# Patient Record
Sex: FEMALE | Race: WHITE | NOT HISPANIC OR LATINO | Employment: OTHER | URBAN - METROPOLITAN AREA
[De-identification: names, ages, dates, MRNs, and addresses within clinical notes are randomized per-mention and may not be internally consistent; named-entity substitution may affect disease eponyms.]

---

## 2017-03-09 ENCOUNTER — HOSPITAL ENCOUNTER (INPATIENT)
Facility: HOSPITAL | Age: 62
LOS: 4 days | Discharge: HOME HEALTH | DRG: 327 | End: 2017-03-13
Attending: EMERGENCY MEDICINE | Admitting: SURGERY
Payer: OTHER GOVERNMENT

## 2017-03-09 DIAGNOSIS — K56.7 ILEUS: ICD-10-CM

## 2017-03-09 DIAGNOSIS — F10.20: ICD-10-CM

## 2017-03-09 DIAGNOSIS — F10.20 ALCOHOLISM: ICD-10-CM

## 2017-03-09 DIAGNOSIS — F41.9 ANXIETY: ICD-10-CM

## 2017-03-09 DIAGNOSIS — M47.816 LUMBAR FACET ARTHROPATHY: ICD-10-CM

## 2017-03-09 DIAGNOSIS — K44.0 INCARCERATED HIATAL HERNIA: Primary | ICD-10-CM

## 2017-03-09 DIAGNOSIS — R07.9 CHEST PAIN, UNSPECIFIED TYPE: ICD-10-CM

## 2017-03-09 DIAGNOSIS — R10.13 ABDOMINAL PAIN, EPIGASTRIC: ICD-10-CM

## 2017-03-09 DIAGNOSIS — F31.9 BIPOLAR DISEASE, CHRONIC: ICD-10-CM

## 2017-03-09 DIAGNOSIS — F41.1 ANXIETY STATE, UNSPECIFIED: ICD-10-CM

## 2017-03-09 DIAGNOSIS — F33.2 MAJOR DEPRESSIVE DISORDER, RECURRENT EPISODE, SEVERE, WITHOUT MENTION OF PSYCHOTIC BEHAVIOR: ICD-10-CM

## 2017-03-09 DIAGNOSIS — M43.10 ACQUIRED SPONDYLOLISTHESIS: ICD-10-CM

## 2017-03-09 DIAGNOSIS — R00.0 TACHYCARDIA: ICD-10-CM

## 2017-03-09 DIAGNOSIS — F11.20 OPIATE DEPENDENCE, CONTINUOUS: ICD-10-CM

## 2017-03-09 DIAGNOSIS — M47.819 SPONDYLOSIS WITHOUT MYELOPATHY: ICD-10-CM

## 2017-03-09 DIAGNOSIS — M51.36 DDD (DEGENERATIVE DISC DISEASE), LUMBAR: Chronic | ICD-10-CM

## 2017-03-09 DIAGNOSIS — E07.9 THYROID DISEASE: ICD-10-CM

## 2017-03-09 DIAGNOSIS — M51.37 DEGENERATION OF LUMBAR OR LUMBOSACRAL INTERVERTEBRAL DISC: ICD-10-CM

## 2017-03-09 LAB
ALBUMIN SERPL BCP-MCNC: 3.7 G/DL
ALP SERPL-CCNC: 332 U/L
ALT SERPL W/O P-5'-P-CCNC: 70 U/L
AMPHET+METHAMPHET UR QL: NEGATIVE
ANION GAP SERPL CALC-SCNC: 14 MMOL/L
AST SERPL-CCNC: 151 U/L
BARBITURATES UR QL SCN>200 NG/ML: NEGATIVE
BASOPHILS # BLD AUTO: 0.05 K/UL
BASOPHILS NFR BLD: 1.5 %
BENZODIAZ UR QL SCN>200 NG/ML: NEGATIVE
BILIRUB SERPL-MCNC: 1 MG/DL
BILIRUB UR QL STRIP: NEGATIVE
BNP SERPL-MCNC: 25 PG/ML
BUN SERPL-MCNC: 3 MG/DL
BZE UR QL SCN: NEGATIVE
CALCIUM SERPL-MCNC: 8.5 MG/DL
CANNABINOIDS UR QL SCN: NEGATIVE
CHLORIDE SERPL-SCNC: 96 MMOL/L
CLARITY UR: CLEAR
CO2 SERPL-SCNC: 20 MMOL/L
COLOR UR: ABNORMAL
CREAT SERPL-MCNC: 0.6 MG/DL
CREAT UR-MCNC: 10.1 MG/DL
DIFFERENTIAL METHOD: ABNORMAL
EOSINOPHIL # BLD AUTO: 0 K/UL
EOSINOPHIL NFR BLD: 0.3 %
ERYTHROCYTE [DISTWIDTH] IN BLOOD BY AUTOMATED COUNT: 13.9 %
EST. GFR  (AFRICAN AMERICAN): >60 ML/MIN/1.73 M^2
EST. GFR  (NON AFRICAN AMERICAN): >60 ML/MIN/1.73 M^2
ETHANOL SERPL-MCNC: 264 MG/DL
GLUCOSE SERPL-MCNC: 120 MG/DL
GLUCOSE UR QL STRIP: NEGATIVE
HCT VFR BLD AUTO: 41 %
HGB BLD-MCNC: 14.9 G/DL
HGB UR QL STRIP: NEGATIVE
INR PPP: 1.1
KETONES UR QL STRIP: NEGATIVE
LACTATE SERPL-SCNC: 3.3 MMOL/L
LEUKOCYTE ESTERASE UR QL STRIP: ABNORMAL
LIPASE SERPL-CCNC: 35 U/L
LYMPHOCYTES # BLD AUTO: 1 K/UL
LYMPHOCYTES NFR BLD: 31.5 %
MCH RBC QN AUTO: 32.5 PG
MCHC RBC AUTO-ENTMCNC: 36.3 %
MCV RBC AUTO: 89 FL
METHADONE UR QL SCN>300 NG/ML: NEGATIVE
MICROSCOPIC COMMENT: NORMAL
MONOCYTES # BLD AUTO: 0.3 K/UL
MONOCYTES NFR BLD: 9.5 %
NEUTROPHILS # BLD AUTO: 1.9 K/UL
NEUTROPHILS NFR BLD: 56.9 %
NITRITE UR QL STRIP: NEGATIVE
OPIATES UR QL SCN: NEGATIVE
PCP UR QL SCN>25 NG/ML: NEGATIVE
PH UR STRIP: 6 [PH] (ref 5–8)
PLATELET # BLD AUTO: 258 K/UL
PMV BLD AUTO: 10.2 FL
POTASSIUM SERPL-SCNC: 3.8 MMOL/L
PROT SERPL-MCNC: 8.5 G/DL
PROT UR QL STRIP: NEGATIVE
PROTHROMBIN TIME: 11.2 SEC
RBC # BLD AUTO: 4.59 M/UL
SODIUM SERPL-SCNC: 130 MMOL/L
SP GR UR STRIP: 1 (ref 1–1.03)
SQUAMOUS #/AREA URNS HPF: 1 /HPF
TOXICOLOGY INFORMATION: ABNORMAL
TROPONIN I SERPL DL<=0.01 NG/ML-MCNC: <0.006 NG/ML
TROPONIN I SERPL DL<=0.01 NG/ML-MCNC: <0.006 NG/ML
URN SPEC COLLECT METH UR: ABNORMAL
UROBILINOGEN UR STRIP-ACNC: NEGATIVE EU/DL
WBC # BLD AUTO: 3.27 K/UL
WBC #/AREA URNS HPF: 1 /HPF (ref 0–5)

## 2017-03-09 PROCEDURE — 96365 THER/PROPH/DIAG IV INF INIT: CPT

## 2017-03-09 PROCEDURE — 93005 ELECTROCARDIOGRAM TRACING: CPT

## 2017-03-09 PROCEDURE — 84484 ASSAY OF TROPONIN QUANT: CPT

## 2017-03-09 PROCEDURE — 83690 ASSAY OF LIPASE: CPT

## 2017-03-09 PROCEDURE — 83880 ASSAY OF NATRIURETIC PEPTIDE: CPT

## 2017-03-09 PROCEDURE — 25000003 PHARM REV CODE 250: Performed by: INTERNAL MEDICINE

## 2017-03-09 PROCEDURE — 63600175 PHARM REV CODE 636 W HCPCS: Performed by: EMERGENCY MEDICINE

## 2017-03-09 PROCEDURE — 80320 DRUG SCREEN QUANTALCOHOLS: CPT

## 2017-03-09 PROCEDURE — 85610 PROTHROMBIN TIME: CPT

## 2017-03-09 PROCEDURE — 80053 COMPREHEN METABOLIC PANEL: CPT

## 2017-03-09 PROCEDURE — 96366 THER/PROPH/DIAG IV INF ADDON: CPT

## 2017-03-09 PROCEDURE — 96375 TX/PRO/DX INJ NEW DRUG ADDON: CPT

## 2017-03-09 PROCEDURE — 25000003 PHARM REV CODE 250: Performed by: EMERGENCY MEDICINE

## 2017-03-09 PROCEDURE — 80307 DRUG TEST PRSMV CHEM ANLYZR: CPT | Mod: 59

## 2017-03-09 PROCEDURE — 83605 ASSAY OF LACTIC ACID: CPT

## 2017-03-09 PROCEDURE — 82570 ASSAY OF URINE CREATININE: CPT

## 2017-03-09 PROCEDURE — 63600175 PHARM REV CODE 636 W HCPCS: Performed by: INTERNAL MEDICINE

## 2017-03-09 PROCEDURE — 25500020 PHARM REV CODE 255: Performed by: EMERGENCY MEDICINE

## 2017-03-09 PROCEDURE — 99285 EMERGENCY DEPT VISIT HI MDM: CPT | Mod: 25

## 2017-03-09 PROCEDURE — 96376 TX/PRO/DX INJ SAME DRUG ADON: CPT

## 2017-03-09 PROCEDURE — 81000 URINALYSIS NONAUTO W/SCOPE: CPT

## 2017-03-09 PROCEDURE — 85025 COMPLETE CBC W/AUTO DIFF WBC: CPT

## 2017-03-09 PROCEDURE — 12000002 HC ACUTE/MED SURGE SEMI-PRIVATE ROOM

## 2017-03-09 PROCEDURE — C9113 INJ PANTOPRAZOLE SODIUM, VIA: HCPCS | Performed by: INTERNAL MEDICINE

## 2017-03-09 RX ORDER — FAMOTIDINE 10 MG/ML
20 INJECTION INTRAVENOUS EVERY 12 HOURS
Status: DISCONTINUED | OUTPATIENT
Start: 2017-03-09 | End: 2017-03-09

## 2017-03-09 RX ORDER — LEVOTHYROXINE SODIUM ANHYDROUS 100 UG/5ML
12.5 INJECTION, POWDER, LYOPHILIZED, FOR SOLUTION INTRAVENOUS DAILY
Status: DISCONTINUED | OUTPATIENT
Start: 2017-03-10 | End: 2017-03-13 | Stop reason: HOSPADM

## 2017-03-09 RX ORDER — MORPHINE SULFATE 10 MG/ML
4 INJECTION INTRAMUSCULAR; INTRAVENOUS; SUBCUTANEOUS EVERY 4 HOURS PRN
Status: DISCONTINUED | OUTPATIENT
Start: 2017-03-09 | End: 2017-03-11

## 2017-03-09 RX ORDER — ONDANSETRON 2 MG/ML
4 INJECTION INTRAMUSCULAR; INTRAVENOUS
Status: COMPLETED | OUTPATIENT
Start: 2017-03-09 | End: 2017-03-09

## 2017-03-09 RX ORDER — METOPROLOL TARTRATE 1 MG/ML
5 INJECTION, SOLUTION INTRAVENOUS EVERY 8 HOURS
Status: DISCONTINUED | OUTPATIENT
Start: 2017-03-09 | End: 2017-03-10

## 2017-03-09 RX ORDER — SODIUM CHLORIDE 9 MG/ML
INJECTION, SOLUTION INTRAVENOUS CONTINUOUS
Status: DISCONTINUED | OUTPATIENT
Start: 2017-03-09 | End: 2017-03-10

## 2017-03-09 RX ORDER — LORAZEPAM 2 MG/ML
1 INJECTION INTRAMUSCULAR EVERY 4 HOURS PRN
Status: DISCONTINUED | OUTPATIENT
Start: 2017-03-09 | End: 2017-03-13 | Stop reason: HOSPADM

## 2017-03-09 RX ORDER — ASPIRIN 325 MG
325 TABLET ORAL
Status: COMPLETED | OUTPATIENT
Start: 2017-03-09 | End: 2017-03-09

## 2017-03-09 RX ORDER — ONDANSETRON 2 MG/ML
4 INJECTION INTRAMUSCULAR; INTRAVENOUS EVERY 12 HOURS PRN
Status: DISCONTINUED | OUTPATIENT
Start: 2017-03-09 | End: 2017-03-13 | Stop reason: HOSPADM

## 2017-03-09 RX ORDER — ENOXAPARIN SODIUM 100 MG/ML
40 INJECTION SUBCUTANEOUS EVERY 24 HOURS
Status: DISCONTINUED | OUTPATIENT
Start: 2017-03-10 | End: 2017-03-10

## 2017-03-09 RX ORDER — LORAZEPAM 2 MG/ML
1 INJECTION INTRAMUSCULAR
Status: COMPLETED | OUTPATIENT
Start: 2017-03-09 | End: 2017-03-09

## 2017-03-09 RX ORDER — PANTOPRAZOLE SODIUM 40 MG/10ML
40 INJECTION, POWDER, LYOPHILIZED, FOR SOLUTION INTRAVENOUS DAILY
Status: DISCONTINUED | OUTPATIENT
Start: 2017-03-09 | End: 2017-03-13 | Stop reason: HOSPADM

## 2017-03-09 RX ADMIN — PROMETHAZINE HYDROCHLORIDE 6.25 MG: 25 INJECTION, SOLUTION INTRAMUSCULAR; INTRAVENOUS at 07:03

## 2017-03-09 RX ADMIN — MORPHINE SULFATE 4 MG: 10 INJECTION INTRAVENOUS at 08:03

## 2017-03-09 RX ADMIN — METOPROLOL TARTRATE 5 MG: 5 INJECTION INTRAVENOUS at 04:03

## 2017-03-09 RX ADMIN — LORAZEPAM 1 MG: 2 INJECTION, SOLUTION INTRAMUSCULAR; INTRAVENOUS at 09:03

## 2017-03-09 RX ADMIN — SODIUM CHLORIDE: 0.9 INJECTION, SOLUTION INTRAVENOUS at 09:03

## 2017-03-09 RX ADMIN — PANTOPRAZOLE SODIUM 40 MG: 40 INJECTION, POWDER, FOR SOLUTION INTRAVENOUS at 04:03

## 2017-03-09 RX ADMIN — ASPIRIN 325 MG ORAL TABLET 325 MG: 325 PILL ORAL at 11:03

## 2017-03-09 RX ADMIN — ONDANSETRON 4 MG: 2 INJECTION INTRAMUSCULAR; INTRAVENOUS at 05:03

## 2017-03-09 RX ADMIN — ONDANSETRON 4 MG: 2 INJECTION INTRAMUSCULAR; INTRAVENOUS at 12:03

## 2017-03-09 RX ADMIN — PROMETHAZINE HYDROCHLORIDE 25 MG: 25 INJECTION, SOLUTION INTRAMUSCULAR; INTRAVENOUS at 11:03

## 2017-03-09 RX ADMIN — IOHEXOL 100 ML: 350 INJECTION, SOLUTION INTRAVENOUS at 01:03

## 2017-03-09 RX ADMIN — MORPHINE SULFATE 4 MG: 10 INJECTION INTRAVENOUS at 04:03

## 2017-03-09 RX ADMIN — LORAZEPAM 1 MG: 2 INJECTION, SOLUTION INTRAMUSCULAR; INTRAVENOUS at 01:03
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
## 2017-03-09 NOTE — IP AVS SNAPSHOT
[place] [address] [place] [address]  Phone: [phone]           Patient Discharge Instructions     Our goal is to set you up for success. This packet includes information on your condition, medications, and your home care. It will help you to care for yourself so you don't get sicker and need to go back to the hospital.     Please ask your nurse if you have any questions.        There are many details to remember when preparing to leave the hospital. Here is what you will need to do:    1. Take your medicine. If you are prescribed medications, review your Medication List in the following pages. You may have new medications to  at the pharmacy and others that you'll need to stop taking. Review the instructions for how and when to take your medications. Talk with your doctor or nurses if you are unsure of what to do.     2. Go to your follow-up appointments. Specific follow-up information is listed in the following pages. Your may be contacted by a transition nurse or clinical provider about future appointments. Be sure we have all of the phone numbers to reach you, if needed. Please contact your provider's office if you are unable to make an appointment.     3. Watch for warning signs. Your doctor or nurse will give you detailed warning signs to watch for and when to call for assistance. These instructions may also include educational information about your condition. If you experience any of warning signs to your health, call your doctor.               ** Verify the list of medication(s) below is accurate and up to date. Carry this with you in case of emergency. If your medications have changed, please notify your healthcare provider.             Medication List      CHANGE how you take these medications        Additional Info                      * oxycodone 20 mg Tab immediate release tablet   Commonly known as:  ROXICODONE   Refills:  0   What changed:  Another  medication with the same name was added. Make sure you understand how and when to take each.    Instructions:  TK 1 T PO  Q 6-8 H PRN     Begin Date    AM    Noon    PM    Bedtime       * oxycodone 5 mg/5 mL Soln   Commonly known as:  ROXICODONE   Quantity:  4 Bottle   Refills:  0   Dose:  10 mg   What changed:  You were already taking a medication with the same name, and this prescription was added. Make sure you understand how and when to take each.    Last time this was given:  10 mg on 3/13/2017  9:45 AM   Instructions:  Take 10 mLs (10 mg total) by mouth every 4 (four) hours as needed.     Begin Date    AM    Noon    PM    Bedtime       * Notice:  This list has 2 medication(s) that are the same as other medications prescribed for you. Read the directions carefully, and ask your doctor or other care provider to review them with you.      CONTINUE taking these medications        Additional Info                      amitriptyline 50 MG tablet   Commonly known as:  ELAVIL   Quantity:  90 tablet   Refills:  0   Dose:  50 mg    Instructions:  Take 1 tablet (50 mg total) by mouth every evening.     Begin Date    AM    Noon    PM    Bedtime       atenolol 25 MG tablet   Commonly known as:  TENORMIN   Refills:  3    Instructions:  TK 1 T PO  D     Begin Date    AM    Noon    PM    Bedtime       capsicum 0.075% 0.075 % topical cream   Commonly known as:  TRIXAICIN HP   Quantity:  56 g   Refills:  1    Instructions:  Apply topically 3 (three) times daily.     Begin Date    AM    Noon    PM    Bedtime       clonazePAM 1 MG tablet   Commonly known as:  KLONOPIN   Quantity:  60 tablet   Refills:  0    Instructions:  TAKE 1 TABLET BY MOUTH TWICE DAILY     Begin Date    AM    Noon    PM    Bedtime       cyclobenzaprine 10 MG tablet   Commonly known as:  FLEXERIL   Quantity:  90 tablet   Refills:  0    Instructions:  TAKE 1 TABLET(10 MG) BY MOUTH THREE TIMES DAILY AS NEEDED FOR MUSCLE SPASMS     Begin Date    AM    Noon    PM     Bedtime       duloxetine 30 MG capsule   Commonly known as:  CYMBALTA   Quantity:  90 capsule   Refills:  2   Dose:  30 mg    Instructions:  Take 1 capsule (30 mg total) by mouth once daily.     Begin Date    AM    Noon    PM    Bedtime       gabapentin 300 MG capsule   Commonly known as:  NEURONTIN   Refills:  3    Instructions:  TK ONE C PO Q 8 H     Begin Date    AM    Noon    PM    Bedtime       JUBLIA 10 % Darlin   Quantity:  24 mL   Refills:  0   Generic drug:  efinaconazole    Instructions:  APPLY ONE DOSE ONCE DAILY     Begin Date    AM    Noon    PM    Bedtime       levothyroxine 25 MCG tablet   Commonly known as:  SYNTHROID   Quantity:  90 tablet   Refills:  5   Dose:  25 mcg   Comments:  **Patient requests 90 days supply**    Instructions:  Take 1 tablet (25 mcg total) by mouth once daily.     Begin Date    AM    Noon    PM    Bedtime       nitroGLYCERIN 0.3 MG SL tablet   Commonly known as:  NITROSTAT   Quantity:  30 tablet   Refills:  0   Dose:  0.3 mg    Instructions:  Place 1 tablet (0.3 mg total) under the tongue every 5 (five) minutes as needed for Chest pain.     Begin Date    AM    Noon    PM    Bedtime       pantoprazole 40 MG tablet   Commonly known as:  PROTONIX   Quantity:  90 tablet   Refills:  5   Dose:  40 mg   Comments:  **Patient requests 90 days supply**    Instructions:  Take 1 tablet (40 mg total) by mouth once daily.     Begin Date    AM    Noon    PM    Bedtime       RESTASIS 0.05 % ophthalmic emulsion   Quantity:  30 each   Refills:  3   Generic drug:  cycloSPORINE    Instructions:  INSTILL 1 DROP INTO BOTH EYES TWICE DAILY     Begin Date    AM    Noon    PM    Bedtime       zolpidem 5 MG Tab   Commonly known as:  AMBIEN   Quantity:  30 tablet   Refills:  1    Instructions:  TK 1 T PO QHS PRN     Begin Date    AM    Noon    PM    Bedtime            Where to Get Your Medications      You can get these medications from any pharmacy     Bring a paper prescription for each of these  medications     oxycodone 5 mg/5 mL Soln                  Please bring to all follow up appointments:    1. A copy of your discharge instructions.  2. All medicines you are currently taking in their original bottles.  3. Identification and insurance card.    Please arrive 15 minutes ahead of scheduled appointment time.    Please call 24 hours in advance if you must reschedule your appointment and/or time.        Your Scheduled Appointments     Mar 23, 2017 10:10 AM CDT   Post Op-OCC with Vini Gomez MD   [facility], [facility] ([facility])    [address] [facility] [address]  [facility] [address]   [phone]              Follow-up Information     Follow up with Vini Gomez MD. Go on 3/23/2017.    Specialties:  General Surgery, Bariatrics    Why:  For Appointment on Thursday 3/23/2017 @ 9:30AM    Contact information:    [address] [place] [address] [address]  [facility] [address]  [phone]          Follow up with Jameson Cornejo MD. Go on 3/22/2017.    Specialty:  Gastroenterology    Why:  For Appointment on Wednesday 3/22/2017 @ 12:45PM    Contact information:    [address] [place] [address]  [address]  [facility]  [place] [address]  [phone]        Referrals     Future Orders    Ambulatory referral to Home Health         Discharge Instructions     Future Orders    Activity as tolerated     Call MD for:  difficulty breathing or increased cough     Call MD for:  persistent nausea and vomiting or diarrhea     Call MD for:  redness, tenderness, or signs of infection (pain, swelling, redness, odor or green/yellow discharge around incision site)     Call MD for:  severe uncontrolled pain     Call MD for:  temperature >100.4     Diet general     Questions:    Total calories:      Fat restriction, if any:      Protein restriction, if any:      Na restriction, if any:      Fluid restriction:      Additional restrictions:      No dressing needed       Discharge  "References/Attachments     HIATAL HERNIA, WHAT IS A (ENGLISH)        Primary Diagnosis     Your primary diagnosis was:  Hiatal Hernia With Obstruction But No Gangrene      Admission Information     Date & Time Provider Department CSN    3/9/2017 10:23 AM Vini Gomez MD [facility] [id number]      Care Providers     Provider Role Specialty Primary office phone    Vini Gomez MD Attending Provider General Surgery [phone]    Yane Pineda MD Consulting Physician  Hospitalist [phone]    Jameson Cornejo MD Consulting Physician  Gastroenterology [phone]    Arnulfo Benton MD Surgeon  Gastroenterology [phone]    Vini Gomez MD Surgeon  General Surgery [phone]      Your Vitals Were     BP Pulse Temp Resp Height Weight    132/85 116 99.9 °F (37.7 °C) (Oral) 18 5' 5" (1.651 m) 74.5 kg (164 lb 4.6 oz)    Last Period SpO2 BMI          (Approximate) 93% 27.34 kg/m2        Recent Lab Values        2/17/2015                           6:08 AM           A1C 5.4                       Pending Labs     Order Current Status    Specimen to Pathology - Surgery Collected (03/10/17 1618)      Allergies as of 3/13/2017     No Known Allergies      [facility]     [facility] Nurse Care Line - 24/7 Assistance  Unless otherwise directed by your provider, please contact [facility], our nurse care line that is available for 24/7 assistance.     Registered nurses in the [facility] provide clinical advisement, health education, appointment booking, and other advisory services.  Call for this free service at [phone].        Advance Directives     An advance directive is a document which, in the event you are no longer able to make decisions for yourself, tells your healthcare team what kind of treatment you do or do not want to receive, or who you would like to make those decisions for you.  If you do not currently have an advance directive, [facility] encourages " you to create one.  For more information call:  [phone]), [phone] ([phone]),  or log on to [email or web address].        Language Assistance Services     ATTENTION: Language assistance services are available, free of charge. Please call [phone].      ATENCIÓN: Si habla español, tiene a quintero disposición servicios gratuitos de asistencia lingüística. Llame al [phone].     [facility] Ý: N?u b?n nói Ti?ng Vi?t, có các d?ch v? h? tr? ngôn ng? mi?n phí dành cho b?n. G?i s? [phone].        [facility] Sign-Up     Activating your [facility] account is as easy as 1-2-3!     1) Visit [email or web address], select Sign Up Now, enter this activation code and your date of birth, then select Next.  [email or web address]  Expires: 4/27/2017 12:34 PM      2) Create a username and password to use when you visit [facility] in the future and select a security question in case you lose your password and select Next.    3) Enter your e-mail address and click Sign Up!    Additional Information  If you have questions, please e-mail [email or web address] or call [phone] to talk to our [facility] staff. Remember, [facility] is NOT to be used for urgent needs. For medical emergencies, dial 911.          [facility] complies with applicable Federal civil rights laws and does not discriminate on the basis of race, color, national origin, age, disability, or sex.

## 2017-03-09 NOTE — CONSULTS
Reason for Consult:  N/V, Chest Pain    HPI:  Pt is a 62 y.o. female who presents with chest pain x 1 day.  Sx's have been ongoing, intermittently over a few months, yet became acutely severe last night.  + associated N/V, non-bloody.  Pt. Found to have abnormal imaging, consistent with volvulus of hiatal hernia.  No alleviating/exacerbating factors.  No associated abd. Pain, F/C, wt. Loss.  No dysphagia, GERD sx's, yellowing of eyes/skin.  No diarrhea/constipation.  No blood in her stool nor black/tarry stools.      Pt. Had EGD, C-scope in 2013      Past Medical History:   Diagnosis Date    Alcohol abuse     Anxiety     Bipolar disorder     Cirrhosis of liver     Depression     Fall     GERD (gastroesophageal reflux disease)     Low back pain     Thyroid disease        Past Surgical History:   Procedure Laterality Date    JOINT REPLACEMENT      KNEE SURGERY  bilateral replacement    right foot sx  2008    SHOULDER SURGERY  replacement       Family History   Problem Relation Age of Onset    Cancer Mother     Cancer Father        Social History     Social History    Marital status:      Spouse name: N/A    Number of children: N/A    Years of education: N/A     Occupational History    Not on file.     Social History Main Topics    Smoking status: Never Smoker    Smokeless tobacco: Never Used    Alcohol use 14.4 oz/week     24 Cans of beer per week    Drug use: No    Sexual activity: Not on file     Other Topics Concern    Not on file     Social History Narrative       Endoscopic History:  EGD - 2013 - Large HH, no varices    Review of patient's allergies indicates:  No Known Allergies    No current facility-administered medications on file prior to encounter.      Current Outpatient Prescriptions on File Prior to Encounter   Medication Sig Dispense Refill    oxycodone 20 mg Tab TK 1 T PO  Q 6-8 H PRN  0    amitriptyline (ELAVIL) 50 MG tablet Take 1 tablet (50 mg total) by mouth every  evening. 90 tablet 0    atenolol (TENORMIN) 25 MG tablet TK 1 T PO  D  3    capsicum 0.075% (TRIXAICIN HP) 0.075 % topical cream Apply topically 3 (three) times daily. 56 g 1    clonazePAM (KLONOPIN) 1 MG tablet TAKE 1 TABLET BY MOUTH TWICE DAILY 60 tablet 0    cyclobenzaprine (FLEXERIL) 10 MG tablet TAKE 1 TABLET(10 MG) BY MOUTH THREE TIMES DAILY AS NEEDED FOR MUSCLE SPASMS 90 tablet 0    duloxetine (CYMBALTA) 30 MG capsule Take 1 capsule (30 mg total) by mouth once daily. 90 capsule 2    gabapentin (NEURONTIN) 300 MG capsule TK ONE C PO Q 8 H  3    JUBLIA 10 % Eran APPLY ONE DOSE ONCE DAILY 24 mL 0    levothyroxine (SYNTHROID) 25 MCG tablet Take 1 tablet (25 mcg total) by mouth once daily. 90 tablet 5    nitroGLYCERIN (NITROSTAT) 0.3 MG SL tablet Place 1 tablet (0.3 mg total) under the tongue every 5 (five) minutes as needed for Chest pain. 30 tablet 0    pantoprazole (PROTONIX) 40 MG tablet Take 1 tablet (40 mg total) by mouth once daily. 90 tablet 5    RESTASIS 0.05 % ophthalmic emulsion INSTILL 1 DROP INTO BOTH EYES TWICE DAILY 30 each 3    zolpidem (AMBIEN) 5 MG Tab TK 1 T PO QHS PRN 30 tablet 1     Scheduled Meds:   [START ON 3/10/2017] levothyroxine  12.5 mcg Intravenous Daily    metoprolol  5 mg Intravenous Q8H    pantoprazole  40 mg Intravenous Daily     Continuous Infusions:   PRN Meds:.lorazepam    Review of Systems:  CONSTITUTIONAL: Negative for weakness, fever, weight loss, weight gain.  HEENT: Eyes: Negative for double/blurred vision. Ears: Negative pain or loss of hearing. Nose:Negative for nasal congestion. Negative for rhinorrhea Mouth: Negative for dry mouth/pain Throat: Negative for masses or hoarseness.  CARDIOVASCULAR: Negative for chest pain or palpitations.  RESPIRATORY: Negative for SOB, wheezes  GASTROINTESTINAL: See HPI  GENITOURINARY: Negative for dysuria/hematuria.  MUSCULOSKELETAL: Negative for osteoarthritis, muscle pain.  SKIN: Negative for rashes/lesions.  NEUROLOGIC:  "Negative for headaches, numbness/tingling.  ENDOCRINE: Negative for diabetes, + thyroid abnormalities.  HEMATOLOGIC: Negative for anemia or blood dyscrasias.    Patient Vitals for the past 24 hrs:   BP Temp Temp src Pulse Resp SpO2 Height Weight   03/09/17 1011 (!) 121/92 97.9 °F (36.6 °C) Oral 107 18 (!) 92 % 5' 5" (1.651 m) 72.6 kg (160 lb)       Gen: Well developed, well nourished, no apparent distress, cooperative  HEENT: Anicteric, PERRLA, normocephalic, neck symmetrical  CV: S1, S2, no murmers/rubs, non-displaced PMI  Lungs: CTA-B, normal excursion  Abd: Soft, NT, ND, normal BS's, no HSM  Ext: No c/c/e, 1+ DP pulses to BLE's  Neuro: CN II-XII grossly intact, no asterixis.  Skin: No rashes/lesions, normal texture  Psych: AA&O x 4, normal affect    Labs:    Recent Labs  Lab 03/09/17  1158   CALCIUM 8.5*   PROT 8.5*   *   K 3.8   CO2 20*   CL 96   BUN 3*   CREATININE 0.6   ALKPHOS 332*   ALT 70*   *   BILITOT 1.0     Recent Results (from the past 336 hour(s))   CBC auto differential    Collection Time: 03/09/17 11:58 AM   Result Value Ref Range    WBC 3.27 (L) 3.90 - 12.70 K/uL    Hemoglobin 14.9 12.0 - 16.0 g/dL    Hematocrit 41.0 37.0 - 48.5 %    Platelets 258 150 - 350 K/uL       Recent Labs  Lab 03/09/17  1158   INR 1.1       Imaging:  CT:        Hiatal hernia with organoaxial volvulus.         Assessment:  Pt. Is a 62 y.o. female with:  1. Hiatal Hernia with volvulus - Concerning for necrosis (elevated Lactate).  2. Abnormal LFT's - + Hx of EtOH intake.  Continues to consume EtOH.     3. 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 Depression/Anxiety, Bipolar, Thyroid disease....    Recommendations:  1. Monitor for sx's.  2. Gen Surg following to consider surgical intervention.  3. EGD tomorrow to evaluate.  4. EtOH cessation counseling.        I would like to take this opportunity to thank you for this consult.  If you have any questions or concerns, please do not hesitate to contact me.         "

## 2017-03-09 NOTE — ASSESSMENT & PLAN NOTE
Routine labs, CXR and EKG reviewed. Patient is at low risk for perioperative cardiopulmonary complications based on the 2014 ACC/AHA Guideline on Perioperative Cardiovascular Evaluation and Management of patients undergoing noncardiac surgery. Patient with no active cardiac issues. Patient with good functional mets > 4 and with no significant cardiac clinical risk factors for intermediate-high risk (if emergent) surgery. Recommend to proceed to surgery as planned with no additional non-invasive cardiac testing required. Given her active alcohol use, I would recommend initiating on a diazepam taper to avoid complications of withdrawal that would delay surgery. Also to initiate atenolol and provide with IVFs to support lactic acidemia and presenting alcoholic hepatitis. Hold gabapentin, flexeril, klonopin, amitriptyline and duloxetine until after surgery. Pending tox screen to rule out other substances used prior to presentation.

## 2017-03-09 NOTE — SUBJECTIVE & OBJECTIVE
Past Medical History:   Diagnosis Date    Alcohol abuse     Anxiety     Bipolar disorder     Cirrhosis of liver     Depression     Fall     GERD (gastroesophageal reflux disease)     Low back pain     Thyroid disease        Past Surgical History:   Procedure Laterality Date    JOINT REPLACEMENT      KNEE SURGERY  bilateral replacement    right foot sx  2008    SHOULDER SURGERY  replacement       Review of patient's allergies indicates:  No Known Allergies    No current facility-administered medications on file prior to encounter.      Current Outpatient Prescriptions on File Prior to Encounter   Medication Sig    oxycodone 20 mg Tab TK 1 T PO  Q 6-8 H PRN    amitriptyline (ELAVIL) 50 MG tablet Take 1 tablet (50 mg total) by mouth every evening.    atenolol (TENORMIN) 25 MG tablet TK 1 T PO  D    capsicum 0.075% (TRIXAICIN HP) 0.075 % topical cream Apply topically 3 (three) times daily.    clonazePAM (KLONOPIN) 1 MG tablet TAKE 1 TABLET BY MOUTH TWICE DAILY    cyclobenzaprine (FLEXERIL) 10 MG tablet TAKE 1 TABLET(10 MG) BY MOUTH THREE TIMES DAILY AS NEEDED FOR MUSCLE SPASMS    duloxetine (CYMBALTA) 30 MG capsule Take 1 capsule (30 mg total) by mouth once daily.    gabapentin (NEURONTIN) 300 MG capsule TK ONE C PO Q 8 H    JUBLIA 10 % Eran APPLY ONE DOSE ONCE DAILY    levothyroxine (SYNTHROID) 25 MCG tablet Take 1 tablet (25 mcg total) by mouth once daily.    nitroGLYCERIN (NITROSTAT) 0.3 MG SL tablet Place 1 tablet (0.3 mg total) under the tongue every 5 (five) minutes as needed for Chest pain.    pantoprazole (PROTONIX) 40 MG tablet Take 1 tablet (40 mg total) by mouth once daily.    RESTASIS 0.05 % ophthalmic emulsion INSTILL 1 DROP INTO BOTH EYES TWICE DAILY    zolpidem (AMBIEN) 5 MG Tab TK 1 T PO QHS PRN     Family History     Problem Relation (Age of Onset)    Cancer Mother, Father        Social History Main Topics    Smoking status: Never Smoker    Smokeless tobacco: Never Used     Alcohol use 14.4 oz/week     24 Cans of beer per week    Drug use: No    Sexual activity: Not on file     Review of Systems   Constitutional: Negative.    HENT: Negative.    Eyes: Negative.    Respiratory: Negative.  Negative for cough and shortness of breath.    Cardiovascular: Positive for chest pain. Negative for palpitations and leg swelling.   Gastrointestinal: Positive for diarrhea, nausea and vomiting. Negative for abdominal pain.   Endocrine: Negative.    Genitourinary: Negative.    Musculoskeletal: Negative.    Skin: Negative.    Allergic/Immunologic: Negative.    Neurological: Negative.    Hematological: Negative.    Psychiatric/Behavioral: The patient is nervous/anxious.      Objective:     Vital Signs (Most Recent):  Temp: 97.9 °F (36.6 °C) (03/09/17 1011)  Pulse: 107 (03/09/17 1011)  Resp: 18 (03/09/17 1011)  BP: (!) 121/92 (03/09/17 1011)  SpO2: (!) 92 % (03/09/17 1011) Vital Signs (24h Range):  Temp:  [97.9 °F (36.6 °C)] 97.9 °F (36.6 °C)  Pulse:  [107] 107  Resp:  [18] 18  SpO2:  [92 %] 92 %  BP: (121)/(92) 121/92     Weight: 72.6 kg (160 lb)  Body mass index is 26.63 kg/(m^2).    Physical Exam   Constitutional: She is oriented to person, place, and time. She appears well-developed. No distress.   Appears older than stated age   HENT:   Head: Normocephalic and atraumatic.   Mouth/Throat: Oropharynx is clear and moist.   Eyes: Conjunctivae and EOM are normal. No scleral icterus.   Neck: Normal range of motion. Neck supple. No thyromegaly present.   Cardiovascular: Normal rate, regular rhythm, normal heart sounds and intact distal pulses.    No murmur heard.  Pulmonary/Chest: Effort normal. She has no wheezes. She has no rales.   Decreased breath sounds on left lower lobe  Breathing comfortably at room air   Abdominal: Soft. She exhibits no distension. There is no tenderness. There is no guarding.   Hypoactive bowel sounds   Musculoskeletal: Normal range of motion. She exhibits no edema.    Neurological: She is alert and oriented to person, place, and time. No cranial nerve deficit.   Skin: Skin is warm and dry. She is not diaphoretic.   Vitals reviewed.      Significant Labs: All pertinent labs within the past 24 hours have been reviewed.    Significant Imaging: I have reviewed all pertinent imaging results/findings within the past 24 hours.

## 2017-03-09 NOTE — CONSULTS
"[facility] Hospital Medicine  Consult Note    Patient Name: Estella Arevalo  MRN: [id number]  Admission Date: 3/9/2017  Hospital Length of Stay: 0 days  Attending Physician: Salvador Wells MD   Primary Care Provider: Angela Packer MD           Patient information was obtained from patient and ER records.     Consults  Subjective:     Principal Problem: Incarcerated hiatal hernia    Chief Complaint:   Chief Complaint   Patient presents with    Chest Pain     mid-sternal CP since last night    Vomiting     multiple episodes of vomiting with nausea        HPI: 62 year old female with self reported past MI, alcohol dependence, essential hypertension, narcotic use dependence, acquired hypothyroidism, chronic back pain, depression, anxiety and chronic Hep C who presented via EMS for worsening nausea, vomiting, diarrhea and "stabbing" chest pain for one day in evolution. Stated she's had chest pain for months. It is described as intermittent, left sided, worsening with movement, and 7/10 in intensity at its worst. Not associated with n/v nor worsening with PO intake. Her chest pain yesterday was described as same chest pain she's had, but with new addition of n/v of bilious fluid. This self reported MI was described as "I saw my doctor for this pain. She did an EKG and showed a defect. I had a heart attack". Was referred to cardiology but never went. No history of cardiac revascularization nor angiography. Has no signs of CHF. No perceived murmur on exam to suggest valvular disease. No diabetes nor renal insufficiency. Today, EKG had T wave inversion in V1 and V2, with occasional PVCs, NSR and with normal QTc. Otherwise normal. Troponin negative. On chest XRay it was noted she had a very significant for "Hiatal hernia with organoaxial volvulus." A CT confirms this.       Of note, patient drinks beer on a daily basis. Last drink was yesterday despite ongoing symptoms. Apparently no longer " taking narcotics for chronic pain. Tox screen pending. CMP with   and ALT with 70 consistent with alcoholic hepatitis. Liver function appears normal otherwise. Lactic acid of 3.3. No leukocytosis or other signs of active infection      Past Medical History:   Diagnosis Date    Alcohol abuse     Anxiety     Bipolar disorder     Cirrhosis of liver     Depression     Fall     GERD (gastroesophageal reflux disease)     Low back pain     Thyroid disease        Past Surgical History:   Procedure Laterality Date    JOINT REPLACEMENT      KNEE SURGERY  bilateral replacement    right foot sx  2008    SHOULDER SURGERY  replacement       Review of patient's allergies indicates:  No Known Allergies    No current facility-administered medications on file prior to encounter.      Current Outpatient Prescriptions on File Prior to Encounter   Medication Sig    oxycodone 20 mg Tab TK 1 T PO  Q 6-8 H PRN    amitriptyline (ELAVIL) 50 MG tablet Take 1 tablet (50 mg total) by mouth every evening.    atenolol (TENORMIN) 25 MG tablet TK 1 T PO  D    capsicum 0.075% (TRIXAICIN HP) 0.075 % topical cream Apply topically 3 (three) times daily.    clonazePAM (KLONOPIN) 1 MG tablet TAKE 1 TABLET BY MOUTH TWICE DAILY    cyclobenzaprine (FLEXERIL) 10 MG tablet TAKE 1 TABLET(10 MG) BY MOUTH THREE TIMES DAILY AS NEEDED FOR MUSCLE SPASMS    duloxetine (CYMBALTA) 30 MG capsule Take 1 capsule (30 mg total) by mouth once daily.    gabapentin (NEURONTIN) 300 MG capsule TK ONE C PO Q 8 H    JUBLIA 10 % Eran APPLY ONE DOSE ONCE DAILY    levothyroxine (SYNTHROID) 25 MCG tablet Take 1 tablet (25 mcg total) by mouth once daily.    nitroGLYCERIN (NITROSTAT) 0.3 MG SL tablet Place 1 tablet (0.3 mg total) under the tongue every 5 (five) minutes as needed for Chest pain.    pantoprazole (PROTONIX) 40 MG tablet Take 1 tablet (40 mg total) by mouth once daily.    RESTASIS 0.05 % ophthalmic emulsion INSTILL 1 DROP INTO BOTH EYES  TWICE DAILY    zolpidem (AMBIEN) 5 MG Tab TK 1 T PO QHS PRN     Family History     Problem Relation (Age of Onset)    Cancer Mother, Father        Social History Main Topics    Smoking status: Never Smoker    Smokeless tobacco: Never Used    Alcohol use 14.4 oz/week     24 Cans of beer per week    Drug use: No    Sexual activity: Not on file     Review of Systems   Constitutional: Negative.    HENT: Negative.    Eyes: Negative.    Respiratory: Negative.  Negative for cough and shortness of breath.    Cardiovascular: Positive for chest pain. Negative for palpitations and leg swelling.   Gastrointestinal: Positive for diarrhea, nausea and vomiting. Negative for abdominal pain.   Endocrine: Negative.    Genitourinary: Negative.    Musculoskeletal: Negative.    Skin: Negative.    Allergic/Immunologic: Negative.    Neurological: Negative.    Hematological: Negative.    Psychiatric/Behavioral: The patient is nervous/anxious.      Objective:     Vital Signs (Most Recent):  Temp: 97.9 °F (36.6 °C) (03/09/17 1011)  Pulse: 107 (03/09/17 1011)  Resp: 18 (03/09/17 1011)  BP: (!) 121/92 (03/09/17 1011)  SpO2: (!) 92 % (03/09/17 1011) Vital Signs (24h Range):  Temp:  [97.9 °F (36.6 °C)] 97.9 °F (36.6 °C)  Pulse:  [107] 107  Resp:  [18] 18  SpO2:  [92 %] 92 %  BP: (121)/(92) 121/92     Weight: 72.6 kg (160 lb)  Body mass index is 26.63 kg/(m^2).    Physical Exam   Constitutional: She is oriented to person, place, and time. She appears well-developed. No distress.   Appears older than stated age   HENT:   Head: Normocephalic and atraumatic.   Mouth/Throat: Oropharynx is clear and moist.   Eyes: Conjunctivae and EOM are normal. No scleral icterus.   Neck: Normal range of motion. Neck supple. No thyromegaly present.   Cardiovascular: Normal rate, regular rhythm, normal heart sounds and intact distal pulses.    No murmur heard.  Pulmonary/Chest: Effort normal. She has no wheezes. She has no rales.   Decreased breath sounds on  left lower lobe  Breathing comfortably at room air   Abdominal: Soft. She exhibits no distension. There is no tenderness. There is no guarding.   Hypoactive bowel sounds   Musculoskeletal: Normal range of motion. She exhibits no edema.   Neurological: She is alert and oriented to person, place, and time. No cranial nerve deficit.   Skin: Skin is warm and dry. She is not diaphoretic.   Vitals reviewed.      Significant Labs: All pertinent labs within the past 24 hours have been reviewed.    Significant Imaging: I have reviewed all pertinent imaging results/findings within the past 24 hours.    Assessment/Plan:     * Incarcerated hiatal hernia  Routine labs, CXR and EKG reviewed. Patient is at low risk for perioperative cardiopulmonary complications based on the 2014 ACC/AHA Guideline on Perioperative Cardiovascular Evaluation and Management of patients undergoing noncardiac surgery. Patient with no active cardiac issues. Patient with good functional mets > 4 and with no significant cardiac clinical risk factors for intermediate-high risk (if emergent) surgery. Recommend to proceed to surgery as planned with no additional non-invasive cardiac testing required. Given her active alcohol use, I would recommend initiating on PRN IV lorazepam which is a shorter acting and shorter acting agent for signs of alcohol withdrawal in setting of undergoing general anesthesia soon. Also to initiate atenolol and provide with IVFs to support lactic acidemia and presenting alcoholic hepatitis. BP currently at goal. Hold gabapentin, flexeril, klonopin, amitriptyline and duloxetine until after surgery. Pending tox screen to rule out other substances used prior to presentation.     Other and unspecified alcohol dependence, continuous drinking behavior  As above      Opiate dependence, continuous  Apparently no longer using. Pending tox screen      Major depressive disorder, recurrent episode, severe, without mention of psychotic  behavior  As above      Thyroid disease  Continue home dose levothyroxine    Alcoholic hepatitis  As above      VTE Risk Mitigation     None        Thank you for your consult. I will follow-up with patient. Please contact us if you have any additional questions.    Yane Peters MD  Department of Hospital Medicine   [facility]

## 2017-03-10 ENCOUNTER — SURGERY (OUTPATIENT)
Age: 62
End: 2017-03-10

## 2017-03-10 ENCOUNTER — ANESTHESIA EVENT (OUTPATIENT)
Dept: SURGERY | Facility: HOSPITAL | Age: 62
DRG: 327 | End: 2017-03-10
Payer: OTHER GOVERNMENT

## 2017-03-10 ENCOUNTER — ANESTHESIA (OUTPATIENT)
Dept: ENDOSCOPY | Facility: HOSPITAL | Age: 62
DRG: 327 | End: 2017-03-10
Payer: OTHER GOVERNMENT

## 2017-03-10 ENCOUNTER — ANESTHESIA EVENT (OUTPATIENT)
Dept: ENDOSCOPY | Facility: HOSPITAL | Age: 62
DRG: 327 | End: 2017-03-10
Payer: OTHER GOVERNMENT

## 2017-03-10 ENCOUNTER — ANESTHESIA (OUTPATIENT)
Dept: SURGERY | Facility: HOSPITAL | Age: 62
DRG: 327 | End: 2017-03-10
Payer: OTHER GOVERNMENT

## 2017-03-10 LAB
ABO + RH BLD: NORMAL
ALBUMIN SERPL BCP-MCNC: 3.7 G/DL
ALP SERPL-CCNC: 304 U/L
ALT SERPL W/O P-5'-P-CCNC: 61 U/L
ANION GAP SERPL CALC-SCNC: 12 MMOL/L
AST SERPL-CCNC: 116 U/L
BASOPHILS # BLD AUTO: 0.02 K/UL
BASOPHILS NFR BLD: 0.5 %
BILIRUB SERPL-MCNC: 2 MG/DL
BLD GP AB SCN CELLS X3 SERPL QL: NORMAL
BUN SERPL-MCNC: 6 MG/DL
CALCIUM SERPL-MCNC: 8.7 MG/DL
CHLORIDE SERPL-SCNC: 102 MMOL/L
CO2 SERPL-SCNC: 23 MMOL/L
CREAT SERPL-MCNC: 0.7 MG/DL
DAT IGG-SP REAG RBC-IMP: NORMAL
DIFFERENTIAL METHOD: ABNORMAL
EOSINOPHIL # BLD AUTO: 0 K/UL
EOSINOPHIL NFR BLD: 1 %
ERYTHROCYTE [DISTWIDTH] IN BLOOD BY AUTOMATED COUNT: 14.5 %
EST. GFR  (AFRICAN AMERICAN): >60 ML/MIN/1.73 M^2
EST. GFR  (NON AFRICAN AMERICAN): >60 ML/MIN/1.73 M^2
GLUCOSE SERPL-MCNC: 99 MG/DL
HCT VFR BLD AUTO: 41.4 %
HGB BLD-MCNC: 14.6 G/DL
LYMPHOCYTES # BLD AUTO: 1.8 K/UL
LYMPHOCYTES NFR BLD: 42.1 %
MCH RBC QN AUTO: 32.4 PG
MCHC RBC AUTO-ENTMCNC: 35.3 %
MCV RBC AUTO: 92 FL
MONOCYTES # BLD AUTO: 0.3 K/UL
MONOCYTES NFR BLD: 7.1 %
NEUTROPHILS # BLD AUTO: 2.1 K/UL
NEUTROPHILS NFR BLD: 49.3 %
PLATELET # BLD AUTO: 216 K/UL
PMV BLD AUTO: 11 FL
POTASSIUM SERPL-SCNC: 3.8 MMOL/L
PROT SERPL-MCNC: 8 G/DL
RBC # BLD AUTO: 4.51 M/UL
RH BLD: NORMAL
SODIUM SERPL-SCNC: 137 MMOL/L
WBC # BLD AUTO: 4.2 K/UL

## 2017-03-10 PROCEDURE — 63600175 PHARM REV CODE 636 W HCPCS: Performed by: ANESTHESIOLOGY

## 2017-03-10 PROCEDURE — 63600175 PHARM REV CODE 636 W HCPCS: Performed by: NURSE ANESTHETIST, CERTIFIED REGISTERED

## 2017-03-10 PROCEDURE — 0BUS4JZ SUPPLEMENT LEFT DIAPHRAGM WITH SYNTH SUB, PERC ENDO APPROACH: ICD-10-PCS | Performed by: SURGERY

## 2017-03-10 PROCEDURE — 88302 TISSUE EXAM BY PATHOLOGIST: CPT | Performed by: PATHOLOGY

## 2017-03-10 PROCEDURE — D9220A PRA ANESTHESIA: Mod: ANES,,, | Performed by: ANESTHESIOLOGY

## 2017-03-10 PROCEDURE — 27201423 OPTIME MED/SURG SUP & DEVICES STERILE SUPPLY: Performed by: SURGERY

## 2017-03-10 PROCEDURE — 43235 EGD DIAGNOSTIC BRUSH WASH: CPT | Performed by: INTERNAL MEDICINE

## 2017-03-10 PROCEDURE — 71000033 HC RECOVERY, INTIAL HOUR: Performed by: SURGERY

## 2017-03-10 PROCEDURE — 0DH63UZ INSERTION OF FEEDING DEVICE INTO STOMACH, PERCUTANEOUS APPROACH: ICD-10-PCS | Performed by: SURGERY

## 2017-03-10 PROCEDURE — 25000003 PHARM REV CODE 250: Performed by: INTERNAL MEDICINE

## 2017-03-10 PROCEDURE — 25000003 PHARM REV CODE 250: Performed by: SURGERY

## 2017-03-10 PROCEDURE — 86901 BLOOD TYPING SEROLOGIC RH(D): CPT | Mod: 91

## 2017-03-10 PROCEDURE — 86880 COOMBS TEST DIRECT: CPT

## 2017-03-10 PROCEDURE — 36415 COLL VENOUS BLD VENIPUNCTURE: CPT

## 2017-03-10 PROCEDURE — D9220A PRA ANESTHESIA: Mod: XE,ANES,, | Performed by: ANESTHESIOLOGY

## 2017-03-10 PROCEDURE — 0DJ08ZZ INSPECTION OF UPPER INTESTINAL TRACT, VIA NATURAL OR ARTIFICIAL OPENING ENDOSCOPIC: ICD-10-PCS | Performed by: INTERNAL MEDICINE

## 2017-03-10 PROCEDURE — C9113 INJ PANTOPRAZOLE SODIUM, VIA: HCPCS | Performed by: INTERNAL MEDICINE

## 2017-03-10 PROCEDURE — 37000008 HC ANESTHESIA 1ST 15 MINUTES: Performed by: INTERNAL MEDICINE

## 2017-03-10 PROCEDURE — 36000711: Performed by: SURGERY

## 2017-03-10 PROCEDURE — 25000003 PHARM REV CODE 250: Performed by: NURSE ANESTHETIST, CERTIFIED REGISTERED

## 2017-03-10 PROCEDURE — 94761 N-INVAS EAR/PLS OXIMETRY MLT: CPT

## 2017-03-10 PROCEDURE — 63600175 PHARM REV CODE 636 W HCPCS: Performed by: INTERNAL MEDICINE

## 2017-03-10 PROCEDURE — C1768 GRAFT, VASCULAR: HCPCS | Performed by: SURGERY

## 2017-03-10 PROCEDURE — 80053 COMPREHEN METABOLIC PANEL: CPT

## 2017-03-10 PROCEDURE — 86901 BLOOD TYPING SEROLOGIC RH(D): CPT

## 2017-03-10 PROCEDURE — 12000002 HC ACUTE/MED SURGE SEMI-PRIVATE ROOM

## 2017-03-10 PROCEDURE — 36000710: Performed by: SURGERY

## 2017-03-10 PROCEDURE — 85025 COMPLETE CBC W/AUTO DIFF WBC: CPT

## 2017-03-10 PROCEDURE — D9220A PRA ANESTHESIA: Mod: CRNA,,, | Performed by: NURSE ANESTHETIST, CERTIFIED REGISTERED

## 2017-03-10 PROCEDURE — 63600175 PHARM REV CODE 636 W HCPCS: Performed by: EMERGENCY MEDICINE

## 2017-03-10 PROCEDURE — 37000009 HC ANESTHESIA EA ADD 15 MINS: Performed by: INTERNAL MEDICINE

## 2017-03-10 PROCEDURE — 63600175 PHARM REV CODE 636 W HCPCS

## 2017-03-10 PROCEDURE — 37000009 HC ANESTHESIA EA ADD 15 MINS: Performed by: SURGERY

## 2017-03-10 PROCEDURE — 63600175 PHARM REV CODE 636 W HCPCS: Performed by: SURGERY

## 2017-03-10 PROCEDURE — D9220A PRA ANESTHESIA: Mod: XE,CRNA,, | Performed by: NURSE ANESTHETIST, CERTIFIED REGISTERED

## 2017-03-10 PROCEDURE — 25000003 PHARM REV CODE 250: Performed by: EMERGENCY MEDICINE

## 2017-03-10 PROCEDURE — 88302 TISSUE EXAM BY PATHOLOGIST: CPT | Mod: 26,,, | Performed by: PATHOLOGY

## 2017-03-10 PROCEDURE — 37000008 HC ANESTHESIA 1ST 15 MINUTES: Performed by: SURGERY

## 2017-03-10 PROCEDURE — 25000003 PHARM REV CODE 250

## 2017-03-10 PROCEDURE — C1781 MESH (IMPLANTABLE): HCPCS | Performed by: SURGERY

## 2017-03-10 PROCEDURE — 86900 BLOOD TYPING SEROLOGIC ABO: CPT

## 2017-03-10 DEVICE — REINFORCEMENT BIO TISSUE: Type: IMPLANTABLE DEVICE | Site: ESOPHAGUS | Status: FUNCTIONAL

## 2017-03-10 DEVICE — FELT THIN 4INX4IN 5EA/BX: Type: IMPLANTABLE DEVICE | Site: ESOPHAGUS | Status: FUNCTIONAL

## 2017-03-10 RX ORDER — LIDOCAINE HYDROCHLORIDE 20 MG/ML
INJECTION, SOLUTION INFILTRATION; PERINEURAL
Status: COMPLETED
Start: 2017-03-10 | End: 2017-03-10

## 2017-03-10 RX ORDER — METOPROLOL TARTRATE 1 MG/ML
10 INJECTION, SOLUTION INTRAVENOUS EVERY 8 HOURS
Status: DISCONTINUED | OUTPATIENT
Start: 2017-03-10 | End: 2017-03-13 | Stop reason: HOSPADM

## 2017-03-10 RX ORDER — MIDAZOLAM HYDROCHLORIDE 1 MG/ML
INJECTION, SOLUTION INTRAMUSCULAR; INTRAVENOUS
Status: DISCONTINUED | OUTPATIENT
Start: 2017-03-10 | End: 2017-03-10

## 2017-03-10 RX ORDER — NALOXONE HCL 0.4 MG/ML
0.02 VIAL (ML) INJECTION
Status: DISCONTINUED | OUTPATIENT
Start: 2017-03-10 | End: 2017-03-11

## 2017-03-10 RX ORDER — ONDANSETRON 8 MG/1
8 TABLET, ORALLY DISINTEGRATING ORAL EVERY 8 HOURS PRN
Status: DISCONTINUED | OUTPATIENT
Start: 2017-03-11 | End: 2017-03-11

## 2017-03-10 RX ORDER — METOCLOPRAMIDE HYDROCHLORIDE 5 MG/ML
INJECTION INTRAMUSCULAR; INTRAVENOUS
Status: DISCONTINUED | OUTPATIENT
Start: 2017-03-10 | End: 2017-03-10

## 2017-03-10 RX ORDER — ROCURONIUM BROMIDE 10 MG/ML
INJECTION, SOLUTION INTRAVENOUS
Status: DISCONTINUED | OUTPATIENT
Start: 2017-03-10 | End: 2017-03-10

## 2017-03-10 RX ORDER — FAMOTIDINE 20 MG/50ML
INJECTION, SOLUTION INTRAVENOUS
Status: COMPLETED
Start: 2017-03-10 | End: 2017-03-10

## 2017-03-10 RX ORDER — MORPHINE SULFATE 10 MG/ML
2 INJECTION INTRAMUSCULAR; INTRAVENOUS; SUBCUTANEOUS EVERY 4 HOURS PRN
Status: DISCONTINUED | OUTPATIENT
Start: 2017-03-10 | End: 2017-03-11

## 2017-03-10 RX ORDER — PROPOFOL 10 MG/ML
VIAL (ML) INTRAVENOUS
Status: DISCONTINUED | OUTPATIENT
Start: 2017-03-10 | End: 2017-03-10

## 2017-03-10 RX ORDER — ACETAMINOPHEN 10 MG/ML
1000 INJECTION, SOLUTION INTRAVENOUS ONCE
Status: COMPLETED | OUTPATIENT
Start: 2017-03-10 | End: 2017-03-10

## 2017-03-10 RX ORDER — LIDOCAINE HCL/PF 100 MG/5ML
SYRINGE (ML) INTRAVENOUS
Status: DISCONTINUED | OUTPATIENT
Start: 2017-03-10 | End: 2017-03-10

## 2017-03-10 RX ORDER — ACETAMINOPHEN 10 MG/ML
INJECTION, SOLUTION INTRAVENOUS
Status: DISPENSED
Start: 2017-03-10 | End: 2017-03-11

## 2017-03-10 RX ORDER — SUCCINYLCHOLINE CHLORIDE 20 MG/ML
INJECTION INTRAMUSCULAR; INTRAVENOUS
Status: DISCONTINUED | OUTPATIENT
Start: 2017-03-10 | End: 2017-03-10

## 2017-03-10 RX ORDER — FENTANYL CITRATE 50 UG/ML
INJECTION, SOLUTION INTRAMUSCULAR; INTRAVENOUS
Status: DISCONTINUED | OUTPATIENT
Start: 2017-03-10 | End: 2017-03-10

## 2017-03-10 RX ORDER — DIPHENHYDRAMINE HYDROCHLORIDE 50 MG/ML
25 INJECTION INTRAMUSCULAR; INTRAVENOUS EVERY 6 HOURS PRN
Status: DISCONTINUED | OUTPATIENT
Start: 2017-03-10 | End: 2017-03-13 | Stop reason: HOSPADM

## 2017-03-10 RX ORDER — MEPERIDINE HYDROCHLORIDE 50 MG/ML
12.5 INJECTION INTRAMUSCULAR; INTRAVENOUS; SUBCUTANEOUS ONCE AS NEEDED
Status: ACTIVE | OUTPATIENT
Start: 2017-03-10 | End: 2017-03-10

## 2017-03-10 RX ORDER — SODIUM CHLORIDE, SODIUM LACTATE, POTASSIUM CHLORIDE, CALCIUM CHLORIDE 600; 310; 30; 20 MG/100ML; MG/100ML; MG/100ML; MG/100ML
INJECTION, SOLUTION INTRAVENOUS CONTINUOUS PRN
Status: DISCONTINUED | OUTPATIENT
Start: 2017-03-10 | End: 2017-03-10

## 2017-03-10 RX ORDER — HYDROMORPHONE HYDROCHLORIDE 2 MG/ML
0.2 INJECTION, SOLUTION INTRAMUSCULAR; INTRAVENOUS; SUBCUTANEOUS EVERY 5 MIN PRN
Status: DISCONTINUED | OUTPATIENT
Start: 2017-03-10 | End: 2017-03-11

## 2017-03-10 RX ORDER — GLYCOPYRROLATE 0.2 MG/ML
INJECTION INTRAMUSCULAR; INTRAVENOUS
Status: DISCONTINUED | OUTPATIENT
Start: 2017-03-10 | End: 2017-03-10

## 2017-03-10 RX ORDER — BUPIVACAINE HYDROCHLORIDE 5 MG/ML
INJECTION, SOLUTION PERINEURAL
Status: DISCONTINUED | OUTPATIENT
Start: 2017-03-10 | End: 2017-03-10 | Stop reason: HOSPADM

## 2017-03-10 RX ORDER — NEOSTIGMINE METHYLSULFATE 1 MG/ML
INJECTION, SOLUTION INTRAVENOUS
Status: DISCONTINUED | OUTPATIENT
Start: 2017-03-10 | End: 2017-03-10

## 2017-03-10 RX ORDER — LIDOCAINE HYDROCHLORIDE AND EPINEPHRINE 20; 10 MG/ML; UG/ML
INJECTION, SOLUTION INFILTRATION; PERINEURAL
Status: DISCONTINUED | OUTPATIENT
Start: 2017-03-10 | End: 2017-03-10 | Stop reason: HOSPADM

## 2017-03-10 RX ORDER — PROPOFOL 10 MG/ML
VIAL (ML) INTRAVENOUS
Status: COMPLETED
Start: 2017-03-10 | End: 2017-03-10

## 2017-03-10 RX ORDER — LIDOCAINE HYDROCHLORIDE 20 MG/ML
INJECTION, SOLUTION INFILTRATION; PERINEURAL
Status: DISPENSED
Start: 2017-03-10 | End: 2017-03-10

## 2017-03-10 RX ORDER — HYDROMORPHONE HCL IN 0.9% NACL 6 MG/30 ML
PATIENT CONTROLLED ANALGESIA SYRINGE INTRAVENOUS CONTINUOUS
Status: DISCONTINUED | OUTPATIENT
Start: 2017-03-11 | End: 2017-03-11

## 2017-03-10 RX ORDER — CEFAZOLIN SODIUM 2 G/50ML
2 SOLUTION INTRAVENOUS
Status: COMPLETED | OUTPATIENT
Start: 2017-03-10 | End: 2017-03-10

## 2017-03-10 RX ORDER — CEFAZOLIN SODIUM 2 G/50ML
SOLUTION INTRAVENOUS
Status: DISPENSED
Start: 2017-03-10 | End: 2017-03-11

## 2017-03-10 RX ORDER — LORAZEPAM 2 MG/ML
0.25 INJECTION INTRAMUSCULAR ONCE AS NEEDED
Status: ACTIVE | OUTPATIENT
Start: 2017-03-10 | End: 2017-03-10

## 2017-03-10 RX ORDER — DEXTROSE, SODIUM CHLORIDE, SODIUM LACTATE, POTASSIUM CHLORIDE, AND CALCIUM CHLORIDE 5; .6; .31; .03; .02 G/100ML; G/100ML; G/100ML; G/100ML; G/100ML
INJECTION, SOLUTION INTRAVENOUS CONTINUOUS
Status: DISCONTINUED | OUTPATIENT
Start: 2017-03-10 | End: 2017-03-12

## 2017-03-10 RX ADMIN — ACETAMINOPHEN 1000 MG: 10 INJECTION, SOLUTION INTRAVENOUS at 05:03

## 2017-03-10 RX ADMIN — METOPROLOL TARTRATE 2 MG: 5 INJECTION INTRAVENOUS at 02:03

## 2017-03-10 RX ADMIN — FENTANYL CITRATE 50 MCG: 50 INJECTION INTRAMUSCULAR; INTRAVENOUS at 03:03

## 2017-03-10 RX ADMIN — MORPHINE SULFATE 4 MG: 10 INJECTION INTRAVENOUS at 01:03

## 2017-03-10 RX ADMIN — SODIUM CHLORIDE, SODIUM LACTATE, POTASSIUM CHLORIDE, AND CALCIUM CHLORIDE: .6; .31; .03; .02 INJECTION, SOLUTION INTRAVENOUS at 04:03

## 2017-03-10 RX ADMIN — FENTANYL CITRATE 50 MCG: 50 INJECTION INTRAMUSCULAR; INTRAVENOUS at 04:03

## 2017-03-10 RX ADMIN — GLYCOPYRROLATE 0.6 MG: 0.2 INJECTION, SOLUTION INTRAMUSCULAR; INTRAVENOUS at 04:03

## 2017-03-10 RX ADMIN — LIDOCAINE HYDROCHLORIDE 100 MG: 20 INJECTION, SOLUTION INFILTRATION; PERINEURAL at 11:03

## 2017-03-10 RX ADMIN — METOPROLOL TARTRATE 10 MG: 5 INJECTION INTRAVENOUS at 10:03

## 2017-03-10 RX ADMIN — METOPROLOL TARTRATE 5 MG: 5 INJECTION INTRAVENOUS at 05:03

## 2017-03-10 RX ADMIN — LORAZEPAM 1 MG: 2 INJECTION, SOLUTION INTRAMUSCULAR; INTRAVENOUS at 06:03

## 2017-03-10 RX ADMIN — LORAZEPAM 1 MG: 2 INJECTION, SOLUTION INTRAMUSCULAR; INTRAVENOUS at 10:03

## 2017-03-10 RX ADMIN — LEVOTHYROXINE SODIUM ANHYDROUS 12.5 MCG: 100 INJECTION, POWDER, LYOPHILIZED, FOR SOLUTION INTRAVENOUS at 09:03

## 2017-03-10 RX ADMIN — SODIUM CHLORIDE: 0.9 INJECTION, SOLUTION INTRAVENOUS at 10:03

## 2017-03-10 RX ADMIN — ROCURONIUM BROMIDE 20 MG: 10 INJECTION, SOLUTION INTRAVENOUS at 03:03

## 2017-03-10 RX ADMIN — PROPOFOL 50 MG: 10 INJECTION, EMULSION INTRAVENOUS at 11:03

## 2017-03-10 RX ADMIN — PROPOFOL 150 MG: 10 INJECTION, EMULSION INTRAVENOUS at 01:03

## 2017-03-10 RX ADMIN — ROCURONIUM BROMIDE 30 MG: 10 INJECTION, SOLUTION INTRAVENOUS at 01:03

## 2017-03-10 RX ADMIN — SODIUM CHLORIDE, SODIUM LACTATE, POTASSIUM CHLORIDE, AND CALCIUM CHLORIDE: .6; .31; .03; .02 INJECTION, SOLUTION INTRAVENOUS at 02:03

## 2017-03-10 RX ADMIN — ONDANSETRON 4 MG: 2 INJECTION INTRAMUSCULAR; INTRAVENOUS at 04:03

## 2017-03-10 RX ADMIN — SUCCINYLCHOLINE CHLORIDE 120 MG: 20 INJECTION, SOLUTION INTRAMUSCULAR; INTRAVENOUS at 01:03

## 2017-03-10 RX ADMIN — MORPHINE SULFATE 4 MG: 10 INJECTION INTRAVENOUS at 05:03

## 2017-03-10 RX ADMIN — NEOSTIGMINE METHYLSULFATE 5 MG: 1 INJECTION INTRAVENOUS at 04:03

## 2017-03-10 RX ADMIN — SODIUM CHLORIDE, SODIUM LACTATE, POTASSIUM CHLORIDE, AND CALCIUM CHLORIDE: .6; .31; .03; .02 INJECTION, SOLUTION INTRAVENOUS at 01:03

## 2017-03-10 RX ADMIN — MORPHINE SULFATE 4 MG: 10 INJECTION INTRAVENOUS at 07:03

## 2017-03-10 RX ADMIN — METOPROLOL TARTRATE 2 MG: 5 INJECTION INTRAVENOUS at 04:03

## 2017-03-10 RX ADMIN — LIDOCAINE HYDROCHLORIDE 100 MG: 20 INJECTION, SOLUTION INTRAVENOUS at 01:03

## 2017-03-10 RX ADMIN — BUPIVACAINE HYDROCHLORIDE 20 ML: 5 INJECTION, SOLUTION PERINEURAL at 02:03

## 2017-03-10 RX ADMIN — FAMOTIDINE 20 MG: 20 INJECTION, SOLUTION INTRAVENOUS at 01:03

## 2017-03-10 RX ADMIN — METOCLOPRAMIDE 10 MG: 5 INJECTION, SOLUTION INTRAMUSCULAR; INTRAVENOUS at 01:03

## 2017-03-10 RX ADMIN — ROCURONIUM BROMIDE 10 MG: 10 INJECTION, SOLUTION INTRAVENOUS at 02:03

## 2017-03-10 RX ADMIN — CEFAZOLIN SODIUM 2 G: 2 SOLUTION INTRAVENOUS at 01:03

## 2017-03-10 RX ADMIN — METOPROLOL TARTRATE 3 MG: 5 INJECTION INTRAVENOUS at 02:03

## 2017-03-10 RX ADMIN — Medication: at 11:03

## 2017-03-10 RX ADMIN — ROCURONIUM BROMIDE 10 MG: 10 INJECTION, SOLUTION INTRAVENOUS at 01:03

## 2017-03-10 RX ADMIN — MORPHINE SULFATE 4 MG: 10 INJECTION INTRAVENOUS at 09:03

## 2017-03-10 RX ADMIN — LIDOCAINE HYDROCHLORIDE,EPINEPHRINE BITARTRATE 20 ML: 20; .01 INJECTION, SOLUTION INFILTRATION; PERINEURAL at 02:03

## 2017-03-10 RX ADMIN — DEXTROSE, SODIUM CHLORIDE, SODIUM LACTATE, POTASSIUM CHLORIDE, AND CALCIUM CHLORIDE: 5; .6; .31; .03; .02 INJECTION, SOLUTION INTRAVENOUS at 06:03

## 2017-03-10 RX ADMIN — MIDAZOLAM HYDROCHLORIDE 2 MG: 1 INJECTION, SOLUTION INTRAMUSCULAR; INTRAVENOUS at 01:03

## 2017-03-10 RX ADMIN — PANTOPRAZOLE SODIUM 40 MG: 40 INJECTION, POWDER, FOR SOLUTION INTRAVENOUS at 09:03

## 2017-03-10 RX ADMIN — FENTANYL CITRATE 100 MCG: 50 INJECTION INTRAMUSCULAR; INTRAVENOUS at 01:03

## 2017-03-10 NOTE — H&P
[facility]  History & Physical    Subjective:      Chief Complaint/Reason for Admission: nausea, vomiting, chest pain    Estella Arevalo is a 62 y.o. female with past history of known large hiatal hernia, now with nausea, vomiting and chest pain, constant and severe.  Worse than previous episodes.    Past Medical History:   Diagnosis Date    Alcohol abuse     Anxiety     Bipolar disorder     Cirrhosis of liver     Coronary artery disease     Depression     Fall     GERD (gastroesophageal reflux disease)     Hypertension     Low back pain     Thyroid disease      Past Surgical History:   Procedure Laterality Date    JOINT REPLACEMENT      KNEE SURGERY  bilateral replacement    right foot sx  2008    SHOULDER SURGERY  replacement     Family History   Problem Relation Age of Onset    Cancer Mother     Cancer Father      Social History   Substance Use Topics    Smoking status: Never Smoker    Smokeless tobacco: Never Used    Alcohol use 14.4 oz/week     24 Cans of beer per week       PTA Medications   Medication Sig    oxycodone 20 mg Tab TK 1 T PO  Q 6-8 H PRN    amitriptyline (ELAVIL) 50 MG tablet Take 1 tablet (50 mg total) by mouth every evening.    atenolol (TENORMIN) 25 MG tablet TK 1 T PO  D    capsicum 0.075% (TRIXAICIN HP) 0.075 % topical cream Apply topically 3 (three) times daily.    clonazePAM (KLONOPIN) 1 MG tablet TAKE 1 TABLET BY MOUTH TWICE DAILY    cyclobenzaprine (FLEXERIL) 10 MG tablet TAKE 1 TABLET(10 MG) BY MOUTH THREE TIMES DAILY AS NEEDED FOR MUSCLE SPASMS    duloxetine (CYMBALTA) 30 MG capsule Take 1 capsule (30 mg total) by mouth once daily.    gabapentin (NEURONTIN) 300 MG capsule TK ONE C PO Q 8 H    JUBLIA 10 % Eran APPLY ONE DOSE ONCE DAILY    levothyroxine (SYNTHROID) 25 MCG tablet Take 1 tablet (25 mcg total) by mouth once daily.    nitroGLYCERIN (NITROSTAT) 0.3 MG SL tablet Place 1 tablet (0.3 mg total) under the tongue every 5 (five)  minutes as needed for Chest pain.    pantoprazole (PROTONIX) 40 MG tablet Take 1 tablet (40 mg total) by mouth once daily.    RESTASIS 0.05 % ophthalmic emulsion INSTILL 1 DROP INTO BOTH EYES TWICE DAILY    zolpidem (AMBIEN) 5 MG Tab TK 1 T PO QHS PRN     Review of patient's allergies indicates:  No Known Allergies     Review of Systems   Constitutional: Negative for chills and fever.   HENT: Negative.    Eyes: Negative.    Respiratory: Negative for cough and shortness of breath.    Cardiovascular: Positive for chest pain. Negative for palpitations.   Gastrointestinal: Positive for nausea and vomiting. Negative for abdominal pain.   Musculoskeletal: Negative.    Skin: Negative.    Neurological: Negative.    Psychiatric/Behavioral: Negative.        Objective:      Vital Signs (Most Recent)  Temp: 97.9 °F (36.6 °C) (03/10/17 1039)  Pulse: 89 (03/10/17 1039)  Resp: 18 (03/10/17 1039)  BP: 115/77 (03/10/17 1039)  SpO2: 97 % (03/10/17 1039)    Vital Signs Range (Last 24H):  Temp:  [97.9 °F (36.6 °C)-99.9 °F (37.7 °C)]   Pulse:  []   Resp:  [18-20]   BP: (102-146)/(61-88)   SpO2:  [94 %-98 %]     Physical Exam   Constitutional: She is oriented to person, place, and time. She appears well-developed and well-nourished.   HENT:   Head: Normocephalic and atraumatic.   Eyes: Pupils are equal, round, and reactive to light. No scleral icterus.   Neck: Normal range of motion. No thyromegaly present.   Cardiovascular: Normal rate and regular rhythm.    Pulmonary/Chest: Effort normal and breath sounds normal.   Abdominal: Soft. She exhibits no distension. There is no tenderness.   Neurological: She is alert and oriented to person, place, and time.   Skin: Skin is warm and dry.   Psychiatric: She has a normal mood and affect. Thought content normal.       Data Review:    CBC:   Lab Results   Component Value Date    WBC 4.20 03/10/2017    RBC 4.51 03/10/2017    HGB 14.6 03/10/2017    HCT 41.4 03/10/2017     03/10/2017      BMP:   Lab Results   Component Value Date    GLU 99 03/10/2017     03/10/2017    K 3.8 03/10/2017     03/10/2017    CO2 23 03/10/2017    BUN 6 (L) 03/10/2017    CREATININE 0.7 03/10/2017    CALCIUM 8.7 03/10/2017     Radiology review: CT shows large HH with volvulus.       Assessment:      Active Hospital Problems    Diagnosis  POA    *Incarcerated hiatal hernia [K44.0]  Yes    Bipolar disease, chronic [F31.9]  Yes    Degeneration of lumbar or lumbosacral intervertebral disc [M51.37]  Yes    Alcoholic hepatitis [K70.10]  Yes    Thyroid disease [E07.9]  Yes    Other and unspecified alcohol dependence, continuous drinking behavior [F10.20]  Yes    Opiate dependence, continuous [F11.20]  Yes    Major depressive disorder, recurrent episode, severe, without mention of psychotic behavior [F33.2]  Yes      Resolved Hospital Problems    Diagnosis Date Resolved POA   No resolved problems to display.       Plan:    For EGD this AM to assess viability of stomach.  If viable will take for lap HH repair, likely with PEG.

## 2017-03-10 NOTE — TRANSFER OF CARE
"Anesthesia Transfer of Care Note    Patient: Estella Arevalo    Procedure(s) Performed: Procedure(s) (LRB):  ESOPHAGOGASTRODUODENOSCOPY (EGD) (N/A)    Patient location: GI    Anesthesia Type: general    Transport from OR: Transported from OR on room air with adequate spontaneous ventilation    Post pain: adequate analgesia    Post assessment: no apparent anesthetic complications and tolerated procedure well    Post vital signs: stable    Level of consciousness: awake, alert and oriented    Nausea/Vomiting: no nausea/vomiting    Complications: none          Last vitals:   Visit Vitals    /74 (BP Location: Left arm, Patient Position: Lying, BP Method: Automatic)    Pulse 88    Temp 36.7 °C (98.1 °F) (Oral)    Resp 18    Ht 5' 5" (1.651 m)    Wt 74.5 kg (164 lb 4.6 oz)    LMP  (Approximate)    SpO2 95%    Breastfeeding No    BMI 27.34 kg/m2     "

## 2017-03-10 NOTE — TRANSFER OF CARE
"Anesthesia Transfer of Care Note    Patient: Estella Arevalo    Procedure(s) Performed: Procedure(s) (LRB):  REPAIR-HERNIA-LAPAROSCOPIC-HIATAL (N/A)  ESOPHAGOGASTRODUODENOSCOPY (EGD) (N/A)  LAPAROSCOPIC PEG TUBE PLACEMENT/REPLACEMENT (N/A)    Patient location: PACU    Anesthesia Type: general    Transport from OR: Transported from OR on room air with adequate spontaneous ventilation    Post pain: adequate analgesia    Post assessment: no apparent anesthetic complications and tolerated procedure well    Post vital signs: stable    Level of consciousness: sedated and responds to stimulation    Nausea/Vomiting: no nausea/vomiting    Complications: none          Last vitals:   Visit Vitals    /77 (BP Location: Left arm, BP Method: Automatic)    Pulse 90    Temp 36.9 °C (98.4 °F) (Oral)    Resp 20    Ht 5' 5" (1.651 m)    Wt 74.5 kg (164 lb 4.6 oz)    LMP  (Approximate)    SpO2 96%    Breastfeeding No    BMI 27.34 kg/m2     "

## 2017-03-10 NOTE — ED NOTES
Pt in bed, semi-fowlers position, bed locked and in lowest position, SR up x2, call bell within reach, pt states pain is 8/10 at this time, respirations even and unlabored, bathroom offered, possessions within reach, no acute distress noted at this time, will continue to monitor.

## 2017-03-10 NOTE — H&P
The patient has been examined and the H&P has been reviewed:  I concur with the findings and no changes have occurred since H&P was written.    Dr. Gomez would like us to better delinate the anatomy and mucosal involvement prior to tentative surgery.     Anesthesia/Surgery risks, benefits and alternative options discussed and understood by patient/family.

## 2017-03-10 NOTE — ANESTHESIA PREPROCEDURE EVALUATION
03/10/2017  Estella Arevalo is a 62 y.o., female.    [facility] Anesthesia Evaluation    I have reviewed the Patient Summary Reports.    I have reviewed the Nursing Notes.      Review of Systems  Anesthesia Hx:  No problems with previous Anesthesia    Social:  Alcohol Use, Non-Smoker    Cardiovascular:   Exercise tolerance: good Denies Pacemaker. Hypertension  Denies Valvular problems/Murmurs.  Denies MI.  Denies CAD.    Denies CABG/stent.   Denies Angina.             denies PVD no hyperlipidemia    Pulmonary:  Pulmonary Normal    Renal/:  Renal/ Normal     Hepatic/GI:   Hiatal Hernia, GERD Liver Disease, (cirrhosis) Pt admitted w incarcerated hiatal hernia;  Likelihood of obstruction low as she is passing gas today and had bowel movement yesterday (or possibly day before, she is uncertain)   Musculoskeletal:   Arthritis   Spine Disorders: Degenerative disease    Neurological:   Denies CVA. Denies Seizures.   Chronic Pain Syndrome   Endocrine:   Denies Diabetes. Hypothyroidism    Psych:   Psychiatric History          Physical Exam  General:  Well nourished    Airway/Jaw/Neck:  AIRWAY FINDINGS: Normal           Mental Status:  Mental Status Findings: Normal        Anesthesia Plan  Type of Anesthesia, risks & benefits discussed:  Anesthesia Type:  general  Patient's Preference:   Intra-op Monitoring Plan:   Intra-op Monitoring Plan Comments:   Post Op Pain Control Plan:   Post Op Pain Control Plan Comments:   Induction:   IV and rapid sequence  Beta Blocker:  Patient is on a Beta-Blocker and has not received dose within the past 24 hours due to non-compliance or for other reasons (Patient should receive a perioperative dose or document why it is withheld).     Beta Blocker Comments: Pt has not received beta blocker in past 24 hours, we will give betablocker during procedure if vitals allow.  Informed Consent:  Patient understands risks and agrees with Anesthesia plan.  Questions answered. Anesthesia consent signed with patient.  ASA Score: 3  emergent   Day of Surgery Review of History & Physical:    H&P update referred to the surgeon.     Anesthesia Plan Notes: Pt w/ active alcohol abuse and cirrhosis admitted w/ abd pain and found to have incarcerated hiatal hernia.        Ready For Surgery From Anesthesia Perspective.     Lab Results   Component Value Date    WBC 4.20 03/10/2017    HGB 14.6 03/10/2017    HCT 41.4 03/10/2017    MCV 92 03/10/2017     03/10/2017       Chemistry        Component Value Date/Time     03/10/2017 0531    K 3.8 03/10/2017 0531     03/10/2017 0531    CO2 23 03/10/2017 0531    BUN 6 (L) 03/10/2017 0531    CREATININE 0.7 03/10/2017 0531    GLU 99 03/10/2017 0531        Component Value Date/Time    CALCIUM 8.7 03/10/2017 0531    ALKPHOS 304 (H) 03/10/2017 0531     (H) 03/10/2017 0531    ALT 61 (H) 03/10/2017 0531    BILITOT 2.0 (H) 03/10/2017 0531        No current facility-administered medications on file prior to encounter.      Current Outpatient Prescriptions on File Prior to Encounter   Medication Sig Dispense Refill    oxycodone 20 mg Tab TK 1 T PO  Q 6-8 H PRN  0    amitriptyline (ELAVIL) 50 MG tablet Take 1 tablet (50 mg total) by mouth every evening. 90 tablet 0    atenolol (TENORMIN) 25 MG tablet TK 1 T PO  D  3    capsicum 0.075% (TRIXAICIN HP) 0.075 % topical cream Apply topically 3 (three) times daily. 56 g 1    clonazePAM (KLONOPIN) 1 MG tablet TAKE 1 TABLET BY MOUTH TWICE DAILY 60 tablet 0    cyclobenzaprine (FLEXERIL) 10 MG tablet TAKE 1 TABLET(10 MG) BY MOUTH THREE TIMES DAILY AS NEEDED FOR MUSCLE SPASMS 90 tablet 0    duloxetine (CYMBALTA) 30 MG capsule Take 1 capsule (30 mg total) by mouth once daily. 90 capsule 2    gabapentin (NEURONTIN) 300 MG capsule TK ONE C PO Q 8 H  3    JUBLIA 10 % Eran APPLY ONE DOSE ONCE DAILY 24 mL 0    levothyroxine  (SYNTHROID) 25 MCG tablet Take 1 tablet (25 mcg total) by mouth once daily. 90 tablet 5    nitroGLYCERIN (NITROSTAT) 0.3 MG SL tablet Place 1 tablet (0.3 mg total) under the tongue every 5 (five) minutes as needed for Chest pain. 30 tablet 0    pantoprazole (PROTONIX) 40 MG tablet Take 1 tablet (40 mg total) by mouth once daily. 90 tablet 5    RESTASIS 0.05 % ophthalmic emulsion INSTILL 1 DROP INTO BOTH EYES TWICE DAILY 30 each 3    zolpidem (AMBIEN) 5 MG Tab TK 1 T PO QHS PRN 30 tablet 1

## 2017-03-10 NOTE — ANESTHESIA POSTPROCEDURE EVALUATION
"Anesthesia Post Evaluation    Patient: Estella Arevalo    Procedure(s) Performed: Procedure(s) (LRB):  REPAIR-HERNIA-LAPAROSCOPIC-HIATAL (N/A)  ESOPHAGOGASTRODUODENOSCOPY (EGD) (N/A)  LAPAROSCOPIC PEG TUBE PLACEMENT/REPLACEMENT (N/A)    Final Anesthesia Type: general  Patient location during evaluation: PACU  Patient participation: Yes- Able to Participate  Level of consciousness: awake  Post-procedure vital signs: reviewed and stable  Pain management: adequate  Airway patency: patent  PONV status at discharge: No PONV  Anesthetic complications: no      Cardiovascular status: stable  Respiratory status: unassisted  Hydration status: euvolemic  Follow-up not needed.        Visit Vitals    /82    Pulse 76    Temp 36.8 °C (98.2 °F) (Oral)    Resp 19    Ht 5' 5" (1.651 m)    Wt 74.5 kg (164 lb 4.6 oz)    LMP  (Approximate)    SpO2 95%    Breastfeeding No    BMI 27.34 kg/m2       Pain/Mika Score: Pain Assessment Performed: Yes (3/10/2017 11:54 AM)  Presence of Pain: complains of pain/discomfort (3/10/2017  5:34 PM)  Pain Rating Prior to Med Admin: 0 (3/10/2017  5:23 PM)  Pain Rating Post Med Admin: 3 (3/10/2017  1:41 AM)  Mika Score: 8 (3/10/2017  5:34 PM)      "

## 2017-03-10 NOTE — PROGRESS NOTES
Telemetry monitor #[id number] placed on patient, monitor tech notified of new admit, placement on screen verified with colt patients monitor visible on the screen.

## 2017-03-10 NOTE — ANESTHESIA PREPROCEDURE EVALUATION
03/10/2017  Estella Arevalo is a 62 y.o., female.    [facility] Anesthesia Evaluation    I have reviewed the Patient Summary Reports.    I have reviewed the Nursing Notes.      Review of Systems  Anesthesia Hx:  No problems with previous Anesthesia    Social:  Alcohol Use, Non-Smoker    Cardiovascular:   Exercise tolerance: good Denies Pacemaker.  Denies Valvular problems/Murmurs.  Denies MI.  Denies CAD.    Denies CABG/stent.   Denies Angina.             denies PVD no hyperlipidemia    Pulmonary:  Pulmonary Normal    Renal/:  Renal/ Normal     Hepatic/GI:   Hiatal Hernia, GERD Liver Disease, (cirrhosis) Pt admitted w incarcerated hiatal hernia;  Likelihood of obstruction low as she is passing gas today and had bowel movement yesterday (or possibly day before, she is uncertain)   Musculoskeletal:   Arthritis   Spine Disorders: Degenerative disease    Neurological:   Denies CVA. Denies Seizures.   Chronic Pain Syndrome   Endocrine:   Denies Diabetes. Hypothyroidism    Psych:   Psychiatric History          Physical Exam  General:  Well nourished    Airway/Jaw/Neck:  AIRWAY FINDINGS: Normal           Mental Status:  Mental Status Findings: Normal        Anesthesia Plan  Type of Anesthesia, risks & benefits discussed:  Anesthesia Type:  general  Patient's Preference:   Intra-op Monitoring Plan:   Intra-op Monitoring Plan Comments:   Post Op Pain Control Plan:   Post Op Pain Control Plan Comments:   Induction:   IV  Beta Blocker:  Patient is on a Beta-Blocker and has not received dose within the past 24 hours due to non-compliance or for other reasons (Patient should receive a perioperative dose or document why it is withheld).     Beta Blocker Comments: Pt has not received beta blocker in past 24 hours, we will give betablocker during procedure if vitals allow.  Informed Consent: Patient understands risks and  agrees with Anesthesia plan.  Questions answered. Anesthesia consent signed with patient.  ASA Score: 3  emergent   Day of Surgery Review of History & Physical:    H&P update referred to the surgeon.     Anesthesia Plan Notes: Pt w/ active alcohol abuse and cirrhosis admitted w/ abd pain and found to have incarcerated hiatal hernia.  Pt may have necrotic gut and require surgery after EGD  Bowel obstruction is not documented on imaging (po contrast is evident throughout upper and lower bowel) and pt is passing flatus and had bowel movement yday (or day before).  We will not plan to intubate but will be prepared to intubate if needed.        Ready For Surgery From Anesthesia Perspective.

## 2017-03-10 NOTE — PROGRESS NOTES
Attempt to complete discharge needs assessment, but patient is currently off unit for testing. Will attempt again as able.

## 2017-03-10 NOTE — NURSING
Patient off unit via wheelchair to Endoscopy.  No acute distress noted; will continue to monitor.

## 2017-03-10 NOTE — PLAN OF CARE
Problem: Patient Care Overview  Goal: Plan of Care Review  Outcome: Ongoing (interventions implemented as appropriate)  Pain and anxiety being managed with IV medications. Patient prepared for surgery in am. No falls, trauma or injury this shift. Continue with plan of care and continue to monitor.

## 2017-03-10 NOTE — BRIEF OP NOTE
[facility]  Brief Operative Note    SUMMARY     Surgery Date: 3/10/2017     Surgeon(s) and Role:     * Vini Gomez MD - Primary    Assisting Surgeon: None    Pre-op Diagnosis:  Incarcerated hiatal hernia [K44.0]    Post-op Diagnosis:  Post-Op Diagnosis Codes:     * Incarcerated hiatal hernia [K44.0]    Procedure(s) (LRB):  REPAIR-HERNIA-LAPAROSCOPIC-HIATAL (N/A)  ESOPHAGOGASTRODUODENOSCOPY (EGD) (N/A)  LAPAROSCOPIC PEG TUBE PLACEMENT/REPLACEMENT (N/A)    Anesthesia: General    Description of Procedure: large incarcerated hernia.  Short gastrics divided.  Hernia sac  from jeremiah circumfirentially and excised.  Stomach, colon, omentum reduced into abdomen until 3 cm of intraabdominal esophagus remained.  Pledgeted crural closure, reinforced with bioA mesh.  EGD showed no injury, perfused mucosa, small ulcer.  PEG placed without complication.    Description of the findings of the procedure: as above    Estimated Blood Loss: 100 mL         Specimens:   Specimen (12h ago through future)    Start     Ordered    03/10/17 1618  Specimen to Pathology - Surgery  Once     Comments:  Hernia sac    03/10/17 5031

## 2017-03-11 LAB
ALBUMIN SERPL BCP-MCNC: 3.1 G/DL
ALP SERPL-CCNC: 208 U/L
ALT SERPL W/O P-5'-P-CCNC: 99 U/L
ANION GAP SERPL CALC-SCNC: 12 MMOL/L
ANION GAP SERPL CALC-SCNC: 12 MMOL/L
AST SERPL-CCNC: 316 U/L
BASOPHILS # BLD AUTO: 0.04 K/UL
BASOPHILS # BLD AUTO: 0.04 K/UL
BASOPHILS NFR BLD: 0.9 %
BASOPHILS NFR BLD: 0.9 %
BILIRUB SERPL-MCNC: 1.4 MG/DL
BUN SERPL-MCNC: 7 MG/DL
BUN SERPL-MCNC: 7 MG/DL
CALCIUM SERPL-MCNC: 8 MG/DL
CALCIUM SERPL-MCNC: 8 MG/DL
CHLORIDE SERPL-SCNC: 103 MMOL/L
CHLORIDE SERPL-SCNC: 103 MMOL/L
CO2 SERPL-SCNC: 21 MMOL/L
CO2 SERPL-SCNC: 21 MMOL/L
CREAT SERPL-MCNC: 0.7 MG/DL
CREAT SERPL-MCNC: 0.7 MG/DL
DIFFERENTIAL METHOD: ABNORMAL
DIFFERENTIAL METHOD: ABNORMAL
EOSINOPHIL # BLD AUTO: 0 K/UL
EOSINOPHIL # BLD AUTO: 0 K/UL
EOSINOPHIL NFR BLD: 0 %
EOSINOPHIL NFR BLD: 0 %
ERYTHROCYTE [DISTWIDTH] IN BLOOD BY AUTOMATED COUNT: 14.3 %
ERYTHROCYTE [DISTWIDTH] IN BLOOD BY AUTOMATED COUNT: 14.3 %
EST. GFR  (AFRICAN AMERICAN): >60 ML/MIN/1.73 M^2
EST. GFR  (AFRICAN AMERICAN): >60 ML/MIN/1.73 M^2
EST. GFR  (NON AFRICAN AMERICAN): >60 ML/MIN/1.73 M^2
EST. GFR  (NON AFRICAN AMERICAN): >60 ML/MIN/1.73 M^2
GLUCOSE SERPL-MCNC: 152 MG/DL
GLUCOSE SERPL-MCNC: 152 MG/DL
HCT VFR BLD AUTO: 38.4 %
HCT VFR BLD AUTO: 38.4 %
HGB BLD-MCNC: 13.1 G/DL
HGB BLD-MCNC: 13.1 G/DL
LYMPHOCYTES # BLD AUTO: 1.1 K/UL
LYMPHOCYTES # BLD AUTO: 1.1 K/UL
LYMPHOCYTES NFR BLD: 23.7 %
LYMPHOCYTES NFR BLD: 23.7 %
MCH RBC QN AUTO: 32.3 PG
MCH RBC QN AUTO: 32.3 PG
MCHC RBC AUTO-ENTMCNC: 34.1 %
MCHC RBC AUTO-ENTMCNC: 34.1 %
MCV RBC AUTO: 95 FL
MCV RBC AUTO: 95 FL
MONOCYTES # BLD AUTO: 0.3 K/UL
MONOCYTES # BLD AUTO: 0.3 K/UL
MONOCYTES NFR BLD: 6.7 %
MONOCYTES NFR BLD: 6.7 %
NEUTROPHILS # BLD AUTO: 3.2 K/UL
NEUTROPHILS # BLD AUTO: 3.2 K/UL
NEUTROPHILS NFR BLD: 69.6 %
NEUTROPHILS NFR BLD: 69.6 %
NRBC BLD-RTO: 2 /100 WBC
NRBC BLD-RTO: 2 /100 WBC
PLATELET # BLD AUTO: 151 K/UL
PLATELET # BLD AUTO: 151 K/UL
PMV BLD AUTO: 11.4 FL
PMV BLD AUTO: 11.4 FL
POTASSIUM SERPL-SCNC: 3.7 MMOL/L
POTASSIUM SERPL-SCNC: 3.7 MMOL/L
PROT SERPL-MCNC: 6.5 G/DL
RBC # BLD AUTO: 4.05 M/UL
RBC # BLD AUTO: 4.05 M/UL
SODIUM SERPL-SCNC: 136 MMOL/L
SODIUM SERPL-SCNC: 136 MMOL/L
WBC # BLD AUTO: 4.6 K/UL
WBC # BLD AUTO: 4.6 K/UL

## 2017-03-11 PROCEDURE — 36415 COLL VENOUS BLD VENIPUNCTURE: CPT

## 2017-03-11 PROCEDURE — C9113 INJ PANTOPRAZOLE SODIUM, VIA: HCPCS | Performed by: INTERNAL MEDICINE

## 2017-03-11 PROCEDURE — 85025 COMPLETE CBC W/AUTO DIFF WBC: CPT

## 2017-03-11 PROCEDURE — 63600175 PHARM REV CODE 636 W HCPCS: Performed by: INTERNAL MEDICINE

## 2017-03-11 PROCEDURE — 25000003 PHARM REV CODE 250: Performed by: INTERNAL MEDICINE

## 2017-03-11 PROCEDURE — 63600175 PHARM REV CODE 636 W HCPCS: Performed by: SURGERY

## 2017-03-11 PROCEDURE — 80053 COMPREHEN METABOLIC PANEL: CPT

## 2017-03-11 PROCEDURE — 25000003 PHARM REV CODE 250: Performed by: SURGERY

## 2017-03-11 PROCEDURE — 63600175 PHARM REV CODE 636 W HCPCS: Performed by: EMERGENCY MEDICINE

## 2017-03-11 PROCEDURE — 12000002 HC ACUTE/MED SURGE SEMI-PRIVATE ROOM

## 2017-03-11 RX ORDER — OXYCODONE HCL 5 MG/5 ML
10 SOLUTION, ORAL ORAL EVERY 4 HOURS PRN
Status: DISCONTINUED | OUTPATIENT
Start: 2017-03-11 | End: 2017-03-13 | Stop reason: HOSPADM

## 2017-03-11 RX ORDER — KETOROLAC TROMETHAMINE 30 MG/ML
15 INJECTION, SOLUTION INTRAMUSCULAR; INTRAVENOUS EVERY 6 HOURS
Status: DISCONTINUED | OUTPATIENT
Start: 2017-03-11 | End: 2017-03-13 | Stop reason: HOSPADM

## 2017-03-11 RX ORDER — MORPHINE SULFATE 10 MG/ML
2 INJECTION INTRAMUSCULAR; INTRAVENOUS; SUBCUTANEOUS EVERY 4 HOURS PRN
Status: DISCONTINUED | OUTPATIENT
Start: 2017-03-11 | End: 2017-03-13 | Stop reason: HOSPADM

## 2017-03-11 RX ORDER — OXYCODONE HCL 5 MG/5 ML
5 SOLUTION, ORAL ORAL EVERY 4 HOURS PRN
Status: DISCONTINUED | OUTPATIENT
Start: 2017-03-11 | End: 2017-03-13 | Stop reason: HOSPADM

## 2017-03-11 RX ORDER — NALOXONE HCL 0.4 MG/ML
0.4 VIAL (ML) INJECTION ONCE
Status: COMPLETED | OUTPATIENT
Start: 2017-03-11 | End: 2017-03-11

## 2017-03-11 RX ORDER — LORAZEPAM 2 MG/ML
2 INJECTION INTRAMUSCULAR ONCE
Status: COMPLETED | OUTPATIENT
Start: 2017-03-11 | End: 2017-03-11

## 2017-03-11 RX ADMIN — METOPROLOL TARTRATE 10 MG: 5 INJECTION INTRAVENOUS at 01:03

## 2017-03-11 RX ADMIN — METOPROLOL TARTRATE 10 MG: 5 INJECTION INTRAVENOUS at 05:03

## 2017-03-11 RX ADMIN — MORPHINE SULFATE 4 MG: 10 INJECTION INTRAVENOUS at 11:03

## 2017-03-11 RX ADMIN — KETOROLAC TROMETHAMINE 15 MG: 30 INJECTION, SOLUTION INTRAMUSCULAR at 11:03

## 2017-03-11 RX ADMIN — DEXTROSE, SODIUM CHLORIDE, SODIUM LACTATE, POTASSIUM CHLORIDE, AND CALCIUM CHLORIDE: 5; .6; .31; .03; .02 INJECTION, SOLUTION INTRAVENOUS at 05:03

## 2017-03-11 RX ADMIN — MORPHINE SULFATE 4 MG: 10 INJECTION INTRAVENOUS at 07:03

## 2017-03-11 RX ADMIN — LORAZEPAM 1 MG: 2 INJECTION, SOLUTION INTRAMUSCULAR; INTRAVENOUS at 03:03

## 2017-03-11 RX ADMIN — PANTOPRAZOLE SODIUM 40 MG: 40 INJECTION, POWDER, FOR SOLUTION INTRAVENOUS at 08:03

## 2017-03-11 RX ADMIN — LORAZEPAM 2 MG: 2 INJECTION, SOLUTION INTRAMUSCULAR; INTRAVENOUS at 08:03

## 2017-03-11 RX ADMIN — DEXTROSE, SODIUM CHLORIDE, SODIUM LACTATE, POTASSIUM CHLORIDE, AND CALCIUM CHLORIDE: 5; .6; .31; .03; .02 INJECTION, SOLUTION INTRAVENOUS at 01:03

## 2017-03-11 RX ADMIN — LORAZEPAM 1 MG: 2 INJECTION, SOLUTION INTRAMUSCULAR; INTRAVENOUS at 08:03

## 2017-03-11 RX ADMIN — LORAZEPAM 1 MG: 2 INJECTION, SOLUTION INTRAMUSCULAR; INTRAVENOUS at 01:03

## 2017-03-11 RX ADMIN — NALOXONE HYDROCHLORIDE 0.4 MG: 0.4 INJECTION, SOLUTION INTRAMUSCULAR; INTRAVENOUS; SUBCUTANEOUS at 04:03

## 2017-03-11 RX ADMIN — METOPROLOL TARTRATE 10 MG: 5 INJECTION INTRAVENOUS at 10:03

## 2017-03-11 NOTE — PLAN OF CARE
Problem: Patient Care Overview  Goal: Plan of Care Review  Outcome: Ongoing (interventions implemented as appropriate)    03/10/17 1918   Coping/Psychosocial   Plan Of Care Reviewed With patient      Patient remained free from falls, trauma, Pain controlled with prn IV analgesics.  No complaints of nausea or vomiting.  IV fluids and medications administered as prescribed. Vital Signs Stable. Boyd catheter dry and intact. PEG tube bandage with small amount of blood; tube to gravity and clamped.  4 Lap sites  With dermabound to abdomen.  No acute distress noted.

## 2017-03-11 NOTE — PLAN OF CARE
Problem: Pain, Acute (Adult)  Goal: Identify Related Risk Factors and Signs and Symptoms  Related risk factors and signs and symptoms are identified upon initiation of Human Response Clinical Practice Guideline (CPG)     03/11/17 1652   Pain, Acute   Related Risk Factors (Acute Pain) persistent pain;surgery   Signs and Symptoms (Acute Pain) BADLs/IADLs reluctance/inability to perform;verbalization of pain descriptors         Problem: Anxiety (Adult)  Goal: Identify Related Risk Factors and Signs and Symptoms  Related risk factors and signs and symptoms are identified upon initiation of Human Response Clinical Practice Guideline (CPG)     03/11/17 1652   Anxiety   Related Risk Factors (Anxiety) procedure/treatment;substance use/abuse;prognosis/treatment plan;pain;coping ineffective   Signs and Symptoms (Anxiety) agitation;nervousness/tension/restlessness;physical complaints/symptoms         Problem: Infection, Risk/Actual (Adult)  Goal: Identify Related Risk Factors and Signs and Symptoms  Related risk factors and signs and symptoms are identified upon initiation of Human Response Clinical Practice Guideline (CPG)     03/11/17 1652   Infection, Risk/Actual   Related Risk Factors (Infection, Risk/Actual) chronic illness/condition;substance abuse;surgery/procedure   Signs and Symptoms (Infection, Risk/Actual) pain         Problem: Fall Risk (Adult)  Goal: Identify Related Risk Factors and Signs and Symptoms  Related risk factors and signs and symptoms are identified upon initiation of Human Response Clinical Practice Guideline (CPG)     03/11/17 1652   Fall Risk   Related Risk Factors (Fall Risk) age-related changes;polypharmacy;sleep pattern alteration   Signs and Symptoms (Fall Risk) presence of risk factors         Problem: Alcohol Withdrawal Acute, Risk/Actual (Adult)  Goal: Signs and Symptoms of Listed Potential Problems Will be Absent, Minimized or Managed (Alcohol Withdrawal Acute, Risk/Actual)  Signs and  symptoms of listed potential problems will be absent, minimized or managed by discharge/transition of care (reference Alcohol Withdrawal Acute, Risk/Actual (Adult) CPG).    03/11/17 1232   Alcohol Withdrawal Acute, Risk/Actual   Problems Assessed (Alcohol Withdrawal Syndrome) all   Problems Present (Alcohol Withdrawal Syndrome) situational response         Comments:   Pt is very manipulative, continues to disconnects self from iv despite continuous education and reinforcement, pt threaten to leave ama today if md didn't increase pain medication also threaten to leave ama this am because at that time she couldn't have ice chips. Pt verbally abuses staff and screams out in the hallway when she cant get what she wants, surgery dressing intact, dressing around peg tube changed today, device clamped. Iv fluids decreased to 75 cc/hr.  Alternating pain and anxiety meds for comfort. Sitter remains at bedside

## 2017-03-11 NOTE — PROGRESS NOTES
Pt continues to scream and use profanity in hallway against  and friend at bedside, attempted to redirect pt to calm down, unsuccessful at this time will monitor pt

## 2017-03-11 NOTE — NURSING
Estella Arevalo, MRN [id number]  has been verified by JAYCEE Stafford and JAYCEE Talbot to be wearing tele box # [id number]. Rhythm and correct information is visible on the monitor.

## 2017-03-11 NOTE — PROGRESS NOTES
"Complains of "pain everywhere".  Wants more morphine.  When I told her I would not increase the dose she threatened to leave, saying "I can get better pain medicine at home".    Vitals:    03/10/17 2312 03/11/17 0000 03/11/17 0330 03/11/17 0715   BP: 127/84 132/80 123/85 (!) 132/95   BP Location: Left arm  Left arm    Patient Position: Lying  Lying    BP Method: Automatic  Automatic    Pulse: 109 85 (!) 120 (!) 113   Resp: 17 18 20 18   Temp: 98.5 °F (36.9 °C)  98.6 °F (37 °C) 98.1 °F (36.7 °C)   TempSrc: Oral  Oral Oral   SpO2: 96% 97% 95% 98%   Weight:       Height:         A/O x 3, combative  RRR  Soft, sivan tender, nondistended.  Gtube intact, serosanguineous drainage    Labs ok    A/P s/p lap PEH, EGD with PEG.  Start clears.  Alcohol intoxication on admit, improved with IVF  Alcoholism--recommend cessation, ativan for prophylaxis  Cirrhosis--visualized at surgery, noncompliant liver with macronodular disease.  Limiting narcotics due to inability to metabolize, and risk of respiratory problems.  "

## 2017-03-11 NOTE — SUBJECTIVE & OBJECTIVE
"Interval History: POD 1 of laparoscopic repair of large hernia with gastric content protruding. EGD done prior to surgery showing no signs of necrosis per mucosal appearance, just a non bleeding ulcer in GEJ. Today she is afebrile but very anxious about diagnosis of "cirrhosis".     Review of Systems   Respiratory: Negative.    Cardiovascular: Negative.    Gastrointestinal: Positive for abdominal pain. Negative for nausea.   Psychiatric/Behavioral: The patient is nervous/anxious.      Objective:     Vital Signs (Most Recent):  Temp: 98.2 °F (36.8 °C) (03/11/17 1603)  Pulse: 98 (03/11/17 1603)  Resp: 18 (03/11/17 1603)  BP: 115/79 (03/11/17 1603)  SpO2: 98 % (03/11/17 1603) Vital Signs (24h Range):  Temp:  [97.4 °F (36.3 °C)-98.6 °F (37 °C)] 98.2 °F (36.8 °C)  Pulse:  [] 98  Resp:  [15-26] 18  SpO2:  [92 %-98 %] 98 %  BP: (109-133)/(74-95) 115/79     Weight: 74.5 kg (164 lb 4.6 oz)  Body mass index is 27.34 kg/(m^2).    Intake/Output Summary (Last 24 hours) at 03/11/17 1607  Last data filed at 03/11/17 1320   Gross per 24 hour   Intake          3344.58 ml   Output             1480 ml   Net          1864.58 ml      Physical Exam   Constitutional: She is oriented to person, place, and time. She appears well-developed.   Cardiovascular: Normal rate and regular rhythm.    Pulmonary/Chest: Effort normal and breath sounds normal.   Abdominal: Soft. There is tenderness.   Neurological: She is alert and oriented to person, place, and time.   Skin: Skin is warm and dry. She is not diaphoretic.   Vitals reviewed.      Significant Labs: All pertinent labs within the past 24 hours have been reviewed.    Significant Imaging: I have reviewed all pertinent imaging results/findings within the past 24 hours.  "

## 2017-03-11 NOTE — ASSESSMENT & PLAN NOTE
POD 1 of laparoscopic repair of large hernia with gastric content protruding. PEG in place. EGD done prior to surgery showing no signs of necrosis per mucosal appearance, just a non bleeding ulcer in GEJ. Management per surgery and GI    Chemical DVT prophylaxis when ok by primary

## 2017-03-11 NOTE — PLAN OF CARE
Problem: Patient Care Overview  Goal: Plan of Care Review  Outcome: Ongoing (interventions implemented as appropriate)  Pain medications changed to PCA, with use patient was not able to tolerated self administered medications. Returned to IV morphine. Anxiety managed with IV medications.. Pt remains afebrile  And wbcs at 4.20 continue with plan of care.

## 2017-03-11 NOTE — ASSESSMENT & PLAN NOTE
Tox screen positive for alcohol upon arrival to the ED. Symptoms of withdrawal currently controlled with PRN lorazepam at this time. Will add diazepam if at any point this no longer works.

## 2017-03-11 NOTE — PROGRESS NOTES
Patient now alert and oriented times 4  Heart rate of 98, patient resting quietly. Dr. Mcgill notified.

## 2017-03-11 NOTE — PROGRESS NOTES
Confusion escalating, asttampting to leave hospital heart rate 146,  notified. Orders received and carried out for discontinue PCA, give 0.4mg narcan.

## 2017-03-11 NOTE — PROGRESS NOTES
Patient confused to time and place, attempted to get out of bed, bedalarm placed, patient moved to room 407b near the nurses station for visual observation.

## 2017-03-11 NOTE — NURSING
Patient arrived back to floor from surgery. 4 lap sites to the abdomen; PEG tub to l abdomen.  No acute distress noted. Alarms on and audible.  Will continue to monitor.

## 2017-03-12 LAB
ALBUMIN SERPL BCP-MCNC: 2.7 G/DL
ALP SERPL-CCNC: 204 U/L
ALT SERPL W/O P-5'-P-CCNC: 165 U/L
ANION GAP SERPL CALC-SCNC: 6 MMOL/L
AST SERPL-CCNC: 536 U/L
BASOPHILS # BLD AUTO: 0.01 K/UL
BASOPHILS NFR BLD: 0.3 %
BILIRUB SERPL-MCNC: 2.3 MG/DL
BUN SERPL-MCNC: 5 MG/DL
CALCIUM SERPL-MCNC: 7.8 MG/DL
CHLORIDE SERPL-SCNC: 103 MMOL/L
CO2 SERPL-SCNC: 26 MMOL/L
CREAT SERPL-MCNC: 0.6 MG/DL
DIFFERENTIAL METHOD: ABNORMAL
EOSINOPHIL # BLD AUTO: 0.2 K/UL
EOSINOPHIL NFR BLD: 4.2 %
ERYTHROCYTE [DISTWIDTH] IN BLOOD BY AUTOMATED COUNT: 13.9 %
EST. GFR  (AFRICAN AMERICAN): >60 ML/MIN/1.73 M^2
EST. GFR  (NON AFRICAN AMERICAN): >60 ML/MIN/1.73 M^2
GLUCOSE SERPL-MCNC: 98 MG/DL
HCT VFR BLD AUTO: 30.8 %
HGB BLD-MCNC: 10.6 G/DL
LYMPHOCYTES # BLD AUTO: 1.1 K/UL
LYMPHOCYTES NFR BLD: 31 %
MCH RBC QN AUTO: 32.7 PG
MCHC RBC AUTO-ENTMCNC: 34.4 %
MCV RBC AUTO: 95 FL
MONOCYTES # BLD AUTO: 0.2 K/UL
MONOCYTES NFR BLD: 5.3 %
NEUTROPHILS # BLD AUTO: 2.1 K/UL
NEUTROPHILS NFR BLD: 59.2 %
PLATELET # BLD AUTO: 93 K/UL
PMV BLD AUTO: 10.9 FL
POTASSIUM SERPL-SCNC: 3.2 MMOL/L
PROT SERPL-MCNC: 6 G/DL
RBC # BLD AUTO: 3.24 M/UL
SODIUM SERPL-SCNC: 135 MMOL/L
WBC # BLD AUTO: 3.58 K/UL

## 2017-03-12 PROCEDURE — 63600175 PHARM REV CODE 636 W HCPCS: Performed by: INTERNAL MEDICINE

## 2017-03-12 PROCEDURE — 36415 COLL VENOUS BLD VENIPUNCTURE: CPT

## 2017-03-12 PROCEDURE — 25000003 PHARM REV CODE 250: Performed by: INTERNAL MEDICINE

## 2017-03-12 PROCEDURE — C9113 INJ PANTOPRAZOLE SODIUM, VIA: HCPCS | Performed by: INTERNAL MEDICINE

## 2017-03-12 PROCEDURE — 85025 COMPLETE CBC W/AUTO DIFF WBC: CPT

## 2017-03-12 PROCEDURE — 12000002 HC ACUTE/MED SURGE SEMI-PRIVATE ROOM

## 2017-03-12 PROCEDURE — 80053 COMPREHEN METABOLIC PANEL: CPT

## 2017-03-12 PROCEDURE — 63600175 PHARM REV CODE 636 W HCPCS: Performed by: SURGERY

## 2017-03-12 RX ORDER — DIAZEPAM 10 MG/2ML
10 INJECTION INTRAMUSCULAR EVERY 8 HOURS
Status: DISCONTINUED | OUTPATIENT
Start: 2017-03-12 | End: 2017-03-13 | Stop reason: HOSPADM

## 2017-03-12 RX ORDER — LIDOCAINE HYDROCHLORIDE 10 MG/ML
INJECTION INFILTRATION; PERINEURAL
Status: DISPENSED
Start: 2017-03-12 | End: 2017-03-13

## 2017-03-12 RX ADMIN — OXYCODONE HYDROCHLORIDE 5 MG: 5 SOLUTION ORAL at 09:03

## 2017-03-12 RX ADMIN — OXYCODONE HYDROCHLORIDE 10 MG: 5 SOLUTION ORAL at 03:03

## 2017-03-12 RX ADMIN — DIAZEPAM 10 MG: 5 INJECTION, SOLUTION INTRAMUSCULAR; INTRAVENOUS at 03:03

## 2017-03-12 RX ADMIN — KETOROLAC TROMETHAMINE 15 MG: 30 INJECTION, SOLUTION INTRAMUSCULAR at 06:03

## 2017-03-12 RX ADMIN — METOPROLOL TARTRATE 10 MG: 5 INJECTION INTRAVENOUS at 10:03

## 2017-03-12 RX ADMIN — DIAZEPAM 10 MG: 5 INJECTION, SOLUTION INTRAMUSCULAR; INTRAVENOUS at 10:03

## 2017-03-12 RX ADMIN — OXYCODONE HYDROCHLORIDE 5 MG: 5 SOLUTION ORAL at 10:03

## 2017-03-12 RX ADMIN — LORAZEPAM 1 MG: 2 INJECTION, SOLUTION INTRAMUSCULAR; INTRAVENOUS at 04:03

## 2017-03-12 RX ADMIN — LEVOTHYROXINE SODIUM ANHYDROUS 12.5 MCG: 100 INJECTION, POWDER, LYOPHILIZED, FOR SOLUTION INTRAVENOUS at 09:03

## 2017-03-12 RX ADMIN — PANTOPRAZOLE SODIUM 40 MG: 40 INJECTION, POWDER, FOR SOLUTION INTRAVENOUS at 09:03

## 2017-03-12 RX ADMIN — KETOROLAC TROMETHAMINE 15 MG: 30 INJECTION, SOLUTION INTRAMUSCULAR at 12:03

## 2017-03-12 RX ADMIN — OXYCODONE HYDROCHLORIDE 5 MG: 5 SOLUTION ORAL at 06:03

## 2017-03-12 NOTE — ASSESSMENT & PLAN NOTE
POD 2 of laparoscopic repair of large hernia with gastric content protruding. PEG in place. EGD done prior to surgery showing no signs of necrosis per mucosal appearance, just a non bleeding ulcer in GEJ. Management per surgery and GI    Chemical DVT prophylaxis when ok by primary

## 2017-03-12 NOTE — PROGRESS NOTES
"Pt in bathroom touching IV site and tubing, pt told to please stop per safety sitter Nicky. Pt slammed bathroom door on Nicky safety sitter. Pt instructed by Purvi ANN that bathroom door must remain partially opened to ensure her safety. Pt yelling, "this is the most ridiculous place I have ever been to". Assured pt that we are only looking out for her safety. Unable to calm patient, pt continually yelling/cursing. Sitter at bedside. Will continue to monitor.   "

## 2017-03-12 NOTE — PROGRESS NOTES
Restraints taken off at this time for a trial run; pt calm and oriented. Explained that restraints will need to be reapplied if she becomes noncompliant or confused again to the point it may cause her/others harm/interfere teratment. Pt verbalized and demonstrated understanding. Passed on report to day nurse.

## 2017-03-12 NOTE — ASSESSMENT & PLAN NOTE
Tox screen positive for alcohol upon arrival to the ED. Evident symptoms of withdrawal last night with associated visual hallucinations. Continue IV valium q 8 hours for now. Will change to PO once diet is advanced and taper accordingly

## 2017-03-12 NOTE — PROGRESS NOTES
Pt very belligerent about pain meds.  She refuses to take toradol, saying it makes her too sleepy.  When I informed her that narcotics are more likely to cause sedation, she said it doesn't make her sleepy, it makes her nauseated.  When I told her that was also more likely with narcotics, she tells me it just makes her feel bad.  Tolerating clears, wants to try more (when I ask about food, she denies ever having any nausea).    Vitals:    03/11/17 2356 03/12/17 0435 03/12/17 0715 03/12/17 1545   BP: 121/84 100/67 110/75 110/72   BP Location: Right arm Right arm     Patient Position: Lying Lying     BP Method: Automatic Automatic     Pulse: 86 89 88 104   Resp: 17 13 18 18   Temp: 97.7 °F (36.5 °C) 97.4 °F (36.3 °C) 97.7 °F (36.5 °C) 98 °F (36.7 °C)   TempSrc: Oral Oral Oral Oral   SpO2: (!) 93% (!) 92% (!) 94% 98%   Weight:       Height:         Awake, alert.  Angry but does not appear uncomfortable.  RRR  Soft, sivan tender, nondistended    A/P s/p lap repair of huge PEH with volvulus.   Advance diet  Hopefully home tomorrow on full liquids x 2 weeks

## 2017-03-12 NOTE — PROGRESS NOTES
"[facility] Medicine  Progress Note    Patient Name: Estella Arevalo  MRN: [id number]  Patient Class: IP- Inpatient   Admission Date: 3/9/2017  Length of Stay: 3 days  Attending Physician: Vini Gomez MD  Primary Care Provider: Angela Packer MD        Subjective:     Principal Problem:Incarcerated hiatal hernia    HPI:  62 year old female with self reported past MI, alcohol dependence, essential hypertension, narcotic use dependence, acquired hypothyroidism, chronic back pain, depression, anxiety and chronic Hep C who presented via EMS for worsening nausea, vomiting, diarrhea and "stabbing" chest pain for one day in evolution. Stated she's had chest pain for months. It is described as intermittent, left sided, worsening with movement, and 7/10 in intensity at its worst. Not associated with n/v nor worsening with PO intake. Her chest pain yesterday was described as same chest pain she's had, but with new addition of n/v of bilious fluid. This self reported MI was described as "I saw my doctor for this pain. She did an EKG and showed a defect. I had a heart attack". Was referred to cardiology but never went. No history of cardiac revascularization nor angiography. Has no signs of CHF. No perceived murmur on exam to suggest valvular disease. No diabetes nor renal insufficiency. Today, EKG had T wave inversion in V1 and V2, with occasional PVCs, NSR and with normal QTc. Otherwise normal. Troponin negative. On chest XRay it was noted she had a very significant for "Hiatal hernia with organoaxial volvulus." A CT confirms this.       Of note, patient drinks beer on a daily basis. Last drink was yesterday despite ongoing symptoms. Apparently no longer taking narcotics for chronic pain. Tox screen pending. CMP with   and ALT with 70 consistent with alcoholic hepatitis. Liver function appears normal otherwise. Lactic acid of 3.3. No leukocytosis or other signs of active " infection      Hospital Course:       Interval History: POD 2. With symptoms of withdrawal last night, needing a scheduled dose of valium via IV route. Slowly advancing diet. No definite issues from post op standpoint. Transaminases increasing as well. Fluids running at 75 cc/hr    Review of Systems   Respiratory: Negative.    Cardiovascular: Negative.    Gastrointestinal: Positive for abdominal pain. Negative for nausea.   Psychiatric/Behavioral: The patient is nervous/anxious.      Objective:     Vital Signs (Most Recent):  Temp: 97.7 °F (36.5 °C) (03/12/17 0715)  Pulse: 88 (03/12/17 0715)  Resp: 18 (03/12/17 0715)  BP: 110/75 (03/12/17 0715)  SpO2: (!) 94 % (03/12/17 0715) Vital Signs (24h Range):  Temp:  [97.4 °F (36.3 °C)-98.2 °F (36.8 °C)] 97.7 °F (36.5 °C)  Pulse:  [] 88  Resp:  [13-18] 18  SpO2:  [86 %-98 %] 94 %  BP: (100-157)/() 110/75     Weight: 74.5 kg (164 lb 4.6 oz)  Body mass index is 27.34 kg/(m^2).    Intake/Output Summary (Last 24 hours) at 03/12/17 1416  Last data filed at 03/12/17 0647   Gross per 24 hour   Intake             1200 ml   Output                0 ml   Net             1200 ml      Physical Exam   Constitutional: She is oriented to person, place, and time. She appears well-developed.   Cardiovascular: Normal rate and regular rhythm.    Pulmonary/Chest: Effort normal and breath sounds normal.   Abdominal: Soft. There is tenderness.   Neurological: She is alert and oriented to person, place, and time.   Skin: Skin is warm and dry. She is not diaphoretic.   Vitals reviewed.      Significant Labs: All pertinent labs within the past 24 hours have been reviewed.    Significant Imaging: I have reviewed all pertinent imaging results/findings within the past 24 hours.    Assessment/Plan:      * Incarcerated hiatal hernia  POD 2 of laparoscopic repair of large hernia with gastric content protruding. PEG in place. EGD done prior to surgery showing no signs of necrosis per mucosal  appearance, just a non bleeding ulcer in GEJ. Management per surgery and GI    Chemical DVT prophylaxis when ok by primary            Other and unspecified alcohol dependence, continuous drinking behavior  Tox screen positive for alcohol upon arrival to the ED. Evident symptoms of withdrawal last night with associated visual hallucinations. Continue IV valium q 8 hours for now. Will change to PO once diet is advanced and taper accordingly      Opiate dependence, continuous  Apparently no longer using. Tox screen confirms this. Getting opioids for pain control post-op      Major depressive disorder, recurrent episode, severe, without mention of psychotic behavior  Holding off on meds at this time. Focusing on withdrawal symptoms      Thyroid disease  Continue home dose levothyroxine but IV form (1/2 home dose)    Alcoholic hepatitis  I am concerned she has signs of cirrhosis. Patient is aware of this and is anxious about diagnosis. Encouraged alcohol cessation for good. Will increase rate of fluids and trend transaminases in AM      VTE Risk Mitigation         Ordered     Medium Risk of VTE  Once      03/10/17 1810     Place sequential compression device  Until discontinued      03/10/17 1810     Reason for No Pharmacological VTE Prophylaxis  Once      03/10/17 1810          Yane Peters MD  Department of Hospital Medicine   [facility]

## 2017-03-12 NOTE — PLAN OF CARE
Problem: Patient Care Overview  Goal: Plan of Care Review  Outcome: Ongoing (interventions implemented as appropriate)  Pt remains free of falls and injuries. Safety sitter at bedside, soft wrist restraints on. Pt much more calm, alert and oriented x4 this AM. Scheduled valium started this morning. New IV started to LFA by House Sup. IVFs cont as scheduled. She remains on tele, VSSAF. Incisions to abd CDI, bruising noted around sites. PEG tube to LLQ intact, old bloody drainage noted to dressing. Will cont to monitor.

## 2017-03-12 NOTE — ASSESSMENT & PLAN NOTE
I am concerned she has signs of cirrhosis. Patient is aware of this and is anxious about diagnosis. Encouraged alcohol cessation for good. Will increase rate of fluids and trend transaminases in AM

## 2017-03-12 NOTE — PROGRESS NOTES
IV infiltrated at 1630. Unsuccessful IV attempts by nurse and charge nurse. Awaiting IV attempt from House Supervisor. Pt very agitated, pacing in room. Attempts made to calm pt unsuccessful. Safety sitter at bedside. Will continue to monitor.

## 2017-03-12 NOTE — SUBJECTIVE & OBJECTIVE
Interval History: POD 2. With symptoms of withdrawal last night, needing a scheduled dose of valium via IV route. Slowly advancing diet. No definite issues from post op standpoint. Transaminases increasing as well. Fluids running at 75 cc/hr    Review of Systems   Respiratory: Negative.    Cardiovascular: Negative.    Gastrointestinal: Positive for abdominal pain. Negative for nausea.   Psychiatric/Behavioral: The patient is nervous/anxious.      Objective:     Vital Signs (Most Recent):  Temp: 97.7 °F (36.5 °C) (03/12/17 0715)  Pulse: 88 (03/12/17 0715)  Resp: 18 (03/12/17 0715)  BP: 110/75 (03/12/17 0715)  SpO2: (!) 94 % (03/12/17 0715) Vital Signs (24h Range):  Temp:  [97.4 °F (36.3 °C)-98.2 °F (36.8 °C)] 97.7 °F (36.5 °C)  Pulse:  [] 88  Resp:  [13-18] 18  SpO2:  [86 %-98 %] 94 %  BP: (100-157)/() 110/75     Weight: 74.5 kg (164 lb 4.6 oz)  Body mass index is 27.34 kg/(m^2).    Intake/Output Summary (Last 24 hours) at 03/12/17 1416  Last data filed at 03/12/17 0647   Gross per 24 hour   Intake             1200 ml   Output                0 ml   Net             1200 ml      Physical Exam   Constitutional: She is oriented to person, place, and time. She appears well-developed.   Cardiovascular: Normal rate and regular rhythm.    Pulmonary/Chest: Effort normal and breath sounds normal.   Abdominal: Soft. There is tenderness.   Neurological: She is alert and oriented to person, place, and time.   Skin: Skin is warm and dry. She is not diaphoretic.   Vitals reviewed.      Significant Labs: All pertinent labs within the past 24 hours have been reviewed.    Significant Imaging: I have reviewed all pertinent imaging results/findings within the past 24 hours.

## 2017-03-12 NOTE — PROGRESS NOTES
Pt Estella Arevalo MRN [id number] with tele box #[id number] confirmed with VARINDER Patrick on box and tele monitor.

## 2017-03-12 NOTE — PROGRESS NOTES
Pt agitated/yelling about clear liquid diet, explained to pt she is on clears until changed by MD. Pt demanding jello, informed pt we do not keep jello in stock on the unit and will request some from dietary. Sitter at bedside. Will continue to monitor.

## 2017-03-12 NOTE — PROGRESS NOTES
Patient yelling and cursing because she is unable to get the medication she wants because of IV access lost to infiltration. MD notified, MD confirmed continued need for IV access. Requested switch to PO meds; awaiting new orders. Will continue to monitor.

## 2017-03-13 VITALS
HEART RATE: 116 BPM | DIASTOLIC BLOOD PRESSURE: 85 MMHG | WEIGHT: 164.31 LBS | OXYGEN SATURATION: 93 % | BODY MASS INDEX: 27.38 KG/M2 | HEIGHT: 65 IN | TEMPERATURE: 100 F | SYSTOLIC BLOOD PRESSURE: 132 MMHG | RESPIRATION RATE: 18 BRPM

## 2017-03-13 PROCEDURE — C9113 INJ PANTOPRAZOLE SODIUM, VIA: HCPCS | Performed by: INTERNAL MEDICINE

## 2017-03-13 PROCEDURE — 63600175 PHARM REV CODE 636 W HCPCS: Performed by: SURGERY

## 2017-03-13 PROCEDURE — 63600175 PHARM REV CODE 636 W HCPCS: Performed by: INTERNAL MEDICINE

## 2017-03-13 PROCEDURE — 25000003 PHARM REV CODE 250: Performed by: INTERNAL MEDICINE

## 2017-03-13 RX ORDER — OXYCODONE HCL 5 MG/5 ML
10 SOLUTION, ORAL ORAL EVERY 4 HOURS PRN
Qty: 4 BOTTLE | Refills: 0 | Status: ON HOLD | OUTPATIENT
Start: 2017-03-13 | End: 2017-11-08 | Stop reason: HOSPADM

## 2017-03-13 RX ADMIN — OXYCODONE HYDROCHLORIDE 10 MG: 5 SOLUTION ORAL at 05:03

## 2017-03-13 RX ADMIN — OXYCODONE HYDROCHLORIDE 10 MG: 5 SOLUTION ORAL at 09:03

## 2017-03-13 RX ADMIN — LORAZEPAM 1 MG: 2 INJECTION, SOLUTION INTRAMUSCULAR; INTRAVENOUS at 02:03

## 2017-03-13 RX ADMIN — OXYCODONE HYDROCHLORIDE 10 MG: 5 SOLUTION ORAL at 01:03

## 2017-03-13 RX ADMIN — PANTOPRAZOLE SODIUM 40 MG: 40 INJECTION, POWDER, FOR SOLUTION INTRAVENOUS at 08:03

## 2017-03-13 RX ADMIN — DIAZEPAM 10 MG: 5 INJECTION, SOLUTION INTRAMUSCULAR; INTRAVENOUS at 06:03

## 2017-03-13 RX ADMIN — METOPROLOL TARTRATE 10 MG: 5 INJECTION INTRAVENOUS at 05:03

## 2017-03-13 RX ADMIN — LEVOTHYROXINE SODIUM ANHYDROUS 12.5 MCG: 100 INJECTION, POWDER, LYOPHILIZED, FOR SOLUTION INTRAVENOUS at 08:03

## 2017-03-13 NOTE — PROGRESS NOTES
WRITTEN HEALTHCARE DISCHARGE INFORMATION         If you are unable to make your follow up appointments, please call the number listed and reschedule this appointment.     After discharge, if you need assistance, you can call [facility] Line for 24/7 assistance at [phone]    Thank you for choosing [facility] and allowing us to care for you.   From your care management team: [facility] [phone], [phone] or [phone]    You should receive a call from [facility] within 48-72 hours to help manage your care after discharge. Please try to make sure that you answer your phone for this important phone call.     Follow-up Information     Follow up with Vini Gomez MD. Go on 3/23/2017.    Specialties:  General Surgery, Bariatrics    Why:  For Appointment on Thursday 3/23/2017 @ 9:30AM    Contact information:    [address]  [address]  [place] [facility]  [address] [place] [address]  [phone]          Follow up with Jameson Cornejo MD. Go on 3/22/2017.    Specialty:  Gastroenterology    Why:  For Appointment on Wednesday 3/22/2017 @ 12:45PM    Contact information:    [address] [place] [address]  [address]  [facility]  [place] [address]  [phone]

## 2017-03-13 NOTE — NURSING
Patient remained free from falls, trauma, and injuries throughout shift.  No complaints of nausea or vomiting.  Complaints of pain controlled with prn analgesics. Telemetry monitor on and alarms audible.  Medications administered as prescribed.  Patient ambulated to and from bathroom with 1 person assist.  Safety sitter remains at bedside.  Bruising noted to 4 lap sites; PEG tube in placed and clamped.  IV removed; catheter intact.  Discharge instructions, prescriptions, and future appointments given to patient.  Educated patient on reasons to return to hospital and provided handouts on hiatal hernia.  Discharged via wheelchair; accompanied by staff and family.  No acute distress noted.

## 2017-03-13 NOTE — OP NOTE
DATE OF PROCEDURE:  03/10/2017.    SURGEON:  Vini Gomez M.D.    PREOPERATIVE DIAGNOSIS:  Incarcerated hiatal hernia with volvulus.    POSTOPERATIVE DIAGNOSIS:  Incarcerated hiatal hernia with volvulus.    PROCEDURES:  1.  Laparoscopic repair of hiatal hernia with mesh.  2.  EGD with PEG placement.    ESTIMATED BLOOD LOSS:  100 mL.    INDICATIONS FOR PROCEDURE:  The patient is a 62-year-old female who has had   previous episodes of chest pain and has a known hiatal hernia; however, this   time was presented with evidence of incarceration and volvulus.  She has   undergone an EGD, which shows viability of the majority of the stomach.    However, she does have ulcer near the GE junction.  This has been discussed in   detail with her and resecting this ulcer would require a major resection of the   stomach with a difficult anastomosis with high grade.  For this reason, the   hernia will be repaired and the ulcer will be left to heal on its own.  The   risks and benefits of the procedure have been explained to the patient, who   acknowledges these risks and wished to proceed.    PROCEDURE IN DETAIL:  After administer of IV antibiotics and placement of   sequential compression devices, the patient was intubated by Anesthesia and   sterilely prepped and draped.  All ports were infiltrated with local anesthetic   prior to incision.  An incision was made in the umbilicus.  The skin edges were   grasped with penetrating towel clamps and used to retract the abdominal wall   upward.  A Veress needle was inserted through a small umbilical hernia and   connected to insufflation.  A pneumoperitoneum to 15 mmHg was obtained.  The   remaining ports were placed under direct visualization.  Access to the abdomen   was gained with an optical viewing 11 mm trocar and a 0-degree scope at 15 cm   from the xiphoid process and just to the left of midline.  The underlying bowel   was inspected and found to be free of injury.  A 5 mm port  was placed in the   left lateral abdomen.  A 5 mm incision was made in the subxiphoid area and the   Ekaterina liver retractor was passed through this incision and used to expose   the GE junction.  It should be noted that the liver had the gross appearance of   advanced cirrhosis was very noncompliant with micronodular appearance.  Two 11   mm ports were placed at the right and left costal margins for the surgeon's   operating ports and the patient was placed in steep reverse Trendelenburg.  The   entirety of the stomach and a portion of the colon were within the hernia   defect.  The hernia was not reduced easily.  The short gastric vessels were   divided beginning approximately 2/3 of the way down the greater curvature of the   stomach.  This was carried up to the left jeremiah.  Once the short gastric vessels   were divided, the colon was able to be reduced back into the abdomen.  The   dissection continued along the jeremiah  the hernia sac from the   diaphragm.  This was completed circumferential.  A 6 inch Penrose drain was   passed into the abdomen and wrapped around the upper stomach just closed to the   GE junction as possible.  This was secured in place with an Endoloop and used to   place gentle retraction on the stomach.  Once the hernia sac was completely    from the cruise, it was bluntly dissected away from the structures   mediastinum.  There were thick bands of scar tissue consistent with an   incarcerated hernia.  Ultimately, this was completely reduced and the esophagus   was mobilized wellness with 3 cm of intraabdominal esophagus would stay when   left of tension.  The 60-Malagasy bougie was placed and a pledget crural repair   was performed that placing several 0 Ethibond sutures with pledgets posterior to   the esophagus and to anterior to the esophagus.  This was closed until it was   snug around the esophagus with the bougie in place.  The ____ U-shaped patch was   chosen to  reinforce the repair.  This was passed posterior to the esophagus and   secured in place using Ethibond sutures.  The bougie was then withdrawn.  An   EGD was performed, which showed no evidence of esophageal injury.  No   intraluminal bleeding.  The previously seen ulcer was still in the same position   near the GE junction along the greater curve.  The examined portion of the   duodenum was normal.  The Angiocath for the PEG tube was placed through the left   upper quadrant incision and guided through the wall of the stomach under direct   visualization.  The Angiocath was grasped with a snare through the scope.  The   needle was removed and a guidewire was placed.  The snare was repositioned to   grasp the guidewire and the scope and the guidewire were withdrawn.  A guidewire   was secured to the PEG tube and the guidewire was pulled back through pulling   the PEG tube with it until the bumper was in good position adjacent to the   abdominal wall.  The guidewire was removed.  The PEG tube was connected to   gravity drainage.  The PEG was confirmed in good position, laparoscopically.    The insufflation was then expelled from the abdomen and the liver retractor was   removed.  All ports were removed.  The skin over all ports was closed using   interrupted 4-0 Monocryl deep dermal sutures.  Dermabond was applied, and the   patient was aroused and transferred to Recovery Room in good condition.  All   instrument and sponge counts were correct at the end of the case.      HEVER  dd: 03/13/2017 09:28:07 (CDT)  td: 03/13/2017 12:09:41 (CDT)  Doc ID   #[id number]  Job ID #[id number]    CC:

## 2017-03-13 NOTE — PROGRESS NOTES
Order received for HH.  Pt offered list of HH providers.  Patient choice form completed, original to blue folder, copy to patient.  Pt requested [facility].   Facesheet, Orders, H&P, med list, Op report, GI consult sent to [facility] via [employer].  Fax confirmation received.  Awaiting return call to confirm provider.

## 2017-03-13 NOTE — DISCHARGE SUMMARY
[facility]  General Surgery  Discharge Summary      Patient Name: Estella Arevalo  MRN: [id number]  Admission Date: 3/9/2017  Hospital Length of Stay: 4 days  Discharge Date and Time:  03/13/2017 9:50 AM  Attending Physician: Vini Gomez MD   Discharging Provider: Jyaa Phillips MD  Primary Care Provider: Angela Packer MD     HPI: 63 yo female with acute incarcerated hiatal hernia    Procedure(s) (LRB):  REPAIR-HERNIA-LAPAROSCOPIC-HIATAL (N/A)  ESOPHAGOGASTRODUODENOSCOPY (EGD) (N/A)  LAPAROSCOPIC PEG TUBE PLACEMENT/REPLACEMENT (N/A)     Hospital Course: Pt was admitted and seen by GI and hospital medicine and was taken to OR for repair of her hiatal hernia.  Postoperatively she had slow but steady progress.  Her diet was advanced to full liquid diet and she continued to progress well and will be discharged home to follow up with Dr Gomez in a week or so    Consults:   Consults         Status Ordering Provider     Inpatient consult to Gastroenterology  Once     Provider:  Jameson Cornejo MD    Completed VINI GOMEZ     Inpatient consult to Internal Medicine  Once     Provider:  Yane Pineda MD    Acknowledged VEGA GUAN          Significant Diagnostic Studies: none    Pending Diagnostic Studies:     Procedure Component Value Units Date/Time    CBC auto differential [[id number]]     Order Status:  Sent Lab Status:  No result     Specimen:  Blood from Blood     Comprehensive metabolic panel [[id number]]     Order Status:  Sent Lab Status:  No result     Specimen:  Blood from Blood         Final Active Diagnoses:    Diagnosis Date Noted POA    PRINCIPAL PROBLEM:  Incarcerated hiatal hernia [K44.0] 03/09/2017 Yes    Bipolar disease, chronic [F31.9] 12/30/2016 Yes    Degeneration of lumbar or lumbosacral intervertebral disc [M51.37] 03/09/2015 Yes    Alcoholic hepatitis [K70.10] 12/19/2014 Yes    Thyroid disease [E07.9]  Yes    Other and unspecified alcohol  dependence, continuous drinking behavior [F10.20] 07/14/2013 Yes    Opiate dependence, continuous [F11.20] 07/14/2013 Yes    Major depressive disorder, recurrent episode, severe, without mention of psychotic behavior [F33.2] 07/14/2013 Yes      Problems Resolved During this Admission:    Diagnosis Date Noted Date Resolved POA      Discharged Condition: good    Disposition: Home or Self Care    Follow Up:  Follow-up Information     Follow up with Vini Gomez MD In 1 week.    Specialties:  General Surgery, Bariatrics    Contact information:    [address]  [address]  [facility]  [place] [place] [address]  [phone]          Patient Instructions:     Diet general     Activity as tolerated     Call MD for:  temperature >100.4     Call MD for:  persistent nausea and vomiting or diarrhea     Call MD for:  severe uncontrolled pain     Call MD for:  redness, tenderness, or signs of infection (pain, swelling, redness, odor or green/yellow discharge around incision site)     Call MD for:  difficulty breathing or increased cough     No dressing needed       Medications:  Reconciled Home Medications:   Current Discharge Medication List      START taking these medications    Details   oxycodone (ROXICODONE) 5 mg/5 mL Soln Take 10 mLs (10 mg total) by mouth every 4 (four) hours as needed.  Qty: 4 Bottle, Refills: 0         CONTINUE these medications which have NOT CHANGED    Details   oxycodone 20 mg Tab TK 1 T PO  Q 6-8 H PRN  Refills: 0      amitriptyline (ELAVIL) 50 MG tablet Take 1 tablet (50 mg total) by mouth every evening.  Qty: 90 tablet, Refills: 0    Associated Diagnoses: Primary insomnia      atenolol (TENORMIN) 25 MG tablet TK 1 T PO  D  Refills: 3      capsicum 0.075% (TRIXAICIN HP) 0.075 % topical cream Apply topically 3 (three) times daily.  Qty: 56 g, Refills: 1    Associated Diagnoses: Pain; DDD (degenerative disc disease), lumbar      clonazePAM (KLONOPIN) 1 MG tablet TAKE 1 TABLET BY MOUTH  TWICE DAILY  Qty: 60 tablet, Refills: 0      cyclobenzaprine (FLEXERIL) 10 MG tablet TAKE 1 TABLET(10 MG) BY MOUTH THREE TIMES DAILY AS NEEDED FOR MUSCLE SPASMS  Qty: 90 tablet, Refills: 0    Associated Diagnoses: Pain; DDD (degenerative disc disease), lumbar      duloxetine (CYMBALTA) 30 MG capsule Take 1 capsule (30 mg total) by mouth once daily.  Qty: 90 capsule, Refills: 2    Associated Diagnoses: DDD (degenerative disc disease), lumbar      gabapentin (NEURONTIN) 300 MG capsule TK ONE C PO Q 8 H  Refills: 3      JUBLIA 10 % Eran APPLY ONE DOSE ONCE DAILY  Qty: 24 mL, Refills: 0      levothyroxine (SYNTHROID) 25 MCG tablet Take 1 tablet (25 mcg total) by mouth once daily.  Qty: 90 tablet, Refills: 5    Comments: **Patient requests 90 days supply**  Associated Diagnoses: Hypothyroidism due to acquired atrophy of thyroid      nitroGLYCERIN (NITROSTAT) 0.3 MG SL tablet Place 1 tablet (0.3 mg total) under the tongue every 5 (five) minutes as needed for Chest pain.  Qty: 30 tablet, Refills: 0      pantoprazole (PROTONIX) 40 MG tablet Take 1 tablet (40 mg total) by mouth once daily.  Qty: 90 tablet, Refills: 5    Comments: **Patient requests 90 days supply**  Associated Diagnoses: Gastroesophageal reflux disease without esophagitis      RESTASIS 0.05 % ophthalmic emulsion INSTILL 1 DROP INTO BOTH EYES TWICE DAILY  Qty: 30 each, Refills: 3      zolpidem (AMBIEN) 5 MG Tab TK 1 T PO QHS PRN  Qty: 30 tablet, Refills: 1    Associated Diagnoses: Primary insomnia             Jaya Phillips MD  General Surgery  [facility]

## 2017-03-13 NOTE — PLAN OF CARE
"   03/13/17 1028   Discharge Assessment   Assessment Type Discharge Planning Assessment   Confirmed/corrected address and phone number on facesheet? Yes   Assessment information obtained from? Patient   Type of Healthcare Directive Received Other (Comment)  (Pt states that she "does not know")   Prior to hospitilization cognitive status: Alert/Oriented   Prior to hospitalization functional status: Independent  (relys on  and daughter to drive her )   Current cognitive status: Alert/Oriented   Current Functional Status: Independent   Arrived From home or self-care   Lives With child(carol), adult;grandchild(carol);spouse   Able to Return to Prior Arrangements yes   Is patient able to care for self after discharge? Yes   How many people do you have in your home that can help with your care after discharge? 2   Who are your caregiver(s) and their phone number(s)? , Hammad Salas [phone], Daughter Mavis Salas [phone]   Patient's perception of discharge disposition home health   Readmission Within The Last 30 Days no previous admission in last 30 days   Patient currently being followed by outpatient case management? No   Patient currently receives home health services? No   Does the patient currently use HME? No   Patient currently receives private duty nursing? No   Patient currently receives any other outside agency services? No   Equipment Currently Used at Home none   Do you have any problems affording any of your prescribed medications? No   Is the patient taking medications as prescribed? yes   Do you have any financial concerns preventing you from receiving the healthcare you need? No   Does the patient have transportation to healthcare appointments? Yes  (Daughter, Mavis Salas)   Transportation Available car   On Dialysis? No   Does the patient receive services at the Coumadin Clinic? No   Are there any open cases? No   Discharge Plan A Home with family   Discharge Plan B Home with " family;Home Health   Patient/Family In Agreement With Plan yes       [facility] [address] - [place] - [address] [facility]  [address] [place] [address]  Phone: [phone] Fax: [phone]

## 2017-03-13 NOTE — PROGRESS NOTES
Follow up appt scheduled with Kimberlyn @ Dr. Gomez's office [phone] on Thursday 3/23/2017 @ 9:30PM.    Follow up appt scheduled with Darrius @ Dr. Cornejo's office [phone] for Wednesday 3/22/2017 @ 12:45PM

## 2017-03-13 NOTE — PLAN OF CARE
03/12/17 1932   Discharge Assessment   Assessment Type Discharge Planning Assessment  (Three attempts to obtain assessment. First attempt patient was unavailable due to tech assisting pt to bathroom. Called pt's spouse at [phone] and [phone]. Voice mail not set up on the first phone and left a voice message on the second phone. )   Assessment information obtained from? Other  (No call received from spouse. Returned to pt's room but she was in bathroom. Just returned to pt's room and she is lying in bed and stated that she is tired and not able to give info.)

## 2017-03-13 NOTE — ANESTHESIA POSTPROCEDURE EVALUATION
"Anesthesia Post Evaluation    Patient: Estella Arevalo    Procedure(s) Performed: Procedure(s) (LRB):  ESOPHAGOGASTRODUODENOSCOPY (EGD) (N/A)    Final Anesthesia Type: general  Patient location during evaluation: PACU  Patient participation: Yes- Able to Participate  Level of consciousness: awake and alert and oriented  Post-procedure vital signs: reviewed and stable  Pain management: adequate  Airway patency: patent  PONV status at discharge: No PONV  Anesthetic complications: no      Cardiovascular status: blood pressure returned to baseline and hemodynamically stable  Respiratory status: unassisted and spontaneous ventilation  Hydration status: euvolemic  Follow-up not needed.        Visit Vitals    /86    Pulse 96    Temp 36.5 °C (97.7 °F) (Oral)    Resp 15    Ht 5' 5" (1.651 m)    Wt 74.5 kg (164 lb 4.6 oz)    LMP  (Approximate)    SpO2 (!) 92%    Breastfeeding No    BMI 27.34 kg/m2       Pain/Mika Score: Pain Assessment Performed: Yes (3/12/2017  7:30 PM)  Presence of Pain: complains of pain/discomfort (3/12/2017  7:30 PM)  Pain Rating Prior to Med Admin: 9 (3/13/2017  5:07 AM)  Pain Rating Post Med Admin: 2 (3/13/2017  6:07 AM)      "

## 2017-03-13 NOTE — PROGRESS NOTES
Denied by [facility]. H&P, MAR, Home Health orders, Op report and GI consult faxed to [facility] via [facility]. [facility] accepted pt via [facility].

## 2017-03-13 NOTE — PROGRESS NOTES
Went to pts room to complete case management discharge teaching. Pt was not in room. Daisha informed TN that pt left. TN placed call to pt at [phone] with regards to please call TN for follow up MD appts and Home Health scheduled. Call also placed to spouse, Hammad, [phone], no answer and voice mail not set up.

## 2017-03-13 NOTE — PLAN OF CARE
Problem: Patient Care Overview  Goal: Plan of Care Review  Outcome: Ongoing (interventions implemented as appropriate)  Argumentative, anxious behavior. Repeat request for pain mediation. Oriented x3. Voiding well. Skin integrity maintained. Pain uncontrolled per patient, however seems to be resting comfortably. VS stable. Adequate oral intake. Tolerating diet. Free of falls. Call light in reach. Low bed. No issues during shift. Continue plan of care.        Problem: Pain, Acute (Adult)  Goal: Acceptable Pain Control/Comfort Level  Patient will demonstrate the desired outcomes by discharge/transition of care.   Outcome: Ongoing (interventions implemented as appropriate)  Complains of severe pain to ABD. Pain medication given as order. Continue to monitor VS. Assess for over sedation.     Problem: Infection, Risk/Actual (Adult)  Goal: Identify Related Risk Factors and Signs and Symptoms  Related risk factors and signs and symptoms are identified upon initiation of Human Response Clinical Practice Guideline (CPG)   Outcome: Ongoing (interventions implemented as appropriate)  Afebrile.     Problem: Fall Risk (Adult)  Goal: Identify Related Risk Factors and Signs and Symptoms  Related risk factors and signs and symptoms are identified upon initiation of Human Response Clinical Practice Guideline (CPG)   Outcome: Ongoing (interventions implemented as appropriate)  Sitter at bedside. Free of falls. Assist while ambulating.     Problem: Alcohol Withdrawal Acute, Risk/Actual (Adult)  Goal: Signs and Symptoms of Listed Potential Problems Will be Absent, Minimized or Managed (Alcohol Withdrawal Acute, Risk/Actual)  Signs and symptoms of listed potential problems will be absent, minimized or managed by discharge/transition of care (reference Alcohol Withdrawal Acute, Risk/Actual (Adult) CPG).   Outcome: Ongoing (interventions implemented as appropriate)  Tachycardia. Anxious behavior noted. No tremors noted.

## 2017-03-13 NOTE — NURSING
"Patient refused to wait until home health was set up with care management.  Patient states, "I have been waiting long enough.  I am ready to go."  Will inform care management of patient's decision.    "

## 2017-03-15 LAB
AMPHETAMINES SERPL QL: NEGATIVE
BARBITURATES SERPL QL SCN: NEGATIVE
BENZODIAZ SERPL QL: NEGATIVE
CANNABINOIDS SERPL QL: NEGATIVE
ETHANOL SERPL-MCNC: 230 MG/DL
METHADONE SERPL QL SCN: NEGATIVE
OPIATES SERPL QL SCN: NEGATIVE
PCP SERPL QL SCN: NEGATIVE
PROPOXYPH SERPL QL: NEGATIVE

## 2017-03-22 ENCOUNTER — TELEPHONE (OUTPATIENT)
Dept: FAMILY MEDICINE | Facility: CLINIC | Age: 62
End: 2017-03-22

## 2017-03-22 NOTE — TELEPHONE ENCOUNTER
----- Message from Yumiko Braden RN sent at 3/16/2017  2:04 PM CDT -----  Please forward this important TCC information to your provider in order to maximize the post discharge care delivery of this patient.    C3 nurse attempted to contact Estella SORIA Mickey  for a TCC post hospital discharge follow up call. No answer. C3 nurse left a voice message and OOC # given. The patient does not have a HOSFU appointment with a Provider within 7-14 days post hospital discharge date 3/14.  Please contact patient and schedule follow up appointment using HOSFU visit type on or before 3/21.    Respectfully,  Yumiko Braden, RN    [facility]  [email or web address]

## 2017-03-24 DIAGNOSIS — R52 PAIN: ICD-10-CM

## 2017-03-24 DIAGNOSIS — F51.01 PRIMARY INSOMNIA: ICD-10-CM

## 2017-03-24 DIAGNOSIS — M51.36 DDD (DEGENERATIVE DISC DISEASE), LUMBAR: ICD-10-CM

## 2017-03-26 RX ORDER — AMITRIPTYLINE HYDROCHLORIDE 50 MG/1
TABLET, FILM COATED ORAL
Qty: 90 TABLET | Refills: 0 | Status: SHIPPED | OUTPATIENT
Start: 2017-03-26 | End: 2017-06-08 | Stop reason: SDUPTHER

## 2017-03-26 RX ORDER — NITROGLYCERIN 0.3 MG/1
TABLET SUBLINGUAL
Qty: 100 TABLET | Refills: 0 | Status: SHIPPED | OUTPATIENT
Start: 2017-03-26 | End: 2017-03-26 | Stop reason: SDUPTHER

## 2017-03-26 RX ORDER — AMITRIPTYLINE HYDROCHLORIDE 50 MG/1
TABLET, FILM COATED ORAL
Qty: 90 TABLET | Refills: 0 | OUTPATIENT
Start: 2017-03-26

## 2017-03-26 RX ORDER — NITROGLYCERIN 0.3 MG/1
TABLET SUBLINGUAL
Qty: 30 TABLET | Refills: 0 | Status: SHIPPED | OUTPATIENT
Start: 2017-03-26 | End: 2017-12-05 | Stop reason: SDUPTHER

## 2017-03-26 RX ORDER — CYCLOBENZAPRINE HCL 10 MG
TABLET ORAL
Qty: 90 TABLET | Refills: 0 | Status: SHIPPED | OUTPATIENT
Start: 2017-03-26 | End: 2018-07-12

## 2017-03-26 RX ORDER — CYCLOSPORINE 0.5 MG/ML
EMULSION OPHTHALMIC
Qty: 30 EACH | Refills: 0 | Status: SHIPPED | OUTPATIENT
Start: 2017-03-26 | End: 2017-05-06 | Stop reason: SDUPTHER

## 2017-03-30 ENCOUNTER — TELEPHONE (OUTPATIENT)
Dept: FAMILY MEDICINE | Facility: CLINIC | Age: 62
End: 2017-03-30

## 2017-03-30 NOTE — TELEPHONE ENCOUNTER
----- Message from Linsey Perez sent at 3/29/2017  3:41 PM CDT -----  Contact: [phone]  Pt needs all her consults rescheduled because she wasn't able to make them Please call pt at your earliest convenience. Thanks !

## 2017-04-04 ENCOUNTER — HOSPITAL ENCOUNTER (INPATIENT)
Facility: HOSPITAL | Age: 62
LOS: 4 days | Discharge: HOME | DRG: 392 | End: 2017-04-08
Attending: EMERGENCY MEDICINE | Admitting: EMERGENCY MEDICINE
Payer: OTHER GOVERNMENT

## 2017-04-04 DIAGNOSIS — R10.9 INTRACTABLE ABDOMINAL PAIN: ICD-10-CM

## 2017-04-04 DIAGNOSIS — R11.2 INTRACTABLE VOMITING WITH NAUSEA, UNSPECIFIED VOMITING TYPE: Primary | ICD-10-CM

## 2017-04-04 LAB
ALBUMIN SERPL BCP-MCNC: 3.6 G/DL
ALP SERPL-CCNC: 232 U/L
ALT SERPL W/O P-5'-P-CCNC: 26 U/L
ANION GAP SERPL CALC-SCNC: 11 MMOL/L
AST SERPL-CCNC: 61 U/L
BACTERIA #/AREA URNS HPF: NORMAL /HPF
BASOPHILS # BLD AUTO: 0.06 K/UL
BASOPHILS NFR BLD: 1.2 %
BILIRUB SERPL-MCNC: 0.9 MG/DL
BILIRUB UR QL STRIP: NEGATIVE
BUN SERPL-MCNC: 7 MG/DL
CALCIUM SERPL-MCNC: 9.2 MG/DL
CHLORIDE SERPL-SCNC: 103 MMOL/L
CLARITY UR: CLEAR
CO2 SERPL-SCNC: 22 MMOL/L
COLOR UR: YELLOW
CREAT SERPL-MCNC: 0.7 MG/DL
DIFFERENTIAL METHOD: ABNORMAL
EOSINOPHIL # BLD AUTO: 0.1 K/UL
EOSINOPHIL NFR BLD: 1.2 %
ERYTHROCYTE [DISTWIDTH] IN BLOOD BY AUTOMATED COUNT: 14.4 %
EST. GFR  (AFRICAN AMERICAN): >60 ML/MIN/1.73 M^2
EST. GFR  (NON AFRICAN AMERICAN): >60 ML/MIN/1.73 M^2
ETHANOL SERPL-MCNC: <10 MG/DL
GLUCOSE SERPL-MCNC: 116 MG/DL
GLUCOSE UR QL STRIP: NEGATIVE
HCT VFR BLD AUTO: 41.9 %
HGB BLD-MCNC: 14.5 G/DL
HGB UR QL STRIP: NEGATIVE
INR PPP: 1.1
KETONES UR QL STRIP: ABNORMAL
LEUKOCYTE ESTERASE UR QL STRIP: ABNORMAL
LIPASE SERPL-CCNC: 13 U/L
LYMPHOCYTES # BLD AUTO: 1 K/UL
LYMPHOCYTES NFR BLD: 20 %
MCH RBC QN AUTO: 32.2 PG
MCHC RBC AUTO-ENTMCNC: 34.6 %
MCV RBC AUTO: 93 FL
MICROSCOPIC COMMENT: NORMAL
MONOCYTES # BLD AUTO: 0.4 K/UL
MONOCYTES NFR BLD: 7.6 %
NEUTROPHILS # BLD AUTO: 3.4 K/UL
NEUTROPHILS NFR BLD: 70 %
NITRITE UR QL STRIP: NEGATIVE
PH UR STRIP: 7 [PH] (ref 5–8)
PLATELET # BLD AUTO: 378 K/UL
PMV BLD AUTO: 10.7 FL
POTASSIUM SERPL-SCNC: 3.7 MMOL/L
PROT SERPL-MCNC: 9.1 G/DL
PROT UR QL STRIP: NEGATIVE
PROTHROMBIN TIME: 11.4 SEC
RBC # BLD AUTO: 4.51 M/UL
SODIUM SERPL-SCNC: 136 MMOL/L
SP GR UR STRIP: >1.03 (ref 1–1.03)
SQUAMOUS #/AREA URNS HPF: 2 /HPF
URN SPEC COLLECT METH UR: ABNORMAL
UROBILINOGEN UR STRIP-ACNC: NEGATIVE EU/DL
WBC # BLD AUTO: 4.9 K/UL
WBC #/AREA URNS HPF: 1 /HPF (ref 0–5)

## 2017-04-04 PROCEDURE — C9113 INJ PANTOPRAZOLE SODIUM, VIA: HCPCS | Performed by: EMERGENCY MEDICINE

## 2017-04-04 PROCEDURE — 63600175 PHARM REV CODE 636 W HCPCS: Performed by: EMERGENCY MEDICINE

## 2017-04-04 PROCEDURE — 80320 DRUG SCREEN QUANTALCOHOLS: CPT

## 2017-04-04 PROCEDURE — 83690 ASSAY OF LIPASE: CPT

## 2017-04-04 PROCEDURE — 25500020 PHARM REV CODE 255: Performed by: EMERGENCY MEDICINE

## 2017-04-04 PROCEDURE — 99285 EMERGENCY DEPT VISIT HI MDM: CPT | Mod: 25

## 2017-04-04 PROCEDURE — 96375 TX/PRO/DX INJ NEW DRUG ADDON: CPT

## 2017-04-04 PROCEDURE — 12000002 HC ACUTE/MED SURGE SEMI-PRIVATE ROOM

## 2017-04-04 PROCEDURE — 96376 TX/PRO/DX INJ SAME DRUG ADON: CPT

## 2017-04-04 PROCEDURE — 85025 COMPLETE CBC W/AUTO DIFF WBC: CPT

## 2017-04-04 PROCEDURE — 96366 THER/PROPH/DIAG IV INF ADDON: CPT

## 2017-04-04 PROCEDURE — 25000003 PHARM REV CODE 250: Performed by: EMERGENCY MEDICINE

## 2017-04-04 PROCEDURE — 81000 URINALYSIS NONAUTO W/SCOPE: CPT

## 2017-04-04 PROCEDURE — 85610 PROTHROMBIN TIME: CPT

## 2017-04-04 PROCEDURE — 96365 THER/PROPH/DIAG IV INF INIT: CPT

## 2017-04-04 PROCEDURE — 80053 COMPREHEN METABOLIC PANEL: CPT

## 2017-04-04 RX ORDER — HYDRALAZINE HYDROCHLORIDE 20 MG/ML
10 INJECTION INTRAMUSCULAR; INTRAVENOUS
Status: COMPLETED | OUTPATIENT
Start: 2017-04-04 | End: 2017-04-04

## 2017-04-04 RX ORDER — HYDROMORPHONE HYDROCHLORIDE 2 MG/ML
1 INJECTION, SOLUTION INTRAMUSCULAR; INTRAVENOUS; SUBCUTANEOUS
Status: COMPLETED | OUTPATIENT
Start: 2017-04-04 | End: 2017-04-04

## 2017-04-04 RX ORDER — HYDROMORPHONE HYDROCHLORIDE 2 MG/ML
0.5 INJECTION, SOLUTION INTRAMUSCULAR; INTRAVENOUS; SUBCUTANEOUS EVERY 4 HOURS PRN
Status: DISCONTINUED | OUTPATIENT
Start: 2017-04-04 | End: 2017-04-08 | Stop reason: HOSPADM

## 2017-04-04 RX ORDER — ONDANSETRON 2 MG/ML
4 INJECTION INTRAMUSCULAR; INTRAVENOUS EVERY 12 HOURS PRN
Status: DISCONTINUED | OUTPATIENT
Start: 2017-04-04 | End: 2017-04-08 | Stop reason: HOSPADM

## 2017-04-04 RX ORDER — SODIUM CHLORIDE, SODIUM LACTATE, POTASSIUM CHLORIDE, CALCIUM CHLORIDE 600; 310; 30; 20 MG/100ML; MG/100ML; MG/100ML; MG/100ML
INJECTION, SOLUTION INTRAVENOUS CONTINUOUS
Status: DISCONTINUED | OUTPATIENT
Start: 2017-04-04 | End: 2017-04-07

## 2017-04-04 RX ORDER — PANTOPRAZOLE SODIUM 40 MG/10ML
40 INJECTION, POWDER, LYOPHILIZED, FOR SOLUTION INTRAVENOUS DAILY
Status: DISCONTINUED | OUTPATIENT
Start: 2017-04-05 | End: 2017-04-08 | Stop reason: HOSPADM

## 2017-04-04 RX ORDER — PANTOPRAZOLE SODIUM 40 MG/10ML
40 INJECTION, POWDER, LYOPHILIZED, FOR SOLUTION INTRAVENOUS
Status: COMPLETED | OUTPATIENT
Start: 2017-04-04 | End: 2017-04-04

## 2017-04-04 RX ORDER — ONDANSETRON 2 MG/ML
8 INJECTION INTRAMUSCULAR; INTRAVENOUS
Status: COMPLETED | OUTPATIENT
Start: 2017-04-04 | End: 2017-04-04

## 2017-04-04 RX ORDER — METOCLOPRAMIDE HYDROCHLORIDE 5 MG/ML
10 INJECTION INTRAMUSCULAR; INTRAVENOUS
Status: COMPLETED | OUTPATIENT
Start: 2017-04-04 | End: 2017-04-04

## 2017-04-04 RX ORDER — HYDROMORPHONE HYDROCHLORIDE 2 MG/ML
1 INJECTION, SOLUTION INTRAMUSCULAR; INTRAVENOUS; SUBCUTANEOUS EVERY 4 HOURS PRN
Status: DISCONTINUED | OUTPATIENT
Start: 2017-04-04 | End: 2017-04-08 | Stop reason: HOSPADM

## 2017-04-04 RX ADMIN — HYDRALAZINE HYDROCHLORIDE 10 MG: 20 INJECTION INTRAMUSCULAR; INTRAVENOUS at 06:04

## 2017-04-04 RX ADMIN — SODIUM CHLORIDE, SODIUM LACTATE, POTASSIUM CHLORIDE, AND CALCIUM CHLORIDE: .6; .31; .03; .02 INJECTION, SOLUTION INTRAVENOUS at 08:04

## 2017-04-04 RX ADMIN — PROMETHAZINE HYDROCHLORIDE 25 MG: 25 INJECTION, SOLUTION INTRAMUSCULAR; INTRAVENOUS at 08:04

## 2017-04-04 RX ADMIN — ONDANSETRON 8 MG: 2 INJECTION INTRAMUSCULAR; INTRAVENOUS at 03:04

## 2017-04-04 RX ADMIN — ONDANSETRON 4 MG: 2 INJECTION INTRAMUSCULAR; INTRAVENOUS at 09:04

## 2017-04-04 RX ADMIN — HYDROMORPHONE HYDROCHLORIDE 1 MG: 2 INJECTION INTRAMUSCULAR; INTRAVENOUS; SUBCUTANEOUS at 02:04

## 2017-04-04 RX ADMIN — HYDROMORPHONE HYDROCHLORIDE 1 MG: 2 INJECTION INTRAMUSCULAR; INTRAVENOUS; SUBCUTANEOUS at 10:04

## 2017-04-04 RX ADMIN — METOCLOPRAMIDE 10 MG: 5 INJECTION, SOLUTION INTRAMUSCULAR; INTRAVENOUS at 05:04

## 2017-04-04 RX ADMIN — HYDROMORPHONE HYDROCHLORIDE 1 MG: 2 INJECTION INTRAMUSCULAR; INTRAVENOUS; SUBCUTANEOUS at 03:04

## 2017-04-04 RX ADMIN — IOHEXOL 75 ML: 350 INJECTION, SOLUTION INTRAVENOUS at 03:04

## 2017-04-04 RX ADMIN — HYDROMORPHONE HYDROCHLORIDE 1 MG: 2 INJECTION INTRAMUSCULAR; INTRAVENOUS; SUBCUTANEOUS at 06:04

## 2017-04-04 RX ADMIN — PROMETHAZINE HYDROCHLORIDE 25 MG: 25 INJECTION INTRAMUSCULAR; INTRAVENOUS at 02:04

## 2017-04-04 RX ADMIN — PANTOPRAZOLE SODIUM 40 MG: 40 INJECTION, POWDER, FOR SOLUTION INTRAVENOUS at 05:04

## 2017-04-04 RX ADMIN — IOHEXOL 15 ML: 300 INJECTION, SOLUTION INTRAVENOUS at 03:04

## 2017-04-04 NOTE — ED PROVIDER NOTES
Encounter Date: 4/4/2017    SCRIBE #1 NOTE: I, Ainsley Aguilar, am scribing for, and in the presence of,  Kirill Alves MD. I have scribed the following portions of the note - Other sections scribed: HPI and ROS.       History     Chief Complaint   Patient presents with    Abdominal Pain     arrived via [facility], called to home for c/o abd pain, n/v, Hx: PEG      Review of patient's allergies indicates:  No Known Allergies  HPI Comments: CC: Abdominal Pain    HPI: This 62 y.o. Female who has GERD, Thyroid disease, Chronic back pain, Liver cirrhosis, Alcohol abuse, HTN, CAD, Anxiety, and Depression presents to the ED c/o acute, constant, 9/10 abdominal pain with associated nausea, emesis, and diarrhea that began at 0400.  Patient also reports of a 2 day h/o abdominal bloating.  Patient reports having a hiatal hernia repair on 3/9/17.  Patient reports of no relief of her pain with taking her prescribed 20mg oxycodone, which states has been prescribed for her chronic back pain.  Patient reports of no changes in flatulence.  Patient denies fever, chills, cough, rhinorrhea, chest pain, neck pain, blood in stool, or any other associated symptoms.  No alleviating factors.    The history is provided by the patient. No  was used.     Past Medical History:   Diagnosis Date    Alcohol abuse     Anxiety     Bipolar disorder     Cirrhosis of liver     Coronary artery disease     Depression     Fall     GERD (gastroesophageal reflux disease)     Hypertension     Low back pain     MI (myocardial infarction)     Thyroid disease      Past Surgical History:   Procedure Laterality Date    JOINT REPLACEMENT      KNEE SURGERY  bilateral replacement    right foot sx  2008    SHOULDER SURGERY  replacement     Family History   Problem Relation Age of Onset    Cancer Mother     Cancer Father      Social History   Substance Use Topics    Smoking status: Never Smoker    Smokeless tobacco: Never Used     Alcohol use No     Review of Systems   Constitutional: Negative for chills and fever.   HENT: Negative for ear pain and sore throat.    Eyes: Negative for pain.   Respiratory: Negative for cough and shortness of breath.    Cardiovascular: Negative for chest pain.   Gastrointestinal: Positive for abdominal distention, abdominal pain, diarrhea, nausea and vomiting.   Genitourinary: Negative for dysuria.   Musculoskeletal: Negative for back pain.   Skin: Negative for rash.   Neurological: Negative for headaches.       Physical Exam   Initial Vitals   BP Pulse Resp Temp SpO2   04/04/17 1409 04/04/17 1409 04/04/17 1409 04/04/17 1409 04/04/17 1409   170/96 66 20 98.9 °F (37.2 °C) 98 %     Physical Exam    Nursing note and vitals reviewed.  Constitutional: She appears well-developed and well-nourished. She appears distressed.   Active vomiting with food particles- Undigested spinach. States ate spinach yesterday.    Eyes: EOM are normal. Pupils are equal, round, and reactive to light.   Neck: Normal range of motion. Neck supple. No thyromegaly present. No JVD present.   Cardiovascular: Normal rate, regular rhythm, normal heart sounds and intact distal pulses. Exam reveals no gallop and no friction rub.    No murmur heard.  Pulmonary/Chest: Breath sounds normal. No respiratory distress. She has no wheezes. She has no rhonchi. She has no rales. She exhibits no tenderness.   Abdominal: Soft. She exhibits no distension and no mass. There is tenderness. There is no rebound and no guarding.   Musculoskeletal: Normal range of motion. She exhibits no edema or tenderness.   Neurological: She is alert and oriented to person, place, and time. She has normal strength. No sensory deficit.   Skin: Skin is warm and dry.         ED Course   Procedures  Labs Reviewed   CBC W/ AUTO DIFFERENTIAL - Abnormal; Notable for the following:        Result Value    MCH 32.2 (*)     Platelets 378 (*)     All other components within normal limits    COMPREHENSIVE METABOLIC PANEL - Abnormal; Notable for the following:     CO2 22 (*)     Glucose 116 (*)     BUN, Bld 7 (*)     Total Protein 9.1 (*)     Alkaline Phosphatase 232 (*)     AST 61 (*)     All other components within normal limits   URINALYSIS - Abnormal; Notable for the following:     Specific Gravity, UA >1.030 (*)     Ketones, UA 1+ (*)     Leukocytes, UA 1+ (*)     All other components within normal limits   LIPASE   PROTIME-INR   ALCOHOL,MEDICAL (ETHANOL)   URINALYSIS MICROSCOPIC            per radiology patient CT scan shows a 9 cm fluid collection surrounding the distal esophagus in the inferior mediastinum.  Sterile postoperative fluid versus infection which cannot be excluded.  Patient does not have fever or leukocytosis.  Discussed with Dr. Jackson.  Who requests the patient be admitted to Dr. Gomez with suction to the PEG tube and nausea medications and pain medications and IV fluids.             Scribe Attestation:   Scribe #1: I performed the above scribed service and the documentation accurately describes the services I performed. I attest to the accuracy of the note.    Attending Attestation:           Physician Attestation for Scribe:  Physician Attestation Statement for Scribe #1: I, Kirill Alves MD, reviewed documentation, as scribed by Ainsley Aguilar in my presence, and it is both accurate and complete.                 ED Course     Clinical Impression:   The primary encounter diagnosis was Intractable vomiting with nausea, unspecified vomiting type. A diagnosis of Intractable abdominal pain was also pertinent to this visit.          Kirill Alves MD  04/04/17 1394

## 2017-04-04 NOTE — ED NOTES
MD made aware that pt refused oral contrast dye, pt requested additional pain med and is currently unable to provide urine specimen.

## 2017-04-04 NOTE — IP AVS SNAPSHOT
[place] [address] [address] [place] [address]  Phone: [phone]           Patient Discharge Instructions   Our goal is to set you up for success. This packet includes information on your condition, medications, and your home care.  It will help you care for yourself to prevent having to return to the hospital.     Please ask your nurse if you have any questions.      There are many details to remember when preparing to leave the hospital. Here is what you will need to do:    1. Take your medicine. If you are prescribed medications, review your Medication List on the following pages. You may have new medications to  at the pharmacy and others that you'll need to stop taking. Review the instructions for how and when to take your medications. Talk with your doctor or nurses if you are unsure of what to do.     2. Go to your follow-up appointments. Specific follow-up information is listed in the following pages. Your may be contacted by a nurse or clinical provider about future appointments. Be sure we have all of the phone numbers to reach you. Please contact your provider's office if you are unable to make an appointment.     3. Watch for warning signs. Your doctor or nurse will give you detailed warning signs to watch for and when to call for assistance. These instructions may also include educational information about your condition. If you experience any of warning signs to your health, call your doctor.               ** Verify the list of medication(s) below is accurate and up to date. Carry this with you in case of emergency. If your medications have changed, please notify your healthcare provider.             Medication List      CONTINUE taking these medications        Additional Info                      amitriptyline 50 MG tablet   Commonly known as:  ELAVIL   Quantity:  90 tablet   Refills:  0    Instructions:  TAKE 1 TABLET(50 MG) BY MOUTH EVERY EVENING     Begin Date     AM    Noon    PM    Bedtime       atenolol 25 MG tablet   Commonly known as:  TENORMIN   Refills:  3    Instructions:  TK 1 T PO  D     Begin Date    AM    Noon    PM    Bedtime       capsicum 0.075% 0.075 % topical cream   Commonly known as:  TRIXAICIN HP   Quantity:  56 g   Refills:  1    Instructions:  Apply topically 3 (three) times daily.     Begin Date    AM    Noon    PM    Bedtime       clonazePAM 1 MG tablet   Commonly known as:  KLONOPIN   Quantity:  60 tablet   Refills:  0    Instructions:  TAKE 1 TABLET BY MOUTH TWICE DAILY     Begin Date    AM    Noon    PM    Bedtime       * cyclobenzaprine 10 MG tablet   Commonly known as:  FLEXERIL   Quantity:  90 tablet   Refills:  0    Instructions:  TAKE 1 TABLET(10 MG) BY MOUTH THREE TIMES DAILY AS NEEDED FOR MUSCLE SPASMS     Begin Date    AM    Noon    PM    Bedtime       * cyclobenzaprine 10 MG tablet   Commonly known as:  FLEXERIL   Quantity:  90 tablet   Refills:  0    Instructions:  TAKE 1 TABLET(10 MG) BY MOUTH THREE TIMES DAILY AS NEEDED FOR MUSCLE SPASMS     Begin Date    AM    Noon    PM    Bedtime       duloxetine 30 MG capsule   Commonly known as:  CYMBALTA   Quantity:  90 capsule   Refills:  2   Dose:  30 mg    Instructions:  Take 1 capsule (30 mg total) by mouth once daily.     Begin Date    AM    Noon    PM    Bedtime       gabapentin 300 MG capsule   Commonly known as:  NEURONTIN   Refills:  3    Instructions:  TK ONE C PO Q 8 H     Begin Date    AM    Noon    PM    Bedtime       JUBLIA 10 % Darlin   Quantity:  24 mL   Refills:  0   Generic drug:  efinaconazole    Instructions:  APPLY ONE DOSE ONCE DAILY     Begin Date    AM    Noon    PM    Bedtime       levothyroxine 25 MCG tablet   Commonly known as:  SYNTHROID   Quantity:  90 tablet   Refills:  5   Dose:  25 mcg   Comments:  **Patient requests 90 days supply**    Instructions:  Take 1 tablet (25 mcg total) by mouth once daily.     Begin Date    AM    Noon    PM    Bedtime       nitroGLYCERIN  0.3 MG SL tablet   Commonly known as:  NITROSTAT   Quantity:  30 tablet   Refills:  0   Comments:  **Patient requests 90 days supply**    Instructions:  ONE TABLET UNDER TONGUE AS NEEDED FOR CHEST PAIN EVERY 5 MINUTES AS NEEDED     Begin Date    AM    Noon    PM    Bedtime       * oxycodone 20 mg Tab immediate release tablet   Commonly known as:  ROXICODONE   Refills:  0    Instructions:  TK 1 T PO  Q 6-8 H PRN     Begin Date    AM    Noon    PM    Bedtime       * oxycodone 5 mg/5 mL Soln   Commonly known as:  ROXICODONE   Quantity:  4 Bottle   Refills:  0   Dose:  10 mg    Instructions:  Take 10 mLs (10 mg total) by mouth every 4 (four) hours as needed.     Begin Date    AM    Noon    PM    Bedtime       pantoprazole 40 MG tablet   Commonly known as:  PROTONIX   Quantity:  90 tablet   Refills:  5   Dose:  40 mg   Comments:  **Patient requests 90 days supply**    Instructions:  Take 1 tablet (40 mg total) by mouth once daily.     Begin Date    AM    Noon    PM    Bedtime       RESTASIS 0.05 % ophthalmic emulsion   Quantity:  30 each   Refills:  0   Generic drug:  cycloSPORINE    Instructions:  INSTILL 1 DROP IN BOTH EYES TWICE DAILY     Begin Date    AM    Noon    PM    Bedtime       zolpidem 5 MG Tab   Commonly known as:  AMBIEN   Quantity:  30 tablet   Refills:  1    Instructions:  TK 1 T PO QHS PRN     Begin Date    AM    Noon    PM    Bedtime       * Notice:  This list has 4 medication(s) that are the same as other medications prescribed for you. Read the directions carefully, and ask your doctor or other care provider to review them with you.               Please bring to all follow up appointments:    1. A copy of your discharge instructions.  2. All medicines you are currently taking in their original bottles.  3. Identification and insurance card.    Please arrive 15 minutes ahead of scheduled appointment time.    Please call 24 hours in advance if you must reschedule your appointment and/or time.         Your Scheduled Appointments     Apr 21, 2017  8:00 AM CDT   Hospital Follow Up with Angela Packer MD   [facility] ([facility])    [address] [place] [address]   [phone]            Apr 24, 2017  1:00 PM CDT   Post Op-OCC with Vini Gomez MD   [facility] ([facility])    [address] [facility] [address]  [facility] [address]   [phone]              Follow-up Information     Follow up with Angela Packer MD. Go on 4/21/2017.    Specialty:  Family Medicine    Why:  For Appointment on Friday 4/21/2017 @ 8AM    Contact information:    [address] [place] [address]  [phone]          Follow up with Vini Gomez MD. Go on 4/24/2017.    Specialties:  General Surgery, Bariatrics    Why:  For Appointment on Monday 4/24/2017 @ 1PM    Contact information:    [address]  [address]  [place] [facility] [place] [address]  [phone]          Discharge Instructions     Future Orders    Activity as tolerated     Call MD for:  difficulty breathing or increased cough     Call MD for:  persistent nausea and vomiting or diarrhea     Call MD for:  redness, tenderness, or signs of infection (pain, swelling, redness, odor or green/yellow discharge around incision site)     Call MD for:  severe persistent headache     Call MD for:  severe uncontrolled pain     Call MD for:  temperature >100.4     Diet general     Questions:    Total calories:      Fat restriction, if any:      Protein restriction, if any:      Na restriction, if any:      Fluid restriction:      Additional restrictions:      No dressing needed         Primary Diagnosis     Your primary diagnosis was:  Intractable Vomiting With Nausea      Admission Information     Date & Time Provider Department CSN    4/4/2017  2:07 PM Vini Gomez MD [facility] [id number]      Care Providers     Provider Role Specialty Primary office phone    Vini Gomez MD Attending Provider  "General Surgery [phone]      Your Vitals Were     BP Pulse Temp Resp Height Weight    109/67 97 98.7 °F (37.1 °C) (Oral) 18 5' 6" (1.676 m) 75.3 kg (166 lb)    Last Period SpO2 BMI          (Approximate) 96% 26.79 kg/m2        Recent Lab Values        2/17/2015                           6:08 AM           A1C 5.4                       Allergies as of 4/8/2017     No Known Allergies      [facility]     [facility] Nurse Care Line - 24/7 Assistance  Unless otherwise directed by your provider, please contact [facility], our nurse care line that is available for 24/7 assistance.     Registered nurses in the [facility] provide clinical advisement, health education, appointment booking, and other advisory services.  Call for this free service at [phone].        Advance Directives     An advance directive is a document which, in the event you are no longer able to make decisions for yourself, tells your healthcare team what kind of treatment you do or do not want to receive, or who you would like to make those decisions for you.  If you do not currently have an advance directive, [facility] encourages you to create one.  For more information call:  [phone]), [phone] ([phone]),  or log on to [email or web address].        Language Assistance Services     ATTENTION: Language assistance services are available, free of charge. Please call [phone].      ATENCIÓN: Si habla español, tiene a quintero disposición servicios gratuitos de asistencia lingüística. Llame al [phone].     CHÚ Ý: N?u b?n nói Ti?ng Vi?t, có các d?ch v? h? tr? ngôn ng? mi?n phí dành cho b?n. G?i s? [phone].        [facility] Sign-Up     Activating your [facility] account is as easy as 1-2-3!     1) Visit [email or web address], select Sign Up Now, enter this activation code and your date of birth, then select Next.  [email or web address]  Expires: 4/27/2017 12:34 PM      2) Create a username " and password to use when you visit [facility] in the future and select a security question in case you lose your password and select Next.    3) Enter your e-mail address and click Sign Up!    Additional Information  If you have questions, please e-mail [email or web address] or call [phone] to talk to our [email or web address] staff. Remember, [facility] is NOT to be used for urgent needs. For medical emergencies, dial 911.          [facility] complies with applicable Federal civil rights laws and does not discriminate on the basis of race, color, national origin, age, disability, or sex.

## 2017-04-05 LAB
ALBUMIN SERPL BCP-MCNC: 3.2 G/DL
ALP SERPL-CCNC: 211 U/L
ALT SERPL W/O P-5'-P-CCNC: 22 U/L
ANION GAP SERPL CALC-SCNC: 10 MMOL/L
AST SERPL-CCNC: 49 U/L
BASOPHILS # BLD AUTO: 0.05 K/UL
BASOPHILS NFR BLD: 1 %
BILIRUB SERPL-MCNC: 0.8 MG/DL
BUN SERPL-MCNC: 8 MG/DL
CALCIUM SERPL-MCNC: 8.8 MG/DL
CHLORIDE SERPL-SCNC: 103 MMOL/L
CO2 SERPL-SCNC: 23 MMOL/L
CREAT SERPL-MCNC: 0.6 MG/DL
DIFFERENTIAL METHOD: ABNORMAL
EOSINOPHIL # BLD AUTO: 0.1 K/UL
EOSINOPHIL NFR BLD: 1.4 %
ERYTHROCYTE [DISTWIDTH] IN BLOOD BY AUTOMATED COUNT: 14.6 %
EST. GFR  (AFRICAN AMERICAN): >60 ML/MIN/1.73 M^2
EST. GFR  (NON AFRICAN AMERICAN): >60 ML/MIN/1.73 M^2
GLUCOSE SERPL-MCNC: 89 MG/DL
HCT VFR BLD AUTO: 38.6 %
HGB BLD-MCNC: 13.1 G/DL
LYMPHOCYTES # BLD AUTO: 1.3 K/UL
LYMPHOCYTES NFR BLD: 25.9 %
MCH RBC QN AUTO: 32.1 PG
MCHC RBC AUTO-ENTMCNC: 33.9 %
MCV RBC AUTO: 95 FL
MONOCYTES # BLD AUTO: 0.7 K/UL
MONOCYTES NFR BLD: 13.3 %
NEUTROPHILS # BLD AUTO: 3 K/UL
NEUTROPHILS NFR BLD: 58.4 %
PLATELET # BLD AUTO: 323 K/UL
PMV BLD AUTO: 11.2 FL
POTASSIUM SERPL-SCNC: 3.7 MMOL/L
PROT SERPL-MCNC: 8.1 G/DL
RBC # BLD AUTO: 4.08 M/UL
SODIUM SERPL-SCNC: 136 MMOL/L
WBC # BLD AUTO: 5.18 K/UL

## 2017-04-05 PROCEDURE — 36415 COLL VENOUS BLD VENIPUNCTURE: CPT

## 2017-04-05 PROCEDURE — 63600175 PHARM REV CODE 636 W HCPCS: Performed by: EMERGENCY MEDICINE

## 2017-04-05 PROCEDURE — 80053 COMPREHEN METABOLIC PANEL: CPT

## 2017-04-05 PROCEDURE — 25000003 PHARM REV CODE 250: Performed by: EMERGENCY MEDICINE

## 2017-04-05 PROCEDURE — 85025 COMPLETE CBC W/AUTO DIFF WBC: CPT

## 2017-04-05 PROCEDURE — 12000002 HC ACUTE/MED SURGE SEMI-PRIVATE ROOM

## 2017-04-05 PROCEDURE — C9113 INJ PANTOPRAZOLE SODIUM, VIA: HCPCS | Performed by: EMERGENCY MEDICINE

## 2017-04-05 RX ADMIN — ONDANSETRON 4 MG: 2 INJECTION INTRAMUSCULAR; INTRAVENOUS at 11:04

## 2017-04-05 RX ADMIN — HYDROMORPHONE HYDROCHLORIDE 1 MG: 2 INJECTION INTRAMUSCULAR; INTRAVENOUS; SUBCUTANEOUS at 03:04

## 2017-04-05 RX ADMIN — PANTOPRAZOLE SODIUM 40 MG: 40 INJECTION, POWDER, FOR SOLUTION INTRAVENOUS at 08:04

## 2017-04-05 RX ADMIN — HYDROMORPHONE HYDROCHLORIDE 1 MG: 2 INJECTION INTRAMUSCULAR; INTRAVENOUS; SUBCUTANEOUS at 11:04

## 2017-04-05 RX ADMIN — HYDROMORPHONE HYDROCHLORIDE 1 MG: 2 INJECTION INTRAMUSCULAR; INTRAVENOUS; SUBCUTANEOUS at 07:04

## 2017-04-05 RX ADMIN — SODIUM CHLORIDE, SODIUM LACTATE, POTASSIUM CHLORIDE, AND CALCIUM CHLORIDE: .6; .31; .03; .02 INJECTION, SOLUTION INTRAVENOUS at 06:04

## 2017-04-05 NOTE — PLAN OF CARE
04/05/17 1146   Readmission Questionnaire   At the time of your discharge, did someone talk to you about what your health problems were? Yes   At the time of discharge, did someone talk to you about what to watch out for regarding worsening of your health problem? Yes   At the time of discharge, did someone talk to you about what to do if you experienced worsening of your health problem? Yes   At the time of discharge, did someone talk to you about which medication to take when you left the hospital and which ones to stop taking? Yes   At the time of discharge, did someone talk to you about when and where to follow up with a doctor after you left the hospital? Yes   What do you believe caused you to be sick enough to be re-admitted? I don't know. I  did eat steak and I have a bad habit of taking big bites   How often do you need to have someone help you when you read instructions, pamphlets, or other written material from your doctor or pharmacy? Always   Do you have problems taking your medications as prescribed? No   Do you have any problems affording any of  your prescribed medications? No   Do you have problems obtaining/receiving your medications? No   Does the patient have transportation to healthcare appointments? Yes   Lives With child(carol), adult;grandchild(carol);spouse   Living Arrangements house   Does the patient have family/friends to help with healtcare needs after discharge? yes   Who are your caregiver(s) and their phone number(s)? Spouse, Hammad Arevalo [phone]   Does your caregiver provide all the help you need? Yes   If no, what kind of help do you need at home? I would like an aide to do housework    Are you currently feeling confused? No   Are you currently having problems thinking? No   Are you currently having memory problems? No   Have you felt down, depressed, or hopeless? 0   Have you felt little interest or pleasure in doing things? 0   In the last 7 days, my sleep quality was: fair

## 2017-04-05 NOTE — ED NOTES
Patient in no acute distress, respirations even and unlabored, awake and alert and oriented. Patient reports improvement with abdominal pain, pain rating 5/10. Still complains of nausea, slight. Patient update on plan of care and currently waiting for bed assignment. PEG tube hook to suction. Will continue to monitor.

## 2017-04-05 NOTE — PROGRESS NOTES
Pt to unit in stable condition. Peg tube intact and to suction per MD orders. Spinach contents noted to tube. IV fluids continued for hydration. Pt NPO and verbalized understanding.pt oriented to room. Call bell and telephone at BS.

## 2017-04-05 NOTE — PLAN OF CARE
04/05/17 1142   Discharge Assessment   Assessment Type Discharge Planning Assessment   Confirmed/corrected address and phone number on facesheet? Yes   Assessment information obtained from? Patient   Expected Length of Stay (days) 3   Communicated expected length of stay with patient/caregiver yes   Type of Healthcare Directive Received (No healhcare directve or POA)   Prior to hospitilization cognitive status: Alert/Oriented   Prior to hospitalization functional status: Independent   Current cognitive status: Alert/Oriented   Current Functional Status: Independent   Arrived From admitted as an inpatient;home or self-care   Lives With child(carol), adult;grandchild(carol);spouse   Able to Return to Prior Arrangements yes   Is patient able to care for self after discharge? Yes   How many people do you have in your home that can help with your care after discharge? 3   Who are your caregiver(s) and their phone number(s)? Spouse, Hammad Arevalo [phone]   Patient's perception of discharge disposition home or selfcare;home health   Readmission Within The Last 30 Days previous discharge plan unsuccessful   Patient currently being followed by outpatient case management? No   Patient currently receives home health services? No   Does the patient currently use HME? No   Patient currently receives private duty nursing? No   Patient currently receives any other outside agency services? No   Equipment Currently Used at Home none   Do you have any problems affording any of your prescribed medications? No   Is the patient taking medications as prescribed? yes   Do you have any financial concerns preventing you from receiving the healthcare you need? No   Does the patient have transportation to healthcare appointments? Yes   Transportation Available car;family or friend will provide   On Dialysis? No   Does the patient receive services at the Coumadin Clinic? No   Are there any open cases? No   Discharge Plan A Home with  family   Discharge Plan B Home with family;Home Health   Patient/Family In Agreement With Plan yes   TN explained duties of Case Management to patient. Contact information added to white board, TN explained process to contact TN for any additional questions or concerns. Blue folder given to patient. She was informed to leave folder at bedside and we will add any needed paperwork to folder during hospital stay.       [facility] [address] [place] [address] [place] [address] [facility]  [address] [address]  Phone: [phone] Fax: [phone]

## 2017-04-06 LAB
ALBUMIN SERPL BCP-MCNC: 3.2 G/DL
ALP SERPL-CCNC: 185 U/L
ALT SERPL W/O P-5'-P-CCNC: 23 U/L
ANION GAP SERPL CALC-SCNC: 10 MMOL/L
AST SERPL-CCNC: 51 U/L
BASOPHILS # BLD AUTO: 0.05 K/UL
BASOPHILS NFR BLD: 1.1 %
BILIRUB SERPL-MCNC: 1 MG/DL
BUN SERPL-MCNC: 7 MG/DL
CALCIUM SERPL-MCNC: 8.6 MG/DL
CHLORIDE SERPL-SCNC: 103 MMOL/L
CO2 SERPL-SCNC: 24 MMOL/L
CREAT SERPL-MCNC: 0.6 MG/DL
DIFFERENTIAL METHOD: ABNORMAL
EOSINOPHIL # BLD AUTO: 0.4 K/UL
EOSINOPHIL NFR BLD: 8 %
ERYTHROCYTE [DISTWIDTH] IN BLOOD BY AUTOMATED COUNT: 14.4 %
EST. GFR  (AFRICAN AMERICAN): >60 ML/MIN/1.73 M^2
EST. GFR  (NON AFRICAN AMERICAN): >60 ML/MIN/1.73 M^2
GLUCOSE SERPL-MCNC: 72 MG/DL
HCT VFR BLD AUTO: 38.3 %
HGB BLD-MCNC: 12.8 G/DL
LYMPHOCYTES # BLD AUTO: 1.3 K/UL
LYMPHOCYTES NFR BLD: 29.4 %
MCH RBC QN AUTO: 32.6 PG
MCHC RBC AUTO-ENTMCNC: 33.4 %
MCV RBC AUTO: 98 FL
MONOCYTES # BLD AUTO: 0.5 K/UL
MONOCYTES NFR BLD: 10.7 %
NEUTROPHILS # BLD AUTO: 2.2 K/UL
NEUTROPHILS NFR BLD: 50.8 %
PLATELET # BLD AUTO: 330 K/UL
PMV BLD AUTO: 10.7 FL
POTASSIUM SERPL-SCNC: 3 MMOL/L
PROT SERPL-MCNC: 7.6 G/DL
RBC # BLD AUTO: 3.93 M/UL
SODIUM SERPL-SCNC: 137 MMOL/L
WBC # BLD AUTO: 4.39 K/UL

## 2017-04-06 PROCEDURE — 85025 COMPLETE CBC W/AUTO DIFF WBC: CPT

## 2017-04-06 PROCEDURE — 25000003 PHARM REV CODE 250: Performed by: EMERGENCY MEDICINE

## 2017-04-06 PROCEDURE — 80053 COMPREHEN METABOLIC PANEL: CPT

## 2017-04-06 PROCEDURE — 63600175 PHARM REV CODE 636 W HCPCS: Performed by: EMERGENCY MEDICINE

## 2017-04-06 PROCEDURE — 12000002 HC ACUTE/MED SURGE SEMI-PRIVATE ROOM

## 2017-04-06 PROCEDURE — 36415 COLL VENOUS BLD VENIPUNCTURE: CPT

## 2017-04-06 PROCEDURE — 63600175 PHARM REV CODE 636 W HCPCS: Performed by: SURGERY

## 2017-04-06 PROCEDURE — C9113 INJ PANTOPRAZOLE SODIUM, VIA: HCPCS | Performed by: EMERGENCY MEDICINE

## 2017-04-06 RX ORDER — METOCLOPRAMIDE HYDROCHLORIDE 5 MG/ML
10 INJECTION INTRAMUSCULAR; INTRAVENOUS EVERY 8 HOURS
Status: DISCONTINUED | OUTPATIENT
Start: 2017-04-06 | End: 2017-04-08 | Stop reason: HOSPADM

## 2017-04-06 RX ORDER — POTASSIUM CHLORIDE 7.45 MG/ML
10 INJECTION INTRAVENOUS
Status: DISPENSED | OUTPATIENT
Start: 2017-04-06 | End: 2017-04-06

## 2017-04-06 RX ADMIN — ONDANSETRON 4 MG: 2 INJECTION INTRAMUSCULAR; INTRAVENOUS at 07:04

## 2017-04-06 RX ADMIN — HYDROMORPHONE HYDROCHLORIDE 1 MG: 2 INJECTION INTRAMUSCULAR; INTRAVENOUS; SUBCUTANEOUS at 08:04

## 2017-04-06 RX ADMIN — HYDROMORPHONE HYDROCHLORIDE 1 MG: 2 INJECTION INTRAMUSCULAR; INTRAVENOUS; SUBCUTANEOUS at 03:04

## 2017-04-06 RX ADMIN — POTASSIUM CHLORIDE 10 MEQ: 10 INJECTION, SOLUTION INTRAVENOUS at 09:04

## 2017-04-06 RX ADMIN — HYDROMORPHONE HYDROCHLORIDE 1 MG: 2 INJECTION INTRAMUSCULAR; INTRAVENOUS; SUBCUTANEOUS at 12:04

## 2017-04-06 RX ADMIN — HYDROMORPHONE HYDROCHLORIDE 1 MG: 2 INJECTION INTRAMUSCULAR; INTRAVENOUS; SUBCUTANEOUS at 07:04

## 2017-04-06 RX ADMIN — POTASSIUM CHLORIDE 10 MEQ: 10 INJECTION, SOLUTION INTRAVENOUS at 12:04

## 2017-04-06 RX ADMIN — HYDROMORPHONE HYDROCHLORIDE 1 MG: 2 INJECTION INTRAMUSCULAR; INTRAVENOUS; SUBCUTANEOUS at 04:04

## 2017-04-06 RX ADMIN — SODIUM CHLORIDE, SODIUM LACTATE, POTASSIUM CHLORIDE, AND CALCIUM CHLORIDE: .6; .31; .03; .02 INJECTION, SOLUTION INTRAVENOUS at 10:04

## 2017-04-06 RX ADMIN — PANTOPRAZOLE SODIUM 40 MG: 40 INJECTION, POWDER, FOR SOLUTION INTRAVENOUS at 09:04

## 2017-04-06 RX ADMIN — METOCLOPRAMIDE 10 MG: 5 INJECTION, SOLUTION INTRAMUSCULAR; INTRAVENOUS at 01:04

## 2017-04-06 RX ADMIN — PROMETHAZINE HYDROCHLORIDE 25 MG: 25 INJECTION, SOLUTION INTRAMUSCULAR; INTRAVENOUS at 03:04

## 2017-04-06 RX ADMIN — POTASSIUM CHLORIDE 10 MEQ: 10 INJECTION, SOLUTION INTRAVENOUS at 11:04

## 2017-04-06 RX ADMIN — METOCLOPRAMIDE 10 MG: 5 INJECTION, SOLUTION INTRAMUSCULAR; INTRAVENOUS at 10:04

## 2017-04-06 NOTE — PROGRESS NOTES
Better, still with intermittent nausea.  Wants to try clears.    Vitals:    04/05/17 1535 04/05/17 2120 04/06/17 0023 04/06/17 0400   BP: 135/87 138/78 134/89 137/86   BP Location:  Left arm Left arm Left arm   Patient Position:  Lying Lying Lying   BP Method:  Automatic Automatic Automatic   Pulse: 86 75 78 90   Resp: 18 17 18 18   Temp: 98.5 °F (36.9 °C) 97.6 °F (36.4 °C) 99.5 °F (37.5 °C) 98.4 °F (36.9 °C)   TempSrc: Oral Oral Oral Oral   SpO2: 99% 97% 95% 96%   Weight:       Height:         A/O x 3  RRR  Soft, nontender.  Gtube gastric fluid    A/P nausea and vomiting, common after large PEH repair.  D/c phenergan, start reglan, clamp tube, start clears

## 2017-04-06 NOTE — H&P
S/P Huge PEH with volvulus and incarcerated colon in defect.  Seroma in the area is expected.  With no fever and normal wbc, would not attempt aspiration.  Problems with gastric emptying also expected.  She had done well until eating at an all you can eat chinese buffet.  N/V, diarrhea after.  Keep Gtube to suction overnight, then clamp and start clears in AM if no further problems

## 2017-04-06 NOTE — PLAN OF CARE
Problem: Patient Care Overview  Goal: Plan of Care Review  Outcome: Outcome(s) achieved Date Met:  04/06/17  NGT clamped today with patient tolerating clear liquid diet well.    IVF's infusing per order.   Patient's potassium level at 3 today.   IV potassium given per order. Patient medicated for pain throughout the day with pain medication relieving patient's pain.

## 2017-04-06 NOTE — PLAN OF CARE
Problem: Patient Care Overview  Goal: Plan of Care Review  Outcome: Ongoing (interventions implemented as appropriate)  Pt currently AAOx4, no falls no injuries.  Pain 10/10 to abdomen and headache controlled by PRN medication.  Headache currently uncontrolled, with dilaudid headache goes away for about 2-2.5h, then returns in force.  Pt depressed, talks about needing to be home taking care of her , her daughter, and her granddaughter.  Was inconsolable at 0400, stating that she cannot live like this and she prays that this will never happen again.  Abdomen is soft and tender in LLQ.  Bowel sounds hypoactive.  No nausea no vomiting no BM.  PEG drain putting out thin milky yellow drainage with pink tinge.  Will continue to monitor.

## 2017-04-06 NOTE — PROGRESS NOTES
Follow up appt scheduled with Luisa at Dr. Packer [phone] for Friday 4/21/2017 @ 8AM.    Follow up verified with Dr. Cooks office [phone] for Monday 4/24/2017 @ 1PM

## 2017-04-06 NOTE — PROGRESS NOTES
WRITTEN HEALTHCARE DISCHARGE INFORMATION     If you are unable to make your follow up appointments, please call the number listed and reschedule this appointment.     After discharge, if you need assistance, you can call [facility] for 24/7 assistance at [phone]    Thank you for choosing [facility] and allowing us to care for you.   From your care management team: Adrienne Ty [phone] or [phone]     You should receive a call from [facility] within 48-72 hours to help manage your care after discharge. Please try to make sure that you answer your phone for this important phone call.     Follow-up Information     Follow up with Angela Packer MD. Go on 4/21/2017.    Specialty:  Family Medicine    Why:  For Appointment on Friday 4/21/2017 @ 8AM    Contact information:    [address] [place] [address]  [phone]          Follow up with Vini Gomez MD. Go on 4/24/2017.    Specialties:  General Surgery, Bariatrics    Why:  For Appointment on Monday 4/24/2017 @ 1PM    Contact information:    [address]  [address]  [place]  [place] [place] [address]  [phone]

## 2017-04-07 LAB
ALBUMIN SERPL BCP-MCNC: 3 G/DL
ALP SERPL-CCNC: 162 U/L
ALT SERPL W/O P-5'-P-CCNC: 24 U/L
ANION GAP SERPL CALC-SCNC: 8 MMOL/L
AST SERPL-CCNC: 56 U/L
BASOPHILS # BLD AUTO: 0.08 K/UL
BASOPHILS NFR BLD: 2.1 %
BILIRUB SERPL-MCNC: 1.2 MG/DL
BUN SERPL-MCNC: 3 MG/DL
CALCIUM SERPL-MCNC: 8.5 MG/DL
CHLORIDE SERPL-SCNC: 103 MMOL/L
CO2 SERPL-SCNC: 26 MMOL/L
CREAT SERPL-MCNC: 0.6 MG/DL
DIFFERENTIAL METHOD: ABNORMAL
EOSINOPHIL # BLD AUTO: 0.5 K/UL
EOSINOPHIL NFR BLD: 12.9 %
ERYTHROCYTE [DISTWIDTH] IN BLOOD BY AUTOMATED COUNT: 14.1 %
EST. GFR  (AFRICAN AMERICAN): >60 ML/MIN/1.73 M^2
EST. GFR  (NON AFRICAN AMERICAN): >60 ML/MIN/1.73 M^2
GLUCOSE SERPL-MCNC: 85 MG/DL
HCT VFR BLD AUTO: 37 %
HGB BLD-MCNC: 12.3 G/DL
LYMPHOCYTES # BLD AUTO: 1.2 K/UL
LYMPHOCYTES NFR BLD: 29.6 %
MCH RBC QN AUTO: 32.1 PG
MCHC RBC AUTO-ENTMCNC: 33.2 %
MCV RBC AUTO: 97 FL
MONOCYTES # BLD AUTO: 0.5 K/UL
MONOCYTES NFR BLD: 13.1 %
NEUTROPHILS # BLD AUTO: 1.6 K/UL
NEUTROPHILS NFR BLD: 42.3 %
PLATELET # BLD AUTO: 291 K/UL
PMV BLD AUTO: 10.8 FL
POTASSIUM SERPL-SCNC: 3.3 MMOL/L
PROT SERPL-MCNC: 7 G/DL
RBC # BLD AUTO: 3.83 M/UL
SODIUM SERPL-SCNC: 137 MMOL/L
WBC # BLD AUTO: 3.88 K/UL

## 2017-04-07 PROCEDURE — 25000003 PHARM REV CODE 250: Performed by: EMERGENCY MEDICINE

## 2017-04-07 PROCEDURE — 85025 COMPLETE CBC W/AUTO DIFF WBC: CPT

## 2017-04-07 PROCEDURE — 63600175 PHARM REV CODE 636 W HCPCS: Performed by: EMERGENCY MEDICINE

## 2017-04-07 PROCEDURE — 12000002 HC ACUTE/MED SURGE SEMI-PRIVATE ROOM

## 2017-04-07 PROCEDURE — 63600175 PHARM REV CODE 636 W HCPCS: Performed by: SURGERY

## 2017-04-07 PROCEDURE — 80053 COMPREHEN METABOLIC PANEL: CPT

## 2017-04-07 PROCEDURE — 36415 COLL VENOUS BLD VENIPUNCTURE: CPT

## 2017-04-07 PROCEDURE — C9113 INJ PANTOPRAZOLE SODIUM, VIA: HCPCS | Performed by: EMERGENCY MEDICINE

## 2017-04-07 RX ADMIN — HYDROMORPHONE HYDROCHLORIDE 1 MG: 2 INJECTION INTRAMUSCULAR; INTRAVENOUS; SUBCUTANEOUS at 12:04

## 2017-04-07 RX ADMIN — METOCLOPRAMIDE 10 MG: 5 INJECTION, SOLUTION INTRAMUSCULAR; INTRAVENOUS at 09:04

## 2017-04-07 RX ADMIN — HYDROMORPHONE HYDROCHLORIDE 1 MG: 2 INJECTION INTRAMUSCULAR; INTRAVENOUS; SUBCUTANEOUS at 04:04

## 2017-04-07 RX ADMIN — HYDROMORPHONE HYDROCHLORIDE 1 MG: 2 INJECTION INTRAMUSCULAR; INTRAVENOUS; SUBCUTANEOUS at 05:04

## 2017-04-07 RX ADMIN — SODIUM CHLORIDE, SODIUM LACTATE, POTASSIUM CHLORIDE, AND CALCIUM CHLORIDE: .6; .31; .03; .02 INJECTION, SOLUTION INTRAVENOUS at 12:04

## 2017-04-07 RX ADMIN — HYDROMORPHONE HYDROCHLORIDE 1 MG: 2 INJECTION INTRAMUSCULAR; INTRAVENOUS; SUBCUTANEOUS at 08:04

## 2017-04-07 RX ADMIN — ONDANSETRON 4 MG: 2 INJECTION INTRAMUSCULAR; INTRAVENOUS at 05:04

## 2017-04-07 RX ADMIN — SODIUM CHLORIDE, SODIUM LACTATE, POTASSIUM CHLORIDE, AND CALCIUM CHLORIDE: .6; .31; .03; .02 INJECTION, SOLUTION INTRAVENOUS at 05:04

## 2017-04-07 RX ADMIN — METOCLOPRAMIDE 10 MG: 5 INJECTION, SOLUTION INTRAMUSCULAR; INTRAVENOUS at 02:04

## 2017-04-07 RX ADMIN — HYDROMORPHONE HYDROCHLORIDE 1 MG: 2 INJECTION INTRAMUSCULAR; INTRAVENOUS; SUBCUTANEOUS at 09:04

## 2017-04-07 RX ADMIN — PANTOPRAZOLE SODIUM 40 MG: 40 INJECTION, POWDER, FOR SOLUTION INTRAVENOUS at 08:04

## 2017-04-07 RX ADMIN — METOCLOPRAMIDE 10 MG: 5 INJECTION, SOLUTION INTRAMUSCULAR; INTRAVENOUS at 05:04

## 2017-04-07 NOTE — PROGRESS NOTES
"TN reviewed follow up appointment information as well as  "Post op discharge instructions" handout with patient using teach back while informing patient to concentrate on signs and symptoms to look for after discharge that would flag her that she needs to contact the doctor. Patient is in agreement and verbalized an understanding. Placed discharge information in blue discharge folder.  TN also reviewed patient responsibility checklist with her using teach back. Patient was able to verbalize her responsibilities after discharge to manager her care at home being   1. Going to follow up appointments   2.  rx from the pharmacy when discharged  3. Taking her medication as prescribed       "

## 2017-04-07 NOTE — PROGRESS NOTES
Better and tolerating clears  Af VSS  abd is soft and NT    I will advance diet and if tolerated dc soon

## 2017-04-07 NOTE — PLAN OF CARE
Problem: Patient Care Overview  Goal: Plan of Care Review  Outcome: Ongoing (interventions implemented as appropriate)  Patient medicated for pain throughout the day.    Patient's IVF's D/Rajan and patient placed on a regular diet.   Patient tolerated diet well.   Patient at 75% of meals.   Plan for patient is possible discharge tomorrow.

## 2017-04-07 NOTE — PLAN OF CARE
"Problem: Patient Care Overview  Goal: Plan of Care Review  Outcome: Ongoing (interventions implemented as appropriate)  Pt currently AAOx4, no falls no injuries.  Afebrile.  Pain 10/10 to PEG tube site controlled by PRN medication, pt states that pain went up as she laid on the site.  Dressing site to PEG tube changed per pt request, area around insertion site is reddened, insertion site itself is red and moist, small amount of yellow malodorous drainage present.  Dressing site to midline abdomen is clean dry and intact.  PEG tube clamped per orders.  No nausea, no vomiting, no BM.  Bowel sounds hypoactive.  Abdomen is soft in all quadrants, tender only in LLQ.  States that her "headache is mild and going away".  Will continue to monitor.      "

## 2017-04-08 VITALS
DIASTOLIC BLOOD PRESSURE: 67 MMHG | BODY MASS INDEX: 26.68 KG/M2 | HEART RATE: 97 BPM | SYSTOLIC BLOOD PRESSURE: 109 MMHG | TEMPERATURE: 99 F | HEIGHT: 66 IN | RESPIRATION RATE: 18 BRPM | WEIGHT: 166 LBS | OXYGEN SATURATION: 96 %

## 2017-04-08 PROCEDURE — 63600175 PHARM REV CODE 636 W HCPCS: Performed by: EMERGENCY MEDICINE

## 2017-04-08 PROCEDURE — C9113 INJ PANTOPRAZOLE SODIUM, VIA: HCPCS | Performed by: EMERGENCY MEDICINE

## 2017-04-08 PROCEDURE — 63600175 PHARM REV CODE 636 W HCPCS: Performed by: SURGERY

## 2017-04-08 RX ADMIN — HYDROMORPHONE HYDROCHLORIDE 1 MG: 2 INJECTION INTRAMUSCULAR; INTRAVENOUS; SUBCUTANEOUS at 09:04

## 2017-04-08 RX ADMIN — PANTOPRAZOLE SODIUM 40 MG: 40 INJECTION, POWDER, FOR SOLUTION INTRAVENOUS at 09:04

## 2017-04-08 RX ADMIN — HYDROMORPHONE HYDROCHLORIDE 1 MG: 2 INJECTION INTRAMUSCULAR; INTRAVENOUS; SUBCUTANEOUS at 05:04

## 2017-04-08 RX ADMIN — METOCLOPRAMIDE 10 MG: 5 INJECTION, SOLUTION INTRAMUSCULAR; INTRAVENOUS at 05:04

## 2017-04-08 RX ADMIN — HYDROMORPHONE HYDROCHLORIDE 1 MG: 2 INJECTION INTRAMUSCULAR; INTRAVENOUS; SUBCUTANEOUS at 01:04

## 2017-04-08 NOTE — DISCHARGE SUMMARY
[facility]  General Surgery  Discharge Summary      Patient Name: Estella Arevalo  MRN: [id number]  Admission Date: 4/4/2017  Hospital Length of Stay: 4 days  Discharge Date and Time:  04/08/2017 10:54 AM  Attending Physician: Vini Gomez MD   Discharging Provider: Jaya Phillips MD  Primary Care Provider: Angela Packer MD     HPI: 61 yo female s/p hernia repair and was at home until episode of nausea and vomiting and diarrhea    * No surgery found *     Hospital Course: Initially with NG tube and IV hydration, she started to improve and passing flatus. Her diet was advanced and she will be discharged home today.    Consults:     Significant Diagnostic Studies:     Pending Diagnostic Studies:     None        Final Active Diagnoses:    Diagnosis Date Noted POA    PRINCIPAL PROBLEM:  Intractable vomiting with nausea [R11.2] 04/04/2017 Yes      Problems Resolved During this Admission:    Diagnosis Date Noted Date Resolved POA      Discharged Condition: good    Disposition: Home or Self Care    Follow Up:  Follow-up Information     Follow up with Angela Packer MD. Go on 4/21/2017.    Specialty:  Family Medicine    Why:  For Appointment on Friday 4/21/2017 @ 8AM    Contact information:    [address] [facility] [place] [address]  [phone]          Follow up with Vini Gomez MD. Go on 4/24/2017.    Specialties:  General Surgery, Bariatrics    Why:  For Appointment on Monday 4/24/2017 @ 1PM    Contact information:    [address]  [address]  [facility]  [place] [address]  [phone]          Patient Instructions:     Diet general     Activity as tolerated     Call MD for:  temperature >100.4     Call MD for:  persistent nausea and vomiting or diarrhea     Call MD for:  severe uncontrolled pain     Call MD for:  redness, tenderness, or signs of infection (pain, swelling, redness, odor or green/yellow discharge around incision site)     Call MD for:  difficulty  breathing or increased cough     Call MD for:  severe persistent headache     No dressing needed       Medications:  Reconciled Home Medications:   Current Discharge Medication List      CONTINUE these medications which have NOT CHANGED    Details   amitriptyline (ELAVIL) 50 MG tablet TAKE 1 TABLET(50 MG) BY MOUTH EVERY EVENING  Qty: 90 tablet, Refills: 0    Associated Diagnoses: Primary insomnia      atenolol (TENORMIN) 25 MG tablet TK 1 T PO  D  Refills: 3      capsicum 0.075% (TRIXAICIN HP) 0.075 % topical cream Apply topically 3 (three) times daily.  Qty: 56 g, Refills: 1    Associated Diagnoses: Pain; DDD (degenerative disc disease), lumbar      !! cyclobenzaprine (FLEXERIL) 10 MG tablet TAKE 1 TABLET(10 MG) BY MOUTH THREE TIMES DAILY AS NEEDED FOR MUSCLE SPASMS  Qty: 90 tablet, Refills: 0    Associated Diagnoses: Pain; DDD (degenerative disc disease), lumbar      !! cyclobenzaprine (FLEXERIL) 10 MG tablet TAKE 1 TABLET(10 MG) BY MOUTH THREE TIMES DAILY AS NEEDED FOR MUSCLE SPASMS  Qty: 90 tablet, Refills: 0    Associated Diagnoses: Pain; DDD (degenerative disc disease), lumbar      duloxetine (CYMBALTA) 30 MG capsule Take 1 capsule (30 mg total) by mouth once daily.  Qty: 90 capsule, Refills: 2    Associated Diagnoses: DDD (degenerative disc disease), lumbar      gabapentin (NEURONTIN) 300 MG capsule TK ONE C PO Q 8 H  Refills: 3      JUBLIA 10 % Eran APPLY ONE DOSE ONCE DAILY  Qty: 24 mL, Refills: 0      levothyroxine (SYNTHROID) 25 MCG tablet Take 1 tablet (25 mcg total) by mouth once daily.  Qty: 90 tablet, Refills: 5    Comments: **Patient requests 90 days supply**  Associated Diagnoses: Hypothyroidism due to acquired atrophy of thyroid      nitroGLYCERIN (NITROSTAT) 0.3 MG SL tablet ONE TABLET UNDER TONGUE AS NEEDED FOR CHEST PAIN EVERY 5 MINUTES AS NEEDED  Qty: 30 tablet, Refills: 0    Comments: **Patient requests 90 days supply**      oxycodone (ROXICODONE) 5 mg/5 mL Soln Take 10 mLs (10 mg total) by  mouth every 4 (four) hours as needed.  Qty: 4 Bottle, Refills: 0      oxycodone 20 mg Tab TK 1 T PO  Q 6-8 H PRN  Refills: 0      pantoprazole (PROTONIX) 40 MG tablet Take 1 tablet (40 mg total) by mouth once daily.  Qty: 90 tablet, Refills: 5    Comments: **Patient requests 90 days supply**  Associated Diagnoses: Gastroesophageal reflux disease without esophagitis      RESTASIS 0.05 % ophthalmic emulsion INSTILL 1 DROP IN BOTH EYES TWICE DAILY  Qty: 30 each, Refills: 0      zolpidem (AMBIEN) 5 MG Tab TK 1 T PO QHS PRN  Qty: 30 tablet, Refills: 1    Associated Diagnoses: Primary insomnia      clonazePAM (KLONOPIN) 1 MG tablet TAKE 1 TABLET BY MOUTH TWICE DAILY  Qty: 60 tablet, Refills: 0       !! - Potential duplicate medications found. Please discuss with provider.          Jaya Phillips MD  General Surgery  [facility]

## 2017-04-08 NOTE — PROGRESS NOTES
Pt given discharge instructions no prescriptions noted,  pt educated on follow ups.  All questions acknowledged and answered iv removed bleeding controlled awaiting ride.

## 2017-04-08 NOTE — PLAN OF CARE
04/08/17 1100   Final Note   Assessment Type Final Discharge Note   Discharge Disposition Home   Discharge planning education complete? Yes   What phone number can be called within the next 1-3 days to see how you are doing after discharge? ([phone])   Hospital Follow Up  Appt(s) scheduled? Yes   Discharge plans and expectations educations in teach back method with documentation complete? Yes   [facility]fered [facility] Bed[facility] Delivery? n/a   Discharge/Hospital Encounter Summary to ([facility]) PCP n/a   Referral to Outpatient Case Management complete? n/a   Referral to / orders for Home Health Complete? n/a   30 day supply of medicines given at discharge, if documented non-compliance / non-adherence? n/a   Any social issues identified prior to discharge? n/a   Did you assess the readiness or willingness of the family or caregiver to support self management of care? n/a   Jalen Mahmood notified that all CM needs are met.

## 2017-04-08 NOTE — PLAN OF CARE
Problem: Patient Care Overview  Goal: Plan of Care Review  Outcome: Ongoing (interventions implemented as appropriate)  IV analgesic and anti-emetic administered as ordered. Pt tolerating regular diet. Ambulates independently. Refusing labs because she expects to be discharged this morning. Remains free from falls or further trauma.

## 2017-04-08 NOTE — PLAN OF CARE
04/08/17 1100   Final Note   Assessment Type Final Discharge Note   Discharge Disposition Home   Discharge planning education complete? Yes   What phone number can be called within the next 1-3 days to see how you are doing after discharge? ([phone])   Hospital Follow Up  Appt(s) scheduled? Yes   Discharge plans and expectations educations in teach back method with documentation complete? Yes   [facility]fered [facility]? n/a   Discharge/Hospital Encounter Summary to ([facility]) PCP n/a   Referral to Outpatient Case Management complete? n/a   Referral to / orders for Home Health Complete? n/a   30 day supply of medicines given at discharge, if documented non-compliance / non-adherence? n/a   Any social issues identified prior to discharge? n/a   Did you assess the readiness or willingness of the family or caregiver to support self management of care? n/a

## 2017-04-12 ENCOUNTER — HOSPITAL ENCOUNTER (EMERGENCY)
Facility: HOSPITAL | Age: 62
Discharge: HOME | End: 2017-04-12
Attending: EMERGENCY MEDICINE
Payer: OTHER GOVERNMENT

## 2017-04-12 VITALS
HEART RATE: 86 BPM | HEIGHT: 66 IN | BODY MASS INDEX: 25.71 KG/M2 | WEIGHT: 160 LBS | OXYGEN SATURATION: 99 % | DIASTOLIC BLOOD PRESSURE: 78 MMHG | SYSTOLIC BLOOD PRESSURE: 112 MMHG | RESPIRATION RATE: 18 BRPM | TEMPERATURE: 99 F

## 2017-04-12 DIAGNOSIS — Z93.1 S/P PERCUTANEOUS ENDOSCOPIC GASTROSTOMY (PEG) TUBE PLACEMENT: Primary | ICD-10-CM

## 2017-04-12 DIAGNOSIS — R10.9 ABDOMINAL PAIN: ICD-10-CM

## 2017-04-12 LAB
ALBUMIN SERPL BCP-MCNC: 3.6 G/DL
ALP SERPL-CCNC: 179 U/L
ALT SERPL W/O P-5'-P-CCNC: 42 U/L
ANION GAP SERPL CALC-SCNC: 10 MMOL/L
AST SERPL-CCNC: 84 U/L
BACTERIA #/AREA URNS HPF: NORMAL /HPF
BASOPHILS # BLD AUTO: 0.06 K/UL
BASOPHILS NFR BLD: 1.1 %
BILIRUB SERPL-MCNC: 0.5 MG/DL
BILIRUB UR QL STRIP: NEGATIVE
BUN SERPL-MCNC: 11 MG/DL
CALCIUM SERPL-MCNC: 9.2 MG/DL
CHLORIDE SERPL-SCNC: 105 MMOL/L
CLARITY UR: CLEAR
CO2 SERPL-SCNC: 17 MMOL/L
COLOR UR: YELLOW
CREAT SERPL-MCNC: 0.6 MG/DL
DIFFERENTIAL METHOD: ABNORMAL
EOSINOPHIL # BLD AUTO: 0.4 K/UL
EOSINOPHIL NFR BLD: 7.5 %
ERYTHROCYTE [DISTWIDTH] IN BLOOD BY AUTOMATED COUNT: 14.1 %
EST. GFR  (AFRICAN AMERICAN): >60 ML/MIN/1.73 M^2
EST. GFR  (NON AFRICAN AMERICAN): >60 ML/MIN/1.73 M^2
GLUCOSE SERPL-MCNC: 81 MG/DL
GLUCOSE UR QL STRIP: NEGATIVE
HCT VFR BLD AUTO: 41.5 %
HGB BLD-MCNC: 13.8 G/DL
HGB UR QL STRIP: NEGATIVE
KETONES UR QL STRIP: NEGATIVE
LEUKOCYTE ESTERASE UR QL STRIP: ABNORMAL
LYMPHOCYTES # BLD AUTO: 1.8 K/UL
LYMPHOCYTES NFR BLD: 33.2 %
MCH RBC QN AUTO: 32.9 PG
MCHC RBC AUTO-ENTMCNC: 33.3 %
MCV RBC AUTO: 99 FL
MICROSCOPIC COMMENT: NORMAL
MONOCYTES # BLD AUTO: 0.5 K/UL
MONOCYTES NFR BLD: 9.7 %
NEUTROPHILS # BLD AUTO: 2.7 K/UL
NEUTROPHILS NFR BLD: 48.7 %
NITRITE UR QL STRIP: NEGATIVE
PH UR STRIP: 5 [PH] (ref 5–8)
PLATELET # BLD AUTO: 285 K/UL
PMV BLD AUTO: 11.2 FL
POTASSIUM SERPL-SCNC: 4.6 MMOL/L
PROT SERPL-MCNC: 8.4 G/DL
PROT UR QL STRIP: NEGATIVE
RBC # BLD AUTO: 4.19 M/UL
RBC #/AREA URNS HPF: 2 /HPF (ref 0–4)
SODIUM SERPL-SCNC: 132 MMOL/L
SP GR UR STRIP: 1.01 (ref 1–1.03)
SQUAMOUS #/AREA URNS HPF: 3 /HPF
URN SPEC COLLECT METH UR: ABNORMAL
UROBILINOGEN UR STRIP-ACNC: NEGATIVE EU/DL
WBC # BLD AUTO: 5.46 K/UL
WBC #/AREA URNS HPF: 4 /HPF (ref 0–5)

## 2017-04-12 PROCEDURE — 96375 TX/PRO/DX INJ NEW DRUG ADDON: CPT

## 2017-04-12 PROCEDURE — 96376 TX/PRO/DX INJ SAME DRUG ADON: CPT

## 2017-04-12 PROCEDURE — 85025 COMPLETE CBC W/AUTO DIFF WBC: CPT

## 2017-04-12 PROCEDURE — 25500020 PHARM REV CODE 255: Performed by: EMERGENCY MEDICINE

## 2017-04-12 PROCEDURE — 96365 THER/PROPH/DIAG IV INF INIT: CPT

## 2017-04-12 PROCEDURE — 25000003 PHARM REV CODE 250: Performed by: EMERGENCY MEDICINE

## 2017-04-12 PROCEDURE — 63600175 PHARM REV CODE 636 W HCPCS: Performed by: EMERGENCY MEDICINE

## 2017-04-12 PROCEDURE — 80053 COMPREHEN METABOLIC PANEL: CPT

## 2017-04-12 PROCEDURE — 81000 URINALYSIS NONAUTO W/SCOPE: CPT

## 2017-04-12 PROCEDURE — 99285 EMERGENCY DEPT VISIT HI MDM: CPT | Mod: 25

## 2017-04-12 RX ORDER — DICYCLOMINE HYDROCHLORIDE 10 MG/1
20 CAPSULE ORAL
Status: COMPLETED | OUTPATIENT
Start: 2017-04-12 | End: 2017-04-12

## 2017-04-12 RX ORDER — ONDANSETRON 4 MG/1
4 TABLET, ORALLY DISINTEGRATING ORAL EVERY 6 HOURS PRN
Qty: 21 TABLET | Refills: 0 | Status: SHIPPED | OUTPATIENT
Start: 2017-04-12 | End: 2017-09-15 | Stop reason: SDUPTHER

## 2017-04-12 RX ORDER — ONDANSETRON 2 MG/ML
8 INJECTION INTRAMUSCULAR; INTRAVENOUS
Status: COMPLETED | OUTPATIENT
Start: 2017-04-12 | End: 2017-04-12

## 2017-04-12 RX ORDER — DICYCLOMINE HYDROCHLORIDE 20 MG/1
20 TABLET ORAL 2 TIMES DAILY
Qty: 20 TABLET | Refills: 0 | Status: SHIPPED | OUTPATIENT
Start: 2017-04-12 | End: 2017-05-12

## 2017-04-12 RX ORDER — MORPHINE SULFATE 10 MG/ML
6 INJECTION INTRAMUSCULAR; INTRAVENOUS; SUBCUTANEOUS
Status: COMPLETED | OUTPATIENT
Start: 2017-04-12 | End: 2017-04-12

## 2017-04-12 RX ORDER — FAMOTIDINE 20 MG/50ML
20 INJECTION, SOLUTION INTRAVENOUS 2 TIMES DAILY
Status: DISCONTINUED | OUTPATIENT
Start: 2017-04-12 | End: 2017-04-13 | Stop reason: HOSPADM

## 2017-04-12 RX ORDER — MORPHINE SULFATE 10 MG/ML
4 INJECTION INTRAMUSCULAR; INTRAVENOUS; SUBCUTANEOUS
Status: COMPLETED | OUTPATIENT
Start: 2017-04-12 | End: 2017-04-12

## 2017-04-12 RX ADMIN — DIATRIZOATE MEGLUMINE AND DIATRIZOATE SODIUM 60 ML: 600; 100 SOLUTION ORAL; RECTAL at 09:04

## 2017-04-12 RX ADMIN — FAMOTIDINE 20 MG: 20 INJECTION, SOLUTION INTRAVENOUS at 09:04

## 2017-04-12 RX ADMIN — ONDANSETRON 8 MG: 2 INJECTION INTRAMUSCULAR; INTRAVENOUS at 07:04

## 2017-04-12 RX ADMIN — IOHEXOL 70 ML: 350 INJECTION, SOLUTION INTRAVENOUS at 10:04

## 2017-04-12 RX ADMIN — MORPHINE SULFATE 6 MG: 10 INJECTION INTRAVENOUS at 09:04

## 2017-04-12 RX ADMIN — DICYCLOMINE HYDROCHLORIDE 20 MG: 10 CAPSULE ORAL at 09:04

## 2017-04-12 RX ADMIN — MORPHINE SULFATE 4 MG: 10 INJECTION INTRAVENOUS at 07:04

## 2017-04-12 NOTE — ED AVS SNAPSHOT
[facility]  [address] [place] [address]               Estella Arevalo   2017  6:06 PM   ED    Description:  Female : 1955   Department:  [facility]           Your Care was Coordinated By:     Provider Role From To    Dai Lopez MD Attending Provider 17 [phone] --      Reason for Visit     Abdominal Pain           Diagnoses this Visit        Comments    S/P percutaneous endoscopic gastrostomy (PEG) tube placement    -  Primary     Abdominal pain           ED Disposition     ED Disposition Condition Comment    Discharge             To Do List           Follow-up Information     Follow up with Angela Packer MD. Schedule an appointment as soon as possible for a visit in 2 days.    Specialty:  Family Medicine    Contact information:    [address]  [place] [id number] [phone]          Follow up with Vini Gomez MD. Schedule an appointment as soon as possible for a visit in 2 days.    Specialties:  General Surgery, Bariatrics    Contact information:    [address] [facility]  [address]  [facility]  [place] [address]  [phone]         These Medications        Disp Refills Start End    dicyclomine (BENTYL) 20 mg tablet 20 tablet 0 2017    Take 1 tablet (20 mg total) by mouth 2 (two) times daily. - Oral    Pharmacy: [facility] [address] [place] [address] [address] [facility] Ph #: [phone]       ondansetron (ZOFRAN-ODT) 4 MG TbDL 21 tablet 0 2017     Take 1 tablet (4 mg total) by mouth every 6 (six) hours as needed (for vomiting or nausea). - Oral    Pharmacy: [facility] [address] [facility] [address] [facility] Ph #: [phone]         [facility]     [facility] Care Line -  Assistance  Unless otherwise directed by your provider, please contact [facility], our nurse care line that is available for   assistance.     Registered nurses in the [facility] provide: appointment scheduling, clinical advisement, health education, and other advisory services.  Call: [phone] (toll free)               Medications           Message regarding Medications     Verify the changes and/or additions to your medication regime listed below are the same as discussed with your clinician today.  If any of these changes or additions are incorrect, please notify your healthcare provider.        START taking these NEW medications        Refills    dicyclomine (BENTYL) 20 mg tablet 0    Sig: Take 1 tablet (20 mg total) by mouth 2 (two) times daily.    Class: Print    Route: Oral    ondansetron (ZOFRAN-ODT) 4 MG TbDL 0    Sig: Take 1 tablet (4 mg total) by mouth every 6 (six) hours as needed (for vomiting or nausea).    Class: Print    Route: Oral      These medications were administered today        Dose Freq    ondansetron injection 8 mg 8 mg ED 1 Time    Sig: Inject 8 mg into the vein ED 1 Time.    Class: Normal    Route: Intravenous    morphine injection 4 mg 4 mg ED 1 Time    Sig: Inject 0.4 mLs (4 mg total) into the vein ED 1 Time.    Class: Normal    Route: Intravenous    famotidine IVPB 20 mg 20 mg 2 times daily    Sig: Inject 50 mLs (20 mg total) into the vein 2 (two) times daily.    Class: Normal    Route: Intravenous    dicyclomine capsule 20 mg 20 mg ED 1 Time    Sig: Take 2 capsules (20 mg total) by mouth ED 1 Time.    Class: Normal    Route: Oral    morphine injection 6 mg 6 mg ED 1 Time    Sig: Inject 0.6 mLs (6 mg total) into the vein ED 1 Time.    Class: Normal    Route: Intravenous    gastroview 60 mL 60 mL IMG once as needed    Si mLs by Per G Tube route ONCE PRN for contrast.    Class: Normal    Route: Per G Tube    omnipaque 350 iohexol 70 mL 70 mL IMG once as needed    Sig: Inject 70 mLs into the vein ONCE PRN for contrast.    Class: Normal    Route: Intravenous      STOP taking these  medications     capsicum 0.075% (TRIXAICIN HP) 0.075 % topical cream Apply topically 3 (three) times daily.    pantoprazole (PROTONIX) 40 MG tablet Take 1 tablet (40 mg total) by mouth once daily.           Verify that the below list of medications is an accurate representation of the medications you are currently taking.  If none reported, the list may be blank. If incorrect, please contact your healthcare provider. Carry this list with you in case of emergency.           Current Medications     amitriptyline (ELAVIL) 50 MG tablet TAKE 1 TABLET(50 MG) BY MOUTH EVERY EVENING    atenolol (TENORMIN) 25 MG tablet TK 1 T PO  D    clonazePAM (KLONOPIN) 1 MG tablet TAKE 1 TABLET BY MOUTH TWICE DAILY    cyclobenzaprine (FLEXERIL) 10 MG tablet TAKE 1 TABLET(10 MG) BY MOUTH THREE TIMES DAILY AS NEEDED FOR MUSCLE SPASMS    cyclobenzaprine (FLEXERIL) 10 MG tablet TAKE 1 TABLET(10 MG) BY MOUTH THREE TIMES DAILY AS NEEDED FOR MUSCLE SPASMS    duloxetine (CYMBALTA) 30 MG capsule Take 1 capsule (30 mg total) by mouth once daily.    gabapentin (NEURONTIN) 300 MG capsule TK ONE C PO Q 8 H    levothyroxine (SYNTHROID) 25 MCG tablet Take 1 tablet (25 mcg total) by mouth once daily.    nitroGLYCERIN (NITROSTAT) 0.3 MG SL tablet ONE TABLET UNDER TONGUE AS NEEDED FOR CHEST PAIN EVERY 5 MINUTES AS NEEDED    oxycodone (ROXICODONE) 5 mg/5 mL Soln Take 10 mLs (10 mg total) by mouth every 4 (four) hours as needed.    oxycodone 20 mg Tab TK 1 T PO  Q 6-8 H PRN    RESTASIS 0.05 % ophthalmic emulsion INSTILL 1 DROP IN BOTH EYES TWICE DAILY    zolpidem (AMBIEN) 5 MG Tab TK 1 T PO QHS PRN    dicyclomine (BENTYL) 20 mg tablet Take 1 tablet (20 mg total) by mouth 2 (two) times daily.    famotidine IVPB 20 mg Inject 50 mLs (20 mg total) into the vein 2 (two) times daily.    JUBLIA 10 % Eran APPLY ONE DOSE ONCE DAILY    ondansetron (ZOFRAN-ODT) 4 MG TbDL Take 1 tablet (4 mg total) by mouth every 6 (six) hours as needed (for vomiting or nausea).     "       Clinical Reference Information           Your Vitals Were     BP Pulse Temp Resp Height Weight    115/79 88 98.5 °F (36.9 °C) (Oral) 17 5' 6" (1.676 m) 72.6 kg (160 lb)    Last Period SpO2 BMI          (Approximate) 94% 25.82 kg/m2        Allergies as of 4/12/2017     No Known Allergies      Immunizations Administered on Date of Encounter - 4/12/2017     None      ED Micro, Lab, POCT     Start Ordered       Status Ordering Provider    04/12/17 1626 04/12/17 1625  CBC auto differential  STAT      Final result     04/12/17 1626 04/12/17 1625  Comprehensive metabolic panel  STAT      Final result     04/12/17 1626 04/12/17 1625  Urinalysis  STAT      Final result     04/12/17 1625 04/12/17 1625  Urinalysis Microscopic  Once      Final result       ED Imaging Orders     Start Ordered       Status Ordering Provider    04/12/17 2052 04/12/17 2051    1 time imaging,   Status:  Canceled      Canceled     04/12/17 2037 04/12/17 2037  CT Abdomen Pelvis With Contrast  1 time imaging      Final result     04/12/17 1929 04/12/17 1928  X-Ray Chest Lateral Decubitus Right  1 time imaging      Final result     04/12/17 1928 04/12/17 1928  XR Gastric Tube Check With Contrast  1 time imaging      Final result     04/12/17 1833 04/12/17 1833  X-Ray Abdomen Flat And Erect  1 time imaging      Final result       Discharge References/Attachments     PAIN MANAGEMENT AFTER SURGERY (ENGLISH)      Your Scheduled Appointments     Apr 21, 2017  8:00 AM CDT   Hospital Follow Up with Angela Packer MD   [facility] ([facility])    [address] [place] [address] [place] [address] [phone]            Apr 24, 2017  1:00 PM CDT   Post Op-OCC with Vini Gomez MD   [facility] (O[facility])    [address] [facility] [address]  [place] [address] [phone]              [facility] Sign-Up     Activating your [facility] account is as easy as 1-2-3!     1) Visit [email or web address], select Sign Up Now, enter this " activation code and your date of birth, then select Next.  [email or web address]  Expires: 4/27/2017 12:34 PM      2) Create a username and password to use when you visit [facility] in the future and select a security question in case you lose your password and select Next.    3) Enter your e-mail address and click Sign Up!    Additional Information  If you have questions, please e-mail [email or web address] or call [phone] to talk to our [facility] staff. Remember, [employer] is NOT to be used for urgent needs. For medical emergencies, dial 911.          [facility] complies with applicable Federal civil rights laws and does not discriminate on the basis of race, color, national origin, age, disability, or sex.        Language Assistance Services     ATTENTION: Language assistance services are available, free of charge. Please call [phone].      ATENCIÓN: Si habla español, tiene a quintero disposición servicios gratuitos de asistencia lingüística. Llame al [phone].     CHÚ Ý: N?u b?n nói Ti?ng Vi?t, có các d?ch v? h? tr? ngôn ng? mi?n phí dành cho b?n. G?i s? [phone].

## 2017-04-12 NOTE — ED TRIAGE NOTES
"Pt states that she has a peg tube that was placed in March for a hiatal hernia for soley for "drainage, help with nausea and stomach placement." She also states that she was recently admitted for vomiting because the tube was getting full and not draining. She noticed that her abdomen looked distended several hours ago and feels the "tube may not be in the right place." Pt is currently complaining of 9/10 pain by tube placement and also a headache but denies N/V/F/D.  "

## 2017-04-12 NOTE — ED PROVIDER NOTES
Encounter Date: 4/12/2017    SCRIBE #1 NOTE: I, Artem Loja , am scribing for, and in the presence of,  Dai Lopez MD . I have scribed the following portions of the note - Other sections scribed: HPI, ROS, PE .       History     Chief Complaint   Patient presents with    Abdominal Pain     peg tube placed martch 9th, having abd pain around the site and feels her abd is distended     Review of patient's allergies indicates:  No Known Allergies  HPI Comments: CC: Abdominal Pain     HPI: This 62 y.o. female with a medical history of Alcohol abuse; Anxiety; Bipolar disorder; Cirrhosis of liver; Coronary artery disease; Depression; Fall; GERD (gastroesophageal reflux disease); Hypertension; Low back pain; MI (myocardial infarction); and Thyroid disease presents to the ED via EMS c/o acute onset abdominal pain and intermittent nausea attributed to PEG tube placement on 3/9/17. Pt is worried there is possible abdominal distention and the tube has moved. Pt reports severe pain 8/10 that is exacerbated with movement. Pt is also reporting appetite change (decreased appetite).  Pt reports last BM was 1 day ago with assistance from stool softer taken 1 day ago. Pt denies any Roxicodone treatment for pain today. Pt denies pain with palpation. Pt denies fever, vomiting, dysuria, or any other associated symptoms. Pt has no modifying factors. Pt has no prior treatment.         The history is provided by the patient. No  was used.     Past Medical History:   Diagnosis Date    Alcohol abuse     Anxiety     Bipolar disorder     Cirrhosis of liver     Coronary artery disease     Depression     Fall     GERD (gastroesophageal reflux disease)     Hypertension     Low back pain     MI (myocardial infarction)     Thyroid disease      Past Surgical History:   Procedure Laterality Date    JOINT REPLACEMENT      KNEE SURGERY  bilateral replacement    right foot sx  2008    SHOULDER SURGERY   replacement     Family History   Problem Relation Age of Onset    Cancer Mother     Cancer Father      Social History   Substance Use Topics    Smoking status: Never Smoker    Smokeless tobacco: Never Used    Alcohol use No     Review of Systems   Constitutional: Positive for appetite change (decreased). Negative for fever.   HENT: Negative for sore throat.    Eyes: Negative for pain.   Respiratory: Negative for shortness of breath.    Cardiovascular: Negative for chest pain.   Gastrointestinal: Positive for abdominal pain and nausea. Negative for vomiting.   Genitourinary: Negative for dysuria.   Musculoskeletal: Negative for back pain.   Skin: Negative for rash.   Neurological: Negative for headaches.       Physical Exam   Initial Vitals   BP Pulse Resp Temp SpO2   04/12/17 1602 04/12/17 1602 04/12/17 1602 04/12/17 1602 04/12/17 1602   126/78 108 17 98.5 °F (36.9 °C) 95 %     Physical Exam    Nursing note and vitals reviewed.  Constitutional: She appears well-developed and well-nourished. She is cooperative.  Non-toxic appearance. No distress.   Pt is well appearing.   Pt has no acute distress.      HENT:   Head: Normocephalic and atraumatic.   Mouth/Throat: Oropharynx is clear and moist.   Eyes: Conjunctivae and EOM are normal. Pupils are equal, round, and reactive to light.   Neck: Normal range of motion and full passive range of motion without pain. Neck supple. No thyromegaly present. No JVD present.   Cardiovascular: Normal rate, regular rhythm, normal heart sounds and normal pulses.   Pulmonary/Chest: Effort normal and breath sounds normal. No respiratory distress.   Abdominal: Soft. Normal appearance. She exhibits no distension and no mass. Bowel sounds are decreased. There is tenderness (mild) in the left lower quadrant.   Pt has PEG tube in place without any drainage at site.   Pt has 1 cm subxiphoid incision incision that is well healing.      Musculoskeletal: Normal range of motion.   Neurological:  "She is alert and oriented to person, place, and time. She has normal strength. No cranial nerve deficit or sensory deficit.   Skin: Skin is warm, dry and intact. No rash noted.   Psychiatric: She has a normal mood and affect. Her speech is normal and behavior is normal. Judgment and thought content normal.         ED Course   Procedures  Labs Reviewed   CBC W/ AUTO DIFFERENTIAL - Abnormal; Notable for the following:        Result Value    MCV 99 (*)     MCH 32.9 (*)     All other components within normal limits   COMPREHENSIVE METABOLIC PANEL - Abnormal; Notable for the following:     Sodium 132 (*)     CO2 17 (*)     Alkaline Phosphatase 179 (*)     AST 84 (*)     All other components within normal limits   URINALYSIS - Abnormal; Notable for the following:     Leukocytes, UA 3+ (*)     All other components within normal limits   URINALYSIS MICROSCOPIC             Medical Decision Making:   Initial Assessment:   Urgent evaluation of 62-year-old female with history of hiatal hernia here with complaint of abdominal distention and nausea. Patient was last discharged from the hospital 4/8 after admission for postoperative complication after PEG tube placement requiring NPO status.  Patient had prior history of incarcerated hiatal hernia requiring laparoscopic repair with PEG tube placement. Pt has since progressed to full PO diet, and endorses eating today without issue, but noted increased swelling to the abdomen this afternoon. Pt denies vomiting, notes belching, and decreased flatus, with last bm yesterday and normal. On exam pt has mild LLQ tenderness consistent with recent surgical intervention, g tube in place without  Drainage through dressing, abdomen soft with intact but diminished bowel sounds. Ddx ileus, repeat sbo, perf viscous, pos op infection. During eval, pt actively requesting pain medications, and endorses being a "pain pain" and reportedly did not take analgesia this am.   Clinical Tests:   Lab Tests: " Ordered and Reviewed  Radiological Study: Ordered and Reviewed  ED Management:  CT obtained and without evidence of obstruction nor infection/post op complication. XR with apropriate contrast in gastric lumen to confirm PEG tube not dislodged. Pt able to tolerate po without emesis/nausea prior to dc home. Has appointment with Dr Gomez in 1 week.             Scribe Attestation:   Scribe #1: I performed the above scribed service and the documentation accurately describes the services I performed. I attest to the accuracy of the note.    Attending Attestation:           Physician Attestation for Scribe:  Physician Attestation Statement for Scribe #1: I, Dai Lopez MD , reviewed documentation, as scribed by Artem Loja  in my presence, and it is both accurate and complete.                 ED Course     Clinical Impression:   The primary encounter diagnosis was S/P percutaneous endoscopic gastrostomy (PEG) tube placement. A diagnosis of Abdominal pain was also pertinent to this visit.    Disposition:   Disposition: Discharged  Condition: Stable       Dai Lopez MD  04/13/17 0118

## 2017-04-12 NOTE — ED NOTES
"Called from lobby to bring to Main ED, noted, agitated due to long wait, states "i'm coming!!!" due to degree of anger, loud voice, i walk away and ask ER tech to please bring pt to room  "

## 2017-04-13 NOTE — ED NOTES
Report received from MARISSA Asencio. Pt lying in bed. MD at bedside speaking to pt. Will continue to monitor.

## 2017-04-13 NOTE — ED NOTES
"Pt found holding hand with sheet upon entering room. States "I took out my IV because y'all wanted me to leave so bad". Dressing placed on hand. No redness or bleeding noted to site.   "

## 2017-04-19 NOTE — PHYSICIAN QUERY
PT Name: Estella Arevalo  MR #: [id number]     Physician Query Form - Documentation Clarification      CDS/: Linda Paez               Contact information: [email or web address]    This form is a permanent document in the medical record.     Query Date: April 19, 2017    By submitting this query, we are merely seeking further clarification of documentation. Please utilize your independent clinical judgment when addressing the question(s) below.    The Medical record reflects the following:    Supporting Clinical Findings Location in Medical Record     Chief Complaint/Reason for Admission: nausea vomiting and abdominal pain     H&P  4/8      Likely gastroenteritis.    Some component of delayed gastric     emptying is likely present as this is expected after an incarcerated hiatal hernia    Final Active Diagnoses:     Diagnosis Date Noted POA    PRINCIPAL PROBLEM: Intractable vomiting with nausea [R11.2] 04/04/2017 Yes            H&P 4/8            D/C Summary 4/8                                                                            Doctor Jason , were you able to determine an underlying cause to pts N/V given it was the primary focus of treatment.    Provider Use Only    [   ] Gastroenteritis    [ x  ]  Delayed Gastric Emptying    [   ]  Other    [  ] Clinically unable to determined

## 2017-04-19 NOTE — H&P
[facility]  General Surgery  History & Physical    Patient Name: Estella Arevalo  MRN: [id number]  Admission Date: 4/4/2017  Attending Physician: Vini Gomez MD  Primary Care Provider: Angela Packer MD    Patient information was obtained from patient and ER records.     Subjective:     Chief Complaint/Reason for Admission: nausea vomiting and abdominal pain    History of Present Illness:  Patient is a 62 y.o. female with recent history of lap repair of huge paraesophageal hernia.  She went to an all you can eat chinese buffet and developed nausea vomiting and diarrhea.  Gtube has been placed to suction with some improvement.    No current facility-administered medications on file prior to encounter.      Current Outpatient Prescriptions on File Prior to Encounter   Medication Sig    amitriptyline (ELAVIL) 50 MG tablet TAKE 1 TABLET(50 MG) BY MOUTH EVERY EVENING    atenolol (TENORMIN) 25 MG tablet TK 1 T PO  D    cyclobenzaprine (FLEXERIL) 10 MG tablet TAKE 1 TABLET(10 MG) BY MOUTH THREE TIMES DAILY AS NEEDED FOR MUSCLE SPASMS    cyclobenzaprine (FLEXERIL) 10 MG tablet TAKE 1 TABLET(10 MG) BY MOUTH THREE TIMES DAILY AS NEEDED FOR MUSCLE SPASMS    duloxetine (CYMBALTA) 30 MG capsule Take 1 capsule (30 mg total) by mouth once daily.    gabapentin (NEURONTIN) 300 MG capsule TK ONE C PO Q 8 H    JUBLIA 10 % Eran APPLY ONE DOSE ONCE DAILY    levothyroxine (SYNTHROID) 25 MCG tablet Take 1 tablet (25 mcg total) by mouth once daily.    nitroGLYCERIN (NITROSTAT) 0.3 MG SL tablet ONE TABLET UNDER TONGUE AS NEEDED FOR CHEST PAIN EVERY 5 MINUTES AS NEEDED    oxycodone (ROXICODONE) 5 mg/5 mL Soln Take 10 mLs (10 mg total) by mouth every 4 (four) hours as needed.    oxycodone 20 mg Tab TK 1 T PO  Q 6-8 H PRN    RESTASIS 0.05 % ophthalmic emulsion INSTILL 1 DROP IN BOTH EYES TWICE DAILY    zolpidem (AMBIEN) 5 MG Tab TK 1 T PO QHS PRN    clonazePAM (KLONOPIN) 1 MG tablet TAKE 1 TABLET BY  MOUTH TWICE DAILY       Review of patient's allergies indicates:  No Known Allergies    Past Medical History:   Diagnosis Date    Alcohol abuse     Anxiety     Bipolar disorder     Cirrhosis of liver     Coronary artery disease     Depression     Fall     GERD (gastroesophageal reflux disease)     Hypertension     Low back pain     MI (myocardial infarction)     Thyroid disease      Past Surgical History:   Procedure Laterality Date    JOINT REPLACEMENT      KNEE SURGERY  bilateral replacement    right foot sx  2008    SHOULDER SURGERY  replacement     Family History     Problem Relation (Age of Onset)    Cancer Mother, Father        Social History Main Topics    Smoking status: Never Smoker    Smokeless tobacco: Never Used    Alcohol use No    Drug use: No    Sexual activity: Not on file     Review of Systems   Constitutional: Negative for chills and fever.   HENT: Negative.    Eyes: Negative.    Respiratory: Negative for cough and shortness of breath.    Cardiovascular: Negative for chest pain and palpitations.   Gastrointestinal: Positive for abdominal pain, diarrhea, nausea and vomiting.   Musculoskeletal: Negative.    Skin: Negative.    Hematological: Negative.    Psychiatric/Behavioral: Negative.      Objective:     Vital Signs (Most Recent):  Temp: 98.7 °F (37.1 °C) (04/08/17 0815)  Pulse: 97 (04/08/17 0815)  Resp: 18 (04/08/17 0815)  BP: 109/67 (04/08/17 0815)  SpO2: 96 % (04/08/17 0815) Vital Signs (24h Range):        Weight: 75.3 kg (166 lb)  Body mass index is 26.79 kg/(m^2).    Physical Exam   Constitutional: She is oriented to person, place, and time. She appears well-developed and well-nourished.   HENT:   Head: Normocephalic and atraumatic.   Eyes: Pupils are equal, round, and reactive to light. No scleral icterus.   Neck: Normal range of motion. No thyromegaly present.   Cardiovascular: Normal rate and regular rhythm.    Pulmonary/Chest: Effort normal and breath sounds normal.    Abdominal: Soft. She exhibits no distension. There is no tenderness.   Neurological: She is alert and oriented to person, place, and time.   Skin: Skin is warm and dry.   Psychiatric: She has a normal mood and affect. Thought content normal.       Significant Labs:  CBC:   Recent Labs  Lab 04/12/17  1855   WBC 5.46   RBC 4.19   HGB 13.8   HCT 41.5      MCV 99*   MCH 32.9*   MCHC 33.3     BMP:   Recent Labs  Lab 04/12/17  1855   GLU 81   *   K 4.6      CO2 17*   BUN 11   CREATININE 0.6   CALCIUM 9.2       Significant Diagnostics:  CT: I have reviewed all pertinent results/findings within the past 24 hours and my personal findings are:       Assessment/Plan:     Active Diagnoses:    Diagnosis Date Noted POA    PRINCIPAL PROBLEM:  Intractable vomiting with nausea [R11.2] 04/04/2017 Yes      Problems Resolved During this Admission:    Diagnosis Date Noted Date Resolved POA     VTE Risk Mitigation         Ordered     Medium Risk of VTE  Once      04/04/17 2208      likely gastroenteritis.  Some component of delayed gastric emptying is likely present as this is expected after an incarcerated hiatal hernia.  Also has a fluid collection in the area that I would avoid aspirating unless she develops signs of infection (fever, leukocytosis, etc)    Vini Gomez MD  General Surgery  [facility] [place]

## 2017-04-20 ENCOUNTER — NURSE TRIAGE (OUTPATIENT)
Dept: ADMINISTRATIVE | Facility: CLINIC | Age: 62
End: 2017-04-20

## 2017-04-20 NOTE — TELEPHONE ENCOUNTER
Reason for Disposition   General information question, no triage required and triager able to answer question    Protocols used: ST INFORMATION ONLY CALL-A-AH    Pt calling wanting the local number to [facility].  Numbers given.

## 2017-05-08 RX ORDER — EFINACONAZOLE 100 MG/ML
SOLUTION TOPICAL
Qty: 24 ML | Refills: 0 | Status: SHIPPED | OUTPATIENT
Start: 2017-05-08 | End: 2017-09-22 | Stop reason: SDUPTHER

## 2017-05-08 RX ORDER — CYCLOSPORINE 0.5 MG/ML
EMULSION OPHTHALMIC
Qty: 30 EACH | Refills: 0 | Status: SHIPPED | OUTPATIENT
Start: 2017-05-08 | End: 2017-07-27 | Stop reason: SDUPTHER

## 2017-05-31 ENCOUNTER — HOSPITAL ENCOUNTER (EMERGENCY)
Facility: HOSPITAL | Age: 62
Discharge: HOME | End: 2017-05-31
Attending: EMERGENCY MEDICINE
Payer: OTHER GOVERNMENT

## 2017-05-31 VITALS
DIASTOLIC BLOOD PRESSURE: 70 MMHG | WEIGHT: 163 LBS | TEMPERATURE: 99 F | OXYGEN SATURATION: 99 % | RESPIRATION RATE: 18 BRPM | HEIGHT: 66 IN | HEART RATE: 88 BPM | SYSTOLIC BLOOD PRESSURE: 100 MMHG | BODY MASS INDEX: 26.2 KG/M2

## 2017-05-31 DIAGNOSIS — K62.3 RECTAL PROLAPSE: Primary | ICD-10-CM

## 2017-05-31 PROCEDURE — 96374 THER/PROPH/DIAG INJ IV PUSH: CPT

## 2017-05-31 PROCEDURE — 63600175 PHARM REV CODE 636 W HCPCS: Performed by: EMERGENCY MEDICINE

## 2017-05-31 PROCEDURE — 99284 EMERGENCY DEPT VISIT MOD MDM: CPT | Mod: 25

## 2017-05-31 RX ORDER — DIAZEPAM 10 MG/2ML
10 INJECTION INTRAMUSCULAR
Status: COMPLETED | OUTPATIENT
Start: 2017-05-31 | End: 2017-05-31

## 2017-05-31 RX ADMIN — DIAZEPAM 10 MG: 5 INJECTION, SOLUTION INTRAMUSCULAR; INTRAVENOUS at 08:05

## 2017-06-01 NOTE — ED NOTES
Pt neighbor Lisa called to check if pt arrived. Per pt, okay to share pt information with this person. Lisa updated on pt status. Phone number: [phone]

## 2017-06-08 DIAGNOSIS — F51.01 PRIMARY INSOMNIA: ICD-10-CM

## 2017-06-08 RX ORDER — AMITRIPTYLINE HYDROCHLORIDE 50 MG/1
TABLET, FILM COATED ORAL
Qty: 90 TABLET | Refills: 0 | Status: SHIPPED | OUTPATIENT
Start: 2017-06-08 | End: 2017-07-28 | Stop reason: SDUPTHER

## 2017-06-09 ENCOUNTER — HOSPITAL ENCOUNTER (EMERGENCY)
Facility: OTHER | Age: 62
Discharge: HOME | End: 2017-06-09
Attending: EMERGENCY MEDICINE
Payer: OTHER GOVERNMENT

## 2017-06-09 VITALS
OXYGEN SATURATION: 97 % | TEMPERATURE: 97 F | WEIGHT: 177 LBS | RESPIRATION RATE: 18 BRPM | BODY MASS INDEX: 28.45 KG/M2 | HEART RATE: 95 BPM | DIASTOLIC BLOOD PRESSURE: 77 MMHG | HEIGHT: 66 IN | SYSTOLIC BLOOD PRESSURE: 122 MMHG

## 2017-06-09 DIAGNOSIS — K94.22 INFECTION OF PEG SITE: ICD-10-CM

## 2017-06-09 DIAGNOSIS — R52 PAIN: ICD-10-CM

## 2017-06-09 DIAGNOSIS — S93.401A SPRAIN OF RIGHT ANKLE, UNSPECIFIED LIGAMENT, INITIAL ENCOUNTER: Primary | ICD-10-CM

## 2017-06-09 PROCEDURE — 87186 SC STD MICRODIL/AGAR DIL: CPT

## 2017-06-09 PROCEDURE — 29515 APPLICATION SHORT LEG SPLINT: CPT | Mod: RT

## 2017-06-09 PROCEDURE — 25000003 PHARM REV CODE 250

## 2017-06-09 PROCEDURE — 99284 EMERGENCY DEPT VISIT MOD MDM: CPT | Mod: 25

## 2017-06-09 PROCEDURE — 63600175 PHARM REV CODE 636 W HCPCS: Performed by: EMERGENCY MEDICINE

## 2017-06-09 PROCEDURE — 96372 THER/PROPH/DIAG INJ SC/IM: CPT

## 2017-06-09 PROCEDURE — 87070 CULTURE OTHR SPECIMN AEROBIC: CPT

## 2017-06-09 PROCEDURE — 87077 CULTURE AEROBIC IDENTIFY: CPT | Mod: 59

## 2017-06-09 RX ORDER — CLINDAMYCIN HYDROCHLORIDE 150 MG/1
150 CAPSULE ORAL 4 TIMES DAILY
Qty: 28 CAPSULE | Refills: 0 | Status: SHIPPED | OUTPATIENT
Start: 2017-06-09 | End: 2017-06-16

## 2017-06-09 RX ORDER — KETOROLAC TROMETHAMINE 30 MG/ML
30 INJECTION, SOLUTION INTRAMUSCULAR; INTRAVENOUS
Status: COMPLETED | OUTPATIENT
Start: 2017-06-09 | End: 2017-06-09

## 2017-06-09 RX ORDER — ONDANSETRON 4 MG/1
TABLET, ORALLY DISINTEGRATING ORAL
Status: COMPLETED
Start: 2017-06-09 | End: 2017-06-09

## 2017-06-09 RX ORDER — TRAMADOL HYDROCHLORIDE 50 MG/1
50 TABLET ORAL EVERY 6 HOURS PRN
Qty: 12 TABLET | Refills: 0 | Status: SHIPPED | OUTPATIENT
Start: 2017-06-09 | End: 2017-06-19

## 2017-06-09 RX ADMIN — KETOROLAC TROMETHAMINE 30 MG: 30 INJECTION, SOLUTION INTRAMUSCULAR; INTRAVENOUS at 10:06

## 2017-06-09 RX ADMIN — ONDANSETRON 8 MG: 4 TABLET, ORALLY DISINTEGRATING ORAL at 11:06

## 2017-06-10 NOTE — ED PROVIDER NOTES
Encounter Date: 6/9/2017       History     Chief Complaint   Patient presents with    Ankle Pain     states she fell a few days ago and now has pain and swelling to the rt ankle, noted greenish and serousanguinous fluid from her PEG tube    Abdominal Pain     Review of patient's allergies indicates:  No Known Allergies  The patient states that she twisted her right ankle approximately one week ago.  She fell to her right left side she's noted since the fall that she's had increased drainage coming from around her PEG tube site.      The history is provided by the patient.   Leg Pain    The incident occurred in the street. The injury mechanism was a fall. The incident occurred several days ago. The pain is present in the right ankle. The quality of the pain is described as aching. The pain is at a severity of 5/10. The pain has been constant since onset. Associated symptoms include loss of motion. The symptoms are aggravated by bearing weight. She has tried nothing for the symptoms.     Past Medical History:   Diagnosis Date    Alcohol abuse     Anxiety     Bipolar disorder     Cirrhosis of liver     Coronary artery disease     Depression     Fall     GERD (gastroesophageal reflux disease)     Hypertension     Low back pain     MI (myocardial infarction)     Thyroid disease      Past Surgical History:   Procedure Laterality Date    JOINT REPLACEMENT      KNEE SURGERY  bilateral replacement    right foot sx  2008    SHOULDER SURGERY  replacement     Family History   Problem Relation Age of Onset    Cancer Mother     Cancer Father      Social History   Substance Use Topics    Smoking status: Never Smoker    Smokeless tobacco: Never Used    Alcohol use No     Review of Systems   Constitutional: Negative.    HENT: Negative.    Eyes: Negative.    Respiratory: Negative.    Cardiovascular: Negative.    Gastrointestinal: Negative.    Endocrine: Negative.    Genitourinary: Negative.    Musculoskeletal:  Negative.    Skin: Negative.    Allergic/Immunologic: Negative.    Neurological: Negative.    Hematological: Negative.    Psychiatric/Behavioral: Negative.    All other systems reviewed and are negative.      Physical Exam     Initial Vitals [06/09/17 2201]   BP Pulse Resp Temp SpO2   122/77 95 18 97 °F (36.1 °C) 97 %     Physical Exam    Nursing note and vitals reviewed.  Constitutional: Vital signs are normal. She appears well-developed. She is active and cooperative.   HENT:   Head: Normocephalic and atraumatic.   Eyes: Conjunctivae, EOM and lids are normal. Pupils are equal, round, and reactive to light.   Neck: Trachea normal and full passive range of motion without pain. Neck supple. No thyroid mass present.   Cardiovascular: Normal rate, regular rhythm, S1 normal, S2 normal, normal heart sounds, intact distal pulses and normal pulses.   Abdominal: Soft. Normal appearance, normal aorta and bowel sounds are normal.   Musculoskeletal:        Right ankle: She exhibits decreased range of motion and swelling. Tenderness.        Feet:    Lymphadenopathy:     She has no axillary adenopathy.   Neurological: She is alert and oriented to person, place, and time.   Skin: Skin is warm, dry and intact.        Psychiatric: She has a normal mood and affect. Her speech is normal and behavior is normal. Judgment and thought content normal. Cognition and memory are normal.         ED Course   Procedures  Labs Reviewed   CULTURE, AEROBIC  (SPECIFY SOURCE)          X-Rays:   Independently Interpreted Readings:   Other Readings:  X-rays are not crossing over in the system that repeating radiology interpretation as no acute disease        X-Ray Ankle Complete Right   Final Result                         ED Course     Clinical Impression:   The primary encounter diagnosis was Sprain of right ankle, unspecified ligament, initial encounter. Diagnoses of Pain and Infection of PEG site were also pertinent to this visit.          Vini  DIANA Narayanan MD  06/09/17 6068

## 2017-06-12 LAB
BACTERIA SPEC AEROBE CULT: NORMAL
BACTERIA SPEC AEROBE CULT: NORMAL

## 2017-06-15 ENCOUNTER — TELEPHONE (OUTPATIENT)
Dept: EMERGENCY MEDICINE | Facility: OTHER | Age: 62
End: 2017-06-15

## 2017-06-25 ENCOUNTER — HOSPITAL ENCOUNTER (EMERGENCY)
Facility: OTHER | Age: 62
Discharge: HOME | End: 2017-06-25
Attending: EMERGENCY MEDICINE
Payer: OTHER GOVERNMENT

## 2017-06-25 VITALS
WEIGHT: 168 LBS | HEART RATE: 86 BPM | RESPIRATION RATE: 18 BRPM | BODY MASS INDEX: 27.12 KG/M2 | DIASTOLIC BLOOD PRESSURE: 82 MMHG | SYSTOLIC BLOOD PRESSURE: 110 MMHG | OXYGEN SATURATION: 96 % | TEMPERATURE: 99 F

## 2017-06-25 DIAGNOSIS — M51.36 DDD (DEGENERATIVE DISC DISEASE), LUMBAR: ICD-10-CM

## 2017-06-25 DIAGNOSIS — N39.0 URINARY TRACT INFECTION WITHOUT HEMATURIA, SITE UNSPECIFIED: Primary | ICD-10-CM

## 2017-06-25 LAB
BILIRUBIN, POC UA: NEGATIVE
BLOOD, POC UA: ABNORMAL
CLARITY, POC UA: ABNORMAL
COLOR, POC UA: ABNORMAL
GLUCOSE, POC UA: NEGATIVE
KETONES, POC UA: NEGATIVE
LEUKOCYTE EST, POC UA: ABNORMAL
NITRITE, POC UA: NEGATIVE
PH UR STRIP: 7.5 [PH]
PROTEIN, POC UA: ABNORMAL
SPECIFIC GRAVITY, POC UA: 1.02
UROBILINOGEN, POC UA: 0.2 E.U./DL

## 2017-06-25 PROCEDURE — 87088 URINE BACTERIA CULTURE: CPT

## 2017-06-25 PROCEDURE — 87186 SC STD MICRODIL/AGAR DIL: CPT

## 2017-06-25 PROCEDURE — 99283 EMERGENCY DEPT VISIT LOW MDM: CPT | Mod: 25

## 2017-06-25 PROCEDURE — 25000003 PHARM REV CODE 250

## 2017-06-25 PROCEDURE — 87077 CULTURE AEROBIC IDENTIFY: CPT

## 2017-06-25 PROCEDURE — 87086 URINE CULTURE/COLONY COUNT: CPT

## 2017-06-25 PROCEDURE — 81003 URINALYSIS AUTO W/O SCOPE: CPT

## 2017-06-25 PROCEDURE — 63600175 PHARM REV CODE 636 W HCPCS

## 2017-06-25 PROCEDURE — 96372 THER/PROPH/DIAG INJ SC/IM: CPT

## 2017-06-25 RX ORDER — NITROFURANTOIN 25; 75 MG/1; MG/1
100 CAPSULE ORAL 2 TIMES DAILY
Qty: 10 CAPSULE | Refills: 0
Start: 2017-06-25 | End: 2017-06-30

## 2017-06-25 RX ORDER — PHENAZOPYRIDINE HYDROCHLORIDE 100 MG/1
TABLET, FILM COATED ORAL
Status: COMPLETED
Start: 2017-06-25 | End: 2017-06-25

## 2017-06-25 RX ORDER — CEFTRIAXONE 1 G/1
INJECTION, POWDER, FOR SOLUTION INTRAMUSCULAR; INTRAVENOUS
Status: COMPLETED
Start: 2017-06-25 | End: 2017-06-25

## 2017-06-25 RX ORDER — PHENAZOPYRIDINE HYDROCHLORIDE 200 MG/1
200 TABLET, FILM COATED ORAL 3 TIMES DAILY
Qty: 6 TABLET | Refills: 0
Start: 2017-06-25 | End: 2017-07-05

## 2017-06-25 RX ADMIN — PHENAZOPYRIDINE HYDROCHLORIDE: 100 TABLET ORAL at 03:06

## 2017-06-25 RX ADMIN — CEFTRIAXONE SODIUM: 1 INJECTION, POWDER, FOR SOLUTION INTRAMUSCULAR; INTRAVENOUS at 03:06

## 2017-06-25 NOTE — ED TRIAGE NOTES
Pt presents to ER with c/o painful dysuria that started tonight.  She denies any fever or flank pain.

## 2017-06-25 NOTE — ED PROVIDER NOTES
Encounter Date: 6/25/2017       History     Chief Complaint   Patient presents with    Dysuria     started tonight, painful and frequent urination     62 y.o. Female presents to the emergency department complaining of acute frequency, oliguria, urgency and frequency that began tonight while laying in bed.    She reports similar symptoms several years ago she was diagnosed with a urinary tract infection.  She denies fever, chills, nausea, vomiting, flank pain or abdominal pain.    Has PEG tube in place to prevent recurrent herniation of abdominal organs intrathoracic cavity.  Eats food by mouth.      The history is provided by the patient.     Review of patient's allergies indicates:  No Known Allergies  Past Medical History:   Diagnosis Date    Alcohol abuse     Anxiety     Bipolar disorder     Cirrhosis of liver     Coronary artery disease     Depression     Fall     GERD (gastroesophageal reflux disease)     Hypertension     Low back pain     MI (myocardial infarction)     Thyroid disease      Past Surgical History:   Procedure Laterality Date    JOINT REPLACEMENT      KNEE SURGERY  bilateral replacement    right foot sx  2008    SHOULDER SURGERY  replacement     Family History   Problem Relation Age of Onset    Cancer Mother     Cancer Father      Social History   Substance Use Topics    Smoking status: Never Smoker    Smokeless tobacco: Never Used    Alcohol use No     Review of Systems   Constitutional: Negative for chills and fever.   HENT: Negative.    Eyes: Negative.    Respiratory: Negative for cough, shortness of breath and stridor.    Cardiovascular: Negative for chest pain and palpitations.   Gastrointestinal: Negative for nausea and vomiting.   Genitourinary: Positive for dysuria and frequency. Negative for flank pain and hematuria.        Oliguria     Musculoskeletal: Negative.    Skin: Negative.    Neurological: Negative for dizziness and headaches.   Psychiatric/Behavioral:  Negative.    All other systems reviewed and are negative.      Physical Exam     Initial Vitals [06/25/17 0153]   BP Pulse Resp Temp SpO2   (!) 134/92 84 16 97.7 °F (36.5 °C) 96 %      MAP       106         Physical Exam    Nursing note and vitals reviewed.  Constitutional: She appears well-developed and well-nourished. She is not diaphoretic. No distress.   HENT:   Head: Normocephalic and atraumatic.   Mouth/Throat: Oropharynx is clear and moist. No oropharyngeal exudate.   Eyes: EOM are normal. Pupils are equal, round, and reactive to light.   Neck: Normal range of motion. Neck supple.   Cardiovascular: Normal rate, regular rhythm and intact distal pulses.   No murmur heard.  Pulmonary/Chest: Breath sounds normal. No stridor. No respiratory distress.   Abdominal: Soft. Bowel sounds are normal. She exhibits no distension. There is no tenderness. There is no guarding.   Peg in place   Musculoskeletal: Normal range of motion. She exhibits no edema or tenderness.   Neurological: She is alert and oriented to person, place, and time. She has normal strength. No cranial nerve deficit.   Skin: Skin is warm and dry. Capillary refill takes less than 2 seconds. No erythema. No pallor.   Psychiatric: She has a normal mood and affect.         ED Course   Procedures  Labs Reviewed   POCT URINALYSIS W/O SCOPE - Abnormal; Notable for the following:        Result Value    Glucose, UA Negative (*)     Bilirubin, UA Negative (*)     Ketones, UA Negative (*)     Blood, UA 3+ (*)     Protein, UA 2+ (*)     Nitrite, UA Negative (*)     Leukocytes, UA 3+ (*)     All other components within normal limits   POCT URINALYSIS W/O SCOPE                               ED Course     Labs Reviewed  Admission on 06/25/2017, Discharged on 06/25/2017   Component Date Value Ref Range Status    Glucose, UA 06/25/2017 Negative*  Final    Bilirubin, UA 06/25/2017 Negative*  Final    Ketones, UA 06/25/2017 Negative*  Final    Spec Grav UA 06/25/2017  1.020   Final    Blood, UA 06/25/2017 3+*  Final    PH, UA 06/25/2017 7.5   Final    Protein, UA 06/25/2017 2+*  Final    Urobilinogen, UA 06/25/2017 0.2  E.U./dL Final    Nitrite, UA 06/25/2017 Negative*  Final    Leukocytes, UA 06/25/2017 3+*  Final    Color, UA 06/25/2017 Pink   Final    Clarity, UA 06/25/2017 Turbid   Final        Imaging Reviewed    Imaging Results    None         Medications given in ED    Medications   phenazopyridine (PYRIDIUM) 100 MG tablet (  Given 6/25/17 0318)   cefTRIAXone (ROCEPHIN) 1 gram injection ( Intramuscular Given 6/25/17 0317)       Discharge Medications     Discharge Medication List as of 6/25/2017  3:51 AM      CONTINUE these medications which have NOT CHANGED    Details   amitriptyline (ELAVIL) 50 MG tablet TAKE 1 TABLET(50 MG) BY MOUTH EVERY EVENING, Normal      atenolol (TENORMIN) 25 MG tablet TK 1 T PO  D, Historical Med      clonazePAM (KLONOPIN) 1 MG tablet TAKE 1 TABLET BY MOUTH TWICE DAILY, Print      !! cyclobenzaprine (FLEXERIL) 10 MG tablet TAKE 1 TABLET(10 MG) BY MOUTH THREE TIMES DAILY AS NEEDED FOR MUSCLE SPASMS, Normal      !! cyclobenzaprine (FLEXERIL) 10 MG tablet TAKE 1 TABLET(10 MG) BY MOUTH THREE TIMES DAILY AS NEEDED FOR MUSCLE SPASMS, Normal      duloxetine (CYMBALTA) 30 MG capsule Take 1 capsule (30 mg total) by mouth once daily., Starting 9/23/2016, Until Sat 9/23/17, Normal      esomeprazole (NEXIUM) 40 MG capsule Take 1 capsule by mouth once daily., Starting 3/30/2017, Until Discontinued, Historical Med      gabapentin (NEURONTIN) 300 MG capsule TK ONE C PO Q 8 H, Historical Med      JUBLIA 10 % Eran APPLY ONE DOSE DAILY, Normal      levothyroxine (SYNTHROID) 25 MCG tablet Take 1 tablet (25 mcg total) by mouth once daily., Starting 12/30/2016, Until Discontinued, Normal      LINZESS 145 mcg Cap capsule Take 1 capsule by mouth once daily., Starting 3/30/2017, Until Discontinued, Historical Med      nitroGLYCERIN (NITROSTAT) 0.3 MG SL tablet  ONE TABLET UNDER TONGUE AS NEEDED FOR CHEST PAIN EVERY 5 MINUTES AS NEEDED, Normal      ondansetron (ZOFRAN-ODT) 4 MG TbDL Take 1 tablet (4 mg total) by mouth every 6 (six) hours as needed (for vomiting or nausea)., Starting 4/12/2017, Until Discontinued, Print      oxycodone (ROXICODONE) 5 mg/5 mL Soln Take 10 mLs (10 mg total) by mouth every 4 (four) hours as needed., Starting 3/13/2017, Until Discontinued, Print      oxycodone 20 mg Tab TK 1 T PO  Q 6-8 H PRN, Historical Med      RESTASIS 0.05 % ophthalmic emulsion INSTILL 1 DROP IN BOTH EYES TWICE DAILY, Normal      zolpidem (AMBIEN) 5 MG Tab TK 1 T PO QHS PRN, Print       !! - Potential duplicate medications found. Please discuss with provider.                Patient discharged to home in stable condition with instructions to:   1. Please take all meds as prescribed.  2. Follow-up with your primary care doctor   3. Return precautions discussed and patient and/or family/caretaker understands to return to the emergency room for any concerns including worsening of your current symptoms, fever, chills, night sweats, worsening pain, chest pain, shortness of breath, nausea, vomiting, diarrhea, bleeding, headache, difficulty talking, visual disturbances, weakness, numbness or any other acute concerns    Clinical Impression:   The encounter diagnosis was Urinary tract infection without hematuria, site unspecified.                           Rob Aguilar MD  06/25/17 9212

## 2017-06-25 NOTE — ED NOTES
Pt reports painful and freq urination that started tonight, states she thinks she has a bladder infection

## 2017-06-26 RX ORDER — DULOXETIN HYDROCHLORIDE 30 MG/1
CAPSULE, DELAYED RELEASE ORAL
Qty: 90 CAPSULE | Refills: 0 | Status: SHIPPED | OUTPATIENT
Start: 2017-06-26 | End: 2017-09-22 | Stop reason: SDUPTHER

## 2017-06-27 LAB — BACTERIA UR CULT: NORMAL

## 2017-07-27 RX ORDER — CYCLOSPORINE 0.5 MG/ML
EMULSION OPHTHALMIC
Qty: 1 EACH | Refills: 0 | Status: SHIPPED | OUTPATIENT
Start: 2017-07-27 | End: 2017-10-02 | Stop reason: SDUPTHER

## 2017-07-28 DIAGNOSIS — F51.01 PRIMARY INSOMNIA: ICD-10-CM

## 2017-07-28 RX ORDER — AMITRIPTYLINE HYDROCHLORIDE 50 MG/1
TABLET, FILM COATED ORAL
Qty: 90 TABLET | Refills: 0 | Status: SHIPPED | OUTPATIENT
Start: 2017-07-28 | End: 2017-08-22 | Stop reason: SDUPTHER

## 2017-08-02 ENCOUNTER — HOSPITAL ENCOUNTER (EMERGENCY)
Facility: OTHER | Age: 62
Discharge: HOME | End: 2017-08-02
Attending: EMERGENCY MEDICINE
Payer: OTHER GOVERNMENT

## 2017-08-02 VITALS
OXYGEN SATURATION: 98 % | WEIGHT: 151.25 LBS | DIASTOLIC BLOOD PRESSURE: 71 MMHG | BODY MASS INDEX: 25.82 KG/M2 | HEART RATE: 77 BPM | RESPIRATION RATE: 18 BRPM | TEMPERATURE: 98 F | HEIGHT: 64 IN | SYSTOLIC BLOOD PRESSURE: 118 MMHG

## 2017-08-02 DIAGNOSIS — M25.471 RIGHT ANKLE SWELLING: ICD-10-CM

## 2017-08-02 DIAGNOSIS — G89.29 CHRONIC PAIN OF LEFT ANKLE: Primary | ICD-10-CM

## 2017-08-02 DIAGNOSIS — M25.572 CHRONIC PAIN OF LEFT ANKLE: Primary | ICD-10-CM

## 2017-08-02 PROCEDURE — 99283 EMERGENCY DEPT VISIT LOW MDM: CPT

## 2017-08-02 RX ORDER — KETOROLAC TROMETHAMINE 10 MG/1
10 TABLET, FILM COATED ORAL EVERY 6 HOURS
Qty: 20 TABLET | Refills: 0 | Status: SHIPPED | OUTPATIENT
Start: 2017-08-02 | End: 2017-09-22 | Stop reason: ALTCHOICE

## 2017-08-02 NOTE — ED PROVIDER NOTES
"Encounter Date: 8/2/2017       History     Chief Complaint   Patient presents with    Ankle Pain     "few weeks ago"     Chief complaint: Need x-ray of my ankle    62-year-old comes to the emergency room with a prescription from her doctor for an x-ray of her ankle.  This is dated June 28.  She also has a prescription for an MRI and is requesting to have these tests done today.  First patient said that she had no new trauma, then she said she did twist her ankle.  Patient takes Roxicodone for pain.          Review of patient's allergies indicates:  No Known Allergies  Past Medical History:   Diagnosis Date    Alcohol abuse     Anxiety     Bipolar disorder     Cirrhosis of liver     Coronary artery disease     Depression     Fall     GERD (gastroesophageal reflux disease)     Hypertension     Low back pain     MI (myocardial infarction)     Thyroid disease      Past Surgical History:   Procedure Laterality Date    JOINT REPLACEMENT      KNEE SURGERY  bilateral replacement    right foot sx  2008    SHOULDER SURGERY  replacement     Family History   Problem Relation Age of Onset    Cancer Mother     Cancer Father      Social History   Substance Use Topics    Smoking status: Never Smoker    Smokeless tobacco: Never Used    Alcohol use No     Review of Systems   Musculoskeletal: Positive for joint swelling.   Skin: Negative for color change and wound.   Neurological: Negative for weakness and numbness.       Physical Exam     Initial Vitals [08/02/17 1437]   BP Pulse Resp Temp SpO2   118/71 77 18 98.4 °F (36.9 °C) 98 %      MAP       86.67         Physical Exam    Nursing note and vitals reviewed.  Constitutional: No distress.   Eyes: Conjunctivae are normal.   Pulmonary/Chest: No respiratory distress.   Musculoskeletal: Normal range of motion. She exhibits edema (right medial ankle). She exhibits no tenderness.   Neurological: She is alert and oriented to person, place, and time.   Skin: Capillary " refill takes less than 2 seconds. No erythema.   Psychiatric: Her mood appears anxious.         ED Course   Procedures  Labs Reviewed - No data to display          Medical Decision Making:   Initial Assessment:   62-year-old presents with pain to her right ankle.  Patient claims to have twisted her ankle a few weeks ago  Clinical Tests:   Radiological Study: Ordered and Reviewed  ED Management:  X-ray will be done of the right ankle.  I did complain to the patient that she needs to follow-up at diagnostic Center for MRI and she also needs follow-up with an orthopedic.  Prescription monitoring shows that the patient does receive 120 oxycodone per month.  X-ray showed no acute findings.  Patient will be referred to orthopedics and prescribed Toradol                   ED Course     Clinical Impression:   The primary encounter diagnosis was Chronic pain of left ankle. A diagnosis of Right ankle swelling was also pertinent to this visit.                           Rohini William MD  08/02/17 7111

## 2017-08-02 NOTE — ED TRIAGE NOTES
"Pt reports here for 1) ankle pain for several weeks                                 2) " I have a split in my head" requesting Ultra Sound of cranium                                  3)  GERD / Acid Reflux  "

## 2017-08-02 NOTE — ED NOTES
Pt identifiers checked and correct.    LOC: The patient is awake, alert and aware of environment with an appropriate affect, the patient is oriented x 3 and speaking appropriately.   APPEARANCE: Patient resting comfortably and in no acute distress, patient is clean and well groomed, patient's clothing is properly fastened.   SKIN: The skin is warm and dry, color consistent with ethnicity, patient has normal skin turgor and moist mucus membranes, skin intact, no breakdown or bruising noted.   MUSCULOSKELETAL: Patient moving all extremities spontaneously, no obvious swelling or deformities noted. Pt ambulates with NAD noted   RESPIRATORY: Airway is open and patent, respirations are spontaneous, patient has a normal effort and rate, no accessory muscle use noted.   CARDIAC: Patient has a normal rate and regular rhythm, no periphreal edema noted, capillary refill < 3 seconds.   ABDOMEN: Soft and non tender to palpation, no distention noted, active bowel sounds present in all four quadrants.   NEUROLOGIC: PERRL, 3 mm bilaterally, eyes open spontaneously, behavior appropriate to situation, follows commands, facial expression symmetrical, bilateral hand grasp equal and even, purposeful motor response noted, normal sensation in all extremities when touched with a finger.

## 2017-08-16 ENCOUNTER — HOSPITAL ENCOUNTER (EMERGENCY)
Facility: HOSPITAL | Age: 62
Discharge: HOME | End: 2017-08-16
Attending: EMERGENCY MEDICINE
Payer: OTHER GOVERNMENT

## 2017-08-16 VITALS
HEART RATE: 86 BPM | RESPIRATION RATE: 18 BRPM | SYSTOLIC BLOOD PRESSURE: 115 MMHG | DIASTOLIC BLOOD PRESSURE: 68 MMHG | TEMPERATURE: 97 F | BODY MASS INDEX: 25.78 KG/M2 | OXYGEN SATURATION: 96 % | WEIGHT: 151 LBS | HEIGHT: 64 IN

## 2017-08-16 DIAGNOSIS — K59.09 CHRONIC CONSTIPATION: ICD-10-CM

## 2017-08-16 DIAGNOSIS — K62.89 RECTAL PAIN: Primary | ICD-10-CM

## 2017-08-16 DIAGNOSIS — K62.3 RECTAL PROLAPSE: ICD-10-CM

## 2017-08-16 DIAGNOSIS — F10.929 ALCOHOL INTOXICATION, WITH UNSPECIFIED COMPLICATION: ICD-10-CM

## 2017-08-16 DIAGNOSIS — F11.20 NARCOTIC DEPENDENCE: ICD-10-CM

## 2017-08-16 DIAGNOSIS — K62.5 RECTAL BLEEDING: ICD-10-CM

## 2017-08-16 DIAGNOSIS — R74.8 ELEVATED LIVER ENZYMES: ICD-10-CM

## 2017-08-16 LAB
ALBUMIN SERPL BCP-MCNC: 3.7 G/DL
ALP SERPL-CCNC: 263 U/L
ALT SERPL W/O P-5'-P-CCNC: 78 U/L
AMPHET+METHAMPHET UR QL: NEGATIVE
ANION GAP SERPL CALC-SCNC: 11 MMOL/L
APTT BLDCRRT: 25.7 SEC
AST SERPL-CCNC: 158 U/L
BACTERIA #/AREA URNS HPF: NORMAL /HPF
BARBITURATES UR QL SCN>200 NG/ML: NEGATIVE
BASOPHILS # BLD AUTO: 0.05 K/UL
BASOPHILS NFR BLD: 1 %
BENZODIAZ UR QL SCN>200 NG/ML: NEGATIVE
BILIRUB SERPL-MCNC: 0.5 MG/DL
BILIRUB UR QL STRIP: NEGATIVE
BUN SERPL-MCNC: 8 MG/DL
BZE UR QL SCN: NEGATIVE
CALCIUM SERPL-MCNC: 8.9 MG/DL
CANNABINOIDS UR QL SCN: NEGATIVE
CHLORIDE SERPL-SCNC: 104 MMOL/L
CLARITY UR: CLEAR
CO2 SERPL-SCNC: 21 MMOL/L
COLOR UR: ABNORMAL
CREAT SERPL-MCNC: 0.6 MG/DL
CREAT UR-MCNC: 20.4 MG/DL
DIFFERENTIAL METHOD: ABNORMAL
EOSINOPHIL # BLD AUTO: 0.1 K/UL
EOSINOPHIL NFR BLD: 2.3 %
ERYTHROCYTE [DISTWIDTH] IN BLOOD BY AUTOMATED COUNT: 14.8 %
EST. GFR  (AFRICAN AMERICAN): >60 ML/MIN/1.73 M^2
EST. GFR  (NON AFRICAN AMERICAN): >60 ML/MIN/1.73 M^2
ETHANOL SERPL-MCNC: 211 MG/DL
GLUCOSE SERPL-MCNC: 120 MG/DL
GLUCOSE UR QL STRIP: NEGATIVE
HCT VFR BLD AUTO: 37 %
HGB BLD-MCNC: 13.2 G/DL
HGB UR QL STRIP: ABNORMAL
INR PPP: 1.1
KETONES UR QL STRIP: NEGATIVE
LEUKOCYTE ESTERASE UR QL STRIP: NEGATIVE
LYMPHOCYTES # BLD AUTO: 2.1 K/UL
LYMPHOCYTES NFR BLD: 42.4 %
MCH RBC QN AUTO: 32.6 PG
MCHC RBC AUTO-ENTMCNC: 35.7 G/DL
MCV RBC AUTO: 91 FL
METHADONE UR QL SCN>300 NG/ML: NEGATIVE
MICROSCOPIC COMMENT: NORMAL
MONOCYTES # BLD AUTO: 0.4 K/UL
MONOCYTES NFR BLD: 8.9 %
NEUTROPHILS # BLD AUTO: 2.2 K/UL
NEUTROPHILS NFR BLD: 45.4 %
NITRITE UR QL STRIP: NEGATIVE
OPIATES UR QL SCN: NORMAL
PCP UR QL SCN>25 NG/ML: NEGATIVE
PH UR STRIP: 5 [PH] (ref 5–8)
PLATELET # BLD AUTO: 298 K/UL
PMV BLD AUTO: 10.3 FL
POTASSIUM SERPL-SCNC: 4.1 MMOL/L
PROT SERPL-MCNC: 8.9 G/DL
PROT UR QL STRIP: NEGATIVE
PROTHROMBIN TIME: 11.2 SEC
RBC # BLD AUTO: 4.05 M/UL
RBC #/AREA URNS HPF: 1 /HPF (ref 0–4)
SODIUM SERPL-SCNC: 136 MMOL/L
SP GR UR STRIP: 1 (ref 1–1.03)
TOXICOLOGY INFORMATION: NORMAL
URN SPEC COLLECT METH UR: ABNORMAL
UROBILINOGEN UR STRIP-ACNC: NEGATIVE EU/DL
WBC # BLD AUTO: 4.83 K/UL
WBC #/AREA URNS HPF: 0 /HPF (ref 0–5)

## 2017-08-16 PROCEDURE — 80320 DRUG SCREEN QUANTALCOHOLS: CPT

## 2017-08-16 PROCEDURE — 85025 COMPLETE CBC W/AUTO DIFF WBC: CPT

## 2017-08-16 PROCEDURE — 81000 URINALYSIS NONAUTO W/SCOPE: CPT

## 2017-08-16 PROCEDURE — 85730 THROMBOPLASTIN TIME PARTIAL: CPT

## 2017-08-16 PROCEDURE — 99284 EMERGENCY DEPT VISIT MOD MDM: CPT

## 2017-08-16 PROCEDURE — 99284 EMERGENCY DEPT VISIT MOD MDM: CPT | Mod: 27

## 2017-08-16 PROCEDURE — 80307 DRUG TEST PRSMV CHEM ANLYZR: CPT

## 2017-08-16 PROCEDURE — 80053 COMPREHEN METABOLIC PANEL: CPT

## 2017-08-16 PROCEDURE — 85610 PROTHROMBIN TIME: CPT

## 2017-08-16 RX ORDER — DOCUSATE SODIUM 100 MG/1
100 CAPSULE, LIQUID FILLED ORAL 2 TIMES DAILY
Qty: 60 CAPSULE | Refills: 2 | Status: SHIPPED | OUTPATIENT
Start: 2017-08-16 | End: 2017-09-22 | Stop reason: SDUPTHER

## 2017-08-16 NOTE — ED PROVIDER NOTES
Encounter Date: 8/16/2017    SCRIBE #1 NOTE: I, Marilin Barba, am scribing for, and in the presence of, Catalina Ferguson MD. Other sections scribed: HPI/ROS.       History     Chief Complaint   Patient presents with    Rectal Bleeding     hx of rectal prolapse, reports bright red bleeding tonight, neighbor reports she drank 6 pack of beer and 6 other drinks      CC: Rectal bleeding    Pt is a 62 y.o. female w/ GERD, depression, anxiety, liver cirrhosis, bipolar disorder, HTN, CAD, thyroid disease, and hx of MI, chronic lower back pain, and hiatal hernia repair (3/10/2017) presenting to the ED via EMS c/o bright red rectal bleeding and rectal prolapse tonight after straining during a BM. Pt states this is her second episode of rectal prolapse since her hernia repair, both during straining during BM's. Pt also reports rectal pain. Pt reports chronic opioid related constipation; pt takes 20 mg oxycodone regularly. Pt states she had 4 vaginal childbirths. Pt denies anal sex, vaginal prolapse, urinary or bowel incontinence, abdominal pain, or N/V.      The history is provided by the patient.     Review of patient's allergies indicates:  No Known Allergies  Past Medical History:   Diagnosis Date    Alcohol abuse     Anxiety     Bipolar disorder     Cirrhosis of liver     Coronary artery disease     Depression     Fall     GERD (gastroesophageal reflux disease)     Hypertension     Low back pain     MI (myocardial infarction)     Thyroid disease      Past Surgical History:   Procedure Laterality Date    JOINT REPLACEMENT      KNEE SURGERY  bilateral replacement    right foot sx  2008    SHOULDER SURGERY  replacement     Family History   Problem Relation Age of Onset    Cancer Mother     Cancer Father      Social History   Substance Use Topics    Smoking status: Never Smoker    Smokeless tobacco: Never Used    Alcohol use No     Review of Systems   Constitutional: Negative for fever.   HENT: Negative  for facial swelling, sinus pressure and sneezing.    Eyes: Negative for visual disturbance.   Respiratory: Negative for shortness of breath.    Cardiovascular: Negative for chest pain.   Gastrointestinal: Positive for anal bleeding, blood in stool and rectal pain. Negative for abdominal pain, nausea and vomiting.   Genitourinary: Negative for dysuria and hematuria.   Musculoskeletal: Negative for arthralgias, myalgias and neck pain.   Skin: Negative for rash and wound.   Neurological: Negative for headaches.       Physical Exam     Initial Vitals [08/16/17 0230]   BP Pulse Resp Temp SpO2   120/72 98 18 97.2 °F (36.2 °C) 98 %      MAP       88         Physical Exam    Nursing note and vitals reviewed.  Constitutional: She appears well-developed and well-nourished. She is not diaphoretic.  Non-toxic appearance. She does not appear ill.   Loudly cursing at paramedic. No distress.   HENT:   Head: Normocephalic and atraumatic.   Mouth/Throat: Oropharynx is clear and moist. Mucous membranes are dry. No oropharyngeal exudate.   Eyes: Conjunctivae and EOM are normal. Pupils are equal, round, and reactive to light.   Neck: Normal range of motion. Neck supple.   Cardiovascular: Normal rate and regular rhythm.   Pulmonary/Chest: Effort normal and breath sounds normal. No respiratory distress.   Abdominal: Soft. Normal appearance and bowel sounds are normal. She exhibits no distension. There is no tenderness.   Multiple well-healed laparoscopic scars from sites.    Genitourinary: Rectal exam shows external hemorrhoid.   Genitourinary Comments: Prolapse has resolved. External hemorrhoids.   Musculoskeletal: Normal range of motion.   Neurological: She is alert and oriented to person, place, and time. She has normal strength.   Skin: Skin is warm and dry.   Psychiatric: She has a normal mood and affect. Her speech is slurred. She expresses inappropriate judgment.         ED Course   Procedures  Labs Reviewed   COMPREHENSIVE  "METABOLIC PANEL - Abnormal; Notable for the following:        Result Value    CO2 21 (*)     Glucose 120 (*)     Total Protein 8.9 (*)     Alkaline Phosphatase 263 (*)      (*)     ALT 78 (*)     All other components within normal limits   CBC W/ AUTO DIFFERENTIAL - Abnormal; Notable for the following:     MCH 32.6 (*)     RDW 14.8 (*)     All other components within normal limits   ALCOHOL,MEDICAL (ETHANOL) - Abnormal; Notable for the following:     Alcohol, Medical, Serum 211 (*)     All other components within normal limits   URINALYSIS - Abnormal; Notable for the following:     Occult Blood UA 2+ (*)     All other components within normal limits   PROTIME-INR   APTT   DRUG SCREEN PANEL, URINE EMERGENCY   URINALYSIS MICROSCOPIC             Medical Decision Making:   History:   Old Medical Records: I decided to obtain old medical records.  Old Records Summarized: records from previous admission(s).  Initial Assessment:   This is an emergent evaluation of a 62 y.o. Female with reported rectal prolapse. Also admits EtOH this PM.  Differential Diagnosis:   Ddx includes rectal prolapse, GI bleed, anemia, EtOH intox, other.  ED Management:  Nontoxic-appearing. Prolapse has resolved. Hgb stable and coags normal. LFTs somewhat bumped, c/w EtOH (EtOH here 211).     Despite argument with paramedics, patient was cooperative with me, though continued to be loud and somewhat inappropriate. ("You're Roman Catholic, aren't you? Jews are the best doctors." "You're going to heaven anyway because you believe in the Old Testament.") She and her friend who is with her and who also had some EtOH tonight by her admission will be taking a cab home. Were escorted to cab by security in [facility].            Scribe Attestation:   Scribe #1: I performed the above scribed service and the documentation accurately describes the services I performed. I attest to the accuracy of the note.    Attending Attestation:           Physician Attestation for " Scribe:  Physician Attestation Statement for Scribe #1: I, Catalina Ferguson MD, reviewed documentation, as scribed by Marilin Barba in my presence, and it is both accurate and complete.                 ED Course     Clinical Impression:   The primary encounter diagnosis was Rectal pain. Diagnoses of Rectal bleeding, Rectal prolapse, Alcohol intoxication, with unspecified complication, Elevated liver enzymes, Narcotic dependence, and Chronic constipation were also pertinent to this visit.                           Catalina Ferguson MD  08/20/17 5239

## 2017-08-16 NOTE — ED TRIAGE NOTES
"Pt reports rectal bleeding and pain starting a couple hours ago. Pt has alcohol in her  Breath. "I don't have anal sex. I don't know why I have a prolapsed rectum. I should go home." Also diarrhea. Denies nv, SOB, chesty pain.  "

## 2017-08-17 ENCOUNTER — HOSPITAL ENCOUNTER (EMERGENCY)
Facility: HOSPITAL | Age: 62
Discharge: HOME | End: 2017-08-17
Attending: EMERGENCY MEDICINE
Payer: OTHER GOVERNMENT

## 2017-08-17 VITALS
DIASTOLIC BLOOD PRESSURE: 84 MMHG | HEART RATE: 96 BPM | WEIGHT: 151 LBS | OXYGEN SATURATION: 97 % | SYSTOLIC BLOOD PRESSURE: 130 MMHG | RESPIRATION RATE: 18 BRPM | TEMPERATURE: 99 F | BODY MASS INDEX: 25.92 KG/M2

## 2017-08-17 DIAGNOSIS — F10.920 ALCOHOL INTOXICATION, UNCOMPLICATED: Primary | ICD-10-CM

## 2017-08-17 NOTE — ED NOTES
"Patient was placed in her 's Lisa. Patient is constantly screaming, "You are a fucking bitch. I am going to report you. If we were on the streets you do not even know what I would do to you if we were not here. You are a lying bitch. You told me my  left because I was screaming and I knew that was not true. I am going to report you to the board of nursing. You have no sense of humor. You have no personality." Patient is able to ambulate to get in her 's car. Security assisted me with the discharge of the patient.   "

## 2017-08-17 NOTE — ED TRIAGE NOTES
"Patient is highly intoxicated at this time. Patient states, "I am drunk and I want to go home." Patient has red daiquiri all over her night gown. Patient is screaming in the hallway. Patient states, "I was at home drinking beers."  "

## 2017-08-17 NOTE — ED NOTES
Security, JPSO, and house supervisor at the bedside to monitor the patient until her  arrives. Patient is screaming profanity and cursing at staff members. Patient is verbally and physically abusive to staff. Patient is threatening staff at this time.
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
## 2017-08-22 ENCOUNTER — TELEPHONE (OUTPATIENT)
Dept: FAMILY MEDICINE | Facility: CLINIC | Age: 62
End: 2017-08-22

## 2017-08-22 DIAGNOSIS — F51.01 PRIMARY INSOMNIA: ICD-10-CM

## 2017-08-22 RX ORDER — AMITRIPTYLINE HYDROCHLORIDE 50 MG/1
TABLET, FILM COATED ORAL
Qty: 90 TABLET | Refills: 0 | Status: SHIPPED | OUTPATIENT
Start: 2017-08-22 | End: 2017-09-22 | Stop reason: SDUPTHER

## 2017-08-22 NOTE — TELEPHONE ENCOUNTER
Spoke with patient, requesting Elavil, until she comes in on 8/31/17 for her appointment. She states she hasn't slept in 3 days.would like script sent to [facility] @ [phone]

## 2017-08-22 NOTE — TELEPHONE ENCOUNTER
----- Message from Cassidy Gasca sent at 8/22/2017  3:20 PM CDT -----  Contact: pt [phone]  Calling to speak with nurse regarding scripts that need to be called in. [facility] [phone]( [facility])

## 2017-08-22 NOTE — TELEPHONE ENCOUNTER
----- Message from Tanya Moran sent at 8/22/2017  3:46 PM CDT -----  Contact: Self   Patient returned your call. Please call again at [phone]

## 2017-08-24 RX ORDER — NITROGLYCERIN 0.3 MG/1
TABLET SUBLINGUAL
Qty: 100 TABLET | Refills: 0 | Status: SHIPPED | OUTPATIENT
Start: 2017-08-24 | End: 2018-08-06 | Stop reason: SDUPTHER

## 2017-08-24 RX ORDER — ONDANSETRON HYDROCHLORIDE 8 MG/1
TABLET, FILM COATED ORAL
Qty: 30 TABLET | Refills: 0 | Status: SHIPPED | OUTPATIENT
Start: 2017-08-24 | End: 2017-09-22 | Stop reason: ALTCHOICE

## 2017-08-25 ENCOUNTER — HOSPITAL ENCOUNTER (EMERGENCY)
Facility: HOSPITAL | Age: 62
Discharge: HOME | End: 2017-08-26
Attending: EMERGENCY MEDICINE
Payer: OTHER GOVERNMENT

## 2017-08-25 DIAGNOSIS — F10.920 ALCOHOL INTOXICATION, UNCOMPLICATED: Primary | ICD-10-CM

## 2017-08-25 DIAGNOSIS — M62.82 NON-TRAUMATIC RHABDOMYOLYSIS: ICD-10-CM

## 2017-08-25 DIAGNOSIS — R74.8 ELEVATED CPK: ICD-10-CM

## 2017-08-25 LAB
ALBUMIN SERPL BCP-MCNC: 3.3 G/DL
ALP SERPL-CCNC: 263 U/L
ALT SERPL W/O P-5'-P-CCNC: 67 U/L
ANION GAP SERPL CALC-SCNC: 10 MMOL/L
AST SERPL-CCNC: 137 U/L
B-OH-BUTYR BLD STRIP-SCNC: 0.1 MMOL/L
BASOPHILS # BLD AUTO: 0.04 K/UL
BASOPHILS NFR BLD: 1.4 %
BILIRUB SERPL-MCNC: 0.6 MG/DL
BUN SERPL-MCNC: 9 MG/DL
CALCIUM SERPL-MCNC: 8 MG/DL
CHLORIDE SERPL-SCNC: 98 MMOL/L
CK SERPL-CCNC: 227 U/L
CO2 SERPL-SCNC: 21 MMOL/L
CREAT SERPL-MCNC: 0.7 MG/DL
DIFFERENTIAL METHOD: ABNORMAL
EOSINOPHIL # BLD AUTO: 0.2 K/UL
EOSINOPHIL NFR BLD: 6.6 %
ERYTHROCYTE [DISTWIDTH] IN BLOOD BY AUTOMATED COUNT: 14.5 %
EST. GFR  (AFRICAN AMERICAN): >60 ML/MIN/1.73 M^2
EST. GFR  (NON AFRICAN AMERICAN): >60 ML/MIN/1.73 M^2
ETHANOL SERPL-MCNC: 59 MG/DL
GLUCOSE SERPL-MCNC: 87 MG/DL
HCT VFR BLD AUTO: 31.5 %
HGB BLD-MCNC: 10.5 G/DL
LYMPHOCYTES # BLD AUTO: 1.2 K/UL
LYMPHOCYTES NFR BLD: 41 %
MCH RBC QN AUTO: 31.3 PG
MCHC RBC AUTO-ENTMCNC: 33.3 G/DL
MCV RBC AUTO: 94 FL
MONOCYTES # BLD AUTO: 0.3 K/UL
MONOCYTES NFR BLD: 10.7 %
NEUTROPHILS # BLD AUTO: 1.2 K/UL
NEUTROPHILS NFR BLD: 40.3 %
PLATELET # BLD AUTO: 197 K/UL
PMV BLD AUTO: 10.3 FL
POTASSIUM SERPL-SCNC: 4.1 MMOL/L
PROT SERPL-MCNC: 7.6 G/DL
RBC # BLD AUTO: 3.35 M/UL
SODIUM SERPL-SCNC: 129 MMOL/L
WBC # BLD AUTO: 2.9 K/UL

## 2017-08-25 PROCEDURE — 82550 ASSAY OF CK (CPK): CPT

## 2017-08-25 PROCEDURE — 85025 COMPLETE CBC W/AUTO DIFF WBC: CPT

## 2017-08-25 PROCEDURE — 82010 KETONE BODYS QUAN: CPT

## 2017-08-25 PROCEDURE — 80053 COMPREHEN METABOLIC PANEL: CPT

## 2017-08-25 PROCEDURE — 80320 DRUG SCREEN QUANTALCOHOLS: CPT

## 2017-08-25 PROCEDURE — 96360 HYDRATION IV INFUSION INIT: CPT

## 2017-08-25 PROCEDURE — 83930 ASSAY OF BLOOD OSMOLALITY: CPT

## 2017-08-25 PROCEDURE — 25000003 PHARM REV CODE 250: Performed by: EMERGENCY MEDICINE

## 2017-08-25 PROCEDURE — 99284 EMERGENCY DEPT VISIT MOD MDM: CPT | Mod: 25

## 2017-08-25 PROCEDURE — 96361 HYDRATE IV INFUSION ADD-ON: CPT

## 2017-08-25 RX ADMIN — SODIUM CHLORIDE 1000 ML: 0.9 INJECTION, SOLUTION INTRAVENOUS at 10:08

## 2017-08-26 VITALS
DIASTOLIC BLOOD PRESSURE: 84 MMHG | TEMPERATURE: 98 F | WEIGHT: 151 LBS | RESPIRATION RATE: 16 BRPM | OXYGEN SATURATION: 98 % | BODY MASS INDEX: 25.92 KG/M2 | HEART RATE: 84 BPM | SYSTOLIC BLOOD PRESSURE: 137 MMHG

## 2017-08-26 LAB
AMPHET+METHAMPHET UR QL: NEGATIVE
BARBITURATES UR QL SCN>200 NG/ML: NEGATIVE
BENZODIAZ UR QL SCN>200 NG/ML: NEGATIVE
BILIRUB UR QL STRIP: NEGATIVE
BZE UR QL SCN: NEGATIVE
CANNABINOIDS UR QL SCN: NEGATIVE
CK SERPL-CCNC: 255 U/L
CLARITY UR: CLEAR
COLOR UR: YELLOW
CREAT UR-MCNC: 30 MG/DL
GLUCOSE UR QL STRIP: NEGATIVE
HGB UR QL STRIP: NEGATIVE
KETONES UR QL STRIP: NEGATIVE
LEUKOCYTE ESTERASE UR QL STRIP: NEGATIVE
METHADONE UR QL SCN>300 NG/ML: NEGATIVE
NITRITE UR QL STRIP: NEGATIVE
OPIATES UR QL SCN: NORMAL
OSMOLALITY SERPL: 287 MOSM/KG
PCP UR QL SCN>25 NG/ML: NEGATIVE
PH UR STRIP: 5 [PH] (ref 5–8)
PROT UR QL STRIP: NEGATIVE
SP GR UR STRIP: 1.01 (ref 1–1.03)
TOXICOLOGY INFORMATION: NORMAL
URN SPEC COLLECT METH UR: NORMAL
UROBILINOGEN UR STRIP-ACNC: NEGATIVE EU/DL

## 2017-08-26 PROCEDURE — 82550 ASSAY OF CK (CPK): CPT

## 2017-08-26 PROCEDURE — 25000003 PHARM REV CODE 250: Performed by: EMERGENCY MEDICINE

## 2017-08-26 PROCEDURE — 81003 URINALYSIS AUTO W/O SCOPE: CPT

## 2017-08-26 PROCEDURE — 80307 DRUG TEST PRSMV CHEM ANLYZR: CPT

## 2017-08-26 RX ADMIN — SODIUM CHLORIDE 1000 ML: 0.9 INJECTION, SOLUTION INTRAVENOUS at 12:08

## 2017-08-26 RX ADMIN — SODIUM CHLORIDE 1000 ML: 0.9 INJECTION, SOLUTION INTRAVENOUS at 02:08

## 2017-08-26 NOTE — ED PROVIDER NOTES
"Encounter Date: 8/25/2017    SCRIBE #1 NOTE: I, Sajan Braden, am scribing for, and in the presence of,  Salvador Wells MD. I have scribed the following portions of the note - Other sections scribed: HPI, ROS.       History     Chief Complaint   Patient presents with    Alcohol Intoxication      reports finding her on floor in bathroom with vomit next to her,  reports she drank 2, 6-packs of "the tall beers"      CC: Alcohol Intoxication    HPI: This 62 y.o. Female with PMHx HTN, CAD, MI, thyroid disease, cirrhosis of liver, GERD, alcohol abuse, bipolar disorder, anxiety, depression, low back pain, and PSHx shoulder replacement, bilateral knee replacement, and R foot surgery presents to the ED via EMS for emergent evaluation of alcohol intoxication. Per nurses note,  found pt on bathroom floor with vomit and says she drank x2 6-packs of "the tall beers". Pt is currently incomprehensible and history is limited by the condition of the pt.       The history is provided by the patient. The history is limited by the condition of the patient. No  was used.     Review of patient's allergies indicates:  No Known Allergies  Past Medical History:   Diagnosis Date    Alcohol abuse     Anxiety     Bipolar disorder     Cirrhosis of liver     Coronary artery disease     Depression     Fall     GERD (gastroesophageal reflux disease)     Hypertension     Low back pain     MI (myocardial infarction)     Thyroid disease      Past Surgical History:   Procedure Laterality Date    JOINT REPLACEMENT      KNEE SURGERY  bilateral replacement    right foot sx  2008    SHOULDER SURGERY  replacement     Family History   Problem Relation Age of Onset    Cancer Mother     Cancer Father      Social History   Substance Use Topics    Smoking status: Never Smoker    Smokeless tobacco: Never Used    Alcohol use Yes     Review of Systems   Unable to perform ROS: Mental status change "   Constitutional: Negative for fever.        (+) alcohol intoxication     HENT: Negative for sore throat.    Respiratory: Negative for shortness of breath.    Cardiovascular: Negative for chest pain.   Gastrointestinal: Negative for nausea.   Genitourinary: Negative for dysuria.   Musculoskeletal: Negative for back pain.   Skin: Negative for rash.   Neurological: Negative for weakness.   Hematological: Does not bruise/bleed easily.       Physical Exam     Initial Vitals [08/25/17 2237]   BP Pulse Resp Temp SpO2   (!) 144/88 102 16 97.9 °F (36.6 °C) 96 %      MAP       106.67         Physical Exam    Vitals reviewed.  Constitutional: She appears well-developed and well-nourished.   HENT:   Head: Normocephalic and atraumatic.   Nose: Nose normal.   Mouth/Throat: No oropharyngeal exudate.   Eyes: EOM are normal. Pupils are equal, round, and reactive to light.   Neck: Normal range of motion. Neck supple. No JVD present.   Cardiovascular: Regular rhythm and normal heart sounds. Exam reveals no gallop and no friction rub.    No murmur heard.  Pulmonary/Chest: Breath sounds normal. No stridor. No respiratory distress. She has no wheezes. She has no rhonchi. She has no rales. She exhibits no tenderness.   Abdominal: Soft. Bowel sounds are normal. She exhibits no distension and no mass. There is no tenderness. There is no rebound and no guarding.   Musculoskeletal: Normal range of motion. She exhibits no edema or tenderness.   Neurological: She is alert and oriented to person, place, and time. She has normal strength. No sensory deficit.   Skin: Skin is warm and dry.   Psychiatric: She has a normal mood and affect. Her speech is slurred. She is slowed. She exhibits abnormal recent memory. She is inattentive.         ED Course   Procedures  Labs Reviewed   CBC W/ AUTO DIFFERENTIAL - Abnormal; Notable for the following:        Result Value    WBC 2.90 (*)     RBC 3.35 (*)     Hemoglobin 10.5 (*)     Hematocrit 31.5 (*)     MCH  31.3 (*)     Gran # 1.2 (*)     All other components within normal limits   COMPREHENSIVE METABOLIC PANEL - Abnormal; Notable for the following:     Sodium 129 (*)     CO2 21 (*)     Calcium 8.0 (*)     Albumin 3.3 (*)     Alkaline Phosphatase 263 (*)      (*)     ALT 67 (*)     All other components within normal limits   ALCOHOL,MEDICAL (ETHANOL) - Abnormal; Notable for the following:     Alcohol, Medical, Serum 59 (*)     All other components within normal limits   CK - Abnormal; Notable for the following:      (*)     All other components within normal limits   CK - Abnormal; Notable for the following:      (*)     All other components within normal limits   OSMOLALITY   BETA - HYDROXYBUTYRATE, SERUM   URINALYSIS   DRUG SCREEN PANEL, URINE EMERGENCY        Medical decision-making:    The patient received a medical screening exam. If performed, the EKG was independently evaluated by me and is pending final cardiology evaluation.  If performed, all radiographic studies were independently evaluated by me and are pending final radiology evaluation. If labs were ordered, they were reviewed. Vital signs are independently assessed by me.  If performed, the pulse oximetry was independently evaluated by me.  I decided to obtain the patient's past medical record.  If available, I reviewed the patient's past medical record, including most recent labs and radiology reports.    Patient presents to the emergency department for evaluation of suspected alcohol intoxication.  Patient has history of alcohol abuse.  Patient was found by family on the floor.  Unknown downtime.  Patient is slurring her speech and slowed.  There is no visible sign of trauma.  CT scan head does not reveal any acute intracranial abnormality.  There is no evidence of intracranial hemorrhage or stroke.  There is no evidence of external trauma.  Patient has a mildly elevated alcohol level.  Patient also has an elevated CPK.  Her renal  function is normal.  Patient was given IV fluids and her CPK level is stabilized and not significantly rising.  I do not feel that she is in severe rhabdo my lysis.  She has a normal potassium.  Patient is dehydrated and she was given IV fluids.  She was encouraged to drink plain fluids.  Throughout the remainder of the day.  Patient was observed in the emergency department for some time and the apparent alcohol intoxication with slurred speech has now resolved.  Patient is ambulating around the emergency department with steady gait.  Her slurred speech is back to normal.  There is no nystagmus.  Patient is clinically sober.  I advised her to seek help and counseling regarding her alcohol addiction and she stated that she would.  I've encouraged her to also seek primary care follow-up.  Return precautions given.  Patient is not significantly anemic to require blood transfusion.  She has no sign of bleeding diathesis.  She denies any GI bleed or dark or bloody stools.  She denies hematemesis.  Low-sodium is most likely due to excessive fluid intake and some of her beer potomania.     The results and physical exam findings were reviewed with the patient. Pt agrees with assessment, disposition and treatment plan and has no further questions or complaints at this time.    NAMRATA Wells M.D. 5:38 AM 8/26/2017                    Scribe Attestation:   Scribe #1: I performed the above scribed service and the documentation accurately describes the services I performed. I attest to the accuracy of the note.    Attending Attestation:           Physician Attestation for Scribe:  Physician Attestation Statement for Scribe #1: I, Salvador Wells MD, reviewed documentation, as scribed by Sajan Braden in my presence, and it is both accurate and complete.                 ED Course     Clinical Impression:   The primary encounter diagnosis was Alcohol intoxication, uncomplicated. Diagnoses of Elevated CPK and Non-traumatic  rhabdomyolysis were also pertinent to this visit.                           Salvador Wells MD  08/26/17 6316
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
## 2017-09-14 ENCOUNTER — HOSPITAL ENCOUNTER (EMERGENCY)
Facility: HOSPITAL | Age: 62
Discharge: HOME | End: 2017-09-15
Attending: EMERGENCY MEDICINE
Payer: OTHER GOVERNMENT

## 2017-09-14 DIAGNOSIS — R10.9 ABDOMINAL PAIN, UNSPECIFIED LOCATION: Primary | ICD-10-CM

## 2017-09-14 DIAGNOSIS — F10.10 ALCOHOL ABUSE: ICD-10-CM

## 2017-09-14 DIAGNOSIS — F31.9 BIPOLAR AFFECTIVE DISORDER, REMISSION STATUS UNSPECIFIED: ICD-10-CM

## 2017-09-14 DIAGNOSIS — K45.8 RECURRENT ABDOMINAL HERNIA WITHOUT OBSTRUCTION OR GANGRENE, UNSPECIFIED HERNIA TYPE: ICD-10-CM

## 2017-09-14 DIAGNOSIS — R11.0 NAUSEA: ICD-10-CM

## 2017-09-14 DIAGNOSIS — R07.9 CHEST PAIN, UNSPECIFIED TYPE: ICD-10-CM

## 2017-09-14 LAB
ALBUMIN SERPL BCP-MCNC: 3.4 G/DL
ALP SERPL-CCNC: 315 U/L
ALT SERPL W/O P-5'-P-CCNC: 73 U/L
ANION GAP SERPL CALC-SCNC: 11 MMOL/L
AST SERPL-CCNC: 148 U/L
BASOPHILS # BLD AUTO: 0.06 K/UL
BASOPHILS NFR BLD: 1.6 %
BILIRUB SERPL-MCNC: 0.6 MG/DL
BNP SERPL-MCNC: 142 PG/ML
BUN SERPL-MCNC: 8 MG/DL
CALCIUM SERPL-MCNC: 8.8 MG/DL
CHLORIDE SERPL-SCNC: 102 MMOL/L
CO2 SERPL-SCNC: 22 MMOL/L
CREAT SERPL-MCNC: 0.7 MG/DL
DIFFERENTIAL METHOD: ABNORMAL
EOSINOPHIL # BLD AUTO: 0.2 K/UL
EOSINOPHIL NFR BLD: 5 %
ERYTHROCYTE [DISTWIDTH] IN BLOOD BY AUTOMATED COUNT: 15.1 %
EST. GFR  (AFRICAN AMERICAN): >60 ML/MIN/1.73 M^2
EST. GFR  (NON AFRICAN AMERICAN): >60 ML/MIN/1.73 M^2
ETHANOL SERPL-MCNC: 28 MG/DL
GLUCOSE SERPL-MCNC: 109 MG/DL
HCT VFR BLD AUTO: 37.6 %
HGB BLD-MCNC: 13 G/DL
LIPASE SERPL-CCNC: 49 U/L
LYMPHOCYTES # BLD AUTO: 1.3 K/UL
LYMPHOCYTES NFR BLD: 34.6 %
MCH RBC QN AUTO: 32.5 PG
MCHC RBC AUTO-ENTMCNC: 34.6 G/DL
MCV RBC AUTO: 94 FL
MONOCYTES # BLD AUTO: 0.5 K/UL
MONOCYTES NFR BLD: 12.8 %
NEUTROPHILS # BLD AUTO: 1.8 K/UL
NEUTROPHILS NFR BLD: 46 %
PLATELET # BLD AUTO: 211 K/UL
PMV BLD AUTO: 9.6 FL
POTASSIUM SERPL-SCNC: 4.1 MMOL/L
PROT SERPL-MCNC: 8.1 G/DL
RBC # BLD AUTO: 4 M/UL
SODIUM SERPL-SCNC: 135 MMOL/L
TROPONIN I SERPL DL<=0.01 NG/ML-MCNC: 0.01 NG/ML
WBC # BLD AUTO: 3.82 K/UL

## 2017-09-14 PROCEDURE — 96374 THER/PROPH/DIAG INJ IV PUSH: CPT

## 2017-09-14 PROCEDURE — 84484 ASSAY OF TROPONIN QUANT: CPT

## 2017-09-14 PROCEDURE — 93005 ELECTROCARDIOGRAM TRACING: CPT

## 2017-09-14 PROCEDURE — 80320 DRUG SCREEN QUANTALCOHOLS: CPT

## 2017-09-14 PROCEDURE — 85025 COMPLETE CBC W/AUTO DIFF WBC: CPT

## 2017-09-14 PROCEDURE — 96375 TX/PRO/DX INJ NEW DRUG ADDON: CPT

## 2017-09-14 PROCEDURE — 83690 ASSAY OF LIPASE: CPT

## 2017-09-14 PROCEDURE — 80053 COMPREHEN METABOLIC PANEL: CPT

## 2017-09-14 PROCEDURE — 83880 ASSAY OF NATRIURETIC PEPTIDE: CPT

## 2017-09-14 PROCEDURE — 99285 EMERGENCY DEPT VISIT HI MDM: CPT | Mod: 25

## 2017-09-14 PROCEDURE — 25000003 PHARM REV CODE 250: Performed by: EMERGENCY MEDICINE

## 2017-09-14 RX ADMIN — LIDOCAINE HYDROCHLORIDE: 20 SOLUTION ORAL; TOPICAL at 11:09

## 2017-09-15 VITALS
HEART RATE: 76 BPM | RESPIRATION RATE: 16 BRPM | DIASTOLIC BLOOD PRESSURE: 79 MMHG | OXYGEN SATURATION: 95 % | HEIGHT: 64 IN | BODY MASS INDEX: 25.78 KG/M2 | WEIGHT: 151 LBS | TEMPERATURE: 98 F | SYSTOLIC BLOOD PRESSURE: 120 MMHG

## 2017-09-15 LAB
AMPHET+METHAMPHET UR QL: NEGATIVE
BACTERIA #/AREA URNS HPF: NORMAL /HPF
BARBITURATES UR QL SCN>200 NG/ML: NEGATIVE
BENZODIAZ UR QL SCN>200 NG/ML: NORMAL
BILIRUB UR QL STRIP: NEGATIVE
BZE UR QL SCN: NEGATIVE
CANNABINOIDS UR QL SCN: NEGATIVE
CLARITY UR: CLEAR
COLOR UR: YELLOW
CREAT UR-MCNC: 68.8 MG/DL
GLUCOSE UR QL STRIP: NEGATIVE
HGB UR QL STRIP: NEGATIVE
KETONES UR QL STRIP: NEGATIVE
LEUKOCYTE ESTERASE UR QL STRIP: ABNORMAL
METHADONE UR QL SCN>300 NG/ML: NEGATIVE
MICROSCOPIC COMMENT: NORMAL
NITRITE UR QL STRIP: NEGATIVE
OPIATES UR QL SCN: NORMAL
PCP UR QL SCN>25 NG/ML: NEGATIVE
PH UR STRIP: 6 [PH] (ref 5–8)
PROT UR QL STRIP: NEGATIVE
RBC #/AREA URNS HPF: 1 /HPF (ref 0–4)
SP GR UR STRIP: 1.01 (ref 1–1.03)
TOXICOLOGY INFORMATION: NORMAL
URN SPEC COLLECT METH UR: ABNORMAL
UROBILINOGEN UR STRIP-ACNC: ABNORMAL EU/DL
WBC #/AREA URNS HPF: 1 /HPF (ref 0–5)

## 2017-09-15 PROCEDURE — 63600175 PHARM REV CODE 636 W HCPCS: Performed by: EMERGENCY MEDICINE

## 2017-09-15 PROCEDURE — 25500020 PHARM REV CODE 255: Performed by: EMERGENCY MEDICINE

## 2017-09-15 PROCEDURE — 81000 URINALYSIS NONAUTO W/SCOPE: CPT

## 2017-09-15 PROCEDURE — 80307 DRUG TEST PRSMV CHEM ANLYZR: CPT

## 2017-09-15 PROCEDURE — 25000003 PHARM REV CODE 250: Performed by: EMERGENCY MEDICINE

## 2017-09-15 RX ORDER — CEPHALEXIN 500 MG/1
500 CAPSULE ORAL EVERY 8 HOURS
Qty: 30 CAPSULE | Refills: 0 | Status: SHIPPED | OUTPATIENT
Start: 2017-09-15 | End: 2018-05-16

## 2017-09-15 RX ORDER — ONDANSETRON 4 MG/1
4 TABLET, ORALLY DISINTEGRATING ORAL EVERY 4 HOURS PRN
Qty: 20 TABLET | Refills: 0 | Status: SHIPPED | OUTPATIENT
Start: 2017-09-15 | End: 2017-09-22 | Stop reason: SDUPTHER

## 2017-09-15 RX ORDER — BUTALBITAL, ACETAMINOPHEN AND CAFFEINE 50; 325; 40 MG/1; MG/1; MG/1
1 TABLET ORAL
Status: COMPLETED | OUTPATIENT
Start: 2017-09-15 | End: 2017-09-15

## 2017-09-15 RX ORDER — METOPROLOL SUCCINATE 25 MG/1
25 TABLET, EXTENDED RELEASE ORAL DAILY
Qty: 30 TABLET | Refills: 2 | Status: SHIPPED | OUTPATIENT
Start: 2017-09-15 | End: 2017-09-22 | Stop reason: ALTCHOICE

## 2017-09-15 RX ORDER — ONDANSETRON 2 MG/ML
4 INJECTION INTRAMUSCULAR; INTRAVENOUS
Status: COMPLETED | OUTPATIENT
Start: 2017-09-15 | End: 2017-09-15

## 2017-09-15 RX ORDER — DICYCLOMINE HYDROCHLORIDE 20 MG/1
20 TABLET ORAL 4 TIMES DAILY PRN
Qty: 20 TABLET | Refills: 0 | Status: SHIPPED | OUTPATIENT
Start: 2017-09-15 | End: 2017-10-15

## 2017-09-15 RX ORDER — CEPHALEXIN 250 MG/1
500 CAPSULE ORAL
Status: COMPLETED | OUTPATIENT
Start: 2017-09-15 | End: 2017-09-15

## 2017-09-15 RX ADMIN — IOHEXOL 70 ML: 350 INJECTION, SOLUTION INTRAVENOUS at 01:09

## 2017-09-15 RX ADMIN — ONDANSETRON 4 MG: 2 INJECTION INTRAMUSCULAR; INTRAVENOUS at 03:09

## 2017-09-15 RX ADMIN — CEPHALEXIN 500 MG: 250 CAPSULE ORAL at 03:09

## 2017-09-15 RX ADMIN — BUTALBITAL, ACETAMINOPHEN AND CAFFEINE 1 TABLET: 50; 325; 40 TABLET ORAL at 12:09

## 2017-09-15 RX ADMIN — LORAZEPAM 1 MG: 2 INJECTION INTRAMUSCULAR; INTRAVENOUS at 03:09

## 2017-09-15 NOTE — ED PROVIDER NOTES
"Encounter Date: 9/14/2017    SCRIBE #1 NOTE: I, Elfi Christie, am scribing for, and in the presence of,  Catalina Ferguson MD. I have scribed the following portions of the note - Other sections scribed: HPI, ROS, PE.       History     Chief Complaint   Patient presents with    Hernia     Patient states that everynight she "eats dinner, pushes in her henia, and then throws up." Patient states that tonight she has 10/10 pain with her hernia.     CC: Abdominal Pain    HPI: 62 year old female presents to the ED via EMS c/o worsening abdominal pain secondary to a hernia with nausea and vomiting tonight. Pain is 10/10. Pt had a PEG tube placed by Dr. Gomez in April 2017 (needed surgery for gastric volvulus) and had it removed a few weeks ago. Pt states the hernia has protruded more since the PEG tube removal. Pt reports when she stands or moves around, the hernia keeps pushing out. She reports coming to the ED for further evaluation of her hernia possibly strangulating. Pt does not have an abdominal binder. Pt otherwise denies fever, chills, chest pain, SOB, diarrhea, dysuria, and any other associated symptoms. No alleviating factors.    PMHx: Alcohol abuse; Anxiety; Bipolar disorder; Cirrhosis of liver; Coronary artery disease; Depression; Fall; GERD; Hypertension; Low back pain; MI; and Thyroid disease    PSHx: Shoulder surgery (replacement); Knee surgery (bilateral replacement); right foot sx (2008); Joint replacement; and Hiatal hernia repair      The history is provided by the patient. No  was used.     Review of patient's allergies indicates:  No Known Allergies  Past Medical History:   Diagnosis Date    Alcohol abuse     Anxiety     Bipolar disorder     Cirrhosis of liver     Coronary artery disease     Depression     Fall     GERD (gastroesophageal reflux disease)     Hypertension     Low back pain     MI (myocardial infarction)     Thyroid disease      Past Surgical History: "   Procedure Laterality Date    HIATAL HERNIA REPAIR      JOINT REPLACEMENT      KNEE SURGERY  bilateral replacement    right foot sx  2008    SHOULDER SURGERY  replacement     Family History   Problem Relation Age of Onset    Cancer Mother     Cancer Father      Social History   Substance Use Topics    Smoking status: Never Smoker    Smokeless tobacco: Never Used    Alcohol use Yes     Review of Systems   Constitutional: Negative for chills, diaphoresis and fever.   HENT: Negative for ear pain and sore throat.    Eyes: Negative for photophobia and visual disturbance.   Respiratory: Negative for cough and shortness of breath.    Cardiovascular: Negative for chest pain.   Gastrointestinal: Positive for abdominal pain, nausea and vomiting. Negative for diarrhea.   Genitourinary: Negative for dysuria and flank pain.   Musculoskeletal: Negative for back pain and neck stiffness.   Skin: Negative for rash.   Neurological: Negative for headaches.       Physical Exam     Initial Vitals [09/14/17 2214]   BP Pulse Resp Temp SpO2   124/76 84 16 98.3 °F (36.8 °C) 98 %      MAP       92         Physical Exam    Nursing note and vitals reviewed.  Constitutional: She appears well-developed and well-nourished. She is not diaphoretic. She does not appear ill. No distress.   Middle-aged female, awake, alert, appears non-toxic. Pt is ambulatory, somewhat agitated, eager to show off hernia   HENT:   Head: Normocephalic and atraumatic.   Eyes: Conjunctivae and EOM are normal. Pupils are equal, round, and reactive to light.   Neck: Normal range of motion. Neck supple.   Cardiovascular: Normal rate and regular rhythm.   Pulmonary/Chest: Effort normal and breath sounds normal. No respiratory distress.   Abdominal: Soft. Normal appearance and bowel sounds are normal. She exhibits distension. There is tenderness.   L-sided abdominal wall hernia. PEG tube site protrudes somewhat past this, easily reduced, minimally erythematous,  non-tender/   Musculoskeletal: Normal range of motion. She exhibits no tenderness.   Neurological: She is alert and oriented to person, place, and time. She has normal strength.   Skin: Skin is warm and dry.   Psychiatric: She has a normal mood and affect.         ED Course   Procedures  Labs Reviewed   B-TYPE NATRIURETIC PEPTIDE - Abnormal; Notable for the following:        Result Value     (*)     All other components within normal limits   CBC W/ AUTO DIFFERENTIAL - Abnormal; Notable for the following:     WBC 3.82 (*)     MCH 32.5 (*)     RDW 15.1 (*)     All other components within normal limits   COMPREHENSIVE METABOLIC PANEL - Abnormal; Notable for the following:     Sodium 135 (*)     CO2 22 (*)     Albumin 3.4 (*)     Alkaline Phosphatase 315 (*)      (*)     ALT 73 (*)     All other components within normal limits   URINALYSIS - Abnormal; Notable for the following:     Urobilinogen, UA 2.0-3.0 (*)     Leukocytes, UA 1+ (*)     All other components within normal limits   ALCOHOL,MEDICAL (ETHANOL) - Abnormal; Notable for the following:     Alcohol, Medical, Serum 28 (*)     All other components within normal limits   LIPASE   TROPONIN I   DRUG SCREEN PANEL, URINE EMERGENCY   URINALYSIS MICROSCOPIC     EKG Readings: (Independently Interpreted)   23:02: NSR, HR 91. Normal axis and intervals. No STEMI.          Medical Decision Making:   History:   Old Medical Records: I decided to obtain old medical records.  Old Records Summarized: records from previous admission(s) and records from clinic visits.  Initial Assessment:   This is an emergent evaluation of a 62-year-old female presents with abdominal pain, nausea, vomiting.  Patient has a known left-sided abdominal wall hernia.  She recently had a PEG tube placed and then removed.  She complains of increasing swelling and wants her hernia fixed.  She missed her latest follow-up with Dr. Gomez.  Differential Diagnosis:   Differential includes  "uncomplicated abdominal wall hernia, incarcerated hernia, strangulated hernia, recurrent volvulus, other.  Independently Interpreted Test(s):   I have ordered and independently interpreted EKG Reading(s) - see prior notes  Clinical Tests:   Lab Tests: Ordered and Reviewed  Radiological Study: Ordered and Reviewed  Medical Tests: Ordered and Reviewed  ED Management:  Labs with chronic low WBC, chronic elevation of LFTs (ETOH), normal lipase. UA with 1+ leuks. BNP minimally elevated. Troponin normal.    EKG no STEMI.    CT abdom/pelvis read by radiology as : "Left anterolateral abdominal wall defect at the site of the patient's previously visualized gastrostomy tube which has been intervally removed. There is associated diastases of the rectus musculature with herniation of mesenteric fat through the defect. There is suggestion of mild inflammatory change of the herniated fat and a component of incarceration is not excluded."    As noted, patient's hernia easily reducible by me without significant discomfort to patient, who is ambulating around the exam room in no apparent distress.  I discussed the CT report with surgery resident Dr. Coello, on call for Dr. Gomez, who reviewed the images and agreed that if the patient's exam was unimpressive, she was stable for discharge with clinic follow-up.    The patient is disappointed she cannot get her hernia fixed here tomorrow, but understands the importance of outpatient follow-up.    I have treated the patient's UTI with Keflex.  The patient had Ativan here to allow for her CT scan and to prevent DTs, as she is a known alcoholic.  At her request, I have also changed her outpatient atenolol, which is apparently hard-to-find, for metoprolol succinate, though I have advised her to follow-up to her primary care doctor for further blood pressure related issues.    Dc'ed home with  in NAD.  Other:   I have discussed this case with another health care provider.        "     Scribe Attestation:   Scribe #1: I performed the above scribed service and the documentation accurately describes the services I performed. I attest to the accuracy of the note.    Attending Attestation:           Physician Attestation for Scribe:  Physician Attestation Statement for Scribe #1: I, Catalina Ferguson MD, reviewed documentation, as scribed by Elif Christie in my presence, and it is both accurate and complete.                 ED Course      Clinical Impression:   The primary encounter diagnosis was Abdominal pain, unspecified location. Diagnoses of Nausea, Chest pain, unspecified type, Alcohol abuse, Recurrent abdominal hernia without obstruction or gangrene, unspecified hernia type, and Bipolar affective disorder, remission status unspecified were also pertinent to this visit.                           Catalina Ferguson MD  09/15/17 [id number]

## 2017-09-15 NOTE — ED TRIAGE NOTES
Pt presents to ED with c/o pain around her hernia repair site rated 10/10.  Pt reports the g-tub was taken out about 3 weeks ago.  Pt reports the site has been protruding more than usual and is getting more swollen over the last few days.

## 2017-09-22 ENCOUNTER — OFFICE VISIT (OUTPATIENT)
Dept: FAMILY MEDICINE | Facility: CLINIC | Age: 62
End: 2017-09-22
Payer: OTHER GOVERNMENT

## 2017-09-22 VITALS
BODY MASS INDEX: 25.78 KG/M2 | HEIGHT: 64 IN | TEMPERATURE: 98 F | DIASTOLIC BLOOD PRESSURE: 80 MMHG | WEIGHT: 151 LBS | SYSTOLIC BLOOD PRESSURE: 125 MMHG | HEART RATE: 114 BPM | OXYGEN SATURATION: 95 %

## 2017-09-22 DIAGNOSIS — R00.0 TACHYCARDIA: ICD-10-CM

## 2017-09-22 DIAGNOSIS — M25.571 CHRONIC PAIN OF RIGHT ANKLE: ICD-10-CM

## 2017-09-22 DIAGNOSIS — F33.9 EPISODE OF RECURRENT MAJOR DEPRESSIVE DISORDER, UNSPECIFIED DEPRESSION EPISODE SEVERITY: ICD-10-CM

## 2017-09-22 DIAGNOSIS — R52 PAIN: ICD-10-CM

## 2017-09-22 DIAGNOSIS — M54.41 CHRONIC LOW BACK PAIN WITH RIGHT-SIDED SCIATICA, UNSPECIFIED BACK PAIN LATERALITY: ICD-10-CM

## 2017-09-22 DIAGNOSIS — R11.0 NAUSEA: ICD-10-CM

## 2017-09-22 DIAGNOSIS — B35.1 ONYCHOMYCOSIS OF TOENAIL: ICD-10-CM

## 2017-09-22 DIAGNOSIS — K21.9 GASTROESOPHAGEAL REFLUX DISEASE, ESOPHAGITIS PRESENCE NOT SPECIFIED: ICD-10-CM

## 2017-09-22 DIAGNOSIS — R35.0 URINARY FREQUENCY: ICD-10-CM

## 2017-09-22 DIAGNOSIS — G89.29 CHRONIC PAIN OF RIGHT ANKLE: ICD-10-CM

## 2017-09-22 DIAGNOSIS — F31.9 BIPOLAR AFFECTIVE DISORDER, REMISSION STATUS UNSPECIFIED: ICD-10-CM

## 2017-09-22 DIAGNOSIS — F51.01 PRIMARY INSOMNIA: ICD-10-CM

## 2017-09-22 DIAGNOSIS — G89.29 CHRONIC LOW BACK PAIN WITH RIGHT-SIDED SCIATICA, UNSPECIFIED BACK PAIN LATERALITY: ICD-10-CM

## 2017-09-22 DIAGNOSIS — K59.03 DRUG-INDUCED CONSTIPATION: Primary | ICD-10-CM

## 2017-09-22 DIAGNOSIS — R07.89 ATYPICAL CHEST PAIN: ICD-10-CM

## 2017-09-22 DIAGNOSIS — M51.36 DDD (DEGENERATIVE DISC DISEASE), LUMBAR: ICD-10-CM

## 2017-09-22 PROCEDURE — 99214 OFFICE O/P EST MOD 30 MIN: CPT | Mod: S$PBB,,, | Performed by: NURSE PRACTITIONER

## 2017-09-22 PROCEDURE — 3008F BODY MASS INDEX DOCD: CPT | Mod: ,,, | Performed by: NURSE PRACTITIONER

## 2017-09-22 PROCEDURE — 99215 OFFICE O/P EST HI 40 MIN: CPT | Mod: PBBFAC,PO | Performed by: NURSE PRACTITIONER

## 2017-09-22 PROCEDURE — 87086 URINE CULTURE/COLONY COUNT: CPT

## 2017-09-22 PROCEDURE — 99999 PR PBB SHADOW E&M-EST. PATIENT-LVL V: CPT | Mod: PBBFAC,,, | Performed by: NURSE PRACTITIONER

## 2017-09-22 RX ORDER — DOCUSATE SODIUM 100 MG/1
100 CAPSULE, LIQUID FILLED ORAL 2 TIMES DAILY
Qty: 60 CAPSULE | Refills: 2 | Status: SHIPPED | OUTPATIENT
Start: 2017-09-22 | End: 2018-05-16 | Stop reason: SDUPTHER

## 2017-09-22 RX ORDER — CYCLOBENZAPRINE HCL 10 MG
TABLET ORAL
Qty: 90 TABLET | Refills: 0 | Status: CANCELLED | OUTPATIENT
Start: 2017-09-22

## 2017-09-22 RX ORDER — METOPROLOL TARTRATE 25 MG/1
12.5 TABLET, FILM COATED ORAL 2 TIMES DAILY
Qty: 30 TABLET | Refills: 2 | Status: SHIPPED | OUTPATIENT
Start: 2017-09-22 | End: 2018-08-23

## 2017-09-22 RX ORDER — AMITRIPTYLINE HYDROCHLORIDE 50 MG/1
TABLET, FILM COATED ORAL
Qty: 90 TABLET | Refills: 0 | Status: SHIPPED | OUTPATIENT
Start: 2017-09-22 | End: 2017-11-03

## 2017-09-22 RX ORDER — GABAPENTIN 300 MG/1
300 CAPSULE ORAL 2 TIMES DAILY
Qty: 180 CAPSULE | Refills: 0 | Status: ON HOLD | OUTPATIENT
Start: 2017-09-22 | End: 2019-03-20

## 2017-09-22 RX ORDER — DULOXETIN HYDROCHLORIDE 30 MG/1
CAPSULE, DELAYED RELEASE ORAL
Qty: 90 CAPSULE | Refills: 0 | Status: ON HOLD | OUTPATIENT
Start: 2017-09-22 | End: 2018-09-23 | Stop reason: SDUPTHER

## 2017-09-22 RX ORDER — PANTOPRAZOLE SODIUM 40 MG/1
40 TABLET, DELAYED RELEASE ORAL DAILY
Qty: 90 TABLET | Refills: 0 | Status: SHIPPED | OUTPATIENT
Start: 2017-09-22 | End: 2017-12-05 | Stop reason: SDUPTHER

## 2017-09-22 RX ORDER — ONDANSETRON 4 MG/1
4 TABLET, ORALLY DISINTEGRATING ORAL EVERY 8 HOURS PRN
Qty: 20 TABLET | Refills: 0 | Status: SHIPPED | OUTPATIENT
Start: 2017-09-22 | End: 2019-04-04 | Stop reason: ALTCHOICE

## 2017-09-22 RX ORDER — LINACLOTIDE 145 UG/1
145 CAPSULE, GELATIN COATED ORAL DAILY
Qty: 90 CAPSULE | Refills: 0 | Status: SHIPPED | OUTPATIENT
Start: 2017-09-22 | End: 2018-05-16 | Stop reason: SDUPTHER

## 2017-09-22 NOTE — PROGRESS NOTES
Patient Name: Estella Arevalo    : 1955  MRN: [id number]    Subjective:  Estella SORIA is a 62 y.o. female who presents today temporary refill of her medications until she is able to follow up with her pcp for     She has hepatitis C and is being follow by [facility] for her liver, she has been few weeks off alcohol after her one time intoxication at ER 1 month ago. She is currently on some medications that may not be indicated due to her liver disease and is aware of this but states she needs it and will go over it with her liver specialist to see if she would have to discontinue.   2. She is on keflex for a uti currently and reports persistent urinary urgency so would like to obtain urine culture.     Past Medical History  Past Medical History:   Diagnosis Date    Alcohol abuse     Anxiety     Bipolar disorder     Cirrhosis of liver     Coronary artery disease     Depression     Fall     GERD (gastroesophageal reflux disease)     Hypertension     Low back pain     MI (myocardial infarction)     Thyroid disease        Past Surgical History  Past Surgical History:   Procedure Laterality Date    HIATAL HERNIA REPAIR      JOINT REPLACEMENT      KNEE SURGERY  bilateral replacement    right foot sx  2008    SHOULDER SURGERY  replacement       Family History  Family History   Problem Relation Age of Onset    Cancer Mother     Cancer Father        Social History  Social History     Social History    Marital status:      Spouse name: N/A    Number of children: N/A    Years of education: N/A     Occupational History    Not on file.     Social History Main Topics    Smoking status: Never Smoker    Smokeless tobacco: Never Used    Alcohol use Yes    Drug use: No    Sexual activity: Not on file     Other Topics Concern    Not on file     Social History Narrative    No narrative on file       Allergies  Review of patient's allergies indicates:  No Known Allergies -reviewed and  updated      Medications  Reviewed and updated.   Current Outpatient Prescriptions   Medication Sig Dispense Refill    amitriptyline (ELAVIL) 50 MG tablet TAKE 1 TABLET(50 MG) BY MOUTH EVERY EVENING 90 tablet 0    cephALEXin (KEFLEX) 500 MG capsule Take 1 capsule (500 mg total) by mouth every 8 (eight) hours. 30 capsule 0    clonazePAM (KLONOPIN) 1 MG tablet TAKE 1 TABLET BY MOUTH TWICE DAILY 60 tablet 0    cyclobenzaprine (FLEXERIL) 10 MG tablet TAKE 1 TABLET(10 MG) BY MOUTH THREE TIMES DAILY AS NEEDED FOR MUSCLE SPASMS 90 tablet 0    dicyclomine (BENTYL) 20 mg tablet Take 1 tablet (20 mg total) by mouth 4 (four) times daily as needed. 20 tablet 0    docusate sodium (COLACE) 100 MG capsule Take 1 capsule (100 mg total) by mouth 2 (two) times daily. 60 capsule 2    duloxetine (CYMBALTA) 30 MG capsule TAKE 1 CAPSULE(30 MG) BY MOUTH EVERY DAY 90 capsule 0    efinaconazole (JUBLIA) 10 % Eran APPLY ONE DOSE DAILY 24 mL 0    gabapentin (NEURONTIN) 300 MG capsule Take 1 capsule (300 mg total) by mouth 2 (two) times daily. 180 capsule 0    HARVONI  mg Tab Take 1 tablet by mouth once daily at 6am.  0    levothyroxine (SYNTHROID) 25 MCG tablet Take 1 tablet (25 mcg total) by mouth once daily. 90 tablet 5    LINZESS 145 mcg Cap capsule Take 1 capsule (145 mcg total) by mouth once daily. 90 capsule 0    nitroGLYCERIN (NITROSTAT) 0.3 MG SL tablet ONE TABLET UNDER TONGUE AS NEEDED FOR CHEST PAIN EVERY 5 MINUTES AS NEEDED 30 tablet 0    nitroGLYCERIN (NITROSTAT) 0.3 MG SL tablet ONE TABLET UNDER TONGUE AS NEEDED FOR CHEST PAIN EVERY 5 MINUTES AS NEEDED 100 tablet 0    ondansetron (ZOFRAN-ODT) 4 MG TbDL Take 1 tablet (4 mg total) by mouth every 8 (eight) hours as needed. Nausea/vomiting 20 tablet 0    oxycodone (ROXICODONE) 5 mg/5 mL Soln Take 10 mLs (10 mg total) by mouth every 4 (four) hours as needed. 4 Bottle 0    oxycodone 20 mg Tab TK 1 T PO  Q 6-8 H PRN  0    pantoprazole (PROTONIX) 40 MG tablet Take  "1 tablet (40 mg total) by mouth once daily. 90 tablet 0    RESTASIS 0.05 % ophthalmic emulsion INSTILL 1 DROP IN BOTH EYES TWICE DAILY 1 each 0    zolpidem (AMBIEN) 5 MG Tab TK 1 T PO QHS PRN 30 tablet 1    metoprolol tartrate (LOPRESSOR) 25 MG tablet Take 0.5 tablets (12.5 mg total) by mouth 2 (two) times daily. 30 tablet 2     No current facility-administered medications for this visit.          Review of Systems   Constitutional: Positive for malaise/fatigue. Negative for chills and fever.   Cardiovascular: Positive for chest pain (on and off, none at visit) and palpitations.   Gastrointestinal: Negative for abdominal pain, nausea and vomiting.   Genitourinary: Positive for frequency and urgency. Negative for dysuria.   Musculoskeletal: Positive for back pain (low back), joint pain (right ankle after previous sx) and myalgias (leg cramps and chronic back pain).   Psychiatric/Behavioral: Positive for depression. Negative for suicidal ideas. The patient is nervous/anxious and has insomnia.          Physical Exam  /80 (BP Location: Right arm, Patient Position: Sitting)   Pulse (!) 114   Temp 98.2 °F (36.8 °C) (Oral)   Ht 5' 4" (1.626 m)   Wt 68.5 kg (151 lb 0.2 oz)   LMP  (Approximate)   SpO2 95%   BMI 25.92 kg/m²   Physical Exam   Constitutional: She is oriented to person, place, and time. She appears well-developed. No distress.   Cardiovascular: Normal rate, regular rhythm and normal heart sounds.    Pulmonary/Chest: Effort normal and breath sounds normal.   Abdominal: Soft. There is no tenderness.   Musculoskeletal:        Right ankle: She exhibits swelling and deformity. She exhibits normal range of motion and no ecchymosis. Tenderness. Lateral malleolus tenderness found. Achilles tendon exhibits no pain.   Neurological: She is alert and oriented to person, place, and time.   Skin: She is not diaphoretic.         Assessment/Plan:  Estella Arevalo is a 62 y.o. female who presents today for " :    Drug-induced constipation  The current regimen is effective, continue treatment, follow up with pcp  -     docusate sodium (COLACE) 100 MG capsule; Take 1 capsule (100 mg total) by mouth 2 (two) times daily.  Dispense: 60 capsule; Refill: 2  -     LINZESS 145 mcg Cap capsule; Take 1 capsule (145 mcg total) by mouth once daily.  Dispense: 90 capsule; Refill: 0    Primary insomnia  The current regimen is effective, continue treatment, follow up with pcp  -     amitriptyline (ELAVIL) 50 MG tablet; TAKE 1 TABLET(50 MG) BY MOUTH EVERY EVENING  Dispense: 90 tablet; Refill: 0    Pain  Comments:  [facility] for chronic pain management     DDD (degenerative disc disease), lumbar  The current regimen is effective, continue treatment, follow up with pcp  -     duloxetine (CYMBALTA) 30 MG capsule; TAKE 1 CAPSULE(30 MG) BY MOUTH EVERY DAY  Dispense: 90 capsule; Refill: 0    Episode of recurrent major depressive disorder, unspecified depression episode severity  The current regimen is effective, continue treatment, follow up with pcp  -     duloxetine (CYMBALTA) 30 MG capsule; TAKE 1 CAPSULE(30 MG) BY MOUTH EVERY DAY  Dispense: 90 capsule; Refill: 0    Gastroesophageal reflux disease, esophagitis presence not specified  The current regimen is effective, continue treatment, follow up with pcp  -     pantoprazole (PROTONIX) 40 MG tablet; Take 1 tablet (40 mg total) by mouth once daily.  Dispense: 90 tablet; Refill: 0    Chronic low back pain with right-sided sciatica, unspecified back pain laterality  The current regimen is effective, continue treatment, follow up with pcp  -     gabapentin (NEURONTIN) 300 MG capsule; Take 1 capsule (300 mg total) by mouth 2 (two) times daily.  Dispense: 180 capsule; Refill: 0    Onychomycosis of toenail  The current regimen is effective, continue treatment, follow up with pcp  -     efinaconazole (JUBLIA) 10 % Eran; APPLY ONE DOSE DAILY  Dispense: 24 mL; Refill: 0    Tachycardia  The current  regimen is effective, continue treatment, follow up with pcp  -     metoprolol tartrate (LOPRESSOR) 25 MG tablet; Take 0.5 tablets (12.5 mg total) by mouth 2 (two) times daily.  Dispense: 30 tablet; Refill: 2    Nausea  The current regimen is effective, continue treatment, follow up with pcp  -     ondansetron (ZOFRAN-ODT) 4 MG TbDL; Take 1 tablet (4 mg total) by mouth every 8 (eight) hours as needed. Nausea/vomiting  Dispense: 20 tablet; Refill: 0    Bipolar affective disorder, remission status unspecified  -     Ambulatory referral to Psychiatry    Atypical chest pain  -     Ambulatory referral to Cardiology    Chronic pain of right ankle  -     Ambulatory referral to Orthopedics    Other orders  -     Cancel: cyclobenzaprine (FLEXERIL) 10 MG tablet;   Dispense: 90 tablet; Refill: 0-discuss tx contraindicated due to liver disease        Return f/u with Dr Packer .

## 2017-09-24 LAB — BACTERIA UR CULT: NO GROWTH

## 2017-09-25 ENCOUNTER — TELEPHONE (OUTPATIENT)
Dept: FAMILY MEDICINE | Facility: CLINIC | Age: 62
End: 2017-09-25

## 2017-10-02 DIAGNOSIS — B35.1 ONYCHOMYCOSIS OF TOENAIL: ICD-10-CM

## 2017-10-02 DIAGNOSIS — H04.129 DRY EYE SYNDROME, UNSPECIFIED LATERALITY: Primary | ICD-10-CM

## 2017-10-02 RX ORDER — CYCLOSPORINE 0.5 MG/ML
1 EMULSION OPHTHALMIC 2 TIMES DAILY
Qty: 1 EACH | Refills: 0 | Status: SHIPPED | OUTPATIENT
Start: 2017-10-02 | End: 2017-11-21 | Stop reason: SDUPTHER

## 2017-10-02 NOTE — TELEPHONE ENCOUNTER
----- Message from Ria Moran sent at 10/2/2017 12:44 PM CDT -----  Patient is asking to have amitriptyline (ELAVIL) 50 MG tablet increased to 75MG or 100MG or have it changed to something similar. She had developed a hernia and has more difficulty sleeping.    Also, needs refills on efinaconazole (JUBLIA) 10 % Eran and RESTASIS 0.05 % ophthalmic emulsion.    Please send to [facility] [phone]. Please call patient at [phone] Thank you!

## 2017-10-02 NOTE — TELEPHONE ENCOUNTER
----- Message from Ria Moran sent at 10/2/2017  1:06 PM CDT -----  Patient was taking Kelfex. She states she is still having symptoms of the UTI. She would like Dr Packer to analyze urine culture results and see if she needs another prescription. Please call at [phone] Thank you!

## 2017-11-03 ENCOUNTER — HOSPITAL ENCOUNTER (OUTPATIENT)
Dept: PREADMISSION TESTING | Facility: HOSPITAL | Age: 62
Discharge: HOME | End: 2017-11-03
Attending: SURGERY
Payer: OTHER GOVERNMENT

## 2017-11-03 VITALS
OXYGEN SATURATION: 97 % | HEIGHT: 65 IN | RESPIRATION RATE: 17 BRPM | HEART RATE: 76 BPM | WEIGHT: 175.69 LBS | BODY MASS INDEX: 29.27 KG/M2 | SYSTOLIC BLOOD PRESSURE: 138 MMHG | DIASTOLIC BLOOD PRESSURE: 94 MMHG | TEMPERATURE: 99 F

## 2017-11-03 DIAGNOSIS — Z01.818 PRE-OP TESTING: Primary | ICD-10-CM

## 2017-11-03 LAB
ANION GAP SERPL CALC-SCNC: 11 MMOL/L
BASOPHILS # BLD AUTO: 0.05 K/UL
BASOPHILS NFR BLD: 0.9 %
BUN SERPL-MCNC: 10 MG/DL
CALCIUM SERPL-MCNC: 8.4 MG/DL
CHLORIDE SERPL-SCNC: 100 MMOL/L
CO2 SERPL-SCNC: 23 MMOL/L
CREAT SERPL-MCNC: 0.7 MG/DL
DIFFERENTIAL METHOD: ABNORMAL
EOSINOPHIL # BLD AUTO: 0 K/UL
EOSINOPHIL NFR BLD: 0.4 %
ERYTHROCYTE [DISTWIDTH] IN BLOOD BY AUTOMATED COUNT: 14.8 %
EST. GFR  (AFRICAN AMERICAN): >60 ML/MIN/1.73 M^2
EST. GFR  (NON AFRICAN AMERICAN): >60 ML/MIN/1.73 M^2
GLUCOSE SERPL-MCNC: 109 MG/DL
HCT VFR BLD AUTO: 38.5 %
HGB BLD-MCNC: 13.3 G/DL
LYMPHOCYTES # BLD AUTO: 1.2 K/UL
LYMPHOCYTES NFR BLD: 22.2 %
MCH RBC QN AUTO: 31.7 PG
MCHC RBC AUTO-ENTMCNC: 34.5 G/DL
MCV RBC AUTO: 92 FL
MONOCYTES # BLD AUTO: 0.9 K/UL
MONOCYTES NFR BLD: 15.5 %
NEUTROPHILS # BLD AUTO: 3.4 K/UL
NEUTROPHILS NFR BLD: 61 %
PLATELET # BLD AUTO: 221 K/UL
PMV BLD AUTO: 9.4 FL
POTASSIUM SERPL-SCNC: 4 MMOL/L
RBC # BLD AUTO: 4.19 M/UL
SODIUM SERPL-SCNC: 134 MMOL/L
WBC # BLD AUTO: 5.54 K/UL

## 2017-11-03 PROCEDURE — 80048 BASIC METABOLIC PNL TOTAL CA: CPT

## 2017-11-03 PROCEDURE — 85025 COMPLETE CBC W/AUTO DIFF WBC: CPT

## 2017-11-03 NOTE — DISCHARGE INSTRUCTIONS
Your surgery is scheduled for _Wed. Nov. 3, 2017___________________.    Call [phone] between 2 p.m. and 5 p.m. on   __Tuesday_____________ to find out your arrival time for the day of your surgery.      Please report to SAME DAY SURGERY UNIT on the 2nd FLOOR at _______ a.m.  Use front door entrance. The doors open at 0530 am.          INSTRUCTIONS IMPORTANT!!!  ¨ Do not eat or drink after 12 midnight-including water. OK to brush teeth, no   gum, candy or mints!    ¨ Take only these medicines with a small swallow of water-morning of surgery.  TAKE METOPROLOL MORNING OF SURGERY WITH SWALLOW OF WATER.      x___  Prep instructions:  SHOWER     _x___  Please shower using Hibiclens soap the night before AND  the morning of  your surgery/procedure. Do not use Hibiclens on your face or genitals               If your surgery is around your belly button (Navel) be sure to wash inside your  belly button also. Rinse hibiclens off completely.  _x___  No shaving of procedural area at least 4-5 days before surgery due to  increased risk of skin irritation and/or possible infection.  _x___  Do not wear makeup, including mascara. WEARING EYE MAKEUP MAY LEAD TO SERIOUS EYE INJURY during surgery.  _x___  No powder, lotions or creams to your body.  __x__  You may wear only deodorant on the day of surgery.  _x___  Please remove all jewelry, including piercings and leave at home.  _x___  No money or valuables needed. Please leave at home.  You may bring your cell phone.    _x___  If going home the same day, arrange for a ride home. You will not be able to   drive if Anesthesia was used.  __x__  Wear loose fitting clothing. Allow for dressings, bandages.  _x___  Stop Aspirin, Ibuprofen, Motrin and Aleve at least 3-5 days before  surgery, unless otherwise instructed by your doctor, or the nurse.              You MAY use Tylenol/acetaminophen until day of surgery.  .  _x___  Call MD for temperature above 101 degrees.        _x___ Stop  taking any Fish Oil supplement or any Vitamins that contain Vitamin  E at least 5 days prior to surgery.          I have read or had read and explained to me, and understand the above information.  Additional comments or instructions:Please call   [phone] if you have any questions regarding the instructions above.

## 2017-11-03 NOTE — PRE-PROCEDURE INSTRUCTIONS
Pre-operative instructions, medication directives and pain scales reviewed with patient. Instructed to wash with Hibiclens prior to surgery. ll questions the patient had  were answered. Re-assurance about surgical procedure and day of surgery routine given as needed. The patient verbalized understanding of the pre-op instructions.

## 2017-11-08 ENCOUNTER — ANESTHESIA EVENT (OUTPATIENT)
Dept: SURGERY | Facility: HOSPITAL | Age: 62
End: 2017-11-08
Payer: OTHER GOVERNMENT

## 2017-11-08 ENCOUNTER — ANESTHESIA (OUTPATIENT)
Dept: SURGERY | Facility: HOSPITAL | Age: 62
End: 2017-11-08
Payer: OTHER GOVERNMENT

## 2017-11-08 ENCOUNTER — HOSPITAL ENCOUNTER (OUTPATIENT)
Facility: HOSPITAL | Age: 62
Discharge: HOME | End: 2017-11-09
Attending: SURGERY | Admitting: SURGERY
Payer: OTHER GOVERNMENT

## 2017-11-08 DIAGNOSIS — K43.9 VENTRAL HERNIA WITHOUT OBSTRUCTION OR GANGRENE: Primary | ICD-10-CM

## 2017-11-08 PROBLEM — K70.30 ALCOHOLIC CIRRHOSIS: Status: ACTIVE | Noted: 2017-11-08

## 2017-11-08 PROCEDURE — 71000039 HC RECOVERY, EACH ADD'L HOUR: Performed by: SURGERY

## 2017-11-08 PROCEDURE — 63600175 PHARM REV CODE 636 W HCPCS: Performed by: NURSE ANESTHETIST, CERTIFIED REGISTERED

## 2017-11-08 PROCEDURE — 36000708 HC OR TIME LEV III 1ST 15 MIN: Performed by: SURGERY

## 2017-11-08 PROCEDURE — 63600175 PHARM REV CODE 636 W HCPCS: Performed by: ANESTHESIOLOGY

## 2017-11-08 PROCEDURE — D9220A PRA ANESTHESIA: Mod: CRNA,,, | Performed by: NURSE ANESTHETIST, CERTIFIED REGISTERED

## 2017-11-08 PROCEDURE — 63600175 PHARM REV CODE 636 W HCPCS: Performed by: SURGERY

## 2017-11-08 PROCEDURE — S0028 INJECTION, FAMOTIDINE, 20 MG: HCPCS | Performed by: NURSE ANESTHETIST, CERTIFIED REGISTERED

## 2017-11-08 PROCEDURE — 37000009 HC ANESTHESIA EA ADD 15 MINS: Performed by: SURGERY

## 2017-11-08 PROCEDURE — 88302 TISSUE EXAM BY PATHOLOGIST: CPT | Mod: 26,,, | Performed by: PATHOLOGY

## 2017-11-08 PROCEDURE — 71000033 HC RECOVERY, INTIAL HOUR: Performed by: SURGERY

## 2017-11-08 PROCEDURE — 63600175 PHARM REV CODE 636 W HCPCS

## 2017-11-08 PROCEDURE — 36000709 HC OR TIME LEV III EA ADD 15 MIN: Performed by: SURGERY

## 2017-11-08 PROCEDURE — 25000003 PHARM REV CODE 250: Performed by: ANESTHESIOLOGY

## 2017-11-08 PROCEDURE — 25000003 PHARM REV CODE 250: Performed by: SURGERY

## 2017-11-08 PROCEDURE — 37000008 HC ANESTHESIA 1ST 15 MINUTES: Performed by: SURGERY

## 2017-11-08 PROCEDURE — 88302 TISSUE EXAM BY PATHOLOGIST: CPT | Performed by: PATHOLOGY

## 2017-11-08 PROCEDURE — S0020 INJECTION, BUPIVICAINE HYDRO: HCPCS | Performed by: SURGERY

## 2017-11-08 PROCEDURE — 25000003 PHARM REV CODE 250: Performed by: NURSE ANESTHETIST, CERTIFIED REGISTERED

## 2017-11-08 PROCEDURE — D9220A PRA ANESTHESIA: Mod: ANES,,, | Performed by: ANESTHESIOLOGY

## 2017-11-08 PROCEDURE — 27201423 OPTIME MED/SURG SUP & DEVICES STERILE SUPPLY: Performed by: SURGERY

## 2017-11-08 DEVICE — IMPLANTABLE DEVICE: Type: IMPLANTABLE DEVICE | Site: ABDOMEN | Status: FUNCTIONAL

## 2017-11-08 RX ORDER — FENTANYL CITRATE 50 UG/ML
50 INJECTION, SOLUTION INTRAMUSCULAR; INTRAVENOUS ONCE
Status: COMPLETED | OUTPATIENT
Start: 2017-11-08 | End: 2017-11-08

## 2017-11-08 RX ORDER — LIDOCAINE HYDROCHLORIDE AND EPINEPHRINE 20; 10 MG/ML; UG/ML
INJECTION, SOLUTION INFILTRATION; PERINEURAL
Status: DISCONTINUED | OUTPATIENT
Start: 2017-11-08 | End: 2017-11-08 | Stop reason: HOSPADM

## 2017-11-08 RX ORDER — LORAZEPAM 2 MG/ML
INJECTION INTRAMUSCULAR
Status: COMPLETED
Start: 2017-11-08 | End: 2017-11-08

## 2017-11-08 RX ORDER — DOCUSATE SODIUM 100 MG/1
100 CAPSULE, LIQUID FILLED ORAL 2 TIMES DAILY
Status: DISCONTINUED | OUTPATIENT
Start: 2017-11-08 | End: 2017-11-09 | Stop reason: HOSPADM

## 2017-11-08 RX ORDER — MIDAZOLAM HYDROCHLORIDE 1 MG/ML
INJECTION INTRAMUSCULAR; INTRAVENOUS
Status: DISCONTINUED | OUTPATIENT
Start: 2017-11-08 | End: 2017-11-08

## 2017-11-08 RX ORDER — BUPIVACAINE HYDROCHLORIDE 5 MG/ML
INJECTION, SOLUTION PERINEURAL
Status: DISCONTINUED | OUTPATIENT
Start: 2017-11-08 | End: 2017-11-08 | Stop reason: HOSPADM

## 2017-11-08 RX ORDER — OXYCODONE AND ACETAMINOPHEN 10; 325 MG/1; MG/1
1 TABLET ORAL EVERY 4 HOURS PRN
Status: DISCONTINUED | OUTPATIENT
Start: 2017-11-08 | End: 2017-11-09

## 2017-11-08 RX ORDER — LIDOCAINE HCL/PF 100 MG/5ML
SYRINGE (ML) INTRAVENOUS
Status: DISCONTINUED | OUTPATIENT
Start: 2017-11-08 | End: 2017-11-08

## 2017-11-08 RX ORDER — HYDROCODONE BITARTRATE AND ACETAMINOPHEN 5; 325 MG/1; MG/1
1 TABLET ORAL EVERY 4 HOURS PRN
Status: DISCONTINUED | OUTPATIENT
Start: 2017-11-08 | End: 2017-11-08

## 2017-11-08 RX ORDER — CYCLOBENZAPRINE HCL 10 MG
10 TABLET ORAL 3 TIMES DAILY
Status: DISCONTINUED | OUTPATIENT
Start: 2017-11-08 | End: 2017-11-09 | Stop reason: HOSPADM

## 2017-11-08 RX ORDER — PHENYLEPHRINE HYDROCHLORIDE 10 MG/ML
INJECTION INTRAVENOUS
Status: DISCONTINUED | OUTPATIENT
Start: 2017-11-08 | End: 2017-11-08

## 2017-11-08 RX ORDER — ONDANSETRON HYDROCHLORIDE 2 MG/ML
INJECTION, SOLUTION INTRAMUSCULAR; INTRAVENOUS
Status: DISCONTINUED | OUTPATIENT
Start: 2017-11-08 | End: 2017-11-08

## 2017-11-08 RX ORDER — GLYCOPYRROLATE 0.2 MG/ML
INJECTION INTRAMUSCULAR; INTRAVENOUS
Status: DISCONTINUED | OUTPATIENT
Start: 2017-11-08 | End: 2017-11-08

## 2017-11-08 RX ORDER — OXYCODONE HYDROCHLORIDE 5 MG/1
5 TABLET ORAL EVERY 4 HOURS PRN
Qty: 40 TABLET | Refills: 0 | Status: SHIPPED | OUTPATIENT
Start: 2017-11-08 | End: 2017-11-27

## 2017-11-08 RX ORDER — ACETAMINOPHEN 325 MG/1
650 TABLET ORAL EVERY 4 HOURS PRN
Status: DISCONTINUED | OUTPATIENT
Start: 2017-11-08 | End: 2017-11-08

## 2017-11-08 RX ORDER — SODIUM CHLORIDE, SODIUM LACTATE, POTASSIUM CHLORIDE, CALCIUM CHLORIDE 600; 310; 30; 20 MG/100ML; MG/100ML; MG/100ML; MG/100ML
INJECTION, SOLUTION INTRAVENOUS CONTINUOUS
Status: DISCONTINUED | OUTPATIENT
Start: 2017-11-08 | End: 2017-11-08

## 2017-11-08 RX ORDER — LIDOCAINE HYDROCHLORIDE 20 MG/ML
JELLY TOPICAL
Status: DISCONTINUED | OUTPATIENT
Start: 2017-11-08 | End: 2017-11-08

## 2017-11-08 RX ORDER — SODIUM CHLORIDE 0.9 G/100ML
IRRIGANT IRRIGATION
Status: DISCONTINUED | OUTPATIENT
Start: 2017-11-08 | End: 2017-11-08 | Stop reason: HOSPADM

## 2017-11-08 RX ORDER — HYDROCODONE BITARTRATE AND ACETAMINOPHEN 10; 325 MG/1; MG/1
1 TABLET ORAL EVERY 4 HOURS PRN
Status: DISCONTINUED | OUTPATIENT
Start: 2017-11-08 | End: 2017-11-08

## 2017-11-08 RX ORDER — PANTOPRAZOLE SODIUM 40 MG/1
40 TABLET, DELAYED RELEASE ORAL DAILY
Status: DISCONTINUED | OUTPATIENT
Start: 2017-11-08 | End: 2017-11-09 | Stop reason: HOSPADM

## 2017-11-08 RX ORDER — LORAZEPAM 2 MG/ML
0.5 INJECTION INTRAMUSCULAR
Status: DISCONTINUED | OUTPATIENT
Start: 2017-11-08 | End: 2017-11-08 | Stop reason: HOSPADM

## 2017-11-08 RX ORDER — NEOSTIGMINE METHYLSULFATE 1 MG/ML
INJECTION, SOLUTION INTRAVENOUS
Status: DISCONTINUED | OUTPATIENT
Start: 2017-11-08 | End: 2017-11-08

## 2017-11-08 RX ORDER — CEFAZOLIN SODIUM 2 G/50ML
2 SOLUTION INTRAVENOUS
Status: COMPLETED | OUTPATIENT
Start: 2017-11-08 | End: 2017-11-08

## 2017-11-08 RX ORDER — ZOLPIDEM TARTRATE 5 MG/1
5 TABLET ORAL NIGHTLY PRN
Status: DISCONTINUED | OUTPATIENT
Start: 2017-11-08 | End: 2017-11-09 | Stop reason: HOSPADM

## 2017-11-08 RX ORDER — GABAPENTIN 300 MG/1
300 CAPSULE ORAL 2 TIMES DAILY
Status: DISCONTINUED | OUTPATIENT
Start: 2017-11-08 | End: 2017-11-09 | Stop reason: HOSPADM

## 2017-11-08 RX ORDER — ONDANSETRON 2 MG/ML
4 INJECTION INTRAMUSCULAR; INTRAVENOUS EVERY 6 HOURS PRN
Status: DISCONTINUED | OUTPATIENT
Start: 2017-11-08 | End: 2017-11-09 | Stop reason: HOSPADM

## 2017-11-08 RX ORDER — HYDROMORPHONE HYDROCHLORIDE 2 MG/ML
1 INJECTION, SOLUTION INTRAMUSCULAR; INTRAVENOUS; SUBCUTANEOUS
Status: DISCONTINUED | OUTPATIENT
Start: 2017-11-08 | End: 2017-11-09

## 2017-11-08 RX ORDER — FENTANYL CITRATE 50 UG/ML
INJECTION, SOLUTION INTRAMUSCULAR; INTRAVENOUS
Status: DISCONTINUED | OUTPATIENT
Start: 2017-11-08 | End: 2017-11-08

## 2017-11-08 RX ORDER — HYDROMORPHONE HYDROCHLORIDE 2 MG/ML
0.2 INJECTION, SOLUTION INTRAMUSCULAR; INTRAVENOUS; SUBCUTANEOUS EVERY 5 MIN PRN
Status: DISCONTINUED | OUTPATIENT
Start: 2017-11-08 | End: 2017-11-08 | Stop reason: HOSPADM

## 2017-11-08 RX ORDER — SODIUM CHLORIDE 0.9 % (FLUSH) 0.9 %
3 SYRINGE (ML) INJECTION
Status: DISCONTINUED | OUTPATIENT
Start: 2017-11-08 | End: 2017-11-09 | Stop reason: HOSPADM

## 2017-11-08 RX ORDER — CLONAZEPAM 0.5 MG/1
1 TABLET ORAL 2 TIMES DAILY
Status: DISCONTINUED | OUTPATIENT
Start: 2017-11-08 | End: 2017-11-09 | Stop reason: HOSPADM

## 2017-11-08 RX ORDER — HYDROMORPHONE HYDROCHLORIDE 2 MG/ML
1 INJECTION, SOLUTION INTRAMUSCULAR; INTRAVENOUS; SUBCUTANEOUS EVERY 4 HOURS PRN
Status: DISCONTINUED | OUTPATIENT
Start: 2017-11-08 | End: 2017-11-08

## 2017-11-08 RX ORDER — NITROGLYCERIN 0.4 MG/1
0.4 TABLET SUBLINGUAL EVERY 5 MIN PRN
Status: DISCONTINUED | OUTPATIENT
Start: 2017-11-08 | End: 2017-11-09 | Stop reason: HOSPADM

## 2017-11-08 RX ORDER — DULOXETIN HYDROCHLORIDE 30 MG/1
30 CAPSULE, DELAYED RELEASE ORAL DAILY
Status: DISCONTINUED | OUTPATIENT
Start: 2017-11-08 | End: 2017-11-09 | Stop reason: HOSPADM

## 2017-11-08 RX ORDER — ACETAMINOPHEN 10 MG/ML
1000 INJECTION, SOLUTION INTRAVENOUS ONCE
Status: COMPLETED | OUTPATIENT
Start: 2017-11-08 | End: 2017-11-08

## 2017-11-08 RX ORDER — SODIUM CHLORIDE 9 MG/ML
INJECTION, SOLUTION INTRAVENOUS CONTINUOUS
Status: DISCONTINUED | OUTPATIENT
Start: 2017-11-08 | End: 2017-11-09

## 2017-11-08 RX ORDER — ONDANSETRON 2 MG/ML
4 INJECTION INTRAMUSCULAR; INTRAVENOUS EVERY 8 HOURS PRN
Status: DISCONTINUED | OUTPATIENT
Start: 2017-11-08 | End: 2017-11-08

## 2017-11-08 RX ORDER — LEVOTHYROXINE SODIUM 25 UG/1
25 TABLET ORAL DAILY
Status: DISCONTINUED | OUTPATIENT
Start: 2017-11-08 | End: 2017-11-09 | Stop reason: HOSPADM

## 2017-11-08 RX ORDER — FENTANYL CITRATE 50 UG/ML
50 INJECTION, SOLUTION INTRAMUSCULAR; INTRAVENOUS
Status: DISCONTINUED | OUTPATIENT
Start: 2017-11-08 | End: 2017-11-08

## 2017-11-08 RX ORDER — SUCCINYLCHOLINE CHLORIDE 20 MG/ML
INJECTION INTRAMUSCULAR; INTRAVENOUS
Status: DISCONTINUED | OUTPATIENT
Start: 2017-11-08 | End: 2017-11-08

## 2017-11-08 RX ORDER — METOPROLOL TARTRATE 25 MG/1
12.5 TABLET ORAL 2 TIMES DAILY
Status: DISCONTINUED | OUTPATIENT
Start: 2017-11-08 | End: 2017-11-09 | Stop reason: HOSPADM

## 2017-11-08 RX ORDER — FAMOTIDINE 10 MG/ML
INJECTION INTRAVENOUS
Status: DISCONTINUED | OUTPATIENT
Start: 2017-11-08 | End: 2017-11-08

## 2017-11-08 RX ORDER — PROPOFOL 10 MG/ML
VIAL (ML) INTRAVENOUS
Status: DISCONTINUED | OUTPATIENT
Start: 2017-11-08 | End: 2017-11-08

## 2017-11-08 RX ORDER — LORAZEPAM 2 MG/ML
2 INJECTION INTRAMUSCULAR EVERY 4 HOURS PRN
Status: DISCONTINUED | OUTPATIENT
Start: 2017-11-08 | End: 2017-11-09 | Stop reason: HOSPADM

## 2017-11-08 RX ORDER — ROCURONIUM BROMIDE 10 MG/ML
INJECTION, SOLUTION INTRAVENOUS
Status: DISCONTINUED | OUTPATIENT
Start: 2017-11-08 | End: 2017-11-08

## 2017-11-08 RX ADMIN — HYDROMORPHONE HYDROCHLORIDE 0.2 MG: 2 INJECTION, SOLUTION INTRAMUSCULAR; INTRAVENOUS; SUBCUTANEOUS at 12:11

## 2017-11-08 RX ADMIN — GLYCOPYRROLATE 0.2 MG: 0.2 INJECTION, SOLUTION INTRAMUSCULAR; INTRAVENOUS at 11:11

## 2017-11-08 RX ADMIN — ONDANSETRON 4 MG: 2 INJECTION, SOLUTION INTRAMUSCULAR; INTRAVENOUS at 08:11

## 2017-11-08 RX ADMIN — SODIUM CHLORIDE, SODIUM LACTATE, POTASSIUM CHLORIDE, AND CALCIUM CHLORIDE: .6; .31; .03; .02 INJECTION, SOLUTION INTRAVENOUS at 09:11

## 2017-11-08 RX ADMIN — LIDOCAINE HYDROCHLORIDE 1 EACH: 20 JELLY TOPICAL at 10:11

## 2017-11-08 RX ADMIN — NEOSTIGMINE METHYLSULFATE 2.5 MG: 1 INJECTION INTRAVENOUS at 11:11

## 2017-11-08 RX ADMIN — LIDOCAINE HYDROCHLORIDE 100 MG: 20 INJECTION, SOLUTION INTRAVENOUS at 10:11

## 2017-11-08 RX ADMIN — FENTANYL CITRATE 50 MCG: 50 INJECTION, SOLUTION INTRAMUSCULAR; INTRAVENOUS at 11:11

## 2017-11-08 RX ADMIN — ONDANSETRON 4 MG: 2 INJECTION, SOLUTION INTRAMUSCULAR; INTRAVENOUS at 10:11

## 2017-11-08 RX ADMIN — ROCURONIUM BROMIDE 5 MG: 10 INJECTION, SOLUTION INTRAVENOUS at 10:11

## 2017-11-08 RX ADMIN — PHENYLEPHRINE HYDROCHLORIDE 100 MCG: 10 INJECTION INTRAVENOUS at 10:11

## 2017-11-08 RX ADMIN — FENTANYL CITRATE 50 MCG: 50 INJECTION INTRAMUSCULAR; INTRAVENOUS at 01:11

## 2017-11-08 RX ADMIN — ACETAMINOPHEN 1000 MG: 10 INJECTION, SOLUTION INTRAVENOUS at 01:11

## 2017-11-08 RX ADMIN — CLONAZEPAM 1 MG: 0.5 TABLET ORAL at 09:11

## 2017-11-08 RX ADMIN — PHENYLEPHRINE HYDROCHLORIDE 200 MCG: 10 INJECTION INTRAVENOUS at 10:11

## 2017-11-08 RX ADMIN — OXYCODONE HYDROCHLORIDE AND ACETAMINOPHEN 1 TABLET: 10; 325 TABLET ORAL at 08:11

## 2017-11-08 RX ADMIN — FENTANYL CITRATE 25 MCG: 50 INJECTION, SOLUTION INTRAMUSCULAR; INTRAVENOUS at 12:11

## 2017-11-08 RX ADMIN — MIDAZOLAM HYDROCHLORIDE 2 MG: 1 INJECTION, SOLUTION INTRAMUSCULAR; INTRAVENOUS at 10:11

## 2017-11-08 RX ADMIN — SODIUM CHLORIDE: 0.9 INJECTION, SOLUTION INTRAVENOUS at 04:11

## 2017-11-08 RX ADMIN — HYDROMORPHONE HYDROCHLORIDE 1 MG: 2 INJECTION INTRAMUSCULAR; INTRAVENOUS; SUBCUTANEOUS at 08:11

## 2017-11-08 RX ADMIN — PANTOPRAZOLE SODIUM 40 MG: 40 TABLET, DELAYED RELEASE ORAL at 04:11

## 2017-11-08 RX ADMIN — PROMETHAZINE HYDROCHLORIDE 12.5 MG: 25 INJECTION INTRAMUSCULAR; INTRAVENOUS at 08:11

## 2017-11-08 RX ADMIN — PROPOFOL 150 MG: 10 INJECTION, EMULSION INTRAVENOUS at 10:11

## 2017-11-08 RX ADMIN — PROMETHAZINE HYDROCHLORIDE 12.5 MG: 25 INJECTION INTRAMUSCULAR; INTRAVENOUS at 12:11

## 2017-11-08 RX ADMIN — FENTANYL CITRATE 50 MCG: 50 INJECTION INTRAMUSCULAR; INTRAVENOUS at 12:11

## 2017-11-08 RX ADMIN — ROCURONIUM BROMIDE 30 MG: 10 INJECTION, SOLUTION INTRAVENOUS at 10:11

## 2017-11-08 RX ADMIN — PHENYLEPHRINE HYDROCHLORIDE 100 MCG: 10 INJECTION INTRAVENOUS at 11:11

## 2017-11-08 RX ADMIN — ONDANSETRON 4 MG: 2 INJECTION, SOLUTION INTRAMUSCULAR; INTRAVENOUS at 11:11

## 2017-11-08 RX ADMIN — CEFAZOLIN SODIUM 2 G: 2 SOLUTION INTRAVENOUS at 10:11

## 2017-11-08 RX ADMIN — LORAZEPAM 0.5 MG: 2 INJECTION INTRAMUSCULAR at 01:11

## 2017-11-08 RX ADMIN — DOCUSATE SODIUM 100 MG: 100 CAPSULE, LIQUID FILLED ORAL at 09:11

## 2017-11-08 RX ADMIN — FAMOTIDINE 20 MG: 10 INJECTION, SOLUTION INTRAVENOUS at 10:11

## 2017-11-08 RX ADMIN — SUCCINYLCHOLINE CHLORIDE 140 MG: 20 INJECTION, SOLUTION INTRAMUSCULAR; INTRAVENOUS at 10:11

## 2017-11-08 RX ADMIN — HYDROMORPHONE HYDROCHLORIDE 1 MG: 2 INJECTION, SOLUTION INTRAMUSCULAR; INTRAVENOUS; SUBCUTANEOUS at 10:11

## 2017-11-08 RX ADMIN — LORAZEPAM 0.5 MG: 2 INJECTION INTRAMUSCULAR; INTRAVENOUS at 01:11

## 2017-11-08 RX ADMIN — OXYCODONE HYDROCHLORIDE AND ACETAMINOPHEN 1 TABLET: 10; 325 TABLET ORAL at 04:11

## 2017-11-08 RX ADMIN — PHENYLEPHRINE HYDROCHLORIDE 200 MCG: 10 INJECTION INTRAVENOUS at 11:11

## 2017-11-08 RX ADMIN — FENTANYL CITRATE 100 MCG: 50 INJECTION, SOLUTION INTRAMUSCULAR; INTRAVENOUS at 10:11

## 2017-11-08 RX ADMIN — DULOXETINE 30 MG: 30 CAPSULE, DELAYED RELEASE ORAL at 04:11

## 2017-11-08 RX ADMIN — HYDROMORPHONE HYDROCHLORIDE 0.2 MG: 2 INJECTION, SOLUTION INTRAMUSCULAR; INTRAVENOUS; SUBCUTANEOUS at 01:11

## 2017-11-08 RX ADMIN — LEVOTHYROXINE SODIUM 25 MCG: 25 TABLET ORAL at 04:11

## 2017-11-08 NOTE — H&P (VIEW-ONLY)
History & Physical    SUBJECTIVE:     History of Present Illness:  Patient is a 62 y.o. female with history of incarcerated paraesophageal hernia, now presents with hernia through the PEG site.  History of alcoholism, but pt insists she has quit.  BMI 30 and pt says she is losing weight.  Hernia causes pain.  No n/v, normal BM.    Chief Complaint   Patient presents with    Hernia       Review of patient's allergies indicates:  No Known Allergies    Current Outpatient Prescriptions   Medication Sig Dispense Refill    amitriptyline (ELAVIL) 50 MG tablet TAKE 1 TABLET(50 MG) BY MOUTH EVERY EVENING 90 tablet 0    cephALEXin (KEFLEX) 500 MG capsule Take 1 capsule (500 mg total) by mouth every 8 (eight) hours. 30 capsule 0    clonazePAM (KLONOPIN) 1 MG tablet TAKE 1 TABLET BY MOUTH TWICE DAILY 60 tablet 0    cyclobenzaprine (FLEXERIL) 10 MG tablet TAKE 1 TABLET(10 MG) BY MOUTH THREE TIMES DAILY AS NEEDED FOR MUSCLE SPASMS 90 tablet 0    cycloSPORINE (RESTASIS) 0.05 % ophthalmic emulsion Place 0.4 mLs (1 drop total) into both eyes 2 (two) times daily. 1 each 0    docusate sodium (COLACE) 100 MG capsule Take 1 capsule (100 mg total) by mouth 2 (two) times daily. 60 capsule 2    duloxetine (CYMBALTA) 30 MG capsule TAKE 1 CAPSULE(30 MG) BY MOUTH EVERY DAY 90 capsule 0    efinaconazole (JUBLIA) 10 % Eran APPLY ONE DOSE DAILY 24 mL 0    gabapentin (NEURONTIN) 300 MG capsule Take 1 capsule (300 mg total) by mouth 2 (two) times daily. 180 capsule 0    HARVONI  mg Tab Take 1 tablet by mouth once daily at 6am.  0    levothyroxine (SYNTHROID) 25 MCG tablet Take 1 tablet (25 mcg total) by mouth once daily. 90 tablet 5    LINZESS 145 mcg Cap capsule Take 1 capsule (145 mcg total) by mouth once daily. 90 capsule 0    metoprolol tartrate (LOPRESSOR) 25 MG tablet Take 0.5 tablets (12.5 mg total) by mouth 2 (two) times daily. 30 tablet 2    nitroGLYCERIN (NITROSTAT) 0.3 MG SL tablet ONE TABLET UNDER TONGUE AS NEEDED  FOR CHEST PAIN EVERY 5 MINUTES AS NEEDED 30 tablet 0    nitroGLYCERIN (NITROSTAT) 0.3 MG SL tablet ONE TABLET UNDER TONGUE AS NEEDED FOR CHEST PAIN EVERY 5 MINUTES AS NEEDED 100 tablet 0    ondansetron (ZOFRAN-ODT) 4 MG TbDL Take 1 tablet (4 mg total) by mouth every 8 (eight) hours as needed. Nausea/vomiting 20 tablet 0    oxycodone (ROXICODONE) 5 mg/5 mL Soln Take 10 mLs (10 mg total) by mouth every 4 (four) hours as needed. 4 Bottle 0    oxycodone 20 mg Tab TK 1 T PO  Q 6-8 H PRN  0    pantoprazole (PROTONIX) 40 MG tablet Take 1 tablet (40 mg total) by mouth once daily. 90 tablet 0    zolpidem (AMBIEN) 5 MG Tab TK 1 T PO QHS PRN 30 tablet 1     No current facility-administered medications for this visit.        Past Medical History:   Diagnosis Date    Alcohol abuse     Anxiety     Bipolar disorder     Cirrhosis of liver     Coronary artery disease     Depression     Fall     GERD (gastroesophageal reflux disease)     Hypertension     Low back pain     MI (myocardial infarction)     Thyroid disease      Past Surgical History:   Procedure Laterality Date    HIATAL HERNIA REPAIR      JOINT REPLACEMENT      KNEE SURGERY  bilateral replacement    right foot sx  2008    SHOULDER SURGERY  replacement     Family History   Problem Relation Age of Onset    Cancer Mother     Cancer Father      Social History   Substance Use Topics    Smoking status: Never Smoker    Smokeless tobacco: Never Used    Alcohol use Yes        Review of Systems:    Review of Systems   Constitutional: Negative for chills and fever.   HENT: Negative.    Eyes: Negative.    Respiratory: Negative for cough and shortness of breath.    Cardiovascular: Negative for chest pain and palpitations.   Gastrointestinal: Positive for abdominal pain. Negative for constipation, nausea and vomiting.   Musculoskeletal: Negative.    Skin: Negative.    Hematological: Negative.    Psychiatric/Behavioral: Negative.        OBJECTIVE:     Vital  "Signs (Most Recent)     5' 4" (1.626 m)  68.5 kg (151 lb)     Physical Exam:    Physical Exam   Constitutional: She is oriented to person, place, and time. She appears well-developed and well-nourished.   HENT:   Head: Normocephalic and atraumatic.   Eyes: Pupils are equal, round, and reactive to light. No scleral icterus.   Neck: Normal range of motion. No thyromegaly present.   Cardiovascular: Normal rate and regular rhythm.    Pulmonary/Chest: Effort normal and breath sounds normal.   Abdominal: Soft. She exhibits no distension. There is no tenderness. A hernia is present.   Neurological: She is alert and oriented to person, place, and time.   Skin: Skin is warm and dry.   Psychiatric: She has a normal mood and affect. Thought content normal.       Laboratory      Diagnostic Results:      ASSESSMENT/PLAN:     Hernia at old PEG site.  Lap VHR, will likely need to resect stomach at the site to reduce the hernia and repair the defect.  This will increase risk of complication and necessitate biologic mesh  "

## 2017-11-08 NOTE — ANESTHESIA PREPROCEDURE EVALUATION
11/08/2017  Estella Arevalo is a 62 y.o., female.    Anesthesia Evaluation    I have reviewed the Patient Summary Reports.     I have reviewed the Medications.     Review of Systems  Anesthesia Hx:  No problems with previous Anesthesia  History of prior surgery of interest to airway management or planning: Previous anesthesia: General   Social:  Social Alcohol Use, Non-Smoker    Hematology/Oncology:  Hematology Normal   Oncology Normal     EENT/Dental:EENT/Dental Normal   Cardiovascular:   Exercise tolerance: poor Hypertension, well controlled Past MI CAD asymptomatic     Pulmonary:  Pulmonary Normal    Renal/:  Renal/ Normal     Hepatic/GI:   Hiatal Hernia, GERD, well controlled Liver Disease, Hepatitis    Musculoskeletal:   Arthritis     Neurological:   Neuromuscular Disease,    Endocrine:   Hypothyroidism    Dermatological:  Skin Normal    Psych:  Psychiatric Normal           Physical Exam  General:  Well nourished    Airway/Jaw/Neck:  Airway Findings: Mouth Opening: Normal Tongue: Normal  General Airway Assessment: Adult  Mallampati: II  TM Distance: Normal, at least 6 cm  Jaw/Neck Findings:  Neck ROM: Normal ROM      Dental:  Dental Findings: In tact        Mental Status:  Mental Status Findings:  Cooperative, Alert and Oriented         Anesthesia Plan  Type of Anesthesia, risks & benefits discussed:  Anesthesia Type:  general  Patient's Preference: GA  Intra-op Monitoring Plan: standard ASA monitors  Intra-op Monitoring Plan Comments:   Post Op Pain Control Plan: multimodal analgesia  Post Op Pain Control Plan Comments:   Induction:   IV  Beta Blocker:  Patient is on a Beta-Blocker and has received one dose within the past 24 hours (No further documentation required).       Informed Consent: Patient understands risks and agrees with Anesthesia plan.  Questions answered. Anesthesia consent signed  with patient.  ASA Score: 3     Day of Surgery Review of History & Physical:  There are no significant changes.  H&P update referred to the surgeon.         Ready For Surgery From Anesthesia Perspective.

## 2017-11-08 NOTE — TRANSFER OF CARE
"Anesthesia Transfer of Care Note    Patient: Estella Arevalo    Procedure(s) Performed: Procedure(s) (LRB):  REPAIR-HERNIA-VENTRAL-LAPAROSCOPIC-W/MESH (N/A)    Patient location: PACU    Anesthesia Type: general    Transport from OR: Transported from OR on room air with adequate spontaneous ventilation    Post pain: adequate analgesia    Post assessment: no apparent anesthetic complications    Post vital signs: stable    Level of consciousness: awake    Nausea/Vomiting: no nausea/vomiting    Complications: none    Transfer of care protocol was followed      Last vitals:   Visit Vitals  BP (!) 141/76 (BP Location: Right arm, Patient Position: Lying)   Pulse 71   Temp 36.3 °C (97.3 °F) (Oral)   Resp 20   Ht 5' 5" (1.651 m)   Wt 79.4 kg (175 lb)   LMP  (Approximate)   SpO2 (!) 94%   BMI 29.12 kg/m²     "

## 2017-11-08 NOTE — BRIEF OP NOTE
[facility]  Brief Operative Note     SUMMARY     Surgery Date: 11/8/2017     Surgeon(s) and Role:     * Vini Gomez MD - Primary    Assisting Surgeon: Josephine Fuentes, Surgery Resident    Pre-op Diagnosis:  Ventral hernia without obstruction or gangrene [K43.9]    Post-op Diagnosis:  Post-Op Diagnosis Codes:     * Ventral hernia without obstruction or gangrene [K43.9]    Procedure(s) (LRB):  REPAIR-HERNIA-VENTRAL-LAPAROSCOPIC-W/MESH (N/A)    Anesthesia: General    Description of the findings of the procedure: laparoscopic hernia repair with mesh    Findings/Key Components: hernia containing stomach from previous gastrostomy tube which was stapled off, stratice mesh placed    Estimated Blood Loss: * No values recorded between 11/8/2017 10:58 AM and 11/8/2017 12:12 PM *         Specimens:   Specimen (12h ago through future)    Start     Ordered    11/08/17 1138  Specimen to Pathology - Surgery  Once     Comments:  Gastrostomy      11/08/17 1202          Discharge Note    SUMMARY     Admit Date: 11/8/2017    Discharge Date and Time:  11/08/2017 12:18 PM    Hospital Course (synopsis of major diagnoses, care, treatment, and services provided during the course of the hospital stay): 61 y/o female with ventral hernia at previous gastrostomy site presented for elective repair of hernia.. She tolerated the surgery well without any complications. Once she recovered from anesthesia, tolerated PO and her pain was controlled she was deemed appropriate for discharge.      Final Diagnosis: Post-Op Diagnosis Codes:     * Ventral hernia without obstruction or gangrene [K43.9]    Disposition: Home or Self Care    Follow Up/Patient Instructions:     Medications:  Reconciled Home Medications:   Current Discharge Medication List      CONTINUE these medications which have CHANGED    Details   oxyCODONE (ROXICODONE) 5 MG immediate release tablet Take 1 tablet (5 mg total) by mouth every 4 (four) hours as needed for  Pain.  Qty: 40 tablet, Refills: 0         CONTINUE these medications which have NOT CHANGED    Details   clonazePAM (KLONOPIN) 1 MG tablet TAKE 1 TABLET BY MOUTH TWICE DAILY  Qty: 60 tablet, Refills: 0      cyclobenzaprine (FLEXERIL) 10 MG tablet TAKE 1 TABLET(10 MG) BY MOUTH THREE TIMES DAILY AS NEEDED FOR MUSCLE SPASMS  Qty: 90 tablet, Refills: 0    Associated Diagnoses: Pain; DDD (degenerative disc disease), lumbar      cycloSPORINE (RESTASIS) 0.05 % ophthalmic emulsion Place 0.4 mLs (1 drop total) into both eyes 2 (two) times daily.  Qty: 1 each, Refills: 0    Associated Diagnoses: Dry eye syndrome, unspecified laterality      docusate sodium (COLACE) 100 MG capsule Take 1 capsule (100 mg total) by mouth 2 (two) times daily.  Qty: 60 capsule, Refills: 2    Associated Diagnoses: Drug-induced constipation      duloxetine (CYMBALTA) 30 MG capsule TAKE 1 CAPSULE(30 MG) BY MOUTH EVERY DAY  Qty: 90 capsule, Refills: 0    Associated Diagnoses: DDD (degenerative disc disease), lumbar; Episode of recurrent major depressive disorder, unspecified depression episode severity      efinaconazole (JUBLIA) 10 % Eran APPLY ONE DOSE DAILY  Qty: 24 mL, Refills: 0    Associated Diagnoses: Onychomycosis of toenail      gabapentin (NEURONTIN) 300 MG capsule Take 1 capsule (300 mg total) by mouth 2 (two) times daily.  Qty: 180 capsule, Refills: 0    Associated Diagnoses: Chronic low back pain with right-sided sciatica, unspecified back pain laterality      levothyroxine (SYNTHROID) 25 MCG tablet Take 1 tablet (25 mcg total) by mouth once daily.  Qty: 90 tablet, Refills: 5    Comments: **Patient requests 90 days supply**  Associated Diagnoses: Hypothyroidism due to acquired atrophy of thyroid      LINZESS 145 mcg Cap capsule Take 1 capsule (145 mcg total) by mouth once daily.  Qty: 90 capsule, Refills: 0    Associated Diagnoses: Drug-induced constipation      metoprolol tartrate (LOPRESSOR) 25 MG tablet Take 0.5 tablets (12.5 mg total)  by mouth 2 (two) times daily.  Qty: 30 tablet, Refills: 2    Associated Diagnoses: Tachycardia      !! nitroGLYCERIN (NITROSTAT) 0.3 MG SL tablet ONE TABLET UNDER TONGUE AS NEEDED FOR CHEST PAIN EVERY 5 MINUTES AS NEEDED  Qty: 30 tablet, Refills: 0    Comments: **Patient requests 90 days supply**      ondansetron (ZOFRAN-ODT) 4 MG TbDL Take 1 tablet (4 mg total) by mouth every 8 (eight) hours as needed. Nausea/vomiting  Qty: 20 tablet, Refills: 0    Associated Diagnoses: Nausea      pantoprazole (PROTONIX) 40 MG tablet Take 1 tablet (40 mg total) by mouth once daily.  Qty: 90 tablet, Refills: 0    Associated Diagnoses: Gastroesophageal reflux disease, esophagitis presence not specified      zolpidem (AMBIEN) 5 MG Tab TK 1 T PO QHS PRN  Qty: 30 tablet, Refills: 1    Associated Diagnoses: Primary insomnia      cephALEXin (KEFLEX) 500 MG capsule Take 1 capsule (500 mg total) by mouth every 8 (eight) hours.  Qty: 30 capsule, Refills: 0      !! nitroGLYCERIN (NITROSTAT) 0.3 MG SL tablet ONE TABLET UNDER TONGUE AS NEEDED FOR CHEST PAIN EVERY 5 MINUTES AS NEEDED  Qty: 100 tablet, Refills: 0       !! - Potential duplicate medications found. Please discuss with provider.      STOP taking these medications       oxycodone (ROXICODONE) 5 mg/5 mL Soln Comments:   Reason for Stopping:             No discharge procedures on file.

## 2017-11-09 VITALS
BODY MASS INDEX: 29.16 KG/M2 | TEMPERATURE: 99 F | RESPIRATION RATE: 19 BRPM | OXYGEN SATURATION: 90 % | HEART RATE: 84 BPM | HEIGHT: 65 IN | WEIGHT: 175 LBS | SYSTOLIC BLOOD PRESSURE: 129 MMHG | DIASTOLIC BLOOD PRESSURE: 90 MMHG

## 2017-11-09 PROCEDURE — 63600175 PHARM REV CODE 636 W HCPCS: Performed by: SURGERY

## 2017-11-09 PROCEDURE — 90471 IMMUNIZATION ADMIN: CPT | Performed by: SURGERY

## 2017-11-09 PROCEDURE — 25000003 PHARM REV CODE 250: Performed by: SURGERY

## 2017-11-09 PROCEDURE — 90472 IMMUNIZATION ADMIN EACH ADD: CPT | Performed by: SURGERY

## 2017-11-09 PROCEDURE — 90670 PCV13 VACCINE IM: CPT | Performed by: SURGERY

## 2017-11-09 PROCEDURE — 90686 IIV4 VACC NO PRSV 0.5 ML IM: CPT | Performed by: SURGERY

## 2017-11-09 RX ORDER — OXYCODONE HYDROCHLORIDE 15 MG/1
15 TABLET ORAL EVERY 4 HOURS PRN
Status: DISCONTINUED | OUTPATIENT
Start: 2017-11-09 | End: 2017-11-09

## 2017-11-09 RX ORDER — HYDROMORPHONE HYDROCHLORIDE 2 MG/ML
2 INJECTION, SOLUTION INTRAMUSCULAR; INTRAVENOUS; SUBCUTANEOUS EVERY 4 HOURS PRN
Status: DISCONTINUED | OUTPATIENT
Start: 2017-11-09 | End: 2017-11-09

## 2017-11-09 RX ORDER — KETOROLAC TROMETHAMINE 30 MG/ML
30 INJECTION, SOLUTION INTRAMUSCULAR; INTRAVENOUS EVERY 8 HOURS
Status: DISCONTINUED | OUTPATIENT
Start: 2017-11-09 | End: 2017-11-09 | Stop reason: HOSPADM

## 2017-11-09 RX ADMIN — HYDROMORPHONE HYDROCHLORIDE 1 MG: 2 INJECTION, SOLUTION INTRAMUSCULAR; INTRAVENOUS; SUBCUTANEOUS at 12:11

## 2017-11-09 RX ADMIN — CLONAZEPAM 1 MG: 0.5 TABLET ORAL at 08:11

## 2017-11-09 RX ADMIN — OXYCODONE HYDROCHLORIDE 20 MG: 5 TABLET ORAL at 08:11

## 2017-11-09 RX ADMIN — OXYCODONE HYDROCHLORIDE AND ACETAMINOPHEN 1 TABLET: 10; 325 TABLET ORAL at 03:11

## 2017-11-09 RX ADMIN — Medication 12.5 MG: at 08:11

## 2017-11-09 RX ADMIN — HYDROMORPHONE HYDROCHLORIDE 1 MG: 2 INJECTION, SOLUTION INTRAMUSCULAR; INTRAVENOUS; SUBCUTANEOUS at 03:11

## 2017-11-09 RX ADMIN — GABAPENTIN 300 MG: 300 CAPSULE ORAL at 08:11

## 2017-11-09 RX ADMIN — PNEUMOCOCCAL 13-VALENT CONJUGATE VACCINE 0.5 ML: 2.2; 2.2; 2.2; 2.2; 2.2; 4.4; 2.2; 2.2; 2.2; 2.2; 2.2; 2.2; 2.2 INJECTION, SUSPENSION INTRAMUSCULAR at 11:11

## 2017-11-09 RX ADMIN — DOCUSATE SODIUM 100 MG: 100 CAPSULE, LIQUID FILLED ORAL at 08:11

## 2017-11-09 RX ADMIN — LEVOTHYROXINE SODIUM 25 MCG: 25 TABLET ORAL at 06:11

## 2017-11-09 RX ADMIN — INFLUENZA A VIRUS A/MICHIGAN/45/2015 X-275 (H1N1) ANTIGEN (FORMALDEHYDE INACTIVATED), INFLUENZA A VIRUS A/HONG KONG/4801/2014 X-263B (H3N2) ANTIGEN (FORMALDEHYDE INACTIVATED), INFLUENZA B VIRUS B/PHUKET/3073/2013 ANTIGEN (FORMALDEHYDE INACTIVATED), AND INFLUENZA B VIRUS B/BRISBANE/60/2008 ANTIGEN (FORMALDEHYDE INACTIVATED) 0.5 ML: 15; 15; 15; 15 INJECTION, SUSPENSION INTRAMUSCULAR at 11:11

## 2017-11-09 RX ADMIN — DULOXETINE 30 MG: 30 CAPSULE, DELAYED RELEASE ORAL at 08:11

## 2017-11-09 RX ADMIN — OXYCODONE HYDROCHLORIDE AND ACETAMINOPHEN 1 TABLET: 10; 325 TABLET ORAL at 07:11

## 2017-11-09 RX ADMIN — CYCLOBENZAPRINE HYDROCHLORIDE 10 MG: 10 TABLET, FILM COATED ORAL at 06:11

## 2017-11-09 RX ADMIN — PANTOPRAZOLE SODIUM 40 MG: 40 TABLET, DELAYED RELEASE ORAL at 08:11

## 2017-11-09 RX ADMIN — KETOROLAC TROMETHAMINE 30 MG: 30 INJECTION, SOLUTION INTRAMUSCULAR at 08:11

## 2017-11-09 RX ADMIN — HYDROMORPHONE HYDROCHLORIDE 1 MG: 2 INJECTION, SOLUTION INTRAMUSCULAR; INTRAVENOUS; SUBCUTANEOUS at 06:11

## 2017-11-09 NOTE — DISCHARGE SUMMARY
[facility]  General Surgery  Discharge Summary      Patient Name: Estella Arevalo  MRN: [id number]  Admission Date: 11/8/2017  Hospital Length of Stay: 0 days  Discharge Date and Time: 11/9/2017 12:36 PM  Attending Physician: No att. providers found   Discharging Provider: Josephine Fuentes MD  Primary Care Provider: Angela Packer MD     HPI: 61 y/o female with hernia at previous PEG tube site presented for elective laparoscopic repair of hernia    Procedure(s) (LRB):  REPAIR-HERNIA-VENTRAL-LAPAROSCOPIC-W/MESH (N/A)     Hospital Course: She tolerated surgery well without any complications but post operatively she experienced significant pain that required admission to control. Patient uses chronic pain medications at home and sees a pain management physician. She was admitted overnight for pain control. On POD 1 she was tolerating a soft diet and her pain was controlled with PO pain regimen. She was discharged home with a script for pain medication for one week until she could follow up with her pain management doctor.    Consults: none    Significant Diagnostic Studies: none    Pending Diagnostic Studies:     None        Final Active Diagnoses:    Diagnosis Date Noted POA    PRINCIPAL PROBLEM:  Ventral hernia without obstruction or gangrene [K43.9] 11/08/2017 Yes    Continuous alcohol dependence [F10.20] 07/14/2013 Yes    Alcoholic cirrhosis [K70.30] 11/08/2017 Yes      Problems Resolved During this Admission:    Diagnosis Date Noted Date Resolved POA      Discharged Condition: good    Disposition: Home or Self Care    Follow Up:  Follow-up Information     Vini Gomez MD On 11/27/2017.    Specialties:  General Surgery, Bariatrics  Why:  Hospital Follow Up on Monday at 1pm  Contact information:  [address] [place] [address]  [place] [address]  [phone]                 Patient Instructions:     Diet general     Lifting restrictions   Order Comments: No lifting  greater than 10 pounds for 6 weeks     Call MD for:  temperature >100.4     Call MD for:  persistent nausea and vomiting     Call MD for:  severe uncontrolled pain     Call MD for:  difficulty breathing, headache or visual disturbances     Call MD for:  redness, tenderness, or signs of infection (pain, swelling, redness, odor or green/yellow discharge around incision site)     Call MD for:  hives     Call MD for:  persistent dizziness or light-headedness     Call MD for:  extreme fatigue     No dressing needed       Medications:    Given prescription for Roxicodone 20mg #50 tabs    Reconciled Home Medications:   Discharge Medication List as of 11/9/2017  1:55 PM      CONTINUE these medications which have CHANGED    Details   oxyCODONE (ROXICODONE) 5 MG immediate release tablet Take 1 tablet (5 mg total) by mouth every 4 (four) hours as needed for Pain., Starting Wed 11/8/2017, Print         CONTINUE these medications which have NOT CHANGED    Details   cephALEXin (KEFLEX) 500 MG capsule Take 1 capsule (500 mg total) by mouth every 8 (eight) hours., Starting Fri 9/15/2017, Until Mon 9/25/2017, Print      clonazePAM (KLONOPIN) 1 MG tablet TAKE 1 TABLET BY MOUTH TWICE DAILY, Print      cyclobenzaprine (FLEXERIL) 10 MG tablet TAKE 1 TABLET(10 MG) BY MOUTH THREE TIMES DAILY AS NEEDED FOR MUSCLE SPASMS, Normal      cycloSPORINE (RESTASIS) 0.05 % ophthalmic emulsion Place 0.4 mLs (1 drop total) into both eyes 2 (two) times daily., Starting Mon 10/2/2017, Normal      docusate sodium (COLACE) 100 MG capsule Take 1 capsule (100 mg total) by mouth 2 (two) times daily., Starting Fri 9/22/2017, Print      duloxetine (CYMBALTA) 30 MG capsule TAKE 1 CAPSULE(30 MG) BY MOUTH EVERY DAY, Normal      efinaconazole (JUBLIA) 10 % Eran APPLY ONE DOSE DAILY, Normal      gabapentin (NEURONTIN) 300 MG capsule Take 1 capsule (300 mg total) by mouth 2 (two) times daily., Starting Fri 9/22/2017, Normal      levothyroxine (SYNTHROID) 25 MCG  tablet Take 1 tablet (25 mcg total) by mouth once daily., Starting 12/30/2016, Until Discontinued, Normal      LINZESS 145 mcg Cap capsule Take 1 capsule (145 mcg total) by mouth once daily., Starting Fri 9/22/2017, Normal      metoprolol tartrate (LOPRESSOR) 25 MG tablet Take 0.5 tablets (12.5 mg total) by mouth 2 (two) times daily., Starting Fri 9/22/2017, Until Sat 9/22/2018, Normal      !! nitroGLYCERIN (NITROSTAT) 0.3 MG SL tablet ONE TABLET UNDER TONGUE AS NEEDED FOR CHEST PAIN EVERY 5 MINUTES AS NEEDED, Normal      !! nitroGLYCERIN (NITROSTAT) 0.3 MG SL tablet ONE TABLET UNDER TONGUE AS NEEDED FOR CHEST PAIN EVERY 5 MINUTES AS NEEDED, Normal      ondansetron (ZOFRAN-ODT) 4 MG TbDL Take 1 tablet (4 mg total) by mouth every 8 (eight) hours as needed. Nausea/vomiting, Starting Fri 9/22/2017, Normal      pantoprazole (PROTONIX) 40 MG tablet Take 1 tablet (40 mg total) by mouth once daily., Starting Fri 9/22/2017, Until Sat 9/22/2018, Normal      zolpidem (AMBIEN) 5 MG Tab TK 1 T PO QHS PRN, Print       !! - Potential duplicate medications found. Please discuss with provider.      STOP taking these medications       oxycodone (ROXICODONE) 5 mg/5 mL Soln Comments:   Reason for Stopping:               Josephine Fuentes MD  General Surgery  [facility]

## 2017-11-09 NOTE — PROGRESS NOTES
WRITTEN HEALTHCARE DISCHARGE INFORMATION     Things that YOU are responsible for to Manage Your Care At Home:  1. Getting your prescriptions filled.  2. Taking you medications as directed. DO NOT MISS ANY DOSES!  3. Going to your follow-up doctor appointments. This is important because it allows the doctor to monitor your progress and to determine if any changes need to be made to your treatment plan.    If you are unable to make your follow up appointments, please call the number listed and reschedule this appointment.     After discharge, if you need assistance, you can call Dr. Vini Gomez at [phone]    Thank you for choosing [facility] and allowing us to care for you.   From your care management team: Josie HERNANDEZ,RSW & Radha MANNING RN,TN [phone] or [phone]     You should receive a call from [facility]charge [facility] within 48-72 hours to help manage your care after discharge. Please try to make sure that you answer your phone for this important phone call.     Follow-up Information     Vini Gomez MD On 11/27/2017.    Specialties:  General Surgery, Bariatrics  Why:  Hospital Follow Up on Monday at 1pm  Contact information:  [address]  [address]  [place] [facility]  [place] [address]  [phone]

## 2017-11-09 NOTE — PLAN OF CARE
"COLBY met with pt at bedside. COLBY reviewed discharge education sheet "Post Op Surgery" with pt. Pt voiced understanding. Teachback method applied with pt. SW also reviewed with pt what she is responsible for in order to manage her health at home.     1) Getting medications taking from pharmacy.  2) Taking medications as prescribed.  3) Making Appointments that are scheduled.    Pt verbalized understanding. Pt cleared to D/C from CM standpoint. COLBY informed MARISSA Paniagua.      11/09/17 0956   Final Note   Assessment Type Final Discharge Note   Discharge Disposition Home   What phone number can be called within the next 1-3 days to see how you are doing after discharge? [phone]   Hospital Follow Up  Appt(s) scheduled? Yes   Discharge plans and expectations educations in teach back method with documentation complete? Yes   Right Care Referral Info   Post Acute Recommendation No Care       "

## 2017-11-09 NOTE — NURSING
I was in the room when Dr Fuentes provided prescription for pain.  I escorted pt with transporter via WC and cart with  with crutches, to her car.  Pt wanted the transporter to roll her to the car in the parking garage as cars were passing and not wait until her  pulled up. Instructed pt that her  would have to pull up as he was already in the car. Pt argumentative, yelling and said she would walk to her car. I escorted pt as she ambulated approx 6 feet to car as the  was trying to pull up. Waited until pt put belonging in car and in car. No distress noted.

## 2017-11-09 NOTE — NURSING
Dr. Fuentes notified of complaint of pain 7/10 unrelieved with PO pain medication or IV hydromorphone for breakthrough pain as well as request that diet be changed from cardiac to clear liquid. New orders noted.

## 2017-11-09 NOTE — PLAN OF CARE
SW met with pt at bedside. SW explained role in . SW inquired about HELP AT HOME. Pt stated that pt will have help at home with Spouse and Daughter. SW reviewed HELP AT HOME and DISCHARGE PLANNING brochure of questions to think about while in the hospital. Pt voiced understanding. SW inquired about what pt feels she are RESPONSIBLE for to MANAGE HEALTH AT HOME. Pt stated going to MD appointments and medications. SW voiced that taking medications at the appropriate time is important with managing care. SW informed pt that a DISCHARGE EDUCATION SHEET will be provided during stay. Pt voiced understanding.       [email or web address] [id number] - [place] - [address] AT [facility] [address] [place]  [address]  [place] [address]  Phone: [phone] Fax: [phone]       11/09/17 0954   Discharge Assessment   Assessment Type Discharge Planning Assessment   Confirmed/corrected address and phone number on facesheet? Yes   Assessment information obtained from? Patient   Prior to hospitilization cognitive status: Alert/Oriented   Prior to hospitalization functional status: Independent   Current cognitive status: Alert/Oriented   Current Functional Status: Independent   Lives With child(carol), adult;grandchild(carol);spouse   Able to Return to Prior Arrangements yes   Is patient able to care for self after discharge? Yes   Who are your caregiver(s) and their phone number(s)? Spouse    Patient's perception of discharge disposition home or selfcare   Readmission Within The Last 30 Days no previous admission in last 30 days   Equipment Currently Used at Home none   Do you have any problems affording any of your prescribed medications? No   Is the patient taking medications as prescribed? yes   Does the patient have transportation home? Yes   Transportation Available car;family or friend will provide   Discharge Plan A Home   Discharge Plan B Home   Patient/Family In Agreement With Plan yes

## 2017-11-09 NOTE — OP NOTE
DATE OF PROCEDURE:  11/08/2017.    SURGEON:  Vini Gomez M.D.    FIRST ASSISTANT:  Josephine Fuentes MD, PGY 4.    PREOPERATIVE DIAGNOSIS:  Ventral hernia containing incarcerated stomach.    POSTOPERATIVE DIAGNOSES:  Ventral hernia containing incarcerated stomach,   cirrhosis and umbilical hernia.    PROCEDURE:  Laparoscopic repair of ventral hernia with mesh, resection of   gastrostomy.    ANESTHESIA:  General endotracheal.    ESTIMATED BLOOD LOSS:  15 mL.    INDICATIONS FOR PROCEDURE:  The patient is a 62-year-old female with a history   of incarcerated paraesophageal hernia.  Part of this procedure involved placing   a PEG tube for decompression.  She has subsequently developed a hernia through   this site containing stomach and colon as well, which causes her significant   discomfort.  The risks and benefits have been explained to the patient, who   acknowledges these risks and wishes to proceed.    PROCEDURE IN DETAIL:  After administration of IV antibiotics and placement of   sequential compression devices, the patient was intubated by Anesthesia and   sterilely prepped and draped.  All ports were infiltrated with local anesthetic   prior to the incision.  An incision was made in the right upper quadrant.    Access to the abdomen was gained using an optical viewing 11 mm trocar and a   0-degree scope.  The underlying bowel was inspected and found to be free of   injury.  A 5 mm port was placed in the right lateral abdomen.  The colon   spontaneously reduced from the hernia defect with insufflation.  The stomach was   densely adherent.  This was the site of previous gastrostomy, so there was some   concern for a patent tract.  The Endo AIYANA stapler with a blue load was used to   staple and divide the stomach just below the level of the skin.  The remaining   portion of the tract was excised using scissors and removed.  The defect was   relatively small, approximately 4 cm in greatest dimension.  Interrupted 0    Vicryl sutures were passed through the anterior abdominal wall to close the   defect primarily and a 10 x 16 piece of Strattice was chosen to cover the   defect.  Sutures were placed in the superior and inferior border of the mesh as   well as the right and left lateral edge of the mesh.  The mesh was rolled up and   inserted into the abdomen and then unrolled within the abdomen.  The sutures   were pulled through the anterior abdominal wall corresponding positions on the   mesh.  The edge of the mesh was just under the ribs and may contribute to   postoperative pain, but was necessary to achieve adequate overlap of the defect.    Tacks were placed in 1 to 1.5 cm intervals around the circumference of the   mesh.  A second layer of tacks was placed into the anterior of the mesh to   prevent seroma formation.  A small umbilical defect was also noted.  The   preperitoneal fat was dissected away using electrocautery.  There was a   significant dilation of the periumbilical veins consistent with history of   cirrhosis.  Her liver was also found to be very nodular throughout the liver,   also consistent with cirrhosis.  The umbilical defect was small and was closed   using a figure-of-eight 0 Vicryl suture.  The insufflation was expelled from the   abdomen.  All ports were removed.  The skin over all ports was closed using   interrupted 4-0 Monocryl deep dermal suture.  Dermabond was applied and the   patient was aroused and transferred to Recovery Room in good condition.  All   instrument and sponge counts were correct at the end of the case.      CARLY/IN  dd: 11/09/2017 08:03:17 (CST)  td: 11/09/2017 09:02:20 (CST)  Doc ID   #[id number]  Job ID #[id number]    CC:

## 2017-11-14 NOTE — ANESTHESIA POSTPROCEDURE EVALUATION
"Anesthesia Post Evaluation    Patient: Estella Arevalo    Procedure(s) Performed: Procedure(s) (LRB):  REPAIR-HERNIA-VENTRAL-LAPAROSCOPIC-W/MESH (N/A)    Final Anesthesia Type: general  Patient location during evaluation: PACU  Patient participation: Yes- Able to Participate  Level of consciousness: awake and alert  Post-procedure vital signs: reviewed and stable  Pain management: adequate  Airway patency: patent  PONV status at discharge: No PONV  Anesthetic complications: no      Cardiovascular status: blood pressure returned to baseline and hemodynamically stable  Respiratory status: unassisted  Hydration status: euvolemic  Follow-up not needed.        Visit Vitals  BP (!) 129/90   Pulse 84   Temp 36.9 °C (98.5 °F)   Resp 19   Ht 5' 5" (1.651 m)   Wt 79.4 kg (175 lb)   LMP  (Approximate)   SpO2 (!) 90%   Breastfeeding? No   BMI 29.12 kg/m²       Pain/Mika Score: No Data Recorded      "

## 2017-11-17 ENCOUNTER — HOSPITAL ENCOUNTER (EMERGENCY)
Facility: HOSPITAL | Age: 62
Discharge: HOME | End: 2017-11-17
Attending: EMERGENCY MEDICINE
Payer: OTHER GOVERNMENT

## 2017-11-17 VITALS
BODY MASS INDEX: 26.52 KG/M2 | TEMPERATURE: 98 F | HEIGHT: 66 IN | HEART RATE: 94 BPM | WEIGHT: 165 LBS | DIASTOLIC BLOOD PRESSURE: 83 MMHG | OXYGEN SATURATION: 95 % | RESPIRATION RATE: 17 BRPM | SYSTOLIC BLOOD PRESSURE: 131 MMHG

## 2017-11-17 DIAGNOSIS — Z98.890 STATUS POST REPAIR OF VENTRAL HERNIA: ICD-10-CM

## 2017-11-17 DIAGNOSIS — R10.12 ABDOMINAL PAIN, LEFT UPPER QUADRANT: ICD-10-CM

## 2017-11-17 DIAGNOSIS — Z87.19 STATUS POST REPAIR OF VENTRAL HERNIA: ICD-10-CM

## 2017-11-17 DIAGNOSIS — R11.2 NON-INTRACTABLE VOMITING WITH NAUSEA, UNSPECIFIED VOMITING TYPE: ICD-10-CM

## 2017-11-17 DIAGNOSIS — R10.13 ABDOMINAL PAIN, EPIGASTRIC: Primary | ICD-10-CM

## 2017-11-17 LAB
ALBUMIN SERPL BCP-MCNC: 3.6 G/DL
ALP SERPL-CCNC: 414 U/L
ALT SERPL W/O P-5'-P-CCNC: 59 U/L
ANION GAP SERPL CALC-SCNC: 13 MMOL/L
AST SERPL-CCNC: 183 U/L
BACTERIA #/AREA URNS HPF: NORMAL /HPF
BASOPHILS # BLD AUTO: 0.18 K/UL
BASOPHILS NFR BLD: 5.7 %
BILIRUB SERPL-MCNC: 1.6 MG/DL
BILIRUB UR QL STRIP: NEGATIVE
BUN SERPL-MCNC: 8 MG/DL
CALCIUM SERPL-MCNC: 9 MG/DL
CHLORIDE SERPL-SCNC: 100 MMOL/L
CLARITY UR: CLEAR
CO2 SERPL-SCNC: 23 MMOL/L
COLOR UR: YELLOW
CREAT SERPL-MCNC: 0.7 MG/DL
DIFFERENTIAL METHOD: ABNORMAL
EOSINOPHIL # BLD AUTO: 0.3 K/UL
EOSINOPHIL NFR BLD: 10.4 %
ERYTHROCYTE [DISTWIDTH] IN BLOOD BY AUTOMATED COUNT: 15.9 %
EST. GFR  (AFRICAN AMERICAN): >60 ML/MIN/1.73 M^2
EST. GFR  (NON AFRICAN AMERICAN): >60 ML/MIN/1.73 M^2
GLUCOSE SERPL-MCNC: 116 MG/DL
GLUCOSE UR QL STRIP: NEGATIVE
HCT VFR BLD AUTO: 39.4 %
HGB BLD-MCNC: 13.8 G/DL
HGB UR QL STRIP: NEGATIVE
KETONES UR QL STRIP: ABNORMAL
LEUKOCYTE ESTERASE UR QL STRIP: NEGATIVE
LIPASE SERPL-CCNC: 27 U/L
LYMPHOCYTES # BLD AUTO: 0.9 K/UL
LYMPHOCYTES NFR BLD: 28.9 %
MCH RBC QN AUTO: 32.2 PG
MCHC RBC AUTO-ENTMCNC: 35 G/DL
MCV RBC AUTO: 92 FL
MICROSCOPIC COMMENT: NORMAL
MONOCYTES # BLD AUTO: 0.5 K/UL
MONOCYTES NFR BLD: 16.4 %
NEUTROPHILS # BLD AUTO: 1.2 K/UL
NEUTROPHILS NFR BLD: 38.6 %
NITRITE UR QL STRIP: NEGATIVE
PH UR STRIP: 8 [PH] (ref 5–8)
PLATELET # BLD AUTO: 292 K/UL
PMV BLD AUTO: 9.5 FL
POTASSIUM SERPL-SCNC: 3.8 MMOL/L
PROT SERPL-MCNC: 8.2 G/DL
PROT UR QL STRIP: NEGATIVE
RBC # BLD AUTO: 4.29 M/UL
RBC #/AREA URNS HPF: 1 /HPF (ref 0–4)
SODIUM SERPL-SCNC: 136 MMOL/L
SP GR UR STRIP: 1.01 (ref 1–1.03)
SQUAMOUS #/AREA URNS HPF: 1 /HPF
URN SPEC COLLECT METH UR: ABNORMAL
UROBILINOGEN UR STRIP-ACNC: ABNORMAL EU/DL
WBC # BLD AUTO: 3.18 K/UL
WBC #/AREA URNS HPF: 1 /HPF (ref 0–5)

## 2017-11-17 PROCEDURE — 25000003 PHARM REV CODE 250: Performed by: EMERGENCY MEDICINE

## 2017-11-17 PROCEDURE — 80053 COMPREHEN METABOLIC PANEL: CPT

## 2017-11-17 PROCEDURE — 96375 TX/PRO/DX INJ NEW DRUG ADDON: CPT

## 2017-11-17 PROCEDURE — 81000 URINALYSIS NONAUTO W/SCOPE: CPT

## 2017-11-17 PROCEDURE — 83690 ASSAY OF LIPASE: CPT

## 2017-11-17 PROCEDURE — 99285 EMERGENCY DEPT VISIT HI MDM: CPT | Mod: 25

## 2017-11-17 PROCEDURE — 85025 COMPLETE CBC W/AUTO DIFF WBC: CPT

## 2017-11-17 PROCEDURE — 25000003 PHARM REV CODE 250: Performed by: SURGERY

## 2017-11-17 PROCEDURE — 63600175 PHARM REV CODE 636 W HCPCS: Performed by: EMERGENCY MEDICINE

## 2017-11-17 PROCEDURE — 96374 THER/PROPH/DIAG INJ IV PUSH: CPT

## 2017-11-17 PROCEDURE — 96361 HYDRATE IV INFUSION ADD-ON: CPT

## 2017-11-17 PROCEDURE — 96376 TX/PRO/DX INJ SAME DRUG ADON: CPT

## 2017-11-17 RX ORDER — HYDROMORPHONE HYDROCHLORIDE 2 MG/ML
1 INJECTION, SOLUTION INTRAMUSCULAR; INTRAVENOUS; SUBCUTANEOUS
Status: COMPLETED | OUTPATIENT
Start: 2017-11-17 | End: 2017-11-17

## 2017-11-17 RX ORDER — ONDANSETRON 2 MG/ML
8 INJECTION INTRAMUSCULAR; INTRAVENOUS
Status: COMPLETED | OUTPATIENT
Start: 2017-11-17 | End: 2017-11-17

## 2017-11-17 RX ORDER — METOCLOPRAMIDE HYDROCHLORIDE 10 MG/2ML
10 INJECTION, SOLUTION INTRAMUSCULAR; INTRAVENOUS
Status: COMPLETED | OUTPATIENT
Start: 2017-11-17 | End: 2017-11-17

## 2017-11-17 RX ORDER — ACETAMINOPHEN 500 MG
1000 TABLET ORAL
Status: DISCONTINUED | OUTPATIENT
Start: 2017-11-17 | End: 2017-11-17 | Stop reason: HOSPADM

## 2017-11-17 RX ORDER — LORAZEPAM 2 MG/ML
1 INJECTION INTRAMUSCULAR
Status: COMPLETED | OUTPATIENT
Start: 2017-11-17 | End: 2017-11-17

## 2017-11-17 RX ORDER — DIPHENHYDRAMINE HYDROCHLORIDE 50 MG/ML
50 INJECTION INTRAMUSCULAR; INTRAVENOUS
Status: COMPLETED | OUTPATIENT
Start: 2017-11-17 | End: 2017-11-17

## 2017-11-17 RX ORDER — OXYCODONE HYDROCHLORIDE 5 MG/1
20 TABLET ORAL ONCE
Status: COMPLETED | OUTPATIENT
Start: 2017-11-17 | End: 2017-11-17

## 2017-11-17 RX ADMIN — OXYCODONE HYDROCHLORIDE 20 MG: 5 TABLET ORAL at 04:11

## 2017-11-17 RX ADMIN — METOCLOPRAMIDE HYDROCHLORIDE 10 MG: 10 INJECTION, SOLUTION INTRAMUSCULAR; INTRAVENOUS at 12:11

## 2017-11-17 RX ADMIN — HYDROMORPHONE HYDROCHLORIDE 1 MG: 2 INJECTION INTRAMUSCULAR; INTRAVENOUS; SUBCUTANEOUS at 01:11

## 2017-11-17 RX ADMIN — SODIUM CHLORIDE 1000 ML: 0.9 INJECTION, SOLUTION INTRAVENOUS at 12:11

## 2017-11-17 RX ADMIN — LORAZEPAM 1 MG: 2 INJECTION INTRAMUSCULAR; INTRAVENOUS at 04:11

## 2017-11-17 RX ADMIN — ONDANSETRON 8 MG: 2 INJECTION INTRAMUSCULAR; INTRAVENOUS at 12:11

## 2017-11-17 RX ADMIN — HYDROMORPHONE HYDROCHLORIDE 1 MG: 2 INJECTION INTRAMUSCULAR; INTRAVENOUS; SUBCUTANEOUS at 12:11

## 2017-11-17 RX ADMIN — DIPHENHYDRAMINE HYDROCHLORIDE 50 MG: 50 INJECTION, SOLUTION INTRAMUSCULAR; INTRAVENOUS at 12:11

## 2017-11-17 NOTE — ED NOTES
"During medication administration pt continued to state "When can  I get more dilaudid?" pt was informed we will monitor her pain and inform MD when pain has returned.  "

## 2017-11-17 NOTE — ED NOTES
"While on EMS stretcher, pt yelling "Doctor, give me pain medication." pt was repeatedly told that she has to be placed into a room and MD will assess her and order medication.  "

## 2017-11-17 NOTE — ED PROVIDER NOTES
"Encounter Date: 11/17/2017    SCRIBE #1 NOTE: I, Vishnu Adan, am scribing for, and in the presence of,  Steven Rodriguez MD. I have scribed the following portions of the note - Other sections scribed: HPI and ROS.       History     Chief Complaint   Patient presents with    Abdominal Pain     with nausea and vomitting since last night; hernia repair Tuesday; EMS reports pt is alcoholic and drank last night; ; 4mg Zofran given en route     CC: Abdominal Pain     HPI: This 62 y.o F presents to the ED via EMS c/o LUQ abdominal pain s/p hernia repair sx 11/08/17 with associated nausea and 15 episodes of emesis and "dry heaving" since 0400. The pt states "I can't keep anything down." The pt attempted to drink ETOH last night. The pt also reports L sided chest pain which began at 0000 and dysuria. The pt reports previous episodes of similar chest pain in the same. The pt denies fever, chills, headache and diarrhea.    Pt's surgeon is Vini Gomez MD    PMHx: Alcohol abuse; Anxiety; Bipolar disorder; Cirrhosis of liver; Coronary artery disease; Depression; Fall; GERD; Hypertension; Low back pain; MI; and Thyroid disease.        The history is provided by the patient. No  was used.     Review of patient's allergies indicates:  No Known Allergies  Past Medical History:   Diagnosis Date    Alcohol abuse     Anxiety     Bipolar disorder     Cirrhosis of liver     Coronary artery disease     Depression     Fall     GERD (gastroesophageal reflux disease)     Hypertension     Low back pain     MI (myocardial infarction)     Thyroid disease      Past Surgical History:   Procedure Laterality Date    HIATAL HERNIA REPAIR      JOINT REPLACEMENT      KNEE SURGERY  bilateral replacement    right foot sx  2008    SHOULDER SURGERY  replacement     Family History   Problem Relation Age of Onset    Cancer Mother     Cancer Father      Social History   Substance Use Topics    Smoking status: " "Never Smoker    Smokeless tobacco: Never Used    Alcohol use Yes      Comment: "few days a week"     Review of Systems   Constitutional: Negative for chills and fever.   HENT: Negative for congestion, ear pain, rhinorrhea and sore throat.    Eyes: Negative for pain and visual disturbance.   Respiratory: Negative for cough and shortness of breath.    Cardiovascular: Positive for chest pain (L side).   Gastrointestinal: Positive for abdominal pain, nausea and vomiting. Negative for diarrhea.   Genitourinary: Positive for dysuria.   Musculoskeletal: Negative for back pain and neck pain.   Skin: Negative for rash.   Neurological: Negative for headaches.       Physical Exam     Initial Vitals [11/17/17 1120]   BP Pulse Resp Temp SpO2   138/87 104 (!) 22 98.6 °F (37 °C) 98 %      MAP       104         Physical Exam  The patient was examined specifically for the following:   General:No significant distress, Good color, Warm and dry. Head and neck:Scalp atraumatic, Neck supple. Neurological:Appropriate conversation, Gross motor deficits. Eyes:Conjugate gaze, Clear corneas. ENT: No epistaxis. Cardiac: Regular rate and rhythm, Grossly normal heart tones. Pulmonary: Wheezing, Rales. Gastrointestinal: Abdominal tenderness, Abdominal distention. Musculoskeletal: Extremity deformity, Apparent pain with range of motion of the joints. Skin: Rash.   The findings on examination were normal except for the following: The patient is retching into an emesis bag.  The lungs are clear.  The heart tones are normal.  The patient has fresh laparoscopic abdominal surgical wounds that are healing without infection in the left upper quadrant.  The patient is markedly tender in the left upper quadrant.  There is no erythema warmth or edema.  The lungs are clear.  The heart tones are normal.  The patient seems very anxious.  ED Course   Procedures  Labs Reviewed   COMPREHENSIVE METABOLIC PANEL - Abnormal; Notable for the following:        Result " Value    Glucose 116 (*)     Total Bilirubin 1.6 (*)     Alkaline Phosphatase 414 (*)      (*)     ALT 59 (*)     All other components within normal limits   CBC W/ AUTO DIFFERENTIAL - Abnormal; Notable for the following:     WBC 3.18 (*)     MCH 32.2 (*)     RDW 15.9 (*)     Gran # 1.2 (*)     Lymph # 0.9 (*)     Mono% 16.4 (*)     Eosinophil% 10.4 (*)     Basophil% 5.7 (*)     All other components within normal limits   URINALYSIS - Abnormal; Notable for the following:     Ketones, UA Trace (*)     Urobilinogen, UA 2.0-3.0 (*)     All other components within normal limits   LIPASE   URINALYSIS MICROSCOPIC          X-Rays:   Independently Interpreted Readings:   Other Readings:  CT of the abdomen fails to reveal any evidence of bowel obstruction or peritonitis.    Medical decision making: Given the above, this patient presents emergency with left upper quadrant abdominal pain after having had hernia repair in that area on the eighth of this month.  The patient complains of nausea and vomiting.  The patient was treated with hydromorphone and Zofran in the emergency room.  CT failed to reveal significant abnormalities.  The patient was evaluated by Dr. Fuentes, Gen. surgery who feels that the patient could be discharged after fluid challenge.  The patient tolerated fluid and solid foods well in the emergency room.  I will discharge her to outpatient evaluation and treatment.              Scribe Attestation:   Scribe #1: I performed the above scribed service and the documentation accurately describes the services I performed. I attest to the accuracy of the note.    Attending Attestation:           Physician Attestation for Scribe:  Physician Attestation Statement for Scribe #1: I, Steven Rodriguez MD, reviewed documentation, as scribed by Vishnu Adan in my presence, and it is both accurate and complete.                 ED Course      Clinical Impression:   The primary encounter diagnosis was Abdominal pain,  epigastric. Diagnoses of Abdominal pain, left upper quadrant, Non-intractable vomiting with nausea, unspecified vomiting type, and Status post repair of ventral hernia were also pertinent to this visit.                           Steven Rodriguez MD  11/18/17 0882

## 2017-11-17 NOTE — ED NOTES
Pt called nurses station asking for pain medication, pt was made aware MD was notified and will order once surgery comes to bedside to assess

## 2017-11-17 NOTE — ED NOTES
"Pt was given the phone number to guest relations and pt became upset at  and stated "great. Give me a number to one of your friends, now you want to be a smart ass." pt was made aware that phone number is the correct number to file a complaint. Pt continued to yell rude comments, pt was again re directed and asked to lower her voice. Pt apologized and continued to complain.  "

## 2017-11-17 NOTE — ED TRIAGE NOTES
"Pt arrived via [facility] from home. CC of abdominal pain. Pt stated "I had a hernia repair on the 8th, and I haven't been able to hold anything down. My incision hurts." pt presents with abdominal pain, pt describes pain at incision site. Pt also reports N/V. Pt denies F/D/SOB. NAD at this time.  "

## 2017-11-17 NOTE — ED NOTES
"Pt yelling rude comments at , Pt  at bedside to answer call light. pt was redirected and asked to lower her voice and made aware that comments have been inappropriate. Pt stated that she did no like 's "tone of voice". Pt requested to speak to house supervisor to file complaint.  "

## 2017-11-17 NOTE — CONSULTS
[facility]  General Surgery  Consult Note    Consults  Subjective:     Chief Complaint/Reason for Admission: abdominal pain    History of Present Illness: 63 y/o female w/ complicated medical h/o presents 9 days post operatively with increased abdominal pain. Patient states she had been having increasing pain and ran out of her pain medications that she was given in the hospital. She was supposed to see her pain doctor for management but she says her appointment was moved back. She states she has been nauseated and has had issues with dry heaving. She denied alcohol use to me for the last few days but on chart review she had drank some alcohol last night.       No current facility-administered medications on file prior to encounter.      Current Outpatient Prescriptions on File Prior to Encounter   Medication Sig    cephALEXin (KEFLEX) 500 MG capsule Take 1 capsule (500 mg total) by mouth every 8 (eight) hours.    clonazePAM (KLONOPIN) 1 MG tablet TAKE 1 TABLET BY MOUTH TWICE DAILY    cyclobenzaprine (FLEXERIL) 10 MG tablet TAKE 1 TABLET(10 MG) BY MOUTH THREE TIMES DAILY AS NEEDED FOR MUSCLE SPASMS    cycloSPORINE (RESTASIS) 0.05 % ophthalmic emulsion Place 0.4 mLs (1 drop total) into both eyes 2 (two) times daily.    docusate sodium (COLACE) 100 MG capsule Take 1 capsule (100 mg total) by mouth 2 (two) times daily.    duloxetine (CYMBALTA) 30 MG capsule TAKE 1 CAPSULE(30 MG) BY MOUTH EVERY DAY    efinaconazole (JUBLIA) 10 % Eran APPLY ONE DOSE DAILY    gabapentin (NEURONTIN) 300 MG capsule Take 1 capsule (300 mg total) by mouth 2 (two) times daily.    levothyroxine (SYNTHROID) 25 MCG tablet Take 1 tablet (25 mcg total) by mouth once daily.    LINZESS 145 mcg Cap capsule Take 1 capsule (145 mcg total) by mouth once daily.    metoprolol tartrate (LOPRESSOR) 25 MG tablet Take 0.5 tablets (12.5 mg total) by mouth 2 (two) times daily.    nitroGLYCERIN (NITROSTAT) 0.3 MG SL tablet ONE  "TABLET UNDER TONGUE AS NEEDED FOR CHEST PAIN EVERY 5 MINUTES AS NEEDED    nitroGLYCERIN (NITROSTAT) 0.3 MG SL tablet ONE TABLET UNDER TONGUE AS NEEDED FOR CHEST PAIN EVERY 5 MINUTES AS NEEDED    ondansetron (ZOFRAN-ODT) 4 MG TbDL Take 1 tablet (4 mg total) by mouth every 8 (eight) hours as needed. Nausea/vomiting    oxyCODONE (ROXICODONE) 5 MG immediate release tablet Take 1 tablet (5 mg total) by mouth every 4 (four) hours as needed for Pain.    pantoprazole (PROTONIX) 40 MG tablet Take 1 tablet (40 mg total) by mouth once daily.    zolpidem (AMBIEN) 5 MG Tab TK 1 T PO QHS PRN       Review of patient's allergies indicates:  No Known Allergies    Past Medical History:   Diagnosis Date    Alcohol abuse     Anxiety     Bipolar disorder     Cirrhosis of liver     Coronary artery disease     Depression     Fall     GERD (gastroesophageal reflux disease)     Hypertension     Low back pain     MI (myocardial infarction)     Thyroid disease      Past Surgical History:   Procedure Laterality Date    HIATAL HERNIA REPAIR      JOINT REPLACEMENT      KNEE SURGERY  bilateral replacement    right foot sx  2008    SHOULDER SURGERY  replacement     Family History     Problem Relation (Age of Onset)    Cancer Mother, Father        Social History Main Topics    Smoking status: Never Smoker    Smokeless tobacco: Never Used    Alcohol use Yes      Comment: "few days a week"    Drug use: No    Sexual activity: Not on file     Review of Systems   Comprehensive ROS was completed and negative other than that stated in HPI    Objective:     Vital Signs (Most Recent):  Temp: 98.6 °F (37 °C) (11/17/17 1120)  Pulse: 98 (11/17/17 1433)  Resp: (!) 22 (11/17/17 1120)  BP: (!) 141/81 (11/17/17 1433)  SpO2: 97 % (11/17/17 1433) Vital Signs (24h Range):  Temp:  [98.6 °F (37 °C)] 98.6 °F (37 °C)  Pulse:  [] 98  Resp:  [22] 22  SpO2:  [96 %-98 %] 97 %  BP: (138-149)/(81-92) 141/81     Weight: 74.8 kg (165 lb)  Body " mass index is 26.63 kg/m².    No intake or output data in the 24 hours ending 11/17/17 0539    Physical Exam   Constitutional: She is oriented to person, place, and time. She appears well-developed and well-nourished.   HENT:   Head: Normocephalic and atraumatic.   Eyes: Conjunctivae and EOM are normal.   Neck: Normal range of motion. Neck supple.   Cardiovascular: Normal rate and regular rhythm.    Pulmonary/Chest: Effort normal and breath sounds normal.   Abdominal: Soft. Bowel sounds are normal. She exhibits no distension and no mass. There is tenderness (LUQ- appropriate). There is no guarding. No hernia.   Surgical site soft with some bruising, some scar tissue related to previous PEG site. Incisions all healing well   Musculoskeletal: Normal range of motion.   Neurological: She is alert and oriented to person, place, and time.   Skin: Skin is warm and dry.   Psychiatric: She has a normal mood and affect. Her behavior is normal.       Significant Labs:  CBC:   Recent Labs  Lab 11/17/17  1205   WBC 3.18*   RBC 4.29   HGB 13.8   HCT 39.4      MCV 92   MCH 32.2*   MCHC 35.0     CMP:   Recent Labs  Lab 11/17/17  1205   *   CALCIUM 9.0   ALBUMIN 3.6   PROT 8.2      K 3.8   CO2 23      BUN 8   CREATININE 0.7   ALKPHOS 414*   ALT 59*   *   BILITOT 1.6*       Significant Diagnostics:  CT: I have reviewed all pertinent results/findings within the past 24 hours. 1.  Interval postsurgical change of left flank hernia repair.  There is lobular material in the postsurgical site, could reflect bulging of the mesh with associated fluid/hematoma, however developing abscess is not excluded, correlation clinically with ascites recommended.  Subcutaneous emphysema is noted more caudally, likely related to recent procedure.  No large volume free air.       2.  Minimally prominent small bowel loops in the left upper quadrant, nonspecific, could reflect ileus related to surgery or early infectious or  inflammatory enteritis although is nonspecific and correlation is needed.    3.  Diffuse hepatic steatosis, with nodular contour suggesting underlying cirrhosis, correlation for the same recommended.    4.  Postsurgical changes of hiatal hernia repair with interval suture material in the midabdomen, possibly of interval partial gastrectomy.  There is inflammation of the mesentery in the region without focal organized fluid collection.    5.  Several additional findings above.    Assessment/Plan:     63 y/o female s/p laparoscopic hernia repair. Pain control is difficult for her given her alcohol abuse. She follows with a pain doctor as an outpatient.    Plan for PO trial in Ed.  Will provide enough Oxycodone until her appointment with her Pain doctor.   Follow up with Dr. Gomez in clinic.  She has an appointment on 11/27/17 at  1pm      Josephine Fuentes MD  General Surgery  [facility]

## 2017-11-17 NOTE — ED NOTES
"Followed up with pt after given a cup of water; pt stated that she feels fine and is no longer nauseous. Pt stated "I even called my  to buy me a cheese burger."  "

## 2017-11-18 NOTE — DISCHARGE INSTRUCTIONS
Please follow-up with Dr. Vini Gomez as scheduled.  Please return immediately if you get worse or if new problems develop.  Please use the medicines, as they were written by Dr. Fuentes.

## 2017-11-21 DIAGNOSIS — H04.129 DRY EYE SYNDROME, UNSPECIFIED LATERALITY: ICD-10-CM

## 2017-11-21 RX ORDER — CYCLOSPORINE 0.5 MG/ML
EMULSION OPHTHALMIC
Qty: 30 EACH | Refills: 0 | Status: SHIPPED | OUTPATIENT
Start: 2017-11-21 | End: 2017-12-05 | Stop reason: SDUPTHER

## 2017-12-05 ENCOUNTER — OFFICE VISIT (OUTPATIENT)
Dept: FAMILY MEDICINE | Facility: CLINIC | Age: 62
End: 2017-12-05
Payer: OTHER GOVERNMENT

## 2017-12-05 VITALS
WEIGHT: 177.25 LBS | BODY MASS INDEX: 28.49 KG/M2 | OXYGEN SATURATION: 93 % | SYSTOLIC BLOOD PRESSURE: 100 MMHG | DIASTOLIC BLOOD PRESSURE: 70 MMHG | HEART RATE: 67 BPM | TEMPERATURE: 98 F | HEIGHT: 66 IN

## 2017-12-05 DIAGNOSIS — B35.9 RINGWORM: Primary | ICD-10-CM

## 2017-12-05 DIAGNOSIS — G89.29 CHRONIC LOW BACK PAIN WITH RIGHT-SIDED SCIATICA, UNSPECIFIED BACK PAIN LATERALITY: ICD-10-CM

## 2017-12-05 DIAGNOSIS — G89.29 CHRONIC PAIN OF RIGHT ANKLE: ICD-10-CM

## 2017-12-05 DIAGNOSIS — E03.4 HYPOTHYROIDISM DUE TO ACQUIRED ATROPHY OF THYROID: ICD-10-CM

## 2017-12-05 DIAGNOSIS — M25.571 CHRONIC PAIN OF RIGHT ANKLE: ICD-10-CM

## 2017-12-05 DIAGNOSIS — M54.41 CHRONIC LOW BACK PAIN WITH RIGHT-SIDED SCIATICA, UNSPECIFIED BACK PAIN LATERALITY: ICD-10-CM

## 2017-12-05 DIAGNOSIS — F33.9 EPISODE OF RECURRENT MAJOR DEPRESSIVE DISORDER, UNSPECIFIED DEPRESSION EPISODE SEVERITY: ICD-10-CM

## 2017-12-05 DIAGNOSIS — M51.36 DDD (DEGENERATIVE DISC DISEASE), LUMBAR: ICD-10-CM

## 2017-12-05 DIAGNOSIS — K21.9 GASTROESOPHAGEAL REFLUX DISEASE, ESOPHAGITIS PRESENCE NOT SPECIFIED: ICD-10-CM

## 2017-12-05 DIAGNOSIS — H04.129 DRY EYE SYNDROME, UNSPECIFIED LATERALITY: ICD-10-CM

## 2017-12-05 DIAGNOSIS — B35.1 ONYCHOMYCOSIS OF TOENAIL: ICD-10-CM

## 2017-12-05 DIAGNOSIS — Z12.39 SCREENING FOR BREAST CANCER: ICD-10-CM

## 2017-12-05 DIAGNOSIS — H10.9 CONJUNCTIVITIS OF LEFT EYE, UNSPECIFIED CONJUNCTIVITIS TYPE: ICD-10-CM

## 2017-12-05 PROCEDURE — 99215 OFFICE O/P EST HI 40 MIN: CPT | Mod: PBBFAC,PO | Performed by: NURSE PRACTITIONER

## 2017-12-05 PROCEDURE — 99214 OFFICE O/P EST MOD 30 MIN: CPT | Mod: S$PBB,,, | Performed by: NURSE PRACTITIONER

## 2017-12-05 PROCEDURE — 99999 PR PBB SHADOW E&M-EST. PATIENT-LVL V: CPT | Mod: PBBFAC,,, | Performed by: NURSE PRACTITIONER

## 2017-12-05 RX ORDER — CYCLOSPORINE 0.5 MG/ML
1 EMULSION OPHTHALMIC 2 TIMES DAILY
Qty: 30 EACH | Refills: 0 | Status: SHIPPED | OUTPATIENT
Start: 2017-12-05 | End: 2018-07-26 | Stop reason: SDUPTHER

## 2017-12-05 RX ORDER — AMITRIPTYLINE HYDROCHLORIDE 50 MG/1
50 TABLET, FILM COATED ORAL NIGHTLY
Qty: 90 TABLET | Refills: 3 | Status: SHIPPED | OUTPATIENT
Start: 2017-12-05 | End: 2018-07-26 | Stop reason: SDUPTHER

## 2017-12-05 RX ORDER — CLOTRIMAZOLE AND BETAMETHASONE DIPROPIONATE 10; .64 MG/G; MG/G
CREAM TOPICAL 2 TIMES DAILY
Qty: 15 G | Refills: 0 | Status: SHIPPED | OUTPATIENT
Start: 2017-12-05 | End: 2018-05-16

## 2017-12-05 RX ORDER — POLYMYXIN B SULFATE AND TRIMETHOPRIM 1; 10000 MG/ML; [USP'U]/ML
1 SOLUTION OPHTHALMIC EVERY 6 HOURS
Qty: 3 ML | Refills: 0 | Status: ON HOLD | OUTPATIENT
Start: 2017-12-05 | End: 2018-10-30 | Stop reason: HOSPADM

## 2017-12-05 RX ORDER — PANTOPRAZOLE SODIUM 40 MG/1
40 TABLET, DELAYED RELEASE ORAL DAILY
Qty: 90 TABLET | Refills: 0 | Status: SHIPPED | OUTPATIENT
Start: 2017-12-05 | End: 2018-02-12 | Stop reason: SDUPTHER

## 2017-12-05 RX ORDER — GABAPENTIN 300 MG/1
300 CAPSULE ORAL 2 TIMES DAILY
Qty: 180 CAPSULE | Refills: 0 | Status: CANCELLED | OUTPATIENT
Start: 2017-12-05

## 2017-12-05 RX ORDER — LEVOTHYROXINE SODIUM 25 UG/1
25 TABLET ORAL DAILY
Qty: 90 TABLET | Refills: 5 | Status: SHIPPED | OUTPATIENT
Start: 2017-12-05 | End: 2018-07-26 | Stop reason: SDUPTHER

## 2017-12-05 RX ORDER — CYCLOBENZAPRINE HCL 5 MG
5 TABLET ORAL 3 TIMES DAILY PRN
Qty: 30 TABLET | Refills: 0 | Status: SHIPPED | OUTPATIENT
Start: 2017-12-05 | End: 2017-12-15

## 2017-12-05 RX ORDER — DULOXETIN HYDROCHLORIDE 30 MG/1
CAPSULE, DELAYED RELEASE ORAL
Qty: 90 CAPSULE | Refills: 0 | Status: CANCELLED | OUTPATIENT
Start: 2017-12-05

## 2017-12-05 NOTE — PROGRESS NOTES
Subjective:       Patient ID: Estella Arevalo is a 62 y.o. female.    Chief Complaint: Conjunctivitis; Rash (Right arm); and Medication Refill    62-year-old female presents to the clinic today with complaint of ringworm to the right upper arm for the last several days.  She requested medication for that.  She also complains of some yellowish crusty drainage to left eye worse when she wakes up in the morning for the last 2 days.  She is requesting multiple refills. She states she never saw psychiatry as recommended by Dr. Packer. Psychiatry department number given to the patient.  She is followed by pain management.  Dr. Packer refused to refill her Klonopin,  Neurontin, or Cymbalta.  She said that she needed to get it from psychiatry or her pain management.  Patient states that she needs Klonopin to quit drinking alcohol.  However, Dr. Packer has told her in the past she would not refill her Klonopin.  She request a ortho referral for chronic right ankle pain. She said she has surgery several years ago to her ankle.       Past Medical History:   Diagnosis Date    Alcohol abuse     Anxiety     Bipolar disorder     Cirrhosis of liver     Coronary artery disease     Depression     Fall     GERD (gastroesophageal reflux disease)     Hypertension     Low back pain     MI (myocardial infarction)     Thyroid disease      Past Surgical History:   Procedure Laterality Date    HIATAL HERNIA REPAIR      JOINT REPLACEMENT      KNEE SURGERY  bilateral replacement    right foot sx  2008    SHOULDER SURGERY  replacement      reports that she has never smoked. She has never used smokeless tobacco. She reports that she drinks alcohol. She reports that she does not use drugs.  Review of Systems   Eyes: Positive for discharge.   Respiratory: Negative for cough, shortness of breath and wheezing.    Cardiovascular: Negative for chest pain, palpitations and leg swelling.   Gastrointestinal: Negative for abdominal  pain, blood in stool, constipation, diarrhea, nausea and vomiting.   Musculoskeletal: Negative for gait problem.   Skin: Positive for rash.   Neurological: Negative for dizziness, light-headedness and headaches.       Objective:      Physical Exam   Constitutional: She is oriented to person, place, and time. She appears well-developed and well-nourished. No distress.   HENT:   Head: Normocephalic and atraumatic.   Right Ear: External ear normal.   Left Ear: External ear normal.   Nose: Nose normal.   Mouth/Throat: Oropharynx is clear and moist.   Eyes: Conjunctivae and EOM are normal. Pupils are equal, round, and reactive to light. Right eye exhibits no discharge. Left eye exhibits discharge. No scleral icterus.   Neck: Normal range of motion. Neck supple. No JVD present.   Cardiovascular: Normal rate, regular rhythm and normal heart sounds.    No murmur heard.  Pulmonary/Chest: Effort normal and breath sounds normal. No respiratory distress. She has no wheezes. She has no rales.   Abdominal: Soft. Bowel sounds are normal. There is no tenderness.   Musculoskeletal: Normal range of motion. She exhibits no edema.   Neurological: She is alert and oriented to person, place, and time.   Skin: Skin is warm and dry. She is not diaphoretic.   Psychiatric: She has a normal mood and affect.       Assessment:       1. Ringworm    2. Gastroesophageal reflux disease, esophagitis presence not specified    3. DDD (degenerative disc disease), lumbar    4. Episode of recurrent major depressive disorder, unspecified depression episode severity    5. Hypothyroidism due to acquired atrophy of thyroid    6. Onychomycosis of toenail    7. Dry eye syndrome, unspecified laterality    8. Chronic low back pain with right-sided sciatica, unspecified back pain laterality    9. Conjunctivitis of left eye, unspecified conjunctivitis type    10. Chronic pain of right ankle    11. Screening for breast cancer        Plan:         Ringworm  -  Lotrisone was sent to her pharmacy    Gastroesophageal reflux disease, esophagitis presence not specified  -     pantoprazole (PROTONIX) 40 MG tablet; Take 1 tablet (40 mg total) by mouth once daily.  Dispense: 90 tablet; Refill: 0  - The current medical regimen is effective;  continue present plan and medications.    DDD (degenerative disc disease), lumbar  - followed by pain management    Episode of recurrent major depressive disorder, unspecified depression episode severity  - needs to see psychiatry per Dr. Packer     Hypothyroidism due to acquired atrophy of thyroid  -     levothyroxine (SYNTHROID) 25 MCG tablet; Take 1 tablet (25 mcg total) by mouth once daily.  Dispense: 90 tablet; Refill: 5    Onychomycosis of toenail  -     efinaconazole (JUBLIA) 10 % Eran; APPLY ONE DOSE DAILY  Dispense: 24 mL; Refill: 0  - The current medical regimen is effective;  continue present plan and medications.    Dry eye syndrome, unspecified laterality  -     cycloSPORINE (RESTASIS) 0.05 % ophthalmic emulsion; Place 0.4 mLs (1 drop total) into both eyes 2 (two) times daily.  Dispense: 30 each; Refill: 0  - The current medical regimen is effective;  continue present plan and medications.'    Chronic low back pain with right-sided sciatica, unspecified back pain laterality  - followed by pain management    Conjunctivitis of left eye, unspecified conjunctivitis type  - Polytrim drops sent to the pharmacy    Chronic pain of right ankle  -     Ambulatory referral to Orthopedics    Screening for breast cancer  - Mammogram order already in computer    Other orders  -     Cancel: DULoxetine (CYMBALTA) 30 MG capsule; TAKE 1 CAPSULE(30 MG) BY MOUTH EVERY DAY  Dispense: 90 capsule; Refill: 0  -     Cancel: gabapentin (NEURONTIN) 300 MG capsule; Take 1 capsule (300 mg total) by mouth 2 (two) times daily.  Dispense: 180 capsule; Refill: 0  -     clotrimazole-betamethasone 1-0.05% (LOTRISONE) cream; Apply topically 2 (two) times daily.   Dispense: 15 g; Refill: 0  -     polymyxin B sulf-trimethoprim (POLYTRIM) 10,000 unit- 1 mg/mL Drop; Place 1 drop into the left eye every 6 (six) hours.  Dispense: 3 mL; Refill: 0  -     amitriptyline (ELAVIL) 50 MG tablet; Take 1 tablet (50 mg total) by mouth every evening.  Dispense: 90 tablet; Refill: 3  -     cyclobenzaprine (FLEXERIL) 5 MG tablet; Take 1 tablet (5 mg total) by mouth 3 (three) times daily as needed.  Dispense: 30 tablet; Refill: 0

## 2017-12-19 DIAGNOSIS — M51.36 DDD (DEGENERATIVE DISC DISEASE), LUMBAR: ICD-10-CM

## 2017-12-19 DIAGNOSIS — F33.9 EPISODE OF RECURRENT MAJOR DEPRESSIVE DISORDER, UNSPECIFIED DEPRESSION EPISODE SEVERITY: ICD-10-CM

## 2017-12-19 NOTE — TELEPHONE ENCOUNTER
----- Message from Eve Milner sent at 12/19/2017  3:46 PM CST -----  Contact: Self  Refill : duloxetine (CYMBALTA) 30 MG capsule        Pharmacy     [facility] [facility] - [address] [facility]    Pt states she tried to get an appt with  but there's nothing close and pt needs this medication as soon as possible.

## 2017-12-20 RX ORDER — DULOXETIN HYDROCHLORIDE 30 MG/1
CAPSULE, DELAYED RELEASE ORAL
Qty: 90 CAPSULE | Refills: 0 | OUTPATIENT
Start: 2017-12-20

## 2017-12-20 NOTE — TELEPHONE ENCOUNTER
Patient has refused to see Dr. Arita.  She has been told multiple times in the past.  She has been told on every single appointment with me.  I am uncomfortable with prescribing with medication to her.

## 2017-12-27 ENCOUNTER — TELEPHONE (OUTPATIENT)
Dept: FAMILY MEDICINE | Facility: CLINIC | Age: 62
End: 2017-12-27

## 2017-12-27 NOTE — TELEPHONE ENCOUNTER
----- Message from Linsey Perez sent at 12/22/2017  4:44 PM CST -----  Contact: [phone]  Pt is wanting to know why her medication for the DULoxetine (CYMBALTA) 30 MG is not appropriate Please call pt at your earliest convenience.  Thanks !

## 2017-12-29 DIAGNOSIS — H04.129 DRY EYE SYNDROME, UNSPECIFIED LATERALITY: ICD-10-CM

## 2017-12-29 RX ORDER — CYCLOSPORINE 0.5 MG/ML
EMULSION OPHTHALMIC
Qty: 30 EACH | Refills: 0 | Status: SHIPPED | OUTPATIENT
Start: 2017-12-29 | End: 2019-04-04 | Stop reason: SDUPTHER

## 2018-01-02 RX ORDER — CYCLOSPORINE 0.5 MG/ML
EMULSION OPHTHALMIC
Refills: 0 | OUTPATIENT
Start: 2018-01-02

## 2018-01-05 ENCOUNTER — TELEPHONE (OUTPATIENT)
Dept: ADMINISTRATIVE | Facility: HOSPITAL | Age: 63
End: 2018-01-05

## 2018-01-09 ENCOUNTER — HOSPITAL ENCOUNTER (EMERGENCY)
Facility: OTHER | Age: 63
Discharge: HOME | End: 2018-01-09
Attending: EMERGENCY MEDICINE
Payer: OTHER GOVERNMENT

## 2018-01-09 VITALS
TEMPERATURE: 98 F | SYSTOLIC BLOOD PRESSURE: 115 MMHG | BODY MASS INDEX: 27.32 KG/M2 | OXYGEN SATURATION: 96 % | HEIGHT: 66 IN | HEART RATE: 103 BPM | WEIGHT: 170 LBS | DIASTOLIC BLOOD PRESSURE: 94 MMHG | RESPIRATION RATE: 20 BRPM

## 2018-01-09 DIAGNOSIS — R11.2 NON-INTRACTABLE VOMITING WITH NAUSEA, UNSPECIFIED VOMITING TYPE: Primary | ICD-10-CM

## 2018-01-09 DIAGNOSIS — F10.930 ALCOHOL WITHDRAWAL SYNDROME WITHOUT COMPLICATION: ICD-10-CM

## 2018-01-09 LAB
ALBUMIN SERPL-MCNC: 4.7 G/DL (ref 3.3–5.5)
ALBUMIN SERPL-MCNC: 4.9 G/DL (ref 3.3–5.5)
ALP SERPL-CCNC: 185 U/L (ref 42–141)
ALP SERPL-CCNC: 199 U/L (ref 42–141)
BILIRUB SERPL-MCNC: 1.1 MG/DL (ref 0.2–1.6)
BILIRUB SERPL-MCNC: 1.2 MG/DL (ref 0.2–1.6)
BILIRUBIN, POC UA: ABNORMAL
BLOOD, POC UA: NEGATIVE
BUN SERPL-MCNC: 8 MG/DL (ref 7–22)
CALCIUM SERPL-MCNC: 9.9 MG/DL (ref 8–10.3)
CHLORIDE SERPL-SCNC: 92 MMOL/L (ref 98–108)
CLARITY, POC UA: CLEAR
COLOR, POC UA: YELLOW
CREAT SERPL-MCNC: 0.6 MG/DL (ref 0.6–1.2)
GLUCOSE SERPL-MCNC: 121 MG/DL (ref 73–118)
GLUCOSE, POC UA: NEGATIVE
KETONES, POC UA: ABNORMAL
LEUKOCYTE EST, POC UA: ABNORMAL
NITRITE, POC UA: NEGATIVE
PH UR STRIP: 7 [PH]
POC ALT (SGPT): 41 U/L (ref 10–47)
POC ALT (SGPT): 43 U/L (ref 10–47)
POC AMYLASE: 61 U/L (ref 14–97)
POC AST (SGOT): 81 U/L (ref 11–38)
POC AST (SGOT): 85 U/L (ref 11–38)
POC GGT: 802 U/L (ref 5–65)
POC TCO2: 25 MMOL/L (ref 18–33)
POTASSIUM BLD-SCNC: 4.1 MMOL/L (ref 3.6–5.1)
PROTEIN, POC UA: ABNORMAL
PROTEIN, POC: 9.3 G/DL (ref 6.4–8.1)
PROTEIN, POC: 9.6 G/DL (ref 6.4–8.1)
SODIUM BLD-SCNC: 138 MMOL/L (ref 128–145)
SPECIFIC GRAVITY, POC UA: 1.02
UROBILINOGEN, POC UA: 1 E.U./DL

## 2018-01-09 PROCEDURE — 96361 HYDRATE IV INFUSION ADD-ON: CPT

## 2018-01-09 PROCEDURE — 96374 THER/PROPH/DIAG INJ IV PUSH: CPT

## 2018-01-09 PROCEDURE — 85025 COMPLETE CBC W/AUTO DIFF WBC: CPT

## 2018-01-09 PROCEDURE — 82040 ASSAY OF SERUM ALBUMIN: CPT

## 2018-01-09 PROCEDURE — 25000003 PHARM REV CODE 250: Performed by: EMERGENCY MEDICINE

## 2018-01-09 PROCEDURE — 96375 TX/PRO/DX INJ NEW DRUG ADDON: CPT

## 2018-01-09 PROCEDURE — 80053 COMPREHEN METABOLIC PANEL: CPT

## 2018-01-09 PROCEDURE — 81003 URINALYSIS AUTO W/O SCOPE: CPT

## 2018-01-09 PROCEDURE — 63600175 PHARM REV CODE 636 W HCPCS: Performed by: EMERGENCY MEDICINE

## 2018-01-09 PROCEDURE — 99284 EMERGENCY DEPT VISIT MOD MDM: CPT | Mod: 25

## 2018-01-09 RX ORDER — PROMETHAZINE HYDROCHLORIDE 25 MG/1
25 SUPPOSITORY RECTAL EVERY 6 HOURS PRN
Qty: 10 SUPPOSITORY | Refills: 0 | Status: SHIPPED | OUTPATIENT
Start: 2018-01-09 | End: 2018-05-06 | Stop reason: ALTCHOICE

## 2018-01-09 RX ORDER — CHLORDIAZEPOXIDE HYDROCHLORIDE 25 MG/1
CAPSULE, GELATIN COATED ORAL
Qty: 20 CAPSULE | Refills: 0 | Status: SHIPPED | OUTPATIENT
Start: 2018-01-09 | End: 2018-05-06

## 2018-01-09 RX ORDER — ONDANSETRON 4 MG/1
8 TABLET, ORALLY DISINTEGRATING ORAL
Status: COMPLETED | OUTPATIENT
Start: 2018-01-09 | End: 2018-01-09

## 2018-01-09 RX ORDER — DIAZEPAM 10 MG/2ML
10 INJECTION INTRAMUSCULAR
Status: DISCONTINUED | OUTPATIENT
Start: 2018-01-09 | End: 2018-01-09

## 2018-01-09 RX ORDER — METOCLOPRAMIDE HYDROCHLORIDE 5 MG/ML
10 INJECTION INTRAMUSCULAR; INTRAVENOUS
Status: COMPLETED | OUTPATIENT
Start: 2018-01-09 | End: 2018-01-09

## 2018-01-09 RX ORDER — METOCLOPRAMIDE 10 MG/1
10 TABLET ORAL EVERY 6 HOURS
Qty: 30 TABLET | Refills: 0 | Status: SHIPPED | OUTPATIENT
Start: 2018-01-09 | End: 2018-07-26 | Stop reason: SDUPTHER

## 2018-01-09 RX ADMIN — ONDANSETRON 8 MG: 4 TABLET, ORALLY DISINTEGRATING ORAL at 08:01

## 2018-01-09 RX ADMIN — SODIUM CHLORIDE 1000 ML: 0.9 INJECTION, SOLUTION INTRAVENOUS at 09:01

## 2018-01-09 RX ADMIN — METOCLOPRAMIDE HYDROCHLORIDE 10 MG: 5 INJECTION INTRAMUSCULAR; INTRAVENOUS at 09:01

## 2018-01-09 RX ADMIN — LORAZEPAM 2 MG: 2 INJECTION INTRAMUSCULAR at 09:01

## 2018-01-10 NOTE — ED TRIAGE NOTES
Pt states she quit drinking yesterday and began having n/v. Pt states she normally drinks 6-12 beers a day and has been drinking pretty steadily since the holidays. Pt states she also has a headache. Pt dry heaving. Pt has no other complaints

## 2018-01-10 NOTE — ED NOTES
Patient identifiers verified and correct for Estella Arevalo.    LOC: The patient is awake, alert and aware of environment with an appropriate affect, the patient is oriented x 3 and speaking appropriately.  APPEARANCE: Patient restless and in no acute distress, patient is clean and well groomed, patient's clothing is properly fastened.  SKIN: The skin is warm and dry, color consistent with ethnicity, patient has normal skin turgor and moist mucus membranes, skin intact, no breakdown or bruising noted.  MUSCULOSKELETAL: Patient moving all extremities spontaneously, no obvious swelling or deformities noted.  RESPIRATORY: Airway is open and patent, respirations are spontaneous, patient has a normal effort and rate, no accessory muscle use noted, clear bilateral breath sounds.  CARDIAC: Patient sinus tach, regular rhythm, no periphreal edema noted, capillary refill < 3 seconds.  ABDOMEN: Soft and non tender to palpation, no distention noted, normoactive bowel sounds present in all four quadrants.  NEUROLOGIC: PERRL, 3mm bilaterally, eyes open spontaneously, behavior appropriate to situation, follows commands, facial expression symmetrical, bilateral hand grasp equal and even, purposeful motor response noted, normal sensation in all extremities when touched with a finger.

## 2018-01-10 NOTE — ED PROVIDER NOTES
Encounter Date: 1/9/2018       History     Chief Complaint   Patient presents with    Emesis     patient states that she is trying to detox herself off of alcohol. Was here and left, called the ambulance to take her to [facility], Left [facility] to come back here with 24 guage iv still in right wrist. States she has been vomiting since last night but her last drink was earlier today.     62 y.o. Female with PMH ETOH abuse, cirrhosis, others presents to emergency department complaining of acute nausea, vomiting, headache and diarrhea that began last night.  She states that she's been trying to detox himself from alcohol since yesterday.  She reports to drinking 6-12 beers daily and states her last drink was last night.  She states headache is localized to the frontal area and was gradual in onset earlier today.  She denies taking medication for pain and denies fever, neck stiffness, photophobia, focal numbness or gait disturbance.  States she fell and hit her head 2 weeks ago and is concerned that she may have a skull fracture.          Review of patient's allergies indicates:  No Known Allergies  Past Medical History:   Diagnosis Date    Alcohol abuse     Anxiety     Bipolar disorder     Cirrhosis of liver     Coronary artery disease     Depression     Fall     GERD (gastroesophageal reflux disease)     Hypertension     Low back pain     MI (myocardial infarction)     Thyroid disease      Past Surgical History:   Procedure Laterality Date    HIATAL HERNIA REPAIR      JOINT REPLACEMENT      KNEE SURGERY  bilateral replacement    right foot sx  2008    SHOULDER SURGERY  replacement     Family History   Problem Relation Age of Onset    Cancer Mother     Cancer Father      Social History   Substance Use Topics    Smoking status: Never Smoker    Smokeless tobacco: Never Used    Alcohol use 3.6 oz/week     6 Cans of beer per week      Comment: every day     Review of Systems   Constitutional:  Negative for chills, diaphoresis and fever.   Eyes: Negative for photophobia and visual disturbance.   Respiratory: Negative for cough, shortness of breath and stridor.    Cardiovascular: Negative for chest pain, palpitations and leg swelling.   Gastrointestinal: Positive for diarrhea, nausea and vomiting. Negative for abdominal pain.   Genitourinary: Negative for dysuria and frequency.   Musculoskeletal: Positive for back pain (chronic). Negative for neck stiffness.   Skin: Negative for rash.   Neurological: Positive for headaches. Negative for tremors, syncope, speech difficulty, weakness and numbness.   Psychiatric/Behavioral: Negative for confusion and hallucinations.       Physical Exam     Initial Vitals [01/09/18 1938]   BP Pulse Resp Temp SpO2   (!) 175/127 109 20 98.2 °F (36.8 °C) 100 %      MAP       143         Physical Exam    Nursing note and vitals reviewed.  Constitutional: She appears well-developed and well-nourished. She is not diaphoretic. She is cooperative. No distress.   Dry heaving and spitting into emesis bag   HENT:   Head: Normocephalic and atraumatic.   Eyes: Conjunctivae are normal. Pupils are equal, round, and reactive to light.   Neck: Normal range of motion. Neck supple.   Cardiovascular: Intact distal pulses. Tachycardia present.    Pulmonary/Chest: No accessory muscle usage. No tachypnea. No respiratory distress.   Abdominal: She exhibits no distension. There is no tenderness.   Musculoskeletal: Normal range of motion. She exhibits no tenderness.   Neurological: She is alert and oriented to person, place, and time.   Skin: Skin is warm. Capillary refill takes less than 2 seconds.   Psychiatric: Her speech is normal. Her mood appears anxious.   restless         ED Course   Procedures  Labs Reviewed - No data to display          Medical Decision Making:   ED Management:  No clinical ssx of DTs. Discharge with librium taper.                    ED Course as of Jan 09 2300   Tue Jan 09,  2018   2226 Calcium, POC: 9.9 [DL]   2253 Symptoms resolved with meds evening ED.  Patient tolerating oral intake.   [DL]      ED Course User Index  [DL] Rob Aguilar MD     Labs Reviewed  Admission on 01/09/2018   Component Date Value Ref Range Status    Albumin, POC 01/09/2018 4.7  3.3 - 5.5 g/dL Final    Alkaline Phosphatase, POC 01/09/2018 199* 42 - 141 U/L Final    ALT (SGPT), POC 01/09/2018 43  10 - 47 U/L Final    AST (SGOT), POC 01/09/2018 81* 11 - 38 U/L Final    POC BUN 01/09/2018 8  7 - 22 mg/dL Final    Calcium, POC 01/09/2018 9.9  8.0 - 10.3 mg/dL Final    POC Chloride 01/09/2018 92* 98 - 108 mmol/L Final    POC Creatinine 01/09/2018 0.6* 0.6 - 1.2 mg/dL Final    POC Glucose 01/09/2018 121* 73 - 118 mg/dL Final    POC Potassium 01/09/2018 4.1  3.6 - 5.1 mmol/L Final    POC Sodium 01/09/2018 138  128 - 145 mmol/L Final    Bilirubin 01/09/2018 1.2  0.2 - 1.6 mg/dL Final    POC TCO2 01/09/2018 25  18 - 33 mmol/L Final    Protein 01/09/2018 9.6* 6.4 - 8.1 g/dL Final    Albumin, POC 01/09/2018 4.9  3.3 - 5.5 g/dL Final    Alkaline Phosphatase, POC 01/09/2018 185* 42 - 141 U/L Final    ALT (SGPT), POC 01/09/2018 41  10 - 47 U/L Final    Amylase, POC 01/09/2018 61  14 - 97 U/L Final    AST (SGOT), POC 01/09/2018 85* 11 - 38 U/L Final    POC GGT 01/09/2018 802* 5 - 65 U/L Final    Bilirubin 01/09/2018 1.1  0.2 - 1.6 mg/dL Final    Protein 01/09/2018 9.3* 6.4 - 8.1 g/dL Final    Glucose, UA 01/09/2018 Negative*  Final    Bilirubin, UA 01/09/2018 1+*  Final    Ketones, UA 01/09/2018 Trace*  Final    Spec Grav UA 01/09/2018 1.020   Final    Blood, UA 01/09/2018 Negative*  Final    PH, UA 01/09/2018 7.0   Final    Protein, UA 01/09/2018 1+*  Final    Urobilinogen, UA 01/09/2018 1.0  E.U./dL Final    Nitrite, UA 01/09/2018 Negative*  Final    Leukocytes, UA 01/09/2018 1+*  Final    Color, UA 01/09/2018 Yellow   Final    Clarity, UA 01/09/2018 Clear   Final        Imaging  Reviewed    Imaging Results          CT Head Without Contrast (Final result)  Result time 01/09/18 21:42:07    Final result by Chan Richardson MD (01/09/18 21:42:07)                 Impression:        No convincing acute intracranial abnormality noting limited exam secondary to patient motion.    Remote injury of the right zygomatic arch, similar to the previous examination as well as postsurgical changes of the anterior wall of right maxillary sinus.    Near-complete opacification of the right maxillary sinus and right sphenoid sinus.    Sequela of chronic microvascular ischemic change and senescent change.            Electronically signed by: CHAN RICHARDSON MD  Date:     01/09/18  Time:    21:42              Narrative:    Comparison: 8/25/2017    Clinical history: Headache    Technique:    Axial images of the brain were obtained at 5-mm intervals from the skull base to the vertex without the administration of contrast.    Findings:    Examination is limited secondary to patient motion.    There is generalized cerebral volume loss.  There is hypoattenuation in a periventricular fashion, likely sequela of chronic microvascular ischemic change.  There is no evidence of acute major vascular territory infarct, hemorrhage, or mass.  There is no hydrocephalus.  There are no abnormal extra-axial fluid collections.  There is complete opacification of the right maxillary sinus, and near-complete opacification of the right sphenoid sinus, otherwise the visualized paranasal sinuses and mastoid air cells are clear, and there is no evidence of calvarial fracture.  The visualized soft tissues are unremarkable.    Postsurgical changes are noted of the anterior wall of the right maxillary sinus.  Remote injury noted of the right zygomatic arch.                              Medications given in ED    Medications   ondansetron disintegrating tablet 8 mg (8 mg Oral Given 1/9/18 2006)   metoclopramide HCl injection 10 mg (10 mg  Intravenous Given 1/9/18 2105)   sodium chloride 0.9% bolus 1,000 mL (0 mLs Intravenous Stopped 1/9/18 2248)   lorazepam (ATIVAN) injection 2 mg (2 mg Intravenous Given 1/9/18 2157)     Discharge Medications     Discharge Medication List as of 1/9/2018 10:42 PM      START taking these medications    Details   chlordiazepoxide (LIBRIUM) 25 MG Cap Day 1 50 mg q 6 hours  Day 2 50 mg q 8 hours  Day 3 50 mg q 12 hours   Day 4 50 mg at night, Print      metoclopramide HCl (REGLAN) 10 MG tablet Take 1 tablet (10 mg total) by mouth every 6 (six) hours., Starting Tue 1/9/2018, Normal      promethazine (PHENERGAN) 25 MG suppository Place 1 suppository (25 mg total) rectally every 6 (six) hours as needed for Nausea., Starting Tue 1/9/2018, Normal         CONTINUE these medications which have NOT CHANGED    Details   amitriptyline (ELAVIL) 50 MG tablet Take 1 tablet (50 mg total) by mouth every evening., Starting Tue 12/5/2017, Until Wed 12/5/2018, Normal      clonazePAM (KLONOPIN) 1 MG tablet TAKE 1 TABLET BY MOUTH TWICE DAILY, Print      clotrimazole-betamethasone 1-0.05% (LOTRISONE) cream Apply topically 2 (two) times daily., Starting Tue 12/5/2017, Normal      cyclobenzaprine (FLEXERIL) 10 MG tablet TAKE 1 TABLET(10 MG) BY MOUTH THREE TIMES DAILY AS NEEDED FOR MUSCLE SPASMS, Normal      !! cycloSPORINE (RESTASIS) 0.05 % ophthalmic emulsion Place 0.4 mLs (1 drop total) into both eyes 2 (two) times daily., Starting Tue 12/5/2017, Normal      docusate sodium (COLACE) 100 MG capsule Take 1 capsule (100 mg total) by mouth 2 (two) times daily., Starting Fri 9/22/2017, Print      duloxetine (CYMBALTA) 30 MG capsule TAKE 1 CAPSULE(30 MG) BY MOUTH EVERY DAY, Normal      efinaconazole (JUBLIA) 10 % Eran APPLY ONE DOSE DAILY, Normal      gabapentin (NEURONTIN) 300 MG capsule Take 1 capsule (300 mg total) by mouth 2 (two) times daily., Starting Fri 9/22/2017, Normal      levothyroxine (SYNTHROID) 25 MCG tablet Take 1 tablet (25 mcg  total) by mouth once daily., Starting Tue 12/5/2017, Normal      LINZESS 145 mcg Cap capsule Take 1 capsule (145 mcg total) by mouth once daily., Starting Fri 9/22/2017, Normal      metoprolol tartrate (LOPRESSOR) 25 MG tablet Take 0.5 tablets (12.5 mg total) by mouth 2 (two) times daily., Starting Fri 9/22/2017, Until Sat 9/22/2018, Normal      atenolol (TENORMIN) 25 MG tablet Take 1 tablet by mouth once daily., Starting Fri 11/17/2017, Historical Med      cephALEXin (KEFLEX) 500 MG capsule Take 1 capsule (500 mg total) by mouth every 8 (eight) hours., Starting Fri 9/15/2017, Until Mon 9/25/2017, Print      NARCAN 4 mg/actuation Spry USE UTD, Historical Med      nitroGLYCERIN (NITROSTAT) 0.3 MG SL tablet ONE TABLET UNDER TONGUE AS NEEDED FOR CHEST PAIN EVERY 5 MINUTES AS NEEDED, Normal      ondansetron (ZOFRAN-ODT) 4 MG TbDL Take 1 tablet (4 mg total) by mouth every 8 (eight) hours as needed. Nausea/vomiting, Starting Fri 9/22/2017, Normal      oxyCODONE (ROXICODONE) 20 mg Tab immediate release tablet Take 1 tablet by mouth every 6 (six) hours as needed., Starting Fri 11/24/2017, Historical Med      polymyxin B sulf-trimethoprim (POLYTRIM) 10,000 unit- 1 mg/mL Drop Place 1 drop into the left eye every 6 (six) hours., Starting Tue 12/5/2017, Normal      !! RESTASIS 0.05 % ophthalmic emulsion INSTILL 1 DROP IN BOTH EYES TWICE DAILY, Normal      zolpidem (AMBIEN) 5 MG Tab TK 1 T PO QHS PRN, Print      pantoprazole (PROTONIX) 40 MG tablet Take 1 tablet (40 mg total) by mouth once daily., Starting Tue 12/5/2017, Until Wed 12/5/2018, Normal       !! - Potential duplicate medications found. Please discuss with provider.              Vitals:    01/09/18 1938 01/09/18 2136 01/09/18 2236   BP: (!) 175/127 122/87 (!) 115/94   BP Location: Right arm     Patient Position: Sitting     Pulse: 109 (!) 112 103   Resp: 20     Temp: 98.2 °F (36.8 °C)     TempSrc: Oral     SpO2: 100% 98% 96%   Weight: 77.1 kg (170 lb)     Height: 5'  "6" (1.676 m)           Patient discharged to home in stable condition with instructions to:   1. Please take all meds as prescribed.  2. Follow-up with your primary care doctor   3. Return precautions discussed and patient and/or family/caretaker understands to return to the emergency room for any concerns including worsening of your current symptoms, fever, chills, night sweats, worsening pain, chest pain, shortness of breath, nausea, vomiting, diarrhea, bleeding, headache, difficulty talking, visual disturbances, weakness, numbness or any other acute concerns      Note was created using voice recognition software. Note may have occasional typographical errors that may not have been identified and edited despite good stiven initial review prior to signing.    Clinical Impression:   The primary encounter diagnosis was Non-intractable vomiting with nausea, unspecified vomiting type. A diagnosis of Alcohol withdrawal syndrome without complication was also pertinent to this visit.                           Rob Aguilar MD  02/22/18 1051    "

## 2018-01-16 ENCOUNTER — HOSPITAL ENCOUNTER (EMERGENCY)
Facility: HOSPITAL | Age: 63
Discharge: HOME | End: 2018-01-16
Attending: EMERGENCY MEDICINE
Payer: OTHER GOVERNMENT

## 2018-01-16 VITALS
DIASTOLIC BLOOD PRESSURE: 67 MMHG | TEMPERATURE: 98 F | RESPIRATION RATE: 16 BRPM | HEIGHT: 66 IN | HEART RATE: 74 BPM | WEIGHT: 170 LBS | OXYGEN SATURATION: 98 % | BODY MASS INDEX: 27.32 KG/M2 | SYSTOLIC BLOOD PRESSURE: 102 MMHG

## 2018-01-16 DIAGNOSIS — R41.82 ALTERED MENTAL STATUS: ICD-10-CM
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
## 2018-01-16 DIAGNOSIS — R05.9 COUGH: ICD-10-CM

## 2018-01-16 DIAGNOSIS — S00.83XA FACIAL CONTUSION, INITIAL ENCOUNTER: ICD-10-CM

## 2018-01-16 LAB
ALBUMIN SERPL BCP-MCNC: 3 G/DL
ALP SERPL-CCNC: 157 U/L
ALT SERPL W/O P-5'-P-CCNC: 84 U/L
AMPHET+METHAMPHET UR QL: NEGATIVE
ANION GAP SERPL CALC-SCNC: 4 MMOL/L
APTT BLDCRRT: 25.2 SEC
AST SERPL-CCNC: 214 U/L
BARBITURATES UR QL SCN>200 NG/ML: NEGATIVE
BASOPHILS # BLD AUTO: 0.04 K/UL
BASOPHILS NFR BLD: 0.5 %
BENZODIAZ UR QL SCN>200 NG/ML: NORMAL
BILIRUB SERPL-MCNC: 1.2 MG/DL
BILIRUB UR QL STRIP: NEGATIVE
BUN SERPL-MCNC: 10 MG/DL
BZE UR QL SCN: NEGATIVE
CALCIUM SERPL-MCNC: 9 MG/DL
CANNABINOIDS UR QL SCN: NEGATIVE
CHLORIDE SERPL-SCNC: 99 MMOL/L
CLARITY UR: CLEAR
CO2 SERPL-SCNC: 30 MMOL/L
COLOR UR: YELLOW
CREAT SERPL-MCNC: 0.6 MG/DL
CREAT UR-MCNC: 39.2 MG/DL
DIFFERENTIAL METHOD: ABNORMAL
EOSINOPHIL # BLD AUTO: 0.4 K/UL
EOSINOPHIL NFR BLD: 4.5 %
ERYTHROCYTE [DISTWIDTH] IN BLOOD BY AUTOMATED COUNT: 15 %
EST. GFR  (AFRICAN AMERICAN): >60 ML/MIN/1.73 M^2
EST. GFR  (NON AFRICAN AMERICAN): >60 ML/MIN/1.73 M^2
ETHANOL SERPL-MCNC: <10 MG/DL
GLUCOSE SERPL-MCNC: 98 MG/DL
GLUCOSE UR QL STRIP: NEGATIVE
HCT VFR BLD AUTO: 33.2 %
HGB BLD-MCNC: 11.3 G/DL
HGB UR QL STRIP: NEGATIVE
INR PPP: 1.1
KETONES UR QL STRIP: NEGATIVE
LACTATE SERPL-SCNC: 0.9 MMOL/L
LEUKOCYTE ESTERASE UR QL STRIP: NEGATIVE
LIPASE SERPL-CCNC: 6 U/L
LYMPHOCYTES # BLD AUTO: 1 K/UL
LYMPHOCYTES NFR BLD: 11 %
MAGNESIUM SERPL-MCNC: 1.7 MG/DL
MCH RBC QN AUTO: 32.6 PG
MCHC RBC AUTO-ENTMCNC: 34 G/DL
MCV RBC AUTO: 96 FL
METHADONE UR QL SCN>300 NG/ML: NEGATIVE
MONOCYTES # BLD AUTO: 0.9 K/UL
MONOCYTES NFR BLD: 10.3 %
NEUTROPHILS # BLD AUTO: 6.4 K/UL
NEUTROPHILS NFR BLD: 73.7 %
NITRITE UR QL STRIP: NEGATIVE
OPIATES UR QL SCN: NORMAL
PCP UR QL SCN>25 NG/ML: NEGATIVE
PH UR STRIP: 6 [PH] (ref 5–8)
PHOSPHATE SERPL-MCNC: 2.4 MG/DL
PLATELET # BLD AUTO: 221 K/UL
PMV BLD AUTO: 10.2 FL
POTASSIUM SERPL-SCNC: 3.9 MMOL/L
PROT SERPL-MCNC: 6.9 G/DL
PROT UR QL STRIP: NEGATIVE
PROTHROMBIN TIME: 11.4 SEC
RBC # BLD AUTO: 3.47 M/UL
SODIUM SERPL-SCNC: 133 MMOL/L
SP GR UR STRIP: 1.01 (ref 1–1.03)
TOXICOLOGY INFORMATION: NORMAL
TROPONIN I SERPL DL<=0.01 NG/ML-MCNC: 0.01 NG/ML
TSH SERPL DL<=0.005 MIU/L-ACNC: 1.69 UIU/ML
URN SPEC COLLECT METH UR: ABNORMAL
UROBILINOGEN UR STRIP-ACNC: ABNORMAL EU/DL
WBC # BLD AUTO: 8.71 K/UL

## 2018-01-16 PROCEDURE — 93010 ELECTROCARDIOGRAM REPORT: CPT | Mod: ,,, | Performed by: INTERNAL MEDICINE

## 2018-01-16 PROCEDURE — 87086 URINE CULTURE/COLONY COUNT: CPT

## 2018-01-16 PROCEDURE — 80307 DRUG TEST PRSMV CHEM ANLYZR: CPT

## 2018-01-16 PROCEDURE — 85730 THROMBOPLASTIN TIME PARTIAL: CPT

## 2018-01-16 PROCEDURE — 84100 ASSAY OF PHOSPHORUS: CPT

## 2018-01-16 PROCEDURE — P9612 CATHETERIZE FOR URINE SPEC: HCPCS

## 2018-01-16 PROCEDURE — 81003 URINALYSIS AUTO W/O SCOPE: CPT

## 2018-01-16 PROCEDURE — 93005 ELECTROCARDIOGRAM TRACING: CPT

## 2018-01-16 PROCEDURE — 83735 ASSAY OF MAGNESIUM: CPT

## 2018-01-16 PROCEDURE — 85610 PROTHROMBIN TIME: CPT

## 2018-01-16 PROCEDURE — 83690 ASSAY OF LIPASE: CPT

## 2018-01-16 PROCEDURE — 84484 ASSAY OF TROPONIN QUANT: CPT

## 2018-01-16 PROCEDURE — 99285 EMERGENCY DEPT VISIT HI MDM: CPT | Mod: 25

## 2018-01-16 PROCEDURE — 85025 COMPLETE CBC W/AUTO DIFF WBC: CPT

## 2018-01-16 PROCEDURE — 83605 ASSAY OF LACTIC ACID: CPT

## 2018-01-16 PROCEDURE — 80053 COMPREHEN METABOLIC PANEL: CPT

## 2018-01-16 PROCEDURE — 25000003 PHARM REV CODE 250: Performed by: EMERGENCY MEDICINE

## 2018-01-16 PROCEDURE — 96360 HYDRATION IV INFUSION INIT: CPT

## 2018-01-16 PROCEDURE — 84443 ASSAY THYROID STIM HORMONE: CPT

## 2018-01-16 PROCEDURE — 80320 DRUG SCREEN QUANTALCOHOLS: CPT

## 2018-01-16 RX ORDER — OXYCODONE HYDROCHLORIDE 5 MG/1
10 TABLET ORAL
Status: COMPLETED | OUTPATIENT
Start: 2018-01-16 | End: 2018-01-16

## 2018-01-16 RX ORDER — OXYCODONE AND ACETAMINOPHEN 5; 325 MG/1; MG/1
1 TABLET ORAL
Status: DISCONTINUED | OUTPATIENT
Start: 2018-01-16 | End: 2018-01-16

## 2018-01-16 RX ADMIN — SODIUM CHLORIDE 500 ML: 900 INJECTION, SOLUTION INTRAVENOUS at 06:01

## 2018-01-16 RX ADMIN — OXYCODONE HYDROCHLORIDE 10 MG: 5 TABLET ORAL at 06:01

## 2018-01-16 NOTE — ED TRIAGE NOTES
"Pt here via EMS for fatigue. When asked what brought pt into ED today pt loudly yells "I dont know". Pt not answering questions in full sentences; states "I fell. Fever. I was coughing". Speech is garbled; pt appears agitated. History limited due to pt current mood/state of mind.   "

## 2018-01-16 NOTE — ED NOTES
"Pt refuses PO pain medication. Yells at nurse "I take 20mg of hydrocodone. I can't take tylenol because of my liver. The doctor should know that. Im not taking that stuff. They usually give me IV dilaudid. Im only taking that". Will make MD aware.   "

## 2018-01-16 NOTE — ED PROVIDER NOTES
Encounter Date: 1/16/2018    SCRIBE #1 NOTE: I, Windy Navarrete, am scribing for, and in the presence of,  Fabian Sumner MD. I have scribed the following portions of the note - Other sections scribed: ROS and HPI.       History     Chief Complaint   Patient presents with    Fatigue     generalized weakness and body aches. patient has a hx of substance abuse. patient slow to respond to questions.      CC: Fatigue; Headache    HPI: This 62 y.o. female with a past medical history of Alcohol abuse; Anxiety; Bipolar disorder; Cirrhosis of liver; CAD; Depression; Fall; GERD; Hypertension; Low back pain; MI; and Thyroid disease, presents to the ED complaining of gradual onset, severe and constant generalized headache. She does not know how she got to ER and why she is here. She notes she fell on a tile floor after slipping on a paper. She is unsure fall happened today or couple of days ago. Unsure about LOC. Ecchymosis noted to L eye. She was seen at an ER on 01/09/2018 for N/V/D and headache after she attempted to detox from alcohol . Denies abdominal pain, CP, SOB or any other associated sx. Not a current smoker.       The history is provided by the patient. No  was used.     Review of patient's allergies indicates:  No Known Allergies  Past Medical History:   Diagnosis Date    Alcohol abuse     Anxiety     Bipolar disorder     Cirrhosis of liver     Coronary artery disease     Depression     Fall     GERD (gastroesophageal reflux disease)     Hypertension     Low back pain     MI (myocardial infarction)     Thyroid disease      Past Surgical History:   Procedure Laterality Date    HIATAL HERNIA REPAIR      JOINT REPLACEMENT      KNEE SURGERY  bilateral replacement    right foot sx  2008    SHOULDER SURGERY  replacement     Family History   Problem Relation Age of Onset    Cancer Mother     Cancer Father      Social History   Substance Use Topics    Smoking status: Never Smoker     Smokeless tobacco: Never Used    Alcohol use 3.6 oz/week     6 Cans of beer per week      Comment: every day     Review of Systems   Constitutional: Positive for fatigue. Negative for fever.   HENT: Negative for sore throat.    Eyes:        (+) ecchymosis to L eye   Respiratory: Negative for shortness of breath.    Cardiovascular: Negative for chest pain.   Gastrointestinal: Negative for nausea.   Genitourinary: Negative for dysuria.   Musculoskeletal: Negative for back pain.   Skin: Negative for rash.   Neurological: Positive for headaches (generalized). Negative for syncope.   Hematological: Does not bruise/bleed easily.       Physical Exam     Initial Vitals   BP Pulse Resp Temp SpO2   01/16/18 1441 01/16/18 1413 01/16/18 1413 01/16/18 1413 01/16/18 1413   (!) 101/57 81 16 99 °F (37.2 °C) (!) 92 %      MAP       01/16/18 1441       71.67         Physical Exam    Nursing note and vitals reviewed.  Constitutional: She appears well-developed and well-nourished.   HENT:   Head: Normocephalic.   Left facial ecchymosis around the left eye. No laceration.   Eyes: Conjunctivae and EOM are normal. Pupils are equal, round, and reactive to light.   Neck: Normal range of motion. Neck supple.   Cardiovascular: Normal rate, regular rhythm and normal heart sounds.   Pulmonary/Chest: Breath sounds normal.   Abdominal: Soft. Bowel sounds are normal. She exhibits no distension. There is no tenderness.   Musculoskeletal: She exhibits tenderness.   Left facial tenderness.   Neurological: She is alert and oriented to person, place, and time. She has normal strength.   Skin: Skin is warm and dry. Capillary refill takes less than 2 seconds.         ED Course   Procedures  Labs Reviewed   CBC W/ AUTO DIFFERENTIAL - Abnormal; Notable for the following:        Result Value    RBC 3.47 (*)     Hemoglobin 11.3 (*)     Hematocrit 33.2 (*)     MCH 32.6 (*)     RDW 15.0 (*)     Gran% 73.7 (*)     Lymph% 11.0 (*)     All other components  within normal limits    Narrative:     Recoll. [id number] by TWDEVIN at 01/16/2018 17:20, reason: Specimen   clotted  Tube has been discarded   COMPREHENSIVE METABOLIC PANEL - Abnormal; Notable for the following:     Sodium 133 (*)     CO2 30 (*)     Albumin 3.0 (*)     Total Bilirubin 1.2 (*)     Alkaline Phosphatase 157 (*)      (*)     ALT 84 (*)     Anion Gap 4 (*)     All other components within normal limits   PHOSPHORUS - Abnormal; Notable for the following:     Phosphorus 2.4 (*)     All other components within normal limits   URINALYSIS - Abnormal; Notable for the following:     Urobilinogen, UA 2.0-3.0 (*)     All other components within normal limits   CULTURE, URINE   APTT   LACTIC ACID, PLASMA   LIPASE   MAGNESIUM   PROTIME-INR   TROPONIN I   TSH   DRUG SCREEN PANEL, URINE EMERGENCY   ALCOHOL,MEDICAL (ETHANOL)        Imaging Results          X-Ray Chest AP Portable (Final result)  Result time 01/16/18 22:39:44    Final result by Kishan Levi MD (01/16/18 22:39:44)                 Impression:        No acute radiographic findings are identified in the chest.        ______________________________________     Electronically signed by resident: SIM MEADOWS MD  Date:     01/16/18  Time:    22:11            As the supervising and teaching physician, I personally reviewed the images and resident's interpretation and I agree with the findings.      ______________________________________     Electronically signed by resident: SIM MEADOWS MD  Date:     01/16/18  Time:    22:37            As the supervising and teaching physician, I personally reviewed the images and resident's interpretation and I agree with the findings.          Electronically signed by: KISHAN LEVI MD  Date:     01/16/18  Time:    22:39              Narrative:    Technique:  Chest AP radiograph    Comparison: Chest radiograph 4/4/17    Findings:     Cardiac monitor wires overlie the chest. Cardiomediastinal silhouette appears  stable. Lungs are slightly hypoexpanded without evidence of focal consolidation, pleural effusion, or pneumothorax.   Stable postsurgical changes of reverse right shoulder arthroplasty noted.                             CT Head Without Contrast (Final result)  Result time 01/16/18 19:17:22    Final result by Kishan Levi MD (01/16/18 19:17:22)                 Impression:       1.  No acute intracranial abnormalities identified.    2.  No definite acute facial fractures identified.  Suspected remote left orbital floor fracture with mild partial herniation of intraorbital fat into the superior aspect of the left maxillary antrum.  No air-fluid levels or blood products seen to suggest acute fracture.  Additional remote fractures are seen involving the nasal bones, right zygomatic arch, and right maxillary sinus walls.      Electronically signed by: KISHAN LEVI MD  Date:     01/16/18  Time:    19:17              Narrative:    Exam: CT of the head and maxillofacial CT without contrast -- 62-year-old female status post fall.    Comparison: CT head 8/2017.    Technique: 5 mm noncontrast axial images through the brain were obtained.  Additional 2.5 mm axial images with sagittal and coronal reformats were obtained through the maxillofacial region without the use of contrast.      Findings: No evidence of acute/recent major vascular distribution cerebral infarction, intraparenchymal hemorrhage, or intra-axial space occupying lesion. The ventricular system is normal in size and configuration with no evidence of hydrocephalus. No effacement of the skull-base cisterns. No abnormal extra-axial fluid collections or blood products. The calvarium shows no significant abnormality.    Remote facial fractures are seen involving the right maxillary sinus walls with postsurgical ORIF changes involving the right anterior maxillary sinus wall.  Remote appearing fracture also seen involving the right zygomatic arch.  There is  suspected left remote inferior orbital fracture with partial mild herniation of intraorbital fat into the superior aspect of the left maxillary antrum.  No air-fluid levels are seen to otherwise suggest acute fracture.  There are suspected remote nasal bone fractures with prominent leftward nasal septal deviation.    There is near-complete opacification of the right maxillary sinus and right sphenoid sinus.  Remaining visualized paranasal sinuses and mastoid air cells are clear.    Upper visualized cervical spine shows no evidence of acute fracture.  There is prominent left-sided facet arthropathy.  Odontoid process is intact the craniocervical junction is unremarkable.                             CT Maxillofacial Without Contrast (Final result)  Result time 01/16/18 19:17:22    Final result by Kishan Levi MD (01/16/18 19:17:22)                 Impression:       1.  No acute intracranial abnormalities identified.    2.  No definite acute facial fractures identified.  Suspected remote left orbital floor fracture with mild partial herniation of intraorbital fat into the superior aspect of the left maxillary antrum.  No air-fluid levels or blood products seen to suggest acute fracture.  Additional remote fractures are seen involving the nasal bones, right zygomatic arch, and right maxillary sinus walls.      Electronically signed by: KISHAN LEVI MD  Date:     01/16/18  Time:    19:17              Narrative:    Exam: CT of the head and maxillofacial CT without contrast -- 62-year-old female status post fall.    Comparison: CT head 8/2017.    Technique: 5 mm noncontrast axial images through the brain were obtained.  Additional 2.5 mm axial images with sagittal and coronal reformats were obtained through the maxillofacial region without the use of contrast.      Findings: No evidence of acute/recent major vascular distribution cerebral infarction, intraparenchymal hemorrhage, or intra-axial space occupying lesion.  The ventricular system is normal in size and configuration with no evidence of hydrocephalus. No effacement of the skull-base cisterns. No abnormal extra-axial fluid collections or blood products. The calvarium shows no significant abnormality.    Remote facial fractures are seen involving the right maxillary sinus walls with postsurgical ORIF changes involving the right anterior maxillary sinus wall.  Remote appearing fracture also seen involving the right zygomatic arch.  There is suspected left remote inferior orbital fracture with partial mild herniation of intraorbital fat into the superior aspect of the left maxillary antrum.  No air-fluid levels are seen to otherwise suggest acute fracture.  There are suspected remote nasal bone fractures with prominent leftward nasal septal deviation.    There is near-complete opacification of the right maxillary sinus and right sphenoid sinus.  Remaining visualized paranasal sinuses and mastoid air cells are clear.    Upper visualized cervical spine shows no evidence of acute fracture.  There is prominent left-sided facet arthropathy.  Odontoid process is intact the craniocervical junction is unremarkable.                                 Medical Decision Making:   Differential Diagnosis:   Left Facial Contusion.            Scribe Attestation:   Scribe #1: I performed the above scribed service and the documentation accurately describes the services I performed. I attest to the accuracy of the note.    Attending Attestation:           Physician Attestation for Scribe:  Physician Attestation Statement for Scribe #1: I, Fabian Sumner MD, reviewed documentation, as scribed by Windy Navarrete in my presence, and it is both accurate and complete.     Comments: I, Dr. Babcock, personally performed the services described in this documentation. All medical record entries made by the scribe were at my direction and in my presence.  I have reviewed the chart and agree that the record  reflects my personal performance and is accurate and complete.              ED Course      Clinical Impression:   The primary encounter diagnosis was Fall, initial encounter. Diagnoses of Altered mental status, Facial contusion, initial encounter, and Cough were also pertinent to this visit.    Disposition:   Disposition: Discharged  Condition: Stable                        Amanda Babcock MD  01/17/18 0019

## 2018-01-16 NOTE — ED NOTES
"IV attempt unsuccessful. Pt extremely rude to staff. 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   "

## 2018-01-16 NOTE — ED NOTES
"Pt refusing to go to CT until she receives pain medication. Pt reports "Im just in terrible pain". Speech is slurred, pt easily falls back asleep when nurse is not speaking.   "

## 2018-01-17 NOTE — ED NOTES
Patient has repeatedly called the  to speak with the house supervisor. Cathleen called and notified.

## 2018-01-18 LAB — BACTERIA UR CULT: NO GROWTH

## 2018-01-20 ENCOUNTER — HOSPITAL ENCOUNTER (EMERGENCY)
Facility: OTHER | Age: 63
Discharge: HOME | End: 2018-01-20
Attending: EMERGENCY MEDICINE
Payer: OTHER GOVERNMENT

## 2018-01-20 VITALS
BODY MASS INDEX: 28.37 KG/M2 | HEART RATE: 84 BPM | RESPIRATION RATE: 18 BRPM | WEIGHT: 176.5 LBS | HEIGHT: 66 IN | DIASTOLIC BLOOD PRESSURE: 79 MMHG | SYSTOLIC BLOOD PRESSURE: 123 MMHG | TEMPERATURE: 98 F | OXYGEN SATURATION: 100 %

## 2018-01-20 DIAGNOSIS — S93.401A SPRAIN OF RIGHT ANKLE, UNSPECIFIED LIGAMENT, INITIAL ENCOUNTER: ICD-10-CM
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
## 2018-01-20 DIAGNOSIS — R68.89 FLU-LIKE SYMPTOMS: Primary | ICD-10-CM

## 2018-01-20 DIAGNOSIS — J20.9 ACUTE BRONCHITIS, UNSPECIFIED ORGANISM: ICD-10-CM

## 2018-01-20 PROCEDURE — 99284 EMERGENCY DEPT VISIT MOD MDM: CPT

## 2018-01-20 RX ORDER — OSELTAMIVIR PHOSPHATE 75 MG/1
75 CAPSULE ORAL 2 TIMES DAILY
Qty: 10 CAPSULE | Refills: 0 | Status: SHIPPED | OUTPATIENT
Start: 2018-01-20 | End: 2018-01-25

## 2018-01-20 RX ORDER — NAPROXEN 500 MG/1
500 TABLET ORAL 2 TIMES DAILY PRN
Qty: 20 TABLET | Refills: 0 | Status: SHIPPED | OUTPATIENT
Start: 2018-01-20 | End: 2018-07-13

## 2018-01-20 RX ORDER — BENZONATATE 100 MG/1
100 CAPSULE ORAL 3 TIMES DAILY PRN
Qty: 30 CAPSULE | Refills: 0 | Status: SHIPPED | OUTPATIENT
Start: 2018-01-20 | End: 2018-01-30

## 2018-01-20 RX ORDER — AZITHROMYCIN 250 MG/1
250 TABLET, FILM COATED ORAL DAILY
Qty: 6 TABLET | Refills: 0 | Status: SHIPPED | OUTPATIENT
Start: 2018-01-20 | End: 2018-05-16

## 2018-01-20 NOTE — ED PROVIDER NOTES
"Encounter Date: 1/20/2018       History     Chief Complaint   Patient presents with    Cough     pt states "I've had a cough and congestion x 1 week"    Nasal Congestion    Ankle Pain     right ankle pain and has been swollen x 1 week     A 62-year-old female who presents to the ED with nasal congestion and cough ×1 week.  Patient has been exposed to granddaughter who has influenza.  Patient reports falling a few days ago.  Patient was seen at another ED and had x-rays.  Patient reports right ankle swelling.  Patient states the right ankle was not x-rayed.  Patient requesting medication for inflammation.      The history is provided by the patient.   Cough   This is a new problem. The current episode started several days ago. The problem has been gradually worsening. The cough is productive of sputum. There has been no fever. Associated symptoms include sore throat. Pertinent negatives include no chills, no shortness of breath and no wheezing. She has tried nothing for the symptoms. She is a smoker.   Ankle Pain   This is a new problem. The current episode started more than 2 days ago. The problem has been gradually worsening. Pertinent negatives include no abdominal pain and no shortness of breath. The symptoms are aggravated by walking. She has tried nothing for the symptoms. The treatment provided no relief.     Review of patient's allergies indicates:  No Known Allergies  Past Medical History:   Diagnosis Date    Alcohol abuse     Anxiety     Bipolar disorder     Cirrhosis of liver     Coronary artery disease     Depression     Fall     GERD (gastroesophageal reflux disease)     Hypertension     Low back pain     MI (myocardial infarction)     Thyroid disease      Past Surgical History:   Procedure Laterality Date    HIATAL HERNIA REPAIR      JOINT REPLACEMENT      KNEE SURGERY  bilateral replacement    right foot sx  2008    SHOULDER SURGERY  replacement     Family History   Problem Relation " Age of Onset    Cancer Mother     Cancer Father      Social History   Substance Use Topics    Smoking status: Never Smoker    Smokeless tobacco: Never Used    Alcohol use 3.6 oz/week     6 Cans of beer per week      Comment: every day     Review of Systems   Constitutional: Negative.  Negative for chills and fever.   HENT: Positive for congestion and sore throat.    Eyes: Negative.    Respiratory: Positive for cough. Negative for chest tightness, shortness of breath and wheezing.    Cardiovascular: Negative.    Gastrointestinal: Negative.  Negative for abdominal pain.   Endocrine: Negative.    Genitourinary: Negative.    Musculoskeletal: Negative.  Negative for back pain and neck pain.   Skin: Negative.  Negative for rash.   Neurological: Negative.  Negative for weakness.   Psychiatric/Behavioral: Negative.    All other systems reviewed and are negative.      Physical Exam     Initial Vitals [01/20/18 1525]   BP Pulse Resp Temp SpO2   124/85 81 18 97.8 °F (36.6 °C) 96 %      MAP       98         Physical Exam    Nursing note and vitals reviewed.  Constitutional: Vital signs are normal. She appears well-developed. She is cooperative. She does not appear ill.   HENT:   Head: Normocephalic and atraumatic.   Right Ear: Tympanic membrane, external ear and ear canal normal.   Left Ear: Tympanic membrane, external ear and ear canal normal.   Nose: Mucosal edema present.   Mouth/Throat: Uvula is midline, oropharynx is clear and moist and mucous membranes are normal.   Eyes: Conjunctivae and lids are normal. Pupils are equal, round, and reactive to light.   Neck: Normal range of motion. Neck supple.   Cardiovascular: Normal rate, regular rhythm, S1 normal, S2 normal and normal heart sounds.   Pulmonary/Chest: Effort normal and breath sounds normal.   Abdominal: Soft. Normal appearance and bowel sounds are normal. There is no tenderness.   Musculoskeletal: Normal range of motion.        Right ankle: She exhibits  swelling. She exhibits normal range of motion and no deformity.   Second and third toe with deformity. Pt has a small dark area to these toes. Pt is scheduled to see a podiatrist next week. Distal pulse +2 bilaterally.   Neurological: She is alert and oriented to person, place, and time.   Skin: Skin is warm, dry and intact.   Psychiatric: She has a normal mood and affect. Judgment and thought content normal.       Imaging Results          X-Ray Ankle Complete Right (Final result)  Result time 01/20/18 17:29:44    Final result by Kishan Levi MD (01/20/18 17:29:44)                 Impression:      Severe degenerative changes within the hindfoot with collapse of the talar dome and ankle joint effusion.  No acute osseous abnormality or significant change compared to previous exam from 8/2017.      Electronically signed by: KISHAN LEVI MD  Date:     01/20/18  Time:    17:29              Narrative:    Right ankle 3 views.  Comparison: 8/2/17.    Severe degenerative changes are visualized within the hindfoot with collapse of the talar dome and extensive spurring.  Postsurgical changes are visualized within the calcaneus and hindfoot with arthrodesis.  Postsurgical ORIF changes are also visualized involving the first metatarsal.  No hardware complication seen.  Findings are not significantly changed compared to previous radiographs from 8/2017.  No evidence of acute fracture or dislocation.  There is ankle joint effusion.  Soft tissue swelling is seen about the ankle.                               ED Course   Procedures  Labs Reviewed - No data to display          Medical Decision Making:   Initial Assessment:   A 62-year-old female who presents to the ED with complaints of nasal congestion and cough ×1 week.  Patient has been exposed to her granddaughter who has influenza.  Patient also reports right ankle pain.  Patient had a fall last week.  Patient was seen at another hospital and had x-rays.  Patient states  her right ankle was not x-rayed but she does have chronic right ankle swelling.  Patient also has a deformity to second and third toes.  Patient is scheduled to see a podiatrist next week.    Differential Diagnosis:   Influenza  Acute sinusitis  Acute bronchitis  Chronic ankle pain  Clinical Tests:   Radiological Study: Ordered and Reviewed  ED Management:  Physical exam.  Right ankle x-ray with degenerative changes no acute findings.  Patient to be treated for acute bronchitis and influenza exposure.  Patient discharged home with a Z-Adrian, Tessalon Perles and Tamiflu.  Patient to follow-up with PCP in 2-3 days.  Return to ED if worsening of symptoms                   ED Course      Clinical Impression:   The primary encounter diagnosis was Flu-like symptoms. Diagnoses of Fall, Acute bronchitis, unspecified organism, and Sprain of right ankle, unspecified ligament, initial encounter were also pertinent to this visit.                           SEAN Mireles  01/1955

## 2018-01-20 NOTE — DISCHARGE INSTRUCTIONS
Follow-up with PCP in 2-3 days.  Return to ED if symptoms worsen.  Keep  scheduled appointment with for foot doctor.

## 2018-01-22 ENCOUNTER — TELEPHONE (OUTPATIENT)
Dept: FAMILY MEDICINE | Facility: CLINIC | Age: 63
End: 2018-01-22

## 2018-01-22 NOTE — TELEPHONE ENCOUNTER
----- Message from Arlet Quiñones sent at 1/19/2018  4:13 PM CST -----  Contact: self  Patient would like to be prescribed an antibiotics to be sent to [facility] at [facility] . She can be reached at [phone]. Thank you!

## 2018-01-22 NOTE — TELEPHONE ENCOUNTER
Patient contacted regarding request for Antibiotics ; message left to contact the clinic. Patient visited the ED for Flu-like symptoms.

## 2018-02-01 DIAGNOSIS — H04.129 DRY EYE SYNDROME, UNSPECIFIED LATERALITY: ICD-10-CM

## 2018-02-01 DIAGNOSIS — K21.9 GASTROESOPHAGEAL REFLUX DISEASE, ESOPHAGITIS PRESENCE NOT SPECIFIED: ICD-10-CM

## 2018-02-02 RX ORDER — CYCLOSPORINE 0.5 MG/ML
EMULSION OPHTHALMIC
Refills: 0 | OUTPATIENT
Start: 2018-02-02

## 2018-02-02 RX ORDER — PANTOPRAZOLE SODIUM 40 MG/1
TABLET, DELAYED RELEASE ORAL
Qty: 90 TABLET | Refills: 0 | OUTPATIENT
Start: 2018-02-02

## 2018-02-12 ENCOUNTER — OFFICE VISIT (OUTPATIENT)
Dept: FAMILY MEDICINE | Facility: CLINIC | Age: 63
End: 2018-02-12
Payer: OTHER GOVERNMENT

## 2018-02-12 VITALS
WEIGHT: 167.88 LBS | HEART RATE: 80 BPM | SYSTOLIC BLOOD PRESSURE: 122 MMHG | OXYGEN SATURATION: 95 % | DIASTOLIC BLOOD PRESSURE: 82 MMHG | BODY MASS INDEX: 26.98 KG/M2 | TEMPERATURE: 98 F | HEIGHT: 66 IN

## 2018-02-12 DIAGNOSIS — K21.9 GASTROESOPHAGEAL REFLUX DISEASE, ESOPHAGITIS PRESENCE NOT SPECIFIED: ICD-10-CM

## 2018-02-12 DIAGNOSIS — I96 NECROTIC TOES: ICD-10-CM

## 2018-02-12 DIAGNOSIS — B18.2 CHRONIC HEPATITIS C WITHOUT HEPATIC COMA: ICD-10-CM

## 2018-02-12 DIAGNOSIS — R07.89 ATYPICAL CHEST PAIN: Primary | ICD-10-CM

## 2018-02-12 PROCEDURE — 99211 OFF/OP EST MAY X REQ PHY/QHP: CPT | Mod: S$PBB,,, | Performed by: NURSE PRACTITIONER

## 2018-02-12 PROCEDURE — 99214 OFFICE O/P EST MOD 30 MIN: CPT | Mod: PBBFAC,PO | Performed by: NURSE PRACTITIONER

## 2018-02-12 PROCEDURE — 99999 PR PBB SHADOW E&M-EST. PATIENT-LVL IV: CPT | Mod: PBBFAC,,, | Performed by: NURSE PRACTITIONER

## 2018-02-12 RX ORDER — PANTOPRAZOLE SODIUM 40 MG/1
40 TABLET, DELAYED RELEASE ORAL DAILY
Qty: 30 TABLET | Refills: 0 | Status: SHIPPED | OUTPATIENT
Start: 2018-02-12 | End: 2018-05-06

## 2018-02-12 RX ORDER — LEDIPASVIR AND SOFOSBUVIR 90; 400 MG/1; MG/1
TABLET, FILM COATED ORAL
Refills: 0 | COMMUNITY
Start: 2018-01-09 | End: 2018-07-13

## 2018-02-12 RX ORDER — ZOLPIDEM TARTRATE 10 MG/1
TABLET ORAL
Refills: 0 | Status: ON HOLD | COMMUNITY
Start: 2018-01-23 | End: 2018-09-13 | Stop reason: HOSPADM

## 2018-02-12 RX ORDER — PANTOPRAZOLE SODIUM 40 MG/1
40 TABLET, DELAYED RELEASE ORAL DAILY
Qty: 30 TABLET | Refills: 0 | OUTPATIENT
Start: 2018-02-12 | End: 2019-02-12

## 2018-02-12 NOTE — PROGRESS NOTES
Patient scheduled to come in and see Dr. Packer. She has not been sent over a year. Will reorder protonix and patient requesting referrals for specialty. Referrals placed.

## 2018-02-14 ENCOUNTER — TELEPHONE (OUTPATIENT)
Dept: FAMILY MEDICINE | Facility: CLINIC | Age: 63
End: 2018-02-14

## 2018-02-14 NOTE — TELEPHONE ENCOUNTER
Patient states SEAN Stubbs. Called her but I advised patient that Evelyne was gone for the day. States she really needs to speak with Eric MCNEAL due to her very serious health issues. Patient requesting a call back and can be reached late morning/early afternoon.

## 2018-02-16 NOTE — TELEPHONE ENCOUNTER
Tried calling patient, no answer. Left message. In message advised that someone from the referral dept may have tired to call her.

## 2018-04-09 ENCOUNTER — HOSPITAL ENCOUNTER (EMERGENCY)
Facility: OTHER | Age: 63
Discharge: HOME | End: 2018-04-09
Attending: EMERGENCY MEDICINE
Payer: OTHER GOVERNMENT

## 2018-04-09 VITALS
HEART RATE: 102 BPM | TEMPERATURE: 98 F | WEIGHT: 165 LBS | SYSTOLIC BLOOD PRESSURE: 137 MMHG | OXYGEN SATURATION: 98 % | HEIGHT: 66 IN | BODY MASS INDEX: 26.52 KG/M2 | DIASTOLIC BLOOD PRESSURE: 92 MMHG | RESPIRATION RATE: 16 BRPM

## 2018-04-09 DIAGNOSIS — R07.9 CHEST PAIN: ICD-10-CM

## 2018-04-09 DIAGNOSIS — K64.9 HEMORRHOIDS, UNSPECIFIED HEMORRHOID TYPE: ICD-10-CM

## 2018-04-09 DIAGNOSIS — K62.5 RECTAL BLEEDING: Primary | ICD-10-CM

## 2018-04-09 LAB
ALBUMIN SERPL-MCNC: 3.9 G/DL (ref 3.3–5.5)
ALP SERPL-CCNC: 259 U/L (ref 42–141)
BILIRUB SERPL-MCNC: 0.8 MG/DL (ref 0.2–1.6)
BUN SERPL-MCNC: 7 MG/DL (ref 7–22)
CALCIUM SERPL-MCNC: 9.5 MG/DL (ref 8–10.3)
CHLORIDE SERPL-SCNC: 96 MMOL/L (ref 98–108)
CREAT SERPL-MCNC: 0.5 MG/DL (ref 0.6–1.2)
CTP QC/QA: YES
FECAL OCCULT BLOOD, POC: POSITIVE
GLUCOSE SERPL-MCNC: 96 MG/DL (ref 73–118)
HCO3 UR-SCNC: 22 MMOL/L (ref 24–28)
LDH SERPL L TO P-CCNC: 3.09 MMOL/L (ref 0.5–2.2)
PCO2 BLDA: 26.4 MMHG (ref 35–45)
PH SMN: 7.53 [PH] (ref 7.35–7.45)
PO2 BLDA: 70 MMHG (ref 40–60)
POC ALT (SGPT): 34 U/L (ref 10–47)
POC AST (SGOT): 62 U/L (ref 11–38)
POC B-TYPE NATRIURETIC PEPTIDE: 16.1 PG/ML (ref 0–100)
POC BE: -1 MMOL/L
POC CARDIAC TROPONIN I: 0 NG/ML
POC PTINR: 0.9 (ref 0.9–1.2)
POC PTWBT: 11.3 SEC (ref 9.7–14.3)
POC SATURATED O2: 96 % (ref 95–100)
POC TCO2: 20 MMOL/L (ref 18–33)
POC TCO2: 23 MMOL/L (ref 24–29)
POTASSIUM BLD-SCNC: 3.2 MMOL/L (ref 3.6–5.1)
PROTEIN, POC: 8.2 G/DL (ref 6.4–8.1)
SAMPLE: ABNORMAL
SAMPLE: NORMAL
SAMPLE: NORMAL
SODIUM BLD-SCNC: 137 MMOL/L (ref 128–145)

## 2018-04-09 PROCEDURE — 81003 URINALYSIS AUTO W/O SCOPE: CPT

## 2018-04-09 PROCEDURE — 85025 COMPLETE CBC W/AUTO DIFF WBC: CPT

## 2018-04-09 PROCEDURE — 84484 ASSAY OF TROPONIN QUANT: CPT

## 2018-04-09 PROCEDURE — 96367 TX/PROPH/DG ADDL SEQ IV INF: CPT

## 2018-04-09 PROCEDURE — 82803 BLOOD GASES ANY COMBINATION: CPT

## 2018-04-09 PROCEDURE — 83880 ASSAY OF NATRIURETIC PEPTIDE: CPT

## 2018-04-09 PROCEDURE — 85610 PROTHROMBIN TIME: CPT

## 2018-04-09 PROCEDURE — 99285 EMERGENCY DEPT VISIT HI MDM: CPT | Mod: 25

## 2018-04-09 PROCEDURE — 80053 COMPREHEN METABOLIC PANEL: CPT

## 2018-04-09 PROCEDURE — 93010 ELECTROCARDIOGRAM REPORT: CPT | Mod: ,,, | Performed by: INTERNAL MEDICINE

## 2018-04-09 PROCEDURE — 25500020 PHARM REV CODE 255: Performed by: EMERGENCY MEDICINE

## 2018-04-09 PROCEDURE — 96375 TX/PRO/DX INJ NEW DRUG ADDON: CPT

## 2018-04-09 PROCEDURE — 82270 OCCULT BLOOD FECES: CPT

## 2018-04-09 PROCEDURE — 25000003 PHARM REV CODE 250: Performed by: EMERGENCY MEDICINE

## 2018-04-09 PROCEDURE — 63600175 PHARM REV CODE 636 W HCPCS: Performed by: EMERGENCY MEDICINE

## 2018-04-09 PROCEDURE — 96361 HYDRATE IV INFUSION ADD-ON: CPT

## 2018-04-09 PROCEDURE — 93005 ELECTROCARDIOGRAM TRACING: CPT

## 2018-04-09 PROCEDURE — 96365 THER/PROPH/DIAG IV INF INIT: CPT

## 2018-04-09 RX ORDER — ONDANSETRON 2 MG/ML
8 INJECTION INTRAMUSCULAR; INTRAVENOUS
Status: COMPLETED | OUTPATIENT
Start: 2018-04-09 | End: 2018-04-09

## 2018-04-09 RX ORDER — ACETAMINOPHEN 10 MG/ML
1000 INJECTION, SOLUTION INTRAVENOUS
Status: COMPLETED | OUTPATIENT
Start: 2018-04-09 | End: 2018-04-09

## 2018-04-09 RX ORDER — POTASSIUM CHLORIDE 7.45 MG/ML
10 INJECTION INTRAVENOUS
Status: COMPLETED | OUTPATIENT
Start: 2018-04-09 | End: 2018-04-09

## 2018-04-09 RX ADMIN — SODIUM CHLORIDE 1000 ML: 0.9 INJECTION, SOLUTION INTRAVENOUS at 06:04

## 2018-04-09 RX ADMIN — IOHEXOL 100 ML: 350 INJECTION, SOLUTION INTRAVENOUS at 06:04

## 2018-04-09 RX ADMIN — POTASSIUM CHLORIDE 10 MEQ: 10 INJECTION, SOLUTION INTRAVENOUS at 07:04

## 2018-04-09 RX ADMIN — ONDANSETRON 8 MG: 2 INJECTION INTRAMUSCULAR; INTRAVENOUS at 05:04

## 2018-04-09 RX ADMIN — IOHEXOL 30 ML: 350 INJECTION, SOLUTION INTRAVENOUS at 06:04

## 2018-04-09 RX ADMIN — ACETAMINOPHEN 1000 MG: 10 INJECTION, SOLUTION INTRAVENOUS at 06:04

## 2018-04-09 NOTE — ED PROVIDER NOTES
Encounter Date: 4/9/2018    SCRIBE #1 NOTE: I, Eric Betancourt, am scribing for, and in the presence of,  Dr. Vanegas. I have scribed the entire note.       History     Chief Complaint   Patient presents with    Rectal Bleeding     pt presents to ED with c/o rectal bleeding that started late last night/early morning today. States she is very weak and is now having lower abd pain    Abdominal Pain     This is a 63 y.o. Female with a past medical history significant for ETOH abuse, HTN, cirrhosis, and bipolar disorder who presents with 14 hours of rectal bleeding and LLQ abdominal pain. She notes large amounts of blood in the toilet. She notes a history of rectal prolapse and denies any recent trauma or injury. She describes her pain as burning in quality. She denies any shortness of breath. She notes some associated lightheadedness. She notes her lightheadedness is worse upon standing. She denies any PO intake today.       The history is provided by the patient.     Review of patient's allergies indicates:  No Known Allergies  Past Medical History:   Diagnosis Date    Alcohol abuse     Anxiety     Bipolar disorder     Cirrhosis of liver     Coronary artery disease     Depression     Fall     GERD (gastroesophageal reflux disease)     Hypertension     Low back pain     MI (myocardial infarction)     Thyroid disease      Past Surgical History:   Procedure Laterality Date    HIATAL HERNIA REPAIR      JOINT REPLACEMENT      KNEE SURGERY  bilateral replacement    right foot sx  2008    SHOULDER SURGERY  replacement     Family History   Problem Relation Age of Onset    Cancer Mother     Cancer Father      Social History   Substance Use Topics    Smoking status: Never Smoker    Smokeless tobacco: Never Used    Alcohol use 3.6 oz/week     6 Cans of beer per week      Comment: every day     Review of Systems   Constitutional: Negative for chills and fever.   Respiratory: Negative for shortness of  breath.    Cardiovascular: Negative for chest pain.   Gastrointestinal: Positive for abdominal pain and anal bleeding. Negative for nausea and vomiting.   Genitourinary: Negative for vaginal bleeding.   Musculoskeletal: Negative for back pain.       Physical Exam     Initial Vitals [04/09/18 1710]   BP Pulse Resp Temp SpO2   (!) 130/90 (!) 116 16 97.6 °F (36.4 °C) 99 %      MAP       103.33         Physical Exam    Nursing note and vitals reviewed.  Constitutional: She appears well-developed and well-nourished. She is not diaphoretic. No distress.   Pale, anxious appearing. Dry red blood to ankle and socks.    HENT:   Head: Normocephalic and atraumatic.   Eyes: EOM are normal. Pupils are equal, round, and reactive to light.   Cardiovascular: Regular rhythm and normal heart sounds.   Tachycardia.    Pulmonary/Chest: Breath sounds normal. No respiratory distress. She has no wheezes. She has no rhonchi. She has no rales.   Abdominal: Soft. Bowel sounds are normal. There is tenderness. There is no rebound and no guarding.   Bilateral lower quadrant tenderness. No rebound, guarding, or rigidity.    Genitourinary: Rectal exam shows guaiac positive stool. Guaiac positive stool. : Acceptable.  Genitourinary Comments: Dark stool on rectal exam. Guaiac positive. Multiple hemorrhoids.   Neurological: She is alert and oriented to person, place, and time.   Skin: Skin is warm and dry.         ED Course   Procedures  Labs Reviewed   POCT OCCULT BLOOD STOOL - Abnormal; Notable for the following:        Result Value    Fecal Occult Blood Positive (*)     All other components within normal limits   ISTAT PROCEDURE - Abnormal; Notable for the following:     POC PH 7.528 (*)     POC PCO2 26.4 (*)     POC PO2 70 (*)     POC HCO3 22.0 (*)     POC Lactate 3.09 (*)     POC TCO2 23 (*)     All other components within normal limits   POCT CMP - Abnormal; Notable for the following:     Alkaline Phosphatase,  (*)      AST (SGOT), POC 62 (*)     POC Chloride 96 (*)     POC Creatinine 0.5 (*)     POC Potassium 3.2 (*)     Protein 8.2 (*)     All other components within normal limits   TROPONIN ISTAT   POCT CBC   POCT URINALYSIS W/O SCOPE   ISTAT PROCEDURE   POCT B-TYPE NATRIURETIC PEPTIDE (BNP)   POCT CMP   POCT PROTIME-INR   POCT TROPONIN   POCT B-TYPE NATRIURETIC PEPTIDE (BNP)     EKG Readings: (Independently Interpreted)   Initial Reading: No STEMI.   Sinus tachycardia at a rate of 117. Indeterminate axis. QTc normal at 460.   Previous EKG 01-- when compared to prior rate has increased by 42 beats today.           Medical Decision Making:   Clinical Tests:   Lab Tests: Ordered  Medical Tests: Ordered  ED Management:  This patient was transferred to my care at 1900 hrs.  Patient at that time was pending CT evaluation.  Patient did receive IV fluids within the department states that she was feeling much better.  Patient also reports no further bright red blood per she has made several trips to the bathroom to urinate.  The patient H&H is essentially normal 14/40.  As discussed with Dr. Vanegas the patient likely has a little bleeding.  I have instructed the patient to follow-up with her primary care physician and will start Proctofoam.          Scribe Attestation:   Scribe #1: I performed the above scribed service and the documentation accurately describes the services I performed. I attest to the accuracy of the note.    Attending Attestation:           Physician Attestation for Scribe:  Physician Attestation Statement for Scribe #1: I, Dr. Vanegas, reviewed documentation, as scribed by Eric Betancourt in my presence, and it is both accurate and complete.         Medical decision making   Chief complaint: Rectal bleeding that came on at 3 AM today  Differential diagnosis: Upper GI bleed, lower GI bleed, diverticulitis, diverticulosis, symptomatic anemia, and anxiety.    Treatment in the ED Physical Exam, IV fluids, IV  Tylenol for pain.  Patient last ate last night.  Patient seen nothing today.  Patient is agreeable to transfer and admission at [facility] if necessary.  Discussed labs, and imaging results.    INR 0.9 lactic acid elevated at 3.09 BMP 16 CMP sodium 137, potassium 3.2, bicarbonate 20, core 96, glucose 96, BUN 7 and creatinine 0.5.  Alkaline phosphatase elevated to 59, ALT 34 and AST 62  CBC White blood cell count 3.5, hemoglobin 13.5, hematocrit 40.9 and platelets 180.  Turned care of patient over to DR Narayanan             Clinical Impression:     1. Rectal bleeding    2. Chest pain    3. Hemorrhoids, unspecified hemorrhoid type                                Vini Narayanan MD  04/09/18 2901

## 2018-04-16 DIAGNOSIS — K21.9 GASTROESOPHAGEAL REFLUX DISEASE, ESOPHAGITIS PRESENCE NOT SPECIFIED: ICD-10-CM

## 2018-04-17 RX ORDER — PANTOPRAZOLE SODIUM 40 MG/1
40 TABLET, DELAYED RELEASE ORAL DAILY
Qty: 30 TABLET | Refills: 0 | OUTPATIENT
Start: 2018-04-17 | End: 2019-04-17

## 2018-04-27 DIAGNOSIS — K21.9 GASTROESOPHAGEAL REFLUX DISEASE, ESOPHAGITIS PRESENCE NOT SPECIFIED: ICD-10-CM

## 2018-04-27 RX ORDER — PANTOPRAZOLE SODIUM 40 MG/1
40 TABLET, DELAYED RELEASE ORAL DAILY
Qty: 30 TABLET | Refills: 0 | OUTPATIENT
Start: 2018-04-27 | End: 2019-04-27

## 2018-04-27 NOTE — TELEPHONE ENCOUNTER
----- Message from Linsey Perez sent at 4/27/2018  3:30 PM CDT -----  Contact: [phone]  Refill:pantoprazole (PROTONIX) 40 MG tablet please send to [id number] - [place], [place] - [address] AT [facility]

## 2018-04-30 RX ORDER — PANTOPRAZOLE SODIUM 40 MG/1
40 TABLET, DELAYED RELEASE ORAL DAILY
Qty: 30 TABLET | Refills: 0 | OUTPATIENT
Start: 2018-04-30 | End: 2019-04-30

## 2018-05-03 NOTE — TELEPHONE ENCOUNTER
LVM for the patient to contact the office to schedule an appt with Dr Packer to be evaluated for a medication refill.

## 2018-05-06 ENCOUNTER — HOSPITAL ENCOUNTER (EMERGENCY)
Facility: HOSPITAL | Age: 63
Discharge: HOME | End: 2018-05-06
Attending: EMERGENCY MEDICINE
Payer: OTHER GOVERNMENT

## 2018-05-06 VITALS
SYSTOLIC BLOOD PRESSURE: 176 MMHG | DIASTOLIC BLOOD PRESSURE: 93 MMHG | TEMPERATURE: 99 F | BODY MASS INDEX: 25.11 KG/M2 | OXYGEN SATURATION: 98 % | HEIGHT: 67 IN | WEIGHT: 160 LBS | RESPIRATION RATE: 22 BRPM | HEART RATE: 78 BPM

## 2018-05-06 DIAGNOSIS — R11.10 VOMITING: ICD-10-CM

## 2018-05-06 DIAGNOSIS — K21.9 GASTROESOPHAGEAL REFLUX DISEASE, ESOPHAGITIS PRESENCE NOT SPECIFIED: ICD-10-CM

## 2018-05-06 LAB
ALBUMIN SERPL BCP-MCNC: 4.1 G/DL
ALP SERPL-CCNC: 245 U/L
ALT SERPL W/O P-5'-P-CCNC: 26 U/L
ANION GAP SERPL CALC-SCNC: 14 MMOL/L
AST SERPL-CCNC: 46 U/L
BACTERIA #/AREA URNS HPF: NORMAL /HPF
BASOPHILS # BLD AUTO: 0.03 K/UL
BASOPHILS NFR BLD: 0.7 %
BILIRUB SERPL-MCNC: 1 MG/DL
BILIRUB UR QL STRIP: NEGATIVE
BUN SERPL-MCNC: 9 MG/DL
CALCIUM SERPL-MCNC: 9.3 MG/DL
CHLORIDE SERPL-SCNC: 102 MMOL/L
CLARITY UR: CLEAR
CO2 SERPL-SCNC: 20 MMOL/L
COLOR UR: YELLOW
CREAT SERPL-MCNC: 0.7 MG/DL
DIFFERENTIAL METHOD: ABNORMAL
EOSINOPHIL # BLD AUTO: 0 K/UL
EOSINOPHIL NFR BLD: 0 %
ERYTHROCYTE [DISTWIDTH] IN BLOOD BY AUTOMATED COUNT: 14.1 %
EST. GFR  (AFRICAN AMERICAN): >60 ML/MIN/1.73 M^2
EST. GFR  (NON AFRICAN AMERICAN): >60 ML/MIN/1.73 M^2
GLUCOSE SERPL-MCNC: 121 MG/DL
GLUCOSE UR QL STRIP: NEGATIVE
HCT VFR BLD AUTO: 39.7 %
HGB BLD-MCNC: 13.7 G/DL
HGB UR QL STRIP: NEGATIVE
KETONES UR QL STRIP: ABNORMAL
LEUKOCYTE ESTERASE UR QL STRIP: ABNORMAL
LIPASE SERPL-CCNC: 17 U/L
LYMPHOCYTES # BLD AUTO: 0.7 K/UL
LYMPHOCYTES NFR BLD: 15.7 %
MCH RBC QN AUTO: 32.3 PG
MCHC RBC AUTO-ENTMCNC: 34.5 G/DL
MCV RBC AUTO: 94 FL
MICROSCOPIC COMMENT: NORMAL
MONOCYTES # BLD AUTO: 0.3 K/UL
MONOCYTES NFR BLD: 6.7 %
NEUTROPHILS # BLD AUTO: 3.3 K/UL
NEUTROPHILS NFR BLD: 76.9 %
NITRITE UR QL STRIP: NEGATIVE
PH UR STRIP: 5 [PH] (ref 5–8)
PLATELET # BLD AUTO: 243 K/UL
PMV BLD AUTO: 9.9 FL
POTASSIUM SERPL-SCNC: 3.6 MMOL/L
PROT SERPL-MCNC: 8.4 G/DL
PROT UR QL STRIP: NEGATIVE
RBC # BLD AUTO: 4.24 M/UL
SODIUM SERPL-SCNC: 136 MMOL/L
SP GR UR STRIP: 1.02 (ref 1–1.03)
SQUAMOUS #/AREA URNS HPF: 2 /HPF
URN SPEC COLLECT METH UR: ABNORMAL
UROBILINOGEN UR STRIP-ACNC: NEGATIVE EU/DL
WBC # BLD AUTO: 4.32 K/UL
WBC #/AREA URNS HPF: 2 /HPF (ref 0–5)

## 2018-05-06 PROCEDURE — 96365 THER/PROPH/DIAG IV INF INIT: CPT

## 2018-05-06 PROCEDURE — 96361 HYDRATE IV INFUSION ADD-ON: CPT

## 2018-05-06 PROCEDURE — 25000003 PHARM REV CODE 250: Performed by: EMERGENCY MEDICINE

## 2018-05-06 PROCEDURE — 63600175 PHARM REV CODE 636 W HCPCS: Performed by: EMERGENCY MEDICINE

## 2018-05-06 PROCEDURE — 85025 COMPLETE CBC W/AUTO DIFF WBC: CPT

## 2018-05-06 PROCEDURE — 80053 COMPREHEN METABOLIC PANEL: CPT

## 2018-05-06 PROCEDURE — C9113 INJ PANTOPRAZOLE SODIUM, VIA: HCPCS | Performed by: EMERGENCY MEDICINE

## 2018-05-06 PROCEDURE — 83690 ASSAY OF LIPASE: CPT

## 2018-05-06 PROCEDURE — 99285 EMERGENCY DEPT VISIT HI MDM: CPT | Mod: 25

## 2018-05-06 PROCEDURE — 96375 TX/PRO/DX INJ NEW DRUG ADDON: CPT

## 2018-05-06 PROCEDURE — 81000 URINALYSIS NONAUTO W/SCOPE: CPT

## 2018-05-06 RX ORDER — PROMETHAZINE HYDROCHLORIDE 25 MG/1
25 TABLET ORAL EVERY 6 HOURS PRN
Qty: 15 TABLET | Refills: 0 | Status: ON HOLD | OUTPATIENT
Start: 2018-05-06 | End: 2018-10-30 | Stop reason: HOSPADM

## 2018-05-06 RX ORDER — PANTOPRAZOLE SODIUM 40 MG/10ML
40 INJECTION, POWDER, LYOPHILIZED, FOR SOLUTION INTRAVENOUS
Status: DISCONTINUED | OUTPATIENT
Start: 2018-05-06 | End: 2018-05-06 | Stop reason: CLARIF

## 2018-05-06 RX ORDER — CHLORDIAZEPOXIDE HYDROCHLORIDE 25 MG/1
CAPSULE, GELATIN COATED ORAL
Qty: 20 CAPSULE | Refills: 0 | Status: ON HOLD | OUTPATIENT
Start: 2018-05-06 | End: 2018-07-17 | Stop reason: HOSPADM

## 2018-05-06 RX ORDER — LORAZEPAM 2 MG/ML
2 INJECTION INTRAMUSCULAR
Status: COMPLETED | OUTPATIENT
Start: 2018-05-06 | End: 2018-05-06

## 2018-05-06 RX ORDER — PANTOPRAZOLE SODIUM 40 MG/1
40 TABLET, DELAYED RELEASE ORAL DAILY
Qty: 30 TABLET | Refills: 0 | Status: SHIPPED | OUTPATIENT
Start: 2018-05-06 | End: 2018-05-16

## 2018-05-06 RX ADMIN — LORAZEPAM 2 MG: 2 INJECTION, SOLUTION INTRAMUSCULAR; INTRAVENOUS at 05:05

## 2018-05-06 RX ADMIN — SODIUM CHLORIDE 1000 ML: 0.9 INJECTION, SOLUTION INTRAVENOUS at 05:05

## 2018-05-06 RX ADMIN — PROMETHAZINE HYDROCHLORIDE 12.5 MG: 25 INJECTION INTRAMUSCULAR; INTRAVENOUS at 05:05

## 2018-05-06 RX ADMIN — DEXTROSE 40 MG: 50 INJECTION, SOLUTION INTRAVENOUS at 05:05

## 2018-05-06 NOTE — ED PROVIDER NOTES
"Encounter Date: 5/6/2018    SCRIBE #1 NOTE: I, Windy Navarrete, am scribing for, and in the presence of,  Yared Hunt MD. I have scribed the following portions of the note - Other sections scribed: ROS, HPI and PE.       History     Chief Complaint   Patient presents with    Abdominal Pain     "drinking all night and I am going through alcohol withdrawal" c/o epigastrc abd pain with n/v that started this am. given 8mg zofran per EMS     CC: Abdominal Pain      HPI: This 63 y.o. female with  past medical history of Alcohol abuse; Anxiety; Bipolar disorder; Cirrhosis of liver; Coronary artery disease; Depression; Fall; GERD; Hypertension; Low back pain; MI ; and Thyroid disease,  presents to the ED complaining of one day hx of N/V/D and abdominal pain. She reports drinking alcohol yesterday. The patient reports she is unable to hold anything down including alcohol. EMS gave the patient 80 MG of Zofran. Denies any other complaints. Sx are severe and constant.      The history is provided by the patient. No  was used.     Review of patient's allergies indicates:  No Known Allergies  Past Medical History:   Diagnosis Date    Alcohol abuse     Anxiety     Bipolar disorder     Cirrhosis of liver     Coronary artery disease     Depression     Fall     GERD (gastroesophageal reflux disease)     Hypertension     Low back pain     MI (myocardial infarction)     Thyroid disease      Past Surgical History:   Procedure Laterality Date    HIATAL HERNIA REPAIR      JOINT REPLACEMENT      KNEE SURGERY  bilateral replacement    right foot sx  2008    SHOULDER SURGERY  replacement     Family History   Problem Relation Age of Onset    Cancer Mother     Cancer Father      Social History   Substance Use Topics    Smoking status: Never Smoker    Smokeless tobacco: Never Used    Alcohol use 3.6 oz/week     6 Cans of beer per week      Comment: every day     Review of Systems   Constitutional: " Negative for chills and fever.   HENT: Negative for congestion, ear pain, rhinorrhea and sore throat.    Eyes: Negative for pain and visual disturbance.   Respiratory: Negative for cough and shortness of breath.    Cardiovascular: Negative for chest pain.   Gastrointestinal: Positive for abdominal pain, diarrhea, nausea and vomiting.   Genitourinary: Negative for dysuria.   Musculoskeletal: Negative for back pain and neck pain.   Skin: Negative for rash.   Neurological: Negative for headaches.       Physical Exam     Initial Vitals [05/06/18 1558]   BP Pulse Resp Temp SpO2   (!) 140/74 70 (!) 22 97.8 °F (36.6 °C) 98 %      MAP       96         Physical Exam    Nursing note and vitals reviewed.  Constitutional: She appears well-developed and well-nourished. She is not diaphoretic. No distress.   On exam, the patient appears uncomfortable.    HENT:   Head: Normocephalic and atraumatic.   Eyes: EOM are normal.   Neck: Neck supple.   Cardiovascular: Normal rate and regular rhythm.   Pulmonary/Chest: Breath sounds normal. No respiratory distress. She has no wheezes. She has no rhonchi. She has no rales. She exhibits no tenderness.   Abdominal: Soft. Normal appearance and bowel sounds are normal. She exhibits no distension. There is no tenderness. There is no rebound and no guarding.   Musculoskeletal: Normal range of motion. She exhibits no edema.   Neurological: She is alert and oriented to person, place, and time.   Skin: Skin is warm and dry.   Psychiatric: She has a normal mood and affect.         ED Course   Procedures  Labs Reviewed   CBC W/ AUTO DIFFERENTIAL - Abnormal; Notable for the following:        Result Value    MCH 32.3 (*)     Lymph # 0.7 (*)     Gran% 76.9 (*)     Lymph% 15.7 (*)     All other components within normal limits   COMPREHENSIVE METABOLIC PANEL - Abnormal; Notable for the following:     CO2 20 (*)     Glucose 121 (*)     Alkaline Phosphatase 245 (*)     AST 46 (*)     All other components  within normal limits   URINALYSIS - Abnormal; Notable for the following:     Ketones, UA 1+ (*)     Leukocytes, UA 1+ (*)     All other components within normal limits   LIPASE   URINALYSIS MICROSCOPIC             Medical Decision Making:   ED Management:  N/V, possible alcohol withdrawal. Will check lab and X-ray her.     Abdominal X-ray- no acute abnormality.  CXR - normal  Labs reviewed- no acute finding.  she feels much better.  Taking PO.  Abdomen nontender  Happy to go home          Scribe Attestation:   Scribe #1: I performed the above scribed service and the documentation accurately describes the services I performed. I attest to the accuracy of the note.    Attending Attestation:           Physician Attestation for Scribe:  Physician Attestation Statement for Scribe #1: I, Yared Hunt MD, reviewed documentation, as scribed by Windy Navarrete in my presence, and it is both accurate and complete.                    Clinical Impression:   Diagnoses of Vomiting and Gastroesophageal reflux disease, esophagitis presence not specified were pertinent to this visit.                           Yared Hunt MD  05/06/18 7475

## 2018-05-06 NOTE — ED TRIAGE NOTES
"63 y.o. Patient reports to the ED via EMS with chief complaint of nausea, vomiting, and headache. Patient states "I believe I'm having withdrawals from alcohol." Patient reports the last time she drank alcohol was last night. Patient denies recent injury, fever, chills, or chest pain. Patient is resting in bed in no acute distress with side rails up x2. Patient is alert and oriented x4.   "

## 2018-05-12 ENCOUNTER — HOSPITAL ENCOUNTER (EMERGENCY)
Facility: HOSPITAL | Age: 63
Discharge: HOME | End: 2018-05-12
Attending: EMERGENCY MEDICINE
Payer: OTHER GOVERNMENT

## 2018-05-12 VITALS
SYSTOLIC BLOOD PRESSURE: 141 MMHG | OXYGEN SATURATION: 98 % | DIASTOLIC BLOOD PRESSURE: 90 MMHG | HEART RATE: 98 BPM | RESPIRATION RATE: 18 BRPM | BODY MASS INDEX: 26.52 KG/M2 | TEMPERATURE: 99 F | WEIGHT: 165 LBS | HEIGHT: 66 IN

## 2018-05-12 DIAGNOSIS — L03.115 CELLULITIS OF RIGHT FOOT: Primary | ICD-10-CM

## 2018-05-12 DIAGNOSIS — R52 PAIN: ICD-10-CM

## 2018-05-12 LAB
ALBUMIN SERPL-MCNC: 3.4 G/DL (ref 3.3–5.5)
ALP SERPL-CCNC: 166 U/L (ref 42–141)
BILIRUB SERPL-MCNC: 0.5 MG/DL (ref 0.2–1.6)
BILIRUBIN, POC UA: NEGATIVE
BLOOD, POC UA: NEGATIVE
BUN SERPL-MCNC: 7 MG/DL (ref 7–22)
CALCIUM SERPL-MCNC: 9.4 MG/DL (ref 8–10.3)
CHLORIDE SERPL-SCNC: 108 MMOL/L (ref 98–108)
CLARITY, POC UA: CLEAR
COLOR, POC UA: YELLOW
CREAT SERPL-MCNC: 0.6 MG/DL (ref 0.6–1.2)
GLUCOSE SERPL-MCNC: 95 MG/DL (ref 73–118)
GLUCOSE, POC UA: NEGATIVE
KETONES, POC UA: NEGATIVE
LEUKOCYTE EST, POC UA: ABNORMAL
NITRITE, POC UA: NEGATIVE
PH UR STRIP: 6 [PH]
POC ALT (SGPT): 31 U/L (ref 10–47)
POC AST (SGOT): 50 U/L (ref 11–38)
POC TCO2: 24 MMOL/L (ref 18–33)
POTASSIUM BLD-SCNC: 3.7 MMOL/L (ref 3.6–5.1)
PROTEIN, POC UA: NEGATIVE
PROTEIN, POC: 7.4 G/DL (ref 6.4–8.1)
SODIUM BLD-SCNC: 141 MMOL/L (ref 128–145)
SPECIFIC GRAVITY, POC UA: 1.01
UROBILINOGEN, POC UA: 0.2 E.U./DL

## 2018-05-12 PROCEDURE — 85025 COMPLETE CBC W/AUTO DIFF WBC: CPT

## 2018-05-12 PROCEDURE — 87040 BLOOD CULTURE FOR BACTERIA: CPT

## 2018-05-12 PROCEDURE — S0077 INJECTION, CLINDAMYCIN PHOSP: HCPCS | Performed by: EMERGENCY MEDICINE

## 2018-05-12 PROCEDURE — 96374 THER/PROPH/DIAG INJ IV PUSH: CPT

## 2018-05-12 PROCEDURE — 80053 COMPREHEN METABOLIC PANEL: CPT

## 2018-05-12 PROCEDURE — 25000003 PHARM REV CODE 250: Performed by: EMERGENCY MEDICINE

## 2018-05-12 PROCEDURE — 81003 URINALYSIS AUTO W/O SCOPE: CPT

## 2018-05-12 PROCEDURE — 99284 EMERGENCY DEPT VISIT MOD MDM: CPT | Mod: 25

## 2018-05-12 RX ORDER — HYDROCODONE BITARTRATE AND ACETAMINOPHEN 7.5; 325 MG/1; MG/1
1 TABLET ORAL ONCE
Status: COMPLETED | OUTPATIENT
Start: 2018-05-12 | End: 2018-05-12

## 2018-05-12 RX ORDER — CLINDAMYCIN HYDROCHLORIDE 300 MG/1
300 CAPSULE ORAL EVERY 6 HOURS
Qty: 40 CAPSULE | Refills: 0 | Status: SHIPPED | OUTPATIENT
Start: 2018-05-12 | End: 2018-07-26

## 2018-05-12 RX ORDER — HYDROCODONE BITARTRATE AND ACETAMINOPHEN 7.5; 325 MG/1; MG/1
1 TABLET ORAL ONCE
Status: DISCONTINUED | OUTPATIENT
Start: 2018-05-12 | End: 2018-05-12

## 2018-05-12 RX ORDER — ACETAMINOPHEN 500 MG
TABLET ORAL
Status: DISCONTINUED
Start: 2018-05-12 | End: 2018-05-12 | Stop reason: HOSPADM

## 2018-05-12 RX ORDER — CLINDAMYCIN PHOSPHATE 900 MG/50ML
900 INJECTION, SOLUTION INTRAVENOUS
Status: COMPLETED | OUTPATIENT
Start: 2018-05-12 | End: 2018-05-12

## 2018-05-12 RX ADMIN — HYDROCODONE BITARTRATE AND ACETAMINOPHEN 1 TABLET: 7.5; 325 TABLET ORAL at 07:05

## 2018-05-12 RX ADMIN — CLINDAMYCIN PHOSPHATE 900 MG: 18 INJECTION, SOLUTION INTRAVENOUS at 07:05

## 2018-05-12 NOTE — ED PROVIDER NOTES
Encounter Date: 5/12/2018       History     Chief Complaint   Patient presents with    Foot Swelling     pt reports right foot swelling, redness and pain since earlier today.      A 63 years old female who presents to the ED with right foot erythema, swelling and pain which started today. Pt denies fever or chills. Pt has a history of HTN, Low back pain, Depression and CAD. Pt has had several foot surgeries. Pt denies injury.       The history is provided by the patient.   Foot Injury    The incident occurred at home. There was no injury mechanism. The incident occurred just prior to arrival. The pain is present in the right foot. The pain is at a severity of 10/10. She reports no foreign bodies present. The symptoms are aggravated by bearing weight. She has tried nothing for the symptoms.     Review of patient's allergies indicates:  No Known Allergies  Past Medical History:   Diagnosis Date    Alcohol abuse     Anxiety     Bipolar disorder     Cirrhosis of liver     Coronary artery disease     Depression     Fall     GERD (gastroesophageal reflux disease)     Hypertension     Low back pain     MI (myocardial infarction)     Thyroid disease      Past Surgical History:   Procedure Laterality Date    HIATAL HERNIA REPAIR      JOINT REPLACEMENT      KNEE SURGERY  bilateral replacement    right foot sx  2008    SHOULDER SURGERY  replacement     Family History   Problem Relation Age of Onset    Cancer Mother     Cancer Father      Social History   Substance Use Topics    Smoking status: Never Smoker    Smokeless tobacco: Never Used    Alcohol use 3.6 oz/week     6 Cans of beer per week      Comment: every day     Review of Systems   Constitutional: Negative.  Negative for chills and fever.   HENT: Negative.  Negative for congestion.    Eyes: Negative.    Respiratory: Negative.  Negative for chest tightness and shortness of breath.    Cardiovascular: Negative.  Negative for chest pain.    Gastrointestinal: Negative.  Negative for abdominal pain and vomiting.   Endocrine: Negative.    Genitourinary: Negative.  Negative for dysuria and hematuria.   Musculoskeletal: Negative for back pain and neck pain.        Right foot pain, swelling, and redness   Skin: Negative.  Negative for rash.   Allergic/Immunologic: Negative for immunocompromised state.   Neurological: Negative.  Negative for weakness.   Hematological: Does not bruise/bleed easily.   Psychiatric/Behavioral: Negative.    All other systems reviewed and are negative.      Physical Exam     Initial Vitals [05/12/18 1754]   BP Pulse Resp Temp SpO2   (!) 140/93 97 18 98.9 °F (37.2 °C) 96 %      MAP       108.67         Physical Exam    Nursing note and vitals reviewed.  Constitutional: Vital signs are normal. She appears well-developed. She is cooperative. She does not appear ill.   HENT:   Head: Normocephalic and atraumatic.   Right Ear: External ear normal.   Left Ear: External ear normal.   Nose: Nose normal.   Mouth/Throat: Oropharynx is clear and moist.   Eyes: Conjunctivae and lids are normal. Pupils are equal, round, and reactive to light.   Neck: Normal range of motion. Neck supple.   Cardiovascular: Normal rate, regular rhythm, S1 normal, S2 normal and normal heart sounds.   Pulmonary/Chest: Effort normal and breath sounds normal.   Abdominal: Soft. Normal appearance and bowel sounds are normal. There is no tenderness.   Musculoskeletal: Normal range of motion.        Right foot: There is tenderness, swelling and deformity (Chronic ).   Right foot with chronic deformity.  Right foot swelling, erythema and tenderness.  Right great toenail missing (chronic).  Third distal toe  with dry had skin.   Distal pulse +2    Neurological: She is alert and oriented to person, place, and time.   Skin: Skin is warm, dry and intact.   Psychiatric: She has a normal mood and affect. Her speech is normal. Thought content normal. Cognition and memory are  normal.       Imaging Results          X-Ray Foot Complete Right (Final result)  Result time 05/12/18 19:03:19    Final result by Paula Santos MD (05/12/18 19:03:19)                 Impression:      Postsurgical changes and chronic changes as above.  No definite acute osseous abnormality identified.      Electronically signed by: Paula Santos MD  Date:    05/12/2018  Time:    19:03             Narrative:    EXAMINATION:  XR FOOT COMPLETE 3 VIEW RIGHT    CLINICAL HISTORY:  . Pain, unspecified    TECHNIQUE:  AP, lateral, and oblique views of the right foot were performed.    COMPARISON:  August 2017.    FINDINGS:  Postsurgical changes are seen.  Bone staples and screws are seen within the hindfoot consistent with prior arthrodesis.  Severe degenerative changes are seen at the tibiotalar joint with collapse of the talar dome.  Postsurgical ORIF changes are seen involving the 1st metatarsal.  There is osseous fusion of the interphalangeal joint of the great toe.  Degenerative changes are seen in the 1st MTP joint.  No acute fracture or dislocation seen.  Second through 5th toes are in hammertoe position and suboptimally evaluated.  There is punctate metallic foreign body seen at the level of the 5th toe.  Diffuse soft tissue swelling is visualized.                                 ED Course   Procedures  Labs Reviewed - No data to display          Medical Decision Making:   Initial Assessment:   A 63 years old female who presents to the ED with right foot erythema, swelling and tenderness which started today. Pt denies injury. She denies fever or chills. Pt has chronic foot deformity.   Differential Diagnosis:   Cellulitis of foot  Osteomyelitis foot  Clinical Tests:   Lab Tests: Ordered and Reviewed       <> Summary of Lab: CMP sodium 141, potassium 3.7, glucose 95, BUNs 7, creatinine 0.6.  CBC- WBC 2.7, hemoglobin 10.8, hematocrit 32.  Radiological Study: Ordered and Reviewed  ED Management:  Physical  exam.  Labs- CMP, CBC. Blood cultures.  Medicated with Clindamycin 900 mg IV.  Right foot x-ray with chronic changes, possible foreign body to fifth toe. No evidence of skin breaks  noted to foot.  Pt to be discharged home with Clindamycin. Continue home pain medications.   Follow-up with PCP in 2-3 days. Return to ED for worsening of symptoms.                      Clinical Impression:   The primary encounter diagnosis was Cellulitis of right foot. A diagnosis of Pain was also pertinent to this visit.                           SEAN Mireles  05/14/18 4104

## 2018-05-16 ENCOUNTER — OFFICE VISIT (OUTPATIENT)
Dept: FAMILY MEDICINE | Facility: CLINIC | Age: 63
End: 2018-05-16
Payer: OTHER GOVERNMENT

## 2018-05-16 VITALS
WEIGHT: 164.88 LBS | DIASTOLIC BLOOD PRESSURE: 70 MMHG | HEIGHT: 66 IN | BODY MASS INDEX: 26.5 KG/M2 | TEMPERATURE: 98 F | HEART RATE: 91 BPM | OXYGEN SATURATION: 96 % | SYSTOLIC BLOOD PRESSURE: 98 MMHG

## 2018-05-16 DIAGNOSIS — G89.4 CHRONIC PAIN SYNDROME: ICD-10-CM

## 2018-05-16 DIAGNOSIS — L03.115 CELLULITIS OF FOOT, RIGHT: Primary | ICD-10-CM

## 2018-05-16 DIAGNOSIS — K70.10 ALCOHOLIC HEPATITIS, UNSPECIFIED WHETHER ASCITES PRESENT: ICD-10-CM

## 2018-05-16 DIAGNOSIS — K59.03 DRUG-INDUCED CONSTIPATION: ICD-10-CM

## 2018-05-16 DIAGNOSIS — K21.9 GASTROESOPHAGEAL REFLUX DISEASE WITHOUT ESOPHAGITIS: ICD-10-CM

## 2018-05-16 DIAGNOSIS — F10.20 CONTINUOUS ALCOHOL DEPENDENCE: ICD-10-CM

## 2018-05-16 DIAGNOSIS — E03.4 HYPOTHYROIDISM DUE TO ACQUIRED ATROPHY OF THYROID: ICD-10-CM

## 2018-05-16 DIAGNOSIS — K64.9 HEMORRHOIDS, UNSPECIFIED HEMORRHOID TYPE: ICD-10-CM

## 2018-05-16 DIAGNOSIS — Z98.890 HISTORY OF FOOT SURGERY: ICD-10-CM

## 2018-05-16 DIAGNOSIS — G89.29 CHRONIC PAIN OF TOE OF RIGHT FOOT: ICD-10-CM

## 2018-05-16 DIAGNOSIS — M79.674 CHRONIC PAIN OF TOE OF RIGHT FOOT: ICD-10-CM

## 2018-05-16 DIAGNOSIS — M51.37 DEGENERATION OF LUMBAR OR LUMBOSACRAL INTERVERTEBRAL DISC: ICD-10-CM

## 2018-05-16 DIAGNOSIS — F11.20 OPIATE DEPENDENCE, CONTINUOUS: ICD-10-CM

## 2018-05-16 DIAGNOSIS — I87.2 VENOUS STASIS DERMATITIS OF BOTH LOWER EXTREMITIES: ICD-10-CM

## 2018-05-16 PROCEDURE — 99999 PR PBB SHADOW E&M-EST. PATIENT-LVL V: CPT | Mod: PBBFAC,,, | Performed by: NURSE PRACTITIONER

## 2018-05-16 PROCEDURE — 99215 OFFICE O/P EST HI 40 MIN: CPT | Mod: PBBFAC,PO | Performed by: NURSE PRACTITIONER

## 2018-05-16 PROCEDURE — 99214 OFFICE O/P EST MOD 30 MIN: CPT | Mod: S$PBB,,, | Performed by: NURSE PRACTITIONER

## 2018-05-16 RX ORDER — LINACLOTIDE 145 UG/1
145 CAPSULE, GELATIN COATED ORAL DAILY
Qty: 90 CAPSULE | Refills: 0 | Status: SHIPPED | OUTPATIENT
Start: 2018-05-16 | End: 2018-07-26 | Stop reason: SDUPTHER

## 2018-05-16 RX ORDER — TRIAMCINOLONE ACETONIDE 1 MG/G
CREAM TOPICAL 2 TIMES DAILY
Qty: 15 G | Refills: 0 | Status: ON HOLD | OUTPATIENT
Start: 2018-05-16 | End: 2018-10-30 | Stop reason: HOSPADM

## 2018-05-16 RX ORDER — ATENOLOL 25 MG/1
25 TABLET ORAL DAILY
Refills: 2 | Status: CANCELLED | OUTPATIENT
Start: 2018-05-16

## 2018-05-16 RX ORDER — DOCUSATE SODIUM 100 MG/1
100 CAPSULE, LIQUID FILLED ORAL 2 TIMES DAILY
Qty: 60 CAPSULE | Refills: 2 | Status: SHIPPED | OUTPATIENT
Start: 2018-05-16 | End: 2018-07-26 | Stop reason: SDUPTHER

## 2018-05-16 NOTE — PATIENT INSTRUCTIONS
Tips to Control Acid Reflux    To control acid reflux, youll need to make some basic diet and lifestyle changes. The simple steps outlined below may be all youll need to ease discomfort.  Watch what you eat  · Avoid fatty foods and spicy foods.  · Eat fewer acidic foods, such as citrus and tomato-based foods. These can increase symptoms.  · Limit drinking alcohol, caffeine, and fizzy beverages. All increase acid reflux.  · Try limiting chocolate, peppermint, and spearmint. These can worsen acid reflux in some people.  Watch when you eat  · Avoid lying down for 3 hours after eating.  · Do not snack before going to bed.  Raise your head  Raising your head and upper body by 4 to 6 inches helps limit reflux when youre lying down. Put blocks under the head of your bed frame to raise it.  Other changes  · Lose weight, if you need to  · Dont exercise near bedtime  · Avoid tight-fitting clothes  · Limit aspirin and ibuprofen  · Stop smoking   Date Last Reviewed: 7/1/2016 © 2000-2017 [employer]. [address] [place] [address]. All rights reserved. This information is not intended as a substitute for professional medical care. Always follow your healthcare professional's instructions.        Discharge Instructions for Cellulitis  You have been diagnosed with cellulitis. This is an infection in the deepest layer of the skin. In some cases, the infection also affects the muscle. Cellulitis is caused by bacteria. The bacteria can enter the body through broken skin. This can happen with a cut, scratch, animal bite, or an insect bite that has been scratched. You may have been treated in the hospital with antibiotics and fluids. You will likely be given a prescription for antibiotics to take at home. This sheet will help you take care of yourself at home.  Home care  When you are home:  · Take the prescribed antibiotic medicine you are given as directed until it is gone. Take it even if you feel  better. It treats the infection and stops it from returning. Not taking all the medicine can make future infections hard to treat.  · Keep the infected area clean.  · When possible, raise the infected area above the level of your heart. This helps keep swelling down.  · Talk with your healthcare provider if you are in pain. Ask what kind of over-the-counter medicine you can take for pain.  · Apply clean bandages as advised.  · Take your temperature once a day for a week.  · Wash your hands often to prevent spreading the infection.  In the future, wash your hands before and after you touch cuts, scratches, or bandages. This will help prevent infection.   When to call your healthcare provider  Call your healthcare provider immediately if you have any of the following:  · Difficulty or pain when moving the joints above or below the infected area  · Discharge or pus draining from the area  · Fever of 100.4°F (38°C) or higher, or as directed by your healthcare provider  · Pain that gets worse in or around the infected   · Redness that gets worse in or around the infected area, particularly if the area of redness expands to a wider area  · Shaking chills  · Swelling of the infected area  · Vomiting   Date Last Reviewed: 8/1/2016  © 7739-3865 [employer]. [address] [place] [address], [place] [address]. All rights reserved. This information is not intended as a substitute for professional medical care. Always follow your healthcare professional's instructions.

## 2018-05-16 NOTE — PROGRESS NOTES
History of Present Illness   Estella Arevalo is a 63 y.o. woman with medical history as listed below who presents today for ER follow-up, right foot cellulitis. She was seen in the ER on 5/12/18 with complaint of swelling, redness, warmth, and tenderness to the right foot. X-ray was negative with the exception of chronic post-surgical changes. Her blood cultures have returned no growth to date and CBC/CMP were unchanged from previous. She received IV Clindamycin and was discharged home on PO Clindamycin. She is taking antibiotics as prescribed. She state redness, warmth, and tenderness have improved greatly. She states the swelling is still present, but she admits she has not been elevating the extremity and has been walking on the foot for prolonged periods of time. She denies fevers, chills, or other systemic signs of infection. She is also requesting multiple referrals on visit today. She would like to try new medication for her GERD as the PPI's do not seem to be helping. She is also requesting a cream for her venous stasis dermatitis. She has no additional complaints and is otherwise healthy on today's visit.        Past Medical History:   Diagnosis Date    Alcohol abuse     Anxiety     Bipolar disorder     Cirrhosis of liver     Coronary artery disease     Depression     Fall     GERD (gastroesophageal reflux disease)     Hypertension     Low back pain     MI (myocardial infarction)     Thyroid disease        Past Surgical History:   Procedure Laterality Date    HIATAL HERNIA REPAIR      JOINT REPLACEMENT      KNEE SURGERY  bilateral replacement    right foot sx  2008    SHOULDER SURGERY  replacement       Social History     Social History    Marital status:      Spouse name: N/A    Number of children: N/A    Years of education: N/A     Social History Main Topics    Smoking status: Never Smoker    Smokeless tobacco: Never Used    Alcohol use 3.6 oz/week     6 Cans of beer per  "week      Comment: every day    Drug use: No    Sexual activity: Not Asked     Other Topics Concern    None     Social History Narrative    None       Family History   Problem Relation Age of Onset    Cancer Mother     Cancer Father        Review of Systems  Review of Systems   Constitutional: Negative for chills and fever.   Respiratory: Negative for shortness of breath.    Cardiovascular: Positive for leg swelling. Negative for chest pain and palpitations.   Gastrointestinal: Positive for heartburn. Negative for abdominal pain, blood in stool, nausea and vomiting.   Musculoskeletal: Negative for joint pain.   Skin: Positive for itching and rash.     A complete review of systems was otherwise negative.    Physical Exam  BP 98/70 (BP Location: Left arm, Patient Position: Sitting, BP Method: X-Large (Manual))   Pulse 91   Temp 98.1 °F (36.7 °C) (Oral)   Ht 5' 6" (1.676 m)   Wt 74.8 kg (164 lb 14.5 oz)   SpO2 96%   BMI 26.62 kg/m²   General appearance: alert, appears stated age, cooperative and no distress  Lungs: clear to auscultation bilaterally  Heart: regular rate and rhythm, S1, S2 normal, no murmur, click, rub or gallop  Extremities: edema RLE associated with cellulitis, Homans sign is negative, no sign of DVT and venous stasis dermatitis noted  Pulses: diminished dorsalis pedis pulse bilateral, 1+  Skin: Skin color, texture, turgor normal. No rashes or lesions or healing cellulitic appearance to right foot with swelling and minimal erythema, no warmth or drainage  Neurologic: Grossly normal    Assessment/Plan  Cellulitis of foot, right  Continue antibiotics until complete, appears to be healing well.  We discussed that she needs to elevate the extremity, apply ice, and try and stay off of the foot to help with the swelling.  No systemic sign of infection.  She is requesting a podiatry referral for recurrent skin/nail infections which she feels have been present since surgery on this foot, referral " placed today.  -     Ambulatory referral to Podiatry    Venous stasis dermatitis of both lower extremities  Treatment as listed below.  We discussed chronic nature of this condition.  She may use Triamcinolone for about 3-4 days, then switch to moisturizer with hydrocortisone to help with itching.  -     Ambulatory referral to Podiatry  -     triamcinolone acetonide 0.1% (KENALOG) 0.1 % cream; Apply topically 2 (two) times daily.  Dispense: 15 g; Refill: 0    Gastroesophageal reflux disease without esophagitis  Stop PPI, we will try Zantac.  If no improvement, we will refer her to GI for further management.  -     ranitidine (ZANTAC) 300 MG tablet; Take 1 tablet (300 mg total) by mouth every evening.  Dispense: 30 tablet; Refill: 11    Hemorrhoids, unspecified hemorrhoid type  Referral to colorectal surgery per patient request.  Home management of hemorrhoids discussed with patient.  We discussed opioid induced chronic constipation is likely contributing to worsening of symptoms.  -     Ambulatory referral to Colorectal Surgery    Drug-induced constipation  The current medical regimen is effective;  continue present plan and medications.  Refill placed today.  -     docusate sodium (COLACE) 100 MG capsule; Take 1 capsule (100 mg total) by mouth 2 (two) times daily.  Dispense: 60 capsule; Refill: 2  -     LINZESS 145 mcg Cap capsule; Take 1 capsule (145 mcg total) by mouth once daily.  Dispense: 90 capsule; Refill: 0    Chronic pain of toe of right foot  She states she has had pain and swelling since her surgery, she would like to see orthopedic.  Referral placed.  -     Ambulatory referral to Orthopedics    History of foot surgery  As above.  -     Ambulatory referral to Orthopedics    Chronic pain syndrome  The current medical regimen is effective;  continue present plan and medications.  Followed by pain management with pain contract.    Degeneration of lumbar or lumbosacral intervertebral disc  As  above.    Opiate dependence, continuous  As above.    Alcoholic hepatitis, unspecified whether ascites present  The current medical regimen is effective;  continue present plan and medications.    Continuous alcohol dependence  She reports she has refrained from drinking alcohol, encouraged continued cessation.    Hypothyroidism due to acquired atrophy of thyroid  The current medical regimen is effective;  continue present plan and medications.    Other orders  Appears she was switched to Metoprolol and should not be taking both. Discussed this with patient at visit today.  -     Cancel: atenolol (TENORMIN) 25 MG tablet; Take 1 tablet (25 mg total) by mouth once daily.; Refill: 2    She has verbalized understanding and is in agreement with plan of care.    Follow-up in about 1 month (around 6/16/2018) for annual exam with PCP.

## 2018-05-18 LAB — BACTERIA BLD CULT: NORMAL

## 2018-07-02 ENCOUNTER — HOSPITAL ENCOUNTER (EMERGENCY)
Facility: HOSPITAL | Age: 63
Discharge: HOME | End: 2018-07-02
Attending: EMERGENCY MEDICINE
Payer: OTHER GOVERNMENT

## 2018-07-02 VITALS
SYSTOLIC BLOOD PRESSURE: 136 MMHG | DIASTOLIC BLOOD PRESSURE: 99 MMHG | HEIGHT: 65 IN | BODY MASS INDEX: 27.32 KG/M2 | TEMPERATURE: 98 F | OXYGEN SATURATION: 98 % | WEIGHT: 164 LBS | RESPIRATION RATE: 18 BRPM | HEART RATE: 86 BPM

## 2018-07-02 DIAGNOSIS — R11.10 VOMITING, INTRACTABILITY OF VOMITING NOT SPECIFIED, PRESENCE OF NAUSEA NOT SPECIFIED, UNSPECIFIED VOMITING TYPE: ICD-10-CM

## 2018-07-02 DIAGNOSIS — R10.9 ABDOMINAL PAIN, UNSPECIFIED ABDOMINAL LOCATION: ICD-10-CM

## 2018-07-02 DIAGNOSIS — F11.93 OPIATE WITHDRAWAL: Primary | ICD-10-CM

## 2018-07-02 LAB
ALBUMIN SERPL BCP-MCNC: 4.4 G/DL
ALP SERPL-CCNC: 204 U/L
ALT SERPL W/O P-5'-P-CCNC: 25 U/L
ANION GAP SERPL CALC-SCNC: 16 MMOL/L
AST SERPL-CCNC: 39 U/L
BASOPHILS # BLD AUTO: 0.01 K/UL
BASOPHILS NFR BLD: 0.2 %
BILIRUB SERPL-MCNC: 0.7 MG/DL
BILIRUB UR QL STRIP: NEGATIVE
BUN SERPL-MCNC: 17 MG/DL
CALCIUM SERPL-MCNC: 10.1 MG/DL
CHLORIDE SERPL-SCNC: 102 MMOL/L
CLARITY UR: CLEAR
CO2 SERPL-SCNC: 18 MMOL/L
COLOR UR: ABNORMAL
CREAT SERPL-MCNC: 0.8 MG/DL
DIFFERENTIAL METHOD: ABNORMAL
EOSINOPHIL # BLD AUTO: 0 K/UL
EOSINOPHIL NFR BLD: 0.6 %
ERYTHROCYTE [DISTWIDTH] IN BLOOD BY AUTOMATED COUNT: 15.7 %
EST. GFR  (AFRICAN AMERICAN): >60 ML/MIN/1.73 M^2
EST. GFR  (NON AFRICAN AMERICAN): >60 ML/MIN/1.73 M^2
GLUCOSE SERPL-MCNC: 143 MG/DL
GLUCOSE UR QL STRIP: NEGATIVE
HCT VFR BLD AUTO: 38.4 %
HGB BLD-MCNC: 13.7 G/DL
HGB UR QL STRIP: NEGATIVE
KETONES UR QL STRIP: ABNORMAL
LEUKOCYTE ESTERASE UR QL STRIP: NEGATIVE
LIPASE SERPL-CCNC: 39 U/L
LYMPHOCYTES # BLD AUTO: 1.1 K/UL
LYMPHOCYTES NFR BLD: 21.6 %
MCH RBC QN AUTO: 31.9 PG
MCHC RBC AUTO-ENTMCNC: 35.7 G/DL
MCV RBC AUTO: 89 FL
MONOCYTES # BLD AUTO: 0.4 K/UL
MONOCYTES NFR BLD: 7.2 %
NEUTROPHILS # BLD AUTO: 3.6 K/UL
NEUTROPHILS NFR BLD: 70.4 %
NITRITE UR QL STRIP: NEGATIVE
PH UR STRIP: 7 [PH] (ref 5–8)
PLATELET # BLD AUTO: 248 K/UL
PMV BLD AUTO: 10 FL
POTASSIUM SERPL-SCNC: 3.6 MMOL/L
PROT SERPL-MCNC: 8.7 G/DL
PROT UR QL STRIP: NEGATIVE
RBC # BLD AUTO: 4.3 M/UL
SODIUM SERPL-SCNC: 136 MMOL/L
SP GR UR STRIP: 1.01 (ref 1–1.03)
URN SPEC COLLECT METH UR: ABNORMAL
UROBILINOGEN UR STRIP-ACNC: NEGATIVE EU/DL
WBC # BLD AUTO: 5.15 K/UL

## 2018-07-02 PROCEDURE — 83690 ASSAY OF LIPASE: CPT

## 2018-07-02 PROCEDURE — 25500020 PHARM REV CODE 255: Performed by: EMERGENCY MEDICINE

## 2018-07-02 PROCEDURE — 96372 THER/PROPH/DIAG INJ SC/IM: CPT | Mod: 59

## 2018-07-02 PROCEDURE — 63600175 PHARM REV CODE 636 W HCPCS: Performed by: EMERGENCY MEDICINE

## 2018-07-02 PROCEDURE — 96374 THER/PROPH/DIAG INJ IV PUSH: CPT

## 2018-07-02 PROCEDURE — 51701 INSERT BLADDER CATHETER: CPT

## 2018-07-02 PROCEDURE — 80053 COMPREHEN METABOLIC PANEL: CPT

## 2018-07-02 PROCEDURE — 25000003 PHARM REV CODE 250: Performed by: EMERGENCY MEDICINE

## 2018-07-02 PROCEDURE — 96376 TX/PRO/DX INJ SAME DRUG ADON: CPT

## 2018-07-02 PROCEDURE — 96375 TX/PRO/DX INJ NEW DRUG ADDON: CPT

## 2018-07-02 PROCEDURE — 81003 URINALYSIS AUTO W/O SCOPE: CPT

## 2018-07-02 PROCEDURE — 99285 EMERGENCY DEPT VISIT HI MDM: CPT | Mod: 25

## 2018-07-02 PROCEDURE — 96361 HYDRATE IV INFUSION ADD-ON: CPT

## 2018-07-02 PROCEDURE — 85025 COMPLETE CBC W/AUTO DIFF WBC: CPT

## 2018-07-02 RX ORDER — METOCLOPRAMIDE HYDROCHLORIDE 5 MG/ML
10 INJECTION INTRAMUSCULAR; INTRAVENOUS
Status: COMPLETED | OUTPATIENT
Start: 2018-07-02 | End: 2018-07-02

## 2018-07-02 RX ORDER — OXYCODONE AND ACETAMINOPHEN 5; 325 MG/1; MG/1
1 TABLET ORAL
Status: COMPLETED | OUTPATIENT
Start: 2018-07-02 | End: 2018-07-02

## 2018-07-02 RX ORDER — MORPHINE SULFATE 4 MG/ML
4 INJECTION, SOLUTION INTRAMUSCULAR; INTRAVENOUS
Status: COMPLETED | OUTPATIENT
Start: 2018-07-02 | End: 2018-07-02

## 2018-07-02 RX ORDER — ONDANSETRON 2 MG/ML
4 INJECTION INTRAMUSCULAR; INTRAVENOUS
Status: COMPLETED | OUTPATIENT
Start: 2018-07-02 | End: 2018-07-02

## 2018-07-02 RX ORDER — PROMETHAZINE HYDROCHLORIDE 25 MG/1
25 TABLET ORAL
Status: COMPLETED | OUTPATIENT
Start: 2018-07-02 | End: 2018-07-02

## 2018-07-02 RX ORDER — OXYCODONE AND ACETAMINOPHEN 5; 325 MG/1; MG/1
1 TABLET ORAL EVERY 4 HOURS PRN
Qty: 10 TABLET | Refills: 0 | Status: ON HOLD | OUTPATIENT
Start: 2018-07-02 | End: 2018-09-13 | Stop reason: SDUPTHER

## 2018-07-02 RX ORDER — ONDANSETRON 4 MG/1
4 TABLET, FILM COATED ORAL EVERY 6 HOURS
Qty: 12 TABLET | Refills: 0 | Status: SHIPPED | OUTPATIENT
Start: 2018-07-02 | End: 2018-07-26 | Stop reason: SDUPTHER

## 2018-07-02 RX ORDER — KETOROLAC TROMETHAMINE 30 MG/ML
30 INJECTION, SOLUTION INTRAMUSCULAR; INTRAVENOUS
Status: COMPLETED | OUTPATIENT
Start: 2018-07-02 | End: 2018-07-02

## 2018-07-02 RX ORDER — ONDANSETRON 4 MG/1
4 TABLET, ORALLY DISINTEGRATING ORAL
Status: COMPLETED | OUTPATIENT
Start: 2018-07-02 | End: 2018-07-02

## 2018-07-02 RX ADMIN — OXYCODONE HYDROCHLORIDE AND ACETAMINOPHEN 1 TABLET: 5; 325 TABLET ORAL at 11:07

## 2018-07-02 RX ADMIN — PROMETHAZINE HYDROCHLORIDE 25 MG: 25 TABLET ORAL at 08:07

## 2018-07-02 RX ADMIN — METOCLOPRAMIDE 10 MG: 5 INJECTION, SOLUTION INTRAMUSCULAR; INTRAVENOUS at 07:07

## 2018-07-02 RX ADMIN — MORPHINE SULFATE 4 MG: 4 INJECTION INTRAVENOUS at 09:07

## 2018-07-02 RX ADMIN — IOHEXOL 75 ML: 350 INJECTION, SOLUTION INTRAVENOUS at 10:07

## 2018-07-02 RX ADMIN — SODIUM CHLORIDE 1000 ML: 0.9 INJECTION, SOLUTION INTRAVENOUS at 07:07

## 2018-07-02 RX ADMIN — MORPHINE SULFATE 4 MG: 4 INJECTION INTRAVENOUS at 07:07

## 2018-07-02 RX ADMIN — KETOROLAC TROMETHAMINE 30 MG: 30 INJECTION, SOLUTION INTRAMUSCULAR at 08:07

## 2018-07-02 RX ADMIN — ONDANSETRON 4 MG: 2 INJECTION INTRAMUSCULAR; INTRAVENOUS at 07:07

## 2018-07-02 RX ADMIN — ONDANSETRON 4 MG: 4 TABLET, ORALLY DISINTEGRATING ORAL at 12:07

## 2018-07-02 NOTE — ED NOTES
Patient changed into scrubs due to patients clothes were soiled. Offered bathroom, pt refused. Patient clean and emesis bag given, along with belongings and a blanket in a wheelchair and took to [facility]. Harsha Crowder given report. She talking to patient about discharge. Patient argumentative and agitated.

## 2018-07-02 NOTE — ED TRIAGE NOTES
Patient stated that she's had N&V for 3 days getting worse on this am. Unable to keep any food or her medications down. Denies diarrhea, fever, chills     no

## 2018-07-02 NOTE — ED NOTES
Patient aware that urine needed for spec. Patient urinated herself in bed. Unable to void at this time.

## 2018-07-02 NOTE — ED NOTES
Performed straight cath to obtain urine spec. Patient tolerated well. Xray brought patient contrast to drink for CT. Patient stated that she's unable to drink. Dr Akbar notified

## 2018-07-02 NOTE — ED PROVIDER NOTES
Encounter Date: 7/2/2018    SCRIBE #1 NOTE: I, Vamsi Kelly, am scribing for, and in the presence of,  Lamont Akbar MD. I have scribed the following portions of the note - Other sections scribed: HPI, ROS, PE, MDM.       History     Chief Complaint   Patient presents with    Abdominal Pain     nausea and vomiting, intermittent for the past few days, pt dry heaving with EMS, 4mg zofran      CC: Abdominal Pain    HPI: This is a 63 y.o. F who has Thyroid disease, GERD, Cirrhosis of liver, Bipolar disorder, HTN, CAD, Alcohol abuse, and Hx of MI who presents to the ED via EMS transportation c/o acute diffuse, non-radiating, severe, 10/10 abdominal pain that began 2 days ago. She describes the abdominal pain as bad. Pt has associated nausea, mucus-like emesis, and diarrhea. Her last episode of vomiting was a few hours PTA. Pt states that the current symptoms are typical to previous episodes that were secondary to GERD. There are no alleviating or exacerbating factors. She denies recent alcohol use and states that she last consumed alcohol a few days ago. Additionally, the pt also complains of dysuria and myalgias. She states that she has been unable to tolerate Oxycodone, which she usually takes for pain. Pt denies urinary frequency, urinary urgency, fever, tobacco use, recreational drug use, or known drug allergies.      The history is provided by the patient. No  was used.     Review of patient's allergies indicates:  No Known Allergies  Past Medical History:   Diagnosis Date    Alcohol abuse     Anxiety     Bipolar disorder     Cirrhosis of liver     Coronary artery disease     Depression     Fall     GERD (gastroesophageal reflux disease)     Hypertension     Low back pain     MI (myocardial infarction)     Thyroid disease      Past Surgical History:   Procedure Laterality Date    HIATAL HERNIA REPAIR      JOINT REPLACEMENT      KNEE SURGERY  bilateral replacement    right foot  sx  2008    SHOULDER SURGERY  replacement     Family History   Problem Relation Age of Onset    Cancer Mother     Cancer Father      Social History   Substance Use Topics    Smoking status: Never Smoker    Smokeless tobacco: Never Used    Alcohol use 3.6 oz/week     6 Cans of beer per week      Comment: every day     Review of Systems   Constitutional: Negative for chills and fever.   HENT: Negative for ear pain and sore throat.    Eyes: Negative for pain.   Respiratory: Negative for cough and shortness of breath.    Cardiovascular: Negative for chest pain.   Gastrointestinal: Positive for abdominal pain, diarrhea, nausea and vomiting.   Genitourinary: Positive for dysuria. Negative for frequency and urgency.   Musculoskeletal: Positive for myalgias. Negative for back pain.   Skin: Negative for rash.   Neurological: Negative for weakness, light-headedness, numbness and headaches.       Physical Exam     Initial Vitals [07/02/18 0651]   BP Pulse Resp Temp SpO2   (!) 175/11 105 18 -- 97 %      MAP       --         Physical Exam    Nursing note and vitals reviewed.  Constitutional: She appears well-developed and well-nourished. She is not diaphoretic.  Non-toxic appearance. No distress.   Pt appears uncomfortable. There is old vomit on shirt.   HENT:   Head: Normocephalic and atraumatic.   Eyes: Conjunctivae and EOM are normal. Pupils are equal, round, and reactive to light.   Neck: Normal range of motion. Neck supple. No neck rigidity.   Cardiovascular: Normal rate, regular rhythm, S1 normal and S2 normal. Exam reveals no gallop and no friction rub.    No murmur heard.  Pulses:       Radial pulses are 2+ on the right side, and 2+ on the left side.   Pulmonary/Chest: Effort normal and breath sounds normal. No respiratory distress. She has no wheezes. She has no rhonchi. She has no rales. She exhibits no tenderness.   Abdominal: Soft. Normal appearance and bowel sounds are normal. She exhibits no distension. There  is generalized tenderness. There is no rebound, no guarding and no CVA tenderness.   Musculoskeletal: Normal range of motion. She exhibits no edema.   Neurological: She is alert and oriented to person, place, and time. She has normal strength. No cranial nerve deficit or sensory deficit. Gait normal.   Skin: Skin is warm and dry. No ecchymosis and no rash noted.   Psychiatric: She has a normal mood and affect.         ED Course   Procedures  Labs Reviewed   CBC W/ AUTO DIFFERENTIAL - Abnormal; Notable for the following:        Result Value    MCH 31.9 (*)     RDW 15.7 (*)     All other components within normal limits   COMPREHENSIVE METABOLIC PANEL - Abnormal; Notable for the following:     CO2 18 (*)     Glucose 143 (*)     Total Protein 8.7 (*)     Alkaline Phosphatase 204 (*)     All other components within normal limits   URINALYSIS - Abnormal; Notable for the following:     Ketones, UA 1+ (*)     All other components within normal limits   LIPASE          Imaging Results          CT Abdomen Pelvis With Contrast (Final result)  Result time 07/02/18 11:06:14    Final result by Tommy Jamison MD (07/02/18 11:06:14)                 Impression:      1. Prominent gas filled proximal small bowel without definite obstruction.  2. Surgical changes consistent with hiatal hernia repair and ventral hernia repair.  Small fat containing umbilical hernia present.  3. Fluid filled gallbladder without abnormal distention or acute inflammation.  Question of hepatic cirrhosis.  4. Atherosclerosis, degenerative spine changes, and other findings as above.      Electronically signed by: Tommy Jamison MD  Date:    07/02/2018  Time:    11:06             Narrative:    EXAMINATION:  CT ABDOMEN PELVIS WITH CONTRAST    CLINICAL HISTORY:  Abdominal pain, unspecified;abdominal pain, possible parital obstruction on xray;    TECHNIQUE:  Low dose axial images, sagittal and coronal reformations were obtained from the lung bases to the  pubic symphysis following the IV administration of 75 mL of Omnipaque 350 .  No oral contrast.    COMPARISON:  CT abdomen pelvis 04/09/2018, 09/15/2017    FINDINGS:  The visualized lung bases are clear without consolidation or pleural fluid.  Postoperative changes are present at the esophagogastric junction, likely hiatal hernia repair.  No free air is seen in the abdomen.  Spleen is small with a splenule noted at the hilum.  The liver is normal size with homogeneous parenchyma; hepatic contour is slightly lobular, could indicate cirrhosis..  The gallbladder is filled with fluid but not abnormally distended and no signs of acute inflammation.  No opaque stones are detected.  Common bile duct is upper normal caliber at 6 mm.  Pancreas is normal.    Adrenal glands are normal.  Both kidneys are normal size and excrete contrast satisfactorily without evidence of stone, obstruction, or solid mass.  Ureters are normal caliber.  Wall of the urinary bladder is smooth.  Uterus and adnexal areas are within normal limits.    Abdominal aorta tapers normally with minimal atherosclerosis with all the major branch vessels patent.  No significant retroperitoneal lymph node enlargement is seen.    No significant ascites is identified in the abdomen or pelvis.  Upper abdominal images  demonstrate some motion blurring from intestinal peristalsis and patient movement.  The stomach is collapsed.  Left upper quadrant and mid abdomen demonstrate multiple prominent small bowel loops filled with gas and some fluid; the dimensions are less than 3 cm diameter indicating no significant distention.  Distal small bowel is unremarkable.  Appendix may be visualized with no inflammation in the area.  Stool and gas are present in the right colon.  Left colon has few diverticula and is otherwise unremarkable.    Skeletal structures are intact without acute finding.  Extensive degenerative changes are again seen in the thoracolumbar spine, also noting  a grade 1 retrolisthesis at L1-2 and grade 2 anterolisthesis at L4-5.  The small fat containing umbilical hernia is again seen.  The abdominal wall over the left mid abdomen shows a band of soft tissue density correlating with the surgical repair of a previous ventral hernia.                               X-Ray Abdomen Flat And Erect (Final result)  Result time 07/02/18 09:16:27    Final result by Tommy Jamison MD (07/02/18 09:16:27)                 Impression:      No free air identified.  Prominent gas in proximal small bowel with mild distention, possible partial obstruction.      Electronically signed by: Tommy Jamison MD  Date:    07/02/2018  Time:    09:16             Narrative:    EXAMINATION:  XR ABDOMEN FLAT AND ERECT    CLINICAL HISTORY:  Abdominal Pain;    TECHNIQUE:  Flat and erect AP views of the abdomen were performed.    COMPARISON:  Abdomen 05/06/2018, CT abdomen pelvis 04/09/2018    FINDINGS:  No free air is seen in the abdomen.  The surgical clips are present near the esophagogastric junction.  Intestinal pattern shows scattered gas mostly in the small bowel.  Several proximal small bowel loops are gas-filled and minimally distended.  Remainder of intestinal pattern is unremarkable.  No stones or obvious masses are detected in the abdomen.  Small vascular calcifications are present in the pelvis.  Skeletal structures are intact with degenerative changes noted in the spine.                                 Medical Decision Making:   History:   Old Medical Records: I decided to obtain old medical records.  patient presents with diffuse, non-radiating, severe, 10/10 abdominal pain. Patient reassessed 1 hour ago abdominal exam normal. Patient reassessed 30 minutes ago abdominal exam normal. Spoke to Dr. Quinn specific and CT reveals no obstruction.    DDx included:  Cholecystitis: location inconsistent, no relation with meals, negative joseph's  Appendicitis: location inconsistent, no  fever, no rebound/guarding  Kidney stone: no radiation to back or cva tenderness, no dysuria, no hematuria  Pyelonephritis: no cva tenderness, no dysuria, no fever  Pancreatitis: no history of alcohol abuse, unlikely gallstone obstructing, location inconsistent  Diverticulitis: age and location not most common, no history of diverticulitis, no fever, no wbc  SBO: normal BM and flatus. No vomiting  Mesenteric ischemia: HPI inconsistent, does not coincide with meals, other dx more likely  TOA: no systemic symptoms, location inconsistent, pelvic exam benign  Ectopic: negative pregnancy test  Torsion: no adnexal tenderness and hpi not consistent  PID: no history of STDs, no vaginal discharge, no cervical motion tenderness  AAA: age unlikely, location inconsistent, no bruits, no history of HTN    Workup in the ED included:   - labs: c/w dehydration with elevated AG. Given IV fluids in ed and tolerating po fluids   - urinalysis: neg for uti   - imaging: negative for anything specific. Spoke with radiology specifically and NO obstruction    Patient reassessed and given Promethazine at 7:54 and Toradol at 8:55. I feel her abdominal pain is of nonemergent etiology. Patient is non toxic in appearance with normal vitals. Patient tolerating PO  and abdominal exam is not impressive on initial or repeat examination.  Patient discharged home with percocet, zofran and follow up with her pcp and pain clinic. She was advised to return to the ED for new or worsening symptoms. She was given the opportunity to ask questions, which were reasonably addressed to the best of my ability and her apparent satisfaction.    Patient ambulated multiple times during visit. When informed she was going to be discharged patient possibly fell to ground. No acute tenderness. c spine cleared via nexus and Somali. No ct head due to Somali ct head. Full rom of legs and arms. No new brusing (old bruises on legs and arms and back). No c/t/l spine  tenderness. Delay in disposition because on original conversation patient stated she lives with family members and safe at home and can ambulate and has her meds. Then states she doesn't have a ride and no family members and no medications. Due to vitals stable, no acute findings, tolerating po stable for discharge. Able to ambulate.               Scribe Attestation:   Scribe #1: I performed the above scribed service and the documentation accurately describes the services I performed. I attest to the accuracy of the note.    Attending Attestation:           Physician Attestation for Scribe:  Physician Attestation Statement for Scribe #1: I, Lamont Akbar MD, reviewed documentation, as scribed by Vamsi Kelly in my presence, and it is both accurate and complete.                    Clinical Impression:   The primary encounter diagnosis was Opiate withdrawal. Diagnoses of Abdominal pain, unspecified abdominal location and Vomiting, intractability of vomiting not specified, presence of nausea not specified, unspecified vomiting type were also pertinent to this visit.                             Lamont Akbar MD  07/02/18 [id number]

## 2018-07-02 NOTE — ED NOTES
Patient again urinated in bed and had a bowel movement. Cleaned linens and patient. Bedpan provided. Patient V/U but not demonstrating it.

## 2018-07-02 NOTE — ED NOTES
Pt continuously using call light, every time nurse walks out of room. Pt educated on use of call light.

## 2018-07-02 NOTE — ED NOTES
Called to room by Nurse Ken. Pt found sitting on floor stating that she fell while trying to get phone. This nurse left room approximately 5 minutes before fall to inform pt of discharge and the need for ride home. At that time pt adamant about not being able to go home because she was feeling the same way as when she arrived. After fall, pt helped back in to bed by CLAYTON Landers, this nurse as well as Ken RN. Pt c/o left sided head pain, left hip pain and left leg pian. No bruises or hematoma noted. Dr. Akbar notified of pt fall.

## 2018-07-06 DIAGNOSIS — G89.29 CHRONIC LOW BACK PAIN WITH RIGHT-SIDED SCIATICA, UNSPECIFIED BACK PAIN LATERALITY: ICD-10-CM

## 2018-07-06 DIAGNOSIS — F51.01 PRIMARY INSOMNIA: ICD-10-CM

## 2018-07-06 DIAGNOSIS — M54.41 CHRONIC LOW BACK PAIN WITH RIGHT-SIDED SCIATICA, UNSPECIFIED BACK PAIN LATERALITY: ICD-10-CM

## 2018-07-06 DIAGNOSIS — E03.4 HYPOTHYROIDISM DUE TO ACQUIRED ATROPHY OF THYROID: ICD-10-CM

## 2018-07-06 DIAGNOSIS — H04.129 DRY EYE SYNDROME, UNSPECIFIED LATERALITY: ICD-10-CM

## 2018-07-06 RX ORDER — POLYMYXIN B SULFATE AND TRIMETHOPRIM 1; 10000 MG/ML; [USP'U]/ML
SOLUTION OPHTHALMIC
Qty: 10 ML | Refills: 0 | OUTPATIENT
Start: 2018-07-06

## 2018-07-06 RX ORDER — GABAPENTIN 300 MG/1
CAPSULE ORAL
Qty: 270 CAPSULE | Refills: 0 | OUTPATIENT
Start: 2018-07-06

## 2018-07-06 RX ORDER — LEVOTHYROXINE SODIUM 25 UG/1
TABLET ORAL
Qty: 90 TABLET | Refills: 0 | OUTPATIENT
Start: 2018-07-06

## 2018-07-06 RX ORDER — CYCLOSPORINE 0.5 MG/ML
EMULSION OPHTHALMIC
Refills: 0 | OUTPATIENT
Start: 2018-07-06

## 2018-07-06 RX ORDER — AMITRIPTYLINE HYDROCHLORIDE 50 MG/1
TABLET, FILM COATED ORAL
Qty: 90 TABLET | Refills: 0 | OUTPATIENT
Start: 2018-07-06

## 2018-07-09 DIAGNOSIS — K59.03 DRUG-INDUCED CONSTIPATION: ICD-10-CM

## 2018-07-10 RX ORDER — LINACLOTIDE 145 UG/1
145 CAPSULE, GELATIN COATED ORAL DAILY
Qty: 90 CAPSULE | Refills: 0 | OUTPATIENT
Start: 2018-07-10

## 2018-07-10 NOTE — TELEPHONE ENCOUNTER
Call patient to inform her that a OV is needed for refill on Linzess was told patient is in the hospital at [facility].

## 2018-07-12 ENCOUNTER — HOSPITAL ENCOUNTER (EMERGENCY)
Facility: HOSPITAL | Age: 63
Discharge: HOME | End: 2018-07-12
Attending: EMERGENCY MEDICINE
Payer: OTHER GOVERNMENT

## 2018-07-12 VITALS
HEIGHT: 66 IN | HEART RATE: 82 BPM | SYSTOLIC BLOOD PRESSURE: 122 MMHG | TEMPERATURE: 98 F | WEIGHT: 160 LBS | BODY MASS INDEX: 25.71 KG/M2 | RESPIRATION RATE: 16 BRPM | DIASTOLIC BLOOD PRESSURE: 83 MMHG | OXYGEN SATURATION: 97 %

## 2018-07-12 DIAGNOSIS — M54.2 NECK PAIN: Primary | ICD-10-CM

## 2018-07-12 DIAGNOSIS — M54.9 UPPER BACK PAIN: ICD-10-CM

## 2018-07-12 DIAGNOSIS — S99.929A INJURY OF NAIL BED OF TOE: ICD-10-CM

## 2018-07-12 DIAGNOSIS — L03.011 CELLULITIS OF FINGER OF RIGHT HAND: ICD-10-CM

## 2018-07-12 PROCEDURE — 99283 EMERGENCY DEPT VISIT LOW MDM: CPT | Mod: 25

## 2018-07-12 PROCEDURE — 25000003 PHARM REV CODE 250: Performed by: EMERGENCY MEDICINE

## 2018-07-12 RX ORDER — MUPIROCIN 20 MG/G
OINTMENT TOPICAL 3 TIMES DAILY
Qty: 22 G | Refills: 0 | Status: SHIPPED | OUTPATIENT
Start: 2018-07-12 | End: 2018-07-22

## 2018-07-12 RX ORDER — METHOCARBAMOL 750 MG/1
1500 TABLET, FILM COATED ORAL
Status: COMPLETED | OUTPATIENT
Start: 2018-07-12 | End: 2018-07-12

## 2018-07-12 RX ORDER — METHOCARBAMOL 750 MG/1
1500 TABLET, FILM COATED ORAL EVERY 6 HOURS
Qty: 24 TABLET | Refills: 0 | Status: ON HOLD | OUTPATIENT
Start: 2018-07-12 | End: 2018-07-17 | Stop reason: HOSPADM

## 2018-07-12 RX ORDER — CLINDAMYCIN PHOSPHATE 150 MG/ML
600 INJECTION, SOLUTION INTRAVENOUS
Status: DISCONTINUED | OUTPATIENT
Start: 2018-07-12 | End: 2018-07-12

## 2018-07-12 RX ORDER — SULFAMETHOXAZOLE AND TRIMETHOPRIM 800; 160 MG/1; MG/1
1 TABLET ORAL 2 TIMES DAILY
Qty: 20 TABLET | Refills: 0 | Status: SHIPPED | OUTPATIENT
Start: 2018-07-12 | End: 2018-07-22

## 2018-07-12 RX ADMIN — METHOCARBAMOL 1500 MG: 750 TABLET ORAL at 08:07

## 2018-07-13 ENCOUNTER — OFFICE VISIT (OUTPATIENT)
Dept: FAMILY MEDICINE | Facility: CLINIC | Age: 63
End: 2018-07-13
Payer: OTHER GOVERNMENT

## 2018-07-13 VITALS
BODY MASS INDEX: 27.16 KG/M2 | OXYGEN SATURATION: 98 % | WEIGHT: 169 LBS | HEART RATE: 85 BPM | TEMPERATURE: 98 F | SYSTOLIC BLOOD PRESSURE: 132 MMHG | HEIGHT: 66 IN | DIASTOLIC BLOOD PRESSURE: 70 MMHG

## 2018-07-13 DIAGNOSIS — K92.2 UGIB (UPPER GASTROINTESTINAL BLEED): ICD-10-CM

## 2018-07-13 DIAGNOSIS — M47.816 SPONDYLOSIS OF LUMBAR REGION WITHOUT MYELOPATHY OR RADICULOPATHY: ICD-10-CM

## 2018-07-13 DIAGNOSIS — Z09 HOSPITAL DISCHARGE FOLLOW-UP: Primary | ICD-10-CM

## 2018-07-13 PROCEDURE — 99213 OFFICE O/P EST LOW 20 MIN: CPT | Mod: PBBFAC,PO | Performed by: INTERNAL MEDICINE

## 2018-07-13 PROCEDURE — 99999 PR PBB SHADOW E&M-EST. PATIENT-LVL III: CPT | Mod: PBBFAC,,, | Performed by: INTERNAL MEDICINE

## 2018-07-13 PROCEDURE — 99214 OFFICE O/P EST MOD 30 MIN: CPT | Mod: S$PBB,,, | Performed by: INTERNAL MEDICINE

## 2018-07-13 NOTE — ED PROVIDER NOTES
And/Tirso Date: 7/12/2018    SCRIBE #1 NOTE: I, Izabel Maza, am scribing for, and in the presence of,  Dr. Aguilar. I have scribed the entire note.       History     Chief Complaint   Patient presents with    Fall     Pt states that she fell on 7/2 and now has a sniff cervical spine and increase pain in her lumbar spine. Pain in right great toe. She is requesting xrays. Pt was discharged from [facility] yesterday for unrelated illness     This is a 63 y.o. female who presents with pain after a fall on July 2nd. She reports she fell out of bed reaching for pain medication. No LOC. She reports hitting the side of her head. She reports upper and mid back pain. She reports neck pain. She reports left sided headaches since the fall. She also reports general left sided pain. She reports regularly taking pain medication for her right ankle after a past surgery. She reports her current pain is different than the pain she takes medication for. No change in ability to walk since the fall. She reports being admitted on the 10th and released yesterday from [facility] for an upper GI bleed. She reports unchanged chronic lumbar spinulosus. No previous neck injuries. Pt has secondary complaint of someone stepping on her toe. Toenail was pulled off and bleeding. She has been cleaning with betadine.          The history is provided by the patient.     Review of patient's allergies indicates:  No Known Allergies  Past Medical History:   Diagnosis Date    Alcohol abuse     Anxiety     Arthritis     Bipolar disorder     Cirrhosis of liver     Coronary artery disease     Depression     Fall     GERD (gastroesophageal reflux disease)     Hypertension     Low back pain     MI (myocardial infarction)     Migraine headache     Thyroid disease      Past Surgical History:   Procedure Laterality Date    HIATAL HERNIA REPAIR      JOINT REPLACEMENT      KNEE SURGERY  bilateral replacement    right foot sx  2008     SHOULDER SURGERY  replacement     Family History   Problem Relation Age of Onset    Cancer Mother     Cancer Father      Social History   Substance Use Topics    Smoking status: Never Smoker    Smokeless tobacco: Never Used    Alcohol use No      Comment: pt states she does not drink anymore     Review of Systems   Respiratory: Negative.  Negative for shortness of breath.    Cardiovascular: Positive for chest pain (Chronic intermittent CP. Takes NTG).   Gastrointestinal: Positive for blood in stool (DC'd from [facility] yesterday for upper GI. Dx with gastritis. Currently resolved. ). Negative for nausea and vomiting.   Genitourinary: Negative.  Negative for dysuria.   Musculoskeletal: Positive for arthralgias, back pain, joint swelling (Chronic right ankle pain) and neck pain.   Neurological: Positive for headaches. Negative for weakness and numbness.   Psychiatric/Behavioral: The patient is nervous/anxious.    All other systems reviewed and are negative.      Physical Exam     Initial Vitals [07/12/18 1907]   BP Pulse Resp Temp SpO2   115/84 84 20 98.6 °F (37 °C) 96 %      MAP       --         Physical Exam    Nursing note and vitals reviewed.  Constitutional: She appears well-developed and well-nourished.   HENT:   Head: Normocephalic and atraumatic.   Nose: Nose normal.   Eyes: Conjunctivae are normal.   Neck: Normal range of motion. Neck supple. Muscular tenderness present. No spinous process tenderness present.       ROM left lateral neck rotation reproduces pain.    Cardiovascular: Normal rate, regular rhythm and normal heart sounds. Exam reveals no gallop and no friction rub.    No murmur heard.  Pulmonary/Chest: Breath sounds normal. No stridor. No respiratory distress. She has no wheezes. She has no rhonchi. She has no rales.   Musculoskeletal: Normal range of motion.        Thoracic back: She exhibits tenderness. She exhibits no bony tenderness.        Feet:    No midline bony tenderness. Thoracic  paraspinal tenderness, no bony tenderness.     Neurological: She is alert and oriented to person, place, and time. She has normal strength.   Skin: Skin is warm and dry. Capillary refill takes less than 2 seconds.   Psychiatric: She has a normal mood and affect. Her behavior is normal.         ED Course   Procedures  Labs Reviewed - No data to display       Imaging Results          X-Ray Thoracic Spine AP Lateral (Final result)  Result time 07/12/18 20:50:45    Final result by Rupesh Richardson MD (07/12/18 20:50:45)                 Impression:      1. Overall, grossly stable chronic degenerative changes of the spine, no convincing new acute displaced fracture or dislocation.      Electronically signed by: Rupesh Richardson MD  Date:    07/12/2018  Time:    20:50             Narrative:    EXAMINATION:  XR THORACIC SPINE AP LATERAL    CLINICAL HISTORY:  Dorsalgia, unspecified    COMPARISON:  09/23/2016    FINDINGS:  Three views.    Lateral imaging demonstrates anterior wedging of a midthoracic vertebral body, similar in appearance to the previous exam with multilevel midthoracic disc space height loss, also grossly stable as compared to the previous examination.  No convincing new vertebral height loss.  AP spinal alignment is grossly unremarkable.  No visualized acute displaced rib fracture.  The visualized lung zones are grossly clear.  Postsurgical change overlies the epigastric region.  Postsurgical changes noted of the shoulder.                               X-Ray Cervical Spine AP And Lateral (Final result)  Result time 07/12/18 20:49:21    Final result by Rupesh Richardson MD (07/12/18 20:49:21)                 Impression:      1. No convincing acute displaced fracture or dislocation of the spine noting two views performed.  Please see above.  2. Sinus disease.      Electronically signed by: Rupesh Richardson MD  Date:    07/12/2018  Time:    20:49             Narrative:    EXAMINATION:  XR CERVICAL SPINE AP  LATERAL    CLINICAL HISTORY:  NECK PAIN;    TECHNIQUE:  Two views cervical spine.    COMPARISON:  CT 01/16/2018    FINDINGS:  The inferior endplate of C7 is obscured by soft tissues on lateral view.  There is stable grade 1 anterolisthesis of C4 on C5.  Spinal alignment is otherwise appropriate.  There is disc space height loss primarily involving C5-C6 and C6-C7 with endplate degenerative changes at these levels.  The facet joints are aligned.  AP spinal alignment is grossly appropriate.  The visualized portions the odontoid are grossly intact noting open-mouth view not performed.  The lung apices are grossly clear.  The paraspinous and hypo pharyngeal soft tissues are unremarkable.  The airway is patent.  Postsurgical changes are noted of the shoulder and facial bones.  There is opacification of the right maxillary sinus.                                 Medical Decision Making:   Clinical Tests:   Lab Tests: Ordered and Reviewed  Radiological Study: Ordered and Reviewed            Scribe Attestation:   Scribe #1: I performed the above scribed service and the documentation accurately describes the services I performed. I attest to the accuracy of the note.       Imaging Reviewed    Imaging Results          X-Ray Thoracic Spine AP Lateral (Final result)  Result time 07/12/18 20:50:45    Final result by Rupesh Richardson MD (07/12/18 20:50:45)                 Impression:      1. Overall, grossly stable chronic degenerative changes of the spine, no convincing new acute displaced fracture or dislocation.      Electronically signed by: Rupesh Richardson MD  Date:    07/12/2018  Time:    20:50             Narrative:    EXAMINATION:  XR THORACIC SPINE AP LATERAL    CLINICAL HISTORY:  Dorsalgia, unspecified    COMPARISON:  09/23/2016    FINDINGS:  Three views.    Lateral imaging demonstrates anterior wedging of a midthoracic vertebral body, similar in appearance to the previous exam with multilevel midthoracic disc space  height loss, also grossly stable as compared to the previous examination.  No convincing new vertebral height loss.  AP spinal alignment is grossly unremarkable.  No visualized acute displaced rib fracture.  The visualized lung zones are grossly clear.  Postsurgical change overlies the epigastric region.  Postsurgical changes noted of the shoulder.                               X-Ray Cervical Spine AP And Lateral (Final result)  Result time 07/12/18 20:49:21    Final result by Rupesh Richardson MD (07/12/18 20:49:21)                 Impression:      1. No convincing acute displaced fracture or dislocation of the spine noting two views performed.  Please see above.  2. Sinus disease.      Electronically signed by: Rupesh Richardson MD  Date:    07/12/2018  Time:    20:49             Narrative:    EXAMINATION:  XR CERVICAL SPINE AP LATERAL    CLINICAL HISTORY:  NECK PAIN;    TECHNIQUE:  Two views cervical spine.    COMPARISON:  CT 01/16/2018    FINDINGS:  The inferior endplate of C7 is obscured by soft tissues on lateral view.  There is stable grade 1 anterolisthesis of C4 on C5.  Spinal alignment is otherwise appropriate.  There is disc space height loss primarily involving C5-C6 and C6-C7 with endplate degenerative changes at these levels.  The facet joints are aligned.  AP spinal alignment is grossly appropriate.  The visualized portions the odontoid are grossly intact noting open-mouth view not performed.  The lung apices are grossly clear.  The paraspinous and hypo pharyngeal soft tissues are unremarkable.  The airway is patent.  Postsurgical changes are noted of the shoulder and facial bones.  There is opacification of the right maxillary sinus.                                Medications given in ED    Medications   methocarbamol tablet 1,500 mg (1,500 mg Oral Given 7/12/18 2022)       This document was produced by a scribe under my direction and in my presence. I agree with the content of the note and have made  any necessary edits.     Rob Aguilar MD         Note was created using voice recognition software. Note may have occasional typographical errors that may not have been identified and edited despite good stiven initial review prior to signing.        Discharge Medications     Medication List with Changes/Refills   New Medications    METHOCARBAMOL (ROBAXIN) 750 MG TAB    Take 2 tablets (1,500 mg total) by mouth every 6 (six) hours. for 3 days    MUPIROCIN (BACTROBAN) 2 % OINTMENT    Apply topically 3 (three) times daily. for 10 days    SULFAMETHOXAZOLE-TRIMETHOPRIM 800-160MG (BACTRIM DS) 800-160 MG TAB    Take 1 tablet by mouth 2 (two) times daily. for 10 days   Current Medications    AMITRIPTYLINE (ELAVIL) 50 MG TABLET    Take 1 tablet (50 mg total) by mouth every evening.    ATENOLOL (TENORMIN) 25 MG TABLET    Take 1 tablet by mouth once daily.    CHLORDIAZEPOXIDE (LIBRIUM) 25 MG CAP    Day 1 50 mg q 6 hours  Day 2 50 mg q 8 hours  Day 3 50 mg q 12 hours   Day 4 50 mg at night    CLINDAMYCIN (CLEOCIN) 300 MG CAPSULE    Take 1 capsule (300 mg total) by mouth every 6 (six) hours.    CLONAZEPAM (KLONOPIN) 1 MG TABLET    TAKE 1 TABLET BY MOUTH TWICE DAILY    CYCLOSPORINE (RESTASIS) 0.05 % OPHTHALMIC EMULSION    Place 0.4 mLs (1 drop total) into both eyes 2 (two) times daily.    DOCUSATE SODIUM (COLACE) 100 MG CAPSULE    Take 1 capsule (100 mg total) by mouth 2 (two) times daily.    DULOXETINE (CYMBALTA) 30 MG CAPSULE    TAKE 1 CAPSULE(30 MG) BY MOUTH EVERY DAY    EFINACONAZOLE (JUBLIA) 10 % MONIQUE    APPLY ONE DOSE DAILY    GABAPENTIN (NEURONTIN) 300 MG CAPSULE    Take 1 capsule (300 mg total) by mouth 2 (two) times daily.    HARVONI  MG TAB    TK 1 T PO QD    HYDROCORTISONE-PRAMOXINE (PROCTOFOAM-HS) RECTAL FOAM    Place 1 applicator rectally 2 (two) times daily.    LEVOTHYROXINE (SYNTHROID) 25 MCG TABLET    Take 1 tablet (25 mcg total) by mouth once daily.    LINZESS 145 MCG CAP CAPSULE    Take 1 capsule (145 mcg  total) by mouth once daily.    METOCLOPRAMIDE HCL (REGLAN) 10 MG TABLET    Take 1 tablet (10 mg total) by mouth every 6 (six) hours.    METOPROLOL TARTRATE (LOPRESSOR) 25 MG TABLET    Take 0.5 tablets (12.5 mg total) by mouth 2 (two) times daily.    NAPROXEN (NAPROSYN) 500 MG TABLET    Take 1 tablet (500 mg total) by mouth 2 (two) times daily as needed.    NARCAN 4 MG/ACTUATION SPRY    USE UTD    NITROGLYCERIN (NITROSTAT) 0.3 MG SL TABLET    ONE TABLET UNDER TONGUE AS NEEDED FOR CHEST PAIN EVERY 5 MINUTES AS NEEDED    ONDANSETRON (ZOFRAN) 4 MG TABLET    Take 1 tablet (4 mg total) by mouth every 6 (six) hours.    ONDANSETRON (ZOFRAN-ODT) 4 MG TBDL    Take 1 tablet (4 mg total) by mouth every 8 (eight) hours as needed. Nausea/vomiting    OXYCODONE (ROXICODONE) 20 MG TAB IMMEDIATE RELEASE TABLET    Take 1 tablet by mouth every 6 (six) hours as needed.    OXYCODONE-ACETAMINOPHEN (PERCOCET) 5-325 MG PER TABLET    Take 1 tablet by mouth every 4 (four) hours as needed for Pain.    POLYMYXIN B SULF-TRIMETHOPRIM (POLYTRIM) 10,000 UNIT- 1 MG/ML DROP    Place 1 drop into the left eye every 6 (six) hours.    PROMETHAZINE (PHENERGAN) 25 MG TABLET    Take 1 tablet (25 mg total) by mouth every 6 (six) hours as needed for Nausea.    RANITIDINE (ZANTAC) 300 MG TABLET    Take 1 tablet (300 mg total) by mouth every evening.    RESTASIS 0.05 % OPHTHALMIC EMULSION    INSTILL 1 DROP IN BOTH EYES TWICE DAILY    TRIAMCINOLONE ACETONIDE 0.1% (KENALOG) 0.1 % CREAM    Apply topically 2 (two) times daily.    ZOLPIDEM (AMBIEN) 10 MG TAB    TK 1 T PO  QHS PRN    ZOLPIDEM (AMBIEN) 5 MG TAB    TK 1 T PO QHS PRN   Discontinued Medications    CYCLOBENZAPRINE (FLEXERIL) 10 MG TABLET    TAKE 1 TABLET(10 MG) BY MOUTH THREE TIMES DAILY AS NEEDED FOR MUSCLE SPASMS             Patient discharged to home in stable condition with instructions to:   1. Please take all meds as prescribed.  2. Follow-up with your primary care doctor   3. Return precautions  discussed and patient and/or family/caretaker understands to return to the emergency room for any concerns including worsening of your current symptoms, fever, chills, night sweats, worsening pain, chest pain, shortness of breath, nausea, vomiting, diarrhea, bleeding, headache, difficulty talking, visual disturbances, weakness, numbness or any other acute concerns          Clinical Impression:     1. Neck pain    2. Upper back pain    3. Cellulitis of finger of right hand    4. Injury of nail bed of toe                                   Rob Aguilar MD  07/12/18 [id number]

## 2018-07-13 NOTE — ED NOTES
"Pt aaox3, rr even unlabored skin wdp cap refill <2 sec. Pt arrived to ed with multiple hospital ID bands and refuses to take off. Pt reports has has several hospital visits ans admissions since complaint of neck pain from fall 07/2/18. Pt ambulates with steady gait and use of all 4 extremities with ease and appropriately, pulses present bilateral and equally. Pt c/o neck pain after falling while reaching for medicine at home on 7/2. Reports pain has not worsened or gotten better since fall. Has had other hospital visits/admissions since fall for unrelated complaint. Reports has had it evaluatd by those MDs and was told had a "cervical sprain". Pt here requesting xrays, a soft collar and pain medicine. Nad. Will continue to monitor.   "

## 2018-07-13 NOTE — PROGRESS NOTES
Assessment & Plan (all problems are new to me)  Problem List Items Addressed This Visit     None      Visit Diagnoses     Hospital discharge follow-up    -  Primary  -    Requesting records from [facility].  Recheck labs in about 2 weeks.     Relevant Orders    CBC auto differential    Basic metabolic panel    UGIB (upper gastrointestinal bleed)    -  Continue PPI therapy. Stop all NSAIDs.  Ok to continue tylenol and robaxin.      Spondylosis of lumbar region without myelopathy or radiculopathy   -  Add Salonpas.  Will also refer to PT. No imaging indicated today due to lack of Red flag symptoms.  review of the abdominal CT scan on 7/2/18 done which showed extensive DJD and some retro/anteriolithesis.      Relevant Orders    Ambulatory Referral to Physical/Occupational Therapy            Health Maintenance reviewed, deferred.    Follow-up: Follow-up in about 2 weeks (around 7/27/2018).  ______________________________________________________________________    Chief Complaint  Chief Complaint   Patient presents with    Hospital Follow Up     ER follow up       HPI  Estella Arevalo is a 63 y.o. female with multiple medical diagnoses as listed in the medical history and problem list that presents for ER follow up.  Pt is new to me but is known to this clinic with her last appointment being 5/16/2018.  She is normal followed by one of my partners.  She was apparently admitted to [facility] for an UGIB.  I do not have any records.  She reports having an EGD with bleeding gastritis.  She was put on a soft bland diet and was told to stop her NSAIDs.  Discharged on PPI.  Denies needing transufusion.  Discharged a few days ago.      She reports that she had a fall on July 2nd and is c/o midline lumbar pain.  She is not having any bowel or bladder incontinence, numbness, weakness, saddle anesthesia.        PAST MEDICAL HISTORY:  Past Medical History:   Diagnosis Date    Alcohol abuse     Anxiety     Arthritis     Bipolar disorder      Cirrhosis of liver     Coronary artery disease     Depression     Fall     GERD (gastroesophageal reflux disease)     Hypertension     Low back pain     MI (myocardial infarction)     Migraine headache     Thyroid disease        PAST SURGICAL HISTORY:  Past Surgical History:   Procedure Laterality Date    HIATAL HERNIA REPAIR      JOINT REPLACEMENT      KNEE SURGERY  bilateral replacement    right foot sx  2008    SHOULDER SURGERY  replacement       SOCIAL HISTORY:  Social History     Social History    Marital status:      Spouse name: N/A    Number of children: N/A    Years of education: N/A     Occupational History    Not on file.     Social History Main Topics    Smoking status: Never Smoker    Smokeless tobacco: Never Used    Alcohol use No      Comment: pt states she does not drink anymore    Drug use: No      Comment: [facility] clinic    Sexual activity: Not on file     Other Topics Concern    Not on file     Social History Narrative    No narrative on file       FAMILY HISTORY:  Family History   Problem Relation Age of Onset    Cancer Mother     Cancer Father        ALLERGIES AND MEDICATIONS: updated and reviewed.  Review of patient's allergies indicates:  No Known Allergies  Current Outpatient Prescriptions   Medication Sig Dispense Refill    amitriptyline (ELAVIL) 50 MG tablet Take 1 tablet (50 mg total) by mouth every evening. 90 tablet 3    atenolol (TENORMIN) 25 MG tablet Take 1 tablet by mouth once daily.  2    chlordiazepoxide (LIBRIUM) 25 MG Cap Day 1 50 mg q 6 hours  Day 2 50 mg q 8 hours  Day 3 50 mg q 12 hours   Day 4 50 mg at night 20 capsule 0    clindamycin (CLEOCIN) 300 MG capsule Take 1 capsule (300 mg total) by mouth every 6 (six) hours. 40 capsule 0    clonazePAM (KLONOPIN) 1 MG tablet TAKE 1 TABLET BY MOUTH TWICE DAILY 60 tablet 0    cycloSPORINE (RESTASIS) 0.05 % ophthalmic emulsion Place 0.4 mLs (1 drop total) into both eyes 2 (two) times daily.  30 each 0    docusate sodium (COLACE) 100 MG capsule Take 1 capsule (100 mg total) by mouth 2 (two) times daily. 60 capsule 2    duloxetine (CYMBALTA) 30 MG capsule TAKE 1 CAPSULE(30 MG) BY MOUTH EVERY DAY 90 capsule 0    efinaconazole (JUBLIA) 10 % Eran APPLY ONE DOSE DAILY 24 mL 0    gabapentin (NEURONTIN) 300 MG capsule Take 1 capsule (300 mg total) by mouth 2 (two) times daily. 180 capsule 0    hydrocortisone-pramoxine (PROCTOFOAM-HS) rectal foam Place 1 applicator rectally 2 (two) times daily. 10 g 1    levothyroxine (SYNTHROID) 25 MCG tablet Take 1 tablet (25 mcg total) by mouth once daily. 90 tablet 5    LINZESS 145 mcg Cap capsule Take 1 capsule (145 mcg total) by mouth once daily. 90 capsule 0    methocarbamol (ROBAXIN) 750 MG Tab Take 2 tablets (1,500 mg total) by mouth every 6 (six) hours. for 3 days 24 tablet 0    metoclopramide HCl (REGLAN) 10 MG tablet Take 1 tablet (10 mg total) by mouth every 6 (six) hours. 30 tablet 0    metoprolol tartrate (LOPRESSOR) 25 MG tablet Take 0.5 tablets (12.5 mg total) by mouth 2 (two) times daily. 30 tablet 2    mupirocin (BACTROBAN) 2 % ointment Apply topically 3 (three) times daily. for 10 days 22 g 0    NARCAN 4 mg/actuation Spry USE UTD  0    nitroGLYCERIN (NITROSTAT) 0.3 MG SL tablet ONE TABLET UNDER TONGUE AS NEEDED FOR CHEST PAIN EVERY 5 MINUTES AS NEEDED 100 tablet 0    ondansetron (ZOFRAN) 4 MG tablet Take 1 tablet (4 mg total) by mouth every 6 (six) hours. 12 tablet 0    ondansetron (ZOFRAN-ODT) 4 MG TbDL Take 1 tablet (4 mg total) by mouth every 8 (eight) hours as needed. Nausea/vomiting 20 tablet 0    oxyCODONE (ROXICODONE) 20 mg Tab immediate release tablet Take 1 tablet by mouth every 6 (six) hours as needed.  0    polymyxin B sulf-trimethoprim (POLYTRIM) 10,000 unit- 1 mg/mL Drop Place 1 drop into the left eye every 6 (six) hours. 3 mL 0    promethazine (PHENERGAN) 25 MG tablet Take 1 tablet (25 mg total) by mouth every 6 (six) hours as  "needed for Nausea. 15 tablet 0    ranitidine (ZANTAC) 300 MG tablet Take 1 tablet (300 mg total) by mouth every evening. 30 tablet 11    RESTASIS 0.05 % ophthalmic emulsion INSTILL 1 DROP IN BOTH EYES TWICE DAILY 30 each 0    sulfamethoxazole-trimethoprim 800-160mg (BACTRIM DS) 800-160 mg Tab Take 1 tablet by mouth 2 (two) times daily. for 10 days 20 tablet 0    zolpidem (AMBIEN) 10 mg Tab TK 1 T PO  QHS PRN  0    oxyCODONE-acetaminophen (PERCOCET) 5-325 mg per tablet Take 1 tablet by mouth every 4 (four) hours as needed for Pain. 10 tablet 0    triamcinolone acetonide 0.1% (KENALOG) 0.1 % cream Apply topically 2 (two) times daily. 15 g 0    zolpidem (AMBIEN) 5 MG Tab TK 1 T PO QHS PRN 30 tablet 1     No current facility-administered medications for this visit.          ROS  Review of Systems   Constitutional: Negative for chills, fever and unexpected weight change.   Eyes: Negative for discharge, redness and visual disturbance.   Respiratory: Negative for cough, chest tightness, shortness of breath and wheezing.    Cardiovascular: Negative for chest pain, palpitations and leg swelling.   Gastrointestinal: Negative for abdominal pain, constipation, diarrhea, nausea and vomiting.   Genitourinary: Negative for decreased urine volume, dysuria and hematuria.   Musculoskeletal: Positive for back pain. Negative for arthralgias, joint swelling and myalgias.   Neurological: Positive for headaches. Negative for dizziness, weakness and light-headedness.         Physical Exam  Vitals:    07/13/18 1403   BP: 132/70   Pulse: 85   Temp: 98.3 °F (36.8 °C)   SpO2: 98%   Weight: 76.7 kg (169 lb)   Height: 5' 6" (1.676 m)    Body mass index is 27.28 kg/m².  Weight: 76.7 kg (169 lb)   Height: 5' 6" (167.6 cm)   Physical Exam   Constitutional: She is oriented to person, place, and time. She appears well-developed and well-nourished. No distress.   Cardiovascular: Normal rate and regular rhythm.  Exam reveals no gallop and no " friction rub.    Pulmonary/Chest: Effort normal and breath sounds normal. No respiratory distress. She has no wheezes. She has no rales.   Musculoskeletal:        Cervical back: She exhibits no bony tenderness (no midline TTP).        Thoracic back: She exhibits no bony tenderness.        Lumbar back: She exhibits spasm. She exhibits no bony tenderness (no midline TTP). Tenderness:  paraspinous tenderness.   Neurological: She is alert and oriented to person, place, and time.   Symmetric facial movements palate elevated symmetrically tongue midline    Skin: Skin is warm and dry. No rash noted. No erythema. No pallor.           Health Maintenance       Date Due Completion Date    Pneumococcal PPSV23 (Medium Risk) (1) 03/01/1972 ---    Pap Smear with HPV Cotest 03/01/1975 ---    Mammogram 03/01/1994 ---    Lipid Panel 05/29/2017 5/29/2012    Influenza Vaccine 08/01/2018 11/9/2017    Override on 11/2/2015: Done    Colonoscopy 07/16/2023 7/16/2013    TETANUS VACCINE 12/30/2026 12/30/2016 (Declined)    Override on 12/30/2016: Declined

## 2018-07-13 NOTE — PATIENT INSTRUCTIONS
Salonpas patch with lidocaine.      You can take a maximum of 4000mg of tylenol a day.     Try some heat on the low back for 15-20 min 2-3x a day.      We will call about seeing PT.

## 2018-07-16 ENCOUNTER — HOSPITAL ENCOUNTER (INPATIENT)
Facility: HOSPITAL | Age: 63
LOS: 1 days | Discharge: HOME | DRG: 641 | End: 2018-07-17
Attending: EMERGENCY MEDICINE | Admitting: INTERNAL MEDICINE
Payer: OTHER GOVERNMENT

## 2018-07-16 DIAGNOSIS — K70.30 ALCOHOLIC CIRRHOSIS, UNSPECIFIED WHETHER ASCITES PRESENT: ICD-10-CM

## 2018-07-16 DIAGNOSIS — F31.9 BIPOLAR 1 DISORDER: Chronic | ICD-10-CM

## 2018-07-16 DIAGNOSIS — R42 DIZZY: ICD-10-CM

## 2018-07-16 DIAGNOSIS — K74.60 HEPATIC CIRRHOSIS, UNSPECIFIED HEPATIC CIRRHOSIS TYPE, UNSPECIFIED WHETHER ASCITES PRESENT: Chronic | ICD-10-CM

## 2018-07-16 DIAGNOSIS — M47.819 SPONDYLOSIS WITHOUT MYELOPATHY: ICD-10-CM

## 2018-07-16 DIAGNOSIS — M47.816 LUMBAR FACET ARTHROPATHY: ICD-10-CM

## 2018-07-16 DIAGNOSIS — R53.1 WEAKNESS: ICD-10-CM

## 2018-07-16 DIAGNOSIS — E87.1 HYPONATREMIA: Primary | ICD-10-CM

## 2018-07-16 DIAGNOSIS — M43.10 ACQUIRED SPONDYLOLISTHESIS: ICD-10-CM

## 2018-07-16 DIAGNOSIS — M51.36 DDD (DEGENERATIVE DISC DISEASE), LUMBAR: Chronic | ICD-10-CM

## 2018-07-16 DIAGNOSIS — M51.37 DEGENERATION OF LUMBAR OR LUMBOSACRAL INTERVERTEBRAL DISC: ICD-10-CM

## 2018-07-16 DIAGNOSIS — F11.20 OPIATE DEPENDENCE, CONTINUOUS: Chronic | ICD-10-CM

## 2018-07-16 PROBLEM — K44.0 INCARCERATED HIATAL HERNIA: Status: RESOLVED | Noted: 2017-03-09 | Resolved: 2018-07-16

## 2018-07-16 PROBLEM — I10 ESSENTIAL HYPERTENSION: Chronic | Status: ACTIVE | Noted: 2018-07-16

## 2018-07-16 PROBLEM — K64.9 HEMORRHOIDS: Status: RESOLVED | Noted: 2018-04-09 | Resolved: 2018-07-16

## 2018-07-16 PROBLEM — B18.2 CHRONIC HEPATITIS C: Chronic | Status: ACTIVE | Noted: 2018-07-16

## 2018-07-16 PROBLEM — R11.2 INTRACTABLE VOMITING WITH NAUSEA: Status: RESOLVED | Noted: 2017-04-04 | Resolved: 2018-07-16

## 2018-07-16 PROBLEM — K62.5 RECTAL BLEEDING: Status: RESOLVED | Noted: 2018-04-09 | Resolved: 2018-07-16

## 2018-07-16 PROBLEM — K43.9 VENTRAL HERNIA WITHOUT OBSTRUCTION OR GANGRENE: Status: RESOLVED | Noted: 2017-11-08 | Resolved: 2018-07-16

## 2018-07-16 PROBLEM — I25.10 CAD (CORONARY ARTERY DISEASE): Chronic | Status: ACTIVE | Noted: 2018-07-16

## 2018-07-16 LAB
ALBUMIN SERPL-MCNC: 4 G/DL (ref 3.3–5.5)
ALBUMIN SERPL-MCNC: 4.2 G/DL (ref 3.3–5.5)
ALP SERPL-CCNC: 126 U/L (ref 42–141)
ALP SERPL-CCNC: 134 U/L (ref 42–141)
BILIRUB SERPL-MCNC: 0.5 MG/DL (ref 0.2–1.6)
BILIRUB SERPL-MCNC: 0.5 MG/DL (ref 0.2–1.6)
BILIRUBIN, POC UA: NEGATIVE
BLOOD, POC UA: NEGATIVE
BUN SERPL-MCNC: 12 MG/DL (ref 7–22)
CALCIUM SERPL-MCNC: 8.9 MG/DL (ref 8–10.3)
CHLORIDE SERPL-SCNC: 89 MMOL/L (ref 98–108)
CLARITY, POC UA: CLEAR
COLOR, POC UA: YELLOW
CREAT SERPL-MCNC: 0.9 MG/DL (ref 0.6–1.2)
GLUCOSE SERPL-MCNC: 107 MG/DL (ref 73–118)
GLUCOSE, POC UA: NEGATIVE
KETONES, POC UA: NEGATIVE
LEUKOCYTE EST, POC UA: NEGATIVE
NITRITE, POC UA: NEGATIVE
PH UR STRIP: 7 [PH]
POC ALT (SGPT): 23 U/L (ref 10–47)
POC ALT (SGPT): 26 U/L (ref 10–47)
POC AMYLASE: 32 U/L (ref 14–97)
POC AST (SGOT): 47 U/L (ref 11–38)
POC AST (SGOT): 50 U/L (ref 11–38)
POC CARDIAC TROPONIN I: 0 NG/ML
POC GGT: 214 U/L (ref 5–65)
POC TCO2: 23 MMOL/L (ref 18–33)
POTASSIUM BLD-SCNC: 4.9 MMOL/L (ref 3.6–5.1)
PROTEIN, POC UA: NEGATIVE
PROTEIN, POC: 7.7 G/DL (ref 6.4–8.1)
PROTEIN, POC: 7.8 G/DL (ref 6.4–8.1)
SAMPLE: NORMAL
SODIUM BLD-SCNC: 123 MMOL/L (ref 128–145)
SPECIFIC GRAVITY, POC UA: 1.02
UROBILINOGEN, POC UA: 0.2 E.U./DL

## 2018-07-16 PROCEDURE — 85025 COMPLETE CBC W/AUTO DIFF WBC: CPT

## 2018-07-16 PROCEDURE — 82150 ASSAY OF AMYLASE: CPT

## 2018-07-16 PROCEDURE — 93005 ELECTROCARDIOGRAM TRACING: CPT

## 2018-07-16 PROCEDURE — 85610 PROTHROMBIN TIME: CPT

## 2018-07-16 PROCEDURE — 81003 URINALYSIS AUTO W/O SCOPE: CPT

## 2018-07-16 PROCEDURE — 21400001 HC TELEMETRY ROOM

## 2018-07-16 PROCEDURE — 84484 ASSAY OF TROPONIN QUANT: CPT

## 2018-07-16 PROCEDURE — 25000003 PHARM REV CODE 250: Performed by: EMERGENCY MEDICINE

## 2018-07-16 PROCEDURE — 99285 EMERGENCY DEPT VISIT HI MDM: CPT | Mod: 25

## 2018-07-16 PROCEDURE — 96361 HYDRATE IV INFUSION ADD-ON: CPT

## 2018-07-16 PROCEDURE — 93010 ELECTROCARDIOGRAM REPORT: CPT | Mod: ,,, | Performed by: INTERNAL MEDICINE

## 2018-07-16 PROCEDURE — 80053 COMPREHEN METABOLIC PANEL: CPT

## 2018-07-16 PROCEDURE — 96360 HYDRATION IV INFUSION INIT: CPT

## 2018-07-16 RX ORDER — ONDANSETRON 2 MG/ML
8 INJECTION INTRAMUSCULAR; INTRAVENOUS EVERY 8 HOURS PRN
Status: DISCONTINUED | OUTPATIENT
Start: 2018-07-16 | End: 2018-07-17 | Stop reason: HOSPADM

## 2018-07-16 RX ORDER — AMOXICILLIN 250 MG
1 CAPSULE ORAL 2 TIMES DAILY PRN
Status: DISCONTINUED | OUTPATIENT
Start: 2018-07-17 | End: 2018-07-17 | Stop reason: HOSPADM

## 2018-07-16 RX ORDER — GABAPENTIN 300 MG/1
300 CAPSULE ORAL 2 TIMES DAILY
Status: DISCONTINUED | OUTPATIENT
Start: 2018-07-17 | End: 2018-07-17 | Stop reason: HOSPADM

## 2018-07-16 RX ORDER — ONDANSETRON 2 MG/ML
4 INJECTION INTRAMUSCULAR; INTRAVENOUS EVERY 8 HOURS PRN
Status: DISCONTINUED | OUTPATIENT
Start: 2018-07-16 | End: 2018-07-16

## 2018-07-16 RX ORDER — DULOXETIN HYDROCHLORIDE 30 MG/1
30 CAPSULE, DELAYED RELEASE ORAL 2 TIMES DAILY
Status: DISCONTINUED | OUTPATIENT
Start: 2018-07-17 | End: 2018-07-17 | Stop reason: HOSPADM

## 2018-07-16 RX ORDER — FAMOTIDINE 20 MG/1
20 TABLET, FILM COATED ORAL 2 TIMES DAILY
Status: DISCONTINUED | OUTPATIENT
Start: 2018-07-17 | End: 2018-07-17 | Stop reason: HOSPADM

## 2018-07-16 RX ORDER — ACETAMINOPHEN 500 MG
500 TABLET ORAL EVERY 6 HOURS PRN
Status: DISCONTINUED | OUTPATIENT
Start: 2018-07-17 | End: 2018-07-17 | Stop reason: HOSPADM

## 2018-07-16 RX ORDER — METOPROLOL TARTRATE 25 MG/1
12.5 TABLET ORAL 2 TIMES DAILY
Status: DISCONTINUED | OUTPATIENT
Start: 2018-07-17 | End: 2018-07-17 | Stop reason: HOSPADM

## 2018-07-16 RX ORDER — CLONIDINE HYDROCHLORIDE 0.1 MG/1
0.1 TABLET ORAL 3 TIMES DAILY PRN
Status: DISCONTINUED | OUTPATIENT
Start: 2018-07-17 | End: 2018-07-17 | Stop reason: HOSPADM

## 2018-07-16 RX ORDER — ONDANSETRON 4 MG/1
4 TABLET, ORALLY DISINTEGRATING ORAL
Status: COMPLETED | OUTPATIENT
Start: 2018-07-16 | End: 2018-07-16

## 2018-07-16 RX ORDER — SODIUM CHLORIDE 9 MG/ML
INJECTION, SOLUTION INTRAVENOUS CONTINUOUS
Status: DISCONTINUED | OUTPATIENT
Start: 2018-07-16 | End: 2018-07-16

## 2018-07-16 RX ORDER — LEVOTHYROXINE SODIUM 25 UG/1
25 TABLET ORAL
Status: DISCONTINUED | OUTPATIENT
Start: 2018-07-17 | End: 2018-07-17 | Stop reason: HOSPADM

## 2018-07-16 RX ORDER — RAMELTEON 8 MG/1
8 TABLET ORAL NIGHTLY PRN
Status: DISCONTINUED | OUTPATIENT
Start: 2018-07-17 | End: 2018-07-17 | Stop reason: HOSPADM

## 2018-07-16 RX ADMIN — SODIUM CHLORIDE 1000 ML: 0.9 INJECTION, SOLUTION INTRAVENOUS at 07:07

## 2018-07-16 RX ADMIN — ONDANSETRON 4 MG: 4 TABLET, ORALLY DISINTEGRATING ORAL at 06:07

## 2018-07-16 RX ADMIN — LIDOCAINE HYDROCHLORIDE: 20 SOLUTION ORAL; TOPICAL at 06:07

## 2018-07-16 RX ADMIN — SODIUM CHLORIDE: 0.9 INJECTION, SOLUTION INTRAVENOUS at 11:07

## 2018-07-16 NOTE — ED PROVIDER NOTES
"Encounter Date: 7/16/2018       History     Chief Complaint   Patient presents with    Fall     pt reports "still a GI bleed, falls sometimes, I was here last week w somebody else, is doctor Jeff here, I think Im off balance"  Pt alert to self and day of week, unable to provide date, behvaior is erratic in triage     Chief complaint:  Vomiting  63-year-old presents with several episodes of vomiting that began today.  She also has heartburn.  No diarrhea.  She reports burning epigastric pain.  She denies chest pain or shortness of breath.      The history is provided by the patient and medical records.     Review of patient's allergies indicates:  No Known Allergies  Past Medical History:   Diagnosis Date    Alcohol abuse     Anxiety     Arthritis     Bipolar disorder     Cirrhosis of liver     Coronary artery disease     Depression     Fall     GERD (gastroesophageal reflux disease)     Hypertension     Low back pain     MI (myocardial infarction)     Migraine headache     Thyroid disease      Past Surgical History:   Procedure Laterality Date    HIATAL HERNIA REPAIR      JOINT REPLACEMENT      KNEE SURGERY  bilateral replacement    right foot sx  2008    SHOULDER SURGERY  replacement     Family History   Problem Relation Age of Onset    Cancer Mother     Cancer Father      Social History   Substance Use Topics    Smoking status: Never Smoker    Smokeless tobacco: Never Used    Alcohol use No      Comment: pt states she does not drink anymore     Review of Systems   Gastrointestinal: Positive for abdominal pain, nausea and vomiting.   Genitourinary: Negative for dysuria.   Neurological: Positive for weakness.   Hematological: Bruises/bleeds easily.   Psychiatric/Behavioral: Positive for confusion (chronic).       Physical Exam     Initial Vitals [07/16/18 1734]   BP Pulse Resp Temp SpO2   110/80 93 (!) 22 98.7 °F (37.1 °C) 97 %      MAP       --         Physical Exam    Nursing note and " "vitals reviewed.  Constitutional: She appears well-developed and well-nourished.   HENT:   Head: Normocephalic and atraumatic.   Eyes: Conjunctivae and EOM are normal. Pupils are equal, round, and reactive to light.   Neck: Normal range of motion. Neck supple.   Cardiovascular: Normal rate and regular rhythm.   Pulmonary/Chest: Breath sounds normal. No respiratory distress.   Abdominal: Soft. She exhibits no distension. There is no tenderness. There is no rebound and no guarding.   Musculoskeletal: Normal range of motion.   Neurological: She is alert and oriented to person, place, and time. She has normal strength.   Skin: Skin is warm and dry.   Multiple areas of ecchymoses in various stages of healing, abrasion to the left knee   Psychiatric: She has a normal mood and affect.   Bizarre          ED Course   Critical Care  Date/Time: 7/16/2018 6:49 PM  Performed by: TORRES CALLAHAN.  Authorized by: TORRES CALLAHAN   Direct patient critical care time: 20 minutes  Additional history critical care time: 5 minutes  Ordering / reviewing critical care time: 5 minutes  Documentation critical care time: 5 minutes  Consulting other physicians critical care time: 5 minutes  Total critical care time (exclusive of procedural time) : 40 minutes  Critical care was necessary to treat or prevent imminent or life-threatening deterioration of the following conditions: endocrine crisis.        Labs Reviewed   POCT CBC   POCT URINALYSIS W/O SCOPE   POCT PROTIME-INR   POCT CMP   POCT AMYLASE     EKG Readings: (Independently Interpreted)   Initial Reading: No STEMI.   Normal sinus rhythm, no ST segment elevation, normal QT, normal P RR, inverted T wave II       Imaging Results    None          Medical Decision Making:   Initial Assessment:   63-year-old presents with a complaint to me that she has been vomiting. She also reports "heartburn".  On exam patient has a bizarre affect but has stable vital signs. Abdomen is soft and " "nontender.  ED Management:  Patient will be evaluated with CBC, CMP, INR, troponin and EKG.  She will be checked with orthostatics and given Zofran and a GI cocktail.  She is requesting "IM  pain medicines".  I explained to her that until her abdominal pain is evaluated I do not feel comfortable giving her Toradol and IM narcotics are not indicated.  Patient's labs were significant for sodium of 123 and chronic elevated liver function tests.  Troponin was normal.  Patient will be given IV fluids.  I discussed the patient with  who does agree that the patient should be placed in observation.  Urine osmolarity, serum osmolarity, urine sodium and tox screen will be ordered with admit orders.  Head CT will be done as well. At the time of this dictation urinalysis is pending.  CT of head pending as well.                      Clinical Impression:   The primary encounter diagnosis was Hyponatremia. Diagnoses of Weakness and Dizzy were also pertinent to this visit.                             Rohini William MD  07/16/18 7100    "

## 2018-07-17 VITALS
TEMPERATURE: 98 F | RESPIRATION RATE: 18 BRPM | HEART RATE: 68 BPM | WEIGHT: 167.56 LBS | SYSTOLIC BLOOD PRESSURE: 109 MMHG | BODY MASS INDEX: 26.93 KG/M2 | HEIGHT: 66 IN | DIASTOLIC BLOOD PRESSURE: 62 MMHG | OXYGEN SATURATION: 93 %

## 2018-07-17 LAB
ALBUMIN SERPL BCP-MCNC: 4.2 G/DL
ALP SERPL-CCNC: 155 U/L
ALT SERPL W/O P-5'-P-CCNC: 26 U/L
AMPHET+METHAMPHET UR QL: NEGATIVE
ANION GAP SERPL CALC-SCNC: 5 MMOL/L
ANION GAP SERPL CALC-SCNC: 6 MMOL/L
AST SERPL-CCNC: 39 U/L
BARBITURATES UR QL SCN>200 NG/ML: NEGATIVE
BASOPHILS # BLD AUTO: 0.03 K/UL
BASOPHILS NFR BLD: 1.3 %
BENZODIAZ UR QL SCN>200 NG/ML: NEGATIVE
BILIRUB SERPL-MCNC: 0.4 MG/DL
BUN SERPL-MCNC: 9 MG/DL
BUN SERPL-MCNC: 9 MG/DL
BZE UR QL SCN: NEGATIVE
CALCIUM SERPL-MCNC: 9 MG/DL
CALCIUM SERPL-MCNC: 9.3 MG/DL
CANNABINOIDS UR QL SCN: NEGATIVE
CHLORIDE SERPL-SCNC: 97 MMOL/L
CHLORIDE SERPL-SCNC: 97 MMOL/L
CO2 SERPL-SCNC: 20 MMOL/L
CO2 SERPL-SCNC: 23 MMOL/L
CREAT SERPL-MCNC: 0.8 MG/DL
CREAT SERPL-MCNC: 0.9 MG/DL
CREAT UR-MCNC: 12.8 MG/DL
DIFFERENTIAL METHOD: ABNORMAL
EOSINOPHIL # BLD AUTO: 0.2 K/UL
EOSINOPHIL NFR BLD: 9.4 %
ERYTHROCYTE [DISTWIDTH] IN BLOOD BY AUTOMATED COUNT: 14.5 %
EST. GFR  (AFRICAN AMERICAN): >60 ML/MIN/1.73 M^2
EST. GFR  (AFRICAN AMERICAN): >60 ML/MIN/1.73 M^2
EST. GFR  (NON AFRICAN AMERICAN): >60 ML/MIN/1.73 M^2
EST. GFR  (NON AFRICAN AMERICAN): >60 ML/MIN/1.73 M^2
GLUCOSE SERPL-MCNC: 88 MG/DL
GLUCOSE SERPL-MCNC: 99 MG/DL
HCT VFR BLD AUTO: 36.7 %
HGB BLD-MCNC: 12.9 G/DL
LYMPHOCYTES # BLD AUTO: 0.9 K/UL
LYMPHOCYTES NFR BLD: 40.6 %
MAGNESIUM SERPL-MCNC: 2.5 MG/DL
MCH RBC QN AUTO: 31.3 PG
MCHC RBC AUTO-ENTMCNC: 35.1 G/DL
MCV RBC AUTO: 89 FL
METHADONE UR QL SCN>300 NG/ML: NEGATIVE
MONOCYTES # BLD AUTO: 0.5 K/UL
MONOCYTES NFR BLD: 21.9 %
NEUTROPHILS # BLD AUTO: 0.6 K/UL
NEUTROPHILS NFR BLD: 26.8 %
OPIATES UR QL SCN: NEGATIVE
OSMOLALITY SERPL: 261 MOSM/KG
OSMOLALITY UR: 167 MOSM/KG
PCP UR QL SCN>25 NG/ML: NEGATIVE
PHOSPHATE SERPL-MCNC: 2.9 MG/DL
PLATELET # BLD AUTO: 243 K/UL
PMV BLD AUTO: 10.1 FL
POTASSIUM SERPL-SCNC: 4.6 MMOL/L
POTASSIUM SERPL-SCNC: 4.7 MMOL/L
PROT SERPL-MCNC: 8 G/DL
RBC # BLD AUTO: 4.12 M/UL
SODIUM SERPL-SCNC: 123 MMOL/L
SODIUM SERPL-SCNC: 125 MMOL/L
SODIUM UR-SCNC: 35 MMOL/L
TOXICOLOGY INFORMATION: ABNORMAL
TSH SERPL DL<=0.005 MIU/L-ACNC: 1.52 UIU/ML
WBC # BLD AUTO: 2.24 K/UL

## 2018-07-17 PROCEDURE — 83935 ASSAY OF URINE OSMOLALITY: CPT

## 2018-07-17 PROCEDURE — 84100 ASSAY OF PHOSPHORUS: CPT

## 2018-07-17 PROCEDURE — 36415 COLL VENOUS BLD VENIPUNCTURE: CPT

## 2018-07-17 PROCEDURE — 84443 ASSAY THYROID STIM HORMONE: CPT

## 2018-07-17 PROCEDURE — 80307 DRUG TEST PRSMV CHEM ANLYZR: CPT

## 2018-07-17 PROCEDURE — 85025 COMPLETE CBC W/AUTO DIFF WBC: CPT

## 2018-07-17 PROCEDURE — 80053 COMPREHEN METABOLIC PANEL: CPT

## 2018-07-17 PROCEDURE — 80048 BASIC METABOLIC PNL TOTAL CA: CPT

## 2018-07-17 PROCEDURE — 25000003 PHARM REV CODE 250: Performed by: INTERNAL MEDICINE

## 2018-07-17 PROCEDURE — 84300 ASSAY OF URINE SODIUM: CPT

## 2018-07-17 PROCEDURE — 94761 N-INVAS EAR/PLS OXIMETRY MLT: CPT

## 2018-07-17 PROCEDURE — 83735 ASSAY OF MAGNESIUM: CPT

## 2018-07-17 PROCEDURE — 83930 ASSAY OF BLOOD OSMOLALITY: CPT

## 2018-07-17 RX ORDER — SODIUM CHLORIDE 9 MG/ML
INJECTION, SOLUTION INTRAVENOUS CONTINUOUS
Status: DISCONTINUED | OUTPATIENT
Start: 2018-07-17 | End: 2018-07-17

## 2018-07-17 RX ORDER — CLONAZEPAM 0.5 MG/1
2 TABLET ORAL 3 TIMES DAILY
Status: DISCONTINUED | OUTPATIENT
Start: 2018-07-17 | End: 2018-07-17

## 2018-07-17 RX ORDER — MAG HYDROX/ALUMINUM HYD/SIMETH 200-200-20
30 SUSPENSION, ORAL (FINAL DOSE FORM) ORAL
Status: DISCONTINUED | OUTPATIENT
Start: 2018-07-17 | End: 2018-07-17 | Stop reason: HOSPADM

## 2018-07-17 RX ORDER — SODIUM CHLORIDE 9 MG/ML
INJECTION, SOLUTION INTRAVENOUS CONTINUOUS
Status: DISCONTINUED | OUTPATIENT
Start: 2018-07-17 | End: 2018-07-17 | Stop reason: HOSPADM

## 2018-07-17 RX ORDER — CLONAZEPAM 0.5 MG/1
1 TABLET ORAL 3 TIMES DAILY
Status: DISCONTINUED | OUTPATIENT
Start: 2018-07-17 | End: 2018-07-17 | Stop reason: HOSPADM

## 2018-07-17 RX ORDER — OXYCODONE HYDROCHLORIDE 5 MG/1
10 TABLET ORAL ONCE
Status: COMPLETED | OUTPATIENT
Start: 2018-07-17 | End: 2018-07-17

## 2018-07-17 RX ADMIN — ALUMINUM HYDROXIDE, MAGNESIUM HYDROXIDE, AND SIMETHICONE 30 ML: 200; 200; 20 SUSPENSION ORAL at 12:07

## 2018-07-17 RX ADMIN — CLONAZEPAM 1 MG: 0.5 TABLET ORAL at 04:07

## 2018-07-17 RX ADMIN — LEVOTHYROXINE SODIUM 25 MCG: 25 TABLET ORAL at 06:07

## 2018-07-17 RX ADMIN — METOPROLOL TARTRATE 12.5 MG: 25 TABLET, FILM COATED ORAL at 12:07

## 2018-07-17 RX ADMIN — SODIUM CHLORIDE: 0.9 INJECTION, SOLUTION INTRAVENOUS at 01:07

## 2018-07-17 RX ADMIN — ACETAMINOPHEN 500 MG: 500 TABLET, FILM COATED ORAL at 04:07

## 2018-07-17 RX ADMIN — FAMOTIDINE 20 MG: 20 TABLET ORAL at 12:07

## 2018-07-17 RX ADMIN — DULOXETINE HYDROCHLORIDE 30 MG: 30 CAPSULE, DELAYED RELEASE ORAL at 08:07

## 2018-07-17 RX ADMIN — OXYCODONE HYDROCHLORIDE 10 MG: 5 TABLET ORAL at 01:07

## 2018-07-17 RX ADMIN — METOPROLOL TARTRATE 12.5 MG: 25 TABLET, FILM COATED ORAL at 08:07

## 2018-07-17 RX ADMIN — FAMOTIDINE 20 MG: 20 TABLET ORAL at 08:07

## 2018-07-17 RX ADMIN — GABAPENTIN 300 MG: 300 CAPSULE ORAL at 12:07

## 2018-07-17 RX ADMIN — GABAPENTIN 300 MG: 300 CAPSULE ORAL at 08:07

## 2018-07-17 RX ADMIN — SODIUM CHLORIDE: 0.9 INJECTION, SOLUTION INTRAVENOUS at 08:07

## 2018-07-17 RX ADMIN — DULOXETINE HYDROCHLORIDE 30 MG: 30 CAPSULE, DELAYED RELEASE ORAL at 12:07

## 2018-07-17 RX ADMIN — ALUMINUM HYDROXIDE, MAGNESIUM HYDROXIDE, AND SIMETHICONE 30 ML: 200; 200; 20 SUSPENSION ORAL at 06:07

## 2018-07-17 NOTE — NURSING
Notified MD Burke Emerson that patient requests Clonazepam ordered. Also informed MD that pt states she takes bactrim for a toe infection and she wants pain meds ordered. Orders noted.

## 2018-07-17 NOTE — ASSESSMENT & PLAN NOTE
Her sodium is 123 mmol/L which is new for her.  She does not appear to be volume overloaded so I do not think that nephrotic syndrome, cirrhosis, acute heart failure, nor her hypothyroidism to be the issue here.  Given that she had been nauseous and vomiting I think that she is volume depleted and is likely the cause of her hyponatremia.  Will continue IV fluids and recheck her sodium in the morning.

## 2018-07-17 NOTE — ED NOTES
"Called and spoke with Ayana at [phone], and requested for her to leave a message with Martha RN assigned to this pt. To fax to us at [facility] the two pages of the "Patient Transfer Documentation" that came over in the transfer packet with this pt.  Our fax number here at [facility] is:  [phone].  Ayana verbalized understanding.  Our number at [facility] is:  [phone]  "

## 2018-07-17 NOTE — SUBJECTIVE & OBJECTIVE
Past Medical History:   Diagnosis Date    Alcohol abuse     Anxiety     Arthritis     Bipolar disorder     Cirrhosis of liver     Coronary artery disease     Depression     Fall     GERD (gastroesophageal reflux disease)     Hypertension     Low back pain     MI (myocardial infarction)     Migraine headache     Thyroid disease        Past Surgical History:   Procedure Laterality Date    HIATAL HERNIA REPAIR      JOINT REPLACEMENT      KNEE SURGERY  bilateral replacement    right foot sx  2008    SHOULDER SURGERY  replacement       Review of patient's allergies indicates:  No Known Allergies    No current facility-administered medications on file prior to encounter.      Current Outpatient Prescriptions on File Prior to Encounter   Medication Sig    amitriptyline (ELAVIL) 50 MG tablet Take 1 tablet (50 mg total) by mouth every evening.    atenolol (TENORMIN) 25 MG tablet Take 1 tablet by mouth once daily.    chlordiazepoxide (LIBRIUM) 25 MG Cap Day 1 50 mg q 6 hours  Day 2 50 mg q 8 hours  Day 3 50 mg q 12 hours   Day 4 50 mg at night    clindamycin (CLEOCIN) 300 MG capsule Take 1 capsule (300 mg total) by mouth every 6 (six) hours.    clonazePAM (KLONOPIN) 1 MG tablet TAKE 1 TABLET BY MOUTH TWICE DAILY    cycloSPORINE (RESTASIS) 0.05 % ophthalmic emulsion Place 0.4 mLs (1 drop total) into both eyes 2 (two) times daily.    docusate sodium (COLACE) 100 MG capsule Take 1 capsule (100 mg total) by mouth 2 (two) times daily.    duloxetine (CYMBALTA) 30 MG capsule TAKE 1 CAPSULE(30 MG) BY MOUTH EVERY DAY    efinaconazole (JUBLIA) 10 % Eran APPLY ONE DOSE DAILY    gabapentin (NEURONTIN) 300 MG capsule Take 1 capsule (300 mg total) by mouth 2 (two) times daily.    hydrocortisone-pramoxine (PROCTOFOAM-HS) rectal foam Place 1 applicator rectally 2 (two) times daily.    levothyroxine (SYNTHROID) 25 MCG tablet Take 1 tablet (25 mcg total) by mouth once daily.    LINZESS 145 mcg Cap capsule Take 1  capsule (145 mcg total) by mouth once daily.    metoclopramide HCl (REGLAN) 10 MG tablet Take 1 tablet (10 mg total) by mouth every 6 (six) hours.    metoprolol tartrate (LOPRESSOR) 25 MG tablet Take 0.5 tablets (12.5 mg total) by mouth 2 (two) times daily.    mupirocin (BACTROBAN) 2 % ointment Apply topically 3 (three) times daily. for 10 days    NARCAN 4 mg/actuation Spry USE UTD    nitroGLYCERIN (NITROSTAT) 0.3 MG SL tablet ONE TABLET UNDER TONGUE AS NEEDED FOR CHEST PAIN EVERY 5 MINUTES AS NEEDED    ondansetron (ZOFRAN) 4 MG tablet Take 1 tablet (4 mg total) by mouth every 6 (six) hours.    ondansetron (ZOFRAN-ODT) 4 MG TbDL Take 1 tablet (4 mg total) by mouth every 8 (eight) hours as needed. Nausea/vomiting    oxyCODONE (ROXICODONE) 20 mg Tab immediate release tablet Take 1 tablet by mouth every 6 (six) hours as needed.    oxyCODONE-acetaminophen (PERCOCET) 5-325 mg per tablet Take 1 tablet by mouth every 4 (four) hours as needed for Pain.    polymyxin B sulf-trimethoprim (POLYTRIM) 10,000 unit- 1 mg/mL Drop Place 1 drop into the left eye every 6 (six) hours.    promethazine (PHENERGAN) 25 MG tablet Take 1 tablet (25 mg total) by mouth every 6 (six) hours as needed for Nausea.    ranitidine (ZANTAC) 300 MG tablet Take 1 tablet (300 mg total) by mouth every evening.    RESTASIS 0.05 % ophthalmic emulsion INSTILL 1 DROP IN BOTH EYES TWICE DAILY    sulfamethoxazole-trimethoprim 800-160mg (BACTRIM DS) 800-160 mg Tab Take 1 tablet by mouth 2 (two) times daily. for 10 days    triamcinolone acetonide 0.1% (KENALOG) 0.1 % cream Apply topically 2 (two) times daily.    zolpidem (AMBIEN) 10 mg Tab TK 1 T PO  QHS PRN    zolpidem (AMBIEN) 5 MG Tab TK 1 T PO QHS PRN     Family History     Problem Relation (Age of Onset)    Cancer Mother, Father        Social History Main Topics    Smoking status: Never Smoker    Smokeless tobacco: Never Used    Alcohol use 3.6 oz/week     6 Cans of beer per week       Comment: pt states she does not drink anymore    Drug use: No      Comment: Pain mgt clinic    Sexual activity: Not on file     Review of Systems   Constitutional: Positive for chills. Negative for activity change, appetite change, diaphoresis, fatigue, fever and unexpected weight change.   HENT: Negative.    Eyes: Negative.    Respiratory: Negative for cough, chest tightness, shortness of breath and wheezing.    Cardiovascular: Positive for chest pain. Negative for palpitations and leg swelling.   Gastrointestinal: Positive for nausea and vomiting. Negative for abdominal distention, abdominal pain, blood in stool, constipation and diarrhea.   Genitourinary: Negative for dysuria and hematuria.   Musculoskeletal: Positive for back pain.   Skin: Negative.    Neurological: Negative for dizziness, seizures, syncope, weakness and light-headedness.   Psychiatric/Behavioral: Negative.      Objective:     Vital Signs (Most Recent):  Temp: 97.2 °F (36.2 °C) (07/16/18 2237)  Pulse: 74 (07/16/18 2300)  Resp: 18 (07/16/18 2237)  BP: 139/76 (07/16/18 2237)  SpO2: 97 % (07/16/18 2237) Vital Signs (24h Range):  Temp:  [97.2 °F (36.2 °C)-98.7 °F (37.1 °C)] 97.2 °F (36.2 °C)  Pulse:  [66-93] 74  Resp:  [17-22] 18  SpO2:  [94 %-97 %] 97 %  BP: ()/(70-92) 139/76     Weight: 76 kg (167 lb 8.8 oz)  Body mass index is 27.04 kg/m².    Physical Exam   Constitutional: She is oriented to person, place, and time. She appears well-developed and well-nourished. No distress.   HENT:   Head: Normocephalic and atraumatic.   Right Ear: External ear normal.   Left Ear: External ear normal.   Nose: Nose normal.   Eyes: Right eye exhibits no discharge. Left eye exhibits no discharge.   Neck: Normal range of motion.   Cardiovascular: Normal rate, regular rhythm, normal heart sounds and intact distal pulses.  Exam reveals no gallop and no friction rub.    No murmur heard.  Pulmonary/Chest: Effort normal and breath sounds normal. No respiratory  distress. She has no wheezes. She has no rales. She exhibits no tenderness.   Abdominal: Soft. Bowel sounds are normal. She exhibits no distension. There is no tenderness. There is no rebound and no guarding.   Musculoskeletal: Normal range of motion. She exhibits no edema.   Neurological: She is alert and oriented to person, place, and time.   Skin: Skin is warm and dry. She is not diaphoretic. No erythema.   Psychiatric: She has a normal mood and affect. Her behavior is normal. Judgment and thought content normal.   Nursing note and vitals reviewed.          Significant Labs: All pertinent labs within the past 24 hours have been reviewed.    Significant Imaging: I have reviewed and interpreted all pertinent imaging results/findings within the past 24 hours.

## 2018-07-17 NOTE — PLAN OF CARE
Problem: Patient Care Overview  Goal: Plan of Care Review  Outcome: Ongoing (interventions implemented as appropriate)   07/17/18 1019   Coping/Psychosocial   Plan Of Care Reviewed With patient       Problem: Fall Risk (Adult)  Goal: Absence of Falls  Patient will demonstrate the desired outcomes by discharge/transition of care.   Outcome: Ongoing (interventions implemented as appropriate)   07/17/18 1019   Fall Risk (Adult)   Absence of Falls making progress toward outcome

## 2018-07-17 NOTE — PROGRESS NOTES
WRITTEN HEALTHCARE DISCHARGE INFORMATION     Things that YOU are responsible for to Manage Your Care At Home:  1. Getting your prescriptions filled.  2. Taking you medications as directed. DO NOT MISS ANY DOSES!  3. Going to your follow-up doctor appointments. This is important because it allows the doctor to monitor your progress and to determine if any changes need to be made to your treatment plan.    If you are unable to make your follow up appointments, please call the number listed and reschedule this appointment.     After discharge, if you need assistance, you can call [facility] for 24/7 assistance at [phone]    Thank you for choosing [facility] and allowing us to care for you.   From your care manager: Jayna ROTH,[place] @ [phone]     You should receive a call from [facility] within 48-72 hours to help manage your care after discharge. Please try to make sure that you answer your phone for this important phone call.     Follow-up Information     [facility] On 7/26/2018.    Specialty:  Urgent Care  Why:  On Thursday @ 4:20pm, outpatient services  Contact information:  [address] [place] [id number]  [phone]             Vini De Luna MD.    Specialties:  Cardiology, INTERVENTIONAL CARDIOLOGY  Why:  Call to schedule Cardiology appointment   Contact information:  [address] [facility] [address]  [address]  [place] [id number] [phone]             Aden Arita MD.    Specialty:  Psychiatry  Why:  Call to schedule Psychiatry appoingment   Contact information:  [address] [facility] [address]  [address]  [facility] [id number] [phone]

## 2018-07-17 NOTE — H&P
[facility] Medicine  History & Physical    Patient Name: Estella Arevalo  MRN: [id number]  Admission Date: 7/16/2018  Attending Physician: Mavis Rosa MD   Primary Care Provider: Angela Packer MD         Patient information was obtained from patient.     Subjective:     Principal Problem:Hyponatremia    Chief Complaint: Nausea & vomiting for two weeks.    HPI: Mrs. Estella Arevalo is a 63 y.o. female with essential hypertension, CAD, hypothyroidism (TSH 1.689 Jan 2018), cirrhosis of liver, chronic hepatitis C, Bipolar 1 disorder, and opioid dependency who presented initially to [facility] ED with complaints of nausea & vomiting for the past two weeks.  She reports that she was recently in the ED for similar complaints earlier this month and felt that her symptoms were ignored.  She says that the nausea and vomiting is mainly of undigested food and denies any vomiting of blood, bile, nor any coffee-grounds.  She denies any abdominal pain, diarrhea, fevers but has had some chills.  These episodes are intermittent.  Her son and daughter was wondering why she had been unsteady on her feet and why she's been so lethargic.  Her family also had mentioned why she appears more confused.    She also complains of left-sided chest pain that is non-radiating, is pressure-like in quality, and is 8/10 in severity.  There is no palpitations, diaphoresis, hemoptysis, nor any lower extremity pain or swelling.  There is no alleviating factors but she does say that exertion and palpation of the chest makes the pain worse.      She otherwise is very tangential and focuses on her pain medication regimen, stating that she has ran out of her medications and would like to avoid going through withdrawals.  She says that she is following with a pain medicine specialist and usually gets oxycodone 20 mg q4-6 hours as-needed for pain.  She was supposedly instructed by a gastroenterologist to avoid  acetaminophen due to risk of liver problems.  She adamantly denies any history of cirrhosis.    Chart Review:  Previous Hospitalizations  Date Hospital Diagnosis   Nov 2017 [facility] Incarcerate ventral hernia s/p laparoscopic repair   Apr 2017 [facility] Acute gastroenteritis   Mar 2017 [facility] Hiatal hernia s/p laparoscopic repair     Feb 2015 [facility] Hematochezia     Dec 2014 [facility] Suicidal ideations    Jan 2014 [facility] Ileus    Jul 2013 [facility] Hematemesis      Outpatient Follow-Up  Date of Visit Physician Service   Jul 2018 Zander Morrow MD Primary Care   Dec 2017 Greg Mendez NP Urgent Care   Nov 2017 Vini Gomez MD General Surgery    Mar 2015 Moisés Marinelli MD Neurosurgery    Jun 2013 NIGEL Atkins MD Pain Medicine      Past Medical History:   Diagnosis Date    Alcohol abuse     Anxiety     Arthritis     Bipolar disorder     Cirrhosis of liver     Coronary artery disease     Depression     Fall     GERD (gastroesophageal reflux disease)     Hypertension     Low back pain     MI (myocardial infarction)     Migraine headache     Thyroid disease        Past Surgical History:   Procedure Laterality Date    HIATAL HERNIA REPAIR      JOINT REPLACEMENT      KNEE SURGERY  bilateral replacement    right foot sx  2008    SHOULDER SURGERY  replacement       Review of patient's allergies indicates:  No Known Allergies    No current facility-administered medications on file prior to encounter.      Current Outpatient Prescriptions on File Prior to Encounter   Medication Sig    amitriptyline (ELAVIL) 50 MG tablet Take 1 tablet (50 mg total) by mouth every evening.    atenolol (TENORMIN) 25 MG tablet Take 1 tablet by mouth once daily.    chlordiazepoxide (LIBRIUM) 25 MG Cap Day 1 50 mg q 6 hours  Day 2 50 mg q 8 hours  Day 3 50 mg q 12 hours   Day 4 50 mg at night    clindamycin (CLEOCIN) 300 MG capsule Take 1 capsule (300 mg total) by mouth every 6 (six) hours.    clonazePAM (KLONOPIN) 1 MG tablet  TAKE 1 TABLET BY MOUTH TWICE DAILY    cycloSPORINE (RESTASIS) 0.05 % ophthalmic emulsion Place 0.4 mLs (1 drop total) into both eyes 2 (two) times daily.    docusate sodium (COLACE) 100 MG capsule Take 1 capsule (100 mg total) by mouth 2 (two) times daily.    duloxetine (CYMBALTA) 30 MG capsule TAKE 1 CAPSULE(30 MG) BY MOUTH EVERY DAY    efinaconazole (JUBLIA) 10 % Eran APPLY ONE DOSE DAILY    gabapentin (NEURONTIN) 300 MG capsule Take 1 capsule (300 mg total) by mouth 2 (two) times daily.    hydrocortisone-pramoxine (PROCTOFOAM-HS) rectal foam Place 1 applicator rectally 2 (two) times daily.    levothyroxine (SYNTHROID) 25 MCG tablet Take 1 tablet (25 mcg total) by mouth once daily.    LINZESS 145 mcg Cap capsule Take 1 capsule (145 mcg total) by mouth once daily.    metoclopramide HCl (REGLAN) 10 MG tablet Take 1 tablet (10 mg total) by mouth every 6 (six) hours.    metoprolol tartrate (LOPRESSOR) 25 MG tablet Take 0.5 tablets (12.5 mg total) by mouth 2 (two) times daily.    mupirocin (BACTROBAN) 2 % ointment Apply topically 3 (three) times daily. for 10 days    NARCAN 4 mg/actuation Spry USE UTD    nitroGLYCERIN (NITROSTAT) 0.3 MG SL tablet ONE TABLET UNDER TONGUE AS NEEDED FOR CHEST PAIN EVERY 5 MINUTES AS NEEDED    ondansetron (ZOFRAN) 4 MG tablet Take 1 tablet (4 mg total) by mouth every 6 (six) hours.    ondansetron (ZOFRAN-ODT) 4 MG TbDL Take 1 tablet (4 mg total) by mouth every 8 (eight) hours as needed. Nausea/vomiting    oxyCODONE (ROXICODONE) 20 mg Tab immediate release tablet Take 1 tablet by mouth every 6 (six) hours as needed.    oxyCODONE-acetaminophen (PERCOCET) 5-325 mg per tablet Take 1 tablet by mouth every 4 (four) hours as needed for Pain.    polymyxin B sulf-trimethoprim (POLYTRIM) 10,000 unit- 1 mg/mL Drop Place 1 drop into the left eye every 6 (six) hours.    promethazine (PHENERGAN) 25 MG tablet Take 1 tablet (25 mg total) by mouth every 6 (six) hours as needed for  Nausea.    ranitidine (ZANTAC) 300 MG tablet Take 1 tablet (300 mg total) by mouth every evening.    RESTASIS 0.05 % ophthalmic emulsion INSTILL 1 DROP IN BOTH EYES TWICE DAILY    sulfamethoxazole-trimethoprim 800-160mg (BACTRIM DS) 800-160 mg Tab Take 1 tablet by mouth 2 (two) times daily. for 10 days    triamcinolone acetonide 0.1% (KENALOG) 0.1 % cream Apply topically 2 (two) times daily.    zolpidem (AMBIEN) 10 mg Tab TK 1 T PO  QHS PRN    zolpidem (AMBIEN) 5 MG Tab TK 1 T PO QHS PRN     Family History     Problem Relation (Age of Onset)    Cancer Mother, Father        Social History Main Topics    Smoking status: Never Smoker    Smokeless tobacco: Never Used    Alcohol use 3.6 oz/week     6 Cans of beer per week      Comment: pt states she does not drink anymore    Drug use: No      Comment: Pain mgt clinic    Sexual activity: Not on file     Review of Systems   Constitutional: Positive for chills. Negative for activity change, appetite change, diaphoresis, fatigue, fever and unexpected weight change.   HENT: Negative.    Eyes: Negative.    Respiratory: Negative for cough, chest tightness, shortness of breath and wheezing.    Cardiovascular: Positive for chest pain. Negative for palpitations and leg swelling.   Gastrointestinal: Positive for nausea and vomiting. Negative for abdominal distention, abdominal pain, blood in stool, constipation and diarrhea.   Genitourinary: Negative for dysuria and hematuria.   Musculoskeletal: Positive for back pain.   Skin: Negative.    Neurological: Negative for dizziness, seizures, syncope, weakness and light-headedness.   Psychiatric/Behavioral: Negative.      Objective:     Vital Signs (Most Recent):  Temp: 97.2 °F (36.2 °C) (07/16/18 2237)  Pulse: 74 (07/16/18 2300)  Resp: 18 (07/16/18 2237)  BP: 139/76 (07/16/18 2237)  SpO2: 97 % (07/16/18 2237) Vital Signs (24h Range):  Temp:  [97.2 °F (36.2 °C)-98.7 °F (37.1 °C)] 97.2 °F (36.2 °C)  Pulse:  [66-93] 74  Resp:   [17-22] 18  SpO2:  [94 %-97 %] 97 %  BP: ()/(70-92) 139/76     Weight: 76 kg (167 lb 8.8 oz)  Body mass index is 27.04 kg/m².    Physical Exam   Constitutional: She is oriented to person, place, and time. She appears well-developed and well-nourished. No distress.   HENT:   Head: Normocephalic and atraumatic.   Right Ear: External ear normal.   Left Ear: External ear normal.   Nose: Nose normal.   Eyes: Right eye exhibits no discharge. Left eye exhibits no discharge.   Neck: Normal range of motion.   Cardiovascular: Normal rate, regular rhythm, normal heart sounds and intact distal pulses.  Exam reveals no gallop and no friction rub.    No murmur heard.  Pulmonary/Chest: Effort normal and breath sounds normal. No respiratory distress. She has no wheezes. She has no rales. She exhibits no tenderness.   Abdominal: Soft. Bowel sounds are normal. She exhibits no distension. There is no tenderness. There is no rebound and no guarding.   Musculoskeletal: Normal range of motion. She exhibits no edema.   Neurological: She is alert and oriented to person, place, and time.   Skin: Skin is warm and dry. She is not diaphoretic. No erythema.   Psychiatric: She has a normal mood and affect. Her behavior is normal. Judgment and thought content normal.   Nursing note and vitals reviewed.          Significant Labs: All pertinent labs within the past 24 hours have been reviewed.    Significant Imaging: I have reviewed and interpreted all pertinent imaging results/findings within the past 24 hours.    Assessment/Plan:     * Hyponatremia    Her sodium is 123 mmol/L which is new for her.  She does not appear to be volume overloaded so I do not think that nephrotic syndrome, cirrhosis, acute heart failure, nor her hypothyroidism to be the issue here.  Given that she had been nauseous and vomiting I think that she is volume depleted and is likely the cause of her hyponatremia.  Will continue IV fluids and recheck her sodium in the  morning.        Essential hypertension    Patient's blood pressure is well-controlled; will continue home regimen of metoprolol, and provide as-needed clonidine.        CAD (coronary artery disease)    Stable; there are no acute issues.        Hypothyroidism    Well-controlled; will continue patient's home regimen of levothyroxine.        Cirrhosis of liver    Stable; there are no acute issues.        Chronic hepatitis C    Stable; there are no acute issues.        Bipolar 1 disorder    Stable; will continue her home regimen of duloxetine.        Opiate dependence, continuous    Stable; will continue her home regimen of gabapentin.  I will give a single dose of oxycodone tonight.          VTE Risk Mitigation         Ordered     IP VTE LOW RISK PATIENT  Once      07/16/18 3761             Francisco J Mcgill M.D.  Staff Nocturnist  Department of Hospital Medicine  [facility]  Pager: [phone]

## 2018-07-17 NOTE — PROGRESS NOTES
TN faxed facesheet and RW order to [phone]. Awaiting feedback. Patient informed that if approved, walker will be delivered to patient's home.

## 2018-07-17 NOTE — ED NOTES
Called verbal report on this pt. via telephone to MARISSA Nova at [facility], and all questions answered.

## 2018-07-17 NOTE — ED NOTES
Spoke with Ceasar via telephone , House Supervisor, at [facility], and I received bed assignment for this pt.  Bed assignment is [address] Bed [address] and phone number to call report is [phone]

## 2018-07-17 NOTE — HOSPITAL COURSE
She was found to be hyponatremic, suspected to be due to volume depletion given her complaints of nausea and vomiting. She was started on IV fluids. She had no focal neurologic deficits.   She was complaining of chest pain however her troponin was normal. EKG showed chronically inverted T waves in V1 but otherwise was negative for ST segment or T wave changes. She had no further chest pain and ACS was very unlikely.  I suspect her chronic nausea/vomiting to be due to opiate-indced gastroparesis from her chronic opiate use. She is followed by a gastroenterologist in clinic. She was fixated on her pain and anxiety management. She was also very concerned about her clinic appointment with a pain management doctor that was scheduled for 7/18 @ 10am. She was tolerating her diet and anxious for discharge. Her sodium increased slightly to 125 the day following discharge. Given that her nausea/vomiting resolved, I suspect her hyponatremia will continue to improve. She was instructed to eat a regular diet and maintain hydration with electrolyte solutions and follow up with her PCP in clinic.

## 2018-07-17 NOTE — ASSESSMENT & PLAN NOTE
Stable; will continue her home regimen of gabapentin.  I will give a single dose of oxycodone tonight.

## 2018-07-17 NOTE — PLAN OF CARE
"TN reviewed follow up appointment information as well as  "GI discharge instructions" handout with patient using teach back. Patient stated she will notify the doctor if she has blood in her stools and a fever.  Patient is in agreement and verbalized an understanding. Placed discharge information in blue discharge folder. TN also reviewed patient responsibility checklist with her using teach back. Patient was able to verbalize her responsibilities after discharge to manage her care at home bein. Going to follow up appointments   2. Picking up rx from the pharmacy when discharged  3. Taking her medications as prescribed     TN also provided patient with contact information for Dr. De Luna and Dr. Arita to schedule appointments for cardiology and psychiatry follow up.        18 1224   Final Note   Assessment Type Final Discharge Note   Discharge Disposition Home   What phone number can be called within the next 1-3 days to see how you are doing after discharge? ([phone])   Hospital Follow Up  Appt(s) scheduled? Yes   Discharge plans and expectations educations in teach back method with documentation complete? Yes   Right Care Referral Info   Post Acute Recommendation No Care     "

## 2018-07-17 NOTE — PROGRESS NOTES
PCP appointment scheduled.    Follow-up Information     DOMITILA Méndez On 7/26/2018.    Specialty:  Urgent Care  Why:  On Thursday @ 4:20pm, outpatient services  Contact information:  [address] [place] [address] [place] [address] [phone]

## 2018-07-17 NOTE — HPI
Mrs. Estella Arevalo is a 63 y.o. female with essential hypertension, CAD, hypothyroidism (TSH 1.689 Jan 2018), cirrhosis of liver, chronic hepatitis C, Bipolar 1 disorder, and opioid dependency who presented initially to [facility] ED with complaints of nausea & vomiting for the past two weeks.  She reports that she was recently in the ED for similar complaints earlier this month and felt that her symptoms were ignored.  She says that the nausea and vomiting is mainly of undigested food and denies any vomiting of blood, bile, nor any coffee-grounds.  She denies any abdominal pain, diarrhea, fevers but has had some chills.  These episodes are intermittent.  Her son and daughter were wondering why she had been unsteady on her feet and why she's been so lethargic.  Her family also had mentioned why she appears more confused.    She also complains of left-sided chest pain that is non-radiating, is pressure-like in quality, and is 8/10 in severity.  There is no palpitations, diaphoresis, hemoptysis, nor any lower extremity pain or swelling.  There is no alleviating factors but she does say that exertion and palpation of the chest makes the pain worse.      She otherwise is very tangential and focuses on her pain medication regimen, stating that she has ran out of her medications and would like to avoid going through withdrawals.  She says that she is following with a pain medicine specialist and usually gets oxycodone 20 mg q4-6 hours as-needed for pain.  She was supposedly instructed by a gastroenterologist to avoid acetaminophen due to risk of liver problems.  She adamantly denies any history of cirrhosis.

## 2018-07-17 NOTE — NURSING
Patient tolerated her breakfast with no problems, negative for nausea, vomiting, or abdominal pain. Will continue to monitor.

## 2018-07-17 NOTE — ED NOTES
Gave verbal report on this pt. Via telephone to MARISSA Bruno at [phone], and all questions answered.

## 2018-07-17 NOTE — PLAN OF CARE
"TN met with patient at bedside to complete discharge needs assessment. TN explained duties of case management to patient.  TN reviewed   "[employer] Health Packet", "Discharge Planning Begins on Admission" brochure and discussed "Help at Home". Patient stated she lives with her daughter, son, and grandchildren. Patient also stated she is usually independent however she has been having leg pain causing difficulty walking. Patient would like a cane or walker at discharge. Patient's HELP AT HOME is her daughter Mavis and  Hammad. Patient plans to return home with family when ready for discharge. TN also discussed her responsibilities to manage her health at home. Patient was informed to leave folder at bedside during hospital stay. Contact information added to white board. HELP AT HOME Questionnaire placed in Health Packet.     Discharge Needs- walker or rollator    PCP- Angela Packer MD     Patient Preferred Pharmacy:   [facility] [address] [place], [place] - [address] [facility] [address]  [address]  [facility] [address]  Phone: [phone] Fax: [phone]       07/17/18 1138   Discharge Assessment   Confirmed/corrected address and phone number on facesheet? Yes   Assessment information obtained from? Patient   Prior to hospitilization cognitive status: Alert/Oriented   Prior to hospitalization functional status: Independent   Current cognitive status: Alert/Oriented   Current Functional Status: Independent   Lives With spouse;child(carol), adult  (Son and grandchildren )   Able to Return to Prior Arrangements yes   Is patient able to care for self after discharge? Yes   Who are your caregiver(s) and their phone number(s)? Hammad-  @ [phone]   Patient's perception of discharge disposition home or selfcare   Readmission Within The Last 30 Days no previous admission in last 30 days   Patient currently being followed by outpatient case management? No   Patient currently receives " any other outside agency services? No   Equipment Currently Used at Home none   Do you have any problems affording any of your prescribed medications? No   Is the patient taking medications as prescribed? no   If no, which medications is patient not taking? cymbalta   Does the patient have transportation home? Yes   Transportation Available car;family or friend will provide   Does the patient receive services at the [facility]? No   Discharge Plan A Home;Home with family   Discharge Plan B Home;Home with family   Patient/Family In Agreement With Plan yes   Does the patient have transportation to healthcare appointments? Yes   Readmission Questionnaire   Have you felt down, depressed, or hopeless? 3

## 2018-07-18 NOTE — DISCHARGE SUMMARY
[facility] Hospital Medicine  Discharge Summary      Patient Name: Estella Arevalo  MRN: [id number]  Admission Date: 7/16/2018  Hospital Length of Stay: 1 days  Discharge Date and Time: 7/17/2018  6:46 PM  Attending Physician: No att. providers found   Discharging Provider: Mavis Rosa MD  Primary Care Provider: Angela Packer MD      HPI:   Mrs. Estella Arevalo is a 63 y.o. female with essential hypertension, CAD, hypothyroidism (TSH 1.689 Jan 2018), cirrhosis of liver, chronic hepatitis C, Bipolar 1 disorder, and opioid dependency who presented initially to [facility] ED with complaints of nausea & vomiting for the past two weeks.  She reports that she was recently in the ED for similar complaints earlier this month and felt that her symptoms were ignored.  She says that the nausea and vomiting is mainly of undigested food and denies any vomiting of blood, bile, nor any coffee-grounds.  She denies any abdominal pain, diarrhea, fevers but has had some chills.  These episodes are intermittent.  Her son and daughter were wondering why she had been unsteady on her feet and why she's been so lethargic.  Her family also had mentioned why she appears more confused.    She also complains of left-sided chest pain that is non-radiating, is pressure-like in quality, and is 8/10 in severity.  There is no palpitations, diaphoresis, hemoptysis, nor any lower extremity pain or swelling.  There is no alleviating factors but she does say that exertion and palpation of the chest makes the pain worse.      She otherwise is very tangential and focuses on her pain medication regimen, stating that she has ran out of her medications and would like to avoid going through withdrawals.  She says that she is following with a pain medicine specialist and usually gets oxycodone 20 mg q4-6 hours as-needed for pain.  She was supposedly instructed by a gastroenterologist to avoid acetaminophen due to risk of  liver problems.  She adamantly denies any history of cirrhosis.    Hospital Course:   She was found to be hyponatremic, suspected to be due to volume depletion given her complaints of nausea and vomiting. She was started on IV fluids. She had no focal neurologic deficits.   She was complaining of chest pain however her troponin was normal. EKG showed chronically inverted T waves in V1 but otherwise was negative for ST segment or T wave changes. She had no further chest pain and ACS was very unlikely.  I suspect her chronic nausea/vomiting to be due to opiate-indced gastroparesis from her chronic opiate use. She is followed by a gastroenterologist in clinic. She was fixated on her pain and anxiety management. She was also very concerned about her clinic appointment with a pain management doctor that was scheduled for 7/18 @ 10am. She was tolerating her diet and anxious for discharge. Her sodium increased slightly to 125 the day following discharge. Given that her nausea/vomiting resolved, I suspect her hyponatremia will continue to improve. She was instructed to eat a regular diet and maintain hydration with electrolyte solutions and follow up with her PCP in clinic. Home medications resumed, none of which are suspected to be the etiology of her hyponatremia. She does, however, have an extensive list and should follow up with her PCP in clinic with all of her medications for further review if she remains hyponatremic.    Final Active Diagnoses:    Diagnosis Date Noted POA    PRINCIPAL PROBLEM:  Hyponatremia [E87.1] 07/16/2018 Yes    Essential hypertension [I10] 07/16/2018 Yes     Chronic    CAD (coronary artery disease) [I25.10] 07/16/2018 Yes     Chronic    Cirrhosis of liver [K74.60] 07/16/2018 Yes     Chronic    Chronic hepatitis C [B18.2] 07/16/2018 Yes     Chronic    Bipolar 1 disorder [F31.9] 12/30/2016 Yes     Chronic    Hypothyroidism [E03.9] 12/19/2014 Yes     Chronic    Opiate dependence, continuous  "[F11.20] 07/14/2013 Yes     Chronic      Problems Resolved During this Admission:    Diagnosis Date Noted Date Resolved POA       Discharged Condition: stable    Disposition: Home or Self Care    Follow Up:  Follow-up Information     DOMITILA Méndez On 7/26/2018.    Specialty:  Urgent Care  Why:  On Thursday @ 4:20pm, outpatient services  Contact information:  [address]  [place] [address]  [phone]             Vini De Luna MD.    Specialties:  Cardiology, INTERVENTIONAL CARDIOLOGY  Why:  Call to schedule Cardiology appointment   Contact information:  [address] [facility]  [address]  [facility] [phone]             Aden Arita MD.    Specialty:  Psychiatry  Why:  Call to schedule Psychiatry appoingment   Contact information:  [address] [facility]  [address]  [facility] [phone]                 Patient Instructions:     WALKER FOR HOME USE   Order Specific Question Answer Comments   Type of Walker: Adult (5'4"-6'6")    With wheels? Yes    Height: 5' 6" (1.676 m)    Weight: 76 kg (167 lb 8.8 oz)    Length of need (1-99 months): 99    Does patient have medical equipment at home? none    Please check all that apply: Patient is unable to safely ambulate without equipment.      Diet Cardiac     Notify your health care provider if you experience any of the following:  increased confusion or weakness     Notify your health care provider if you experience any of the following:  persistent dizziness, light-headedness, or visual disturbances     Notify your health care provider if you experience any of the following:  worsening rash     Notify your health care provider if you experience any of the following:  severe persistent headache     Notify your health care provider if you experience any of the following:  difficulty breathing or increased cough     Notify your health care provider if you experience any of the following:  redness, tenderness, or signs of infection (pain, " swelling, redness, odor or green/yellow discharge around incision site)     Notify your health care provider if you experience any of the following:  severe uncontrolled pain     Notify your health care provider if you experience any of the following:  persistent nausea and vomiting or diarrhea     Notify your health care provider if you experience any of the following:  temperature >100.4     Activity as tolerated       Medications:  Reconciled Home Medications:      Medication List      CONTINUE taking these medications    amitriptyline 50 MG tablet  Commonly known as:  ELAVIL  Take 1 tablet (50 mg total) by mouth every evening.     atenolol 25 MG tablet  Commonly known as:  TENORMIN  Take 1 tablet by mouth once daily.     clindamycin 300 MG capsule  Commonly known as:  CLEOCIN  Take 1 capsule (300 mg total) by mouth every 6 (six) hours.     clonazePAM 1 MG tablet  Commonly known as:  KLONOPIN  TAKE 1 TABLET BY MOUTH TWICE DAILY     * cycloSPORINE 0.05 % ophthalmic emulsion  Commonly known as:  RESTASIS  Place 0.4 mLs (1 drop total) into both eyes 2 (two) times daily.     * RESTASIS 0.05 % ophthalmic emulsion  Generic drug:  cycloSPORINE  INSTILL 1 DROP IN BOTH EYES TWICE DAILY     docusate sodium 100 MG capsule  Commonly known as:  COLACE  Take 1 capsule (100 mg total) by mouth 2 (two) times daily.     DULoxetine 30 MG capsule  Commonly known as:  CYMBALTA  TAKE 1 CAPSULE(30 MG) BY MOUTH EVERY DAY     efinaconazole 10 % Darlin  Commonly known as:  JUBLIA  APPLY ONE DOSE DAILY     gabapentin 300 MG capsule  Commonly known as:  NEURONTIN  Take 1 capsule (300 mg total) by mouth 2 (two) times daily.     hydrocortisone-pramoxine rectal foam  Commonly known as:  PROCTOFOAM-HS  Place 1 applicator rectally 2 (two) times daily.     levothyroxine 25 MCG tablet  Commonly known as:  SYNTHROID  Take 1 tablet (25 mcg total) by mouth once daily.     LINZESS 145 mcg Cap capsule  Generic drug:  linaclotide  Take 1 capsule (145 mcg  total) by mouth once daily.     metoclopramide HCl 10 MG tablet  Commonly known as:  REGLAN  Take 1 tablet (10 mg total) by mouth every 6 (six) hours.     metoprolol tartrate 25 MG tablet  Commonly known as:  LOPRESSOR  Take 0.5 tablets (12.5 mg total) by mouth 2 (two) times daily.     mupirocin 2 % ointment  Commonly known as:  BACTROBAN  Apply topically 3 (three) times daily. for 10 days     NARCAN 4 mg/actuation Spry  Generic drug:  naloxone  USE UTD     nitroGLYCERIN 0.3 MG SL tablet  Commonly known as:  NITROSTAT  ONE TABLET UNDER TONGUE AS NEEDED FOR CHEST PAIN EVERY 5 MINUTES AS NEEDED     ondansetron 4 MG tablet  Commonly known as:  ZOFRAN  Take 1 tablet (4 mg total) by mouth every 6 (six) hours.     ondansetron 4 MG Tbdl  Commonly known as:  ZOFRAN-ODT  Take 1 tablet (4 mg total) by mouth every 8 (eight) hours as needed. Nausea/vomiting     oxyCODONE 20 mg Tab immediate release tablet  Commonly known as:  ROXICODONE  Take 1 tablet by mouth every 6 (six) hours as needed.     oxyCODONE-acetaminophen 5-325 mg per tablet  Commonly known as:  PERCOCET  Take 1 tablet by mouth every 4 (four) hours as needed for Pain.     polymyxin B sulf-trimethoprim 10,000 unit- 1 mg/mL Drop  Commonly known as:  POLYTRIM  Place 1 drop into the left eye every 6 (six) hours.     promethazine 25 MG tablet  Commonly known as:  PHENERGAN  Take 1 tablet (25 mg total) by mouth every 6 (six) hours as needed for Nausea.     ranitidine 300 MG tablet  Commonly known as:  ZANTAC  Take 1 tablet (300 mg total) by mouth every evening.     sulfamethoxazole-trimethoprim 800-160mg 800-160 mg Tab  Commonly known as:  BACTRIM DS  Take 1 tablet by mouth 2 (two) times daily. for 10 days     triamcinolone acetonide 0.1% 0.1 % cream  Commonly known as:  KENALOG  Apply topically 2 (two) times daily.     * zolpidem 5 MG Tab  Commonly known as:  AMBIEN  TK 1 T PO QHS PRN     * zolpidem 10 mg Tab  Commonly known as:  AMBIEN  TK 1 T PO  QHS PRN        * This  list has 4 medication(s) that are the same as other medications prescribed for you. Read the directions carefully, and ask your doctor or other care provider to review them with you.            STOP taking these medications    chlordiazepoxide 25 MG Cap  Commonly known as:  LIBRIUM     methocarbamol 750 MG Tab  Commonly known as:  ROBAXIN            Indwelling Lines/Drains at time of discharge:   Lines/Drains/Airways          No matching active lines, drains, or airways          Time spent on the discharge of patient: 45 minutes  Patient was seen and examined on the date of discharge and determined to be suitable for discharge.         Mavis Rosa MD  Department of Hospital Medicine  [facility]

## 2018-07-26 ENCOUNTER — OFFICE VISIT (OUTPATIENT)
Dept: FAMILY MEDICINE | Facility: CLINIC | Age: 63
End: 2018-07-26
Payer: OTHER GOVERNMENT

## 2018-07-26 VITALS
SYSTOLIC BLOOD PRESSURE: 100 MMHG | OXYGEN SATURATION: 92 % | TEMPERATURE: 98 F | HEIGHT: 66 IN | BODY MASS INDEX: 27.03 KG/M2 | DIASTOLIC BLOOD PRESSURE: 66 MMHG | HEART RATE: 116 BPM | WEIGHT: 168.19 LBS

## 2018-07-26 DIAGNOSIS — E03.4 HYPOTHYROIDISM DUE TO ACQUIRED ATROPHY OF THYROID: ICD-10-CM

## 2018-07-26 DIAGNOSIS — K59.03 DRUG-INDUCED CONSTIPATION: ICD-10-CM

## 2018-07-26 DIAGNOSIS — K21.9 GASTROESOPHAGEAL REFLUX DISEASE, ESOPHAGITIS PRESENCE NOT SPECIFIED: ICD-10-CM

## 2018-07-26 DIAGNOSIS — B35.1 ONYCHOMYCOSIS OF TOENAIL: ICD-10-CM

## 2018-07-26 DIAGNOSIS — K21.9 GASTROESOPHAGEAL REFLUX DISEASE WITHOUT ESOPHAGITIS: ICD-10-CM

## 2018-07-26 DIAGNOSIS — E87.1 HYPONATREMIA: ICD-10-CM

## 2018-07-26 DIAGNOSIS — K64.9 BLEEDING HEMORRHOID: ICD-10-CM

## 2018-07-26 DIAGNOSIS — K59.09 OTHER CONSTIPATION: ICD-10-CM

## 2018-07-26 DIAGNOSIS — H04.129 DRY EYE SYNDROME, UNSPECIFIED LATERALITY: ICD-10-CM

## 2018-07-26 DIAGNOSIS — B18.2 CHRONIC HEPATITIS C WITHOUT HEPATIC COMA: ICD-10-CM

## 2018-07-26 DIAGNOSIS — I10 ESSENTIAL HYPERTENSION: ICD-10-CM

## 2018-07-26 DIAGNOSIS — Z09 HOSPITAL DISCHARGE FOLLOW-UP: Primary | ICD-10-CM

## 2018-07-26 DIAGNOSIS — K64.9 HEMORRHOIDS, UNSPECIFIED HEMORRHOID TYPE: ICD-10-CM

## 2018-07-26 DIAGNOSIS — F31.9 BIPOLAR 1 DISORDER: ICD-10-CM

## 2018-07-26 DIAGNOSIS — R11.0 NAUSEA: ICD-10-CM

## 2018-07-26 PROCEDURE — 99495 TRANSJ CARE MGMT MOD F2F 14D: CPT | Mod: PBBFAC,PO | Performed by: NURSE PRACTITIONER

## 2018-07-26 PROCEDURE — 99214 OFFICE O/P EST MOD 30 MIN: CPT | Mod: PBBFAC,PO | Performed by: NURSE PRACTITIONER

## 2018-07-26 PROCEDURE — 99999 PR PBB SHADOW E&M-EST. PATIENT-LVL IV: CPT | Mod: PBBFAC,,, | Performed by: NURSE PRACTITIONER

## 2018-07-26 PROCEDURE — 99495 TRANSJ CARE MGMT MOD F2F 14D: CPT | Mod: S$PBB,,, | Performed by: NURSE PRACTITIONER

## 2018-07-26 RX ORDER — LINACLOTIDE 145 UG/1
145 CAPSULE, GELATIN COATED ORAL DAILY
Qty: 90 CAPSULE | Refills: 0 | Status: ON HOLD | OUTPATIENT
Start: 2018-07-26 | End: 2019-03-20

## 2018-07-26 RX ORDER — ATENOLOL 25 MG/1
25 TABLET ORAL DAILY
Qty: 90 TABLET | Refills: 1 | Status: ON HOLD | OUTPATIENT
Start: 2018-07-26 | End: 2019-03-20 | Stop reason: HOSPADM

## 2018-07-26 RX ORDER — METOCLOPRAMIDE 10 MG/1
10 TABLET ORAL EVERY 6 HOURS
Qty: 180 TABLET | Refills: 0 | Status: ON HOLD | OUTPATIENT
Start: 2018-07-26 | End: 2018-09-13 | Stop reason: HOSPADM

## 2018-07-26 RX ORDER — AMITRIPTYLINE HYDROCHLORIDE 50 MG/1
50 TABLET, FILM COATED ORAL NIGHTLY
Qty: 90 TABLET | Refills: 1 | Status: ON HOLD | OUTPATIENT
Start: 2018-07-26 | End: 2018-09-23 | Stop reason: HOSPADM

## 2018-07-26 RX ORDER — CYCLOSPORINE 0.5 MG/ML
1 EMULSION OPHTHALMIC 2 TIMES DAILY
Qty: 30 EACH | Refills: 0 | Status: SHIPPED | OUTPATIENT
Start: 2018-07-26 | End: 2019-04-04 | Stop reason: SDUPTHER

## 2018-07-26 RX ORDER — PANTOPRAZOLE SODIUM 40 MG/1
40 TABLET, DELAYED RELEASE ORAL DAILY
Qty: 90 TABLET | Refills: 0 | Status: SHIPPED | OUTPATIENT
Start: 2018-07-26 | End: 2019-04-04 | Stop reason: SDUPTHER

## 2018-07-26 RX ORDER — DOCUSATE SODIUM 100 MG/1
100 CAPSULE, LIQUID FILLED ORAL 2 TIMES DAILY
Qty: 180 CAPSULE | Refills: 0 | Status: ON HOLD | OUTPATIENT
Start: 2018-07-26 | End: 2018-10-30 | Stop reason: HOSPADM

## 2018-07-26 RX ORDER — LEVOTHYROXINE SODIUM 25 UG/1
25 TABLET ORAL DAILY
Qty: 90 TABLET | Refills: 0 | Status: SHIPPED | OUTPATIENT
Start: 2018-07-26 | End: 2019-04-04 | Stop reason: SDUPTHER

## 2018-07-26 RX ORDER — ONDANSETRON 4 MG/1
4 TABLET, FILM COATED ORAL EVERY 6 HOURS
Qty: 60 TABLET | Refills: 0 | Status: ON HOLD | OUTPATIENT
Start: 2018-07-26 | End: 2018-10-30 | Stop reason: HOSPADM

## 2018-07-26 NOTE — PROGRESS NOTES
Transitional Care Note  Subjective:       Patient ID: Estella Arevalo is a 63 y.o. female.  Chief Complaint: Transitional Care    Family and/or Caretaker present at visit?  No.  Diagnostic tests reviewed/disposition: No diagnosic tests pending after this hospitalization.  Disease/illness education: none  Home health/community services discussion/referrals: Patient does not have home health established from hospital visit.  They do not need home health.  If needed, we will set up home health for the patient.   Establishment or re-establishment of referral orders for community resources: No other necessary community resources.   Discussion with other health care providers: No discussion with other health care providers necessary.   63-year-old female presents to the clinic today for hospital follow-up.  She was hospitalized at [facility] on 07/16/2018 through 07/17/2018.  The the she presented to the emergency room with complaint of nausea and vomiting for the past 2 weeks.  She reports that she was recently seen in the ED for similar complaints earlier in the month and felt the symptoms were ignored.  She states that the nausea and vomiting was mainly undigested food and denies any vomiting of blood, bile, are not any coffee ground substances.  She denied any abdominal pain, diarrhea, or fevers.  She stated these episodes are intermittent.  She also complained of left-sided chest pain that is nonradiating, is pressure like in quality and is 8/10 in severity.  She denies any palpitations, diaphoresis, nor any extremity pain or swelling. There were no alleviating factors but she does say that exertion on palpation of the chest makes the pain worse.  She was found to be hyponatremic, suspected to be due to volume depletion given her complaints of nausea and vomiting.  She was started on IV fluids.  She had no focal neurological deficits.  She was complaining of chest pain however her troponin was  normal.  EKG showed chronically inverted T-waves in V1 but otherwise was negative for ST segment or T-wave changes.  She had no further chest pain and acute coronary syndrome was very unlikely.  It was suspected that her chronic nausea and vomiting to be due to opiate induced gastroparesis from her chronic opiate use.  She is followed by gastroenterologist in the clinic.  She was fixated on her pain and anxiety management.  She was also very concerned about her clinic appointment with a pain management doctor that was scheduled for 07/18/2018 at 10:00 a.m..  She was tolerating her diet and anxious about discharge. Her sodium increased slightly to 125 the day following discharge. Her nausea and vomiting had resolved. It was suspected that her hyponatremia would continued to improve.  She was instructed to eat a regular diet and maintain hydration with electrolyte solutions and  follow up with her PCP in the clinic.  She states she has been drinking Gatorade, Powerade, and a lot of water.  She states she vomited some undigested food once last night.  She also states that she had some blood in her stool due to her hemorrhoids that she noticed last night.  She is going to schedule an appointment with Dr. Hyatt at [facility] who is a GI specialist.  Presently, she denies any cardiac chest pain, heart palpitations, shortness breath, or swelling to lower extremities.  She has occasionally headache which she believes are related to tension.  She is under lot of stress at home.  She denies any dizziness or blurred vision.  She says she was also hospitalized around 07/08/2018 at [place] in the psychiatric unit and was transferred to the floor due to vomiting up coffee-ground emesis.  She states she was scoped at [facility]. I will attempt to get hospital records from [place].      Review of Systems   Constitutional: Negative for activity change.   Respiratory: Negative for cough, chest tightness,  shortness of breath and wheezing.    Cardiovascular: Negative for chest pain, palpitations and leg swelling.   Gastrointestinal: Positive for abdominal pain, blood in stool, nausea and vomiting. Negative for constipation and diarrhea.   Musculoskeletal: Negative for gait problem.   Skin: Negative for color change.   Neurological: Positive for headaches. Negative for dizziness, syncope and light-headedness.       Objective:      Physical Exam   Constitutional: She is oriented to person, place, and time. She appears well-developed and well-nourished. No distress.   Eyes: Conjunctivae and EOM are normal. Pupils are equal, round, and reactive to light. Right eye exhibits no discharge. Left eye exhibits no discharge. No scleral icterus.   Neck: Normal range of motion. Neck supple. No JVD present.   Cardiovascular: Normal rate, regular rhythm and normal heart sounds.    No murmur heard.  Pulmonary/Chest: Effort normal and breath sounds normal. No respiratory distress. She has no wheezes. She has no rales.   Abdominal: Soft. Bowel sounds are normal. There is no tenderness.   Non-tenderness to palpation no rebound or guarding noted    Musculoskeletal: Normal range of motion. She exhibits no edema.   Neurological: She is alert and oriented to person, place, and time.   Skin: Skin is warm and dry. She is not diaphoretic.   Psychiatric: She has a normal mood and affect.       Assessment:       1. Hospital discharge follow-up    2. Hemorrhoids, unspecified hemorrhoid type    3. Essential hypertension    4. Chronic hepatitis C without hepatic coma    5. Bipolar 1 disorder    6. Hyponatremia    7. Bleeding hemorrhoid    8. Dry eye syndrome, unspecified laterality    9. Drug-induced constipation    10. Onychomycosis of toenail    11. Hypothyroidism due to acquired atrophy of thyroid    12. Gastroesophageal reflux disease without esophagitis    13. Other constipation    14. Nausea    15. Gastroesophageal reflux disease, esophagitis  presence not specified        Plan:         Hospital discharge follow-up    Hemorrhoids, unspecified hemorrhoid type  - call Dr. Hyatt and schedule a appt asap    Essential hypertension  - The current medical regimen is effective;  continue present plan and medications.    Chronic hepatitis C without hepatic coma  -     atenolol (TENORMIN) 25 MG tablet; Take 1 tablet (25 mg total) by mouth once daily.  Dispense: 90 tablet; Refill: 1    Bipolar 1 disorder  -     amitriptyline (ELAVIL) 50 MG tablet; Take 1 tablet (50 mg total) by mouth every evening.  Dispense: 90 tablet; Refill: 1  - call and schedule a appt with Dr. Arita     Hyponatremia  -     Comprehensive metabolic panel; Future; Expected date: 07/26/2018    Bleeding hemorrhoid  -     CBC auto differential; Future; Expected date: 07/26/2018  -     hydrocortisone-pramoxine (PROCTOFOAM-HS) rectal foam; Place 1 applicator rectally 2 (two) times daily.  Dispense: 10 g; Refill: 1    Dry eye syndrome, unspecified laterality  -     cycloSPORINE (RESTASIS) 0.05 % ophthalmic emulsion; Place 0.4 mLs (1 drop total) into both eyes 2 (two) times daily.  Dispense: 30 each; Refill: 0    Drug-induced constipation  -     docusate sodium (COLACE) 100 MG capsule; Take 1 capsule (100 mg total) by mouth 2 (two) times daily.  Dispense: 180 capsule; Refill: 0  -     LINZESS 145 mcg Cap capsule; Take 1 capsule (145 mcg total) by mouth once daily.  Dispense: 90 capsule; Refill: 0    Onychomycosis of toenail  -     efinaconazole (JUBLIA) 10 % Eran; APPLY ONE DOSE DAILY  Dispense: 24 mL; Refill: 0    Hypothyroidism due to acquired atrophy of thyroid  -     levothyroxine (SYNTHROID) 25 MCG tablet; Take 1 tablet (25 mcg total) by mouth once daily.  Dispense: 90 tablet; Refill: 0  -     TSH; Future; Expected date: 07/26/2018  -     T4, free; Future; Expected date: 07/26/2018    Gastroesophageal reflux disease without esophagitis    Other constipation    Nausea  -     metoclopramide HCl  (REGLAN) 10 MG tablet; Take 1 tablet (10 mg total) by mouth every 6 (six) hours.  Dispense: 180 tablet; Refill: 0  -     ondansetron (ZOFRAN) 4 MG tablet; Take 1 tablet (4 mg total) by mouth every 6 (six) hours.  Dispense: 60 tablet; Refill: 0    Gastroesophageal reflux disease, esophagitis presence not specified  -     pantoprazole (PROTONIX) 40 MG tablet; Take 1 tablet (40 mg total) by mouth once daily.  Dispense: 90 tablet; Refill: 0

## 2018-07-26 NOTE — PROGRESS NOTES
Subjective:       Patient ID: Estella Arevalo is a 63 y.o. female.    Chief Complaint: No chief complaint on file.    HPI  Past Medical History:   Diagnosis Date    Alcohol abuse     Anxiety     Arthritis     Bipolar disorder     Cirrhosis of liver     Coronary artery disease     Depression     Fall     GERD (gastroesophageal reflux disease)     Hypertension     Low back pain     MI (myocardial infarction)     Migraine headache     Thyroid disease      Past Surgical History:   Procedure Laterality Date    HIATAL HERNIA REPAIR      JOINT REPLACEMENT      KNEE SURGERY  bilateral replacement    right foot sx  2008    SHOULDER SURGERY  replacement      reports that she has never smoked. She has never used smokeless tobacco. She reports that she drinks about 3.6 oz of alcohol per week . She reports that she does not use drugs.  Review of Systems    Objective:      Physical Exam    Assessment:       No diagnosis found.    Plan:       ***    No Follow-up on file.

## 2018-07-31 ENCOUNTER — TELEPHONE (OUTPATIENT)
Dept: FAMILY MEDICINE | Facility: CLINIC | Age: 63
End: 2018-07-31

## 2018-08-06 RX ORDER — NITROGLYCERIN 0.3 MG/1
TABLET SUBLINGUAL
Qty: 100 TABLET | Refills: 0 | Status: SHIPPED | OUTPATIENT
Start: 2018-08-06 | End: 2019-04-04 | Stop reason: SDUPTHER

## 2018-08-07 ENCOUNTER — HOSPITAL ENCOUNTER (EMERGENCY)
Facility: HOSPITAL | Age: 63
Discharge: HOME | End: 2018-08-07
Attending: EMERGENCY MEDICINE
Payer: OTHER GOVERNMENT

## 2018-08-07 VITALS
SYSTOLIC BLOOD PRESSURE: 141 MMHG | WEIGHT: 168 LBS | HEIGHT: 66 IN | HEART RATE: 72 BPM | RESPIRATION RATE: 18 BRPM | OXYGEN SATURATION: 96 % | BODY MASS INDEX: 27 KG/M2 | DIASTOLIC BLOOD PRESSURE: 85 MMHG | TEMPERATURE: 99 F

## 2018-08-07 DIAGNOSIS — K42.9 UMBILICAL HERNIA WITHOUT OBSTRUCTION AND WITHOUT GANGRENE: Primary | ICD-10-CM

## 2018-08-07 DIAGNOSIS — K42.9 UMBILICAL HERNIA: ICD-10-CM

## 2018-08-07 DIAGNOSIS — R51.9 NONINTRACTABLE EPISODIC HEADACHE, UNSPECIFIED HEADACHE TYPE: ICD-10-CM

## 2018-08-07 PROCEDURE — 99283 EMERGENCY DEPT VISIT LOW MDM: CPT | Mod: 25

## 2018-08-07 PROCEDURE — 25000003 PHARM REV CODE 250: Performed by: NURSE PRACTITIONER

## 2018-08-07 RX ORDER — BUTALBITAL, ACETAMINOPHEN AND CAFFEINE 50; 325; 40 MG/1; MG/1; MG/1
1 TABLET ORAL EVERY 6 HOURS PRN
Qty: 10 TABLET | Refills: 0 | Status: SHIPPED | OUTPATIENT
Start: 2018-08-07 | End: 2018-09-06

## 2018-08-07 RX ORDER — BUTALBITAL, ACETAMINOPHEN AND CAFFEINE 50; 325; 40 MG/1; MG/1; MG/1
1 TABLET ORAL
Status: COMPLETED | OUTPATIENT
Start: 2018-08-07 | End: 2018-08-07

## 2018-08-07 RX ADMIN — BUTALBITAL, ACETAMINOPHEN AND CAFFEINE 1 TABLET: 50; 325; 40 TABLET ORAL at 05:08

## 2018-08-07 NOTE — ED PROVIDER NOTES
"Encounter Date: 8/7/2018    SCRIBE #1 NOTE: I, Ruma Tyrelaston, am scribing for, and in the presence of,  Battley Toussaint NP. I have scribed the entire note.       History     Chief Complaint   Patient presents with    Abdominal Pain     " i think I have an umbilical hernia" pt states she noticed her belly button protuding while taking a bath yesterday. Also reports some nausea " but I was able to eat shanes bbq today."    Nausea     63 y.o female complains of an acute possible umbilical hernia she first noticed last night. She was taking a bath last night when she noticed it. She denies changes in her bowels, chest pain, and vomiting. She has associated nausea. She came in to the ED today due to having complications when she had a hiatal hernia in the past. She rates her pain as a 2/10.       The history is provided by the patient. No  was used.   Abdominal Pain   The current episode started yesterday. The onset of the illness was gradual. The problem has been gradually worsening. The abdominal pain is located in the periumbilical region. The abdominal pain does not radiate. The severity of the abdominal pain is 2/10. The abdominal pain is relieved by being still. The abdominal pain is exacerbated by certain positions and movement. The other symptoms of the illness include nausea. The other symptoms of the illness do not include fever, fatigue, jaundice, melena, shortness of breath, vomiting, diarrhea, dysuria, hematemesis, hematochezia, vaginal discharge or vaginal bleeding.   The patient states that she believes she is currently not pregnant. The patient has not had a change in bowel habit. Symptoms associated with the illness do not include chills, anorexia, constipation, hematuria or back pain.   Headache    This is a chronic problem. The current episode started in the past 7 days. The problem occurs intermittently. The problem has been waxing and waning. The pain is located in the " bilateral and frontal region. The pain does not radiate. The pain quality is similar to prior headaches. The quality of the pain is described as aching. The pain is at a severity of 4/10. Associated symptoms include abdominal pain and nausea. Pertinent negatives include no abnormal behavior, anorexia, back pain, blurred vision, coughing, dizziness, drainage, ear pain, eye pain, eye redness, eye watering, facial sweating, fever, hearing loss, insomnia, loss of balance, muscle aches, neck pain, numbness, phonophobia, photophobia, rhinorrhea, scalp tenderness, seizures, sinus pressure, sore throat, swollen glands, tingling, tinnitus, visual change, vomiting, weakness or weight loss. The symptoms are aggravated by bright light and noise. She has tried nothing for the symptoms. Her past medical history is significant for migraine headaches.     Review of patient's allergies indicates:  No Known Allergies  Past Medical History:   Diagnosis Date    Alcohol abuse     Anxiety     Arthritis     Bipolar disorder     Cirrhosis of liver     Coronary artery disease     Depression     Fall     GERD (gastroesophageal reflux disease)     Hypertension     Low back pain     MI (myocardial infarction)     Migraine headache     Thyroid disease      Past Surgical History:   Procedure Laterality Date    HIATAL HERNIA REPAIR      JOINT REPLACEMENT      KNEE SURGERY  bilateral replacement    right foot sx  2008    SHOULDER SURGERY  replacement     Family History   Problem Relation Age of Onset    Cancer Mother     Cancer Father      Social History   Substance Use Topics    Smoking status: Never Smoker    Smokeless tobacco: Never Used    Alcohol use 3.6 oz/week     6 Cans of beer per week      Comment: pt states she does not drink anymore     Review of Systems   Constitutional: Negative.  Negative for appetite change, chills, fatigue, fever and weight loss.   HENT: Negative.  Negative for congestion, dental problem,  ear discharge, ear pain, hearing loss, mouth sores, rhinorrhea, sinus pressure, sore throat, tinnitus and trouble swallowing.    Eyes: Negative.  Negative for blurred vision, photophobia, pain, discharge and redness.   Respiratory: Negative.  Negative for cough and shortness of breath.    Cardiovascular: Negative.  Negative for chest pain.   Gastrointestinal: Positive for abdominal pain and nausea. Negative for abdominal distention, anal bleeding, anorexia, blood in stool, constipation, diarrhea, hematemesis, hematochezia, jaundice, melena, rectal pain and vomiting.   Endocrine: Negative.    Genitourinary: Negative.  Negative for dyspareunia, dysuria, hematuria, vaginal bleeding, vaginal discharge and vaginal pain.   Musculoskeletal: Negative for back pain and neck pain.   Skin: Negative.  Negative for rash.   Allergic/Immunologic: Negative.    Neurological: Positive for headaches. Negative for dizziness, tingling, seizures, facial asymmetry, speech difficulty, weakness, light-headedness, numbness and loss of balance.   Hematological: Negative.  Does not bruise/bleed easily.   Psychiatric/Behavioral: Negative.  Negative for agitation, dysphoric mood and sleep disturbance. The patient does not have insomnia.    All other systems reviewed and are negative.      Physical Exam     Initial Vitals [08/07/18 1732]   BP Pulse Resp Temp SpO2   (!) 146/94 72 18 98.8 °F (37.1 °C) 98 %      MAP       --         Physical Exam    Nursing note and vitals reviewed.  Constitutional: She appears well-developed and well-nourished. She is not diaphoretic.  Non-toxic appearance. She does not appear ill. No distress.   HENT:   Head: Normocephalic and atraumatic.   Eyes: Conjunctivae and EOM are normal. Right eye exhibits no discharge. Left eye exhibits no discharge.   Neck: Normal range of motion.   Cardiovascular: Normal rate, regular rhythm, normal heart sounds and intact distal pulses. Exam reveals no gallop and no friction rub.    No  murmur heard.  Pulmonary/Chest: Breath sounds normal. No respiratory distress. She has no wheezes. She has no rhonchi. She has no rales. She exhibits no tenderness.   Abdominal: Soft. Normal appearance and bowel sounds are normal. There is no tenderness. There is no rebound and no guarding. A hernia (umbilical without strangulation; easily reducible) is present.       Musculoskeletal: Normal range of motion.   Neurological: She is alert and oriented to person, place, and time. She has normal strength. She displays no atrophy and no tremor. No cranial nerve deficit or sensory deficit. She exhibits normal muscle tone. She displays a negative Romberg sign. She displays no seizure activity. Coordination and gait normal. GCS eye subscore is 4. GCS verbal subscore is 5. GCS motor subscore is 6.   Skin: Skin is warm and dry. No rash noted.   Psychiatric: She has a normal mood and affect. Her behavior is normal. Judgment and thought content normal.         ED Course   Procedures  Labs Reviewed - No data to display       Imaging Results          X-Ray Abdomen Flat And Erect (Final result)  Result time 08/07/18 18:26:12    Final result by Rupesh Richardson MD (08/07/18 18:26:12)                 Impression:      1. Large amount of stool throughout the colon suggesting constipation.  2. Large amount of ingested content within the gastric lumen.      Electronically signed by: Rupesh Richardson MD  Date:    08/07/2018  Time:    18:26             Narrative:    EXAMINATION:  XR ABDOMEN FLAT AND ERECT    CLINICAL HISTORY:  Umbilical hernia without obstruction or gangrene    TECHNIQUE:  Flat and erect AP views of the abdomen were performed.    COMPARISON:  07/02/2018    FINDINGS:  Two upright views, 2 supine views.    No significant air-fluid levels on upright view, noting air-fluid levels within the right colon.  There is a large amount of stool throughout the colon, as well as a large amount of ingested content within the gastric  lumen.  No convincing focally dilated small bowel loops.  No large volume free air or pneumatosis.  Postsurgical change overlies the epigastric region.  The lower lung zones are grossly clear, obscured.  No acute osseous abnormality.                                 Medical Decision Making:   Initial Assessment:   Umbilical hernia, headache  Differential Diagnosis:   Bowel obstruction, abdominal pain  Clinical Tests:   Radiological Study: Ordered and Reviewed  ED Management:  The patient will be discharged home on Fioricet for her headache.  The patient will be referred to General surgery for further management and evaluation of her hernia and the patient is instructed to return to the ER as needed if symptoms worsen or fail to improve.  No CT scan ordered as the patient's headache is chronic and similar to prior headaches and she has no neurologic abnormality or deficits on exam.  Also patient denies this is the worse headache of her life and denies thunderclap.  The patient verbalized understanding of discharge instructions and treatment plan.            Scribe Attestation:   Scribe #1: I performed the above scribed service and the documentation accurately describes the services I performed. I attest to the accuracy of the note.               Clinical Impression:     1. Umbilical hernia without obstruction and without gangrene    2. Umbilical hernia    3. Nonintractable episodic headache, unspecified headache type                             Toussaint Battley III, [facility]  08/07/18 4422

## 2018-08-11 NOTE — PROGRESS NOTES
"[facility] Medicine  Progress Note    Patient Name: Estella Arevalo  MRN: [id number]  Patient Class: IP- Inpatient   Admission Date: 3/9/2017  Length of Stay: 2 days  Attending Physician: Vini Gomez MD  Primary Care Provider: Angela Packer MD        Subjective:     Principal Problem:Incarcerated hiatal hernia    HPI:  62 year old female with self reported past MI, alcohol dependence, essential hypertension, narcotic use dependence, acquired hypothyroidism, chronic back pain, depression, anxiety and chronic Hep C who presented via EMS for worsening nausea, vomiting, diarrhea and "stabbing" chest pain for one day in evolution. Stated she's had chest pain for months. It is described as intermittent, left sided, worsening with movement, and 7/10 in intensity at its worst. Not associated with n/v nor worsening with PO intake. Her chest pain yesterday was described as same chest pain she's had, but with new addition of n/v of bilious fluid. This self reported MI was described as "I saw my doctor for this pain. She did an EKG and showed a defect. I had a heart attack". Was referred to cardiology but never went. No history of cardiac revascularization nor angiography. Has no signs of CHF. No perceived murmur on exam to suggest valvular disease. No diabetes nor renal insufficiency. Today, EKG had T wave inversion in V1 and V2, with occasional PVCs, NSR and with normal QTc. Otherwise normal. Troponin negative. On chest XRay it was noted she had a very significant for "Hiatal hernia with organoaxial volvulus." A CT confirms this.       Of note, patient drinks beer on a daily basis. Last drink was yesterday despite ongoing symptoms. Apparently no longer taking narcotics for chronic pain. Tox screen pending. CMP with   and ALT with 70 consistent with alcoholic hepatitis. Liver function appears normal otherwise. Lactic acid of 3.3. No leukocytosis or other signs of active " "infection      Hospital Course:       Interval History: POD 1 of laparoscopic repair of large hernia with gastric content protruding. EGD done prior to surgery showing no signs of necrosis per mucosal appearance, just a non bleeding ulcer in GEJ. Today she is afebrile but very anxious about diagnosis of "cirrhosis".     Review of Systems   Respiratory: Negative.    Cardiovascular: Negative.    Gastrointestinal: Positive for abdominal pain. Negative for nausea.   Psychiatric/Behavioral: The patient is nervous/anxious.      Objective:     Vital Signs (Most Recent):  Temp: 98.2 °F (36.8 °C) (03/11/17 1603)  Pulse: 98 (03/11/17 1603)  Resp: 18 (03/11/17 1603)  BP: 115/79 (03/11/17 1603)  SpO2: 98 % (03/11/17 1603) Vital Signs (24h Range):  Temp:  [97.4 °F (36.3 °C)-98.6 °F (37 °C)] 98.2 °F (36.8 °C)  Pulse:  [] 98  Resp:  [15-26] 18  SpO2:  [92 %-98 %] 98 %  BP: (109-133)/(74-95) 115/79     Weight: 74.5 kg (164 lb 4.6 oz)  Body mass index is 27.34 kg/(m^2).    Intake/Output Summary (Last 24 hours) at 03/11/17 1607  Last data filed at 03/11/17 1320   Gross per 24 hour   Intake          3344.58 ml   Output             1480 ml   Net          1864.58 ml      Physical Exam   Constitutional: She is oriented to person, place, and time. She appears well-developed.   Cardiovascular: Normal rate and regular rhythm.    Pulmonary/Chest: Effort normal and breath sounds normal.   Abdominal: Soft. There is tenderness.   Neurological: She is alert and oriented to person, place, and time.   Skin: Skin is warm and dry. She is not diaphoretic.   Vitals reviewed.      Significant Labs: All pertinent labs within the past 24 hours have been reviewed.    Significant Imaging: I have reviewed all pertinent imaging results/findings within the past 24 hours.    Assessment/Plan:      * Incarcerated hiatal hernia  POD 1 of laparoscopic repair of large hernia with gastric content protruding. PEG in place. EGD done prior to surgery showing no " signs of necrosis per mucosal appearance, just a non bleeding ulcer in GEJ. Management per surgery and GI    Chemical DVT prophylaxis when ok by primary            Other and unspecified alcohol dependence, continuous drinking behavior  Tox screen positive for alcohol upon arrival to the ED. Symptoms of withdrawal currently controlled with PRN lorazepam at this time. Will add diazepam if at any point this no longer works.       Opiate dependence, continuous  Apparently no longer using. Tox screen confirms this.       Major depressive disorder, recurrent episode, severe, without mention of psychotic behavior  Holding off on meds at this time. Will resume once OK by surgery to use GI tract      Thyroid disease  Continue home dose levothyroxine but IV form (1/2 home dose)    Alcoholic hepatitis  I am concerned she has signs of cirrhosis. Patient is aware of this and is anxious about diagnosis. Encouraged alcohol cessation for good.       VTE Risk Mitigation         Ordered     Medium Risk of VTE  Once      03/10/17 1810     Place sequential compression device  Until discontinued      03/10/17 1810     Reason for No Pharmacological VTE Prophylaxis  Once      03/10/17 1810        Thank you for this consult. I will continue to follow with you    Yane Peters MD  Department of Hospital Medicine   [facility]   36.8

## 2018-08-12 ENCOUNTER — HOSPITAL ENCOUNTER (EMERGENCY)
Facility: HOSPITAL | Age: 63
Discharge: HOME | End: 2018-08-12
Attending: EMERGENCY MEDICINE
Payer: OTHER GOVERNMENT

## 2018-08-12 VITALS
OXYGEN SATURATION: 100 % | HEART RATE: 62 BPM | TEMPERATURE: 98 F | DIASTOLIC BLOOD PRESSURE: 102 MMHG | HEIGHT: 66 IN | RESPIRATION RATE: 16 BRPM | BODY MASS INDEX: 26.52 KG/M2 | SYSTOLIC BLOOD PRESSURE: 158 MMHG | WEIGHT: 165 LBS

## 2018-08-12 DIAGNOSIS — M79.671 RIGHT FOOT PAIN: ICD-10-CM
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
## 2018-08-12 DIAGNOSIS — S13.9XXA CERVICAL SPRAIN, INITIAL ENCOUNTER: Primary | ICD-10-CM

## 2018-08-12 PROCEDURE — 96374 THER/PROPH/DIAG INJ IV PUSH: CPT

## 2018-08-12 PROCEDURE — 99284 EMERGENCY DEPT VISIT MOD MDM: CPT | Mod: 25

## 2018-08-12 PROCEDURE — 63600175 PHARM REV CODE 636 W HCPCS: Performed by: NURSE PRACTITIONER

## 2018-08-12 RX ORDER — KETOROLAC TROMETHAMINE 30 MG/ML
30 INJECTION, SOLUTION INTRAMUSCULAR; INTRAVENOUS
Status: COMPLETED | OUTPATIENT
Start: 2018-08-12 | End: 2018-08-12

## 2018-08-12 RX ORDER — NAPROXEN 500 MG/1
500 TABLET ORAL 2 TIMES DAILY PRN
Qty: 14 TABLET | Refills: 0 | Status: ON HOLD | OUTPATIENT
Start: 2018-08-12 | End: 2019-03-20

## 2018-08-12 RX ORDER — KETOROLAC TROMETHAMINE 30 MG/ML
30 INJECTION, SOLUTION INTRAMUSCULAR; INTRAVENOUS
Status: DISCONTINUED | OUTPATIENT
Start: 2018-08-12 | End: 2018-08-12

## 2018-08-12 RX ADMIN — KETOROLAC TROMETHAMINE 30 MG: 30 INJECTION, SOLUTION INTRAMUSCULAR at 07:08

## 2018-08-12 NOTE — ED PROVIDER NOTES
Encounter Date: 8/12/2018    SCRIBE #1 NOTE: I, Izabel Maza, am scribing for, and in the presence of,  SEAN Leggett. I have scribed the entire note.       History   No chief complaint on file.    This is a 63 year old female who presents to the ED complaining of right ankle pain and neck pain. She reports she fell in the bathtub and twisted her ankle today a few minutes ago. No head injury. No LOC.  Patient has a history of anxiety, bipolar disorder cirrhosis of the liver, CAD, depression GERD hypertension chronic low back pain, MI migraine headaches and thyroid disease.    She is in pain management for lumbar back pain.   Reports hx of left sided migraines. She reports a migraine HA that just developed at the ED.      The history is provided by the patient.   Fall   The accident occurred just prior to arrival. The fall occurred while standing (while showering). She landed on a hard floor. The pain is present in the neck (right ankle). The pain is at a severity of 9/10. She was not ambulatory at the scene. Associated symptoms include neck pain, back pain (chronic) and headaches (chronic migraine HA). Pertinent negatives include no fever and no loss of consciousness. She has tried nothing for the symptoms.     Review of patient's allergies indicates:  No Known Allergies  Past Medical History:   Diagnosis Date    Alcohol abuse     Anxiety     Arthritis     Bipolar disorder     Cirrhosis of liver     Coronary artery disease     Depression     Fall     GERD (gastroesophageal reflux disease)     Hypertension     Low back pain     MI (myocardial infarction)     Migraine headache     Thyroid disease      Past Surgical History:   Procedure Laterality Date    HIATAL HERNIA REPAIR      JOINT REPLACEMENT      KNEE SURGERY  bilateral replacement    right foot sx  2008    SHOULDER SURGERY  replacement     Family History   Problem Relation Age of Onset    Cancer Mother     Cancer Father      Social  History     Tobacco Use    Smoking status: Never Smoker    Smokeless tobacco: Never Used   Substance Use Topics    Alcohol use: Yes     Alcohol/week: 3.6 oz     Types: 6 Cans of beer per week     Comment: pt states she does not drink anymore    Drug use: No     Comment: [facility]     Review of Systems   Constitutional: Negative.  Negative for fever.   HENT: Negative.    Eyes: Negative.    Respiratory: Negative.    Cardiovascular: Negative.    Gastrointestinal: Negative.    Endocrine: Negative.    Genitourinary: Negative.    Musculoskeletal: Positive for arthralgias (left ankle), back pain (chronic) and neck pain.   Skin: Negative.  Negative for wound.   Allergic/Immunologic: Negative.    Neurological: Positive for headaches (chronic migraine HA). Negative for loss of consciousness.   Hematological: Negative.    Psychiatric/Behavioral: Negative.    All other systems reviewed and are negative.      Physical Exam     Initial Vitals [08/12/18 1757]   BP Pulse Resp Temp SpO2   (!) 173/93 75 16 98 °F (36.7 °C) 99 %      MAP       --         Physical Exam    Nursing note and vitals reviewed.  Constitutional: Vital signs are normal. She appears well-developed. She is cooperative. She does not appear ill.   HENT:   Head: Normocephalic and atraumatic.   Right Ear: External ear normal.   Left Ear: External ear normal.   Nose: Nose normal.   Mouth/Throat: Oropharynx is clear and moist.   Eyes: Conjunctivae, EOM and lids are normal. Pupils are equal, round, and reactive to light.   Neck: Normal range of motion. Neck supple. Muscular tenderness present. No spinous process tenderness present.       Cardiovascular: Normal rate, regular rhythm, S1 normal, S2 normal and normal heart sounds. Exam reveals no gallop.    No murmur heard.  Pulses:       Dorsalis pedis pulses are 2+ on the right side, and 2+ on the left side.   Pulmonary/Chest: Effort normal and breath sounds normal.   Abdominal: Soft. Normal appearance and bowel  sounds are normal. There is no tenderness.   Musculoskeletal: Normal range of motion.        Left ankle: She exhibits swelling. Tenderness.        Left foot: There is tenderness and swelling. There is no deformity.        Feet:    From of all extremities   Neurological: She is alert and oriented to person, place, and time.   Skin: Skin is warm, dry and intact.   Psychiatric: She has a normal mood and affect. Her speech is normal. Thought content normal. Cognition and memory are normal.         ED Course   Procedures  Labs Reviewed - No data to display       Imaging Results    None       Imaging Results          X-Ray Tibia Fibula 2 View Right (Final result)  Result time 08/12/18 18:56:03    Final result by Lucho Cuevas MD (08/12/18 18:56:03)                 Impression:      No acute process.    Partially visualized arthrodesis involving the right knee and right ankle.      Electronically signed by: Lucho Cuevas MD  Date:    08/12/2018  Time:    18:56             Narrative:    EXAMINATION:  XR TIBIA FIBULA 2 VIEW RIGHT    CLINICAL HISTORY:  Unspecified fall, initial encounter    TECHNIQUE:  AP and lateral views of the right tibia and fibula were performed.    COMPARISON:  None.    FINDINGS:  There are postoperative changes of total right knee arthroplasty.  There also partially visualized postoperative changes of arthrodesis involving the right ankle.  The visualized portions of the hardware appear unremarkable.  The bone mineralization is within normal limits.  There is joint space narrowing the right ankle.  There is no evidence of fracture or dislocation of the right tibia/fibula.                               X-Ray Ankle Complete Right (Final result)     Abnormal  Result time 08/12/18 19:09:23    Final result by Lucho Cuevas MD (08/12/18 19:09:23)                 Impression:      Punctate radiopaque density along the plantar aspect of the right midfoot.  This is concerning for a small retained radiopaque foreign  body.    Stable appearance of arthrodesis the right ankle and foot.  No evidence of a fracture.    This report was flagged in Epic as abnormal.      Electronically signed by: Lucho Cuevas MD  Date:    08/12/2018  Time:    19:09             Narrative:    EXAMINATION:  XR ANKLE COMPLETE 3 VIEW RIGHT    CLINICAL HISTORY:  Unspecified fall, initial encounter    TECHNIQUE:  AP, lateral, and oblique images of the right ankle were performed.    COMPARISON:  01/20/2018.    FINDINGS:  There is stable appearance of arthrodesis involving the right ankle and the right hindfoot.  The appearance of the hardware is unchanged compared to the prior examination.  No evidence of periprosthetic fracture or loosening is identified.  There is stable abnormal appearance of the ankle mortise.  No definitive new fracture or dislocation is identified.  There is a punctate radiopaque foreign body along the plantar aspect of the right midfoot.                               X-Ray Foot Complete Right (Edited Result - FINAL)  Result time 08/12/18 19:04:06    Addendum 1 of 1 by Lucho Cuevas MD (08/12/18 19:04:06)      Correction: The retained foreign body is along the plantar aspect of the right midfoot.      Electronically signed by: Lucho Cuevas MD  Date:    08/12/2018  Time:    19:04               Final result by Lucho Cuevas MD (08/12/18 19:00:12)                 Impression:      Punctate radiopaque density along the dorsum of the right midfoot.  This is concerning for a small retained foreign body.  Suggest clinical correlation.    Stable appearance of extensive arthrodesis involving the right foot.  No evidence of a periprosthetic fracture or dislocation.      Electronically signed by: Lucho Cuevas MD  Date:    08/12/2018  Time:    19:00             Narrative:    EXAMINATION:  XR FOOT COMPLETE 3 VIEW RIGHT    CLINICAL HISTORY:  Unspecified fall, initial encounter    TECHNIQUE:  AP, lateral, and oblique views of the right foot were  performed.    COMPARISON:  05/12/2018.    FINDINGS:  There is stable appearance of a through disease involving the proximal aspect of the 1st right metatarsal.  There also arthrodesis involving the right midfoot in the region of the cuboid bone.  There is also stable appearance of screws transfixing the calcaneus.  The appearance of the arthrodesis is unchanged compared to the prior examination.  There is stable appearance of the loss of the ankle mortise.  The Lisfranc articulation appears fused.  There is a punctate radiopaque density along the dorsum of the right midfoot.  There is no evidence of an acute fracture or dislocation of the right foot.                               CT Cervical Spine Without Contrast (Final result)  Result time 08/12/18 18:53:57    Final result by Lucho Cuevas MD (08/12/18 18:53:57)                 Impression:      No evidence of acute fracture of the cervical spine.    1 mm anterolisthesis of C4 on C5.  This is most likely on the basis of degenerative changes.    Advanced multilevel degenerative changes of the cervical spine.    Soft tissue opacifications of the right paranasal sinuses.  Dedicated CT maxillofacial examination may be obtained for further evaluation.      Electronically signed by: Lucho Cuevas MD  Date:    08/12/2018  Time:    18:53             Narrative:    EXAMINATION:  CT CERVICAL SPINE WITHOUT CONTRAST    CLINICAL HISTORY:  Status post fall.    TECHNIQUE:  Low dose axial images, sagittal and coronal reformations were performed though the cervical spine.  Contrast was not administered.    COMPARISON:  07/12/2018.    FINDINGS:  The visualized portions of the intracranial compartment is within normal limits.  There is soft tissue opacification of the right maxillary sinus.  There is also fluid within the right aspect of the sphenoid sinuses.  No prevertebral soft tissue swelling is identified.    The craniocervical junction is within normal limits.  There is minimal 1  mm anterolisthesis of C4 on C5.  The cervical alignment is otherwise unremarkable.  The vertebral body heights are maintained.  There is hypertrophy of the posterior elements.  There is no evidence of jumped or perched facet.  There is multilevel disc desiccation at multiple levels.  There is variable spinal canal and neural foraminal narrowing.  There is no evidence of mass effect in the spinal canal.  There is no evidence of acute fracture or listhesis of the cervical spine.    The visualized soft tissues of the neck are within normal limits.  There is no evidence of lymphadenopathy in the neck.  The visualized lung apices are unremarkable.  There is no evidence of a pneumothorax.                                    Medical Decision Making:   Initial Assessment:   A 63-year-old female who presents to the ED with complaints of a slip and fall in the shower.  Patient reports neck pain and upper back pain and rightfoot and ankle pain.  Patient denies head injury or LOC.  Patient reports a headache since she fell.  Differential Diagnosis:   Cervical fracture  Cervical sprain  Right foot sprain/fracture  Right ankle/fracture  Clinical Tests:   Radiological Study: Ordered and Reviewed  ED Management:  Physical exam.  Medicated with Toradol 30 mg IM.  Cervical CT with no acute fracture dislocation.  Right foot and ankle x-ray with no acute fracture dislocation.  Possible foreign body to right midfoot.  Physical exam with no evidence of foreign body.  Callus noted.  Patient has home pain medicine.    Patient instructed to continue home medicine.  Patient given Naprosyn p.r.n..  Follow-up with PCP in 1-2 days.  Return ED for worsening of symptoms.            Scribe Attestation:   Scribe #1: I performed the above scribed service and the documentation accurately describes the services I performed. I attest to the accuracy of the note.               Clinical Impression:     1. Cervical sprain, initial encounter    2. Fall     3. Right foot pain                                   Sophia Henson, SEAN  08/13/18 1848

## 2018-08-13 NOTE — ED NOTES
Neuro: AAOx3  Resp: Airway patent, respirations even/unlabored  Cardiac: Skin pink/warm/dry, pulses intact  Abdomen: Soft, non-tender to palpation, denies N/V/D  Musculoskeletal: Moves all extremities equally, ROM intact  Pt reports neck pain and headache

## 2018-08-21 ENCOUNTER — LAB VISIT (OUTPATIENT)
Dept: LAB | Facility: HOSPITAL | Age: 63
End: 2018-08-21
Attending: NURSE PRACTITIONER
Payer: OTHER GOVERNMENT

## 2018-08-21 DIAGNOSIS — E03.4 HYPOTHYROIDISM DUE TO ACQUIRED ATROPHY OF THYROID: ICD-10-CM

## 2018-08-21 DIAGNOSIS — K64.9 BLEEDING HEMORRHOID: ICD-10-CM

## 2018-08-21 DIAGNOSIS — E87.1 HYPONATREMIA: ICD-10-CM

## 2018-08-21 LAB
ALBUMIN SERPL BCP-MCNC: 3.8 G/DL
ALP SERPL-CCNC: 265 U/L
ALT SERPL W/O P-5'-P-CCNC: 29 U/L
ANION GAP SERPL CALC-SCNC: 9 MMOL/L
AST SERPL-CCNC: 41 U/L
BASOPHILS # BLD AUTO: 0.05 K/UL
BASOPHILS NFR BLD: 1.2 %
BILIRUB SERPL-MCNC: 0.5 MG/DL
BUN SERPL-MCNC: 4 MG/DL
CALCIUM SERPL-MCNC: 9.3 MG/DL
CHLORIDE SERPL-SCNC: 92 MMOL/L
CO2 SERPL-SCNC: 25 MMOL/L
CREAT SERPL-MCNC: 0.6 MG/DL
DIFFERENTIAL METHOD: ABNORMAL
EOSINOPHIL # BLD AUTO: 0.2 K/UL
EOSINOPHIL NFR BLD: 4.2 %
ERYTHROCYTE [DISTWIDTH] IN BLOOD BY AUTOMATED COUNT: 14.5 %
EST. GFR  (AFRICAN AMERICAN): >60 ML/MIN/1.73 M^2
EST. GFR  (NON AFRICAN AMERICAN): >60 ML/MIN/1.73 M^2
GLUCOSE SERPL-MCNC: 100 MG/DL
HCT VFR BLD AUTO: 34.6 %
HGB BLD-MCNC: 11.6 G/DL
IMM GRANULOCYTES # BLD AUTO: 0 K/UL
IMM GRANULOCYTES NFR BLD AUTO: 0 %
LYMPHOCYTES # BLD AUTO: 1 K/UL
LYMPHOCYTES NFR BLD: 25.4 %
MCH RBC QN AUTO: 29.7 PG
MCHC RBC AUTO-ENTMCNC: 33.5 G/DL
MCV RBC AUTO: 89 FL
MONOCYTES # BLD AUTO: 0.5 K/UL
MONOCYTES NFR BLD: 13.3 %
NEUTROPHILS # BLD AUTO: 2.3 K/UL
NEUTROPHILS NFR BLD: 55.9 %
NRBC BLD-RTO: 0 /100 WBC
PLATELET # BLD AUTO: 294 K/UL
PMV BLD AUTO: 10.3 FL
POTASSIUM SERPL-SCNC: 4.3 MMOL/L
PROT SERPL-MCNC: 7.2 G/DL
RBC # BLD AUTO: 3.9 M/UL
SODIUM SERPL-SCNC: 126 MMOL/L
T4 FREE SERPL-MCNC: 0.72 NG/DL
TSH SERPL DL<=0.005 MIU/L-ACNC: 1.6 UIU/ML
WBC # BLD AUTO: 4.06 K/UL

## 2018-08-21 PROCEDURE — 85025 COMPLETE CBC W/AUTO DIFF WBC: CPT

## 2018-08-21 PROCEDURE — 84443 ASSAY THYROID STIM HORMONE: CPT

## 2018-08-21 PROCEDURE — 84439 ASSAY OF FREE THYROXINE: CPT

## 2018-08-21 PROCEDURE — 80053 COMPREHEN METABOLIC PANEL: CPT

## 2018-08-21 PROCEDURE — 36415 COLL VENOUS BLD VENIPUNCTURE: CPT | Mod: PO

## 2018-08-23 ENCOUNTER — OFFICE VISIT (OUTPATIENT)
Dept: FAMILY MEDICINE | Facility: CLINIC | Age: 63
End: 2018-08-23
Payer: OTHER GOVERNMENT

## 2018-08-23 VITALS
WEIGHT: 167.56 LBS | HEIGHT: 66 IN | DIASTOLIC BLOOD PRESSURE: 70 MMHG | HEART RATE: 82 BPM | SYSTOLIC BLOOD PRESSURE: 90 MMHG | TEMPERATURE: 99 F | OXYGEN SATURATION: 98 % | BODY MASS INDEX: 26.93 KG/M2

## 2018-08-23 DIAGNOSIS — Z01.818 PRE-OP EVALUATION: Primary | ICD-10-CM

## 2018-08-23 DIAGNOSIS — R74.8 ELEVATED LIVER ENZYMES: ICD-10-CM

## 2018-08-23 DIAGNOSIS — K42.9 UMBILICAL HERNIA WITHOUT OBSTRUCTION AND WITHOUT GANGRENE: ICD-10-CM

## 2018-08-23 DIAGNOSIS — E03.9 ACQUIRED HYPOTHYROIDISM: Chronic | ICD-10-CM

## 2018-08-23 DIAGNOSIS — E87.1 HYPONATREMIA: ICD-10-CM

## 2018-08-23 PROCEDURE — 99999 PR PBB SHADOW E&M-EST. PATIENT-LVL III: CPT | Mod: PBBFAC,,, | Performed by: FAMILY MEDICINE

## 2018-08-23 PROCEDURE — 93010 ELECTROCARDIOGRAM REPORT: CPT | Mod: ,,, | Performed by: INTERNAL MEDICINE

## 2018-08-23 PROCEDURE — 93005 ELECTROCARDIOGRAM TRACING: CPT | Mod: PBBFAC,PO | Performed by: FAMILY MEDICINE

## 2018-08-23 PROCEDURE — 99213 OFFICE O/P EST LOW 20 MIN: CPT | Mod: PBBFAC,25,PO | Performed by: FAMILY MEDICINE

## 2018-08-23 PROCEDURE — 99215 OFFICE O/P EST HI 40 MIN: CPT | Mod: S$PBB,,, | Performed by: FAMILY MEDICINE

## 2018-08-23 NOTE — PROGRESS NOTES
Assessment & Plan  Problem List Items Addressed This Visit        Unprioritized    Acquired hypothyroidism (Chronic)    Relevant Orders    CBC auto differential    Comprehensive metabolic panel    Hemoglobin A1c    Lipid panel    TSH    HIV-1 and HIV-2 antibodies    US Abdomen Limited    Hyponatremia    Relevant Orders    CBC auto differential    Comprehensive metabolic panel    Hemoglobin A1c    Lipid panel    HIV-1 and HIV-2 antibodies    RPR    Hepatitis C RNA, quantitative, PCR    Ethanol    ANTIDIURETIC HORMONE    SODIUM, URINE, RANDOM      Other Visit Diagnoses     Pre-op evaluation    -  Primary    Relevant Orders    CBC auto differential    Comprehensive metabolic panel    Hemoglobin A1c    Lipid panel    TSH    Hepatitis C RNA, quantitative, PCR    Ethanol    EKG 12-lead    US Abdomen Limited    Umbilical hernia without obstruction and without gangrene        Relevant Orders    US Abdomen Limited    Elevated liver enzymes        Relevant Orders    US Abdomen Limited      At this time is difficult to clear this patient for surgery.  She has an untreated hyponatremia that may be related to cirrhosis or poly dipsia as she has increased her intake of water.  Known history of alcohol abuse.  Elevated liver enzymes noted on blood work completed on 8/21/18.  Will need further evaluation of liver as it is enlarged on exam today   Needs to complete liver ultrasound and blood work.  Would not recommend surgery unless hyponatremia improves.      As of 8/30/18 patient has not completed any blood work.  She CANNOT PROCEED WITH SURGERY.   Health Maintenance reviewed.      ______________________________________________________________________    Chief Complaint  Chief Complaint   Patient presents with    Pre-op Exam    Medication Refill    Results       HPI  Estella Arevalo is a 63 y.o. female with multiple medical diagnoses as listed in the medical history and problem list that presents for pre-op.  Pt is known to me  with last appointment 12/30/2016.      She will have a hernia repair in the near future.  Patient denies any new symptoms including chest pain, SOB, blurry vision, N/V, diarrhea.  She reports increased issued of vomiting bright red blood.  She has an ulceration that is noted at the esophagus and stomach.  History of internal hemorrhoids.  Currently on medication of ulcer.      She did not bring her medication to the appointment.  When I asked about metoprolol she states that she is on atenolol.  I instructed her in the office today that she needs to get back in to see me with her bottles.  We need a complete list of all of her medication.  I told her we need to see her bottles to determine what she is taking.    PAST MEDICAL HISTORY:  Past Medical History:   Diagnosis Date    Alcohol abuse     Anxiety     Arthritis     Bipolar disorder     Cirrhosis of liver     Coronary artery disease     Depression     Fall     GERD (gastroesophageal reflux disease)     Hypertension     Low back pain     MI (myocardial infarction)     Migraine headache     Thyroid disease        PAST SURGICAL HISTORY:  Past Surgical History:   Procedure Laterality Date    HIATAL HERNIA REPAIR      JOINT REPLACEMENT      KNEE SURGERY  bilateral replacement    right foot sx  2008    SHOULDER SURGERY  replacement       SOCIAL HISTORY:  Social History     Socioeconomic History    Marital status:      Spouse name: Not on file    Number of children: Not on file    Years of education: Not on file    Highest education level: Not on file   Social Needs    Financial resource strain: Not on file    Food insecurity - worry: Not on file    Food insecurity - inability: Not on file    Transportation needs - medical: Not on file    Transportation needs - non-medical: Not on file   Occupational History    Not on file   Tobacco Use    Smoking status: Never Smoker    Smokeless tobacco: Never Used   Substance and Sexual Activity     Alcohol use: Yes     Alcohol/week: 3.6 oz     Types: 6 Cans of beer per week     Comment: pt states she does not drink anymore    Drug use: No     Comment: [facility]    Sexual activity: Not on file   Other Topics Concern    Not on file   Social History Narrative    Not on file       FAMILY HISTORY:  Family History   Problem Relation Age of Onset    Cancer Mother     Cancer Father        ALLERGIES AND MEDICATIONS: updated and reviewed.  Review of patient's allergies indicates:  No Known Allergies  Current Outpatient Medications   Medication Sig Dispense Refill    amitriptyline (ELAVIL) 50 MG tablet Take 1 tablet (50 mg total) by mouth every evening. 90 tablet 1    atenolol (TENORMIN) 25 MG tablet Take 1 tablet (25 mg total) by mouth once daily. 90 tablet 1    butalbital-acetaminophen-caffeine -40 mg (FIORICET, ESGIC) -40 mg per tablet Take 1 tablet by mouth every 6 (six) hours as needed for Headaches. 10 tablet 0    clonazePAM (KLONOPIN) 1 MG tablet TAKE 1 TABLET BY MOUTH TWICE DAILY 60 tablet 0    cycloSPORINE (RESTASIS) 0.05 % ophthalmic emulsion Place 0.4 mLs (1 drop total) into both eyes 2 (two) times daily. 30 each 0    docusate sodium (COLACE) 100 MG capsule Take 1 capsule (100 mg total) by mouth 2 (two) times daily. 180 capsule 0    duloxetine (CYMBALTA) 30 MG capsule TAKE 1 CAPSULE(30 MG) BY MOUTH EVERY DAY 90 capsule 0    efinaconazole (JUBLIA) 10 % Eran APPLY ONE DOSE DAILY 24 mL 0    gabapentin (NEURONTIN) 300 MG capsule Take 1 capsule (300 mg total) by mouth 2 (two) times daily. 180 capsule 0    hydrocortisone-pramoxine (PROCTOFOAM-HS) rectal foam Place 1 applicator rectally 2 (two) times daily. 10 g 1    levothyroxine (SYNTHROID) 25 MCG tablet Take 1 tablet (25 mcg total) by mouth once daily. 90 tablet 0    LINZESS 145 mcg Cap capsule Take 1 capsule (145 mcg total) by mouth once daily. 90 capsule 0    metoclopramide HCl (REGLAN) 10 MG tablet Take 1 tablet (10 mg  total) by mouth every 6 (six) hours. 180 tablet 0    naproxen (NAPROSYN) 500 MG tablet Take 1 tablet (500 mg total) by mouth 2 (two) times daily as needed. 14 tablet 0    NARCAN 4 mg/actuation Spry USE UTD  0    nitroGLYCERIN (NITROSTAT) 0.3 MG SL tablet ONE TABLET UNDER TONGUE AS NEEDED FOR CHEST PAIN EVERY 5 MINUTES AS NEEDED 100 tablet 0    ondansetron (ZOFRAN) 4 MG tablet Take 1 tablet (4 mg total) by mouth every 6 (six) hours. 60 tablet 0    ondansetron (ZOFRAN-ODT) 4 MG TbDL Take 1 tablet (4 mg total) by mouth every 8 (eight) hours as needed. Nausea/vomiting 20 tablet 0    oxyCODONE (ROXICODONE) 20 mg Tab immediate release tablet Take 1 tablet by mouth every 6 (six) hours as needed.  0    oxyCODONE-acetaminophen (PERCOCET) 5-325 mg per tablet Take 1 tablet by mouth every 4 (four) hours as needed for Pain. 10 tablet 0    pantoprazole (PROTONIX) 40 MG tablet Take 1 tablet (40 mg total) by mouth once daily. 90 tablet 0    polymyxin B sulf-trimethoprim (POLYTRIM) 10,000 unit- 1 mg/mL Drop Place 1 drop into the left eye every 6 (six) hours. 3 mL 0    promethazine (PHENERGAN) 25 MG tablet Take 1 tablet (25 mg total) by mouth every 6 (six) hours as needed for Nausea. 15 tablet 0    ranitidine (ZANTAC) 300 MG tablet Take 1 tablet (300 mg total) by mouth every evening. 30 tablet 11    RESTASIS 0.05 % ophthalmic emulsion INSTILL 1 DROP IN BOTH EYES TWICE DAILY 30 each 0    zolpidem (AMBIEN) 10 mg Tab TK 1 T PO  QHS PRN  0    triamcinolone acetonide 0.1% (KENALOG) 0.1 % cream Apply topically 2 (two) times daily. 15 g 0     No current facility-administered medications for this visit.          ROS  Review of Systems   Constitutional: Negative for activity change, appetite change, fatigue, fever and unexpected weight change.   HENT: Negative.  Negative for ear discharge, ear pain, rhinorrhea and sore throat.    Eyes: Negative.    Respiratory: Negative for apnea, cough, chest tightness, shortness of breath and  "wheezing.    Cardiovascular: Negative for chest pain, palpitations and leg swelling.   Gastrointestinal: Negative for abdominal distention, abdominal pain, constipation, diarrhea and vomiting.   Endocrine: Negative for cold intolerance, heat intolerance, polydipsia and polyuria.   Genitourinary: Negative for decreased urine volume, menstrual problem, urgency, vaginal bleeding, vaginal discharge and vaginal pain.   Musculoskeletal: Negative.    Skin: Negative for rash.   Neurological: Negative for dizziness and headaches.   Hematological: Does not bruise/bleed easily.   Psychiatric/Behavioral: Negative for agitation, sleep disturbance and suicidal ideas.           Physical Exam  Vitals:    08/23/18 1421   BP: 90/70   BP Location: Left arm   Patient Position: Sitting   BP Method: Large (Manual)   Pulse: 82   Temp: 98.6 °F (37 °C)   TempSrc: Oral   SpO2: 98%   Weight: 76 kg (167 lb 8.8 oz)   Height: 5' 6" (1.676 m)    Body mass index is 27.04 kg/m².  Weight: 76 kg (167 lb 8.8 oz)   Height: 5' 6" (167.6 cm)   Physical Exam   Constitutional: She is oriented to person, place, and time. She appears well-developed and well-nourished.   HENT:   Head: Normocephalic.   Right Ear: External ear normal.   Left Ear: External ear normal.   Nose: Nose normal.   Mouth/Throat: Oropharynx is clear and moist.   Eyes: Conjunctivae and EOM are normal. Pupils are equal, round, and reactive to light.   Cardiovascular: Normal rate, regular rhythm and normal heart sounds.   Pulmonary/Chest: Effort normal and breath sounds normal.   Abdominal: Soft. Bowel sounds are normal. There is hepatomegaly.       Noted umbilical hernia that is reducible in the office.    Neurological: She is alert and oriented to person, place, and time.   Skin: Skin is warm and dry.   Vitals reviewed.        Health Maintenance       Date Due Completion Date    Pap Smear with HPV Cotest 03/01/1975 ---    Mammogram 03/01/1994 ---    Lipid Panel 05/29/2017 5/29/2012    " Influenza Vaccine 08/01/2018 11/9/2017    Override on 11/2/2015: Done    Colonoscopy 07/16/2023 7/16/2013    TETANUS VACCINE 12/30/2026 12/30/2016 (Declined)    Override on 12/30/2016: Declined

## 2018-08-28 ENCOUNTER — HOSPITAL ENCOUNTER (EMERGENCY)
Facility: HOSPITAL | Age: 63
Discharge: HOME | End: 2018-08-28
Attending: EMERGENCY MEDICINE
Payer: OTHER GOVERNMENT

## 2018-08-28 VITALS
HEART RATE: 75 BPM | DIASTOLIC BLOOD PRESSURE: 78 MMHG | OXYGEN SATURATION: 98 % | RESPIRATION RATE: 16 BRPM | TEMPERATURE: 98 F | SYSTOLIC BLOOD PRESSURE: 139 MMHG | WEIGHT: 160 LBS | BODY MASS INDEX: 25.82 KG/M2

## 2018-08-28 DIAGNOSIS — K64.9 HEMORRHOIDS, UNSPECIFIED HEMORRHOID TYPE: ICD-10-CM

## 2018-08-28 DIAGNOSIS — K62.3 RECTAL PROLAPSE: Primary | ICD-10-CM

## 2018-08-28 LAB — POCT GLUCOSE: 106 MG/DL (ref 70–110)

## 2018-08-28 PROCEDURE — 85025 COMPLETE CBC W/AUTO DIFF WBC: CPT

## 2018-08-28 PROCEDURE — 25000003 PHARM REV CODE 250: Performed by: NURSE PRACTITIONER

## 2018-08-28 PROCEDURE — 99283 EMERGENCY DEPT VISIT LOW MDM: CPT

## 2018-08-28 RX ORDER — HYDROCODONE BITARTRATE AND ACETAMINOPHEN 5; 325 MG/1; MG/1
1 TABLET ORAL
Status: COMPLETED | OUTPATIENT
Start: 2018-08-28 | End: 2018-08-28

## 2018-08-28 RX ADMIN — HYDROCODONE BITARTRATE AND ACETAMINOPHEN 1 TABLET: 5; 325 TABLET ORAL at 04:08

## 2018-08-28 NOTE — ED PROVIDER NOTES
"Encounter Date: 8/28/2018    SCRIBE #1 NOTE: I, Izabel Link, am scribing for, and in the presence of,  SEAN Leggett. I have scribed the following portions of the note - Other sections scribed: HPI, ROS, PE.       History     Chief Complaint   Patient presents with    Rectal Bleeding     Pt states," I am bleeding through my rectum since last night."     This is a 63 year old female who presents to the ED complaining of hemorrhaging from her rectum since last night. She reports she has bled one pint of blood or more and bled through her diaper. She states she took a laxative and enema yesterday because she is afraid of being constipated. She reports diarrhea yesterday and then the bleeding started. She reports severe rectal pain now. She is requesting medication for pain.     She is currently prescribed pain medication for chronic pain.    She reports she has a rectum prolapse. She reports she has had an endoscopy before.    She reports a pre op appointment for September 6th with Dr. Gomez for hernia repair.     Surgery scheduled for September 12th.      The history is provided by the patient.   Rectal Bleeding    The current episode started today (last night). The onset was sudden. The problem has been gradually worsening. The pain is at a severity of 10/10. The stool is described as liquid and bloody. Prior successful therapies include laxatives and enemas. Associated symptoms include diarrhea and rectal pain. Pertinent negatives include no nausea and no vomiting. The last void occurred 13 to 24 hours ago.     Review of patient's allergies indicates:  No Known Allergies  Past Medical History:   Diagnosis Date    Alcohol abuse     Anxiety     Arthritis     Bipolar disorder     Cirrhosis of liver     Coronary artery disease     Depression     Fall     GERD (gastroesophageal reflux disease)     Hypertension     Low back pain     MI (myocardial infarction)     Migraine headache     Thyroid disease "      Past Surgical History:   Procedure Laterality Date    HERNIA REPAIR      HIATAL HERNIA REPAIR      JOINT REPLACEMENT      KNEE SURGERY  bilateral replacement    right foot sx  2008    SHOULDER SURGERY  replacement     Family History   Problem Relation Age of Onset    Cancer Mother     Cancer Father      Social History     Tobacco Use    Smoking status: Never Smoker    Smokeless tobacco: Never Used   Substance Use Topics    Alcohol use: Yes     Alcohol/week: 3.6 oz     Types: 6 Cans of beer per week     Comment: pt states she does not drink anymore    Drug use: No     Comment: [facility]     Review of Systems   Constitutional: Negative.    HENT: Negative.    Eyes: Negative.    Respiratory: Negative.    Cardiovascular: Negative.    Gastrointestinal: Positive for blood in stool, diarrhea, hematochezia and rectal pain. Negative for constipation, nausea and vomiting.   Endocrine: Negative.    Musculoskeletal: Negative.    Skin: Negative.    Allergic/Immunologic: Negative.    Neurological: Negative.    Hematological: Negative.    Psychiatric/Behavioral: Negative.    All other systems reviewed and are negative.      Physical Exam     Initial Vitals [08/28/18 1554]   BP Pulse Resp Temp SpO2   (!) 140/69 91 16 98 °F (36.7 °C) 99 %      MAP       --         Physical Exam    Nursing note and vitals reviewed.  Constitutional: She appears well-developed and well-nourished. No distress.   HENT:   Head: Normocephalic and atraumatic.   Nose: Nose normal.   Eyes: Conjunctivae and EOM are normal. Pupils are equal, round, and reactive to light.   Neck: Normal range of motion. Neck supple.   Cardiovascular: Normal rate, regular rhythm, normal heart sounds and intact distal pulses. Exam reveals no gallop and no friction rub.    No murmur heard.  Pulmonary/Chest: Breath sounds normal. No respiratory distress. She has no wheezes. She has no rhonchi. She has no rales. She exhibits no tenderness.   Abdominal: Soft.  Bowel sounds are normal. She exhibits no distension. There is no tenderness. There is no rebound and no guarding.   Genitourinary:   Genitourinary Comments: Rectal exam with external hemorrhoids with no inflammation or bleeding.  Patient refused a digital exam.   Musculoskeletal: Normal range of motion.   Neurological: She is alert and oriented to person, place, and time. She has normal strength. No sensory deficit.   Skin: Skin is warm and dry. Capillary refill takes less than 2 seconds.   Psychiatric: Her behavior is normal. Her mood appears anxious.         ED Course   Procedures  Labs Reviewed   POCT CBC   POCT GLUCOSE MONITORING CONTINUOUS   POCT GLUCOSE          Imaging Results    None          Medical Decision Making:   Initial Assessment:   A 63-year-old female who presents to the ED with complaints of rectal bleeding.  Patient reports a history of constipation, rectal prolapse and hemorrhoids.  She took a laxative on yesterday and had a few episodes of diarrhea.  Patient states she began have some bleeding afterwards.  Patient is concerned that she may needed blood transfusion.  Patient is scheduled to have a surgery September 12.  Patient is afraid that she would not be able to have surgery.    Differential Diagnosis:   Anemia  Hemorrhoid  Clinical Tests:   Lab Tests: Ordered and Reviewed  ED Management:  Physical exam.  CBC Hemoglobin 9 Hematocrit 28.  Patient medicated with Norco.  Patient instructed to follow up with PCP in a.m..  Return ED for worsening of symptoms.            Scribe Attestation:   Scribe #1: I performed the above scribed service and the documentation accurately describes the services I performed. I attest to the accuracy of the note.               Clinical Impression:     1. Rectal prolapse    2. Hemorrhoids, unspecified hemorrhoid type                                   Sophia Henson, SEAN  08/29/18 6466

## 2018-08-31 ENCOUNTER — TELEPHONE (OUTPATIENT)
Dept: FAMILY MEDICINE | Facility: CLINIC | Age: 63
End: 2018-08-31

## 2018-08-31 NOTE — TELEPHONE ENCOUNTER
----- Message from Muriel Thomson sent at 8/31/2018  9:57 AM CDT -----  Contact: self  Patient is wanting to speak to nurse,regarding some iron supplements. Her iron is low due to blood loss. Went to E.R last night. Please call at [phone]

## 2018-08-31 NOTE — TELEPHONE ENCOUNTER
----- Message from Muriel Thomson sent at 8/31/2018  9:57 AM CDT -----  Contact: self  Patient is wanting to speak to nurse,regarding some iron supplements. Her iron is low due to blood loss. Went to E.R last night. Please call at [phone]

## 2018-08-31 NOTE — TELEPHONE ENCOUNTER
Patient states was cleared for sx but went to ER on 8/28  hgb is 9.8 and she is very weak. Requesting iron supplement . Please advise

## 2018-08-31 NOTE — TELEPHONE ENCOUNTER
Please notify patient that I did not clear her for surgery.  She has not completed any of the blood work that I instructed her to complete.  She has not completed the ultrasound I instructed her to complete.  She cannot have surgery.  She has not brought in her medications to update her medication list.  I am not comfortable with prescribing any medications until she brings in her medication to clinic so that we can up date her list.  She can take iron tablets over the counter, but she cannot have surgery at this time do to incomplete workup.

## 2018-09-03 ENCOUNTER — HOSPITAL ENCOUNTER (EMERGENCY)
Facility: HOSPITAL | Age: 63
Discharge: LEFT BEFORE DISCHARGE | End: 2018-09-03
Attending: EMERGENCY MEDICINE
Payer: OTHER GOVERNMENT

## 2018-09-03 VITALS
OXYGEN SATURATION: 96 % | WEIGHT: 160 LBS | HEART RATE: 68 BPM | HEIGHT: 66 IN | SYSTOLIC BLOOD PRESSURE: 125 MMHG | DIASTOLIC BLOOD PRESSURE: 76 MMHG | BODY MASS INDEX: 25.71 KG/M2

## 2018-09-03 DIAGNOSIS — R07.9 CHEST PAIN: ICD-10-CM

## 2018-09-03 LAB
ALBUMIN SERPL BCP-MCNC: 3.7 G/DL
ALP SERPL-CCNC: 181 U/L
ALT SERPL W/O P-5'-P-CCNC: 25 U/L
ANION GAP SERPL CALC-SCNC: 10 MMOL/L
AST SERPL-CCNC: 38 U/L
BASOPHILS # BLD AUTO: 0.06 K/UL
BASOPHILS NFR BLD: 1.8 %
BILIRUB SERPL-MCNC: 0.2 MG/DL
BUN SERPL-MCNC: 9 MG/DL
CALCIUM SERPL-MCNC: 8.8 MG/DL
CHLORIDE SERPL-SCNC: 106 MMOL/L
CO2 SERPL-SCNC: 22 MMOL/L
CREAT SERPL-MCNC: 0.6 MG/DL
DIFFERENTIAL METHOD: ABNORMAL
EOSINOPHIL # BLD AUTO: 0.2 K/UL
EOSINOPHIL NFR BLD: 6.7 %
ERYTHROCYTE [DISTWIDTH] IN BLOOD BY AUTOMATED COUNT: 14.7 %
EST. GFR  (AFRICAN AMERICAN): >60 ML/MIN/1.73 M^2
EST. GFR  (NON AFRICAN AMERICAN): >60 ML/MIN/1.73 M^2
GLUCOSE SERPL-MCNC: 133 MG/DL
HCT VFR BLD AUTO: 30.4 %
HGB BLD-MCNC: 10.1 G/DL
INR PPP: 1
LIPASE SERPL-CCNC: 22 U/L
LYMPHOCYTES # BLD AUTO: 1.2 K/UL
LYMPHOCYTES NFR BLD: 35.3 %
MCH RBC QN AUTO: 29.1 PG
MCHC RBC AUTO-ENTMCNC: 33.2 G/DL
MCV RBC AUTO: 88 FL
MONOCYTES # BLD AUTO: 0.7 K/UL
MONOCYTES NFR BLD: 21 %
NEUTROPHILS # BLD AUTO: 1.2 K/UL
NEUTROPHILS NFR BLD: 35.2 %
PLATELET # BLD AUTO: 359 K/UL
PMV BLD AUTO: 9.3 FL
POTASSIUM SERPL-SCNC: 3.7 MMOL/L
PROT SERPL-MCNC: 7.5 G/DL
PROTHROMBIN TIME: 10 SEC
RBC # BLD AUTO: 3.47 M/UL
SODIUM SERPL-SCNC: 138 MMOL/L
TROPONIN I SERPL DL<=0.01 NG/ML-MCNC: <0.006 NG/ML
WBC # BLD AUTO: 3.29 K/UL

## 2018-09-03 PROCEDURE — 93005 ELECTROCARDIOGRAM TRACING: CPT

## 2018-09-03 PROCEDURE — 96365 THER/PROPH/DIAG IV INF INIT: CPT

## 2018-09-03 PROCEDURE — 84484 ASSAY OF TROPONIN QUANT: CPT

## 2018-09-03 PROCEDURE — 25000003 PHARM REV CODE 250: Performed by: EMERGENCY MEDICINE

## 2018-09-03 PROCEDURE — 85025 COMPLETE CBC W/AUTO DIFF WBC: CPT

## 2018-09-03 PROCEDURE — C9113 INJ PANTOPRAZOLE SODIUM, VIA: HCPCS | Performed by: EMERGENCY MEDICINE

## 2018-09-03 PROCEDURE — 93010 ELECTROCARDIOGRAM REPORT: CPT | Mod: ,,, | Performed by: INTERNAL MEDICINE

## 2018-09-03 PROCEDURE — 85610 PROTHROMBIN TIME: CPT

## 2018-09-03 PROCEDURE — 96375 TX/PRO/DX INJ NEW DRUG ADDON: CPT

## 2018-09-03 PROCEDURE — 80053 COMPREHEN METABOLIC PANEL: CPT

## 2018-09-03 PROCEDURE — 63600175 PHARM REV CODE 636 W HCPCS: Performed by: EMERGENCY MEDICINE

## 2018-09-03 PROCEDURE — 99285 EMERGENCY DEPT VISIT HI MDM: CPT | Mod: 25

## 2018-09-03 PROCEDURE — 83690 ASSAY OF LIPASE: CPT

## 2018-09-03 RX ORDER — ONDANSETRON 2 MG/ML
4 INJECTION INTRAMUSCULAR; INTRAVENOUS
Status: COMPLETED | OUTPATIENT
Start: 2018-09-03 | End: 2018-09-03

## 2018-09-03 RX ADMIN — DEXTROSE 40 MG: 50 INJECTION, SOLUTION INTRAVENOUS at 05:09

## 2018-09-03 RX ADMIN — ONDANSETRON HYDROCHLORIDE 4 MG: 2 INJECTION, SOLUTION INTRAMUSCULAR; INTRAVENOUS at 05:09

## 2018-09-03 NOTE — ED TRIAGE NOTES
Patient brought to the ED via EMS with complaints of chest pain, vomiting and pooping blood and nausea. Patient is rating pain for 10/10.Patient was at the stand alone ED with the same complaint.

## 2018-09-03 NOTE — ED PROVIDER NOTES
Encounter Date: 9/3/2018    SCRIBE #1 NOTE: I, Mavis Wells, am scribing for, and in the presence of,  Kingsley Villalobos MD. I have scribed the following portions of the note - Other sections scribed: HPI, ROS, PE, MDM.       History     Chief Complaint   Patient presents with    Chest Pain     x4 days, recently seen at [facility] with same complaint; pt presents with clinical signs of intoxication     CC: Hematemesis, Hematochezia     62 y/o female with PMHx of alcohol abuse, bipolar disorder, cirrhosis of liver, CAD, GERD, HTN, and MI and past surgical hx of hernia repair and hiatal hernia repair presents to ED via EMS for emergent evaluation of hematemesis and hematochezia that began 1-2 hours ago.  Pt reports vomit was dark red initially and then bright red.  Pt notes visiting [facility] a few days ago for rectal bleeding and hematemesis where she was told she had an ulcer and internal hemorrhoids. She reports being discharged with an antacid.  Pt notes visit to [facility] ED on 8/28/18 for rectal prolapse. Pt also reports present chest pain, mild SOB, and abdominal pain associated with her hernia (umbilical hernia, diagnosed at this facility on 8/7/18).  She states she has been out of nitro for 4 days.  Pt admits to drinking alcohol yesterday evening/today.  Pt denies fever, chills, congestion, dysuria, weakness, numbness, and headache.  No tx at home. No other symptoms reported.       The history is provided by the patient. No  was used.     Review of patient's allergies indicates:  No Known Allergies  Past Medical History:   Diagnosis Date    Alcohol abuse     Anxiety     Arthritis     Bipolar disorder     Cirrhosis of liver     Coronary artery disease     Depression     Fall     GERD (gastroesophageal reflux disease)     Hypertension     Low back pain     MI (myocardial infarction)     Migraine headache     Thyroid disease      Past Surgical History:   Procedure  Laterality Date    HERNIA REPAIR      HIATAL HERNIA REPAIR      JOINT REPLACEMENT      KNEE SURGERY  bilateral replacement    right foot sx  2008    SHOULDER SURGERY  replacement     Family History   Problem Relation Age of Onset    Cancer Mother     Cancer Father      Social History     Tobacco Use    Smoking status: Never Smoker    Smokeless tobacco: Never Used   Substance Use Topics    Alcohol use: Yes     Alcohol/week: 3.6 oz     Types: 6 Cans of beer per week     Comment: pt states she does not drink anymore    Drug use: No     Comment: [facility]     Review of Systems   Constitutional: Negative for chills and fever.   HENT: Negative for ear pain and sore throat.    Eyes: Negative for pain.   Respiratory: Positive for shortness of breath (mild). Negative for cough.    Cardiovascular: Positive for chest pain. Negative for palpitations.   Gastrointestinal: Positive for blood in stool, nausea and vomiting. Negative for abdominal pain and diarrhea.        (+) hematochezia   (+) hematemesis   Genitourinary: Negative for dysuria and hematuria.   Musculoskeletal: Negative for back pain and neck pain.   Skin: Negative for wound.   Neurological: Negative for syncope and headaches.   All other systems reviewed and are negative.      Physical Exam     Initial Vitals   BP Pulse Resp Temp SpO2   -- -- -- -- --      MAP       --         Physical Exam    Nursing note and vitals reviewed.  Constitutional: She appears well-developed and well-nourished. No distress.   Drunk. Slurred Speech.   HENT:   Head: Normocephalic and atraumatic.   Nose: Nose normal.   Eyes: Conjunctivae are normal. No scleral icterus.   Neck: Neck supple. No JVD present.   Cardiovascular: Normal rate and regular rhythm.   Pulmonary/Chest: Breath sounds normal. No stridor. No respiratory distress. She has no wheezes. She has no rhonchi. She has no rales.   Abdominal: Soft. There is no tenderness. There is no rebound and no guarding. A  hernia (umbilical) is present.   3 cm umbilical hernia. Not reducible. Mild tenderness. No erythema.    Musculoskeletal: She exhibits no tenderness.   Neurological: She is alert. She has normal strength.   Normal speech   Skin: Skin is warm and dry. Capillary refill takes less than 2 seconds. No pallor.   Psychiatric: She has a normal mood and affect. Her behavior is normal. Thought content normal.         ED Course   Procedures  Labs Reviewed   CBC W/ AUTO DIFFERENTIAL   COMPREHENSIVE METABOLIC PANEL   LIPASE   TROPONIN I     EKG Readings: (Independently Interpreted)   Initial Reading: No STEMI. Rhythm: Normal Sinus Rhythm. Heart Rate: 69. Ectopy: No Ectopy. Conduction: Normal. ST Segments: Normal ST Segments. T Waves: Normal. Clinical Impression: Normal Sinus Rhythm       Imaging Results    None          Medical Decision Making:   Differential Diagnosis:   Peptic ulcer, esophageal varices, internal hemorrhoids, esophagitis, alcohol abuse   ED Management:  Patient stated that she wanted to leave AMA prior to results coming back. States that she is tired of waiting on tests.  Patient exhibited now vomiting in ED. She plans to call her  for a ride. Though intoxicated she is AAOx3. Her gait is steady. 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 She was advised that she may return for further evaluation and treatment if she changes her mind. She was advised to f/u with her PCP.             Scribe Attestation:   Scribe #1: I performed the above scribed service and the documentation accurately describes the services I performed. I attest to the accuracy of the note.    Attending Attestation:           Physician Attestation for Scribe:  Physician Attestation Statement for Scribe #1: I, Kingsley Villalobos MD, reviewed documentation, as scribed by Mavis Wells in my presence, and it is both accurate and complete.                    Clinical Impression:   Diagnoses of Chest pain  and Chest pain were pertinent to this visit.      Disposition:   Disposition: Discharged  Condition: Stable                        Kingsley Villalobos MD  09/03/18 0668

## 2018-09-04 ENCOUNTER — TELEPHONE (OUTPATIENT)
Dept: FAMILY MEDICINE | Facility: CLINIC | Age: 63
End: 2018-09-04

## 2018-09-04 NOTE — TELEPHONE ENCOUNTER
----- Message from eTsha Olsen sent at 8/31/2018  3:59 PM CDT -----  Contact: Self   Pt would like the office to know that she has her list of medications, last office visit, copy of blood work results. She says she is no longer interested in having the surgery. She will bring it all in Tuesday. [phone].

## 2018-09-11 ENCOUNTER — HOSPITAL ENCOUNTER (INPATIENT)
Facility: HOSPITAL | Age: 63
LOS: 2 days | Discharge: PSYCHIATRIC OR SUBSTANCE USE FACILITY | DRG: 641 | End: 2018-09-13
Attending: EMERGENCY MEDICINE | Admitting: INTERNAL MEDICINE
Payer: OTHER GOVERNMENT

## 2018-09-11 DIAGNOSIS — E87.1 HYPONATREMIA: Primary | ICD-10-CM

## 2018-09-11 DIAGNOSIS — E87.1 ACUTE HYPONATREMIA: ICD-10-CM

## 2018-09-11 DIAGNOSIS — F19.94 SUBSTANCE INDUCED MOOD DISORDER: ICD-10-CM

## 2018-09-11 DIAGNOSIS — R53.1 WEAKNESS: ICD-10-CM

## 2018-09-11 DIAGNOSIS — F11.20 POLYSUBSTANCE DEPENDENCE INCLUDING OPIOID TYPE DRUG, CONTINUOUS USE: ICD-10-CM

## 2018-09-11 DIAGNOSIS — F19.20 POLYSUBSTANCE DEPENDENCE INCLUDING OPIOID TYPE DRUG, CONTINUOUS USE: ICD-10-CM

## 2018-09-11 DIAGNOSIS — K52.9 COLITIS: ICD-10-CM

## 2018-09-11 LAB
ALBUMIN SERPL BCP-MCNC: 3.9 G/DL
ALP SERPL-CCNC: 207 U/L
ALT SERPL W/O P-5'-P-CCNC: 51 U/L
AMPHET+METHAMPHET UR QL: NEGATIVE
ANION GAP SERPL CALC-SCNC: 12 MMOL/L
ANION GAP SERPL CALC-SCNC: 12 MMOL/L
APAP SERPL-MCNC: <3 UG/ML
AST SERPL-CCNC: 56 U/L
BARBITURATES UR QL SCN>200 NG/ML: NEGATIVE
BASOPHILS # BLD AUTO: 0.01 K/UL
BASOPHILS NFR BLD: 0.2 %
BENZODIAZ UR QL SCN>200 NG/ML: NEGATIVE
BILIRUB SERPL-MCNC: 0.4 MG/DL
BILIRUB UR QL STRIP: NEGATIVE
BUN SERPL-MCNC: 11 MG/DL
BUN SERPL-MCNC: 9 MG/DL
BZE UR QL SCN: NEGATIVE
CALCIUM SERPL-MCNC: 9 MG/DL
CALCIUM SERPL-MCNC: 9.8 MG/DL
CANNABINOIDS UR QL SCN: NEGATIVE
CARBAMAZEPINE SERPL-MCNC: <1.9 UG/ML
CHLORIDE SERPL-SCNC: 92 MMOL/L
CHLORIDE SERPL-SCNC: 95 MMOL/L
CHLORIDE UR-SCNC: 37 MMOL/L
CLARITY UR: CLEAR
CO2 SERPL-SCNC: 16 MMOL/L
CO2 SERPL-SCNC: 23 MMOL/L
COLOR UR: ABNORMAL
CREAT SERPL-MCNC: 0.6 MG/DL
CREAT SERPL-MCNC: 0.9 MG/DL
CREAT UR-MCNC: 13.7 MG/DL
DIFFERENTIAL METHOD: ABNORMAL
EOSINOPHIL # BLD AUTO: 0 K/UL
EOSINOPHIL NFR BLD: 0 %
ERYTHROCYTE [DISTWIDTH] IN BLOOD BY AUTOMATED COUNT: 14.1 %
EST. GFR  (AFRICAN AMERICAN): >60 ML/MIN/1.73 M^2
EST. GFR  (AFRICAN AMERICAN): >60 ML/MIN/1.73 M^2
EST. GFR  (NON AFRICAN AMERICAN): >60 ML/MIN/1.73 M^2
EST. GFR  (NON AFRICAN AMERICAN): >60 ML/MIN/1.73 M^2
ETHANOL SERPL-MCNC: <10 MG/DL
GLUCOSE SERPL-MCNC: 125 MG/DL
GLUCOSE SERPL-MCNC: 130 MG/DL
GLUCOSE UR QL STRIP: NEGATIVE
HCT VFR BLD AUTO: 33.8 %
HGB BLD-MCNC: 11.7 G/DL
HGB UR QL STRIP: ABNORMAL
KETONES UR QL STRIP: NEGATIVE
LEUKOCYTE ESTERASE UR QL STRIP: ABNORMAL
LIPASE SERPL-CCNC: 9 U/L
LYMPHOCYTES # BLD AUTO: 0.8 K/UL
LYMPHOCYTES NFR BLD: 12.8 %
MAGNESIUM SERPL-MCNC: 1.8 MG/DL
MCH RBC QN AUTO: 28.7 PG
MCHC RBC AUTO-ENTMCNC: 34.6 G/DL
MCV RBC AUTO: 83 FL
METHADONE UR QL SCN>300 NG/ML: NEGATIVE
MICROSCOPIC COMMENT: NORMAL
MONOCYTES # BLD AUTO: 0.8 K/UL
MONOCYTES NFR BLD: 12.3 %
NEUTROPHILS # BLD AUTO: 4.8 K/UL
NEUTROPHILS NFR BLD: 74.5 %
NITRITE UR QL STRIP: NEGATIVE
OPIATES UR QL SCN: ABNORMAL
OSMOLALITY SERPL: 252 MOSM/KG
PCP UR QL SCN>25 NG/ML: NEGATIVE
PH UR STRIP: 8 [PH] (ref 5–8)
PLATELET # BLD AUTO: 431 K/UL
PMV BLD AUTO: 9.9 FL
POTASSIUM SERPL-SCNC: 4.2 MMOL/L
POTASSIUM SERPL-SCNC: 4.3 MMOL/L
POTASSIUM UR-SCNC: 15 MMOL/L
PROT SERPL-MCNC: 7.7 G/DL
PROT UR QL STRIP: NEGATIVE
RBC # BLD AUTO: 4.08 M/UL
RBC #/AREA URNS HPF: 0 /HPF (ref 0–4)
SALICYLATES SERPL-MCNC: <5 MG/DL
SODIUM SERPL-SCNC: 120 MMOL/L
SODIUM SERPL-SCNC: 130 MMOL/L
SODIUM UR-SCNC: 41 MMOL/L
SP GR UR STRIP: 1 (ref 1–1.03)
T4 FREE SERPL-MCNC: 0.76 NG/DL
TOXICOLOGY INFORMATION: ABNORMAL
TROPONIN I SERPL DL<=0.01 NG/ML-MCNC: <0.006 NG/ML
TSH SERPL DL<=0.005 MIU/L-ACNC: 10.13 UIU/ML
URN SPEC COLLECT METH UR: ABNORMAL
UROBILINOGEN UR STRIP-ACNC: NEGATIVE EU/DL
WBC # BLD AUTO: 6.48 K/UL
WBC #/AREA URNS HPF: 1 /HPF (ref 0–5)

## 2018-09-11 PROCEDURE — 80156 ASSAY CARBAMAZEPINE TOTAL: CPT

## 2018-09-11 PROCEDURE — 96376 TX/PRO/DX INJ SAME DRUG ADON: CPT

## 2018-09-11 PROCEDURE — 80048 BASIC METABOLIC PNL TOTAL CA: CPT

## 2018-09-11 PROCEDURE — 96361 HYDRATE IV INFUSION ADD-ON: CPT

## 2018-09-11 PROCEDURE — 96374 THER/PROPH/DIAG INJ IV PUSH: CPT

## 2018-09-11 PROCEDURE — 96375 TX/PRO/DX INJ NEW DRUG ADDON: CPT

## 2018-09-11 PROCEDURE — 25500020 PHARM REV CODE 255: Performed by: EMERGENCY MEDICINE

## 2018-09-11 PROCEDURE — 93005 ELECTROCARDIOGRAM TRACING: CPT

## 2018-09-11 PROCEDURE — 81000 URINALYSIS NONAUTO W/SCOPE: CPT | Mod: 59

## 2018-09-11 PROCEDURE — 84443 ASSAY THYROID STIM HORMONE: CPT

## 2018-09-11 PROCEDURE — 84439 ASSAY OF FREE THYROXINE: CPT

## 2018-09-11 PROCEDURE — 99291 CRITICAL CARE FIRST HOUR: CPT | Mod: 25

## 2018-09-11 PROCEDURE — 80320 DRUG SCREEN QUANTALCOHOLS: CPT

## 2018-09-11 PROCEDURE — 83735 ASSAY OF MAGNESIUM: CPT

## 2018-09-11 PROCEDURE — 82436 ASSAY OF URINE CHLORIDE: CPT

## 2018-09-11 PROCEDURE — 80307 DRUG TEST PRSMV CHEM ANLYZR: CPT

## 2018-09-11 PROCEDURE — 21400001 HC TELEMETRY ROOM

## 2018-09-11 PROCEDURE — 63600175 PHARM REV CODE 636 W HCPCS: Performed by: EMERGENCY MEDICINE

## 2018-09-11 PROCEDURE — 25000003 PHARM REV CODE 250: Performed by: EMERGENCY MEDICINE

## 2018-09-11 PROCEDURE — 99292 CRITICAL CARE ADDL 30 MIN: CPT

## 2018-09-11 PROCEDURE — 93010 ELECTROCARDIOGRAM REPORT: CPT | Mod: ,,, | Performed by: INTERNAL MEDICINE

## 2018-09-11 PROCEDURE — 80053 COMPREHEN METABOLIC PANEL: CPT

## 2018-09-11 PROCEDURE — 85025 COMPLETE CBC W/AUTO DIFF WBC: CPT

## 2018-09-11 PROCEDURE — S0030 INJECTION, METRONIDAZOLE: HCPCS | Performed by: EMERGENCY MEDICINE

## 2018-09-11 PROCEDURE — 84133 ASSAY OF URINE POTASSIUM: CPT

## 2018-09-11 PROCEDURE — 84300 ASSAY OF URINE SODIUM: CPT

## 2018-09-11 PROCEDURE — 83930 ASSAY OF BLOOD OSMOLALITY: CPT

## 2018-09-11 PROCEDURE — 80329 ANALGESICS NON-OPIOID 1 OR 2: CPT

## 2018-09-11 PROCEDURE — 84484 ASSAY OF TROPONIN QUANT: CPT

## 2018-09-11 PROCEDURE — 83690 ASSAY OF LIPASE: CPT

## 2018-09-11 PROCEDURE — 82340 ASSAY OF CALCIUM IN URINE: CPT

## 2018-09-11 RX ORDER — ATENOLOL 25 MG/1
25 TABLET ORAL DAILY
Status: DISCONTINUED | OUTPATIENT
Start: 2018-09-12 | End: 2018-09-13 | Stop reason: HOSPADM

## 2018-09-11 RX ORDER — FLUOXETINE HYDROCHLORIDE 20 MG/1
20 CAPSULE ORAL DAILY
Status: DISCONTINUED | OUTPATIENT
Start: 2018-09-12 | End: 2018-09-12

## 2018-09-11 RX ORDER — SODIUM CHLORIDE 9 MG/ML
INJECTION, SOLUTION INTRAVENOUS CONTINUOUS
Status: DISCONTINUED | OUTPATIENT
Start: 2018-09-11 | End: 2018-09-12

## 2018-09-11 RX ORDER — DIPHENHYDRAMINE HYDROCHLORIDE 50 MG/ML
12.5 INJECTION INTRAMUSCULAR; INTRAVENOUS
Status: COMPLETED | OUTPATIENT
Start: 2018-09-11 | End: 2018-09-11

## 2018-09-11 RX ORDER — ONDANSETRON 2 MG/ML
4 INJECTION INTRAMUSCULAR; INTRAVENOUS
Status: COMPLETED | OUTPATIENT
Start: 2018-09-11 | End: 2018-09-11

## 2018-09-11 RX ORDER — ZOLPIDEM TARTRATE 5 MG/1
5 TABLET ORAL NIGHTLY
Status: DISCONTINUED | OUTPATIENT
Start: 2018-09-11 | End: 2018-09-12

## 2018-09-11 RX ORDER — IBUPROFEN 200 MG
1 TABLET ORAL
Status: ON HOLD | COMMUNITY
End: 2019-03-20 | Stop reason: HOSPADM

## 2018-09-11 RX ORDER — FLUOXETINE HYDROCHLORIDE 20 MG/1
20 CAPSULE ORAL DAILY
Status: ON HOLD | COMMUNITY
End: 2018-09-23 | Stop reason: HOSPADM

## 2018-09-11 RX ORDER — FERROUS SULFATE 325(65) MG
325 TABLET, DELAYED RELEASE (ENTERIC COATED) ORAL
COMMUNITY
End: 2019-04-04

## 2018-09-11 RX ORDER — METRONIDAZOLE 500 MG/100ML
500 INJECTION, SOLUTION INTRAVENOUS
Status: DISCONTINUED | OUTPATIENT
Start: 2018-09-11 | End: 2018-09-13 | Stop reason: HOSPADM

## 2018-09-11 RX ORDER — OXYCODONE AND ACETAMINOPHEN 10; 325 MG/1; MG/1
1 TABLET ORAL
Status: COMPLETED | OUTPATIENT
Start: 2018-09-11 | End: 2018-09-11

## 2018-09-11 RX ORDER — MORPHINE SULFATE 4 MG/ML
4 INJECTION, SOLUTION INTRAMUSCULAR; INTRAVENOUS
Status: COMPLETED | OUTPATIENT
Start: 2018-09-11 | End: 2018-09-11

## 2018-09-11 RX ORDER — METOCLOPRAMIDE HYDROCHLORIDE 5 MG/ML
10 INJECTION INTRAMUSCULAR; INTRAVENOUS
Status: COMPLETED | OUTPATIENT
Start: 2018-09-11 | End: 2018-09-11

## 2018-09-11 RX ORDER — LEVOTHYROXINE SODIUM 25 UG/1
25 TABLET ORAL
Status: DISCONTINUED | OUTPATIENT
Start: 2018-09-12 | End: 2018-09-13 | Stop reason: HOSPADM

## 2018-09-11 RX ORDER — CIPROFLOXACIN 2 MG/ML
400 INJECTION, SOLUTION INTRAVENOUS
Status: DISCONTINUED | OUTPATIENT
Start: 2018-09-11 | End: 2018-09-13 | Stop reason: HOSPADM

## 2018-09-11 RX ORDER — OXCARBAZEPINE 150 MG/1
300 TABLET, FILM COATED ORAL 2 TIMES DAILY
Status: DISCONTINUED | OUTPATIENT
Start: 2018-09-11 | End: 2018-09-12

## 2018-09-11 RX ORDER — OXCARBAZEPINE 300 MG/1
600 TABLET, FILM COATED ORAL 2 TIMES DAILY
Status: ON HOLD | COMMUNITY
End: 2018-09-23 | Stop reason: HOSPADM

## 2018-09-11 RX ADMIN — MORPHINE SULFATE 4 MG: 4 INJECTION INTRAVENOUS at 05:09

## 2018-09-11 RX ADMIN — ONDANSETRON 4 MG: 2 INJECTION INTRAMUSCULAR; INTRAVENOUS at 02:09

## 2018-09-11 RX ADMIN — OXCARBAZEPINE 300 MG: 150 TABLET ORAL at 10:09

## 2018-09-11 RX ADMIN — DIPHENHYDRAMINE HYDROCHLORIDE 12.5 MG: 50 INJECTION INTRAMUSCULAR; INTRAVENOUS at 03:09

## 2018-09-11 RX ADMIN — CIPROFLOXACIN 400 MG: 2 INJECTION, SOLUTION INTRAVENOUS at 10:09

## 2018-09-11 RX ADMIN — IOHEXOL 75 ML: 350 INJECTION, SOLUTION INTRAVENOUS at 04:09

## 2018-09-11 RX ADMIN — METOCLOPRAMIDE 10 MG: 5 INJECTION, SOLUTION INTRAMUSCULAR; INTRAVENOUS at 03:09

## 2018-09-11 RX ADMIN — MORPHINE SULFATE 4 MG: 4 INJECTION INTRAVENOUS at 02:09

## 2018-09-11 RX ADMIN — SODIUM CHLORIDE: 0.9 INJECTION, SOLUTION INTRAVENOUS at 10:09

## 2018-09-11 RX ADMIN — AMITRIPTYLINE HYDROCHLORIDE 100 MG: 75 TABLET, FILM COATED ORAL at 10:09

## 2018-09-11 RX ADMIN — ZOLPIDEM TARTRATE 5 MG: 5 TABLET ORAL at 10:09

## 2018-09-11 RX ADMIN — METRONIDAZOLE 500 MG: 500 INJECTION, SOLUTION INTRAVENOUS at 10:09

## 2018-09-11 RX ADMIN — SODIUM CHLORIDE 500 ML: 0.9 INJECTION, SOLUTION INTRAVENOUS at 02:09

## 2018-09-11 RX ADMIN — OXYCODONE HYDROCHLORIDE AND ACETAMINOPHEN 1 TABLET: 10; 325 TABLET ORAL at 07:09

## 2018-09-11 NOTE — ED NOTES
Dr. Akbar made aware of multiple unsuccessful attempts by  and myself to obtain PT/INR, verbal order received to d/c PT/INR labs

## 2018-09-11 NOTE — ED NOTES
Nurse Clay notified Dr. Morales of need for PT/INR draw. Pt currently in CT, will attempt draw when pt returns.

## 2018-09-11 NOTE — ED NOTES
Vipul RN butterfly pt x3 in attempt to collect labs, unsuccessful. Vipul reports will contact lab to obtain draw

## 2018-09-11 NOTE — ED NOTES
Present during Dr. Morales's rectal assessment, due to pt c/o rectal pain/bleeding. No bleeding visualized by

## 2018-09-11 NOTE — ED PROVIDER NOTES
Encounter Date: 9/11/2018       History     Chief Complaint   Patient presents with    Abnormal Lab     present to the ER via EMS from [facility] for c/o low sodium and confusion, pt is PEC     HPI  Review of patient's allergies indicates:  No Known Allergies  Past Medical History:   Diagnosis Date    Alcohol abuse     Anxiety     Arthritis     Bipolar disorder     Cirrhosis of liver     Coronary artery disease     Depression     Fall     GERD (gastroesophageal reflux disease)     Hypertension     Low back pain     MI (myocardial infarction)     Migraine headache     Thyroid disease      Past Surgical History:   Procedure Laterality Date    BLOCK, NERVE, FACET JOINT, MEDIAL BRANCH, LUMBAR Right 4/16/2013    [facility]    BLOCK, NERVE, FACET JOINT, MEDIAL BRANCH, LUMBAR Right 4/2/2013    [facility]    COLONOSCOPY Left 7/16/2013    [facility] by [facility]    EGD (ESOPHAGOGASTRODUODENOSCOPY) N/A 7/15/2013    Performed by [facility] at [facility] END[facility]    ESOPHAGOGASTRODUODENOSCOPY (EGD) N/A 3/10/2017    Performed by Vini Gomez [facility] at [facility] OR    ESOPHAGOGASTRODUODENOSCOPY (EGD) N/A 3/10/2017    Performed by Arnulfo Benton MD at [facility] ENDO    HERNIA REPAIR      HIATAL HERNIA REPAIR      JOINT REPLACEMENT      KNEE SURGERY  bilateral replacement    LAPAROSCOPIC PEG TUBE PLACEMENT/REPLACEMENT N/A 3/10/2017    [facility] at [facility] OR    REPAIR-HERNIA-LAPAROSCOPIC-HIATAL N/A 3/10/2017    Per[facility] at [facility] OR    REPAIR-HERNIA-VENTRAL-LAPAROSCOPIC-W/MESH N/A 11/8/2017    Per[facility] by [facility] at [facility] OR    right foot sx  2008    SHOULDER SURGERY  replacement     Family History   Problem Relation Age of Onset    Cancer Mother     Cancer Father      Social History     Tobacco Use    Smoking status: Never Smoker    Smokeless tobacco: Never  Used   Substance Use Topics    Alcohol use: Yes     Alcohol/week: 3.6 oz     Types: 6 Cans of beer per week     Comment: pt states she does not drink anymore    Drug use: No     Comment: [facility]     Review of Systems    Physical Exam     Initial Vitals [09/11/18 1349]   BP Pulse Resp Temp SpO2   (!) 165/92 92 20 98.2 °F (36.8 °C) 98 %      MAP       --         Physical Exam    ED Course   Procedures  Labs Reviewed   COMPREHENSIVE METABOLIC PANEL - Abnormal; Notable for the following components:       Result Value    Sodium 120 (*)     Chloride 92 (*)     CO2 16 (*)     Glucose 125 (*)     Alkaline Phosphatase 207 (*)     AST 56 (*)     ALT 51 (*)     All other components within normal limits   CBC W/ AUTO DIFFERENTIAL - Abnormal; Notable for the following components:    Hemoglobin 11.7 (*)     Hematocrit 33.8 (*)     Platelets 431 (*)     Lymph # 0.8 (*)     Gran% 74.5 (*)     Lymph% 12.8 (*)     All other components within normal limits   URINALYSIS, REFLEX TO URINE CULTURE - Abnormal; Notable for the following components:    Occult Blood UA 1+ (*)     Leukocytes, UA 1+ (*)     All other components within normal limits    Narrative:     Preferred Collection Type->Urine, Clean Catch   OSMOLALITY - Abnormal; Notable for the following components:    Osmolality 252 (*)     All other components within normal limits    Narrative:     SERUM OSMOLALITY   critical result(s) called and verbal readback   obtained from ANDERS SMITH., 09/11/2018 16:39   DRUG SCREEN PANEL, URINE EMERGENCY - Abnormal; Notable for the following components:    Creatinine, Random Ur 13.7 (*)     All other components within normal limits   ACETAMINOPHEN LEVEL - Abnormal; Notable for the following components:    Acetaminophen (Tylenol), Serum <3.0 (*)     All other components within normal limits   SALICYLATE LEVEL - Abnormal; Notable for the following components:    Salicylate Lvl <5.0 (*)     All other components within normal limits   LIPASE    MAGNESIUM   TROPONIN I   CHLORIDE, URINE, RANDOM   POTASSIUM, URINE, RANDOM   SODIUM, URINE, RANDOM   ALCOHOL,MEDICAL (ETHANOL)   URINALYSIS MICROSCOPIC    Narrative:     Preferred Collection Type->Urine, Clean Catch   CALCIUM, URINE, RANDOM     EKG Readings: (Independently Interpreted)   14:31  nsr with rate 86, normal axis and qrs and qtc. Normal ekg       Imaging Results          CT Abdomen Pelvis With Contrast (In process)  Result time 09/11/18 17:30:51               X-Ray Chest AP Portable (Final result)  Result time 09/11/18 14:47:05    Final result by Austin Aragon MD (09/11/18 14:47:05)                 Impression:      Stable exam.  No acute cardiopulmonary process identified.      Electronically signed by: Austin Aragon MD  Date:    09/11/2018  Time:    14:47             Narrative:    EXAMINATION:  XR CHEST AP PORTABLE    CLINICAL HISTORY:  weakness;    TECHNIQUE:  Frontal radiograph of the chest was performed.    COMPARISON:  09/03/2018    FINDINGS:  Multiple overlying cardiac monitoring leads.    No apparent change in cardiopulmonary status of the patient.  No new focal pulmonary parenchymal opacity, pleural fluid or pneumothorax.    Prior right shoulder arthroplasty procedure.                                 Medical Decision Making:   Differential Diagnosis:   Confusion/abnormal lab  - likely 2/2 hyponatremia  - h/a c/w hypontremia  - given IV bolus 500 ml NS but then fluid capping patient    Patient has hx of abdominal pain  - diffuse tenderness but no rebound/guarding  - reducible hernia  - ct abdomen pelvis ordered and pending    Labs c/w hyponatremia, elevated anion gap, low osmolality. Signed out to Dr romo pending CT abd/pelvis result and patient will need to get admitted.   Lamont Akbar    Clinical Tests:   Lab Tests: Ordered and Reviewed  Radiological Study: Reviewed and Ordered  Medical Tests: Reviewed and Ordered                      Clinical Impression:   The encounter diagnosis was  Weakness.                             Lamont Akbar MD  09/11/18 9432

## 2018-09-11 NOTE — ED NOTES
Received phone call from [facility] tech to inform me he was unable to draw the PT/INR. Pineda instructed to come obtain the lab draw, verbalized understanding

## 2018-09-11 NOTE — ED NOTES
Called [facility]. Patient's family requested our facility. Mayur ([facility]) stated that sitter is on their way that they are coming from [place].

## 2018-09-11 NOTE — ED PROVIDER NOTES
Encounter Date: 9/11/2018    SCRIBE #1 NOTE: I, Brooke Henson , am scribing for, and in the presence of,  Lamont Akbar MD . I have scribed the following portions of the note - Other sections scribed: HPI, ROS, PE .       History     Chief Complaint   Patient presents with    Abnormal Lab     present to the ER via EMS from [facility] for c/o low sodium and confusion, pt is PEC     CC: Abnormal lab     HPI: This is a 63 y.o female pt who has a past medical history of Alcohol abuse, Anxiety, Arthritis, Bipolar disorder, Cirrhosis of liver, Coronary artery disease, Depression, Fall, GERD (gastroesophageal reflux disease), Hypertension, Low back pain, MI (myocardial infarction), Migraine headache, and Thyroid disease who presents to the ED c/o nausea, emesis, headaches, and 11 episodes of diarrhea today. The nausea and emesis began today, the headaches began x1 ago. Pt also reports rectal prolapse. Pt says she has never had a headache of this magnitude before. The pt is able to walk but is limited because she feels very weak. The pt was transferred from [facility] because of low sodium and confusion. No recent trauma, change in vision, and fever.       Patient has a CEC at [facility] in [place] for gravely disabled with bipolar disorder  delusional and polysubstance abuse and opiate dependency.  She was treated with Elavil, trileptal, ambien and Prozac While at [place] she was noted to have increased confusion and blood work revealed a sodium of 118.                Review of patient's allergies indicates:  No Known Allergies  Past Medical History:   Diagnosis Date    Alcohol abuse     Anxiety     Arthritis     Bipolar disorder     Cirrhosis of liver     Coronary artery disease     Depression     Fall     GERD (gastroesophageal reflux disease)     Hypertension     Low back pain     MI (myocardial infarction)     Migraine headache     Thyroid disease      Past Surgical History:   Procedure Laterality Date     BLOCK, NERVE, FACET JOINT, MEDIAL BRANCH, LUMBAR Right 4/16/2013    Performed by [facility] at [facility]    BLOCK, NERVE, FACET JOINT, MEDIAL BRANCH, LUMBAR Right 4/2/2013    [facility] at [facility]    COLONOSCOPY Left 7/16/2013    [facility]formed by [facility] at [facility] ENDO    EGD (ESOPHAGOGASTRODUODENOSCOPY) N/A 7/15/2013    Performed by [facility] at [facility] END[facility]    ESOPHAGOGASTRODUODENOSCOPY (EGD) N/A 3/10/2017    Performed by [facility]ini [facility] at [facility] OR    ESOPHAGOGASTRODUODENOSCOPY (EGD) N/A 3/10/2017    Performed by Arnulfo Benton MD at [facility] ENDO    HERNIA REPAIR      HIATAL HERNIA REPAIR      JOINT REPLACEMENT      KNEE SURGERY  bilateral replacement    LAPAROSCOPIC PEG TUBE PLACEMENT/REPLACEMENT N/A 3/10/2017    [facility] by [facility] at [facility] OR    REPAIR-HERNIA-LAPAROSCOPIC-HIATAL N/A 3/10/2017    [facility] at [facility]    REPAIR-HERNIA-VENTRAL-LAPAROSCOPIC-W/MESH N/A 11/8/2017    [facility] at [facility]    right foot sx  2008    SHOULDER SURGERY  replacement     Family History   Problem Relation Age of Onset    Cancer Mother     Cancer Father      Social History     Tobacco Use    Smoking status: Never Smoker    Smokeless tobacco: Never Used   Substance Use Topics    Alcohol use: Yes     Alcohol/week: 3.6 oz     Types: 6 Cans of beer per week     Comment: pt states she does not drink anymore    Drug use: No     Comment: Pain mgt clinic     Review of Systems   Constitutional: Negative for fever.   HENT: Negative for sore throat.    Respiratory: Negative for shortness of breath.    Cardiovascular: Negative for chest pain.   Gastrointestinal: Positive for diarrhea, nausea and vomiting.   Genitourinary: Negative for dysuria.   Musculoskeletal: Negative for back pain.   Skin: Negative for rash.   Neurological: Negative for weakness.   Hematological: Does not bruise/bleed easily.    There has been  no blood in the stool or vomit    Physical Exam     Initial Vitals [09/11/18 1349]   BP Pulse Resp Temp SpO2   (!) 165/92 92 20 98.2 °F (36.8 °C) 98 %      MAP       --       HTN noted and discussed need for close followup with primary care physician.    Physical Exam    Nursing note and vitals reviewed.  Constitutional: She appears well-developed and well-nourished.   weak   Lying in bed      HENT:   Head: Normocephalic and atraumatic.   Nose: Nose normal.   Mouth/Throat: Mucous membranes are dry.   Eyes: EOM and lids are normal.   Neck: Neck supple.   Pulmonary/Chest: No respiratory distress.   Abdominal: Normal appearance. There is tenderness (generalized).   Reproducible abdominal hernia     Musculoskeletal: Normal range of motion.   Neurological: She is alert.   Skin: Skin is warm, dry and intact. Capillary refill takes less than 2 seconds.   Psychiatric: She has a normal mood and affect. Her behavior is normal. Thought content normal.   Slightly confused         ED Course   Procedures  Labs Reviewed   COMPREHENSIVE METABOLIC PANEL - Abnormal; Notable for the following components:       Result Value    Sodium 120 (*)     Chloride 92 (*)     CO2 16 (*)     Glucose 125 (*)     Alkaline Phosphatase 207 (*)     AST 56 (*)     ALT 51 (*)     All other components within normal limits   CBC W/ AUTO DIFFERENTIAL - Abnormal; Notable for the following components:    Hemoglobin 11.7 (*)     Hematocrit 33.8 (*)     Platelets 431 (*)     Lymph # 0.8 (*)     Gran% 74.5 (*)     Lymph% 12.8 (*)     All other components within normal limits   URINALYSIS, REFLEX TO URINE CULTURE - Abnormal; Notable for the following components:    Occult Blood UA 1+ (*)     Leukocytes, UA 1+ (*)     All other components within normal limits    Narrative:     Preferred Collection Type->Urine, Clean Catch   OSMOLALITY - Abnormal; Notable for the following components:    Osmolality 252 (*)     All other components within normal limits    Narrative:      SERUM OSMOLALITY   critical result(s) called and verbal readback   obtained from ANDERS GUTHRIEEZ., 09/11/2018 16:39   DRUG SCREEN PANEL, URINE EMERGENCY - Abnormal; Notable for the following components:    Creatinine, Random Ur 13.7 (*)     All other components within normal limits   ACETAMINOPHEN LEVEL - Abnormal; Notable for the following components:    Acetaminophen (Tylenol), Serum <3.0 (*)     All other components within normal limits   SALICYLATE LEVEL - Abnormal; Notable for the following components:    Salicylate Lvl <5.0 (*)     All other components within normal limits   LIPASE   MAGNESIUM   TROPONIN I   CHLORIDE, URINE, RANDOM   POTASSIUM, URINE, RANDOM   SODIUM, URINE, RANDOM   ALCOHOL,MEDICAL (ETHANOL)   URINALYSIS MICROSCOPIC    Narrative:     Preferred Collection Type->Urine, Clean Catch   CALCIUM, URINE, RANDOM          Imaging Results          CT Abdomen Pelvis With Contrast (Final result)  Result time 09/11/18 17:41:14    Final result by Paula Santos MD (09/11/18 17:41:14)                 Impression:      1. Mild rectal wall thickening with surrounding inflammatory change suggestive for acute proctitis.  Future colonoscopy follow-up is recommended if not recently performed.  2. Interval enlargement of fat containing periumbilical hernia with mild edema/inflammatory change visualized within the herniated fat.  3. Scattered colonic diverticulosis without evidence of acute diverticulitis.  4. Additional findings as detailed above.      Electronically signed by: Paula Santos MD  Date:    09/11/2018  Time:    17:41             Narrative:    EXAMINATION:  CT ABDOMEN PELVIS WITH CONTRAST    CLINICAL HISTORY:  Nausea, vomiting, diarrhea;    TECHNIQUE:  Low dose axial images, sagittal and coronal reformations were obtained from the lung bases to the pubic symphysis following the IV administration of 75 mL of Omnipaque 350 .  Oral contrast was not given.    COMPARISON:  CT abdomen and pelvis from  07/02/2018.    FINDINGS:  The visualized portion of the heart is unremarkable.  Mild atelectatic change seen at the lung bases.    No significant hepatic abnormalities are identified.  There is no intra-or extrahepatic biliary ductal dilatation.  The gallbladder is unremarkable.  The stomach, pancreas, spleen, and adrenal glands are unremarkable.  Surgical clips are seen at the level of the GE junction.    Kidneys are functioning.  No evidence of hydronephrosis.  No abnormalities are seen along the ureteral courses.  Urinary bladder and uterus show no significant abnormalities.    No evidence of bowel obstruction.  There is mild rectal wall thickening with mild surrounding stranding/inflammatory change which may be seen in setting of proctitis.  Scattered colonic diverticula are seen without evidence of acute diverticulitis.  No free air or free fluid.    Aorta tapers normally.    No acute osseous abnormality identified.  Multilevel degenerative changes are seen within the spine with stable grade 2 anterolisthesis of L4 on L5.    There has been interval enlargement of fat containing periumbilical hernia with mild edema/inflammatory change visualized within the herniated fat.                               X-Ray Chest AP Portable (Final result)  Result time 09/11/18 14:47:05    Final result by Austin Aragon MD (09/11/18 14:47:05)                 Impression:      Stable exam.  No acute cardiopulmonary process identified.      Electronically signed by: Austin Aragon MD  Date:    09/11/2018  Time:    14:47             Narrative:    EXAMINATION:  XR CHEST AP PORTABLE    CLINICAL HISTORY:  weakness;    TECHNIQUE:  Frontal radiograph of the chest was performed.    COMPARISON:  09/03/2018    FINDINGS:  Multiple overlying cardiac monitoring leads.    No apparent change in cardiopulmonary status of the patient.  No new focal pulmonary parenchymal opacity, pleural fluid or pneumothorax.    Prior right shoulder arthroplasty  procedure.                                                   ED Course as of Sep 11 1856   Tue Sep 11, 2018   1800 CT consistent with colitis.  No obstruction. CT Abdomen Pelvis With Contrast [MH]      ED Course User Index  [MH] Reginaldo Sandoval MD     Clinical Impression:   The primary encounter diagnosis was Hyponatremia. Diagnoses of Weakness and Colitis were also pertinent to this visit.               Reginaldo Sandoval MD  09/11/18 2779       I have discussed this patient's workup and examination findings with .  He recommends hydration at 125cc an hour x 20 hours with q6 hr BMP, plus Flagyl and Cipro for colitis.    CRITICAL CARE TIME  Based on this patient's presenting history and physical exam, this patient had high probability of sudden, clinically significant deterioration and the services I provided to this patient were to treat and/or prevent clinically significant deterioration.      These services included the following: chart data review, reviewing nursing notes and researching old charts from internal and external sources, documentation time, consultant collaboration regarding findings and treatment options, medication orders and management, direct patient care, vital sign assessments, physical exam reassessments, and ordering, interpreting and reviewing diagnostic studies/lab tests.    Aggregate critical care time was approximately 75 minutes, which includes only time during which I was engaged in work directly related to the patient's care, as described above, whether at the bedside or elsewhere in the Emergency Department.  It did not include time spent performing other reported procedures or the services of residents, students, nurses or physician assistants.    ]       Reginaldo Sandoval MD  09/11/18 [id number]

## 2018-09-11 NOTE — ED TRIAGE NOTES
Per EMS sent here from [facility], unknown confusion, labs drawn on 9/11, sodium of 118. C/o n/v. Also pt c/o rectal pain due to previous rectal prolapse. Pt is PEC/CEC

## 2018-09-12 PROBLEM — K52.9 ACUTE COLITIS: Status: ACTIVE | Noted: 2018-09-12

## 2018-09-12 PROBLEM — F19.94 SUBSTANCE INDUCED MOOD DISORDER: Status: ACTIVE | Noted: 2018-09-12

## 2018-09-12 PROBLEM — D63.8 ANEMIA OF CHRONIC DISEASE: Chronic | Status: ACTIVE | Noted: 2018-09-12

## 2018-09-12 LAB
ANION GAP SERPL CALC-SCNC: 10 MMOL/L
ANION GAP SERPL CALC-SCNC: 9 MMOL/L
BUN SERPL-MCNC: 14 MG/DL
BUN SERPL-MCNC: 15 MG/DL
CALCIUM SERPL-MCNC: 9 MG/DL
CALCIUM SERPL-MCNC: 9.1 MG/DL
CALCIUM UR-MCNC: 6.4 MG/DL
CHLORIDE SERPL-SCNC: 101 MMOL/L
CHLORIDE SERPL-SCNC: 101 MMOL/L
CO2 SERPL-SCNC: 19 MMOL/L
CO2 SERPL-SCNC: 22 MMOL/L
CREAT SERPL-MCNC: 0.8 MG/DL
CREAT SERPL-MCNC: 0.8 MG/DL
EST. GFR  (AFRICAN AMERICAN): >60 ML/MIN/1.73 M^2
EST. GFR  (AFRICAN AMERICAN): >60 ML/MIN/1.73 M^2
EST. GFR  (NON AFRICAN AMERICAN): >60 ML/MIN/1.73 M^2
EST. GFR  (NON AFRICAN AMERICAN): >60 ML/MIN/1.73 M^2
GLUCOSE SERPL-MCNC: 110 MG/DL
GLUCOSE SERPL-MCNC: 98 MG/DL
POTASSIUM SERPL-SCNC: 4.4 MMOL/L
POTASSIUM SERPL-SCNC: 4.9 MMOL/L
SODIUM SERPL-SCNC: 130 MMOL/L
SODIUM SERPL-SCNC: 132 MMOL/L

## 2018-09-12 PROCEDURE — S0030 INJECTION, METRONIDAZOLE: HCPCS | Performed by: EMERGENCY MEDICINE

## 2018-09-12 PROCEDURE — 63600175 PHARM REV CODE 636 W HCPCS: Performed by: EMERGENCY MEDICINE

## 2018-09-12 PROCEDURE — 25000003 PHARM REV CODE 250: Performed by: EMERGENCY MEDICINE

## 2018-09-12 PROCEDURE — 25000003 PHARM REV CODE 250: Performed by: INTERNAL MEDICINE

## 2018-09-12 PROCEDURE — 25000003 PHARM REV CODE 250: Performed by: HOSPITALIST

## 2018-09-12 PROCEDURE — 21400001 HC TELEMETRY ROOM

## 2018-09-12 PROCEDURE — 99231 SBSQ HOSP IP/OBS SF/LOW 25: CPT | Mod: ,,, | Performed by: NURSE PRACTITIONER

## 2018-09-12 PROCEDURE — 80048 BASIC METABOLIC PNL TOTAL CA: CPT | Mod: 91

## 2018-09-12 PROCEDURE — 36415 COLL VENOUS BLD VENIPUNCTURE: CPT

## 2018-09-12 RX ORDER — RAMELTEON 8 MG/1
8 TABLET ORAL NIGHTLY PRN
Status: DISCONTINUED | OUTPATIENT
Start: 2018-09-12 | End: 2018-09-13 | Stop reason: HOSPADM

## 2018-09-12 RX ORDER — SODIUM CHLORIDE 9 MG/ML
INJECTION, SOLUTION INTRAVENOUS CONTINUOUS
Status: DISCONTINUED | OUTPATIENT
Start: 2018-09-12 | End: 2018-09-13 | Stop reason: HOSPADM

## 2018-09-12 RX ORDER — DULOXETIN HYDROCHLORIDE 30 MG/1
30 CAPSULE, DELAYED RELEASE ORAL DAILY
Status: DISCONTINUED | OUTPATIENT
Start: 2018-09-12 | End: 2018-09-12

## 2018-09-12 RX ORDER — GABAPENTIN 300 MG/1
300 CAPSULE ORAL 2 TIMES DAILY
Status: DISCONTINUED | OUTPATIENT
Start: 2018-09-12 | End: 2018-09-13 | Stop reason: HOSPADM

## 2018-09-12 RX ORDER — ONDANSETRON 2 MG/ML
8 INJECTION INTRAMUSCULAR; INTRAVENOUS EVERY 8 HOURS PRN
Status: DISCONTINUED | OUTPATIENT
Start: 2018-09-12 | End: 2018-09-13 | Stop reason: HOSPADM

## 2018-09-12 RX ORDER — CLONIDINE HYDROCHLORIDE 0.1 MG/1
0.1 TABLET ORAL 3 TIMES DAILY PRN
Status: DISCONTINUED | OUTPATIENT
Start: 2018-09-12 | End: 2018-09-13 | Stop reason: HOSPADM

## 2018-09-12 RX ORDER — ACETAMINOPHEN 500 MG
500 TABLET ORAL EVERY 6 HOURS PRN
Status: DISCONTINUED | OUTPATIENT
Start: 2018-09-12 | End: 2018-09-13 | Stop reason: HOSPADM

## 2018-09-12 RX ORDER — FAMOTIDINE 20 MG/1
20 TABLET, FILM COATED ORAL 2 TIMES DAILY
Status: DISCONTINUED | OUTPATIENT
Start: 2018-09-12 | End: 2018-09-13 | Stop reason: HOSPADM

## 2018-09-12 RX ORDER — AMOXICILLIN 250 MG
1 CAPSULE ORAL 2 TIMES DAILY PRN
Status: DISCONTINUED | OUTPATIENT
Start: 2018-09-12 | End: 2018-09-13 | Stop reason: HOSPADM

## 2018-09-12 RX ADMIN — SODIUM CHLORIDE: 0.9 INJECTION, SOLUTION INTRAVENOUS at 01:09

## 2018-09-12 RX ADMIN — FAMOTIDINE 20 MG: 20 TABLET ORAL at 09:09

## 2018-09-12 RX ADMIN — AMITRIPTYLINE HYDROCHLORIDE 100 MG: 75 TABLET, FILM COATED ORAL at 08:09

## 2018-09-12 RX ADMIN — LEVOTHYROXINE SODIUM 25 MCG: 25 TABLET ORAL at 06:09

## 2018-09-12 RX ADMIN — FAMOTIDINE 20 MG: 20 TABLET ORAL at 08:09

## 2018-09-12 RX ADMIN — METRONIDAZOLE 500 MG: 500 INJECTION, SOLUTION INTRAVENOUS at 06:09

## 2018-09-12 RX ADMIN — CIPROFLOXACIN 400 MG: 2 INJECTION, SOLUTION INTRAVENOUS at 09:09

## 2018-09-12 RX ADMIN — GABAPENTIN 300 MG: 300 CAPSULE ORAL at 09:09

## 2018-09-12 RX ADMIN — ATENOLOL 25 MG: 25 TABLET ORAL at 09:09

## 2018-09-12 RX ADMIN — OXYCODONE HYDROCHLORIDE 20 MG: 15 TABLET ORAL at 07:09

## 2018-09-12 RX ADMIN — METRONIDAZOLE 500 MG: 500 INJECTION, SOLUTION INTRAVENOUS at 01:09

## 2018-09-12 RX ADMIN — OXYCODONE HYDROCHLORIDE 20 MG: 15 TABLET ORAL at 01:09

## 2018-09-12 RX ADMIN — GABAPENTIN 300 MG: 300 CAPSULE ORAL at 08:09

## 2018-09-12 RX ADMIN — OXYCODONE HYDROCHLORIDE 20 MG: 15 TABLET ORAL at 08:09

## 2018-09-12 RX ADMIN — METRONIDAZOLE 500 MG: 500 INJECTION, SOLUTION INTRAVENOUS at 10:09

## 2018-09-12 NOTE — SUBJECTIVE & OBJECTIVE
Past Medical History:   Diagnosis Date    Alcohol abuse     Anxiety     Arthritis     Bipolar disorder     Cirrhosis of liver     Coronary artery disease     Depression     Fall     GERD (gastroesophageal reflux disease)     Hypertension     Low back pain     MI (myocardial infarction)     Migraine headache     Thyroid disease        Past Surgical History:   Procedure Laterality Date    BLOCK, NERVE, FACET JOINT, MEDIAL BRANCH, LUMBAR Right 4/16/2013    [facility]    BLOCK, NERVE, FACET JOINT, MEDIAL BRANCH, LUMBAR Right 4/2/2013    [facility]    COLONOSCOPY Left 7/16/2013    Performed by [facility]arias MD at [facility] ENDO    EGD (ESOPHAGOGASTRODUODENOSCOPY) N/A 7/15/2013    Performed by [facility] at [facility] ENDO    ESOPHAGOGASTRODUODENOSCOPY (EGD) N/A 3/10/2017    Performed by Vini Gomez MD at [facility] OR    ESOPHAGOGASTRODUODENOSCOPY (EGD) N/A 3/10/2017    Performed by Arnulfo Benton MD at [facility] ENDO    HERNIA REPAIR      HIATAL HERNIA REPAIR      JOINT REPLACEMENT      KNEE SURGERY  bilateral replacement    LAPAROSCOPIC PEG TUBE PLACEMENT/REPLACEMENT N/A 3/10/2017    Performed by [facility] at [facility] OR    REPAIR-HERNIA-LAPAROSCOPIC-HIATAL N/A 3/10/2017    [facility]formed by [facility] at [facility] OR    REPAIR-HERNIA-VENTRAL-LAPAROSCOPIC-W/MESH N/A 11/8/2017    Performed by [facility] MD at [facility] OR    right foot sx  2008    SHOULDER SURGERY  replacement       Review of patient's allergies indicates:  No Known Allergies    No current facility-administered medications on file prior to encounter.      Current Outpatient Medications on File Prior to Encounter   Medication Sig    atenolol (TENORMIN) 25 MG tablet Take 1 tablet (25 mg total) by mouth once daily.    docusate sodium (COLACE) 100 MG capsule Take 1 capsule (100 mg total) by mouth 2 (two) times daily.    LINZESS 145 mcg Cap capsule Take 1  capsule (145 mcg total) by mouth once daily.    amitriptyline (ELAVIL) 50 MG tablet Take 1 tablet (50 mg total) by mouth every evening.    clonazePAM (KLONOPIN) 1 MG tablet TAKE 1 TABLET BY MOUTH TWICE DAILY    cycloSPORINE (RESTASIS) 0.05 % ophthalmic emulsion Place 0.4 mLs (1 drop total) into both eyes 2 (two) times daily.    duloxetine (CYMBALTA) 30 MG capsule TAKE 1 CAPSULE(30 MG) BY MOUTH EVERY DAY    efinaconazole (JUBLIA) 10 % Eran APPLY ONE DOSE DAILY    ferrous sulfate 325 (65 FE) MG EC tablet Take 325 mg by mouth 3 (three) times daily with meals.    FLUoxetine (PROZAC) 20 MG capsule Take 20 mg by mouth once daily.    gabapentin (NEURONTIN) 300 MG capsule Take 1 capsule (300 mg total) by mouth 2 (two) times daily.    hydrocortisone-pramoxine (PROCTOFOAM-HS) rectal foam Place 1 applicator rectally 2 (two) times daily.    levothyroxine (SYNTHROID) 25 MCG tablet Take 1 tablet (25 mcg total) by mouth once daily.    metoclopramide HCl (REGLAN) 10 MG tablet Take 1 tablet (10 mg total) by mouth every 6 (six) hours.    naproxen (NAPROSYN) 500 MG tablet Take 1 tablet (500 mg total) by mouth 2 (two) times daily as needed.    NARCAN 4 mg/actuation Spry USE UTD    nicotine (NICODERM CQ) 21 mg/24 hr Place 1 patch onto the skin every 24 hours.    nitroGLYCERIN (NITROSTAT) 0.3 MG SL tablet ONE TABLET UNDER TONGUE AS NEEDED FOR CHEST PAIN EVERY 5 MINUTES AS NEEDED    ondansetron (ZOFRAN) 4 MG tablet Take 1 tablet (4 mg total) by mouth every 6 (six) hours.    ondansetron (ZOFRAN-ODT) 4 MG TbDL Take 1 tablet (4 mg total) by mouth every 8 (eight) hours as needed. Nausea/vomiting    OXcarbazepine (TRILEPTAL) 300 MG Tab Take 600 mg by mouth 2 (two) times daily.    oxyCODONE (ROXICODONE) 20 mg Tab immediate release tablet Take 1 tablet by mouth every 6 (six) hours as needed.    oxyCODONE-acetaminophen (PERCOCET) 5-325 mg per tablet Take 1 tablet by mouth every 4 (four) hours as needed for Pain.     pantoprazole (PROTONIX) 40 MG tablet Take 1 tablet (40 mg total) by mouth once daily.    polymyxin B sulf-trimethoprim (POLYTRIM) 10,000 unit- 1 mg/mL Drop Place 1 drop into the left eye every 6 (six) hours.    promethazine (PHENERGAN) 25 MG tablet Take 1 tablet (25 mg total) by mouth every 6 (six) hours as needed for Nausea.    ranitidine (ZANTAC) 300 MG tablet Take 1 tablet (300 mg total) by mouth every evening.    RESTASIS 0.05 % ophthalmic emulsion INSTILL 1 DROP IN BOTH EYES TWICE DAILY    triamcinolone acetonide 0.1% (KENALOG) 0.1 % cream Apply topically 2 (two) times daily.    zolpidem (AMBIEN) 10 mg Tab TK 1 T PO  QHS PRN     Family History     Problem Relation (Age of Onset)    Cancer Mother, Father        Tobacco Use    Smoking status: Never Smoker    Smokeless tobacco: Never Used   Substance and Sexual Activity    Alcohol use: Yes     Alcohol/week: 3.6 oz     Types: 6 Cans of beer per week     Comment: last drink Sept 2, 2018    Drug use: No     Comment: [facility] mgt clinic    Sexual activity: Not on file     Review of Systems   Constitutional: Negative for activity change, appetite change, chills, diaphoresis, fatigue, fever and unexpected weight change.   HENT: Negative.    Eyes: Negative.    Respiratory: Negative for cough, chest tightness, shortness of breath and wheezing.    Cardiovascular: Negative for chest pain, palpitations and leg swelling.   Gastrointestinal: Positive for abdominal pain (hernia), blood in stool, diarrhea, nausea and vomiting. Negative for abdominal distention and constipation.   Genitourinary: Negative for dysuria and hematuria.   Musculoskeletal: Negative.    Skin: Negative.    Neurological: Negative for dizziness, seizures, syncope, weakness and light-headedness.   Psychiatric/Behavioral: Negative.      Objective:     Vital Signs (Most Recent):  Temp: 98.5 °F (36.9 °C) (09/12/18 0423)  Pulse: 80 (09/12/18 0423)  Resp: 18 (09/12/18 0423)  BP: 113/62 (09/12/18  0423)  SpO2: 98 % (09/12/18 0423) Vital Signs (24h Range):  Temp:  [98 °F (36.7 °C)-99.1 °F (37.3 °C)] 98.5 °F (36.9 °C)  Pulse:  [] 80  Resp:  [16-20] 18  SpO2:  [96 %-100 %] 98 %  BP: ()/(58-99) 113/62     Weight: 76 kg (167 lb 8.8 oz)  Body mass index is 28.76 kg/m².    Physical Exam   Constitutional: She is oriented to person, place, and time. She appears well-developed and well-nourished. No distress.   HENT:   Head: Normocephalic and atraumatic.   Right Ear: External ear normal.   Left Ear: External ear normal.   Nose: Nose normal.   Eyes: Right eye exhibits no discharge. Left eye exhibits no discharge.   Neck: Normal range of motion.   Cardiovascular: Normal rate, regular rhythm, normal heart sounds and intact distal pulses. Exam reveals no gallop and no friction rub.   No murmur heard.  Pulmonary/Chest: Effort normal and breath sounds normal. No stridor. No respiratory distress. She has no wheezes. She has no rales. She exhibits no tenderness.   Abdominal: Soft. Bowel sounds are normal. She exhibits no distension. There is no tenderness. There is no rebound and no guarding.   Musculoskeletal: Normal range of motion. She exhibits no edema.   Neurological: She is alert and oriented to person, place, and time.   Skin: Skin is warm and dry. She is not diaphoretic. No erythema.   Psychiatric: Judgment and thought content normal. Her mood appears anxious. Her speech is rapid and/or pressured and tangential. She is agitated, aggressive and hyperactive. Cognition and memory are normal. She is inattentive.   Nursing note and vitals reviewed.          Significant Labs: All pertinent labs within the past 24 hours have been reviewed.    Significant Imaging: I have reviewed and interpreted all pertinent imaging results/findings within the past 24 hours.

## 2018-09-12 NOTE — HOSPITAL COURSE
"Estella Arevalo presents sitting up in bed, wearing hospital attire with 1:1 sitter in place. Estella was transferred here from [facility] in [place] due to nausea and vomiting related to hyponatremia. Estella states she called 911 for physical problems related to her bleeding umbilical hernia. She started telling them about the American flag and crucifix that came from her brother in law's  and that she saw the crown of thornes around [place]' head illuminating and they had her put into [facility] via a PEC. She states that she was diagnosed with bipolar disorder when she was a young girl. She states that she has been on multiple psychiatric medications in the past. She has taken and stopped taking medications over and over for years. She remembers being on lithium in the past. She states that she has been taking Prozac 20 mgs and Ambien 10 mgs along with an unknown dose of Trileptal for the past 2 months. She admits that she was only partially compliant with the prozac and trileptal. She admits to a long history of drug and alcohol abuses. She states she is addicted to opioids and receives them from a pain doctor who is also an anesthesiologist. She states, "believe me, I get plenty enough, she gives 120 tablets but she customizes it to what you need." She states she loves benzodiazepines but does not take them as much as she would like so she takes them whenever she can get them. She does take her ambien prescription in addition to other sources which she does not specify. She states that she also likes adderall and has had methamphetamines at times but they saw something on her heart so she does not use it as often as she use to use it. She states that she does not drink as much alcohol as she use to because of problems with her liver and her past treatment of Hepatitis C which she states she got from her . Presently her mood is labile mixed with euphoria and crying. She " "is mildly loosened.     Her overall symptom complex includes: polysubstance abuse/dependence, labile affect, mildly loosened, history of diagnosis of bipolar disorder, past history of suicide attempt, mood is described today as "fantastic", visual hallucinations, energy without sleep for days, excessive shopping, poor focus  "

## 2018-09-12 NOTE — HPI
Mrs. Estella Arevalo is a 63 y.o. female [facility] patient known to me with essential hypertension, CAD, hypothyroidism (TSH 10.131 Sept 2018), cirrhosis of liver, chronic hepatitis C, anemia of chronic disease, Bipolar 1 disorder, and opioid dependency who presents to [facility] ED with complaints of hyponatremia today.  She was noted to have a sodium of 118 mmol/L and was sent here for further evaluation.  She says that labs were drawn for her today in preparation for an abdominal hernia repair on Wednesday, Sept 12, 2018 at [facility] with Dr. Vini Gomez.  They found that her sodium was low and sent her here for evaluation.  She says that she has chronic back and abdominal pain but has been feeling some increased lower abdominal pain for the past couple days that was associated with copious diarrhea.  She also had several episodes of nausea with vomiting.  She does say that she has had bloody stools but that it's normal for her given her rectal prolapse and straining.  She denies any blood anywhere else.  She otherwise has been feeling well.
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
## 2018-09-12 NOTE — CARE UPDATE
Patient has been seen and examinated,has been admitted for hyponatremia,agree with A/P of ,i changed diet to low salt and patient will be on frer water restriction,she is CEC'ed duo to history of psychiatry illness,came [facility],psychiartry has been consulted.

## 2018-09-12 NOTE — ASSESSMENT & PLAN NOTE
"Her overall symptom complex includes: polysubstance abuse/dependence, labile affect, mildly loosened, history of diagnosis of bipolar disorder, past history of suicide attempt, mood is described today as "fantastic", visual hallucinations, energy without sleep for days, excessive shopping, poor focus    Patient is presently CEC'd. Continue 1:1 Sitter. Back to Inpatient Psychiatric facility once medically cleared. Upon transfer, please send original PEC/CEC with patient and place copy on the chart. Utilize Zyprexa 10 mg IM/PO PRN for agitation or uncontrollable threatening behavior. Recommend patient continue psychiatric medications as per [facility] prior to her admission here. Patient would benefit from inpatient drug/alcohol rehabilitation.   "

## 2018-09-12 NOTE — ED NOTES
Report received from Kathryn ANN. Pt resting comfortably in stretcher, no acute distress. Sitter at bedside. VSS. Will continue to monitor.

## 2018-09-12 NOTE — NURSING
Bedside report given to nurse Adelr. Pt is AAO. Sitter at bedside. Pt denies needs. Safety maintained.

## 2018-09-12 NOTE — NURSING
Pt arrived to room. Pt is AAO. Pt ambulated in room. Respirations even and nonlabored. Pt denies needs. Pt oriented to room and call system. Be in lowest position, side rails up x2 and sitter at bedside. Will continue to monitor pt.

## 2018-09-12 NOTE — ASSESSMENT & PLAN NOTE
"She has had multiple episodes of diarrhea which is likely from acute colitis and I think is contributing to her hyponatremia.  Her CT-abd/pelvis was significant for "[m]ild rectal wall thickening with surrounding inflammatory change suggestive for acute proctitis."  She has been started on ciprofloxacin and metronidazole.  "

## 2018-09-12 NOTE — H&P
[facility] Medicine  History & Physical    Patient Name: Estella Arevalo  MRN: [id number]  Admission Date: 9/11/2018  Attending Physician: Keven Gleason MD   Primary Care Provider: Angela Packer MD         Patient information was obtained from patient.     Subjective:     Principal Problem:Hyponatremia    Chief Complaint: Abnormal labs today.    HPI: Mrs. Estella Arevalo is a 63 y.o. female [facility] patient known to me with essential hypertension, CAD, hypothyroidism (TSH 10.131 Sept 2018), cirrhosis of liver, chronic hepatitis C, anemia of chronic disease, Bipolar 1 disorder, and opioid dependency who presents to [facility] ED with complaints of hyponatremia today.  She was noted to have a sodium of 118 mmol/L and was sent here for further evaluation.  She says that labs were drawn for her today in preparation for an abdominal hernia repair on Wednesday, Sept 12, 2018 at [facility] with Dr. Vini Gomez.  They found that her sodium was low and sent her here for evaluation.  She says that she has chronic back and abdominal pain but has been feeling some increased lower abdominal pain for the past couple days that was associated with copious diarrhea.  She also had several episodes of nausea with vomiting.  She does say that she has had bloody stools but that it's normal for her given her rectal prolapse and straining.  She denies any blood anywhere else.  She otherwise has been feeling well.    Chart Review:  Previous Hospitalizations  Date Hospital Diagnosis   Jul 2018 [facility] Hyponatremia    Nov 2017 [facility] Incarcerate ventral hernia s/p laparoscopic repair   Apr 2017 [facility] Acute gastroenteritis   Mar 2017 [facility] Hiatal hernia s/p laparoscopic repair     Feb 2015 [facility] Hematochezia     Dec 2014 [facility] Suicidal ideations    Jan 2014 [facility] Ileus    Jul 2013 [facility] Hematemesis       Outpatient Follow-Up  Date of Visit Physician Service   Jul 2018 Zander  MD [facility]   Dec 2017 Greg Mendez NP Urgent Care   Nov 2017 Vini Gomez MD General Surgery    Mar 2015 Moisés Marinelli MD Neurosurgery    Jun 2013 NIGEL Atkins MD Pain Medicine      Past Medical History:   Diagnosis Date    Alcohol abuse     Anxiety     Arthritis     Bipolar disorder     Cirrhosis of liver     Coronary artery disease     Depression     Fall     GERD (gastroesophageal reflux disease)     Hypertension     Low back pain     MI (myocardial infarction)     Migraine headache     Thyroid disease        Past Surgical History:   Procedure Laterality Date    BLOCK, NERVE, FACET JOINT, MEDIAL BRANCH, LUMBAR Right 4/16/2013    Performed by Nichelle [facility]buena [facility] at [facility] PAIN MGT    BLOCK, NERVE, FACET JOINT, MEDIAL BRANCH, LUMBAR Right 4/2/2013    Performed by [facility] at [facility] MGT    COLONOSCOPY Left 7/16/2013    Performed by Hernandez Farias MD at [facility] ENDO    EGD (ESOPHAGOGASTRODUODENOSCOPY) N/A 7/15/2013    Performed by Hernandez Farias MD at [facility] ENDO    ESOPHAGOGASTRODUODENOSCOPY (EGD) N/A 3/10/2017    Performed by Vini Gomez MD at [facility] OR    ESOPHAGOGASTRODUODENOSCOPY (EGD) N/A 3/10/2017    Performed by Arnulfo Benton MD at [facility] ENDO    HERNIA REPAIR      HIATAL HERNIA REPAIR      JOINT REPLACEMENT      KNEE SURGERY  bilateral replacement    LAPAROSCOPIC PEG TUBE PLACEMENT/REPLACEMENT N/A 3/10/2017    Performed by [facility]ini Gomez MD at [facility] OR    REPAIR-HERNIA-LAPAROSCOPIC-HIATAL N/A 3/10/2017    Performed by Vini Gomez MD at [facility] OR    REPAIR-HERNIA-VENTRAL-LAPAROSCOPIC-W/MESH N/A 11/8/2017    Performed by Vini Gomez MD at [facility] OR    right foot sx  2008    SHOULDER SURGERY  replacement       Review of patient's allergies indicates:  No Known Allergies    No current facility-administered medications on file prior to encounter.      Current Outpatient Medications on File Prior to Encounter   Medication Sig    atenolol  (TENORMIN) 25 MG tablet Take 1 tablet (25 mg total) by mouth once daily.    docusate sodium (COLACE) 100 MG capsule Take 1 capsule (100 mg total) by mouth 2 (two) times daily.    LINZESS 145 mcg Cap capsule Take 1 capsule (145 mcg total) by mouth once daily.    amitriptyline (ELAVIL) 50 MG tablet Take 1 tablet (50 mg total) by mouth every evening.    clonazePAM (KLONOPIN) 1 MG tablet TAKE 1 TABLET BY MOUTH TWICE DAILY    cycloSPORINE (RESTASIS) 0.05 % ophthalmic emulsion Place 0.4 mLs (1 drop total) into both eyes 2 (two) times daily.    duloxetine (CYMBALTA) 30 MG capsule TAKE 1 CAPSULE(30 MG) BY MOUTH EVERY DAY    efinaconazole (JUBLIA) 10 % Eran APPLY ONE DOSE DAILY    ferrous sulfate 325 (65 FE) MG EC tablet Take 325 mg by mouth 3 (three) times daily with meals.    FLUoxetine (PROZAC) 20 MG capsule Take 20 mg by mouth once daily.    gabapentin (NEURONTIN) 300 MG capsule Take 1 capsule (300 mg total) by mouth 2 (two) times daily.    hydrocortisone-pramoxine (PROCTOFOAM-HS) rectal foam Place 1 applicator rectally 2 (two) times daily.    levothyroxine (SYNTHROID) 25 MCG tablet Take 1 tablet (25 mcg total) by mouth once daily.    metoclopramide HCl (REGLAN) 10 MG tablet Take 1 tablet (10 mg total) by mouth every 6 (six) hours.    naproxen (NAPROSYN) 500 MG tablet Take 1 tablet (500 mg total) by mouth 2 (two) times daily as needed.    NARCAN 4 mg/actuation Spry USE UTD    nicotine (NICODERM CQ) 21 mg/24 hr Place 1 patch onto the skin every 24 hours.    nitroGLYCERIN (NITROSTAT) 0.3 MG SL tablet ONE TABLET UNDER TONGUE AS NEEDED FOR CHEST PAIN EVERY 5 MINUTES AS NEEDED    ondansetron (ZOFRAN) 4 MG tablet Take 1 tablet (4 mg total) by mouth every 6 (six) hours.    ondansetron (ZOFRAN-ODT) 4 MG TbDL Take 1 tablet (4 mg total) by mouth every 8 (eight) hours as needed. Nausea/vomiting    OXcarbazepine (TRILEPTAL) 300 MG Tab Take 600 mg by mouth 2 (two) times daily.    oxyCODONE (ROXICODONE) 20 mg Tab  immediate release tablet Take 1 tablet by mouth every 6 (six) hours as needed.    oxyCODONE-acetaminophen (PERCOCET) 5-325 mg per tablet Take 1 tablet by mouth every 4 (four) hours as needed for Pain.    pantoprazole (PROTONIX) 40 MG tablet Take 1 tablet (40 mg total) by mouth once daily.    polymyxin B sulf-trimethoprim (POLYTRIM) 10,000 unit- 1 mg/mL Drop Place 1 drop into the left eye every 6 (six) hours.    promethazine (PHENERGAN) 25 MG tablet Take 1 tablet (25 mg total) by mouth every 6 (six) hours as needed for Nausea.    ranitidine (ZANTAC) 300 MG tablet Take 1 tablet (300 mg total) by mouth every evening.    RESTASIS 0.05 % ophthalmic emulsion INSTILL 1 DROP IN BOTH EYES TWICE DAILY    triamcinolone acetonide 0.1% (KENALOG) 0.1 % cream Apply topically 2 (two) times daily.    zolpidem (AMBIEN) 10 mg Tab TK 1 T PO  QHS PRN     Family History     Problem Relation (Age of Onset)    Cancer Mother, Father        Tobacco Use    Smoking status: Never Smoker    Smokeless tobacco: Never Used   Substance and Sexual Activity    Alcohol use: Yes     Alcohol/week: 3.6 oz     Types: 6 Cans of beer per week     Comment: last drink Sept 2, 2018    Drug use: No     Comment: [facility]    Sexual activity: Not on file     Review of Systems   Constitutional: Negative for activity change, appetite change, chills, diaphoresis, fatigue, fever and unexpected weight change.   HENT: Negative.    Eyes: Negative.    Respiratory: Negative for cough, chest tightness, shortness of breath and wheezing.    Cardiovascular: Negative for chest pain, palpitations and leg swelling.   Gastrointestinal: Positive for abdominal pain (hernia), blood in stool, diarrhea, nausea and vomiting. Negative for abdominal distention and constipation.   Genitourinary: Negative for dysuria and hematuria.   Musculoskeletal: Negative.    Skin: Negative.    Neurological: Negative for dizziness, seizures, syncope, weakness and light-headedness.    Psychiatric/Behavioral: Negative.      Objective:     Vital Signs (Most Recent):  Temp: 98.5 °F (36.9 °C) (09/12/18 0423)  Pulse: 80 (09/12/18 0423)  Resp: 18 (09/12/18 0423)  BP: 113/62 (09/12/18 0423)  SpO2: 98 % (09/12/18 0423) Vital Signs (24h Range):  Temp:  [98 °F (36.7 °C)-99.1 °F (37.3 °C)] 98.5 °F (36.9 °C)  Pulse:  [] 80  Resp:  [16-20] 18  SpO2:  [96 %-100 %] 98 %  BP: ()/(58-99) 113/62     Weight: 76 kg (167 lb 8.8 oz)  Body mass index is 28.76 kg/m².    Physical Exam   Constitutional: She is oriented to person, place, and time. She appears well-developed and well-nourished. No distress.   HENT:   Head: Normocephalic and atraumatic.   Right Ear: External ear normal.   Left Ear: External ear normal.   Nose: Nose normal.   Eyes: Right eye exhibits no discharge. Left eye exhibits no discharge.   Neck: Normal range of motion.   Cardiovascular: Normal rate, regular rhythm, normal heart sounds and intact distal pulses. Exam reveals no gallop and no friction rub.   No murmur heard.  Pulmonary/Chest: Effort normal and breath sounds normal. No stridor. No respiratory distress. She has no wheezes. She has no rales. She exhibits no tenderness.   Abdominal: Soft. Bowel sounds are normal. She exhibits no distension. There is no tenderness. There is no rebound and no guarding.   Musculoskeletal: Normal range of motion. She exhibits no edema.   Neurological: She is alert and oriented to person, place, and time.   Skin: Skin is warm and dry. She is not diaphoretic. No erythema.   Psychiatric: Judgment and thought content normal. Her mood appears anxious. Her speech is rapid and/or pressured and tangential. She is agitated, aggressive and hyperactive. Cognition and memory are normal. She is inattentive.   Nursing note and vitals reviewed.          Significant Labs: All pertinent labs within the past 24 hours have been reviewed.    Significant Imaging: I have reviewed and interpreted all pertinent imaging  "results/findings within the past 24 hours.    Assessment/Plan:     * Hyponatremia    She is a repeat episode of hyponatremia and there could be several reasons.  First, she is on duloxetine, fluoxetine, oxcarbazepine, and pantoprazole are of which areknown agents to cause hyponatremia.  Second, she has had many bouts of diarrhea and does appear to be hypovolemic.  Third, her TSH is high which suggests that she is either not adequately controlled with her current dose of levothyroxine or she is incorrectly taking it.  She has a normal glucose.  She is not exhibiting any neurological symptoms from hyponatremia so she was started on IV fluids given her volume depleted state.  A repeat sodium was 130 mmol/L compared to 120 mmol/L for which I have stopped her IV fluids.  She continues to be asymptomatic.  Will repeat her sodium again soon.        Acute colitis    She has had multiple episodes of diarrhea which is likely from acute colitis and I think is contributing to her hyponatremia.  Her CT-abd/pelvis was significant for "[m]ild rectal wall thickening with surrounding inflammatory change suggestive for acute proctitis."  She has been started on ciprofloxacin and metronidazole.        Essential hypertension    Patient's blood pressure is well-controlled; will continue home regimen of atenolol, and provide as-needed clonidine.        CAD (coronary artery disease)    Stable; will continue her home regimen of atenolol.        Acquired hypothyroidism    Her most recent TSH reveals that she is undersuppressed which could be contributing to her hyponatremia.  Will continue her home regimen of levothyroxine.        Cirrhosis of liver    Stable; there are no acute issues.        Chronic hepatitis C    Stable; there are no acute issues.        Anemia of chronic disease    The patient's H/H is stable and consistent with previous laboratory measurements, and the patient exhibits no signs or symptoms of acute bleeding; there is " no indication for transfusion.  Will continue to monitor.        Bipolar 1 disorder    Stable; will continue her home regimen of amitriptyline, but hold duloxetine, fluoxetine, and oxcarbazapine due to hyponatremia.         Opiate dependence, continuous    Stable; there are no acute issues.          VTE Risk Mitigation (From admission, onward)        Ordered     IP VTE LOW RISK PATIENT  Once      09/11/18 5765           Francisco J Mcgill M.D.  Staff Nocturnist  Department of Hospital Medicine  [facility]  Pager: [phone]

## 2018-09-12 NOTE — ASSESSMENT & PLAN NOTE
She is a repeat episode of hyponatremia and there could be several reasons.  First, she is on duloxetine, fluoxetine, oxcarbazepine, and pantoprazole are of which areknown agents to cause hyponatremia.  Second, she has had many bouts of diarrhea and does appear to be hypovolemic.  Third, her TSH is high which suggests that she is either not adequately controlled with her current dose of levothyroxine or she is incorrectly taking it.  She has a normal glucose.  She is not exhibiting any neurological symptoms from hyponatremia so she was started on IV fluids given her volume depleted state.  A repeat sodium was 130 mmol/L compared to 120 mmol/L for which I have stopped her IV fluids.  She continues to be asymptomatic.  Will repeat her sodium again soon.
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
## 2018-09-12 NOTE — PLAN OF CARE
Problem: Patient Care Overview  Goal: Plan of Care Review  Outcome: Ongoing (interventions implemented as appropriate)   09/12/18 0610   Coping/Psychosocial   Plan Of Care Reviewed With patient   AAOx3. Ambulates to bathroom. Remains free from falls. IV Abx continued. IVF dc'd per orders. Sitter continued throughout shift. No distress noted. Safety maintained: phone removed from room, bed in lowest position c wheels locked, close to nurse station. Will continue c POC.

## 2018-09-12 NOTE — PROGRESS NOTES
09/11/18 2157   Vital Signs   /61       Notified Dr. Mcgill of above BP and of pt's request for pain medication.No new orders at this time. Will continue to monitor pt closely.

## 2018-09-12 NOTE — ASSESSMENT & PLAN NOTE
Stable; will continue her home regimen of amitriptyline, but hold duloxetine, fluoxetine, and oxcarbazapine due to hyponatremia.

## 2018-09-12 NOTE — HPI
"Dr. Sandoval: "This is a 63 y.o female pt who has a past medical history of Alcohol abuse, Anxiety, Arthritis, Bipolar disorder, Cirrhosis of liver, Coronary artery disease, Depression, Fall, GERD (gastroesophageal reflux disease), Hypertension, Low back pain, MI (myocardial infarction), Migraine headache, and Thyroid disease who presents to the ED c/o nausea, emesis, headaches, and 11 episodes of diarrhea today. The nausea and emesis began today, the headaches began x1 ago. Pt also reports rectal prolapse. Pt says she has never had a headache of this magnitude before. The pt is able to walk but is limited because she feels very weak. The pt was transferred from [facility] because of low sodium and confusion. No recent trauma, change in vision, and fever.       "

## 2018-09-12 NOTE — ASSESSMENT & PLAN NOTE
Her most recent TSH reveals that she is undersuppressed which could be contributing to her hyponatremia.  Will continue her home regimen of levothyroxine.

## 2018-09-12 NOTE — PLAN OF CARE
09/12/18 1530   Discharge Assessment   Assessment Type Discharge Planning Assessment   Confirmed/corrected address and phone number on facesheet? Yes   Assessment information obtained from? Patient   Communicated expected length of stay with patient/caregiver no   Prior to hospitilization cognitive status: Alert/Oriented;No Deficits   Prior to hospitalization functional status: Independent;Assistive Equipment   Current cognitive status: Alert/Oriented;No Deficits   Current Functional Status: Independent   Facility Arrived From: [facility]    Lives With spouse;grandchild(carol);child(carol), adult   Able to Return to Prior Arrangements unable to determine at this time (comments)   Is patient able to care for self after discharge? Unable to determine at this time (comments)   Who are your caregiver(s) and their phone number(s)? Hammad (spouse) [phone]   Patient's perception of discharge disposition home or selfcare   Readmission Within The Last 30 Days no previous admission in last 30 days   Patient currently being followed by outpatient case management? No   Patient currently receives any other outside agency services? No   Equipment Currently Used at Home walker, rolling   Do you have any problems affording any of your prescribed medications? No   Transportation Available ambulance   Does the patient receive services at the [facility]? No   Discharge Plan A Psychiatric hospital   Discharge Plan B Home with family   Patient/Family In Agreement With Plan yes   Does the patient have transportation to healthcare appointments? Yes         [facility] [id number] - [address] [address]  [address] [facility] [address]  [address]  [place] [address]  Phone: [phone] Fax: [phone]

## 2018-09-12 NOTE — ASSESSMENT & PLAN NOTE
Patient's blood pressure is well-controlled; will continue home regimen of atenolol, and provide as-needed clonidine.

## 2018-09-13 VITALS
HEIGHT: 64 IN | WEIGHT: 172.38 LBS | RESPIRATION RATE: 16 BRPM | OXYGEN SATURATION: 100 % | DIASTOLIC BLOOD PRESSURE: 62 MMHG | HEART RATE: 66 BPM | BODY MASS INDEX: 29.43 KG/M2 | SYSTOLIC BLOOD PRESSURE: 125 MMHG | TEMPERATURE: 98 F

## 2018-09-13 LAB
ALBUMIN SERPL BCP-MCNC: 3.5 G/DL
ALP SERPL-CCNC: 193 U/L
ALT SERPL W/O P-5'-P-CCNC: 47 U/L
ANION GAP SERPL CALC-SCNC: 10 MMOL/L
AST SERPL-CCNC: 62 U/L
BASOPHILS # BLD AUTO: 0.04 K/UL
BASOPHILS NFR BLD: 1.2 %
BILIRUB SERPL-MCNC: 0.4 MG/DL
BUN SERPL-MCNC: 14 MG/DL
CALCIUM SERPL-MCNC: 9.1 MG/DL
CHLORIDE SERPL-SCNC: 104 MMOL/L
CO2 SERPL-SCNC: 21 MMOL/L
CREAT SERPL-MCNC: 0.7 MG/DL
DIFFERENTIAL METHOD: ABNORMAL
EOSINOPHIL # BLD AUTO: 0.2 K/UL
EOSINOPHIL NFR BLD: 5.1 %
ERYTHROCYTE [DISTWIDTH] IN BLOOD BY AUTOMATED COUNT: 15.1 %
EST. GFR  (AFRICAN AMERICAN): >60 ML/MIN/1.73 M^2
EST. GFR  (NON AFRICAN AMERICAN): >60 ML/MIN/1.73 M^2
GLUCOSE SERPL-MCNC: 104 MG/DL
HCT VFR BLD AUTO: 29.1 %
HGB BLD-MCNC: 9.9 G/DL
LYMPHOCYTES # BLD AUTO: 1.3 K/UL
LYMPHOCYTES NFR BLD: 38.7 %
MAGNESIUM SERPL-MCNC: 2.2 MG/DL
MCH RBC QN AUTO: 29.5 PG
MCHC RBC AUTO-ENTMCNC: 34 G/DL
MCV RBC AUTO: 87 FL
MONOCYTES # BLD AUTO: 0.7 K/UL
MONOCYTES NFR BLD: 19.6 %
NEUTROPHILS # BLD AUTO: 1.2 K/UL
NEUTROPHILS NFR BLD: 35.4 %
PHOSPHATE SERPL-MCNC: 5.8 MG/DL
PLATELET # BLD AUTO: 345 K/UL
PMV BLD AUTO: 9.2 FL
POTASSIUM SERPL-SCNC: 4.4 MMOL/L
PROT SERPL-MCNC: 6.8 G/DL
RBC # BLD AUTO: 3.36 M/UL
SODIUM SERPL-SCNC: 135 MMOL/L
WBC # BLD AUTO: 3.36 K/UL

## 2018-09-13 PROCEDURE — 84100 ASSAY OF PHOSPHORUS: CPT

## 2018-09-13 PROCEDURE — 25000003 PHARM REV CODE 250: Performed by: HOSPITALIST

## 2018-09-13 PROCEDURE — 90686 IIV4 VACC NO PRSV 0.5 ML IM: CPT | Performed by: EMERGENCY MEDICINE

## 2018-09-13 PROCEDURE — 63600175 PHARM REV CODE 636 W HCPCS: Performed by: EMERGENCY MEDICINE

## 2018-09-13 PROCEDURE — 25000003 PHARM REV CODE 250: Performed by: EMERGENCY MEDICINE

## 2018-09-13 PROCEDURE — 90471 IMMUNIZATION ADMIN: CPT | Performed by: EMERGENCY MEDICINE

## 2018-09-13 PROCEDURE — 83735 ASSAY OF MAGNESIUM: CPT

## 2018-09-13 PROCEDURE — 85025 COMPLETE CBC W/AUTO DIFF WBC: CPT

## 2018-09-13 PROCEDURE — 25000003 PHARM REV CODE 250: Performed by: INTERNAL MEDICINE

## 2018-09-13 PROCEDURE — S0030 INJECTION, METRONIDAZOLE: HCPCS | Performed by: EMERGENCY MEDICINE

## 2018-09-13 PROCEDURE — 80053 COMPREHEN METABOLIC PANEL: CPT

## 2018-09-13 PROCEDURE — 63600150 PHARM REV CODE 636: Performed by: EMERGENCY MEDICINE

## 2018-09-13 PROCEDURE — 36415 COLL VENOUS BLD VENIPUNCTURE: CPT

## 2018-09-13 RX ORDER — CLONAZEPAM 1 MG/1
1 TABLET ORAL 2 TIMES DAILY
Qty: 14 TABLET | Refills: 0 | Status: ON HOLD | OUTPATIENT
Start: 2018-09-13 | End: 2018-10-30 | Stop reason: HOSPADM

## 2018-09-13 RX ORDER — OXYCODONE AND ACETAMINOPHEN 5; 325 MG/1; MG/1
1 TABLET ORAL EVERY 12 HOURS PRN
Qty: 15 TABLET | Refills: 0 | Status: ON HOLD | OUTPATIENT
Start: 2018-09-13 | End: 2018-09-23 | Stop reason: HOSPADM

## 2018-09-13 RX ADMIN — CIPROFLOXACIN 400 MG: 2 INJECTION, SOLUTION INTRAVENOUS at 12:09

## 2018-09-13 RX ADMIN — FAMOTIDINE 20 MG: 20 TABLET ORAL at 09:09

## 2018-09-13 RX ADMIN — ATENOLOL 25 MG: 25 TABLET ORAL at 09:09

## 2018-09-13 RX ADMIN — METRONIDAZOLE 500 MG: 500 INJECTION, SOLUTION INTRAVENOUS at 09:09

## 2018-09-13 RX ADMIN — SODIUM CHLORIDE: 0.9 INJECTION, SOLUTION INTRAVENOUS at 09:09

## 2018-09-13 RX ADMIN — INFLUENZA A VIRUS A/MICHIGAN/45/2015 X-275 (H1N1) ANTIGEN (FORMALDEHYDE INACTIVATED), INFLUENZA A VIRUS A/SINGAPORE/INFIMH-16-0019/2016 IVR-186 (H3N2) ANTIGEN (FORMALDEHYDE INACTIVATED), INFLUENZA B VIRUS B/PHUKET/3073/2013 ANTIGEN (FORMALDEHYDE INACTIVATED), AND INFLUENZA B VIRUS B/MARYLAND/15/2016 BX-69A ANTIGEN (FORMALDEHYDE INACTIVATED) 0.5 ML: 15; 15; 15; 15 INJECTION, SUSPENSION INTRAMUSCULAR at 12:09

## 2018-09-13 RX ADMIN — LEVOTHYROXINE SODIUM 25 MCG: 25 TABLET ORAL at 07:09

## 2018-09-13 RX ADMIN — OXYCODONE HYDROCHLORIDE 20 MG: 15 TABLET ORAL at 01:09

## 2018-09-13 RX ADMIN — GABAPENTIN 300 MG: 300 CAPSULE ORAL at 09:09

## 2018-09-13 RX ADMIN — OXYCODONE HYDROCHLORIDE 20 MG: 15 TABLET ORAL at 07:09

## 2018-09-13 NOTE — DISCHARGE SUMMARY
[facility] Hospital Medicine  Discharge Summary      Patient Name: Estella Arevalo  MRN: [id number]  Admission Date: 9/11/2018  Hospital Length of Stay: 2 days  Discharge Date and Time:  09/13/2018 11:44 AM  Attending Physician: Keven Gleason MD   Discharging Provider: Keven Gleason MD  Primary Care Provider: Angela Packer MD      HPI:   Mrs. Estella Arevalo is a 63 y.o. female [facility] patient known to me with essential hypertension, CAD, hypothyroidism (TSH 10.131 Sept 2018), cirrhosis of liver, chronic hepatitis C, anemia of chronic disease, Bipolar 1 disorder, and opioid dependency who presents to [facility] ED with complaints of hyponatremia today.  She was noted to have a sodium of 118 mmol/L and was sent here for further evaluation.  She says that labs were drawn for her today in preparation for an abdominal hernia repair on Wednesday, Sept 12, 2018 at [facility] with Dr. Vini Gomez.  They found that her sodium was low and sent her here for evaluation.  She says that she has chronic back and abdominal pain but has been feeling some increased lower abdominal pain for the past couple days that was associated with copious diarrhea.  She also had several episodes of nausea with vomiting.  She does say that she has had bloody stools but that it's normal for her given her rectal prolapse and straining.  She denies any blood anywhere else.  She otherwise has been feeling well.    * No surgery found *      Hospital Course:   Mrs. Estella Arevalo is a 63 y.o. female [facility] patient known to me with essential hypertension, CAD, hypothyroidism (TSH 10.131 Sept 2018), cirrhosis of liver, chronic hepatitis C, anemia of chronic disease, Bipolar 1 disorder, and opioid dependency who presents to [facility] ED with complaints of hyponatremia.  She was noted to have a sodium of 118 mmol/L and was sent here for further evaluation.  She says that labs were  drawn for her in preparation for an abdominal hernia repair on Wednesday, Sept 12, 2018 [place] with Dr. Vini Gomez.  They found that her sodium was low and sent her here for evaluation.  She says that she has chronic back and abdominal pain but has been feeling some increased lower abdominal pain for the past couple days that was associated with copious diarrhea.  She also had several episodes of nausea with vomiting.  She does say that she has had bloody stools but that it's normal for her given her rectal prolapse and straining.  She denies any blood anywhere else.  She otherwise has been feeling well.patient was started on gentle NS,she was on low salt and free water restriction,some of her home medication tegretol,cymbolta,ppI,was hold as potential agentss for hyponatremia,she was CEC'ed duo to history of psychiatric admission [facility],her hyponatremia much improved,psychitray was consulted and re[facility]mended [facility] and resume her previous medications,I returned back patient to [facility] with low slat diet and  free water restriction.she need follow up with PCP in next few days.     Consults:   Consults (From admission, onward)        Status Ordering Provider     Inpatient consult to Psychiatry  Once     Provider:  Aden Arita MD    Completed ANAI BRYAN     Inpatient consult to Social Work  Once     Provider:  (Not yet assigned)    Completed ANAI BRYAN          No new Assessment & Plan notes have been filed under this hospital service since the last note was generated.  Service: Hospital Medicine    Final Active Diagnoses:    Diagnosis Date Noted POA    PRINCIPAL PROBLEM:  Hyponatremia [E87.1] 07/16/2018 Yes    Anemia of chronic disease [D63.8] 09/12/2018 Yes     Chronic    Acute colitis [K52.9] 09/12/2018 Yes    Substance induced mood disorder [F19.94] 09/12/2018 Unknown    Essential hypertension [I10] 07/16/2018 Yes     Chronic    CAD (coronary  artery disease) [I25.10] 07/16/2018 Yes     Chronic    Cirrhosis of liver [K74.60] 07/16/2018 Yes     Chronic    Chronic hepatitis C [B18.2] 07/16/2018 Yes     Chronic    Bipolar 1 disorder [F31.9] 12/30/2016 Yes     Chronic    Acquired hypothyroidism [E03.9] 12/19/2014 Yes     Chronic    Polysubstance dependence including opioid type drug, continuous use [F11.20, F19.20] 07/14/2013 Yes      Problems Resolved During this Admission:       Discharged Condition: stable    Disposition: Home or Self Care    Follow Up:  Follow-up Information     Angela Packer MD In 1 week.    Specialty:  Family Medicine  Contact information:  [address] [place] [address]  [place] [address] [phone]                 Patient Instructions:      Activity as tolerated       Significant Diagnostic Studies: Labs:   BMP:   Recent Labs   Lab  09/11/18   1445   09/12/18   0429  09/12/18   0837  09/13/18   0553   GLU  125*   < >  110  98  104   NA  120*   < >  132*  130*  135*   K  4.3   < >  4.4  4.9  4.4   CL  92*   < >  101  101  104   CO2  16*   < >  22*  19*  21*   BUN  9   < >  15  14  14   CREATININE  0.6   < >  0.8  0.8  0.7   CALCIUM  9.0   < >  9.0  9.1  9.1   MG  1.8   --    --    --   2.2    < > = values in this interval not displayed.    and CBC   Recent Labs   Lab  09/11/18   1444  09/13/18   0553   WBC  6.48  3.36*   HGB  11.7*  9.9*   HCT  33.8*  29.1*   PLT  431*  345       Pending Diagnostic Studies:     None         Medications:  Reconciled Home Medications:      Medication List      CHANGE how you take these medications    clonazePAM 1 MG tablet  Commonly known as:  KLONOPIN  Take 1 tablet (1 mg total) by mouth 2 (two) times daily. for 7 days  What changed:  See the new instructions.     oxyCODONE-acetaminophen 5-325 mg per tablet  Commonly known as:  PERCOCET  Take 1 tablet by mouth every 12 (twelve) hours as needed for Pain.  What changed:  when to take this        CONTINUE taking these medications    amitriptyline 50 MG  tablet  Commonly known as:  ELAVIL  Take 1 tablet (50 mg total) by mouth every evening.     atenolol 25 MG tablet  Commonly known as:  TENORMIN  Take 1 tablet (25 mg total) by mouth once daily.     docusate sodium 100 MG capsule  Commonly known as:  COLACE  Take 1 capsule (100 mg total) by mouth 2 (two) times daily.     DULoxetine 30 MG capsule  Commonly known as:  CYMBALTA  TAKE 1 CAPSULE(30 MG) BY MOUTH EVERY DAY     efinaconazole 10 % Darlin  Commonly known as:  JUBLIA  APPLY ONE DOSE DAILY     ferrous sulfate 325 (65 FE) MG EC tablet  Take 325 mg by mouth 3 (three) times daily with meals.     FLUoxetine 20 MG capsule  Commonly known as:  PROZAC  Take 20 mg by mouth once daily.     gabapentin 300 MG capsule  Commonly known as:  NEURONTIN  Take 1 capsule (300 mg total) by mouth 2 (two) times daily.     hydrocortisone-pramoxine rectal foam  Commonly known as:  PROCTOFOAM-HS  Place 1 applicator rectally 2 (two) times daily.     levothyroxine 25 MCG tablet  Commonly known as:  SYNTHROID  Take 1 tablet (25 mcg total) by mouth once daily.     LINZESS 145 mcg Cap capsule  Generic drug:  linaclotide  Take 1 capsule (145 mcg total) by mouth once daily.     naproxen 500 MG tablet  Commonly known as:  NAPROSYN  Take 1 tablet (500 mg total) by mouth 2 (two) times daily as needed.     NARCAN 4 mg/actuation Spry  Generic drug:  naloxone  USE UTD     nicotine 21 mg/24 hr  Commonly known as:  NICODERM CQ  Place 1 patch onto the skin every 24 hours.     nitroGLYCERIN 0.3 MG SL tablet  Commonly known as:  NITROSTAT  ONE TABLET UNDER TONGUE AS NEEDED FOR CHEST PAIN EVERY 5 MINUTES AS NEEDED     ondansetron 4 MG tablet  Commonly known as:  ZOFRAN  Take 1 tablet (4 mg total) by mouth every 6 (six) hours.     ondansetron 4 MG Tbdl  Commonly known as:  ZOFRAN-ODT  Take 1 tablet (4 mg total) by mouth every 8 (eight) hours as needed. Nausea/vomiting     OXcarbazepine 300 MG Tab  Commonly known as:  TRILEPTAL  Take 600 mg by mouth 2 (two)  times daily.     pantoprazole 40 MG tablet  Commonly known as:  PROTONIX  Take 1 tablet (40 mg total) by mouth once daily.     polymyxin B sulf-trimethoprim 10,000 unit- 1 mg/mL Drop  Commonly known as:  POLYTRIM  Place 1 drop into the left eye every 6 (six) hours.     promethazine 25 MG tablet  Commonly known as:  PHENERGAN  Take 1 tablet (25 mg total) by mouth every 6 (six) hours as needed for Nausea.     ranitidine 300 MG tablet  Commonly known as:  ZANTAC  Take 1 tablet (300 mg total) by mouth every evening.     * RESTASIS 0.05 % ophthalmic emulsion  Generic drug:  cycloSPORINE  INSTILL 1 DROP IN BOTH EYES TWICE DAILY     * cycloSPORINE 0.05 % ophthalmic emulsion  Commonly known as:  RESTASIS  Place 0.4 mLs (1 drop total) into both eyes 2 (two) times daily.     triamcinolone acetonide 0.1% 0.1 % cream  Commonly known as:  KENALOG  Apply topically 2 (two) times daily.         * This list has 2 medication(s) that are the same as other medications prescribed for you. Read the directions carefully, and ask your doctor or other care provider to review them with you.            STOP taking these medications    metoclopramide HCl 10 MG tablet  Commonly known as:  REGLAN     oxyCODONE 20 mg Tab immediate release tablet  Commonly known as:  ROXICODONE     zolpidem 10 mg Tab  Commonly known as:  AMBIEN            Indwelling Lines/Drains at time of discharge:   Lines/Drains/Airways          None          Time spent on the discharge of patient: more than 30  minutes  Patient was seen and examined on the date of discharge and determined to be suitable for discharge.         Keven Gleason MD  Department of Hospital Medicine  [facility]

## 2018-09-13 NOTE — NURSING
SPD  at bedside for transport to [facility]. Patient awake, alert, oriented, dressed in blue paper scrubs with one wedding band on that can't be removed. No apparent distress noted. Attempt to call report to [facility] ([place]) [phone], spoke to Connor. Connor states that Pt is going to [facility], phone number is [phone]. Accepting MD is Dr. Mcbride.

## 2018-09-13 NOTE — NURSING
Follow up call placed to [facility], spoke to MARISSA Bell. Report given to MARISSA Bell, all questions answered

## 2018-09-13 NOTE — PLAN OF CARE
[facility]     Department of Hospital Medicine     [address] [place] [address]     [phone] after hours  [phone] fax       NURSING HOME ORDERS    09/13/2018    Admit to Nursing Home:  Regular Bed                                                Diagnoses:  Active Hospital Problems    Diagnosis  POA    *Hyponatremia [E87.1]  Yes    Anemia of chronic disease [D63.8]  Yes     Chronic    Acute colitis [K52.9]  Yes    Substance induced mood disorder [F19.94]  Unknown    Essential hypertension [I10]  Yes     Chronic    CAD (coronary artery disease) [I25.10]  Yes     Chronic    Cirrhosis of liver [K74.60]  Yes     Chronic    Chronic hepatitis C [B18.2]  Yes     Chronic    Bipolar 1 disorder [F31.9]  Yes     Chronic    Acquired hypothyroidism [E03.9]  Yes     Chronic    Polysubstance dependence including opioid type drug, continuous use [F11.20, F19.20]  Yes      Resolved Hospital Problems   No resolved problems to display.       Patient is homebound due to:  Hyponatremia    Allergies:Review of patient's allergies indicates:  No Known Allergies    Vitals:       Every shift    Diet:   Low salt diet   Free water restriction 300 daily     Acitivities:      - Up in a chair each morning as tolerated   - Ambulate with assistance to bathroom     - May use walker, cane, or self-propelled wheelchair   - Weight bearing: as tolerated.    LABS:  Per facility protocol    Nursing Precautions:     - Aspiration precautions:                -  Upright 90 degrees befor during and after meals               - Fall precautions per nursing home protocol     - Decubitus precautions:        -  for positioning   - Pressure reducing foam mattress   - Turn patient every two hours. Use wedge pillows to anchor patient    Consult   Psychiatry     MISCELLANEOUS CARE:                    Routine Skin for Bedridden Patients:  Apply moisture barrier cream to all    skin folds and  wet areas in perineal area daily and after baths and                           all bowel movements.                       Medications: Discontinue all previous medication orders, if any. See new list below.   (   Estella Arevalo   Home Medication Instructions AMCKENZIE:[id number]    Printed on:09/13/18 1037   Medication Information                      amitriptyline (ELAVIL) 50 MG tablet  Take 1 tablet (50 mg total) by mouth every evening.             atenolol (TENORMIN) 25 MG tablet  Take 1 tablet (25 mg total) by mouth once daily.             clonazePAM (KLONOPIN) 1 MG tablet  TAKE 1 TABLET BY MOUTH TWICE DAILY             cycloSPORINE (RESTASIS) 0.05 % ophthalmic emulsion  Place 0.4 mLs (1 drop total) into both eyes 2 (two) times daily.             docusate sodium (COLACE) 100 MG capsule  Take 1 capsule (100 mg total) by mouth 2 (two) times daily.             duloxetine (CYMBALTA) 30 MG capsule  TAKE 1 CAPSULE(30 MG) BY MOUTH EVERY DAY                          ferrous sulfate 325 (65 FE) MG EC tablet  Take 325 mg by mouth 3 (three) times daily with meals.             FLUoxetine (PROZAC) 20 MG capsule  Take 20 mg by mouth once daily.             gabapentin (NEURONTIN) 300 MG capsule  Take 1 capsule (300 mg total) by mouth 2 (two) times daily.             hydrocortisone-pramoxine (PROCTOFOAM-HS) rectal foam  Place 1 applicator rectally 2 (two) times daily.             levothyroxine (SYNTHROID) 25 MCG tablet  Take 1 tablet (25 mcg total) by mouth once daily.             LINZESS 145 mcg Cap capsule  Take 1 capsule (145 mcg total) by mouth once daily.             naproxen (NAPROSYN) 500 MG tablet  Take 1 tablet (500 mg total) by mouth 2 (two) times daily as needed.for pain                           nicotine (NICODERM CQ) 21 mg/24 hr  Place 1 patch onto the skin every 24 hours.             nitroGLYCERIN (NITROSTAT) 0.3 MG SL tablet  ONE TABLET UNDER TONGUE AS NEEDED FOR CHEST PAIN EVERY 5 MINUTES AS NEEDED for chest pain                           ondansetron (ZOFRAN-ODT) 4 MG TbDL  Take 1 tablet (4 mg total) by mouth every 8 (eight) hours as needed. Nausea/vomiting             OXcarbazepine (TRILEPTAL) 300 MG Tab  Take 600 mg by mouth 2 (two) times daily.             oxyCODONE-acetaminophen (PERCOCET) 5-325 mg per tablet  Take 1 tablet by mouth every 12 (twelve) hours as needed for Pain.             pantoprazole (PROTONIX) 40 MG tablet  Take 1 tablet (40 mg total) by mouth once daily.                                       ranitidine (ZANTAC) 300 MG tablet  Take 1 tablet (300 mg total) by mouth every evening.                                                 _________________________________  Keven Gleason MD  09/13/2018

## 2018-09-13 NOTE — PROGRESS NOTES
Patient IV occluded will not flush. Multiple attempts to find access, Cathleen, House Supervisor notified.

## 2018-09-13 NOTE — NURSING
Attempt to call report to [facility], [phone]   spoke to Rosa. [facility] nurse will call back for report. Call back number given.

## 2018-09-13 NOTE — HOSPITAL COURSE
Mrs. Estella Arevalo is a 63 y.o. female [facility] patient known to me with essential hypertension, CAD, hypothyroidism (TSH 10.131 Sept 2018), cirrhosis of liver, chronic hepatitis C, anemia of chronic disease, Bipolar 1 disorder, and opioid dependency who presents to [facility] ED with complaints of hyponatremia.  She was noted to have a sodium of 118 mmol/L and was sent here for further evaluation.  She says that labs were drawn for her in preparation for an abdominal hernia repair on Wednesday, Sept 12, 2018 at [facility] with Dr. Vini Gomez.  They found that her sodium was low and sent her here for evaluation.  She says that she has chronic back and abdominal pain but has been feeling some increased lower abdominal pain for the past couple days that was associated with copious diarrhea.  She also had several episodes of nausea with vomiting.  She does say that she has had bloody stools but that it's normal for her given her rectal prolapse and straining.  She denies any blood anywhere else.  She otherwise has been feeling well.patient was started on gentle NS,she was on low salt and free water restriction,some of her home medication tegretol,cymbolta,ppI,was hold as potential agentss for hyponatremia,she was CEC'ed duo to history of psychiatric admission on [facility],her hyponatremia much improved,psychitray was consulted and reccommended return back to [facility] and resume her previous medications,I returned back patient to [facility] with low slat diet and  free water restriction.she need follow up with PCP in next few days.

## 2018-09-13 NOTE — PROGRESS NOTES
COLBY faxed clinicals to [facility] @ [phone] so admissions could review for possible re-admission.    Addendum: COLBY spoke to Kristine () with [facility] that reported facility will accept patient back. Kristine provided COLBY with call report information (call [phone], Dr. Mcbride). COLBY contacted [facility] @ [phone] to schedule car transportation to facility, COLBY spoke to [facility]. COLBY provided nurse Agnieszka with call report information.

## 2018-09-18 ENCOUNTER — HOSPITAL ENCOUNTER (INPATIENT)
Facility: HOSPITAL | Age: 63
LOS: 5 days | Discharge: HOME | DRG: 641 | End: 2018-09-23
Attending: EMERGENCY MEDICINE | Admitting: HOSPITALIST
Payer: OTHER GOVERNMENT

## 2018-09-18 DIAGNOSIS — E87.1 HYPONATREMIA: Primary | ICD-10-CM

## 2018-09-18 DIAGNOSIS — M51.36 DDD (DEGENERATIVE DISC DISEASE), LUMBAR: ICD-10-CM

## 2018-09-18 DIAGNOSIS — R53.1 WEAKNESS: ICD-10-CM

## 2018-09-18 DIAGNOSIS — F33.9 EPISODE OF RECURRENT MAJOR DEPRESSIVE DISORDER, UNSPECIFIED DEPRESSION EPISODE SEVERITY: ICD-10-CM

## 2018-09-18 PROBLEM — K52.9 ACUTE COLITIS: Status: RESOLVED | Noted: 2018-09-12 | Resolved: 2018-09-18

## 2018-09-18 LAB
ALBUMIN SERPL BCP-MCNC: 4.2 G/DL
ALP SERPL-CCNC: 231 U/L
ALT SERPL W/O P-5'-P-CCNC: 35 U/L
ANION GAP SERPL CALC-SCNC: 12 MMOL/L
AST SERPL-CCNC: 38 U/L
BACTERIA #/AREA URNS HPF: NORMAL /HPF
BASOPHILS # BLD AUTO: 0 K/UL
BASOPHILS NFR BLD: 0 %
BILIRUB SERPL-MCNC: 0.7 MG/DL
BILIRUB UR QL STRIP: NEGATIVE
BNP SERPL-MCNC: 163 PG/ML
BUN SERPL-MCNC: 6 MG/DL
CALCIUM SERPL-MCNC: 9.6 MG/DL
CHLORIDE SERPL-SCNC: 83 MMOL/L
CLARITY UR: ABNORMAL
CO2 SERPL-SCNC: 21 MMOL/L
COLOR UR: ABNORMAL
CREAT SERPL-MCNC: 0.7 MG/DL
DIFFERENTIAL METHOD: ABNORMAL
EOSINOPHIL # BLD AUTO: 0 K/UL
EOSINOPHIL NFR BLD: 0 %
ERYTHROCYTE [DISTWIDTH] IN BLOOD BY AUTOMATED COUNT: 14.2 %
EST. GFR  (AFRICAN AMERICAN): >60 ML/MIN/1.73 M^2
EST. GFR  (NON AFRICAN AMERICAN): >60 ML/MIN/1.73 M^2
GLUCOSE SERPL-MCNC: 132 MG/DL
GLUCOSE UR QL STRIP: ABNORMAL
HCT VFR BLD AUTO: 31.5 %
HGB BLD-MCNC: 11.4 G/DL
HGB UR QL STRIP: ABNORMAL
KETONES UR QL STRIP: NEGATIVE
LEUKOCYTE ESTERASE UR QL STRIP: ABNORMAL
LIPASE SERPL-CCNC: 5 U/L
LYMPHOCYTES # BLD AUTO: 0.6 K/UL
LYMPHOCYTES NFR BLD: 15.1 %
MCH RBC QN AUTO: 29.7 PG
MCHC RBC AUTO-ENTMCNC: 36.2 G/DL
MCV RBC AUTO: 82 FL
MICROSCOPIC COMMENT: NORMAL
MONOCYTES # BLD AUTO: 0.3 K/UL
MONOCYTES NFR BLD: 8.1 %
NEUTROPHILS # BLD AUTO: 3.1 K/UL
NEUTROPHILS NFR BLD: 76.8 %
NITRITE UR QL STRIP: NEGATIVE
OSMOLALITY SERPL: 242 MOSM/KG
PH UR STRIP: 8 [PH] (ref 5–8)
PLATELET # BLD AUTO: 387 K/UL
PMV BLD AUTO: 9.4 FL
POCT GLUCOSE: 106 MG/DL (ref 70–110)
POTASSIUM SERPL-SCNC: 4.8 MMOL/L
PROT SERPL-MCNC: 8.1 G/DL
PROT UR QL STRIP: NEGATIVE
RBC # BLD AUTO: 3.84 M/UL
RBC #/AREA URNS HPF: 3 /HPF (ref 0–4)
SODIUM SERPL-SCNC: 116 MMOL/L
SP GR UR STRIP: 1 (ref 1–1.03)
TROPONIN I SERPL DL<=0.01 NG/ML-MCNC: <0.006 NG/ML
TSH SERPL DL<=0.005 MIU/L-ACNC: 1.91 UIU/ML
URATE SERPL-MCNC: 1.6 MG/DL
URN SPEC COLLECT METH UR: ABNORMAL
UROBILINOGEN UR STRIP-ACNC: NEGATIVE EU/DL
WBC # BLD AUTO: 4.05 K/UL
WBC #/AREA URNS HPF: 2 /HPF (ref 0–5)

## 2018-09-18 PROCEDURE — 12000002 HC ACUTE/MED SURGE SEMI-PRIVATE ROOM

## 2018-09-18 PROCEDURE — 85025 COMPLETE CBC W/AUTO DIFF WBC: CPT

## 2018-09-18 PROCEDURE — 83930 ASSAY OF BLOOD OSMOLALITY: CPT

## 2018-09-18 PROCEDURE — 25000003 PHARM REV CODE 250: Performed by: EMERGENCY MEDICINE

## 2018-09-18 PROCEDURE — 83690 ASSAY OF LIPASE: CPT

## 2018-09-18 PROCEDURE — 81000 URINALYSIS NONAUTO W/SCOPE: CPT

## 2018-09-18 PROCEDURE — 84484 ASSAY OF TROPONIN QUANT: CPT

## 2018-09-18 PROCEDURE — 94761 N-INVAS EAR/PLS OXIMETRY MLT: CPT

## 2018-09-18 PROCEDURE — A4216 STERILE WATER/SALINE, 10 ML: HCPCS | Performed by: EMERGENCY MEDICINE

## 2018-09-18 PROCEDURE — 93010 ELECTROCARDIOGRAM REPORT: CPT | Mod: ,,, | Performed by: INTERNAL MEDICINE

## 2018-09-18 PROCEDURE — 93005 ELECTROCARDIOGRAM TRACING: CPT

## 2018-09-18 PROCEDURE — 99284 EMERGENCY DEPT VISIT MOD MDM: CPT | Mod: 25

## 2018-09-18 PROCEDURE — 25000003 PHARM REV CODE 250: Performed by: HOSPITALIST

## 2018-09-18 PROCEDURE — 84443 ASSAY THYROID STIM HORMONE: CPT

## 2018-09-18 PROCEDURE — 83880 ASSAY OF NATRIURETIC PEPTIDE: CPT

## 2018-09-18 PROCEDURE — 84550 ASSAY OF BLOOD/URIC ACID: CPT

## 2018-09-18 PROCEDURE — 80053 COMPREHEN METABOLIC PANEL: CPT

## 2018-09-18 RX ORDER — SODIUM CHLORIDE 0.9 % (FLUSH) 0.9 %
3 SYRINGE (ML) INJECTION EVERY 8 HOURS
Status: DISCONTINUED | OUTPATIENT
Start: 2018-09-18 | End: 2018-09-23 | Stop reason: HOSPADM

## 2018-09-18 RX ORDER — SODIUM CHLORIDE 9 MG/ML
INJECTION, SOLUTION INTRAVENOUS CONTINUOUS
Status: DISCONTINUED | OUTPATIENT
Start: 2018-09-18 | End: 2018-09-22

## 2018-09-18 RX ORDER — DOCUSATE SODIUM 100 MG/1
100 CAPSULE, LIQUID FILLED ORAL 2 TIMES DAILY
Status: DISCONTINUED | OUTPATIENT
Start: 2018-09-18 | End: 2018-09-23 | Stop reason: HOSPADM

## 2018-09-18 RX ORDER — OXYCODONE AND ACETAMINOPHEN 5; 325 MG/1; MG/1
1 TABLET ORAL EVERY 8 HOURS PRN
Status: DISCONTINUED | OUTPATIENT
Start: 2018-09-18 | End: 2018-09-18

## 2018-09-18 RX ORDER — AMITRIPTYLINE HYDROCHLORIDE 25 MG/1
50 TABLET, FILM COATED ORAL NIGHTLY
Status: DISCONTINUED | OUTPATIENT
Start: 2018-09-18 | End: 2018-09-20

## 2018-09-18 RX ORDER — SODIUM CHLORIDE 9 MG/ML
1000 INJECTION, SOLUTION INTRAVENOUS
Status: COMPLETED | OUTPATIENT
Start: 2018-09-18 | End: 2018-09-18

## 2018-09-18 RX ORDER — OXCARBAZEPINE 150 MG/1
600 TABLET, FILM COATED ORAL 2 TIMES DAILY
Status: DISCONTINUED | OUTPATIENT
Start: 2018-09-18 | End: 2018-09-20

## 2018-09-18 RX ORDER — FERROUS SULFATE 325(65) MG
325 TABLET, DELAYED RELEASE (ENTERIC COATED) ORAL
Status: DISCONTINUED | OUTPATIENT
Start: 2018-09-18 | End: 2018-09-23 | Stop reason: HOSPADM

## 2018-09-18 RX ORDER — GABAPENTIN 300 MG/1
300 CAPSULE ORAL 2 TIMES DAILY
Status: DISCONTINUED | OUTPATIENT
Start: 2018-09-18 | End: 2018-09-23 | Stop reason: HOSPADM

## 2018-09-18 RX ORDER — POLYETHYLENE GLYCOL 3350 17 G/17G
17 POWDER, FOR SOLUTION ORAL 2 TIMES DAILY PRN
Status: DISCONTINUED | OUTPATIENT
Start: 2018-09-18 | End: 2018-09-23 | Stop reason: HOSPADM

## 2018-09-18 RX ORDER — ONDANSETRON 2 MG/ML
8 INJECTION INTRAMUSCULAR; INTRAVENOUS EVERY 6 HOURS PRN
Status: DISCONTINUED | OUTPATIENT
Start: 2018-09-18 | End: 2018-09-23 | Stop reason: HOSPADM

## 2018-09-18 RX ORDER — OXYCODONE HYDROCHLORIDE 5 MG/1
10 TABLET ORAL EVERY 6 HOURS PRN
Status: DISCONTINUED | OUTPATIENT
Start: 2018-09-18 | End: 2018-09-23 | Stop reason: HOSPADM

## 2018-09-18 RX ORDER — FLUOXETINE HYDROCHLORIDE 20 MG/1
20 CAPSULE ORAL DAILY
Status: DISCONTINUED | OUTPATIENT
Start: 2018-09-19 | End: 2018-09-20

## 2018-09-18 RX ORDER — ACETAMINOPHEN 325 MG/1
650 TABLET ORAL EVERY 4 HOURS PRN
Status: DISCONTINUED | OUTPATIENT
Start: 2018-09-18 | End: 2018-09-23 | Stop reason: HOSPADM

## 2018-09-18 RX ORDER — LEVOTHYROXINE SODIUM 25 UG/1
25 TABLET ORAL DAILY
Status: DISCONTINUED | OUTPATIENT
Start: 2018-09-19 | End: 2018-09-23 | Stop reason: HOSPADM

## 2018-09-18 RX ORDER — TRAMADOL HYDROCHLORIDE 50 MG/1
50 TABLET ORAL EVERY 6 HOURS PRN
Status: DISCONTINUED | OUTPATIENT
Start: 2018-09-18 | End: 2018-09-18

## 2018-09-18 RX ORDER — ATENOLOL 25 MG/1
25 TABLET ORAL DAILY
Status: DISCONTINUED | OUTPATIENT
Start: 2018-09-19 | End: 2018-09-23 | Stop reason: HOSPADM

## 2018-09-18 RX ADMIN — OXYCODONE HYDROCHLORIDE 10 MG: 5 TABLET ORAL at 06:09

## 2018-09-18 RX ADMIN — SODIUM CHLORIDE 1000 ML: 0.9 INJECTION, SOLUTION INTRAVENOUS at 12:09

## 2018-09-18 RX ADMIN — AMITRIPTYLINE HYDROCHLORIDE 50 MG: 25 TABLET, FILM COATED ORAL at 09:09

## 2018-09-18 RX ADMIN — SODIUM CHLORIDE: 0.9 INJECTION, SOLUTION INTRAVENOUS at 04:09

## 2018-09-18 RX ADMIN — SODIUM CHLORIDE: 0.9 INJECTION, SOLUTION INTRAVENOUS at 09:09

## 2018-09-18 RX ADMIN — GABAPENTIN 300 MG: 300 CAPSULE ORAL at 09:09

## 2018-09-18 RX ADMIN — OXCARBAZEPINE 600 MG: 150 TABLET ORAL at 09:09

## 2018-09-18 RX ADMIN — DOCUSATE SODIUM 100 MG: 100 CAPSULE, LIQUID FILLED ORAL at 09:09

## 2018-09-18 RX ADMIN — Medication 3 ML: at 09:09

## 2018-09-18 NOTE — ED TRIAGE NOTES
Arrived to [facility] via personal transportation by . Pt is lethargic at this time. Reports being discharged today from [facility]. Reported sodium level of 111

## 2018-09-18 NOTE — HPI
62 y/o female presents for hyponatremia.  Patient is currently lethargic and unable to give history.  Patient wakes up, mumbles a response to question and goes back to sleep.  History obtained from ER and patient's .  Patient was admitted here last week for hyponatremia.  This was thought to be secondary to medications (Cymbalta, Tegretol and PPI).  Medications were stopped and she was hydrated with improvement of Na.  Patient was then discharged back to behavioral unit where she had been prior to admission.  Per , patient was discharged from behavioral unit, but he got a call on their way home to come to hospital because her Na was very low.   denies patient having excessive fluid intake.   keeps stating that she needs her pain medications.  He is also a very poor historian.  No further history able to be obtained.

## 2018-09-18 NOTE — NURSING
Pt demanded oxycodone 20mg to be given instead of tramadol or percocet. She yelled and said if she doesn't get her pain med she will leave the hospital. She cursed and made a show about her pain med. She refused percocet stating that it does nothing to her. Waiting for  to call back.

## 2018-09-18 NOTE — SUBJECTIVE & OBJECTIVE
Past Medical History:   Diagnosis Date    Addiction to drug     Alcohol abuse     Anxiety     Arthritis     Bipolar disorder     Cirrhosis of liver     Coronary artery disease     Depression     Fall     GERD (gastroesophageal reflux disease)     Hallucination     Hepatitis C     States was successfully treated with Harvoni    History of psychiatric hospitalization     Hx of psychiatric care     Hypertension     Low back pain     Radha     MI (myocardial infarction)     Migraine headache     Psychiatric problem     Sleep difficulties     Suicide attempt     Therapy     Thyroid disease     Withdrawal symptoms, alcohol     tremors    Withdrawal symptoms, drug or narcotic     nausea, diarrhea       Past Surgical History:   Procedure Laterality Date    BLOCK, NERVE, FACET JOINT, MEDIAL BRANCH, LUMBAR Right 4/16/2013    [facility]    BLOCK, NERVE, FACET JOINT, MEDIAL BRANCH, LUMBAR Right 4/2/2013    [facility]    COLONOSCOPY Left 7/16/2013    Performed by [facility] Farias [facility] at [facility] ENDO    EGD (ESOPHAGOGASTRODUODENOSCOPY) N/A 7/15/2013    Performed by [facility] at [facility] ENDO    ESOPHAGOGASTRODUODENOSCOPY (EGD) N/A 3/10/2017    Performed by Vini Gomez MD at [facility] OR    ESOPHAGOGASTRODUODENOSCOPY (EGD) N/A 3/10/2017    Performed by Arnulfo Benton MD at [facility] ENDO    HERNIA REPAIR      HIATAL HERNIA REPAIR      JOINT REPLACEMENT Bilateral     KNEE SURGERY  bilateral replacement    LAPAROSCOPIC PEG TUBE PLACEMENT/REPLACEMENT N/A 3/10/2017    Performed by [facility] at [facility] OR    REPAIR-HERNIA-LAPAROSCOPIC-HIATAL N/A 3/10/2017    Performed by [facility] at [facility] OR    REPAIR-HERNIA-VENTRAL-LAPAROSCOPIC-W/MESH N/A 11/8/2017    Performed by [facility]ini Gomez [facility] at [facility] OR    right foot sx  2008    SHOULDER SURGERY  replacement       Review of patient's allergies indicates:  No Known  Allergies    No current facility-administered medications on file prior to encounter.      Current Outpatient Medications on File Prior to Encounter   Medication Sig    atenolol (TENORMIN) 25 MG tablet Take 1 tablet (25 mg total) by mouth once daily.    clonazePAM (KLONOPIN) 1 MG tablet Take 1 tablet (1 mg total) by mouth 2 (two) times daily. for 7 days    cycloSPORINE (RESTASIS) 0.05 % ophthalmic emulsion Place 0.4 mLs (1 drop total) into both eyes 2 (two) times daily.    docusate sodium (COLACE) 100 MG capsule Take 1 capsule (100 mg total) by mouth 2 (two) times daily.    ferrous sulfate 325 (65 FE) MG EC tablet Take 325 mg by mouth 3 (three) times daily with meals.    FLUoxetine (PROZAC) 20 MG capsule Take 20 mg by mouth once daily.    naproxen (NAPROSYN) 500 MG tablet Take 1 tablet (500 mg total) by mouth 2 (two) times daily as needed.    nitroGLYCERIN (NITROSTAT) 0.3 MG SL tablet ONE TABLET UNDER TONGUE AS NEEDED FOR CHEST PAIN EVERY 5 MINUTES AS NEEDED    ondansetron (ZOFRAN-ODT) 4 MG TbDL Take 1 tablet (4 mg total) by mouth every 8 (eight) hours as needed. Nausea/vomiting    pantoprazole (PROTONIX) 40 MG tablet Take 1 tablet (40 mg total) by mouth once daily.    ranitidine (ZANTAC) 300 MG tablet Take 1 tablet (300 mg total) by mouth every evening.    amitriptyline (ELAVIL) 50 MG tablet Take 1 tablet (50 mg total) by mouth every evening.    duloxetine (CYMBALTA) 30 MG capsule TAKE 1 CAPSULE(30 MG) BY MOUTH EVERY DAY    efinaconazole (JUBLIA) 10 % Eran APPLY ONE DOSE DAILY    gabapentin (NEURONTIN) 300 MG capsule Take 1 capsule (300 mg total) by mouth 2 (two) times daily.    hydrocortisone-pramoxine (PROCTOFOAM-HS) rectal foam Place 1 applicator rectally 2 (two) times daily.    levothyroxine (SYNTHROID) 25 MCG tablet Take 1 tablet (25 mcg total) by mouth once daily.    LINZESS 145 mcg Cap capsule Take 1 capsule (145 mcg total) by mouth once daily.    NARCAN 4 mg/actuation Spry USE UTD     nicotine (NICODERM CQ) 21 mg/24 hr Place 1 patch onto the skin every 24 hours.    ondansetron (ZOFRAN) 4 MG tablet Take 1 tablet (4 mg total) by mouth every 6 (six) hours.    OXcarbazepine (TRILEPTAL) 300 MG Tab Take 600 mg by mouth 2 (two) times daily.    oxyCODONE-acetaminophen (PERCOCET) 5-325 mg per tablet Take 1 tablet by mouth every 12 (twelve) hours as needed for Pain.    polymyxin B sulf-trimethoprim (POLYTRIM) 10,000 unit- 1 mg/mL Drop Place 1 drop into the left eye every 6 (six) hours.    promethazine (PHENERGAN) 25 MG tablet Take 1 tablet (25 mg total) by mouth every 6 (six) hours as needed for Nausea.    RESTASIS 0.05 % ophthalmic emulsion INSTILL 1 DROP IN BOTH EYES TWICE DAILY    triamcinolone acetonide 0.1% (KENALOG) 0.1 % cream Apply topically 2 (two) times daily.     Family History     Problem Relation (Age of Onset)    Bipolar disorder Maternal Grandfather    Cancer Mother, Father    Depression Sister    Suicide Cousin        Tobacco Use    Smoking status: Never Smoker    Smokeless tobacco: Never Used   Substance and Sexual Activity    Alcohol use: Yes     Alcohol/week: 3.6 oz     Types: 6 Cans of beer per week     Comment: last drink Sept 2, 2018    Drug use: Yes     Types: Benzodiazepines, Methamphetamines, Amphetamines     Comment: [facility] clinic; admits to addiction to opioids    Sexual activity: Not on file     Review of Systems   Reason unable to perform ROS: Lethargy.     Objective:     Vital Signs (Most Recent):  Temp: 97.4 °F (36.3 °C) (09/18/18 1614)  Pulse: 63 (09/18/18 1614)  Resp: 17 (09/18/18 1614)  BP: 118/81 (09/18/18 1614)  SpO2: 100 % (09/18/18 1614) Vital Signs (24h Range):  Temp:  [97.4 °F (36.3 °C)-98.3 °F (36.8 °C)] 97.4 °F (36.3 °C)  Pulse:  [63-79] 63  Resp:  [16-20] 17  SpO2:  [94 %-100 %] 100 %  BP: (114-172)/() 118/81     Weight: 72.6 kg (160 lb)  Body mass index is 27.46 kg/m².    Physical Exam   Constitutional: No distress.   HENT:   Head:  Normocephalic and atraumatic.   Eyes: Conjunctivae are normal. Right eye exhibits no discharge. Left eye exhibits no discharge.   Neck: Neck supple. No tracheal deviation present.   Cardiovascular: Normal rate, regular rhythm and normal heart sounds.   Pulmonary/Chest: Effort normal and breath sounds normal.   Abdominal: Soft. Bowel sounds are normal.   Musculoskeletal: She exhibits no deformity.   Neurological:   Lethargic  Opens eyes to tactile stimuli, mumbles answer to question and goes back to sleep.  Moves all extremities   Skin: Skin is warm and dry. She is not diaphoretic.           Significant Labs:   BMP:   Recent Labs   Lab  09/18/18   1224   GLU  132*   NA  116*   K  4.8   CL  83*   CO2  21*   BUN  6*   CREATININE  0.7   CALCIUM  9.6     CBC:   Recent Labs   Lab  09/18/18   1224   WBC  4.05   HGB  11.4*   HCT  31.5*   PLT  387*

## 2018-09-18 NOTE — H&P
[facility] Hospital Medicine  History & Physical    Patient Name: Estella Arevalo  MRN: [id number]  Admission Date: 9/18/2018  Attending Physician: Ramon Marvin MD   Primary Care Provider: Angela Packer MD         Patient information was obtained from ER records.     Subjective:     Principal Problem:Hyponatremia    Chief Complaint:   Chief Complaint   Patient presents with    Fatigue     Pt sent for evaluation of sodium 111. She reports feeling fatigued and having diffiuclty walking and talking.         HPI: 64 y/o female presents for hyponatremia.  Patient is currently lethargic and unable to give history.  Patient wakes up, mumbles a response to question and goes back to sleep.  History obtained from ER and patient's .  Patient was admitted here last week for hyponatremia.  This was thought to be secondary to medications (Cymbalta, Tegretol and PPI).  Medications were stopped and she was hydrated with improvement of Na.  Patient was then discharged back to behavioral unit where she had been prior to admission.  Per , patient was discharged from behavioral unit, but he got a call on their way home to come to hospital because her Na was very low.   denies patient having excessive fluid intake.   keeps stating that she needs her pain medications.  He is also a very poor historian.  No further history able to be obtained.    Past Medical History:   Diagnosis Date    Addiction to drug     Alcohol abuse     Anxiety     Arthritis     Bipolar disorder     Cirrhosis of liver     Coronary artery disease     Depression     Fall     GERD (gastroesophageal reflux disease)     Hallucination     Hepatitis C     States was successfully treated with Harvoni    History of psychiatric hospitalization     Hx of psychiatric care     Hypertension     Low back pain     Radha     MI (myocardial infarction)     Migraine headache     Psychiatric problem      Sleep difficulties     Suicide attempt     Therapy     Thyroid disease     Withdrawal symptoms, alcohol     tremors    Withdrawal symptoms, drug or narcotic     nausea, diarrhea       Past Surgical History:   Procedure Laterality Date    BLOCK, NERVE, FACET JOINT, MEDIAL BRANCH, LUMBAR Right 4/16/2013    [facility]    BLOCK, NERVE, FACET JOINT, MEDIAL BRANCH, LUMBAR Right 4/2/2013    [facility]    COLONOSCOPY Left 7/16/2013    [facility]formed by [facility] at [facility] ENDO    EGD (ESOPHAGOGASTRODUODENOSCOPY) N/A 7/15/2013    Performed by [facility] at [facility] ENDO    ESOPHAGOGASTRODUODENOSCOPY (EGD) N/A 3/10/2017    Performed by Vini Gomez MD at [facility] OR    ESOPHAGOGASTRODUODENOSCOPY (EGD) N/A 3/10/2017    Performed by Arnulfo Benton MD at [facility] ENDO    HERNIA REPAIR      HIATAL HERNIA REPAIR      JOINT REPLACEMENT Bilateral     KNEE SURGERY  bilateral replacement    LAPAROSCOPIC PEG TUBE PLACEMENT/REPLACEMENT N/A 3/10/2017    Performed by [facility] at [facility] OR    REPAIR-HERNIA-LAPAROSCOPIC-HIATAL N/A 3/10/2017    Performed by [facility] at [facility] OR    REPAIR-HERNIA-VENTRAL-LAPAROSCOPIC-W/MESH N/A 11/8/2017    Performed by [facility] at [facility] OR    right foot sx  2008    SHOULDER SURGERY  replacement       Review of patient's allergies indicates:  No Known Allergies    No current facility-administered medications on file prior to encounter.      Current Outpatient Medications on File Prior to Encounter   Medication Sig    atenolol (TENORMIN) 25 MG tablet Take 1 tablet (25 mg total) by mouth once daily.    clonazePAM (KLONOPIN) 1 MG tablet Take 1 tablet (1 mg total) by mouth 2 (two) times daily. for 7 days    cycloSPORINE (RESTASIS) 0.05 % ophthalmic emulsion Place 0.4 mLs (1 drop total) into both eyes 2 (two) times daily.    docusate sodium (COLACE) 100 MG capsule Take 1 capsule (100 mg  total) by mouth 2 (two) times daily.    ferrous sulfate 325 (65 FE) MG EC tablet Take 325 mg by mouth 3 (three) times daily with meals.    FLUoxetine (PROZAC) 20 MG capsule Take 20 mg by mouth once daily.    naproxen (NAPROSYN) 500 MG tablet Take 1 tablet (500 mg total) by mouth 2 (two) times daily as needed.    nitroGLYCERIN (NITROSTAT) 0.3 MG SL tablet ONE TABLET UNDER TONGUE AS NEEDED FOR CHEST PAIN EVERY 5 MINUTES AS NEEDED    ondansetron (ZOFRAN-ODT) 4 MG TbDL Take 1 tablet (4 mg total) by mouth every 8 (eight) hours as needed. Nausea/vomiting    pantoprazole (PROTONIX) 40 MG tablet Take 1 tablet (40 mg total) by mouth once daily.    ranitidine (ZANTAC) 300 MG tablet Take 1 tablet (300 mg total) by mouth every evening.    amitriptyline (ELAVIL) 50 MG tablet Take 1 tablet (50 mg total) by mouth every evening.    duloxetine (CYMBALTA) 30 MG capsule TAKE 1 CAPSULE(30 MG) BY MOUTH EVERY DAY    efinaconazole (JUBLIA) 10 % Eran APPLY ONE DOSE DAILY    gabapentin (NEURONTIN) 300 MG capsule Take 1 capsule (300 mg total) by mouth 2 (two) times daily.    hydrocortisone-pramoxine (PROCTOFOAM-HS) rectal foam Place 1 applicator rectally 2 (two) times daily.    levothyroxine (SYNTHROID) 25 MCG tablet Take 1 tablet (25 mcg total) by mouth once daily.    LINZESS 145 mcg Cap capsule Take 1 capsule (145 mcg total) by mouth once daily.    NARCAN 4 mg/actuation Spry USE UTD    nicotine (NICODERM CQ) 21 mg/24 hr Place 1 patch onto the skin every 24 hours.    ondansetron (ZOFRAN) 4 MG tablet Take 1 tablet (4 mg total) by mouth every 6 (six) hours.    OXcarbazepine (TRILEPTAL) 300 MG Tab Take 600 mg by mouth 2 (two) times daily.    oxyCODONE-acetaminophen (PERCOCET) 5-325 mg per tablet Take 1 tablet by mouth every 12 (twelve) hours as needed for Pain.    polymyxin B sulf-trimethoprim (POLYTRIM) 10,000 unit- 1 mg/mL Drop Place 1 drop into the left eye every 6 (six) hours.    promethazine (PHENERGAN) 25 MG tablet  Take 1 tablet (25 mg total) by mouth every 6 (six) hours as needed for Nausea.    RESTASIS 0.05 % ophthalmic emulsion INSTILL 1 DROP IN BOTH EYES TWICE DAILY    triamcinolone acetonide 0.1% (KENALOG) 0.1 % cream Apply topically 2 (two) times daily.     Family History     Problem Relation (Age of Onset)    Bipolar disorder Maternal Grandfather    Cancer Mother, Father    Depression Sister    Suicide Cousin        Tobacco Use    Smoking status: Never Smoker    Smokeless tobacco: Never Used   Substance and Sexual Activity    Alcohol use: Yes     Alcohol/week: 3.6 oz     Types: 6 Cans of beer per week     Comment: last drink Sept 2, 2018    Drug use: Yes     Types: Benzodiazepines, Methamphetamines, Amphetamines     Comment: [facility] clinic; admits to addiction to opioids    Sexual activity: Not on file     Review of Systems   Reason unable to perform ROS: Lethargy.     Objective:     Vital Signs (Most Recent):  Temp: 97.4 °F (36.3 °C) (09/18/18 1614)  Pulse: 63 (09/18/18 1614)  Resp: 17 (09/18/18 1614)  BP: 118/81 (09/18/18 1614)  SpO2: 100 % (09/18/18 1614) Vital Signs (24h Range):  Temp:  [97.4 °F (36.3 °C)-98.3 °F (36.8 °C)] 97.4 °F (36.3 °C)  Pulse:  [63-79] 63  Resp:  [16-20] 17  SpO2:  [94 %-100 %] 100 %  BP: (114-172)/() 118/81     Weight: 72.6 kg (160 lb)  Body mass index is 27.46 kg/m².    Physical Exam   Constitutional: No distress.   HENT:   Head: Normocephalic and atraumatic.   Eyes: Conjunctivae are normal. Right eye exhibits no discharge. Left eye exhibits no discharge.   Neck: Neck supple. No tracheal deviation present.   Cardiovascular: Normal rate, regular rhythm and normal heart sounds.   Pulmonary/Chest: Effort normal and breath sounds normal.   Abdominal: Soft. Bowel sounds are normal.   Musculoskeletal: She exhibits no deformity.   Neurological:   Lethargic  Opens eyes to tactile stimuli, mumbles answer to question and goes back to sleep.  Moves all extremities   Skin: Skin is warm and  dry. She is not diaphoretic.           Significant Labs:   BMP:   Recent Labs   Lab  09/18/18   1224   GLU  132*   NA  116*   K  4.8   CL  83*   CO2  21*   BUN  6*   CREATININE  0.7   CALCIUM  9.6     CBC:   Recent Labs   Lab  09/18/18   1224   WBC  4.05   HGB  11.4*   HCT  31.5*   PLT  387*         Assessment/Plan:     * Hyponatremia    Patient with recurrent admissions for hyponatremia.  Admitted last week.  Thought to be secondary to medications (Cymbalta, Tegretol, PPI).  Medications stopped and patient hydrated with improvement of Na.  Psychogenic polydypsia?  Still remains on Lexapro.  Will check osmolality and urine studies.  Nephrology input.  IVF's and oral fluid restriction.           Anemia of chronic disease    H/H similar to previous labs.          Chronic hepatitis C              Essential hypertension    Continue home medications and monitor.          Bipolar 1 disorder    Patient just discharged from psych facility.  Continue medications.          Acquired hypothyroidism    Check TSH  Continue Synthroid.        Polysubstance dependence including opioid type drug, continuous use                VTE Risk Mitigation (From admission, onward)        Ordered     IP VTE LOW RISK PATIENT  Once      09/18/18 1614     Place sequential compression device  Until discontinued      09/18/18 1614             Ramon Marvin MD  Department of Hospital Medicine   [facility]

## 2018-09-18 NOTE — ASSESSMENT & PLAN NOTE
Patient with recurrent admissions for hyponatremia.  Admitted last week.  Thought to be secondary to medications (Cymbalta, Tegretol, PPI).  Medications stopped and patient hydrated with improvement of Na.  Psychogenic polydypsia?  Still remains on Lexapro.  Will check osmolality and urine studies.  Nephrology input.  IVF's and oral fluid restriction.

## 2018-09-18 NOTE — PLAN OF CARE
Readmit; Discharged from [facility] 9/18/2018.  Prefers afternoon appts.       09/18/18 1822   Discharge Assessment   Assessment Type Discharge Planning Assessment   Confirmed/corrected address and phone number on facesheet? Yes   Assessment information obtained from? Patient   Prior to hospitilization cognitive status: Alert/Oriented   Prior to hospitalization functional status: Independent   Current cognitive status: Alert/Oriented   Current Functional Status: Independent   Facility Arrived From: [facility]; Discharged today.   Lives With spouse   Able to Return to Prior Arrangements yes   Is patient able to care for self after discharge? Yes   Who are your caregiver(s) and their phone number(s)? Hammad Arevalo; [phone]; spouse   Patient's perception of discharge disposition home or selfcare   Readmission Within The Last 30 Days current reason for admission unrelated to previous admission   If yes, most recent facility name: [facility]   Patient currently being followed by outpatient case management? No   Patient currently receives any other outside agency services? No   Equipment Currently Used at Home walker, rolling   Do you have any problems affording any of your prescribed medications? No   Is the patient taking medications as prescribed? yes   Does the patient have transportation home? Yes   Transportation Available family or friend will provide   Dialysis Name and Scheduled days n/a   Does the patient receive services at the [facility]? No   Discharge Plan A Home   Discharge Plan B Home   Patient/Family In Agreement With Plan yes   Bailey Garnett LMSW, [facility], CCM  9/18/2018

## 2018-09-18 NOTE — NURSING
Pt arrived vias stretcher. No signs of distress noted. C/o pain in legs, ha. Ns infusing @100ml. scds in place. Will cont monitor

## 2018-09-18 NOTE — ED PROVIDER NOTES
Encounter Date: 9/18/2018    SCRIBE #1 NOTE: I, Dianna Cruz, am scribing for, and in the presence of,  Jyoti Mon MD. I have scribed the following portions of the note - Other sections scribed: HPI, ROS, PE.       History     Chief Complaint   Patient presents with    Fatigue     Pt sent for evaluation of sodium 111. She reports feeling fatigued and having diffiuclty walking and talking.      CC: Fatigue    63 year old female  has a past medical history of polysubstance abuse, Anxiety, Arthritis, Bipolar disorder, Cirrhosis of liver, Coronary artery disease,  Hepatitis C presents to the ED for evaluation of fatigue. Per triage note, pt was sent to this ED for evaluation of a sodium of 111. Per triage note, pt is feeling fatigue and having difficulty walking and talking. No other symptoms reported.       The history is provided by the patient. No  was used.     Review of patient's allergies indicates:  No Known Allergies  Past Medical History:   Diagnosis Date    Addiction to drug     Alcohol abuse     Anxiety     Arthritis     Bipolar disorder     Cirrhosis of liver     Coronary artery disease     Depression     Fall     GERD (gastroesophageal reflux disease)     Hallucination     Hepatitis C     States was successfully treated with Harvoni    History of psychiatric hospitalization     Hx of psychiatric care     Hypertension     Low back pain     Radha     MI (myocardial infarction)     Migraine headache     Psychiatric problem     Sleep difficulties     Suicide attempt     Therapy     Thyroid disease     Withdrawal symptoms, alcohol     tremors    Withdrawal symptoms, drug or narcotic     nausea, diarrhea     Past Surgical History:   Procedure Laterality Date    BLOCK, NERVE, FACET JOINT, MEDIAL BRANCH, LUMBAR Right 4/16/2013    Performed by [facility] at [facility] PA[facility]GT    BLOCK, NERVE, FACET JOINT, MEDIAL BRANCH, LUMBAR Right 4/2/2013    Performed by  [facility] at [facility] PA[facility]    COLONOSCOPY Left 7/16/2013    Performed by Hernandez Farias MD at [facility] ENDO    EGD (ESOPHAGOGASTRODUODENOSCOPY) N/A 7/15/2013    Performed by Hernandez Farias MD at [facility] END[facility]    ESOPHAGOGASTRODUODENOSCOPY (EGD) N/A 3/10/2017    Performed by Vini Gomez MD at [facility] OR    ESOPHAGOGASTRODUODENOSCOPY (EGD) N/A 3/10/2017    Performed by Arnulfo Benton MD at [facility] END[facility]    HERNIA REPAIR      HIATAL HERNIA REPAIR      JOINT REPLACEMENT Bilateral     KNEE SURGERY  bilateral replacement    LAPAROSCOPIC PEG TUBE PLACEMENT/REPLACEMENT N/A 3/10/2017    Performed by Vini Gomez MD at [facility] OR    REPAIR-HERNIA-LAPAROSCOPIC-HIATAL N/A 3/10/2017    Performed by Vini Gomez MD at [facility] OR    REPAIR-HERNIA-VENTRAL-LAPAROSCOPIC-W/MESH N/A 11/8/2017    Performed by Vini Gomez MD at [facility] OR    right foot sx  2008    SHOULDER SURGERY  replacement     Family History   Problem Relation Age of Onset    Cancer Mother     Cancer Father     Depression Sister     Bipolar disorder Maternal Grandfather     Suicide Cousin      Social History     Tobacco Use    Smoking status: Never Smoker    Smokeless tobacco: Never Used   Substance Use Topics    Alcohol use: Yes     Alcohol/week: 3.6 oz     Types: 6 Cans of beer per week     Comment: last drink Sept 2, 2018    Drug use: Yes     Types: Benzodiazepines, Methamphetamines, Amphetamines     Comment: Pain mgt clinic; admits to addiction to opioids     Review of Systems   Unable to perform ROS: Mental status change   Skin: Negative for rash.       Physical Exam     Initial Vitals [09/18/18 1056]   BP Pulse Resp Temp SpO2   128/87 79 18 97.7 °F (36.5 °C) 98 %      MAP       --         Physical Exam    Nursing note and vitals reviewed.  Constitutional: She is not diaphoretic. No distress.   Drowsy   Answers questions appropriately     HENT:   Head: Normocephalic and atraumatic.   Mouth/Throat: Oropharynx is clear and moist.   Eyes:  EOM are normal. Pupils are equal, round, and reactive to light. No scleral icterus.   Neck: Normal range of motion. Neck supple. No JVD present.   Cardiovascular: Normal rate and regular rhythm.   Pulmonary/Chest: Breath sounds normal. No stridor. No respiratory distress.   Abdominal: Soft. Bowel sounds are normal. She exhibits no distension. There is no tenderness.   Musculoskeletal: Normal range of motion. She exhibits no edema or tenderness.   Neurological: No cranial nerve deficit or sensory deficit.   Drowsy but arousable.  Follows commands.   Skin: Skin is warm and dry.         ED Course   Procedures  Labs Reviewed   CBC W/ AUTO DIFFERENTIAL - Abnormal; Notable for the following components:       Result Value    RBC 3.84 (*)     Hemoglobin 11.4 (*)     Hematocrit 31.5 (*)     MCHC 36.2 (*)     Platelets 387 (*)     Lymph # 0.6 (*)     Gran% 76.8 (*)     Lymph% 15.1 (*)     All other components within normal limits   COMPREHENSIVE METABOLIC PANEL - Abnormal; Notable for the following components:    Sodium 116 (*)     Chloride 83 (*)     CO2 21 (*)     Glucose 132 (*)     BUN, Bld 6 (*)     Alkaline Phosphatase 231 (*)     All other components within normal limits    Narrative:     SODIUM critical result(s) called and verbal readback obtained from   SHARRI EUBANKS. , 09/18/2018 13:17   URINALYSIS, REFLEX TO URINE CULTURE - Abnormal; Notable for the following components:    Appearance, UA Hazy (*)     Glucose, UA 2+ (*)     Occult Blood UA 3+ (*)     Leukocytes, UA 3+ (*)     All other components within normal limits    Narrative:     Preferred Collection Type->Urine, Clean Catch   B-TYPE NATRIURETIC PEPTIDE - Abnormal; Notable for the following components:     (*)     All other components within normal limits   OSMOLALITY - Abnormal; Notable for the following components:    Osmolality 242 (*)     All other components within normal limits    Narrative:     OSMO critical result(s) called and verbal  readback obtained from   YAKOV IQBAL.  , 09/18/2018 13:45   URIC ACID - Abnormal; Notable for the following components:    Uric Acid 1.6 (*)     All other components within normal limits   LIPASE   TROPONIN I   URINALYSIS MICROSCOPIC    Narrative:     Preferred Collection Type->Urine, Clean Catch     EKG Readings: (Independently Interpreted)   Initial Reading: No STEMI. Rhythm: Sinus Arrhythmia. Heart Rate: 55. Conduction: 1st Degree AV Block. ST Segments: Normal ST Segments. T Waves: Normal.       Imaging Results          X-Ray Chest AP Portable (Final result)  Result time 09/18/18 12:01:31    Final result by Yoel Flores MD (09/18/18 12:01:31)                 Impression:      No acute chest disease identified.    No detrimental change noted when compared to 09/11/2018.      Electronically signed by: Yoel Flores MD  Date:    09/18/2018  Time:    12:01             Narrative:    EXAMINATION:  XR CHEST AP PORTABLE    CLINICAL HISTORY:  Weakness    TECHNIQUE:  Single frontal view of the chest was performed.    COMPARISON:  09/11/2018.    FINDINGS:  The heart and mediastinal structures are unremarkable.  Pulmonary vasculature is within normal limits.  The lungs are free of focal consolidations.  There is no evidence for pneumothorax or large pleural effusions.  Patient has a right shoulder prosthesis.  Bony structures appear grossly intact.                              X-Rays:   Independently Interpreted Readings:   Chest X-Ray: No infiltrates.  No acute abnormalities.     Medical Decision Making:   History:   Old Medical Records: I decided to obtain old medical records.  Old Records Summarized: records from previous admission(s).       <> Summary of Records: Recently admitted for hyponatremia discharge to a behavior health facility  Differential Diagnosis:   Hyponatremia  Electrolyte abnormality  Intoxication  Independently Interpreted Test(s):   I have ordered and independently interpreted X-rays - see prior  notes.  I have ordered and independently interpreted EKG Reading(s) - see prior notes  Clinical Tests:   Lab Tests: Ordered and Reviewed  Radiological Study: Ordered and Reviewed  Medical Tests: Ordered and Reviewed  ED Management:  Patient is afebrile and in no acute distress at time of history and physical.  She is drowsy but follows commands and intermittently responds appropriately.  She has no obvious focal neurological deficits.  No evidence of head trauma.  Labs notable for sodium of 116.  Patient started on IV hydration.  On reassessment she is slightly more alert. She does report drinking a lot of water.  I suspect symptoms may be due to polydipsia.  Patient to be admitted to Medicine for further management of hyponatremia.    I spent a total of 30 minutes caring for and overseeing the critical care of this patient. Critical care time was spent treating or preventing major adverse outcome resulting from hyponatremia.  Patient was specifically observed and treated with IV hydration, cardiopulmonary monitoring, neurological exam, lab testing, interpretation of lab values, neurological reassessment, followed by discussion with hospitalist and admission to the hospital.     This chart completed using dictation software, as a result there may be some typographical errors.             Scribe Attestation:   Scribe #1: I performed the above scribed service and the documentation accurately describes the services I performed. I attest to the accuracy of the note.    Attending Attestation:           Physician Attestation for Scribe:  Physician Attestation Statement for Scribe #1: I, Jyoti Mon MD, reviewed documentation, as scribed by Dianna Cruz in my presence, and it is both accurate and complete.                    Clinical Impression:   The primary encounter diagnosis was Hyponatremia. A diagnosis of Weakness was also pertinent to this visit.      Disposition:   Disposition: Admitted  Condition:  Aravind Mon MD  09/18/18 5293

## 2018-09-19 LAB
ANION GAP SERPL CALC-SCNC: 11 MMOL/L
BUN SERPL-MCNC: 9 MG/DL
CALCIUM SERPL-MCNC: 9.2 MG/DL
CHLORIDE SERPL-SCNC: 100 MMOL/L
CHLORIDE UR-SCNC: 41 MMOL/L
CO2 SERPL-SCNC: 15 MMOL/L
CREAT SERPL-MCNC: 0.7 MG/DL
EST. GFR  (AFRICAN AMERICAN): >60 ML/MIN/1.73 M^2
EST. GFR  (NON AFRICAN AMERICAN): >60 ML/MIN/1.73 M^2
GLUCOSE SERPL-MCNC: 74 MG/DL
OSMOLALITY UR: 177 MOSM/KG
POTASSIUM SERPL-SCNC: 4.8 MMOL/L
POTASSIUM UR-SCNC: <10 MMOL/L
SODIUM SERPL-SCNC: 126 MMOL/L
SODIUM UR-SCNC: 34 MMOL/L

## 2018-09-19 PROCEDURE — 25000003 PHARM REV CODE 250: Performed by: HOSPITALIST

## 2018-09-19 PROCEDURE — 80048 BASIC METABOLIC PNL TOTAL CA: CPT

## 2018-09-19 PROCEDURE — 82436 ASSAY OF URINE CHLORIDE: CPT

## 2018-09-19 PROCEDURE — A4216 STERILE WATER/SALINE, 10 ML: HCPCS | Performed by: EMERGENCY MEDICINE

## 2018-09-19 PROCEDURE — 83935 ASSAY OF URINE OSMOLALITY: CPT

## 2018-09-19 PROCEDURE — 25000003 PHARM REV CODE 250: Performed by: EMERGENCY MEDICINE

## 2018-09-19 PROCEDURE — 84300 ASSAY OF URINE SODIUM: CPT

## 2018-09-19 PROCEDURE — 12000002 HC ACUTE/MED SURGE SEMI-PRIVATE ROOM

## 2018-09-19 PROCEDURE — 36415 COLL VENOUS BLD VENIPUNCTURE: CPT

## 2018-09-19 PROCEDURE — 84133 ASSAY OF URINE POTASSIUM: CPT

## 2018-09-19 RX ADMIN — GABAPENTIN 300 MG: 300 CAPSULE ORAL at 09:09

## 2018-09-19 RX ADMIN — DOCUSATE SODIUM 100 MG: 100 CAPSULE, LIQUID FILLED ORAL at 09:09

## 2018-09-19 RX ADMIN — FERROUS SULFATE TAB EC 325 MG (65 MG FE EQUIVALENT) 325 MG: 325 (65 FE) TABLET DELAYED RESPONSE at 01:09

## 2018-09-19 RX ADMIN — OXYCODONE HYDROCHLORIDE 10 MG: 5 TABLET ORAL at 06:09

## 2018-09-19 RX ADMIN — FERROUS SULFATE TAB EC 325 MG (65 MG FE EQUIVALENT) 325 MG: 325 (65 FE) TABLET DELAYED RESPONSE at 06:09

## 2018-09-19 RX ADMIN — OXYCODONE HYDROCHLORIDE 10 MG: 5 TABLET ORAL at 01:09

## 2018-09-19 RX ADMIN — LEVOTHYROXINE SODIUM 25 MCG: 25 TABLET ORAL at 09:09

## 2018-09-19 RX ADMIN — AMITRIPTYLINE HYDROCHLORIDE 50 MG: 25 TABLET, FILM COATED ORAL at 09:09

## 2018-09-19 RX ADMIN — FLUOXETINE HYDROCHLORIDE 20 MG: 20 CAPSULE ORAL at 09:09

## 2018-09-19 RX ADMIN — Medication 3 ML: at 06:09

## 2018-09-19 RX ADMIN — SODIUM CHLORIDE: 0.9 INJECTION, SOLUTION INTRAVENOUS at 06:09

## 2018-09-19 RX ADMIN — Medication 3 ML: at 01:09

## 2018-09-19 RX ADMIN — Medication 3 ML: at 10:09

## 2018-09-19 RX ADMIN — OXCARBAZEPINE 600 MG: 150 TABLET ORAL at 09:09

## 2018-09-19 RX ADMIN — SODIUM CHLORIDE: 0.9 INJECTION, SOLUTION INTRAVENOUS at 10:09

## 2018-09-19 RX ADMIN — OXYCODONE HYDROCHLORIDE 10 MG: 5 TABLET ORAL at 07:09

## 2018-09-19 RX ADMIN — FERROUS SULFATE TAB EC 325 MG (65 MG FE EQUIVALENT) 325 MG: 325 (65 FE) TABLET DELAYED RESPONSE at 09:09

## 2018-09-19 RX ADMIN — ATENOLOL 25 MG: 25 TABLET ORAL at 09:09

## 2018-09-19 NOTE — PROGRESS NOTES
[facility] Hospital Medicine  Progress Note    Patient Name: Estella Arevalo  MRN: [id number]  Patient Class: IP- Inpatient   Admission Date: 9/18/2018  Length of Stay: 1 days  Attending Physician: Ramon Marvin MD  Primary Care Provider: Angela Packer MD        Subjective:     Principal Problem:Hyponatremia    HPI:  62 y/o female presents for hyponatremia.  Patient is currently lethargic and unable to give history.  Patient wakes up, mumbles a response to question and goes back to sleep.  History obtained from ER and patient's .  Patient was admitted here last week for hyponatremia.  This was thought to be secondary to medications (Cymbalta, Tegretol and PPI).  Medications were stopped and she was hydrated with improvement of Na.  Patient was then discharged back to behavioral unit where she had been prior to admission.  Per , patient was discharged from behavioral unit, but he got a call on their way home to come to hospital because her Na was very low.   denies patient having excessive fluid intake.   keeps stating that she needs her pain medications.  He is also a very poor historian.  No further history able to be obtained.    Hospital Course:  62 y/o female admitted with recurrent hyponatremia.  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  Started on IVF's and fluid restriction diet.  Nephrology consulted.  Improving Na.    Interval History: Feeling well today and much more awake.    Review of Systems   HENT: Negative for ear discharge and ear pain.    Eyes: Negative for pain and itching.   Genitourinary: Negative for difficulty urinating and dysuria.   Neurological: Negative for seizures and syncope.     Objective:     Vital Signs (Most Recent):  Temp: 97.3 °F (36.3 °C) (09/19/18 1625)  Pulse: 60 (09/19/18 1625)  Resp: 16 (09/19/18 1625)  BP: 110/75 (09/19/18 1625)  SpO2: 99 % (09/19/18 1625) Vital Signs (24h Range):  Temp:  [96.3 °F (35.7 °C)-98.7 °F (37.1 °C)]  97.3 °F (36.3 °C)  Pulse:  [56-76] 60  Resp:  [16-18] 16  SpO2:  [95 %-100 %] 99 %  BP: (102-120)/(62-76) 110/75     Weight: 77.7 kg (171 lb 3.2 oz)  Body mass index is 29.37 kg/m².    Intake/Output Summary (Last 24 hours) at 9/19/2018 0503  Last data filed at 9/19/2018 1200  Gross per 24 hour   Intake --   Output 185 ml   Net -185 ml      Physical Exam   Constitutional: She is oriented to person, place, and time. No distress.   HENT:   Head: Normocephalic and atraumatic.   Eyes: Conjunctivae are normal. Right eye exhibits no discharge. Left eye exhibits no discharge.   Neck: Neck supple. No tracheal deviation present.   Cardiovascular: Normal rate, regular rhythm and normal heart sounds.   Pulmonary/Chest: Effort normal and breath sounds normal.   Abdominal: Soft. Bowel sounds are normal.   Musculoskeletal: She exhibits no deformity.   Neurological: She is alert and oriented to person, place, and time.   Skin: Skin is warm and dry. She is not diaphoretic.   Psychiatric: Her mood appears anxious.       Significant Labs:   BMP:   Recent Labs   Lab  09/19/18   1404   GLU  74   NA  126*   K  4.8   CL  100   CO2  15*   BUN  9   CREATININE  0.7   CALCIUM  9.2     CBC:   Recent Labs   Lab  09/18/18   1224   WBC  4.05   HGB  11.4*   HCT  31.5*   PLT  387*         Assessment/Plan:      * Hyponatremia    Patient with recurrent admissions for hyponatremia.  Admitted last week.  Thought to be secondary to medications (Cymbalta, Tegretol, PPI).  Medications stopped and patient hydrated with improvement of Na.  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  Appreciate Renal input.  Started on IVF's and fluid restriction diet with improving Na.          Anemia of chronic disease    H/H similar to previous labs.          Chronic hepatitis C              Essential hypertension    Continue home medications and monitor.          Bipolar 1 disorder    Patient just discharged from psych facility.  Continue medications.          Acquired  hypothyroidism    TSH wnl  Continue Synthroid.        Polysubstance dependence including opioid type drug, continuous use                VTE Risk Mitigation (From admission, onward)        Ordered     IP VTE LOW RISK PATIENT  Once      09/18/18 1614     Place sequential compression device  Until discontinued      09/18/18 1614              Ramon Marvin MD  Department of Hospital Medicine   [facility]

## 2018-09-19 NOTE — HOSPITAL COURSE
62 y/o female admitted with recurrent hyponatremia.  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  Started on IVF's and fluid restriction diet.  Nephrology consulted.  Improving Na.  Psych consulted for medication adjustments.  Fluoxetine and Oxcarbazepine were stopped.  Patient was started on nightly Seroquel.  Patient remained afebrile and hemodynamically stable during hospital stay.  She initially presented with Na of 116.  Na now is 131.  She has been instructed on importance of limiting oral fluid intake.  Patient will be discharged home.

## 2018-09-19 NOTE — CONSULTS
"Complicated hx for this 63 y o f who was recently dc from [facility] as she was tx for "hallucinations". Went home but hte  brought her to [facility] because Hyponatremia.    Renal consult requested to help with her evaluation and treatment. This is not the first time she has had hyponatremia. As a matter of fact she was at  2-3 weeks ago with a NA of 111 which corrected in 2 days (per pt). Tells me that she has always been a big fluid drinker and tells me that daily she drinks >3000 ml's/24 hours.    Denies using diuretics, having CNS disease, endocrinopathies or polyuria. Denies HA vomiting hypotension hyper or hypoK metabolic acidosis or hypothyroidism.    Denies CNS ENT CP GI  RHEUM OR DERM SX    She made some requests and noted a few things:  1) na at [facility] her na improved to 135 in 2 days  2) IVF rate at  was 125 here 100  3) Has GERD needs rx  4) Requests a Multivitamin   5) At [facility] she was treated with cipro for "MRSA" unknown location  6) At home take Linzess  7) Has an umbilical hernia (seems incarcerated)  8) Added salt to her food here at [facility]  9) requested Klonopin 1 mg tid  Past Medical History:   Diagnosis Date    Addiction to drug     Alcohol abuse     Anxiety     Arthritis     Bipolar disorder     Cirrhosis of liver     Coronary artery disease     Depression     Fall     GERD (gastroesophageal reflux disease)     Hallucination     Hepatitis C     States was successfully treated with Harvoni    History of psychiatric hospitalization     Hx of psychiatric care     Hypertension     Low back pain     Radha     MI (myocardial infarction)     Migraine headache     Psychiatric problem     Sleep difficulties     Suicide attempt     Therapy     Thyroid disease     Withdrawal symptoms, alcohol     tremors    Withdrawal symptoms, drug or narcotic     nausea, diarrhea     Review of patient's allergies indicates:  No Known Allergies  Family History   Problem Relation Age of Onset " "   Cancer Mother     Cancer Father     Depression Sister     Bipolar disorder Maternal Grandfather     Suicide Cousin      Social History     Socioeconomic History    Marital status:      Spouse name: Hammad Kaufman"    Number of children: 4    Years of education: Not on file    Highest education level: Not on file   Social Needs    Financial resource strain: Not on file    Food insecurity - worry: Not on file    Food insecurity - inability: Not on file    Transportation needs - medical: Not on file    Transportation needs - non-medical: Not on file   Occupational History    Occupation: Retired     Comment: RN   Tobacco Use    Smoking status: Never Smoker    Smokeless tobacco: Never Used   Substance and Sexual Activity    Alcohol use: Yes     Alcohol/week: 3.6 oz     Types: 6 Cans of beer per week     Comment: last drink Sept 2, 2018    Drug use: Yes     Types: Benzodiazepines, Methamphetamines, Amphetamines     Comment: [facility] mgt clinic; admits to addiction to opioids    Sexual activity: Not on file   Other Topics Concern    Patient feels they ought to cut down on drinking/drug use Yes    Patient annoyed by others criticizing their drinking/drug use Yes    Patient has felt bad or guilty about drinking/drug use Yes    Patient has had a drink/used drugs as an eye opener in the AM Yes   Social History Narrative    Lives with , grand-daughter, daughter and son    Retired RN    Polysubstance abuse       Current Facility-Administered Medications   Medication    0.9%  NaCl infusion    acetaminophen tablet 650 mg    amitriptyline tablet 50 mg    atenolol tablet 25 mg    docusate sodium capsule 100 mg    ferrous sulfate EC tablet 325 mg    FLUoxetine capsule 20 mg    gabapentin capsule 300 mg    levothyroxine tablet 25 mcg    ondansetron injection 8 mg    OXcarbazepine tablet 600 mg    oxyCODONE immediate release tablet 10 mg    polyethylene glycol packet 17 g    sodium " "chloride 0.9% flush 3 mL       LABS    Recent Results (from the past 24 hour(s))   POCT glucose    Collection Time: 09/18/18  9:34 PM   Result Value Ref Range    POCT Glucose 106 70 - 110 mg/dL   Chloride, urine, random    Collection Time: 09/19/18 12:30 PM   Result Value Ref Range    Chloride, Rand Ur 41 25 - 200 mmol/L   Potassium, urine, random    Collection Time: 09/19/18 12:30 PM   Result Value Ref Range    Potassium Urine Random <10 (L) 15 - 95 mmol/L   Sodium, urine, random    Collection Time: 09/19/18 12:30 PM   Result Value Ref Range    Sodium Urine Random 34 20 - 250 mmol/L   ]    No intake/output data recorded.    Vitals:    09/18/18 2337 09/19/18 0512 09/19/18 0818 09/19/18 1210   BP: 105/67 120/62 115/76 102/68   Pulse: 76 (!) 56 66 61   Resp: 18 18 16 16   Temp: 98.1 °F (36.7 °C) 97.7 °F (36.5 °C) 98 °F (36.7 °C) 96.3 °F (35.7 °C)   TempSrc: Oral Oral Oral Oral   SpO2: 99% 95% 100% 99%   Weight:   77.7 kg (171 lb 3.2 oz)    Height:   5' 4.02" (1.626 m)        No Jvd, Thyromegaly or Lymphadenopathy  Lungs: Fairly clear anteriorly and laterally  Cor: RRR no G or rubs  Abd: Soft benign good bowel sounds non tender, Incarcerated umbilacal hernia  Ext: No E C C    A)  Severe and maybe symptomatic hyponatremia  Cause for hyponatremia probably a combination of things: Water intox-medications-cns disease-liver disease and bowel disease.  SIADH    Cirrhosis  Bipolar  CAD  DJD  Depression sp suicide attempt  HTN  Hypothyroid  Hx of drug and alcohol problems    P)  Agree with present rx  Goal is to improve her na @ 0.5 meq an hour  Replace thyroid  Avoid diuretics and NSAIDs  Psych drugs can cause hyponatremia: consider psych eval to help us chose a more concise treatment plan  Fluid rest 1500 ml 24 hours  No need for hypertonic  "

## 2018-09-19 NOTE — SUBJECTIVE & OBJECTIVE
Interval History: Feeling well today and much more awake.    Review of Systems   HENT: Negative for ear discharge and ear pain.    Eyes: Negative for pain and itching.   Genitourinary: Negative for difficulty urinating and dysuria.   Neurological: Negative for seizures and syncope.     Objective:     Vital Signs (Most Recent):  Temp: 97.3 °F (36.3 °C) (09/19/18 1625)  Pulse: 60 (09/19/18 1625)  Resp: 16 (09/19/18 1625)  BP: 110/75 (09/19/18 1625)  SpO2: 99 % (09/19/18 1625) Vital Signs (24h Range):  Temp:  [96.3 °F (35.7 °C)-98.7 °F (37.1 °C)] 97.3 °F (36.3 °C)  Pulse:  [56-76] 60  Resp:  [16-18] 16  SpO2:  [95 %-100 %] 99 %  BP: (102-120)/(62-76) 110/75     Weight: 77.7 kg (171 lb 3.2 oz)  Body mass index is 29.37 kg/m².    Intake/Output Summary (Last 24 hours) at 9/19/2018 1743  Last data filed at 9/19/2018 1200  Gross per 24 hour   Intake --   Output 185 ml   Net -185 ml      Physical Exam   Constitutional: She is oriented to person, place, and time. No distress.   HENT:   Head: Normocephalic and atraumatic.   Eyes: Conjunctivae are normal. Right eye exhibits no discharge. Left eye exhibits no discharge.   Neck: Neck supple. No tracheal deviation present.   Cardiovascular: Normal rate, regular rhythm and normal heart sounds.   Pulmonary/Chest: Effort normal and breath sounds normal.   Abdominal: Soft. Bowel sounds are normal.   Musculoskeletal: She exhibits no deformity.   Neurological: She is alert and oriented to person, place, and time.   Skin: Skin is warm and dry. She is not diaphoretic.   Psychiatric: Her mood appears anxious.       Significant Labs:   BMP:   Recent Labs   Lab  09/19/18   1404   GLU  74   NA  126*   K  4.8   CL  100   CO2  15*   BUN  9   CREATININE  0.7   CALCIUM  9.2     CBC:   Recent Labs   Lab  09/18/18   1224   WBC  4.05   HGB  11.4*   HCT  31.5*   PLT  387*

## 2018-09-19 NOTE — ASSESSMENT & PLAN NOTE
Patient with recurrent admissions for hyponatremia.  Admitted last week.  Thought to be secondary to medications (Cymbalta, Tegretol, PPI).  Medications stopped and patient hydrated with improvement of Na.  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  Appreciate Renal input.  Started on IVF's and fluid restriction diet with improving Na.

## 2018-09-19 NOTE — PLAN OF CARE
Problem: Patient Care Overview  Goal: Plan of Care Review  Outcome: Ongoing (interventions implemented as appropriate)  Pt refused lab collection. Is currently sleeping after pain med was given. Bed alarm set. Call light within reach

## 2018-09-19 NOTE — PLAN OF CARE
Problem: Patient Care Overview  Goal: Plan of Care Review  Outcome: Ongoing (interventions implemented as appropriate)   09/19/18 0500   Coping/Psychosocial   Plan Of Care Reviewed With patient   Pt AOx4. C/o pain.managed with prn meds. No s/s of distress or discomfort. Refused morning labs. Ambulates to the bathroom with assist. Safety maintained. Will cont to monitor...

## 2018-09-20 LAB
ANION GAP SERPL CALC-SCNC: 8 MMOL/L
BUN SERPL-MCNC: 10 MG/DL
CALCIUM SERPL-MCNC: 9 MG/DL
CHLORIDE SERPL-SCNC: 98 MMOL/L
CO2 SERPL-SCNC: 20 MMOL/L
CREAT SERPL-MCNC: 0.6 MG/DL
EST. GFR  (AFRICAN AMERICAN): >60 ML/MIN/1.73 M^2
EST. GFR  (NON AFRICAN AMERICAN): >60 ML/MIN/1.73 M^2
GLUCOSE SERPL-MCNC: 81 MG/DL
POTASSIUM SERPL-SCNC: 4.2 MMOL/L
SODIUM SERPL-SCNC: 126 MMOL/L

## 2018-09-20 PROCEDURE — A4216 STERILE WATER/SALINE, 10 ML: HCPCS | Performed by: EMERGENCY MEDICINE

## 2018-09-20 PROCEDURE — 36415 COLL VENOUS BLD VENIPUNCTURE: CPT

## 2018-09-20 PROCEDURE — 25000003 PHARM REV CODE 250: Performed by: HOSPITALIST

## 2018-09-20 PROCEDURE — 80048 BASIC METABOLIC PNL TOTAL CA: CPT

## 2018-09-20 PROCEDURE — 90792 PSYCH DIAG EVAL W/MED SRVCS: CPT | Mod: ,,, | Performed by: PSYCHIATRY & NEUROLOGY

## 2018-09-20 PROCEDURE — 25000003 PHARM REV CODE 250: Performed by: EMERGENCY MEDICINE

## 2018-09-20 PROCEDURE — 63600175 PHARM REV CODE 636 W HCPCS: Performed by: HOSPITALIST

## 2018-09-20 PROCEDURE — 12000002 HC ACUTE/MED SURGE SEMI-PRIVATE ROOM

## 2018-09-20 RX ORDER — QUETIAPINE FUMARATE 100 MG/1
100 TABLET, FILM COATED ORAL NIGHTLY
Status: DISCONTINUED | OUTPATIENT
Start: 2018-09-20 | End: 2018-09-23 | Stop reason: HOSPADM

## 2018-09-20 RX ORDER — DULOXETIN HYDROCHLORIDE 30 MG/1
30 CAPSULE, DELAYED RELEASE ORAL DAILY
Status: DISCONTINUED | OUTPATIENT
Start: 2018-09-21 | End: 2018-09-23 | Stop reason: HOSPADM

## 2018-09-20 RX ADMIN — OXYCODONE HYDROCHLORIDE 10 MG: 5 TABLET ORAL at 07:09

## 2018-09-20 RX ADMIN — FERROUS SULFATE TAB EC 325 MG (65 MG FE EQUIVALENT) 325 MG: 325 (65 FE) TABLET DELAYED RESPONSE at 11:09

## 2018-09-20 RX ADMIN — DOCUSATE SODIUM 100 MG: 100 CAPSULE, LIQUID FILLED ORAL at 09:09

## 2018-09-20 RX ADMIN — QUETIAPINE FUMARATE 100 MG: 100 TABLET ORAL at 10:09

## 2018-09-20 RX ADMIN — Medication 3 ML: at 07:09

## 2018-09-20 RX ADMIN — FERROUS SULFATE TAB EC 325 MG (65 MG FE EQUIVALENT) 325 MG: 325 (65 FE) TABLET DELAYED RESPONSE at 06:09

## 2018-09-20 RX ADMIN — ONDANSETRON 8 MG: 2 INJECTION INTRAMUSCULAR; INTRAVENOUS at 11:09

## 2018-09-20 RX ADMIN — POLYETHYLENE GLYCOL 3350 17 G: 17 POWDER, FOR SOLUTION ORAL at 09:09

## 2018-09-20 RX ADMIN — GABAPENTIN 300 MG: 300 CAPSULE ORAL at 10:09

## 2018-09-20 RX ADMIN — OXCARBAZEPINE 600 MG: 150 TABLET ORAL at 09:09

## 2018-09-20 RX ADMIN — LEVOTHYROXINE SODIUM 25 MCG: 25 TABLET ORAL at 09:09

## 2018-09-20 RX ADMIN — OXYCODONE HYDROCHLORIDE 10 MG: 5 TABLET ORAL at 01:09

## 2018-09-20 RX ADMIN — SODIUM CHLORIDE: 0.9 INJECTION, SOLUTION INTRAVENOUS at 04:09

## 2018-09-20 RX ADMIN — OXYCODONE HYDROCHLORIDE 10 MG: 5 TABLET ORAL at 12:09

## 2018-09-20 RX ADMIN — FLUOXETINE HYDROCHLORIDE 20 MG: 20 CAPSULE ORAL at 09:09

## 2018-09-20 RX ADMIN — DOCUSATE SODIUM 100 MG: 100 CAPSULE, LIQUID FILLED ORAL at 10:09

## 2018-09-20 RX ADMIN — Medication 3 ML: at 10:09

## 2018-09-20 RX ADMIN — ATENOLOL 25 MG: 25 TABLET ORAL at 09:09

## 2018-09-20 RX ADMIN — POLYETHYLENE GLYCOL 3350 17 G: 17 POWDER, FOR SOLUTION ORAL at 10:09

## 2018-09-20 RX ADMIN — FERROUS SULFATE TAB EC 325 MG (65 MG FE EQUIVALENT) 325 MG: 325 (65 FE) TABLET DELAYED RESPONSE at 07:09

## 2018-09-20 RX ADMIN — GABAPENTIN 300 MG: 300 CAPSULE ORAL at 09:09

## 2018-09-20 NOTE — PROGRESS NOTES
[facility] Hospital Medicine  Progress Note    Patient Name: Estella Arevalo  MRN: [id number]  Patient Class: IP- Inpatient   Admission Date: 9/18/2018  Length of Stay: 2 days  Attending Physician: Ramon Marvin MD  Primary Care Provider: Angela Packer MD        Subjective:     Principal Problem:Hyponatremia    HPI:  64 y/o female presents for hyponatremia.  Patient is currently lethargic and unable to give history.  Patient wakes up, mumbles a response to question and goes back to sleep.  History obtained from ER and patient's .  Patient was admitted here last week for hyponatremia.  This was thought to be secondary to medications (Cymbalta, Tegretol and PPI).  Medications were stopped and she was hydrated with improvement of Na.  Patient was then discharged back to behavioral unit where she had been prior to admission.  Per , patient was discharged from behavioral unit, but he got a call on their way home to come to hospital because her Na was very low.   denies patient having excessive fluid intake.   keeps stating that she needs her pain medications.  He is also a very poor historian.  No further history able to be obtained.    Hospital Course:  64 y/o female admitted with recurrent hyponatremia.  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  Started on IVF's and fluid restriction diet.  Nephrology consulted.  Improving Na.    Interval History: No new complaints.    Review of Systems   HENT: Negative for ear discharge and ear pain.    Eyes: Negative for pain and itching.   Genitourinary: Negative for difficulty urinating and dysuria.   Neurological: Negative for seizures and syncope.     Objective:     Vital Signs (Most Recent):  Temp: 98 °F (36.7 °C) (09/20/18 1702)  Pulse: 76 (09/20/18 1702)  Resp: 18 (09/20/18 1702)  BP: 134/77 (09/20/18 1702)  SpO2: 100 % (09/20/18 1702) Vital Signs (24h Range):  Temp:  [97.8 °F (36.6 °C)-98.4 °F (36.9 °C)] 98 °F (36.7 °C)  Pulse:   [55-76] 76  Resp:  [18] 18  SpO2:  [95 %-100 %] 100 %  BP: ()/(57-77) 134/77     Weight: 77.7 kg (171 lb 5.2 oz)  Body mass index is 28.51 kg/m².    Intake/Output Summary (Last 24 hours) at 9/20/2018 1714  Last data filed at 9/20/2018 1409  Gross per 24 hour   Intake 1930 ml   Output --   Net 1930 ml      Physical Exam   Constitutional: She is oriented to person, place, and time. No distress.   HENT:   Head: Normocephalic and atraumatic.   Eyes: Conjunctivae are normal. Right eye exhibits no discharge. Left eye exhibits no discharge.   Neck: Neck supple. No tracheal deviation present.   Cardiovascular: Normal rate, regular rhythm and normal heart sounds.   Pulmonary/Chest: Effort normal and breath sounds normal.   Abdominal: Soft. Bowel sounds are normal.   Musculoskeletal: She exhibits no deformity.   Neurological: She is alert and oriented to person, place, and time.   Skin: Skin is warm and dry. She is not diaphoretic.   Psychiatric: Her mood appears anxious.       Significant Labs:   BMP:   Recent Labs   Lab  09/20/18   0400   GLU  81   NA  126*   K  4.2   CL  98   CO2  20*   BUN  10   CREATININE  0.6   CALCIUM  9.0     CBC:   No results for input(s): WBC, HGB, HCT, PLT in the last 48 hours.      Assessment/Plan:      * Hyponatremia    Patient with recurrent admissions for hyponatremia.  Admitted last week.  Thought to be secondary to medications (Cymbalta, Tegretol, PPI).  Medications stopped and patient hydrated with improvement of Na.  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  Appreciate Renal input.  Started on IVF's and fluid restriction diet with improving Na.  Na about the same as yesterday.  Consult Psych to see if meds can be adjusted to avoid further hyponatremia.          Anemia of chronic disease    H/H similar to previous labs.          Chronic hepatitis C              Essential hypertension    Continue home medications and monitor.          Bipolar 1 disorder    Patient just discharged from psych  facility.  Psych consult.        Acquired hypothyroidism    TSH wnl  Continue Synthroid.        Polysubstance dependence including opioid type drug, continuous use                VTE Risk Mitigation (From admission, onward)        Ordered     IP VTE LOW RISK PATIENT  Once      09/18/18 1614     Place sequential compression device  Until discontinued      09/18/18 1614              Ramon Marvin MD  Department of Hospital Medicine   [facility]

## 2018-09-20 NOTE — ASSESSMENT & PLAN NOTE
Patient with a long history of bipolar diathesis mood disorder but in the context of multiple substances and alcohol use.  Was on carbamazepine and now on oxcarbazepine which can both lower sodium levels.  Stop oxcarbazepine.  Also, stop fluoxetine.  Start duloxetine 30mg daily.  Start quetiapine 100mg nightly.  Can increase quetiapine to 200, then 300mg nightly for mood control and will help with sleep.  Stop amitriptyline as this in an anticholinergic risk in patient with polysubstance.

## 2018-09-20 NOTE — HPI
"Patient Estella Arevalo presents to the hospital directly from being discharged from an inpatient psychiatric facility and found to have low sodium.  Consult was placed to help manage her symptoms and medications.  She is easily approached in conversation but very flighty in her words.  She is difficult to maintain on task and goes off on many tangential stories.  She tells me that she has been on many psychiatric medications in the past and is worried that she is not feeling as she should.  She is not depressed but is anxious.  She feels that she is a little better since her initial admission to the psychiatric unit a couple weeks ago but "can be better".  She is stressed because her 90 year old mother passed away last week while she was in the hospital and is not able to go to the  because her  has to have surgery on that day.  She is tearful and somewhat labile in her speech.  She is also fixated on medications and has many questions about taking more.  She says that she is now on a pain management contract so is weary about what she takes.  She says at first that she stopped drinking a few weeks ago then added that she still has "screwdrivers and bloody heather with Absolute Vodka because it is the only Vodka that I drink".  Says that she lives with her  and helps take care of him.  Says that she does feel a little elevated now.  Denies any current hallucinations but does admit that she did see a cross burning on top of the [place] before coming to the hospital.  She says that it was real because her  saw it also.  She was not sleeping so she woke him in the middle of the night to show him.  She says that she does not have an outpatient psychiatrist now and hasn't followed with one for years.  Her pain management doctor has been giving her Prozac, Elavil, and Ambien.  Biggest complaint is for poor sleep.    "

## 2018-09-20 NOTE — SUBJECTIVE & OBJECTIVE
Interval History: No new complaints.    Review of Systems   HENT: Negative for ear discharge and ear pain.    Eyes: Negative for pain and itching.   Genitourinary: Negative for difficulty urinating and dysuria.   Neurological: Negative for seizures and syncope.     Objective:     Vital Signs (Most Recent):  Temp: 98 °F (36.7 °C) (09/20/18 1702)  Pulse: 76 (09/20/18 1702)  Resp: 18 (09/20/18 1702)  BP: 134/77 (09/20/18 1702)  SpO2: 100 % (09/20/18 1702) Vital Signs (24h Range):  Temp:  [97.8 °F (36.6 °C)-98.4 °F (36.9 °C)] 98 °F (36.7 °C)  Pulse:  [55-76] 76  Resp:  [18] 18  SpO2:  [95 %-100 %] 100 %  BP: ()/(57-77) 134/77     Weight: 77.7 kg (171 lb 5.2 oz)  Body mass index is 28.51 kg/m².    Intake/Output Summary (Last 24 hours) at 9/20/2018 1714  Last data filed at 9/20/2018 1409  Gross per 24 hour   Intake 1930 ml   Output --   Net 1930 ml      Physical Exam   Constitutional: She is oriented to person, place, and time. No distress.   HENT:   Head: Normocephalic and atraumatic.   Eyes: Conjunctivae are normal. Right eye exhibits no discharge. Left eye exhibits no discharge.   Neck: Neck supple. No tracheal deviation present.   Cardiovascular: Normal rate, regular rhythm and normal heart sounds.   Pulmonary/Chest: Effort normal and breath sounds normal.   Abdominal: Soft. Bowel sounds are normal.   Musculoskeletal: She exhibits no deformity.   Neurological: She is alert and oriented to person, place, and time.   Skin: Skin is warm and dry. She is not diaphoretic.   Psychiatric: Her mood appears anxious.       Significant Labs:   BMP:   Recent Labs   Lab  09/20/18   0400   GLU  81   NA  126*   K  4.2   CL  98   CO2  20*   BUN  10   CREATININE  0.6   CALCIUM  9.0     CBC:   No results for input(s): WBC, HGB, HCT, PLT in the last 48 hours.

## 2018-09-20 NOTE — PROGRESS NOTES
Awake alert oriented NAD    Multiple complaints: wants more laxatives, wants an US or CT of her umbilical hernia, wants a PPI    Denies CNS ENT CP GI RHEUM OR DERM SX  Past Medical History:   Diagnosis Date    Addiction to drug     Alcohol abuse     Anxiety     Arthritis     Bipolar disorder     Cirrhosis of liver     Coronary artery disease     Depression     Fall     GERD (gastroesophageal reflux disease)     Hallucination     Hepatitis C     States was successfully treated with [facility]    History of psychiatric hospitalization     Hx of psychiatric care     Hypertension     Low back pain     Radha     MI (myocardial infarction)     Migraine headache     Psychiatric problem     Sleep difficulties     Suicide attempt     Therapy     Thyroid disease     Withdrawal symptoms, alcohol     tremors    Withdrawal symptoms, drug or narcotic     nausea, diarrhea     Review of patient's allergies indicates:  No Known Allergies    Current Facility-Administered Medications   Medication    0.9%  NaCl infusion    acetaminophen tablet 650 mg    amitriptyline tablet 50 mg    atenolol tablet 25 mg    docusate sodium capsule 100 mg    ferrous sulfate EC tablet 325 mg    FLUoxetine capsule 20 mg    gabapentin capsule 300 mg    levothyroxine tablet 25 mcg    ondansetron injection 8 mg    OXcarbazepine tablet 600 mg    oxyCODONE immediate release tablet 10 mg    polyethylene glycol packet 17 g    sodium chloride 0.9% flush 3 mL       LABS    Recent Results (from the past 24 hour(s))   Chloride, urine, random    Collection Time: 09/19/18 12:30 PM   Result Value Ref Range    Chloride, Rand Ur 41 25 - 200 mmol/L   Osmolality, urine    Collection Time: 09/19/18 12:30 PM   Result Value Ref Range    Osmolality, Ur 177 50 - 1200 mOsm/kg   Potassium, urine, random    Collection Time: 09/19/18 12:30 PM   Result Value Ref Range    Potassium Urine Random <10 (L) 15 - 95 mmol/L   Sodium, urine, random     Collection Time: 09/19/18 12:30 PM   Result Value Ref Range    Sodium Urine Random 34 20 - 250 mmol/L   Basic metabolic panel    Collection Time: 09/19/18  2:04 PM   Result Value Ref Range    Sodium 126 (L) 136 - 145 mmol/L    Potassium 4.8 3.5 - 5.1 mmol/L    Chloride 100 95 - 110 mmol/L    CO2 15 (L) 23 - 29 mmol/L    Glucose 74 70 - 110 mg/dL    BUN, Bld 9 8 - 23 mg/dL    Creatinine 0.7 0.5 - 1.4 mg/dL    Calcium 9.2 8.7 - 10.5 mg/dL    Anion Gap 11 8 - 16 mmol/L    eGFR if African American >60 >60 mL/min/1.73 m^2    eGFR if non African American >60 >60 mL/min/1.73 m^2   Basic metabolic panel    Collection Time: 09/20/18  4:00 AM   Result Value Ref Range    Sodium 126 (L) 136 - 145 mmol/L    Potassium 4.2 3.5 - 5.1 mmol/L    Chloride 98 95 - 110 mmol/L    CO2 20 (L) 23 - 29 mmol/L    Glucose 81 70 - 110 mg/dL    BUN, Bld 10 8 - 23 mg/dL    Creatinine 0.6 0.5 - 1.4 mg/dL    Calcium 9.0 8.7 - 10.5 mg/dL    Anion Gap 8 8 - 16 mmol/L    eGFR if African American >60 >60 mL/min/1.73 m^2    eGFR if non African American >60 >60 mL/min/1.73 m^2   ]    I/O last 3 completed shifts:  In: 1450 [P.O.:1450]  Out: 185 [Urine:185]    Vitals:    09/19/18 1943 09/19/18 2347 09/20/18 0403 09/20/18 0756   BP: 104/63 106/62 (!) 97/57 124/71   Pulse: 70 67 65 65   Resp: 18 18 18 18   Temp: 98.2 °F (36.8 °C) 98.3 °F (36.8 °C) 97.9 °F (36.6 °C) 97.8 °F (36.6 °C)   TempSrc: Oral Oral Oral Oral   SpO2: 97% 95% 98% 95%   Weight:       Height:           No Jvd, Thyromegaly or Lymphadenopathy  Lungs: Fairly clear anteriorly and laterally  Cor: RRR no G or rubs  Abd: Soft benign good bowel sounds non tender  Ext: No E C C    A)  Sodium coming up    Cirrhosis  Bipolar  CAD  DJD  Depression sp suicide attempt  HTN  Hypothyroid  Hx of drug and alcohol problems  Umbilical hernia Her surgeon is Dr Gomez    P)  Same recommendations

## 2018-09-20 NOTE — ASSESSMENT & PLAN NOTE
Patient with a long history of prescription drug abuse, not just controlled substances.  She also continues to use alcohol.  Minimize and polypharmacy and controlled substances.

## 2018-09-20 NOTE — PLAN OF CARE
Problem: Patient Care Overview  Goal: Plan of Care Review  Outcome: Ongoing (interventions implemented as appropriate)  No falls, trauma or injury this shift. Pain managed with oral oxycocone IR every 6 hours. Sodium level an admit 116 as of 09/19 at 1404 was 126, awaiting morning lab results. Continue with plan of care and continue to monitor patient.
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
## 2018-09-20 NOTE — PLAN OF CARE
Problem: Patient Care Overview  Goal: Plan of Care Review  Outcome: Ongoing (interventions implemented as appropriate)  Pt remained free of falls, refused scds. Pain managed with med with significant relief. Fluids infusing @100ml. Ambulates to restroom. understands that she is on fluid restriction 1500ml. Records her own intake. Call light within reach, bed in low position.
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
## 2018-09-20 NOTE — ASSESSMENT & PLAN NOTE
Patient with recurrent admissions for hyponatremia.  Admitted last week.  Thought to be secondary to medications (Cymbalta, Tegretol, PPI).  Medications stopped and patient hydrated with improvement of Na.  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  Appreciate Renal input.  Started on IVF's and fluid restriction diet with improving Na.  Na about the same as yesterday.  Consult Psych to see if meds can be adjusted to avoid further hyponatremia.

## 2018-09-21 LAB
ANION GAP SERPL CALC-SCNC: 11 MMOL/L
BUN SERPL-MCNC: 9 MG/DL
CALCIUM SERPL-MCNC: 9.1 MG/DL
CHLORIDE SERPL-SCNC: 96 MMOL/L
CO2 SERPL-SCNC: 21 MMOL/L
CREAT SERPL-MCNC: 0.7 MG/DL
EST. GFR  (AFRICAN AMERICAN): >60 ML/MIN/1.73 M^2
EST. GFR  (NON AFRICAN AMERICAN): >60 ML/MIN/1.73 M^2
GLUCOSE SERPL-MCNC: 77 MG/DL
POTASSIUM SERPL-SCNC: 4.2 MMOL/L
SODIUM SERPL-SCNC: 128 MMOL/L

## 2018-09-21 PROCEDURE — 02HV33Z INSERTION OF INFUSION DEVICE INTO SUPERIOR VENA CAVA, PERCUTANEOUS APPROACH: ICD-10-PCS | Performed by: HOSPITALIST

## 2018-09-21 PROCEDURE — 36569 INSJ PICC 5 YR+ W/O IMAGING: CPT

## 2018-09-21 PROCEDURE — A4216 STERILE WATER/SALINE, 10 ML: HCPCS | Performed by: HOSPITALIST

## 2018-09-21 PROCEDURE — 12000002 HC ACUTE/MED SURGE SEMI-PRIVATE ROOM

## 2018-09-21 PROCEDURE — 80048 BASIC METABOLIC PNL TOTAL CA: CPT

## 2018-09-21 PROCEDURE — 99232 SBSQ HOSP IP/OBS MODERATE 35: CPT | Mod: ,,, | Performed by: PSYCHIATRY & NEUROLOGY

## 2018-09-21 PROCEDURE — 25000003 PHARM REV CODE 250: Performed by: EMERGENCY MEDICINE

## 2018-09-21 PROCEDURE — 36415 COLL VENOUS BLD VENIPUNCTURE: CPT

## 2018-09-21 PROCEDURE — A4216 STERILE WATER/SALINE, 10 ML: HCPCS | Performed by: EMERGENCY MEDICINE

## 2018-09-21 PROCEDURE — 25000003 PHARM REV CODE 250: Performed by: HOSPITALIST

## 2018-09-21 RX ORDER — PRENATAL WITH FERROUS FUM AND FOLIC ACID 3080; 920; 120; 400; 22; 1.84; 3; 20; 10; 1; 12; 200; 27; 25; 2 [IU]/1; [IU]/1; MG/1; [IU]/1; MG/1; MG/1; MG/1; MG/1; MG/1; MG/1; UG/1; MG/1; MG/1; MG/1; MG/1
1 TABLET ORAL DAILY
Status: DISCONTINUED | OUTPATIENT
Start: 2018-09-21 | End: 2018-09-23 | Stop reason: HOSPADM

## 2018-09-21 RX ORDER — SODIUM CHLORIDE 0.9 % (FLUSH) 0.9 %
10 SYRINGE (ML) INJECTION EVERY 6 HOURS
Status: DISCONTINUED | OUTPATIENT
Start: 2018-09-21 | End: 2018-09-23 | Stop reason: HOSPADM

## 2018-09-21 RX ORDER — PRENATAL WITH FERROUS FUM AND FOLIC ACID 3080; 920; 120; 400; 22; 1.84; 3; 20; 10; 1; 12; 200; 27; 25; 2 [IU]/1; [IU]/1; MG/1; [IU]/1; MG/1; MG/1; MG/1; MG/1; MG/1; MG/1; UG/1; MG/1; MG/1; MG/1; MG/1
1 TABLET ORAL DAILY
Status: DISCONTINUED | OUTPATIENT
Start: 2018-09-21 | End: 2018-09-21 | Stop reason: CLARIF

## 2018-09-21 RX ORDER — SODIUM CHLORIDE 0.9 % (FLUSH) 0.9 %
10 SYRINGE (ML) INJECTION
Status: DISCONTINUED | OUTPATIENT
Start: 2018-09-21 | End: 2018-09-23 | Stop reason: HOSPADM

## 2018-09-21 RX ADMIN — POLYETHYLENE GLYCOL 3350 17 G: 17 POWDER, FOR SOLUTION ORAL at 08:09

## 2018-09-21 RX ADMIN — FERROUS SULFATE TAB EC 325 MG (65 MG FE EQUIVALENT) 325 MG: 325 (65 FE) TABLET DELAYED RESPONSE at 12:09

## 2018-09-21 RX ADMIN — DULOXETINE 30 MG: 30 CAPSULE, DELAYED RELEASE ORAL at 09:09

## 2018-09-21 RX ADMIN — DOCUSATE SODIUM 100 MG: 100 CAPSULE, LIQUID FILLED ORAL at 09:09

## 2018-09-21 RX ADMIN — OXYCODONE HYDROCHLORIDE 10 MG: 5 TABLET ORAL at 07:09

## 2018-09-21 RX ADMIN — QUETIAPINE FUMARATE 100 MG: 100 TABLET ORAL at 08:09

## 2018-09-21 RX ADMIN — GABAPENTIN 300 MG: 300 CAPSULE ORAL at 09:09

## 2018-09-21 RX ADMIN — ATENOLOL 25 MG: 25 TABLET ORAL at 09:09

## 2018-09-21 RX ADMIN — OXYCODONE HYDROCHLORIDE 10 MG: 5 TABLET ORAL at 01:09

## 2018-09-21 RX ADMIN — DOCUSATE SODIUM 100 MG: 100 CAPSULE, LIQUID FILLED ORAL at 08:09

## 2018-09-21 RX ADMIN — FERROUS SULFATE TAB EC 325 MG (65 MG FE EQUIVALENT) 325 MG: 325 (65 FE) TABLET DELAYED RESPONSE at 07:09

## 2018-09-21 RX ADMIN — LEVOTHYROXINE SODIUM 25 MCG: 25 TABLET ORAL at 09:09

## 2018-09-21 RX ADMIN — VITAMIN A, VITAMIN C, VITAMIN D-3, VITAMIN E, VITAMIN B-1, VITAMIN B-2, NIACIN, VITAMIN B-6, CALCIUM, IRON, ZINC, COPPER 1 TABLET: 4000; 120; 400; 22; 1.84; 3; 20; 10; 1; 12; 200; 27; 25; 2 TABLET ORAL at 12:09

## 2018-09-21 RX ADMIN — GABAPENTIN 300 MG: 300 CAPSULE ORAL at 08:09

## 2018-09-21 RX ADMIN — FERROUS SULFATE TAB EC 325 MG (65 MG FE EQUIVALENT) 325 MG: 325 (65 FE) TABLET DELAYED RESPONSE at 05:09

## 2018-09-21 RX ADMIN — SODIUM CHLORIDE 1000 ML: 0.9 INJECTION, SOLUTION INTRAVENOUS at 11:09

## 2018-09-21 RX ADMIN — Medication 3 ML: at 07:09

## 2018-09-21 RX ADMIN — Medication 10 ML: at 11:09

## 2018-09-21 NOTE — PROGRESS NOTES
Awake alert oriented NAD feeling better    Denies CNS ENT CP GI  RHEUM OR DERM SX  Past Medical History:   Diagnosis Date    Addiction to drug     Alcohol abuse     Arthritis     Cirrhosis of liver     Coronary artery disease     Fall     GERD (gastroesophageal reflux disease)     Hepatitis C     States was successfully treated with Charito    History of psychiatric hospitalization     Hx of psychiatric care     Hypertension     Low back pain     Radha     MI (myocardial infarction)     Migraine headache     Psychiatric problem     Sleep difficulties     Suicide attempt     Therapy     Thyroid disease      Review of patient's allergies indicates:  No Known Allergies    Current Facility-Administered Medications   Medication    0.9%  NaCl infusion    acetaminophen tablet 650 mg    atenolol tablet 25 mg    docusate sodium capsule 100 mg    DULoxetine DR capsule 30 mg    ferrous sulfate EC tablet 325 mg    gabapentin capsule 300 mg    levothyroxine tablet 25 mcg    ondansetron injection 8 mg    oxyCODONE immediate release tablet 10 mg    polyethylene glycol packet 17 g    prenatal vitamin oral tablet    QUEtiapine tablet 100 mg    sodium chloride 0.9% flush 3 mL       LABS    Recent Results (from the past 24 hour(s))   Basic metabolic panel    Collection Time: 09/21/18  4:03 AM   Result Value Ref Range    Sodium 128 (L) 136 - 145 mmol/L    Potassium 4.2 3.5 - 5.1 mmol/L    Chloride 96 95 - 110 mmol/L    CO2 21 (L) 23 - 29 mmol/L    Glucose 77 70 - 110 mg/dL    BUN, Bld 9 8 - 23 mg/dL    Creatinine 0.7 0.5 - 1.4 mg/dL    Calcium 9.1 8.7 - 10.5 mg/dL    Anion Gap 11 8 - 16 mmol/L    eGFR if African American >60 >60 mL/min/1.73 m^2    eGFR if non African American >60 >60 mL/min/1.73 m^2   ]    I/O last 3 completed shifts:  In: 720 [P.O.:720]  Out: -     Vitals:    09/21/18 0818 09/21/18 1137 09/21/18 1138 09/21/18 1142   BP: 119/80 (!) 96/59 (!) 82/56 100/73   Pulse: 75 70 67 67   Resp:  18  18    Temp: 97.8 °F (36.6 °C)  96.4 °F (35.8 °C)    TempSrc: Oral  Axillary    SpO2: 99% 96% 96% (!) 94%   Weight:       Height:           No Jvd, Thyromegaly or Lymphadenopathy  Lungs: Fairly clear anteriorly and laterally  Cor: RRR no G or rubs  Abd: Soft benign good bowel sounds non tender  Ext: No E C C    A)  Sodium improving continue fluid rest   Cirrhosis  Bipolar  CAD  DJD  Depression sp suicide attempt  HTN controlled  Hypothyroid  Hx of drug and alcohol problems  Umbilical hernia Her surgeon is Dr Gomez

## 2018-09-21 NOTE — PROGRESS NOTES
[facility] Hospital Medicine  Progress Note    Patient Name: Estella Arevalo  MRN: [id number]  Patient Class: IP- Inpatient   Admission Date: 9/18/2018  Length of Stay: 3 days  Attending Physician: Ramon Marvin MD  Primary Care Provider: Angela Packer MD        Subjective:     Principal Problem:Hyponatremia    HPI:  62 y/o female presents for hyponatremia.  Patient is currently lethargic and unable to give history.  Patient wakes up, mumbles a response to question and goes back to sleep.  History obtained from ER and patient's .  Patient was admitted here last week for hyponatremia.  This was thought to be secondary to medications (Cymbalta, Tegretol and PPI).  Medications were stopped and she was hydrated with improvement of Na.  Patient was then discharged back to behavioral unit where she had been prior to admission.  Per , patient was discharged from behavioral unit, but he got a call on their way home to come to hospital because her Na was very low.   denies patient having excessive fluid intake.   keeps stating that she needs her pain medications.  He is also a very poor historian.  No further history able to be obtained.    Hospital Course:  62 y/o female admitted with recurrent hyponatremia.  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  Started on IVF's and fluid restriction diet.  Nephrology consulted.  Improving Na.  Psych consulted for medication adjustments.    Interval History: Wants an Abdominal CT to evaluate her hernia, prenatal vitamins and prn Klonopin, otherwise feeling better.    Review of Systems   HENT: Negative for ear discharge and ear pain.    Eyes: Negative for pain and itching.   Genitourinary: Negative for difficulty urinating and dysuria.   Neurological: Negative for seizures and syncope.     Objective:     Vital Signs (Most Recent):  Temp: 96.4 °F (35.8 °C) (09/21/18 1138)  Pulse: 67 (09/21/18 1142)  Resp: 18 (09/21/18 1138)  BP: 100/73  (09/21/18 1142)  SpO2: (!) 94 % (09/21/18 1142) Vital Signs (24h Range):  Temp:  [96.4 °F (35.8 °C)-98 °F (36.7 °C)] 96.4 °F (35.8 °C)  Pulse:  [66-81] 67  Resp:  [18-20] 18  SpO2:  [94 %-100 %] 94 %  BP: ()/(56-80) 100/73     Weight: 77.7 kg (171 lb 5.5 oz)  Body mass index is 28.51 kg/m².    Intake/Output Summary (Last 24 hours) at 9/21/2018 1156  Last data filed at 9/21/2018 0847  Gross per 24 hour   Intake 960 ml   Output --   Net 960 ml      Physical Exam   Constitutional: She is oriented to person, place, and time. No distress.   HENT:   Head: Normocephalic and atraumatic.   Eyes: Conjunctivae are normal. Right eye exhibits no discharge. Left eye exhibits no discharge.   Neck: Neck supple. No tracheal deviation present.   Cardiovascular: Normal rate, regular rhythm and normal heart sounds.   Pulmonary/Chest: Effort normal and breath sounds normal.   Abdominal: Soft. Bowel sounds are normal.   Musculoskeletal: She exhibits no deformity.   Neurological: She is alert and oriented to person, place, and time.   Skin: Skin is warm and dry. She is not diaphoretic.   Psychiatric: Her mood appears anxious.       Significant Labs:   BMP:   Recent Labs   Lab  09/21/18   0403   GLU  77   NA  128*   K  4.2   CL  96   CO2  21*   BUN  9   CREATININE  0.7   CALCIUM  9.1     CBC:   No results for input(s): WBC, HGB, HCT, PLT in the last 48 hours.      Assessment/Plan:      * Hyponatremia    Patient with recurrent admissions for hyponatremia.  Admitted last week.  Thought to be secondary to medications (Cymbalta, Tegretol, PPI).  Medications stopped and patient hydrated with improvement of Na.  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  Appreciate Renal input.  Started on IVF's and fluid restriction diet with improving Na.  Na slowly improving.    Appreciate Psych input and medications adjusted.  Hopefully home in 1-2 days.          Anemia of chronic disease    H/H similar to previous labs.          Chronic hepatitis C               Essential hypertension    Continue home medications and monitor.          Bipolar 1 disorder    Patient just discharged from psych facility.  Appreciate Psych input.        Acquired hypothyroidism    TSH wnl  Continue Synthroid.        Polysubstance dependence including opioid type drug, continuous use                VTE Risk Mitigation (From admission, onward)        Ordered     IP VTE LOW RISK PATIENT  Once      09/18/18 1614     Place sequential compression device  Until discontinued      09/18/18 1614              Ramon Marvin MD  Department of Hospital Medicine   [facility]

## 2018-09-21 NOTE — NURSING
Iv site no longer patient, sweilling at site. IV removed. Patient refused new start now, wants to wait until it is determined if prem will be discharged. Per Dr.Hoang whittington to leave out for now if no discharge restart site and fluids.

## 2018-09-21 NOTE — PROGRESS NOTES
"[facility]  Psychiatry  Progress Note    Patient Name: Estella Arevalo  MRN: [id number]   Code Status: Full Code  Admission Date: 2018  Hospital Length of Stay: 3 days  Expected Discharge Date:   Attending Physician: Ramon Marvin MD  Primary Care Provider: Angela Packer MD    Current Legal Status: N/A    Patient information was obtained from patient and Chart review and nursing.     Subjective:     Principal Problem:Hyponatremia    Chief Complaint:  Odd behavior and alcohol/polypharmacy use    HPI: Patient Estella Arevalo presents to the hospital directly from being discharged from an inpatient psychiatric facility and found to have low sodium.  Consult was placed to help manage her symptoms and medications.  She is easily approached in conversation but very flighty in her words.  She is difficult to maintain on task and goes off on many tangential stories.  She tells me that she has been on many psychiatric medications in the past and is worried that she is not feeling as she should.  She is not depressed but is anxious.  She feels that she is a little better since her initial admission to the psychiatric unit a couple weeks ago but "can be better".  She is stressed because her 90 year old mother passed away last week while she was in the hospital and is not able to go to the  because her  has to have surgery on that day.  She is tearful and somewhat labile in her speech.  She is also fixated on medications and has many questions about taking more.  She says that she is now on a pain management contract so is weary about what she takes.  She says at first that she stopped drinking a few weeks ago then added that she still has "screwdrivers and bloody heather with Absolute Vodka because it is the only Vodka that I drink".  Says that she lives with her  and helps take care of him.  Says that she does feel a little elevated now.  Denies any current hallucinations " "but does admit that she did see a cross burning on top of the American Flag before coming to the hospital.  She says that it was real because her  saw it also.  She was not sleeping so she woke him in the middle of the night to show him.  She says that she does not have an outpatient psychiatrist now and hasn't followed with one for years.  Her pain management doctor has been giving her Prozac, Elavil, and Ambien.  Biggest complaint is for poor sleep.      Hospital Course: 09/21/2018 - Patient Estella Arevalo looks somewhat worse today.  She has bright colored makeup and lipstick on today and continues to be very tangential.  Upon my going into the room to see her, she manipulated her recently placed PICC line and is bleeding.  She is difficult to redirect.  Says that she did not sleep well last night but "really would  like some Klonopin.  It is difficult to maintain full conversation and obtain any information from her today.    Interval History:  Possibly a little worse today    Family History     Problem Relation (Age of Onset)    Bipolar disorder Maternal Grandfather    Cancer Mother, Father    Depression Sister    Suicide Cousin        Tobacco Use    Smoking status: Never Smoker    Smokeless tobacco: Never Used   Substance and Sexual Activity    Alcohol use: Yes     Alcohol/week: 3.6 oz     Types: 6 Cans of beer per week     Comment: history of withdrawals; says that she quit but then admits that she drinks sometimes    Drug use: Yes     Types: Benzodiazepines, Methamphetamines, Amphetamines     Comment: Pain mgt clinic; admits to addiction to opioids    Sexual activity: Not on file     Psychotherapeutics (From admission, onward)    Start     Stop Route Frequency Ordered    09/21/18 0900  DULoxetine DR capsule 30 mg      -- Oral Daily 09/20/18 1828 09/20/18 2100  QUEtiapine tablet 100 mg      -- Oral Nightly 09/20/18 1829           Review of Systems   Constitutional: Positive for activity " "change and fatigue.   Respiratory: Negative for shortness of breath.    Cardiovascular: Negative for chest pain.   Gastrointestinal: Negative for nausea.   Musculoskeletal: Positive for back pain and myalgias.   Psychiatric/Behavioral: Positive for dysphoric mood and sleep disturbance. Negative for hallucinations and suicidal ideas. The patient is nervous/anxious.      Objective:     Vital Signs (Most Recent):  Temp: 96.4 °F (35.8 °C) (09/21/18 1138)  Pulse: 67 (09/21/18 1142)  Resp: 18 (09/21/18 1138)  BP: 100/73 (09/21/18 1142)  SpO2: (!) 94 % (09/21/18 1142) Vital Signs (24h Range):  Temp:  [96.4 °F (35.8 °C)-98 °F (36.7 °C)] 96.4 °F (35.8 °C)  Pulse:  [66-81] 67  Resp:  [18-20] 18  SpO2:  [94 %-100 %] 94 %  BP: ()/(56-80) 100/73     Height: 5' 5" (165.1 cm)  Weight: 77.7 kg (171 lb 5.5 oz)  Body mass index is 28.51 kg/m².      Intake/Output Summary (Last 24 hours) at 9/21/2018 1529  Last data filed at 9/21/2018 1307  Gross per 24 hour   Intake 960 ml   Output --   Net 960 ml       Physical Exam   Psychiatric:   EXAMINATION    CONSTITUTIONAL  General Appearance: hospital attire    MUSCULOSKELETAL  Muscle Strength and Tone: normal  Abnormal Involuntary Movements: none noted  Gait and Station: not observed    PSYCHIATRIC MENTAL STATUS EXAM   Level of Consciousness: awake and alert  Orientation: name, place, date, situation  Grooming: limited  Psychomotor Behavior: somewhat restless  Speech: pressured, normal volume  Language: no abnormalities  Mood: "alright"  Affect: odd and labile  Thought Process: linear at times, somewhat flighty  Associations: tangential  Thought Content: denies suicidal/homicidal/psychosis  Memory: intact to recent and remote  Attention: distracted  Fund of Knowledge: intact for conversation  Insight: poor into medication/drug/alcohol use  Judgment: poor into asking for more meds         Significant Labs:   Last 24 Hours:   Recent Lab Results       09/21/18  0403      Anion Gap 11     " BUN, Bld 9     Calcium 9.1     Chloride 96     CO2 21     Creatinine 0.7     eGFR if  >60     eGFR if non  >60  Comment:  Calculation used to obtain the estimated glomerular filtration  rate (eGFR) is the CKD-EPI equation.        Glucose 77     Potassium 4.2     Sodium 128         All pertinent labs within the past 24 hours have been reviewed.    Significant Imaging: I have reviewed all pertinent imaging results/findings within the past 24 hours.    Assessment/Plan:     Substance induced mood disorder    Patient with a long history of bipolar diathesis mood disorder but in the context of multiple substances and alcohol use.  Oxcarbazepine and fluoxetine and amitriptyline were stopped.  Continue duloxetine 30mg daily.  Increase quetiapine to 100 mg in the morning and 300 mg nightly.  Start Depakote 250 mg t.i.d..  Utilize olanzapine 10 mg p.o./IM q.8 hours p.r.n. acute psychotic or non redirectable agitation.    She has been in the hospital for a while and I do not suspect any withdrawal but she is slightly more manic today.        Polysubstance dependence including opioid type drug, continuous use    Patient with a long history of prescription drug abuse, not just controlled substances.  She also continues to use alcohol.  Minimize and polypharmacy and controlled substances.             Need for Continued Hospitalization:   Requires ongoing hospitalization for stabilization of medications.    Anticipated Disposition: Home or Self Care     Total time:  25 with greater than 50% of this time spent in counseling and/or coordination of care.       Aden Arita MD   Psychiatry  [facility]

## 2018-09-21 NOTE — ASSESSMENT & PLAN NOTE
Patient with a long history of bipolar diathesis mood disorder but in the context of multiple substances and alcohol use.  Oxcarbazepine and fluoxetine and amitriptyline were stopped.  Continue duloxetine 30mg daily.  Increase quetiapine to 100 mg in the morning and 300 mg nightly.  Start Depakote 250 mg t.i.d..  Utilize olanzapine 10 mg p.o./IM q.8 hours p.r.n. acute psychotic or non redirectable agitation.    She has been in the hospital for a while and I do not suspect any withdrawal but she is slightly more manic today.

## 2018-09-21 NOTE — SUBJECTIVE & OBJECTIVE
Interval History: Wants an Abdominal CT to evaluate her hernia, prenatal vitamins and prn Klonopin, otherwise feeling better.    Review of Systems   HENT: Negative for ear discharge and ear pain.    Eyes: Negative for pain and itching.   Genitourinary: Negative for difficulty urinating and dysuria.   Neurological: Negative for seizures and syncope.     Objective:     Vital Signs (Most Recent):  Temp: 96.4 °F (35.8 °C) (09/21/18 1138)  Pulse: 67 (09/21/18 1142)  Resp: 18 (09/21/18 1138)  BP: 100/73 (09/21/18 1142)  SpO2: (!) 94 % (09/21/18 1142) Vital Signs (24h Range):  Temp:  [96.4 °F (35.8 °C)-98 °F (36.7 °C)] 96.4 °F (35.8 °C)  Pulse:  [66-81] 67  Resp:  [18-20] 18  SpO2:  [94 %-100 %] 94 %  BP: ()/(56-80) 100/73     Weight: 77.7 kg (171 lb 5.5 oz)  Body mass index is 28.51 kg/m².    Intake/Output Summary (Last 24 hours) at 9/21/2018 1156  Last data filed at 9/21/2018 0847  Gross per 24 hour   Intake 960 ml   Output --   Net 960 ml      Physical Exam   Constitutional: She is oriented to person, place, and time. No distress.   HENT:   Head: Normocephalic and atraumatic.   Eyes: Conjunctivae are normal. Right eye exhibits no discharge. Left eye exhibits no discharge.   Neck: Neck supple. No tracheal deviation present.   Cardiovascular: Normal rate, regular rhythm and normal heart sounds.   Pulmonary/Chest: Effort normal and breath sounds normal.   Abdominal: Soft. Bowel sounds are normal.   Musculoskeletal: She exhibits no deformity.   Neurological: She is alert and oriented to person, place, and time.   Skin: Skin is warm and dry. She is not diaphoretic.   Psychiatric: Her mood appears anxious.       Significant Labs:   BMP:   Recent Labs   Lab  09/21/18   0403   GLU  77   NA  128*   K  4.2   CL  96   CO2  21*   BUN  9   CREATININE  0.7   CALCIUM  9.1     CBC:   No results for input(s): WBC, HGB, HCT, PLT in the last 48 hours.

## 2018-09-21 NOTE — HOSPITAL COURSE
"09/21/2018 - Patient Estella Arevalo looks somewhat worse today.  She has bright colored makeup and lipstick on today and continues to be very tangential.  Upon my going into the room to see her, she manipulated her recently placed PICC line and is bleeding.  She is difficult to redirect.  Says that she did not sleep well last night but "really would  like some Klonopin.  It is difficult to maintain full conversation and obtain any information from her today.  "

## 2018-09-21 NOTE — SUBJECTIVE & OBJECTIVE
"Interval History:  Possibly a little worse today    Family History     Problem Relation (Age of Onset)    Bipolar disorder Maternal Grandfather    Cancer Mother, Father    Depression Sister    Suicide Cousin        Tobacco Use    Smoking status: Never Smoker    Smokeless tobacco: Never Used   Substance and Sexual Activity    Alcohol use: Yes     Alcohol/week: 3.6 oz     Types: 6 Cans of beer per week     Comment: history of withdrawals; says that she quit but then admits that she drinks sometimes    Drug use: Yes     Types: Benzodiazepines, Methamphetamines, Amphetamines     Comment: Pain mgt clinic; admits to addiction to opioids    Sexual activity: Not on file     Psychotherapeutics (From admission, onward)    Start     Stop Route Frequency Ordered    09/21/18 0900  DULoxetine DR capsule 30 mg      -- Oral Daily 09/20/18 1828    09/20/18 2100  QUEtiapine tablet 100 mg      -- Oral Nightly 09/20/18 1829           Review of Systems   Constitutional: Positive for activity change and fatigue.   Respiratory: Negative for shortness of breath.    Cardiovascular: Negative for chest pain.   Gastrointestinal: Negative for nausea.   Musculoskeletal: Positive for back pain and myalgias.   Psychiatric/Behavioral: Positive for dysphoric mood and sleep disturbance. Negative for hallucinations and suicidal ideas. The patient is nervous/anxious.      Objective:     Vital Signs (Most Recent):  Temp: 96.4 °F (35.8 °C) (09/21/18 1138)  Pulse: 67 (09/21/18 1142)  Resp: 18 (09/21/18 1138)  BP: 100/73 (09/21/18 1142)  SpO2: (!) 94 % (09/21/18 1142) Vital Signs (24h Range):  Temp:  [96.4 °F (35.8 °C)-98 °F (36.7 °C)] 96.4 °F (35.8 °C)  Pulse:  [66-81] 67  Resp:  [18-20] 18  SpO2:  [94 %-100 %] 94 %  BP: ()/(56-80) 100/73     Height: 5' 5" (165.1 cm)  Weight: 77.7 kg (171 lb 5.5 oz)  Body mass index is 28.51 kg/m².      Intake/Output Summary (Last 24 hours) at 9/21/2018 1529  Last data filed at 9/21/2018 1307  Gross per 24 hour " "  Intake 960 ml   Output --   Net 960 ml       Physical Exam   Psychiatric:   EXAMINATION    CONSTITUTIONAL  General Appearance: hospital attire    MUSCULOSKELETAL  Muscle Strength and Tone: normal  Abnormal Involuntary Movements: none noted  Gait and Station: not observed    PSYCHIATRIC MENTAL STATUS EXAM   Level of Consciousness: awake and alert  Orientation: name, place, date, situation  Grooming: limited  Psychomotor Behavior: somewhat restless  Speech: pressured, normal volume  Language: no abnormalities  Mood: "alright"  Affect: odd and labile  Thought Process: linear at times, somewhat flighty  Associations: tangential  Thought Content: denies suicidal/homicidal/psychosis  Memory: intact to recent and remote  Attention: distracted  Fund of Knowledge: intact for conversation  Insight: poor into medication/drug/alcohol use  Judgment: poor into asking for more meds         Significant Labs:   Last 24 Hours:   Recent Lab Results       09/21/18  0403      Anion Gap 11     BUN, Bld 9     Calcium 9.1     Chloride 96     CO2 21     Creatinine 0.7     eGFR if  >60     eGFR if non  >60  Comment:  Calculation used to obtain the estimated glomerular filtration  rate (eGFR) is the CKD-EPI equation.        Glucose 77     Potassium 4.2     Sodium 128         All pertinent labs within the past 24 hours have been reviewed.    Significant Imaging: I have reviewed all pertinent imaging results/findings within the past 24 hours.  "

## 2018-09-21 NOTE — ASSESSMENT & PLAN NOTE
Patient with recurrent admissions for hyponatremia.  Admitted last week.  Thought to be secondary to medications (Cymbalta, Tegretol, PPI).  Medications stopped and patient hydrated with improvement of Na.  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  Appreciate Renal input.  Started on IVF's and fluid restriction diet with improving Na.  Na slowly improving.    Appreciate Psych input and medications adjusted.  Hopefully home in 1-2 days.

## 2018-09-21 NOTE — PLAN OF CARE
Problem: Patient Care Overview  Goal: Plan of Care Review  Outcome: Ongoing (interventions implemented as appropriate)  No falls, trauma or injury this shift. pain managed with oral medications every 6 hours. soudim 128 this am.  Continue with plan of care and continue to monitor patient.

## 2018-09-22 LAB
ANION GAP SERPL CALC-SCNC: 7 MMOL/L
BASOPHILS # BLD AUTO: 0.04 K/UL
BASOPHILS NFR BLD: 1.3 %
BUN SERPL-MCNC: 11 MG/DL
CALCIUM SERPL-MCNC: 9.2 MG/DL
CHLORIDE SERPL-SCNC: 100 MMOL/L
CO2 SERPL-SCNC: 26 MMOL/L
CREAT SERPL-MCNC: 0.7 MG/DL
DIFFERENTIAL METHOD: ABNORMAL
EOSINOPHIL # BLD AUTO: 0.3 K/UL
EOSINOPHIL NFR BLD: 10 %
ERYTHROCYTE [DISTWIDTH] IN BLOOD BY AUTOMATED COUNT: 16 %
EST. GFR  (AFRICAN AMERICAN): >60 ML/MIN/1.73 M^2
EST. GFR  (NON AFRICAN AMERICAN): >60 ML/MIN/1.73 M^2
GLUCOSE SERPL-MCNC: 86 MG/DL
HCT VFR BLD AUTO: 29.9 %
HGB BLD-MCNC: 9.9 G/DL
LYMPHOCYTES # BLD AUTO: 1 K/UL
LYMPHOCYTES NFR BLD: 31.6 %
MCH RBC QN AUTO: 29.5 PG
MCHC RBC AUTO-ENTMCNC: 33.1 G/DL
MCV RBC AUTO: 89 FL
MONOCYTES # BLD AUTO: 0.5 K/UL
MONOCYTES NFR BLD: 15.9 %
NEUTROPHILS # BLD AUTO: 1.3 K/UL
NEUTROPHILS NFR BLD: 41.2 %
PLATELET # BLD AUTO: 299 K/UL
PMV BLD AUTO: 9.9 FL
POTASSIUM SERPL-SCNC: 4.6 MMOL/L
RBC # BLD AUTO: 3.36 M/UL
SODIUM SERPL-SCNC: 133 MMOL/L
WBC # BLD AUTO: 3.2 K/UL

## 2018-09-22 PROCEDURE — 80048 BASIC METABOLIC PNL TOTAL CA: CPT

## 2018-09-22 PROCEDURE — 36415 COLL VENOUS BLD VENIPUNCTURE: CPT

## 2018-09-22 PROCEDURE — A4216 STERILE WATER/SALINE, 10 ML: HCPCS | Performed by: HOSPITALIST

## 2018-09-22 PROCEDURE — 25000003 PHARM REV CODE 250: Performed by: INTERNAL MEDICINE

## 2018-09-22 PROCEDURE — 12000002 HC ACUTE/MED SURGE SEMI-PRIVATE ROOM

## 2018-09-22 PROCEDURE — 85025 COMPLETE CBC W/AUTO DIFF WBC: CPT

## 2018-09-22 PROCEDURE — A4216 STERILE WATER/SALINE, 10 ML: HCPCS | Performed by: EMERGENCY MEDICINE

## 2018-09-22 PROCEDURE — 25000003 PHARM REV CODE 250: Performed by: HOSPITALIST

## 2018-09-22 PROCEDURE — 25000003 PHARM REV CODE 250: Performed by: EMERGENCY MEDICINE

## 2018-09-22 RX ORDER — FAMOTIDINE 20 MG/1
20 TABLET, FILM COATED ORAL 2 TIMES DAILY
Status: DISCONTINUED | OUTPATIENT
Start: 2018-09-22 | End: 2018-09-23 | Stop reason: HOSPADM

## 2018-09-22 RX ORDER — ALPRAZOLAM 0.5 MG/1
0.5 TABLET ORAL 2 TIMES DAILY PRN
Status: DISCONTINUED | OUTPATIENT
Start: 2018-09-22 | End: 2018-09-23 | Stop reason: HOSPADM

## 2018-09-22 RX ORDER — LUBIPROSTONE 24 UG/1
24 CAPSULE ORAL
Status: DISCONTINUED | OUTPATIENT
Start: 2018-09-22 | End: 2018-09-23 | Stop reason: HOSPADM

## 2018-09-22 RX ORDER — BISACODYL 5 MG
10 TABLET, DELAYED RELEASE (ENTERIC COATED) ORAL ONCE
Status: COMPLETED | OUTPATIENT
Start: 2018-09-22 | End: 2018-09-22

## 2018-09-22 RX ADMIN — Medication 3 ML: at 02:09

## 2018-09-22 RX ADMIN — LEVOTHYROXINE SODIUM 25 MCG: 25 TABLET ORAL at 08:09

## 2018-09-22 RX ADMIN — GABAPENTIN 300 MG: 300 CAPSULE ORAL at 08:09

## 2018-09-22 RX ADMIN — Medication 3 ML: at 12:09

## 2018-09-22 RX ADMIN — QUETIAPINE FUMARATE 100 MG: 100 TABLET ORAL at 08:09

## 2018-09-22 RX ADMIN — OXYCODONE HYDROCHLORIDE 10 MG: 5 TABLET ORAL at 03:09

## 2018-09-22 RX ADMIN — FAMOTIDINE 20 MG: 20 TABLET ORAL at 01:09

## 2018-09-22 RX ADMIN — DOCUSATE SODIUM 100 MG: 100 CAPSULE, LIQUID FILLED ORAL at 08:09

## 2018-09-22 RX ADMIN — FAMOTIDINE 20 MG: 20 TABLET ORAL at 08:09

## 2018-09-22 RX ADMIN — BISACODYL 10 MG: 5 TABLET, COATED ORAL at 02:09

## 2018-09-22 RX ADMIN — Medication 10 ML: at 06:09

## 2018-09-22 RX ADMIN — DULOXETINE 30 MG: 30 CAPSULE, DELAYED RELEASE ORAL at 08:09

## 2018-09-22 RX ADMIN — LUBIPROSTONE 24 MCG: 24 CAPSULE, GELATIN COATED ORAL at 02:09

## 2018-09-22 RX ADMIN — Medication 3 ML: at 06:09

## 2018-09-22 RX ADMIN — ALPRAZOLAM 0.5 MG: 0.5 TABLET ORAL at 08:09

## 2018-09-22 RX ADMIN — POLYETHYLENE GLYCOL 3350 17 G: 17 POWDER, FOR SOLUTION ORAL at 08:09

## 2018-09-22 RX ADMIN — OXYCODONE HYDROCHLORIDE 10 MG: 5 TABLET ORAL at 09:09

## 2018-09-22 RX ADMIN — OXYCODONE HYDROCHLORIDE 10 MG: 5 TABLET ORAL at 04:09

## 2018-09-22 RX ADMIN — ATENOLOL 25 MG: 25 TABLET ORAL at 08:09

## 2018-09-22 RX ADMIN — Medication 10 ML: at 12:09

## 2018-09-22 RX ADMIN — OXYCODONE HYDROCHLORIDE 10 MG: 5 TABLET ORAL at 10:09

## 2018-09-22 RX ADMIN — VITAMIN A, VITAMIN C, VITAMIN D-3, VITAMIN E, VITAMIN B-1, VITAMIN B-2, NIACIN, VITAMIN B-6, CALCIUM, IRON, ZINC, COPPER 1 TABLET: 4000; 120; 400; 22; 1.84; 3; 20; 10; 1; 12; 200; 27; 25; 2 TABLET ORAL at 08:09

## 2018-09-22 NOTE — PLAN OF CARE
Problem: Patient Care Overview  Goal: Plan of Care Review   09/22/18 1790   Coping/Psychosocial   Plan Of Care Reviewed With patient   Patient resting in bed with eyes closed. Resp. Even non-labored no acute distress noted. Able to make needs known. Pain is being managed with current medication regimen. Plan of care continues. IV fluids continues. Psych medications have adjusted. Patient's blood sodium level is increasing slowly. PICC line to  ADDIS patent dressing intact with small amount of dry drainage noted. Patient remains free from. Safety maintained, bed locked and in lowest position with  Call light in reach. Will monitor.

## 2018-09-22 NOTE — SUBJECTIVE & OBJECTIVE
Interval History: Feels better.  Complaining of constipation and heartburn.    Review of Systems   HENT: Negative for ear discharge and ear pain.    Eyes: Negative for pain and itching.   Genitourinary: Negative for difficulty urinating and dysuria.   Neurological: Negative for seizures and syncope.     Objective:     Vital Signs (Most Recent):  Temp: 99 °F (37.2 °C) (09/22/18 1130)  Pulse: 84 (09/22/18 1130)  Resp: 19 (09/22/18 1130)  BP: 128/72 (09/22/18 1130)  SpO2: (!) 94 % (09/22/18 1130) Vital Signs (24h Range):  Temp:  [97.7 °F (36.5 °C)-99 °F (37.2 °C)] 99 °F (37.2 °C)  Pulse:  [66-84] 84  Resp:  [17-19] 19  SpO2:  [94 %-98 %] 94 %  BP: (107-132)/(63-72) 128/72     Weight: 77.7 kg (171 lb 5.5 oz)  Body mass index is 28.51 kg/m².    Intake/Output Summary (Last 24 hours) at 9/22/2018 1440  Last data filed at 9/22/2018 0700  Gross per 24 hour   Intake 1200 ml   Output 900 ml   Net 300 ml      Physical Exam   Constitutional: She is oriented to person, place, and time. No distress.   HENT:   Head: Normocephalic and atraumatic.   Eyes: Conjunctivae are normal. Right eye exhibits no discharge. Left eye exhibits no discharge.   Neck: Neck supple. No tracheal deviation present.   Cardiovascular: Normal rate, regular rhythm and normal heart sounds.   Pulmonary/Chest: Effort normal and breath sounds normal.   Abdominal: Soft. Bowel sounds are normal.   Musculoskeletal: She exhibits no deformity.   Neurological: She is alert and oriented to person, place, and time.   Skin: Skin is warm and dry. She is not diaphoretic.   Psychiatric: Her mood appears anxious.       Significant Labs:   BMP:   Recent Labs   Lab  09/22/18   0556   GLU  86   NA  133*   K  4.6   CL  100   CO2  26   BUN  11   CREATININE  0.7   CALCIUM  9.2     CBC:   Recent Labs   Lab  09/22/18   0556   WBC  3.20*   HGB  9.9*   HCT  29.9*   PLT  299

## 2018-09-22 NOTE — ASSESSMENT & PLAN NOTE
Patient with recurrent admissions for hyponatremia.  Admitted last week.  Thought to be secondary to medications (Cymbalta, Tegretol, PPI).  Medications stopped and patient hydrated with improvement of Na.  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  Appreciate Renal input.  Started on IVF's and fluid restriction diet with improving Na.  Na slowly improving.    Appreciate Psych input and medications adjusted.  Hopefully home tomorrow.

## 2018-09-22 NOTE — PROGRESS NOTES
Estella Arevalo is a 63 y.o. female patient.    Follow for hyponatremia    No new c/o, comfortable    Scheduled Meds:   atenolol  25 mg Oral Daily    docusate sodium  100 mg Oral BID    DULoxetine  30 mg Oral Daily    ferrous sulfate  325 mg Oral TID WM    gabapentin  300 mg Oral BID    levothyroxine  25 mcg Oral Daily    prenatal vitamin  1 tablet Oral Daily    QUEtiapine  100 mg Oral QHS    sodium chloride 0.9%  10 mL Intravenous Q6H    sodium chloride 0.9%  3 mL Intravenous Q8H       Review of patient's allergies indicates:  No Known Allergies      Vital Signs Range (Last 24H):  Temp:  [96.4 °F (35.8 °C)-98.4 °F (36.9 °C)]   Pulse:  [66-80]   Resp:  [17-18]   BP: ()/(56-73)   SpO2:  [94 %-98 %]     I & O (Last 24H):    Intake/Output Summary (Last 24 hours) at 9/22/2018 0937  Last data filed at 9/22/2018 0700  Gross per 24 hour   Intake 1440 ml   Output 900 ml   Net 540 ml           Physical Exam:  General appearance: well developed, well nourished, no distress  Lungs:  clear to auscultation bilaterally and normal respiratory effort  Heart: regular rate and rhythm  Abdomen: soft, non-tender non-distented; bowel sounds normal; no masses,  no organomegaly  Extremities: no cyanosis or edema, or clubbing    Laboratory:  CBC:   Recent Labs   Lab  09/18/18   1224   WBC  4.05   RBC  3.84*   HGB  11.4*   HCT  31.5*   PLT  387*   MCV  82   MCH  29.7   MCHC  36.2*     CMP:   Recent Labs   Lab  09/18/18   1224   09/22/18   0556   GLU  132*   < >  86   CALCIUM  9.6   < >  9.2   ALBUMIN  4.2   --    --    PROT  8.1   --    --    NA  116*   < >  133*   K  4.8   < >  4.6   CO2  21*   < >  26   CL  83*   < >  100   BUN  6*   < >  11   CREATININE  0.7   < >  0.7   ALKPHOS  231*   --    --    ALT  35   --    --    AST  38   --    --    BILITOT  0.7   --    --     < > = values in this interval not displayed.     Imp/Plan    Hyponatremia - improving  Cirrhosis  Bipolar  CAD  Hypothyroidism  Umbilical  hernia    D/c IVF  Continue present Rx  CMP in am      Trac T Le  9/22/2018

## 2018-09-22 NOTE — PLAN OF CARE
Problem: Patient Care Overview  Goal: Plan of Care Review  Outcome: Ongoing (interventions implemented as appropriate)  Patient is A/Ox4, remains free from falls, is afebrile, states pain in abdomen is being moderately controlled with ordered medicine.  Patient is tolerating diet well, no complaints of N/V/, pt is passing flatus.  Pt ambulates to restroom independently, voids spontaenously.  Patient repeats her self, repeats requests, and asks the same questions repeatedly.  Patient seeks out staff frequently.  Patient states abdominal discomfort is worsening, MD aware, KUB ordered.

## 2018-09-22 NOTE — PROGRESS NOTES
[facility] Hospital Medicine  Progress Note    Patient Name: Estella Arevalo  MRN: [id number]  Patient Class: IP- Inpatient   Admission Date: 9/18/2018  Length of Stay: 4 days  Attending Physician: Ramon Marvin MD  Primary Care Provider: Angela Packer MD        Subjective:     Principal Problem:Hyponatremia    HPI:  62 y/o female presents for hyponatremia.  Patient is currently lethargic and unable to give history.  Patient wakes up, mumbles a response to question and goes back to sleep.  History obtained from ER and patient's .  Patient was admitted here last week for hyponatremia.  This was thought to be secondary to medications (Cymbalta, Tegretol and PPI).  Medications were stopped and she was hydrated with improvement of Na.  Patient was then discharged back to behavioral unit where she had been prior to admission.  Per , patient was discharged from behavioral unit, but he got a call on their way home to come to hospital because her Na was very low.   denies patient having excessive fluid intake.   keeps stating that she needs her pain medications.  He is also a very poor historian.  No further history able to be obtained.    Hospital Course:  62 y/o female admitted with recurrent hyponatremia.  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  Started on IVF's and fluid restriction diet.  Nephrology consulted.  Improving Na.  Psych consulted for medication adjustments.    Interval History: Feels better.  Complaining of constipation and heartburn.    Review of Systems   HENT: Negative for ear discharge and ear pain.    Eyes: Negative for pain and itching.   Genitourinary: Negative for difficulty urinating and dysuria.   Neurological: Negative for seizures and syncope.     Objective:     Vital Signs (Most Recent):  Temp: 99 °F (37.2 °C) (09/22/18 1130)  Pulse: 84 (09/22/18 1130)  Resp: 19 (09/22/18 1130)  BP: 128/72 (09/22/18 1130)  SpO2: (!) 94 % (09/22/18 1130) Vital  Signs (24h Range):  Temp:  [97.7 °F (36.5 °C)-99 °F (37.2 °C)] 99 °F (37.2 °C)  Pulse:  [66-84] 84  Resp:  [17-19] 19  SpO2:  [94 %-98 %] 94 %  BP: (107-132)/(63-72) 128/72     Weight: 77.7 kg (171 lb 5.5 oz)  Body mass index is 28.51 kg/m².    Intake/Output Summary (Last 24 hours) at 9/22/2018 1440  Last data filed at 9/22/2018 0700  Gross per 24 hour   Intake 1200 ml   Output 900 ml   Net 300 ml      Physical Exam   Constitutional: She is oriented to person, place, and time. No distress.   HENT:   Head: Normocephalic and atraumatic.   Eyes: Conjunctivae are normal. Right eye exhibits no discharge. Left eye exhibits no discharge.   Neck: Neck supple. No tracheal deviation present.   Cardiovascular: Normal rate, regular rhythm and normal heart sounds.   Pulmonary/Chest: Effort normal and breath sounds normal.   Abdominal: Soft. Bowel sounds are normal.   Musculoskeletal: She exhibits no deformity.   Neurological: She is alert and oriented to person, place, and time.   Skin: Skin is warm and dry. She is not diaphoretic.   Psychiatric: Her mood appears anxious.       Significant Labs:   BMP:   Recent Labs   Lab  09/22/18   0556   GLU  86   NA  133*   K  4.6   CL  100   CO2  26   BUN  11   CREATININE  0.7   CALCIUM  9.2     CBC:   Recent Labs   Lab  09/22/18   0556   WBC  3.20*   HGB  9.9*   HCT  29.9*   PLT  299         Assessment/Plan:      * Hyponatremia    Patient with recurrent admissions for hyponatremia.  Admitted last week.  Thought to be secondary to medications (Cymbalta, Tegretol, PPI).  Medications stopped and patient hydrated with improvement of Na.  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  Appreciate Renal input.  Started on IVF's and fluid restriction diet with improving Na.  Na slowly improving.    Appreciate Psych input and medications adjusted.  Hopefully home tomorrow.          Anemia of chronic disease    H/H similar to previous labs.          Chronic hepatitis C              Essential hypertension     Continue home medications and monitor.          Bipolar 1 disorder    Patient just discharged from psych facility.  Appreciate Psych input.        Acquired hypothyroidism    TSH wnl  Continue Synthroid.        Polysubstance dependence including opioid type drug, continuous use                VTE Risk Mitigation (From admission, onward)        Ordered     IP VTE LOW RISK PATIENT  Once      09/18/18 1614     Place sequential compression device  Until discontinued      09/18/18 1614              Ramon Marvin MD  Department of Hospital Medicine   [facility]

## 2018-09-23 VITALS
TEMPERATURE: 98 F | SYSTOLIC BLOOD PRESSURE: 103 MMHG | DIASTOLIC BLOOD PRESSURE: 78 MMHG | HEIGHT: 65 IN | RESPIRATION RATE: 18 BRPM | BODY MASS INDEX: 28.54 KG/M2 | HEART RATE: 78 BPM | OXYGEN SATURATION: 99 % | WEIGHT: 171.31 LBS

## 2018-09-23 LAB
ANION GAP SERPL CALC-SCNC: 8 MMOL/L
BUN SERPL-MCNC: 10 MG/DL
CALCIUM SERPL-MCNC: 10.2 MG/DL
CHLORIDE SERPL-SCNC: 97 MMOL/L
CO2 SERPL-SCNC: 26 MMOL/L
CREAT SERPL-MCNC: 0.8 MG/DL
EST. GFR  (AFRICAN AMERICAN): >60 ML/MIN/1.73 M^2
EST. GFR  (NON AFRICAN AMERICAN): >60 ML/MIN/1.73 M^2
GLUCOSE SERPL-MCNC: 100 MG/DL
POTASSIUM SERPL-SCNC: 4.3 MMOL/L
SODIUM SERPL-SCNC: 131 MMOL/L

## 2018-09-23 PROCEDURE — 80048 BASIC METABOLIC PNL TOTAL CA: CPT

## 2018-09-23 PROCEDURE — 25000003 PHARM REV CODE 250: Performed by: EMERGENCY MEDICINE

## 2018-09-23 PROCEDURE — 25000003 PHARM REV CODE 250: Performed by: INTERNAL MEDICINE

## 2018-09-23 PROCEDURE — 25000003 PHARM REV CODE 250: Performed by: HOSPITALIST

## 2018-09-23 PROCEDURE — A4216 STERILE WATER/SALINE, 10 ML: HCPCS | Performed by: EMERGENCY MEDICINE

## 2018-09-23 PROCEDURE — A4216 STERILE WATER/SALINE, 10 ML: HCPCS | Performed by: HOSPITALIST

## 2018-09-23 RX ORDER — QUETIAPINE FUMARATE 100 MG/1
100 TABLET, FILM COATED ORAL NIGHTLY
Qty: 30 TABLET | Refills: 11 | Status: SHIPPED | OUTPATIENT
Start: 2018-09-23 | End: 2019-04-04 | Stop reason: ALTCHOICE

## 2018-09-23 RX ORDER — DULOXETIN HYDROCHLORIDE 30 MG/1
30 CAPSULE, DELAYED RELEASE ORAL DAILY
Qty: 30 CAPSULE | Refills: 11 | Status: ON HOLD | OUTPATIENT
Start: 2018-09-23 | End: 2019-08-29 | Stop reason: HOSPADM

## 2018-09-23 RX ADMIN — Medication 3 ML: at 09:09

## 2018-09-23 RX ADMIN — Medication 3 ML: at 12:09

## 2018-09-23 RX ADMIN — LEVOTHYROXINE SODIUM 25 MCG: 25 TABLET ORAL at 09:09

## 2018-09-23 RX ADMIN — DULOXETINE 30 MG: 30 CAPSULE, DELAYED RELEASE ORAL at 09:09

## 2018-09-23 RX ADMIN — FERROUS SULFATE TAB EC 325 MG (65 MG FE EQUIVALENT) 325 MG: 325 (65 FE) TABLET DELAYED RESPONSE at 09:09

## 2018-09-23 RX ADMIN — OXYCODONE HYDROCHLORIDE 10 MG: 5 TABLET ORAL at 04:09

## 2018-09-23 RX ADMIN — OXYCODONE HYDROCHLORIDE 10 MG: 5 TABLET ORAL at 09:09

## 2018-09-23 RX ADMIN — DOCUSATE SODIUM 100 MG: 100 CAPSULE, LIQUID FILLED ORAL at 09:09

## 2018-09-23 RX ADMIN — ALPRAZOLAM 0.5 MG: 0.5 TABLET ORAL at 09:09

## 2018-09-23 RX ADMIN — GABAPENTIN 300 MG: 300 CAPSULE ORAL at 09:09

## 2018-09-23 RX ADMIN — ATENOLOL 25 MG: 25 TABLET ORAL at 09:09

## 2018-09-23 RX ADMIN — Medication 10 ML: at 12:09

## 2018-09-23 RX ADMIN — FAMOTIDINE 20 MG: 20 TABLET ORAL at 09:09

## 2018-09-23 RX ADMIN — Medication 10 ML: at 06:09

## 2018-09-23 RX ADMIN — VITAMIN A, VITAMIN C, VITAMIN D-3, VITAMIN E, VITAMIN B-1, VITAMIN B-2, NIACIN, VITAMIN B-6, CALCIUM, IRON, ZINC, COPPER 1 TABLET: 4000; 120; 400; 22; 1.84; 3; 20; 10; 1; 12; 200; 27; 25; 2 TABLET ORAL at 09:09

## 2018-09-23 RX ADMIN — LUBIPROSTONE 24 MCG: 24 CAPSULE, GELATIN COATED ORAL at 09:09

## 2018-09-23 NOTE — PROGRESS NOTES
Estella Arevalo is a 63 y.o. female patient.    Follow for hyponatremia    No new c/o, having bowel movement this am    Scheduled Meds:   atenolol  25 mg Oral Daily    docusate sodium  100 mg Oral BID    DULoxetine  30 mg Oral Daily    famotidine  20 mg Oral BID    ferrous sulfate  325 mg Oral TID WM    gabapentin  300 mg Oral BID    levothyroxine  25 mcg Oral Daily    lubiprostone  24 mcg Oral Daily with breakfast    prenatal vitamin  1 tablet Oral Daily    QUEtiapine  100 mg Oral QHS    sodium chloride 0.9%  10 mL Intravenous Q6H    sodium chloride 0.9%  3 mL Intravenous Q8H       Review of patient's allergies indicates:  No Known Allergies      Vital Signs Range (Last 24H):  Temp:  [97.8 °F (36.6 °C)-99 °F (37.2 °C)]   Pulse:  []   Resp:  [18-19]   BP: (103-128)/(69-78)   SpO2:  [94 %-99 %]     I & O (Last 24H):    Intake/Output Summary (Last 24 hours) at 9/23/2018 1059  Last data filed at 9/22/2018 2209  Gross per 24 hour   Intake 720 ml   Output --   Net 720 ml           Physical Exam:  General appearance: well developed, well nourished, no distress  Lungs:  clear to auscultation bilaterally and normal respiratory effort  Heart: regular rate and rhythm  Abdomen: soft, non-tender non-distented; bowel sounds normal; no masses,  no organomegaly  Extremities: no cyanosis or edema, or clubbing    Laboratory:  CBC:   Recent Labs   Lab  09/22/18   0556   WBC  3.20*   RBC  3.36*   HGB  9.9*   HCT  29.9*   PLT  299   MCV  89   MCH  29.5   MCHC  33.1     CMP:   Recent Labs   Lab  09/18/18   1224   09/23/18   0359   GLU  132*   < >  100   CALCIUM  9.6   < >  10.2   ALBUMIN  4.2   --    --    PROT  8.1   --    --    NA  116*   < >  131*   K  4.8   < >  4.3   CO2  21*   < >  26   CL  83*   < >  97   BUN  6*   < >  10   CREATININE  0.7   < >  0.8   ALKPHOS  231*   --    --    ALT  35   --    --    AST  38   --    --    BILITOT  0.7   --    --     < > = values in this interval not displayed.      Imp/Plan    Hyponatremia - stable  Cirrhosis  Bipolar  CAD  Umbilical hernia  Hypothyroidism    Continue present Rx  Renal status stable  OK for d/c on renal standpoint      Trac RANJIT Sprague  9/23/2018

## 2018-09-23 NOTE — NURSING
Patient discharged to home with .  PICC line removed, no s/s's of infection at site, catheter tip intact.  Discharge instructions given, pt stated understanding.  Paper Rx given, and one Rx called into pt's pharmacy.      Patient in no apparent distress, pain or SOB.  Transported out to 's vehicle via wheelchair.

## 2018-09-23 NOTE — NURSING
Patient currently OOB pacing in room. At beginning of shift, patient was very upset, tearful about mother's death. Earlier during during shift, xanax was adminiseter and patient dotted that  He feels much better. ,

## 2018-09-23 NOTE — PROGRESS NOTES
TN informed Jacklyn that patient is cleared for discharge from [facility]..Joaquina Oleary RN, BSN, STN [facility]  9/23/2018

## 2018-09-23 NOTE — PLAN OF CARE
09/23/18 1100   Medicare Message   Important Message from Medicare regarding Discharge Appeal Rights Given to patient/caregiver;Explained to patient/caregiver;Signed/date by patient/caregiver   Date IMM was signed 09/23/18   Time IMM was signed 1100

## 2018-09-23 NOTE — PLAN OF CARE
Problem: Patient Care Overview  Goal: Plan of Care Review  Patient resting in bed with eyes open. Resp. Even non-labored no acute distress noted. Patient have not slept all night long. Pain is being managed with  Current medication. Safety maintained will monitor.

## 2018-09-23 NOTE — PROGRESS NOTES
TN provided patient with Drug and Alcohol abuse programs, addresses and phone numbers.  She says that she should not have been CEC'd  Into [facility], says she in Pain management program...Joaquina Oleary RN, BSN, [facility]  9/23/2018

## 2018-09-23 NOTE — PLAN OF CARE
09/23/18 1311   Final Note   Assessment Type Final Discharge Note   Discharge Disposition Home   What phone number can be called within the next 1-3 days to see how you are doing after discharge? (see chart)   Hospital Follow Up  Appt(s) scheduled? Yes   Discharge plans and expectations educations in teach back method with documentation complete? Yes   Right Care Referral Info   Post Acute Recommendation No Care

## 2018-09-23 NOTE — DISCHARGE SUMMARY
[facility] Hospital Medicine  Discharge Summary      Patient Name: Estella Arevalo  MRN: [id number]  Admission Date: 9/18/2018  Hospital Length of Stay: 5 days  Discharge Date and Time:  09/23/2018 10:54 AM  Attending Physician: Ramon Marvin MD   Discharging Provider: Ramon Marvin MD  Primary Care Provider: Angela Packer MD      HPI:   64 y/o female presents for hyponatremia.  Patient is currently lethargic and unable to give history.  Patient wakes up, mumbles a response to question and goes back to sleep.  History obtained from ER and patient's .  Patient was admitted here last week for hyponatremia.  This was thought to be secondary to medications (Cymbalta, Tegretol and PPI).  Medications were stopped and she was hydrated with improvement of Na.  Patient was then discharged back to behavioral unit where she had been prior to admission.  Per , patient was discharged from behavioral unit, but he got a call on their way home to come to hospital because her Na was very low.   denies patient having excessive fluid intake.   keeps stating that she needs her pain medications.  He is also a very poor historian.  No further history able to be obtained.    * No surgery found *      Hospital Course:   64 y/o female admitted with recurrent hyponatremia.  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  Started on IVF's and fluid restriction diet.  Nephrology consulted.  Improving Na.  Psych consulted for medication adjustments.  Fluoxetine and Oxcarbazepine were stopped.  Patient was started on nightly Seroquel.  Patient remained afebrile and hemodynamically stable during hospital stay.  She initially presented with Na of 116.  Na now is 131.  She has been instructed on importance of limiting oral fluid intake.  Patient will be discharged home.     Addendum: Patient came back to hospital the day after discharge asking for a prescription for pain medication.  Stated that her pain  management doctor was on vacation for 14 days.  Patient stated that she was in pain secondary to her hernia.  I gave her a prescription for Oxycodone 10 mg every 6 hours prn pain with 15 pills and no refills.  Our Hospital Medicine supervisor then called me that 2 pharmacies had not filled her prescription because patient had altered the prescription.  She had apparently changed it to 40 mg and had written for refills.      Consults:   Consults (From admission, onward)        Status Ordering Provider     Inpatient consult to Nephrology  Once     Provider:  Get Beatty MD    Completed GLORIA WISEMAN     [facility]  [facility]     Provider:  (Not yet assigned)    Acknowledged GLORIA WISEMAN     Inpatient consult to Psychiatry  Once     Provider:  Aden Arita MD    Completed GLORIA WISEMAN          No new Assessment & Plan notes have been filed under this hospital service since the last note was generated.  Service: Hospital Medicine    Final Active Diagnoses:    Diagnosis Date Noted POA    PRINCIPAL PROBLEM:  Hyponatremia [E87.1] 07/16/2018 Yes    Anemia of chronic disease [D63.8] 09/12/2018 Yes     Chronic    Substance induced mood disorder [F19.94] 09/12/2018 Yes    Chronic hepatitis C [B18.2] 07/16/2018 Yes     Chronic    Essential hypertension [I10] 07/16/2018 Yes     Chronic    Bipolar 1 disorder [F31.9] 12/30/2016 Yes     Chronic    Acquired hypothyroidism [E03.9] 12/19/2014 Yes     Chronic    Polysubstance dependence including opioid type drug, continuous use [F11.20, F19.20] 07/14/2013 Yes      Problems Resolved During this Admission:       Discharged Condition: stable    Disposition: Home or Self Care    Follow Up:  Follow-up Information     Angela Packer MD In 1 week.    Specialty:  Family Medicine  Contact information:  [address] [id number] [phone]             DOMITILA Patton.    Specialty:  Urgent Care  Why:   Outpatient Services, PCP follow-up appointment. Patient should arrive by 2:00PM.   Contact information:  [address]  [phone]             Vini Gomez MD In 1 week.    Specialties:  General Surgery, Bariatrics  Contact information:  [address] [facility] [facility]  [address]  [address]  [address]  [phone]                 Patient Instructions:      Diet Cardiac     Notify your health care provider if you experience any of the following:  temperature >100.4     Notify your health care provider if you experience any of the following:  persistent nausea and vomiting or diarrhea     Notify your health care provider if you experience any of the following:  redness, tenderness, or signs of infection (pain, swelling, redness, odor or green/yellow discharge around incision site)     Notify your health care provider if you experience any of the following:  difficulty breathing or increased cough     Notify your health care provider if you experience any of the following:  persistent dizziness, light-headedness, or visual disturbances     Notify your health care provider if you experience any of the following:  increased confusion or weakness     Activity as tolerated         Pending Diagnostic Studies:     Procedure Component Value Units Date/Time    X-Ray Abdomen AP 1 View [[id number]] Resulted:  09/22/18 1429    Order Status:  Sent Lab Status:  In process Updated:  09/22/18 6637         Medications:  Reconciled Home Medications:      Medication List      START taking these medications    QUEtiapine 100 MG Tab  Commonly known as:  SEROQUEL  Take 1 tablet (100 mg total) by mouth every evening.        CONTINUE taking these medications    atenolol 25 MG tablet  Commonly known as:  TENORMIN  Take 1 tablet (25 mg total) by mouth once daily.     clonazePAM 1 MG tablet  Commonly known as:  KLONOPIN  Take 1 tablet (1 mg total) by mouth 2 (two) times daily. for 7 days     docusate sodium 100 MG  capsule  Commonly known as:  COLACE  Take 1 capsule (100 mg total) by mouth 2 (two) times daily.     DULoxetine 30 MG capsule  Commonly known as:  CYMBALTA  TAKE 1 CAPSULE(30 MG) BY MOUTH EVERY DAY     efinaconazole 10 % Darlin  Commonly known as:  JUBLIA  APPLY ONE DOSE DAILY     ferrous sulfate 325 (65 FE) MG EC tablet  Take 325 mg by mouth 3 (three) times daily with meals.     gabapentin 300 MG capsule  Commonly known as:  NEURONTIN  Take 1 capsule (300 mg total) by mouth 2 (two) times daily.     hydrocortisone-pramoxine rectal foam  Commonly known as:  PROCTOFOAM-HS  Place 1 applicator rectally 2 (two) times daily.     levothyroxine 25 MCG tablet  Commonly known as:  SYNTHROID  Take 1 tablet (25 mcg total) by mouth once daily.     LINZESS 145 mcg Cap capsule  Generic drug:  linaclotide  Take 1 capsule (145 mcg total) by mouth once daily.     naproxen 500 MG tablet  Commonly known as:  NAPROSYN  Take 1 tablet (500 mg total) by mouth 2 (two) times daily as needed.     NARCAN 4 mg/actuation Spry  Generic drug:  naloxone  USE UTD     nicotine 21 mg/24 hr  Commonly known as:  NICODERM CQ  Place 1 patch onto the skin every 24 hours.     nitroGLYCERIN 0.3 MG SL tablet  Commonly known as:  NITROSTAT  ONE TABLET UNDER TONGUE AS NEEDED FOR CHEST PAIN EVERY 5 MINUTES AS NEEDED     ondansetron 4 MG tablet  Commonly known as:  ZOFRAN  Take 1 tablet (4 mg total) by mouth every 6 (six) hours.     ondansetron 4 MG Tbdl  Commonly known as:  ZOFRAN-ODT  Take 1 tablet (4 mg total) by mouth every 8 (eight) hours as needed. Nausea/vomiting     pantoprazole 40 MG tablet  Commonly known as:  PROTONIX  Take 1 tablet (40 mg total) by mouth once daily.     polymyxin B sulf-trimethoprim 10,000 unit- 1 mg/mL Drop  Commonly known as:  POLYTRIM  Place 1 drop into the left eye every 6 (six) hours.     promethazine 25 MG tablet  Commonly known as:  PHENERGAN  Take 1 tablet (25 mg total) by mouth every 6 (six) hours as needed for Nausea.      ranitidine 300 MG tablet  Commonly known as:  ZANTAC  Take 1 tablet (300 mg total) by mouth every evening.     * RESTASIS 0.05 % ophthalmic emulsion  Generic drug:  cycloSPORINE  INSTILL 1 DROP IN BOTH EYES TWICE DAILY     * cycloSPORINE 0.05 % ophthalmic emulsion  Commonly known as:  RESTASIS  Place 0.4 mLs (1 drop total) into both eyes 2 (two) times daily.     triamcinolone acetonide 0.1% 0.1 % cream  Commonly known as:  KENALOG  Apply topically 2 (two) times daily.         * This list has 2 medication(s) that are the same as other medications prescribed for you. Read the directions carefully, and ask your doctor or other care provider to review them with you.            STOP taking these medications    amitriptyline 50 MG tablet  Commonly known as:  ELAVIL     FLUoxetine 20 MG capsule  Commonly known as:  PROZAC     OXcarbazepine 300 MG Tab  Commonly known as:  TRILEPTAL     oxyCODONE-acetaminophen 5-325 mg per tablet  Commonly known as:  PERCOCET            Indwelling Lines/Drains at time of discharge:   Lines/Drains/Airways     Peripherally Inserted Central Catheter Line                 PICC Double Lumen 09/21/18 1501 right basilic 1 day                Time spent on the discharge of patient: >30 minutes  Patient was seen and examined on the date of discharge and determined to be suitable for discharge.         Ramon Marvin MD  Department of Hospital Medicine  [facility]

## 2018-09-23 NOTE — PROGRESS NOTES
WRITTEN DISCHARGE INFORMATION:     Follow-up Information     Angela Packer MD In 1 week.    Specialty:  Family Medicine  Contact information:  [address] [id number] [phone]             DOMITILA Patton.    Specialty:  Urgent Care  Why:  Outpatient Services, PCP follow-up appointment. Patient should arrive by 2:00PM.   Contact information:  [address] [id number] [phone]             Vini Gomez MD. Schedule an appointment as soon as possible for a visit in 1 week.    Contact information:  [facility]              Things that YOU are responsible for to Manage Your Care At Home:  1. Getting your prescriptions filled.  2. Taking you medications as directed. DO NOT MISS ANY DOSES!  3. Going to your follow-up doctor appointments. This is important because it allows the doctor to monitor your progress and to determine if any changes need to be made to your treatment plan.                                   Help at Home  After discharge for assistance [facility] 24/7 assistance  [phone]     Thank you for choosing [facility] for your care.  Sincerely, Your [facility] Healthcare Manager is,  Joaquina Oleary RN [place] [phone]

## 2018-09-23 NOTE — DISCHARGE INSTRUCTIONS
Discharge Instructions for Hyponatremia  You were diagnosed with hyponatremia, which means your blood level of sodium (salt) is too low. Salt is needed for the body and brain to work. Very low blood levels of sodium can be fatal. Symptoms can include headache, confusion, fatigue, muscle cramps, hallucinations, seizures, and coma. You have been treated to raise your blood levels of sodium. The following instructions will help you care for yourself at home as you have been instructed.  Home care  · Limit your intake of fluids. Drink only the amounts directed by your healthcare provider.  · Ask your healthcare provider what you should use to replace fluids if you are throwing up.  · Keep all follow-up appointments. Your provider needs to watch your condition closely.  To help prevent hyponatremia:  · Take all medicines exactly as directed. Certain medicines can lower blood sodium levels.  · If you have done something that makes you sweat a lot, drink fluids that contain salt and other electrolytes.   · Tell all healthcare providers what medicines you take. Mention all prescription and over-the-counter drugs and herbs.  · Have your sodium levels checked often. This is vital if you take a diuretic (medicine that helps your body get rid of water).  Follow-up  Schedule a follow-up visit as directed.  When to call your healthcare provider  Call your provider right away if you have any of the following:  · Severe tiredness  · Fainting  · Dizziness  · Loss of appetite  · Nausea or vomiting  · Confusion or forgetfullness  · Muscle spasms, cramping, or twitching  · Seizures  · Gait disturbances   Date Last Reviewed: 6/8/2015  © 8842-9061 [employer]. [address] [place] [address], [place] [address]. All rights reserved. This information is not intended as a substitute for professional medical care. Always follow your healthcare professional's instructions.

## 2018-10-10 ENCOUNTER — TELEPHONE (OUTPATIENT)
Dept: FAMILY MEDICINE | Facility: CLINIC | Age: 63
End: 2018-10-10

## 2018-10-10 NOTE — TELEPHONE ENCOUNTER
Patient requested appiontment be re-schd because she wanted labs drawn that was ordered by Dr Packer in august and she agreed on 10/16@ 8:40a appointment

## 2018-10-10 NOTE — TELEPHONE ENCOUNTER
LVM stating that Dr. Packer is out til Monday. Ad doesn't have anything til tomorrow. We can offer her an appt after hours.

## 2018-10-10 NOTE — TELEPHONE ENCOUNTER
----- Message from Iman Avila sent at 10/10/2018 12:31 PM CDT -----  Contact: PT  PT is calling regarding the need to be seen today! By either Dr. Packer or her NP  PT just got discharged from ER from a week stay for critical sodium levels  PT also needs a rectal surgery appointment / referral needed asap  PT has a rectal prolapse and is in horrible pain  Blood work needs to be taken again as well.      Callback: [phone]

## 2018-10-26 ENCOUNTER — HOSPITAL ENCOUNTER (INPATIENT)
Facility: HOSPITAL | Age: 63
LOS: 4 days | Discharge: HOME | DRG: 917 | End: 2018-10-30
Attending: EMERGENCY MEDICINE | Admitting: EMERGENCY MEDICINE
Payer: OTHER GOVERNMENT

## 2018-10-26 DIAGNOSIS — F54 PSYCHOLOGICAL AND BEHAVIORAL FACTORS ASSOCIATED WITH DISORDERS OR DISEASES CLASSIFIED ELSEWHERE: ICD-10-CM

## 2018-10-26 DIAGNOSIS — R41.82 ALTERED MENTAL STATE: Primary | ICD-10-CM

## 2018-10-26 DIAGNOSIS — R00.0 TACHYCARDIA: ICD-10-CM

## 2018-10-26 DIAGNOSIS — F11.20 POLYSUBSTANCE DEPENDENCE INCLUDING OPIOID TYPE DRUG, CONTINUOUS USE: ICD-10-CM

## 2018-10-26 DIAGNOSIS — F19.20 POLYSUBSTANCE DEPENDENCE INCLUDING OPIOID TYPE DRUG, CONTINUOUS USE: ICD-10-CM

## 2018-10-26 LAB
ABO + RH BLD: NORMAL
ALBUMIN SERPL BCP-MCNC: 3.1 G/DL
ALP SERPL-CCNC: 301 U/L
ALT SERPL W/O P-5'-P-CCNC: 53 U/L
AMMONIA PLAS-SCNC: 32 UMOL/L
AMPHET+METHAMPHET UR QL: NEGATIVE
ANION GAP SERPL CALC-SCNC: 12 MMOL/L
AST SERPL-CCNC: 106 U/L
BARBITURATES UR QL SCN>200 NG/ML: NEGATIVE
BASOPHILS # BLD AUTO: 0.02 K/UL
BASOPHILS NFR BLD: 0.5 %
BENZODIAZ UR QL SCN>200 NG/ML: NEGATIVE
BILIRUB SERPL-MCNC: 1.1 MG/DL
BILIRUB UR QL STRIP: NEGATIVE
BILIRUB UR QL STRIP: NEGATIVE
BLD GP AB SCN CELLS X3 SERPL QL: NORMAL
BNP SERPL-MCNC: 20 PG/ML
BUN SERPL-MCNC: 6 MG/DL
BZE UR QL SCN: NEGATIVE
CALCIUM SERPL-MCNC: 7.6 MG/DL
CANNABINOIDS UR QL SCN: NEGATIVE
CHLORIDE SERPL-SCNC: 110 MMOL/L
CLARITY UR: CLEAR
CLARITY UR: CLEAR
CO2 SERPL-SCNC: 19 MMOL/L
COLOR UR: NORMAL
COLOR UR: NORMAL
CREAT SERPL-MCNC: 0.6 MG/DL
CREAT UR-MCNC: 25.8 MG/DL
DIFFERENTIAL METHOD: ABNORMAL
EOSINOPHIL # BLD AUTO: 0.2 K/UL
EOSINOPHIL NFR BLD: 6.3 %
ERYTHROCYTE [DISTWIDTH] IN BLOOD BY AUTOMATED COUNT: 17.7 %
EST. GFR  (AFRICAN AMERICAN): >60 ML/MIN/1.73 M^2
EST. GFR  (NON AFRICAN AMERICAN): >60 ML/MIN/1.73 M^2
ETHANOL SERPL-MCNC: <10 MG/DL
GLUCOSE SERPL-MCNC: 98 MG/DL
GLUCOSE UR QL STRIP: NEGATIVE
GLUCOSE UR QL STRIP: NEGATIVE
HCT VFR BLD AUTO: 32.7 %
HGB BLD-MCNC: 11.4 G/DL
HGB UR QL STRIP: NEGATIVE
HGB UR QL STRIP: NEGATIVE
INR PPP: 1
KETONES UR QL STRIP: NEGATIVE
KETONES UR QL STRIP: NEGATIVE
LACTATE SERPL-SCNC: 1.9 MMOL/L
LEUKOCYTE ESTERASE UR QL STRIP: NEGATIVE
LEUKOCYTE ESTERASE UR QL STRIP: NEGATIVE
LIPASE SERPL-CCNC: 22 U/L
LYMPHOCYTES # BLD AUTO: 1.6 K/UL
LYMPHOCYTES NFR BLD: 44.2 %
MAGNESIUM SERPL-MCNC: 1.7 MG/DL
MCH RBC QN AUTO: 30.6 PG
MCHC RBC AUTO-ENTMCNC: 34.9 G/DL
MCV RBC AUTO: 88 FL
METHADONE UR QL SCN>300 NG/ML: NEGATIVE
MONOCYTES # BLD AUTO: 0.5 K/UL
MONOCYTES NFR BLD: 12.6 %
NEUTROPHILS # BLD AUTO: 1.3 K/UL
NEUTROPHILS NFR BLD: 36.4 %
NITRITE UR QL STRIP: NEGATIVE
NITRITE UR QL STRIP: NEGATIVE
OPIATES UR QL SCN: NEGATIVE
PCP UR QL SCN>25 NG/ML: NEGATIVE
PH UR STRIP: 7 [PH] (ref 5–8)
PH UR STRIP: 7 [PH] (ref 5–8)
PLATELET # BLD AUTO: 125 K/UL
PMV BLD AUTO: 10.1 FL
POTASSIUM SERPL-SCNC: 2.9 MMOL/L
PROT SERPL-MCNC: 6.2 G/DL
PROT UR QL STRIP: NEGATIVE
PROT UR QL STRIP: NEGATIVE
PROTHROMBIN TIME: 10.5 SEC
RBC # BLD AUTO: 3.72 M/UL
SODIUM SERPL-SCNC: 141 MMOL/L
SP GR UR STRIP: 1 (ref 1–1.03)
SP GR UR STRIP: 1 (ref 1–1.03)
TOXICOLOGY INFORMATION: NORMAL
TROPONIN I SERPL DL<=0.01 NG/ML-MCNC: <0.006 NG/ML
TSH SERPL DL<=0.005 MIU/L-ACNC: 2.26 UIU/ML
URN SPEC COLLECT METH UR: NORMAL
URN SPEC COLLECT METH UR: NORMAL
UROBILINOGEN UR STRIP-ACNC: NEGATIVE EU/DL
UROBILINOGEN UR STRIP-ACNC: NEGATIVE EU/DL
WBC # BLD AUTO: 3.64 K/UL

## 2018-10-26 PROCEDURE — 96374 THER/PROPH/DIAG INJ IV PUSH: CPT

## 2018-10-26 PROCEDURE — 25000003 PHARM REV CODE 250: Performed by: EMERGENCY MEDICINE

## 2018-10-26 PROCEDURE — 83605 ASSAY OF LACTIC ACID: CPT

## 2018-10-26 PROCEDURE — 80053 COMPREHEN METABOLIC PANEL: CPT

## 2018-10-26 PROCEDURE — 96361 HYDRATE IV INFUSION ADD-ON: CPT

## 2018-10-26 PROCEDURE — 84443 ASSAY THYROID STIM HORMONE: CPT

## 2018-10-26 PROCEDURE — 96372 THER/PROPH/DIAG INJ SC/IM: CPT

## 2018-10-26 PROCEDURE — C9113 INJ PANTOPRAZOLE SODIUM, VIA: HCPCS | Performed by: EMERGENCY MEDICINE

## 2018-10-26 PROCEDURE — 85025 COMPLETE CBC W/AUTO DIFF WBC: CPT

## 2018-10-26 PROCEDURE — 96375 TX/PRO/DX INJ NEW DRUG ADDON: CPT

## 2018-10-26 PROCEDURE — S0166 INJ OLANZAPINE 2.5MG: HCPCS | Performed by: EMERGENCY MEDICINE

## 2018-10-26 PROCEDURE — 83735 ASSAY OF MAGNESIUM: CPT

## 2018-10-26 PROCEDURE — 83690 ASSAY OF LIPASE: CPT

## 2018-10-26 PROCEDURE — 85610 PROTHROMBIN TIME: CPT

## 2018-10-26 PROCEDURE — 86880 COOMBS TEST DIRECT: CPT

## 2018-10-26 PROCEDURE — 99285 EMERGENCY DEPT VISIT HI MDM: CPT | Mod: 25

## 2018-10-26 PROCEDURE — 86901 BLOOD TYPING SEROLOGIC RH(D): CPT

## 2018-10-26 PROCEDURE — 83880 ASSAY OF NATRIURETIC PEPTIDE: CPT

## 2018-10-26 PROCEDURE — 11000001 HC ACUTE MED/SURG PRIVATE ROOM

## 2018-10-26 PROCEDURE — 86850 RBC ANTIBODY SCREEN: CPT

## 2018-10-26 PROCEDURE — 93005 ELECTROCARDIOGRAM TRACING: CPT

## 2018-10-26 PROCEDURE — 93010 ELECTROCARDIOGRAM REPORT: CPT | Mod: ,,, | Performed by: INTERNAL MEDICINE

## 2018-10-26 PROCEDURE — 80307 DRUG TEST PRSMV CHEM ANLYZR: CPT

## 2018-10-26 PROCEDURE — 63600175 PHARM REV CODE 636 W HCPCS: Performed by: EMERGENCY MEDICINE

## 2018-10-26 PROCEDURE — 81003 URINALYSIS AUTO W/O SCOPE: CPT | Mod: 59

## 2018-10-26 PROCEDURE — 84484 ASSAY OF TROPONIN QUANT: CPT

## 2018-10-26 PROCEDURE — 80320 DRUG SCREEN QUANTALCOHOLS: CPT

## 2018-10-26 PROCEDURE — 82140 ASSAY OF AMMONIA: CPT

## 2018-10-26 PROCEDURE — 86900 BLOOD TYPING SEROLOGIC ABO: CPT

## 2018-10-26 RX ORDER — NALOXONE HCL 0.4 MG/ML
0.4 VIAL (ML) INJECTION
Status: DISCONTINUED | OUTPATIENT
Start: 2018-10-26 | End: 2018-10-26

## 2018-10-26 RX ORDER — LEVOTHYROXINE SODIUM 25 UG/1
25 TABLET ORAL DAILY
Status: DISCONTINUED | OUTPATIENT
Start: 2018-10-27 | End: 2018-10-30 | Stop reason: HOSPADM

## 2018-10-26 RX ORDER — LORAZEPAM 2 MG/ML
2 INJECTION INTRAMUSCULAR
Status: COMPLETED | OUTPATIENT
Start: 2018-10-26 | End: 2018-10-26

## 2018-10-26 RX ORDER — POTASSIUM CHLORIDE 20 MEQ/15ML
40 SOLUTION ORAL
Status: COMPLETED | OUTPATIENT
Start: 2018-10-26 | End: 2018-10-26

## 2018-10-26 RX ORDER — ATENOLOL 25 MG/1
25 TABLET ORAL DAILY
Status: DISCONTINUED | OUTPATIENT
Start: 2018-10-27 | End: 2018-10-27

## 2018-10-26 RX ORDER — SODIUM CHLORIDE 9 MG/ML
1000 INJECTION, SOLUTION INTRAVENOUS
Status: COMPLETED | OUTPATIENT
Start: 2018-10-26 | End: 2018-10-26

## 2018-10-26 RX ORDER — OLANZAPINE 10 MG/2ML
5 INJECTION, POWDER, FOR SOLUTION INTRAMUSCULAR ONCE AS NEEDED
Status: COMPLETED | OUTPATIENT
Start: 2018-10-26 | End: 2018-10-26

## 2018-10-26 RX ADMIN — SODIUM CHLORIDE 1000 ML: 0.9 INJECTION, SOLUTION INTRAVENOUS at 09:10

## 2018-10-26 RX ADMIN — SODIUM CHLORIDE 1000 ML: 0.9 INJECTION, SOLUTION INTRAVENOUS at 04:10

## 2018-10-26 RX ADMIN — CALCIUM GLUCONATE 1000 MG: 98 INJECTION, SOLUTION INTRAVENOUS at 09:10

## 2018-10-26 RX ADMIN — OLANZAPINE 5 MG: 10 INJECTION, POWDER, FOR SOLUTION INTRAMUSCULAR at 04:10

## 2018-10-26 RX ADMIN — DEXTROSE 8 MG/HR: 50 INJECTION, SOLUTION INTRAVENOUS at 06:10

## 2018-10-26 RX ADMIN — SODIUM CHLORIDE 1000 ML: 0.9 INJECTION, SOLUTION INTRAVENOUS at 07:10

## 2018-10-26 RX ADMIN — POTASSIUM CHLORIDE 40 MEQ: 20 SOLUTION ORAL at 06:10

## 2018-10-26 RX ADMIN — LORAZEPAM 2 MG: 2 INJECTION INTRAMUSCULAR; INTRAVENOUS at 05:10

## 2018-10-26 NOTE — ED TRIAGE NOTES
Pt came in via EMS and EMS reports that pt was found unresponsive at residence and was called out via family members for rectal bleeding; pt was admin 0.5 narcan and responded; pt came in with wrist restraints and pt is constantly trying to get out of the bed; pt is alert and awake and ox2

## 2018-10-26 NOTE — ED PROVIDER NOTES
Encounter Date: 10/26/2018    SCRIBE #1 NOTE: I, Windy Navarrete, am scribing for, and in the presence of,  Salvador Wlels MD. I have scribed the following portions of the note - Other sections scribed: ROS and HPI.       History     Chief Complaint   Patient presents with    Altered Mental Status     Pt found unresponsive at residence. (Family called EMS for rectal bleeding.)  Upon EMS arrival found unresponsive.  Gave 0.5 Narcan, pt woke up.  Pt restrained by EMS for safety.  Pt attempting to get off stretcher.  Pt awake and alert upon arrival to ed.  alert to person and place.  Unknown why she is in ED.     CC: Altered Mental Status    HPI: This 63 y.o. female with a past medical history of Addiction to drug, Alcohol abuse, Arthritis, Cirrhosis of liver, Coronary artery disease, Fall, GERD, Hepatitis C, History of psychiatric hospitalization, Hypertension, Low back pain, Radha, MI, Migraine headache, Psychiatric problem, Sleep difficulties, Suicide attempt, Therapy, and Thyroid disease, presents to the ED for an emergent evaluation of her altered mental status. The patient was found unresponsive at the residence. Family placed the call to EMS for the patient's rectal bleeding. Upon EMS arrival, the patient was found unresponsive at the scene. The patient regained consciousness after 0.5 MG Narcan was given. Unable to obtain any reliable hx from the patient as the patient speech is incomprehensible at this time.       The history is provided by the EMS personnel. No  was used.     Review of patient's allergies indicates:  No Known Allergies  Past Medical History:   Diagnosis Date    Addiction to drug     Alcohol abuse     Arthritis     Cirrhosis of liver     Coronary artery disease     Fall     GERD (gastroesophageal reflux disease)     Hepatitis C     States was successfully treated with Charito    History of psychiatric hospitalization     Hx of psychiatric care     Hypertension      Low back pain     Radha     MI (myocardial infarction)     Migraine headache     Psychiatric problem     Sleep difficulties     Suicide attempt     Therapy     Thyroid disease      Past Surgical History:   Procedure Laterality Date    BLOCK, NERVE, FACET JOINT, LUMBAR, MEDIAL BRANCH Right 4/16/2013    [facility] T    BLOCK, NERVE, FACET JOINT, LUMBAR, MEDIAL BRANCH Right 4/2/2013    [facility]    COLONOSCOPY Left 7/16/2013    Performed [facility] Hernandez Farias [facility] at [facility] ENDO    EGD (ESOPHAGOGASTRODUODENOSCOPY) N/A 7/15/2013    Performed by Hernandez Farias [facility] at [facility] ENDO    ESOPHAGOGASTRODUODENOSCOPY (EGD) N/A 3/10/2017    Performed by Vini Gomez MD at [facility] OR    ESOPHAGOGASTRODUODENOSCOPY (EGD) N/A 3/10/2017    Performed by Arnulfo Benton MD at [facility] ENDO    HERNIA REPAIR      HIATAL HERNIA REPAIR      JOINT REPLACEMENT Bilateral     KNEE SURGERY  bilateral replacement    LAPAROSCOPIC PEG TUBE PLACEMENT/REPLACEMENT N/A 3/10/2017    Performed by Vini Gomez MD at [facility] OR    REPAIR-HERNIA-LAPAROSCOPIC-HIATAL N/A 3/10/2017    Performed by Vini Gomez MD at [facility] OR    REPAIR-HERNIA-VENTRAL-LAPAROSCOPIC-W/MESH N/A 11/8/2017    Performed by Vini Gomez MD at [facility] OR    right foot sx  2008    SHOULDER SURGERY  replacement     Family History   Problem Relation Age of Onset    Cancer Mother     Cancer Father     Depression Sister     Bipolar disorder Maternal Grandfather     Suicide Cousin      Social History     Tobacco Use    Smoking status: Never Smoker    Smokeless tobacco: Never Used   Substance Use Topics    Alcohol use: Yes     Alcohol/week: 3.6 oz     Types: 6 Cans of beer per week     Comment: history of withdrawals; says that she quit but then admits that she drinks sometimes    Drug use: Yes     Types: Benzodiazepines, Methamphetamines, Amphetamines     Comment: Pain mg[facility] clinic; admits to  addiction to opioids     Review of Systems   Unable to perform ROS: Mental status change       Physical Exam     Initial Vitals   BP Pulse Resp Temp SpO2   10/26/18 1548 10/26/18 1548 10/26/18 1548 10/26/18 1700 10/26/18 1548   118/80 (!) 135 20 98 °F (36.7 °C) 97 %      MAP       --                Physical Exam    Vitals reviewed.  Constitutional: She appears well-developed and well-nourished.   HENT:   Head: Normocephalic and atraumatic.   Nose: Nose normal.   Mouth/Throat: No oropharyngeal exudate.   Eyes: EOM are normal. Pupils are equal, round, and reactive to light.   Neck: Normal range of motion. Neck supple. No JVD present.   Cardiovascular: Regular rhythm, normal heart sounds and intact distal pulses. Tachycardia present.    Pulmonary/Chest: Breath sounds normal. No stridor. No respiratory distress. She has no wheezes. She has no rhonchi. She has no rales. She exhibits no tenderness.   Abdominal: Soft. Bowel sounds are normal. She exhibits no distension and no mass. There is no tenderness. There is no rebound and no guarding.   Musculoskeletal: Normal range of motion. She exhibits no edema or tenderness.   Neurological: She is alert and oriented to person, place, and time. She has normal strength. No sensory deficit.   Skin: Skin is warm and dry.   Psychiatric: Her affect is inappropriate. She is actively hallucinating. Thought content is delusional. She is inattentive.         ED Course   Procedures  Labs Reviewed   COMPREHENSIVE METABOLIC PANEL - Abnormal; Notable for the following components:       Result Value    Potassium 2.9 (*)     CO2 19 (*)     BUN, Bld 6 (*)     Calcium 7.6 (*)     Albumin 3.1 (*)     Total Bilirubin 1.1 (*)     Alkaline Phosphatase 301 (*)      (*)     ALT 53 (*)     All other components within normal limits   CBC W/ AUTO DIFFERENTIAL - Abnormal; Notable for the following components:    WBC 3.64 (*)     RBC 3.72 (*)     Hemoglobin 11.4 (*)     Hematocrit 32.7 (*)     RDW  17.7 (*)     Platelets 125 (*)     Gran # (ANC) 1.3 (*)     Gran% 36.4 (*)     All other components within normal limits   DRUG SCREEN PANEL, URINE EMERGENCY    Narrative:     Preferred Collection Type->Urine, Clean Catch   LACTIC ACID, PLASMA   LIPASE   PROTIME-INR   MAGNESIUM   TROPONIN I   URINALYSIS, REFLEX TO URINE CULTURE    Narrative:     Preferred Collection Type->Urine, Clean Catch   URINALYSIS, REFLEX TO URINE CULTURE    Narrative:     Preferred Collection Type->Urine, Clean Catch   B-TYPE NATRIURETIC PEPTIDE   AMMONIA   TSH   ALCOHOL,MEDICAL (ETHANOL)   TYPE & SCREEN          Imaging Results          X-Ray Chest 1 View (Final result)     Abnormal  Result time 10/26/18 18:05:32    Final result by Jimbo Perez MD (10/26/18 18:05:32)                 Impression:      Right midlung zone subtle opacity concerning for airspace disease including pneumonia versus potential pulmonary nodule.  Short-term follow-up chest radiography after therapy is recommended to ensure resolution.    This report was flagged in Epic as abnormal.      Electronically signed by: Jimbo Perez MD  Date:    10/26/2018  Time:    18:05             Narrative:    EXAMINATION:  XR CHEST 1 VIEW    CLINICAL HISTORY:  Altered mental status, unspecified    TECHNIQUE:  Single frontal view of the chest was performed.    COMPARISON:  Chest radiograph most recent 04/12/2017 and CT abdomen and pelvis 04/12/2017    FINDINGS:  Cardiomediastinal silhouette is stable without convincing evidence of failure.  Pulmonary vasculature and hilar regions are within normal limits.  Subtle opacity projects over the right midlung zone not definitively seen on previous exams which may represent a small focus of airspace disease including pneumonia versus pulmonary nodule.  A few scattered linear opacities consistent with subsegmental scarring versus atelectasis.  No large pleural effusion or pneumothorax.  No acute osseous process seen noting right shoulder  arthroplasty.                               CT Head Without Contrast (Final result)  Result time 10/26/18 17:40:43    Final result by Paula Santos MD (10/26/18 17:40:43)                 Impression:      No acute intracranial abnormalities identified, allowing for significant patient motion artifact.      Electronically signed by: Paula Santos MD  Date:    10/26/2018  Time:    17:40             Narrative:    EXAMINATION:  CT HEAD WITHOUT CONTRAST    CLINICAL HISTORY:  altered mental state;    TECHNIQUE:  Low dose axial images were obtained through the head.  Coronal and sagittal reformations were also performed. Contrast was not administered.    COMPARISON:  CT head from 07/16/2018.    FINDINGS:  Examination is limited by patient motion artifact despite repeat scan attempt.  Allowing for artifact, there is no evidence of acute/recent major vascular distribution cerebral infarction, intraparenchymal hemorrhage, or intra-axial space occupying lesion. The ventricular system is normal in size and configuration with no evidence of hydrocephalus. No effacement of the skull-base cisterns. No abnormal extra-axial fluid collections or blood products.  Postsurgical changes are seen involving the right anterior maxillary sinus wall with near complete opacification of the right maxillary antrum seen.  The calvarium shows no significant abnormality.                                 Medical Decision Making:   History:   Old Medical Records: I decided to obtain old medical records.  Initial Assessment:   Medical decision-making:    The patient received a medical screening exam. If performed, the EKG was independently evaluated by me and is pending final cardiology evaluation.  If performed, all radiographic studies were independently evaluated by me and are pending final radiology evaluation. If labs were ordered, they were reviewed. Vital signs are independently assessed by me.  If performed, the pulse oximetry was  independently evaluated by me.  I decided to obtain the patient's past medical record.  If available, I reviewed the patient's past medical record, including most recent labs and radiology reports.    ED Management:    This was an emergent evaluation of an elderly person with altered mental status.   I was immediately concerned about stroke, MI, infection, metabolic derangement, uremia, medication side effect, encephalopathy and hypoglycemia.      I decided to obtain and review the old medical record.    Pt is persistently tachy. Improved somewhat with IVF.   The patient is afebrile.  EKG is negative for acute ST elevation MI.  Troponin is negative.  I do not think the patient is having acute coronary syndrome.  The chest x-ray is equivocal for pneumonia. No fever or leukocytosis. Does not fit the clinical picture of pneumonia.   CT is negative for stroke or intracranial mass.  The urine is not infected.  There are no electrolyte abnormalities.  The patient is not anemic. There was reported bloody vomiting last night. Non here.  Transaminitis noted. CT scan 1 month ago without biliary obstruction or gallstones.   Medications were reviewed.  There are no new medications.  I do not think that the medications are a factor.  Calcium low. Replaced.       The patient's altered mental status has not improved.  The etiology of the patient's altered mental status is unclear.  This patient should be admitted for further evaluation and observation.    I discussed the case with:  Temporary admission orders will be placed into the system.      NAMRATA Wells M.D. 6:52 PM 10/26/2018                Scribe Attestation:   Scribe #1: I performed the above scribed service and the documentation accurately describes the services I performed. I attest to the accuracy of the note.    Attending Attestation:           Physician Attestation for Scribe:  Physician Attestation Statement for Scribe #1: I, Salvador Wells MD, reviewed  documentation, as scribed by Windy Navarrete in my presence, and it is both accurate and complete.                    Clinical Impression:   The primary encounter diagnosis was Altered mental state. A diagnosis of Tachycardia was also pertinent to this visit.                             Salvador Wells MD  10/26/18 1854       Salvador Wells MD  10/26/18 1916       Salvador Wells MD  10/26/18 1923       Salvador Wells MD  10/26/18 1936

## 2018-10-27 PROBLEM — T50.902A OVERDOSE, INTENTIONAL SELF-HARM, INITIAL ENCOUNTER: Status: ACTIVE | Noted: 2018-10-27

## 2018-10-27 PROBLEM — G92.9 ENCEPHALOPATHY, TOXIC: Status: ACTIVE | Noted: 2018-10-26

## 2018-10-27 LAB
ABO GROUP BLD: NORMAL
ANION GAP SERPL CALC-SCNC: 9 MMOL/L
BASOPHILS # BLD AUTO: 0.02 K/UL
BASOPHILS NFR BLD: 1 %
BUN SERPL-MCNC: 7 MG/DL
CALCIUM SERPL-MCNC: 8.9 MG/DL
CHLORIDE SERPL-SCNC: 107 MMOL/L
CO2 SERPL-SCNC: 24 MMOL/L
CREAT SERPL-MCNC: 0.6 MG/DL
DIFFERENTIAL METHOD: ABNORMAL
EOSINOPHIL # BLD AUTO: 0.3 K/UL
EOSINOPHIL NFR BLD: 13.4 %
ERYTHROCYTE [DISTWIDTH] IN BLOOD BY AUTOMATED COUNT: 17.9 %
EST. GFR  (AFRICAN AMERICAN): >60 ML/MIN/1.73 M^2
EST. GFR  (NON AFRICAN AMERICAN): >60 ML/MIN/1.73 M^2
GLUCOSE SERPL-MCNC: 100 MG/DL
HCT VFR BLD AUTO: 33.5 %
HGB BLD-MCNC: 11.4 G/DL
LYMPHOCYTES # BLD AUTO: 1 K/UL
LYMPHOCYTES NFR BLD: 48.5 %
MCH RBC QN AUTO: 30.6 PG
MCHC RBC AUTO-ENTMCNC: 34 G/DL
MCV RBC AUTO: 90 FL
MONOCYTES # BLD AUTO: 0.2 K/UL
MONOCYTES NFR BLD: 10.4 %
NEUTROPHILS # BLD AUTO: 0.5 K/UL
NEUTROPHILS NFR BLD: 26.7 %
PLATELET # BLD AUTO: 135 K/UL
PMV BLD AUTO: 10.4 FL
POTASSIUM SERPL-SCNC: 3.7 MMOL/L
RBC # BLD AUTO: 3.73 M/UL
RH BLD: NORMAL
SODIUM SERPL-SCNC: 140 MMOL/L
WBC # BLD AUTO: 2.02 K/UL

## 2018-10-27 PROCEDURE — 25000003 PHARM REV CODE 250: Performed by: HOSPITALIST

## 2018-10-27 PROCEDURE — 25000003 PHARM REV CODE 250: Performed by: EMERGENCY MEDICINE

## 2018-10-27 PROCEDURE — 36415 COLL VENOUS BLD VENIPUNCTURE: CPT

## 2018-10-27 PROCEDURE — 11000001 HC ACUTE MED/SURG PRIVATE ROOM

## 2018-10-27 PROCEDURE — 85025 COMPLETE CBC W/AUTO DIFF WBC: CPT

## 2018-10-27 PROCEDURE — 63600175 PHARM REV CODE 636 W HCPCS: Performed by: HOSPITALIST

## 2018-10-27 PROCEDURE — 80048 BASIC METABOLIC PNL TOTAL CA: CPT

## 2018-10-27 PROCEDURE — 25000003 PHARM REV CODE 250: Performed by: INTERNAL MEDICINE

## 2018-10-27 RX ORDER — DIAZEPAM 5 MG/1
5 TABLET ORAL ONCE
Status: DISCONTINUED | OUTPATIENT
Start: 2018-10-27 | End: 2018-10-30

## 2018-10-27 RX ORDER — DIAZEPAM 5 MG/1
10 TABLET ORAL EVERY 8 HOURS
Status: DISCONTINUED | OUTPATIENT
Start: 2018-10-27 | End: 2018-10-27

## 2018-10-27 RX ORDER — NITROGLYCERIN 0.3 MG/1
0.3 TABLET SUBLINGUAL EVERY 5 MIN PRN
Status: DISCONTINUED | OUTPATIENT
Start: 2018-10-27 | End: 2018-10-30 | Stop reason: HOSPADM

## 2018-10-27 RX ORDER — ENOXAPARIN SODIUM 100 MG/ML
40 INJECTION SUBCUTANEOUS
Status: DISCONTINUED | OUTPATIENT
Start: 2018-10-27 | End: 2018-10-30 | Stop reason: HOSPADM

## 2018-10-27 RX ORDER — DIAZEPAM 5 MG/1
15 TABLET ORAL EVERY 8 HOURS
Status: DISCONTINUED | OUTPATIENT
Start: 2018-10-27 | End: 2018-10-30

## 2018-10-27 RX ORDER — ATENOLOL 25 MG/1
25 TABLET ORAL DAILY
Status: DISCONTINUED | OUTPATIENT
Start: 2018-10-27 | End: 2018-10-30 | Stop reason: HOSPADM

## 2018-10-27 RX ORDER — DULOXETIN HYDROCHLORIDE 30 MG/1
30 CAPSULE, DELAYED RELEASE ORAL DAILY
Status: DISCONTINUED | OUTPATIENT
Start: 2018-10-27 | End: 2018-10-30 | Stop reason: HOSPADM

## 2018-10-27 RX ORDER — QUETIAPINE FUMARATE 100 MG/1
100 TABLET, FILM COATED ORAL NIGHTLY
Status: DISCONTINUED | OUTPATIENT
Start: 2018-10-27 | End: 2018-10-30 | Stop reason: HOSPADM

## 2018-10-27 RX ORDER — FAMOTIDINE 20 MG/1
20 TABLET, FILM COATED ORAL 2 TIMES DAILY
Status: DISCONTINUED | OUTPATIENT
Start: 2018-10-27 | End: 2018-10-27

## 2018-10-27 RX ORDER — LORAZEPAM 2 MG/ML
1 INJECTION INTRAMUSCULAR EVERY 4 HOURS PRN
Status: DISCONTINUED | OUTPATIENT
Start: 2018-10-27 | End: 2018-10-28

## 2018-10-27 RX ORDER — PANTOPRAZOLE SODIUM 40 MG/1
40 TABLET, DELAYED RELEASE ORAL DAILY
Status: DISCONTINUED | OUTPATIENT
Start: 2018-10-27 | End: 2018-10-29

## 2018-10-27 RX ORDER — GABAPENTIN 300 MG/1
300 CAPSULE ORAL 2 TIMES DAILY
Status: DISCONTINUED | OUTPATIENT
Start: 2018-10-27 | End: 2018-10-30 | Stop reason: HOSPADM

## 2018-10-27 RX ORDER — DOCUSATE SODIUM 100 MG/1
100 CAPSULE, LIQUID FILLED ORAL 2 TIMES DAILY
Status: DISCONTINUED | OUTPATIENT
Start: 2018-10-27 | End: 2018-10-30

## 2018-10-27 RX ADMIN — DOCUSATE SODIUM 100 MG: 100 CAPSULE, LIQUID FILLED ORAL at 09:10

## 2018-10-27 RX ADMIN — DULOXETINE 30 MG: 30 CAPSULE, DELAYED RELEASE ORAL at 01:10

## 2018-10-27 RX ADMIN — DIAZEPAM 15 MG: 5 TABLET ORAL at 01:10

## 2018-10-27 RX ADMIN — DIAZEPAM 15 MG: 5 TABLET ORAL at 09:10

## 2018-10-27 RX ADMIN — ATENOLOL 25 MG: 25 TABLET ORAL at 09:10

## 2018-10-27 RX ADMIN — LEVOTHYROXINE SODIUM 25 MCG: 25 TABLET ORAL at 09:10

## 2018-10-27 RX ADMIN — PANTOPRAZOLE SODIUM 40 MG: 40 TABLET, DELAYED RELEASE ORAL at 09:10

## 2018-10-27 RX ADMIN — QUETIAPINE FUMARATE 100 MG: 100 TABLET ORAL at 09:10

## 2018-10-27 RX ADMIN — ENOXAPARIN SODIUM 40 MG: 100 INJECTION SUBCUTANEOUS at 09:10

## 2018-10-27 RX ADMIN — DIAZEPAM 10 MG: 5 TABLET ORAL at 09:10

## 2018-10-27 RX ADMIN — GABAPENTIN 300 MG: 300 CAPSULE ORAL at 09:10

## 2018-10-27 NOTE — PLAN OF CARE
Problem: Patient Care Overview  Goal: Plan of Care Review  Outcome: Ongoing (interventions implemented as appropriate)  Pt has been free of falls or injuries. Afebrile. meds given as scheduled. Took shower and had a bowel movement. Pt became agitated and Demanded oxycodone, charge nurse explained to her that this was not possible. Pt is now more calmed.

## 2018-10-27 NOTE — HPI
isaac Arevalo is a 63 y.o. female that (in part)  has a past medical history of Addiction to drug, Alcohol abuse, Arthritis, Cirrhosis of liver, Coronary artery disease, Fall, GERD (gastroesophageal reflux disease), Hepatitis C, History of psychiatric hospitalization, psychiatric care, Hypertension, Low back pain, Radha, MI (myocardial infarction), Migraine headache, Psychiatric problem, Sleep difficulties, Suicide attempt, Therapy, and Thyroid disease.  Presents to [facility] agitated and hostile. She made multiple attempts to spit on the attendings and required a face shield to protect staff. Initially, she was not cooperative when asked questions about her current presentation and eventually became somnolent. Before falling asleep, pt stated that she 'took 4 elavil' after having problems with her . Her  provided collateral information and stated that she consumed two beers this evening but he did not mention the elavil.     Further information was limited due to altered mental status.     Hospital medicine has been asked to admit for further evaluation and treatment.

## 2018-10-27 NOTE — MEDICAL/APP STUDENT
[facility] Hospital Medicine  History & Physical    Patient Name: Estella Arevalo     MRN: [id number]  Admission Date: 10/27/2018  Attending Physician: Vini Bailey MD, MPH      PCP:     Angela Packer MD    CC:     Chief Complaint   Patient presents with    Altered Mental Status     Pt found unresponsive at residence. (Family called EMS for rectal bleeding.)  Upon EMS arrival found unresponsive.  Gave 0.5 Narcan, pt woke up.  Pt restrained by EMS for safety.  Pt attempting to get off stretcher.  Pt awake and alert upon arrival to ed.  alert to person and place.  Unknown why she is in ED.       HISTORY OF PRESENT ILLNESS:     Estella Arevalo is a 63 y.o. female that (in part)  has a past medical history of Addiction to drug, Alcohol abuse, Arthritis, Cirrhosis of liver, Coronary artery disease, Fall, GERD (gastroesophageal reflux disease), Hepatitis C, History of psychiatric hospitalization, psychiatric care, Hypertension, Low back pain, Radha, MI (myocardial infarction), Migraine headache, Psychiatric problem, Sleep difficulties, Suicide attempt, Therapy, and Thyroid disease.  has a past surgical history that includes Shoulder surgery (replacement); Knee surgery (bilateral replacement); right foot sx (2008); Hiatal hernia repair; Hernia repair; Joint replacement (Bilateral); REPAIR-HERNIA-VENTRAL-LAPAROSCOPIC-W/MESH (N/A, 11/8/2017); REPAIR-HERNIA-LAPAROSCOPIC-HIATAL (N/A, 3/10/2017); ESOPHAGOGASTRODUODENOSCOPY (EGD) (N/A, 3/10/2017); LAPAROSCOPIC PEG TUBE PLACEMENT/REPLACEMENT (N/A, 3/10/2017); ESOPHAGOGASTRODUODENOSCOPY (EGD) (N/A, 3/10/2017); COLONOSCOPY (Left, 7/16/2013); EGD (ESOPHAGOGASTRODUODENOSCOPY) (N/A, 7/15/2013); BLOCK, NERVE, FACET JOINT, LUMBAR, MEDIAL BRANCH (Right, 4/16/2013); and BLOCK, NERVE, FACET JOINT, LUMBAR, MEDIAL BRANCH (Right, 4/2/2013). Presents to [facility] Emergency Department    On arrival at the emergency department, pt was  agitated and hostile. She made multiple attempts to spit on the attendings and required a face shield to protect staff. Initially, she was not cooperative when asked questions about her current presentation and eventually became somnolent. Before falling asleep, pt stated that she 'took 4 elavil' after having problems with her . Her  provided collateral information and stated that she consumed two beers this evening but he did not mention the elavil.    Further information was limited due to altered mental status.    Hospital medicine has been asked to admit for further evaluation and treatment.       REVIEW OF SYSTEMS:     ROS: Pt not cooperative    Pain Scale  Pt denied pain    PAST MEDICAL / SURGICAL HISTORY:     Past Medical History:   Diagnosis Date    Addiction to drug     Alcohol abuse     Arthritis     Cirrhosis of liver     Coronary artery disease     Fall     GERD (gastroesophageal reflux disease)     Hepatitis C     States was successfully treated with Harvoni    History of psychiatric hospitalization     Hx of psychiatric care     Hypertension     Low back pain     Radha     MI (myocardial infarction)     Migraine headache     Psychiatric problem     Sleep difficulties     Suicide attempt     Therapy     Thyroid disease      Past Surgical History:   Procedure Laterality Date    BLOCK, NERVE, FACET JOINT, LUMBAR, MEDIAL BRANCH Right 4/16/2013    Performed by [facility] at [facility] MGT    BLOCK, NERVE, FACET JOINT, LUMBAR, MEDIAL BRANCH Right 4/2/2013    Performed by [facility] at [facility] MGT    COLONOSCOPY Left 7/16/2013    Performed by Hernandez Farias MD at [facility] ENDO    EGD (ESOPHAGOGASTRODUODENOSCOPY) N/A 7/15/2013    Performed by Hernandez Farias MD at [facility] ENDO    ESOPHAGOGASTRODUODENOSCOPY (EGD) N/A 3/10/2017    Performed by Vini Gomez MD at [facility] OR    ESOPHAGOGASTRODUODENOSCOPY (EGD) N/A 3/10/2017    Performed by Arnulfo Benton MD at [facility]  "ENDO    HERNIA REPAIR      HIATAL HERNIA REPAIR      JOINT REPLACEMENT Bilateral     KNEE SURGERY  bilateral replacement    LAPAROSCOPIC PEG TUBE PLACEMENT/REPLACEMENT N/A 3/10/2017    [facility] at [facility] OR    REPAIR-HERNIA-LAPAROSCOPIC-HIATAL N/A 3/10/2017    [facility] at [facility] OR    REPAIR-HERNIA-VENTRAL-LAPAROSCOPIC-W/MESH N/A 11/8/2017    Performed by [facility] at [facility] OR    right foot sx  2008    SHOULDER SURGERY  replacement         FAMILY HISTORY:     Family History   Problem Relation Age of Onset    Cancer Mother     Cancer Father     Depression Sister     Bipolar disorder Maternal Grandfather     Suicide Cousin          SOCIAL HISTORY:     Social History     Socioeconomic History    Marital status:      Spouse name: Hammad Kaufman"    Number of children: 4    Years of education: None    Highest education level: None   Social Needs    Financial resource strain: None    Food insecurity - worry: None    Food insecurity - inability: None    Transportation needs - medical: None    Transportation needs - non-medical: None   Occupational History    Occupation: Retired     Comment: RN   Tobacco Use    Smoking status: Never Smoker    Smokeless tobacco: Never Used   Substance and Sexual Activity    Alcohol use: Yes     Alcohol/week: 3.6 oz     Types: 6 Cans of beer per week     Comment: history of withdrawals; says that she quit but then admits that she drinks sometimes    Drug use: Yes     Types: Benzodiazepines, Methamphetamines, Amphetamines     Comment: Pain mgt clinic; admits to addiction to opioids    Sexual activity: None   Other Topics Concern    Patient feels they ought to cut down on drinking/drug use Yes    Patient annoyed by others criticizing their drinking/drug use Yes    Patient has felt bad or guilty about drinking/drug use Yes    Patient has had a drink/used drugs as an eye opener in the AM Yes   Social History " Narrative    Lives with , grand-daughter, daughter and son    Retired RN    Polysubstance abuse         ALLERGIES:       Review of patient's allergies indicates:  No Known Allergies      HEALTH SCREENING:     Influenza vaccine: unknown   Prevnar 13 pneumonia vaccine: unknown    HOME MEDICATIONS:     Prior to Admission medications    Medication Sig Start Date End Date Taking? Authorizing Provider   atenolol (TENORMIN) 25 MG tablet Take 1 tablet (25 mg total) by mouth once daily. 7/26/18   DOMITILA Zamarripa   clonazePAM (KLONOPIN) 1 MG tablet Take 1 tablet (1 mg total) by mouth 2 (two) times daily. for 7 days 9/13/18 9/20/18  Keven Gleason MD   cycloSPORINE (RESTASIS) 0.05 % ophthalmic emulsion Place 0.4 mLs (1 drop total) into both eyes 2 (two) times daily. 7/26/18   DOMITILA Zamarripa   docusate sodium (COLACE) 100 MG capsule Take 1 capsule (100 mg total) by mouth 2 (two) times daily. 7/26/18   DOMITILA Zamarripa   DULoxetine (CYMBALTA) 30 MG capsule Take 1 capsule (30 mg total) by mouth once daily. TAKE 1 CAPSULE(30 MG) BY MOUTH EVERY DAY 9/23/18   Ramon Marvin MD   efinaconazole (JUBLIA) 10 % Eran APPLY ONE DOSE DAILY 7/26/18   DOMITILA Zamarripa   ferrous sulfate 325 (65 FE) MG EC tablet Take 325 mg by mouth 3 (three) times daily with meals.    Historical Provider, MD   gabapentin (NEURONTIN) 300 MG capsule Take 1 capsule (300 mg total) by mouth 2 (two) times daily. 9/22/17   Jennifer Morales NP   hydrocortisone-pramoxine (PROCTOFOAM-HS) rectal foam Place 1 applicator rectally 2 (two) times daily. 7/26/18   DOMITILA Zamarripa   levothyroxine (SYNTHROID) 25 MCG tablet Take 1 tablet (25 mcg total) by mouth once daily. 7/26/18   DOMITILA Zamarripa   LINZESS 145 mcg Cap capsule Take 1 capsule (145 mcg total) by mouth once daily. 7/26/18   Greg Mendez, CARP-C   naproxen (NAPROSYN) 500 MG tablet Take 1 tablet (500 mg total) by mouth 2 (two) times  daily as needed. 8/12/18   SEAN Mireles   NARCAN 4 mg/actuation Spry USE UTD 11/17/17   Historical Provider, MD   nicotine (NICODERM CQ) 21 mg/24 hr Place 1 patch onto the skin every 24 hours.    Historical Provider, MD   nitroGLYCERIN (NITROSTAT) 0.3 MG SL tablet ONE TABLET UNDER TONGUE AS NEEDED FOR CHEST PAIN EVERY 5 MINUTES AS NEEDED 8/6/18   SEAN Clemons-BELIA   ondansetron (ZOFRAN) 4 MG tablet Take 1 tablet (4 mg total) by mouth every 6 (six) hours. 7/26/18   SEAN Zamarripa-BELIA   ondansetron (ZOFRAN-ODT) 4 MG TbDL Take 1 tablet (4 mg total) by mouth every 8 (eight) hours as needed. Nausea/vomiting 9/22/17   Jennifer Moralse NP   pantoprazole (PROTONIX) 40 MG tablet Take 1 tablet (40 mg total) by mouth once daily. 7/26/18 7/26/19  CAR ZamarripaP-C   polymyxin B sulf-trimethoprim (POLYTRIM) 10,000 unit- 1 mg/mL Drop Place 1 drop into the left eye every 6 (six) hours. 12/5/17   Angela Packer MD   promethazine (PHENERGAN) 25 MG tablet Take 1 tablet (25 mg total) by mouth every 6 (six) hours as needed for Nausea. 5/6/18   Yared Hunt MD   QUEtiapine (SEROQUEL) 100 MG Tab Take 1 tablet (100 mg total) by mouth every evening. 9/23/18 9/23/19  Ramon Marvin MD   ranitidine (ZANTAC) 300 MG tablet Take 1 tablet (300 mg total) by mouth every evening. 5/16/18 5/16/19  Amber Finney NP   RESTASIS 0.05 % ophthalmic emulsion INSTILL 1 DROP IN BOTH EYES TWICE DAILY 12/29/17   Katharine Quiñones MD   triamcinolone acetonide 0.1% (KENALOG) 0.1 % cream Apply topically 2 (two) times daily. 5/16/18 5/26/18  Amber Finney NP          [facility] MEDICATIONS:     Scheduled Meds:    atenolol  25 mg Oral Daily    enoxaparin  40 mg Subcutaneous Q24H    levothyroxine  25 mcg Oral Daily     Continuous Infusions:   PRN Meds:       PHYSICAL EXAM:     Wt Readings from Last 1 Encounters:   10/26/18 2131 71.7 kg (158 lb 1.1 oz)   10/26/18 1548 71.7 kg (158 lb)     Body mass index is 25.51  "kg/m².  Vitals:    10/26/18 2010 10/26/18 2040 10/26/18 2131 10/26/18 2356   BP:  (!) 155/83 (!) 147/85 (!) 154/81   BP Location:   Right arm Right arm   Patient Position:   Lying Lying   Pulse: 106 102 93 78   Resp:   18 18   Temp:   98.2 °F (36.8 °C) 97.9 °F (36.6 °C)   TempSrc:   Oral Oral   SpO2: 96% 97% 98% 97%   Weight:   71.7 kg (158 lb 1.1 oz)    Height:   5' 6" (1.676 m)         On examination, pt was drowsy and laying supine in her hospital bed. She responded to questions in a very low voice with reduced affect. She denied being in pain. Thorough examination was limited due to altered mental status.    -- General appearance: well developed. appears stated age   -- Head: normocephalic, atraumatic   -- Eyes: PERRL, conjunctivae clear. Extraocular muscles not tested  -- Nose: Nares normal. Septum midline.   -- Mouth/Throat: lips, mucosa, and tongue normal. no throat erythema.   -- Neck: supple, symmetrical, trachea midline, no JVD and thyroid not grossly enlarged, appears symmetric  -- Lungs: coarse sounds bilaterally heard loudest un upper lobes. normal respiratory effort. No use of accessory muscles.   -- Chest wall: no tenderness. equal bilateral chest rise   -- Heart: regular rate and regular rhythm. S1, S2 normal.  no click, rub or gallop   -- Abdomen: soft, tender in the LUQ, non-distended, non-tympanic; bowel sounds normal; no masses  -- Extremities: no cyanosis, clubbing or edema. Bruising observed on the knees bilaterally   -- Pulses: 2+ and symmetric   -- Skin: color normal, texture normal, turgor normal. No rashes or lesions.   -- Neurologic: not assessed      LABORATORY STUDIES:     Recent Results (from the past 36 hour(s))   Comprehensive metabolic panel    Collection Time: 10/26/18  4:11 PM   Result Value Ref Range    Sodium 141 136 - 145 mmol/L    Potassium 2.9 (L) 3.5 - 5.1 mmol/L    Chloride 110 95 - 110 mmol/L    CO2 19 (L) 23 - 29 mmol/L    Glucose 98 70 - 110 mg/dL    BUN, Bld 6 (L) 8 - 23 " mg/dL    Creatinine 0.6 0.5 - 1.4 mg/dL    Calcium 7.6 (L) 8.7 - 10.5 mg/dL    Total Protein 6.2 6.0 - 8.4 g/dL    Albumin 3.1 (L) 3.5 - 5.2 g/dL    Total Bilirubin 1.1 (H) 0.1 - 1.0 mg/dL    Alkaline Phosphatase 301 (H) 55 - 135 U/L     (H) 10 - 40 U/L    ALT 53 (H) 10 - 44 U/L    Anion Gap 12 8 - 16 mmol/L    eGFR if African American >60 >60 mL/min/1.73 m^2    eGFR if non African American >60 >60 mL/min/1.73 m^2   CBC auto differential    Collection Time: 10/26/18  4:11 PM   Result Value Ref Range    WBC 3.64 (L) 3.90 - 12.70 K/uL    RBC 3.72 (L) 4.00 - 5.40 M/uL    Hemoglobin 11.4 (L) 12.0 - 16.0 g/dL    Hematocrit 32.7 (L) 37.0 - 48.5 %    MCV 88 82 - 98 fL    MCH 30.6 27.0 - 31.0 pg    MCHC 34.9 32.0 - 36.0 g/dL    RDW 17.7 (H) 11.5 - 14.5 %    Platelets 125 (L) 150 - 350 K/uL    MPV 10.1 9.2 - 12.9 fL    Gran # (ANC) 1.3 (L) 1.8 - 7.7 K/uL    Lymph # 1.6 1.0 - 4.8 K/uL    Mono # 0.5 0.3 - 1.0 K/uL    Eos # 0.2 0.0 - 0.5 K/uL    Baso # 0.02 0.00 - 0.20 K/uL    Gran% 36.4 (L) 38.0 - 73.0 %    Lymph% 44.2 18.0 - 48.0 %    Mono% 12.6 4.0 - 15.0 %    Eosinophil% 6.3 0.0 - 8.0 %    Basophil% 0.5 0.0 - 1.9 %    Differential Method Automated    Lipase    Collection Time: 10/26/18  4:11 PM   Result Value Ref Range    Lipase 22 4 - 60 U/L   Protime-INR    Collection Time: 10/26/18  4:11 PM   Result Value Ref Range    Prothrombin Time 10.5 9.0 - 12.5 sec    INR 1.0 0.8 - 1.2   Magnesium    Collection Time: 10/26/18  4:11 PM   Result Value Ref Range    Magnesium 1.7 1.6 - 2.6 mg/dL   Troponin I    Collection Time: 10/26/18  4:11 PM   Result Value Ref Range    Troponin I <0.006 0.000 - 0.026 ng/mL   Brain natriuretic peptide    Collection Time: 10/26/18  4:11 PM   Result Value Ref Range    BNP 20 0 - 99 pg/mL   Ammonia    Collection Time: 10/26/18  4:11 PM   Result Value Ref Range    Ammonia 32 10 - 50 umol/L   TSH    Collection Time: 10/26/18  4:11 PM   Result Value Ref Range    TSH 2.259 0.400 - 4.000 uIU/mL    Lactic acid, plasma    Collection Time: 10/26/18  4:49 PM   Result Value Ref Range    Lactate (Lactic Acid) 1.9 0.5 - 2.2 mmol/L   Drug screen panel, emergency    Collection Time: 10/26/18  5:25 PM   Result Value Ref Range    Benzodiazepines Negative     Methadone metabolites Negative     Cocaine (Metab.) Negative     Opiate Scrn, Ur Negative     Barbiturate Screen, Ur Negative     Amphetamine Screen, Ur Negative     THC Negative     Phencyclidine Negative     Creatinine, Random Ur 25.8 15.0 - 325.0 mg/dL    Toxicology Information SEE COMMENT    Urinalysis, Reflex to Urine Culture Urine, Clean Catch    Collection Time: 10/26/18  5:25 PM   Result Value Ref Range    Specimen UA Urine, Clean Catch     Color, UA Straw Yellow, Straw, Leanna    Appearance, UA Clear Clear    pH, UA 7.0 5.0 - 8.0    Specific Gravity, UA 1.005 1.005 - 1.030    Protein, UA Negative Negative    Glucose, UA Negative Negative    Ketones, UA Negative Negative    Bilirubin (UA) Negative Negative    Occult Blood UA Negative Negative    Nitrite, UA Negative Negative    Urobilinogen, UA Negative <2.0 EU/dL    Leukocytes, UA Negative Negative   Urinalysis, Reflex to Urine Culture Urine, Clean Catch    Collection Time: 10/26/18  5:25 PM   Result Value Ref Range    Specimen UA Urine, Clean Catch     Color, UA Straw Yellow, Straw, Leanna    Appearance, UA Clear Clear    pH, UA 7.0 5.0 - 8.0    Specific Gravity, UA 1.005 1.005 - 1.030    Protein, UA Negative Negative    Glucose, UA Negative Negative    Ketones, UA Negative Negative    Bilirubin (UA) Negative Negative    Occult Blood UA Negative Negative    Nitrite, UA Negative Negative    Urobilinogen, UA Negative <2.0 EU/dL    Leukocytes, UA Negative Negative   Ethanol    Collection Time: 10/26/18  5:33 PM   Result Value Ref Range    Alcohol, Medical, Serum <10 <10 mg/dL   Type & Screen    Collection Time: 10/26/18  5:43 PM   Result Value Ref Range    Indirect David NEG        Lab Results   Component  Value Date    INR 1.0 10/26/2018    INR 1.0 09/03/2018    INR 1.1 01/16/2018     Lab Results   Component Value Date    HGBA1C 5.4 02/17/2015     No results for input(s): POCTGLUCOSE in the last 72 hours.        MICROBIOLOGY DATA:     Urine Culture, Routine   Date Value Ref Range Status   01/16/2018 No growth  Final   09/22/2017 No growth  Final   06/25/2017 ESCHERICHIA COLI  >100,000 cfu/ml    Final   02/16/2015 No significant growth  Final   01/26/2014 No significant growth  Final       Microbiology x 7d:   Microbiology Results (last 7 days)     ** No results found for the last 168 hours. **            IMAGING:     Imaging Results          X-Ray Chest 1 View (Final result)     Abnormal  Result time 10/26/18 18:05:32    Final result by Jimbo Perez MD (10/26/18 18:05:32)                 Impression:      Right midlung zone subtle opacity concerning for airspace disease including pneumonia versus potential pulmonary nodule.  Short-term follow-up chest radiography after therapy is recommended to ensure resolution.    This report was flagged in Epic as abnormal.      Electronically signed by: Jimbo Perez MD  Date:    10/26/2018  Time:    18:05             Narrative:    EXAMINATION:  XR CHEST 1 VIEW    CLINICAL HISTORY:  Altered mental status, unspecified    TECHNIQUE:  Single frontal view of the chest was performed.    COMPARISON:  Chest radiograph most recent 04/12/2017 and CT abdomen and pelvis 04/12/2017    FINDINGS:  Cardiomediastinal silhouette is stable without convincing evidence of failure.  Pulmonary vasculature and hilar regions are within normal limits.  Subtle opacity projects over the right midlung zone not definitively seen on previous exams which may represent a small focus of airspace disease including pneumonia versus pulmonary nodule.  A few scattered linear opacities consistent with subsegmental scarring versus atelectasis.  No large pleural effusion or pneumothorax.  No acute osseous process seen  noting right shoulder arthroplasty.                               CT Head Without Contrast (Final result)  Result time 10/26/18 17:40:43    Final result by Paula Santos MD (10/26/18 17:40:43)                 Impression:      No acute intracranial abnormalities identified, allowing for significant patient motion artifact.      Electronically signed by: Paula Santos MD  Date:    10/26/2018  Time:    17:40             Narrative:    EXAMINATION:  CT HEAD WITHOUT CONTRAST    CLINICAL HISTORY:  altered mental state;    TECHNIQUE:  Low dose axial images were obtained through the head.  Coronal and sagittal reformations were also performed. Contrast was not administered.    COMPARISON:  CT head from 07/16/2018.    FINDINGS:  Examination is limited by patient motion artifact despite repeat scan attempt.  Allowing for artifact, there is no evidence of acute/recent major vascular distribution cerebral infarction, intraparenchymal hemorrhage, or intra-axial space occupying lesion. The ventricular system is normal in size and configuration with no evidence of hydrocephalus. No effacement of the skull-base cisterns. No abnormal extra-axial fluid collections or blood products.  Postsurgical changes are seen involving the right anterior maxillary sinus wall with near complete opacification of the right maxillary antrum seen.  The calvarium shows no significant abnormality.                                  CONSULTS:     IP CONSULT TO NEUROLOGY       ASSESSMENT & PLAN:     Primary Diagnosis:  Altered mental status from consumption of 4 tricyclic antidepressants.    Active Hospital Problems    Diagnosis  POA    Altered mental state [R41.82]  Yes      Resolved Hospital Problems   No resolved problems to display.            1. Altered Mental Status  -alcohol: 2 beers according to collateral from   -medication: pt admitted to taking 4 elavil, which were not included on her medication list. Unable to assess if the elavil  were prescribed to pt or where she attained them due to AMS.  -Glucose 98, lactate normal  -Afebrile with normal WBC  -Ammonia normal at  -ECG: tachycardic with normal sinus rhythm. QT interval is normal.  -CT: No acute intracranial abnormalities noted  -Pt will be admitted/monitored overnight for evaluation and assessment.  -Consult Psych     2. Right midlung opacity on CXR  -Concerning for airspace disease including pneumonia versus potential pulmonary nodule. Coarse lung sounds bilaterally, loudest at upper lobes bilaterally.   -Afebrile with normal WBC  -No smoking history   -No previous diagnosis of COPD  -Obtain PA and lateral CXR when pt is cooperative  -Pending results of CXR, perform CT chest  -Refer to pulmonology for outpatient follow up    3. Hypokalemia  -Administer potassium-chloride and monitor level    4. Hypoalbuminemia  -pt diagnosed with liver cirrhosis  -provide education on nutrition and monitor level    5. HTN  -Continue atenolol    6.GERD  -Continue pantaprazole    7. Depression:  -Continue duloxetine once pt is at baseline    8. Bipolar disorder with jessy:  -Continue seroquel once pt is at baseline      VTE Risk Mitigation (From admission, onward)    Enoxaparin 40mg daily            Adult PRN medications available   DVT prophylaxis given       DISPOSITION:     Will admit to the Hospital Medicine service for further evaluation and treatment.    Chart reviewed and updated where applicable.          ===============================================================    Gilmer Jha, YR3 Med Student

## 2018-10-27 NOTE — NURSING
Patient arrived to floor oriented to self and place. Speech slurred, lethargic, arouses to slight shaking. Patient states she took 4 Elavil today. Denies any other medication. Patient falls asleep quickly after awakening. MARINA hose and nonskid socks applied. Bilateral soft wrist restraints on.

## 2018-10-27 NOTE — PLAN OF CARE
10/27/18 1150   Discharge Assessment   Assessment Type Discharge Planning Assessment   Confirmed/corrected address and phone number on facesheet? Yes   Assessment information obtained from? Patient   Communicated expected length of stay with patient/caregiver yes   Prior to hospitilization cognitive status: Alert/Oriented   Prior to hospitalization functional status: Independent   Current cognitive status: Alert/Oriented   Current Functional Status: Independent   Facility Arrived From: Home    Lives With spouse   Able to Return to Prior Arrangements yes   Is patient able to care for self after discharge? Yes   Who are your caregiver(s) and their phone number(s)? Hammad Spouse [phone]    Patient's perception of discharge disposition admitted as an inpatient   Readmission Within The Last 30 Days no previous admission in last 30 days   Patient currently being followed by outpatient case management? No   Patient currently receives any other outside agency services? No   Equipment Currently Used at Home none;walker, rolling   Do you have any problems affording any of your prescribed medications? No   Is the patient taking medications as prescribed? yes   Does the patient have transportation home? Yes   Transportation Available family or friend will provide   Dialysis Name and Scheduled days N/A   Does the patient receive services at the [facility]? No   Discharge Plan A Home with family   Discharge Plan B Home with family;Home Health   Patient/Family In Agreement With Plan yes     SW to patient's room to discuss Helping the patient manage care at home.   TN/SW role explained to pt.  Patient identified using two identifiers:  Name and date of birth.    SW's name and contact info placed on white board.     Person who will help at home if needed:  Pineda pt spouse.     [facility]:   [id number] [place], [place] - [address] [facility] [address]  [address]  [address]  Phone: [phone] Fax: [phone]    Preferred Appointment time: Morning appointments.

## 2018-10-27 NOTE — H&P
[facility] Hospital Medicine  History & Physical    Patient Name: Estella Arevalo  MRN: [id number]  Admission Date: 10/27/2018  Attending Physician: Vini Bailey MD, MPH      PCP:     Angela Packer MD    CC:     Chief Complaint   Patient presents with    Altered Mental Status     Pt found unresponsive at residence. (Family called EMS for rectal bleeding.)  Upon EMS arrival found unresponsive.  Gave 0.5 Narcan, pt woke up.  Pt restrained by EMS for safety.  Pt attempting to get off stretcher.  Pt awake and alert upon arrival to ed.  alert to person and place.  Unknown why she is in ED.       HISTORY OF PRESENT ILLNESS:     Estella Arevalo is a 63 y.o. female that (in part)  has a past medical history of Addiction to drug, Alcohol abuse, Arthritis, Cirrhosis of liver, Coronary artery disease, Fall, GERD (gastroesophageal reflux disease), Hepatitis C, History of psychiatric hospitalization, psychiatric care, Hypertension, Low back pain, Radha, MI (myocardial infarction), Migraine headache, Psychiatric problem, Sleep difficulties, Suicide attempt, Therapy, and Thyroid disease.  Presents to [facility] Emergency Department agitated and hostile. She made multiple attempts to spit on the attendings and required a face shield to protect staff. Initially, she was not cooperative when asked questions about her current presentation and eventually became somnolent. Before falling asleep, pt stated that she 'took 4 elavil' after having problems with her . Her  provided collateral information and stated that she consumed two beers this evening but he did not mention the elavil.     Further information was limited due to altered mental status.     Hospital medicine has been asked to admit for further evaluation and treatment.       REVIEW OF SYSTEMS:     Unable to obtain other than was above in HPI due to altered mental status.    PAST MEDICAL / SURGICAL  HISTORY:     Past Medical History:   Diagnosis Date    Addiction to drug     Alcohol abuse     Arthritis     Cirrhosis of liver     Coronary artery disease     Fall     GERD (gastroesophageal reflux disease)     Hepatitis C     States was successfully treated with Harvoni    History of psychiatric hospitalization     Hx of psychiatric care     Hypertension     Low back pain     Radha     MI (myocardial infarction)     Migraine headache     Psychiatric problem     Sleep difficulties     Suicide attempt     Therapy     Thyroid disease      Past Surgical History:   Procedure Laterality Date    BLOCK, NERVE, FACET JOINT, LUMBAR, MEDIAL BRANCH Right 4/16/2013    Performed by Nichelle Balbuena MD at [facility]    BLOCK, NERVE, FACET JOINT, LUMBAR, MEDIAL BRANCH Right 4/2/2013    Performed by [facility] at [facility]    COLONOSCOPY Left 7/16/2013    Performed by Hernandez Farias [facility] at [facility]    EGD (ESOPHAGOGASTRODUODENOSCOPY) N/A 7/15/2013    Performed by [facility] at [facility]    ESOPHAGOGASTRODUODENOSCOPY (EGD) N/A 3/10/2017    Performed by Vini Gomez MD at [facility]    ESOPHAGOGASTRODUODENOSCOPY (EGD) N/A 3/10/2017    Performed by Arnulfo Benton MD at [facility]    HERNIA REPAIR      HIATAL HERNIA REPAIR      JOINT REPLACEMENT Bilateral     KNEE SURGERY  bilateral replacement    LAPAROSCOPIC PEG TUBE PLACEMENT/REPLACEMENT N/A 3/10/2017    Performed by [facility] at [facility]    REPAIR-HERNIA-LAPAROSCOPIC-HIATAL N/A 3/10/2017    Performed by [facility] Gomez [facility] at [facility]    REPAIR-HERNIA-VENTRAL-LAPAROSCOPIC-W/MESH N/A 11/8/2017    Performed by [facility]ini Gomez [facility] at [facility]    right foot sx  2008    SHOULDER SURGERY  replacement         FAMILY HISTORY:     Family History   Problem Relation Age of Onset    Cancer Mother     Cancer Father     Depression Sister     Bipolar disorder Maternal Grandfather     Suicide Cousin          SOCIAL HISTORY:  "    Social History     Socioeconomic History    Marital status:      Spouse name: Hammad Kaufman"    Number of children: 4    Years of education: None    Highest education level: None   Social Needs    Financial resource strain: None    Food insecurity - worry: None    Food insecurity - inability: None    Transportation needs - medical: None    Transportation needs - non-medical: None   Occupational History    Occupation: Retired     Comment: RN   Tobacco Use    Smoking status: Never Smoker    Smokeless tobacco: Never Used   Substance and Sexual Activity    Alcohol use: Yes     Alcohol/week: 3.6 oz     Types: 6 Cans of beer per week     Comment: history of withdrawals; says that she quit but then admits that she drinks sometimes    Drug use: Yes     Types: Benzodiazepines, Methamphetamines, Amphetamines     Comment: Pain [facility] clinic; admits to addiction to opioids    Sexual activity: None   Other Topics Concern    Patient feels they ought to cut down on drinking/drug use Yes    Patient annoyed by others criticizing their drinking/drug use Yes    Patient has felt bad or guilty about drinking/drug use Yes    Patient has had a drink/used drugs as an eye opener in the AM Yes   Social History Narrative    Lives with , grand-daughter, daughter and son    Retired RN    Polysubstance abuse         ALLERGIES:       Review of patient's allergies indicates:  No Known Allergies        HOME MEDICATIONS:     Prior to Admission medications    Medication Sig Start Date End Date Taking? Authorizing Provider   atenolol (TENORMIN) 25 MG tablet Take 1 tablet (25 mg total) by mouth once daily. 7/26/18   SEAN Zamarripa-BELIA   clonazePAM (KLONOPIN) 1 MG tablet Take 1 tablet (1 mg total) by mouth 2 (two) times daily. for 7 days 9/13/18 9/20/18  Keven Gleason MD   cycloSPORINE (RESTASIS) 0.05 % ophthalmic emulsion Place 0.4 mLs (1 drop total) into both eyes 2 (two) times daily. 7/26/18   Greg" DOMITILA Velasquez   docusate sodium (COLACE) 100 MG capsule Take 1 capsule (100 mg total) by mouth 2 (two) times daily. 7/26/18   DOMITILA Zamarripa   DULoxetine (CYMBALTA) 30 MG capsule Take 1 capsule (30 mg total) by mouth once daily. TAKE 1 CAPSULE(30 MG) BY MOUTH EVERY DAY 9/23/18   Ramon Marvin MD   efinaconazole (JUBLIA) 10 % Eran APPLY ONE DOSE DAILY 7/26/18   DOMITILA Zamarripa   ferrous sulfate 325 (65 FE) MG EC tablet Take 325 mg by mouth 3 (three) times daily with meals.    Historical Provider, MD   gabapentin (NEURONTIN) 300 MG capsule Take 1 capsule (300 mg total) by mouth 2 (two) times daily. 9/22/17   Jennifer Morales, ELIZABET   hydrocortisone-pramoxine (PROCTOFOAM-HS) rectal foam Place 1 applicator rectally 2 (two) times daily. 7/26/18   DOMITILA Zamarripa   levothyroxine (SYNTHROID) 25 MCG tablet Take 1 tablet (25 mcg total) by mouth once daily. 7/26/18   DOMITILA Zamarripa   LINZESS 145 mcg Cap capsule Take 1 capsule (145 mcg total) by mouth once daily. 7/26/18   DOMITILA Zamarripa   naproxen (NAPROSYN) 500 MG tablet Take 1 tablet (500 mg total) by mouth 2 (two) times daily as needed. 8/12/18   SEAN Mireles   NARCAN 4 mg/actuation Spry USE UTD 11/17/17   Historical Provider, MD   nicotine (NICODERM CQ) 21 mg/24 hr Place 1 patch onto the skin every 24 hours.    Historical Provider, MD   nitroGLYCERIN (NITROSTAT) 0.3 MG SL tablet ONE TABLET UNDER TONGUE AS NEEDED FOR CHEST PAIN EVERY 5 MINUTES AS NEEDED 8/6/18   DOMITILA Clemons   ondansetron (ZOFRAN) 4 MG tablet Take 1 tablet (4 mg total) by mouth every 6 (six) hours. 7/26/18   Donalyn M. Deblanc, FNP-C   ondansetron (ZOFRAN-ODT) 4 MG TbDL Take 1 tablet (4 mg total) by mouth every 8 (eight) hours as needed. Nausea/vomiting 9/22/17   Jennifer Morales, NP   pantoprazole (PROTONIX) 40 MG tablet Take 1 tablet (40 mg total) by mouth once daily. 7/26/18 7/26/19  Greg Mendez, DOMITILA   polymyxin B  "sulf-trimethoprim (POLYTRIM) 10,000 unit- 1 mg/mL Drop Place 1 drop into the left eye every 6 (six) hours. 12/5/17   Angela Packer MD   promethazine (PHENERGAN) 25 MG tablet Take 1 tablet (25 mg total) by mouth every 6 (six) hours as needed for Nausea. 5/6/18   Yared Hunt MD   QUEtiapine (SEROQUEL) 100 MG Tab Take 1 tablet (100 mg total) by mouth every evening. 9/23/18 9/23/19  Ramon Marvin MD   ranitidine (ZANTAC) 300 MG tablet Take 1 tablet (300 mg total) by mouth every evening. 5/16/18 5/16/19  Amber Finney NP   RESTASIS 0.05 % ophthalmic emulsion INSTILL 1 DROP IN BOTH EYES TWICE DAILY 12/29/17   Katharine Quiñones MD   triamcinolone acetonide 0.1% (KENALOG) 0.1 % cream Apply topically 2 (two) times daily. 5/16/18 5/26/18  Amber Finney NP          HOSPITAL MEDICATIONS:     Scheduled Meds:    atenolol  25 mg Oral Daily    enoxaparin  40 mg Subcutaneous Q24H    levothyroxine  25 mcg Oral Daily    pantoprazole  40 mg Oral Daily     Continuous Infusions:   PRN Meds:       PHYSICAL EXAM:     Wt Readings from Last 1 Encounters:   10/26/18 2131 71.7 kg (158 lb 1.1 oz)   10/26/18 1548 71.7 kg (158 lb)     Body mass index is 25.51 kg/m².  Vitals:    10/26/18 2040 10/26/18 2131 10/26/18 2356 10/27/18 0526   BP: (!) 155/83 (!) 147/85 (!) 154/81 (!) 152/85   BP Location:  Right arm Right arm Right arm   Patient Position:  Lying Lying Lying   Pulse: 102 93 78 77   Resp:  18 18 17   Temp:  98.2 °F (36.8 °C) 97.9 °F (36.6 °C) 97.8 °F (36.6 °C)   TempSrc:  Oral Oral Oral   SpO2: 97% 98% 97% 95%   Weight:  71.7 kg (158 lb 1.1 oz)     Height:  5' 6" (1.676 m)            -- General appearance:  Somnolent.  Lying in bed.  No apparent distress. Response to voice with eye opening.  Follows simple commands.  Short 2-3 word answers to questions.  well developed. appears stated age   -- Head: normocephalic, atraumatic   -- Eyes: PERRL, conjunctivae clear. Extraocular muscles not tested  -- Nose: Nares " normal. Septum midline.   -- Mouth/Throat: lips, mucosa, and tongue normal. no throat erythema.   -- Neck: supple, symmetrical, trachea midline, no JVD and thyroid not grossly enlarged, appears symmetric  -- Lungs: coarse sounds bilaterally heard loudest un upper lobes. normal respiratory effort. No use of accessory muscles.   -- Chest wall: no tenderness. equal bilateral chest rise   -- Heart: regular rate and regular rhythm. S1, S2 normal.  no click, rub or gallop   -- Abdomen: soft, tender in the LUQ, non-distended, non-tympanic; bowel sounds normal; no masses  -- Extremities: no cyanosis, clubbing or edema. Bruising observed on the knees bilaterally   -- Pulses: 2+ and symmetric   -- Skin: color normal, texture normal, turgor normal. No rashes or lesions.   -- Neurologic:  somnolent.  Lethargic.  GCS = 10.  No focal neurologic deficits.         LABORATORY STUDIES:     Recent Results (from the past 36 hour(s))   Comprehensive metabolic panel    Collection Time: 10/26/18  4:11 PM   Result Value Ref Range    Sodium 141 136 - 145 mmol/L    Potassium 2.9 (L) 3.5 - 5.1 mmol/L    Chloride 110 95 - 110 mmol/L    CO2 19 (L) 23 - 29 mmol/L    Glucose 98 70 - 110 mg/dL    BUN, Bld 6 (L) 8 - 23 mg/dL    Creatinine 0.6 0.5 - 1.4 mg/dL    Calcium 7.6 (L) 8.7 - 10.5 mg/dL    Total Protein 6.2 6.0 - 8.4 g/dL    Albumin 3.1 (L) 3.5 - 5.2 g/dL    Total Bilirubin 1.1 (H) 0.1 - 1.0 mg/dL    Alkaline Phosphatase 301 (H) 55 - 135 U/L     (H) 10 - 40 U/L    ALT 53 (H) 10 - 44 U/L    Anion Gap 12 8 - 16 mmol/L    eGFR if African American >60 >60 mL/min/1.73 m^2    eGFR if non African American >60 >60 mL/min/1.73 m^2   CBC auto differential    Collection Time: 10/26/18  4:11 PM   Result Value Ref Range    WBC 3.64 (L) 3.90 - 12.70 K/uL    RBC 3.72 (L) 4.00 - 5.40 M/uL    Hemoglobin 11.4 (L) 12.0 - 16.0 g/dL    Hematocrit 32.7 (L) 37.0 - 48.5 %    MCV 88 82 - 98 fL    MCH 30.6 27.0 - 31.0 pg    MCHC 34.9 32.0 - 36.0 g/dL    RDW  17.7 (H) 11.5 - 14.5 %    Platelets 125 (L) 150 - 350 K/uL    MPV 10.1 9.2 - 12.9 fL    Gran # (ANC) 1.3 (L) 1.8 - 7.7 K/uL    Lymph # 1.6 1.0 - 4.8 K/uL    Mono # 0.5 0.3 - 1.0 K/uL    Eos # 0.2 0.0 - 0.5 K/uL    Baso # 0.02 0.00 - 0.20 K/uL    Gran% 36.4 (L) 38.0 - 73.0 %    Lymph% 44.2 18.0 - 48.0 %    Mono% 12.6 4.0 - 15.0 %    Eosinophil% 6.3 0.0 - 8.0 %    Basophil% 0.5 0.0 - 1.9 %    Differential Method Automated    Lipase    Collection Time: 10/26/18  4:11 PM   Result Value Ref Range    Lipase 22 4 - 60 U/L   Protime-INR    Collection Time: 10/26/18  4:11 PM   Result Value Ref Range    Prothrombin Time 10.5 9.0 - 12.5 sec    INR 1.0 0.8 - 1.2   Magnesium    Collection Time: 10/26/18  4:11 PM   Result Value Ref Range    Magnesium 1.7 1.6 - 2.6 mg/dL   Troponin I    Collection Time: 10/26/18  4:11 PM   Result Value Ref Range    Troponin I <0.006 0.000 - 0.026 ng/mL   Brain natriuretic peptide    Collection Time: 10/26/18  4:11 PM   Result Value Ref Range    BNP 20 0 - 99 pg/mL   Ammonia    Collection Time: 10/26/18  4:11 PM   Result Value Ref Range    Ammonia 32 10 - 50 umol/L   TSH    Collection Time: 10/26/18  4:11 PM   Result Value Ref Range    TSH 2.259 0.400 - 4.000 uIU/mL   Lactic acid, plasma    Collection Time: 10/26/18  4:49 PM   Result Value Ref Range    Lactate (Lactic Acid) 1.9 0.5 - 2.2 mmol/L   Drug screen panel, emergency    Collection Time: 10/26/18  5:25 PM   Result Value Ref Range    Benzodiazepines Negative     Methadone metabolites Negative     Cocaine (Metab.) Negative     Opiate Scrn, Ur Negative     Barbiturate Screen, Ur Negative     Amphetamine Screen, Ur Negative     THC Negative     Phencyclidine Negative     Creatinine, Random Ur 25.8 15.0 - 325.0 mg/dL    Toxicology Information SEE COMMENT    Urinalysis, Reflex to Urine Culture Urine, Clean Catch    Collection Time: 10/26/18  5:25 PM   Result Value Ref Range    Specimen UA Urine, Clean Catch     Color, UA Straw Yellow, Straw, Leanna     Appearance, UA Clear Clear    pH, UA 7.0 5.0 - 8.0    Specific Gravity, UA 1.005 1.005 - 1.030    Protein, UA Negative Negative    Glucose, UA Negative Negative    Ketones, UA Negative Negative    Bilirubin (UA) Negative Negative    Occult Blood UA Negative Negative    Nitrite, UA Negative Negative    Urobilinogen, UA Negative <2.0 EU/dL    Leukocytes, UA Negative Negative   Urinalysis, Reflex to Urine Culture Urine, Clean Catch    Collection Time: 10/26/18  5:25 PM   Result Value Ref Range    Specimen UA Urine, Clean Catch     Color, UA Straw Yellow, Straw, Leanna    Appearance, UA Clear Clear    pH, UA 7.0 5.0 - 8.0    Specific Gravity, UA 1.005 1.005 - 1.030    Protein, UA Negative Negative    Glucose, UA Negative Negative    Ketones, UA Negative Negative    Bilirubin (UA) Negative Negative    Occult Blood UA Negative Negative    Nitrite, UA Negative Negative    Urobilinogen, UA Negative <2.0 EU/dL    Leukocytes, UA Negative Negative   Ethanol    Collection Time: 10/26/18  5:33 PM   Result Value Ref Range    Alcohol, Medical, Serum <10 <10 mg/dL   Type & Screen    Collection Time: 10/26/18  5:43 PM   Result Value Ref Range    Indirect David NEG    Basic metabolic panel    Collection Time: 10/27/18  3:39 AM   Result Value Ref Range    Sodium 140 136 - 145 mmol/L    Potassium 3.7 3.5 - 5.1 mmol/L    Chloride 107 95 - 110 mmol/L    CO2 24 23 - 29 mmol/L    Glucose 100 70 - 110 mg/dL    BUN, Bld 7 (L) 8 - 23 mg/dL    Creatinine 0.6 0.5 - 1.4 mg/dL    Calcium 8.9 8.7 - 10.5 mg/dL    Anion Gap 9 8 - 16 mmol/L    eGFR if African American >60 >60 mL/min/1.73 m^2    eGFR if non African American >60 >60 mL/min/1.73 m^2   CBC auto differential    Collection Time: 10/27/18  3:39 AM   Result Value Ref Range    WBC 2.02 (L) 3.90 - 12.70 K/uL    RBC 3.73 (L) 4.00 - 5.40 M/uL    Hemoglobin 11.4 (L) 12.0 - 16.0 g/dL    Hematocrit 33.5 (L) 37.0 - 48.5 %    MCV 90 82 - 98 fL    MCH 30.6 27.0 - 31.0 pg    MCHC 34.0 32.0 - 36.0  g/dL    RDW 17.9 (H) 11.5 - 14.5 %    Platelets 135 (L) 150 - 350 K/uL    MPV 10.4 9.2 - 12.9 fL    Gran # (ANC) 0.5 (L) 1.8 - 7.7 K/uL    Lymph # 1.0 1.0 - 4.8 K/uL    Mono # 0.2 (L) 0.3 - 1.0 K/uL    Eos # 0.3 0.0 - 0.5 K/uL    Baso # 0.02 0.00 - 0.20 K/uL    Gran% 26.7 (L) 38.0 - 73.0 %    Lymph% 48.5 (H) 18.0 - 48.0 %    Mono% 10.4 4.0 - 15.0 %    Eosinophil% 13.4 (H) 0.0 - 8.0 %    Basophil% 1.0 0.0 - 1.9 %    Differential Method Automated        Lab Results   Component Value Date    INR 1.0 10/26/2018    INR 1.0 09/03/2018    INR 1.1 01/16/2018     Lab Results   Component Value Date    HGBA1C 5.4 02/17/2015     No results for input(s): POCTGLUCOSE in the last 72 hours.        MICROBIOLOGY DATA:     Urine Culture, Routine   Date Value Ref Range Status   01/16/2018 No growth  Final   09/22/2017 No growth  Final   06/25/2017 ESCHERICHIA COLI  >100,000 cfu/ml    Final   02/16/2015 No significant growth  Final   01/26/2014 No significant growth  Final       Microbiology x 7d:   Microbiology Results (last 7 days)     ** No results found for the last 168 hours. **            IMAGING:     Imaging Results          X-Ray Chest 1 View (Final result)     Abnormal  Result time 10/26/18 18:05:32    Final result by Jimbo Perez MD (10/26/18 18:05:32)                 Impression:      Right midlung zone subtle opacity concerning for airspace disease including pneumonia versus potential pulmonary nodule.  Short-term follow-up chest radiography after therapy is recommended to ensure resolution.    This report was flagged in Epic as abnormal.      Electronically signed by: Jimbo Perez MD  Date:    10/26/2018  Time:    18:05             Narrative:    EXAMINATION:  XR CHEST 1 VIEW    CLINICAL HISTORY:  Altered mental status, unspecified    TECHNIQUE:  Single frontal view of the chest was performed.    COMPARISON:  Chest radiograph most recent 04/12/2017 and CT abdomen and pelvis 04/12/2017    FINDINGS:  Cardiomediastinal  silhouette is stable without convincing evidence of failure.  Pulmonary vasculature and hilar regions are within normal limits.  Subtle opacity projects over the right midlung zone not definitively seen on previous exams which may represent a small focus of airspace disease including pneumonia versus pulmonary nodule.  A few scattered linear opacities consistent with subsegmental scarring versus atelectasis.  No large pleural effusion or pneumothorax.  No acute osseous process seen noting right shoulder arthroplasty.                               CT Head Without Contrast (Final result)  Result time 10/26/18 17:40:43    Final result by Paula Santos MD (10/26/18 17:40:43)                 Impression:      No acute intracranial abnormalities identified, allowing for significant patient motion artifact.      Electronically signed by: Paula Santos MD  Date:    10/26/2018  Time:    17:40             Narrative:    EXAMINATION:  CT HEAD WITHOUT CONTRAST    CLINICAL HISTORY:  altered mental state;    TECHNIQUE:  Low dose axial images were obtained through the head.  Coronal and sagittal reformations were also performed. Contrast was not administered.    COMPARISON:  CT head from 07/16/2018.    FINDINGS:  Examination is limited by patient motion artifact despite repeat scan attempt.  Allowing for artifact, there is no evidence of acute/recent major vascular distribution cerebral infarction, intraparenchymal hemorrhage, or intra-axial space occupying lesion. The ventricular system is normal in size and configuration with no evidence of hydrocephalus. No effacement of the skull-base cisterns. No abnormal extra-axial fluid collections or blood products.  Postsurgical changes are seen involving the right anterior maxillary sinus wall with near complete opacification of the right maxillary antrum seen.  The calvarium shows no significant abnormality.                                  CONSULTS:     IP CONSULT TO PSYCHIATRY        ASSESSMENT & PLAN:     Primary Diagnosis:  Encephalopathy, toxic    Active Hospital Problems    Diagnosis  POA    *Encephalopathy, toxic [G92]  Yes     Priority: 1 - High    Overdose, intentional self-harm, initial encounter [T50.902A]  Yes     Priority: 2     Essential hypertension [I10]  Yes     Chronic    CAD (coronary artery disease) [I25.10]  Yes     Chronic    Chronic hepatitis C [B18.2]  Yes     Chronic    Cirrhosis of liver [K74.60]  Yes     Chronic    Bipolar 1 disorder [F31.9]  Yes     Chronic    Depression [F32.9]  Yes    Anxiety [F41.9]  Yes      Resolved Hospital Problems   No resolved problems to display.       Toxic encephalopathy secondary to intentional medication overdose; history of bipolar 1 disorder  · Patient her states that she was upset at her  and therefore ingested 4 tablets of Elavil, which she does not appear to having prescription for.  It is unclear where she obtained these.  · No other obvious organic etiology.  Does not appear infectious, she has history of cirrhosis but her ammonia levels were normal.  No significant EKG changes.  Head CT normal.  She is hemodynamically stable with normal lactic acid.  · Urine Toxicology screen negative.  Blood duct going to do screen ordered as well.Chest x-ray  · Holding psychotropic medications for anxiety and depression until patient returns to baseline  · Psychiatry consult in for further evaluation and recommendations.    Abnormal chest x-ray  Right lung opacification.  Possible aspiration pneumonia versus pulmonary nodule.  Will need to obtain PA and lateral chest x-ray and possible referred to pulmonology for outpatient follow-up.    Hypokalemia   · Due to GI losses or poor oral intake  · Check magnesium  · Replace potassium orally if possible, if not then IV  · Monitor with serial labs    Hypertension  · Goal while inpatient is a systolic blood pressure less than 160mmHg  · BP in acceptable range at this  time  · Continue current home regimen with hold parameters  · PRN antihypertensives available    Gastroesophageal reflux disease   · No acute issues  · Continue current home regimen    Coronary artery disease  · No evidence of acute coronary syndrome at this time  · Maintain adequate blood pressure control  · Heart healthy diet  · Aspirin  · Statin            VTE Risk Mitigation (From admission, onward)        Ordered     enoxaparin injection 40 mg  Every 24 hours (non-standard times)      10/27/18 0312     IP VTE LOW RISK PATIENT  Once      10/26/18 2131     Place sequential compression device  Until discontinued      10/26/18 2131     Place MARINA hose  Until discontinued      10/26/18 2131            Adult PRN medications available   DVT prophylaxis given       DISPOSITION:     Will admit to the Hospital Medicine service for further evaluation and treatment.    Chart reviewed and updated where applicable.    High Risk Conditions:  Patient has an abrupt change in neurologic status:  Toxic encephalopathy secondary to intentional drug overdose      ===============================================================    Vini Bailey MD, MPH  Department of Hospital Medicine   [facility]  [phone] pg  (7pm - 6am)          This note is dictated using Dragon Medical 360 voice recognition software.  There are word recognition mistakes that are occasionally missed on review.

## 2018-10-28 PROBLEM — F10.939 ALCOHOL WITHDRAWAL: Status: ACTIVE | Noted: 2018-10-28

## 2018-10-28 PROBLEM — T50.902A OVERDOSE, INTENTIONAL SELF-HARM, INITIAL ENCOUNTER: Status: RESOLVED | Noted: 2018-10-27 | Resolved: 2018-10-28

## 2018-10-28 PROBLEM — R10.9 ABDOMINAL PAIN: Status: ACTIVE | Noted: 2018-10-28

## 2018-10-28 LAB
ANION GAP SERPL CALC-SCNC: 6 MMOL/L
BASOPHILS # BLD AUTO: 0.03 K/UL
BASOPHILS NFR BLD: 1 %
BUN SERPL-MCNC: 8 MG/DL
CALCIUM SERPL-MCNC: 9 MG/DL
CHLORIDE SERPL-SCNC: 106 MMOL/L
CO2 SERPL-SCNC: 26 MMOL/L
CREAT SERPL-MCNC: 0.7 MG/DL
DIFFERENTIAL METHOD: ABNORMAL
EOSINOPHIL # BLD AUTO: 0.3 K/UL
EOSINOPHIL NFR BLD: 8.4 %
ERYTHROCYTE [DISTWIDTH] IN BLOOD BY AUTOMATED COUNT: 18.5 %
EST. GFR  (AFRICAN AMERICAN): >60 ML/MIN/1.73 M^2
EST. GFR  (NON AFRICAN AMERICAN): >60 ML/MIN/1.73 M^2
GLUCOSE SERPL-MCNC: 102 MG/DL
HCT VFR BLD AUTO: 33.7 %
HGB BLD-MCNC: 11.1 G/DL
LYMPHOCYTES # BLD AUTO: 1.1 K/UL
LYMPHOCYTES NFR BLD: 37.5 %
MAGNESIUM SERPL-MCNC: 2.2 MG/DL
MCH RBC QN AUTO: 30.5 PG
MCHC RBC AUTO-ENTMCNC: 32.9 G/DL
MCV RBC AUTO: 93 FL
MONOCYTES # BLD AUTO: 0.3 K/UL
MONOCYTES NFR BLD: 10.1 %
NEUTROPHILS # BLD AUTO: 1.3 K/UL
NEUTROPHILS NFR BLD: 43 %
PLATELET # BLD AUTO: 137 K/UL
PMV BLD AUTO: 10.7 FL
POTASSIUM SERPL-SCNC: 3.8 MMOL/L
RBC # BLD AUTO: 3.64 M/UL
SODIUM SERPL-SCNC: 138 MMOL/L
WBC # BLD AUTO: 2.96 K/UL

## 2018-10-28 PROCEDURE — 25000003 PHARM REV CODE 250: Performed by: EMERGENCY MEDICINE

## 2018-10-28 PROCEDURE — 83735 ASSAY OF MAGNESIUM: CPT

## 2018-10-28 PROCEDURE — 80048 BASIC METABOLIC PNL TOTAL CA: CPT

## 2018-10-28 PROCEDURE — 25000003 PHARM REV CODE 250: Performed by: INTERNAL MEDICINE

## 2018-10-28 PROCEDURE — 25000003 PHARM REV CODE 250: Performed by: HOSPITALIST

## 2018-10-28 PROCEDURE — 63600175 PHARM REV CODE 636 W HCPCS: Performed by: HOSPITALIST

## 2018-10-28 PROCEDURE — 85025 COMPLETE CBC W/AUTO DIFF WBC: CPT

## 2018-10-28 PROCEDURE — 36415 COLL VENOUS BLD VENIPUNCTURE: CPT

## 2018-10-28 PROCEDURE — 11000001 HC ACUTE MED/SURG PRIVATE ROOM

## 2018-10-28 RX ORDER — DICYCLOMINE HYDROCHLORIDE 10 MG/1
10 CAPSULE ORAL 4 TIMES DAILY
Status: DISCONTINUED | OUTPATIENT
Start: 2018-10-28 | End: 2018-10-30 | Stop reason: HOSPADM

## 2018-10-28 RX ORDER — LORAZEPAM 2 MG/ML
1 INJECTION INTRAMUSCULAR EVERY 4 HOURS PRN
Status: DISCONTINUED | OUTPATIENT
Start: 2018-10-28 | End: 2018-10-30

## 2018-10-28 RX ORDER — OXYCODONE AND ACETAMINOPHEN 5; 325 MG/1; MG/1
1 TABLET ORAL EVERY 6 HOURS PRN
Status: DISCONTINUED | OUTPATIENT
Start: 2018-10-28 | End: 2018-10-30

## 2018-10-28 RX ADMIN — LEVOTHYROXINE SODIUM 25 MCG: 25 TABLET ORAL at 09:10

## 2018-10-28 RX ADMIN — ENOXAPARIN SODIUM 40 MG: 100 INJECTION SUBCUTANEOUS at 09:10

## 2018-10-28 RX ADMIN — DIAZEPAM 15 MG: 5 TABLET ORAL at 05:10

## 2018-10-28 RX ADMIN — OXYCODONE HYDROCHLORIDE AND ACETAMINOPHEN 1 TABLET: 5; 325 TABLET ORAL at 04:10

## 2018-10-28 RX ADMIN — DIAZEPAM 15 MG: 5 TABLET ORAL at 01:10

## 2018-10-28 RX ADMIN — DICYCLOMINE HYDROCHLORIDE 10 MG: 10 CAPSULE ORAL at 04:10

## 2018-10-28 RX ADMIN — DIAZEPAM 15 MG: 5 TABLET ORAL at 09:10

## 2018-10-28 RX ADMIN — OXYCODONE HYDROCHLORIDE AND ACETAMINOPHEN 1 TABLET: 5; 325 TABLET ORAL at 10:10

## 2018-10-28 RX ADMIN — DICYCLOMINE HYDROCHLORIDE 10 MG: 10 CAPSULE ORAL at 10:10

## 2018-10-28 RX ADMIN — DICYCLOMINE HYDROCHLORIDE 10 MG: 10 CAPSULE ORAL at 01:10

## 2018-10-28 RX ADMIN — PANTOPRAZOLE SODIUM 40 MG: 40 TABLET, DELAYED RELEASE ORAL at 09:10

## 2018-10-28 RX ADMIN — GABAPENTIN 300 MG: 300 CAPSULE ORAL at 09:10

## 2018-10-28 RX ADMIN — DICYCLOMINE HYDROCHLORIDE 10 MG: 10 CAPSULE ORAL at 09:10

## 2018-10-28 RX ADMIN — DOCUSATE SODIUM 100 MG: 100 CAPSULE, LIQUID FILLED ORAL at 09:10

## 2018-10-28 RX ADMIN — DULOXETINE 30 MG: 30 CAPSULE, DELAYED RELEASE ORAL at 09:10

## 2018-10-28 RX ADMIN — QUETIAPINE FUMARATE 100 MG: 100 TABLET ORAL at 09:10

## 2018-10-28 NOTE — NURSING
Patient sleeping throughout shift. Patient awoken by lab for blood draw. Patient requesting something for sleep, when informed patient of time, she changes her story and states she's agitated. Patient is aware that she has Ativan PRN. No evidence of tremors or agitation at this. Patient's behavior is consistent with drug seeking.

## 2018-10-28 NOTE — SUBJECTIVE & OBJECTIVE
Interval History: No real signs of withdrawal other than some abdominal pain.     Review of Systems   Constitutional: Negative for activity change, chills and fatigue.   HENT: Negative for congestion.    Respiratory: Negative for shortness of breath.    Cardiovascular: Negative for chest pain.   Gastrointestinal: Positive for abdominal pain.   Genitourinary: Negative for difficulty urinating.   Musculoskeletal: Negative for back pain.   Neurological: Negative for dizziness and facial asymmetry.     Objective:     Vital Signs (Most Recent):  Temp: 98.2 °F (36.8 °C) (10/28/18 0731)  Pulse: 88 (10/28/18 0731)  Resp: 18 (10/28/18 0731)  BP: 102/64 (10/28/18 0731)  SpO2: 96 % (10/28/18 0731) Vital Signs (24h Range):  Temp:  [97.6 °F (36.4 °C)-98.5 °F (36.9 °C)] 98.2 °F (36.8 °C)  Pulse:  [] 88  Resp:  [17-21] 18  SpO2:  [95 %-99 %] 96 %  BP: (101-147)/(55-93) 102/64     Weight: 71.7 kg (158 lb 1.1 oz)  Body mass index is 25.51 kg/m².    Intake/Output Summary (Last 24 hours) at 10/28/2018 0954  Last data filed at 10/28/2018 0822  Gross per 24 hour   Intake 2280 ml   Output --   Net 2280 ml      Physical Exam   Constitutional: She appears well-developed and well-nourished.   HENT:   Head: Normocephalic and atraumatic.   Skin: Skin is warm and dry.   Psychiatric: She has a normal mood and affect. Her behavior is normal.   Vitals reviewed.      Significant Labs:   BMP:   Recent Labs   Lab 10/26/18  1611  10/28/18  0343   GLU 98   < > 102      < > 138   K 2.9*   < > 3.8      < > 106   CO2 19*   < > 26   BUN 6*   < > 8   CREATININE 0.6   < > 0.7   CALCIUM 7.6*   < > 9.0   MG 1.7  --   --     < > = values in this interval not displayed.     CBC:   Recent Labs   Lab 10/26/18  1611 10/27/18  0339 10/28/18  0343   WBC 3.64* 2.02* 2.96*   HGB 11.4* 11.4* 11.1*   HCT 32.7* 33.5* 33.7*   * 135* 137*       Significant Imaging:

## 2018-10-28 NOTE — PROGRESS NOTES
[facility] Hospital Medicine  Progress Note    Patient Name: Estella Arevalo  MRN: [id number]  Patient Class: IP- Inpatient   Admission Date: 10/26/2018  Length of Stay: 2 days  Attending Physician: Emigdio Landin MD  Primary Care Provider: Angela Packer MD        Subjective:     Principal Problem:Encephalopathy, toxic    HPI:  isaac Arevalo is a 63 y.o. female that (in part)  has a past medical history of Addiction to drug, Alcohol abuse, Arthritis, Cirrhosis of liver, Coronary artery disease, Fall, GERD (gastroesophageal reflux disease), Hepatitis C, History of psychiatric hospitalization, psychiatric care, Hypertension, Low back pain, Radha, MI (myocardial infarction), Migraine headache, Psychiatric problem, Sleep difficulties, Suicide attempt, Therapy, and Thyroid disease.  Presents to [facility] Emergency Department agitated and hostile. She made multiple attempts to spit on the attendings and required a face shield to protect staff. Initially, she was not cooperative when asked questions about her current presentation and eventually became somnolent. Before falling asleep, pt stated that she 'took 4 elavil' after having problems with her . Her  provided collateral information and stated that she consumed two beers this evening but he did not mention the elavil.     Further information was limited due to altered mental status.     Hospital medicine has been asked to admit for further evaluation and treatment.     Hospital Course:   Mickey is a 63 y.o. female that (in part)  has a past medical history of Addiction to drug, Alcohol abuse, Arthritis, Cirrhosis of liver, Coronary artery disease, Fall, GERD (gastroesophageal reflux disease), Hepatitis C, History of psychiatric hospitalization, psychiatric care, Hypertension, Low back pain, Radha, MI (myocardial infarction), Migraine headache, Psychiatric problem, Sleep difficulties, Suicide  attempt, Therapy, and Thyroid disease.  Presents to [facility] Emergency Department agitated and hostile.  The patient is a known alcoholic and has been drinking heavily since her mother  recently. She has been trying to wean herself off alcohol.  Because of a fight with her , and just wanting to go to sleep, the patient took 4 Elavil tablets.  She presented disoriented and hostile.  By the following morning, she was alert and oriented. Stated she was not trying to hurt herself. She started to display signs of ETOH withdrawal and was started on Valium.      Interval History: No real signs of withdrawal other than some abdominal pain.     Review of Systems   Constitutional: Negative for activity change, chills and fatigue.   HENT: Negative for congestion.    Respiratory: Negative for shortness of breath.    Cardiovascular: Negative for chest pain.   Gastrointestinal: Positive for abdominal pain.   Genitourinary: Negative for difficulty urinating.   Musculoskeletal: Negative for back pain.   Neurological: Negative for dizziness and facial asymmetry.     Objective:     Vital Signs (Most Recent):  Temp: 98.2 °F (36.8 °C) (10/28/18 0731)  Pulse: 88 (10/28/18 0731)  Resp: 18 (10/28/18 0731)  BP: 102/64 (10/28/18 0731)  SpO2: 96 % (10/28/18 0731) Vital Signs (24h Range):  Temp:  [97.6 °F (36.4 °C)-98.5 °F (36.9 °C)] 98.2 °F (36.8 °C)  Pulse:  [] 88  Resp:  [17-21] 18  SpO2:  [95 %-99 %] 96 %  BP: (101-147)/(55-93) 102/64     Weight: 71.7 kg (158 lb 1.1 oz)  Body mass index is 25.51 kg/m².    Intake/Output Summary (Last 24 hours) at 10/28/2018 0954  Last data filed at 10/28/2018 0822  Gross per 24 hour   Intake 2280 ml   Output --   Net 2280 ml      Physical Exam   Constitutional: She appears well-developed and well-nourished.   HENT:   Head: Normocephalic and atraumatic.   Skin: Skin is warm and dry.   Psychiatric: She has a normal mood and affect. Her behavior is normal.   Vitals  reviewed.      Significant Labs:   BMP:   Recent Labs   Lab 10/26/18  1611  10/28/18  0343   GLU 98   < > 102      < > 138   K 2.9*   < > 3.8      < > 106   CO2 19*   < > 26   BUN 6*   < > 8   CREATININE 0.6   < > 0.7   CALCIUM 7.6*   < > 9.0   MG 1.7  --   --     < > = values in this interval not displayed.     CBC:   Recent Labs   Lab 10/26/18  1611 10/27/18  0339 10/28/18  0343   WBC 3.64* 2.02* 2.96*   HGB 11.4* 11.4* 11.1*   HCT 32.7* 33.5* 33.7*   * 135* 137*       Significant Imaging:     Assessment/Plan:      * Encephalopathy, toxic    From Elavil. This has resolved. She is alert and oriented. Did not try to hurt herself.          Alcohol withdrawal    Continue Valium         Abdominal pain    From withdrawal?  Will start bentyl        Chronic hepatitis C    No acute issues.      Cirrhosis of liver    From ETOH. No acute issues.          CAD (coronary artery disease)    Stable.        Essential hypertension    Resume home meds.          Bipolar 1 disorder    Resume home meds.        Anxiety    Resume home meds.        Depression    Resume home meds.          VTE Risk Mitigation (From admission, onward)        Ordered     enoxaparin injection 40 mg  Every 24 hours (non-standard times)      10/27/18 0312     IP VTE LOW RISK PATIENT  Once      10/26/18 2131     Place sequential compression device  Until discontinued      10/26/18 2131     Place MARINA hose  Until discontinued      10/26/18 2131              Emigdio Davila MD  Department of Hospital Medicine   [facility]

## 2018-10-28 NOTE — HOSPITAL COURSE
Presented disoriented and hostile after intentionally taking 4 elavil pills and drinking alcohol. By the following morning, she was alert and oriented. Stated she was not trying to hurt herself. Psych consulted- no PEC needed. She started to display signs of ETOH withdrawal and was started on Valium taper. Of note, the nurses have informed me that on a previous discharge, the patient altered a narcotic Rx. Developed nausea and vomiting on 10/29. KUB with partial small bowel obstruction. Made NPO with improvement in symptoms. Started IVF, bowel regimen, and educated on opioids causing constipation. Patient refuses to discontinue opioids. Patient has tolerated regular diet with no nausea, no vomiting. Increase bowel regimen at discharge to promote daily BMs. Given past history of altering Rx, will not prescribe with any controlled substances.

## 2018-10-28 NOTE — NURSING
Dr. Bailey notified of multiple unsuccessful attempts to re-start IV. Okay to leave IV per Dr. Bailey.

## 2018-10-28 NOTE — PLAN OF CARE
Problem: Patient Care Overview  Goal: Plan of Care Review  Outcome: Ongoing (interventions implemented as appropriate)  Pt free of falls or injuries, no s/s of alcohol withdraws. C/o to abdominal area, percocet given twice during shift. Has good appetite. Seems to be more calmed than yesterday. Bed alarm on. Tele box number [id number], with good wave form. Call light within reach, bed in low position.

## 2018-10-29 PROBLEM — F54 PSYCHOLOGICAL AND BEHAVIORAL FACTORS ASSOCIATED WITH DISORDERS OR DISEASES CLASSIFIED ELSEWHERE: Status: ACTIVE | Noted: 2018-10-29

## 2018-10-29 LAB
ANION GAP SERPL CALC-SCNC: 13 MMOL/L
BASOPHILS # BLD AUTO: 0.01 K/UL
BASOPHILS NFR BLD: 0.2 %
BUN SERPL-MCNC: 17 MG/DL
CALCIUM SERPL-MCNC: 9.3 MG/DL
CHLORIDE SERPL-SCNC: 102 MMOL/L
CO2 SERPL-SCNC: 17 MMOL/L
CREAT SERPL-MCNC: 0.7 MG/DL
DAT IGG-SP REAG RBC-IMP: NORMAL
DIFFERENTIAL METHOD: ABNORMAL
EOSINOPHIL # BLD AUTO: 0.2 K/UL
EOSINOPHIL NFR BLD: 2.6 %
ERYTHROCYTE [DISTWIDTH] IN BLOOD BY AUTOMATED COUNT: 18 %
EST. GFR  (AFRICAN AMERICAN): >60 ML/MIN/1.73 M^2
EST. GFR  (NON AFRICAN AMERICAN): >60 ML/MIN/1.73 M^2
GLUCOSE SERPL-MCNC: 128 MG/DL
HCT VFR BLD AUTO: 39.6 %
HGB BLD-MCNC: 13.5 G/DL
LYMPHOCYTES # BLD AUTO: 0.9 K/UL
LYMPHOCYTES NFR BLD: 15.9 %
MCH RBC QN AUTO: 31.3 PG
MCHC RBC AUTO-ENTMCNC: 34.1 G/DL
MCV RBC AUTO: 92 FL
MONOCYTES # BLD AUTO: 0.4 K/UL
MONOCYTES NFR BLD: 6.8 %
NEUTROPHILS # BLD AUTO: 4.3 K/UL
NEUTROPHILS NFR BLD: 74.5 %
PLATELET # BLD AUTO: 183 K/UL
PMV BLD AUTO: 10.9 FL
POTASSIUM SERPL-SCNC: 4.1 MMOL/L
RBC # BLD AUTO: 4.32 M/UL
SODIUM SERPL-SCNC: 132 MMOL/L
WBC # BLD AUTO: 5.73 K/UL

## 2018-10-29 PROCEDURE — 80048 BASIC METABOLIC PNL TOTAL CA: CPT

## 2018-10-29 PROCEDURE — 11000001 HC ACUTE MED/SURG PRIVATE ROOM

## 2018-10-29 PROCEDURE — 99231 SBSQ HOSP IP/OBS SF/LOW 25: CPT | Mod: ,,, | Performed by: NURSE PRACTITIONER

## 2018-10-29 PROCEDURE — 85025 COMPLETE CBC W/AUTO DIFF WBC: CPT

## 2018-10-29 PROCEDURE — 25000003 PHARM REV CODE 250: Performed by: HOSPITALIST

## 2018-10-29 PROCEDURE — 63600175 PHARM REV CODE 636 W HCPCS: Performed by: INTERNAL MEDICINE

## 2018-10-29 PROCEDURE — 36415 COLL VENOUS BLD VENIPUNCTURE: CPT

## 2018-10-29 PROCEDURE — 25000003 PHARM REV CODE 250: Performed by: INTERNAL MEDICINE

## 2018-10-29 RX ORDER — ONDANSETRON 8 MG/1
8 TABLET, ORALLY DISINTEGRATING ORAL EVERY 6 HOURS PRN
Status: DISCONTINUED | OUTPATIENT
Start: 2018-10-29 | End: 2018-10-30 | Stop reason: HOSPADM

## 2018-10-29 RX ORDER — PROMETHAZINE HYDROCHLORIDE 25 MG/ML
25 INJECTION, SOLUTION INTRAMUSCULAR; INTRAVENOUS EVERY 4 HOURS PRN
Status: DISCONTINUED | OUTPATIENT
Start: 2018-10-29 | End: 2018-10-30

## 2018-10-29 RX ORDER — PANTOPRAZOLE SODIUM 40 MG/1
40 TABLET, DELAYED RELEASE ORAL DAILY
Status: DISCONTINUED | OUTPATIENT
Start: 2018-10-29 | End: 2018-10-30 | Stop reason: HOSPADM

## 2018-10-29 RX ORDER — LACTULOSE 10 G/15ML
30 SOLUTION ORAL ONCE
Status: COMPLETED | OUTPATIENT
Start: 2018-10-29 | End: 2018-10-29

## 2018-10-29 RX ADMIN — DIAZEPAM 15 MG: 5 TABLET ORAL at 10:10

## 2018-10-29 RX ADMIN — DIAZEPAM 15 MG: 5 TABLET ORAL at 05:10

## 2018-10-29 RX ADMIN — OXYCODONE HYDROCHLORIDE AND ACETAMINOPHEN 1 TABLET: 5; 325 TABLET ORAL at 04:10

## 2018-10-29 RX ADMIN — PROMETHAZINE HYDROCHLORIDE 25 MG: 25 INJECTION INTRAMUSCULAR; INTRAVENOUS at 02:10

## 2018-10-29 RX ADMIN — ONDANSETRON 8 MG: 8 TABLET, ORALLY DISINTEGRATING ORAL at 04:10

## 2018-10-29 RX ADMIN — OXYCODONE HYDROCHLORIDE AND ACETAMINOPHEN 1 TABLET: 5; 325 TABLET ORAL at 07:10

## 2018-10-29 RX ADMIN — DICYCLOMINE HYDROCHLORIDE 10 MG: 10 CAPSULE ORAL at 09:10

## 2018-10-29 RX ADMIN — DICYCLOMINE HYDROCHLORIDE 10 MG: 10 CAPSULE ORAL at 01:10

## 2018-10-29 RX ADMIN — GABAPENTIN 300 MG: 300 CAPSULE ORAL at 09:10

## 2018-10-29 RX ADMIN — LACTULOSE 30 G: 20 SOLUTION ORAL at 11:10

## 2018-10-29 RX ADMIN — OXYCODONE HYDROCHLORIDE AND ACETAMINOPHEN 1 TABLET: 5; 325 TABLET ORAL at 11:10

## 2018-10-29 RX ADMIN — DICYCLOMINE HYDROCHLORIDE 10 MG: 10 CAPSULE ORAL at 04:10

## 2018-10-29 RX ADMIN — QUETIAPINE FUMARATE 100 MG: 100 TABLET ORAL at 09:10

## 2018-10-29 RX ADMIN — PANTOPRAZOLE SODIUM 40 MG: 40 TABLET, DELAYED RELEASE ORAL at 10:10

## 2018-10-29 RX ADMIN — ATENOLOL 25 MG: 25 TABLET ORAL at 12:10

## 2018-10-29 RX ADMIN — DIAZEPAM 15 MG: 5 TABLET ORAL at 02:10

## 2018-10-29 RX ADMIN — PROMETHAZINE HYDROCHLORIDE 25 MG: 25 INJECTION INTRAMUSCULAR; INTRAVENOUS at 10:10

## 2018-10-29 RX ADMIN — DOCUSATE SODIUM 100 MG: 100 CAPSULE, LIQUID FILLED ORAL at 09:10

## 2018-10-29 NOTE — ASSESSMENT & PLAN NOTE
Estella SORIA Mickey states that she took Elavil to help her to stop drinking. She states that she took the Elavil with 2 beers. She states that she was not trying to kill herself and denies suicidal ideation. She is not suicidal, homicidal or gravely disabled and therefore does not meet the criteria for a PEC.

## 2018-10-29 NOTE — SUBJECTIVE & OBJECTIVE
Interval History: No new issues. Some N/V    Review of Systems   Constitutional: Negative for activity change, chills and fatigue.   HENT: Negative for congestion.    Respiratory: Negative for shortness of breath.    Cardiovascular: Negative for chest pain.   Gastrointestinal: Positive for abdominal pain.   Genitourinary: Negative for difficulty urinating.   Musculoskeletal: Negative for back pain.   Neurological: Negative for dizziness and facial asymmetry.     Objective:     Vital Signs (Most Recent):  Temp: 98.6 °F (37 °C) (10/29/18 0740)  Pulse: 107 (10/29/18 0740)  Resp: 17 (10/29/18 0740)  BP: (!) 125/91 (10/29/18 0740)  SpO2: (!) 92 % (10/29/18 0740) Vital Signs (24h Range):  Temp:  [97.9 °F (36.6 °C)-98.6 °F (37 °C)] 98.6 °F (37 °C)  Pulse:  [] 107  Resp:  [17-22] 17  SpO2:  [92 %-98 %] 92 %  BP: (118-135)/(73-97) 125/91     Weight: 71.7 kg (158 lb 1.1 oz)  Body mass index is 25.51 kg/m².    Intake/Output Summary (Last 24 hours) at 10/29/2018 0951  Last data filed at 10/29/2018 0600  Gross per 24 hour   Intake 2220 ml   Output --   Net 2220 ml      Physical Exam   Constitutional: She appears well-developed and well-nourished.   HENT:   Head: Normocephalic and atraumatic.   Skin: Skin is warm and dry.   Psychiatric: She has a normal mood and affect. Her behavior is normal.   Vitals reviewed.      Significant Labs:   BMP:   Recent Labs   Lab 10/28/18  0343 10/29/18  0506    128*    132*   K 3.8 4.1    102   CO2 26 17*   BUN 8 17   CREATININE 0.7 0.7   CALCIUM 9.0 9.3   MG 2.2  --      CBC:   Recent Labs   Lab 10/28/18  0343 10/29/18  0506   WBC 2.96* 5.73   HGB 11.1* 13.5   HCT 33.7* 39.6   * 183       Significant Imaging:

## 2018-10-29 NOTE — HOSPITAL COURSE
Estella Arevalo presents lying supine in bed with the head of bed up, wearing hospital attire. Her  states that she came into the hospital after passing out. He states that she has a problem with her rectum due to a fall a while back that results in her having a lot of bleeding from her rectum and he found her in a bathtub of blood a couple of days before presenting to the ER and subsequently she passed out and was brought to the ED. She states that she has prolapse rectum. She states she did pass out because she took Elavil to help her to stop drinking. She states that she took the Elavil with 2 beers. She states she has gone to Rehab in the past but does not remember when. She states that she was not trying to kill herself and denies suicidal ideation. She states that she is nauseated and did not sleep at all last night and this is not a good time for an interview. She is not suicidal, homicidal or gravely disabled and therefore does not meet the criteria for a PEC.

## 2018-10-29 NOTE — HPI
Estella Arevalo states that she came into the hospital because she passed out. Her  states that he found her in a bathtub of blood a couple of days prior to her passing out and being brought into the ED. A review of the ED notes indicate that the patient reported that she 'took 4 elavil' after having problems with her  and the note further indicates that her  provided collateral information and stated that she consumed two beers but he did not mention the elavil.

## 2018-10-29 NOTE — NURSING
"Patient complaining of severe pain to abdomen, states, "I think my umbilical hernia may be incarcerated." Pain medication administered.  "

## 2018-10-29 NOTE — PLAN OF CARE
Problem: Patient Care Overview  Goal: Plan of Care Review  Outcome: Ongoing (interventions implemented as appropriate)  Patient remains free from injury and falls. No signs or symptoms of alcohol withdrawal. Patient received scheduled valium as ordered. Patient eating and requesting food continuously. Patient vomited once this shift, very large amounts, PRN anti-emetic administered. Patient made NPO. Patient also complaining of severe abdominal pain, prn analgesic administered as ordered with mild relief. Patient scheduled to have abdominal x-ray today. Plan of care continued.

## 2018-10-29 NOTE — NURSING
Patient vomited copious amounts of undigested food all over floor, bed, and bedside table. Patient has been binge eating this shift.     Dr. Bailey notified. New orders received.
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
## 2018-10-29 NOTE — NURSING
Patient becomes tachycardic while ambulating to restroom, HR 130s. Once back in bed patient heart rate goes down, 100s BPM
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
## 2018-10-29 NOTE — NURSING
Patient assisted to restroom, bright red blood noted on toilet paper and in toilet. Patient states she has had hemorrhoids in the past. Patient also complaining of severe abdominal pain. Dr. Bailey notified new orders received.
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
## 2018-10-29 NOTE — PROGRESS NOTES
[facility] Hospital Medicine  Progress Note    Patient Name: Estella Arevalo  MRN: [id number]  Patient Class: IP- Inpatient   Admission Date: 10/26/2018  Length of Stay: 3 days  Attending Physician: Emigdio Landin MD  Primary Care Provider: Angela Packer MD        Subjective:     Principal Problem:Encephalopathy, toxic    HPI:  isaac Arevalo is a 63 y.o. female that (in part)  has a past medical history of Addiction to drug, Alcohol abuse, Arthritis, Cirrhosis of liver, Coronary artery disease, Fall, GERD (gastroesophageal reflux disease), Hepatitis C, History of psychiatric hospitalization, psychiatric care, Hypertension, Low back pain, Radha, MI (myocardial infarction), Migraine headache, Psychiatric problem, Sleep difficulties, Suicide attempt, Therapy, and Thyroid disease.  Presents to [facility] Emergency Department agitated and hostile. She made multiple attempts to spit on the attendings and required a face shield to protect staff. Initially, she was not cooperative when asked questions about her current presentation and eventually became somnolent. Before falling asleep, pt stated that she 'took 4 elavil' after having problems with her . Her  provided collateral information and stated that she consumed two beers this evening but he did not mention the elavil.     Further information was limited due to altered mental status.     Hospital medicine has been asked to admit for further evaluation and treatment.     Hospital Course:   Mickey is a 63 y.o. female that (in part)  has a past medical history of Addiction to drug, Alcohol abuse, Arthritis, Cirrhosis of liver, Coronary artery disease, Fall, GERD (gastroesophageal reflux disease), Hepatitis C, History of psychiatric hospitalization, psychiatric care, Hypertension, Low back pain, Radha, MI (myocardial infarction), Migraine headache, Psychiatric problem, Sleep difficulties, Suicide  attempt, Therapy, and Thyroid disease.  Presents to [facility] Emergency Department agitated and hostile.  The patient is a known alcoholic and has been drinking heavily since her mother  recently. She has been trying to wean herself off alcohol.  Because of a fight with her , and just wanting to go to sleep, the patient took 4 Elavil tablets.  She presented disoriented and hostile.  By the following morning, she was alert and oriented. Stated she was not trying to hurt herself. She started to display signs of ETOH withdrawal and was started on Valium.  Of note, the nurses have informed me that on a previous discharge, the patient altered a narcotic Rx.      Interval History: No new issues. Some N/V    Review of Systems   Constitutional: Negative for activity change, chills and fatigue.   HENT: Negative for congestion.    Respiratory: Negative for shortness of breath.    Cardiovascular: Negative for chest pain.   Gastrointestinal: Positive for abdominal pain.   Genitourinary: Negative for difficulty urinating.   Musculoskeletal: Negative for back pain.   Neurological: Negative for dizziness and facial asymmetry.     Objective:     Vital Signs (Most Recent):  Temp: 98.6 °F (37 °C) (10/29/18 0740)  Pulse: 107 (10/29/18 0740)  Resp: 17 (10/29/18 0740)  BP: (!) 125/91 (10/29/18 0740)  SpO2: (!) 92 % (10/29/18 0740) Vital Signs (24h Range):  Temp:  [97.9 °F (36.6 °C)-98.6 °F (37 °C)] 98.6 °F (37 °C)  Pulse:  [] 107  Resp:  [17-22] 17  SpO2:  [92 %-98 %] 92 %  BP: (118-135)/(73-97) 125/91     Weight: 71.7 kg (158 lb 1.1 oz)  Body mass index is 25.51 kg/m².    Intake/Output Summary (Last 24 hours) at 10/29/2018 0951  Last data filed at 10/29/2018 0600  Gross per 24 hour   Intake 2220 ml   Output --   Net 2220 ml      Physical Exam   Constitutional: She appears well-developed and well-nourished.   HENT:   Head: Normocephalic and atraumatic.   Skin: Skin is warm and dry.    Psychiatric: She has a normal mood and affect. Her behavior is normal.   Vitals reviewed.      Significant Labs:   BMP:   Recent Labs   Lab 10/28/18  0343 10/29/18  0506    128*    132*   K 3.8 4.1    102   CO2 26 17*   BUN 8 17   CREATININE 0.7 0.7   CALCIUM 9.0 9.3   MG 2.2  --      CBC:   Recent Labs   Lab 10/28/18  0343 10/29/18  0506   WBC 2.96* 5.73   HGB 11.1* 13.5   HCT 33.7* 39.6   * 183       Significant Imaging:     Assessment/Plan:      * Encephalopathy, toxic    From Elavil. This has resolved. She is alert and oriented. Did not try to hurt herself.          Alcohol withdrawal    Continue Valium         Abdominal pain    From withdrawal?  Will start bentyl        Chronic hepatitis C    No acute issues.      Cirrhosis of liver    From ETOH. No acute issues.          CAD (coronary artery disease)    Stable.        Essential hypertension    Resume home meds.          Bipolar 1 disorder    Resume home meds.        Anxiety    Resume home meds.        Depression    Resume home meds.          VTE Risk Mitigation (From admission, onward)        Ordered     enoxaparin injection 40 mg  Every 24 hours (non-standard times)      10/27/18 0312     IP VTE LOW RISK PATIENT  Once      10/26/18 2131     Place sequential compression device  Until discontinued      10/26/18 2131     Place MARINA hose  Until discontinued      10/26/18 2131        Home soon     Emigdio Davila MD  Department of Hospital Medicine   [facility]

## 2018-10-29 NOTE — ASSESSMENT & PLAN NOTE
Estella Arevalo states that she took Elavil to help her to stop drinking. She states that she took the Elavil with 2 beers. She states that she was not trying to kill herself and denies suicidal ideation. She is not suicidal, homicidal or gravely disabled and therefore does not meet the criteria for a PEC.     She would do best with inpatient drug/alcohol rehabilitation but refuses at this time. Utilize Zyprexa 10 mg IM/PO PRN agitation or uncontrollable threatening behavior. Avoid scheduled drugs due to patient's long history of polysubstance abuse. Monitor for alcohol/drug withdrawal.

## 2018-10-30 VITALS
TEMPERATURE: 99 F | DIASTOLIC BLOOD PRESSURE: 74 MMHG | WEIGHT: 158.06 LBS | HEIGHT: 66 IN | HEART RATE: 91 BPM | BODY MASS INDEX: 25.4 KG/M2 | RESPIRATION RATE: 18 BRPM | OXYGEN SATURATION: 98 % | SYSTOLIC BLOOD PRESSURE: 116 MMHG

## 2018-10-30 PROBLEM — K56.600 PARTIAL SMALL BOWEL OBSTRUCTION: Status: ACTIVE | Noted: 2018-10-30

## 2018-10-30 LAB
ANION GAP SERPL CALC-SCNC: 10 MMOL/L
BASOPHILS # BLD AUTO: 0.01 K/UL
BASOPHILS NFR BLD: 0.4 %
BUN SERPL-MCNC: 14 MG/DL
CALCIUM SERPL-MCNC: 8.3 MG/DL
CHLORIDE SERPL-SCNC: 102 MMOL/L
CO2 SERPL-SCNC: 24 MMOL/L
CREAT SERPL-MCNC: 0.6 MG/DL
DIFFERENTIAL METHOD: ABNORMAL
EOSINOPHIL # BLD AUTO: 0.1 K/UL
EOSINOPHIL NFR BLD: 4.9 %
ERYTHROCYTE [DISTWIDTH] IN BLOOD BY AUTOMATED COUNT: 18.1 %
EST. GFR  (AFRICAN AMERICAN): >60 ML/MIN/1.73 M^2
EST. GFR  (NON AFRICAN AMERICAN): >60 ML/MIN/1.73 M^2
GLUCOSE SERPL-MCNC: 95 MG/DL
HCT VFR BLD AUTO: 33.3 %
HGB BLD-MCNC: 11.1 G/DL
LYMPHOCYTES # BLD AUTO: 0.9 K/UL
LYMPHOCYTES NFR BLD: 37.9 %
MCH RBC QN AUTO: 30.6 PG
MCHC RBC AUTO-ENTMCNC: 33.3 G/DL
MCV RBC AUTO: 92 FL
MONOCYTES # BLD AUTO: 0.3 K/UL
MONOCYTES NFR BLD: 12.5 %
NEUTROPHILS # BLD AUTO: 1 K/UL
NEUTROPHILS NFR BLD: 44.3 %
PLATELET # BLD AUTO: 155 K/UL
PMV BLD AUTO: 10.6 FL
POTASSIUM SERPL-SCNC: 3.5 MMOL/L
RBC # BLD AUTO: 3.63 M/UL
SODIUM SERPL-SCNC: 136 MMOL/L
WBC # BLD AUTO: 2.24 K/UL

## 2018-10-30 PROCEDURE — 25000003 PHARM REV CODE 250: Performed by: HOSPITALIST

## 2018-10-30 PROCEDURE — 94761 N-INVAS EAR/PLS OXIMETRY MLT: CPT

## 2018-10-30 PROCEDURE — 63600175 PHARM REV CODE 636 W HCPCS: Performed by: HOSPITALIST

## 2018-10-30 PROCEDURE — 80048 BASIC METABOLIC PNL TOTAL CA: CPT

## 2018-10-30 PROCEDURE — 25000003 PHARM REV CODE 250: Performed by: INTERNAL MEDICINE

## 2018-10-30 PROCEDURE — 36415 COLL VENOUS BLD VENIPUNCTURE: CPT

## 2018-10-30 PROCEDURE — 25000003 PHARM REV CODE 250: Performed by: EMERGENCY MEDICINE

## 2018-10-30 PROCEDURE — 85025 COMPLETE CBC W/AUTO DIFF WBC: CPT

## 2018-10-30 RX ORDER — AMOXICILLIN 250 MG
1 CAPSULE ORAL 2 TIMES DAILY
Status: ON HOLD | COMMUNITY
Start: 2018-10-30 | End: 2019-03-20 | Stop reason: HOSPADM

## 2018-10-30 RX ORDER — OXYCODONE AND ACETAMINOPHEN 5; 325 MG/1; MG/1
1 TABLET ORAL EVERY 8 HOURS PRN
Status: DISCONTINUED | OUTPATIENT
Start: 2018-10-30 | End: 2018-10-30 | Stop reason: HOSPADM

## 2018-10-30 RX ORDER — POLYETHYLENE GLYCOL 3350 17 G/17G
17 POWDER, FOR SOLUTION ORAL DAILY
Status: DISCONTINUED | OUTPATIENT
Start: 2018-10-30 | End: 2018-10-30 | Stop reason: HOSPADM

## 2018-10-30 RX ORDER — DIAZEPAM 5 MG/1
15 TABLET ORAL 2 TIMES DAILY
Status: DISCONTINUED | OUTPATIENT
Start: 2018-10-30 | End: 2018-10-30 | Stop reason: HOSPADM

## 2018-10-30 RX ORDER — ACETAMINOPHEN 500 MG
1000 TABLET ORAL EVERY 8 HOURS PRN
Status: DISCONTINUED | OUTPATIENT
Start: 2018-10-30 | End: 2018-10-30 | Stop reason: HOSPADM

## 2018-10-30 RX ORDER — POLYETHYLENE GLYCOL 3350 17 G/17G
17 POWDER, FOR SOLUTION ORAL DAILY
Qty: 100 EACH | Refills: 5 | Status: ON HOLD | OUTPATIENT
Start: 2018-10-31 | End: 2019-03-20

## 2018-10-30 RX ORDER — AMOXICILLIN 250 MG
1 CAPSULE ORAL 2 TIMES DAILY
Status: DISCONTINUED | OUTPATIENT
Start: 2018-10-30 | End: 2018-10-30 | Stop reason: HOSPADM

## 2018-10-30 RX ORDER — DEXTROSE MONOHYDRATE, SODIUM CHLORIDE, AND POTASSIUM CHLORIDE 50; 2.98; 4.5 G/1000ML; G/1000ML; G/1000ML
INJECTION, SOLUTION INTRAVENOUS CONTINUOUS
Status: DISCONTINUED | OUTPATIENT
Start: 2018-10-30 | End: 2018-10-30

## 2018-10-30 RX ADMIN — ONDANSETRON 8 MG: 8 TABLET, ORALLY DISINTEGRATING ORAL at 06:10

## 2018-10-30 RX ADMIN — DULOXETINE 30 MG: 30 CAPSULE, DELAYED RELEASE ORAL at 11:10

## 2018-10-30 RX ADMIN — OXYCODONE HYDROCHLORIDE AND ACETAMINOPHEN 1 TABLET: 5; 325 TABLET ORAL at 07:10

## 2018-10-30 RX ADMIN — DIAZEPAM 15 MG: 5 TABLET ORAL at 06:10

## 2018-10-30 RX ADMIN — OXYCODONE HYDROCHLORIDE AND ACETAMINOPHEN 1 TABLET: 5; 325 TABLET ORAL at 01:10

## 2018-10-30 RX ADMIN — DOCUSATE SODIUM AND SENNOSIDES 1 TABLET: 8.6; 5 TABLET, FILM COATED ORAL at 11:10

## 2018-10-30 RX ADMIN — GABAPENTIN 300 MG: 300 CAPSULE ORAL at 11:10

## 2018-10-30 RX ADMIN — POLYETHYLENE GLYCOL 3350 17 G: 17 POWDER, FOR SOLUTION ORAL at 11:10

## 2018-10-30 RX ADMIN — PANTOPRAZOLE SODIUM 40 MG: 40 TABLET, DELAYED RELEASE ORAL at 11:10

## 2018-10-30 RX ADMIN — ATENOLOL 25 MG: 25 TABLET ORAL at 11:10

## 2018-10-30 RX ADMIN — LEVOTHYROXINE SODIUM 25 MCG: 25 TABLET ORAL at 11:10

## 2018-10-30 RX ADMIN — DICYCLOMINE HYDROCHLORIDE 10 MG: 10 CAPSULE ORAL at 12:10

## 2018-10-30 NOTE — SUBJECTIVE & OBJECTIVE
Interval History: Nausea and vomiting are resolved. Does have constipation x4 days. No abdominal pain. Wants to eat.     Review of Systems   Gastrointestinal: Positive for constipation. Negative for abdominal distention, abdominal pain, nausea and vomiting.     Objective:     Vital Signs (Most Recent):  Temp: 97.4 °F (36.3 °C) (10/30/18 0719)  Pulse: 87 (10/30/18 0719)  Resp: 18 (10/30/18 0719)  BP: 111/76 (10/30/18 0719)  SpO2: (!) 94 % (10/30/18 0800) Vital Signs (24h Range):  Temp:  [97.4 °F (36.3 °C)-98.1 °F (36.7 °C)] 97.4 °F (36.3 °C)  Pulse:  [] 87  Resp:  [17-20] 18  SpO2:  [93 %-97 %] 94 %  BP: (106-168)/() 111/76     Weight: 71.7 kg (158 lb 1.1 oz)  Body mass index is 25.51 kg/m².    Intake/Output Summary (Last 24 hours) at 10/30/2018 0856  Last data filed at 10/29/2018 1408  Gross per 24 hour   Intake 50 ml   Output --   Net 50 ml      Physical Exam   Constitutional: She is oriented to person, place, and time. She appears well-developed and well-nourished. No distress.   HENT:   Head: Normocephalic and atraumatic.   Nose: Nose normal.   Mouth/Throat: No oropharyngeal exudate.   Dry tongue   Eyes: Conjunctivae are normal. No scleral icterus.   Cardiovascular: Normal rate, regular rhythm, normal heart sounds and intact distal pulses. Exam reveals no gallop and no friction rub.   No murmur heard.  Pulmonary/Chest: Effort normal and breath sounds normal. No stridor. No respiratory distress. She has no wheezes. She has no rales.   Room air   Abdominal: Soft. Bowel sounds are normal. She exhibits no distension and no mass. There is no tenderness. There is no guarding. A hernia (umbilical, reducible) is present.   Musculoskeletal: She exhibits no edema.   Neurological: She is alert and oriented to person, place, and time.   Skin: Skin is dry. She is not diaphoretic.   No IVs in   Nursing note and vitals reviewed.      Significant Labs: All pertinent labs within the past 24 hours have been  reviewed.    Significant Imaging: I have reviewed and interpreted all pertinent imaging results/findings within the past 24 hours.

## 2018-10-30 NOTE — PROGRESS NOTES
[facility] Hospital Medicine  Progress Note    Patient Name: Estella Arevalo  MRN: [id number]  Patient Class: IP- Inpatient   Admission Date: 10/26/2018  Length of Stay: 4 days  Attending Physician: Daina Herrmann MD  Primary Care Provider: Angela Packer MD        Subjective:     Principal Problem:Polysubstance dependence including opioid type drug, continuous use    HPI:  isaac Arevalo is a 63 y.o. female that (in part)  has a past medical history of Addiction to drug, Alcohol abuse, Arthritis, Cirrhosis of liver, Coronary artery disease, Fall, GERD (gastroesophageal reflux disease), Hepatitis C, History of psychiatric hospitalization, psychiatric care, Hypertension, Low back pain, Radha, MI (myocardial infarction), Migraine headache, Psychiatric problem, Sleep difficulties, Suicide attempt, Therapy, and Thyroid disease.  Presents to [facility] Emergency Department agitated and hostile. She made multiple attempts to spit on the attendings and required a face shield to protect staff. Initially, she was not cooperative when asked questions about her current presentation and eventually became somnolent. Before falling asleep, pt stated that she 'took 4 elavil' after having problems with her . Her  provided collateral information and stated that she consumed two beers this evening but he did not mention the elavil.     Further information was limited due to altered mental status.     Hospital medicine has been asked to admit for further evaluation and treatment.     Hospital Course:  Presented disoriented and hostile after intentionally taking 4 elavil pills and drinking alcohol. By the following morning, she was alert and oriented. Stated she was not trying to hurt herself. Psych consulted- no PEC needed. She started to display signs of ETOH withdrawal and was started on Valium taper. Of note, the nurses have informed me that on a previous discharge, the  patient altered a narcotic Rx. Developed nausea and vomiting on 10/29. KUB with partial small bowel obstruction. Made NPO with improvement in symptoms. Started IVF, bowel regimen, and educated on opioids causing constipation. Patient refuses to discontinue opioids. Given past history of altering Rx, will not prescribe with any controlled substances.     Interval History: Nausea and vomiting are resolved. Does have constipation x4 days. No abdominal pain. Wants to eat.     Review of Systems   Gastrointestinal: Positive for constipation. Negative for abdominal distention, abdominal pain, nausea and vomiting.     Objective:     Vital Signs (Most Recent):  Temp: 97.4 °F (36.3 °C) (10/30/18 0719)  Pulse: 87 (10/30/18 0719)  Resp: 18 (10/30/18 0719)  BP: 111/76 (10/30/18 0719)  SpO2: (!) 94 % (10/30/18 0800) Vital Signs (24h Range):  Temp:  [97.4 °F (36.3 °C)-98.1 °F (36.7 °C)] 97.4 °F (36.3 °C)  Pulse:  [] 87  Resp:  [17-20] 18  SpO2:  [93 %-97 %] 94 %  BP: (106-168)/() 111/76     Weight: 71.7 kg (158 lb 1.1 oz)  Body mass index is 25.51 kg/m².    Intake/Output Summary (Last 24 hours) at 10/30/2018 0856  Last data filed at 10/29/2018 1408  Gross per 24 hour   Intake 50 ml   Output --   Net 50 ml      Physical Exam   Constitutional: She is oriented to person, place, and time. She appears well-developed and well-nourished. No distress.   HENT:   Head: Normocephalic and atraumatic.   Nose: Nose normal.   Mouth/Throat: No oropharyngeal exudate.   Dry tongue   Eyes: Conjunctivae are normal. No scleral icterus.   Cardiovascular: Normal rate, regular rhythm, normal heart sounds and intact distal pulses. Exam reveals no gallop and no friction rub.   No murmur heard.  Pulmonary/Chest: Effort normal and breath sounds normal. No stridor. No respiratory distress. She has no wheezes. She has no rales.   Room air   Abdominal: Soft. Bowel sounds are normal. She exhibits no distension and no mass. There is no tenderness.  There is no guarding. A hernia (umbilical, reducible) is present.   Musculoskeletal: She exhibits no edema.   Neurological: She is alert and oriented to person, place, and time.   Skin: Skin is dry. She is not diaphoretic.   No IVs in   Nursing note and vitals reviewed.      Significant Labs: All pertinent labs within the past 24 hours have been reviewed.    Significant Imaging: I have reviewed and interpreted all pertinent imaging results/findings within the past 24 hours.    Assessment/Plan:      * Polysubstance dependence including opioid type drug, continuous use    Patient refuses to discontinue opioids. Also with benzos on home med list and took elavil more than prescribed  Psych consulted- does not need PEC  Avoid controlled substances- will not prescribe any on discharge  Decrease valium (for possible alcohol withdrawal) to BID today  Educated on opioid risks and need to stop         Partial small bowel obstruction    KUB on 10/29 with partial SBO  Now with improving nausea and vomiting, no abd pain  IVF, bowel regimen, decrease opioids  Increase to full liquid diet  If tolerating and has BM, will be stable for discharge.        Psychological and behavioral factors associated with disorders or diseases classified elsewhere    See above       Alcohol withdrawal    No signs of withdrawal today  Decrease valium to BID  Will not prescribe on discharge       Abdominal pain    See partial SBO       Encephalopathy, toxic    From Elavil. This has resolved. She is alert and oriented. Did not try to hurt herself.          Chronic hepatitis C    No acute issues.   Needs to be linked to treatment as outpatient      Cirrhosis of liver    Alcoholic cirrhosis  No signs of decompensation  Continue to monitor          CAD (coronary artery disease)    Stable  Not on asa or statin currently- will need to discuss with patient       Essential hypertension    Continue home atenolol 25mg QD  Monitor         Bipolar 1 disorder     Psych consulted  Continue duloxetine, seroquel, and PRN zyprexa  No signs of jessy today       Acquired hypothyroidism    Continue home levothyroxine       Anxiety    Resume home meds.        Depression    Resume home meds.          VTE Risk Mitigation (From admission, onward)        Ordered     enoxaparin injection 40 mg  Every 24 hours (non-standard times)      10/27/18 0312     IP VTE LOW RISK PATIENT  Once      10/26/18 2131     Place sequential compression device  Until discontinued      10/26/18 2131     Place MARINA hose  Until discontinued      10/26/18 2131              Daina Herrmann MD  Department of Hospital Medicine   [facility]

## 2018-10-30 NOTE — PROGRESS NOTES
WRITTEN HEALTHCARE DISCHARGE INFORMATION     Things that YOU are RESPONSIBLE for to Manage Your Care At Home:    1. Getting your prescriptions filled.  2. Taking you medications as directed. DO NOT MISS ANY DOSES!  3. Going to your follow-up doctor appointments. This is important because it allows the doctor to monitor your progress and to determine if any changes need to be made to your treatment plan.    If you are unable to make your follow up appointments, please call the number listed and reschedule this appointment.     ____________HELP AT HOME____________________    Experiencing any SIGNS or SYMPTOMS: YOU CAN    Schedule a same day appopintment with your Primary Care Doctor or  you can call [facility] On [facility] Care Line for 24/7 assistance at [phone]    If you are experience any signs or symptoms that have become severe, Call 911 and come to your nearest Emergency Room.    Thank you for choosing [facility] and allowing us to care for you.   From your care management team: Shannen [facility] [phone]    You should receive a call from [facility] within 48-72 hours to help manage your care after discharge. Please try to make sure that you answer your phone for this important phone call.  Follow-up Information     Angela Packer MD On 11/20/2018.    Specialty:  Family Medicine  Why:  Outpatient Services, PCP follow-up appointment @ 1:00PM.   Contact information:  [address] [place] [address] [phone]             SEAN Méndez-C On 10/31/2018.    Specialty:  Family Medicine  Why:  Outpatient Services, PCP follow-up appointment @ 3:00PM.   Contact information:  [address] [place] [address] [phone]

## 2018-10-30 NOTE — NURSING
Discharge instructions given to patient. No questions at this time. Awaiting ride home. No complains at this time

## 2018-10-30 NOTE — ASSESSMENT & PLAN NOTE
KUB on 10/29 with partial SBO  Now with improving nausea and vomiting, no abd pain  IVF, bowel regimen, decrease opioids  Increase to full liquid diet  If tolerating and has BM, will be stable for discharge.

## 2018-10-30 NOTE — PLAN OF CARE
"   10/30/18 1414   Final Note   Assessment Type Final Discharge Note   Anticipated Discharge Disposition Home   What phone number can be called within the next 1-3 days to see how you are doing after discharge? [phone]   Hospital Follow Up  Appt(s) scheduled? Yes   Discharge plans and expectations educations in teach back method with documentation complete? Yes   Right Care Referral Info   Post Acute Recommendation No Care     EDUCATION:  Pt provided with educational information on GI.  Information reviewed and placed in :My Healthcare Packet" to be brought home for  use as resource after discharge.  Information included:  signs and symptoms to look for at discharge. COLBY instructed pt to call the doctor if experiencing symptoms that may indicate a medical emergency: CALL 911 if signs and symptoms worsen. Reminded pt things she will be responsible for to manage her healthcare at home: getting Rx filled, attending follow up appointments, and taking medication as prescribed were discussed.   Teach back method used.  All questions answered.  Patient verbalized understanding of all information.      COLBY provided pt with a copy of follow-up appointments. COLBY explained/highlighted date, time, and location of each appointment. COLBY provided pt with a blue folder and instructed pt to place all medical documents in blue folder. COLBY explained to pt the nurse will provide an AVS with diagnosis and instructed pt to place in blue folder and bring to follow-up appointment. Pt explained she would like to keep her appointment scheduled with PCP, however, she would like to follow-up with NP at a sooner date. COLBY scheduled appointment with ELIZABET Balderas via C-Note. Appointment has been added to follow-up. COLBY notified pt nurse, Britany, all CM needs have been met.   "

## 2018-10-30 NOTE — ASSESSMENT & PLAN NOTE
From [facility]. This has resolved. She is alert and oriented. Did not try to hurt herself.

## 2018-10-30 NOTE — DISCHARGE SUMMARY
[facility] Hospital Medicine  Discharge Summary      Patient Name: Estella Arevalo  MRN: [id number]  Admission Date: 10/26/2018  Hospital Length of Stay: 4 days  Discharge Date and Time:  10/30/2018 2:10 PM  Attending Physician: Daina Herrmann MD   Discharging Provider: Daina Herrmann MD  Primary Care Provider: Angela Packer MD      HPI:   isaac Arevalo is a 63 y.o. female that (in part)  has a past medical history of Addiction to drug, Alcohol abuse, Arthritis, Cirrhosis of liver, Coronary artery disease, Fall, GERD (gastroesophageal reflux disease), Hepatitis C, History of psychiatric hospitalization, psychiatric care, Hypertension, Low back pain, Radha, MI (myocardial infarction), Migraine headache, Psychiatric problem, Sleep difficulties, Suicide attempt, Therapy, and Thyroid disease.  Presents to [facility] Emergency Department agitated and hostile. She made multiple attempts to spit on the attendings and required a face shield to protect staff. Initially, she was not cooperative when asked questions about her current presentation and eventually became somnolent. Before falling asleep, pt stated that she 'took 4 elavil' after having problems with her . Her  provided collateral information and stated that she consumed two beers this evening but he did not mention the elavil.     Further information was limited due to altered mental status.     Hospital medicine has been asked to admit for further evaluation and treatment.     * No surgery found *      Hospital Course:   Presented disoriented and hostile after intentionally taking 4 elavil pills and drinking alcohol. By the following morning, she was alert and oriented. Stated she was not trying to hurt herself. Psych consulted- no PEC needed. She started to display signs of ETOH withdrawal and was started on Valium taper. Of note, the nurses have informed me that on a previous discharge, the patient  altered a narcotic Rx. Developed nausea and vomiting on 10/29. KUB with partial small bowel obstruction. Made NPO with improvement in symptoms. Started IVF, bowel regimen, and educated on opioids causing constipation. Patient refuses to discontinue opioids. Patient has tolerated regular diet with no nausea, no vomiting. Increase bowel regimen at discharge to promote daily BMs. Given past history of altering Rx, will not prescribe with any controlled substances.      Consults:   Consults (From admission, onward)        Status Ordering Provider     Inpatient consult to Psychiatry  Once     Provider:  Aden Arita MD    Completed CARLOS BENNETT          No new Assessment & Plan notes have been filed under this hospital service since the last note was generated.  Service: Hospital Medicine    Final Active Diagnoses:    Diagnosis Date Noted POA    PRINCIPAL PROBLEM:  Polysubstance dependence including opioid type drug, continuous use [F11.20, F19.20] 07/14/2013 Yes    Partial small bowel obstruction [K56.600] 10/30/2018 No    Psychological and behavioral factors associated with disorders or diseases classified elsewhere [F54] 10/29/2018 Yes    Abdominal pain [R10.9] 10/28/2018 Yes    Alcohol withdrawal [F10.239] 10/28/2018 Yes    Encephalopathy, toxic [G92] 10/26/2018 Yes    Essential hypertension [I10] 07/16/2018 Yes     Chronic    CAD (coronary artery disease) [I25.10] 07/16/2018 Yes     Chronic    Chronic hepatitis C [B18.2] 07/16/2018 Yes     Chronic    Cirrhosis of liver [K74.60] 07/16/2018 Yes     Chronic    Bipolar 1 disorder [F31.9] 12/30/2016 Yes     Chronic    Acquired hypothyroidism [E03.9] 12/19/2014 Yes     Chronic    Depression [F32.9]  Yes    Anxiety [F41.9]  Yes      Problems Resolved During this Admission:    Diagnosis Date Noted Date Resolved POA    Overdose, intentional self-harm, initial encounter [T52.929H] 10/27/2018 10/28/2018 Yes       Discharged Condition:  good    Disposition: Home or Self Care    Follow Up:  Follow-up Information     Angela Packer MD On 11/20/2018.    Specialty:  Family Medicine  Why:  Outpatient Services, PCP follow-up appointment @ 1:00PM.   Contact information:  [address] [place] [address] [phone]                 Patient Instructions:      Diet Cardiac     Notify your health care provider if you experience any of the following:  temperature >100.4     Notify your health care provider if you experience any of the following:  persistent nausea and vomiting or diarrhea     Notify your health care provider if you experience any of the following:  severe uncontrolled pain     Notify your health care provider if you experience any of the following:  redness, tenderness, or signs of infection (pain, swelling, redness, odor or green/yellow discharge around incision site)     Notify your health care provider if you experience any of the following:  difficulty breathing or increased cough     Notify your health care provider if you experience any of the following:  severe persistent headache     Notify your health care provider if you experience any of the following:  worsening rash     Notify your health care provider if you experience any of the following:  persistent dizziness, light-headedness, or visual disturbances     Notify your health care provider if you experience any of the following:  increased confusion or weakness     Activity as tolerated       Significant Diagnostic Studies: Labs: All labs within the past 24 hours have been reviewed    Pending Diagnostic Studies:     Procedure Component Value Units Date/Time    Drugs of Abuse Screen, Blood [[id number]] Collected:  10/26/18 2017    Order Status:  Sent Lab Status:  In process Updated:  10/26/18 2022    Specimen:  Blood          Medications:  Reconciled Home Medications:      Medication List      START taking these medications    polyethylene glycol 17 gram Pwpk  Commonly  known as:  GLYCOLAX  Take 17 g by mouth once daily.  Start taking on:  10/31/2018     senna-docusate 8.6-50 mg 8.6-50 mg per tablet  Commonly known as:  PERICOLACE  Take 1 tablet by mouth 2 (two) times daily.        CONTINUE taking these medications    atenolol 25 MG tablet  Commonly known as:  TENORMIN  Take 1 tablet (25 mg total) by mouth once daily.     DULoxetine 30 MG capsule  Commonly known as:  CYMBALTA  Take 1 capsule (30 mg total) by mouth once daily. TAKE 1 CAPSULE(30 MG) BY MOUTH EVERY DAY     ferrous sulfate 325 (65 FE) MG EC tablet  Take 325 mg by mouth 3 (three) times daily with meals.     gabapentin 300 MG capsule  Commonly known as:  NEURONTIN  Take 1 capsule (300 mg total) by mouth 2 (two) times daily.     hydrocortisone-pramoxine rectal foam  Commonly known as:  PROCTOFOAM-HS  Place 1 applicator rectally 2 (two) times daily.     levothyroxine 25 MCG tablet  Commonly known as:  SYNTHROID  Take 1 tablet (25 mcg total) by mouth once daily.     LINZESS 145 mcg Cap capsule  Generic drug:  linaclotide  Take 1 capsule (145 mcg total) by mouth once daily.     naproxen 500 MG tablet  Commonly known as:  NAPROSYN  Take 1 tablet (500 mg total) by mouth 2 (two) times daily as needed.     NARCAN 4 mg/actuation Spry  Generic drug:  naloxone  USE UTD     nicotine 21 mg/24 hr  Commonly known as:  NICODERM CQ  Place 1 patch onto the skin every 24 hours.     nitroGLYCERIN 0.3 MG SL tablet  Commonly known as:  NITROSTAT  ONE TABLET UNDER TONGUE AS NEEDED FOR CHEST PAIN EVERY 5 MINUTES AS NEEDED     ondansetron 4 MG Tbdl  Commonly known as:  ZOFRAN-ODT  Take 1 tablet (4 mg total) by mouth every 8 (eight) hours as needed. Nausea/vomiting     pantoprazole 40 MG tablet  Commonly known as:  PROTONIX  Take 1 tablet (40 mg total) by mouth once daily.     QUEtiapine 100 MG Tab  Commonly known as:  SEROQUEL  Take 1 tablet (100 mg total) by mouth every evening.     ranitidine 300 MG tablet  Commonly known as:  ZANTAC  Take 1  tablet (300 mg total) by mouth every evening.     * RESTASIS 0.05 % ophthalmic emulsion  Generic drug:  cycloSPORINE  INSTILL 1 DROP IN BOTH EYES TWICE DAILY     * cycloSPORINE 0.05 % ophthalmic emulsion  Commonly known as:  RESTASIS  Place 0.4 mLs (1 drop total) into both eyes 2 (two) times daily.         * This list has 2 medication(s) that are the same as other medications prescribed for you. Read the directions carefully, and ask your doctor or other care provider to review them with you.            STOP taking these medications    clonazePAM 1 MG tablet  Commonly known as:  KLONOPIN     docusate sodium 100 MG capsule  Commonly known as:  COLACE     efinaconazole 10 % Darlin  Commonly known as:  JUBLIA     ondansetron 4 MG tablet  Commonly known as:  ZOFRAN     polymyxin B sulf-trimethoprim 10,000 unit- 1 mg/mL Drop  Commonly known as:  POLYTRIM     promethazine 25 MG tablet  Commonly known as:  PHENERGAN     triamcinolone acetonide 0.1% 0.1 % cream  Commonly known as:  KENALOG            Indwelling Lines/Drains at time of discharge:   Lines/Drains/Airways          None          Time spent on the discharge of patient: 45 minutes  Patient was seen and examined on the date of discharge and determined to be suitable for discharge.         Daina Herrmann MD  Department of Hospital Medicine  [facility]

## 2018-10-30 NOTE — ASSESSMENT & PLAN NOTE
Patient refuses to discontinue opioids. Also with benzos on home med list and took elavil more than prescribed  Psych consulted- does not need PEC  Avoid controlled substances- will not prescribe any on discharge  Decrease valium (for possible alcohol withdrawal) to BID today  Educated on opioid risks and need to stop

## 2018-10-30 NOTE — PLAN OF CARE
Problem: Patient Care Overview  Goal: Plan of Care Review  Outcome: Ongoing (interventions implemented as appropriate)   10/30/18 0605   Coping/Psychosocial   Plan Of Care Reviewed With patient   AOx4. No s/s of withdrawal noted. Ambulates to bathroom. NPO continued. Tele box 5236 continued. No distress noted. Safety maintained.

## 2018-10-31 LAB
AMPHETAMINES SERPL QL: NEGATIVE
BARBITURATES SERPL QL SCN: NEGATIVE
BENZODIAZ SERPL QL: NEGATIVE
CANNABINOIDS SERPL QL: NEGATIVE
COCAINE, BLOOD: NEGATIVE
ETHANOL SERPL-MCNC: NEGATIVE MG/DL
METHADONE SERPL QL SCN: NEGATIVE
OPIATES SERPL QL SCN: NEGATIVE
PCP SERPL QL SCN: NEGATIVE
PROPOXYPH SERPL QL: NEGATIVE

## 2018-12-14 ENCOUNTER — HOSPITAL ENCOUNTER (EMERGENCY)
Facility: HOSPITAL | Age: 63
Discharge: LEFT BEFORE DISCHARGE | End: 2018-12-14
Attending: EMERGENCY MEDICINE
Payer: OTHER GOVERNMENT

## 2018-12-14 VITALS
TEMPERATURE: 98 F | WEIGHT: 155 LBS | DIASTOLIC BLOOD PRESSURE: 105 MMHG | BODY MASS INDEX: 24.91 KG/M2 | HEIGHT: 66 IN | SYSTOLIC BLOOD PRESSURE: 148 MMHG | HEART RATE: 101 BPM | OXYGEN SATURATION: 97 % | RESPIRATION RATE: 20 BRPM

## 2018-12-14 DIAGNOSIS — F11.93 OPIATE WITHDRAWAL: ICD-10-CM

## 2018-12-14 DIAGNOSIS — E86.0 DEHYDRATION: Primary | ICD-10-CM

## 2018-12-14 LAB
ALBUMIN SERPL-MCNC: 4.2 G/DL (ref 3.3–5.5)
ALBUMIN SERPL-MCNC: 4.5 G/DL (ref 3.3–5.5)
ALP SERPL-CCNC: 216 U/L (ref 42–141)
ALP SERPL-CCNC: 230 U/L (ref 42–141)
BILIRUB SERPL-MCNC: 0.6 MG/DL (ref 0.2–1.6)
BILIRUB SERPL-MCNC: 0.6 MG/DL (ref 0.2–1.6)
BILIRUBIN, POC UA: NEGATIVE
BLOOD, POC UA: ABNORMAL
BUN SERPL-MCNC: 9 MG/DL (ref 7–22)
CALCIUM SERPL-MCNC: 9 MG/DL (ref 8–10.3)
CHLORIDE SERPL-SCNC: 99 MMOL/L (ref 98–108)
CLARITY, POC UA: CLEAR
COLOR, POC UA: YELLOW
CREAT SERPL-MCNC: 0.5 MG/DL (ref 0.6–1.2)
GLUCOSE SERPL-MCNC: 183 MG/DL (ref 73–118)
GLUCOSE, POC UA: ABNORMAL
KETONES, POC UA: ABNORMAL
LEUKOCYTE EST, POC UA: NEGATIVE
NITRITE, POC UA: NEGATIVE
PH UR STRIP: 8.5 [PH]
POC ALT (SGPT): 22 U/L (ref 10–47)
POC ALT (SGPT): 24 U/L (ref 10–47)
POC AMYLASE: 44 U/L (ref 14–97)
POC AST (SGOT): 56 U/L (ref 11–38)
POC AST (SGOT): 65 U/L (ref 11–38)
POC CARDIAC TROPONIN I: 0 NG/ML
POC GGT: 226 U/L (ref 5–65)
POC PTINR: 1 (ref 0.9–1.2)
POC PTWBT: 12.4 SEC (ref 9.7–14.3)
POC TCO2: 26 MMOL/L (ref 18–33)
POTASSIUM BLD-SCNC: 3 MMOL/L (ref 3.6–5.1)
PROTEIN, POC UA: NEGATIVE
PROTEIN, POC: 8.3 G/DL (ref 6.4–8.1)
PROTEIN, POC: 8.5 G/DL (ref 6.4–8.1)
SAMPLE: NORMAL
SAMPLE: NORMAL
SODIUM BLD-SCNC: 141 MMOL/L (ref 128–145)
SPECIFIC GRAVITY, POC UA: 1.02
UROBILINOGEN, POC UA: 1 E.U./DL

## 2018-12-14 PROCEDURE — 84484 ASSAY OF TROPONIN QUANT: CPT

## 2018-12-14 PROCEDURE — 96375 TX/PRO/DX INJ NEW DRUG ADDON: CPT

## 2018-12-14 PROCEDURE — 63600175 PHARM REV CODE 636 W HCPCS: Performed by: EMERGENCY MEDICINE

## 2018-12-14 PROCEDURE — 25000003 PHARM REV CODE 250: Performed by: EMERGENCY MEDICINE

## 2018-12-14 PROCEDURE — 82270 OCCULT BLOOD FECES: CPT

## 2018-12-14 PROCEDURE — 96361 HYDRATE IV INFUSION ADD-ON: CPT

## 2018-12-14 PROCEDURE — 96365 THER/PROPH/DIAG IV INF INIT: CPT

## 2018-12-14 PROCEDURE — 82150 ASSAY OF AMYLASE: CPT

## 2018-12-14 PROCEDURE — 85610 PROTHROMBIN TIME: CPT

## 2018-12-14 PROCEDURE — 81003 URINALYSIS AUTO W/O SCOPE: CPT

## 2018-12-14 PROCEDURE — 99284 EMERGENCY DEPT VISIT MOD MDM: CPT | Mod: 25

## 2018-12-14 PROCEDURE — 80053 COMPREHEN METABOLIC PANEL: CPT

## 2018-12-14 PROCEDURE — 85025 COMPLETE CBC W/AUTO DIFF WBC: CPT

## 2018-12-14 PROCEDURE — 96372 THER/PROPH/DIAG INJ SC/IM: CPT

## 2018-12-14 RX ORDER — POTASSIUM CHLORIDE 14.9 MG/ML
20 INJECTION INTRAVENOUS
Status: COMPLETED | OUTPATIENT
Start: 2018-12-14 | End: 2018-12-14

## 2018-12-14 RX ORDER — KETOROLAC TROMETHAMINE 30 MG/ML
15 INJECTION, SOLUTION INTRAMUSCULAR; INTRAVENOUS
Status: COMPLETED | OUTPATIENT
Start: 2018-12-14 | End: 2018-12-14

## 2018-12-14 RX ORDER — ONDANSETRON 2 MG/ML
4 INJECTION INTRAMUSCULAR; INTRAVENOUS
Status: COMPLETED | OUTPATIENT
Start: 2018-12-14 | End: 2018-12-14

## 2018-12-14 RX ORDER — DICYCLOMINE HYDROCHLORIDE 10 MG/ML
20 INJECTION INTRAMUSCULAR
Status: COMPLETED | OUTPATIENT
Start: 2018-12-14 | End: 2018-12-14

## 2018-12-14 RX ORDER — PROMETHAZINE HYDROCHLORIDE 25 MG/ML
25 INJECTION, SOLUTION INTRAMUSCULAR; INTRAVENOUS
Status: COMPLETED | OUTPATIENT
Start: 2018-12-14 | End: 2018-12-14

## 2018-12-14 RX ORDER — POTASSIUM CHLORIDE 20 MEQ/1
40 TABLET, EXTENDED RELEASE ORAL
Status: DISCONTINUED | OUTPATIENT
Start: 2018-12-14 | End: 2018-12-14

## 2018-12-14 RX ADMIN — ONDANSETRON 4 MG: 2 INJECTION INTRAMUSCULAR; INTRAVENOUS at 09:12

## 2018-12-14 RX ADMIN — DICYCLOMINE HYDROCHLORIDE 20 MG: 10 INJECTION INTRAMUSCULAR at 10:12

## 2018-12-14 RX ADMIN — PROMETHAZINE HYDROCHLORIDE 25 MG: 25 INJECTION INTRAMUSCULAR; INTRAVENOUS at 09:12

## 2018-12-14 RX ADMIN — KETOROLAC TROMETHAMINE 15 MG: 30 INJECTION, SOLUTION INTRAMUSCULAR at 10:12

## 2018-12-14 RX ADMIN — SODIUM CHLORIDE 1000 ML: 0.9 INJECTION, SOLUTION INTRAVENOUS at 09:12

## 2018-12-14 RX ADMIN — POTASSIUM CHLORIDE 20 MEQ: 200 INJECTION, SOLUTION INTRAVENOUS at 10:12

## 2018-12-14 NOTE — ED NOTES
Son here  Patient stated she has to go to bank because a baby at home needs formula   Patient refuses to stay   Patient pulled out IV   Encouraged patient to stay  Patient refused

## 2018-12-14 NOTE — ED PROVIDER NOTES
"Encounter Date: 12/14/2018    SCRIBE #1 NOTE: I, Felisa Yeh, am scribing for, and in the presence of,  Dr. Sandoval . I have scribed the following portions of the note - Other sections scribed: HPI, ROS, PE.       History     Chief Complaint   Patient presents with    Vomiting     states onset last night, and "I'm possibly detoxing from opiates and can I get some morphine?"    Nausea     also states hx of reflux, denies fever     This is a 63 year old female complaining of nausea and vomiting since last night. Patient states that there has been specks of blood in her vomit. She is experiencing abdominal pain, coughing and back pain as well. Patient states she could possibly be in detox from opiates. She denies fever, diarrhea, and chills. Patient has been off of pain medication back pain for apprx 1 day.       The history is provided by the patient.     Review of patient's allergies indicates:  No Known Allergies  Past Medical History:   Diagnosis Date    Addiction to drug     Alcohol abuse     Arthritis     Cirrhosis of liver     Coronary artery disease     Fall     GERD (gastroesophageal reflux disease)     Hepatitis C     States was successfully treated with Harvoni    History of psychiatric hospitalization     Hx of psychiatric care     Hypertension     Low back pain     Radha     MI (myocardial infarction)     Migraine headache     Psychiatric problem     Sleep difficulties     Suicide attempt     Therapy     Thyroid disease      Past Surgical History:   Procedure Laterality Date    BLOCK, NERVE, FACET JOINT, LUMBAR, MEDIAL BRANCH Right 4/16/2013    Performed by [facility] at [facility]    BLOCK, NERVE, FACET JOINT, LUMBAR, MEDIAL BRANCH Right 4/2/2013    Performed by [facility] at [facility]    COLONOSCOPY Left 7/16/2013    Performed by Hernandez Farias MD at [facility]O    EGD (ESOPHAGOGASTRODUODENOSCOPY) N/A 7/15/2013    Performed by Hernandez Farias MD at " [facility] ENDO    ESOPHAGOGASTRODUODENOSCOPY (EGD) N/A 3/10/2017    Performed by Vini Gomez MD at [facility] OR    ESOPHAGOGASTRODUODENOSCOPY (EGD) N/A 3/10/2017    Performed by Arnulfo Benton MD at [facility] ENDO    HERNIA REPAIR      HIATAL HERNIA REPAIR      JOINT REPLACEMENT Bilateral     KNEE SURGERY  bilateral replacement    LAPAROSCOPIC PEG TUBE PLACEMENT/REPLACEMENT N/A 3/10/2017    Performed by Vini Gomez MD at [facility] OR    REPAIR-HERNIA-LAPAROSCOPIC-HIATAL N/A 3/10/2017    Performed by Vini Gomez MD at [facility] OR    REPAIR-HERNIA-VENTRAL-LAPAROSCOPIC-W/MESH N/A 11/8/2017    Performed by Vini Gomez MD at [facility] OR    right foot sx  2008    SHOULDER SURGERY  replacement     Family History   Problem Relation Age of Onset    Cancer Mother     Cancer Father     Depression Sister     Bipolar disorder Maternal Grandfather     Suicide Cousin      Social History     Tobacco Use    Smoking status: Never Smoker    Smokeless tobacco: Never Used   Substance Use Topics    Alcohol use: Yes     Alcohol/week: 3.6 oz     Types: 6 Cans of beer per week     Comment: history of withdrawals; says that she quit but then admits that she drinks sometimes    Drug use: Yes     Types: Benzodiazepines, Methamphetamines, Amphetamines     Comment: Pain mg[facility] clinic; admits to addiction to opioids     Review of Systems   Constitutional: Negative for chills (resolved) and fever.   Respiratory: Positive for cough.    Gastrointestinal: Positive for abdominal pain, nausea and vomiting. Negative for diarrhea.   Musculoskeletal: Positive for back pain (chronic).   All other systems reviewed and are negative.      Physical Exam     Initial Vitals [12/14/18 0833]   BP Pulse Resp Temp SpO2   (!) 148/105 101 20 97.8 °F (36.6 °C) 97 %      MAP       --         Physical Exam    Nursing note and vitals reviewed.  Constitutional: Vital signs are normal. She appears well-developed and well-nourished. She appears distressed (moderate).    HENT:   Head: Normocephalic and atraumatic.   Mouth/Throat: Oropharynx is clear and moist and mucous membranes are normal.   Eyes: Conjunctivae are normal.   Neck: Neck supple.   Cardiovascular: Normal rate, regular rhythm, normal heart sounds and normal pulses. Exam reveals no gallop and no friction rub.    No murmur heard.  Pulmonary/Chest: Effort normal and breath sounds normal. No respiratory distress. She has no decreased breath sounds. She has no wheezes. She has no rhonchi. She has no rales.   Abdominal: Soft. Normal appearance. She exhibits no distension. There is no tenderness. There is no rigidity, no rebound and no guarding.   Neurological: She is alert and oriented to person, place, and time.   Skin: Skin is warm, dry and intact.   Psychiatric: She has a normal mood and affect. Her behavior is normal.         ED Course   Procedures  Labs Reviewed   POCT URINALYSIS W/O SCOPE - Abnormal; Notable for the following components:       Result Value    Glucose, UA Trace (*)     Bilirubin, UA Negative (*)     Ketones, UA 1+ (*)     Blood, UA Trace-intact (*)     Protein, UA Negative (*)     Nitrite, UA Negative (*)     Leukocytes, UA Negative (*)     All other components within normal limits   POCT CMP - Abnormal; Notable for the following components:    Alkaline Phosphatase,  (*)     AST (SGOT), POC 56 (*)     POC Creatinine 0.5 (*)     POC Glucose 183 (*)     POC Potassium 3.0 (*)     Protein 8.3 (*)     All other components within normal limits   POCT LIVER PANEL - Abnormal; Notable for the following components:    Alkaline Phosphatase,  (*)     AST (SGOT), POC 65 (*)     POC  (*)     Protein 8.5 (*)     All other components within normal limits   TROPONIN ISTAT   POCT CBC   POCT URINALYSIS DIPSTICK (CULTURE IF INDICATED)   POCT OCCULT BLOOD STOOL   POCT CMP   POCT AMYLASE   POCT TROPONIN   POCT PROTIME-INR   ISTAT PROCEDURE          Imaging Results          X-Ray Chest 1 View (Final  result)     Abnormal  Result time 12/14/18 10:03:00   Procedure changed from X-ray chest PA and lateral     Final result by Gaetano Gerardo Jr., MD (12/14/18 10:03:00)                 Impression:      No confluent consolidation in the chest.    Some air beneath the right hemidiaphragm more likely within bowel rather than free peritoneal air though correlation with clinical findings and additional evaluation if indicated as discussed above.    This report was flagged in Epic as abnormal.      Electronically signed by: Gaetano Gerardo MD  Date:    12/14/2018  Time:    10:03             Narrative:    EXAMINATION:  XR CHEST 1 VIEW    CLINICAL HISTORY:  cough;    TECHNIQUE:  Single frontal view of the chest was performed.    COMPARISON:  October 27, 2018 and CT abdomen September 11, 2018.    FINDINGS:  Heart size and pulmonary vessels are normal.  The lungs are fairly well aerated.  No confluent consolidation.    There is some air beneath the right hemidiaphragm which appears to be within bowel however no bowel was interposed beneath the diaphragm on the recent CT study.  Correlation with clinical exam and additional evaluation if indicated.                                            Scribe Attestation:   Scribe #1: I performed the above scribed service and the documentation accurately describes the services I performed. I attest to the accuracy of the note.    I attest that I personally performed the services documented by the scribe and acknowledged and confirm the content of the note. Reginaldo Sandoval              ED Course as of Dec 14 1210   Fri Dec 14, 2018   1014 Alk phos is up which has been up in the past. Alkaline Phosphatase, POC: (!) 216 [MH]   1014 GGT is elevated POC GGT: (!) 226 [MH]   1014 AST and ALT are not contributory AST (SGOT), POC: (!) 65 [MH]   1014 Potassium is low POC Potassium: (!) 3.0 [MH]      ED Course User Index  [MH] Reginaldo Sandoval MD     Clinical Impression:     1. Dehydration    2.  Opiate withdrawal        12:10 PM  Patient left with her son.  She states she has to go to the bank.  We have offered to give her oral replacement of potassium but she refuses.  She states she will follow up with her own doctor.  Patient demonstrates the capacity to make this decision.  She left without signing the AMA form.                           Reginaldo Sandoval MD  12/14/18 1012       Reginaldo Sandoval MD  12/14/18 1211

## 2019-01-17 PROBLEM — Z86.010 HX OF COLONIC POLYPS: Status: ACTIVE | Noted: 2019-01-17

## 2019-02-08 DIAGNOSIS — Z12.39 BREAST CANCER SCREENING: ICD-10-CM

## 2019-02-12 NOTE — ED TRIAGE NOTES
Pt arrived to ED due CP since yesterday. Pt states nausea and diarrhea since yesterday. Pt  states drinking 3 beers yesterday. Reports history of past MI  
no

## 2019-03-20 ENCOUNTER — HOSPITAL ENCOUNTER (OUTPATIENT)
Facility: HOSPITAL | Age: 64
Discharge: HOME | End: 2019-03-20
Attending: EMERGENCY MEDICINE | Admitting: EMERGENCY MEDICINE
Payer: MEDICARE

## 2019-03-20 ENCOUNTER — ANESTHESIA (OUTPATIENT)
Dept: ENDOSCOPY | Facility: HOSPITAL | Age: 64
End: 2019-03-20
Payer: MEDICARE

## 2019-03-20 ENCOUNTER — ANESTHESIA EVENT (OUTPATIENT)
Dept: ENDOSCOPY | Facility: HOSPITAL | Age: 64
End: 2019-03-20
Payer: MEDICARE

## 2019-03-20 VITALS
DIASTOLIC BLOOD PRESSURE: 96 MMHG | BODY MASS INDEX: 24.48 KG/M2 | HEIGHT: 66 IN | HEART RATE: 99 BPM | WEIGHT: 152.31 LBS | TEMPERATURE: 99 F | OXYGEN SATURATION: 95 % | SYSTOLIC BLOOD PRESSURE: 144 MMHG | RESPIRATION RATE: 20 BRPM

## 2019-03-20 DIAGNOSIS — K27.9 PEPTIC ULCER DISEASE: ICD-10-CM

## 2019-03-20 DIAGNOSIS — K92.0 HEMATEMESIS, PRESENCE OF NAUSEA NOT SPECIFIED: Primary | ICD-10-CM

## 2019-03-20 DIAGNOSIS — N39.0 URINARY TRACT INFECTION WITHOUT HEMATURIA, SITE UNSPECIFIED: ICD-10-CM

## 2019-03-20 DIAGNOSIS — K92.0 HEMATEMESIS WITH NAUSEA: ICD-10-CM

## 2019-03-20 PROBLEM — F11.20 NARCOTIC DEPENDENCY, CONTINUOUS: Chronic | Status: ACTIVE | Noted: 2019-03-20

## 2019-03-20 LAB
ABO + RH BLD: NORMAL
ABO GROUP BLD: NORMAL
ALBUMIN SERPL BCP-MCNC: 4.2 G/DL
ALP SERPL-CCNC: 283 U/L
ALT SERPL W/O P-5'-P-CCNC: 48 U/L
AMPHET+METHAMPHET UR QL: NEGATIVE
ANION GAP SERPL CALC-SCNC: 12 MMOL/L
AST SERPL-CCNC: 100 U/L
BACTERIA #/AREA URNS HPF: ABNORMAL /HPF
BARBITURATES UR QL SCN>200 NG/ML: NEGATIVE
BASOPHILS # BLD AUTO: 0.03 K/UL
BASOPHILS NFR BLD: 1.1 %
BENZODIAZ UR QL SCN>200 NG/ML: NORMAL
BILIRUB SERPL-MCNC: 0.7 MG/DL
BILIRUB UR QL STRIP: NEGATIVE
BLD GP AB SCN CELLS X3 SERPL QL: NORMAL
BUN SERPL-MCNC: 9 MG/DL
BZE UR QL SCN: NEGATIVE
CALCIUM SERPL-MCNC: 9.8 MG/DL
CANNABINOIDS UR QL SCN: NEGATIVE
CHLORIDE SERPL-SCNC: 98 MMOL/L
CLARITY UR: CLEAR
CO2 SERPL-SCNC: 26 MMOL/L
COLOR UR: YELLOW
CREAT SERPL-MCNC: 0.7 MG/DL
CREAT UR-MCNC: 19.9 MG/DL
DIFFERENTIAL METHOD: ABNORMAL
EOSINOPHIL # BLD AUTO: 0 K/UL
EOSINOPHIL NFR BLD: 0.8 %
ERYTHROCYTE [DISTWIDTH] IN BLOOD BY AUTOMATED COUNT: 17.7 %
EST. GFR  (AFRICAN AMERICAN): >60 ML/MIN/1.73 M^2
EST. GFR  (NON AFRICAN AMERICAN): >60 ML/MIN/1.73 M^2
ETHANOL SERPL-MCNC: <10 MG/DL
GLUCOSE SERPL-MCNC: 147 MG/DL
GLUCOSE UR QL STRIP: ABNORMAL
HCT VFR BLD AUTO: 41 %
HCT VFR BLD AUTO: 41.8 %
HGB BLD-MCNC: 13.1 G/DL
HGB BLD-MCNC: 13.6 G/DL
HGB UR QL STRIP: NEGATIVE
INR PPP: 1
KETONES UR QL STRIP: NEGATIVE
LEUKOCYTE ESTERASE UR QL STRIP: ABNORMAL
LIPASE SERPL-CCNC: 25 U/L
LYMPHOCYTES # BLD AUTO: 0.7 K/UL
LYMPHOCYTES NFR BLD: 27.2 %
MAGNESIUM SERPL-MCNC: 2.3 MG/DL
MCH RBC QN AUTO: 28.9 PG
MCHC RBC AUTO-ENTMCNC: 32 G/DL
MCV RBC AUTO: 91 FL
METHADONE UR QL SCN>300 NG/ML: NEGATIVE
MICROSCOPIC COMMENT: ABNORMAL
MONOCYTES # BLD AUTO: 0.3 K/UL
MONOCYTES NFR BLD: 10.9 %
NEUTROPHILS # BLD AUTO: 1.6 K/UL
NEUTROPHILS NFR BLD: 60 %
NITRITE UR QL STRIP: POSITIVE
OPIATES UR QL SCN: NORMAL
PCP UR QL SCN>25 NG/ML: NEGATIVE
PH UR STRIP: 7 [PH] (ref 5–8)
PLATELET # BLD AUTO: 287 K/UL
PMV BLD AUTO: 10.2 FL
POTASSIUM SERPL-SCNC: 3.7 MMOL/L
PROT SERPL-MCNC: 8.4 G/DL
PROT UR QL STRIP: NEGATIVE
PROTHROMBIN TIME: 10.7 SEC
RBC # BLD AUTO: 4.53 M/UL
RBC #/AREA URNS HPF: 0 /HPF (ref 0–4)
RH BLD: NORMAL
SODIUM SERPL-SCNC: 136 MMOL/L
SP GR UR STRIP: 1.01 (ref 1–1.03)
SQUAMOUS #/AREA URNS HPF: ABNORMAL /HPF
TOXICOLOGY INFORMATION: NORMAL
URN SPEC COLLECT METH UR: ABNORMAL
UROBILINOGEN UR STRIP-ACNC: NEGATIVE EU/DL
WBC # BLD AUTO: 2.65 K/UL
WBC #/AREA URNS HPF: 1 /HPF (ref 0–5)

## 2019-03-20 PROCEDURE — 96374 THER/PROPH/DIAG INJ IV PUSH: CPT

## 2019-03-20 PROCEDURE — 63600175 PHARM REV CODE 636 W HCPCS: Performed by: EMERGENCY MEDICINE

## 2019-03-20 PROCEDURE — 96376 TX/PRO/DX INJ SAME DRUG ADON: CPT | Mod: 59

## 2019-03-20 PROCEDURE — G0378 HOSPITAL OBSERVATION PER HR: HCPCS

## 2019-03-20 PROCEDURE — 37000009 HC ANESTHESIA EA ADD 15 MINS: Performed by: INTERNAL MEDICINE

## 2019-03-20 PROCEDURE — D9220A PRA ANESTHESIA: ICD-10-PCS | Mod: CRNA,,, | Performed by: NURSE ANESTHETIST, CERTIFIED REGISTERED

## 2019-03-20 PROCEDURE — 85018 HEMOGLOBIN: CPT

## 2019-03-20 PROCEDURE — D9220A PRA ANESTHESIA: Mod: ANES,,, | Performed by: ANESTHESIOLOGY

## 2019-03-20 PROCEDURE — 63600175 PHARM REV CODE 636 W HCPCS: Performed by: NURSE ANESTHETIST, CERTIFIED REGISTERED

## 2019-03-20 PROCEDURE — 86901 BLOOD TYPING SEROLOGIC RH(D): CPT

## 2019-03-20 PROCEDURE — 25000003 PHARM REV CODE 250: Performed by: EMERGENCY MEDICINE

## 2019-03-20 PROCEDURE — 81000 URINALYSIS NONAUTO W/SCOPE: CPT | Mod: 59

## 2019-03-20 PROCEDURE — 85610 PROTHROMBIN TIME: CPT

## 2019-03-20 PROCEDURE — 86850 RBC ANTIBODY SCREEN: CPT

## 2019-03-20 PROCEDURE — 96365 THER/PROPH/DIAG IV INF INIT: CPT | Mod: 59

## 2019-03-20 PROCEDURE — 83735 ASSAY OF MAGNESIUM: CPT

## 2019-03-20 PROCEDURE — 96376 TX/PRO/DX INJ SAME DRUG ADON: CPT | Performed by: EMERGENCY MEDICINE

## 2019-03-20 PROCEDURE — 25000003 PHARM REV CODE 250: Performed by: NURSE PRACTITIONER

## 2019-03-20 PROCEDURE — 80307 DRUG TEST PRSMV CHEM ANLYZR: CPT

## 2019-03-20 PROCEDURE — 85025 COMPLETE CBC W/AUTO DIFF WBC: CPT

## 2019-03-20 PROCEDURE — 96361 HYDRATE IV INFUSION ADD-ON: CPT

## 2019-03-20 PROCEDURE — 37000008 HC ANESTHESIA 1ST 15 MINUTES: Performed by: INTERNAL MEDICINE

## 2019-03-20 PROCEDURE — 25000003 PHARM REV CODE 250: Performed by: INTERNAL MEDICINE

## 2019-03-20 PROCEDURE — D9220A PRA ANESTHESIA: Mod: CRNA,,, | Performed by: NURSE ANESTHETIST, CERTIFIED REGISTERED

## 2019-03-20 PROCEDURE — 36415 COLL VENOUS BLD VENIPUNCTURE: CPT

## 2019-03-20 PROCEDURE — 80320 DRUG SCREEN QUANTALCOHOLS: CPT

## 2019-03-20 PROCEDURE — C9113 INJ PANTOPRAZOLE SODIUM, VIA: HCPCS | Performed by: EMERGENCY MEDICINE

## 2019-03-20 PROCEDURE — 96375 TX/PRO/DX INJ NEW DRUG ADDON: CPT | Mod: 59

## 2019-03-20 PROCEDURE — D9220A PRA ANESTHESIA: ICD-10-PCS | Mod: ANES,,, | Performed by: ANESTHESIOLOGY

## 2019-03-20 PROCEDURE — 43235 EGD DIAGNOSTIC BRUSH WASH: CPT | Performed by: INTERNAL MEDICINE

## 2019-03-20 PROCEDURE — 80053 COMPREHEN METABOLIC PANEL: CPT

## 2019-03-20 PROCEDURE — 83690 ASSAY OF LIPASE: CPT

## 2019-03-20 PROCEDURE — 96372 THER/PROPH/DIAG INJ SC/IM: CPT | Mod: 59

## 2019-03-20 PROCEDURE — 85014 HEMATOCRIT: CPT | Mod: 91

## 2019-03-20 PROCEDURE — 63600175 PHARM REV CODE 636 W HCPCS: Performed by: NURSE PRACTITIONER

## 2019-03-20 PROCEDURE — 99285 EMERGENCY DEPT VISIT HI MDM: CPT | Mod: 25

## 2019-03-20 RX ORDER — OXYCODONE HCL 10 MG/1
20 TABLET, FILM COATED, EXTENDED RELEASE ORAL EVERY 12 HOURS PRN
Status: DISCONTINUED | OUTPATIENT
Start: 2019-03-20 | End: 2019-03-20 | Stop reason: HOSPADM

## 2019-03-20 RX ORDER — CIPROFLOXACIN 500 MG/1
500 TABLET ORAL 2 TIMES DAILY
Qty: 10 TABLET | Refills: 0 | Status: SHIPPED | OUTPATIENT
Start: 2019-03-20 | End: 2019-03-25

## 2019-03-20 RX ORDER — ONDANSETRON 2 MG/ML
INJECTION INTRAMUSCULAR; INTRAVENOUS
Status: DISCONTINUED
Start: 2019-03-20 | End: 2019-03-20 | Stop reason: WASHOUT

## 2019-03-20 RX ORDER — FENTANYL CITRATE 50 UG/ML
INJECTION, SOLUTION INTRAMUSCULAR; INTRAVENOUS
Status: DISCONTINUED | OUTPATIENT
Start: 2019-03-20 | End: 2019-03-20

## 2019-03-20 RX ORDER — QUETIAPINE FUMARATE 100 MG/1
100 TABLET, FILM COATED ORAL NIGHTLY
Status: DISCONTINUED | OUTPATIENT
Start: 2019-03-20 | End: 2019-03-20 | Stop reason: HOSPADM

## 2019-03-20 RX ORDER — METOCLOPRAMIDE HYDROCHLORIDE 5 MG/ML
10 INJECTION INTRAMUSCULAR; INTRAVENOUS
Status: DISCONTINUED | OUTPATIENT
Start: 2019-03-20 | End: 2019-03-20 | Stop reason: HOSPADM

## 2019-03-20 RX ORDER — ATENOLOL 25 MG/1
25 TABLET ORAL DAILY
Qty: 30 TABLET | Refills: 3 | Status: SHIPPED | OUTPATIENT
Start: 2019-03-20 | End: 2019-04-04 | Stop reason: SDUPTHER

## 2019-03-20 RX ORDER — LIDOCAINE HYDROCHLORIDE 20 MG/ML
INJECTION, SOLUTION EPIDURAL; INFILTRATION; INTRACAUDAL; PERINEURAL
Status: DISCONTINUED
Start: 2019-03-20 | End: 2019-03-20 | Stop reason: HOSPADM

## 2019-03-20 RX ORDER — LEVOTHYROXINE SODIUM 25 UG/1
25 TABLET ORAL
Status: DISCONTINUED | OUTPATIENT
Start: 2019-03-20 | End: 2019-03-20 | Stop reason: HOSPADM

## 2019-03-20 RX ORDER — DEXTROSE MONOHYDRATE AND SODIUM CHLORIDE 5; .9 G/100ML; G/100ML
INJECTION, SOLUTION INTRAVENOUS CONTINUOUS
Status: DISCONTINUED | OUTPATIENT
Start: 2019-03-20 | End: 2019-03-20 | Stop reason: HOSPADM

## 2019-03-20 RX ORDER — ONDANSETRON 2 MG/ML
4 INJECTION INTRAMUSCULAR; INTRAVENOUS
Status: COMPLETED | OUTPATIENT
Start: 2019-03-20 | End: 2019-03-20

## 2019-03-20 RX ORDER — METOCLOPRAMIDE 10 MG/1
10 TABLET ORAL
Qty: 120 TABLET | Refills: 1 | Status: ON HOLD | OUTPATIENT
Start: 2019-03-20 | End: 2019-08-29 | Stop reason: SDUPTHER

## 2019-03-20 RX ORDER — MIDAZOLAM HYDROCHLORIDE 1 MG/ML
INJECTION, SOLUTION INTRAMUSCULAR; INTRAVENOUS
Status: DISCONTINUED | OUTPATIENT
Start: 2019-03-20 | End: 2019-03-20

## 2019-03-20 RX ORDER — PANTOPRAZOLE SODIUM 40 MG/10ML
80 INJECTION, POWDER, LYOPHILIZED, FOR SOLUTION INTRAVENOUS
Status: COMPLETED | OUTPATIENT
Start: 2019-03-20 | End: 2019-03-20

## 2019-03-20 RX ORDER — DIPHENHYDRAMINE HYDROCHLORIDE 50 MG/ML
25 INJECTION INTRAMUSCULAR; INTRAVENOUS
Status: COMPLETED | OUTPATIENT
Start: 2019-03-20 | End: 2019-03-20

## 2019-03-20 RX ORDER — THIAMINE HCL 100 MG
100 TABLET ORAL DAILY
Status: DISCONTINUED | OUTPATIENT
Start: 2019-03-20 | End: 2019-03-20

## 2019-03-20 RX ORDER — HYDROMORPHONE HYDROCHLORIDE 2 MG/ML
0.5 INJECTION, SOLUTION INTRAMUSCULAR; INTRAVENOUS; SUBCUTANEOUS
Status: COMPLETED | OUTPATIENT
Start: 2019-03-20 | End: 2019-03-20

## 2019-03-20 RX ORDER — LIDOCAINE HCL/PF 100 MG/5ML
SYRINGE (ML) INTRAVENOUS
Status: DISCONTINUED | OUTPATIENT
Start: 2019-03-20 | End: 2019-03-20

## 2019-03-20 RX ORDER — ONDANSETRON 2 MG/ML
4 INJECTION INTRAMUSCULAR; INTRAVENOUS EVERY 8 HOURS PRN
Status: DISCONTINUED | OUTPATIENT
Start: 2019-03-20 | End: 2019-03-20 | Stop reason: HOSPADM

## 2019-03-20 RX ORDER — PROPOFOL 10 MG/ML
VIAL (ML) INTRAVENOUS
Status: COMPLETED
Start: 2019-03-20 | End: 2019-03-20

## 2019-03-20 RX ORDER — MIDAZOLAM HYDROCHLORIDE 1 MG/ML
INJECTION INTRAMUSCULAR; INTRAVENOUS
Status: COMPLETED
Start: 2019-03-20 | End: 2019-03-20

## 2019-03-20 RX ORDER — OXYCODONE HCL 20 MG/1
20 TABLET, FILM COATED, EXTENDED RELEASE ORAL EVERY 12 HOURS
Status: ON HOLD | COMMUNITY
End: 2019-08-29 | Stop reason: SDUPTHER

## 2019-03-20 RX ORDER — PROPOFOL 10 MG/ML
VIAL (ML) INTRAVENOUS
Status: DISCONTINUED | OUTPATIENT
Start: 2019-03-20 | End: 2019-03-20

## 2019-03-20 RX ORDER — ATENOLOL 25 MG/1
25 TABLET ORAL DAILY
Status: DISCONTINUED | OUTPATIENT
Start: 2019-03-20 | End: 2019-03-20 | Stop reason: HOSPADM

## 2019-03-20 RX ORDER — PROMETHAZINE HYDROCHLORIDE 25 MG/ML
25 INJECTION, SOLUTION INTRAMUSCULAR; INTRAVENOUS
Status: COMPLETED | OUTPATIENT
Start: 2019-03-20 | End: 2019-03-20

## 2019-03-20 RX ORDER — FENTANYL CITRATE 50 UG/ML
INJECTION, SOLUTION INTRAMUSCULAR; INTRAVENOUS
Status: COMPLETED
Start: 2019-03-20 | End: 2019-03-20

## 2019-03-20 RX ORDER — SUCCINYLCHOLINE CHLORIDE 20 MG/ML
INJECTION INTRAMUSCULAR; INTRAVENOUS
Status: COMPLETED
Start: 2019-03-20 | End: 2019-03-20

## 2019-03-20 RX ORDER — FERROUS SULFATE 325(65) MG
325 TABLET, DELAYED RELEASE (ENTERIC COATED) ORAL
Status: DISCONTINUED | OUTPATIENT
Start: 2019-03-20 | End: 2019-03-20 | Stop reason: HOSPADM

## 2019-03-20 RX ORDER — DULOXETIN HYDROCHLORIDE 30 MG/1
30 CAPSULE, DELAYED RELEASE ORAL DAILY
Status: DISCONTINUED | OUTPATIENT
Start: 2019-03-20 | End: 2019-03-20 | Stop reason: HOSPADM

## 2019-03-20 RX ORDER — SODIUM CHLORIDE 9 MG/ML
INJECTION, SOLUTION INTRAVENOUS ONCE
Status: COMPLETED | OUTPATIENT
Start: 2019-03-20 | End: 2019-03-20

## 2019-03-20 RX ORDER — HYDRALAZINE HYDROCHLORIDE 20 MG/ML
10 INJECTION INTRAMUSCULAR; INTRAVENOUS
Status: COMPLETED | OUTPATIENT
Start: 2019-03-20 | End: 2019-03-20

## 2019-03-20 RX ORDER — SUCCINYLCHOLINE CHLORIDE 20 MG/ML
INJECTION INTRAMUSCULAR; INTRAVENOUS
Status: DISCONTINUED | OUTPATIENT
Start: 2019-03-20 | End: 2019-03-20

## 2019-03-20 RX ADMIN — DULOXETINE 30 MG: 30 CAPSULE, DELAYED RELEASE ORAL at 03:03

## 2019-03-20 RX ADMIN — SUCCINYLCHOLINE CHLORIDE 140 MG: 20 INJECTION, SOLUTION INTRAMUSCULAR; INTRAVENOUS at 10:03

## 2019-03-20 RX ADMIN — LEVOTHYROXINE SODIUM 25 MCG: 25 TABLET ORAL at 03:03

## 2019-03-20 RX ADMIN — ONDANSETRON 4 MG: 2 INJECTION INTRAMUSCULAR; INTRAVENOUS at 04:03

## 2019-03-20 RX ADMIN — FENTANYL CITRATE 50 MCG: 50 INJECTION INTRAMUSCULAR; INTRAVENOUS at 10:03

## 2019-03-20 RX ADMIN — CEFTRIAXONE 1 G: 1 INJECTION, SOLUTION INTRAVENOUS at 06:03

## 2019-03-20 RX ADMIN — FOLIC ACID: 5 INJECTION, SOLUTION INTRAMUSCULAR; INTRAVENOUS; SUBCUTANEOUS at 07:03

## 2019-03-20 RX ADMIN — LIDOCAINE HYDROCHLORIDE 75 MG: 20 INJECTION, SOLUTION INTRAVENOUS at 10:03

## 2019-03-20 RX ADMIN — OXYCODONE HYDROCHLORIDE 20 MG: 10 TABLET, FILM COATED, EXTENDED RELEASE ORAL at 02:03

## 2019-03-20 RX ADMIN — ONDANSETRON 4 MG: 2 INJECTION INTRAMUSCULAR; INTRAVENOUS at 07:03

## 2019-03-20 RX ADMIN — HYDROMORPHONE HYDROCHLORIDE 0.5 MG: 2 INJECTION, SOLUTION INTRAMUSCULAR; INTRAVENOUS; SUBCUTANEOUS at 07:03

## 2019-03-20 RX ADMIN — MIDAZOLAM HYDROCHLORIDE 2 MG: 1 INJECTION, SOLUTION INTRAMUSCULAR; INTRAVENOUS at 10:03

## 2019-03-20 RX ADMIN — PROMETHAZINE HYDROCHLORIDE 25 MG: 25 INJECTION INTRAMUSCULAR; INTRAVENOUS at 04:03

## 2019-03-20 RX ADMIN — PROMETHAZINE HYDROCHLORIDE 12.5 MG: 25 INJECTION INTRAMUSCULAR; INTRAVENOUS at 08:03

## 2019-03-20 RX ADMIN — DEXTROSE 8 MG/HR: 50 INJECTION, SOLUTION INTRAVENOUS at 07:03

## 2019-03-20 RX ADMIN — PROMETHAZINE HYDROCHLORIDE 6.25 MG: 25 INJECTION INTRAMUSCULAR; INTRAVENOUS at 02:03

## 2019-03-20 RX ADMIN — DEXTROSE AND SODIUM CHLORIDE: 5; .9 INJECTION, SOLUTION INTRAVENOUS at 01:03

## 2019-03-20 RX ADMIN — SODIUM CHLORIDE: 0.9 INJECTION, SOLUTION INTRAVENOUS at 10:03

## 2019-03-20 RX ADMIN — PROPOFOL 150 MG: 10 INJECTION, EMULSION INTRAVENOUS at 10:03

## 2019-03-20 RX ADMIN — SODIUM CHLORIDE 1000 ML: 0.9 INJECTION, SOLUTION INTRAVENOUS at 05:03

## 2019-03-20 RX ADMIN — PANTOPRAZOLE SODIUM 80 MG: 40 INJECTION, POWDER, FOR SOLUTION INTRAVENOUS at 04:03

## 2019-03-20 RX ADMIN — DIPHENHYDRAMINE HYDROCHLORIDE 25 MG: 50 INJECTION INTRAMUSCULAR; INTRAVENOUS at 07:03

## 2019-03-20 RX ADMIN — HYDRALAZINE HYDROCHLORIDE 10 MG: 20 INJECTION INTRAMUSCULAR; INTRAVENOUS at 07:03

## 2019-03-20 RX ADMIN — ATENOLOL 25 MG: 25 TABLET ORAL at 03:03

## 2019-03-20 NOTE — TRANSFER OF CARE
"Anesthesia Transfer of Care Note    Patient: Estella Arevalo    Procedure(s) Performed: Procedure(s) (LRB):  EGD (ESOPHAGOGASTRODUODENOSCOPY) (N/A)    Patient location: GI    Anesthesia Type: general    Transport from OR: Transported from OR on room air with adequate spontaneous ventilation    Post pain: adequate analgesia    Post assessment: no apparent anesthetic complications and tolerated procedure well    Post vital signs: stable    Level of consciousness: sedated and responds to stimulation    Nausea/Vomiting: no nausea/vomiting    Complications: none    Transfer of care protocol was followed      Last vitals:   Visit Vitals  BP (!) 141/81 (BP Location: Left arm, Patient Position: Lying)   Pulse 106   Temp 36.7 °C (98.1 °F) (Oral)   Resp 16   Ht 5' 6" (1.676 m)   Wt 70.3 kg (155 lb)   SpO2 95%   Breastfeeding? No   BMI 25.02 kg/m²     "

## 2019-03-20 NOTE — HOSPITAL COURSE
Estella Arevalo is a 64 y.o. female with a history of GERD, CAD, MI, ETOH abuse, chronic opioid use (back pain), HCV (treated), and compensated cirrhosis (no varices, encephalopathy or ascites) who presented with complaints of N/V, diarrhea and coffee ground emesis over the last 3 days. Hemodynamically stable on presentation. H&H 13.1 & 41. Tox screen + benzos, opioids. Received dilaudid in ED. GI consulted and performed EGD 3/20/19-- showed no source of bleeding, did show large volume food in stomach suggestive of gastroparesis likely 2/2 opioid use. GI recommended reglan 10 mg ac hs and continue protonix daily. Patient also found to have UTI /endorsed dysuria symptoms, was treated with IV rocephin 1g in ED. Rocephin switch to Cipro PO. Patient able to take medications and with juice without issues.Patient did not have appetite and recline regular diet. Patient stable for discharge home and advance diet as tolerated. Follow up with GI for continue evaluation and possible GES outpatient.

## 2019-03-20 NOTE — DISCHARGE SUMMARY
[facility] Hospital Medicine  Discharge Summary      Patient Name: Estella Arevalo  MRN: [id number]  Admission Date: 3/20/2019  Hospital Length of Stay: 0 days  Discharge Date and Time:  03/20/2019 5:19 PM  Attending Physician: No att. providers found   Discharging Provider: Petra Loja NP  Primary Care Provider: Angela Packer MD      HPI:   Estella Arevalo is a 64 y.o. female with a history of GERD, PUD, CAD, HTN, MI, chronic ETOH abuse, chronic opioid use (back pain), HCV, and compensated cirrhosis who presented to [facility] ED 3/20/19 with  c/o of N/V, diarrhea and coffee ground emesis x 3 days. Per reports taking pepcid which provided some relief. Denies aggravating factors. Patient states her diarrhea is chronic, attributes it to hx rectal prolapse. Patient has been admitted for intractable nausea and vomiting as recently as January of 2019. Pt not compliant with at home meds.  Denies NSAID use. Denies ever using tobacco (however home meds include nicotine patch). Admits to drinking 6 cans of beer once a week (last drink 4 days prior to presentation). Denies blood in stool, rectal bleeding, syncope, vision changes, constipation, cough, palpitations. She does endorse CP associated with vomiting, dysuria, HA, dizziness, abdominal pain, season allergies with rhinorrhea. She was admitted to observation for endoscopic evaluation.   EKG NSR.     Procedure(s) (LRB):  EGD (ESOPHAGOGASTRODUODENOSCOPY) (N/A)      Hospital Course:   Estella Arevalo is a 64 y.o. female with a history of GERD, CAD, MI, ETOH abuse, chronic opioid use (back pain), HCV (treated), and compensated cirrhosis (no varices, encephalopathy or ascites) who presented with complaints of N/V, diarrhea and coffee ground emesis over the last 3 days. Hemodynamically stable on presentation. H&H 13.1 & 41. Tox screen + benzos, opioids. Received dilaudid in ED. GI consulted and performed EGD 3/20/19-- showed no source of bleeding,  did show large volume food in stomach suggestive of gastroparesis likely 2/2 opioid use. GI recommended reglan 10 mg ac hs and continue protonix daily. Patient also found to have UTI /endorsed dysuria symptoms, was treated with IV rocephin 1g in ED. Rocephin switch to Cipro PO. Patient able to take medications and with juice without issues. Patient did not have appetite so decline regular diet. Patient stable for discharge home and advance diet as tolerated. Follow up with GI for continue evaluation and possible GES outpatient.  See discharge medication list below.     Consults:         Final Active Diagnoses:    Diagnosis Date Noted POA    PRINCIPAL PROBLEM:  Hematemesis [K92.0] 03/20/2019 Yes    UTI (urinary tract infection) [N39.0] 03/20/2019 Yes    Narcotic dependency, continuous [F11.20] 03/20/2019 Yes     Chronic    Essential hypertension [I10] 07/16/2018 Yes     Chronic    CAD (coronary artery disease) [I25.10] 07/16/2018 Yes     Chronic    Degeneration of lumbar or lumbosacral intervertebral disc [M51.37] 03/09/2015 Yes      Problems Resolved During this Admission:       Discharged Condition: good    Disposition: Home or Self Care    Follow Up:  Follow-up Information     Obdulia Wilson MD. Schedule an appointment as soon as possible for a visit in 1 week.    Specialty:  Gastroenterology  Why:  For Gastroenterology follow up  Contact information:  [address] [address] [phone]                 Patient Instructions:      Diet Cardiac     Notify your health care provider if you experience any of the following:  temperature >100.4     Notify your health care provider if you experience any of the following:  severe uncontrolled pain     Activity as tolerated       Significant Diagnostic Studies: Labs: All labs within the past 24 hours have been reviewed    Pending Diagnostic Studies:     None         Medications:  Reconciled Home Medications:      Medication List      START  taking these medications    ciprofloxacin HCl 500 MG tablet  Commonly known as:  CIPRO  Take 1 tablet (500 mg total) by mouth 2 (two) times daily. for 5 days     metoclopramide HCl 10 MG tablet  Commonly known as:  REGLAN  Take 1 tablet (10 mg total) by mouth 4 (four) times daily before meals and nightly.        CONTINUE taking these medications    atenolol 25 MG tablet  Commonly known as:  TENORMIN  Take 1 tablet (25 mg total) by mouth once daily.     DULoxetine 30 MG capsule  Commonly known as:  CYMBALTA  Take 1 capsule (30 mg total) by mouth once daily. TAKE 1 CAPSULE(30 MG) BY MOUTH EVERY DAY     ferrous sulfate 325 (65 FE) MG EC tablet  Take 325 mg by mouth 3 (three) times daily with meals.     hydrocortisone-pramoxine rectal foam  Commonly known as:  PROCTOFOAM-HS  Place 1 applicator rectally 2 (two) times daily.     levothyroxine 25 MCG tablet  Commonly known as:  SYNTHROID  Take 1 tablet (25 mcg total) by mouth once daily.     NARCAN 4 mg/actuation Spry  Generic drug:  naloxone  USE UTD     nitroGLYCERIN 0.3 MG SL tablet  Commonly known as:  NITROSTAT  ONE TABLET UNDER TONGUE AS NEEDED FOR CHEST PAIN EVERY 5 MINUTES AS NEEDED     ondansetron 4 MG Tbdl  Commonly known as:  ZOFRAN-ODT  Take 1 tablet (4 mg total) by mouth every 8 (eight) hours as needed. Nausea/vomiting     oxyCODONE 20 mg 12 hr tablet  Commonly known as:  OXYCONTIN  Take 20 mg by mouth every 12 (twelve) hours.     pantoprazole 40 MG tablet  Commonly known as:  PROTONIX  Take 1 tablet (40 mg total) by mouth once daily.     QUEtiapine 100 MG Tab  Commonly known as:  SEROQUEL  Take 1 tablet (100 mg total) by mouth every evening.     ranitidine 300 MG tablet  Commonly known as:  ZANTAC  Take 1 tablet (300 mg total) by mouth every evening.     * RESTASIS 0.05 % ophthalmic emulsion  Generic drug:  cycloSPORINE  INSTILL 1 DROP IN BOTH EYES TWICE DAILY     * cycloSPORINE 0.05 % ophthalmic emulsion  Commonly known as:  RESTASIS  Place 0.4 mLs (1 drop  total) into both eyes 2 (two) times daily.         * This list has 2 medication(s) that are the same as other medications prescribed for you. Read the directions carefully, and ask your doctor or other care provider to review them with you.            STOP taking these medications    gabapentin 300 MG capsule  Commonly known as:  NEURONTIN     LINZESS 145 mcg Cap capsule  Generic drug:  linaclotide     naproxen 500 MG tablet  Commonly known as:  NAPROSYN     nicotine 21 mg/24 hr  Commonly known as:  NICODERM CQ     polyethylene glycol 17 gram Pwpk  Commonly known as:  GLYCOLAX     senna-docusate 8.6-50 mg 8.6-50 mg per tablet  Commonly known as:  PERICOLACE            Indwelling Lines/Drains at time of discharge:   Lines/Drains/Airways          None          Time spent on the discharge of patient: 30 minutes  Patient was seen and examined on the date of discharge and determined to be suitable for discharge.         Petra Loja NP  Department of Hospital Medicine  [facility]

## 2019-03-20 NOTE — ED NOTES
Past Medical History:   Diagnosis Date    Addiction to drug     Alcohol abuse     Arthritis     Cirrhosis of liver     Coronary artery disease     Fall     GERD (gastroesophageal reflux disease)     Hepatitis C     States was successfully treated with Charito    History of psychiatric hospitalization     Hx of psychiatric care     Hypertension     Low back pain     Radha     MI (myocardial infarction)     Migraine headache     Psychiatric problem     Sleep difficulties     Suicide attempt     Therapy     Thyroid disease      Pt presenting with co nausea vomiting and diarrhea.  She reports dark color vomitus 3 weeks in which she was recently in the hospital for.  Pt denies  Blood in stool.

## 2019-03-20 NOTE — ASSESSMENT & PLAN NOTE
- Positive nitritie, trace leukocytes, many bacteria   - received 1g rocephin IV in ED   - D/C with Cipro 500 BID QD x 3 days

## 2019-03-20 NOTE — PLAN OF CARE
03/20/19 1834   Final Note   Assessment Type Final Discharge Note   Anticipated Discharge Disposition Home   What phone number can be called within the next 1-3 days to see how you are doing after discharge? ([phone])   Hospital Follow Up  Appt(s) scheduled? Yes   Discharge plans and expectations educations in teach back method with documentation complete? Yes   Right Care Referral Info   Post Acute Recommendation No Care

## 2019-03-20 NOTE — ANESTHESIA POSTPROCEDURE EVALUATION
"Anesthesia Post Evaluation    Patient: Estella Arevalo    Procedure(s) Performed: Procedure(s) (LRB):  EGD (ESOPHAGOGASTRODUODENOSCOPY) (N/A)    Final Anesthesia Type: general  Patient location during evaluation: PACU  Patient participation: Yes- Able to Participate  Level of consciousness: awake and alert, oriented and awake  Post-procedure vital signs: reviewed and stable  Pain management: adequate  Airway patency: patent  PONV status at discharge: No PONV  Anesthetic complications: no      Cardiovascular status: blood pressure returned to baseline, hemodynamically stable and stable  Respiratory status: unassisted and spontaneous ventilation  Hydration status: euvolemic  Follow-up not needed.        Visit Vitals  BP (!) 144/96   Pulse 99   Temp 37.1 °C (98.7 °F)   Resp 20   Ht 5' 6" (1.676 m)   Wt 69.1 kg (152 lb 5.4 oz)   SpO2 95%   Breastfeeding? No   BMI 24.59 kg/m²       Pain/Mika Score: Pain Rating Prior to Med Admin: 10 (3/20/2019  7:34 AM)  Mika Score: 10 (3/20/2019 11:45 AM)        "

## 2019-03-20 NOTE — CONSULTS
Gastroenterology Consult    3/20/2019 8:33 AM    Patient Name: Estella Arevalo  MRN: [id number]  Admission Date: 3/20/2019  Hospital Length of Stay: 0 days  Code Status: Full Code   Primary Care Physician: Angela Packer MD  Principal Problem:<principal problem not specified>  Consulting Physician: Inpatient consult to Gastroenterology  Consult performed by: Obdulia Wilson MD  Consult ordered by: Kingsley Villalobos MD      Reason for consultation: hematemesis    HPI:  Estella Arevalo is a 64 y.o. female with a history of ETOH abuse, HCV (treated) and compensated cirrhosis (no varices, encephalopathy or ascites) who presented with complaints of N/V, diarrhea and coffee ground emesis over the last 3 days.  Her diarrhea is chronic and she attributes this to rectal prolapse.  She has not had ETOH in about 1 week.  She has undergone EGD and colonoscopy in the past.  EGD was notable for a hiatal hernia and GEJ ulcer (in 2017).  She has no history of varices.  She denies fevers, chills, night sweats, shortness of breath.  She had some chest pain which resolved.  She has abdominal discomfort throughout and chronic back pain for which she takes opiates.  No history of diabetes or gastroparesis.    Past Medical History:   Diagnosis Date    Addiction to drug     Alcohol abuse     Arthritis     Cirrhosis of liver     Coronary artery disease     Fall     GERD (gastroesophageal reflux disease)     Hepatitis C     States was successfully treated with Harvoni    History of psychiatric hospitalization     Hx of psychiatric care     Hypertension     Low back pain     Radha     MI (myocardial infarction)     Migraine headache     Psychiatric problem     Sleep difficulties     Suicide attempt     Therapy     Thyroid disease        Past Surgical History:   Procedure Laterality Date    BLOCK, NERVE, FACET JOINT, LUMBAR, MEDIAL BRANCH Right 4/16/2013    [facility] MGT    BLOCK,  "NERVE, FACET JOINT, LUMBAR, MEDIAL BRANCH Right 4/2/2013    [facility] at [facility] PA[facility] MGT    COLONOSCOPY Left 7/16/2013    [facility]formed by [facility] at [facility] ENDO    EGD (ESOPHAGOGASTRODUODENOSCOPY) N/A 7/15/2013    Performed by [facility] at [facility] ENDO    ESOPHAGOGASTRODUODENOSCOPY (EGD) N/A 3/10/2017    Performed by Vini Gomez MD at [facility] OR    ESOPHAGOGASTRODUODENOSCOPY (EGD) N/A 3/10/2017    Performed by Arnulfo Benton MD at [facility] ENDO    HERNIA REPAIR      HIATAL HERNIA REPAIR      JOINT REPLACEMENT Bilateral     KNEE SURGERY  bilateral replacement    LAPAROSCOPIC PEG TUBE PLACEMENT/REPLACEMENT N/A 3/10/2017    Performed by [facility] at [facility] OR    REPAIR-HERNIA-LAPAROSCOPIC-HIATAL N/A 3/10/2017    Performed by [facility] at [facility] OR    REPAIR-HERNIA-VENTRAL-LAPAROSCOPIC-W/MESH N/A 11/8/2017    Performed by [facility] at [facility] OR    right foot sx  2008    SHOULDER SURGERY  replacement       Social History     Socioeconomic History    Marital status:      Spouse name: Hammad Kaufman"    Number of children: 4    Years of education: Not on file    Highest education level: Not on file   Social Needs    Financial resource strain: Not on file    Food insecurity - worry: Not on file    Food insecurity - inability: Not on file    Transportation needs - medical: Not on file    Transportation needs - non-medical: Not on file   Occupational History    Occupation: Retired     Comment: RN   Tobacco Use    Smoking status: Never Smoker    Smokeless tobacco: Never Used   Substance and Sexual Activity    Alcohol use: Yes     Alcohol/week: 3.6 oz     Types: 6 Cans of beer per week     Comment: history of withdrawals; says that she quit but then admits that she drinks sometimes    Drug use: Yes     Types: Benzodiazepines, Methamphetamines, Amphetamines     Comment: Pain mgt clinic; admits to addiction to opioids    Sexual activity: Not on " file   Other Topics Concern    Patient feels they ought to cut down on drinking/drug use Yes    Patient annoyed by others criticizing their drinking/drug use Yes    Patient has felt bad or guilty about drinking/drug use Yes    Patient has had a drink/used drugs as an eye opener in the AM Yes   Social History Narrative    Lives with , grand-daughter, daughter and son    Retired RN    Polysubstance abuse       Family History   Problem Relation Age of Onset    Cancer Mother     Cancer Father     Depression Sister     Bipolar disorder Maternal Grandfather     Suicide Cousin          Review of patient's allergies indicates:  No Known Allergies      Current Facility-Administered Medications:     [START ON 3/21/2019] cefTRIAXone (ROCEPHIN) 1 g in dextrose 5 % 50 mL IVPB, 1 g, Intravenous, Q24H, Kingsley Villalobos MD    dextrose 5 % and 0.9 % NaCl infusion, , Intravenous, Continuous, Kingsley Villalobos MD    ondansetron injection 4 mg, 4 mg, Intravenous, Q8H PRN, Kingsley Villalobos MD, 4 mg at 03/20/19 0733    pantoprazole 40 mg in dextrose 5 % 100 mL infusion (ready to mix system), 8 mg/hr, Intravenous, Continuous, Kingsley Villalobos MD, Last Rate: 20 mL/hr at 03/20/19 0750, 8 mg/hr at 03/20/19 0750    promethazine (PHENERGAN) 6.25 mg in dextrose 5 % 50 mL IVPB, 6.25 mg, Intravenous, Q6H PRN, Kingsley Villalobos MD    Current Outpatient Medications:     atenolol (TENORMIN) 25 MG tablet, Take 1 tablet (25 mg total) by mouth once daily., Disp: 90 tablet, Rfl: 1    DULoxetine (CYMBALTA) 30 MG capsule, Take 1 capsule (30 mg total) by mouth once daily. TAKE 1 CAPSULE(30 MG) BY MOUTH EVERY DAY, Disp: 30 capsule, Rfl: 11    gabapentin (NEURONTIN) 300 MG capsule, Take 1 capsule (300 mg total) by mouth 2 (two) times daily., Disp: 180 capsule, Rfl: 0    levothyroxine (SYNTHROID) 25 MCG tablet, Take 1 tablet (25 mcg total) by mouth once daily., Disp: 90 tablet, Rfl: 0    QUEtiapine (SEROQUEL) 100 MG Tab, Take  1 tablet (100 mg total) by mouth every evening., Disp: 30 tablet, Rfl: 11    ranitidine (ZANTAC) 300 MG tablet, Take 1 tablet (300 mg total) by mouth every evening., Disp: 30 tablet, Rfl: 11    cycloSPORINE (RESTASIS) 0.05 % ophthalmic emulsion, Place 0.4 mLs (1 drop total) into both eyes 2 (two) times daily., Disp: 30 each, Rfl: 0    ferrous sulfate 325 (65 FE) MG EC tablet, Take 325 mg by mouth 3 (three) times daily with meals., Disp: , Rfl:     hydrocortisone-pramoxine (PROCTOFOAM-HS) rectal foam, Place 1 applicator rectally 2 (two) times daily., Disp: 10 g, Rfl: 1    LINZESS 145 mcg Cap capsule, Take 1 capsule (145 mcg total) by mouth once daily., Disp: 90 capsule, Rfl: 0    naproxen (NAPROSYN) 500 MG tablet, Take 1 tablet (500 mg total) by mouth 2 (two) times daily as needed., Disp: 14 tablet, Rfl: 0    NARCAN 4 mg/actuation Spry, USE UTD, Disp: , Rfl: 0    nicotine (NICODERM CQ) 21 mg/24 hr, Place 1 patch onto the skin every 24 hours., Disp: , Rfl:     nitroGLYCERIN (NITROSTAT) 0.3 MG SL tablet, ONE TABLET UNDER TONGUE AS NEEDED FOR CHEST PAIN EVERY 5 MINUTES AS NEEDED, Disp: 100 tablet, Rfl: 0    ondansetron (ZOFRAN-ODT) 4 MG TbDL, Take 1 tablet (4 mg total) by mouth every 8 (eight) hours as needed. Nausea/vomiting, Disp: 20 tablet, Rfl: 0    pantoprazole (PROTONIX) 40 MG tablet, Take 1 tablet (40 mg total) by mouth once daily., Disp: 90 tablet, Rfl: 0    polyethylene glycol (GLYCOLAX) 17 gram PwPk, Take 17 g by mouth once daily., Disp: 100 each, Rfl: 5    RESTASIS 0.05 % ophthalmic emulsion, INSTILL 1 DROP IN BOTH EYES TWICE DAILY, Disp: 30 each, Rfl: 0    senna-docusate 8.6-50 mg (PERICOLACE) 8.6-50 mg per tablet, Take 1 tablet by mouth 2 (two) times daily., Disp: , Rfl:       Review of Systems   Constitutional: Negative for chills, fever and weight loss.   HENT: Negative.    Eyes: Negative.    Respiratory: Negative.    Cardiovascular: Negative.    Gastrointestinal: Positive for diarrhea,  "nausea and vomiting. Negative for abdominal pain, blood in stool, constipation, heartburn and melena.   Genitourinary: Negative.    Musculoskeletal: Positive for back pain.   Skin: Negative.    Neurological: Negative.    Endo/Heme/Allergies: Negative.        BP (!) 165/108 (BP Location: Left arm, Patient Position: Sitting)   Pulse 93   Temp 98.2 °F (36.8 °C) (Oral)   Resp (!) 22   Ht 5' 6" (1.676 m)   Wt 70.3 kg (155 lb)   SpO2 99%   BMI 25.02 kg/m²   Physical Exam   Constitutional: She is oriented to person, place, and time. She appears well-developed and well-nourished. No distress.   HENT:   Head: Normocephalic and atraumatic.   Mouth/Throat: Oropharynx is clear and moist.   Eyes: EOM are normal. Pupils are equal, round, and reactive to light. No scleral icterus.   Cardiovascular: Normal rate and regular rhythm.   No murmur heard.  Pulmonary/Chest: Effort normal and breath sounds normal. She has no wheezes.   Abdominal: Soft. Bowel sounds are normal. She exhibits no distension. There is tenderness (mild, throughout). There is no rebound and no guarding.   Musculoskeletal: Normal range of motion. She exhibits no edema.   Neurological: She is alert and oriented to person, place, and time. No sensory deficit.   Skin: Skin is warm and dry. No rash noted.   Vitals reviewed.      Labs:  Lab Results   Component Value Date/Time    WBC 2.65 (L) 03/20/2019 03:51 AM    HGB 13.1 03/20/2019 03:51 AM    HCT 41.0 03/20/2019 03:51 AM     03/20/2019 03:51 AM    MCV 91 03/20/2019 03:51 AM     03/20/2019 03:51 AM    K 3.7 03/20/2019 03:51 AM    CL 98 03/20/2019 03:51 AM    CO2 26 03/20/2019 03:51 AM    BUN 9 03/20/2019 03:51 AM    CREATININE 0.7 03/20/2019 03:51 AM     (H) 03/20/2019 03:51 AM    CALCIUM 9.8 03/20/2019 03:51 AM    MG 2.3 03/20/2019 03:51 AM    PHOS 5.8 (H) 09/13/2018 05:53 AM    INR 1.0 03/20/2019 04:20 AM    APTT 25.2 01/16/2018 04:45 PM   ]  Lab Results   Component Value Date/Time    " PROT 8.4 03/20/2019 03:51 AM    ALBUMIN 4.2 03/20/2019 03:51 AM    BILITOT 0.7 03/20/2019 03:51 AM     (H) 03/20/2019 03:51 AM    ALT 48 (H) 03/20/2019 03:51 AM    ALKPHOS 283 (H) 03/20/2019 03:51 AM   ]    Imaging and Procedures:  I personally reviewed the imaging/procedures below.  EGD 3/10/17:  - Non-bleeding GEJ ulcer.  - Examined stomach without sign of necrosis.  - Normal examined duodenum.    Assessment:  Estella Arevalo is a 64 y.o. female with a history of ETOH abuse, HCV (treated) and cirrhosis who presented with complaints of N/V, diarrhea and coffee ground emesis over the last 3 days.       Plan:  - NPO  - PPI gtt  - EGD today to look for bleeding source  - management of possible ETOH withdrawal per primary  - thiamine, MVI, folate  - fluids and as needed anti-emetics   - further recommendations to follow in EGD report    Obdulia Wilson MD

## 2019-03-20 NOTE — SUBJECTIVE & OBJECTIVE
Past Medical History:   Diagnosis Date    Addiction to drug     Alcohol abuse     Arthritis     Cirrhosis of liver     Coronary artery disease     Fall     GERD (gastroesophageal reflux disease)     Hepatitis C     States was successfully treated with Harvoni    History of psychiatric hospitalization     Hx of psychiatric care     Hypertension     Low back pain     Radha     MI (myocardial infarction)     Migraine headache     Psychiatric problem     Sleep difficulties     Suicide attempt     Therapy     Thyroid disease        Past Surgical History:   Procedure Laterality Date    BLOCK, NERVE, FACET JOINT, LUMBAR, MEDIAL BRANCH Right 4/16/2013    Performed by [facility] at [facility] MGT    BLOCK, NERVE, FACET JOINT, LUMBAR, MEDIAL BRANCH Right 4/2/2013    Performed by [facility] at [facility] MGT    COLONOSCOPY Left 7/16/2013    [facility]formed by [facility] MD at [facility] ENDO    EGD (ESOPHAGOGASTRODUODENOSCOPY) N/A 7/15/2013    Performed by Hernandez [facility]arias MD at [facility] ENDO    ESOPHAGOGASTRODUODENOSCOPY (EGD) N/A 3/10/2017    Performed by Vini Gomez MD at [facility] OR    ESOPHAGOGASTRODUODENOSCOPY (EGD) N/A 3/10/2017    Performed by Arnulfo Benton MD at [facility] ENDO    HERNIA REPAIR      HIATAL HERNIA REPAIR      JOINT REPLACEMENT Bilateral     KNEE SURGERY  bilateral replacement    LAPAROSCOPIC PEG TUBE PLACEMENT/REPLACEMENT N/A 3/10/2017    Performed by [facility]ini Gomez MD at [facility] OR    REPAIR-HERNIA-LAPAROSCOPIC-HIATAL N/A 3/10/2017    Performed by [facility]ini Gomez MD at [facility] OR    REPAIR-HERNIA-VENTRAL-LAPAROSCOPIC-W/MESH N/A 11/8/2017    Performed by Vini Gomez MD at [facility] OR    right foot sx  2008    SHOULDER SURGERY  replacement       Review of patient's allergies indicates:  No Known Allergies    No current facility-administered medications on file prior to encounter.      Current Outpatient Medications on File Prior to Encounter   Medication Sig    atenolol  (TENORMIN) 25 MG tablet Take 1 tablet (25 mg total) by mouth once daily.    DULoxetine (CYMBALTA) 30 MG capsule Take 1 capsule (30 mg total) by mouth once daily. TAKE 1 CAPSULE(30 MG) BY MOUTH EVERY DAY    gabapentin (NEURONTIN) 300 MG capsule Take 1 capsule (300 mg total) by mouth 2 (two) times daily.    levothyroxine (SYNTHROID) 25 MCG tablet Take 1 tablet (25 mcg total) by mouth once daily.    QUEtiapine (SEROQUEL) 100 MG Tab Take 1 tablet (100 mg total) by mouth every evening.    ranitidine (ZANTAC) 300 MG tablet Take 1 tablet (300 mg total) by mouth every evening.    cycloSPORINE (RESTASIS) 0.05 % ophthalmic emulsion Place 0.4 mLs (1 drop total) into both eyes 2 (two) times daily.    ferrous sulfate 325 (65 FE) MG EC tablet Take 325 mg by mouth 3 (three) times daily with meals.    hydrocortisone-pramoxine (PROCTOFOAM-HS) rectal foam Place 1 applicator rectally 2 (two) times daily.    LINZESS 145 mcg Cap capsule Take 1 capsule (145 mcg total) by mouth once daily.    naproxen (NAPROSYN) 500 MG tablet Take 1 tablet (500 mg total) by mouth 2 (two) times daily as needed.    NARCAN 4 mg/actuation Spry USE UTD    nicotine (NICODERM CQ) 21 mg/24 hr Place 1 patch onto the skin every 24 hours.    nitroGLYCERIN (NITROSTAT) 0.3 MG SL tablet ONE TABLET UNDER TONGUE AS NEEDED FOR CHEST PAIN EVERY 5 MINUTES AS NEEDED    ondansetron (ZOFRAN-ODT) 4 MG TbDL Take 1 tablet (4 mg total) by mouth every 8 (eight) hours as needed. Nausea/vomiting    pantoprazole (PROTONIX) 40 MG tablet Take 1 tablet (40 mg total) by mouth once daily.    polyethylene glycol (GLYCOLAX) 17 gram PwPk Take 17 g by mouth once daily.    RESTASIS 0.05 % ophthalmic emulsion INSTILL 1 DROP IN BOTH EYES TWICE DAILY    senna-docusate 8.6-50 mg (PERICOLACE) 8.6-50 mg per tablet Take 1 tablet by mouth 2 (two) times daily.     Family History     Problem Relation (Age of Onset)    Bipolar disorder Maternal Grandfather    Cancer Mother, Father     Depression Sister    Suicide Cousin        Tobacco Use    Smoking status: Never Smoker    Smokeless tobacco: Never Used   Substance and Sexual Activity    Alcohol use: Yes     Alcohol/week: 3.6 oz     Types: 6 Cans of beer per week     Comment: history of withdrawals; says that she quit but then admits that she drinks sometimes    Drug use: Yes     Types: Benzodiazepines, Methamphetamines, Amphetamines     Comment: Pain mgt clinic; admits to addiction to opioids    Sexual activity: Not on file     Review of Systems   Constitutional: Positive for unexpected weight change.     Objective:     Vital Signs (Most Recent):  Temp: 98.1 °F (36.7 °C) (03/20/19 1117)  Pulse: 99 (03/20/19 1145)  Resp: 20 (03/20/19 1145)  BP: (!) 158/98 (03/20/19 1145)  SpO2: 98 % (03/20/19 1145) Vital Signs (24h Range):  Temp:  [98 °F (36.7 °C)-98.4 °F (36.9 °C)] 98.1 °F (36.7 °C)  Pulse:  [] 99  Resp:  [16-28] 20  SpO2:  [95 %-100 %] 98 %  BP: (141-182)/() 158/98     Weight: 70.3 kg (155 lb)  Body mass index is 25.02 kg/m².    Physical Exam        Significant Labs:   BMP:   Recent Labs   Lab 03/20/19  0351   *      K 3.7   CL 98   CO2 26   BUN 9   CREATININE 0.7   CALCIUM 9.8   MG 2.3     CBC:   Recent Labs   Lab 03/20/19  0351   WBC 2.65*   HGB 13.1   HCT 41.0        CMP:   Recent Labs   Lab 03/20/19  0351      K 3.7   CL 98   CO2 26   *   BUN 9   CREATININE 0.7   CALCIUM 9.8   PROT 8.4   ALBUMIN 4.2   BILITOT 0.7   ALKPHOS 283*   *   ALT 48*   ANIONGAP 12   EGFRNONAA >60     Coagulation:   Recent Labs   Lab 03/20/19  0420   INR 1.0   Lipase:   Recent Labs   Lab 03/20/19  0351   LIPASE 25     TSH:   Recent Labs   Lab 10/26/18  1611   TSH 2.259     Urine Studies:   Recent Labs   Lab 03/20/19  0325   COLORU Yellow   APPEARANCEUA Clear   PHUR 7.0   SPECGRAV 1.010   PROTEINUA Negative   GLUCUA 1+*   KETONESU Negative   BILIRUBINUA Negative   OCCULTUA Negative   NITRITE Positive*    UROBILINOGEN Negative   LEUKOCYTESUR Trace*   RBCUA 0   WBCUA 1   BACTERIA Many*   SQUAMEPITHEL rare       Significant Imaging: I have reviewed all pertinent imaging results/findings within the past 24 hours.

## 2019-03-20 NOTE — NURSING
Arrived to floor room #433 from ENDO via stretcher, AAO x 4, can make her needs known, transferred to bed w/o incident, denies any discomfort at this time, safety maintained.

## 2019-03-20 NOTE — ED NOTES
"Pt states "Can't y'all just give me something for nausea IM?"    I informed the pt that the doctor has to evaluate and order medicine first and that we can't just give her medicine. Pt states "Alright, asshole, you're a bitch."    MD aware.  "

## 2019-03-20 NOTE — ASSESSMENT & PLAN NOTE
Lab Results   Component Value Date    LDLCALC 134.0 05/29/2012   Not on statin possible due to liver . Obtain lipid panel. No longer on ASA?

## 2019-03-20 NOTE — PROVATION PATIENT INSTRUCTIONS
Discharge Summary/Instructions after an Endoscopic Procedure  Patient Name: Estella Arevalo  Patient MRN: [id number]  Patient YOB: 1955  Wednesday, March 20, 2019  Obdulia Wilson MD  RESTRICTIONS:  During your procedure today, you received medications for sedation.  These   medications may affect your judgment, balance and coordination.  Therefore,   for 24 hours, you have the following restrictions:   - DO NOT drive a car, operate machinery, make legal/financial decisions,   sign important papers or drink alcohol.    ACTIVITY:  Today: no heavy lifting, straining or running due to procedural   sedation/anesthesia.  The following day: return to full activity including work.  DIET:  Eat and drink normally unless instructed otherwise.     TREATMENT FOR COMMON SIDE EFFECTS:  - Mild abdominal pain, nausea, belching, bloating or excessive gas:  rest,   eat lightly and use a heating pad.  - Sore Throat: treat with throat lozenges and/or gargle with warm salt   water.  - Because air was used during the procedure, expelling large amounts of air   from your rectum or belching is normal.  - If a bowel prep was taken, you may not have a bowel movement for 1-3 days.    This is normal.  SYMPTOMS TO WATCH FOR AND REPORT TO YOUR PHYSICIAN:  1. Abdominal pain or bloating, other than gas cramps.  2. Chest pain.  3. Back pain.  4. Signs of infection such as: chills or fever occurring within 24 hours   after the procedure.  5. Rectal bleeding, which would show as bright red, maroon, or black stools.   (A tablespoon of blood from the rectum is not serious, especially if   hemorrhoids are present.)  6. Vomiting.  7. Weakness or dizziness.  GO DIRECTLY TO THE NEAREST EMERGENCY ROOM IF YOU HAVE ANY OF THE FOLLOWING:      Difficulty breathing              Chills and/or fever over 101 F   Persistent vomiting and/or vomiting blood   Severe abdominal pain   Severe chest pain   Black, tarry stools   Bleeding- more than one  tablespoon   Any other symptom or condition that you feel may need urgent attention  Your doctor recommends these additional instructions:  If any biopsies were taken, your doctors clinic will contact you in 1 to 2   weeks with any results.  - [facility].   - Clear liquid diet today, then advance as tolerated to low fiber, low fat   diet (gastroparesis) diet.   - Follow an antireflux regimen indefinitely.   - Do a gastric emptying study as an outpatient if symptoms persist.   - Use Protonix (pantoprazole) 40 mg PO daily indefinitely.   - Recommend an anti-emetic/anti-nausea medication PRN.   - Use metoclopramide 10 mg PO QID; 30 min AC and HS indefinitely. Counseled   on risk of tardive dyskinesia with this medication (can be irreversible).  - Minimize use of narcotics.  - Avoid constipation.  - The findings and recommendations were discussed with the patient.  For questions, problems or results please call your physician - Obdulia Wilson MD at Work:  [phone].  [facility] can be reached at [phone]     IF A COMPLICATION OR EMERGENCY SITUATION ARISES AND YOU ARE UNABLE TO REACH   YOUR PHYSICIAN - GO DIRECTLY TO THE EMERGENCY ROOM.  Obdulia Wilson MD  3/20/2019 11:10:43 AM  This report has been verified and signed electronically.  GISSELL Angel  [employer]

## 2019-03-20 NOTE — PLAN OF CARE
03/20/19 1837   Post-Acute Status   Post-Acute Authorization Other   Patient discharged home with family

## 2019-03-20 NOTE — PROGRESS NOTES
[facility]    WRITTEN HEALTHCARE AND DISCHARGE INFORMATION  Follow-up Information     Obdulia Wilson MD. Schedule an appointment as soon as possible for a visit in 1 week.    Specialty:  Gastroenterology  Why:  For Gastroenterology follow up  Contact information:  [address]  [address]  [facility] [address]  [phone]                       Help at Home           [phone]  After discharge for assistance [facility] On Call Nurse Care Line 24/7  Assistance     Things You are responsible For To Manage Your Care At Home:  1.    Getting your prescriptions filled   2.    Taking your medications as directed, DO NOT MISS ANY DOSES!  3.    Going to your follow-up doctor appointment. This is important because it  allow the doctor to monitor your progress and determine if  any changes need to made to your treatment plan.     Thank you for choosing [facility] for your care.  Please answer any calls you may receive from [facility] we want to continue to support you as you manage your healthcare needs. [facility] is happy to have the opportunity to serve you.      Sincerely,  Your [facility] Team,  MARISSA Soriano, ACM-RN; [facility]  [phone]

## 2019-03-20 NOTE — ASSESSMENT & PLAN NOTE
EGD 3/20/19 Grossly normal. No source of bleeding identified. Large volume food in stomach suggests gastroparesis likely opioid induced ( presumed + 3/20/10).  - Stable   - H&H 3/20/19 WNL at 13.1 & 41  - Per GI recs,   1. clear liquid diet today, will advance as tolerated to low fiber, low fat diet (gastroparesis) diet.  2. phenergan PRN for nausea   3. Protonix 40 mg PO QD and continue outpatient indefinitely  4. outpatient gastric emptying study if symptoms persist  5. metoclopramide 10 mg PO QID 30 min AC and HS outpatient indefinitely. Counseled on risk of tardive dyskinesia with this medication (can be irreversible).  6. Minimize narcotic use   7. Avoid constipation   8. thiamine, MVI, folate  - Will D/c home with above recommendations pending toleration of diet  - FU with GI outpatient

## 2019-03-20 NOTE — PLAN OF CARE
To patient's room to discuss patient managing her care at home.      TN Role Explained.  Patient identified by using 2 identifiers:  Name and date of birth    Patient stated that her  and son WILL HELP AT HOME WITH her  RECOVERY.      TN name and contact info placed on the communication board    Preferred Pharmacy:  [id number]  [place] - [address] [facility] [address]  [address]  [place] [address]  Phone: [phone] Fax: [phone]       03/20/19 3286   Discharge Assessment   Assessment Type Discharge Planning Assessment   Confirmed/corrected address and phone number on facesheet? Yes   Assessment information obtained from? Patient   Expected Length of Stay (days) (discharged)   Communicated expected length of stay with patient/caregiver yes   Prior to hospitilization cognitive status: Alert/Oriented   Prior to hospitalization functional status: Independent   Current cognitive status: Alert/Oriented   Current Functional Status: Independent   Lives With child(carol), adult;spouse   Is patient able to care for self after discharge? Yes   Patient's perception of discharge disposition home or selfcare   Readmission Within the Last 30 Days no previous admission in last 30 days   Patient currently being followed by outpatient case management? No   Patient currently receives any other outside agency services? No   Equipment Currently Used at Home none   Do you have any problems affording any of your prescribed medications? No   Is the patient taking medications as prescribed? yes   Does the patient have transportation home? Yes   Transportation Anticipated family or friend will provide   Does the patient receive services at the [facility]? No   Discharge Plan A Home with family   DME Needed Upon Discharge  none   Patient/Family in Agreement with Plan yes

## 2019-03-20 NOTE — HPI
Estella Arevalo is a 64 y.o. female with a history of GERD, PUD, CAD, HTN, MI, chronic ETOH abuse, chronic opioid use (back pain), HCV, and compensated cirrhosis who presented to [facility] ED 3/20/19 with c/o of N/V, diarrhea and coffee ground emesis x 3 days. Per reports taking pepcid which provided some relief. Denies aggravating factors. Patient states her diarrhea is chronic, attributes it to hx rectal prolapse. Patient has been admitted for intractable nausea and vomiting as recently as January of 2019. Pt not compliant with at home meds.  Denies NSAID use. Denies ever using tobacco (however home meds include nicotine patch). Admits to drinking 6 cans of beer once a week (last drink 4 days prior to presentation). Denies blood in stool, rectal bleeding, syncope, vision changes, constipation, cough, palpitations. She does endorse CP associated with vomiting, dysuria, HA, dizziness, abdominal pain, season allergies with rhinorrhea. She was admitted to observation for endoscopic evaluation.   EKG NSR.

## 2019-03-20 NOTE — ED PROVIDER NOTES
"Encounter Date: 3/20/2019       History     Chief Complaint   Patient presents with    Emesis     n/v x3 days; EMS reports pt was trying to make herself vomit      Patient presents with complaints of 3 days of nausea, vomiting, diarrhea.  Patient states that she has had intermittent hematemesis described as "black blood in her vomit."  Patient's history hiatal hernia peptic ulcer disease.  Patient also has a history of chronic alcohol abuse.  States she has not had a drink in 1 week.  Patient has been admitted for intractable nausea and vomiting as recently as January of 2019.  Patient admits that her diarrhea is chronic when she contributes it to a history rectal prolapse.  She denies melena or rectal bleeding.  She complains of mild intermittent upper abdominal pain. She has chronic lower back pain that is unchanged.  Patient denies fever.  She denies chest pain. She denies shortness of breath. She denies syncope.  She denies headache.          Review of patient's allergies indicates:  No Known Allergies  Past Medical History:   Diagnosis Date    Addiction to drug     Alcohol abuse     Arthritis     Cirrhosis of liver     Coronary artery disease     Fall     GERD (gastroesophageal reflux disease)     Hepatitis C     States was successfully treated with Harvoni    History of psychiatric hospitalization     Hx of psychiatric care     Hypertension     Low back pain     Radha     MI (myocardial infarction)     Migraine headache     Psychiatric problem     Sleep difficulties     Suicide attempt     Therapy     Thyroid disease      Past Surgical History:   Procedure Laterality Date    BLOCK, NERVE, FACET JOINT, LUMBAR, MEDIAL BRANCH Right 4/16/2013    [facility]    BLOCK, NERVE, FACET JOINT, LUMBAR, MEDIAL BRANCH Right 4/2/2013    [facility]    COLONOSCOPY Left 7/16/2013    [facility]    " EGD (ESOPHAGOGASTRODUODENOSCOPY) N/A 7/15/2013    [facility]    ESOPHAGOGASTRODUODENOSCOPY (EGD) N/A 3/10/2017    [facility] OR    ESOPHAGOGASTRODUODENOSCOPY (EGD) N/A 3/10/2017    [facility] by Arnulfo [facility]enton [facility] at [facility] END[facility]    HERNIA REPAIR      HIATAL HERNIA REPAIR      JOINT REPLACEMENT Bilateral     KNEE SURGERY  bilateral replacement    LAPAROSCOPIC PEG TUBE PLACEMENT/REPLACEMENT N/A 3/10/2017    [facility] OR    REPAIR-HERNIA-LAPAROSCOPIC-HIATAL N/A 3/10/2017    [facility] OR    REPAIR-HERNIA-VENTRAL-LAPAROSCOPIC-W/MESH N/A 11/8/2017    [facility] OR    right foot sx  2008    SHOULDER SURGERY  replacement     Family History   Problem Relation Age of Onset    Cancer Mother     Cancer Father     Depression Sister     Bipolar disorder Maternal Grandfather     Suicide Cousin      Social History     Tobacco Use    Smoking status: Never Smoker    Smokeless tobacco: Never Used   Substance Use Topics    Alcohol use: Yes     Alcohol/week: 3.6 oz     Types: 6 Cans of beer per week     Comment: history of withdrawals; says that she quit but then admits that she drinks sometimes    Drug use: Yes     Types: Benzodiazepines, Methamphetamines, Amphetamines     Comment: Pain mgt clinic; admits to addiction to opioids     Review of Systems   Constitutional: Negative for chills, diaphoresis and fever.   HENT: Negative for congestion, rhinorrhea and sore throat.    Eyes: Negative for visual disturbance.   Respiratory: Negative for cough and shortness of breath.    Cardiovascular: Negative for chest pain.   Gastrointestinal: Positive for abdominal pain, diarrhea, nausea and vomiting. Negative for blood in stool.        No melena.   Genitourinary: Negative for dysuria, flank pain and hematuria.   Musculoskeletal: Positive for back pain.   Skin: Negative for  rash and wound.   Neurological: Negative for dizziness, seizures, facial asymmetry, speech difficulty, weakness, numbness and headaches.   Psychiatric/Behavioral: Negative for behavioral problems and confusion.   All other systems reviewed and are negative.      Physical Exam     Initial Vitals [03/20/19 0306]   BP Pulse Resp Temp SpO2   (!) 160/110 68 18 98 °F (36.7 °C) 99 %      MAP       --         Physical Exam    Nursing note and vitals reviewed.  Constitutional: She appears well-developed and well-nourished. She is not diaphoretic. No distress.   HENT:   Head: Normocephalic and atraumatic.   Nose: Nose normal.   Mouth/Throat: Oropharynx is clear and moist.   Eyes: Conjunctivae and EOM are normal. Pupils are equal, round, and reactive to light. Right eye exhibits no discharge. Left eye exhibits no discharge. No scleral icterus.   Neck: Normal range of motion. Neck supple. No thyromegaly present.   Cardiovascular: Normal rate, regular rhythm and normal heart sounds.   No murmur heard.  Pulmonary/Chest: Breath sounds normal. No stridor. No respiratory distress. She has no wheezes. She has no rhonchi. She has no rales.   Abdominal: Soft. She exhibits no distension and no mass. There is tenderness (Mild tenderness epigastric area.). There is no rebound and no guarding.   Musculoskeletal: Normal range of motion. She exhibits no edema or tenderness.   Neurological: She is alert and oriented to person, place, and time. She has normal strength. No cranial nerve deficit. GCS score is 15. GCS eye subscore is 4. GCS verbal subscore is 5. GCS motor subscore is 6.   Skin: Skin is warm and dry. No rash noted. No erythema.   Psychiatric: Her behavior is normal. Judgment and thought content normal.   Anxious.         ED Course   Procedures  Labs Reviewed   CBC W/ AUTO DIFFERENTIAL   COMPREHENSIVE METABOLIC PANEL   PROTIME-INR   MAGNESIUM   URINALYSIS, REFLEX TO URINE CULTURE   DRUG SCREEN PANEL, URINE EMERGENCY    ALCOHOL,MEDICAL (ETHANOL)   TYPE & SCREEN     EKG Readings: (Independently Interpreted)   Initial Reading: No STEMI. Rhythm: Normal Sinus Rhythm. Heart Rate: 78. Ectopy: No Ectopy. Conduction: Normal. ST Segments: Normal ST Segments. T Waves: Normal. Clinical Impression: Normal Sinus Rhythm       Imaging Results    None          Medical Decision Making:   Differential Diagnosis:   Gastritis, peptic ulcer, alcohol withdrawal, alcoholic ketoacidosis, esophageal varices  Clinical Tests:   Lab Tests: Reviewed  The following lab test(s) were unremarkable: CBC, CMP, Urinalysis and Lipase  Medical Tests: Reviewed  ED Management:  Symptoms controlled.  H&H stable. White count is at baseline.  Patient's history peptic ulcer disease.  Will admit for serial hemoglobin/hematocrit for observation.  Will continue IV PPI.  Will treat UTI.                      Clinical Impression:       ICD-10-CM ICD-9-CM   1. Hematemesis, presence of nausea not specified K92.0 578.0   2. Urinary tract infection without hematuria, site unspecified N39.0 599.0   3. Peptic ulcer disease K27.9 533.90         Disposition:   Disposition: Admitted  Condition: Stable                        Kingsley Villaloobs MD  03/20/19 0558

## 2019-04-01 ENCOUNTER — OFFICE VISIT (OUTPATIENT)
Dept: FAMILY MEDICINE | Facility: CLINIC | Age: 64
End: 2019-04-01
Payer: MEDICARE

## 2019-04-01 VITALS
BODY MASS INDEX: 26.36 KG/M2 | DIASTOLIC BLOOD PRESSURE: 76 MMHG | SYSTOLIC BLOOD PRESSURE: 124 MMHG | WEIGHT: 164 LBS | HEIGHT: 66 IN | HEART RATE: 90 BPM | TEMPERATURE: 98 F | OXYGEN SATURATION: 98 %

## 2019-04-01 DIAGNOSIS — L03.039 INFECTION OF TOENAIL: Primary | ICD-10-CM

## 2019-04-01 DIAGNOSIS — M51.36 DDD (DEGENERATIVE DISC DISEASE), LUMBAR: ICD-10-CM

## 2019-04-01 PROBLEM — Z86.19 HISTORY OF HEPATITIS C: Status: ACTIVE | Noted: 2019-01-04

## 2019-04-01 PROBLEM — F19.10 POLYSUBSTANCE ABUSE: Status: ACTIVE | Noted: 2019-01-04

## 2019-04-01 PROCEDURE — 99214 OFFICE O/P EST MOD 30 MIN: CPT | Mod: S$PBB,,, | Performed by: NURSE PRACTITIONER

## 2019-04-01 PROCEDURE — 99214 PR OFFICE/OUTPT VISIT, EST, LEVL IV, 30-39 MIN: ICD-10-PCS | Mod: S$PBB,,, | Performed by: NURSE PRACTITIONER

## 2019-04-01 PROCEDURE — 99999 PR PBB SHADOW E&M-EST. PATIENT-LVL IV: CPT | Mod: PBBFAC,,, | Performed by: NURSE PRACTITIONER

## 2019-04-01 PROCEDURE — 99999 PR PBB SHADOW E&M-EST. PATIENT-LVL IV: ICD-10-PCS | Mod: PBBFAC,,, | Performed by: NURSE PRACTITIONER

## 2019-04-01 PROCEDURE — 99214 OFFICE O/P EST MOD 30 MIN: CPT | Mod: PBBFAC,PO | Performed by: NURSE PRACTITIONER

## 2019-04-01 RX ORDER — MUPIROCIN 20 MG/G
OINTMENT TOPICAL 3 TIMES DAILY
Qty: 22 G | Refills: 0 | Status: SHIPPED | OUTPATIENT
Start: 2019-04-01 | End: 2019-04-11

## 2019-04-01 RX ORDER — ATENOLOL 25 MG/1
25 TABLET ORAL DAILY
Qty: 30 TABLET | Refills: 3 | Status: CANCELLED | OUTPATIENT
Start: 2019-04-01 | End: 2020-03-31

## 2019-04-01 RX ORDER — SUCRALFATE 1 G/1
TABLET ORAL
Refills: 0 | Status: ON HOLD | COMMUNITY
Start: 2019-01-07 | End: 2019-08-29 | Stop reason: HOSPADM

## 2019-04-01 RX ORDER — PROMETHAZINE HYDROCHLORIDE 12.5 MG/1
TABLET ORAL
Refills: 0 | COMMUNITY
Start: 2019-01-07 | End: 2019-06-21

## 2019-04-01 RX ORDER — PANTOPRAZOLE SODIUM 40 MG/1
40 TABLET, DELAYED RELEASE ORAL DAILY
Qty: 90 TABLET | Refills: 0 | Status: CANCELLED | OUTPATIENT
Start: 2019-04-01 | End: 2020-03-31

## 2019-04-01 RX ORDER — ONDANSETRON 4 MG/1
4 TABLET, ORALLY DISINTEGRATING ORAL EVERY 8 HOURS PRN
Qty: 20 TABLET | Refills: 0 | Status: CANCELLED | OUTPATIENT
Start: 2019-04-01

## 2019-04-01 RX ORDER — HYDROCORTISONE 2.5% 25 MG/G
CREAM TOPICAL
Refills: 1 | COMMUNITY
Start: 2019-01-02 | End: 2019-04-11 | Stop reason: SDUPTHER

## 2019-04-01 RX ORDER — AMITRIPTYLINE HYDROCHLORIDE 50 MG/1
TABLET, FILM COATED ORAL
Refills: 1 | COMMUNITY
Start: 2019-01-02 | End: 2019-04-04 | Stop reason: SDUPTHER

## 2019-04-01 RX ORDER — AMITRIPTYLINE HYDROCHLORIDE 50 MG/1
TABLET, FILM COATED ORAL
Refills: 1 | Status: CANCELLED | OUTPATIENT
Start: 2019-04-01

## 2019-04-01 RX ORDER — DULOXETIN HYDROCHLORIDE 30 MG/1
30 CAPSULE, DELAYED RELEASE ORAL DAILY
Qty: 30 CAPSULE | Refills: 11 | Status: CANCELLED | OUTPATIENT
Start: 2019-04-01

## 2019-04-01 RX ORDER — LEVOTHYROXINE SODIUM 25 UG/1
25 TABLET ORAL DAILY
Qty: 90 TABLET | Refills: 0 | Status: CANCELLED | OUTPATIENT
Start: 2019-04-01

## 2019-04-01 RX ORDER — GABAPENTIN 300 MG/1
300 CAPSULE ORAL
COMMUNITY
Start: 2017-09-22 | End: 2019-04-04 | Stop reason: SDUPTHER

## 2019-04-01 RX ORDER — EFINACONAZOLE 100 MG/ML
SOLUTION TOPICAL
Refills: 0 | COMMUNITY
Start: 2019-01-02 | End: 2019-04-11 | Stop reason: SDUPTHER

## 2019-04-01 RX ORDER — GABAPENTIN 300 MG/1
300 CAPSULE ORAL
Status: CANCELLED | OUTPATIENT
Start: 2019-04-01

## 2019-04-01 RX ORDER — CYCLOSPORINE 0.5 MG/ML
1 EMULSION OPHTHALMIC 2 TIMES DAILY
Qty: 30 EACH | Refills: 0 | Status: CANCELLED | OUTPATIENT
Start: 2019-04-01

## 2019-04-01 NOTE — PATIENT INSTRUCTIONS
Please follow up on referrals to see a podiatrist and the GI specialist and pain management.   Call the referrals team at: [phone]  1. Mupirocin ointment 3x a day  2. Epsom salt soaks

## 2019-04-01 NOTE — PROGRESS NOTES
Subjective:        Chief Complaint  Chief Complaint   Patient presents with    Nail Problem       HPI  Estella Arevalo is a 64 y.o. female with multiple medical diagnoses as listed in the medical history and problem list that presents for infected right great toe.  Patient is new to me. This patient removed her toenail last week because it was falling off, it has then scabbed over and become tender to the touch. She denies fever, chills, drainage from the site.         PAST MEDICAL HISTORY:  Past Medical History:   Diagnosis Date    Addiction to drug     Alcohol abuse     Arthritis     Cirrhosis of liver     Coronary artery disease     Fall     GERD (gastroesophageal reflux disease)     Hepatitis C     States was successfully treated with Harvoni    History of psychiatric hospitalization     Hx of psychiatric care     Hypertension     Low back pain     Radha     MI (myocardial infarction)     Migraine headache     Psychiatric problem     Sleep difficulties     Suicide attempt     Therapy     Thyroid disease        PAST SURGICAL HISTORY:  Past Surgical History:   Procedure Laterality Date    BLOCK, NERVE, FACET JOINT, LUMBAR, MEDIAL BRANCH Right 4/16/2013    [facility]    BLOCK, NERVE, FACET JOINT, LUMBAR, MEDIAL BRANCH Right 4/2/2013    [facility] at [facility]    COLONOSCOPY Left 7/16/2013    [facility]formed by [facility] at [facility]    EGD (ESOPHAGOGASTRODUODENOSCOPY) N/A 3/20/2019    Performed by Obdulia Wilson MD at [facility] ENDO    EGD (ESOPHAGOGASTRODUODENOSCOPY) N/A 7/15/2013    [facility]formed by [facility] at [facility] END[facility]    ESOPHAGOGASTRODUODENOSCOPY (EGD) N/A 3/10/2017    [facility]formed by [facility] at [facility] OR    ESOPHAGOGASTRODUODENOSCOPY (EGD) N/A 3/10/2017    [facility] by [facility] at [facility] ENDO    HERNIA REPAIR      HIATAL HERNIA REPAIR      JOINT REPLACEMENT Bilateral     KNEE SURGERY  bilateral  "replacement    LAPAROSCOPIC PEG TUBE PLACEMENT/REPLACEMENT N/A 3/10/2017    [facility]    REPAIR-HERNIA-LAPAROSCOPIC-HIATAL N/A 3/10/2017    Performed [facility]    REPAIR-HERNIA-VENTRAL-LAPAROSCOPIC-W/MESH N/A 11/8/2017    Performed by [facility] at [facility]    right foot sx  2008    SHOULDER SURGERY  replacement       SOCIAL HISTORY:  Social History     Socioeconomic History    Marital status:      Spouse name: Hammad Kaufman"    Number of children: 4    Years of education: Not on file    Highest education level: Not on file   Occupational History    Occupation: Retired     Comment: RN   Social Needs    Financial resource strain: Not on file    Food insecurity:     Worry: Not on file     Inability: Not on file    Transportation needs:     Medical: Not on file     Non-medical: Not on file   Tobacco Use    Smoking status: Never Smoker    Smokeless tobacco: Never Used   Substance and Sexual Activity    Alcohol use: Yes     Alcohol/week: 3.6 oz     Types: 6 Cans of beer per week     Comment: history of withdrawals; says that she quit but then admits that she drinks sometimes    Drug use: Yes     Types: Benzodiazepines, Methamphetamines, Amphetamines     Comment: Pain mgt clinic; admits to addiction to opioids    Sexual activity: Not on file   Lifestyle    Physical activity:     Days per week: Not on file     Minutes per session: Not on file    Stress: Not on file   Relationships    Social connections:     Talks on phone: Not on file     Gets together: Not on file     Attends Jainism service: Not on file     Active member of club or organization: Not on file     Attends meetings of clubs or organizations: Not on file     Relationship status: Not on file    Intimate partner violence:     Fear of current or ex partner: Not on file     Emotionally abused: Not on file     Physically abused: Not on file     Forced sexual activity: " Not on file   Other Topics Concern    Patient feels they ought to cut down on drinking/drug use Yes    Patient annoyed by others criticizing their drinking/drug use Yes    Patient has felt bad or guilty about drinking/drug use Yes    Patient has had a drink/used drugs as an eye opener in the AM Yes   Social History Narrative    Lives with , grand-daughter, daughter and son    Retired RN    Polysubstance abuse       FAMILY HISTORY:  Family History   Problem Relation Age of Onset    Cancer Mother     Cancer Father     Depression Sister     Bipolar disorder Maternal Grandfather     Suicide Cousin        ALLERGIES AND MEDICATIONS: updated and reviewed.  Review of patient's allergies indicates:  No Known Allergies  Current Outpatient Medications   Medication Sig Dispense Refill    amitriptyline (ELAVIL) 50 MG tablet   1    atenolol (TENORMIN) 25 MG tablet Take 1 tablet (25 mg total) by mouth once daily. 30 tablet 3    cycloSPORINE (RESTASIS) 0.05 % ophthalmic emulsion Place 0.4 mLs (1 drop total) into both eyes 2 (two) times daily. 30 each 0    DULoxetine (CYMBALTA) 30 MG capsule Take 1 capsule (30 mg total) by mouth once daily. TAKE 1 CAPSULE(30 MG) BY MOUTH EVERY DAY 30 capsule 11    gabapentin (NEURONTIN) 300 MG capsule Take 300 mg by mouth.      hydrocortisone-pramoxine (PROCTOFOAM-HS) rectal foam Place 1 applicator rectally 2 (two) times daily. 10 g 1    JUBLIA 10 % Eran APPLY ONE DOSE D  0    levothyroxine (SYNTHROID) 25 MCG tablet Take 1 tablet (25 mcg total) by mouth once daily. 90 tablet 0    linaclotide (LINZESS) 145 mcg Cap capsule Take 145 mcg by mouth.      metoclopramide HCl (REGLAN) 10 MG tablet Take 1 tablet (10 mg total) by mouth 4 (four) times daily before meals and nightly. 120 tablet 1    nitroGLYCERIN (NITROSTAT) 0.3 MG SL tablet ONE TABLET UNDER TONGUE AS NEEDED FOR CHEST PAIN EVERY 5 MINUTES AS NEEDED 100 tablet 0    ondansetron (ZOFRAN-ODT) 4 MG TbDL Take 1 tablet (4 mg  "total) by mouth every 8 (eight) hours as needed. Nausea/vomiting 20 tablet 0    oxyCODONE (OXYCONTIN) 20 mg 12 hr tablet Take 20 mg by mouth every 12 (twelve) hours.      pantoprazole (PROTONIX) 40 MG tablet Take 1 tablet (40 mg total) by mouth once daily. 90 tablet 0    PROCTOZONE-HC 2.5 % rectal cream STACI RECTALLY BID  1    promethazine (PHENERGAN) 12.5 MG Tab   0    QUEtiapine (SEROQUEL) 100 MG Tab Take 1 tablet (100 mg total) by mouth every evening. 30 tablet 11    ranitidine (ZANTAC) 300 MG tablet Take 1 tablet (300 mg total) by mouth every evening. 30 tablet 11    RESTASIS 0.05 % ophthalmic emulsion INSTILL 1 DROP IN BOTH EYES TWICE DAILY 30 each 0    sucralfate (CARAFATE) 1 gram tablet TAKE 1 TABLET PO Q 6 H  0    ferrous sulfate 325 (65 FE) MG EC tablet Take 325 mg by mouth 3 (three) times daily with meals.      mupirocin (BACTROBAN) 2 % ointment Apply topically 3 (three) times daily. for 10 days 22 g 0    NARCAN 4 mg/actuation Spry USE UTD  0     No current facility-administered medications for this visit.        ROS  Review of Systems   Constitutional: Negative for activity change, appetite change, chills and fever.   Respiratory: Negative for cough, shortness of breath and wheezing.    Cardiovascular: Negative for chest pain.   Gastrointestinal: Negative for constipation, diarrhea, nausea and vomiting.   Musculoskeletal: Positive for gait problem (limp on right foot due to pain).   Skin: Positive for wound (right great toe).   Neurological: Negative for headaches.   Psychiatric/Behavioral: Negative for confusion and decreased concentration.       Objective:     Physical Exam  Vitals:    04/01/19 1644   BP: 124/76   BP Location: Left arm   Patient Position: Sitting   BP Method: Medium (Manual)   Pulse: 90   Temp: 98.1 °F (36.7 °C)   TempSrc: Oral   SpO2: 98%   Weight: 74.4 kg (164 lb 0.4 oz)   Height: 5' 6" (1.676 m)    Body mass index is 26.47 kg/m².  Weight: 74.4 kg (164 lb 0.4 oz)   Height: " "5' 6" (167.6 cm)   Physical Exam   Constitutional: She is oriented to person, place, and time. She appears well-developed and well-nourished.   Eyes: EOM are normal.   Cardiovascular: Normal rate and regular rhythm.   Pulmonary/Chest: Effort normal and breath sounds normal.   Abdominal: Soft. Bowel sounds are normal.   Neurological: She is alert and oriented to person, place, and time.   Skin: Skin is warm and dry.   See photo   Psychiatric: She has a normal mood and affect. Her behavior is normal.   Vitals reviewed.        Assessment:     1. Infection of toenail    2. DDD (degenerative disc disease), lumbar      Plan:     Estella SORIA was seen today for nail problem.    Diagnoses and all orders for this visit:    Infection of toenail  -     mupirocin (BACTROBAN) 2 % ointment; Apply topically 3 (three) times daily. for 10 days  -     Soak with epsom salts  -     Keep clean and dry  -     F/u on podiatry referral    DDD (degenerative disc disease), lumbar  -     Ambulatory referral to Pain Clinic      Health Maintenance       Date Due Completion Date    Mammogram 03/01/1994 ---    DEXA SCAN 03/01/1994 ---    Lipid Panel 05/29/2017 5/29/2012    Colonoscopy 07/16/2018 7/16/2013    Pneumococcal Vaccine (65+ Low/Medium Risk) (2 of 2 - PPSV23) 03/01/2019 11/9/2017    TETANUS VACCINE 12/30/2026 12/30/2016 (Declined)    Override on 12/30/2016: Declined          Health Maintenance reviewed, addressed as per orders    Follow-up: Follow up in about 6 weeks (around 5/13/2019) for physical with PCP.    The patient expressed understanding and no barriers to adherence were identified.     1. The patient indicates understanding of these issues and agrees with the plan. Brief care plan is updated and reviewed with the patient as applicable.     2. The patient is given an After Visit Summary that lists all medications with directions, allergies, orders placed during this encounter and follow-up instructions.     3. I have reviewed the " patient's medical information including past medical, family, and social history sections including the medications and allergies.     4. We discussed the patient's current medications. I reconciled the patient's medication list and prepared and supplied needed refills.       Codi Hurst, DNP, APRN, FNP-c  Family Medicine    My collaborating physicians are Dr. Huan Danielson and Dr. Yuriy Cueva

## 2019-04-04 ENCOUNTER — OFFICE VISIT (OUTPATIENT)
Dept: FAMILY MEDICINE | Facility: CLINIC | Age: 64
End: 2019-04-04
Payer: MEDICARE

## 2019-04-04 VITALS
TEMPERATURE: 98 F | BODY MASS INDEX: 26.52 KG/M2 | OXYGEN SATURATION: 93 % | DIASTOLIC BLOOD PRESSURE: 68 MMHG | WEIGHT: 165 LBS | HEIGHT: 66 IN | SYSTOLIC BLOOD PRESSURE: 94 MMHG | HEART RATE: 85 BPM

## 2019-04-04 DIAGNOSIS — K62.3 RECTAL PROLAPSE: ICD-10-CM

## 2019-04-04 DIAGNOSIS — G47.00 INSOMNIA, UNSPECIFIED TYPE: ICD-10-CM

## 2019-04-04 DIAGNOSIS — M89.9 DISORDER OF BONE AND CARTILAGE: ICD-10-CM

## 2019-04-04 DIAGNOSIS — K21.9 GASTROESOPHAGEAL REFLUX DISEASE, ESOPHAGITIS PRESENCE NOT SPECIFIED: ICD-10-CM

## 2019-04-04 DIAGNOSIS — I10 ESSENTIAL HYPERTENSION: Chronic | ICD-10-CM

## 2019-04-04 DIAGNOSIS — B18.2 CHRONIC HEPATITIS C WITHOUT HEPATIC COMA: Chronic | ICD-10-CM

## 2019-04-04 DIAGNOSIS — D63.8 ANEMIA OF CHRONIC DISEASE: Chronic | ICD-10-CM

## 2019-04-04 DIAGNOSIS — I25.10 CORONARY ARTERY DISEASE, ANGINA PRESENCE UNSPECIFIED, UNSPECIFIED VESSEL OR LESION TYPE, UNSPECIFIED WHETHER NATIVE OR TRANSPLANTED HEART: Chronic | ICD-10-CM

## 2019-04-04 DIAGNOSIS — H04.129 DRY EYE SYNDROME, UNSPECIFIED LATERALITY: ICD-10-CM

## 2019-04-04 DIAGNOSIS — R11.0 NAUSEA: ICD-10-CM

## 2019-04-04 DIAGNOSIS — E03.4 HYPOTHYROIDISM DUE TO ACQUIRED ATROPHY OF THYROID: ICD-10-CM

## 2019-04-04 DIAGNOSIS — F32.A DEPRESSION, UNSPECIFIED DEPRESSION TYPE: ICD-10-CM

## 2019-04-04 DIAGNOSIS — M94.9 DISORDER OF BONE AND CARTILAGE: ICD-10-CM

## 2019-04-04 DIAGNOSIS — M25.571 CHRONIC PAIN OF RIGHT ANKLE: ICD-10-CM

## 2019-04-04 DIAGNOSIS — G89.29 CHRONIC PAIN OF RIGHT ANKLE: ICD-10-CM

## 2019-04-04 DIAGNOSIS — Z12.39 SCREENING FOR BREAST CANCER: ICD-10-CM

## 2019-04-04 DIAGNOSIS — K42.9 UMBILICAL HERNIA WITHOUT OBSTRUCTION AND WITHOUT GANGRENE: ICD-10-CM

## 2019-04-04 DIAGNOSIS — F33.2 SEVERE EPISODE OF RECURRENT MAJOR DEPRESSIVE DISORDER, WITHOUT PSYCHOTIC FEATURES: ICD-10-CM

## 2019-04-04 DIAGNOSIS — M51.36 DDD (DEGENERATIVE DISC DISEASE), LUMBAR: Chronic | ICD-10-CM

## 2019-04-04 DIAGNOSIS — K59.00 CONSTIPATION, UNSPECIFIED CONSTIPATION TYPE: ICD-10-CM

## 2019-04-04 DIAGNOSIS — K21.9 GASTROESOPHAGEAL REFLUX DISEASE WITHOUT ESOPHAGITIS: Primary | ICD-10-CM

## 2019-04-04 PROCEDURE — 99999 PR PBB SHADOW E&M-EST. PATIENT-LVL V: CPT | Mod: PBBFAC,,, | Performed by: NURSE PRACTITIONER

## 2019-04-04 PROCEDURE — 99999 PR PBB SHADOW E&M-EST. PATIENT-LVL V: ICD-10-PCS | Mod: PBBFAC,,, | Performed by: NURSE PRACTITIONER

## 2019-04-04 PROCEDURE — 99214 PR OFFICE/OUTPT VISIT, EST, LEVL IV, 30-39 MIN: ICD-10-PCS | Mod: S$PBB,,, | Performed by: NURSE PRACTITIONER

## 2019-04-04 PROCEDURE — 99215 OFFICE O/P EST HI 40 MIN: CPT | Mod: PBBFAC,PO | Performed by: NURSE PRACTITIONER

## 2019-04-04 PROCEDURE — 99214 OFFICE O/P EST MOD 30 MIN: CPT | Mod: S$PBB,,, | Performed by: NURSE PRACTITIONER

## 2019-04-04 RX ORDER — CYCLOSPORINE 0.5 MG/ML
1 EMULSION OPHTHALMIC 2 TIMES DAILY
Qty: 30 EACH | Refills: 2 | Status: SHIPPED | OUTPATIENT
Start: 2019-04-04 | End: 2020-03-11 | Stop reason: SDUPTHER

## 2019-04-04 RX ORDER — LEVOTHYROXINE SODIUM 25 UG/1
25 TABLET ORAL DAILY
Qty: 30 TABLET | Refills: 2 | Status: ON HOLD | OUTPATIENT
Start: 2019-04-04 | End: 2019-08-29 | Stop reason: SDUPTHER

## 2019-04-04 RX ORDER — GABAPENTIN 300 MG/1
300 CAPSULE ORAL DAILY
Qty: 30 CAPSULE | Refills: 2 | Status: ON HOLD | OUTPATIENT
Start: 2019-04-04 | End: 2019-08-29 | Stop reason: HOSPADM

## 2019-04-04 RX ORDER — PROMETHAZINE HYDROCHLORIDE 12.5 MG/1
TABLET ORAL
Refills: 0 | Status: CANCELLED | OUTPATIENT
Start: 2019-04-04

## 2019-04-04 RX ORDER — AMITRIPTYLINE HYDROCHLORIDE 50 MG/1
50 TABLET, FILM COATED ORAL NIGHTLY
Qty: 30 TABLET | Refills: 2 | Status: ON HOLD | OUTPATIENT
Start: 2019-04-04 | End: 2019-08-29 | Stop reason: HOSPADM

## 2019-04-04 RX ORDER — PANTOPRAZOLE SODIUM 40 MG/1
40 TABLET, DELAYED RELEASE ORAL DAILY
Qty: 90 TABLET | Refills: 0 | Status: SHIPPED | OUTPATIENT
Start: 2019-04-04 | End: 2019-06-21

## 2019-04-04 RX ORDER — ONDANSETRON 4 MG/1
4 TABLET, FILM COATED ORAL EVERY 8 HOURS PRN
Qty: 15 TABLET | Refills: 0 | Status: ON HOLD | OUTPATIENT
Start: 2019-04-04 | End: 2019-08-29 | Stop reason: HOSPADM

## 2019-04-04 RX ORDER — NITROGLYCERIN 0.3 MG/1
TABLET SUBLINGUAL
Qty: 100 TABLET | Refills: 0 | Status: SHIPPED | OUTPATIENT
Start: 2019-04-04 | End: 2019-10-08 | Stop reason: SDUPTHER

## 2019-04-04 RX ORDER — QUETIAPINE FUMARATE 100 MG/1
100 TABLET, FILM COATED ORAL NIGHTLY
Qty: 30 TABLET | Refills: 11 | Status: CANCELLED | OUTPATIENT
Start: 2019-04-04 | End: 2020-04-03

## 2019-04-04 RX ORDER — ATENOLOL 25 MG/1
25 TABLET ORAL DAILY
Qty: 30 TABLET | Refills: 3 | Status: SHIPPED | OUTPATIENT
Start: 2019-04-04 | End: 2019-10-08 | Stop reason: SDUPTHER

## 2019-04-04 NOTE — PATIENT INSTRUCTIONS
Follow up with Dr. Packer 5/15/2019  Schedule Dexa scan  Schedule Mammogram  Referral to general and colon rectal surgery  Schedule appointment with Dr. Glaser

## 2019-04-04 NOTE — PROGRESS NOTES
Subjective:       Patient ID: Estella Arevalo is a 64 y.o. female.    Chief Complaint: Hypertension (F/U) and Hypothyroidism (F/U)    64-year-old female presents to the clinic today for follow-up on hypertension and hypothyroidism.  She has good dietary habits.  She does not exercise.  She is compliant with all of her medications.  She denies any headaches, dizziness, or blurred vision.  She denies any cardiac chest pain, heart palpitations, shortness of breath, or swelling to lower extremities.  She requests a referral to General surgery for an umbilical hernia.  She also request a referral to Colorectal surgery for history of a rectal prolapse.  She says she is able to push her rectal prolapse back in place but she says she would like to have it fixed if possible.  She does not take Seroquel anymore because she says it makes her feel hung over.  She takes Elavil for sleep and works very good.  She has bipolar is going to make an appointment with Dr. Arita for further evaluation.  She will let me schedule a mammogram and a DEXA scan at this time.  However, she refuses a flu and pneumonia vaccine today.  She is due to see Dr. Packer for checkup with lab work.    Past Medical History:   Diagnosis Date    Addiction to drug     Alcohol abuse     Arthritis     Cirrhosis of liver     Coronary artery disease     Fall     GERD (gastroesophageal reflux disease)     Hepatitis C     States was successfully treated with Harvoni    History of psychiatric hospitalization     Hx of psychiatric care     Hypertension     Low back pain     Radha     MI (myocardial infarction)     Migraine headache     Psychiatric problem     Sleep difficulties     Suicide attempt     Therapy     Thyroid disease      Past Surgical History:   Procedure Laterality Date    BLOCK, NERVE, FACET JOINT, LUMBAR, MEDIAL BRANCH Right 4/16/2013    [facility]    BLOCK, NERVE, FACET JOINT, LUMBAR,  MEDIAL BRANCH Right 4/2/2013    [facility]    COLONOSCOPY Left 7/16/2013    [facility]formed by [facility] ENDO    EGD (ESOPHAGOGASTRODUODENOSCOPY) N/A 3/20/2019    Performed by Obdulia Wilson MD at [facility] ENDO    EGD (ESOPHAGOGASTRODUODENOSCOPY) N/A 7/15/2013    [facility]formed by [facility] ENDO    ESOPHAGOGASTRODUODENOSCOPY (EGD) N/A 3/10/2017    Performed by Vini Gomez [facility] at [facility] OR    ESOPHAGOGASTRODUODENOSCOPY (EGD) N/A 3/10/2017    Performed by Arnulfo Benton MD at [facility] ENDO    HERNIA REPAIR      HIATAL HERNIA REPAIR      JOINT REPLACEMENT Bilateral     KNEE SURGERY  bilateral replacement    LAPAROSCOPIC PEG TUBE PLACEMENT/REPLACEMENT N/A 3/10/2017    [facility] OR    REPAIR-HERNIA-LAPAROSCOPIC-HIATAL N/A 3/10/2017    [facility]formed [facility] at [facility] OR    REPAIR-HERNIA-VENTRAL-LAPAROSCOPIC-W/MESH N/A 11/8/2017    [facility] at [facility] OR    right foot sx  2008    SHOULDER SURGERY  replacement      reports that she has never smoked. She has never used smokeless tobacco. She reports that she drinks about 3.6 oz of alcohol per week. She reports that she has current or past drug history. Drugs: Benzodiazepines, Methamphetamines, and Amphetamines.  Review of Systems   Constitutional: Negative for activity change.   Respiratory: Negative for cough, chest tightness, shortness of breath and wheezing.    Cardiovascular: Negative for chest pain, palpitations and leg swelling.   Gastrointestinal: Negative for abdominal pain, constipation, diarrhea, nausea and vomiting.        Umbilical hernia history of rectal prolapse    Musculoskeletal: Negative for gait problem.   Skin: Negative for color change.   Neurological: Negative for dizziness, syncope and light-headedness.       Objective:      Physical Exam   Constitutional: She is oriented to person, place, and time. She appears well-developed  and well-nourished. No distress.   Eyes: Pupils are equal, round, and reactive to light. Conjunctivae and EOM are normal. Right eye exhibits no discharge. Left eye exhibits no discharge. No scleral icterus.   Neck: Normal range of motion. Neck supple. No JVD present.   Cardiovascular: Normal rate, regular rhythm and normal heart sounds.   No murmur heard.  Pulmonary/Chest: Effort normal and breath sounds normal. No respiratory distress. She has no wheezes. She has no rales.   Abdominal: Soft. Bowel sounds are normal. There is no tenderness. There is no guarding. No hernia.   Umbilical hernia noted but is reducible non-tender to palpation    Musculoskeletal: Normal range of motion. She exhibits no edema.   Neurological: She is alert and oriented to person, place, and time.   Skin: Skin is warm and dry. She is not diaphoretic.   Psychiatric: She has a normal mood and affect.       Assessment:       1. Gastroesophageal reflux disease without esophagitis    2. Gastroesophageal reflux disease, esophagitis presence not specified    3. Infection of toenail    4. Hypothyroidism due to acquired atrophy of thyroid    5. Dry eye syndrome, unspecified laterality        Plan:            Gastroesophageal reflux disease without esophagitis  -     ranitidine (ZANTAC) 300 MG tablet; Take 1 tablet (300 mg total) by mouth every evening.  Dispense: 30 tablet; Refill: 11  - limit use of Zantac take as needed only    Gastroesophageal reflux disease, esophagitis presence not specified  -     pantoprazole (PROTONIX) 40 MG tablet; Take 1 tablet (40 mg total) by mouth once daily.  Dispense: 90 tablet; Refill: 0  - limit use of Protonix take as needed only    Hypothyroidism due to acquired atrophy of thyroid  -     levothyroxine (SYNTHROID) 25 MCG tablet; Take 1 tablet (25 mcg total) by mouth once daily.  Dispense: 30 tablet; Refill: 2  - The current medical regimen is effective;  continue present plan and medications.    Dry eye syndrome,  unspecified laterality  -     cycloSPORINE (RESTASIS) 0.05 % ophthalmic emulsion; Place 0.4 mLs (1 drop total) into both eyes 2 (two) times daily.  Dispense: 30 each; Refill: 2  - The current medical regimen is effective;  continue present plan and medications.    Anemia of chronic disease  - stable continue to monitor    Coronary artery disease, angina presence unspecified, unspecified vessel or lesion type, unspecified whether native or transplanted heart  -     nitroGLYCERIN (NITROSTAT) 0.3 MG SL tablet; ONE TABLET UNDER TONGUE AS NEEDED FOR CHEST PAIN EVERY 5 MINUTES AS NEEDED  Dispense: 100 tablet; Refill: 0  - call and schedule a appointment with cardiologist as recommended per Dr. Packer    Essential hypertension  -     atenolol (TENORMIN) 25 MG tablet; Take 1 tablet (25 mg total) by mouth once daily.  Dispense: 30 tablet; Refill: 3  - The current medical regimen is effective;  continue present plan and medications.    Depression, unspecified depression type  - on Cymbalta     Chronic hepatitis C without hepatic coma  - stable observe asymptomatic     DDD (degenerative disc disease), lumbar  - stable no on any pain medication at this time    Severe episode of recurrent major depressive disorder, without psychotic features  - continue Cymbalta     Umbilical hernia without obstruction and without gangrene  -     Ambulatory referral to General Surgery    Rectal prolapse  -     Ambulatory referral to Colorectal Surgery    Screening for breast cancer  -     Mammo Digital Screening Bilat w/ Fabian; Future; Expected date: 04/04/2019    Disorder of bone and cartilage  -     DXA Bone Density Spine And Hip; Future; Expected date: 04/04/2019    Insomnia, unspecified type  -     amitriptyline (ELAVIL) 50 MG tablet; Take 1 tablet (50 mg total) by mouth every evening.  Dispense: 30 tablet; Refill: 2  - The current medical regimen is effective;  continue present plan and medications.    Constipation, unspecified  constipation type  -     linaclotide (LINZESS) 145 mcg Cap capsule; Take 1 capsule (145 mcg total) by mouth daily as needed.  Dispense: 30 capsule; Refill: 1  - The current medical regimen is effective;  continue present plan and medications.    Chronic pain of right ankle  -     gabapentin (NEURONTIN) 300 MG capsule; Take 1 capsule (300 mg total) by mouth once daily.  Dispense: 30 capsule; Refill: 2  - The current medical regimen is effective;  continue present plan and medications.    Nausea  -     ondansetron (ZOFRAN) 4 MG tablet; Take 1 tablet (4 mg total) by mouth every 8 (eight) hours as needed for Nausea.  Dispense: 15 tablet; Refill: 0  -The current medical regimen is effective;  continue present plan and medications.

## 2019-04-08 ENCOUNTER — HOSPITAL ENCOUNTER (EMERGENCY)
Facility: HOSPITAL | Age: 64
Discharge: HOME | End: 2019-04-08
Attending: EMERGENCY MEDICINE
Payer: MEDICARE

## 2019-04-08 VITALS
DIASTOLIC BLOOD PRESSURE: 78 MMHG | HEIGHT: 66 IN | RESPIRATION RATE: 17 BRPM | TEMPERATURE: 99 F | SYSTOLIC BLOOD PRESSURE: 127 MMHG | WEIGHT: 160 LBS | BODY MASS INDEX: 25.71 KG/M2 | HEART RATE: 75 BPM | OXYGEN SATURATION: 98 %

## 2019-04-08 DIAGNOSIS — M54.9 BACK PAIN: ICD-10-CM

## 2019-04-08 DIAGNOSIS — T14.90XA INJURY: ICD-10-CM

## 2019-04-08 PROCEDURE — 99284 EMERGENCY DEPT VISIT MOD MDM: CPT | Mod: 25,ER

## 2019-04-08 PROCEDURE — 63600175 PHARM REV CODE 636 W HCPCS: Mod: ER | Performed by: PHYSICIAN ASSISTANT

## 2019-04-08 PROCEDURE — 25000003 PHARM REV CODE 250: Mod: ER | Performed by: PHYSICIAN ASSISTANT

## 2019-04-08 PROCEDURE — 96372 THER/PROPH/DIAG INJ SC/IM: CPT | Mod: ER

## 2019-04-08 RX ORDER — METHOCARBAMOL 500 MG/1
500 TABLET, FILM COATED ORAL
Status: COMPLETED | OUTPATIENT
Start: 2019-04-08 | End: 2019-04-08

## 2019-04-08 RX ORDER — METHOCARBAMOL 500 MG/1
1000 TABLET, FILM COATED ORAL 3 TIMES DAILY
Qty: 30 TABLET | Refills: 0 | Status: SHIPPED | OUTPATIENT
Start: 2019-04-08 | End: 2019-04-13

## 2019-04-08 RX ORDER — KETOROLAC TROMETHAMINE 30 MG/ML
30 INJECTION, SOLUTION INTRAMUSCULAR; INTRAVENOUS
Status: COMPLETED | OUTPATIENT
Start: 2019-04-08 | End: 2019-04-08

## 2019-04-08 RX ORDER — IBUPROFEN 600 MG/1
600 TABLET ORAL EVERY 6 HOURS PRN
Qty: 20 TABLET | Refills: 0 | OUTPATIENT
Start: 2019-04-08 | End: 2019-08-14

## 2019-04-08 RX ADMIN — KETOROLAC TROMETHAMINE 30 MG: 30 INJECTION, SOLUTION INTRAMUSCULAR at 10:04

## 2019-04-08 RX ADMIN — METHOCARBAMOL 500 MG: 500 TABLET, FILM COATED ORAL at 10:04

## 2019-04-09 NOTE — ED PROVIDER NOTES
Encounter Date: 4/8/2019       History     Chief Complaint   Patient presents with    Ankle Pain     PT REPORTS CHRONIC ISSUES WITH RIGHT ANKLE, T=SPINE AND L=SPINE AND HEMORRHOIDS, REPORTS RIGHT ANKLE SWELLING TODAY AND TWISTED IT EARLIER AND HEARD A POP    Back Pain     64-year-old female with a past medical history of chronic ankle pain presents complaining of ankle injury today.  Patient states she twisted and fell on her back.  Associated mid and low back pain. She denies numbness or tingling radiating down legs.  She denies any other injuries.  No treatment prior to arrival.        Review of patient's allergies indicates:  No Known Allergies  Past Medical History:   Diagnosis Date    Addiction to drug     Alcohol abuse     Arthritis     Cirrhosis of liver     Coronary artery disease     Fall     GERD (gastroesophageal reflux disease)     Hepatitis C     States was successfully treated with Harvoni    History of psychiatric hospitalization     Hx of psychiatric care     Hypertension     Low back pain     Radha     MI (myocardial infarction)     Migraine headache     Psychiatric problem     Sleep difficulties     Suicide attempt     Therapy     Thyroid disease      Past Surgical History:   Procedure Laterality Date    BLOCK, NERVE, FACET JOINT, LUMBAR, MEDIAL BRANCH Right 4/16/2013    [facility] MGT    BLOCK, NERVE, FACET JOINT, LUMBAR, MEDIAL BRANCH Right 4/2/2013    [facility] MGT    COLONOSCOPY Left 7/16/2013    [facility] by [facility]    EGD (ESOPHAGOGASTRODUODENOSCOPY) N/A 3/20/2019    Performed by Obdulia Wilson MD at [facility] ENDO    EGD (ESOPHAGOGASTRODUODENOSCOPY) N/A 7/15/2013    Performed by Hernandez [facility]arias MD at [facility] ENDO    ESOPHAGOGASTRODUODENOSCOPY (EGD) N/A 3/10/2017    Performed by Vini Gomez MD at [facility] OR    ESOPHAGOGASTRODUODENOSCOPY (EGD) N/A 3/10/2017    Performed by Arnulfo  [facility] ENDO    HERNIA REPAIR      HIATAL HERNIA REPAIR      JOINT REPLACEMENT Bilateral     KNEE SURGERY  bilateral replacement    LAPAROSCOPIC PEG TUBE PLACEMENT/REPLACEMENT N/A 3/10/2017    [facility] OR    REPAIR-HERNIA-LAPAROSCOPIC-HIATAL N/A 3/10/2017    [facility]formed [facility] OR    REPAIR-HERNIA-VENTRAL-LAPAROSCOPIC-W/MESH N/A 11/8/2017    [facility]formed by [facility] OR    right foot sx  2008    SHOULDER SURGERY  replacement     Family History   Problem Relation Age of Onset    Cancer Mother     Cancer Father     Depression Sister     Bipolar disorder Maternal Grandfather     Suicide Cousin      Social History     Tobacco Use    Smoking status: Never Smoker    Smokeless tobacco: Never Used   Substance Use Topics    Alcohol use: Not Currently     Alcohol/week: 3.6 oz     Types: 6 Cans of beer per week     Comment: QUIT 3 MONTHS AGO    Drug use: Not Currently     Types: Benzodiazepines, Methamphetamines, Amphetamines     Comment: Pain mgt clinic; admits to addiction to opioids     Review of Systems   Constitutional: Negative for chills.   Musculoskeletal: Positive for back pain and joint swelling (Right ankle).       Physical Exam     Initial Vitals [04/08/19 1939]   BP Pulse Resp Temp SpO2   121/83 94 (!) 22 98.5 °F (36.9 °C) 96 %      MAP       --         Physical Exam    Constitutional: She appears well-developed and well-nourished.   HENT:   Head: Normocephalic.   Eyes: Conjunctivae and EOM are normal. Pupils are equal, round, and reactive to light.   Neck: Normal range of motion.   Cardiovascular: Normal rate, regular rhythm, normal heart sounds and intact distal pulses.   Pulmonary/Chest: Breath sounds normal. No respiratory distress.   Musculoskeletal: She exhibits tenderness (Right ankle, decreased range of motion.  Positive swelling and tenderness to medial and lateral malleolus).   Midline thoracic and lumbar  tenderness. Full range of motion.   Skin: Skin is warm.         ED Course   Procedures  Labs Reviewed - No data to display       Imaging Results          X-Ray Lumbar Spine Ap And Lateral (In process)                X-Ray Thoracic Spine AP Lateral (In process)                X-Ray Ankle Complete Right (Final result)  Result time 04/08/19 21:09:29    Final result by Lena Brasher MD (04/08/19 21:09:29)                 Impression:      No definite acute bony abnormality detected.  Interval removal of a radiopaque foreign body at the plantar aspect of the hindfoot.  No significant change when compared to study of 08/02/2018.      Electronically signed by: Lena Brasher  Date:    04/08/2019  Time:    21:09             Narrative:    EXAMINATION:  THREE VIEWS OF THE RIGHT ANKLE    CLINICAL HISTORY:  Injury, unspecified, initial encounter    TECHNIQUE:  AP, lateral and oblique views of the right ankle    COMPARISON:  08/12/2018    FINDINGS:  Postsurgical changes of arthrodesis of the ankle and foot with severe degenerative changes of the ankle and foot are noted.  Three views of the right ankle demonstrate no acute fracture or dislocation.  There is again marked soft tissue density at the ankle joint.  There has been interval removal of a tiny radiopaque linear foreign body from the plantar surface of the hindfoot.                                 Medical Decision Making:   Initial Assessment:   Patient has chronic degenerative changes seen on thoracic and lumbar spine.  No acute fracture seen.  Right ankle x-ray shows no acute changes.  I will discharge patient home with Robaxin and ibuprofen.  Patient is stable for discharge.                      Clinical Impression:       ICD-10-CM ICD-9-CM   1. Injury T14.90XA 959.9   2. Back pain M54.9 724.5     3. Ankle sprain    Disposition:   Disposition: Discharged  Condition: Stable                        GISSELL Zazueta  04/08/19 1617

## 2019-04-11 ENCOUNTER — OFFICE VISIT (OUTPATIENT)
Dept: FAMILY MEDICINE | Facility: CLINIC | Age: 64
End: 2019-04-11
Payer: MEDICARE

## 2019-04-11 VITALS
HEIGHT: 66 IN | HEART RATE: 78 BPM | DIASTOLIC BLOOD PRESSURE: 70 MMHG | BODY MASS INDEX: 26.43 KG/M2 | TEMPERATURE: 99 F | OXYGEN SATURATION: 97 % | WEIGHT: 164.44 LBS | SYSTOLIC BLOOD PRESSURE: 110 MMHG

## 2019-04-11 DIAGNOSIS — K62.3 RECTAL PROLAPSE: Primary | ICD-10-CM

## 2019-04-11 DIAGNOSIS — L98.8 AGE-RELATED FACIAL WRINKLES: ICD-10-CM

## 2019-04-11 DIAGNOSIS — B35.1 NAIL DERMATOPHYTOSIS: ICD-10-CM

## 2019-04-11 DIAGNOSIS — R41.3 MEMORY LOSS: ICD-10-CM

## 2019-04-11 DIAGNOSIS — I10 ESSENTIAL HYPERTENSION: ICD-10-CM

## 2019-04-11 DIAGNOSIS — H61.22 IMPACTED CERUMEN OF LEFT EAR: ICD-10-CM

## 2019-04-11 PROCEDURE — 69210 REMOVE IMPACTED EAR WAX UNI: CPT | Mod: S$PBB,,, | Performed by: INTERNAL MEDICINE

## 2019-04-11 PROCEDURE — 99999 PR PBB SHADOW E&M-EST. PATIENT-LVL IV: ICD-10-PCS | Mod: PBBFAC,,, | Performed by: INTERNAL MEDICINE

## 2019-04-11 PROCEDURE — 99214 OFFICE O/P EST MOD 30 MIN: CPT | Mod: PBBFAC,PO | Performed by: INTERNAL MEDICINE

## 2019-04-11 PROCEDURE — 99214 PR OFFICE/OUTPT VISIT, EST, LEVL IV, 30-39 MIN: ICD-10-PCS | Mod: S$PBB,25,, | Performed by: INTERNAL MEDICINE

## 2019-04-11 PROCEDURE — 99214 OFFICE O/P EST MOD 30 MIN: CPT | Mod: S$PBB,25,, | Performed by: INTERNAL MEDICINE

## 2019-04-11 PROCEDURE — 69210 REMOVE IMPACTED EAR WAX UNI: CPT | Mod: 25,PBBFAC,PO | Performed by: INTERNAL MEDICINE

## 2019-04-11 PROCEDURE — 99999 PR PBB SHADOW E&M-EST. PATIENT-LVL IV: CPT | Mod: PBBFAC,,, | Performed by: INTERNAL MEDICINE

## 2019-04-11 PROCEDURE — 69210 PR REMOVAL IMPACTED CERUMEN REQUIRING INSTRUMENTATION, UNILATERAL: ICD-10-PCS | Mod: S$PBB,,, | Performed by: INTERNAL MEDICINE

## 2019-04-11 RX ORDER — HYDROCORTISONE 2.5% 25 MG/G
CREAM TOPICAL
Qty: 60 G | Refills: 12 | Status: SHIPPED | OUTPATIENT
Start: 2019-04-11 | End: 2019-10-08 | Stop reason: SDUPTHER

## 2019-04-11 RX ORDER — EFINACONAZOLE 100 MG/ML
SOLUTION TOPICAL
Qty: 8 ML | Refills: 0 | Status: SHIPPED | OUTPATIENT
Start: 2019-04-11 | End: 2020-03-11

## 2019-04-11 RX ORDER — PRAMOXINE HYDROCHLORIDE HYDROCORTISONE ACETATE 100; 100 MG/10G; MG/10G
AEROSOL, FOAM TOPICAL
Refills: 1 | Status: ON HOLD | COMMUNITY
Start: 2019-04-05 | End: 2019-08-29 | Stop reason: HOSPADM

## 2019-04-11 NOTE — PROGRESS NOTES
Chief complaint:  Wax in ears, refills    64-year-old white female new to me who made an appointment very quickly in an opening.  She has a history of rectal prolapse.  She needs a refill of Proctofoam cream which she says works better than the foam.        Apparently told she had a cerumen impaction on the left possibly by the ER.  She has no pain but does have moderate reduced hearing.  On exam she does have a cerumen impaction on the left but clear on the right.  Her ear was sensitive and the cerumen impaction only be partially removed by the physician with an ear pick.  We discussed using over-the-counter drops and irrigation or returning for irrigation if needed.    She also was given a topical toenail fungus treatment but her grandchild lost it.  She will try to find it.  I will send a refill but we did discuss that may not be covered and may be expensive.  She could try the topical nail Polish, even the prescription nail Polish might be expensive    She does have wrinkles on her face and request some Retin-A but discussed that would prefer she be followed by dermatologist and she is open to referral to Dermatology and I put that in    She is concerned about dementia.  She is under lot of stress her  having returned from an umbilical hernia repair and some adult children living with her that is stressful.  She is forgetting things such as her phone and while it stores and she is frustrated.  She is here alone and we discussed that she may need to bring and family to discuss other symptoms of true dementia but her current symptoms would suggest a response to stress and normal age-related memory issues.  She except reassurance about that.  She was counseled at length regarding the multitude of the above issues.Total time over 25 minutes with over 50% counseling.      Review of systems:  No other ENT symptoms or URI or allergy symptoms.  No rectal bleeding at present.    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  Left-sided cerumen impaction partially removed by physician himself with the ear pick.    Estella SORIA was seen today for hearing problem.    Diagnoses and all orders for this visit:    Rectal prolapse, prescription management done    Nail dermatophytosis, prescription management done    Impacted cerumen of left ear, physician partially removed impaction but she needs to probably use some ear wax removal drops and kit and return for irrigation if needed    Age-related facial wrinkles, discussed with patient I would prefer her to be followed by a dermatologist for a comprehensive skin care plan including Retin-A possibly but I would prefer not to simply prescribe the Retin-A  -     Ambulatory referral to Dermatology    Essential hypertension, appears chronic and stable    Memory loss, discussed and reassured    Other orders  -     JUBLIA 10 % Eran; APPLY ONE DOSE D  -     PROCTOZONE-HC 2.5 % rectal cream; STACI RECTALLY BID

## 2019-06-21 ENCOUNTER — HOSPITAL ENCOUNTER (EMERGENCY)
Facility: HOSPITAL | Age: 64
Discharge: HOME | End: 2019-06-21
Attending: EMERGENCY MEDICINE
Payer: MEDICARE

## 2019-06-21 VITALS
RESPIRATION RATE: 19 BRPM | HEART RATE: 91 BPM | HEIGHT: 66 IN | OXYGEN SATURATION: 95 % | TEMPERATURE: 99 F | SYSTOLIC BLOOD PRESSURE: 162 MMHG | DIASTOLIC BLOOD PRESSURE: 99 MMHG | WEIGHT: 165 LBS | BODY MASS INDEX: 26.52 KG/M2

## 2019-06-21 DIAGNOSIS — R11.10 VOMITING: ICD-10-CM

## 2019-06-21 DIAGNOSIS — F10.930 ALCOHOL WITHDRAWAL SYNDROME WITHOUT COMPLICATION: Primary | ICD-10-CM

## 2019-06-21 LAB
ALBUMIN SERPL BCP-MCNC: 4.2 G/DL (ref 3.5–5.2)
ALP SERPL-CCNC: 173 U/L (ref 55–135)
ALT SERPL W/O P-5'-P-CCNC: 37 U/L (ref 10–44)
AMPHET+METHAMPHET UR QL: NEGATIVE
ANION GAP SERPL CALC-SCNC: 12 MMOL/L (ref 8–16)
APTT BLDCRRT: 21.9 SEC (ref 21–32)
AST SERPL-CCNC: 53 U/L (ref 10–40)
BARBITURATES UR QL SCN>200 NG/ML: NEGATIVE
BASOPHILS # BLD AUTO: 0.05 K/UL (ref 0–0.2)
BASOPHILS NFR BLD: 1.3 % (ref 0–1.9)
BENZODIAZ UR QL SCN>200 NG/ML: NEGATIVE
BILIRUB SERPL-MCNC: 0.5 MG/DL (ref 0.1–1)
BUN SERPL-MCNC: 23 MG/DL (ref 8–23)
BZE UR QL SCN: NEGATIVE
CALCIUM SERPL-MCNC: 9.7 MG/DL (ref 8.7–10.5)
CANNABINOIDS UR QL SCN: NEGATIVE
CHLORIDE SERPL-SCNC: 100 MMOL/L (ref 95–110)
CO2 SERPL-SCNC: 24 MMOL/L (ref 23–29)
CREAT SERPL-MCNC: 0.7 MG/DL (ref 0.5–1.4)
CREAT UR-MCNC: 72.6 MG/DL (ref 15–325)
DIFFERENTIAL METHOD: ABNORMAL
EOSINOPHIL # BLD AUTO: 0 K/UL (ref 0–0.5)
EOSINOPHIL NFR BLD: 0.3 % (ref 0–8)
ERYTHROCYTE [DISTWIDTH] IN BLOOD BY AUTOMATED COUNT: 15.5 % (ref 11.5–14.5)
EST. GFR  (AFRICAN AMERICAN): >60 ML/MIN/1.73 M^2
EST. GFR  (NON AFRICAN AMERICAN): >60 ML/MIN/1.73 M^2
ETHANOL SERPL-MCNC: <10 MG/DL
GLUCOSE SERPL-MCNC: 136 MG/DL (ref 70–110)
HCT VFR BLD AUTO: 36.6 % (ref 37–48.5)
HGB BLD-MCNC: 12.1 G/DL (ref 12–16)
INR PPP: 1 (ref 0.8–1.2)
LIPASE SERPL-CCNC: 20 U/L (ref 4–60)
LYMPHOCYTES # BLD AUTO: 0.4 K/UL (ref 1–4.8)
LYMPHOCYTES NFR BLD: 10.1 % (ref 18–48)
MCH RBC QN AUTO: 29.4 PG (ref 27–31)
MCHC RBC AUTO-ENTMCNC: 33.1 G/DL (ref 32–36)
MCV RBC AUTO: 89 FL (ref 82–98)
METHADONE UR QL SCN>300 NG/ML: NEGATIVE
MONOCYTES # BLD AUTO: 0.4 K/UL (ref 0.3–1)
MONOCYTES NFR BLD: 10.1 % (ref 4–15)
NEUTROPHILS # BLD AUTO: 3 K/UL (ref 1.8–7.7)
NEUTROPHILS NFR BLD: 78.2 % (ref 38–73)
OPIATES UR QL SCN: NORMAL
PCP UR QL SCN>25 NG/ML: NEGATIVE
PLATELET # BLD AUTO: 355 K/UL (ref 150–350)
PMV BLD AUTO: 9.5 FL (ref 9.2–12.9)
POTASSIUM SERPL-SCNC: 3.9 MMOL/L (ref 3.5–5.1)
PROT SERPL-MCNC: 7.9 G/DL (ref 6–8.4)
PROTHROMBIN TIME: 10.6 SEC (ref 9–12.5)
RBC # BLD AUTO: 4.11 M/UL (ref 4–5.4)
SODIUM SERPL-SCNC: 136 MMOL/L (ref 136–145)
TOXICOLOGY INFORMATION: NORMAL
TSH SERPL DL<=0.005 MIU/L-ACNC: 1.06 UIU/ML (ref 0.4–4)
WBC # BLD AUTO: 3.85 K/UL (ref 3.9–12.7)

## 2019-06-21 PROCEDURE — 85610 PROTHROMBIN TIME: CPT

## 2019-06-21 PROCEDURE — 80307 DRUG TEST PRSMV CHEM ANLYZR: CPT

## 2019-06-21 PROCEDURE — C9113 INJ PANTOPRAZOLE SODIUM, VIA: HCPCS | Performed by: EMERGENCY MEDICINE

## 2019-06-21 PROCEDURE — 83690 ASSAY OF LIPASE: CPT

## 2019-06-21 PROCEDURE — 96366 THER/PROPH/DIAG IV INF ADDON: CPT

## 2019-06-21 PROCEDURE — 25000003 PHARM REV CODE 250: Performed by: EMERGENCY MEDICINE

## 2019-06-21 PROCEDURE — 84443 ASSAY THYROID STIM HORMONE: CPT

## 2019-06-21 PROCEDURE — 96375 TX/PRO/DX INJ NEW DRUG ADDON: CPT | Mod: 59

## 2019-06-21 PROCEDURE — 80320 DRUG SCREEN QUANTALCOHOLS: CPT

## 2019-06-21 PROCEDURE — 86850 RBC ANTIBODY SCREEN: CPT

## 2019-06-21 PROCEDURE — 63600175 PHARM REV CODE 636 W HCPCS: Performed by: EMERGENCY MEDICINE

## 2019-06-21 PROCEDURE — 85025 COMPLETE CBC W/AUTO DIFF WBC: CPT

## 2019-06-21 PROCEDURE — 85730 THROMBOPLASTIN TIME PARTIAL: CPT

## 2019-06-21 PROCEDURE — 96361 HYDRATE IV INFUSION ADD-ON: CPT

## 2019-06-21 PROCEDURE — 99284 EMERGENCY DEPT VISIT MOD MDM: CPT | Mod: 25

## 2019-06-21 PROCEDURE — 96365 THER/PROPH/DIAG IV INF INIT: CPT

## 2019-06-21 PROCEDURE — 80053 COMPREHEN METABOLIC PANEL: CPT

## 2019-06-21 PROCEDURE — 96372 THER/PROPH/DIAG INJ SC/IM: CPT | Mod: 59

## 2019-06-21 PROCEDURE — 87040 BLOOD CULTURE FOR BACTERIA: CPT

## 2019-06-21 RX ORDER — PANTOPRAZOLE SODIUM 40 MG/10ML
80 INJECTION, POWDER, LYOPHILIZED, FOR SOLUTION INTRAVENOUS
Status: COMPLETED | OUTPATIENT
Start: 2019-06-21 | End: 2019-06-21

## 2019-06-21 RX ORDER — DIAZEPAM 10 MG/2ML
5 INJECTION INTRAMUSCULAR
Status: COMPLETED | OUTPATIENT
Start: 2019-06-21 | End: 2019-06-21

## 2019-06-21 RX ORDER — OCTREOTIDE ACETATE 100 UG/ML
100 INJECTION, SOLUTION INTRAVENOUS; SUBCUTANEOUS
Status: COMPLETED | OUTPATIENT
Start: 2019-06-21 | End: 2019-06-21

## 2019-06-21 RX ORDER — FOLIC ACID 1 MG/1
1 TABLET ORAL ONCE
Status: COMPLETED | OUTPATIENT
Start: 2019-06-21 | End: 2019-06-21

## 2019-06-21 RX ORDER — THIAMINE HYDROCHLORIDE 100 MG/ML
100 INJECTION, SOLUTION INTRAMUSCULAR; INTRAVENOUS
Status: COMPLETED | OUTPATIENT
Start: 2019-06-21 | End: 2019-06-21

## 2019-06-21 RX ORDER — ONDANSETRON 2 MG/ML
4 INJECTION INTRAMUSCULAR; INTRAVENOUS
Status: COMPLETED | OUTPATIENT
Start: 2019-06-21 | End: 2019-06-21

## 2019-06-21 RX ORDER — PANTOPRAZOLE SODIUM 40 MG/1
40 TABLET, DELAYED RELEASE ORAL DAILY
Qty: 30 TABLET | Refills: 0 | Status: ON HOLD | OUTPATIENT
Start: 2019-06-21 | End: 2019-09-25 | Stop reason: SDUPTHER

## 2019-06-21 RX ORDER — PROMETHAZINE HYDROCHLORIDE 25 MG/ML
25 INJECTION, SOLUTION INTRAMUSCULAR; INTRAVENOUS
Status: COMPLETED | OUTPATIENT
Start: 2019-06-21 | End: 2019-06-21

## 2019-06-21 RX ORDER — PROMETHAZINE HYDROCHLORIDE 50 MG/1
50 SUPPOSITORY RECTAL EVERY 6 HOURS PRN
Qty: 12 SUPPOSITORY | Refills: 0 | Status: ON HOLD | OUTPATIENT
Start: 2019-06-21 | End: 2019-08-29 | Stop reason: HOSPADM

## 2019-06-21 RX ORDER — HYDROMORPHONE HYDROCHLORIDE 2 MG/ML
1 INJECTION, SOLUTION INTRAMUSCULAR; INTRAVENOUS; SUBCUTANEOUS
Status: COMPLETED | OUTPATIENT
Start: 2019-06-21 | End: 2019-06-21

## 2019-06-21 RX ORDER — OXYCODONE AND ACETAMINOPHEN 5; 325 MG/1; MG/1
1 TABLET ORAL
Status: COMPLETED | OUTPATIENT
Start: 2019-06-21 | End: 2019-06-21

## 2019-06-21 RX ORDER — PROMETHAZINE HYDROCHLORIDE 50 MG/1
50 TABLET ORAL EVERY 6 HOURS PRN
Qty: 30 TABLET | Refills: 0 | Status: ON HOLD | OUTPATIENT
Start: 2019-06-21 | End: 2019-08-29 | Stop reason: HOSPADM

## 2019-06-21 RX ADMIN — FOLIC ACID 1 MG: 1 TABLET ORAL at 07:06

## 2019-06-21 RX ADMIN — OXYCODONE HYDROCHLORIDE AND ACETAMINOPHEN 1 TABLET: 5; 325 TABLET ORAL at 07:06

## 2019-06-21 RX ADMIN — PANTOPRAZOLE SODIUM 80 MG: 40 INJECTION, POWDER, FOR SOLUTION INTRAVENOUS at 06:06

## 2019-06-21 RX ADMIN — THIAMINE HYDROCHLORIDE 100 MG: 100 INJECTION, SOLUTION INTRAMUSCULAR; INTRAVENOUS at 06:06

## 2019-06-21 RX ADMIN — PROMETHAZINE HYDROCHLORIDE 25 MG: 25 INJECTION INTRAMUSCULAR; INTRAVENOUS at 08:06

## 2019-06-21 RX ADMIN — SODIUM CHLORIDE 1000 ML: 0.9 INJECTION, SOLUTION INTRAVENOUS at 06:06

## 2019-06-21 RX ADMIN — PANTOPRAZOLE SODIUM 8 MG/HR: 40 INJECTION, POWDER, FOR SOLUTION INTRAVENOUS at 06:06

## 2019-06-21 RX ADMIN — DIAZEPAM 5 MG: 5 INJECTION, SOLUTION INTRAMUSCULAR; INTRAVENOUS at 06:06

## 2019-06-21 RX ADMIN — OCTREOTIDE ACETATE 50 MCG/HR: 500 INJECTION, SOLUTION INTRAVENOUS; SUBCUTANEOUS at 07:06

## 2019-06-21 RX ADMIN — OCTREOTIDE ACETATE 100 MCG: 100 INJECTION, SOLUTION INTRAVENOUS; SUBCUTANEOUS at 06:06

## 2019-06-21 RX ADMIN — ONDANSETRON 4 MG: 2 INJECTION INTRAMUSCULAR; INTRAVENOUS at 06:06

## 2019-06-21 RX ADMIN — HYDROMORPHONE HYDROCHLORIDE 1 MG: 2 INJECTION, SOLUTION INTRAMUSCULAR; INTRAVENOUS; SUBCUTANEOUS at 08:06

## 2019-06-21 NOTE — ED PROVIDER NOTES
Encounter Date: 6/21/2019       History     Chief Complaint   Patient presents with    Vomiting     coffee ground emesis x 3 days, denies abdominal pain. Reports history of hiatal and umblical hernia. Drinks 1/5 fifth of vodka daily     64 y.o. female Past Medical History:  No date: Addiction to drug  No date: Alcohol abuse  No date: Arthritis  No date: Cirrhosis of liver  No date: Coronary artery disease  No date: Fall  No date: GERD (gastroesophageal reflux disease)  No date: Hepatitis C      Comment:  States was successfully treated with Harvoni  No date: History of psychiatric hospitalization  No date: Hx of psychiatric care  No date: Hypertension  No date: Low back pain  No date: Radha  No date: MI (myocardial infarction)  No date: Migraine headache  No date: Psychiatric problem  No date: Sleep difficulties  No date: Suicide attempt  No date: Therapy  No date: Thyroid disease     Chronic pain, alcohol abuse, drinks 1/5 of vodka every other day, chronic pain, polysubstance abuse. C/o 3 days coffee ground/black emesis, has been unable to hold down etoh for the last 2 days. Also states she has not taken her chronic pain meds in 2 days. Denies diarrhea/chills, endorses shakiness.  Medics report that patient brought a bottle of vodka with her which they removed.        Review of patient's allergies indicates:  No Known Allergies  Past Medical History:   Diagnosis Date    Addiction to drug     Alcohol abuse     Arthritis     Cirrhosis of liver     Coronary artery disease     Fall     GERD (gastroesophageal reflux disease)     Hepatitis C     States was successfully treated with Harvoni    History of psychiatric hospitalization     Hx of psychiatric care     Hypertension     Low back pain     Radha     MI (myocardial infarction)     Migraine headache     Psychiatric problem     Sleep difficulties     Suicide attempt     Therapy     Thyroid disease      Past Surgical History:   Procedure Laterality  Date    BLOCK, NERVE, FACET JOINT, LUMBAR, MEDIAL BRANCH Right 4/16/2013    Performed by [facility] at [facility] PAIN MGT    BLOCK, NERVE, FACET JOINT, LUMBAR, MEDIAL BRANCH Right 4/2/2013    [facility] at [facility] PAIN MGT    COLONOSCOPY Left 7/16/2013    [facility] by [facility] at [facility]    EGD (ESOPHAGOGASTRODUODENOSCOPY) N/A 3/20/2019    Performed by Obdulia Wilson MD at [facility] ENDO    EGD (ESOPHAGOGASTRODUODENOSCOPY) N/A 7/15/2013    Performed by [facility] MD at [facility] END[facility]    ESOPHAGOGASTRODUODENOSCOPY (EGD) N/A 3/10/2017    Performed by Vini Gomez [facility] at [facility] OR    ESOPHAGOGASTRODUODENOSCOPY (EGD) N/A 3/10/2017    Performed by Arnulfo Benton MD at [facility] ENDO    HERNIA REPAIR      HIATAL HERNIA REPAIR      JOINT REPLACEMENT Bilateral     KNEE SURGERY  bilateral replacement    LAPAROSCOPIC PEG TUBE PLACEMENT/REPLACEMENT N/A 3/10/2017    [facility] by [facility] at [facility] OR    REPAIR-HERNIA-LAPAROSCOPIC-HIATAL N/A 3/10/2017    [facility]formed [facility] at [facility] OR    REPAIR-HERNIA-VENTRAL-LAPAROSCOPIC-W/MESH N/A 11/8/2017    Performed by [facility] at [facility] OR    right foot sx  2008    SHOULDER SURGERY  replacement     Family History   Problem Relation Age of Onset    Cancer Mother     Cancer Father     Depression Sister     Bipolar disorder Maternal Grandfather     Suicide Cousin      Social History     Tobacco Use    Smoking status: Never Smoker    Smokeless tobacco: Never Used   Substance Use Topics    Alcohol use: Not Currently     Alcohol/week: 3.6 oz     Types: 6 Cans of beer per week     Comment: QUIT 3 MONTHS AGO    Drug use: Not Currently     Types: Benzodiazepines, Methamphetamines, Amphetamines     Comment: [facility]; admits to addiction to opioids     Review of Systems   Constitutional: Negative for fever.   HENT: Negative for sore throat.    Respiratory: Negative for shortness of breath.    Cardiovascular: Negative  "for chest pain.   Gastrointestinal: Positive for nausea and vomiting. Negative for diarrhea.   Genitourinary: Negative for dysuria.   Musculoskeletal: Negative for back pain.   Skin: Negative for rash.   Neurological: Positive for tremors. Negative for weakness.   Hematological: Does not bruise/bleed easily.   All other systems reviewed and are negative.      Physical Exam     Initial Vitals [06/21/19 1729]   BP Pulse Resp Temp SpO2   (!) 167/116 104 (!) 22 99.4 °F (37.4 °C) 97 %      MAP       --         Physical Exam    Nursing note and vitals reviewed.  Constitutional: She appears well-developed and well-nourished.   HENT:   Head: Normocephalic and atraumatic.   Eyes: Conjunctivae and EOM are normal. Pupils are equal, round, and reactive to light.   Neck: Normal range of motion.   Cardiovascular: Normal rate and regular rhythm.   Pulmonary/Chest: Breath sounds normal. No respiratory distress.   Abdominal: Soft. She exhibits no distension. There is no tenderness. There is no rebound and no guarding.   Musculoskeletal: Normal range of motion.   Neurological: She is alert. No cranial nerve deficit. GCS score is 15. GCS eye subscore is 4. GCS verbal subscore is 5. GCS motor subscore is 6.   Skin: Skin is warm and dry.   Psychiatric: She has a normal mood and affect. Thought content normal.     +tongue fasciculations    ED Course   Procedures  Labs Reviewed   CBC W/ AUTO DIFFERENTIAL   COMPREHENSIVE METABOLIC PANEL   LIPASE   PROTIME-INR   APTT   TYPE & SCREEN          Imaging Results    None                          pt refuses NG tube, refused rectal exam. Now informs RN that she has not had coffee ground emesis in days and "really just needs her pain meds".    Tachycardia and tremor have resolved. Pt is no longer in etoh withdrawal.    Review of records shows that patient was seen at  3 days ago stating she ran out of her narcotic pain medicines. She was treated and discharged. Her hgb today is unchanged from " prior.      hgb stable. No coffee ground emesis here. Alcohol withdrawal resolved. Have treated pain. Will discharge.  Clinical Impression:       ICD-10-CM ICD-9-CM   1. Alcohol withdrawal syndrome without complication F10.230 291.81   2. Vomiting R11.10 787.03                                Peter Brower MD  06/21/19 2028       Peter Brower MD  06/21/19 2030       Peter Brower MD  06/21/19 2045

## 2019-06-22 LAB
ABO + RH BLD: NORMAL
BLD GP AB SCN CELLS X3 SERPL QL: NORMAL

## 2019-06-22 NOTE — ED NOTES
and son here in ER to  pt. Assisted pt with getting dressed. All lines removed, disconnected from all monitoring devices. Explained all discharge information and prescriptions to pt, she verbalized understanding and all questions were addressed. Pt transferred from stretcher to wheelchair independently. Nurse wheeled pt to registration, escorted by son, for discharge.

## 2019-06-22 NOTE — DISCHARGE INSTRUCTIONS
Thank you for coming to our Emergency Department today. It is important to remember that some problems are difficult to diagnose and may not be found during your first visit. Be sure to follow up with your primary care doctor and review any labs/imaging that was performed with them. If you do not have a primary care doctor, you may contact the one listed on your discharge paperwork or you may also call the [facility] Appointment Desk at [phone] to schedule an appointment with one.     All medications may potentially have side effects and it is impossible to predict which medications may give you side effects. If you feel that you are having a negative effect of any medication you should immediately stop taking them and seek medical attention.    Return to the ER with any questions/concerns, new/concerning symptoms, worsening or failure to improve. Do not drive or make any important decisions for 24 hours if you have received any pain medications, sedatives or mood altering drugs during your ER visit.

## 2019-06-22 NOTE — ED NOTES
"Attempted to place NG tube per MD order. Pt refused, states "I don't need that. I haven't had the coffee grind stuff in days". MD made aware; at bedside now.   "

## 2019-06-22 NOTE — ED TRIAGE NOTES
"Pt here via  EMS for nausea and vomiting, reports coffee ground emesis. Pt drinks vodka daily, reports attempting to drink today but "I couldn't even keep it down". Last drink yesterday. States she is withdrawing from opioids as well. Pt is slow to answer questions; awake alert and oriented. Pt vomiting x1, blood tinged. VSS. MD at bedside.   "

## 2019-06-26 LAB
BACTERIA BLD CULT: NORMAL
BACTERIA BLD CULT: NORMAL

## 2019-06-29 DIAGNOSIS — K21.9 GASTROESOPHAGEAL REFLUX DISEASE WITHOUT ESOPHAGITIS: ICD-10-CM

## 2019-08-14 ENCOUNTER — HOSPITAL ENCOUNTER (EMERGENCY)
Facility: HOSPITAL | Age: 64
Discharge: HOME | End: 2019-08-14
Attending: EMERGENCY MEDICINE
Payer: MEDICARE

## 2019-08-14 VITALS
TEMPERATURE: 98 F | DIASTOLIC BLOOD PRESSURE: 94 MMHG | SYSTOLIC BLOOD PRESSURE: 129 MMHG | RESPIRATION RATE: 20 BRPM | HEIGHT: 66 IN | WEIGHT: 175 LBS | BODY MASS INDEX: 28.12 KG/M2 | HEART RATE: 106 BPM | OXYGEN SATURATION: 94 %

## 2019-08-14 DIAGNOSIS — S33.5XXA LUMBAR SPRAIN, INITIAL ENCOUNTER: Primary | ICD-10-CM

## 2019-08-14 DIAGNOSIS — S63.617A SPRAIN OF LEFT LITTLE FINGER, UNSPECIFIED SITE OF FINGER, INITIAL ENCOUNTER: ICD-10-CM

## 2019-08-14 DIAGNOSIS — S83.92XA SPRAIN OF LEFT KNEE, UNSPECIFIED LIGAMENT, INITIAL ENCOUNTER: ICD-10-CM

## 2019-08-14 DIAGNOSIS — L08.9 SKIN INFECTION: ICD-10-CM

## 2019-08-14 DIAGNOSIS — R52 PAIN: ICD-10-CM

## 2019-08-14 DIAGNOSIS — R29.898 SUSPECTED FRACTURE OF BONE: ICD-10-CM

## 2019-08-14 PROCEDURE — 29130 APPL FINGER SPLINT STATIC: CPT | Mod: F4,HCNC,ER

## 2019-08-14 PROCEDURE — 25000003 PHARM REV CODE 250: Mod: HCNC,ER | Performed by: NURSE PRACTITIONER

## 2019-08-14 PROCEDURE — 96372 THER/PROPH/DIAG INJ SC/IM: CPT | Mod: HCNC,ER,59

## 2019-08-14 PROCEDURE — 99284 EMERGENCY DEPT VISIT MOD MDM: CPT | Mod: 25,HCNC,ER

## 2019-08-14 PROCEDURE — 63600175 PHARM REV CODE 636 W HCPCS: Mod: HCNC,ER | Performed by: NURSE PRACTITIONER

## 2019-08-14 RX ORDER — IBUPROFEN 600 MG/1
600 TABLET ORAL EVERY 6 HOURS PRN
Qty: 24 TABLET | Refills: 0 | Status: SHIPPED | OUTPATIENT
Start: 2019-08-14 | End: 2020-03-11

## 2019-08-14 RX ORDER — SULFAMETHOXAZOLE AND TRIMETHOPRIM 800; 160 MG/1; MG/1
1 TABLET ORAL 2 TIMES DAILY
Qty: 14 TABLET | Refills: 0 | Status: SHIPPED | OUTPATIENT
Start: 2019-08-14 | End: 2019-08-14 | Stop reason: SDUPTHER

## 2019-08-14 RX ORDER — SULFAMETHOXAZOLE AND TRIMETHOPRIM 800; 160 MG/1; MG/1
1 TABLET ORAL 2 TIMES DAILY
Qty: 14 TABLET | Refills: 0 | Status: SHIPPED | OUTPATIENT
Start: 2019-08-14 | End: 2019-08-21

## 2019-08-14 RX ORDER — KETOROLAC TROMETHAMINE 30 MG/ML
30 INJECTION, SOLUTION INTRAMUSCULAR; INTRAVENOUS
Status: COMPLETED | OUTPATIENT
Start: 2019-08-14 | End: 2019-08-14

## 2019-08-14 RX ADMIN — KETOROLAC TROMETHAMINE 30 MG: 30 INJECTION, SOLUTION INTRAMUSCULAR at 03:08

## 2019-08-14 RX ADMIN — BACITRACIN, NEOMYCIN, POLYMYXIN B 1 EACH: 400; 3.5; 5 OINTMENT TOPICAL at 04:08

## 2019-08-14 NOTE — ED PROVIDER NOTES
Encounter Date: 8/14/2019       History     Chief Complaint   Patient presents with    Fall     Patient tripped and fell 2 hours ago  Patient with left 5th digit finger pain  Swelling and ecchymosis   Patient also with lower back pain and abrasion left knee     A 64-year-old female who presents to the ED with complaints of a trip and fall.  Patient denies head injury LOC.  Patient reports left 5th finger pain,  abrasion to left knee, and low back pain. Patient has erythema to right great toe. Pt has had surgery to right great toe.  Patient reports redness for few days.  Patient has no complaints of fever or chills. Tetanus is up-to-date    The history is provided by the patient.   Fall   The accident occurred just prior to arrival. The fall occurred while walking. She fell from a height of 1 to 2 ft. She landed on concrete. The point of impact was the left knee (left hand ). The pain is present in the back. The pain is at a severity of 9/10. She was ambulatory at the scene. There was no entrapment after the fall. There was no drug use involved in the accident. There was no alcohol use involved in the accident. Associated symptoms include back pain. Pertinent negatives include no fever, no abdominal pain, no bowel incontinence, no hematuria, no loss of consciousness and no tingling.     Review of patient's allergies indicates:  No Known Allergies  Past Medical History:   Diagnosis Date    Addiction to drug     Alcohol abuse     Arthritis     Cirrhosis of liver     Coronary artery disease     Fall     GERD (gastroesophageal reflux disease)     Hepatitis C     States was successfully treated with Harvoni    History of psychiatric hospitalization     Hx of psychiatric care     Hypertension     Low back pain     Radha     MI (myocardial infarction)     Migraine headache     Psychiatric problem     Sleep difficulties     Suicide attempt     Therapy     Thyroid disease      Past Surgical History:    Procedure Laterality Date    BLOCK, NERVE, FACET JOINT, LUMBAR, MEDIAL BRANCH Right 4/16/2013    [facility] PA[facility]    BLOCK, NERVE, FACET JOINT, LUMBAR, MEDIAL BRANCH Right 4/2/2013    Performed by [facility] at [facility] PAIN MGT    COLONOSCOPY Left 7/16/2013    Performed by [facility]arias MD at [facility] ENDO    EGD (ESOPHAGOGASTRODUODENOSCOPY) N/A 3/20/2019    Performed by Obdulia Wilson MD at [facility] ENDO    EGD (ESOPHAGOGASTRODUODENOSCOPY) N/A 7/15/2013    Performed by [facility]arias MD at [facility] ENDO    ESOPHAGOGASTRODUODENOSCOPY (EGD) N/A 3/10/2017    Performed by Vini Gomez MD at [facility] OR    ESOPHAGOGASTRODUODENOSCOPY (EGD) N/A 3/10/2017    Performed by Arnulfo Benton MD at [facility] ENDO    HERNIA REPAIR      HIATAL HERNIA REPAIR      JOINT REPLACEMENT Bilateral     KNEE SURGERY  bilateral replacement    LAPAROSCOPIC PEG TUBE PLACEMENT/REPLACEMENT N/A 3/10/2017    Performed by [facility] MD at [facility] OR    REPAIR-HERNIA-LAPAROSCOPIC-HIATAL N/A 3/10/2017    Performed by [facility] MD at [facility] OR    REPAIR-HERNIA-VENTRAL-LAPAROSCOPIC-W/MESH N/A 11/8/2017    Performed by [facility]ini Gomez MD at [facility] OR    right foot sx  2008    SHOULDER SURGERY  replacement     Family History   Problem Relation Age of Onset    Cancer Mother     Cancer Father     Depression Sister     Bipolar disorder Maternal Grandfather     Suicide Cousin      Social History     Tobacco Use    Smoking status: Never Smoker    Smokeless tobacco: Never Used   Substance Use Topics    Alcohol use: Yes     Alcohol/week: 3.6 oz     Types: 6 Cans of beer per week     Comment: pt admits to drinking vodka daily    Drug use: Not Currently     Types: Benzodiazepines, Methamphetamines, Amphetamines     Comment: [facility]; admits to addiction to opioids     Review of Systems   Constitutional: Negative.  Negative for fever.   HENT: Negative.    Eyes: Negative.    Respiratory: Negative.   Negative for shortness of breath.    Cardiovascular: Negative.  Negative for chest pain.   Gastrointestinal: Negative.  Negative for abdominal pain and bowel incontinence.   Endocrine: Negative.    Genitourinary: Negative.  Negative for hematuria.   Musculoskeletal: Positive for arthralgias and back pain.        Left fifth finger pain, left knee pain    Skin: Positive for color change (right great toe ).   Allergic/Immunologic: Negative.    Neurological: Negative.  Negative for tingling and loss of consciousness.   Hematological: Negative.    All other systems reviewed and are negative.      Physical Exam     Initial Vitals [08/14/19 1402]   BP Pulse Resp Temp SpO2   (!) 155/100 (!) 117 20 98.4 °F (36.9 °C) (!) 94 %      MAP       --         Physical Exam    Nursing note and vitals reviewed.  Constitutional: Vital signs are normal. She appears well-developed. She is cooperative. She does not appear ill.   HENT:   Right Ear: External ear normal.   Left Ear: External ear normal.   Nose: Nose normal.   Mouth/Throat: Oropharynx is clear and moist.   Eyes: Conjunctivae and lids are normal.   Neck: Normal range of motion. Neck supple.   Cardiovascular: Normal rate, regular rhythm, S1 normal, S2 normal and normal heart sounds. Exam reveals no gallop.    No murmur heard.  Pulmonary/Chest: Effort normal and breath sounds normal. No respiratory distress.   Abdominal: Soft. Normal appearance. There is no tenderness.   Musculoskeletal:        Lumbar back: She exhibits tenderness and pain. She exhibits no deformity and no spasm.        Back:         Left hand: She exhibits decreased range of motion, tenderness and swelling. She exhibits normal capillary refill. Normal sensation noted. Normal strength noted.        Hands:  Swelling, bruising, and decreased ROM   Neurological: She is alert and oriented to person, place, and time.   Skin: Skin is warm, dry and intact.   Right great toe with erythema    Psychiatric: She has a normal  mood and affect. Her speech is normal.         ED Course   Procedures  Labs Reviewed - No data to display       Imaging Results    None       Imaging Results          X-Ray Lumbar Spine 2 Or 3 Views (Final result)  Result time 08/14/19 15:37:19   Procedure changed from X-Ray Lumbar Spine Ap And Lateral     Final result by Paul Kimball MD (08/14/19 15:37:19)                 Impression:      Degenerative changes similar with prior.  No fracture.      Electronically signed by: Paul Kimball  Date:    08/14/2019  Time:    15:37             Narrative:    EXAMINATION:  XR LUMBAR SPINE 2 OR 3 VIEWS    CLINICAL HISTORY:  Spine fracture, traumatic, lumbar;    TECHNIQUE:  Frontal, lateral and cone-down lateral views presented.    COMPARISON:  04/08/2019 lumbar spine films.    FINDINGS:  There are 5 lumbar type vertebra.  There is moderate lumbar scoliosis convex to the right on the supine exam similar with 04/08/2019.  There is grade 1 degenerative anterolisthesis at L4-5 unchanged.  There is grade 1 degenerative retrolisthesis at 2 L1-2.  There is moderate to severe diffuse disc changes and facet arthropathy particularly of the inferior lumbar spine similar with 04/08/2019.  There is subjective decreased bone density.  No acute fracture.  The sacrum and SI joints appear intact.  Visualized bowel gas pattern appears normal.  Visualized lung bases are normal.                               X-Ray Knee 3 View Left (Final result)  Result time 08/14/19 15:38:16    Final result by Paul Kimball MD (08/14/19 15:38:16)                 Impression:      Total knee arthroplasty without complication      Electronically signed by: Paul Kimball  Date:    08/14/2019  Time:    15:38             Narrative:    EXAMINATION:  XR KNEE 3 VIEW LEFT    CLINICAL HISTORY:  Other symptoms and signs involving the musculoskeletal system    TECHNIQUE:  AP, lateral, and Merchant views of the left knee were  performed.    COMPARISON:  02/09/2015    FINDINGS:  Frontal, sunrise and lateral views presented.  There is a total knee arthroplasty with cemented components without loosening or fracture or migration.  The synthetic joint space appears normal.  No focal soft tissue swelling or defect.  The patellar tendon appears normal.                               X-Ray Hand 3 view Left (Final result)  Result time 08/14/19 15:39:31    Final result by Paul Kimball MD (08/14/19 15:39:31)                 Impression:      Degenerative changes.  No fracture or erosion or malalignment.      Electronically signed by: Paul Kimball  Date:    08/14/2019  Time:    15:39             Narrative:    EXAMINATION:  XR HAND COMPLETE 3 VIEW LEFT    CLINICAL HISTORY:  suspected fracture;.    TECHNIQUE:  PA, lateral, and oblique views of the left hand were performed.    COMPARISON:  None    FINDINGS:  Frontal, oblique and lateral views presented.  Metallic rings on the 4th finger.  There is subjective moderate to severe decreased bone density diffusely.  No acute fracture or erosion or periosteal reaction.  No definite focal soft tissue swelling or defect or foreign body found.  There is moderate degenerative change of the 1st carpometacarpal joint and scaphoid multangular joints.  Otherwise joint spaces are preserved..                               X-Ray Foot Complete Right (Final result)  Result time 08/14/19 15:47:22    Final result by Paul Kimball MD (08/14/19 15:47:22)                 Impression:      Solid appearing a subtalar triple arthrodesis.    Healed 1st metatarsal osteotomy.    Severe degenerative change of the tibiotalar joint with apparent collapse of the talar dome.      Electronically signed by: Paul Kimball  Date:    08/14/2019  Time:    15:47             Narrative:    EXAMINATION:  XR FOOT COMPLETE 3 VIEW RIGHT    CLINICAL HISTORY:  . Pain, unspecified    TECHNIQUE:  AP, lateral, and oblique views of the right foot  were performed.    COMPARISON:  None    FINDINGS:  Frontal, oblique and lateral views.    There are 2 cannulated screws across the posterior subtalar joint which is appears to have solid fusion.  There is metallic orthopedic staples of the calcaneocuboid joint which also appears to have solid fusion.  There are 2 cannulated screws across the talonavicular joint which appears to have solid fusion.    There is a healed osteotomy with metallic fixation hardware the proximal 1st metatarsal.  There is solid fusion of the 1st IP joint.  There is moderate degenerative change at the 1st MTP.    There is probably 2nd and 3rd and 4th hammertoe deformities.  There is subjective moderate to severe decreased bone density diffusely.    There is severe degenerative changes of the tibiotalar joint with flattening of the talus likely related to osteonecrosis and collapse of the talar dome.  There is a large ankle effusion.                                    Medical Decision Making:   Initial Assessment:   A 64-year-old female who presents to the ED with complaints of a trip and fall.  Patient denies head injury LOC.  Patient reports left 5th finger pain,  abrasion to left knee, and low back pain. Patient has erythema to right great toe. Pt has had surgery to right great toe.  Patient reports redness for few days.  Denies fever or chills.     Differential Diagnosis:   Lumbar sprain, Compression fracture, finger sprain, finger fracture, knee sprain, knee fracture, cellulitis  Clinical Tests:   Radiological Study: Ordered and Reviewed  ED Management:  Medicated with Toradol 30 mg IM.   Discharged home with Motrin and Bactrim DS.   Follow-up with PCP in 1-2 days.   Return to ED for worsening of symptoms.                       Clinical Impression:       ICD-10-CM ICD-9-CM   1. Lumbar sprain, initial encounter S33.5XXA 847.2   2. Suspected fracture of bone R29.898 781.99   3. Pain R52 780.96   4. Sprain of left knee, unspecified ligament,  initial encounter S83.92XA 844.9   5. Sprain of left little finger, unspecified site of finger, initial encounter S63.617A 842.10   6. Skin infection L08.9 686.9                                Sophia Henson, SUMMER  08/14/19 2125

## 2019-08-25 DIAGNOSIS — G47.00 INSOMNIA, UNSPECIFIED TYPE: ICD-10-CM

## 2019-08-25 RX ORDER — AMITRIPTYLINE HYDROCHLORIDE 50 MG/1
TABLET, FILM COATED ORAL
Qty: 30 TABLET | Refills: 0 | OUTPATIENT
Start: 2019-08-25

## 2019-08-27 ENCOUNTER — HOSPITAL ENCOUNTER (INPATIENT)
Facility: HOSPITAL | Age: 64
LOS: 2 days | Discharge: HOME HEALTH | DRG: 641 | End: 2019-08-29
Attending: EMERGENCY MEDICINE | Admitting: HOSPITALIST
Payer: MEDICARE

## 2019-08-27 DIAGNOSIS — F11.20 POLYSUBSTANCE DEPENDENCE INCLUDING OPIOID TYPE DRUG, CONTINUOUS USE: ICD-10-CM

## 2019-08-27 DIAGNOSIS — E87.1 HYPONATREMIA: Primary | ICD-10-CM

## 2019-08-27 DIAGNOSIS — K62.3 RECTAL PROLAPSE: ICD-10-CM

## 2019-08-27 DIAGNOSIS — D64.9 ANEMIA, UNSPECIFIED TYPE: ICD-10-CM

## 2019-08-27 DIAGNOSIS — K62.5 RECTAL BLEED: ICD-10-CM
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
## 2019-08-27 DIAGNOSIS — E03.4 HYPOTHYROIDISM DUE TO ACQUIRED ATROPHY OF THYROID: ICD-10-CM

## 2019-08-27 DIAGNOSIS — F19.20 POLYSUBSTANCE DEPENDENCE INCLUDING OPIOID TYPE DRUG, CONTINUOUS USE: ICD-10-CM

## 2019-08-27 DIAGNOSIS — R11.10 VOMITING: ICD-10-CM

## 2019-08-27 DIAGNOSIS — E87.8 HYPOCHLOREMIA: ICD-10-CM

## 2019-08-27 LAB
ALBUMIN SERPL BCP-MCNC: 4.1 G/DL (ref 3.5–5.2)
ALP SERPL-CCNC: 154 U/L (ref 55–135)
ALT SERPL W/O P-5'-P-CCNC: 23 U/L (ref 10–44)
AMPHET+METHAMPHET UR QL: NORMAL
ANION GAP SERPL CALC-SCNC: 12 MMOL/L (ref 8–16)
ANION GAP SERPL CALC-SCNC: 8 MMOL/L (ref 8–16)
ANION GAP SERPL CALC-SCNC: 9 MMOL/L (ref 8–16)
APTT BLDCRRT: 26.3 SEC (ref 21–32)
AST SERPL-CCNC: 50 U/L (ref 10–40)
BACTERIA #/AREA URNS HPF: ABNORMAL /HPF
BARBITURATES UR QL SCN>200 NG/ML: NEGATIVE
BASOPHILS # BLD AUTO: 0.04 K/UL (ref 0–0.2)
BASOPHILS NFR BLD: 0.9 % (ref 0–1.9)
BENZODIAZ UR QL SCN>200 NG/ML: NORMAL
BILIRUB SERPL-MCNC: 0.6 MG/DL (ref 0.1–1)
BILIRUB UR QL STRIP: NEGATIVE
BNP SERPL-MCNC: 101 PG/ML (ref 0–99)
BUN SERPL-MCNC: 8 MG/DL (ref 8–23)
BUN SERPL-MCNC: 8 MG/DL (ref 8–23)
BUN SERPL-MCNC: 9 MG/DL (ref 8–23)
BZE UR QL SCN: NEGATIVE
CALCIUM SERPL-MCNC: 8.1 MG/DL (ref 8.7–10.5)
CALCIUM SERPL-MCNC: 8.2 MG/DL (ref 8.7–10.5)
CALCIUM SERPL-MCNC: 8.4 MG/DL (ref 8.7–10.5)
CANNABINOIDS UR QL SCN: NEGATIVE
CHLORIDE SERPL-SCNC: 89 MMOL/L (ref 95–110)
CHLORIDE SERPL-SCNC: 91 MMOL/L (ref 95–110)
CHLORIDE SERPL-SCNC: 92 MMOL/L (ref 95–110)
CK SERPL-CCNC: 428 U/L (ref 20–180)
CLARITY UR: CLEAR
CO2 SERPL-SCNC: 22 MMOL/L (ref 23–29)
CO2 SERPL-SCNC: 23 MMOL/L (ref 23–29)
CO2 SERPL-SCNC: 23 MMOL/L (ref 23–29)
COLOR UR: YELLOW
CREAT SERPL-MCNC: 0.6 MG/DL (ref 0.5–1.4)
CREAT UR-MCNC: 96 MG/DL (ref 15–325)
DIFFERENTIAL METHOD: ABNORMAL
EOSINOPHIL # BLD AUTO: 0 K/UL (ref 0–0.5)
EOSINOPHIL NFR BLD: 0 % (ref 0–8)
ERYTHROCYTE [DISTWIDTH] IN BLOOD BY AUTOMATED COUNT: 16.1 % (ref 11.5–14.5)
EST. GFR  (AFRICAN AMERICAN): >60 ML/MIN/1.73 M^2
EST. GFR  (NON AFRICAN AMERICAN): >60 ML/MIN/1.73 M^2
ETHANOL SERPL-MCNC: <10 MG/DL
GLUCOSE SERPL-MCNC: 111 MG/DL (ref 70–110)
GLUCOSE SERPL-MCNC: 114 MG/DL (ref 70–110)
GLUCOSE SERPL-MCNC: 94 MG/DL (ref 70–110)
GLUCOSE UR QL STRIP: NEGATIVE
HCT VFR BLD AUTO: 31.4 % (ref 37–48.5)
HGB BLD-MCNC: 10.2 G/DL (ref 12–16)
HGB UR QL STRIP: NEGATIVE
HYALINE CASTS #/AREA URNS LPF: 0 /LPF
INR PPP: 1 (ref 0.8–1.2)
KETONES UR QL STRIP: ABNORMAL
LEUKOCYTE ESTERASE UR QL STRIP: ABNORMAL
LIPASE SERPL-CCNC: 11 U/L (ref 4–60)
LYMPHOCYTES # BLD AUTO: 0.3 K/UL (ref 1–4.8)
LYMPHOCYTES NFR BLD: 7.1 % (ref 18–48)
MAGNESIUM SERPL-MCNC: 1.8 MG/DL (ref 1.6–2.6)
MCH RBC QN AUTO: 28.2 PG (ref 27–31)
MCHC RBC AUTO-ENTMCNC: 32.5 G/DL (ref 32–36)
MCV RBC AUTO: 87 FL (ref 82–98)
METHADONE UR QL SCN>300 NG/ML: NEGATIVE
MICROSCOPIC COMMENT: ABNORMAL
MONOCYTES # BLD AUTO: 0.5 K/UL (ref 0.3–1)
MONOCYTES NFR BLD: 11.5 % (ref 4–15)
NEUTROPHILS # BLD AUTO: 3.8 K/UL (ref 1.8–7.7)
NEUTROPHILS NFR BLD: 80.5 % (ref 38–73)
NITRITE UR QL STRIP: NEGATIVE
OPIATES UR QL SCN: NORMAL
PCP UR QL SCN>25 NG/ML: NEGATIVE
PH UR STRIP: 5 [PH] (ref 5–8)
PHOSPHATE SERPL-MCNC: 3.4 MG/DL (ref 2.7–4.5)
PLATELET # BLD AUTO: 256 K/UL (ref 150–350)
PMV BLD AUTO: 9.2 FL (ref 9.2–12.9)
POTASSIUM SERPL-SCNC: 4 MMOL/L (ref 3.5–5.1)
POTASSIUM SERPL-SCNC: 4.2 MMOL/L (ref 3.5–5.1)
POTASSIUM SERPL-SCNC: 4.9 MMOL/L (ref 3.5–5.1)
PROT SERPL-MCNC: 7.1 G/DL (ref 6–8.4)
PROT UR QL STRIP: ABNORMAL
PROTHROMBIN TIME: 10.5 SEC (ref 9–12.5)
RBC # BLD AUTO: 3.62 M/UL (ref 4–5.4)
RBC #/AREA URNS HPF: 7 /HPF (ref 0–4)
SODIUM SERPL-SCNC: 123 MMOL/L (ref 136–145)
SODIUM UR-SCNC: 45 MMOL/L (ref 20–250)
SP GR UR STRIP: 1.02 (ref 1–1.03)
TOXICOLOGY INFORMATION: NORMAL
TROPONIN I SERPL DL<=0.01 NG/ML-MCNC: <0.006 NG/ML (ref 0–0.03)
TSH SERPL DL<=0.005 MIU/L-ACNC: 1.85 UIU/ML (ref 0.4–4)
URN SPEC COLLECT METH UR: ABNORMAL
UROBILINOGEN UR STRIP-ACNC: NEGATIVE EU/DL
WBC # BLD AUTO: 4.68 K/UL (ref 3.9–12.7)
WBC #/AREA URNS HPF: 5 /HPF (ref 0–5)

## 2019-08-27 PROCEDURE — P9612 CATHETERIZE FOR URINE SPEC: HCPCS | Mod: HCNC

## 2019-08-27 PROCEDURE — 11000001 HC ACUTE MED/SURG PRIVATE ROOM: Mod: HCNC

## 2019-08-27 PROCEDURE — 63600175 PHARM REV CODE 636 W HCPCS: Mod: HCNC | Performed by: HOSPITALIST

## 2019-08-27 PROCEDURE — 80053 COMPREHEN METABOLIC PANEL: CPT | Mod: HCNC

## 2019-08-27 PROCEDURE — 80048 BASIC METABOLIC PNL TOTAL CA: CPT | Mod: 91,HCNC

## 2019-08-27 PROCEDURE — 25000003 PHARM REV CODE 250: Mod: HCNC | Performed by: HOSPITALIST

## 2019-08-27 PROCEDURE — 81000 URINALYSIS NONAUTO W/SCOPE: CPT | Mod: HCNC,59

## 2019-08-27 PROCEDURE — 82550 ASSAY OF CK (CPK): CPT | Mod: HCNC

## 2019-08-27 PROCEDURE — 84443 ASSAY THYROID STIM HORMONE: CPT | Mod: HCNC

## 2019-08-27 PROCEDURE — 25000003 PHARM REV CODE 250: Mod: HCNC | Performed by: EMERGENCY MEDICINE

## 2019-08-27 PROCEDURE — 83935 ASSAY OF URINE OSMOLALITY: CPT | Mod: HCNC

## 2019-08-27 PROCEDURE — 96361 HYDRATE IV INFUSION ADD-ON: CPT | Mod: HCNC

## 2019-08-27 PROCEDURE — 84300 ASSAY OF URINE SODIUM: CPT | Mod: HCNC

## 2019-08-27 PROCEDURE — 84484 ASSAY OF TROPONIN QUANT: CPT | Mod: HCNC

## 2019-08-27 PROCEDURE — 63600175 PHARM REV CODE 636 W HCPCS: Mod: HCNC | Performed by: EMERGENCY MEDICINE

## 2019-08-27 PROCEDURE — 83735 ASSAY OF MAGNESIUM: CPT | Mod: HCNC

## 2019-08-27 PROCEDURE — 80307 DRUG TEST PRSMV CHEM ANLYZR: CPT | Mod: HCNC

## 2019-08-27 PROCEDURE — 96365 THER/PROPH/DIAG IV INF INIT: CPT | Mod: HCNC

## 2019-08-27 PROCEDURE — 99285 EMERGENCY DEPT VISIT HI MDM: CPT | Mod: 25,HCNC

## 2019-08-27 PROCEDURE — 84100 ASSAY OF PHOSPHORUS: CPT | Mod: HCNC

## 2019-08-27 PROCEDURE — 80320 DRUG SCREEN QUANTALCOHOLS: CPT | Mod: HCNC

## 2019-08-27 PROCEDURE — 96375 TX/PRO/DX INJ NEW DRUG ADDON: CPT | Mod: HCNC

## 2019-08-27 PROCEDURE — 83880 ASSAY OF NATRIURETIC PEPTIDE: CPT | Mod: HCNC

## 2019-08-27 PROCEDURE — 85025 COMPLETE CBC W/AUTO DIFF WBC: CPT | Mod: HCNC

## 2019-08-27 PROCEDURE — 85610 PROTHROMBIN TIME: CPT | Mod: HCNC

## 2019-08-27 PROCEDURE — 36415 COLL VENOUS BLD VENIPUNCTURE: CPT | Mod: HCNC

## 2019-08-27 PROCEDURE — 83690 ASSAY OF LIPASE: CPT | Mod: HCNC

## 2019-08-27 PROCEDURE — 85730 THROMBOPLASTIN TIME PARTIAL: CPT | Mod: HCNC

## 2019-08-27 PROCEDURE — 83930 ASSAY OF BLOOD OSMOLALITY: CPT | Mod: HCNC

## 2019-08-27 PROCEDURE — 94761 N-INVAS EAR/PLS OXIMETRY MLT: CPT | Mod: HCNC

## 2019-08-27 RX ORDER — DULOXETIN HYDROCHLORIDE 30 MG/1
60 CAPSULE, DELAYED RELEASE ORAL DAILY
Status: DISCONTINUED | OUTPATIENT
Start: 2019-08-28 | End: 2019-08-29 | Stop reason: HOSPADM

## 2019-08-27 RX ORDER — MORPHINE SULFATE 10 MG/ML
4 INJECTION INTRAMUSCULAR; INTRAVENOUS; SUBCUTANEOUS
Status: COMPLETED | OUTPATIENT
Start: 2019-08-27 | End: 2019-08-27

## 2019-08-27 RX ORDER — SODIUM CHLORIDE 0.9 % (FLUSH) 0.9 %
10 SYRINGE (ML) INJECTION
Status: DISCONTINUED | OUTPATIENT
Start: 2019-08-27 | End: 2019-08-29 | Stop reason: HOSPADM

## 2019-08-27 RX ORDER — PANTOPRAZOLE SODIUM 40 MG/1
40 TABLET, DELAYED RELEASE ORAL DAILY
Status: DISCONTINUED | OUTPATIENT
Start: 2019-08-27 | End: 2019-08-27

## 2019-08-27 RX ORDER — AMITRIPTYLINE HYDROCHLORIDE 25 MG/1
50 TABLET, FILM COATED ORAL NIGHTLY
Status: DISCONTINUED | OUTPATIENT
Start: 2019-08-27 | End: 2019-08-27

## 2019-08-27 RX ORDER — MORPHINE SULFATE 10 MG/ML
2 INJECTION INTRAMUSCULAR; INTRAVENOUS; SUBCUTANEOUS EVERY 4 HOURS PRN
Status: DISCONTINUED | OUTPATIENT
Start: 2019-08-27 | End: 2019-08-27

## 2019-08-27 RX ORDER — LIDOCAINE HYDROCHLORIDE 20 MG/ML
JELLY TOPICAL
Status: COMPLETED | OUTPATIENT
Start: 2019-08-27 | End: 2019-08-27

## 2019-08-27 RX ORDER — LORAZEPAM 2 MG/ML
1 INJECTION INTRAMUSCULAR
Status: COMPLETED | OUTPATIENT
Start: 2019-08-27 | End: 2019-08-27

## 2019-08-27 RX ORDER — ONDANSETRON 2 MG/ML
4 INJECTION INTRAMUSCULAR; INTRAVENOUS EVERY 4 HOURS PRN
Status: DISCONTINUED | OUTPATIENT
Start: 2019-08-27 | End: 2019-08-28

## 2019-08-27 RX ORDER — LORAZEPAM 2 MG/ML
1 INJECTION INTRAMUSCULAR EVERY 6 HOURS PRN
Status: DISCONTINUED | OUTPATIENT
Start: 2019-08-27 | End: 2019-08-29 | Stop reason: HOSPADM

## 2019-08-27 RX ORDER — FAMOTIDINE 20 MG/1
20 TABLET, FILM COATED ORAL 2 TIMES DAILY
Status: DISCONTINUED | OUTPATIENT
Start: 2019-08-27 | End: 2019-08-27 | Stop reason: SDUPTHER

## 2019-08-27 RX ORDER — MORPHINE SULFATE 10 MG/ML
4 INJECTION INTRAMUSCULAR; INTRAVENOUS; SUBCUTANEOUS EVERY 4 HOURS PRN
Status: DISCONTINUED | OUTPATIENT
Start: 2019-08-27 | End: 2019-08-27

## 2019-08-27 RX ORDER — ENOXAPARIN SODIUM 100 MG/ML
40 INJECTION SUBCUTANEOUS EVERY 24 HOURS
Status: DISCONTINUED | OUTPATIENT
Start: 2019-08-27 | End: 2019-08-29 | Stop reason: HOSPADM

## 2019-08-27 RX ORDER — LEVOTHYROXINE SODIUM 25 UG/1
25 TABLET ORAL DAILY
Status: DISCONTINUED | OUTPATIENT
Start: 2019-08-27 | End: 2019-08-29 | Stop reason: HOSPADM

## 2019-08-27 RX ORDER — HYDROCORTISONE 25 MG/G
CREAM TOPICAL 2 TIMES DAILY
Status: DISCONTINUED | OUTPATIENT
Start: 2019-08-27 | End: 2019-08-29 | Stop reason: HOSPADM

## 2019-08-27 RX ORDER — OXYCODONE HCL 10 MG/1
10 TABLET, FILM COATED, EXTENDED RELEASE ORAL EVERY 12 HOURS
Status: DISCONTINUED | OUTPATIENT
Start: 2019-08-27 | End: 2019-08-28

## 2019-08-27 RX ORDER — PANTOPRAZOLE SODIUM 40 MG/1
40 TABLET, DELAYED RELEASE ORAL DAILY
Status: DISCONTINUED | OUTPATIENT
Start: 2019-08-27 | End: 2019-08-27 | Stop reason: SDUPTHER

## 2019-08-27 RX ORDER — ACETAMINOPHEN 325 MG/1
650 TABLET ORAL EVERY 8 HOURS PRN
Status: DISCONTINUED | OUTPATIENT
Start: 2019-08-27 | End: 2019-08-29 | Stop reason: HOSPADM

## 2019-08-27 RX ORDER — SODIUM CHLORIDE 9 MG/ML
INJECTION, SOLUTION INTRAVENOUS CONTINUOUS
Status: DISCONTINUED | OUTPATIENT
Start: 2019-08-27 | End: 2019-08-28

## 2019-08-27 RX ORDER — TRAMADOL HYDROCHLORIDE 50 MG/1
50 TABLET ORAL EVERY 4 HOURS PRN
Status: DISCONTINUED | OUTPATIENT
Start: 2019-08-27 | End: 2019-08-29 | Stop reason: HOSPADM

## 2019-08-27 RX ORDER — GUAIFENESIN 600 MG/1
600 TABLET, EXTENDED RELEASE ORAL 2 TIMES DAILY
Status: DISCONTINUED | OUTPATIENT
Start: 2019-08-27 | End: 2019-08-29 | Stop reason: HOSPADM

## 2019-08-27 RX ORDER — PANTOPRAZOLE SODIUM 40 MG/1
40 TABLET, DELAYED RELEASE ORAL DAILY
Status: DISCONTINUED | OUTPATIENT
Start: 2019-08-28 | End: 2019-08-29 | Stop reason: HOSPADM

## 2019-08-27 RX ORDER — FLUTICASONE PROPIONATE 50 MCG
2 SPRAY, SUSPENSION (ML) NASAL DAILY
Status: DISCONTINUED | OUTPATIENT
Start: 2019-08-28 | End: 2019-08-29 | Stop reason: HOSPADM

## 2019-08-27 RX ORDER — CETIRIZINE HYDROCHLORIDE 5 MG/1
5 TABLET ORAL DAILY
Status: DISCONTINUED | OUTPATIENT
Start: 2019-08-28 | End: 2019-08-29 | Stop reason: HOSPADM

## 2019-08-27 RX ORDER — POLYETHYLENE GLYCOL 3350 17 G/17G
17 POWDER, FOR SOLUTION ORAL DAILY
Status: DISCONTINUED | OUTPATIENT
Start: 2019-08-27 | End: 2019-08-27

## 2019-08-27 RX ORDER — LUBIPROSTONE 24 UG/1
24 CAPSULE ORAL
Status: DISCONTINUED | OUTPATIENT
Start: 2019-08-28 | End: 2019-08-27

## 2019-08-27 RX ORDER — ATENOLOL 25 MG/1
25 TABLET ORAL DAILY
Status: DISCONTINUED | OUTPATIENT
Start: 2019-08-27 | End: 2019-08-29 | Stop reason: HOSPADM

## 2019-08-27 RX ADMIN — MORPHINE SULFATE 2 MG: 10 INJECTION INTRAVENOUS at 11:08

## 2019-08-27 RX ADMIN — GUAIFENESIN 600 MG: 600 TABLET, EXTENDED RELEASE ORAL at 10:08

## 2019-08-27 RX ADMIN — LIDOCAINE HYDROCHLORIDE 10 ML: 20 JELLY TOPICAL at 05:08

## 2019-08-27 RX ADMIN — ATENOLOL 25 MG: 25 TABLET ORAL at 11:08

## 2019-08-27 RX ADMIN — HYDROCORTISONE 2.5%: 25 CREAM TOPICAL at 10:08

## 2019-08-27 RX ADMIN — LORAZEPAM 1 MG: 2 INJECTION INTRAMUSCULAR; INTRAVENOUS at 06:08

## 2019-08-27 RX ADMIN — SODIUM CHLORIDE: 0.9 INJECTION, SOLUTION INTRAVENOUS at 08:08

## 2019-08-27 RX ADMIN — MORPHINE SULFATE 4 MG: 10 INJECTION INTRAVENOUS at 05:08

## 2019-08-27 RX ADMIN — SODIUM CHLORIDE 1000 ML: 0.9 INJECTION, SOLUTION INTRAVENOUS at 05:08

## 2019-08-27 RX ADMIN — ENOXAPARIN SODIUM 40 MG: 100 INJECTION SUBCUTANEOUS at 04:08

## 2019-08-27 RX ADMIN — SODIUM CHLORIDE: 0.9 INJECTION, SOLUTION INTRAVENOUS at 11:08

## 2019-08-27 RX ADMIN — LEVOTHYROXINE SODIUM 25 MCG: 25 TABLET ORAL at 11:08

## 2019-08-27 RX ADMIN — POLYETHYLENE GLYCOL 3350 17 G: 17 POWDER, FOR SOLUTION ORAL at 11:08

## 2019-08-27 RX ADMIN — LORAZEPAM 1 MG: 2 INJECTION INTRAMUSCULAR; INTRAVENOUS at 04:08

## 2019-08-27 RX ADMIN — FAMOTIDINE 20 MG: 20 TABLET ORAL at 11:08

## 2019-08-27 RX ADMIN — TRAMADOL HYDROCHLORIDE 50 MG: 50 TABLET, FILM COATED ORAL at 04:08

## 2019-08-27 RX ADMIN — OXYCODONE HYDROCHLORIDE 10 MG: 10 TABLET, FILM COATED, EXTENDED RELEASE ORAL at 10:08

## 2019-08-27 RX ADMIN — SODIUM CHLORIDE: 0.9 INJECTION, SOLUTION INTRAVENOUS at 01:08

## 2019-08-27 RX ADMIN — PROMETHAZINE HYDROCHLORIDE 25 MG: 25 INJECTION INTRAMUSCULAR; INTRAVENOUS at 05:08

## 2019-08-27 NOTE — HPI
In ED:  63 yo female presents via [facility] with nausea, vomiting, rectal pain, and rectal prolapse. Patient reports acute onset of nausea and vomiting this evening. She reports rectal pain and rectal prolapse for several hours. Usually she can push her rectum back in but tonight she could not. Patient reports bright red blood from her rectal prolapse.     Patient also reports that she tripped over her own feet in a store 2 days ago (Saturday 8/24/19). She struck her forehead on the floor and twisted her left knee. She has been ambulatory but has noticed significant bruising to her left knee and left calf. She also reports bruising and swelling to the bridge of her nose; patient had a nosebleed when she fell but has not had any since. No neck pain. No numbness/weakness. No LOC.   Patient is a poor historian.   I discussed patient with her , Mr. Hammad Arevalo, [phone]. He reports that patient drinks every day, last yesterday morning (Monday 8/26/19). He corroborates the story of her fall. He reports she is chronically poorly compliant with medications.   Denies recent EtOH use.     Pt admitted to hospital medicine for hyponatremia. Pt reports running out of pain medications and likely all of them as I went through her list of medications. PT/OT consulted for dc planning.

## 2019-08-27 NOTE — ED PROVIDER NOTES
Encounter Date: 8/27/2019       History     Chief Complaint   Patient presents with    Rectal Bleeding     x 1 hour, bright red blood, reports hx of prolapsed rectum      63 yo female presents via  EMS with nausea, vomiting, rectal pain, and rectal prolapse. Patient reports acute onset of nausea and vomiting this evening. She reports rectal pain and rectal prolapse for several hours. Usually she can push her rectum back in but tonight she could not. Patient reports bright red blood from her rectal prolapse.    Patient also reports that she tripped over her own feet in a store 2 days ago (Saturday 8/24/19). She struck her forehead on the floor and twisted her left knee. She has been ambulatory but has noticed significant bruising to her left knee and left calf. She also reports bruising and swelling to the bridge of her nose; patient had a nosebleed when she fell but has not had any since. No neck pain. No numbness/weakness. No LOC.     Patient is a poor historian.     I discussed patient with her , Mr. Hammad Arevalo, [phone]. He reports that patient drinks every day, last yesterday morning (Monday 8/26/19). He corroborates the story of her fall. He reports she is chronically poorly compliant with medications.     Denies recent EtOH use.     ED visits for rectal prolapse per Epic: 8/28/18; 8/16/17; 5/31/17.     PMH: cirrhosis of liver, hepatitis C s/p Harvoni tx, MI, HTN, GERD, low back pain, arthritis, thyroid disease, sleep difficulties    Psych: bipolar disorder, suicide attempt, therapy, drug addiction (benzos, methamphetamine/amphetamine), alcohol abuse    PSH: hiatal hernia repair, ventral hernia repair with mesh, PEG tube, shoulder replacement, bilateral knee replacement, R foot surgery, EGD/colonoscopy, back injections        Review of patient's allergies indicates:  No Known Allergies  Past Medical History:   Diagnosis Date    Addiction to drug     Alcohol abuse     Arthritis      Cirrhosis of liver     Coronary artery disease     Fall     GERD (gastroesophageal reflux disease)     Hepatitis C     States was successfully treated with Harvoni    History of psychiatric hospitalization     Hx of psychiatric care     Hypertension     Low back pain     Radha     MI (myocardial infarction)     Migraine headache     Psychiatric problem     Sleep difficulties     Suicide attempt     Therapy     Thyroid disease      Past Surgical History:   Procedure Laterality Date    BLOCK, NERVE, FACET JOINT, LUMBAR, MEDIAL BRANCH Right 4/16/2013    Performed by [facility] at [facility]    BLOCK, NERVE, FACET JOINT, LUMBAR, MEDIAL BRANCH Right 4/2/2013    [facility] at [facility]    COLONOSCOPY Left 7/16/2013    [facility] at [facility]    EGD (ESOPHAGOGASTRODUODENOSCOPY) N/A 3/20/2019    [facility] by Obdulia Wilson [facility] at [facility]    EGD (ESOPHAGOGASTRODUODENOSCOPY) N/A 7/15/2013    [facility] by [facility]    ESOPHAGOGASTRODUODENOSCOPY (EGD) N/A 3/10/2017    [facility]    ESOPHAGOGASTRODUODENOSCOPY (EGD) N/A 3/10/2017    Per[facility]ulfo Benton [facility] at [facility]    HERNIA REPAIR      HIATAL HERNIA REPAIR      JOINT REPLACEMENT Bilateral     KNEE SURGERY  bilateral replacement    LAPAROSCOPIC PEG TUBE PLACEMENT/REPLACEMENT N/A 3/10/2017    [facility]    REPAIR-HERNIA-LAPAROSCOPIC-HIATAL N/A 3/10/2017    [facility]    REPAIR-HERNIA-VENTRAL-LAPAROSCOPIC-W/MESH N/A 11/8/2017    [facility]    right foot sx  2008    SHOULDER SURGERY  replacement     Family History   Problem Relation Age of Onset    Cancer Mother     Cancer Father     Depression Sister     Bipolar disorder Maternal Grandfather     Suicide Cousin      Social History     Tobacco Use    Smoking status: Never Smoker     Smokeless tobacco: Never Used   Substance Use Topics    Alcohol use: Yes     Alcohol/week: 3.6 oz     Types: 6 Cans of beer per week     Comment: pt admits to drinking vodka daily    Drug use: Not Currently     Types: Benzodiazepines, Methamphetamines, Amphetamines     Comment: [facility]; admits to addiction to opioids     Review of Systems   Constitutional: Negative for fever.   HENT: Positive for nosebleeds (when she fell).    Eyes: Negative for visual disturbance.   Respiratory: Negative for shortness of breath.    Cardiovascular: Negative for chest pain.   Gastrointestinal: Positive for anal bleeding, nausea and vomiting.   Genitourinary: Negative for dysuria.   Musculoskeletal: Positive for arthralgias (left knee). Negative for neck pain.   Skin: Positive for color change (bruising left knee).   Neurological: Negative for syncope.       Physical Exam     Initial Vitals [08/27/19 0409]   BP Pulse Resp Temp SpO2   130/88 85 15 98 °F (36.7 °C) 97 %      MAP       --         Physical Exam    Nursing note and vitals reviewed.  Constitutional: She appears well-developed and well-nourished. She is not diaphoretic.   Awake, alert, nontoxic adult female. Vomiting clear material into emesis bag.   HENT:   Head: Normocephalic and atraumatic.   Mouth/Throat: Oropharynx is clear and moist.   Eyes: Conjunctivae and EOM are normal. Pupils are equal, round, and reactive to light.   Neck: Normal range of motion. Neck supple.   Cardiovascular: Normal rate, regular rhythm, normal heart sounds and intact distal pulses.   No murmur heard.  Pulmonary/Chest: Breath sounds normal. No respiratory distress. She has no wheezes. She has no rhonchi. She has no rales.   Frequent coughing.   Abdominal: Soft. There is no tenderness. There is no rebound and no guarding.   Genitourinary:   Genitourinary Comments: Rectal chaperoned by Gilma Urias. Rectal prolapse noted, purple discoloration, diffusely tender, scant  bleeding. Reduced using gentle pressure after pain control with IV morphine and topical lidocaine jelly.    Musculoskeletal: Normal range of motion. She exhibits tenderness. She exhibits no edema.   Scars to bilateral knees c/w prior arthroplasty. Diffuse L knee bruising and mild swelling. +bony crepitus of L knee. Bruising into L calf.   Neurological: She is alert. She has normal strength.   Moving all extremities. Mildly confused but redirectable, eg patient keeps getting up to use commode even when instructed not to in order to allow for imaging.   Skin: Skin is warm and dry. No erythema. No pallor.   L knee bruising as noted.   Psychiatric: Her behavior is normal.         ED Course   Procedures  Labs Reviewed   COMPREHENSIVE METABOLIC PANEL - Abnormal; Notable for the following components:       Result Value    Sodium 123 (*)     Chloride 89 (*)     CO2 22 (*)     Glucose 111 (*)     Calcium 8.4 (*)     Alkaline Phosphatase 154 (*)     AST 50 (*)     All other components within normal limits   CBC W/ AUTO DIFFERENTIAL - Abnormal; Notable for the following components:    RBC 3.62 (*)     Hemoglobin 10.2 (*)     Hematocrit 31.4 (*)     RDW 16.1 (*)     Lymph # 0.3 (*)     Gran% 80.5 (*)     Lymph% 7.1 (*)     All other components within normal limits   B-TYPE NATRIURETIC PEPTIDE - Abnormal; Notable for the following components:     (*)     All other components within normal limits   URINALYSIS, REFLEX TO URINE CULTURE - Abnormal; Notable for the following components:    Protein, UA 1+ (*)     Ketones, UA 1+ (*)     Leukocytes, UA 3+ (*)     All other components within normal limits    Narrative:     Preferred Collection Type->Urine, Clean Catch   CK - Abnormal; Notable for the following components:     (*)     All other components within normal limits   URINALYSIS MICROSCOPIC - Abnormal; Notable for the following components:    RBC, UA 7 (*)     Bacteria Moderate (*)     All other components within  normal limits    Narrative:     Preferred Collection Type->Urine, Clean Catch   LIPASE   TROPONIN I   ALCOHOL,MEDICAL (ETHANOL)   APTT   PROTIME-INR   MAGNESIUM   PHOSPHORUS   DRUG SCREEN PANEL, URINE EMERGENCY   TSH          Imaging Results          CT Head Without Contrast (In process)  Result time 08/27/19 07:05:48               CT Cervical Spine Without Contrast (In process)  Result time 08/27/19 07:05:55               X-Ray Chest AP Portable (Final result)  Result time 08/27/19 06:53:32    Final result by Kai Shi MD (08/27/19 06:53:32)                 Impression:      No radiographic evidence of acute intrathoracic process.      Electronically signed by: Kai Shi MD  Date:    08/27/2019  Time:    06:53             Narrative:    EXAMINATION:  XR CHEST AP PORTABLE    CLINICAL HISTORY:  Vomiting, unspecified    TECHNIQUE:  Single frontal view of the chest was performed.    COMPARISON:  12/14/2018, 10/27/2018    FINDINGS:  Cardiac monitoring leads overlie the chest.  The cardiomediastinal appears within normal limits.  There is atherosclerotic calcification of the thoracic aorta.  The lungs demonstrates coarse interstitial markings bilaterally.  There is mild left basilar subsegmental atelectasis or scarring at the left lung base.  No large confluent airspace consolidation or pleural effusion appreciated.  No evidence of pneumothorax.  Postoperative changes noted of the proximal right humerus.                               X-Ray Knee 1 or 2 View Left (Final result)  Result time 08/27/19 06:48:24   Procedure changed from X-Ray Knee 3 View Left     Final result by Kai Shi MD (08/27/19 06:48:24)                 Impression:      Postoperative change of left knee total arthroplasty.  No evidence of acute osseous injury.      Electronically signed by: Kai Shi MD  Date:    08/27/2019  Time:    06:48             Narrative:    EXAMINATION:  XR KNEE 1 OR 2 VIEW LEFT    CLINICAL  HISTORY:  pain;  Unspecified fall, initial encounter    TECHNIQUE:  AP and lateral views of the left knee    COMPARISON:  Left knee radiograph series 08/14/2019    FINDINGS:  There are postoperative changes of prior left total knee arthroplasty.  No evidence of hardware loosening or periprosthetic fracture.  No large joint effusion appreciated.  There is mild diffuse subcutaneous soft tissue edema.                              X-Rays:   Independently Interpreted Readings:   Other Readings:  CXR NAD.  L knee XR s/p total arthroplasty, no acute process.     Medical Decision Making:   History:   Old Medical Records: I decided to obtain old medical records.  Old Records Summarized: records from previous admission(s).  Initial Assessment:   65 yo female with rectal pain and prolapse and rectal bleeding, also s/p fall 2 days ago with headache, nasal pain, L knee pain/bruising.  Differential Diagnosis:   Ddx includes prolapse, hemorrhoids, GI bleed, ICH, skull fx, cspine injury, nasal fx, dehydration, intoxication, other.  Independently Interpreted Test(s):   I have ordered and independently interpreted X-rays - see prior notes.  I have ordered and independently interpreted EKG Reading(s) - see prior notes  Clinical Tests:   Lab Tests: Ordered and Reviewed  Radiological Study: Ordered and Reviewed  Medical Tests: Ordered and Reviewed  ED Management:  Patient had NS bolus, IV morphine for pain control, IV phenergan for nausea/vomiting. NS bolus given as well.     Rectal prolapse reduced as noted. This later recurred and I reduced it again. Both times I used gentle manual pressure on the prolapse until the rectum reduced.     Labs: Mild anemia (Hgb 10.2) is chronic. 3+ leuks on UA, but clean catch, suspect contaminant. Troponin within normal. CPK mildly elevated, 428.     CMP shows hyponatremia (Na 123) and hypochloremia (Cl 89). Patient was admitted twice for hyponatremia in September 2018. This was attributed first to  medications and then to psychogenic polydipsia.     L knee XR no acute process. CXR NAD.    CT head/cspine official read pending but no acute process by my evaluation.    Patient requires admit for hyponatremia and hypochloremia. She may require evaluation by surgical service for rectal prolapse but for definitive management likely will need f/u to colorectal.     I discussed patient for admission with Dr. Mcdonough, hospitalist. I have written courtesy orders.     I have updated patient as to plan of care.    I have also updated patient's  via telephone as to diagnosis and plan of care.   Other:   I have discussed this case with another health care provider.                      Clinical Impression:       ICD-10-CM ICD-9-CM   1. Hyponatremia E87.1 276.1   2. Fall W19.XXXA E888.9   3. Vomiting R11.10 787.03   4. Hypochloremia E87.8 276.9   5. Rectal prolapse K62.3 569.1   6. Rectal bleed K62.5 569.3   7. Anemia, unspecified type D64.9 285.9                         Catalina Ferguson MD  08/27/19 0706

## 2019-08-27 NOTE — ASSESSMENT & PLAN NOTE
Pt reports running out of oxy pills but + opiates on UDS - takes her husbands meds  Was seeing a pain specialist but reports has to pay cash for pain pills - needs rehab

## 2019-08-27 NOTE — ED TRIAGE NOTES
Pt reports rectal prolapse x3 hrs PTA. Pt states her rectum prolapses almost daily but tonight she was unable to retract it herself and was told to come to the ER. Pt also has been having n/v since yesterday and bright red rectal bleeding. Pain 10/10, denies any meds PTA

## 2019-08-27 NOTE — ASSESSMENT & PLAN NOTE
Resume cymbalta  Dc elavil  Counseled on drug use with narcotics and benzos and amphetamines  - not sure where she is getting these - needs outpatient counseling and assistance

## 2019-08-27 NOTE — ASSESSMENT & PLAN NOTE
Likely due to polysubstance abuse  Decrease oxycontin   Benzo and amphetamines on UDS - not on list of home meds ?? - appears to take husbands medications  + ETOH abuse per spouse report       PT/OT consulted for dc planning

## 2019-08-27 NOTE — ASSESSMENT & PLAN NOTE
Multifactorial  - per patient - multiple days of N/V and diarrhea - appears chronic and possibly serotonin syndrome with cymbalta and elavil combo. Also, undermines ETOH intake which also contributing.     Complete thyroid workup   NS IVF  BMP q 4 hours  Advance diet

## 2019-08-27 NOTE — H&P
[facility] Medicine  History & Physical    Patient Name: Estella Arevalo  MRN: [id number]  Admission Date: 8/27/2019  Attending Physician: Daisy Mcdonough MD   Primary Care Provider: Angela Packer MD         Patient information was obtained from patient and ER records.     Subjective:     Principal Problem:Hyponatremia    Chief Complaint:   Chief Complaint   Patient presents with    Rectal Bleeding     x 1 hour, bright red blood, reports hx of prolapsed rectum         HPI: In ED:  63 yo female presents via [facility] with nausea, vomiting, rectal pain, and rectal prolapse. Patient reports acute onset of nausea and vomiting this evening. She reports rectal pain and rectal prolapse for several hours. Usually she can push her rectum back in but tonight she could not. Patient reports bright red blood from her rectal prolapse.     Patient also reports that she tripped over her own feet in a store 2 days ago (Saturday 8/24/19). She struck her forehead on the floor and twisted her left knee. She has been ambulatory but has noticed significant bruising to her left knee and left calf. She also reports bruising and swelling to the bridge of her nose; patient had a nosebleed when she fell but has not had any since. No neck pain. No numbness/weakness. No LOC.   Patient is a poor historian.   I discussed patient with her , Mr. Hammad Arevalo, [phone]. He reports that patient drinks every day, last yesterday morning (Monday 8/26/19). He corroborates the story of her fall. He reports she is chronically poorly compliant with medications.   Denies recent EtOH use.     Pt admitted to hospital medicine for hyponatremia. Pt reports running out of pain medications and likely all of them as I went through her list of medications. PT/OT consulted for dc planning.     Past Medical History:   Diagnosis Date    Addiction to drug     Alcohol abuse     Arthritis     Cirrhosis of liver      Coronary artery disease     Fall     GERD (gastroesophageal reflux disease)     Hepatitis C     States was successfully treated with Harvoni    History of psychiatric hospitalization     Hx of psychiatric care     Hypertension     Low back pain     Radha     MI (myocardial infarction)     Migraine headache     Psychiatric problem     Sleep difficulties     Suicide attempt     Therapy     Thyroid disease        Past Surgical History:   Procedure Laterality Date    BLOCK, NERVE, FACET JOINT, LUMBAR, MEDIAL BRANCH Right 4/16/2013    [facility]    BLOCK, NERVE, FACET JOINT, LUMBAR, MEDIAL BRANCH Right 4/2/2013    [facility]    COLONOSCOPY Left 7/16/2013    [facility]formed by [facility] at [facility]    EGD (ESOPHAGOGASTRODUODENOSCOPY) N/A 3/20/2019    Performed by Obdulia Wlison MD at [facility]    EGD (ESOPHAGOGASTRODUODENOSCOPY) N/A 7/15/2013    Performed by [facility] at [facility]    ESOPHAGOGASTRODUODENOSCOPY (EGD) N/A 3/10/2017    Performed by Vini Gomez MD at [facility] OR    ESOPHAGOGASTRODUODENOSCOPY (EGD) N/A 3/10/2017    Performed by Arnulfo Benton MD at [facility] ENDO    HERNIA REPAIR      HIATAL HERNIA REPAIR      JOINT REPLACEMENT Bilateral     KNEE SURGERY  bilateral replacement    LAPAROSCOPIC PEG TUBE PLACEMENT/REPLACEMENT N/A 3/10/2017    [facility]formed by [facility] at [facility] OR    REPAIR-HERNIA-LAPAROSCOPIC-HIATAL N/A 3/10/2017    Performed by [facility] at [facility]    REPAIR-HERNIA-VENTRAL-LAPAROSCOPIC-W/MESH N/A 11/8/2017    Performed by [facility]ini Gomez [facility] at [facility] OR    right foot sx  2008    SHOULDER SURGERY  replacement       Review of patient's allergies indicates:  No Known Allergies    No current facility-administered medications on file prior to encounter.      Current Outpatient Medications on File Prior to Encounter   Medication Sig    amitriptyline (ELAVIL) 50 MG tablet  Take 1 tablet (50 mg total) by mouth every evening.    atenolol (TENORMIN) 25 MG tablet Take 1 tablet (25 mg total) by mouth once daily.    cycloSPORINE (RESTASIS) 0.05 % ophthalmic emulsion Place 0.4 mLs (1 drop total) into both eyes 2 (two) times daily.    DULoxetine (CYMBALTA) 30 MG capsule Take 1 capsule (30 mg total) by mouth once daily. TAKE 1 CAPSULE(30 MG) BY MOUTH EVERY DAY    gabapentin (NEURONTIN) 300 MG capsule Take 1 capsule (300 mg total) by mouth once daily.    ibuprofen (ADVIL,MOTRIN) 600 MG tablet Take 1 tablet (600 mg total) by mouth every 6 (six) hours as needed for Pain.    JUBLIA 10 % Eran APPLY ONE DOSE D    levothyroxine (SYNTHROID) 25 MCG tablet Take 1 tablet (25 mcg total) by mouth once daily.    linaclotide (LINZESS) 145 mcg Cap capsule Take 1 capsule (145 mcg total) by mouth daily as needed.    metoclopramide HCl (REGLAN) 10 MG tablet Take 1 tablet (10 mg total) by mouth 4 (four) times daily before meals and nightly.    NARCAN 4 mg/actuation Spry USE UTD    nitroGLYCERIN (NITROSTAT) 0.3 MG SL tablet ONE TABLET UNDER TONGUE AS NEEDED FOR CHEST PAIN EVERY 5 MINUTES AS NEEDED    ondansetron (ZOFRAN) 4 MG tablet Take 1 tablet (4 mg total) by mouth every 8 (eight) hours as needed for Nausea.    oxyCODONE (OXYCONTIN) 20 mg 12 hr tablet Take 20 mg by mouth every 12 (twelve) hours.    pantoprazole (PROTONIX) 40 MG tablet Take 1 tablet (40 mg total) by mouth once daily.    PROCTOFOAM HC rectal foam PLACE 1 APPLICATOR RECTALLY BID    PROCTOZONE-HC 2.5 % rectal cream STACI RECTALLY BID    promethazine (PHENERGAN) 50 MG suppository Place 1 suppository (50 mg total) rectally every 6 (six) hours as needed for Nausea.    promethazine (PHENERGAN) 50 MG tablet Take 1 tablet (50 mg total) by mouth every 6 (six) hours as needed for Nausea.    ranitidine (ZANTAC) 300 MG tablet Take 1 tablet (300 mg total) by mouth every evening.    ranitidine (ZANTAC) 300 MG tablet TAKE 1 TABLET(300 MG) BY  MOUTH EVERY EVENING    sucralfate (CARAFATE) 1 gram tablet TAKE 1 TABLET PO Q 6 H     Family History     Problem Relation (Age of Onset)    Bipolar disorder Maternal Grandfather    Cancer Mother, Father    Depression Sister    Suicide Cousin        Tobacco Use    Smoking status: Never Smoker    Smokeless tobacco: Never Used   Substance and Sexual Activity    Alcohol use: Yes     Alcohol/week: 3.6 oz     Types: 6 Cans of beer per week     Comment: pt admits to drinking vodka daily    Drug use: Not Currently     Types: Benzodiazepines, Methamphetamines, Amphetamines     Comment: Pain mgt clinic; admits to addiction to opioids    Sexual activity: Not on file     Review of Systems   Constitutional: Positive for fatigue.   HENT: Positive for congestion and sinus pressure.    Eyes: Positive for itching.   Respiratory: Positive for cough.    Cardiovascular: Negative.    Gastrointestinal: Positive for diarrhea, nausea, rectal pain and vomiting.   Endocrine: Negative.    Genitourinary: Negative.    Musculoskeletal: Positive for back pain and gait problem.   Skin: Negative.    Neurological: Positive for light-headedness.   Hematological: Bruises/bleeds easily.   Psychiatric/Behavioral: Positive for decreased concentration, dysphoric mood and sleep disturbance.     Objective:     Vital Signs (Most Recent):  Temp: 98.5 °F (36.9 °C) (08/27/19 1243)  Pulse: 72 (08/27/19 1243)  Resp: 19 (08/27/19 1243)  BP: (!) 148/92 (08/27/19 1243)  SpO2: 96 % (08/27/19 1243) Vital Signs (24h Range):  Temp:  [98 °F (36.7 °C)-98.5 °F (36.9 °C)] 98.5 °F (36.9 °C)  Pulse:  [72-86] 72  Resp:  [12-23] 19  SpO2:  [94 %-99 %] 96 %  BP: (109-160)/(62-99) 148/92     Weight: 79.4 kg (175 lb)  Body mass index is 28.25 kg/m².    Physical Exam   Constitutional: She is oriented to person, place, and time. She appears well-developed and well-nourished.   HENT:   Head: Normocephalic.   Mouth/Throat: Oropharynx is clear and moist.   Eyes: Pupils are equal,  round, and reactive to light. EOM are normal.   Neck: Normal range of motion. Neck supple. No JVD present. No thyromegaly present.   Cardiovascular: Normal rate, regular rhythm, normal heart sounds and intact distal pulses.   Pulmonary/Chest: Breath sounds normal. She has no rales.   Abdominal: Soft. Bowel sounds are normal. She exhibits no distension. There is no tenderness. A hernia is present.   Musculoskeletal: She exhibits tenderness. She exhibits no edema.   Neurological: She is alert and oriented to person, place, and time. Coordination abnormal.   Skin: Skin is warm and dry. Capillary refill takes 2 to 3 seconds.   Psychiatric: Her behavior is normal.   Delayed in response to questions         CRANIAL NERVES     CN III, IV, VI   Pupils are equal, round, and reactive to light.  Extraocular motions are normal.        Significant Labs:   CBC:   Recent Labs   Lab 08/27/19 0436   WBC 4.68   HGB 10.2*   HCT 31.4*        CMP:   Recent Labs   Lab 08/27/19 0436 08/27/19  1206   * 123*   K 4.2 4.9   CL 89* 91*   CO2 22* 23   * 94   BUN 8 9   CREATININE 0.6 0.6   CALCIUM 8.4* 8.2*   PROT 7.1  --    ALBUMIN 4.1  --    BILITOT 0.6  --    ALKPHOS 154*  --    AST 50*  --    ALT 23  --    ANIONGAP 12 9   EGFRNONAA >60 >60     Cardiac Markers:   Recent Labs   Lab 08/27/19 0436   *     Magnesium:   Recent Labs   Lab 08/27/19 0436   MG 1.8     POCT Glucose: No results for input(s): POCTGLUCOSE in the last 48 hours.  Troponin:   Recent Labs   Lab 08/27/19 0436   TROPONINI <0.006     TSH:   Recent Labs   Lab 08/27/19 0436   TSH 1.848     Urine Studies:   Recent Labs   Lab 08/27/19  0540   COLORU Yellow   APPEARANCEUA Clear   PHUR 5.0   SPECGRAV 1.025   PROTEINUA 1+*   GLUCUA Negative   KETONESU 1+*   BILIRUBINUA Negative   OCCULTUA Negative   NITRITE Negative   UROBILINOGEN Negative   LEUKOCYTESUR 3+*   RBCUA 7*   WBCUA 5   BACTERIA Moderate*   HYALINECASTS 0       Significant Imaging:    Impression- CT c-spine        No CT evidence of acute fracture or traumatic subluxation of the cervical spine.     Impression/Head CT        No CT evidence of acute intracranial abnormality. Clinical correlation and further evaluation as warranted.         Assessment/Plan:     * Hyponatremia  Multifactorial  - per patient - multiple days of N/V and diarrhea - appears chronic and possibly serotonin syndrome with cymbalta and elavil combo. Also, undermines ETOH intake which also contributing.     Complete thyroid workup   NS IVF  BMP q 4 hours  Advance diet      Rectal prolapse  Reduced in ED  Resume rectal hydrocortisone suppository       Polysubstance abuse  Pt reports running out of oxy pills but + opiates on UDS - takes her husbands meds  Was seeing a pain specialist but reports has to pay cash for pain pills - needs rehab         Substance induced mood disorder  Resume cymbalta  Dc elavil  Counseled on drug use with narcotics and benzos and amphetamines  - not sure where she is getting these - needs outpatient counseling and assistance       Anemia of chronic disease    Stable  monitor    Essential hypertension  Controlled  Resume atenolol       Fall    Likely due to polysubstance abuse  Decrease oxycontin   Benzo and amphetamines on UDS - not on list of home meds ?? - appears to take husbands medications  + ETOH abuse per spouse report       PT/OT consulted for dc planning        Acquired hypothyroidism  FT4, T3 pending  Resume synthroid          VTE Risk Mitigation (From admission, onward)        Ordered     enoxaparin injection 40 mg  Daily      08/27/19 1534     Place MARINA hose  Until discontinued      08/27/19 1101     Place sequential compression device  Until discontinued      08/27/19 1101     IP VTE LOW RISK PATIENT  Once      08/27/19 1101             Daisy Mcdonough MD  Department of Hospital Medicine   [facility]

## 2019-08-27 NOTE — SUBJECTIVE & OBJECTIVE
Past Medical History:   Diagnosis Date    Addiction to drug     Alcohol abuse     Arthritis     Cirrhosis of liver     Coronary artery disease     Fall     GERD (gastroesophageal reflux disease)     Hepatitis C     States was successfully treated with Harvoni    History of psychiatric hospitalization     Hx of psychiatric care     Hypertension     Low back pain     Radha     MI (myocardial infarction)     Migraine headache     Psychiatric problem     Sleep difficulties     Suicide attempt     Therapy     Thyroid disease        Past Surgical History:   Procedure Laterality Date    BLOCK, NERVE, FACET JOINT, LUMBAR, MEDIAL BRANCH Right 4/16/2013    [facility]    BLOCK, NERVE, FACET JOINT, LUMBAR, MEDIAL BRANCH Right 4/2/2013    [facility] at [facility]    COLONOSCOPY Left 7/16/2013    [facility]    EGD (ESOPHAGOGASTRODUODENOSCOPY) N/A 3/20/2019    [facility]formed by Obdulia Wilson MD at [facility] END[facility]    EGD (ESOPHAGOGASTRODUODENOSCOPY) N/A 7/15/2013    [facility]formed by [facility] at [facility] END[facility]    ESOPHAGOGASTRODUODENOSCOPY (EGD) N/A 3/10/2017    [facility]    ESOPHAGOGASTRODUODENOSCOPY (EGD) N/A 3/10/2017    [facility] by [facility]fo [facility]enton [facility] at [facility]    HERNIA REPAIR      HIATAL HERNIA REPAIR      JOINT REPLACEMENT Bilateral     KNEE SURGERY  bilateral replacement    LAPAROSCOPIC PEG TUBE PLACEMENT/REPLACEMENT N/A 3/10/2017    [facility]    REPAIR-HERNIA-LAPAROSCOPIC-HIATAL N/A 3/10/2017    [facility]    REPAIR-HERNIA-VENTRAL-LAPAROSCOPIC-W/MESH N/A 11/8/2017    [facility]    right foot sx  2008    SHOULDER SURGERY  replacement       Review of patient's allergies indicates:  No Known Allergies    No current facility-administered medications on file prior to encounter.       Current Outpatient Medications on File Prior to Encounter   Medication Sig    amitriptyline (ELAVIL) 50 MG tablet Take 1 tablet (50 mg total) by mouth every evening.    atenolol (TENORMIN) 25 MG tablet Take 1 tablet (25 mg total) by mouth once daily.    cycloSPORINE (RESTASIS) 0.05 % ophthalmic emulsion Place 0.4 mLs (1 drop total) into both eyes 2 (two) times daily.    DULoxetine (CYMBALTA) 30 MG capsule Take 1 capsule (30 mg total) by mouth once daily. TAKE 1 CAPSULE(30 MG) BY MOUTH EVERY DAY    gabapentin (NEURONTIN) 300 MG capsule Take 1 capsule (300 mg total) by mouth once daily.    ibuprofen (ADVIL,MOTRIN) 600 MG tablet Take 1 tablet (600 mg total) by mouth every 6 (six) hours as needed for Pain.    JUBLIA 10 % Eran APPLY ONE DOSE D    levothyroxine (SYNTHROID) 25 MCG tablet Take 1 tablet (25 mcg total) by mouth once daily.    linaclotide (LINZESS) 145 mcg Cap capsule Take 1 capsule (145 mcg total) by mouth daily as needed.    metoclopramide HCl (REGLAN) 10 MG tablet Take 1 tablet (10 mg total) by mouth 4 (four) times daily before meals and nightly.    NARCAN 4 mg/actuation Spry USE UTD    nitroGLYCERIN (NITROSTAT) 0.3 MG SL tablet ONE TABLET UNDER TONGUE AS NEEDED FOR CHEST PAIN EVERY 5 MINUTES AS NEEDED    ondansetron (ZOFRAN) 4 MG tablet Take 1 tablet (4 mg total) by mouth every 8 (eight) hours as needed for Nausea.    oxyCODONE (OXYCONTIN) 20 mg 12 hr tablet Take 20 mg by mouth every 12 (twelve) hours.    pantoprazole (PROTONIX) 40 MG tablet Take 1 tablet (40 mg total) by mouth once daily.    PROCTOFOAM HC rectal foam PLACE 1 APPLICATOR RECTALLY BID    PROCTOZONE-HC 2.5 % rectal cream STACI RECTALLY BID    promethazine (PHENERGAN) 50 MG suppository Place 1 suppository (50 mg total) rectally every 6 (six) hours as needed for Nausea.    promethazine (PHENERGAN) 50 MG tablet Take 1 tablet (50 mg total) by mouth every 6 (six) hours as needed for Nausea.    ranitidine (ZANTAC) 300 MG  tablet Take 1 tablet (300 mg total) by mouth every evening.    ranitidine (ZANTAC) 300 MG tablet TAKE 1 TABLET(300 MG) BY MOUTH EVERY EVENING    sucralfate (CARAFATE) 1 gram tablet TAKE 1 TABLET PO Q 6 H     Family History     Problem Relation (Age of Onset)    Bipolar disorder Maternal Grandfather    Cancer Mother, Father    Depression Sister    Suicide Cousin        Tobacco Use    Smoking status: Never Smoker    Smokeless tobacco: Never Used   Substance and Sexual Activity    Alcohol use: Yes     Alcohol/week: 3.6 oz     Types: 6 Cans of beer per week     Comment: pt admits to drinking vodka daily    Drug use: Not Currently     Types: Benzodiazepines, Methamphetamines, Amphetamines     Comment: Pain mgt clinic; admits to addiction to opioids    Sexual activity: Not on file     Review of Systems   Constitutional: Positive for fatigue.   HENT: Positive for congestion and sinus pressure.    Eyes: Positive for itching.   Respiratory: Positive for cough.    Cardiovascular: Negative.    Gastrointestinal: Positive for diarrhea, nausea, rectal pain and vomiting.   Endocrine: Negative.    Genitourinary: Negative.    Musculoskeletal: Positive for back pain and gait problem.   Skin: Negative.    Neurological: Positive for light-headedness.   Hematological: Bruises/bleeds easily.   Psychiatric/Behavioral: Positive for decreased concentration, dysphoric mood and sleep disturbance.     Objective:     Vital Signs (Most Recent):  Temp: 98.5 °F (36.9 °C) (08/27/19 1243)  Pulse: 72 (08/27/19 1243)  Resp: 19 (08/27/19 1243)  BP: (!) 148/92 (08/27/19 1243)  SpO2: 96 % (08/27/19 1243) Vital Signs (24h Range):  Temp:  [98 °F (36.7 °C)-98.5 °F (36.9 °C)] 98.5 °F (36.9 °C)  Pulse:  [72-86] 72  Resp:  [12-23] 19  SpO2:  [94 %-99 %] 96 %  BP: (109-160)/(62-99) 148/92     Weight: 79.4 kg (175 lb)  Body mass index is 28.25 kg/m².    Physical Exam   Constitutional: She is oriented to person, place, and time. She appears well-developed  and well-nourished.   HENT:   Head: Normocephalic.   Mouth/Throat: Oropharynx is clear and moist.   Eyes: Pupils are equal, round, and reactive to light. EOM are normal.   Neck: Normal range of motion. Neck supple. No JVD present. No thyromegaly present.   Cardiovascular: Normal rate, regular rhythm, normal heart sounds and intact distal pulses.   Pulmonary/Chest: Breath sounds normal. She has no rales.   Abdominal: Soft. Bowel sounds are normal. She exhibits no distension. There is no tenderness. A hernia is present.   Musculoskeletal: She exhibits tenderness. She exhibits no edema.   Neurological: She is alert and oriented to person, place, and time. Coordination abnormal.   Skin: Skin is warm and dry. Capillary refill takes 2 to 3 seconds.   Psychiatric: Her behavior is normal.   Delayed in response to questions         CRANIAL NERVES     CN III, IV, VI   Pupils are equal, round, and reactive to light.  Extraocular motions are normal.        Significant Labs:   CBC:   Recent Labs   Lab 08/27/19 0436   WBC 4.68   HGB 10.2*   HCT 31.4*        CMP:   Recent Labs   Lab 08/27/19 0436 08/27/19  1206   * 123*   K 4.2 4.9   CL 89* 91*   CO2 22* 23   * 94   BUN 8 9   CREATININE 0.6 0.6   CALCIUM 8.4* 8.2*   PROT 7.1  --    ALBUMIN 4.1  --    BILITOT 0.6  --    ALKPHOS 154*  --    AST 50*  --    ALT 23  --    ANIONGAP 12 9   EGFRNONAA >60 >60     Cardiac Markers:   Recent Labs   Lab 08/27/19 0436   *     Magnesium:   Recent Labs   Lab 08/27/19 0436   MG 1.8     POCT Glucose: No results for input(s): POCTGLUCOSE in the last 48 hours.  Troponin:   Recent Labs   Lab 08/27/19 0436   TROPONINI <0.006     TSH:   Recent Labs   Lab 08/27/19 0436   TSH 1.848     Urine Studies:   Recent Labs   Lab 08/27/19  0540   COLORU Yellow   APPEARANCEUA Clear   PHUR 5.0   SPECGRAV 1.025   PROTEINUA 1+*   GLUCUA Negative   KETONESU 1+*   BILIRUBINUA Negative   OCCULTUA Negative   NITRITE Negative   UROBILINOGEN  Negative   LEUKOCYTESUR 3+*   RBCUA 7*   WBCUA 5   BACTERIA Moderate*   HYALINECASTS 0       Significant Imaging:   Impression- CT c-spine        No CT evidence of acute fracture or traumatic subluxation of the cervical spine.     Impression/Head CT        No CT evidence of acute intracranial abnormality. Clinical correlation and further evaluation as warranted.

## 2019-08-28 LAB
ANION GAP SERPL CALC-SCNC: 6 MMOL/L (ref 8–16)
BUN SERPL-MCNC: 7 MG/DL (ref 8–23)
CALCIUM SERPL-MCNC: 8.3 MG/DL (ref 8.7–10.5)
CHLORIDE SERPL-SCNC: 103 MMOL/L (ref 95–110)
CO2 SERPL-SCNC: 24 MMOL/L (ref 23–29)
CREAT SERPL-MCNC: 0.7 MG/DL (ref 0.5–1.4)
EST. GFR  (AFRICAN AMERICAN): >60 ML/MIN/1.73 M^2
EST. GFR  (NON AFRICAN AMERICAN): >60 ML/MIN/1.73 M^2
GLUCOSE SERPL-MCNC: 102 MG/DL (ref 70–110)
OSMOLALITY SERPL: 259 MOSM/KG (ref 275–295)
OSMOLALITY UR: 135 MOSM/KG (ref 50–1200)
POTASSIUM SERPL-SCNC: 4.6 MMOL/L (ref 3.5–5.1)
SODIUM SERPL-SCNC: 133 MMOL/L (ref 136–145)
T3 SERPL-MCNC: 58 NG/DL (ref 60–180)
T4 FREE SERPL-MCNC: 0.66 NG/DL (ref 0.71–1.51)

## 2019-08-28 PROCEDURE — 25000242 PHARM REV CODE 250 ALT 637 W/ HCPCS: Mod: HCNC | Performed by: HOSPITALIST

## 2019-08-28 PROCEDURE — 97535 SELF CARE MNGMENT TRAINING: CPT | Mod: HCNC

## 2019-08-28 PROCEDURE — 25000003 PHARM REV CODE 250: Mod: HCNC | Performed by: HOSPITALIST

## 2019-08-28 PROCEDURE — 97116 GAIT TRAINING THERAPY: CPT | Mod: HCNC

## 2019-08-28 PROCEDURE — 11000001 HC ACUTE MED/SURG PRIVATE ROOM: Mod: HCNC

## 2019-08-28 PROCEDURE — 84439 ASSAY OF FREE THYROXINE: CPT | Mod: HCNC

## 2019-08-28 PROCEDURE — 84480 ASSAY TRIIODOTHYRONINE (T3): CPT | Mod: HCNC

## 2019-08-28 PROCEDURE — 97165 OT EVAL LOW COMPLEX 30 MIN: CPT | Mod: HCNC

## 2019-08-28 PROCEDURE — 36415 COLL VENOUS BLD VENIPUNCTURE: CPT | Mod: HCNC

## 2019-08-28 PROCEDURE — 97161 PT EVAL LOW COMPLEX 20 MIN: CPT | Mod: HCNC

## 2019-08-28 PROCEDURE — 80048 BASIC METABOLIC PNL TOTAL CA: CPT | Mod: HCNC

## 2019-08-28 PROCEDURE — 63600175 PHARM REV CODE 636 W HCPCS: Mod: HCNC | Performed by: HOSPITALIST

## 2019-08-28 RX ORDER — OXYCODONE HCL 10 MG/1
20 TABLET, FILM COATED, EXTENDED RELEASE ORAL EVERY 12 HOURS
Status: DISCONTINUED | OUTPATIENT
Start: 2019-08-28 | End: 2019-08-29 | Stop reason: HOSPADM

## 2019-08-28 RX ORDER — METOCLOPRAMIDE 10 MG/1
10 TABLET ORAL
Status: DISCONTINUED | OUTPATIENT
Start: 2019-08-28 | End: 2019-08-29 | Stop reason: HOSPADM

## 2019-08-28 RX ADMIN — TRAMADOL HYDROCHLORIDE 50 MG: 50 TABLET, FILM COATED ORAL at 04:08

## 2019-08-28 RX ADMIN — LORAZEPAM 1 MG: 2 INJECTION INTRAMUSCULAR; INTRAVENOUS at 10:08

## 2019-08-28 RX ADMIN — OXYCODONE HYDROCHLORIDE 20 MG: 10 TABLET, FILM COATED, EXTENDED RELEASE ORAL at 08:08

## 2019-08-28 RX ADMIN — ATENOLOL 25 MG: 25 TABLET ORAL at 08:08

## 2019-08-28 RX ADMIN — FLUTICASONE PROPIONATE 100 MCG: 50 SPRAY, METERED NASAL at 08:08

## 2019-08-28 RX ADMIN — ENOXAPARIN SODIUM 40 MG: 100 INJECTION SUBCUTANEOUS at 05:08

## 2019-08-28 RX ADMIN — PANTOPRAZOLE SODIUM 40 MG: 40 TABLET, DELAYED RELEASE ORAL at 08:08

## 2019-08-28 RX ADMIN — GUAIFENESIN 600 MG: 600 TABLET, EXTENDED RELEASE ORAL at 08:08

## 2019-08-28 RX ADMIN — METOCLOPRAMIDE HYDROCHLORIDE 10 MG: 10 TABLET ORAL at 05:08

## 2019-08-28 RX ADMIN — HYDROCORTISONE 2.5%: 25 CREAM TOPICAL at 08:08

## 2019-08-28 RX ADMIN — LORAZEPAM 1 MG: 2 INJECTION INTRAMUSCULAR; INTRAVENOUS at 11:08

## 2019-08-28 RX ADMIN — DULOXETINE 60 MG: 30 CAPSULE, DELAYED RELEASE ORAL at 08:08

## 2019-08-28 RX ADMIN — LEVOTHYROXINE SODIUM 25 MCG: 25 TABLET ORAL at 08:08

## 2019-08-28 RX ADMIN — TRAMADOL HYDROCHLORIDE 50 MG: 50 TABLET, FILM COATED ORAL at 10:08

## 2019-08-28 RX ADMIN — LORAZEPAM 1 MG: 2 INJECTION INTRAMUSCULAR; INTRAVENOUS at 04:08

## 2019-08-28 RX ADMIN — HYDROCORTISONE 2.5%: 25 CREAM TOPICAL at 09:08

## 2019-08-28 RX ADMIN — TRAMADOL HYDROCHLORIDE 50 MG: 50 TABLET, FILM COATED ORAL at 03:08

## 2019-08-28 RX ADMIN — OXYCODONE HYDROCHLORIDE 10 MG: 10 TABLET, FILM COATED, EXTENDED RELEASE ORAL at 08:08

## 2019-08-28 RX ADMIN — CETIRIZINE HYDROCHLORIDE 5 MG: 5 TABLET ORAL at 08:08

## 2019-08-28 RX ADMIN — LORAZEPAM 1 MG: 2 INJECTION INTRAMUSCULAR; INTRAVENOUS at 05:08

## 2019-08-28 NOTE — PLAN OF CARE
"To patient's room to discuss patient managing her care at home.      TN Role Explained.  Patient identified by using 2 identifiers:  Name and date of birth    Patient stated that her daughter and  WILL HELP AT HOME WITH her RECOVERY.       TN reviewed with patient contents of "[employer]".      TN name and contact info placed on the communication board    Preferred Pharmacy:  [id number] - [place] - [address] AT [facility]  [address]  [place] [address]  Phone: [phone] Fax: [phone]       08/28/19 9844   Discharge Assessment   Assessment Type Discharge Planning Assessment   Confirmed/corrected address and phone number on facesheet? Yes   Assessment information obtained from? Patient   Expected Length of Stay (days) 2   Communicated expected length of stay with patient/caregiver yes   Prior to hospitilization cognitive status: Alert/Oriented   Prior to hospitalization functional status: Independent   Current cognitive status: Alert/Oriented   Current Functional Status: Assistive Equipment   Lives With grandchild(carol);child(carol), adult;spouse   Able to Return to Prior Arrangements yes   Is patient able to care for self after discharge? Yes   Patient's perception of discharge disposition home health   Readmission Within the Last 30 Days no previous admission in last 30 days   Patient currently being followed by outpatient case management? No   Patient currently receives any other outside agency services? No   Equipment Currently Used at Home shower chair   Do you have any problems affording any of your prescribed medications? No   Is the patient taking medications as prescribed? yes   Does the patient have transportation home? Yes   Transportation Anticipated family or friend will provide   Does the patient receive services at the [facility]madin Clinic? No   Discharge Plan A Home Health   Discharge Plan B Home with family   DME Needed Upon Discharge  " walker, rolling   Patient/Family in Agreement with Plan yes

## 2019-08-28 NOTE — PLAN OF CARE
Problem: Physical Therapy Goal  Goal: Physical Therapy Goal  Goals to be met by: 2019     Patient will increase functional independence with mobility by performin. Sit to stand transfer with Modified [place]  2. Gait  x 150 feet with Modified [place] using Rolling Walker.   3. Lower extremity exercise program x10 reps per handout, with supervision    Outcome: Ongoing (interventions implemented as appropriate)  Initial PT evaluation performed.  Pt could benefit from skilled PT services 3x/wk in order to maximize function prior to D/C.  HHPT and RW recommended.

## 2019-08-28 NOTE — PLAN OF CARE
08/28/19 1721   Post-Acute Status   Post-Acute Authorization Home Health/Hospice   Discussed DC home with HH.  Patient agreed to [facility].  Awaiting HH plan of care at time of DC.

## 2019-08-28 NOTE — PLAN OF CARE
Problem: Adult Inpatient Plan of Care  Goal: Plan of Care Review  Outcome: Ongoing (interventions implemented as appropriate)     08/27/19 1920   Plan of Care Review   Plan of Care Reviewed With patient   Progress improving     Patient oriented x3 w/ intermittent confusion.  IVF initiated. VSS WNL for patient; bleeding precautions in place.  Diet advanced to cardiac.  Urinary urgency; bed alarm set. Level 3 w/ standby assistance.  Frequent checks made.  Will continue to monitor.

## 2019-08-28 NOTE — PT/OT/SLP EVAL
Physical Therapy Evaluation    Patient Name:  Estella Arevalo   MRN:  [id number]    Recommendations:     Discharge Recommendations:  home health PT   Discharge Equipment Recommendations: walker, rolling   Barriers to discharge: None    Assessment:     Estella Arevalo is a 64 y.o. female admitted with a medical diagnosis of Hyponatremia.  She presents with the following impairments/functional limitations:  impaired functional mobilty, weakness, decreased safety awareness, impaired coordination, gait instability, impaired endurance, decreased coordination, impaired balance, impaired self care skills .    Rehab Prognosis: Good; patient would benefit from acute skilled PT services to address these deficits and reach maximum level of function.    Recent Surgery: * No surgery found *      Plan:     During this hospitalization, patient to be seen 3 x/week to address the identified rehab impairments via therapeutic activities, therapeutic exercises, gait training, neuromuscular re-education and progress toward the following goals:    · Plan of Care Expires:  09/04/19    Subjective     Chief Complaint: weakness  Patient/Family Comments/goals: to not fall  Pain/Comfort:  · Pain Rating 1: 0/10    Patients cultural, spiritual, Yazdanism conflicts given the current situation: no    Living Environment:  Pt lives with spouse in a [facility] with 1 [address]RACH[address]  Prior to admission, patients level of function was Independent with ambulation, but fall hx.  Equipment used at home: shower chair.  DME owned (not currently used): none.  Upon discharge, patient will have assistance from spouse.    Objective:     Communicated with nsg prior to session.  Patient found HOB elevated with bed alarm, telemetry, peripheral IV  upon PT entry to room.    General Precautions: Standard, fall   Orthopedic Precautions:N/A   Braces: N/A     Exams:  · Gross Motor Coordination:  mildly impaired 2/2 weakness and decondtioning  · Postural Exam:  Patient presented with  the following abnormalities:    · -       Rounded shoulders  · -       Forward head  · Sensation:    · -       Intact  light/touch B Le's  · Skin Integrity/Edema:      · -       Skin integrity: Visible skin intact  · RLE ROM: WFL  · RLE Strength: WFL  · LLE ROM: WFL  · LLE Strength: WFL    Functional Mobility:  · Bed Mobility:     · Scooting: stand by assistance  · Supine to Sit: stand by assistance  · Transfers:     · Sit to Stand:  contact guard assistance and with VC for hand placement/safety with rolling walker and straight cane  · Gait: 50' with RW and SBA/CGA and VC for walker management/safety and increased trunk ext.  30' with SPC and min A with max VC for sequencing with SPC and cane managemetn/safety  · Balance: Fair+ sit, FAir stand      Therapeutic Activities and Exercises:   Pt instructed to perform B AP's, FAQ's, Marching, and Hip ABd/Add while sitting up in chair.  Pt verbalized/demonstrated understanding x 10 reps.    AM-PAC 6 CLICK MOBILITY  Total Score:18     Patient left up in chair with all lines intact, call button in reach and nsg notified.    GOALS:   Multidisciplinary Problems     Physical Therapy Goals        Problem: Physical Therapy Goal    Goal Priority Disciplines Outcome Goal Variances Interventions   Physical Therapy Goal     PT, PT/OT Ongoing (interventions implemented as appropriate)     Description:  Goals to be met by: 2019     Patient will increase functional independence with mobility by performin. Sit to stand transfer with Modified [place]  2. Gait  x 150 feet with Modified [place] using Rolling Walker.   3. Lower extremity exercise program x10 reps per handout, with supervision                      History:     Past Medical History:   Diagnosis Date    Addiction to drug     Alcohol abuse     Arthritis     Cirrhosis of liver     Coronary artery disease     Fall     GERD (gastroesophageal reflux disease)     Hepatitis C     States was successfully  treated with Charito    History of psychiatric hospitalization     Hx of psychiatric care     Hypertension     Low back pain     Radha     MI (myocardial infarction)     Migraine headache     Psychiatric problem     Sleep difficulties     Suicide attempt     Therapy     Thyroid disease        Past Surgical History:   Procedure Laterality Date    BLOCK, NERVE, FACET JOINT, LUMBAR, MEDIAL BRANCH Right 4/16/2013    [facility]    BLOCK, NERVE, FACET JOINT, LUMBAR, MEDIAL BRANCH Right 4/2/2013    [facility] MGT    COLONOSCOPY Left 7/16/2013    [facility]formed by [facility] at [facility] ENDO    EGD (ESOPHAGOGASTRODUODENOSCOPY) N/A 3/20/2019    Performed by Obdulia Wilson MD at [facility] ENDO    EGD (ESOPHAGOGASTRODUODENOSCOPY) N/A 7/15/2013    Performed by [facility] at [facility] ENDO    ESOPHAGOGASTRODUODENOSCOPY (EGD) N/A 3/10/2017    Performed by Vini Gomez MD at [facility] OR    ESOPHAGOGASTRODUODENOSCOPY (EGD) N/A 3/10/2017    Performed by Arnulfo Benton MD at [facility] ENDO    HERNIA REPAIR      HIATAL HERNIA REPAIR      JOINT REPLACEMENT Bilateral     KNEE SURGERY  bilateral replacement    LAPAROSCOPIC PEG TUBE PLACEMENT/REPLACEMENT N/A 3/10/2017    [facility] at [facility] OR    REPAIR-HERNIA-LAPAROSCOPIC-HIATAL N/A 3/10/2017    Performed by [facility] at [facility] OR    REPAIR-HERNIA-VENTRAL-LAPAROSCOPIC-W/MESH N/A 11/8/2017    Performed by [facility] at [facility] OR    right foot sx  2008    SHOULDER SURGERY  replacement       Time Tracking:     PT Received On: 08/28/19  PT Start Time: 1145     PT Stop Time: 1217  PT Total Time (min): 32 min     Billable Minutes: Evaluation 15 and Gait Training 17      Ann Kaye, PT  08/28/2019

## 2019-08-28 NOTE — PLAN OF CARE
Problem: Occupational Therapy Goal  Goal: Occupational Therapy Goal  Goals to be met by: 09/11/19     Patient will increase functional independence with ADLs by performing:    LE Dressing with Supervision.  Grooming while standing at sink with Supervision.  Toileting from toilet with Supervision for hygiene and clothing management.   Supine to sit with Supervision.  Step transfer with Supervision  Toilet transfer to toilet with Supervision.  Upper extremity exercise program x15 reps per handout, with independence.    Outcome: Ongoing (interventions implemented as appropriate)  Pt will continue to benefit from acute OT in order to increase safety awareness and independence with ADLs and all aspects of functional mobility. OT rec. HHOT with 24 hour supervision at d/c to regain PLOF and reduce risk of falls/readmission.

## 2019-08-28 NOTE — NURSING
Pt remained free from fall/injury. Pt ambulated to bathroom frequently with assist x1. Some rectal bleeding during shift. Cont IVF. Tele monitor #[id number]. Complaint of pain. PRN Tramadol and Ativan given. Safety measures maintained will cont to monitor

## 2019-08-28 NOTE — PT/OT/SLP EVAL
"Occupational Therapy   Evaluation    Name: Estella Arevalo  MRN: [id number]  Admitting Diagnosis:  Hyponatremia      Recommendations:     Discharge Recommendations: home health OT(with 24 hr supervision)  Discharge Equipment Recommendations:  (TBD )  Barriers to discharge:  None    Assessment:     Estella Arevalo is a 64 y.o. female with a medical diagnosis of Hyponatremia.  She presents with motivation to participate with therapy. Pt reports frequent tripping and falls d/t LOB. Pt presents with decreased safety awareness and independence with ADLs/all aspects of functional mobility. Performance deficits affecting function: weakness, impaired functional mobilty, decreased safety awareness, gait instability, pain, impaired balance, impaired self care skills, decreased upper extremity function, decreased lower extremity function.      Rehab Prognosis: Good; patient would benefit from acute skilled OT services to address these deficits and reach maximum level of function.       Plan:     Patient to be seen 3 x/week to address the above listed problems via self-care/home management, therapeutic activities, therapeutic exercises  · Plan of Care Expires: 09/11/19  · Plan of Care Reviewed with: patient    Subjective     Chief Complaint: wanting an MRI of her lower back since she didn't follow up with PCP 3 weeks ago after a fall. Dr. Mcdonough notified.   Patient/Family Comments/goals: regain balance with all functional mobility     Occupational Profile:  Living Environment: Pt lives with  and 3 yo grandchild in a [place] house with 1 step at entry. Bathroom set-up: tub/shower combo.   Previous level of function: Pt was independent with ADLs and functional mobility, not using any DME. Pt reports tripping often "over my own two feet" and able to catch herself without falling most of the time. Pt's children cook for her and her  and her  typically does all the driving.   Equipment Used at Home:  shower " "chair  Assistance upon Discharge: spouse, children live nearby     Pain/Comfort:  · Pain Rating 1: (did not rate, described as "discomfort with movement")  · Location - Side 1: Right  · Location - Orientation 1: lateral  · Location 1: abdomen  · Pain Addressed 1: Reposition  · Pain Rating Post-Intervention 1: (resolved at rest)    Patients cultural, spiritual, Evangelical conflicts given the current situation: no    Objective:     Communicated with: nurseBulmaro, prior to session.  Patient found HOB elevated with bed alarm, peripheral IV, telemetry upon OT entry to room.    General Precautions: Standard, fall   Orthopedic Precautions:N/A   Braces: N/A     Occupational Performance:    Bed Mobility:    · Patient completed Rolling/Turning to Left with  stand by assistance  · Patient completed Rolling/Turning to Right with stand by assistance  · Patient completed Scooting/Bridging with stand by assistance  · Patient completed Supine to Sit with stand by assistance  · Patient completed Sit to Supine with stand by assistance    Functional Mobility/Transfers:  · Patient completed Sit <> Stand Transfer with contact guard assistance  with  no assistive device   · Functional Mobility: Pt ambulated from bed>sink to complete grooming tasks>back to bed with CGA and no AD.     Activities of Daily Living:  · Feeding:  pt reports no issues with eating breakfast tray prior to OT entry    · Grooming: contact guard assistance standing at the sink for pt to brush hair & teeth and wash face  · Upper Body Dressing: set-up with hospital gown     · Lower Body Dressing: supervision to britt/doff socks seated EOB    Cognitive/Visual Perceptual:  Cognitive/Psychosocial Skills:     -       Oriented to: Person, Place and Time   -       Follows Commands/attention:Follows multistep  commands  -       Communication: clear/fluent  -       Memory: No Deficits noted  -       Safety awareness/insight to disability: impaired   -       Mood/Affect/Coping " skills/emotional control: Pleasant  Visual/Perceptual:      -Intact  acuity and R/L discrimination      Physical Exam:  Balance:    -       seated: SUP; static standing: SBA; dynamic standing: CGA with no AD  Postural examination/scapula alignment:    -       Rounded shoulders  -       Forward head  Skin integrity: Bruising of L knee and facial bruising, Wound R toe bleeding through sock (nurseBulmaro, notified) and Dry scar anterior L knee   Edema:  no BUE edema noted  Sensation:    -       Intact  light/touch BUE  Dominant hand:    -       Right  Upper Extremity Range of Motion:     -       Right Upper Extremity: WFL  -       Left Upper Extremity: WFL  Upper Extremity Strength:    -       Right Upper Extremity: WFL  -       Left Upper Extremity: WFL   Strength:    -       Right Upper Extremity: WFL  -       Left Upper Extremity: WFL  Fine Motor Coordination:    -       Intact  Left hand thumb/finger opposition skills, Right hand thumb/finger opposition skills, Left hand, manipulation of objects and Right hand, manipulation of objects  Gross motor coordination:   WFL    AMPAC 6 Click ADL:  AMPAC Total Score: 21    Treatment & Education:  Pt educated on OT role/POC.   Importance of OOB activity with staff supervision.  Safety during functional t/f and mobility   Edu on log roll technique   Edu on safety/fall hazards in the home: remove throw rugs, use of non-skid mats in the tub, use of liquid (vs. Bar) soap in the shower, reducing distractions/increased concentration around obstacles (since she was fixated on the danger of [place] mats as she has tripped over them multiple times). Pt receptive of edu, but will require reinforcement.   White board updated   Multiple self-care tasks/functional mobility completed- assistance level noted above   All questions/concerns answered within OT scope of practice     Education:    Patient left HOB elevated with all lines intact, call button in reach, bed alarm on and  nurse, Bulmaro, and Dr. Mcdonough notified    GOALS:   Multidisciplinary Problems     Occupational Therapy Goals        Problem: Occupational Therapy Goal    Goal Priority Disciplines Outcome Interventions   Occupational Therapy Goal     OT, PT/OT Ongoing (interventions implemented as appropriate)    Description:  Goals to be met by: 09/11/19     Patient will increase functional independence with ADLs by performing:    LE Dressing with Supervision.  Grooming while standing at sink with Supervision.  Toileting from toilet with Supervision for hygiene and clothing management.   Supine to sit with Supervision.  Step transfer with Supervision  Toilet transfer to toilet with Supervision.  Upper extremity exercise program x15 reps per handout, with independence.                      History:     Past Medical History:   Diagnosis Date    Addiction to drug     Alcohol abuse     Arthritis     Cirrhosis of liver     Coronary artery disease     Fall     GERD (gastroesophageal reflux disease)     Hepatitis C     States was successfully treated with Harvoni    History of psychiatric hospitalization     Hx of psychiatric care     Hypertension     Low back pain     Radha     MI (myocardial infarction)     Migraine headache     Psychiatric problem     Sleep difficulties     Suicide attempt     Therapy     Thyroid disease        Past Surgical History:   Procedure Laterality Date    BLOCK, NERVE, FACET JOINT, LUMBAR, MEDIAL BRANCH Right 4/16/2013    [facility]    BLOCK, NERVE, FACET JOINT, LUMBAR, MEDIAL BRANCH Right 4/2/2013    [facility] at [facility]    COLONOSCOPY Left 7/16/2013    [facility]formed by [facility] MD at [facility] ENDO    EGD (ESOPHAGOGASTRODUODENOSCOPY) N/A 3/20/2019    Performed by Obdulia Wilson MD at [facility] ENDO    EGD (ESOPHAGOGASTRODUODENOSCOPY) N/A 7/15/2013    Performed by [facility] at [facility] ENDO    ESOPHAGOGASTRODUODENOSCOPY  (EGD) N/A 3/10/2017    [facility] at [facility] OR    ESOPHAGOGASTRODUODENOSCOPY (EGD) N/A 3/10/2017    Performed by Arnulfo Benton MD at [facility] ENDO    HERNIA REPAIR      HIATAL HERNIA REPAIR      JOINT REPLACEMENT Bilateral     KNEE SURGERY  bilateral replacement    LAPAROSCOPIC PEG TUBE PLACEMENT/REPLACEMENT N/A 3/10/2017    Performed by [facility] MD at [facility] OR    REPAIR-HERNIA-LAPAROSCOPIC-HIATAL N/A 3/10/2017    Performed by [facility] at [facility] OR    REPAIR-HERNIA-VENTRAL-LAPAROSCOPIC-W/MESH N/A 11/8/2017    Performed by [facility] Gomez MD at [facility] OR    right foot sx  2008    SHOULDER SURGERY  replacement       Time Tracking:     OT Date of Treatment: 08/28/19  OT Start Time: 0858  OT Stop Time: 0925  OT Total Time (min): 27 min    Billable Minutes:Evaluation 15 min  Self Care/Home Management 12 min  Total Time 27 min    Lisa Mena OT  8/28/2019

## 2019-08-28 NOTE — NURSING
Calls frequently, oriented but forgetful, asking for pain meds. Tramadol, Ativan given as ordered PRN.Would like antibiotic ointment prescribed for her toe abrasion at anticipated discharge home tomorrow. Pt is in agreement with POC.

## 2019-08-29 VITALS
DIASTOLIC BLOOD PRESSURE: 85 MMHG | HEART RATE: 71 BPM | WEIGHT: 196.63 LBS | OXYGEN SATURATION: 96 % | TEMPERATURE: 99 F | SYSTOLIC BLOOD PRESSURE: 156 MMHG | HEIGHT: 66 IN | RESPIRATION RATE: 18 BRPM | BODY MASS INDEX: 31.6 KG/M2

## 2019-08-29 PROBLEM — E87.1 HYPONATREMIA: Status: RESOLVED | Noted: 2018-07-16 | Resolved: 2019-08-29

## 2019-08-29 LAB
ANION GAP SERPL CALC-SCNC: 8 MMOL/L (ref 8–16)
BUN SERPL-MCNC: 9 MG/DL (ref 8–23)
CALCIUM SERPL-MCNC: 8.5 MG/DL (ref 8.7–10.5)
CHLORIDE SERPL-SCNC: 102 MMOL/L (ref 95–110)
CO2 SERPL-SCNC: 25 MMOL/L (ref 23–29)
CREAT SERPL-MCNC: 0.6 MG/DL (ref 0.5–1.4)
EST. GFR  (AFRICAN AMERICAN): >60 ML/MIN/1.73 M^2
EST. GFR  (NON AFRICAN AMERICAN): >60 ML/MIN/1.73 M^2
GLUCOSE SERPL-MCNC: 108 MG/DL (ref 70–110)
POTASSIUM SERPL-SCNC: 4 MMOL/L (ref 3.5–5.1)
SODIUM SERPL-SCNC: 135 MMOL/L (ref 136–145)

## 2019-08-29 PROCEDURE — 97116 GAIT TRAINING THERAPY: CPT | Mod: HCNC

## 2019-08-29 PROCEDURE — 97110 THERAPEUTIC EXERCISES: CPT | Mod: HCNC

## 2019-08-29 PROCEDURE — 80048 BASIC METABOLIC PNL TOTAL CA: CPT | Mod: HCNC

## 2019-08-29 PROCEDURE — 25000003 PHARM REV CODE 250: Mod: HCNC | Performed by: HOSPITALIST

## 2019-08-29 PROCEDURE — 63600175 PHARM REV CODE 636 W HCPCS: Mod: HCNC | Performed by: HOSPITALIST

## 2019-08-29 PROCEDURE — 97535 SELF CARE MNGMENT TRAINING: CPT | Mod: HCNC

## 2019-08-29 PROCEDURE — 36415 COLL VENOUS BLD VENIPUNCTURE: CPT | Mod: HCNC

## 2019-08-29 RX ORDER — LEVOTHYROXINE SODIUM 50 UG/1
50 CAPSULE ORAL DAILY
Qty: 30 TABLET | Refills: 11 | Status: SHIPPED | OUTPATIENT
Start: 2019-08-29 | End: 2019-09-28

## 2019-08-29 RX ORDER — CETIRIZINE HYDROCHLORIDE 5 MG/1
5 TABLET ORAL DAILY
Refills: 0 | COMMUNITY
Start: 2019-08-30 | End: 2020-03-11

## 2019-08-29 RX ORDER — OXYCODONE HCL 20 MG/1
20 TABLET, FILM COATED, EXTENDED RELEASE ORAL EVERY 12 HOURS
Qty: 14 TABLET | Refills: 0 | Status: SHIPPED | OUTPATIENT
Start: 2019-08-29 | End: 2019-09-05

## 2019-08-29 RX ORDER — FLUTICASONE PROPIONATE 50 MCG
2 SPRAY, SUSPENSION (ML) NASAL DAILY
Qty: 16 G | Refills: 0 | Status: SHIPPED | OUTPATIENT
Start: 2019-08-30 | End: 2019-09-29

## 2019-08-29 RX ORDER — CLINDAMYCIN PHOSPHATE 10 MG/G
GEL TOPICAL 2 TIMES DAILY
Qty: 30 G | Refills: 0 | Status: ON HOLD | OUTPATIENT
Start: 2019-08-29 | End: 2019-09-25 | Stop reason: HOSPADM

## 2019-08-29 RX ORDER — DULOXETIN HYDROCHLORIDE 60 MG/1
60 CAPSULE, DELAYED RELEASE ORAL DAILY
Qty: 30 CAPSULE | Refills: 11 | Status: SHIPPED | OUTPATIENT
Start: 2019-08-30 | End: 2020-09-22

## 2019-08-29 RX ORDER — FLUTICASONE PROPIONATE 50 MCG
SPRAY, SUSPENSION (ML) NASAL
Qty: 48 ML | Refills: 0 | OUTPATIENT
Start: 2019-08-29

## 2019-08-29 RX ORDER — GUAIFENESIN 600 MG/1
600 TABLET, EXTENDED RELEASE ORAL 2 TIMES DAILY
COMMUNITY
Start: 2019-08-29 | End: 2020-03-11

## 2019-08-29 RX ORDER — METOCLOPRAMIDE 10 MG/1
10 TABLET ORAL
Qty: 120 TABLET | Refills: 1 | Status: ON HOLD | OUTPATIENT
Start: 2019-08-29 | End: 2019-09-25 | Stop reason: HOSPADM

## 2019-08-29 RX ADMIN — CETIRIZINE HYDROCHLORIDE 5 MG: 5 TABLET ORAL at 08:08

## 2019-08-29 RX ADMIN — ATENOLOL 25 MG: 25 TABLET ORAL at 08:08

## 2019-08-29 RX ADMIN — DULOXETINE 60 MG: 30 CAPSULE, DELAYED RELEASE ORAL at 08:08

## 2019-08-29 RX ADMIN — TRAMADOL HYDROCHLORIDE 50 MG: 50 TABLET, FILM COATED ORAL at 01:08

## 2019-08-29 RX ADMIN — GUAIFENESIN 600 MG: 600 TABLET, EXTENDED RELEASE ORAL at 08:08

## 2019-08-29 RX ADMIN — METOCLOPRAMIDE HYDROCHLORIDE 10 MG: 10 TABLET ORAL at 11:08

## 2019-08-29 RX ADMIN — FLUTICASONE PROPIONATE 100 MCG: 50 SPRAY, METERED NASAL at 08:08

## 2019-08-29 RX ADMIN — METOCLOPRAMIDE HYDROCHLORIDE 10 MG: 10 TABLET ORAL at 06:08

## 2019-08-29 RX ADMIN — PANTOPRAZOLE SODIUM 40 MG: 40 TABLET, DELAYED RELEASE ORAL at 08:08

## 2019-08-29 RX ADMIN — HYDROCORTISONE 2.5%: 25 CREAM TOPICAL at 08:08

## 2019-08-29 RX ADMIN — LEVOTHYROXINE SODIUM 25 MCG: 25 TABLET ORAL at 08:08

## 2019-08-29 RX ADMIN — OXYCODONE HYDROCHLORIDE 20 MG: 10 TABLET, FILM COATED, EXTENDED RELEASE ORAL at 08:08

## 2019-08-29 RX ADMIN — LORAZEPAM 1 MG: 2 INJECTION INTRAMUSCULAR; INTRAVENOUS at 11:08

## 2019-08-29 RX ADMIN — LORAZEPAM 1 MG: 2 INJECTION INTRAMUSCULAR; INTRAVENOUS at 05:08

## 2019-08-29 NOTE — PLAN OF CARE
Problem: Adult Inpatient Plan of Care  Goal: Plan of Care Review  Outcome: Ongoing (interventions implemented as appropriate)     08/29/19 1226   Plan of Care Review   Plan of Care Reviewed With patient   Progress improving     Patient oriented x3 w/ intermittent confusion.  IVF initiated. VSS WNL for patient; bleeding precautions in place. Tolerating to cardiac diet.  Urinary urgency; bed alarm set. Level 3 w/ standby assistance.  Frequent checks made. Pain and anxiety managed w/ prn medication.  Will continue to monitor.

## 2019-08-29 NOTE — PLAN OF CARE
[facility]    HOME HEALTH ORDERS  FACE TO FACE ENCOUNTER    Patient Name: Estella Arevalo  YOB: 1955    PCP: Angela Packer MD   PCP Address: [address] [place] [address]  PCP Phone Number: [phone]  PCP Fax: [phone]    Encounter Date: 08/29/2019    Admit to Home Health    Diagnoses:  Active Hospital Problems    Diagnosis  POA    Rectal prolapse [K62.3]  Yes    Polysubstance abuse [F19.10]  Yes    Substance induced mood disorder [F19.94]  Yes    Anemia of chronic disease [D63.8]  Yes     Chronic    Essential hypertension [I10]  Yes     Chronic    Fall [W19.XXXA]  Yes    Acquired hypothyroidism [E03.9]  Yes     Chronic      Resolved Hospital Problems    Diagnosis Date Resolved POA    *Hyponatremia [E87.1] 08/29/2019 Yes     Priority: 1 - High       No future appointments.  Follow-up Information     Angela Packer MD In 1 week.    Specialties:  Family Medicine, Wound Care  Contact information:  [address] [place] [id number] [phone]                     I have seen and examined this patient face to face today. My clinical findings that support the need for the home health skilled services and home bound status are the following:  Weakness/numbness causing balance and gait disturbance due to Weakness/Debility and Dehydration making it taxing to leave home.    Allergies:Review of patient's allergies indicates:  No Known Allergies    Diet: cardiac diet    Activities: activity as tolerated    Nursing:   SN to complete comprehensive assessment including routine vital signs. Instruct on disease process and s/s of complications to report to MD. Review/verify medication list sent home with the patient at time of discharge  and instruct patient/caregiver as needed. Frequency may be adjusted depending on start of care date.    Notify MD if SBP > 160 or < 90; DBP > 90 or < 50; HR > 120 or < 50; Temp > 101; Other:         CONSULTS:    Physical Therapy  to evaluate and treat. Evaluate for home safety and equipment needs; Establish/upgrade home exercise program. Perform / instruct on therapeutic exercises, gait training, transfer training, and Range of Motion.  Occupational Therapy to evaluate and treat. Evaluate home environment for safety and equipment needs. Perform/Instruct on transfers, ADL training, ROM, and therapeutic exercises.  Aide to provide assistance with personal care, ADLs, and vital signs.    Medications: Review discharge medications with patient and family and provide education.      Current Discharge Medication List      START taking these medications    Details   cetirizine (ZYRTEC) 5 MG tablet Take 1 tablet (5 mg total) by mouth once daily.  Refills: 0      fluticasone propionate (FLONASE) 50 mcg/actuation nasal spray 2 sprays (100 mcg total) by Each Nostril route once daily.  Qty: 16 g, Refills: 0      guaiFENesin (MUCINEX) 600 mg 12 hr tablet Take 1 tablet (600 mg total) by mouth 2 (two) times daily.         CONTINUE these medications which have CHANGED    Details   DULoxetine (CYMBALTA) 60 MG capsule Take 1 capsule (60 mg total) by mouth once daily.  Qty: 30 capsule, Refills: 11      levothyroxine (TIROSINT) 50 mcg Cap Take 50 mcg by mouth once daily.  Qty: 30 tablet, Refills: 11    Comments: **Patient requests 90 days supply**  Associated Diagnoses: Hypothyroidism due to acquired atrophy of thyroid      metoclopramide HCl (REGLAN) 10 MG tablet Take 1 tablet (10 mg total) by mouth 4 (four) times daily before meals and nightly.  Qty: 120 tablet, Refills: 1      oxyCODONE (OXYCONTIN) 20 mg 12 hr tablet Take 1 tablet (20 mg total) by mouth every 12 (twelve) hours. for 7 days  Qty: 14 tablet, Refills: 0         CONTINUE these medications which have NOT CHANGED    Details   atenolol (TENORMIN) 25 MG tablet Take 1 tablet (25 mg total) by mouth once daily.  Qty: 30 tablet, Refills: 3    Associated Diagnoses: Essential hypertension      cycloSPORINE  (RESTASIS) 0.05 % ophthalmic emulsion Place 0.4 mLs (1 drop total) into both eyes 2 (two) times daily.  Qty: 30 each, Refills: 2    Associated Diagnoses: Dry eye syndrome, unspecified laterality      ibuprofen (ADVIL,MOTRIN) 600 MG tablet Take 1 tablet (600 mg total) by mouth every 6 (six) hours as needed for Pain.  Qty: 24 tablet, Refills: 0      JUBLIA 10 % Eran APPLY ONE DOSE D  Qty: 8 mL, Refills: 0      NARCAN 4 mg/actuation Spry USE UTD  Refills: 0      nitroGLYCERIN (NITROSTAT) 0.3 MG SL tablet ONE TABLET UNDER TONGUE AS NEEDED FOR CHEST PAIN EVERY 5 MINUTES AS NEEDED  Qty: 100 tablet, Refills: 0    Associated Diagnoses: Coronary artery disease, angina presence unspecified, unspecified vessel or lesion type, unspecified whether native or transplanted heart      pantoprazole (PROTONIX) 40 MG tablet Take 1 tablet (40 mg total) by mouth once daily.  Qty: 30 tablet, Refills: 0      PROCTOZONE-HC 2.5 % rectal cream STACI RECTALLY BID  Qty: 60 g, Refills: 12         STOP taking these medications       amitriptyline (ELAVIL) 50 MG tablet Comments:   Reason for Stopping:         gabapentin (NEURONTIN) 300 MG capsule Comments:   Reason for Stopping:         linaclotide (LINZESS) 145 mcg Cap capsule Comments:   Reason for Stopping:         ondansetron (ZOFRAN) 4 MG tablet Comments:   Reason for Stopping:         PROCTOFOAM HC rectal foam Comments:   Reason for Stopping:         promethazine (PHENERGAN) 50 MG suppository Comments:   Reason for Stopping:         promethazine (PHENERGAN) 50 MG tablet Comments:   Reason for Stopping:         ranitidine (ZANTAC) 300 MG tablet Comments:   Reason for Stopping:         ranitidine (ZANTAC) 300 MG tablet Comments:   Reason for Stopping:         sucralfate (CARAFATE) 1 gram tablet Comments:   Reason for Stopping:               I certify that this patient is confined to her home and needs intermittent skilled nursing care, physical therapy and occupational therapy.

## 2019-08-29 NOTE — PT/OT/SLP PROGRESS
Physical Therapy Treatment/Discharge summary    Patient Name:  Estella Arevalo   MRN:  [id number]    Recommendations:     Discharge Recommendations:  home health PT   Discharge Equipment Recommendations: walker, rolling   Barriers to discharge: None    Assessment:     Estella Arevalo is a 64 y.o. female admitted with a medical diagnosis of Hyponatremia.  She presents with the following impairments/functional limitations:  impaired functional mobilty, impaired balance, decreased safety awareness, impaired coordination, gait instability, impaired endurance, impaired self care skills, pain Goals not met, but adequate enough for home with family assisst/supervision.    Rehab Prognosis: Good; patient would benefit from HHPT services to address these deficits and reach maximum level of function.    Recent Surgery: * No surgery found *      Plan:     During this hospitalization, patient to be seen (N/A, pt to be D/C'd home) to address the identified rehab impairments via (N/A, pt to be D/C'd home today) and progress toward the following goals:    · Plan of Care Expires:  08/29/19    Subjective     Chief Complaint: back pain  Patient/Family Comments/goals: to go home  Pain/Comfort:  · Pain Rating 1: (not rated)  · Location 1: back  · Pain Addressed 1: Reposition, Distraction, Cessation of Activity      Objective:     Communicated with nsg prior to session.  Patient found HOB elevated with bed alarm, telemetry upon PT entry to room.     General Precautions: Standard, fall   Orthopedic Precautions:N/A   Braces: N/A     Functional Mobility:  · Bed Mobility:     · Scooting: modified independence  · Supine to Sit: modified independence  · Transfers:     · Sit to Stand:  supervision and with VC for hadn placement/safety with rolling walker  · Gait: Sup/Mod I with RW x 250' with VC for walker management/safety  · Balance: Fair+      AM-PAC 6 CLICK MOBILITY  Turning over in bed (including adjusting bedclothes, sheets and blankets)?:  4  Sitting down on and standing up from a chair with arms (e.g., wheelchair, bedside commode, etc.): 3  Moving from lying on back to sitting on the side of the bed?: 4  Moving to and from a bed to a chair (including a wheelchair)?: 3  Need to walk in hospital room?: 3  Climbing 3-5 steps with a railing?: 3  Basic Mobility Total Score: 20       Therapeutic Activities and Exercises:   B LE there ex handout dispensed and reviewed with patient for HEP.  Pt verbalized/demonstrated understanding    Patient left up in chair with call button in reach and nsg notified..    GOALS:   Multidisciplinary Problems     Physical Therapy Goals        Problem: Physical Therapy Goal    Goal Priority Disciplines Outcome Goal Variances Interventions   Physical Therapy Goal     PT, PT/OT Unable to achieve outcome(s) by discharge     Description:  Goals to be met by: 2019     Patient will increase functional independence with mobility by performin. Sit to stand transfer with Modified [place]  2. Gait  x 150 feet with Modified [place] using Rolling Walker.   3. Lower extremity exercise program x10 reps per handout, with supervision                      Time Tracking:     PT Received On: 19  PT Start Time: 952     PT Stop Time: 1015  PT Total Time (min): 23 min     Billable Minutes: Gait Training 15 and Therapeutic Exercise 8    Treatment Type: Treatment  PT/PTA: PT     PTA Visit Number: 0     Ann Kaye, PT  2019

## 2019-08-29 NOTE — PROGRESS NOTES
HOW TO MANAGE YOUR CARE  AT HOME:  TN taught Symptoms and Problems for HYPOTHYROIDISM home care with pt and   with teach back:  1. FATIQUE, 2.FAST HEART RATE TN placed education sheet in [employer] Packet..     HELP AT HOME TO ASSIST WITH PATIENT'S RECOVERY IS SPOUSE/GISELLA      TN taught patient about things she is responsible for when discharged TO HELP WITH HER RECOVERY:   How to Manage Her Care At Home:  1. Getting her prescriptions filled.  2. Taking her medications as directed. DO NOT MISS ANY DOSES!  3. Going to her follow-up doctor appointments.   .  Joaquina Oleary, RN, BSN, STN CCM

## 2019-08-29 NOTE — SUBJECTIVE & OBJECTIVE
Interval History: pt more alert, c/o pain     Review of Systems   Constitutional: Positive for fatigue.   HENT: Positive for congestion and sinus pressure.    Eyes: Positive for itching.   Respiratory: Positive for cough.    Cardiovascular: Negative.    Gastrointestinal: Positive for diarrhea, nausea, rectal pain and vomiting.   Endocrine: Negative.    Genitourinary: Negative.    Musculoskeletal: Positive for back pain and gait problem.   Skin: Negative.    Neurological: Positive for light-headedness.   Hematological: Bruises/bleeds easily.   Psychiatric/Behavioral: Positive for decreased concentration, dysphoric mood and sleep disturbance.     Objective:     Vital Signs (Most Recent):  Temp: 98 °F (36.7 °C) (08/28/19 1651)  Pulse: 64 (08/28/19 1651)  Resp: 20 (08/28/19 1651)  BP: 109/68 (08/28/19 1651)  SpO2: 96 % (08/28/19 1651) Vital Signs (24h Range):  Temp:  [98 °F (36.7 °C)-99 °F (37.2 °C)] 98 °F (36.7 °C)  Pulse:  [64-87] 64  Resp:  [18-20] 20  SpO2:  [96 %-97 %] 96 %  BP: (108-135)/(67-85) 109/68     Weight: 89.2 kg (196 lb 10.4 oz)  Body mass index is 31.74 kg/m².    Intake/Output Summary (Last 24 hours) at 8/28/2019 2014  Last data filed at 8/28/2019 1730  Gross per 24 hour   Intake 2175 ml   Output --   Net 2175 ml      Physical Exam   Constitutional: She is oriented to person, place, and time. She appears well-developed and well-nourished.   HENT:   Head: Normocephalic.   Mouth/Throat: Oropharynx is clear and moist.   Eyes: Pupils are equal, round, and reactive to light. EOM are normal.   Neck: Normal range of motion. Neck supple. No JVD present. No thyromegaly present.   Cardiovascular: Normal rate, regular rhythm, normal heart sounds and intact distal pulses.   Pulmonary/Chest: Breath sounds normal. She has no rales.   Abdominal: Soft. Bowel sounds are normal. She exhibits no distension. There is no tenderness. A hernia is present.   Musculoskeletal: She exhibits tenderness. She exhibits no edema.    Neurological: She is alert and oriented to person, place, and time. Coordination abnormal.   Skin: Skin is warm and dry. Capillary refill takes 2 to 3 seconds.   Psychiatric: Her behavior is normal.   Delayed in response to questions       Significant Labs:   BMP:   Recent Labs   Lab 08/27/19  0436  08/28/19  0943   *   < > 102   *   < > 133*   K 4.2   < > 4.6   CL 89*   < > 103   CO2 22*   < > 24   BUN 8   < > 7*   CREATININE 0.6   < > 0.7   CALCIUM 8.4*   < > 8.3*   MG 1.8  --   --     < > = values in this interval not displayed.       Significant Imaging: I have reviewed and interpreted all pertinent imaging results/findings within the past 24 hours.

## 2019-08-29 NOTE — PLAN OF CARE
"   08/29/19 1241   Post-Acute Status   Post-Acute Authorization Home Health/Hospice   Home Health/Hospice Status Set-up Complete   Per [facility] patient accepted by [facility].    Education on hypothyroidism reinforced.  Patient stated, "I need to take my medicine as ordered and follow up with my doctor".      TN also provided Endocrinologist Contact information if needed.  "

## 2019-08-29 NOTE — PROGRESS NOTES
[facility] CASE MANAGEMENT                  WRITTEN DISCHARGE INFORMATION      APPOINTMENTS AND RESOURCES TO HELP YOU MANAGE YOUR CARE AT HOME BASED ON YOUR PREFERENCES:  (If an appointment is not scheduled for you when you leave the hospital, call your doctor to schedule a follow up visit within a week)    Follow-up Information     Angela Packer MD On 9/5/2019.    Specialties:  Family Medicine, Wound Care  Why:  @3pm to see Dr Loja for Hospital Follow up  Contact information:  [address]  [place] [address] [phone]             [facility] [place].    Specialty:  Home Health Services  Contact information:  [address]  [address]  [place] [id number] [phone]             [facility].    Specialty:  DME Provider  Why:  DME FOR ROLLING WALKER  Contact information:  [address] [place] [address] [phone]                   Healthy Living Instructions to HELP MANAGE YOUR CARE AT HOME:  Things You are responsible for:  1.    Getting your prescriptions filled   2.    Taking your medications as directed, DO NOT MISS ANY DOSES!  3.    Following the diet and exercise recommended by your doctor  4.    Going to your follow-up doctor appointment. This is important because it allows the doctor to monitor your progress and determine if any changes need to made to your treatment plan.  5. If you have any questions about MANAGING YOUR CARE AT HOME Call the Nurse Care Line for 24/7 Assistance [phone]       Please answer any calls you may receive from [facility]. We want to continue to support you as you manage your healthcare needs. [facility] is happy to have the opportunity to serve you.      Thank you for choosing [facility] for your healthcare needs!  Your [facility] Case Management Team,

## 2019-08-29 NOTE — NURSING
Removed IV access and telemetry monitor; notified telemetry.  VSS WNL for patient.  Patient calm and alert; voiding w/o difficulty.  Reviewed discharge instructions w/ patient. Provided scripts as well.  Will continue to monitor.

## 2019-08-29 NOTE — PLAN OF CARE
08/29/19 1244   Final Note   Assessment Type Final Discharge Note   Anticipated Discharge Disposition Home-Health   Hospital Follow Up  Appt(s) scheduled? Yes   Discharge plans and expectations educations in teach back method with documentation complete? Yes   [facility] Referral Info   Post Acute Recommendation Home-care   Referral Type    Facility Name [facility]

## 2019-08-29 NOTE — PLAN OF CARE
Problem: Occupational Therapy Goal  Goal: Occupational Therapy Goal  Goals to be met by: 09/11/19     Patient will increase functional independence with ADLs by performing:    LE Dressing with Supervision. Met 8/29  Grooming while standing at sink with Supervision. Met 8/29  Toileting from toilet with Supervision for hygiene and clothing management.   Supine to sit with Supervision.  Step transfer with Supervision Met 8/29  Toilet transfer to toilet with Supervision.  Upper extremity exercise program x15 reps per handout, with independence. Met     Outcome: Ongoing (interventions implemented as appropriate)  Pt progressing, continue with OT POC as indicated.

## 2019-08-29 NOTE — PROGRESS NOTES
[facility] Hospital Medicine  Progress Note    Patient Name: Estella Arevalo  MRN: [id number]  Patient Class: IP- Inpatient   Admission Date: 8/27/2019  Length of Stay: 1 days  Attending Physician: Daisy Mcdonough MD  Primary Care Provider: Angela Packer MD        Subjective:     Principal Problem:Hyponatremia        HPI:  In ED:  63 yo female presents via [facility] with nausea, vomiting, rectal pain, and rectal prolapse. Patient reports acute onset of nausea and vomiting this evening. She reports rectal pain and rectal prolapse for several hours. Usually she can push her rectum back in but tonight she could not. Patient reports bright red blood from her rectal prolapse.     Patient also reports that she tripped over her own feet in a store 2 days ago (Saturday 8/24/19). She struck her forehead on the floor and twisted her left knee. She has been ambulatory but has noticed significant bruising to her left knee and left calf. She also reports bruising and swelling to the bridge of her nose; patient had a nosebleed when she fell but has not had any since. No neck pain. No numbness/weakness. No LOC.   Patient is a poor historian.   I discussed patient with her , Mr. Hammad Arevalo, [phone]. He reports that patient drinks every day, last yesterday morning (Monday 8/26/19). He corroborates the story of her fall. He reports she is chronically poorly compliant with medications.   Denies recent EtOH use.     Pt admitted to hospital medicine for hyponatremia. Pt reports running out of pain medications and likely all of them as I went through her list of medications. PT/OT consulted for dc planning.     Overview/Hospital Course:  Pt admitted with hyponatremia. Improved with NS which suggest dehydration from GI losses. Now reporting abdominal pain from gastroparesis. Mental status improved and she requesting to be  Back on her usual meds for pain.  Pt needs outpatient therapy/counseling for  ETOH abuse and addiction to narcotics. She reports running out of her pain meds and will be seeing a new MD soon.     Interval History: pt more alert, c/o pain     Review of Systems   Constitutional: Positive for fatigue.   HENT: Positive for congestion and sinus pressure.    Eyes: Positive for itching.   Respiratory: Positive for cough.    Cardiovascular: Negative.    Gastrointestinal: Positive for diarrhea, nausea, rectal pain and vomiting.   Endocrine: Negative.    Genitourinary: Negative.    Musculoskeletal: Positive for back pain and gait problem.   Skin: Negative.    Neurological: Positive for light-headedness.   Hematological: Bruises/bleeds easily.   Psychiatric/Behavioral: Positive for decreased concentration, dysphoric mood and sleep disturbance.     Objective:     Vital Signs (Most Recent):  Temp: 98 °F (36.7 °C) (08/28/19 1651)  Pulse: 64 (08/28/19 1651)  Resp: 20 (08/28/19 1651)  BP: 109/68 (08/28/19 1651)  SpO2: 96 % (08/28/19 1651) Vital Signs (24h Range):  Temp:  [98 °F (36.7 °C)-99 °F (37.2 °C)] 98 °F (36.7 °C)  Pulse:  [64-87] 64  Resp:  [18-20] 20  SpO2:  [96 %-97 %] 96 %  BP: (108-135)/(67-85) 109/68     Weight: 89.2 kg (196 lb 10.4 oz)  Body mass index is 31.74 kg/m².    Intake/Output Summary (Last 24 hours) at 8/28/2019 2014  Last data filed at 8/28/2019 1730  Gross per 24 hour   Intake 2175 ml   Output --   Net 2175 ml      Physical Exam   Constitutional: She is oriented to person, place, and time. She appears well-developed and well-nourished.   HENT:   Head: Normocephalic.   Mouth/Throat: Oropharynx is clear and moist.   Eyes: Pupils are equal, round, and reactive to light. EOM are normal.   Neck: Normal range of motion. Neck supple. No JVD present. No thyromegaly present.   Cardiovascular: Normal rate, regular rhythm, normal heart sounds and intact distal pulses.   Pulmonary/Chest: Breath sounds normal. She has no rales.   Abdominal: Soft. Bowel sounds are normal. She exhibits no  distension. There is no tenderness. A hernia is present.   Musculoskeletal: She exhibits tenderness. She exhibits no edema.   Neurological: She is alert and oriented to person, place, and time. Coordination abnormal.   Skin: Skin is warm and dry. Capillary refill takes 2 to 3 seconds.   Psychiatric: Her behavior is normal.   Delayed in response to questions       Significant Labs:   BMP:   Recent Labs   Lab 08/27/19  0436  08/28/19  0943   *   < > 102   *   < > 133*   K 4.2   < > 4.6   CL 89*   < > 103   CO2 22*   < > 24   BUN 8   < > 7*   CREATININE 0.6   < > 0.7   CALCIUM 8.4*   < > 8.3*   MG 1.8  --   --     < > = values in this interval not displayed.       Significant Imaging: I have reviewed and interpreted all pertinent imaging results/findings within the past 24 hours.      Assessment/Plan:      * Hyponatremia  Multifactorial  - per patient - multiple days of N/V and diarrhea - appears chronic and possibly serotonin syndrome with cymbalta and elavil combo. Also, undermines ETOH intake which also contributing.     Complete thyroid workup   NS IVF  BMP q 4 hours  Advance diet      Rectal prolapse  Reduced in ED  Resume rectal hydrocortisone suppository       Polysubstance abuse  Pt reports running out of oxy pills but + opiates on UDS - takes her husbands meds  Was seeing a pain specialist but reports has to pay cash for pain pills - needs rehab         Substance induced mood disorder  Resume cymbalta  Dc elavil  Counseled on drug use with narcotics and benzos and amphetamines  - not sure where she is getting these - needs outpatient counseling and assistance       Anemia of chronic disease    Stable  monitor    Essential hypertension  Controlled  Resume atenolol       Fall    Likely due to polysubstance abuse  Decrease oxycontin   Benzo and amphetamines on UDS - not on list of home meds ?? - appears to take husbands medications  + ETOH abuse per spouse report       PT/OT consulted for dc  planning        Acquired hypothyroidism  FT4, T3 pending  Resume synthroid          VTE Risk Mitigation (From admission, onward)        Ordered     enoxaparin injection 40 mg  Daily      08/27/19 1534     Place MARINA hose  Until discontinued      08/27/19 1101     Place sequential compression device  Until discontinued      08/27/19 1101     IP VTE LOW RISK PATIENT  Once      08/27/19 1101                Daisy Mcdonough MD  Department of Hospital Medicine   [facility]

## 2019-08-29 NOTE — PT/OT/SLP PROGRESS
Occupational Therapy   Treatment    Name: Estella Arevalo  MRN: [id number]  Admitting Diagnosis:  Hyponatremia       Recommendations:     Discharge Recommendations: home health OT(with 24 hr supervision)  Discharge Equipment Recommendations:  (TBD )  Barriers to discharge:       Assessment:     Estella Arevalo is a 64 y.o. female with a medical diagnosis of Hyponatremia.  She presents with the following performance deficits affecting function: weakness, impaired endurance, impaired self care skills, impaired functional mobilty, gait instability, impaired balance, decreased safety awareness, decreased upper extremity function, decreased ROM, pain. Patient able to perform grooming tasks standing at sink with supervision today. Pt progressing well toward OT goals, d/c today with HH.    Rehab Prognosis:  Good; patient would benefit from acute skilled OT services to address these deficits and reach maximum level of function.       Plan:     Patient to be seen 3 x/week to address the above listed problems via self-care/home management, therapeutic activities, therapeutic exercises  · Plan of Care Expires: 09/11/19  · Plan of Care Reviewed with: patient    Subjective   Pt agreeable to therapy.  Pain/Comfort:  · Pain Rating 1: (did not rate)  · Location 1: back    Objective:     Communicated with: Nurse prior to session.  Patient found seated EOB with bed alarm, telemetry, peripheral IV upon OT entry to room.    General Precautions: Standard, fall   Orthopedic Precautions:N/A   Braces: N/A     Occupational Performance:     Bed Mobility:    · Not performed    Functional Mobility/Transfers:  · Patient completed Sit <> Stand Transfer with supervision  with  rolling walker   · Patient completed Bed <> Chair Transfer using Step Transfer technique with supervision with rolling walker  · Functional Mobility: Pt able to ambulate to sink and chair with supervision/RW,VCs for safe management with walker.    Activities of Daily  Living:  · Grooming: supervision to perform oral care, wash face, and comb hair while standing at sink  · Lower Body Dressing: supervision to britt/doff socks seated EOB    [facility] 6 Click ADL: 22    Treatment & Education:  -Pt educated on safety with all transfers, mobility, and ADL's.  -Pt educated on BUE HEP with red theraband via handout, verbal instruction, and demo. Pt able to perform x 10 reps elbow flex, elbow ext, horizontal abd, and external rotation. Limited with shoulder therex due to pervious shoulder injury. Pt verbalizes understanding, placed HEP in blue discharge folder.    Patient left up in chair with all lines intact, call button in reach and nurse Bulmaro presentEducation:      GOALS:   Multidisciplinary Problems     Occupational Therapy Goals        Problem: Occupational Therapy Goal    Goal Priority Disciplines Outcome Interventions   Occupational Therapy Goal     OT, PT/OT Ongoing (interventions implemented as appropriate)    Description:  Goals to be met by: 09/11/19     Patient will increase functional independence with ADLs by performing:    LE Dressing with Supervision. Met 8/29  Grooming while standing at sink with Supervision. Met 8/29  Toileting from toilet with Supervision for hygiene and clothing management.   Supine to sit with Supervision.  Step transfer with Supervision Brianna 8/29  Toilet transfer to toilet with Supervision.  Upper extremity exercise program x15 reps per handout, with independence. 8/29                      Time Tracking:     OT Date of Treatment: 08/29/19  OT Start Time: 1037  OT Stop Time: 1103  OT Total Time (min): 26 min    Billable Minutes:Self Care/Home Management 16  Therapeutic Exercise 10    FRED Díaz  8/29/2019

## 2019-08-29 NOTE — PLAN OF CARE
Problem: Physical Therapy Goal  Goal: Physical Therapy Goal  Goals to be met by: 2019     Patient will increase functional independence with mobility by performin. Sit to stand transfer with Modified [place]  2. Gait  x 150 feet with Modified [place] using Rolling Walker.   3. Lower extremity exercise program x10 reps per handout, with supervision     Outcome: Unable to achieve outcome(s) by discharge  Goals not met, but adequate for home with family assist supervision and HHPT

## 2019-08-29 NOTE — HOSPITAL COURSE
Pt admitted with hyponatremia. Improved with NS which suggest dehydration from GI losses. Now reporting abdominal pain from gastroparesis. Mental status improved and she requesting to be  Back on her usual meds for pain.  Pt needs outpatient therapy/counseling for ETOH abuse and addiction to narcotics. She reports running out of her pain meds and will be seeing a new MD soon.     Prescription for oxycodoen x 7 days given until she can be seen by pain specialist. Rolling walker ordered per PT/OT  Recs. Needs follow up with endocrinology for hypothyroidism and abnormal labs. Pt counseled on etoh use and narcotics. Does not seem to have an interest in quitting either one. DC home.

## 2019-08-30 NOTE — PT/OT/SLP DISCHARGE
Occupational Therapy Discharge Summary    Estella Arevalo  MRN: [id number]   Principal Problem: Hyponatremia      Patient Discharged from acute Occupational Therapy on 08/30/19.  Please refer to prior OT notes for functional status.    Assessment:      Patient appropriate for care in another setting.    Objective:     GOALS:   Multidisciplinary Problems     Occupational Therapy Goals        Problem: Occupational Therapy Goal    Goal Priority Disciplines Outcome Interventions   Occupational Therapy Goal     OT, PT/OT Ongoing (interventions implemented as appropriate)    Description:  Goals to be met by: 09/11/19     Patient will increase functional independence with ADLs by performing:    LE Dressing with Supervision. Met 8/29  Grooming while standing at sink with Supervision. Met 8/29  Toileting from toilet with Supervision for hygiene and clothing management.   Supine to sit with Supervision.  Step transfer with Supervision Met 8/29  Toilet transfer to toilet with Supervision.  Upper extremity exercise program x15 reps per handout, with independence. Met                       Reasons for Discontinuation of Therapy Services  Transfer to alternate level of care.      Plan:     Patient Discharged to: Home with Home Health Service    Lisa Mena OT  8/30/2019

## 2019-09-16 NOTE — DISCHARGE SUMMARY
[facility] Hospital Medicine  Discharge Summary      Patient Name: Estella Arevalo  MRN: [id number]  Admission Date: 8/27/2019  Hospital Length of Stay: 2 days  Discharge Date and Time: 8/29/2019  Attending Physician: No att. providers found   Discharging Provider: Daisy Mcdonough MD  Primary Care Provider: Angela Packer MD      HPI:   In ED:  63 yo female presents via [facility] with nausea, vomiting, rectal pain, and rectal prolapse. Patient reports acute onset of nausea and vomiting this evening. She reports rectal pain and rectal prolapse for several hours. Usually she can push her rectum back in but tonight she could not. Patient reports bright red blood from her rectal prolapse.     Patient also reports that she tripped over her own feet in a store 2 days ago (Saturday 8/24/19). She struck her forehead on the floor and twisted her left knee. She has been ambulatory but has noticed significant bruising to her left knee and left calf. She also reports bruising and swelling to the bridge of her nose; patient had a nosebleed when she fell but has not had any since. No neck pain. No numbness/weakness. No LOC.   Patient is a poor historian.   I discussed patient with her , Mr. Hammad Arevalo, [phone]. He reports that patient drinks every day, last yesterday morning (Monday 8/26/19). He corroborates the story of her fall. He reports she is chronically poorly compliant with medications.   Denies recent EtOH use.     Pt admitted to hospital medicine for hyponatremia. Pt reports running out of pain medications and likely all of them as I went through her list of medications. PT/OT consulted for dc planning.     * No surgery found *      Hospital Course:   Pt admitted with hyponatremia. Improved with NS which suggest dehydration from GI losses. Now reporting abdominal pain from gastroparesis. Mental status improved and she requesting to be  Back on her usual meds for pain.  Pt needs  outpatient therapy/counseling for ETOH abuse and addiction to narcotics. She reports running out of her pain meds and will be seeing a new MD soon.     Prescription for oxycodoen x 7 days given until she can be seen by pain specialist. Rolling walker ordered per PT/OT  Recs. Needs follow up with endocrinology for hypothyroidism and abnormal labs. Pt counseled on etoh use and narcotics. Does not seem to have an interest in quitting either one. DC home.     Consults:     No new Assessment & Plan notes have been filed under this hospital service since the last note was generated.  Service: Hospital Medicine    Final Active Diagnoses:    Diagnosis Date Noted POA    Rectal prolapse [K62.3] 08/27/2019 Yes    Polysubstance abuse [F19.10] 01/04/2019 Yes    Substance induced mood disorder [F19.94] 09/12/2018 Yes    Anemia of chronic disease [D63.8] 09/12/2018 Yes     Chronic    Essential hypertension [I10] 07/16/2018 Yes     Chronic    Fall [W19.XXXA]  Yes    Acquired hypothyroidism [E03.9] 12/19/2014 Yes     Chronic      Problems Resolved During this Admission:    Diagnosis Date Noted Date Resolved POA    PRINCIPAL PROBLEM:  Hyponatremia [E87.1] 07/16/2018 08/29/2019 Yes       Discharged Condition: good    Disposition: Home-Health Care [facility]    Follow Up:  Follow-up Information     Angela Packer MD On 9/5/2019.    Specialties:  Family Medicine, Wound Care  Why:  @3pm to see Dr Loja for Hospital Follow up  Contact information:  [address] [facility] [place] [address] [phone]             [facility] [place].    Specialty:  Home Health Services  Contact information:  [address]  [address]  [place] [id number] [phone]             [facility].    Specialty:  DME Provider  Why:  DME FOR ROLLING WALKER  Contact information:  [address] [place] [address] [phone]             Coreen Saucedo MD.    Specialty:  Endocrinology  Why:  CALL FOR AN APPOINTMENT  "IF NEEDED  Contact information:  [address] [facility]  [address]  [place] [address]  [phone]                 Patient Instructions:      WALKER FOR HOME USE     Order Specific Question Answer Comments   Type of Walker: Adult (5'4"-6'6")    With wheels? Yes    Height: 5' 6" (1.676 m)    Weight: 89.2 kg (196 lb 10.4 oz)    Length of need (1-99 months): 99    Does patient have medical equipment at home? shower chair    Please check all that apply: Patient's condition impairs ambulation.    Please check all that apply: Patient is unable to safely ambulate without equipment.      Diet Cardiac     Notify your health care provider if you experience any of the following:  temperature >100.4     Notify your health care provider if you experience any of the following:  persistent dizziness, light-headedness, or visual disturbances     Notify your health care provider if you experience any of the following:  increased confusion or weakness     Activity as tolerated           Pending Diagnostic Studies:     None         Medications:  Reconciled Home Medications:      Medication List      START taking these medications    cetirizine 5 MG tablet  Commonly known as:  ZYRTEC  Take 1 tablet (5 mg total) by mouth once daily.     clindamycin phosphate 1% 1 % gel  Commonly known as:  CLINDAGEL  Apply topically 2 (two) times daily. for 7 days     fluticasone propionate 50 mcg/actuation nasal spray  Commonly known as:  FLONASE  2 sprays (100 mcg total) by Each Nostril route once daily.     guaiFENesin 600 mg 12 hr tablet  Commonly known as:  MUCINEX  Take 1 tablet (600 mg total) by mouth 2 (two) times daily.     levothyroxine 50 mcg Cap  Commonly known as:  TIROSINT  Take 50 mcg by mouth once daily.  Replaces:  levothyroxine 25 MCG tablet        CHANGE how you take these medications    DULoxetine 60 MG capsule  Commonly known as:  CYMBALTA  Take 1 capsule (60 mg total) by mouth once daily.  What changed:    · medication " strength  · how much to take  · additional instructions        CONTINUE taking these medications    atenolol 25 MG tablet  Commonly known as:  TENORMIN  Take 1 tablet (25 mg total) by mouth once daily.     cycloSPORINE 0.05 % ophthalmic emulsion  Commonly known as:  RESTASIS  Place 0.4 mLs (1 drop total) into both eyes 2 (two) times daily.     ibuprofen 600 MG tablet  Commonly known as:  ADVIL,MOTRIN  Take 1 tablet (600 mg total) by mouth every 6 (six) hours as needed for Pain.     JUBLIA 10 % Darlin  Generic drug:  efinaconazole  APPLY ONE DOSE D     metoclopramide HCl 10 MG tablet  Commonly known as:  REGLAN  Take 1 tablet (10 mg total) by mouth 4 (four) times daily before meals and nightly.     NARCAN 4 mg/actuation Spry  Generic drug:  naloxone  USE UTD     nitroGLYCERIN 0.3 MG SL tablet  Commonly known as:  NITROSTAT  ONE TABLET UNDER TONGUE AS NEEDED FOR CHEST PAIN EVERY 5 MINUTES AS NEEDED     pantoprazole 40 MG tablet  Commonly known as:  PROTONIX  Take 1 tablet (40 mg total) by mouth once daily.     PROCTOZONE-HC 2.5 % rectal cream  Generic drug:  hydrocortisone  STACI RECTALLY BID        STOP taking these medications    amitriptyline 50 MG tablet  Commonly known as:  ELAVIL     gabapentin 300 MG capsule  Commonly known as:  NEURONTIN     levothyroxine 25 MCG tablet  Commonly known as:  SYNTHROID  Replaced by:  levothyroxine 50 mcg Cap     linaCLOtide 145 mcg Cap capsule  Commonly known as:  LINZESS     ondansetron 4 MG tablet  Commonly known as:  ZOFRAN     oxyCODONE 20 mg 12 hr tablet  Commonly known as:  OXYCONTIN     PROCTOFOAM HC rectal foam  Generic drug:  hydrocortisone-pramoxine     promethazine 50 MG suppository  Commonly known as:  PHENERGAN     promethazine 50 MG tablet  Commonly known as:  PHENERGAN     ranitidine 300 MG tablet  Commonly known as:  ZANTAC     sucralfate 1 gram tablet  Commonly known as:  CARAFATE        ASK your doctor about these medications    oxyCODONE 20 mg 12 hr  tablet  Commonly known as:  OXYCONTIN  Take 1 tablet (20 mg total) by mouth every 12 (twelve) hours. for 7 days  Ask about: Should I take this medication?            Indwelling Lines/Drains at time of discharge:   Lines/Drains/Airways          None          Time spent on the discharge of patient: 40 minutes  Patient was seen and examined on the date of discharge and determined to be suitable for discharge.         Daisy Mcdonough MD  Department of Hospital Medicine  [facility]

## 2019-09-17 ENCOUNTER — PATIENT OUTREACH (OUTPATIENT)
Dept: ADMINISTRATIVE | Facility: OTHER | Age: 64
End: 2019-09-17

## 2019-09-24 ENCOUNTER — HOSPITAL ENCOUNTER (OUTPATIENT)
Facility: HOSPITAL | Age: 64
Discharge: HOME | End: 2019-09-25
Attending: EMERGENCY MEDICINE | Admitting: INTERNAL MEDICINE
Payer: MEDICARE

## 2019-09-24 DIAGNOSIS — R11.10 VOMITING: ICD-10-CM

## 2019-09-24 DIAGNOSIS — F11.20 POLYSUBSTANCE DEPENDENCE INCLUDING OPIOID TYPE DRUG, CONTINUOUS USE: Chronic | ICD-10-CM

## 2019-09-24 DIAGNOSIS — I10 ESSENTIAL HYPERTENSION: Chronic | ICD-10-CM

## 2019-09-24 DIAGNOSIS — R11.2 INTRACTABLE VOMITING WITH NAUSEA, UNSPECIFIED VOMITING TYPE: Primary | ICD-10-CM

## 2019-09-24 DIAGNOSIS — K92.0 HEMATEMESIS WITH NAUSEA: ICD-10-CM

## 2019-09-24 DIAGNOSIS — B18.2 CHRONIC HEPATITIS C WITHOUT HEPATIC COMA: Chronic | ICD-10-CM

## 2019-09-24 DIAGNOSIS — F19.20 POLYSUBSTANCE DEPENDENCE INCLUDING OPIOID TYPE DRUG, CONTINUOUS USE: Chronic | ICD-10-CM

## 2019-09-24 DIAGNOSIS — K70.30 ALCOHOLIC CIRRHOSIS, UNSPECIFIED WHETHER ASCITES PRESENT: ICD-10-CM

## 2019-09-24 DIAGNOSIS — K74.60 HEPATIC CIRRHOSIS, UNSPECIFIED HEPATIC CIRRHOSIS TYPE, UNSPECIFIED WHETHER ASCITES PRESENT: Chronic | ICD-10-CM

## 2019-09-24 DIAGNOSIS — F31.9 BIPOLAR 1 DISORDER: Chronic | ICD-10-CM

## 2019-09-24 LAB
ABO + RH BLD: NORMAL
ABO GROUP BLD: NORMAL
ALBUMIN SERPL BCP-MCNC: 4.6 G/DL (ref 3.5–5.2)
ALP SERPL-CCNC: 231 U/L (ref 55–135)
ALT SERPL W/O P-5'-P-CCNC: 25 U/L (ref 10–44)
AMPHET+METHAMPHET UR QL: NEGATIVE
ANION GAP SERPL CALC-SCNC: 15 MMOL/L (ref 8–16)
AST SERPL-CCNC: 44 U/L (ref 10–40)
BACTERIA #/AREA URNS HPF: ABNORMAL /HPF
BARBITURATES UR QL SCN>200 NG/ML: NEGATIVE
BASOPHILS # BLD AUTO: 0.01 K/UL (ref 0–0.2)
BASOPHILS NFR BLD: 0.4 % (ref 0–1.9)
BENZODIAZ UR QL SCN>200 NG/ML: NORMAL
BILIRUB SERPL-MCNC: 0.9 MG/DL (ref 0.1–1)
BILIRUB UR QL STRIP: NEGATIVE
BLD GP AB SCN CELLS X3 SERPL QL: NORMAL
BUN SERPL-MCNC: 9 MG/DL (ref 8–23)
BZE UR QL SCN: NEGATIVE
CALCIUM SERPL-MCNC: 9.5 MG/DL (ref 8.7–10.5)
CANNABINOIDS UR QL SCN: NEGATIVE
CHLORIDE SERPL-SCNC: 96 MMOL/L (ref 95–110)
CLARITY UR: CLEAR
CO2 SERPL-SCNC: 25 MMOL/L (ref 23–29)
COLOR UR: YELLOW
CREAT SERPL-MCNC: 0.7 MG/DL (ref 0.5–1.4)
CREAT UR-MCNC: 77.4 MG/DL (ref 15–325)
DIFFERENTIAL METHOD: ABNORMAL
EOSINOPHIL # BLD AUTO: 0 K/UL (ref 0–0.5)
EOSINOPHIL NFR BLD: 0 % (ref 0–8)
ERYTHROCYTE [DISTWIDTH] IN BLOOD BY AUTOMATED COUNT: 18.3 % (ref 11.5–14.5)
EST. GFR  (AFRICAN AMERICAN): >60 ML/MIN/1.73 M^2
EST. GFR  (NON AFRICAN AMERICAN): >60 ML/MIN/1.73 M^2
ETHANOL SERPL-MCNC: <10 MG/DL
GLUCOSE SERPL-MCNC: 153 MG/DL (ref 70–110)
GLUCOSE UR QL STRIP: ABNORMAL
HCT VFR BLD AUTO: 35.9 % (ref 37–48.5)
HGB BLD-MCNC: 11.6 G/DL (ref 12–16)
HGB UR QL STRIP: NEGATIVE
HYALINE CASTS #/AREA URNS LPF: 2 /LPF
KETONES UR QL STRIP: NEGATIVE
LACTATE SERPL-SCNC: 3.2 MMOL/L (ref 0.5–2.2)
LEUKOCYTE ESTERASE UR QL STRIP: NEGATIVE
LIPASE SERPL-CCNC: 53 U/L (ref 4–60)
LYMPHOCYTES # BLD AUTO: 0.2 K/UL (ref 1–4.8)
LYMPHOCYTES NFR BLD: 9.4 % (ref 18–48)
MCH RBC QN AUTO: 28.2 PG (ref 27–31)
MCHC RBC AUTO-ENTMCNC: 32.3 G/DL (ref 32–36)
MCV RBC AUTO: 87 FL (ref 82–98)
METHADONE UR QL SCN>300 NG/ML: NEGATIVE
MICROSCOPIC COMMENT: ABNORMAL
MONOCYTES # BLD AUTO: 0.2 K/UL (ref 0.3–1)
MONOCYTES NFR BLD: 8.5 % (ref 4–15)
NEUTROPHILS # BLD AUTO: 1.9 K/UL (ref 1.8–7.7)
NEUTROPHILS NFR BLD: 82.1 % (ref 38–73)
NITRITE UR QL STRIP: POSITIVE
OPIATES UR QL SCN: NORMAL
PCP UR QL SCN>25 NG/ML: NEGATIVE
PH UR STRIP: 7 [PH] (ref 5–8)
PLATELET # BLD AUTO: 262 K/UL (ref 150–350)
PMV BLD AUTO: 9.6 FL (ref 9.2–12.9)
POTASSIUM SERPL-SCNC: 3.3 MMOL/L (ref 3.5–5.1)
PROT SERPL-MCNC: 9 G/DL (ref 6–8.4)
PROT UR QL STRIP: ABNORMAL
RBC # BLD AUTO: 4.11 M/UL (ref 4–5.4)
RBC #/AREA URNS HPF: 0 /HPF (ref 0–4)
RH BLD: NORMAL
SODIUM SERPL-SCNC: 136 MMOL/L (ref 136–145)
SP GR UR STRIP: 1.01 (ref 1–1.03)
TOXICOLOGY INFORMATION: NORMAL
TROPONIN I SERPL DL<=0.01 NG/ML-MCNC: <0.006 NG/ML (ref 0–0.03)
URN SPEC COLLECT METH UR: ABNORMAL
UROBILINOGEN UR STRIP-ACNC: ABNORMAL EU/DL
WBC # BLD AUTO: 2.34 K/UL (ref 3.9–12.7)
WBC #/AREA URNS HPF: 4 /HPF (ref 0–5)

## 2019-09-24 PROCEDURE — 83605 ASSAY OF LACTIC ACID: CPT | Mod: HCNC

## 2019-09-24 PROCEDURE — 80307 DRUG TEST PRSMV CHEM ANLYZR: CPT | Mod: HCNC

## 2019-09-24 PROCEDURE — 99285 EMERGENCY DEPT VISIT HI MDM: CPT | Mod: 25,HCNC

## 2019-09-24 PROCEDURE — 96376 TX/PRO/DX INJ SAME DRUG ADON: CPT | Mod: HCNC

## 2019-09-24 PROCEDURE — 25000003 PHARM REV CODE 250: Mod: HCNC | Performed by: EMERGENCY MEDICINE

## 2019-09-24 PROCEDURE — 86850 RBC ANTIBODY SCREEN: CPT | Mod: HCNC

## 2019-09-24 PROCEDURE — 63600175 PHARM REV CODE 636 W HCPCS: Mod: HCNC | Performed by: EMERGENCY MEDICINE

## 2019-09-24 PROCEDURE — G0378 HOSPITAL OBSERVATION PER HR: HCPCS | Mod: HCNC

## 2019-09-24 PROCEDURE — 80053 COMPREHEN METABOLIC PANEL: CPT | Mod: HCNC

## 2019-09-24 PROCEDURE — 96375 TX/PRO/DX INJ NEW DRUG ADDON: CPT | Mod: HCNC

## 2019-09-24 PROCEDURE — 93010 EKG 12-LEAD: ICD-10-PCS | Mod: HCNC,,, | Performed by: INTERNAL MEDICINE

## 2019-09-24 PROCEDURE — 93005 ELECTROCARDIOGRAM TRACING: CPT | Mod: HCNC

## 2019-09-24 PROCEDURE — 96375 TX/PRO/DX INJ NEW DRUG ADDON: CPT

## 2019-09-24 PROCEDURE — 80320 DRUG SCREEN QUANTALCOHOLS: CPT | Mod: HCNC

## 2019-09-24 PROCEDURE — 84484 ASSAY OF TROPONIN QUANT: CPT | Mod: HCNC

## 2019-09-24 PROCEDURE — 96361 HYDRATE IV INFUSION ADD-ON: CPT

## 2019-09-24 PROCEDURE — 96361 HYDRATE IV INFUSION ADD-ON: CPT | Mod: HCNC

## 2019-09-24 PROCEDURE — 85025 COMPLETE CBC W/AUTO DIFF WBC: CPT | Mod: HCNC

## 2019-09-24 PROCEDURE — 93010 ELECTROCARDIOGRAM REPORT: CPT | Mod: HCNC,,, | Performed by: INTERNAL MEDICINE

## 2019-09-24 PROCEDURE — 81000 URINALYSIS NONAUTO W/SCOPE: CPT | Mod: HCNC,59

## 2019-09-24 PROCEDURE — 96365 THER/PROPH/DIAG IV INF INIT: CPT | Mod: HCNC

## 2019-09-24 PROCEDURE — 86900 BLOOD TYPING SEROLOGIC ABO: CPT | Mod: HCNC

## 2019-09-24 PROCEDURE — 86901 BLOOD TYPING SEROLOGIC RH(D): CPT | Mod: HCNC

## 2019-09-24 PROCEDURE — 83690 ASSAY OF LIPASE: CPT | Mod: HCNC

## 2019-09-24 PROCEDURE — C9113 INJ PANTOPRAZOLE SODIUM, VIA: HCPCS | Mod: HCNC | Performed by: EMERGENCY MEDICINE

## 2019-09-24 RX ORDER — FENTANYL CITRATE 50 UG/ML
50 INJECTION, SOLUTION INTRAMUSCULAR; INTRAVENOUS
Status: COMPLETED | OUTPATIENT
Start: 2019-09-24 | End: 2019-09-24

## 2019-09-24 RX ORDER — FENTANYL CITRATE 50 UG/ML
100 INJECTION, SOLUTION INTRAMUSCULAR; INTRAVENOUS
Status: DISCONTINUED | OUTPATIENT
Start: 2019-09-24 | End: 2019-09-24

## 2019-09-24 RX ORDER — SODIUM CHLORIDE 0.9 % (FLUSH) 0.9 %
10 SYRINGE (ML) INJECTION
Status: DISCONTINUED | OUTPATIENT
Start: 2019-09-24 | End: 2019-09-25

## 2019-09-24 RX ORDER — DIAZEPAM 10 MG/2ML
5 INJECTION INTRAMUSCULAR
Status: COMPLETED | OUTPATIENT
Start: 2019-09-24 | End: 2019-09-24

## 2019-09-24 RX ORDER — SODIUM CHLORIDE 9 MG/ML
1000 INJECTION, SOLUTION INTRAVENOUS
Status: COMPLETED | OUTPATIENT
Start: 2019-09-24 | End: 2019-09-25

## 2019-09-24 RX ORDER — PANTOPRAZOLE SODIUM 40 MG/10ML
80 INJECTION, POWDER, LYOPHILIZED, FOR SOLUTION INTRAVENOUS ONCE
Status: COMPLETED | OUTPATIENT
Start: 2019-09-24 | End: 2019-09-24

## 2019-09-24 RX ORDER — METOCLOPRAMIDE 10 MG/1
5 TABLET ORAL
Status: DISCONTINUED | OUTPATIENT
Start: 2019-09-24 | End: 2019-09-24

## 2019-09-24 RX ORDER — FENTANYL CITRATE 50 UG/ML
50 INJECTION, SOLUTION INTRAMUSCULAR; INTRAVENOUS
Status: DISCONTINUED | OUTPATIENT
Start: 2019-09-24 | End: 2019-09-24

## 2019-09-24 RX ORDER — ONDANSETRON 2 MG/ML
4 INJECTION INTRAMUSCULAR; INTRAVENOUS
Status: ACTIVE | OUTPATIENT
Start: 2019-09-24 | End: 2019-09-25

## 2019-09-24 RX ORDER — HALOPERIDOL 5 MG/ML
5 INJECTION INTRAMUSCULAR
Status: COMPLETED | OUTPATIENT
Start: 2019-09-24 | End: 2019-09-24

## 2019-09-24 RX ORDER — DIPHENHYDRAMINE HYDROCHLORIDE 50 MG/ML
25 INJECTION INTRAMUSCULAR; INTRAVENOUS
Status: COMPLETED | OUTPATIENT
Start: 2019-09-24 | End: 2019-09-24

## 2019-09-24 RX ORDER — ONDANSETRON 2 MG/ML
4 INJECTION INTRAMUSCULAR; INTRAVENOUS EVERY 8 HOURS PRN
Status: DISCONTINUED | OUTPATIENT
Start: 2019-09-24 | End: 2019-09-25

## 2019-09-24 RX ORDER — LORAZEPAM 0.5 MG/1
1 TABLET ORAL
Status: COMPLETED | OUTPATIENT
Start: 2019-09-24 | End: 2019-09-24

## 2019-09-24 RX ORDER — ATENOLOL 25 MG/1
25 TABLET ORAL
Status: COMPLETED | OUTPATIENT
Start: 2019-09-24 | End: 2019-09-24

## 2019-09-24 RX ORDER — PROCHLORPERAZINE EDISYLATE 5 MG/ML
10 INJECTION INTRAMUSCULAR; INTRAVENOUS ONCE
Status: COMPLETED | OUTPATIENT
Start: 2019-09-24 | End: 2019-09-24

## 2019-09-24 RX ADMIN — FENTANYL CITRATE 50 MCG: 50 INJECTION INTRAMUSCULAR; INTRAVENOUS at 10:09

## 2019-09-24 RX ADMIN — DIAZEPAM 5 MG: 5 INJECTION, SOLUTION INTRAMUSCULAR; INTRAVENOUS at 07:09

## 2019-09-24 RX ADMIN — SODIUM CHLORIDE 1000 ML: 0.9 INJECTION, SOLUTION INTRAVENOUS at 07:09

## 2019-09-24 RX ADMIN — ATENOLOL 25 MG: 25 TABLET ORAL at 11:09

## 2019-09-24 RX ADMIN — FENTANYL CITRATE 50 MCG: 50 INJECTION INTRAMUSCULAR; INTRAVENOUS at 06:09

## 2019-09-24 RX ADMIN — DIPHENHYDRAMINE HYDROCHLORIDE 25 MG: 50 INJECTION INTRAMUSCULAR; INTRAVENOUS at 05:09

## 2019-09-24 RX ADMIN — HALOPERIDOL LACTATE 5 MG: 5 INJECTION, SOLUTION INTRAMUSCULAR at 10:09

## 2019-09-24 RX ADMIN — PANTOPRAZOLE SODIUM 80 MG: 40 INJECTION, POWDER, LYOPHILIZED, FOR SOLUTION INTRAVENOUS at 07:09

## 2019-09-24 RX ADMIN — PROMETHAZINE HYDROCHLORIDE 25 MG: 25 INJECTION INTRAMUSCULAR; INTRAVENOUS at 10:09

## 2019-09-24 RX ADMIN — LORAZEPAM 1 MG: 0.5 TABLET ORAL at 11:09

## 2019-09-24 RX ADMIN — PROCHLORPERAZINE EDISYLATE 10 MG: 5 INJECTION INTRAMUSCULAR; INTRAVENOUS at 05:09

## 2019-09-24 NOTE — ED TRIAGE NOTES
64 y.o. Female presents to the ED with chief complaint of vomiting. EMS states pt has hx of ETOH abuse and has been vomiting blood since this AM. Pt also reports abdominal pain. Pt in mild distress in bed actively vomiting.

## 2019-09-25 ENCOUNTER — ANESTHESIA (OUTPATIENT)
Dept: ENDOSCOPY | Facility: HOSPITAL | Age: 64
End: 2019-09-25
Payer: MEDICARE

## 2019-09-25 ENCOUNTER — ANESTHESIA EVENT (OUTPATIENT)
Dept: ENDOSCOPY | Facility: HOSPITAL | Age: 64
End: 2019-09-25
Payer: MEDICARE

## 2019-09-25 VITALS
SYSTOLIC BLOOD PRESSURE: 119 MMHG | BODY MASS INDEX: 27.42 KG/M2 | HEART RATE: 70 BPM | HEIGHT: 66 IN | DIASTOLIC BLOOD PRESSURE: 82 MMHG | OXYGEN SATURATION: 98 % | RESPIRATION RATE: 18 BRPM | TEMPERATURE: 99 F | WEIGHT: 170.63 LBS

## 2019-09-25 PROBLEM — F10.10 ALCOHOL ABUSE: Chronic | Status: ACTIVE | Noted: 2019-09-25

## 2019-09-25 LAB
ALBUMIN SERPL BCP-MCNC: 3.6 G/DL (ref 3.5–5.2)
ALP SERPL-CCNC: 182 U/L (ref 55–135)
ALT SERPL W/O P-5'-P-CCNC: 21 U/L (ref 10–44)
ANION GAP SERPL CALC-SCNC: 11 MMOL/L (ref 8–16)
AST SERPL-CCNC: 34 U/L (ref 10–40)
BASOPHILS # BLD AUTO: 0.03 K/UL (ref 0–0.2)
BASOPHILS NFR BLD: 0.9 % (ref 0–1.9)
BILIRUB SERPL-MCNC: 0.8 MG/DL (ref 0.1–1)
BUN SERPL-MCNC: 9 MG/DL (ref 8–23)
CALCIUM SERPL-MCNC: 8.5 MG/DL (ref 8.7–10.5)
CHLORIDE SERPL-SCNC: 100 MMOL/L (ref 95–110)
CO2 SERPL-SCNC: 24 MMOL/L (ref 23–29)
CREAT SERPL-MCNC: 0.7 MG/DL (ref 0.5–1.4)
DIFFERENTIAL METHOD: ABNORMAL
EOSINOPHIL # BLD AUTO: 0 K/UL (ref 0–0.5)
EOSINOPHIL NFR BLD: 0 % (ref 0–8)
ERYTHROCYTE [DISTWIDTH] IN BLOOD BY AUTOMATED COUNT: 18.7 % (ref 11.5–14.5)
EST. GFR  (AFRICAN AMERICAN): >60 ML/MIN/1.73 M^2
EST. GFR  (NON AFRICAN AMERICAN): >60 ML/MIN/1.73 M^2
GLUCOSE SERPL-MCNC: 127 MG/DL (ref 70–110)
HCT VFR BLD AUTO: 32.2 % (ref 37–48.5)
HGB BLD-MCNC: 10.6 G/DL (ref 12–16)
LACTATE SERPL-SCNC: 2.6 MMOL/L (ref 0.5–2.2)
LYMPHOCYTES # BLD AUTO: 0.6 K/UL (ref 1–4.8)
LYMPHOCYTES NFR BLD: 19.2 % (ref 18–48)
MAGNESIUM SERPL-MCNC: 1.9 MG/DL (ref 1.6–2.6)
MCH RBC QN AUTO: 28.6 PG (ref 27–31)
MCHC RBC AUTO-ENTMCNC: 32.9 G/DL (ref 32–36)
MCV RBC AUTO: 87 FL (ref 82–98)
MONOCYTES # BLD AUTO: 0.6 K/UL (ref 0.3–1)
MONOCYTES NFR BLD: 17.7 % (ref 4–15)
NEUTROPHILS # BLD AUTO: 2.1 K/UL (ref 1.8–7.7)
NEUTROPHILS NFR BLD: 63.7 % (ref 38–73)
PHOSPHATE SERPL-MCNC: 3.8 MG/DL (ref 2.7–4.5)
PLATELET # BLD AUTO: 244 K/UL (ref 150–350)
PMV BLD AUTO: 9.7 FL (ref 9.2–12.9)
POTASSIUM SERPL-SCNC: 3.7 MMOL/L (ref 3.5–5.1)
PROT SERPL-MCNC: 7.3 G/DL (ref 6–8.4)
RBC # BLD AUTO: 3.71 M/UL (ref 4–5.4)
SODIUM SERPL-SCNC: 135 MMOL/L (ref 136–145)
WBC # BLD AUTO: 3.33 K/UL (ref 3.9–12.7)

## 2019-09-25 PROCEDURE — 36415 COLL VENOUS BLD VENIPUNCTURE: CPT | Mod: HCNC

## 2019-09-25 PROCEDURE — 85025 COMPLETE CBC W/AUTO DIFF WBC: CPT | Mod: HCNC

## 2019-09-25 PROCEDURE — 37000008 HC ANESTHESIA 1ST 15 MINUTES: Mod: HCNC | Performed by: INTERNAL MEDICINE

## 2019-09-25 PROCEDURE — 63600175 PHARM REV CODE 636 W HCPCS: Mod: HCNC | Performed by: NURSE ANESTHETIST, CERTIFIED REGISTERED

## 2019-09-25 PROCEDURE — D9220A PRA ANESTHESIA: ICD-10-PCS | Mod: HCNC,ANES,, | Performed by: ANESTHESIOLOGY

## 2019-09-25 PROCEDURE — 37000009 HC ANESTHESIA EA ADD 15 MINS: Mod: HCNC | Performed by: INTERNAL MEDICINE

## 2019-09-25 PROCEDURE — 96376 TX/PRO/DX INJ SAME DRUG ADON: CPT

## 2019-09-25 PROCEDURE — 83735 ASSAY OF MAGNESIUM: CPT | Mod: HCNC

## 2019-09-25 PROCEDURE — 63600175 PHARM REV CODE 636 W HCPCS: Mod: HCNC | Performed by: EMERGENCY MEDICINE

## 2019-09-25 PROCEDURE — 43255 EGD CONTROL BLEEDING ANY: CPT | Mod: HCNC | Performed by: INTERNAL MEDICINE

## 2019-09-25 PROCEDURE — 83605 ASSAY OF LACTIC ACID: CPT | Mod: HCNC

## 2019-09-25 PROCEDURE — 84100 ASSAY OF PHOSPHORUS: CPT | Mod: HCNC

## 2019-09-25 PROCEDURE — 63600175 PHARM REV CODE 636 W HCPCS: Mod: HCNC | Performed by: INTERNAL MEDICINE

## 2019-09-25 PROCEDURE — 27200997: Mod: HCNC | Performed by: INTERNAL MEDICINE

## 2019-09-25 PROCEDURE — C9113 INJ PANTOPRAZOLE SODIUM, VIA: HCPCS | Mod: HCNC | Performed by: INTERNAL MEDICINE

## 2019-09-25 PROCEDURE — 96375 TX/PRO/DX INJ NEW DRUG ADDON: CPT

## 2019-09-25 PROCEDURE — G0378 HOSPITAL OBSERVATION PER HR: HCPCS | Mod: HCNC

## 2019-09-25 PROCEDURE — 80053 COMPREHEN METABOLIC PANEL: CPT | Mod: HCNC

## 2019-09-25 PROCEDURE — 25000003 PHARM REV CODE 250: Mod: HCNC | Performed by: NURSE PRACTITIONER

## 2019-09-25 PROCEDURE — 94761 N-INVAS EAR/PLS OXIMETRY MLT: CPT | Mod: HCNC

## 2019-09-25 PROCEDURE — 63600175 PHARM REV CODE 636 W HCPCS: Mod: HCNC | Performed by: ANESTHESIOLOGY

## 2019-09-25 PROCEDURE — D9220A PRA ANESTHESIA: Mod: HCNC,ANES,, | Performed by: ANESTHESIOLOGY

## 2019-09-25 PROCEDURE — 25000003 PHARM REV CODE 250: Mod: HCNC | Performed by: INTERNAL MEDICINE

## 2019-09-25 RX ORDER — PROPOFOL 10 MG/ML
VIAL (ML) INTRAVENOUS
Status: DISCONTINUED
Start: 2019-09-25 | End: 2019-09-25 | Stop reason: HOSPADM

## 2019-09-25 RX ORDER — SUCCINYLCHOLINE CHLORIDE 20 MG/ML
INJECTION INTRAMUSCULAR; INTRAVENOUS
Status: DISCONTINUED | OUTPATIENT
Start: 2019-09-25 | End: 2019-09-25

## 2019-09-25 RX ORDER — PROPOFOL 10 MG/ML
VIAL (ML) INTRAVENOUS
Status: COMPLETED
Start: 2019-09-25 | End: 2019-09-25

## 2019-09-25 RX ORDER — SODIUM CHLORIDE 9 MG/ML
INJECTION, SOLUTION INTRAVENOUS CONTINUOUS
Status: DISCONTINUED | OUTPATIENT
Start: 2019-09-25 | End: 2019-09-25 | Stop reason: HOSPADM

## 2019-09-25 RX ORDER — METOCLOPRAMIDE HYDROCHLORIDE 5 MG/ML
20 INJECTION INTRAMUSCULAR; INTRAVENOUS ONCE
Status: COMPLETED | OUTPATIENT
Start: 2019-09-25 | End: 2019-09-25

## 2019-09-25 RX ORDER — PANTOPRAZOLE SODIUM 40 MG/1
40 TABLET, DELAYED RELEASE ORAL 2 TIMES DAILY
Qty: 60 TABLET | Refills: 1 | Status: SHIPPED | OUTPATIENT
Start: 2019-09-25 | End: 2019-12-04 | Stop reason: SDUPTHER

## 2019-09-25 RX ORDER — MIDAZOLAM HYDROCHLORIDE 1 MG/ML
INJECTION, SOLUTION INTRAMUSCULAR; INTRAVENOUS
Status: DISCONTINUED | OUTPATIENT
Start: 2019-09-25 | End: 2019-09-25

## 2019-09-25 RX ORDER — MIDAZOLAM HYDROCHLORIDE 1 MG/ML
INJECTION INTRAMUSCULAR; INTRAVENOUS
Status: COMPLETED
Start: 2019-09-25 | End: 2019-09-25

## 2019-09-25 RX ORDER — PANTOPRAZOLE SODIUM 40 MG/1
40 TABLET, DELAYED RELEASE ORAL DAILY
Status: DISCONTINUED | OUTPATIENT
Start: 2019-09-25 | End: 2019-09-25 | Stop reason: HOSPADM

## 2019-09-25 RX ORDER — FENTANYL CITRATE 50 UG/ML
INJECTION, SOLUTION INTRAMUSCULAR; INTRAVENOUS
Status: DISCONTINUED | OUTPATIENT
Start: 2019-09-25 | End: 2019-09-25

## 2019-09-25 RX ORDER — ONDANSETRON 2 MG/ML
INJECTION INTRAMUSCULAR; INTRAVENOUS
Status: DISCONTINUED | OUTPATIENT
Start: 2019-09-25 | End: 2019-09-25

## 2019-09-25 RX ORDER — LIDOCAINE HYDROCHLORIDE 20 MG/ML
INJECTION, SOLUTION EPIDURAL; INFILTRATION; INTRACAUDAL; PERINEURAL
Status: DISCONTINUED
Start: 2019-09-25 | End: 2019-09-25 | Stop reason: HOSPADM

## 2019-09-25 RX ORDER — CEPHALEXIN 500 MG/1
500 CAPSULE ORAL EVERY 12 HOURS
Status: DISCONTINUED | OUTPATIENT
Start: 2019-09-25 | End: 2019-09-25 | Stop reason: HOSPADM

## 2019-09-25 RX ORDER — ONDANSETRON 2 MG/ML
INJECTION INTRAMUSCULAR; INTRAVENOUS
Status: COMPLETED
Start: 2019-09-25 | End: 2019-09-25

## 2019-09-25 RX ORDER — LIDOCAINE HCL/PF 100 MG/5ML
SYRINGE (ML) INTRAVENOUS
Status: DISCONTINUED | OUTPATIENT
Start: 2019-09-25 | End: 2019-09-25

## 2019-09-25 RX ORDER — SUCCINYLCHOLINE CHLORIDE 20 MG/ML
INJECTION INTRAMUSCULAR; INTRAVENOUS
Status: COMPLETED
Start: 2019-09-25 | End: 2019-09-25

## 2019-09-25 RX ORDER — CEPHALEXIN 500 MG/1
500 CAPSULE ORAL EVERY 12 HOURS
Qty: 14 CAPSULE | Refills: 0 | Status: SHIPPED | OUTPATIENT
Start: 2019-09-25 | End: 2019-10-02

## 2019-09-25 RX ORDER — FENTANYL CITRATE 50 UG/ML
INJECTION, SOLUTION INTRAMUSCULAR; INTRAVENOUS
Status: COMPLETED
Start: 2019-09-25 | End: 2019-09-25

## 2019-09-25 RX ORDER — PANTOPRAZOLE SODIUM 40 MG/1
40 TABLET, DELAYED RELEASE ORAL DAILY
Qty: 30 TABLET | Refills: 0 | Status: SHIPPED | OUTPATIENT
Start: 2019-09-25 | End: 2019-09-25 | Stop reason: SDUPTHER

## 2019-09-25 RX ORDER — LEVOTHYROXINE SODIUM 50 UG/1
50 TABLET ORAL
Status: DISCONTINUED | OUTPATIENT
Start: 2019-09-25 | End: 2019-09-25 | Stop reason: HOSPADM

## 2019-09-25 RX ORDER — CLONIDINE HYDROCHLORIDE 0.1 MG/1
0.1 TABLET ORAL 3 TIMES DAILY PRN
Status: DISCONTINUED | OUTPATIENT
Start: 2019-09-25 | End: 2019-09-25 | Stop reason: HOSPADM

## 2019-09-25 RX ORDER — ATENOLOL 25 MG/1
25 TABLET ORAL DAILY
Status: DISCONTINUED | OUTPATIENT
Start: 2019-09-25 | End: 2019-09-25 | Stop reason: HOSPADM

## 2019-09-25 RX ORDER — PANTOPRAZOLE SODIUM 40 MG/10ML
40 INJECTION, POWDER, LYOPHILIZED, FOR SOLUTION INTRAVENOUS 2 TIMES DAILY
Status: DISCONTINUED | OUTPATIENT
Start: 2019-09-25 | End: 2019-09-25

## 2019-09-25 RX ORDER — ONDANSETRON 2 MG/ML
8 INJECTION INTRAMUSCULAR; INTRAVENOUS EVERY 8 HOURS PRN
Status: DISCONTINUED | OUTPATIENT
Start: 2019-09-25 | End: 2019-09-25 | Stop reason: HOSPADM

## 2019-09-25 RX ORDER — ACETAMINOPHEN 500 MG
500 TABLET ORAL EVERY 6 HOURS PRN
Status: DISCONTINUED | OUTPATIENT
Start: 2019-09-25 | End: 2019-09-25 | Stop reason: HOSPADM

## 2019-09-25 RX ORDER — DULOXETIN HYDROCHLORIDE 30 MG/1
60 CAPSULE, DELAYED RELEASE ORAL DAILY
Status: DISCONTINUED | OUTPATIENT
Start: 2019-09-25 | End: 2019-09-25 | Stop reason: HOSPADM

## 2019-09-25 RX ORDER — SODIUM CHLORIDE 0.9 % (FLUSH) 0.9 %
10 SYRINGE (ML) INJECTION
Status: DISCONTINUED | OUTPATIENT
Start: 2019-09-25 | End: 2019-09-25 | Stop reason: HOSPADM

## 2019-09-25 RX ORDER — RAMELTEON 8 MG/1
8 TABLET ORAL NIGHTLY PRN
Status: DISCONTINUED | OUTPATIENT
Start: 2019-09-25 | End: 2019-09-25 | Stop reason: HOSPADM

## 2019-09-25 RX ORDER — PROPOFOL 10 MG/ML
VIAL (ML) INTRAVENOUS
Status: DISCONTINUED | OUTPATIENT
Start: 2019-09-25 | End: 2019-09-25

## 2019-09-25 RX ADMIN — CLONIDINE HYDROCHLORIDE 0.1 MG: 0.1 TABLET ORAL at 01:09

## 2019-09-25 RX ADMIN — PROPOFOL 50 MG: 10 INJECTION, EMULSION INTRAVENOUS at 08:09

## 2019-09-25 RX ADMIN — ATENOLOL 25 MG: 25 TABLET ORAL at 10:09

## 2019-09-25 RX ADMIN — PANTOPRAZOLE SODIUM 40 MG: 40 TABLET, DELAYED RELEASE ORAL at 10:09

## 2019-09-25 RX ADMIN — FENTANYL CITRATE 100 MCG: 50 INJECTION INTRAMUSCULAR; INTRAVENOUS at 08:09

## 2019-09-25 RX ADMIN — LEVOTHYROXINE SODIUM 50 MCG: 50 TABLET ORAL at 06:09

## 2019-09-25 RX ADMIN — ONDANSETRON 4 MG: 2 INJECTION, SOLUTION INTRAMUSCULAR; INTRAVENOUS at 08:09

## 2019-09-25 RX ADMIN — PANTOPRAZOLE SODIUM 40 MG: 40 INJECTION, POWDER, FOR SOLUTION INTRAVENOUS at 01:09

## 2019-09-25 RX ADMIN — SODIUM CHLORIDE: 0.9 INJECTION, SOLUTION INTRAVENOUS at 08:09

## 2019-09-25 RX ADMIN — SUCCINYLCHOLINE CHLORIDE 120 MG: 20 INJECTION, SOLUTION INTRAMUSCULAR; INTRAVENOUS at 08:09

## 2019-09-25 RX ADMIN — PROPOFOL 20 MG: 10 INJECTION, EMULSION INTRAVENOUS at 08:09

## 2019-09-25 RX ADMIN — ONDANSETRON 4 MG: 2 INJECTION INTRAMUSCULAR; INTRAVENOUS at 12:09

## 2019-09-25 RX ADMIN — SODIUM CHLORIDE 1000 ML: 0.9 INJECTION, SOLUTION INTRAVENOUS at 12:09

## 2019-09-25 RX ADMIN — LIDOCAINE HYDROCHLORIDE 100 MG: 20 INJECTION, SOLUTION INTRAVENOUS at 08:09

## 2019-09-25 RX ADMIN — MIDAZOLAM HYDROCHLORIDE 2 MG: 1 INJECTION, SOLUTION INTRAMUSCULAR; INTRAVENOUS at 08:09

## 2019-09-25 RX ADMIN — DULOXETINE 60 MG: 30 CAPSULE, DELAYED RELEASE ORAL at 10:09

## 2019-09-25 RX ADMIN — METOCLOPRAMIDE 20 MG: 5 INJECTION, SOLUTION INTRAMUSCULAR; INTRAVENOUS at 10:09

## 2019-09-25 RX ADMIN — PROPOFOL 150 MG: 10 INJECTION, EMULSION INTRAVENOUS at 08:09

## 2019-09-25 NOTE — ANESTHESIA POSTPROCEDURE EVALUATION
Anesthesia Post Evaluation    Patient: Estella Arevalo    Procedure(s) Performed: Procedure(s) (LRB):  EGD (ESOPHAGOGASTRODUODENOSCOPY) (Left)    Final Anesthesia Type: general  Patient location during evaluation: GI PACU  Patient participation: Yes- Able to Participate  Level of consciousness: awake and alert  Post-procedure vital signs: reviewed and stable  Pain management: adequate  Airway patency: patent  PONV status at discharge: No PONV  Anesthetic complications: no      Cardiovascular status: hemodynamically stable  Respiratory status: unassisted and spontaneous ventilation  Hydration status: euvolemic  Follow-up not needed.          Vitals Value Taken Time   /82 9/25/2019  9:14 AM   Temp 37.1 °C (98.8 °F) 9/25/2019  9:14 AM   Pulse 70 9/25/2019  9:14 AM   Resp 18 9/25/2019  9:14 AM   SpO2 98 % 9/25/2019  9:14 AM         Event Time     Out of Recovery 09/25/2019 09:17:00          Pain/Mika Score: Pain Rating Prior to Med Admin: 4 (9/25/2019  4:30 AM)  Pain Rating Post Med Admin: 4 (9/25/2019  6:11 AM)  Mika Score: 10 (9/25/2019  9:03 AM)

## 2019-09-25 NOTE — DISCHARGE SUMMARY
[facility]  Discharge Summary      Admit Date: 9/24/2019    Discharge Date and Time:  09/25/2019 8:38 AM    Attending Physician: Emigdio Landin MD     Reason for Admission: Hematemesis    Procedures Performed: Procedure(s) (LRB):  EGD (ESOPHAGOGASTRODUODENOSCOPY) (Left)    Hospital Course (synopsis of major diagnoses, care, treatment, and services provided during the course of the hospital stay): Ongoing     Consults: GI    Significant Diagnostic Studies: EGD    Final Diagnoses:    Principal Problem: Hematemesis   Secondary Diagnoses: EGD    Discharged Condition: good    Disposition: Admitted as an Inpatient    Follow Up/Patient Instructions: Follow-up with referring physician             Resume previous diet and activity.    Medications:  Transfer Medications (for Discharge Readmit only):   Current Facility-Administered Medications   Medication Dose Route Frequency Provider Last Rate Last Dose    0.9%  NaCl infusion   Intravenous Continuous Chapincito Mcgill MD        acetaminophen tablet 500 mg  500 mg Oral Q6H PRN Francisco J Mcgill MD        atenolol tablet 25 mg  25 mg Oral Daily Francisco J Mcgill MD        cloNIDine tablet 0.1 mg  0.1 mg Oral TID PRN Francisco J Mcgill MD   0.1 mg at 09/25/19 0158    DULoxetine DR capsule 60 mg  60 mg Oral Daily Francisco J Mcgill MD        levothyroxine tablet 50 mcg  50 mcg Oral Before breakfast Francisco J Mcgill MD   50 mcg at 09/25/19 0647    lidocaine (PF) 20 mg/mL (2%) 20 mg/mL (2 %) injection             metoclopramide HCl injection 20 mg  20 mg Intravenous Once Hernandez Farias MD        ondansetron injection 4 mg  4 mg Intravenous ED 1 Time Francisco J Mcgill MD        ondansetron injection 8 mg  8 mg Intravenous Q8H PRN Francisco J Mcgill MD        pantoprazole injection 40 mg  40 mg Intravenous BID Francisco J Mcgill MD   40 mg at 09/25/19 0154    promethazine (PHENERGAN) 6.25 mg in dextrose 5 % 50 mL IVPB  6.25 mg Intravenous Q6H PRN MD Moshe Adame  tablet 8 mg  8 mg Oral Nightly PRN Francisco J Mcgill MD        sodium chloride 0.9% flush 10 mL  10 mL Intravenous PRN Hernandez Farias MD         Facility-Administered Medications Ordered in Other Encounters   Medication Dose Route Frequency Provider Last Rate Last Dose    fentaNYL injection    PRN Igor Lizarraga, CRNA   100 mcg at 09/25/19 0824    lidocaine (cardiac) injection    PRN Igor Lizarraga, CRNA   100 mg at 09/25/19 0825    midazolam injection   Intravenous PRN Igor Lizarraga, CRNA   2 mg at 09/25/19 0820    ondansetron injection    PRN Igor Lizarraga, CRNA   4 mg at 09/25/19 0820    propofol (DIPRIVAN) 10 mg/mL infusion    PRN Igor Lizarraga, CRNA   50 mg at 09/25/19 0833    succinylcholine injection    PRN Igor Lizarraga, CRNA   120 mg at 09/25/19 0825     No discharge procedures on file.

## 2019-09-25 NOTE — NURSING
Patient returned to unit from scheduled procedure. Patient awake, alert, oriented. Patient without c/o discomfort. Tele monitoring in progress. No apparent distress noted.

## 2019-09-25 NOTE — PROGRESS NOTES
Understanding Alcoholism    Alcoholism is a disease in which a person is dependent on a drug--alcohol. The disease can harm the persons physical and mental health. It can also affect his or her behavior. Alcoholism is not a character flaw. It is not a moral failure. It is a disease that worsens over time. Untreated, it can lead to brain damage and death. Recovery is possible if drinking is stopped.  Effects of alcoholism  Behavioral effects  Drinking is the main behavior of people with alcoholism. They develop a private relationship with drinking. They guard it. They give it their time, money, and attention. This may happen at the expense of family and friends. They lie for it and think about it all the time. They may risk losing their families for it. They may risk their lives for it. Despite the harm it causes, they cant control the drinking.  Health risks  People with alcoholism are at high risk for health problems. These include heart disease and cancer. They also include mental illness. People with the disease may not heal from illness normally. Unless drinking is stopped, it can cause death. Death may result from organ failure, cancer, or common viruses. Death may also result from accidents or suicide.  Physical effects  Alcohol can be like a poison to the body. It kills cells. Heavy drinking over a long time can greatly harm the bodys organs. These can include the brain, heart, liver, and pancreas. Chronic drinking also harms the digestive tract. It harms the immune system as well. This leaves the body vulnerable to serious disease.  Psychological effects  Alcoholism can lead to a type of distorted thinking known as alcohol-think. One of the most common forms of this is denial. This is when the person denies that drinking is a problem. He or she may deny that any of the problems in his or her life are caused by drinking.

## 2019-09-25 NOTE — NURSING TRANSFER
Nursing Transfer Note      9/25/2019     Transfer From: ED To: 331B    Transfer via stretcher    Transfer with cardiac monitoring and IV fluids    Transported by transport personnel    Medicines sent: none    Chart send with patient: Yes    Patient reassessed at 0010 on September 25, 2019    Upon arrival to floor patient assisted from the stretcher to the bed. Cardiac monitoring applied. Patient walked to and from the restroom with some generalized weakness noted. Vital signs obtained and can be found in flow sheets with complete patient assessment. Skin dry with bilateral great toes noted red and a 22g LAC PIV noted saline locked. Complaints of 4/10 LLQ abdominal pain but patient with no signs of respiratory distress. Plan to continue IV fluids, monitor and manage pain and nausea, and await further input from the provider. Patient updated on plan of care and verbalized understanding. Call light in reach and bed alarm activated to maintain patient safety. Patient stable and will continue to be monitored.

## 2019-09-25 NOTE — SUBJECTIVE & OBJECTIVE
Past Medical History:   Diagnosis Date    Addiction to drug     Alcohol abuse     Arthritis     Cirrhosis of liver     Colon polyp     Coronary artery disease     Fall     GERD (gastroesophageal reflux disease)     Hepatitis C     States was successfully treated with Harvoni    History of psychiatric hospitalization     Hx of psychiatric care     Hypertension     Low back pain     Radha     MI (myocardial infarction)     Migraine headache     Psychiatric problem     Sleep difficulties     Suicide attempt     Therapy     Thyroid disease        Past Surgical History:   Procedure Laterality Date    ESOPHAGOGASTRODUODENOSCOPY N/A 3/20/2019    Procedure: EGD (ESOPHAGOGASTRODUODENOSCOPY);  Surgeon: Obdulia Wilson MD;  [facility];  Service: Endoscopy;  Laterality: N/A;    HERNIA REPAIR      HIATAL HERNIA REPAIR      JOINT REPLACEMENT Bilateral     KNEE SURGERY  bilateral replacement    right foot sx  2008    SHOULDER SURGERY  replacement       Review of patient's allergies indicates:  No Known Allergies    No current facility-administered medications on file prior to encounter.      Current Outpatient Medications on File Prior to Encounter   Medication Sig    atenolol (TENORMIN) 25 MG tablet Take 1 tablet (25 mg total) by mouth once daily.    DULoxetine (CYMBALTA) 60 MG capsule Take 1 capsule (60 mg total) by mouth once daily.    fluticasone propionate (FLONASE) 50 mcg/actuation nasal spray 2 sprays (100 mcg total) by Each Nostril route once daily.    ibuprofen (ADVIL,MOTRIN) 600 MG tablet Take 1 tablet (600 mg total) by mouth every 6 (six) hours as needed for Pain.    levothyroxine (TIROSINT) 50 mcg Cap Take 50 mcg by mouth once daily.    metoclopramide HCl (REGLAN) 10 MG tablet Take 1 tablet (10 mg total) by mouth 4 (four) times daily before meals and nightly.    pantoprazole (PROTONIX) 40 MG tablet Take 1 tablet (40 mg total) by mouth once daily.    cetirizine (ZYRTEC) 5 MG  tablet Take 1 tablet (5 mg total) by mouth once daily.    clindamycin phosphate 1% (CLINDAGEL) 1 % gel Apply topically 2 (two) times daily. for 7 days    cycloSPORINE (RESTASIS) 0.05 % ophthalmic emulsion Place 0.4 mLs (1 drop total) into both eyes 2 (two) times daily.    guaiFENesin (MUCINEX) 600 mg 12 hr tablet Take 1 tablet (600 mg total) by mouth 2 (two) times daily.    JUBLIA 10 % Eran APPLY ONE DOSE D    NARCAN 4 mg/actuation Spry USE UTD    nitroGLYCERIN (NITROSTAT) 0.3 MG SL tablet ONE TABLET UNDER TONGUE AS NEEDED FOR CHEST PAIN EVERY 5 MINUTES AS NEEDED    PROCTOZONE-HC 2.5 % rectal cream STACI RECTALLY BID     Family History     Problem Relation (Age of Onset)    Bipolar disorder Maternal Grandfather    Cancer Mother, Father    Depression Sister    Suicide Cousin        Tobacco Use    Smoking status: Never Smoker    Smokeless tobacco: Never Used   Substance and Sexual Activity    Alcohol use: Yes     Alcohol/week: 6.0 standard drinks     Types: 6 Cans of beer per week     Comment: pt admits to drinking vodka daily    Drug use: Not Currently     Types: Benzodiazepines, Methamphetamines, Amphetamines     Comment: Pain mgt clinic; admits to addiction to opioids    Sexual activity: Not on file     Review of Systems   Constitutional: Positive for fatigue. Negative for activity change, appetite change, chills, fever and unexpected weight change.   HENT: Negative.    Eyes: Negative.    Respiratory: Negative for cough, chest tightness, shortness of breath and wheezing.    Cardiovascular: Negative for chest pain, palpitations and leg swelling.   Gastrointestinal: Positive for nausea (hematemesis) and vomiting. Negative for abdominal distention, abdominal pain, blood in stool, constipation and diarrhea.   Genitourinary: Negative for dysuria and hematuria.   Musculoskeletal: Negative.    Skin: Negative.    Neurological: Positive for dizziness, weakness and light-headedness. Negative for seizures and  syncope.   Psychiatric/Behavioral: Negative for hallucinations.     Objective:     Vital Signs (Most Recent):  Temp: 98.2 °F (36.8 °C) (09/25/19 0012)  Pulse: 85 (09/25/19 0012)  Resp: 18 (09/25/19 0012)  BP: (!) 178/110 (09/25/19 0158)  SpO2: 99 % (09/25/19 0012) Vital Signs (24h Range):  Temp:  [97.9 °F (36.6 °C)-99.5 °F (37.5 °C)] 98.2 °F (36.8 °C)  Pulse:  [] 85  Resp:  [16-20] 18  SpO2:  [96 %-100 %] 99 %  BP: (144-184)/() 178/110     Weight: 77.4 kg (170 lb 10.2 oz)  Body mass index is 27.54 kg/m².    Physical Exam   Constitutional: She is oriented to person, place, and time. She appears well-developed and well-nourished. No distress.   HENT:   Head: Normocephalic and atraumatic.   Right Ear: External ear normal.   Left Ear: External ear normal.   Nose: Nose normal.   Eyes: Right eye exhibits no discharge. Left eye exhibits no discharge.   Neck: Normal range of motion.   Cardiovascular: Normal rate, regular rhythm, normal heart sounds and intact distal pulses. Exam reveals no gallop and no friction rub.   No murmur heard.  Pulmonary/Chest: Effort normal and breath sounds normal. No stridor. No respiratory distress. She has no wheezes. She has no rales. She exhibits no tenderness.   Abdominal: Soft. Bowel sounds are normal. She exhibits no distension. There is no tenderness. There is no rebound and no guarding.   Musculoskeletal: Normal range of motion. She exhibits edema (There is some edema to the left knee).   Neurological: She is alert and oriented to person, place, and time.   Skin: Skin is warm and dry. She is not diaphoretic. No erythema.   Psychiatric: She has a normal mood and affect. Her behavior is normal. Judgment and thought content normal.   Nursing note and vitals reviewed.          Significant Labs: All pertinent labs within the past 24 hours have been reviewed.    Significant Imaging: I have reviewed and interpreted all pertinent imaging results/findings within the past 24 hours.

## 2019-09-25 NOTE — CONSULTS
"Chief Complaint: "I threw up blood."    HPI:  The patient is a 64 year old woman with a history of HTN, CAD, MI, hypothyroidism, hepatitis C and alcoholic cirrhosis, opioid abuse, arthritis, GERD, and a previous suicide attempt presenting with hematemesis.  She had nausea and emesis followed by several episodes of hematemesis yesterday.  She has been having mid to lower abdominal cramping, but she states she believes this is from an umbilical hernia that needs re-repair.  The patient has not had weight loss, diarrhea, or constipation.  She does not take NSAIDs or use anticoagulation.  The patient continues to drink alcohol.  She was supposedly cured of hepatitis C.  An EGD in 2017 showed a large gastroesophageal junction ulcer.  An EGD on 3/20/19 showed retained food in the stomach suggestive of gastroparesis and no varices.  A colonoscopy on 7/16/13 showed a good prep to the cecum, external hemorrhoids, internal hemorrhoids, diverticulosis, and several sigmoid colon hyperplastic polyps.    Past Medical History:   Diagnosis Date    Addiction to drug     Alcohol abuse     Arthritis     Cirrhosis of liver     Colon polyp     Coronary artery disease     Fall     GERD (gastroesophageal reflux disease)     Hepatitis C     States was successfully treated with Harvoni    History of psychiatric hospitalization     Hx of psychiatric care     Hypertension     Low back pain     Radha     MI (myocardial infarction)     Migraine headache     Psychiatric problem     Sleep difficulties     Suicide attempt     Therapy     Thyroid disease      Past Surgical History:   Procedure Laterality Date    ESOPHAGOGASTRODUODENOSCOPY N/A 3/20/2019    Procedure: EGD (ESOPHAGOGASTRODUODENOSCOPY);  Surgeon: Obdulia Wilson MD;  Location: [facility];  Service: Endoscopy;  Laterality: N/A;    HERNIA REPAIR      HIATAL HERNIA REPAIR      JOINT REPLACEMENT Bilateral     KNEE SURGERY  bilateral replacement    right foot sx  " "2008    SHOULDER SURGERY  replacement     Family History   Problem Relation Age of Onset    Cancer Mother     Cancer Father     Depression Sister     Bipolar disorder Maternal Grandfather     Suicide Cousin      Social History     Socioeconomic History    Marital status:      Spouse name: Hammad Kaufman"    Number of children: 4    Years of education: Not on file    Highest education level: Not on file   Occupational History    Occupation: Retired     Comment: RN   Social Needs    Financial resource strain: Not on file    Food insecurity:     Worry: Not on file     Inability: Not on file    Transportation needs:     Medical: Not on file     Non-medical: Not on file   Tobacco Use    Smoking status: Never Smoker    Smokeless tobacco: Never Used   Substance and Sexual Activity    Alcohol use: Yes     Alcohol/week: 6.0 standard drinks     Types: 6 Cans of beer per week     Comment: pt admits to drinking vodka daily    Drug use: Not Currently     Types: Benzodiazepines, Methamphetamines, Amphetamines     Comment: [facility]; admits to addiction to opioids    Sexual activity: Not on file   Lifestyle    Physical activity:     Days per week: Not on file     Minutes per session: Not on file    Stress: Not on file   Relationships    Social connections:     Talks on phone: Not on file     Gets together: Not on file     Attends [employer] service: Not on file     Active member of club or organization: Not on file     Attends meetings of clubs or organizations: Not on file     Relationship status: Not on file   Other Topics Concern    Patient feels they ought to cut down on drinking/drug use Yes    Patient annoyed by others criticizing their drinking/drug use Yes    Patient has felt bad or guilty about drinking/drug use Yes    Patient has had a drink/used drugs as an eye opener in the AM Yes   Social History Narrative    Lives with , grand-daughter, daughter and son    Retired RN    " Polysubstance abuse        atenolol  25 mg Oral Daily    DULoxetine  60 mg Oral Daily    levothyroxine  50 mcg Oral Before breakfast    metoclopramide HCl  20 mg Intravenous Once    ondansetron  4 mg Intravenous ED 1 Time    pantoprazole  40 mg Intravenous BID     Review of patient's allergies indicates:  No Known Allergies    ROS:  No chest pain or dyspnea.  No dysuria.  No heartburn or dysphagia.  Otherwise as stated above.  Ten other systems negative.    Vitals:    09/24/19 2338 09/25/19 0012 09/25/19 0158 09/25/19 0309   BP: (!) 159/87 (!) 184/98 (!) 178/110 (!) 148/86   BP Location: Right arm Left arm  Right arm   Patient Position: Lying Lying  Lying   Pulse: 110 85  74   Resp: 16 18  20   Temp: 99.5 °F (37.5 °C) 98.2 °F (36.8 °C)  98.2 °F (36.8 °C)   TempSrc: Oral Oral  Oral   SpO2: 96% 99%  98%   Weight:  77.4 kg (170 lb 10.2 oz)     Height:         P.E.:  GEN: A x O x 3, NAD  SKIN: No jaundice  HEENT: EOMI, PERRL, anicteric sclera  CV: RRR, no M/R/G  Chest: CTA B  Abdomen: soft, NTND, normoactive BS  Ext: No C/C/E.  2+ dorsalis pedis pulses B  Neuro: No asterixes or tremors.  CN II-XII intact  Musculoskeletal: 5/5 strength bilaterally    Labs:  Recent Results (from the past 336 hour(s))   CBC auto differential    Collection Time: 09/25/19  4:12 AM   Result Value Ref Range    WBC 3.33 (L) 3.90 - 12.70 K/uL    Hemoglobin 10.6 (L) 12.0 - 16.0 g/dL    Hematocrit 32.2 (L) 37.0 - 48.5 %    Platelets 244 150 - 350 K/uL   CBC auto differential    Collection Time: 09/24/19  7:11 PM   Result Value Ref Range    WBC 2.34 (L) 3.90 - 12.70 K/uL    Hemoglobin 11.6 (L) 12.0 - 16.0 g/dL    Hematocrit 35.9 (L) 37.0 - 48.5 %    Platelets 262 150 - 350 K/uL     CMP  Sodium   Date Value Ref Range Status   09/25/2019 135 (L) 136 - 145 mmol/L Final     Potassium   Date Value Ref Range Status   09/25/2019 3.7 3.5 - 5.1 mmol/L Final     Chloride   Date Value Ref Range Status   09/25/2019 100 95 - 110 mmol/L Final     CO2    Date Value Ref Range Status   09/25/2019 24 23 - 29 mmol/L Final     Glucose   Date Value Ref Range Status   09/25/2019 127 (H) 70 - 110 mg/dL Final     BUN, Bld   Date Value Ref Range Status   09/25/2019 9 8 - 23 mg/dL Final     Creatinine   Date Value Ref Range Status   09/25/2019 0.7 0.5 - 1.4 mg/dL Final     Calcium   Date Value Ref Range Status   09/25/2019 8.5 (L) 8.7 - 10.5 mg/dL Final     Total Protein   Date Value Ref Range Status   09/25/2019 7.3 6.0 - 8.4 g/dL Final     Albumin   Date Value Ref Range Status   09/25/2019 3.6 3.5 - 5.2 g/dL Final     Total Bilirubin   Date Value Ref Range Status   09/25/2019 0.8 0.1 - 1.0 mg/dL Final     Comment:     For infants and newborns, interpretation of results should be based  on gestational age, weight and in agreement with clinical  observations.  Premature Infant recommended reference ranges:  Up to 24 hours.............<8.0 mg/dL  Up to 48 hours............<12.0 mg/dL  3-5 days..................<15.0 mg/dL  6-29 days.................<15.0 mg/dL       Alkaline Phosphatase   Date Value Ref Range Status   09/25/2019 182 (H) 55 - 135 U/L Final     AST   Date Value Ref Range Status   09/25/2019 34 10 - 40 U/L Final     ALT   Date Value Ref Range Status   09/25/2019 21 10 - 44 U/L Final     Anion Gap   Date Value Ref Range Status   09/25/2019 11 8 - 16 mmol/L Final     eGFR if    Date Value Ref Range Status   09/25/2019 >60 >60 mL/min/1.73 m^2 Final     eGFR if non    Date Value Ref Range Status   09/25/2019 >60 >60 mL/min/1.73 m^2 Final     Comment:     Calculation used to obtain the estimated glomerular filtration  rate (eGFR) is the CKD-EPI equation.          No results for input(s): PT, INR, APTT in the last 24 hours.    A/P:  The patient is a 64 year old woman with a history of HTN, CAD, MI, hypothyroidism, hepatitis C and alcoholic cirrhosis, opioid abuse, arthritis, GERD, and a previous suicide attempt presenting with  hematemesis.  1.  Hematemesis - alcohol cessation was emphasized, but she does appear ready to quit.  Reglan will be given to clear out clots.  Protonix can be continued.  She can undergo an EGD.  A recent EGD did not suggest varices.  Protonix can be continued.  2.  Cirrhosis - she needs to stop drinking alcohol.  An ultrasound will be obtained to evaluate for liver lesions and ascites.    Thank you for this consult.

## 2019-09-25 NOTE — HOSPITAL COURSE
"Placed in observation for evaluation of hematemesis. No new episodes documented or witnessed since placement. She tells me that when she awaken in early morning that her nausea was gone. She stated that at the time of presentation she has "withdrawals" and ask in early morning for "that medicine they gave me yesterday". She was seen and evaluated by GI and EGD performed showing non-bleeding ulcer. Recommendations to stop NSAID use as well as alcohol cessation & Protonix BID. The patient does display manipulative behavior but appears well-versed on her condition. She was explained the mechanism of portal hypertension, ascites, cirrhosis and its relationship to hep C and alcohol use. Abd US revealed findings consistent with cirrhosis changes of liver. Patient reports that she went through [facility]. She now wishes to see Dr. Farias. Explained that she would need to schedule her own appointment. No fever or evidence of infection noted, lipase and AST/ALT normal, renal functions normal. Lactic acid improving - positive for benzos and opiates - she was hesitant to give urine sample until late in the evening. She is able to tolerate her meals and vitals are grossly stable. Positive nitrite per UA - Keflex at discharge. Also patient reported nails missing from BL great toes, states she picks them off. R great toe with some redness noted without any signs of drainage, non-tender to touch - doubt MRSA - Keflex should cross cover. Discharge to home with ambulatory referral to podiatry.   "

## 2019-09-25 NOTE — TRANSFER OF CARE
"Anesthesia Transfer of Care Note    Patient: Estella Arevalo    Procedure(s) Performed: Procedure(s) (LRB):  EGD (ESOPHAGOGASTRODUODENOSCOPY) (Left)    Patient location: GI    Anesthesia Type: general    Transport from OR: Transported from OR on room air with adequate spontaneous ventilation    Post pain: adequate analgesia    Post assessment: no apparent anesthetic complications and tolerated procedure well    Post vital signs: stable    Level of consciousness: sedated and responds to stimulation    Nausea/Vomiting: no nausea/vomiting    Complications: none    Transfer of care protocol was followed      Last vitals:   Visit Vitals  /71 (BP Location: Right arm)   Pulse 70   Temp 36.9 °C (98.4 °F) (Oral)   Resp 17   Ht 5' 6" (1.676 m)   Wt 77.4 kg (170 lb 10.2 oz)   SpO2 95%   Breastfeeding? No   BMI 27.54 kg/m²     "

## 2019-09-25 NOTE — MEDICAL/APP STUDENT
[facility]  History & Physical    Patient Name: Estella Arevalo  MRN: [id number]  Admission Date: 9/24/2019  Attending Physician: Francisco J Mcgill MD  Primary Care Provider: Angela Packer MD      Subjective:         Chief Complaint   Patient presents with    Emesis     EMS reports pt has hx of ETOH abuse, been vomiting blood since this AM. Pt has associated weakness.      HPI  Patient is a 65 y/o white female with a PMHx of ETOH abuse, HCV, hypothyroidism, liver cirrhosis, Bipolar 1, and opioid dependency that present to the ED complaining of vomiting bright red blood. Patient states that she binge drank alcohol yesterday, drinking about a half case of shay mix. Last night she began to vomit large amounts of bright red blood. She has continued to have nausea and vomiting. She endorses RUQ abdominal pain and feeling like she has a fever. She also notes coughing up black sputum for 2 days that turned into bright red sputum yesterday. She denies CP, SOB, or other associated symptoms.      In the ED, zofran and phenergan were given for N/V. ED workup remarkable for Hgb - 11.6, CMP: K - 3.3, ETOH - negative, Lipase - 53, LA - 3.2, trop - negative, CXR - unremarkable.    Past Medical History:   Diagnosis Date    Addiction to drug     Alcohol abuse     Arthritis     Cirrhosis of liver     Colon polyp     Coronary artery disease     Fall     GERD (gastroesophageal reflux disease)     Hepatitis C     States was successfully treated with Harvoni    History of psychiatric hospitalization     Hx of psychiatric care     Hypertension     Low back pain     Radha     MI (myocardial infarction)     Migraine headache     Psychiatric problem     Sleep difficulties     Suicide attempt     Therapy     Thyroid disease      Past Surgical History:   Procedure Laterality Date    ESOPHAGOGASTRODUODENOSCOPY N/A 3/20/2019    Procedure: EGD (ESOPHAGOGASTRODUODENOSCOPY);  Surgeon: Obdulia Wilson,  MD;  Location: [facility];  Service: Endoscopy;  Laterality: N/A;    HERNIA REPAIR      HIATAL HERNIA REPAIR      JOINT REPLACEMENT Bilateral     KNEE SURGERY  bilateral replacement    right foot sx  2008    SHOULDER SURGERY  replacement     Family History   Problem Relation Age of Onset    Cancer Mother     Cancer Father     Depression Sister     Bipolar disorder Maternal Grandfather     Suicide Cousin      Social History     Tobacco Use    Smoking status: Never Smoker    Smokeless tobacco: Never Used   Substance Use Topics    Alcohol use: Yes     Alcohol/week: 6.0 standard drinks     Types: 6 Cans of beer per week     Comment: pt admits to drinking vodka daily    Drug use: Not Currently     Types: Benzodiazepines, Methamphetamines, Amphetamines     Comment: [facility]; admits to addiction to opioids     Review of Systems   Constitutional: Negative for chills and malaise/fatigue.   HENT: Negative for congestion and sore throat.    Respiratory: Positive for cough and hemoptysis. Negative for shortness of breath.    Cardiovascular: Negative for chest pain and palpitations.   Gastrointestinal: Positive for abdominal pain, nausea and vomiting.   Genitourinary: Negative for dysuria and frequency.   Musculoskeletal: Negative for back pain and myalgias.   Neurological: Negative for dizziness, tremors and headaches.     Objective     Vitals:    09/25/19 0914   BP: 119/82   Pulse: 70   Resp: 18   Temp: 98.8 °F (37.1 °C)       Physical Exam   Constitutional: She is oriented to person, place, and time and well-developed, well-nourished, and in no distress. No distress.   HENT:   Head: Normocephalic and atraumatic.   Eyes: Pupils are equal, round, and reactive to light.   Cardiovascular: Normal rate and regular rhythm.   Pulmonary/Chest: Effort normal and breath sounds normal.   Abdominal: Soft. Bowel sounds are normal. There is tenderness (RUQ and epigastrum).   Musculoskeletal: Normal range of motion.    Neurological: She is alert and oriented to person, place, and time. GCS score is 15.   Skin: Skin is warm and dry.   Psychiatric: Affect and judgment normal.        ED Course    Abnormal Labs Reviewed   CBC W/ AUTO DIFFERENTIAL - Abnormal; Notable for the following components:       Result Value    WBC 2.34 (*)     Hemoglobin 11.6 (*)     Hematocrit 35.9 (*)     RDW 18.3 (*)     Lymph # 0.2 (*)     Mono # 0.2 (*)     Gran% 82.1 (*)     Lymph% 9.4 (*)     All other components within normal limits   COMPREHENSIVE METABOLIC PANEL - Abnormal; Notable for the following components:    Potassium 3.3 (*)     Glucose 153 (*)     Total Protein 9.0 (*)     Alkaline Phosphatase 231 (*)     AST 44 (*)     All other components within normal limits   URINALYSIS, REFLEX TO URINE CULTURE - Abnormal; Notable for the following components:    Protein, UA 1+ (*)     Glucose, UA 1+ (*)     Nitrite, UA Positive (*)     Urobilinogen, UA 4.0-6.0 (*)     All other components within normal limits    Narrative:     Preferred Collection Type->Urine, Clean Catch   LACTIC ACID, PLASMA - Abnormal; Notable for the following components:    Lactate (Lactic Acid) 3.2 (*)     All other components within normal limits   LACTIC ACID, PLASMA - Abnormal; Notable for the following components:    Lactate (Lactic Acid) 2.6 (*)     All other components within normal limits   URINALYSIS MICROSCOPIC - Abnormal; Notable for the following components:    Bacteria Many (*)     Hyaline Casts, UA 2 (*)     All other components within normal limits    Narrative:     Preferred Collection Type->Urine, Clean Catch     Imaging Results          X-Ray Chest 1 View (Final result)  Result time 09/24/19 18:09:58    Final result by Paula Santos MD (09/24/19 18:09:58)                 Impression:      No acute cardiopulmonary process identified.      Electronically signed by: Paula Santos MD  Date:    09/24/2019  Time:    18:09             Narrative:    EXAMINATION:  XR  CHEST 1 VIEW    TECHNIQUE:  Single frontal view of the chest was performed.    COMPARISON:  08/27/2019.    FINDINGS:  Cardiac silhouette is normal in size.  Lungs are slightly hypoinflated.  No evidence of focal consolidative process, pneumothorax, or significant effusion.  No acute osseous abnormality identified.  Postsurgical changes are seen involving the right shoulder.                                Assessment/Plan:     63 y/o white female with a PMHx of alcohol abuse, HCV, hypothyroidism, liver cirrhosis, Bipolar 1, and opioid dependency that present to the ED complaining of vomiting bright red blood    Hematemesis with intractable nausea and vomiting  Zofran, pantoprazole, promethazine PRN   NPO, IVF    Alcohol Abuse  Monitor CIWA score  Consider giving benzodiazepines     Hypothyroidism   Continue home levothyroxine     Bipolar 1 disorder  Continue home duloxetine    HTN  Continue home atenolol    Chronic Hepatitis C  Stable. Monitor as needed    Liver cirrhosis  Stable. Monitor as needed    Anemia of chronic disease  Stable. Monitor as needed      VTE Risk Mitigation (From admission, onward)         Ordered     IP VTE LOW RISK PATIENT  Once      09/24/19 9880                  GISSELL Meadows-S2  Hospital Medicine  [facility]

## 2019-09-25 NOTE — HPI
Mrs. Estella Arevalo is a 64 y.o. female known to me with essential hypertension, CAD, hypothyroidism (TSH 1.848 Aug 2019), cirrhosis of liver, chronic hepatitis C, anemia of chronic disease, alcohol abuse, Bipolar 1 disorder, and opioid dependency who presents to [facility] ED with complaints of bloody vomiting for the past two days.  She reports episodes of vomiting bright red blood and was concerned that it might be esophageal varies.  She complains of abdominal soreness only on coughing but denies any fevers, chills, chest pains, shortness of breath, palpitations, nor any diaphoresis.  She has experienced some weakness, fatigue, lightheaded, and some falls without any head trauma.  She has not lost consciousness.  She denies any melena, hematochezia, hematuria, vaginal bleeding, nor any hemoptysis.  She doesn't take NSAIDs but does say she drinks alcohol pretty regularly.  Her last drink was about two days ago and she is motivated to quit.  She was unable to drink yesterday because of the persistent nausea.  She usually drinks beers, anywhere from a few to a full 12-pack.  She denies any tremors or hallucinations recently.

## 2019-09-25 NOTE — PLAN OF CARE
Problem: Adult Inpatient Plan of Care  Goal: Plan of Care Review  Flowsheets (Taken 9/25/2019 0536)  Plan of Care Reviewed With: patient     Problem: Fall Injury Risk  Goal: Absence of Fall and Fall-Related Injury  Intervention: Promote Injury-Free Environment  Flowsheets (Taken 9/25/2019 0536)  Safety Promotion/Fall Prevention: bed alarm set; Fall Risk reviewed with patient/family  Environmental Safety Modification: room near unit station; room organization consistent; lighting adjusted     Problem: Adult Inpatient Plan of Care  Goal: Plan of Care Review  Flowsheets (Taken 9/25/2019 0536)  Plan of Care Reviewed With: patient     Problem: Adjustment to Illness (Gastrointestinal Bleeding)  Goal: Optimal Coping with Acute Illness  Intervention: Optimize Psychosocial Response to Unexpected Illness  Flowsheets (Taken 9/25/2019 0536)  Supportive Measures: self-care encouraged; self-reflection promoted; self-responsibility promoted; verbalization of feelings encouraged     Problem: Bleeding (Gastrointestinal Bleeding)  Goal: Hemostasis  Intervention: Manage Gastrointestinal Bleeding  Flowsheets (Taken 9/25/2019 0536)  Environmental Support: calm environment promoted; personal routine supported     Problem: Nausea and Vomiting  Goal: Fluid and Electrolyte Balance  Intervention: Prevent and Manage Nausea and Vomiting  Flowsheets (Taken 9/25/2019 0536)  Nausea/Vomiting Interventions: nausea triggers minimized; sips of clear liquids given  Environmental Support: calm environment promoted; personal routine supported     Problem: Pain Acute  Goal: Optimal Pain Control  Intervention: Develop Pain Management Plan  Flowsheets (Taken 9/25/2019 0536)  Pain Management Interventions: care clustered; pain management plan reviewed with patient/caregiver  Intervention: Prevent or Manage Pain  Flowsheets (Taken 9/25/2019 0536)  Sleep/Rest Enhancement: regular sleep/rest pattern promoted  Sensory Stimulation Regulation: care clustered;  lighting decreased; music/television provided for relaxation  Intervention: Optimize Psychosocial Wellbeing  Flowsheets (Taken 9/25/2019 0536)  Supportive Measures: self-care encouraged; self-reflection promoted; self-responsibility promoted; verbalization of feelings encouraged  Diversional Activities: television     Problem: Pain Acute  Goal: Optimal Pain Control  Intervention: Prevent or Manage Pain  Flowsheets (Taken 9/25/2019 0536)  Sleep/Rest Enhancement: regular sleep/rest pattern promoted  Sensory Stimulation Regulation: care clustered; lighting decreased; music/television provided for relaxation     Problem: Pain Acute  Goal: Optimal Pain Control  Intervention: Optimize Psychosocial Wellbeing  Flowsheets (Taken 9/25/2019 0536)  Supportive Measures: self-care encouraged; self-reflection promoted; self-responsibility promoted; verbalization of feelings encouraged  Diversional Activities: television     Patient remained free of injury throughout the shift. Vital signs remained WNL. Complaints of pain treated with prn analgesics with relief noted. Pt still with nausea in which antiemetics were administered with full relief obtained. Gastroenterology consulted. Plan to continue IV PPI and fluids, nausea and pain management, and await further input from consulted provider.

## 2019-09-25 NOTE — ASSESSMENT & PLAN NOTE
She is opioid dependent and has exhibited doctor shopping behavior in the past as documented in her chart.  Will be very careful with opioid administration.

## 2019-09-25 NOTE — PROGRESS NOTES
WRITTEN HEALTHCARE DISCHARGE INFORMATION      Things that YOU are RESPONSIBLE for to Manage Your Care At Home:     1. Getting your prescriptions filled.  2. Taking you medications as directed. DO NOT MISS ANY DOSES!  3. Going to your follow-up doctor appointments. This is important because it allows the doctor to monitor your progress and to determine if any changes need to be made to your treatment plan.     If you are unable to make your follow up appointments, please call the number listed and reschedule this appointment.      ____________HELP AT HOME____________________     Experiencing any SIGNS or SYMPTOMS: YOU CAN     Schedule a same day appopintment with your Primary Care Doctor or  you can call [facility] On Call Nurse Care Line for 24/7 assistance at [phone]     If you are experience any signs or symptoms that have become severe, Call 911 and come to your nearest Emergency Room.     Thank you for choosing [facility] and allowing us to care for you.   From your care management team:      You should receive a call from [facility] Discharge Department within 48-72 hours to help manage your care after discharge. Please try to make sure that you answer your phone for this important phone call.    Follow-up Information     Angela Packer MD On 10/8/2019.    Specialties:  Family Medicine, Wound Care  Why:  Appointment scheduled for Tuesday October 8th at 1pm  Contact information:  [address] [facility] [address]  [phone]             Raquel Howard DPM.    Specialties:  Podiatry, Wound Care  Why:  call to schedule appointment as needed to follow up   Contact information:  [address] [facility] [address]  [phone]             Jameson Cornejo MD On 10/7/2019.    Specialty:  Gastroenterology  Why:  Appointment scheduled for October 7th at 11:00am  Contact information:  [address] [place] [address]  [address]  [facility]  [place] [id number]  [phone]

## 2019-09-25 NOTE — ANESTHESIA PREPROCEDURE EVALUATION
09/25/2019  Estella Arevalo is a 64 y.o., female.    Anesthesia Evaluation    I have reviewed the Patient Summary Reports.    I have reviewed the Nursing Notes.      Review of Systems  Anesthesia Hx:  No problems with previous Anesthesia  Denies Family Hx of Anesthesia complications.   Denies Personal Hx of Anesthesia complications.   Social:  Non-Smoker, Alcohol Use Opioid dependency; Utox +benzo and opiate   Hematology/Oncology:         -- Anemia: Hematology Comments: Of chronic disease   Cardiovascular:   Exercise tolerance: good Hypertension Past MI CAD   Denies Dysrhythmias.   Denies Angina.            Pulmonary:  Pulmonary Normal    Renal/:  Renal/ Normal     Hepatic/GI:   GERD Liver Disease, Hepatitis, C Cirrhosis; admitted for hematemesis and intractable N/V; last emesis ~8 hrs ago-brown with streaks of blood   Musculoskeletal:   Arthritis     Neurological:   Denies CVA. Headaches    Endocrine:   Hypothyroidism    Psych:   Psychiatric History anxiety depression H/o suicide attempt; bipolar         Physical Exam  General:  Well nourished    Airway/Jaw/Neck:  Airway Findings: Mouth Opening: Normal Tongue: Normal  Mallampati: III  TM Distance: 4 - 6 cm      Dental:  Dental Findings: In tact    Chest/Lungs:  Chest/Lungs Findings: Clear to auscultation, Normal Respiratory Rate     Heart/Vascular:  Heart Findings: Rate: Normal  Rhythm: Regular Rhythm        Mental Status:  Mental Status Findings:  Cooperative, Alert and Oriented       Wt Readings from Last 3 Encounters:   09/25/19 77.4 kg (170 lb 10.2 oz)   08/27/19 89.2 kg (196 lb 10.4 oz)   08/14/19 79.4 kg (175 lb)     Temp Readings from Last 3 Encounters:   09/25/19 37 °C (98.6 °F) (Oral)   08/29/19 37 °C (98.6 °F) (Oral)   08/14/19 36.9 °C (98.4 °F)     BP Readings from Last 3 Encounters:   09/25/19 117/78   08/29/19 (!) 156/85   08/14/19 (!)  129/94     Pulse Readings from Last 3 Encounters:   09/25/19 68   08/29/19 71   08/14/19 106     Lab Results   Component Value Date    WBC 3.33 (L) 09/25/2019    HGB 10.6 (L) 09/25/2019    HCT 32.2 (L) 09/25/2019    MCV 87 09/25/2019     09/25/2019     CMP  Sodium   Date Value Ref Range Status   09/25/2019 135 (L) 136 - 145 mmol/L Final     Potassium   Date Value Ref Range Status   09/25/2019 3.7 3.5 - 5.1 mmol/L Final     Chloride   Date Value Ref Range Status   09/25/2019 100 95 - 110 mmol/L Final     CO2   Date Value Ref Range Status   09/25/2019 24 23 - 29 mmol/L Final     Glucose   Date Value Ref Range Status   09/25/2019 127 (H) 70 - 110 mg/dL Final     BUN, Bld   Date Value Ref Range Status   09/25/2019 9 8 - 23 mg/dL Final     Creatinine   Date Value Ref Range Status   09/25/2019 0.7 0.5 - 1.4 mg/dL Final     Calcium   Date Value Ref Range Status   09/25/2019 8.5 (L) 8.7 - 10.5 mg/dL Final     Total Protein   Date Value Ref Range Status   09/25/2019 7.3 6.0 - 8.4 g/dL Final     Albumin   Date Value Ref Range Status   09/25/2019 3.6 3.5 - 5.2 g/dL Final     Total Bilirubin   Date Value Ref Range Status   09/25/2019 0.8 0.1 - 1.0 mg/dL Final     Comment:     For infants and newborns, interpretation of results should be based  on gestational age, weight and in agreement with clinical  observations.  Premature Infant recommended reference ranges:  Up to 24 hours.............<8.0 mg/dL  Up to 48 hours............<12.0 mg/dL  3-5 days..................<15.0 mg/dL  6-29 days.................<15.0 mg/dL       Alkaline Phosphatase   Date Value Ref Range Status   09/25/2019 182 (H) 55 - 135 U/L Final     AST   Date Value Ref Range Status   09/25/2019 34 10 - 40 U/L Final     ALT   Date Value Ref Range Status   09/25/2019 21 10 - 44 U/L Final     Anion Gap   Date Value Ref Range Status   09/25/2019 11 8 - 16 mmol/L Final     eGFR if    Date Value Ref Range Status   09/25/2019 >60 >60 mL/min/1.73  m^2 Final     eGFR if non    Date Value Ref Range Status   09/25/2019 >60 >60 mL/min/1.73 m^2 Final     Comment:     Calculation used to obtain the estimated glomerular filtration  rate (eGFR) is the CKD-EPI equation.            Anesthesia Plan  Type of Anesthesia, risks & benefits discussed:  Anesthesia Type:  general, MAC  Patient's Preference:   Intra-op Monitoring Plan: standard ASA monitors  Intra-op Monitoring Plan Comments:   Post Op Pain Control Plan: multimodal analgesia and per primary service following discharge from PACU  Post Op Pain Control Plan Comments:   Induction:   IV  Beta Blocker:  Patient is on a Beta-Blocker and has received one dose within the past 24 hours (No further documentation required).       Informed Consent: Patient understands risks and agrees with Anesthesia plan.  Questions answered. Anesthesia consent signed with patient.  ASA Score: 3     Day of Surgery Review of History & Physical: I have interviewed and examined the patient. I have reviewed the patient's H&P dated:            Ready For Surgery From Anesthesia Perspective.

## 2019-09-25 NOTE — NURSING
Patient to scheduled testing as ordered. Tele monitoring in progress. Patient awake, alert, oriented. No apparent distress noted.

## 2019-09-25 NOTE — PLAN OF CARE
09/25/19 1000   Discharge Assessment   Assessment Type Discharge Planning Assessment   Assessment information obtained from? Medical Record   Prior to hospitilization cognitive status: Alert/Oriented   Prior to hospitalization functional status: Independent;Assistive Equipment   Current cognitive status: Alert/Oriented   Current Functional Status: Independent;Assistive Equipment   Facility Arrived From: home   Lives With spouse   Able to Return to Prior Arrangements yes   Is patient able to care for self after discharge? Yes   Who are your caregiver(s) and their phone number(s)? [phone]   Patient's perception of discharge disposition home or selfcare   Readmission Within the Last 30 Days no previous admission in last 30 days   Patient currently being followed by outpatient case management? No   Patient currently receives any other outside agency services? No   Equipment Currently Used at Home walker, rolling   Do you have any problems affording any of your prescribed medications? No   Is the patient taking medications as prescribed? yes   Does the patient have transportation home? Yes   Transportation Anticipated family or friend will provide   Does the patient receive services at the Coumadin Clinic? No   Discharge Plan A Home with family  (with follow up appointments)   Discharge Plan B Home with family   DME Needed Upon Discharge  none   Patient/Family in Agreement with Plan yes     [id number] - [place] - [address] AT [facility] & [address]  [address]  [place] [address]  Phone: [phone] Fax: [phone]

## 2019-09-25 NOTE — [employer] PATIENT INSTRUCTIONS
Discharge Summary/Instructions after an Endoscopic Procedure  Patient Name: Estella Arevalo  Patient MRN: [id number]  Patient YOB: 1955  Wednesday, September 25, 2019  Hernandez Farias MD  RESTRICTIONS:  During your procedure today, you received medications for sedation.  These   medications may affect your judgment, balance and coordination.  Therefore,   for 24 hours, you have the following restrictions:   - DO NOT drive a car, operate machinery, make legal/financial decisions,   sign important papers or drink alcohol.    ACTIVITY:  Today: no heavy lifting, straining or running due to procedural   sedation/anesthesia.  The following day: return to full activity including work.  DIET:  Eat and drink normally unless instructed otherwise.     TREATMENT FOR COMMON SIDE EFFECTS:  - Mild abdominal pain, nausea, belching, bloating or excessive gas:  rest,   eat lightly and use a heating pad.  - Sore Throat: treat with throat lozenges and/or gargle with warm salt   water.  - Because air was used during the procedure, expelling large amounts of air   from your rectum or belching is normal.  - If a bowel prep was taken, you may not have a bowel movement for 1-3 days.    This is normal.  SYMPTOMS TO WATCH FOR AND REPORT TO YOUR PHYSICIAN:  1. Abdominal pain or bloating, other than gas cramps.  2. Chest pain.  3. Back pain.  4. Signs of infection such as: chills or fever occurring within 24 hours   after the procedure.  5. Rectal bleeding, which would show as bright red, maroon, or black stools.   (A tablespoon of blood from the rectum is not serious, especially if   hemorrhoids are present.)  6. Vomiting.  7. Weakness or dizziness.  GO DIRECTLY TO THE NEAREST EMERGENCY ROOM IF YOU HAVE ANY OF THE FOLLOWING:      Difficulty breathing              Chills and/or fever over 101 F   Persistent vomiting and/or vomiting blood   Severe abdominal pain   Severe chest pain   Black, tarry stools   Bleeding- more than one  tablespoon   Any other symptom or condition that you feel may need urgent attention  Your doctor recommends these additional instructions:  If any biopsies were taken, your doctors clinic will contact you in 1 to 2   weeks with any results.  - Monitor H/H and transfuse pRBCs as needed.  - AVOID NSAIDs.    - Continue protonix 40 mg po Q12H x 1 month.  - It is unclear why she had an ulcer at the GE junction in 2017 and still   now.  Alcohol cessation was emphasized.  - Return patient to [facility] for ongoing care.  For questions, problems or results please call your physician - Hernandez Farias MD at Work:  [phone].  [facility] can be reached at [phone]     IF A COMPLICATION OR EMERGENCY SITUATION ARISES AND YOU ARE UNABLE TO REACH   YOUR PHYSICIAN - GO DIRECTLY TO THE EMERGENCY ROOM.  Hernandez Farias MD  9/25/2019 8:48:17 AM  This report has been verified and signed electronically.  [employer]

## 2019-09-25 NOTE — ASSESSMENT & PLAN NOTE
Patient's blood pressure is poorly-controlled; will continue home regimen of atenolol and provide as-needed clonidine.

## 2019-09-25 NOTE — PLAN OF CARE
09/25/19 1054   Final Note   Assessment Type Final Discharge Note   Anticipated Discharge Disposition Home   Hospital Follow Up  Appt(s) scheduled? Yes   Discharge plans and expectations educations in teach back method with documentation complete? Yes   Right Care Referral Info   Post Acute Recommendation No Care   pts nurse Claritza notified that pt can d/c from CM standpoint.

## 2019-09-25 NOTE — DISCHARGE SUMMARY
"[facility] Hospital Medicine  Discharge Summary      Patient Name: Estella Arevalo  MRN: [id number]  Admission Date: 9/24/2019  Hospital Length of Stay: 0 days  Discharge Date and Time:  09/25/2019 10:52 AM  Attending Physician: Emigdio Landin MD   Discharging Provider: SEAN Barrios  Primary Care Provider: Angela Packer MD      HPI:   Mrs. Estella Arevalo is a 64 y.o. female known to me with essential hypertension, CAD, hypothyroidism (TSH 1.848 Aug 2019), cirrhosis of liver, chronic hepatitis C, anemia of chronic disease, alcohol abuse, Bipolar 1 disorder, and opioid dependency who presents to [facility] ED with complaints of bloody vomiting for the past two days.  She reports episodes of vomiting bright red blood and was concerned that it might be esophageal varies.  She complains of abdominal soreness only on coughing but denies any fevers, chills, chest pains, shortness of breath, palpitations, nor any diaphoresis.   She has experienced some weakness, fatigue, lightheaded, and some falls without any head trauma.  She has not lost consciousness.  She denies any melena, hematochezia, hematuria, vaginal bleeding, nor any hemoptysis.  She doesn't take NSAIDs but does say she drinks alcohol pretty regularly.  Her last drink was about two days ago and she is motivated to quit.  She was unable to drink yesterday because of the persistent nausea.  She usually drinks beers, anywhere from a few to a full 12-pack.  She denies any tremors or hallucinations recently.      Procedure(s) (LRB):  EGD (ESOPHAGOGASTRODUODENOSCOPY) (Left)      Hospital Course:   Placed in observation for evaluation of hematemesis. No new episodes documented or witnessed since placement. She tells me that when she awaken in early morning that her nausea was gone. She stated that at the time of presentation she has "withdrawals" and ask in early morning for "that medicine they gave me yesterday". She was seen " and evaluated by GI and EGD performed showing non-bleeding ulcer. Recommendations to stop NSAID use as well as alcohol cessation & Protonix BID. The patient does display manipulative behavior but appears well-versed on her condition. She was explained the mechanism of portal hypertension, ascites, cirrhosis and its relationship to hep C and alcohol use. Abd US revealed findings consistent with cirrhosis changes of liver. Patient reports that she [facility] She now wishes to see Dr. Farias. Explained that she would need to schedule her own appointment. No fever or evidence of infection noted, lipase and AST/ALT normal, renal functions normal. Lactic acid improving - positive for benzos and opiates - she was hesitant to give urine sample until late in the evening. She is able to tolerate her meals and vitals are grossly stable. Positive nitrite per UA - Keflex at discharge. Also patient reported nails missing from BL great toes, states she picks them off. R great toe with some redness noted without any signs of drainage, non-tender to touch - doubt MRSA - Keflex should cross cover. Discharge to home with ambulatory referral to podiatry.      Consults:   Consults (From admission, onward)        Status Ordering Provider     Inpatient consult to Gastroenterology  Once     Provider:  Hernandez Farias MD    Acknowledged DAMON TERRELL          No new Assessment & Plan notes have been filed under this hospital service since the last note was generated.  Service: Hospital Medicine    Final Active Diagnoses:    Diagnosis Date Noted POA    PRINCIPAL PROBLEM:  Hematemesis [K92.0] 03/20/2019 Yes    Alcohol abuse [F10.10] 09/25/2019 Yes     Chronic    Intractable vomiting with nausea [R11.2] 09/24/2019 Yes    Anemia of chronic disease [D63.8] 09/12/2018 Yes     Chronic    Essential hypertension [I10] 07/16/2018 Yes     Chronic    CAD (coronary artery disease) [I25.10] 07/16/2018  Yes     Chronic    Cirrhosis of liver [K74.60] 07/16/2018 Yes     Chronic    Chronic hepatitis C [B18.2] 07/16/2018 Yes     Chronic    Bipolar 1 disorder [F31.9] 12/30/2016 Yes     Chronic    Acquired hypothyroidism [E03.9] 12/19/2014 Yes     Chronic    Polysubstance dependence including opioid type drug, continuous use [F11.20, F19.20] 07/14/2013 Yes     Chronic      Problems Resolved During this Admission:       Discharged Condition: stable    Disposition: Home or Self Care    Follow Up:  Follow-up Information     Angela Packer MD On 10/8/2019.    Specialties:  Family Medicine, Wound Care  Why:  Appointment scheduled for Tuesday October 8th at 1pm  Contact information:  [address] [facility] [address]  [phone]             Raquel Howard DPM.    Specialties:  Podiatry, Wound Care  Why:  call to schedule appointment as needed to follow up   Contact information:  [address] [facility]  [place] [address]  [phone]                 Patient Instructions:      Ambulatory Referral to Podiatry   Referral Priority: Routine Referral Type: Consultation   Referral Reason: Specialty Services Required   Requested Specialty: Podiatry   Number of Visits Requested: 1     Diet Cardiac     Activity as tolerated       Significant Diagnostic Studies: Labs:   CMP   Recent Labs   Lab 09/24/19 1911 09/25/19 0412    135*   K 3.3* 3.7   CL 96 100   CO2 25 24   * 127*   BUN 9 9   CREATININE 0.7 0.7   CALCIUM 9.5 8.5*   PROT 9.0* 7.3   ALBUMIN 4.6 3.6   BILITOT 0.9 0.8   ALKPHOS 231* 182*   AST 44* 34   ALT 25 21   ANIONGAP 15 11   ESTGFRAFRICA >60 >60   EGFRNONAA >60 >60   , CBC   Recent Labs   Lab 09/24/19 1911 09/25/19  0412   WBC 2.34* 3.33*   HGB 11.6* 10.6*   HCT 35.9* 32.2*    244   , INR   Lab Results   Component Value Date    INR 1.0 08/27/2019    INR 1.0 06/21/2019    INR 1.0 03/20/2019   , Lipid Panel   Lab Results   Component Value Date    CHOL 202 (H) 05/29/2012    HDL 50 05/29/2012     LDLCALC 134.0 05/29/2012    TRIG 90 05/29/2012    CHOLHDL 24.8 05/29/2012   , Troponin   Recent Labs   Lab 09/24/19  1911   TROPONINI <0.006    and A1C: No results for input(s): HGBA1C in the last 4320 hours.    Pending Diagnostic Studies:     None         Medications:  Reconciled Home Medications:      Medication List      START taking these medications    cephALEXin 500 MG capsule  Commonly known as:  KEFLEX  Take 1 capsule (500 mg total) by mouth every 12 (twelve) hours. for 7 days     pantoprazole 40 MG tablet  Commonly known as:  PROTONIX  Take 1 tablet (40 mg total) by mouth 2 (two) times daily.        CONTINUE taking these medications    atenolol 25 MG tablet  Commonly known as:  TENORMIN  Take 1 tablet (25 mg total) by mouth once daily.     cetirizine 5 MG tablet  Commonly known as:  ZYRTEC  Take 1 tablet (5 mg total) by mouth once daily.     cycloSPORINE 0.05 % ophthalmic emulsion  Commonly known as:  RESTASIS  Place 0.4 mLs (1 drop total) into both eyes 2 (two) times daily.     DULoxetine 60 MG capsule  Commonly known as:  CYMBALTA  Take 1 capsule (60 mg total) by mouth once daily.     fluticasone propionate 50 mcg/actuation nasal spray  Commonly known as:  FLONASE  2 sprays (100 mcg total) by Each Nostril route once daily.     guaiFENesin 600 mg 12 hr tablet  Commonly known as:  MUCINEX  Take 1 tablet (600 mg total) by mouth 2 (two) times daily.     ibuprofen 600 MG tablet  Commonly known as:  ADVIL,MOTRIN  Take 1 tablet (600 mg total) by mouth every 6 (six) hours as needed for Pain.     JUBLIA 10 % Darlin  Generic drug:  efinaconazole  APPLY ONE DOSE D     levothyroxine 50 mcg Cap  Commonly known as:  TIROSINT  Take 50 mcg by mouth once daily.     NARCAN 4 mg/actuation Spry  Generic drug:  naloxone  USE UTD     nitroGLYCERIN 0.3 MG SL tablet  Commonly known as:  NITROSTAT  ONE TABLET UNDER TONGUE AS NEEDED FOR CHEST PAIN EVERY 5 MINUTES AS NEEDED     PROCTOZONE-HC 2.5 % rectal cream  Generic drug:   hydrocortisone  STACI RECTALLY BID        STOP taking these medications    clindamycin phosphate 1% 1 % gel  Commonly known as:  CLINDAGEL     metoclopramide HCl 10 MG tablet  Commonly known as:  REGLAN            Indwelling Lines/Drains at time of discharge:   Lines/Drains/Airways     None                 Time spent on the discharge of patient: 35 minutes  Patient was seen and examined on the date of discharge and determined to be suitable for discharge.       BC Bazan, FNP-C  Hospitalist - Department of Hospital Medicine  [address], [place] [address]  Office [phone]; Pager [phone]

## 2019-09-25 NOTE — H&P
[facility] Medicine  History & Physical    Patient Name: Estella Arevalo  MRN: [id number]  Admission Date: 9/24/2019  Attending Physician: Emigdio Landin MD   Primary Care Provider: Angela Packer MD         Patient information was obtained from patient.     Subjective:     Principal Problem:Hematemesis    Chief Complaint: Bloody vomiting for two days.    HPI: Mrs. Estella Arevalo is a 64 y.o. female known to me with essential hypertension, CAD, hypothyroidism (TSH 1.848 Aug 2019), cirrhosis of liver, chronic hepatitis C, anemia of chronic disease, alcohol abuse, Bipolar 1 disorder, and opioid dependency who presents to [facility] ED with complaints of bloody vomiting for the past two days.  She reports episodes of vomiting bright red blood and was concerned that it might be esophageal varies.  She complains of abdominal soreness only on coughing but denies any fevers, chills, chest pains, shortness of breath, palpitations, nor any diaphoresis.   She has experienced some weakness, fatigue, lightheaded, and some falls without any head trauma.  She has not lost consciousness.  She denies any melena, hematochezia, hematuria, vaginal bleeding, nor any hemoptysis.  She doesn't take NSAIDs but does say she drinks alcohol pretty regularly.  Her last drink was about two days ago and she is motivated to quit.  She was unable to drink yesterday because of the persistent nausea.  She usually drinks beers, anywhere from a few to a full 12-pack.  She denies any tremors or hallucinations recently.      Chart Review:  Previous Hospitalizations  Date Hospital Diagnosis   Aug 2019 [facility] Hyponatremia   Mar 2019 [facility] Coffee-ground emesis s/p EGD   Oct 2018 [facility] [place] A[place]ript[place] and alcohol intoxication   Sept 18, 2018 [facility] Hyponatremia   Sept 11, 2018 [facility] Hyponatremia   Jul 2018 [facility] Hyponatremia    Nov 2017 [facility] [place] Inc[place] ventral hernia s/p laparoscopic repair   Apr 2017 [facility]  Acute gastroenteritis   Mar 2017 [facility] [place] Hiatal hernia s/p laparoscopic repair     Feb 2015 [facility] [place]- Hematochezia     Dec 2014 [facility] [place] Suicidal ideations    Jan 2014 [facility] [place] Ileus    Jul 2013 [facility] [place] Hematemesis       Outpatient Follow-Up  Date of Visit Physician Service   April 2019 Manny Glass MD Primary care   Dec 2017 Greg Mendez NP Urgent Care   Nov 2017 Vini Gomez MD General Surgery    Mar 2015 Moisés Marinelli MD Neurosurgery    Jun 2013 NIGEL Atkins MD Pain Medicine      Past Medical History:   Diagnosis Date    Addiction to drug     Alcohol abuse     Arthritis     Cirrhosis of liver     Colon polyp     Coronary artery disease     Fall     GERD (gastroesophageal reflux disease)     Hepatitis C     States was successfully treated with Harvoni    History of psychiatric hospitalization     Hx of psychiatric care     Hypertension     Low back pain     Radha     MI (myocardial infarction)     Migraine headache     Psychiatric problem     Sleep difficulties     Suicide attempt     Therapy     Thyroid disease        Past Surgical History:   Procedure Laterality Date    ESOPHAGOGASTRODUODENOSCOPY N/A 3/20/2019    Procedure: EGD (ESOPHAGOGASTRODUODENOSCOPY);  Surgeon: Obdulia Wilson MD;  Location: [facility];  Service: Endoscopy;  Laterality: N/A;    HERNIA REPAIR      HIATAL HERNIA REPAIR      JOINT REPLACEMENT Bilateral     KNEE SURGERY  bilateral replacement    right foot sx  2008    SHOULDER SURGERY  replacement       Review of patient's allergies indicates:  No Known Allergies    No current facility-administered medications on file prior to encounter.      Current Outpatient Medications on File Prior to Encounter   Medication Sig    atenolol (TENORMIN) 25 MG tablet Take 1 tablet (25 mg total) by mouth once daily.    DULoxetine (CYMBALTA) 60 MG capsule Take 1 capsule (60 mg total) by mouth once daily.    fluticasone propionate (FLONASE) 50 mcg/actuation nasal spray 2 sprays  (100 mcg total) by Each Nostril route once daily.    ibuprofen (ADVIL,MOTRIN) 600 MG tablet Take 1 tablet (600 mg total) by mouth every 6 (six) hours as needed for Pain.    levothyroxine (TIROSINT) 50 mcg Cap Take 50 mcg by mouth once daily.    metoclopramide HCl (REGLAN) 10 MG tablet Take 1 tablet (10 mg total) by mouth 4 (four) times daily before meals and nightly.    pantoprazole (PROTONIX) 40 MG tablet Take 1 tablet (40 mg total) by mouth once daily.    cetirizine (ZYRTEC) 5 MG tablet Take 1 tablet (5 mg total) by mouth once daily.    clindamycin phosphate 1% (CLINDAGEL) 1 % gel Apply topically 2 (two) times daily. for 7 days    cycloSPORINE (RESTASIS) 0.05 % ophthalmic emulsion Place 0.4 mLs (1 drop total) into both eyes 2 (two) times daily.    guaiFENesin (MUCINEX) 600 mg 12 hr tablet Take 1 tablet (600 mg total) by mouth 2 (two) times daily.    JUBLIA 10 % Eran APPLY ONE DOSE D    NARCAN 4 mg/actuation Spry USE UTD    nitroGLYCERIN (NITROSTAT) 0.3 MG SL tablet ONE TABLET UNDER TONGUE AS NEEDED FOR CHEST PAIN EVERY 5 MINUTES AS NEEDED    PROCTOZONE-HC 2.5 % rectal cream STACI RECTALLY BID     Family History     Problem Relation (Age of Onset)    Bipolar disorder Maternal Grandfather    Cancer Mother, Father    Depression Sister    Suicide Cousin        Tobacco Use    Smoking status: Never Smoker    Smokeless tobacco: Never Used   Substance and Sexual Activity    Alcohol use: Yes     Alcohol/week: 6.0 standard drinks     Types: 6 Cans of beer per week     Comment: pt admits to drinking vodka daily    Drug use: Not Currently     Types: Benzodiazepines, Methamphetamines, Amphetamines     Comment: Pain mgt clinic; admits to addiction to opioids    Sexual activity: Not on file     Review of Systems   Constitutional: Positive for fatigue. Negative for activity change, appetite change, chills, fever and unexpected weight change.   HENT: Negative.    Eyes: Negative.    Respiratory: Negative for cough,  chest tightness, shortness of breath and wheezing.    Cardiovascular: Negative for chest pain, palpitations and leg swelling.   Gastrointestinal: Positive for nausea (hematemesis) and vomiting. Negative for abdominal distention, abdominal pain, blood in stool, constipation and diarrhea.   Genitourinary: Negative for dysuria and hematuria.   Musculoskeletal: Negative.    Skin: Negative.    Neurological: Positive for dizziness, weakness and light-headedness. Negative for seizures and syncope.   Psychiatric/Behavioral: Negative for hallucinations.     Objective:     Vital Signs (Most Recent):  Temp: 98.2 °F (36.8 °C) (09/25/19 0012)  Pulse: 85 (09/25/19 0012)  Resp: 18 (09/25/19 0012)  BP: (!) 178/110 (09/25/19 0158)  SpO2: 99 % (09/25/19 0012) Vital Signs (24h Range):  Temp:  [97.9 °F (36.6 °C)-99.5 °F (37.5 °C)] 98.2 °F (36.8 °C)  Pulse:  [] 85  Resp:  [16-20] 18  SpO2:  [96 %-100 %] 99 %  BP: (144-184)/() 178/110     Weight: 77.4 kg (170 lb 10.2 oz)  Body mass index is 27.54 kg/m².    Physical Exam   Constitutional: She is oriented to person, place, and time. She appears well-developed and well-nourished. No distress.   HENT:   Head: Normocephalic and atraumatic.   Right Ear: External ear normal.   Left Ear: External ear normal.   Nose: Nose normal.   Eyes: Right eye exhibits no discharge. Left eye exhibits no discharge.   Neck: Normal range of motion.   Cardiovascular: Normal rate, regular rhythm, normal heart sounds and intact distal pulses. Exam reveals no gallop and no friction rub.   No murmur heard.  Pulmonary/Chest: Effort normal and breath sounds normal. No stridor. No respiratory distress. She has no wheezes. She has no rales. She exhibits no tenderness.   Abdominal: Soft. Bowel sounds are normal. She exhibits no distension. There is no tenderness. There is no rebound and no guarding.   Musculoskeletal: Normal range of motion. She exhibits edema (There is some edema to the left knee).    Neurological: She is alert and oriented to person, place, and time.   Skin: Skin is warm and dry. She is not diaphoretic. No erythema.   Psychiatric: She has a normal mood and affect. Her behavior is normal. Judgment and thought content normal.   Nursing note and vitals reviewed.          Significant Labs: All pertinent labs within the past 24 hours have been reviewed.    Significant Imaging: I have reviewed and interpreted all pertinent imaging results/findings within the past 24 hours.    Assessment/Plan:     * Hematemesis  Patient reports episodes of hematemesis in the last day after bouts of intractable nausea and vomiting.  I suspect that Susi-Vital tears at this time.  She has a history of cirrhosis of the liver apparently from hepatitis C and alcohol and had an EGD recently which was negative for esophageal varices.  Her hemoglobin is normal at 11.6 g/dL and she is otherwise hemodynamically stable.  Would defer PRBC transfusion at this time and repeat her H/H in the morning to assess for transfusion needs.  She has been started on pantoprazole.  Will place in Observation status and consult Gastroenterology for further recommendations.    Intractable vomiting with nausea  As addressed above.    Essential hypertension  Patient's blood pressure is poorly-controlled; will continue home regimen of atenolol and provide as-needed clonidine.    CAD (coronary artery disease)  Stable; there are no acute issues.    Acquired hypothyroidism  Well controlled; will continue her home regimen levothyroxine.    Cirrhosis of liver  Stable; there are no acute issues.    Chronic hepatitis C  Stable; there are no acute issues.    Anemia of chronic disease  The patient's H/H is stable and consistent with previous laboratory measurements, and the patient exhibits no signs or symptoms of acute bleeding; there is no indication for transfusion.  Will continue to monitor.    Alcohol abuse  As addressed above.    Bipolar 1  disorder  Stable; will continue her home regimen of duloxetine.    Polysubstance dependence including opioid type drug, continuous use  She is opioid dependent and has exhibited doctor shopping behavior in the past as documented in her chart.  Will be very careful with opioid administration.    VTE Risk Mitigation (From admission, onward)         Ordered     IP VTE LOW RISK PATIENT  Once      09/24/19 2902                   Francisco J Mcgill M.D.  Staff [facility]  Department of [facility] Medicine  [facility]  Pager: [phone]          N.B.: Portions of this note was dictated using M*Modal Fluency Direct--there may be voice recognition errors occasionally missed on review.

## 2019-09-25 NOTE — ASSESSMENT & PLAN NOTE
Patient reports episodes of hematemesis in the last day after bouts of intractable nausea and vomiting.  I suspect that Susi-Vital tears at this time.  She has a history of cirrhosis of the liver apparently from hepatitis C and alcohol and had an EGD recently which was negative for esophageal varices.  Her hemoglobin is normal at 11.6 g/dL and she is otherwise hemodynamically stable.  Would defer PRBC transfusion at this time and repeat her H/H in the morning to assess for transfusion needs.  She has been started on pantoprazole.  Will consult Gastroenterology for further recommendations.

## 2019-10-08 ENCOUNTER — OFFICE VISIT (OUTPATIENT)
Dept: FAMILY MEDICINE | Facility: CLINIC | Age: 64
End: 2019-10-08
Payer: MEDICARE

## 2019-10-08 VITALS
OXYGEN SATURATION: 95 % | HEIGHT: 66 IN | BODY MASS INDEX: 28.23 KG/M2 | WEIGHT: 175.69 LBS | SYSTOLIC BLOOD PRESSURE: 120 MMHG | DIASTOLIC BLOOD PRESSURE: 74 MMHG | TEMPERATURE: 98 F | HEART RATE: 73 BPM

## 2019-10-08 DIAGNOSIS — E03.4 HYPOTHYROIDISM DUE TO ACQUIRED ATROPHY OF THYROID: ICD-10-CM

## 2019-10-08 DIAGNOSIS — Z23 NEED FOR PROPHYLACTIC VACCINATION AND INOCULATION AGAINST INFLUENZA: ICD-10-CM

## 2019-10-08 DIAGNOSIS — Z23 NEED FOR 23-POLYVALENT PNEUMOCOCCAL POLYSACCHARIDE VACCINE: ICD-10-CM

## 2019-10-08 DIAGNOSIS — K62.3 RECTAL PROLAPSE: ICD-10-CM

## 2019-10-08 DIAGNOSIS — M47.816 LUMBAR FACET ARTHROPATHY: ICD-10-CM

## 2019-10-08 DIAGNOSIS — I25.10 CORONARY ARTERY DISEASE, ANGINA PRESENCE UNSPECIFIED, UNSPECIFIED VESSEL OR LESION TYPE, UNSPECIFIED WHETHER NATIVE OR TRANSPLANTED HEART: Chronic | ICD-10-CM

## 2019-10-08 DIAGNOSIS — K42.9 UMBILICAL HERNIA WITHOUT OBSTRUCTION AND WITHOUT GANGRENE: ICD-10-CM

## 2019-10-08 DIAGNOSIS — M54.16 LUMBAR RADICULOPATHY, CHRONIC: ICD-10-CM

## 2019-10-08 DIAGNOSIS — I10 ESSENTIAL HYPERTENSION: Primary | Chronic | ICD-10-CM

## 2019-10-08 PROCEDURE — 99999 PR PBB SHADOW E&M-EST. PATIENT-LVL IV: CPT | Mod: PBBFAC,HCNC,, | Performed by: FAMILY MEDICINE

## 2019-10-08 PROCEDURE — 99214 PR OFFICE/OUTPT VISIT, EST, LEVL IV, 30-39 MIN: ICD-10-PCS | Mod: HCNC,25,S$GLB, | Performed by: FAMILY MEDICINE

## 2019-10-08 PROCEDURE — 90732 PPSV23 VACC 2 YRS+ SUBQ/IM: CPT | Mod: HCNC,S$GLB,, | Performed by: FAMILY MEDICINE

## 2019-10-08 PROCEDURE — 99214 OFFICE O/P EST MOD 30 MIN: CPT | Mod: HCNC,25,S$GLB, | Performed by: FAMILY MEDICINE

## 2019-10-08 PROCEDURE — G0008 FLU VACCINE - HIGH DOSE (65+) PRESERVATIVE FREE IM: ICD-10-PCS | Mod: HCNC,S$GLB,, | Performed by: FAMILY MEDICINE

## 2019-10-08 PROCEDURE — 90732 PNEUMOCOCCAL POLYSACCHARIDE VACCINE 23-VALENT =>2YO SQ IM: ICD-10-PCS | Mod: HCNC,S$GLB,, | Performed by: FAMILY MEDICINE

## 2019-10-08 PROCEDURE — 90662 FLU VACCINE - HIGH DOSE (65+) PRESERVATIVE FREE IM: ICD-10-PCS | Mod: HCNC,S$GLB,, | Performed by: FAMILY MEDICINE

## 2019-10-08 PROCEDURE — G0009 PNEUMOCOCCAL POLYSACCHARIDE VACCINE 23-VALENT =>2YO SQ IM: ICD-10-PCS | Mod: HCNC,S$GLB,, | Performed by: FAMILY MEDICINE

## 2019-10-08 PROCEDURE — G0009 ADMIN PNEUMOCOCCAL VACCINE: HCPCS | Mod: HCNC,S$GLB,, | Performed by: FAMILY MEDICINE

## 2019-10-08 PROCEDURE — 90662 IIV NO PRSV INCREASED AG IM: CPT | Mod: HCNC,S$GLB,, | Performed by: FAMILY MEDICINE

## 2019-10-08 PROCEDURE — G0008 ADMIN INFLUENZA VIRUS VAC: HCPCS | Mod: HCNC,S$GLB,, | Performed by: FAMILY MEDICINE

## 2019-10-08 PROCEDURE — 99999 PR PBB SHADOW E&M-EST. PATIENT-LVL IV: ICD-10-PCS | Mod: PBBFAC,HCNC,, | Performed by: FAMILY MEDICINE

## 2019-10-08 RX ORDER — ATENOLOL 25 MG/1
25 TABLET ORAL DAILY
Qty: 30 TABLET | Refills: 3 | Status: SHIPPED | OUTPATIENT
Start: 2019-10-08 | End: 2020-03-11 | Stop reason: SDUPTHER

## 2019-10-08 RX ORDER — HYDROCORTISONE 2.5% 25 MG/G
CREAM TOPICAL
Qty: 60 G | Refills: 12 | Status: SHIPPED | OUTPATIENT
Start: 2019-10-08 | End: 2020-03-11 | Stop reason: SDUPTHER

## 2019-10-08 RX ORDER — LEVOTHYROXINE SODIUM 50 UG/1
50 CAPSULE ORAL DAILY
Qty: 30 CAPSULE | Refills: 1 | Status: CANCELLED | OUTPATIENT
Start: 2019-10-08 | End: 2019-11-07

## 2019-10-08 RX ORDER — NITROGLYCERIN 0.3 MG/1
TABLET SUBLINGUAL
Qty: 100 TABLET | Refills: 0 | Status: SHIPPED | OUTPATIENT
Start: 2019-10-08 | End: 2020-03-11 | Stop reason: SDUPTHER

## 2019-10-08 RX ORDER — EFINACONAZOLE 100 MG/ML
SOLUTION TOPICAL
Qty: 8 ML | Refills: 0 | Status: CANCELLED | OUTPATIENT
Start: 2019-10-08

## 2019-10-08 RX ORDER — DULOXETIN HYDROCHLORIDE 60 MG/1
60 CAPSULE, DELAYED RELEASE ORAL DAILY
Qty: 30 CAPSULE | Refills: 11 | Status: CANCELLED | OUTPATIENT
Start: 2019-10-08 | End: 2020-10-07

## 2019-10-08 RX ORDER — PANTOPRAZOLE SODIUM 40 MG/1
40 TABLET, DELAYED RELEASE ORAL 2 TIMES DAILY
Qty: 60 TABLET | Refills: 1 | Status: CANCELLED | OUTPATIENT
Start: 2019-10-08

## 2019-10-08 NOTE — PROGRESS NOTES
Assessment & Plan  Problem List Items Addressed This Visit        Cardiac/Vascular    Essential hypertension - Primary (Chronic)    Relevant Medications    atenolol (TENORMIN) 25 MG tablet    CAD (coronary artery disease) (Chronic)    Relevant Medications    nitroGLYCERIN (NITROSTAT) 0.3 MG SL tablet       GI    Rectal prolapse    Relevant Orders    Ambulatory referral to Colorectal Surgery       Orthopedic    Lumbar facet arthropathy    Relevant Orders    MRI Lumbar Spine Without Contrast      Other Visit Diagnoses     Hypothyroidism due to acquired atrophy of thyroid        Umbilical hernia without obstruction and without gangrene        Relevant Orders    Ambulatory referral to General Surgery    Lumbar radiculopathy, chronic        Relevant Orders    MRI Lumbar Spine Without Contrast    Need for 23-polyvalent pneumococcal polysaccharide vaccine        Relevant Orders    (In Office Administered) Pneumococcal Polysaccharide Vaccine (23 Valent) (SQ/IM) (Completed)    Need for prophylactic vaccination and inoculation against influenza        Relevant Orders    Influenza - High Dose (65+) (PF) (IM) (Completed)       It has been recommended that the patient not continue on opiate medications.  I did notify the patient that this is documented in her chart.  She is aware.  She would like to proceed with pain management.  I did discuss with the patient is not likely that she will be prescribed opiate medications in order to address her pain. We need to focus on conservative measures in order to decrease her pain in her knee as well as her back.  Patient will need to utilize alternative forms of therapy in order to improve her pain control.  Consider possible acupuncture or chiropractic intervention.      Health Maintenance reviewed.    Follow-up: Follow up if symptoms worsen or fail to improve.    ______________________________________________________________________    Chief Complaint  Chief Complaint   Patient  presents with    Follow-up       HPI  Estella Arevalo is a 64 y.o. female with multiple medical diagnoses as listed in the medical history and problem list that presents for hospital follow up.  Pt is known to me with last appointment 8/23/2018.      Family and/or Caretaker present at visit?  No.  Diagnostic tests reviewed/disposition: No diagnosic tests pending after this hospitalization.  Disease/illness education: back pain, alcohol abuse  Home health/community services discussion/referrals: Patient does not have home health established from hospital visit.  They do not need home health.  If needed, we will set up home health for the patient.   Establishment or re-establishment of referral orders for community resources: No other necessary community resources.   Discussion with other health care providers: No discussion with other health care providers necessary.   Rectal prolapse:  She has had this condition for over a year.  She has not had a previous procedure.  She reports that she has some strength.  She does have to wear a diaper and she is concerned that she may have a UTI.      UTI:  She reports increased burning, frequency and urgency.  She has noted a decreased amount of urination     Umbilical hernia: she reports this began 2 years ago.  She would like this repaired.      She reports a recent fall at [facility].  She landed on her left knee pain and lower back pain since this fall.  She landed on her left knee and then laid on her back.      She would like to see pain management.  Notified that several doctors do not recommend that she take opiate medication.  She has a known history of alcohol abuse.  Last alcohol level was <10.  A notify the patient that she is not likely to get opiates due to her history. Referral placed in April 2019.  She is able to schedule her appointment.  She request pain medication.  Patient states that she stopped seeing her previous pain management doctor secondary to  "cost.  She reports that it cost over 300 dollars for her to see this pain management doctor.  Patient states she did not like the way they are practicing.    After completion of her complaints.  It was noted that the patient was evaluated for hyponatremia.  Based upon last set of blood work hyponatremia has improved.  Patient is aware that she needs to discontinue all use of alcohol.  Patient reports that she has discontinued use of alcohol.  PAST MEDICAL HISTORY:  Past Medical History:   Diagnosis Date    Addiction to drug     Alcohol abuse     Arthritis     Cirrhosis of liver     Colon polyp     Coronary artery disease     Fall     GERD (gastroesophageal reflux disease)     Hepatitis C     States was successfully treated with Harvoni    History of psychiatric hospitalization     Hx of psychiatric care     Hypertension     Low back pain     Radha     MI (myocardial infarction)     Migraine headache     Psychiatric problem     Sleep difficulties     Suicide attempt     Therapy     Thyroid disease        PAST SURGICAL HISTORY:  Past Surgical History:   Procedure Laterality Date    ESOPHAGOGASTRODUODENOSCOPY N/A 3/20/2019    Procedure: EGD (ESOPHAGOGASTRODUODENOSCOPY);  Surgeon: Obdulia Wilson MD;  Location: [facility];  Service: Endoscopy;  Laterality: N/A;    ESOPHAGOGASTRODUODENOSCOPY Left 9/25/2019    Procedure: EGD (ESOPHAGOGASTRODUODENOSCOPY);  Surgeon: Hernandez Farias MD;  Location: [facility];  Service: Endoscopy;  Laterality: Left;    HERNIA REPAIR      HIATAL HERNIA REPAIR      JOINT REPLACEMENT Bilateral     KNEE SURGERY  bilateral replacement    right foot sx  2008    SHOULDER SURGERY  replacement       SOCIAL HISTORY:  Social History     Socioeconomic History    Marital status:      Spouse name: Hammad Kaufman"    Number of children: 4    Years of education: Not on file    Highest education level: Not on file   Occupational History    Occupation: Retired     Comment: RN "   Social Needs    Financial resource strain: Not on file    Food insecurity:     Worry: Not on file     Inability: Not on file    Transportation needs:     Medical: Not on file     Non-medical: Not on file   Tobacco Use    Smoking status: Never Smoker    Smokeless tobacco: Never Used   Substance and Sexual Activity    Alcohol use: Yes     Alcohol/week: 6.0 standard drinks     Types: 6 Cans of beer per week     Comment: pt admits to drinking vodka daily    Drug use: Not Currently     Types: Benzodiazepines, Methamphetamines, Amphetamines     Comment: [facility] clinic; admits to addiction to opioids    Sexual activity: Not on file   Lifestyle    Physical activity:     Days per week: Not on file     Minutes per session: Not on file    Stress: Only a little   Relationships    Social connections:     Talks on phone: Not on file     Gets together: Not on file     Attends Congregation service: Not on file     Active member of club or organization: Not on file     Attends meetings of clubs or organizations: Not on file     Relationship status: Not on file   Other Topics Concern    Patient feels they ought to cut down on drinking/drug use Yes    Patient annoyed by others criticizing their drinking/drug use Yes    Patient has felt bad or guilty about drinking/drug use Yes    Patient has had a drink/used drugs as an eye opener in the AM Yes   Social History Narrative    Lives with , grand-daughter, daughter and son    Retired RN    Polysubstance abuse       FAMILY HISTORY:  Family History   Problem Relation Age of Onset    Cancer Mother     Cancer Father     Depression Sister     Bipolar disorder Maternal Grandfather     Suicide Cousin        ALLERGIES AND MEDICATIONS: updated and reviewed.  Review of patient's allergies indicates:  No Known Allergies  Current Outpatient Medications   Medication Sig Dispense Refill    atenolol (TENORMIN) 25 MG tablet Take 1 tablet (25 mg total) by mouth once daily. 30  tablet 3    cetirizine (ZYRTEC) 5 MG tablet Take 1 tablet (5 mg total) by mouth once daily.  0    cycloSPORINE (RESTASIS) 0.05 % ophthalmic emulsion Place 0.4 mLs (1 drop total) into both eyes 2 (two) times daily. 30 each 2    DULoxetine (CYMBALTA) 60 MG capsule Take 1 capsule (60 mg total) by mouth once daily. 30 capsule 11    guaiFENesin (MUCINEX) 600 mg 12 hr tablet Take 1 tablet (600 mg total) by mouth 2 (two) times daily.      ibuprofen (ADVIL,MOTRIN) 600 MG tablet Take 1 tablet (600 mg total) by mouth every 6 (six) hours as needed for Pain. 24 tablet 0    JUBLIA 10 % Eran APPLY ONE DOSE D 8 mL 0    NARCAN 4 mg/actuation Spry USE UTD  0    nitroGLYCERIN (NITROSTAT) 0.3 MG SL tablet ONE TABLET UNDER TONGUE AS NEEDED FOR CHEST PAIN EVERY 5 MINUTES AS NEEDED 100 tablet 0    pantoprazole (PROTONIX) 40 MG tablet Take 1 tablet (40 mg total) by mouth 2 (two) times daily. 60 tablet 1    PROCTOZONE-HC 2.5 % rectal cream STACI RECTALLY BID 60 g 12    levothyroxine (TIROSINT) 50 mcg Cap Take 50 mcg by mouth once daily. 30 tablet 11     No current facility-administered medications for this visit.          ROS  Review of Systems   Constitutional: Negative for activity change, appetite change, fatigue, fever and unexpected weight change.   HENT: Negative.  Negative for ear discharge, ear pain, rhinorrhea and sore throat.    Eyes: Negative.    Respiratory: Negative for apnea, cough, chest tightness, shortness of breath and wheezing.    Cardiovascular: Negative for chest pain, palpitations and leg swelling.   Gastrointestinal: Negative for abdominal distention, abdominal pain, constipation, diarrhea and vomiting.   Endocrine: Negative for cold intolerance, heat intolerance, polydipsia and polyuria.   Genitourinary: Negative for decreased urine volume and urgency.   Musculoskeletal: Positive for arthralgias, gait problem and myalgias.   Skin: Negative for rash.   Neurological: Negative for dizziness and headaches.  "  Hematological: Does not bruise/bleed easily.   Psychiatric/Behavioral: Negative for agitation, sleep disturbance and suicidal ideas.         Physical Exam  Vitals:    10/08/19 1304   BP: 120/74   BP Location: Right arm   Patient Position: Sitting   BP Method: Medium (Manual)   Pulse: 73   Temp: 97.6 °F (36.4 °C)   TempSrc: Oral   SpO2: 95%   Weight: 79.7 kg (175 lb 11.3 oz)   Height: 5' 6" (1.676 m)    Body mass index is 28.36 kg/m².  Weight: 79.7 kg (175 lb 11.3 oz)   Height: 5' 6" (167.6 cm)   Physical Exam   Constitutional: She is oriented to person, place, and time. She appears well-developed and well-nourished. No distress.   Eyes: Pupils are equal, round, and reactive to light. Conjunctivae and EOM are normal. Right eye exhibits no discharge. Left eye exhibits no discharge. No scleral icterus.   Neck: Normal range of motion. Neck supple. No JVD present.   Cardiovascular: Normal rate, regular rhythm and normal heart sounds.   No murmur heard.  Pulmonary/Chest: Effort normal and breath sounds normal. No respiratory distress. She has no wheezes. She has no rales.   Abdominal: Soft. Bowel sounds are normal. There is no tenderness. There is no guarding. No hernia.   Umbilical hernia noted but is reducible non-tender to palpation    Musculoskeletal: Normal range of motion. She exhibits no edema.   Neurological: She is alert and oriented to person, place, and time.   Skin: Skin is warm and dry. She is not diaphoretic.   Psychiatric: She has a normal mood and affect.         Health Maintenance       Date Due Completion Date    TETANUS VACCINE 03/01/1972 ---    Mammogram 03/01/1994 ---    DEXA SCAN 03/01/1994 ---    Shingles Vaccine (2 of 3) 12/28/2015 11/2/2015    Lipid Panel 05/29/2017 5/29/2012    Colonoscopy 07/16/2018 7/16/2013    Pneumococcal Vaccine (65+ Low/Medium Risk) (2 of 2 - PPSV23) 03/01/2019 11/9/2017    Influenza Vaccine (1) 09/01/2019 9/13/2018              Patient note was created using MModal.  Any " errors in syntax or even information may not have been identified and edited on initial review prior to signing this note.

## 2019-10-15 ENCOUNTER — TELEPHONE (OUTPATIENT)
Dept: FAMILY MEDICINE | Facility: CLINIC | Age: 64
End: 2019-10-15

## 2019-10-15 NOTE — TELEPHONE ENCOUNTER
Left a message to contact the clinic regarding a referral to Colorectal and General Surgery. Letter mailed

## 2019-10-15 NOTE — LETTER
October 15, 2019    Estella SORIA [place]  [address]  [place] [address]             [facility]  [address] [place] [address]  Phone: [phone]  Fax: [phone] Dear Ms. Arevalo:    I have been unable to reach you by phone for your appointment to General Surgery and Colorectal Surgery. Please call me at the clinic [phone] to book your appointment.    If you have any questions or concerns, please don't hesitate to call.    Sincerely,        Chasity Martines MA

## 2019-12-04 ENCOUNTER — HOSPITAL ENCOUNTER (EMERGENCY)
Facility: HOSPITAL | Age: 64
Discharge: HOME | End: 2019-12-04
Attending: EMERGENCY MEDICINE
Payer: MEDICARE

## 2019-12-04 VITALS
SYSTOLIC BLOOD PRESSURE: 138 MMHG | HEIGHT: 66 IN | DIASTOLIC BLOOD PRESSURE: 86 MMHG | OXYGEN SATURATION: 97 % | WEIGHT: 155 LBS | RESPIRATION RATE: 18 BRPM | HEART RATE: 78 BPM | BODY MASS INDEX: 24.91 KG/M2 | TEMPERATURE: 98 F

## 2019-12-04 DIAGNOSIS — F10.20 ALCOHOLISM: ICD-10-CM

## 2019-12-04 DIAGNOSIS — R11.2 NON-INTRACTABLE VOMITING WITH NAUSEA, UNSPECIFIED VOMITING TYPE: Primary | ICD-10-CM

## 2019-12-04 DIAGNOSIS — I10 ESSENTIAL HYPERTENSION: Chronic | ICD-10-CM

## 2019-12-04 DIAGNOSIS — F10.930 ALCOHOL WITHDRAWAL SYNDROME WITHOUT COMPLICATION: ICD-10-CM

## 2019-12-04 LAB
ALBUMIN SERPL BCP-MCNC: 4.3 G/DL (ref 3.5–5.2)
ALLENS TEST: ABNORMAL
ALP SERPL-CCNC: 168 U/L (ref 55–135)
ALT SERPL W/O P-5'-P-CCNC: 23 U/L (ref 10–44)
ANION GAP SERPL CALC-SCNC: 10 MMOL/L (ref 8–16)
AST SERPL-CCNC: 53 U/L (ref 10–40)
B-OH-BUTYR BLD STRIP-SCNC: 0.1 MMOL/L (ref 0–0.5)
BACTERIA #/AREA URNS HPF: ABNORMAL /HPF
BASOPHILS # BLD AUTO: 0.05 K/UL (ref 0–0.2)
BASOPHILS NFR BLD: 1.3 % (ref 0–1.9)
BILIRUB SERPL-MCNC: 0.5 MG/DL (ref 0.1–1)
BILIRUB UR QL STRIP: NEGATIVE
BUN SERPL-MCNC: 10 MG/DL (ref 8–23)
CALCIUM SERPL-MCNC: 9.3 MG/DL (ref 8.7–10.5)
CHLORIDE SERPL-SCNC: 102 MMOL/L (ref 95–110)
CLARITY UR: CLEAR
CO2 SERPL-SCNC: 24 MMOL/L (ref 23–29)
COLOR UR: YELLOW
CREAT SERPL-MCNC: 0.6 MG/DL (ref 0.5–1.4)
DELSYS: ABNORMAL
DIFFERENTIAL METHOD: ABNORMAL
EOSINOPHIL # BLD AUTO: 0 K/UL (ref 0–0.5)
EOSINOPHIL NFR BLD: 0.3 % (ref 0–8)
ERYTHROCYTE [DISTWIDTH] IN BLOOD BY AUTOMATED COUNT: 18.5 % (ref 11.5–14.5)
EST. GFR  (AFRICAN AMERICAN): >60 ML/MIN/1.73 M^2
EST. GFR  (NON AFRICAN AMERICAN): >60 ML/MIN/1.73 M^2
GLUCOSE SERPL-MCNC: 120 MG/DL (ref 70–110)
GLUCOSE UR QL STRIP: NEGATIVE
HCO3 UR-SCNC: 31.6 MMOL/L (ref 24–28)
HCT VFR BLD AUTO: 37.2 % (ref 37–48.5)
HGB BLD-MCNC: 11.9 G/DL (ref 12–16)
HGB UR QL STRIP: NEGATIVE
HYALINE CASTS #/AREA URNS LPF: 0 /LPF
IMM GRANULOCYTES # BLD AUTO: 0.01 K/UL (ref 0–0.04)
IMM GRANULOCYTES NFR BLD AUTO: 0.3 % (ref 0–0.5)
KETONES UR QL STRIP: ABNORMAL
LEUKOCYTE ESTERASE UR QL STRIP: ABNORMAL
LIPASE SERPL-CCNC: 16 U/L (ref 4–60)
LYMPHOCYTES # BLD AUTO: 0.6 K/UL (ref 1–4.8)
LYMPHOCYTES NFR BLD: 15.9 % (ref 18–48)
MAGNESIUM SERPL-MCNC: 1.9 MG/DL (ref 1.6–2.6)
MCH RBC QN AUTO: 27.7 PG (ref 27–31)
MCHC RBC AUTO-ENTMCNC: 32 G/DL (ref 32–36)
MCV RBC AUTO: 87 FL (ref 82–98)
MICROSCOPIC COMMENT: ABNORMAL
MODE: ABNORMAL
MONOCYTES # BLD AUTO: 0.5 K/UL (ref 0.3–1)
MONOCYTES NFR BLD: 11.5 % (ref 4–15)
NEUTROPHILS # BLD AUTO: 2.8 K/UL (ref 1.8–7.7)
NEUTROPHILS NFR BLD: 70.7 % (ref 38–73)
NITRITE UR QL STRIP: NEGATIVE
NRBC BLD-RTO: 0 /100 WBC
PCO2 BLDA: 48.3 MMHG (ref 35–45)
PH SMN: 7.42 [PH] (ref 7.35–7.45)
PH UR STRIP: 8 [PH] (ref 5–8)
PLATELET # BLD AUTO: 312 K/UL (ref 150–350)
PMV BLD AUTO: 9.8 FL (ref 9.2–12.9)
PO2 BLDA: 51 MMHG (ref 40–60)
POC BE: 6 MMOL/L
POC SATURATED O2: 86 % (ref 95–100)
POC TCO2: 33 MMOL/L (ref 24–29)
POTASSIUM SERPL-SCNC: 4 MMOL/L (ref 3.5–5.1)
PROT SERPL-MCNC: 8.2 G/DL (ref 6–8.4)
PROT UR QL STRIP: ABNORMAL
RBC # BLD AUTO: 4.3 M/UL (ref 4–5.4)
RBC #/AREA URNS HPF: 2 /HPF (ref 0–4)
SAMPLE: ABNORMAL
SITE: ABNORMAL
SODIUM SERPL-SCNC: 136 MMOL/L (ref 136–145)
SP GR UR STRIP: 1.02 (ref 1–1.03)
SQUAMOUS #/AREA URNS HPF: 2 /HPF
URN SPEC COLLECT METH UR: ABNORMAL
UROBILINOGEN UR STRIP-ACNC: NEGATIVE EU/DL
WBC # BLD AUTO: 3.9 K/UL (ref 3.9–12.7)
WBC #/AREA URNS HPF: 3 /HPF (ref 0–5)

## 2019-12-04 PROCEDURE — 81000 URINALYSIS NONAUTO W/SCOPE: CPT | Mod: HCNC

## 2019-12-04 PROCEDURE — 82010 KETONE BODYS QUAN: CPT | Mod: HCNC

## 2019-12-04 PROCEDURE — 82803 BLOOD GASES ANY COMBINATION: CPT | Mod: HCNC

## 2019-12-04 PROCEDURE — 83690 ASSAY OF LIPASE: CPT | Mod: HCNC

## 2019-12-04 PROCEDURE — 63600175 PHARM REV CODE 636 W HCPCS: Mod: HCNC | Performed by: EMERGENCY MEDICINE

## 2019-12-04 PROCEDURE — 96375 TX/PRO/DX INJ NEW DRUG ADDON: CPT | Mod: HCNC

## 2019-12-04 PROCEDURE — 99284 EMERGENCY DEPT VISIT MOD MDM: CPT | Mod: 25,HCNC

## 2019-12-04 PROCEDURE — C9113 INJ PANTOPRAZOLE SODIUM, VIA: HCPCS | Mod: HCNC | Performed by: EMERGENCY MEDICINE

## 2019-12-04 PROCEDURE — 99900035 HC TECH TIME PER 15 MIN (STAT): Mod: HCNC

## 2019-12-04 PROCEDURE — 85025 COMPLETE CBC W/AUTO DIFF WBC: CPT | Mod: HCNC

## 2019-12-04 PROCEDURE — 83735 ASSAY OF MAGNESIUM: CPT | Mod: HCNC

## 2019-12-04 PROCEDURE — 80053 COMPREHEN METABOLIC PANEL: CPT | Mod: HCNC

## 2019-12-04 PROCEDURE — 96361 HYDRATE IV INFUSION ADD-ON: CPT | Mod: HCNC

## 2019-12-04 PROCEDURE — 96365 THER/PROPH/DIAG IV INF INIT: CPT | Mod: HCNC,59

## 2019-12-04 PROCEDURE — 25500020 PHARM REV CODE 255: Mod: HCNC | Performed by: EMERGENCY MEDICINE

## 2019-12-04 RX ORDER — PANTOPRAZOLE SODIUM 40 MG/1
40 TABLET, DELAYED RELEASE ORAL 2 TIMES DAILY
Qty: 60 TABLET | Refills: 1 | Status: SHIPPED | OUTPATIENT
Start: 2019-12-04 | End: 2020-03-11

## 2019-12-04 RX ORDER — PANTOPRAZOLE SODIUM 40 MG/10ML
40 INJECTION, POWDER, LYOPHILIZED, FOR SOLUTION INTRAVENOUS
Status: COMPLETED | OUTPATIENT
Start: 2019-12-04 | End: 2019-12-04

## 2019-12-04 RX ORDER — ATENOLOL 25 MG/1
TABLET ORAL
Qty: 90 TABLET | Refills: 0 | Status: SHIPPED | OUTPATIENT
Start: 2019-12-04 | End: 2020-03-11

## 2019-12-04 RX ORDER — LORAZEPAM 2 MG/ML
1 INJECTION INTRAMUSCULAR
Status: COMPLETED | OUTPATIENT
Start: 2019-12-04 | End: 2019-12-04

## 2019-12-04 RX ORDER — LORAZEPAM 1 MG/1
1 TABLET ORAL 2 TIMES DAILY
Qty: 10 TABLET | Refills: 0 | Status: SHIPPED | OUTPATIENT
Start: 2019-12-04 | End: 2020-03-11

## 2019-12-04 RX ORDER — ONDANSETRON 4 MG/1
4 TABLET, ORALLY DISINTEGRATING ORAL EVERY 6 HOURS PRN
Qty: 20 TABLET | Refills: 0 | Status: SHIPPED | OUTPATIENT
Start: 2019-12-04 | End: 2020-03-11

## 2019-12-04 RX ORDER — ONDANSETRON 2 MG/ML
4 INJECTION INTRAMUSCULAR; INTRAVENOUS
Status: COMPLETED | OUTPATIENT
Start: 2019-12-04 | End: 2019-12-04

## 2019-12-04 RX ADMIN — PANTOPRAZOLE SODIUM 40 MG: 40 INJECTION, POWDER, FOR SOLUTION INTRAVENOUS at 06:12

## 2019-12-04 RX ADMIN — ONDANSETRON HYDROCHLORIDE 4 MG: 2 SOLUTION INTRAMUSCULAR; INTRAVENOUS at 06:12

## 2019-12-04 RX ADMIN — LORAZEPAM 1 MG: 2 INJECTION, SOLUTION INTRAMUSCULAR; INTRAVENOUS at 06:12

## 2019-12-04 RX ADMIN — SODIUM CHLORIDE 1000 ML: 0.9 INJECTION, SOLUTION INTRAVENOUS at 06:12

## 2019-12-04 RX ADMIN — IOHEXOL 75 ML: 350 INJECTION, SOLUTION INTRAVENOUS at 07:12

## 2019-12-04 RX ADMIN — PROMETHAZINE HYDROCHLORIDE 6.25 MG: 25 INJECTION INTRAMUSCULAR; INTRAVENOUS at 09:12

## 2019-12-05 DIAGNOSIS — E03.4 HYPOTHYROIDISM DUE TO ACQUIRED ATROPHY OF THYROID: ICD-10-CM

## 2019-12-05 RX ORDER — LEVOTHYROXINE SODIUM 25 UG/1
TABLET ORAL
Qty: 90 TABLET | Refills: 0 | Status: SHIPPED | OUTPATIENT
Start: 2019-12-05 | End: 2020-03-10

## 2019-12-05 NOTE — ED TRIAGE NOTES
64 y.o. Female presents to the ED with chief complaint of emesis. Pt reports abd pain with NV since this morning. PT also c/o of hernia problem after lifting grandchild. Pt also states she has been drinking ETOH but has been vomiting it all up. Hx of ETOH abuse. Pt AAOX4. Side rails up x2. Answering questions appropriately.

## 2019-12-05 NOTE — ED PROVIDER NOTES
Encounter Date: 12/4/2019    SCRIBE #1 NOTE: I, Jimbo Grande, am scribing for, and in the presence of,  Kingsley Villalobos MD. I have scribed the following portions of the note - Other sections scribed: HPI, ROS, PE.       History     Chief Complaint   Patient presents with    Emesis     emesis since last night. hx of hernia and having abd pain with nausea. also having cold cough congestion symptoms. EMS gave 4 zofran with no relief.    Abdominal Pain     This is a 64 y.o. Female with a PMHx of umbilical hernia, drug abuse, alcohol abuse, cirrhosis of liver, colon polyp, CAD, GERD, peptic ulcer disease, MI, and HEP C who presents to the ED via EMS with c/o N/V that began this morning.  Denies abdominal pain. Denies hematemesis or melena.  No diarrhea She took Reglan without relief.  EMS gave the pt 4mg Zofran with no relief. She denies any hematemesis, abd pain, chest pain, back pain, dysuria, hematuria, diarrhea, sick contact, or any other worsening or alleviating factors. She states that she is unable to keep anything down. Pt is an everyday drinker of alcohol. She drinks about a 12 pack of beer per day.  States she has not been able to drink all day today because the vomiting. She states she feels slightly tremulous and anxious.  He has a history of alcohol withdrawal.  The pt has been to the ED for the same symptoms in the past.  On her last admission she was found to have esophageal ulcers.  No history of esophageal varices..  She is concerned that her nausea vomiting is due to her umbilical hernia because it seems to be sticking out more than normal. She denies pain in the area.    The history is provided by the patient and medical records.     Review of patient's allergies indicates:  No Known Allergies  Past Medical History:   Diagnosis Date    Addiction to drug     Alcohol abuse     Arthritis     Cirrhosis of liver     Colon polyp     Coronary artery disease     Fall     GERD (gastroesophageal reflux  disease)     Hepatitis C     States was successfully treated with Harvoni    History of psychiatric hospitalization     Hx of psychiatric care     Hypertension     Low back pain     Radha     MI (myocardial infarction)     Migraine headache     Psychiatric problem     Sleep difficulties     Suicide attempt     Therapy     Thyroid disease      Past Surgical History:   Procedure Laterality Date    ESOPHAGOGASTRODUODENOSCOPY N/A 3/20/2019    Procedure: EGD (ESOPHAGOGASTRODUODENOSCOPY);  Surgeon: Obdulia Wilson MD;  Location: [facility];  Service: Endoscopy;  Laterality: N/A;    ESOPHAGOGASTRODUODENOSCOPY Left 9/25/2019    Procedure: EGD (ESOPHAGOGASTRODUODENOSCOPY);  Surgeon: Hernandez Farias MD;  Location: [facility] END[facility];  Service: Endoscopy;  Laterality: Left;    HERNIA REPAIR      HIATAL HERNIA REPAIR      JOINT REPLACEMENT Bilateral     KNEE SURGERY  bilateral replacement    right foot sx  2008    SHOULDER SURGERY  replacement     Family History   Problem Relation Age of Onset    Cancer Mother     Cancer Father     Depression Sister     Bipolar disorder Maternal Grandfather     Suicide Cousin      Social History     Tobacco Use    Smoking status: Never Smoker    Smokeless tobacco: Never Used   Substance Use Topics    Alcohol use: Yes     Alcohol/week: 6.0 standard drinks     Types: 6 Cans of beer per week     Comment: pt admits to drinking vodka daily    Drug use: Not Currently     Types: Benzodiazepines, Methamphetamines, Amphetamines     Comment: [facility]; admits to addiction to opioids     Review of Systems   Constitutional: Negative for chills, diaphoresis and fever.   HENT: Positive for congestion. Negative for sore throat.    Respiratory: Positive for cough. Negative for shortness of breath.    Cardiovascular: Negative for chest pain.   Gastrointestinal: Positive for nausea and vomiting. Negative for abdominal pain, blood in stool and diarrhea.        No melena.   Genitourinary:  Negative for dysuria, flank pain and hematuria.   Musculoskeletal: Negative for back pain.   Skin: Negative for rash.   Neurological: Negative for dizziness, syncope, facial asymmetry, speech difficulty, weakness, light-headedness, numbness and headaches.   Psychiatric/Behavioral: Negative for confusion, hallucinations and suicidal ideas. The patient is nervous/anxious.    All other systems reviewed and are negative.      Physical Exam     Initial Vitals [12/04/19 1823]   BP Pulse Resp Temp SpO2   (!) 154/92 87 18 98.1 °F (36.7 °C) 96 %      MAP       --         Physical Exam    Nursing note and vitals reviewed.  Constitutional: She appears well-developed and well-nourished. She is not diaphoretic. No distress.   HENT:   Head: Normocephalic and atraumatic.   Nose: Nose normal.   Mouth/Throat: Oropharynx is clear and moist.   Eyes: Conjunctivae and EOM are normal. Pupils are equal, round, and reactive to light. Right eye exhibits no discharge. Left eye exhibits no discharge. No scleral icterus.   Neck: Normal range of motion. Neck supple. No thyromegaly present.   Cardiovascular: Normal rate, regular rhythm and normal heart sounds.   No murmur heard.  Pulmonary/Chest: Breath sounds normal. No stridor. No respiratory distress. She has no wheezes. She has no rhonchi. She has no rales.   Abdominal: Soft. She exhibits no distension. There is no rebound and no guarding.   There is an 3x3 cm umbilical hernia present that is mildly tender to palpation. Non-reduceable.  Hernia is soft.  No significant abdominal tenderness.   Musculoskeletal: Normal range of motion. She exhibits no edema or tenderness.   Neurological: She is alert and oriented to person, place, and time. She has normal strength. No cranial nerve deficit or sensory deficit. GCS score is 15. GCS eye subscore is 4. GCS verbal subscore is 5. GCS motor subscore is 6.   Skin: Skin is warm and dry. No rash noted. No erythema.   Psychiatric: Her behavior is normal.  Judgment and thought content normal. Her mood appears anxious.   Anxious.  Mildly tremulous.         ED Course   Procedures  Labs Reviewed   CBC W/ AUTO DIFFERENTIAL - Abnormal; Notable for the following components:       Result Value    Hemoglobin 11.9 (*)     RDW 18.5 (*)     Lymph # 0.6 (*)     Lymph% 15.9 (*)     All other components within normal limits   COMPREHENSIVE METABOLIC PANEL - Abnormal; Notable for the following components:    Glucose 120 (*)     Alkaline Phosphatase 168 (*)     AST 53 (*)     All other components within normal limits   URINALYSIS, REFLEX TO URINE CULTURE - Abnormal; Notable for the following components:    Protein, UA 1+ (*)     Ketones, UA Trace (*)     Leukocytes, UA Trace (*)     All other components within normal limits    Narrative:     Preferred Collection Type->Urine, Clean Catch   URINALYSIS MICROSCOPIC - Abnormal; Notable for the following components:    Bacteria Many (*)     All other components within normal limits    Narrative:     Preferred Collection Type->Urine, Clean Catch   ISTAT PROCEDURE - Abnormal; Notable for the following components:    POC PCO2 48.3 (*)     POC HCO3 31.6 (*)     POC SATURATED O2 86 (*)     POC TCO2 33 (*)     All other components within normal limits   LIPASE   MAGNESIUM   BETA - HYDROXYBUTYRATE, SERUM          Imaging Results          CT Abdomen Pelvis With Contrast (Final result)  Result time 12/04/19 20:28:07    Final result by Paula Santos MD (12/04/19 20:28:07)                 Impression:      1. Fat containing ventral wall hernia with mild edema seen within the herniated fat.  Findings are similar when compared to previous CT from September 2018.  No evidence of bowel containing hernia.  2. Otherwise no acute intra-abdominal abnormalities identified.  3. Postsurgical changes and additional stable findings as detailed above.      Electronically signed by: Paula Santos MD  Date:    12/04/2019  Time:    20:28             Narrative:     EXAMINATION:  CT ABDOMEN PELVIS WITH CONTRAST    CLINICAL HISTORY:  Hernia, complicated;    TECHNIQUE:  Low dose axial images, sagittal and coronal reformations were obtained from the lung bases to the pubic symphysis following the IV administration of 75 mL of Omnipaque 350 .  Oral contrast was not given.    COMPARISON:  CT abdomen and pelvis from September 2018.    FINDINGS:  The visualized portion of the heart is unremarkable.  Minimal bibasilar atelectatic change or scarring is seen.  No evidence of focal consolidation or pleural effusion.    No significant hepatic abnormalities are identified.  There is stable prominence of the common bile duct measuring 8 mm.  The gallbladder is unremarkable.  Postsurgical changes are seen at the GE junction.  Stomach otherwise is normal appearance.  Spleen, pancreas and adrenal glands show no significant abnormalities.    Kidneys enhance normally with no evidence of hydronephrosis.  Ureters are difficult to track.  Urinary bladder is nondistended.  Uterus is unremarkable.    Appendix is not definitely visualized, however no abnormalities are seen in the region.  There is sigmoid diverticulosis without evidence of acute diverticulitis.  The visualized loops of small and large bowel show no evidence of obstruction or inflammation.  No free air or free fluid.    Aorta tapers normally.    No acute osseous abnormality identified.  Prominent multilevel degenerative changes are seen within the spine.  There is stable grade 2 anterolisthesis of L4 on L5 secondary to facet arthropathy.    Prominent periumbilical ventral wall fat containing hernia is again seen mild edema seen within the herniated fat.  No evidence of bowel containing hernia.                                 Medical Decision Making:   History:   Old Medical Records: I decided to obtain old medical records.  Initial Assessment:   Patient presents with intractable nausea and vomiting that started this morning.  Patient's  that do peptic ulcer disease and alcoholism.  She denies hematemesis.  She denies abdominal pain.  also has history of umbilical hernia.  Complains of swelling of the hernia but denies pain. Relatively benign abdominal exam except for mild tenderness over umbilical hernia.  Hernia is not reducible but is soft.  Suspect alcoholic gastritis or peptic ulcer disease source of patient's nausea and vomiting. Will evaluate hernia with CT.  Will check labs.  Will rule out AKA.  No symptoms of GI bleeding.  Differential Diagnosis:   Peptic ulcer disease, gastritis, alcohol withdrawal, alcoholic ketoacidosis, incarcerated hernia, pancreatitis  Clinical Tests:   Lab Tests: Ordered and Reviewed  The following lab test(s) were unremarkable: CBC, CMP and Lipase  Radiological Study: Ordered and Reviewed  ED Management:  2050:  No evidence hernia incarceration or bowel obstruction on CT.  Patient still complaining of nausea.  Lab work is unchanged from previous.  No evidence of acute renal failure.  No evidence hepatitis or pancreatitis.  Abdominal exam remains benign.  Give additional antiemetics.  Will p.o. challenge.  If patient can tolerate PO she may be discharged with symptomatic treatment.  However if vomiting continue she may have to be admitted for observation for intractable vomiting. Suspect alcoholic gastritis etiology of her symptoms.    2150:  Symptoms improved.  Patient tolerating p.o..  Tremors improved.  Mental status is normal. No evidence of delirium tremens.  Abdominal exam remains benign.  Heart rate is normal. Blood pressure is normal. Patient follow up with her primary care physician for recheck.  Patient plans to follow up outpatient for alcohol cessation.  Will provide benzodiazepines for withdrawal symptoms.            Scribe Attestation:   Scribe #1: I performed the above scribed service and the documentation accurately describes the services I performed. I attest to the accuracy of the note.                           Clinical Impression:       ICD-10-CM ICD-9-CM   1. Non-intractable vomiting with nausea, unspecified vomiting type R11.2 787.01   2. Alcoholism F10.20 303.90   3. Alcohol withdrawal syndrome without complication F10.230 291.81         Disposition:   Disposition: Discharged  Condition: Stable        I, Kingsley Villalobos MD, personally performed the services described in this documentation. All medical record entries made by the scribe were at my direction and in my presence.  I have reviewed the chart and agree that the record reflects my personal performance and is accurate and complete                    Kingsley Villalobos MD  12/04/19 [id number]

## 2020-02-17 DIAGNOSIS — K21.9 GASTROESOPHAGEAL REFLUX DISEASE WITHOUT ESOPHAGITIS: ICD-10-CM

## 2020-03-09 DIAGNOSIS — E03.4 HYPOTHYROIDISM DUE TO ACQUIRED ATROPHY OF THYROID: ICD-10-CM

## 2020-03-10 RX ORDER — LEVOTHYROXINE SODIUM 25 UG/1
TABLET ORAL
Qty: 90 TABLET | Refills: 0 | Status: SHIPPED | OUTPATIENT
Start: 2020-03-10 | End: 2020-05-19 | Stop reason: SDUPTHER

## 2020-03-11 ENCOUNTER — OFFICE VISIT (OUTPATIENT)
Dept: FAMILY MEDICINE | Facility: CLINIC | Age: 65
End: 2020-03-11
Payer: MEDICARE

## 2020-03-11 VITALS
HEART RATE: 100 BPM | WEIGHT: 172 LBS | SYSTOLIC BLOOD PRESSURE: 120 MMHG | HEIGHT: 66 IN | OXYGEN SATURATION: 99 % | BODY MASS INDEX: 27.64 KG/M2 | TEMPERATURE: 99 F | DIASTOLIC BLOOD PRESSURE: 89 MMHG

## 2020-03-11 DIAGNOSIS — I25.10 CORONARY ARTERY DISEASE, ANGINA PRESENCE UNSPECIFIED, UNSPECIFIED VESSEL OR LESION TYPE, UNSPECIFIED WHETHER NATIVE OR TRANSPLANTED HEART: Chronic | ICD-10-CM

## 2020-03-11 DIAGNOSIS — I10 ESSENTIAL HYPERTENSION: Chronic | ICD-10-CM

## 2020-03-11 DIAGNOSIS — F19.20 POLYSUBSTANCE DEPENDENCE INCLUDING OPIOID TYPE DRUG, CONTINUOUS USE: Chronic | ICD-10-CM

## 2020-03-11 DIAGNOSIS — K70.30 ALCOHOLIC CIRRHOSIS, UNSPECIFIED WHETHER ASCITES PRESENT: ICD-10-CM

## 2020-03-11 DIAGNOSIS — F10.939 ALCOHOL WITHDRAWAL SYNDROME WITH COMPLICATION: ICD-10-CM

## 2020-03-11 DIAGNOSIS — F11.20 POLYSUBSTANCE DEPENDENCE INCLUDING OPIOID TYPE DRUG, CONTINUOUS USE: Chronic | ICD-10-CM

## 2020-03-11 DIAGNOSIS — J06.9 VIRAL URI: Primary | ICD-10-CM

## 2020-03-11 DIAGNOSIS — J06.9 UPPER RESPIRATORY TRACT INFECTION, UNSPECIFIED TYPE: ICD-10-CM

## 2020-03-11 DIAGNOSIS — H04.129 DRY EYE SYNDROME, UNSPECIFIED LATERALITY: ICD-10-CM

## 2020-03-11 DIAGNOSIS — B18.2 CHRONIC HEPATITIS C WITHOUT HEPATIC COMA: Chronic | ICD-10-CM

## 2020-03-11 DIAGNOSIS — F10.10 ALCOHOL ABUSE: Chronic | ICD-10-CM

## 2020-03-11 DIAGNOSIS — F31.9 BIPOLAR 1 DISORDER: Chronic | ICD-10-CM

## 2020-03-11 PROCEDURE — 3074F PR MOST RECENT SYSTOLIC BLOOD PRESSURE < 130 MM HG: ICD-10-PCS | Mod: HCNC,CPTII,S$GLB, | Performed by: NURSE PRACTITIONER

## 2020-03-11 PROCEDURE — 1159F MED LIST DOCD IN RCRD: CPT | Mod: HCNC,S$GLB,, | Performed by: NURSE PRACTITIONER

## 2020-03-11 PROCEDURE — 1126F AMNT PAIN NOTED NONE PRSNT: CPT | Mod: HCNC,S$GLB,, | Performed by: NURSE PRACTITIONER

## 2020-03-11 PROCEDURE — 99499 UNLISTED E&M SERVICE: CPT | Mod: HCNC,S$GLB,, | Performed by: NURSE PRACTITIONER

## 2020-03-11 PROCEDURE — 3079F DIAST BP 80-89 MM HG: CPT | Mod: HCNC,CPTII,S$GLB, | Performed by: NURSE PRACTITIONER

## 2020-03-11 PROCEDURE — 1126F PR PAIN SEVERITY QUANTIFIED, NO PAIN PRESENT: ICD-10-PCS | Mod: HCNC,S$GLB,, | Performed by: NURSE PRACTITIONER

## 2020-03-11 PROCEDURE — 99214 PR OFFICE/OUTPT VISIT, EST, LEVL IV, 30-39 MIN: ICD-10-PCS | Mod: HCNC,S$GLB,, | Performed by: NURSE PRACTITIONER

## 2020-03-11 PROCEDURE — 99214 OFFICE O/P EST MOD 30 MIN: CPT | Mod: HCNC,S$GLB,, | Performed by: NURSE PRACTITIONER

## 2020-03-11 PROCEDURE — 3079F PR MOST RECENT DIASTOLIC BLOOD PRESSURE 80-89 MM HG: ICD-10-PCS | Mod: HCNC,CPTII,S$GLB, | Performed by: NURSE PRACTITIONER

## 2020-03-11 PROCEDURE — 1101F PR PT FALLS ASSESS DOC 0-1 FALLS W/OUT INJ PAST YR: ICD-10-PCS | Mod: HCNC,CPTII,S$GLB, | Performed by: NURSE PRACTITIONER

## 2020-03-11 PROCEDURE — 1101F PT FALLS ASSESS-DOCD LE1/YR: CPT | Mod: HCNC,CPTII,S$GLB, | Performed by: NURSE PRACTITIONER

## 2020-03-11 PROCEDURE — 99999 PR PBB SHADOW E&M-EST. PATIENT-LVL IV: ICD-10-PCS | Mod: PBBFAC,HCNC,, | Performed by: NURSE PRACTITIONER

## 2020-03-11 PROCEDURE — 99499 RISK ADDL DX/OHS AUDIT: ICD-10-PCS | Mod: HCNC,S$GLB,, | Performed by: NURSE PRACTITIONER

## 2020-03-11 PROCEDURE — 1159F PR MEDICATION LIST DOCUMENTED IN MEDICAL RECORD: ICD-10-PCS | Mod: HCNC,S$GLB,, | Performed by: NURSE PRACTITIONER

## 2020-03-11 PROCEDURE — 3074F SYST BP LT 130 MM HG: CPT | Mod: HCNC,CPTII,S$GLB, | Performed by: NURSE PRACTITIONER

## 2020-03-11 PROCEDURE — 99999 PR PBB SHADOW E&M-EST. PATIENT-LVL IV: CPT | Mod: PBBFAC,HCNC,, | Performed by: NURSE PRACTITIONER

## 2020-03-11 RX ORDER — FLUTICASONE PROPIONATE 50 MCG
SPRAY, SUSPENSION (ML) NASAL
Qty: 48 G | OUTPATIENT
Start: 2020-03-11

## 2020-03-11 RX ORDER — LEVOCETIRIZINE DIHYDROCHLORIDE 5 MG/1
TABLET, FILM COATED ORAL
Qty: 90 TABLET | OUTPATIENT
Start: 2020-03-11

## 2020-03-11 RX ORDER — LEVOCETIRIZINE DIHYDROCHLORIDE 5 MG/1
5 TABLET, FILM COATED ORAL NIGHTLY
Qty: 30 TABLET | Refills: 0 | Status: SHIPPED | OUTPATIENT
Start: 2020-03-11 | End: 2020-04-08

## 2020-03-11 RX ORDER — METHYLPREDNISOLONE 4 MG/1
TABLET ORAL
Qty: 1 PACKAGE | Refills: 0 | Status: SHIPPED | OUTPATIENT
Start: 2020-03-11 | End: 2020-03-19

## 2020-03-11 RX ORDER — ATENOLOL 25 MG/1
25 TABLET ORAL DAILY
Qty: 30 TABLET | Refills: 2 | Status: SHIPPED | OUTPATIENT
Start: 2020-03-11 | End: 2020-07-27

## 2020-03-11 RX ORDER — FLUTICASONE PROPIONATE 50 MCG
2 SPRAY, SUSPENSION (ML) NASAL DAILY
Qty: 16 G | Refills: 0 | Status: SHIPPED | OUTPATIENT
Start: 2020-03-11 | End: 2020-03-21

## 2020-03-11 RX ORDER — NITROGLYCERIN 0.3 MG/1
TABLET SUBLINGUAL
Qty: 100 TABLET | Refills: 0 | Status: SHIPPED | OUTPATIENT
Start: 2020-03-11 | End: 2020-09-16

## 2020-03-11 RX ORDER — HYDROCORTISONE 2.5% 25 MG/G
CREAM TOPICAL
Qty: 60 G | Refills: 2 | Status: SHIPPED | OUTPATIENT
Start: 2020-03-11 | End: 2021-07-02 | Stop reason: SDUPTHER

## 2020-03-11 RX ORDER — CYCLOSPORINE 0.5 MG/ML
1 EMULSION OPHTHALMIC 2 TIMES DAILY
Qty: 30 EACH | Refills: 2 | Status: SHIPPED | OUTPATIENT
Start: 2020-03-11 | End: 2021-07-02 | Stop reason: SDUPTHER

## 2020-03-11 NOTE — PATIENT INSTRUCTIONS

## 2020-03-11 NOTE — PROGRESS NOTES
"Subjective:       Patient ID: Estella Arevalo is a 65 y.o. female.    Chief Complaint: URI    URI    This is a new problem. The current episode started in the past 7 days. The problem has been gradually improving. The maximum temperature recorded prior to her arrival was 100.4 - 100.9 F. The fever has been present for 1 to 2 days. Associated symptoms include congestion, coughing, ear pain, headaches, rhinorrhea and a sore throat. Pertinent negatives include no diarrhea, joint pain, nausea, sinus pain, sneezing or vomiting. She has tried decongestant and antihistamine (Nyquil) for the symptoms. The treatment provided moderate relief.       Past Medical History:   Diagnosis Date    Addiction to drug     Alcohol abuse     Arthritis     Cirrhosis of liver     Colon polyp     Coronary artery disease     Fall     GERD (gastroesophageal reflux disease)     Hepatitis C     States was successfully treated with Harvoni    History of psychiatric hospitalization     Hx of psychiatric care     Hypertension     Low back pain     Radha     MI (myocardial infarction)     Migraine headache     Psychiatric problem     Sleep difficulties     Suicide attempt     Therapy     Thyroid disease        Social History     Socioeconomic History    Marital status:      Spouse name: Hammad Kaufman"    Number of children: 4    Years of education: Not on file    Highest education level: Not on file   Occupational History    Occupation: Retired     Comment: RN   Social Needs    Financial resource strain: Not on file    Food insecurity:     Worry: Not on file     Inability: Not on file    Transportation needs:     Medical: Not on file     Non-medical: Not on file   Tobacco Use    Smoking status: Never Smoker    Smokeless tobacco: Never Used   Substance and Sexual Activity    Alcohol use: Yes     Alcohol/week: 6.0 standard drinks     Types: 6 Cans of beer per week     Comment: pt admits to drinking vodka daily    " Drug use: Not Currently     Types: Benzodiazepines, Methamphetamines, Amphetamines     Comment: [facility]; admits to addiction to opioids    Sexual activity: Not on file   Lifestyle    Physical activity:     Days per week: Not on file     Minutes per session: Not on file    Stress: Only a little   Relationships    Social connections:     Talks on phone: Not on file     Gets together: Not on file     Attends Yazdanism service: Not on file     Active member of club or organization: Not on file     Attends meetings of clubs or organizations: Not on file     Relationship status: Not on file   Other Topics Concern    Patient feels they ought to cut down on drinking/drug use Yes    Patient annoyed by others criticizing their drinking/drug use Yes    Patient has felt bad or guilty about drinking/drug use Yes    Patient has had a drink/used drugs as an eye opener in the AM Yes   Social History Narrative    Lives with , grand-daughter, daughter and son    Retired RN    Polysubstance abuse       Past Surgical History:   Procedure Laterality Date    ESOPHAGOGASTRODUODENOSCOPY N/A 3/20/2019    Procedure: EGD (ESOPHAGOGASTRODUODENOSCOPY);  Surgeon: Obdulia Wilson MD;  Location: [facility];  Service: Endoscopy;  Laterality: N/A;    ESOPHAGOGASTRODUODENOSCOPY Left 9/25/2019    Procedure: EGD (ESOPHAGOGASTRODUODENOSCOPY);  Surgeon: Hernandez Farias MD;  Location: [facility];  Service: Endoscopy;  Laterality: Left;    HERNIA REPAIR      HIATAL HERNIA REPAIR      JOINT REPLACEMENT Bilateral     KNEE SURGERY  bilateral replacement    right foot sx  2008    SHOULDER SURGERY  replacement       Review of Systems   Constitutional: Positive for appetite change, diaphoresis, fatigue and fever. Negative for chills.   HENT: Positive for congestion, ear pain, rhinorrhea and sore throat. Negative for ear discharge, sinus pain and sneezing.    Eyes: Negative for visual disturbance.   Respiratory: Positive for cough.     Gastrointestinal: Negative for diarrhea, nausea and vomiting.   Musculoskeletal: Negative for joint pain.   Neurological: Positive for headaches.       Objective:   There were no vitals taken for this visit.     Physical Exam   Constitutional: She is oriented to person, place, and time. She appears well-developed and well-nourished. She is cooperative.   HENT:   Head: Normocephalic and atraumatic.   Right Ear: Hearing, tympanic membrane, external ear and ear canal normal.   Left Ear: Hearing, tympanic membrane, external ear and ear canal normal.   Nose: Rhinorrhea present. No mucosal edema. Right sinus exhibits maxillary sinus tenderness and frontal sinus tenderness. Left sinus exhibits maxillary sinus tenderness and frontal sinus tenderness.   Mouth/Throat: No oropharyngeal exudate, posterior oropharyngeal edema or posterior oropharyngeal erythema.   +PND  +left ear wax impaction, removed in clinic by M.A with flushing.    Eyes: Pupils are equal, round, and reactive to light.   Cardiovascular: Normal rate and regular rhythm.   Pulmonary/Chest: Effort normal and breath sounds normal. No respiratory distress. She has no decreased breath sounds. She has no wheezes. She has no rhonchi. She has no rales.   Lymphadenopathy:     She has no cervical adenopathy.   Neurological: She is alert and oriented to person, place, and time.   Skin: Skin is warm, dry and intact. She is not diaphoretic. No pallor.   Psychiatric: She has a normal mood and affect. Her speech is normal and behavior is normal.   Vitals reviewed.      Assessment:       1. Viral URI    2. Upper respiratory tract infection, unspecified type    3. Essential hypertension    4. Coronary artery disease, angina presence unspecified, unspecified vessel or lesion type, unspecified whether native or transplanted heart    5. Bipolar 1 disorder    6. Chronic hepatitis C without hepatic coma    7. Dry eye syndrome, unspecified laterality    8. Polysubstance dependence  including opioid type drug, continuous use    9. Alcoholic cirrhosis, unspecified whether ascites present    10. Alcohol withdrawal syndrome with complication    11. Alcohol abuse        Plan:       Estella was seen today for uri.    Diagnoses and all orders for this visit:    Viral URI  -     fluticasone propionate (FLONASE) 50 mcg/actuation nasal spray; 2 sprays (100 mcg total) by Each Nostril route once daily. for 10 days  -     methylPREDNISolone (MEDROL DOSEPACK) 4 mg tablet; use as directed  -     levocetirizine (XYZAL) 5 MG tablet; Take 1 tablet (5 mg total) by mouth every evening.    Upper respiratory tract infection, unspecified type  -     Increase your water intake to 64-80 oz daily to help thin mucus  -     Nasal Saline spray (Over the counter [place] spray or [place])  2 sprays each nostril 2-3 times a day for nasal congestion  -     Tylenol 500 mg 2 tablets or Ibuprofen 200 mg 2 tablets every 4-6 hours as needed for fever, headaches, sore throat, ear pain, bodyaches, and/or nasal/sinus inflammation  -     Warm salt water gargles with 1/2 cup water and 1 tablespoon salt every 4 hours  -     Warm tea with honey and lemon    Essential hypertension  -     atenoloL (TENORMIN) 25 MG tablet; Take 1 tablet (25 mg total) by mouth once daily.  Stable and controlled. Continue current treatment plan as previously prescribed with your PCP.   The patient is asked to make an attempt to improve diet and exercise patterns to aid in medical management of this problem.  Followed by PCP    Coronary artery disease, angina presence unspecified, unspecified vessel or lesion type, unspecified whether native or transplanted heart  -     nitroGLYCERIN (NITROSTAT) 0.3 MG SL tablet; ONE TABLET UNDER TONGUE AS NEEDED FOR CHEST PAIN EVERY 5 MINUTES AS NEEDED  -     Stable. Continue to monitor    Bipolar 1 disorder        -     Stable. Continue to monitor    Chronic hepatitis C without hepatic coma        -     Stable. Continue to  monitor    Dry eye syndrome, unspecified laterality  -     cycloSPORINE (RESTASIS) 0.05 % ophthalmic emulsion; Place 1 drop into both eyes 2 (two) times daily.  -     Medication refilled    Polysubstance dependence including opioid type drug, continuous use        -     Stable. Continue to monitor    Alcoholic cirrhosis, unspecified whether ascites present        -     Stable. Continue to monitor    Alcohol withdrawal syndrome with complication        -     Stable. Continue to monitor    Alcohol abuse         -     Stable. Continue to monitor    Other orders  -     PROCTOZONE-HC 2.5 % rectal cream; STACI RECTALLY BID  -     Medication refilled    Follow up if symptoms worsen or fail to improve.

## 2020-04-08 RX ORDER — LEVOCETIRIZINE DIHYDROCHLORIDE 5 MG/1
TABLET, FILM COATED ORAL
Qty: 30 TABLET | Refills: 0 | Status: SHIPPED | OUTPATIENT
Start: 2020-04-08 | End: 2020-08-27 | Stop reason: SDUPTHER

## 2020-04-14 ENCOUNTER — PATIENT OUTREACH (OUTPATIENT)
Dept: ADMINISTRATIVE | Facility: HOSPITAL | Age: 65
End: 2020-04-14

## 2020-05-19 DIAGNOSIS — E03.4 HYPOTHYROIDISM DUE TO ACQUIRED ATROPHY OF THYROID: ICD-10-CM

## 2020-05-19 RX ORDER — LEVOTHYROXINE SODIUM 25 UG/1
TABLET ORAL
Qty: 90 TABLET | Refills: 0 | Status: SHIPPED | OUTPATIENT
Start: 2020-05-19 | End: 2020-11-12 | Stop reason: SDUPTHER

## 2020-05-19 RX ORDER — PANTOPRAZOLE SODIUM 40 MG/1
TABLET, DELAYED RELEASE ORAL
Qty: 60 TABLET | Refills: 0 | Status: SHIPPED | OUTPATIENT
Start: 2020-05-19 | End: 2020-05-19 | Stop reason: SDUPTHER

## 2020-05-19 RX ORDER — PANTOPRAZOLE SODIUM 40 MG/1
TABLET, DELAYED RELEASE ORAL
Qty: 180 TABLET | Refills: 0 | Status: SHIPPED | OUTPATIENT
Start: 2020-05-19 | End: 2020-09-16

## 2020-05-19 NOTE — TELEPHONE ENCOUNTER
No new care gaps identified.  Powered by [employer]. Reference number: [id number]. 5/19/2020 8:42:10 AM CDT

## 2020-05-19 NOTE — TELEPHONE ENCOUNTER
Care Due:                  Date            Visit Type   Department     Provider  --------------------------------------------------------------------------------    Last Visit: None Found      None         None Found  Next Visit: None Scheduled  None         None Found                                                            Last  Test          Frequency    Reason                     Performed    Due Date  --------------------------------------------------------------------------------    Office Visit  6 months...  DULoxetine, atenoloL,      Not Found    Overdue                             cycloSPORINE,                             nitroGLYCERIN............    eGFR........  12 months..  DULoxetine...............  Not Found    Overdue    Powered by [employer]. Reference number: [id number]. 5/19/2020 12:38:49 AM   CDT

## 2020-05-24 ENCOUNTER — HOSPITAL ENCOUNTER (OUTPATIENT)
Facility: HOSPITAL | Age: 65
Discharge: LEFT BEFORE DISCHARGE | End: 2020-05-24
Attending: EMERGENCY MEDICINE | Admitting: EMERGENCY MEDICINE
Payer: MEDICARE

## 2020-05-24 VITALS
WEIGHT: 160 LBS | BODY MASS INDEX: 25.71 KG/M2 | SYSTOLIC BLOOD PRESSURE: 126 MMHG | OXYGEN SATURATION: 96 % | RESPIRATION RATE: 20 BRPM | DIASTOLIC BLOOD PRESSURE: 73 MMHG | HEIGHT: 66 IN | TEMPERATURE: 98 F | HEART RATE: 78 BPM

## 2020-05-24 DIAGNOSIS — F19.10 DRUG ABUSE: ICD-10-CM

## 2020-05-24 DIAGNOSIS — S01.01XA SCALP LACERATION, INITIAL ENCOUNTER: ICD-10-CM

## 2020-05-24 DIAGNOSIS — Z86.19 HISTORY OF HEPATITIS C: ICD-10-CM

## 2020-05-24 DIAGNOSIS — F31.9 BIPOLAR 1 DISORDER: Chronic | ICD-10-CM
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
## 2020-05-24 DIAGNOSIS — F19.20 POLYSUBSTANCE DEPENDENCE INCLUDING OPIOID TYPE DRUG, CONTINUOUS USE: Chronic | ICD-10-CM

## 2020-05-24 DIAGNOSIS — T14.8XXA ABRASION: ICD-10-CM

## 2020-05-24 DIAGNOSIS — E03.9 ACQUIRED HYPOTHYROIDISM: Chronic | ICD-10-CM

## 2020-05-24 DIAGNOSIS — R07.9 CHEST PAIN: Primary | ICD-10-CM

## 2020-05-24 DIAGNOSIS — F11.20 POLYSUBSTANCE DEPENDENCE INCLUDING OPIOID TYPE DRUG, CONTINUOUS USE: Chronic | ICD-10-CM

## 2020-05-24 DIAGNOSIS — K70.30 ALCOHOLIC CIRRHOSIS OF LIVER WITHOUT ASCITES: ICD-10-CM

## 2020-05-24 DIAGNOSIS — K74.60 HEPATIC CIRRHOSIS, UNSPECIFIED HEPATIC CIRRHOSIS TYPE, UNSPECIFIED WHETHER ASCITES PRESENT: Chronic | ICD-10-CM

## 2020-05-24 DIAGNOSIS — S00.83XA FACIAL CONTUSION, INITIAL ENCOUNTER: ICD-10-CM

## 2020-05-24 LAB
ALBUMIN SERPL BCP-MCNC: 4.1 G/DL (ref 3.5–5.2)
ALP SERPL-CCNC: 167 U/L (ref 55–135)
ALT SERPL W/O P-5'-P-CCNC: 26 U/L (ref 10–44)
ANION GAP SERPL CALC-SCNC: 12 MMOL/L (ref 8–16)
AST SERPL-CCNC: 46 U/L (ref 10–40)
BASOPHILS # BLD AUTO: 0.08 K/UL (ref 0–0.2)
BASOPHILS NFR BLD: 1 % (ref 0–1.9)
BILIRUB SERPL-MCNC: 0.3 MG/DL (ref 0.1–1)
BNP SERPL-MCNC: 24 PG/ML (ref 0–99)
BUN SERPL-MCNC: 8 MG/DL (ref 8–23)
CALCIUM SERPL-MCNC: 9.1 MG/DL (ref 8.7–10.5)
CHLORIDE SERPL-SCNC: 101 MMOL/L (ref 95–110)
CHOLEST SERPL-MCNC: 185 MG/DL (ref 120–199)
CHOLEST/HDLC SERPL: 2.9 {RATIO} (ref 2–5)
CO2 SERPL-SCNC: 23 MMOL/L (ref 23–29)
CREAT SERPL-MCNC: 0.7 MG/DL (ref 0.5–1.4)
DIFFERENTIAL METHOD: ABNORMAL
EOSINOPHIL # BLD AUTO: 0.1 K/UL (ref 0–0.5)
EOSINOPHIL NFR BLD: 1.8 % (ref 0–8)
ERYTHROCYTE [DISTWIDTH] IN BLOOD BY AUTOMATED COUNT: 16.1 % (ref 11.5–14.5)
EST. GFR  (AFRICAN AMERICAN): >60 ML/MIN/1.73 M^2
EST. GFR  (NON AFRICAN AMERICAN): >60 ML/MIN/1.73 M^2
ETHANOL SERPL-MCNC: 112 MG/DL
GLUCOSE SERPL-MCNC: 104 MG/DL (ref 70–110)
HCT VFR BLD AUTO: 39 % (ref 37–48.5)
HDLC SERPL-MCNC: 64 MG/DL (ref 40–75)
HDLC SERPL: 34.6 % (ref 20–50)
HGB BLD-MCNC: 12.4 G/DL (ref 12–16)
IMM GRANULOCYTES # BLD AUTO: 0.03 K/UL (ref 0–0.04)
IMM GRANULOCYTES NFR BLD AUTO: 0.4 % (ref 0–0.5)
LDLC SERPL CALC-MCNC: 102 MG/DL (ref 63–159)
LYMPHOCYTES # BLD AUTO: 0.9 K/UL (ref 1–4.8)
LYMPHOCYTES NFR BLD: 10.9 % (ref 18–48)
MCH RBC QN AUTO: 29.7 PG (ref 27–31)
MCHC RBC AUTO-ENTMCNC: 31.8 G/DL (ref 32–36)
MCV RBC AUTO: 94 FL (ref 82–98)
MONOCYTES # BLD AUTO: 0.8 K/UL (ref 0.3–1)
MONOCYTES NFR BLD: 10.3 % (ref 4–15)
NEUTROPHILS # BLD AUTO: 6 K/UL (ref 1.8–7.7)
NEUTROPHILS NFR BLD: 75.6 % (ref 38–73)
NONHDLC SERPL-MCNC: 121 MG/DL
NRBC BLD-RTO: 0 /100 WBC
PLATELET # BLD AUTO: 237 K/UL (ref 150–350)
PMV BLD AUTO: 10 FL (ref 9.2–12.9)
POTASSIUM SERPL-SCNC: 3.9 MMOL/L (ref 3.5–5.1)
PROT SERPL-MCNC: 8.1 G/DL (ref 6–8.4)
RBC # BLD AUTO: 4.17 M/UL (ref 4–5.4)
SARS-COV-2 RDRP RESP QL NAA+PROBE: NEGATIVE
SODIUM SERPL-SCNC: 136 MMOL/L (ref 136–145)
TRIGL SERPL-MCNC: 95 MG/DL (ref 30–150)
TROPONIN I SERPL DL<=0.01 NG/ML-MCNC: 0.01 NG/ML (ref 0–0.03)
TROPONIN I SERPL DL<=0.01 NG/ML-MCNC: <0.006 NG/ML (ref 0–0.03)
TSH SERPL DL<=0.005 MIU/L-ACNC: 2.91 UIU/ML (ref 0.4–4)
WBC # BLD AUTO: 7.98 K/UL (ref 3.9–12.7)

## 2020-05-24 PROCEDURE — 99291 CRITICAL CARE FIRST HOUR: CPT | Mod: HCNC

## 2020-05-24 PROCEDURE — 25000003 PHARM REV CODE 250: Mod: HCNC | Performed by: NURSE PRACTITIONER

## 2020-05-24 PROCEDURE — 25000242 PHARM REV CODE 250 ALT 637 W/ HCPCS: Mod: HCNC | Performed by: EMERGENCY MEDICINE

## 2020-05-24 PROCEDURE — U0002 COVID-19 LAB TEST NON-CDC: HCPCS | Mod: HCNC

## 2020-05-24 PROCEDURE — 36415 COLL VENOUS BLD VENIPUNCTURE: CPT | Mod: HCNC

## 2020-05-24 PROCEDURE — 84443 ASSAY THYROID STIM HORMONE: CPT | Mod: HCNC

## 2020-05-24 PROCEDURE — 93005 ELECTROCARDIOGRAM TRACING: CPT | Mod: HCNC

## 2020-05-24 PROCEDURE — 63600175 PHARM REV CODE 636 W HCPCS: Mod: HCNC | Performed by: EMERGENCY MEDICINE

## 2020-05-24 PROCEDURE — 85025 COMPLETE CBC W/AUTO DIFF WBC: CPT | Mod: HCNC

## 2020-05-24 PROCEDURE — 96374 THER/PROPH/DIAG INJ IV PUSH: CPT | Mod: HCNC

## 2020-05-24 PROCEDURE — G0378 HOSPITAL OBSERVATION PER HR: HCPCS | Mod: HCNC

## 2020-05-24 PROCEDURE — 83880 ASSAY OF NATRIURETIC PEPTIDE: CPT | Mod: HCNC

## 2020-05-24 PROCEDURE — 93010 ELECTROCARDIOGRAM REPORT: CPT | Mod: HCNC,,, | Performed by: INTERNAL MEDICINE

## 2020-05-24 PROCEDURE — 84484 ASSAY OF TROPONIN QUANT: CPT | Mod: HCNC

## 2020-05-24 PROCEDURE — 80053 COMPREHEN METABOLIC PANEL: CPT | Mod: HCNC

## 2020-05-24 PROCEDURE — 96376 TX/PRO/DX INJ SAME DRUG ADON: CPT | Mod: HCNC

## 2020-05-24 PROCEDURE — 25000003 PHARM REV CODE 250: Mod: HCNC | Performed by: EMERGENCY MEDICINE

## 2020-05-24 PROCEDURE — 93010 EKG 12-LEAD: ICD-10-PCS | Mod: HCNC,,, | Performed by: INTERNAL MEDICINE

## 2020-05-24 PROCEDURE — 80061 LIPID PANEL: CPT | Mod: HCNC

## 2020-05-24 PROCEDURE — 80320 DRUG SCREEN QUANTALCOHOLS: CPT | Mod: HCNC

## 2020-05-24 RX ORDER — LEVOTHYROXINE SODIUM 25 UG/1
25 TABLET ORAL
Status: DISCONTINUED | OUTPATIENT
Start: 2020-05-24 | End: 2020-05-24 | Stop reason: HOSPADM

## 2020-05-24 RX ORDER — ATENOLOL 25 MG/1
25 TABLET ORAL DAILY
Status: DISCONTINUED | OUTPATIENT
Start: 2020-05-24 | End: 2020-05-24 | Stop reason: HOSPADM

## 2020-05-24 RX ORDER — LORAZEPAM 2 MG/ML
2 INJECTION INTRAMUSCULAR
Status: DISCONTINUED | OUTPATIENT
Start: 2020-05-24 | End: 2020-05-24 | Stop reason: HOSPADM

## 2020-05-24 RX ORDER — CYCLOBENZAPRINE HCL 5 MG
5 TABLET ORAL 2 TIMES DAILY
Status: DISCONTINUED | OUTPATIENT
Start: 2020-05-24 | End: 2020-05-24 | Stop reason: HOSPADM

## 2020-05-24 RX ORDER — SODIUM CHLORIDE 0.9 % (FLUSH) 0.9 %
10 SYRINGE (ML) INJECTION
Status: DISCONTINUED | OUTPATIENT
Start: 2020-05-24 | End: 2020-05-24 | Stop reason: HOSPADM

## 2020-05-24 RX ORDER — IBUPROFEN 400 MG/1
400 TABLET ORAL EVERY 6 HOURS PRN
Status: DISCONTINUED | OUTPATIENT
Start: 2020-05-24 | End: 2020-05-24 | Stop reason: HOSPADM

## 2020-05-24 RX ORDER — MORPHINE SULFATE 10 MG/ML
2 INJECTION INTRAMUSCULAR; INTRAVENOUS; SUBCUTANEOUS
Status: COMPLETED | OUTPATIENT
Start: 2020-05-24 | End: 2020-05-24

## 2020-05-24 RX ORDER — NITROGLYCERIN 0.4 MG/1
0.4 TABLET SUBLINGUAL
Status: COMPLETED | OUTPATIENT
Start: 2020-05-24 | End: 2020-05-24

## 2020-05-24 RX ORDER — FAMOTIDINE 20 MG/1
20 TABLET, FILM COATED ORAL DAILY
Status: DISCONTINUED | OUTPATIENT
Start: 2020-05-24 | End: 2020-05-24 | Stop reason: HOSPADM

## 2020-05-24 RX ORDER — DULOXETIN HYDROCHLORIDE 30 MG/1
60 CAPSULE, DELAYED RELEASE ORAL DAILY
Status: DISCONTINUED | OUTPATIENT
Start: 2020-05-24 | End: 2020-05-24 | Stop reason: HOSPADM

## 2020-05-24 RX ORDER — ONDANSETRON 2 MG/ML
4 INJECTION INTRAMUSCULAR; INTRAVENOUS EVERY 8 HOURS PRN
Status: DISCONTINUED | OUTPATIENT
Start: 2020-05-24 | End: 2020-05-24 | Stop reason: HOSPADM

## 2020-05-24 RX ORDER — ENOXAPARIN SODIUM 100 MG/ML
40 INJECTION SUBCUTANEOUS EVERY 24 HOURS
Status: DISCONTINUED | OUTPATIENT
Start: 2020-05-24 | End: 2020-05-24 | Stop reason: HOSPADM

## 2020-05-24 RX ORDER — TRAMADOL HYDROCHLORIDE 50 MG/1
50 TABLET ORAL EVERY 8 HOURS PRN
Status: DISCONTINUED | OUTPATIENT
Start: 2020-05-24 | End: 2020-05-24

## 2020-05-24 RX ADMIN — DULOXETINE HYDROCHLORIDE 60 MG: 30 CAPSULE, DELAYED RELEASE ORAL at 03:05

## 2020-05-24 RX ADMIN — CYCLOBENZAPRINE HYDROCHLORIDE 5 MG: 5 TABLET, FILM COATED ORAL at 03:05

## 2020-05-24 RX ADMIN — NITROGLYCERIN 0.4 MG: 0.4 TABLET, ORALLY DISINTEGRATING SUBLINGUAL at 11:05

## 2020-05-24 RX ADMIN — MORPHINE SULFATE 2 MG: 10 INJECTION INTRAVENOUS at 07:05

## 2020-05-24 RX ADMIN — FAMOTIDINE 20 MG: 20 TABLET ORAL at 12:05

## 2020-05-24 RX ADMIN — MORPHINE SULFATE 2 MG: 10 INJECTION INTRAVENOUS at 08:05

## 2020-05-24 RX ADMIN — ATENOLOL 25 MG: 25 TABLET ORAL at 03:05

## 2020-05-24 RX ADMIN — IBUPROFEN 400 MG: 400 TABLET, FILM COATED ORAL at 12:05

## 2020-05-24 NOTE — ASSESSMENT & PLAN NOTE
Patient was easily redirected and calmed down after a started to interview her.  Will monitor her closely if she stays overnight will add p.r.n. diazepam but for now will defer

## 2020-05-24 NOTE — ASSESSMENT & PLAN NOTE
Patient denied smoking cigarette history and denies using illicit drugs recently.  However she admitted to snorting heroin recently while she was in the ED.  She reports that she gets prescriptions for oxycodone for back pain from pain management however review of her [place] prescription monitoring shows that she has not been given any narcotics since August 2019.  Coronary ED doctors MTM the patient admitted that she snorts heroin that she buys from the street.  At the time of interview the patient appeared nontoxic her labs are grossly normal including blood pressure 119/77, temp 98.5°, pulse 96, respirations 16, sats remained 96% on room air.  I will defer narcotics at this time but will monitor patient closely.  She will likely discharge today

## 2020-05-24 NOTE — DISCHARGE INSTRUCTIONS
Complete medications as ordered  Follow all discharge instructions.  Please schedule follow up appointments as necessary      When to Call Your Doctor  Call your doctor immediately if you have any of the following:  · Severe headache  · Severe dizziness, or fainting  · Nausea or vomiting  · Fast heartbeat (higher than 100 beats per minute)  · Fever or chills  · Swollen ankles  · Weakness  · Chest Pain attacks that last longer, occur more often, or are more severe than in the past         Noncardiac Chest Pain    Based on your visit today, the healthcare provider doesnt know what is causing your chest pain. In most cases, people who come to the emergency department with chest pain dont have a problem with their heart. Instead, the pain is caused by other conditions. It's important for the healthcare team to be sure you are not having a life threatening cause for chest pain such as a heart attack, blood clot in the lungs, collapsed lung, ruptured esophagus, or tearing of the aorta. Once these major causes have been ruled out, you may have further evaluation for non-heart causes of chest pain. These may be problems with the lungs, muscles, bones, digestive tract, nerves, or mental health.  Lung problems  · Inflammation around the lungs (pleurisy)  · Collapsed lung (pneumothorax)  · Fluid around the lungs (pleural effusion)  · Lung cancer (a rare cause of chest pain)  Muscle or bone problems  · Inflamed cartilage between the ribs (costochondritis)  · Fibromyalgia  · Rheumatoid arthritis  · Chest wall strain  Digestive system problems  · Reflux  · Stomach ulcer  · Spasms of the esophagus  · Gall stones  · Gallbladder inflammation  Mental health conditions  · Panic or anxiety attacks  · Emotional distress  Your condition doesnt seem serious and your pain doesnt appear to be coming from your heart. But sometimes the signs of a serious problem take more time to appear. Watch for the warning signs listed below.  Home  care  Follow these guidelines when caring for yourself at home:  · Rest today and avoid strenuous activity.  · Take any prescribed medicine as directed.  Follow-up care  Follow up with your healthcare provider, or as advised, if you dont start to feel better within 24 hours.  When to seek medical advice  Call your healthcare provider right away if any of these occur:  · A change in the type of pain. Call if it feels different, becomes more serious, lasts longer, or begins to spread into your shoulder, arm, neck, jaw, or back.  · Shortness of breath  · You feel more pain when you breathe  · Cough with dark-colored mucus or blood  · Weakness, dizziness, or fainting  · Fever of 100.4ºF (38ºC) or higher, or as directed by your healthcare provider  · Swelling, pain, or redness in one leg  Date Last Reviewed: 12/1/2016  © 7068-6944 [employer]. [address] [place] [address], [place] [address]. All rights reserved. This information is not intended as a substitute for professional medical care. Always follow your healthcare professional's instructions.

## 2020-05-24 NOTE — HOSPITAL COURSE
Placed in observation for ACS rule out. She ruled out with serial negative troponin 1 levels. EKG non-ischemic. BNP normal. She did not have any new episodes of chest pain while in the hospital.  All scans including head and spine showed no acute abnormalities. Deceptive behavior noted with inconsistencies in drug use as she reported to the ED doc that she recently did heroin and denied recent drug use during this clinicians' interview. Otherwise her stay at the hospital was unfounded. Some drug seeking behavior, requested opiates states she's being treated for back pain. [facility] has not documented any narcotic RX since 8/2019. She is non-toxic appearing. Vitals grossly stable. Neurologically intact. Discharge to home when ACS ruled out.      Informed by Staff that Patient left AMA prior to her discharge.

## 2020-05-24 NOTE — ASSESSMENT & PLAN NOTE
Substance abuse is contraindicated in patients with bipolar.  I have strongly encouraged cessation.  Continue patient's home Cymbalta 60 mg daily---negative for SSI or self-harm behavior.  Patient is calm and cooperative
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
## 2020-05-24 NOTE — PLAN OF CARE
05/24/20 1506   Final Note   Assessment Type Final Discharge Note   Anticipated Discharge Disposition Home   What phone number can be called within the next 1-3 days to see how you are doing after discharge?   ([phone] )   Hospital Follow Up  Appt(s) scheduled? No  (Unable to schedule within needed time-frame)   Discharge plans and expectations educations in teach back method with documentation complete? Yes   Right Care Referral Info   Post Acute Recommendation No Care   Post-Acute Status   Post-Acute Authorization Other  (Home)   Other Status No Post-Acute Service Needs   Discharge Delays None known at this time

## 2020-05-24 NOTE — PROGRESS NOTES
Patient is ready and clear for discharge from CM perspective; we were unable to schedule her Cardiology follow-up with Dr. Parsons or her Primary Care follow-up with Dr. Packer in the requested time-frames. Wait-listed; have patient contact the providers' offices on Tuesday.

## 2020-05-24 NOTE — ASSESSMENT & PLAN NOTE
Hepatic enzymes unimpressive, no fever, or juandice noted. Monitor closely cautious use of nephrotoxic meds

## 2020-05-24 NOTE — SUBJECTIVE & OBJECTIVE
Past Medical History:   Diagnosis Date    Addiction to drug     Alcohol abuse     Arthritis     Cirrhosis of liver     Colon polyp     Coronary artery disease     Fall     GERD (gastroesophageal reflux disease)     Hepatitis C     States was successfully treated with Harvoni    History of psychiatric hospitalization     Hx of psychiatric care     Hypertension     Low back pain     Radha     MI (myocardial infarction)     Migraine headache     Psychiatric problem     Sleep difficulties     Suicide attempt     Therapy     Thyroid disease        Past Surgical History:   Procedure Laterality Date    ESOPHAGOGASTRODUODENOSCOPY N/A 3/20/2019    Procedure: EGD (ESOPHAGOGASTRODUODENOSCOPY);  Surgeon: Obdulia Wilson MD;  Location: [facility] ENDO;  Service: Endoscopy;  Laterality: N/A;    ESOPHAGOGASTRODUODENOSCOPY Left 9/25/2019    Procedure: EGD (ESOPHAGOGASTRODUODENOSCOPY);  Surgeon: Hernandez Farias MD;  Location: [facility] ENDO;  Service: Endoscopy;  Laterality: Left;    HERNIA REPAIR      HIATAL HERNIA REPAIR      JOINT REPLACEMENT Bilateral     KNEE SURGERY  bilateral replacement    right foot sx  2008    SHOULDER SURGERY  replacement       Review of patient's allergies indicates:  No Known Allergies    No current facility-administered medications on file prior to encounter.      Current Outpatient Medications on File Prior to Encounter   Medication Sig    atenoloL (TENORMIN) 25 MG tablet Take 1 tablet (25 mg total) by mouth once daily.    cycloSPORINE (RESTASIS) 0.05 % ophthalmic emulsion Place 1 drop into both eyes 2 (two) times daily.    DULoxetine (CYMBALTA) 60 MG capsule Take 1 capsule (60 mg total) by mouth once daily.    levocetirizine (XYZAL) 5 MG tablet TAKE 1 TABLET(5 MG) BY MOUTH EVERY EVENING    levothyroxine (SYNTHROID) 25 MCG tablet TAKE 1 TABLET BY MOUTH EVERY DAY    NARCAN 4 mg/actuation Spry USE UTD    nitroGLYCERIN (NITROSTAT) 0.3 MG SL tablet ONE TABLET UNDER TONGUE AS NEEDED  FOR CHEST PAIN EVERY 5 MINUTES AS NEEDED    pantoprazole (PROTONIX) 40 MG tablet TAKE 1 TABLET BY MOUTH TWICE DAILY    PROCTOZONE-HC 2.5 % rectal cream STACI RECTALLY BID     Family History     Problem Relation (Age of Onset)    Bipolar disorder Maternal Grandfather    Cancer Mother, Father    Depression Sister    Suicide Cousin        Tobacco Use    Smoking status: Never Smoker    Smokeless tobacco: Never Used   Substance and Sexual Activity    Alcohol use: Yes     Alcohol/week: 6.0 standard drinks     Types: 6 Cans of beer per week     Comment: pt admits to drinking vodka daily    Drug use: Yes     Types: Benzodiazepines, Methamphetamines, Amphetamines     Comment: Pain mgt clinic; admits to addiction to opioids  pt admits to Heroin.    Sexual activity: Not Currently     Review of Systems   Constitutional: Negative for appetite change, chills, diaphoresis and fever.   HENT: Negative for congestion, hearing loss, sore throat, tinnitus and trouble swallowing.    Eyes: Negative for photophobia, discharge, itching and visual disturbance.   Respiratory: Negative for apnea, cough, wheezing and stridor.    Cardiovascular: Positive for chest pain. Negative for palpitations and leg swelling.   Gastrointestinal: Negative for abdominal distention, abdominal pain, blood in stool, constipation, diarrhea and nausea.   Endocrine: Negative for polydipsia, polyphagia and polyuria.   Genitourinary: Negative for difficulty urinating, dysuria, flank pain and frequency.   Musculoskeletal: Negative for arthralgias, joint swelling and neck stiffness.   Skin: Negative for color change, rash and wound.   Neurological: Negative for dizziness, tremors, seizures, light-headedness, numbness and headaches.   Hematological: Negative for adenopathy.   Psychiatric/Behavioral: Negative for hallucinations and self-injury.     Objective:     Vital Signs (Most Recent):  Temp: 98.5 °F (36.9 °C) (05/24/20 1222)  Pulse: 96 (05/24/20 1222)  Resp: 16  (05/24/20 1222)  BP: 119/77 (05/24/20 1222)  SpO2: (!) 93 % (05/24/20 1222) Vital Signs (24h Range):  Temp:  [98.2 °F (36.8 °C)-98.5 °F (36.9 °C)] 98.5 °F (36.9 °C)  Pulse:  [] 96  Resp:  [16-19] 16  SpO2:  [93 %-98 %] 93 %  BP: (114-134)/(63-87) 119/77     Weight: 72.6 kg (160 lb)  Body mass index is 25.82 kg/m².    Physical Exam   Constitutional: She is oriented to person, place, and time. She appears well-developed and well-nourished. She is cooperative.   HENT:   Head: Normocephalic and atraumatic.   Eyes: Pupils are equal, round, and reactive to light. Conjunctivae, EOM and lids are normal.   Neck: Normal range of motion and full passive range of motion without pain. Neck supple. No JVD present. No edema present. No thyroid mass present.   Cardiovascular: Normal rate, S1 normal, S2 normal and intact distal pulses.   No murmur heard.  Pulmonary/Chest: Effort normal.   Abdominal: Soft. Bowel sounds are normal. She exhibits no distension and no abdominal bruit. There is no splenomegaly or hepatomegaly. There is no tenderness. There is no CVA tenderness.   Musculoskeletal: Normal range of motion.   Lymphadenopathy:     She has no cervical adenopathy.     She has no axillary adenopathy.   Neurological: She is alert and oriented to person, place, and time. She has normal reflexes. She displays no tremor. She displays no seizure activity.   Skin: Skin is warm, dry and intact.   Psychiatric: She has a normal mood and affect. Her speech is normal. Thought content normal. Cognition and memory are normal.         CRANIAL NERVES     CN III, IV, VI   Pupils are equal, round, and reactive to light.  Extraocular motions are normal.        Significant Labs:   CBC:   Recent Labs   Lab 05/24/20  0715   WBC 7.98   HGB 12.4   HCT 39.0        CMP:   Recent Labs   Lab 05/24/20  0715      K 3.9      CO2 23      BUN 8   CREATININE 0.7   CALCIUM 9.1   PROT 8.1   ALBUMIN 4.1   BILITOT 0.3   ALKPHOS 167*    AST 46*   ALT 26   ANIONGAP 12   EGFRNONAA >60     Cardiac Markers:   Recent Labs   Lab 05/24/20  0715   BNP 24     Troponin:   Recent Labs   Lab 05/24/20  0715   TROPONINI 0.007       Significant Imaging:   Imaging Results          X-Ray Chest AP Portable (Final result)  Result time 05/24/20 09:15:01    Final result by Timo Jose MD (05/24/20 09:15:01)                 Impression:      As above      Electronically signed by: Timo Jose MD  Date:    05/24/2020  Time:    09:15             Narrative:    EXAMINATION:  XR CHEST AP PORTABLE    CLINICAL HISTORY:  Chest pain, unspecified    TECHNIQUE:  Single frontal view of the chest was performed.    COMPARISON:  09/24/2019    FINDINGS:  Stable postoperative changes of the right shoulder.  EKG leads overlie chest.  No focal consolidation.  Mild bibasilar scarring or atelectasis.  Cardiomediastinal silhouette and osseous structures are stable in appearance with degenerative changes of the shoulders noted.  Please note detail limited by rotation.                               CT Head Without Contrast (Final result)  Result time 05/24/20 08:32:08    Final result by Timo Jose MD (05/24/20 08:32:08)                 Impression:      Within the limitations of the exam, no acute intracranial abnormality.  No interval change since the comparison study.      Electronically signed by: Timo Jose MD  Date:    05/24/2020  Time:    08:32             Narrative:    EXAMINATION:  CT HEAD WITHOUT CONTRAST    CLINICAL HISTORY:  Head trauma, minor, GCS>=13, NOC/NEXUS/CCR positive, first study;    TECHNIQUE:  Low dose axial images were obtained through the head.  Coronal and sagittal reformations were also performed. Contrast was not administered.    COMPARISON:  Head CT from 08/27/2019.    FINDINGS:  Motion artifact is present.  There is atrophy of the brain parenchyma with slight prominence of the ventricles and sulci, appearance not significantly changed.  No mass effect or  shift of midline.  Chronic microvascular ischemic changes.  No acute intracranial hemorrhage is seen.  The ventricles and the basilar cisterns remain patent.  Intracranial atherosclerosis is present.  Orbits are symmetric in appearance.  Opacification of the visualized left and right sphenoid sinuses.  Chronic mucoperiosteal thickening of the left sphenoid sinus.  Mastoid air cells are normally pneumatized.  Skull remains intact.                               CT Cervical Spine Without Contrast (Final result)  Result time 05/24/20 08:39:53    Final result by Timo Jose MD (05/24/20 08:39:53)                 Impression:      No acute abnormality identified on this motion limited study.    Mild scarring at the lung apices is present.  Bilateral small scattered cervical lymph nodes which are likely reactive in etiology.      Electronically signed by: Timo Jose MD  Date:    05/24/2020  Time:    08:39             Narrative:    EXAMINATION:  CT CERVICAL SPINE WITHOUT CONTRAST    CLINICAL HISTORY:  C-spine trauma, NEXUS/CCR positive, low risk;    TECHNIQUE:  Low dose axial images, sagittal and coronal reformations were performed though the cervical spine.  Contrast was not administered.    COMPARISON:  Cervical spine CT from August 2019.    FINDINGS:  On this motion limited exam, no acute fracture seen.  Stable grade 1 anterolisthesis of C4 on C5 with discogenic degenerative changes of the lower cervical spine most pronounced at C5-C6 and C6-C7 with disc space narrowing marginal spurring with multilevel facet arthropathy and multi level areas of neural foraminal encroachment seen.  The craniocervical junction and atlanto odontoid distance is are maintained.  Prevertebral soft tissues are within normal limits.                               CT Maxillofacial Without Contrast (Final result)  Result time 05/24/20 08:49:33    Final result by Timo Jose MD (05/24/20 08:49:33)                 Impression:      No acute  fracture seen.  Multiple chronic appearing fracture deformities as above.      Electronically signed by: Timo Jose MD  Date:    05/24/2020  Time:    08:49             Narrative:    EXAMINATION:  CT MAXILLOFACIAL WITHOUT CONTRAST    CLINICAL HISTORY:  Facial fracture(s);    TECHNIQUE:  Low dose axial images, sagittal and coronal reformations were obtained through the face.  Contrast was not administered.    COMPARISON:  Maxillofacial CT from January 16, 2018.    FINDINGS:  Old fractures of the inferior left orbital wall and right zygomatic arch.  Postoperative changes along the anterior right maxillary wall which are similar in appearance.  Bilateral maxillary sinusitis changes, left greater than right with only minimal residual aeration of the left maxillary sinus seen.  There is prominent lead leftward deviated nasal septum.  Mandible, pterygoid plates and TMJs are intact.  Old nasal bone fractures are seen.  No definite acute fracture.
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
## 2020-05-24 NOTE — ED NOTES
Report received, patient sitting up in bed, night shift attempting to obtain IV, Reports chest pain has decreased, denies SOB, still endorses HA, Left facial bruising noted.
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
## 2020-05-24 NOTE — NURSING
Pt states she wants to leave AMA.  She refuses lab work states she does not want to wait for lab draw and the results. Nurse explained to pt the risks factors of leaving AMA, pt still insists on leaving. ELIZABET Chong and charge nurse notified. Documentation completed per facility protocol. Requested transport due to pt's condition, pt walked off unit without waiting for transport, NAD noted.

## 2020-05-24 NOTE — PLAN OF CARE
05/24/20 1505   Post-Acute Status   Post-Acute Authorization Other  (Home; unable to schedule needed follow-ups)   Other Status No Post-Acute Service Needs   Discharge Delays None known at this time   Discharge Plan   Discharge Plan A Home with family   Discharge Plan B Home with family
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
## 2020-08-27 DIAGNOSIS — E03.4 HYPOTHYROIDISM DUE TO ACQUIRED ATROPHY OF THYROID: ICD-10-CM

## 2020-08-27 RX ORDER — LEVOCETIRIZINE DIHYDROCHLORIDE 5 MG/1
5 TABLET, FILM COATED ORAL NIGHTLY
Qty: 30 TABLET | Refills: 0 | Status: SHIPPED | OUTPATIENT
Start: 2020-08-27 | End: 2020-09-29

## 2020-11-06 ENCOUNTER — NURSE TRIAGE (OUTPATIENT)
Dept: ADMINISTRATIVE | Facility: CLINIC | Age: 65
End: 2020-11-06

## 2020-11-06 NOTE — TELEPHONE ENCOUNTER
"Patient called in and stated she has been out of cymbalta for depression for approximately two weeks. Patient advised that she contacted pcp for refill and was advised that she would need to come in for refills and reports she cant come in until next week as she is out of town. Patient asked if suicidal and states that it's possible. Patient was advised to call 911 and reported "why would I do that when I could just go blow my brains out." patient advised for nurse to send message to dr asking for them to please refill her meds asap. When asked for a number for out of town location, patient hung up and one number on file was not a good number and voicemail was left at [phone]. I spoke with  and he reports that patient has been drinking and isn't suicidal. I advised patient's  to dial 911 and  stated he would dial 911 if he felt she needed 911.  confirmed they are out of town at a friend's house in [facility] and stated he would call back if he needed us.     Reason for Disposition   Patient is threatening suicide now    Additional Information   Negative: Patient attempted suicide    Protocols used: SUICIDE [id number]      "

## 2020-11-06 NOTE — TELEPHONE ENCOUNTER
Patient has not follow up with me in over a year. This has been a chronic issue.  She does not follow up nor does she follow the plan of care.  She has been hospitalized several times due to this concern.  I do not normally manage this medication.  I did provide a refill to assist, but patient is refusing to come into the office. I cannot provide effective care if the patient does not want to address her chronic alcohol issues.

## 2020-11-11 DIAGNOSIS — E03.4 HYPOTHYROIDISM DUE TO ACQUIRED ATROPHY OF THYROID: ICD-10-CM

## 2020-11-11 RX ORDER — LEVOTHYROXINE SODIUM 25 UG/1
25 TABLET ORAL DAILY
Qty: 90 TABLET | Refills: 0 | Status: CANCELLED | OUTPATIENT
Start: 2020-11-11

## 2020-11-12 ENCOUNTER — OFFICE VISIT (OUTPATIENT)
Dept: FAMILY MEDICINE | Facility: CLINIC | Age: 65
End: 2020-11-12
Payer: MEDICARE

## 2020-11-12 VITALS
DIASTOLIC BLOOD PRESSURE: 74 MMHG | WEIGHT: 194.25 LBS | HEART RATE: 85 BPM | OXYGEN SATURATION: 95 % | HEIGHT: 66 IN | SYSTOLIC BLOOD PRESSURE: 118 MMHG | TEMPERATURE: 98 F | BODY MASS INDEX: 31.22 KG/M2

## 2020-11-12 DIAGNOSIS — J30.2 SEASONAL ALLERGIES: ICD-10-CM

## 2020-11-12 DIAGNOSIS — I10 ESSENTIAL HYPERTENSION: Chronic | ICD-10-CM

## 2020-11-12 DIAGNOSIS — F41.9 ANXIETY: ICD-10-CM

## 2020-11-12 DIAGNOSIS — K21.9 GASTROESOPHAGEAL REFLUX DISEASE, UNSPECIFIED WHETHER ESOPHAGITIS PRESENT: ICD-10-CM

## 2020-11-12 DIAGNOSIS — I25.10 CORONARY ARTERY DISEASE, ANGINA PRESENCE UNSPECIFIED, UNSPECIFIED VESSEL OR LESION TYPE, UNSPECIFIED WHETHER NATIVE OR TRANSPLANTED HEART: Chronic | ICD-10-CM

## 2020-11-12 DIAGNOSIS — M54.9 DORSALGIA, UNSPECIFIED: ICD-10-CM

## 2020-11-12 DIAGNOSIS — E03.4 HYPOTHYROIDISM DUE TO ACQUIRED ATROPHY OF THYROID: ICD-10-CM

## 2020-11-12 DIAGNOSIS — G47.00 INSOMNIA, UNSPECIFIED TYPE: ICD-10-CM

## 2020-11-12 DIAGNOSIS — M54.12 RADICULOPATHY, CERVICAL REGION: Primary | ICD-10-CM

## 2020-11-12 DIAGNOSIS — M47.892 OTHER SPONDYLOSIS, CERVICAL REGION: ICD-10-CM

## 2020-11-12 DIAGNOSIS — F32.A DEPRESSION, UNSPECIFIED DEPRESSION TYPE: ICD-10-CM

## 2020-11-12 PROCEDURE — 1125F AMNT PAIN NOTED PAIN PRSNT: CPT | Mod: HCNC,S$GLB,, | Performed by: NURSE PRACTITIONER

## 2020-11-12 PROCEDURE — 3078F PR MOST RECENT DIASTOLIC BLOOD PRESSURE < 80 MM HG: ICD-10-PCS | Mod: HCNC,CPTII,S$GLB, | Performed by: NURSE PRACTITIONER

## 2020-11-12 PROCEDURE — 99215 PR OFFICE/OUTPT VISIT, EST, LEVL V, 40-54 MIN: ICD-10-PCS | Mod: HCNC,S$GLB,, | Performed by: NURSE PRACTITIONER

## 2020-11-12 PROCEDURE — 1159F PR MEDICATION LIST DOCUMENTED IN MEDICAL RECORD: ICD-10-PCS | Mod: HCNC,S$GLB,, | Performed by: NURSE PRACTITIONER

## 2020-11-12 PROCEDURE — 93010 EKG 12-LEAD: ICD-10-PCS | Mod: HCNC,S$GLB,, | Performed by: INTERNAL MEDICINE

## 2020-11-12 PROCEDURE — 93010 ELECTROCARDIOGRAM REPORT: CPT | Mod: HCNC,S$GLB,, | Performed by: INTERNAL MEDICINE

## 2020-11-12 PROCEDURE — 3288F PR FALLS RISK ASSESSMENT DOCUMENTED: ICD-10-PCS | Mod: HCNC,CPTII,S$GLB, | Performed by: NURSE PRACTITIONER

## 2020-11-12 PROCEDURE — 3008F BODY MASS INDEX DOCD: CPT | Mod: HCNC,CPTII,S$GLB, | Performed by: NURSE PRACTITIONER

## 2020-11-12 PROCEDURE — 3074F PR MOST RECENT SYSTOLIC BLOOD PRESSURE < 130 MM HG: ICD-10-PCS | Mod: HCNC,CPTII,S$GLB, | Performed by: NURSE PRACTITIONER

## 2020-11-12 PROCEDURE — 99999 PR PBB SHADOW E&M-EST. PATIENT-LVL V: ICD-10-PCS | Mod: PBBFAC,HCNC,, | Performed by: NURSE PRACTITIONER

## 2020-11-12 PROCEDURE — 93005 ELECTROCARDIOGRAM TRACING: CPT | Mod: HCNC,S$GLB,, | Performed by: NURSE PRACTITIONER

## 2020-11-12 PROCEDURE — 93005 EKG 12-LEAD: ICD-10-PCS | Mod: HCNC,S$GLB,, | Performed by: NURSE PRACTITIONER

## 2020-11-12 PROCEDURE — 99999 PR PBB SHADOW E&M-EST. PATIENT-LVL V: CPT | Mod: PBBFAC,HCNC,, | Performed by: NURSE PRACTITIONER

## 2020-11-12 PROCEDURE — 3288F FALL RISK ASSESSMENT DOCD: CPT | Mod: HCNC,CPTII,S$GLB, | Performed by: NURSE PRACTITIONER

## 2020-11-12 PROCEDURE — 1100F PTFALLS ASSESS-DOCD GE2>/YR: CPT | Mod: HCNC,CPTII,S$GLB, | Performed by: NURSE PRACTITIONER

## 2020-11-12 PROCEDURE — 3078F DIAST BP <80 MM HG: CPT | Mod: HCNC,CPTII,S$GLB, | Performed by: NURSE PRACTITIONER

## 2020-11-12 PROCEDURE — 99215 OFFICE O/P EST HI 40 MIN: CPT | Mod: HCNC,S$GLB,, | Performed by: NURSE PRACTITIONER

## 2020-11-12 PROCEDURE — 1125F PR PAIN SEVERITY QUANTIFIED, PAIN PRESENT: ICD-10-PCS | Mod: HCNC,S$GLB,, | Performed by: NURSE PRACTITIONER

## 2020-11-12 PROCEDURE — 1159F MED LIST DOCD IN RCRD: CPT | Mod: HCNC,S$GLB,, | Performed by: NURSE PRACTITIONER

## 2020-11-12 PROCEDURE — 1100F PR PT FALLS ASSESS DOC 2+ FALLS/FALL W/INJURY/YR: ICD-10-PCS | Mod: HCNC,CPTII,S$GLB, | Performed by: NURSE PRACTITIONER

## 2020-11-12 PROCEDURE — 3008F PR BODY MASS INDEX (BMI) DOCUMENTED: ICD-10-PCS | Mod: HCNC,CPTII,S$GLB, | Performed by: NURSE PRACTITIONER

## 2020-11-12 PROCEDURE — 3074F SYST BP LT 130 MM HG: CPT | Mod: HCNC,CPTII,S$GLB, | Performed by: NURSE PRACTITIONER

## 2020-11-12 RX ORDER — LEVOCETIRIZINE DIHYDROCHLORIDE 5 MG/1
5 TABLET, FILM COATED ORAL NIGHTLY
Qty: 30 TABLET | Refills: 0 | Status: SHIPPED | OUTPATIENT
Start: 2020-11-12 | End: 2020-11-16

## 2020-11-12 RX ORDER — NITROGLYCERIN 0.3 MG/1
TABLET SUBLINGUAL
Qty: 100 TABLET | Refills: 0 | Status: SHIPPED | OUTPATIENT
Start: 2020-11-12 | End: 2023-11-12

## 2020-11-12 RX ORDER — CLONIDINE HYDROCHLORIDE 0.1 MG/1
0.1 TABLET ORAL ONCE
Qty: 30 TABLET | Refills: 2 | Status: ON HOLD | OUTPATIENT
Start: 2020-11-12 | End: 2023-11-29 | Stop reason: HOSPADM

## 2020-11-12 RX ORDER — LEVOTHYROXINE SODIUM 25 UG/1
25 TABLET ORAL DAILY
Qty: 90 TABLET | Refills: 3 | Status: SHIPPED | OUTPATIENT
Start: 2020-11-12 | End: 2022-01-07

## 2020-11-12 RX ORDER — HYDROXYZINE PAMOATE 25 MG/1
CAPSULE ORAL
Status: CANCELLED | OUTPATIENT
Start: 2020-11-12

## 2020-11-12 RX ORDER — TRAZODONE HYDROCHLORIDE 50 MG/1
50 TABLET ORAL NIGHTLY
Qty: 90 TABLET | Refills: 0 | Status: SHIPPED | OUTPATIENT
Start: 2020-11-12 | End: 2021-07-02 | Stop reason: SDUPTHER

## 2020-11-12 RX ORDER — ATENOLOL 25 MG/1
25 TABLET ORAL DAILY
Qty: 30 TABLET | Refills: 2 | Status: SHIPPED | OUTPATIENT
Start: 2020-11-12 | End: 2021-10-27

## 2020-11-12 RX ORDER — DULOXETIN HYDROCHLORIDE 60 MG/1
CAPSULE, DELAYED RELEASE ORAL
Qty: 90 CAPSULE | Refills: 0 | Status: SHIPPED | OUTPATIENT
Start: 2020-11-12 | End: 2021-10-27

## 2020-11-12 RX ORDER — CLONIDINE HYDROCHLORIDE 0.1 MG/1
0.1 TABLET ORAL
COMMUNITY
Start: 2020-08-19 | End: 2020-11-12 | Stop reason: SDUPTHER

## 2020-11-12 RX ORDER — PANTOPRAZOLE SODIUM 40 MG/1
40 TABLET, DELAYED RELEASE ORAL 2 TIMES DAILY
Qty: 180 TABLET | Refills: 0 | Status: SHIPPED | OUTPATIENT
Start: 2020-11-12 | End: 2021-07-02 | Stop reason: SDUPTHER

## 2020-11-12 RX ORDER — ONDANSETRON HYDROCHLORIDE 8 MG/1
8 TABLET, FILM COATED ORAL
COMMUNITY
Start: 2020-08-19 | End: 2021-08-16 | Stop reason: SDUPTHER

## 2020-11-12 RX ORDER — HYDROXYZINE PAMOATE 25 MG/1
CAPSULE ORAL
COMMUNITY
Start: 2020-08-19 | End: 2020-11-12 | Stop reason: ALTCHOICE

## 2020-11-12 RX ORDER — BUPRENORPHINE HYDROCHLORIDE 8 MG/1
TABLET SUBLINGUAL
COMMUNITY
Start: 2020-08-18 | End: 2021-10-27 | Stop reason: ALTCHOICE

## 2020-11-12 NOTE — PROGRESS NOTES
______________________________________________________________________    Chief Complaint  Chief Complaint   Patient presents with    sprained ankle     right    Medication Refill    Neck Pain    Back Pain    Chest Pain       HPI  Estella Arevalo is a 65 y.o. female with medical diagnoses as listed in the medical history and problem list that presents for a fall, chest pain and med refill.  Pt is known to me with her last appointment Visit date not found.      Fall  Reports she fell a week ago. Reports she has been suffering from increasing lumbar  and cervical pain since the fall. Denies hitting her head when she fell. Reports neck muscle tension with pain that radiates to head causing headcahes. Reports headache but not the worse headache she has ever felt. She take already prescribed Norco that controls pain.    Chest Pain  Reports chest pain x 3 days. She has not been able to take Nitroglycerin because they spilled onto the floor.  SOB on exertion. Denies dizziness, N&V, visual or neuro focal changes, syncope        PAST MEDICAL HISTORY:  Past Medical History:   Diagnosis Date    Addiction to drug     Alcohol abuse     Arthritis     Cirrhosis of liver     Colon polyp     Coronary artery disease     Fall     GERD (gastroesophageal reflux disease)     Hepatitis C     States was successfully treated with Harvoni    History of psychiatric hospitalization     Hx of psychiatric care     Hypertension     Low back pain     Radha     MI (myocardial infarction)     Migraine headache     Psychiatric problem     Sleep difficulties     Suicide attempt     Therapy     Thyroid disease        PAST SURGICAL HISTORY:  Past Surgical History:   Procedure Laterality Date    ESOPHAGOGASTRODUODENOSCOPY N/A 3/20/2019    Procedure: EGD (ESOPHAGOGASTRODUODENOSCOPY);  Surgeon: Obdulia Wilson MD;  Location: [facility];  Service: Endoscopy;  Laterality: N/A;    ESOPHAGOGASTRODUODENOSCOPY Left 9/25/2019     "Procedure: EGD (ESOPHAGOGASTRODUODENOSCOPY);  Surgeon: Hernandez Farias MD;  Location: [facility];  Service: Endoscopy;  Laterality: Left;    HERNIA REPAIR      HIATAL HERNIA REPAIR      JOINT REPLACEMENT Bilateral     KNEE SURGERY  bilateral replacement    right foot sx  2008    SHOULDER SURGERY  replacement       SOCIAL HISTORY:  Social History     Socioeconomic History    Marital status:      Spouse name: Hammad Kaufman"    Number of children: 4    Years of education: Not on file    Highest education level: Not on file   Occupational History    Occupation: Retired     Comment: RN   Social Needs    Financial resource strain: Not on file    Food insecurity     Worry: Not on file     Inability: Not on file    Transportation needs     Medical: Not on file     Non-medical: Not on file   Tobacco Use    Smoking status: Never Smoker    Smokeless tobacco: Never Used   Substance and Sexual Activity    Alcohol use: Yes     Alcohol/week: 6.0 standard drinks     Types: 6 Cans of beer per week     Comment: pt admits to drinking vodka daily    Drug use: Yes     Types: Benzodiazepines, Methamphetamines, Amphetamines     Comment: [facility] clinic; admits to addiction to opioids  pt admits to Heroin.    Sexual activity: Not Currently   Lifestyle    Physical activity     Days per week: Not on file     Minutes per session: Not on file    Stress: Only a little   Relationships    Social connections     Talks on phone: Not on file     Gets together: Not on file     Attends [employer] service: Not on file     Active member of club or organization: Not on file     Attends meetings of clubs or organizations: Not on file     Relationship status: Not on file   Other Topics Concern    Patient feels they ought to cut down on drinking/drug use Yes    Patient annoyed by others criticizing their drinking/drug use Yes    Patient has felt bad or guilty about drinking/drug use Yes    Patient has had a drink/used drugs as " an eye opener in the AM Yes   Social History Narrative    Lives with , grand-daughter, daughter and son    Retired RN    Polysubstance abuse       FAMILY HISTORY:  Family History   Problem Relation Age of Onset    Cancer Mother     Cancer Father     Depression Sister     Bipolar disorder Maternal Grandfather     Suicide Cousin        ALLERGIES AND MEDICATIONS: updated and reviewed.  Review of patient's allergies indicates:  No Known Allergies  Current Outpatient Medications   Medication Sig Dispense Refill    atenoloL (TENORMIN) 25 MG tablet Take 1 tablet (25 mg total) by mouth once daily. 30 tablet 2    buprenorphine HCL (SUBUTEX) 8 mg Subl PLACE 1 T UNT TID      cloNIDine (CATAPRES) 0.1 MG tablet Take 1 tablet (0.1 mg total) by mouth once. for 1 dose 30 tablet 2    cycloSPORINE (RESTASIS) 0.05 % ophthalmic emulsion Place 1 drop into both eyes 2 (two) times daily. 30 each 2    DULoxetine (CYMBALTA) 60 MG capsule TAKE 1 CAPSULE(60 MG) BY MOUTH EVERY DAY 90 capsule 0    levocetirizine (XYZAL) 5 MG tablet Take 1 tablet (5 mg total) by mouth every evening. 30 tablet 0    levothyroxine (SYNTHROID) 25 MCG tablet Take 1 tablet (25 mcg total) by mouth once daily. 90 tablet 3    NARCAN 4 mg/actuation Spry USE UTD  0    nitroGLYCERIN (NITROSTAT) 0.3 MG SL tablet DISSOLVE 1 TABLET UNDER THE TONGUE EVERY 5 MINUTES AS NEEDED FOR CHEST PAIN 100 tablet 0    ondansetron (ZOFRAN) 8 MG tablet Take 8 mg by mouth.      pantoprazole (PROTONIX) 40 MG tablet Take 1 tablet (40 mg total) by mouth 2 (two) times daily. 180 tablet 0    PROCTOZONE-HC 2.5 % rectal cream STACI RECTALLY BID 60 g 2    traZODone (DESYREL) 50 MG tablet Take 1 tablet (50 mg total) by mouth every evening. 90 tablet 0     No current facility-administered medications for this visit.          ROS  Review of Systems   Constitutional: Negative for appetite change, chills, fatigue and fever.   HENT: Negative for congestion, ear pain, postnasal drip,  "rhinorrhea, sinus pressure, sneezing and sore throat.    Respiratory: Negative for shortness of breath.    Cardiovascular: Positive for chest pain. Negative for palpitations and leg swelling.   Gastrointestinal: Negative for abdominal pain, constipation, diarrhea, nausea and vomiting.   Genitourinary: Negative for dysuria, flank pain, frequency and urgency.   Musculoskeletal: Positive for arthralgias, back pain and myalgias.   Neurological: Negative for headaches.           Physical Exam  Vitals:    11/12/20 1024   BP: 118/74   Pulse: 85   Temp: 97.9 °F (36.6 °C)   TempSrc: Oral   SpO2: 95%   Weight: 88.1 kg (194 lb 3.6 oz)   Height: 5' 6" (1.676 m)    Body mass index is 31.35 kg/m².  Weight: 88.1 kg (194 lb 3.6 oz)   Height: 5' 6" (167.6 cm)   Physical Exam  Constitutional:       General: She is not in acute distress.  HENT:      Head: Normocephalic and atraumatic.      Right Ear: External ear normal.      Left Ear: External ear normal.      Nose: Nose normal.   Eyes:      Pupils: Pupils are equal, round, and reactive to light.   Neck:      Musculoskeletal: Neck supple.   Cardiovascular:      Rate and Rhythm: Normal rate and regular rhythm.   Pulmonary:      Effort: Pulmonary effort is normal. No respiratory distress.      Breath sounds: Normal breath sounds. No wheezing.   Abdominal:      General: Bowel sounds are normal. There is no distension.      Palpations: Abdomen is soft. There is no mass.      Tenderness: There is no abdominal tenderness.      Hernia: No hernia is present.   Musculoskeletal:         General: Swelling, tenderness and signs of injury present.      Right lower leg: Edema present.   Lymphadenopathy:      Cervical: No cervical adenopathy.   Skin:     General: Skin is warm and dry.      Capillary Refill: Capillary refill takes less than 2 seconds.      Coloration: Skin is not jaundiced or pale.      Findings: No bruising or erythema.   Neurological:      General: No focal deficit present.      " "Mental Status: She is alert and oriented to person, place, and time. Mental status is at baseline.   Psychiatric:         Mood and Affect: Mood normal.             Health Maintenance       Date Due Completion Date    TETANUS VACCINE 03/01/1972 ---    Mammogram 03/01/1994 ---    DEXA SCAN 03/01/1994 ---    Shingles Vaccine (2 of 3) 12/28/2015 11/2/2015    Colorectal Cancer Screening 07/16/2018 4/9/2018    Influenza Vaccine (1) 08/01/2020 10/8/2019    Lipid Panel 05/24/2025 5/24/2020            Assessment & Plan  Problem List Items Addressed This Visit        Psychiatric    Depression  The current medical regimen is effective;  continue present plan and medications.     Relevant Medications    DULoxetine (CYMBALTA) 60 MG capsule    Anxiety  The current medical regimen is effective;  continue present plan and medications.     Relevant Medications    DULoxetine (CYMBALTA) 60 MG capsule       Cardiac/Vascular    Essential hypertension (Chronic)  -We discussed a "heart-healthy" diet, lots of fruits and vegetables, fiber, and healthy fats (like those found in fish and certain oils). Limit sugar and unhealthy fats.      Relevant Medications    cloNIDine (CATAPRES) 0.1 MG tablet    atenoloL (TENORMIN) 25 MG tablet    Other Relevant Orders    Comprehensive Metabolic Panel    CAD (coronary artery disease) (Chronic)  -Advised patient to continue Nitro as needed  -Patient educated on proper administration of Nitroglycerin, possible side effects and adverse effects with handout for reinforcement.  -Patient educated on ER precautions    -EKG in clinic revealed NSR    Relevant Medications    nitroGLYCERIN (NITROSTAT) 0.3 MG SL tablet      Other Visit Diagnoses     Radiculopathy, cervical region    -  Primary  -Will appropriately treat/refer pending results   -Continue Norco  -Continue Tylenol intermittently   -Pending results, patient may benefit from a consult to Pain management    Relevant Orders    MRI Cervical Spine Without " Contrast    Dorsalgia, unspecified      -Will appropriately treat/refer pending results   -See Cervical Radiculopathy    Relevant Orders    MRI Lumbar Spine Without Contrast    Other spondylosis, cervical region      -Will appropriately treat/refer pending results   -See Cervical Radiculopathy    Relevant Orders    MRI Cervical Spine Without Contrast    Hypothyroidism due to acquired atrophy of thyroid        Relevant Medications    levothyroxine (SYNTHROID) 25 MCG tablet    Gastroesophageal reflux disease, unspecified whether esophagitis present        Relevant Medications    pantoprazole (PROTONIX) 40 MG tablet    Seasonal allergies        Relevant Medications    levocetirizine (XYZAL) 5 MG tablet    Insomnia, unspecified type      -Patient educated on proper administration of Trazodone, possible side effects and adverse effects with handout for reinforcement.      Relevant Medications    traZODone (DESYREL) 50 MG tablet            Health Maintenance reviewed    Follow-up: Follow up if symptoms worsen or fail to improve.

## 2020-11-19 ENCOUNTER — HOSPITAL ENCOUNTER (OUTPATIENT)
Dept: RADIOLOGY | Facility: HOSPITAL | Age: 65
Discharge: HOME | End: 2020-11-19
Attending: NURSE PRACTITIONER
Payer: MEDICARE

## 2020-11-19 DIAGNOSIS — M47.892 OTHER SPONDYLOSIS, CERVICAL REGION: ICD-10-CM

## 2020-11-19 DIAGNOSIS — M54.9 DORSALGIA, UNSPECIFIED: ICD-10-CM

## 2020-11-19 DIAGNOSIS — M54.12 RADICULOPATHY, CERVICAL REGION: ICD-10-CM

## 2020-11-19 PROCEDURE — 72148 MRI LUMBAR SPINE WITHOUT CONTRAST: ICD-10-PCS | Mod: 26,HCNC,, | Performed by: RADIOLOGY

## 2020-11-19 PROCEDURE — 72141 MRI NECK SPINE W/O DYE: CPT | Mod: TC,HCNC,PO

## 2020-11-19 PROCEDURE — 72148 MRI LUMBAR SPINE W/O DYE: CPT | Mod: TC,HCNC,PO

## 2020-11-19 PROCEDURE — 72148 MRI LUMBAR SPINE W/O DYE: CPT | Mod: 26,HCNC,, | Performed by: RADIOLOGY

## 2020-11-19 PROCEDURE — 72141 MRI CERVICAL SPINE WITHOUT CONTRAST: ICD-10-PCS | Mod: 26,HCNC,, | Performed by: RADIOLOGY

## 2020-11-19 PROCEDURE — 72141 MRI NECK SPINE W/O DYE: CPT | Mod: 26,HCNC,, | Performed by: RADIOLOGY

## 2021-01-27 ENCOUNTER — HOSPITAL ENCOUNTER (EMERGENCY)
Facility: HOSPITAL | Age: 66
Discharge: HOME | End: 2021-01-27
Attending: EMERGENCY MEDICINE
Payer: MEDICARE

## 2021-01-27 VITALS
WEIGHT: 165 LBS | DIASTOLIC BLOOD PRESSURE: 85 MMHG | RESPIRATION RATE: 20 BRPM | TEMPERATURE: 99 F | HEART RATE: 112 BPM | OXYGEN SATURATION: 97 % | SYSTOLIC BLOOD PRESSURE: 145 MMHG | BODY MASS INDEX: 26.63 KG/M2

## 2021-01-27 DIAGNOSIS — L08.9 TOE INFECTION: ICD-10-CM

## 2021-01-27 DIAGNOSIS — E86.0 DEHYDRATION: ICD-10-CM

## 2021-01-27 DIAGNOSIS — F10.10 ALCOHOL ABUSE: Primary | ICD-10-CM

## 2021-01-27 DIAGNOSIS — K70.30 ALCOHOLIC CIRRHOSIS, UNSPECIFIED WHETHER ASCITES PRESENT: ICD-10-CM

## 2021-01-27 LAB
ALBUMIN SERPL-MCNC: 3.8 G/DL (ref 3.3–5.5)
ALBUMIN SERPL-MCNC: 3.9 G/DL (ref 3.3–5.5)
ALP SERPL-CCNC: 221 U/L (ref 42–141)
ALP SERPL-CCNC: 223 U/L (ref 42–141)
BILIRUB SERPL-MCNC: 0.9 MG/DL (ref 0.2–1.6)
BILIRUB SERPL-MCNC: 0.9 MG/DL (ref 0.2–1.6)
BUN SERPL-MCNC: 5 MG/DL (ref 7–22)
CALCIUM SERPL-MCNC: 9.1 MG/DL (ref 8–10.3)
CHLORIDE SERPL-SCNC: 94 MMOL/L (ref 98–108)
CREAT SERPL-MCNC: 0.5 MG/DL (ref 0.6–1.2)
CTP QC/QA: YES
GLUCOSE SERPL-MCNC: 86 MG/DL (ref 73–118)
POC ALT (SGPT): 65 U/L (ref 10–47)
POC ALT (SGPT): 70 U/L (ref 10–47)
POC AMYLASE: 30 U/L (ref 14–97)
POC AST (SGOT): 189 U/L (ref 11–38)
POC AST (SGOT): 192 U/L (ref 11–38)
POC GGT: 622 U/L (ref 5–65)
POC TCO2: 27 MMOL/L (ref 18–33)
POTASSIUM BLD-SCNC: 3.6 MMOL/L (ref 3.6–5.1)
PROTEIN, POC: 8.2 G/DL (ref 6.4–8.1)
PROTEIN, POC: 8.4 G/DL (ref 6.4–8.1)
SARS-COV-2 RDRP RESP QL NAA+PROBE: NEGATIVE
SODIUM BLD-SCNC: 134 MMOL/L (ref 128–145)
TSH SERPL DL<=0.005 MIU/L-ACNC: 3.1 UIU/ML (ref 0.4–4)

## 2021-01-27 PROCEDURE — 63600175 PHARM REV CODE 636 W HCPCS: Mod: HCNC,ER | Performed by: EMERGENCY MEDICINE

## 2021-01-27 PROCEDURE — 96375 TX/PRO/DX INJ NEW DRUG ADDON: CPT | Mod: HCNC,ER

## 2021-01-27 PROCEDURE — 99284 EMERGENCY DEPT VISIT MOD MDM: CPT | Mod: 25,HCNC,ER

## 2021-01-27 PROCEDURE — 81003 URINALYSIS AUTO W/O SCOPE: CPT | Mod: HCNC,ER

## 2021-01-27 PROCEDURE — 82150 ASSAY OF AMYLASE: CPT | Mod: HCNC,ER

## 2021-01-27 PROCEDURE — 96374 THER/PROPH/DIAG INJ IV PUSH: CPT | Mod: HCNC,ER

## 2021-01-27 PROCEDURE — 96361 HYDRATE IV INFUSION ADD-ON: CPT | Mod: HCNC,ER

## 2021-01-27 PROCEDURE — U0002 COVID-19 LAB TEST NON-CDC: HCPCS | Mod: HCNC,ER | Performed by: EMERGENCY MEDICINE

## 2021-01-27 PROCEDURE — 25000003 PHARM REV CODE 250: Mod: HCNC,ER | Performed by: EMERGENCY MEDICINE

## 2021-01-27 PROCEDURE — 84443 ASSAY THYROID STIM HORMONE: CPT | Mod: HCNC

## 2021-01-27 PROCEDURE — 85025 COMPLETE CBC W/AUTO DIFF WBC: CPT | Mod: HCNC,ER

## 2021-01-27 PROCEDURE — 80053 COMPREHEN METABOLIC PANEL: CPT | Mod: HCNC,ER

## 2021-01-27 RX ORDER — SODIUM CHLORIDE 9 MG/ML
INJECTION, SOLUTION INTRAVENOUS
Status: COMPLETED | OUTPATIENT
Start: 2021-01-27 | End: 2021-01-27

## 2021-01-27 RX ORDER — ONDANSETRON 2 MG/ML
4 INJECTION INTRAMUSCULAR; INTRAVENOUS
Status: COMPLETED | OUTPATIENT
Start: 2021-01-27 | End: 2021-01-27

## 2021-01-27 RX ORDER — HALOPERIDOL 5 MG/ML
5 INJECTION INTRAMUSCULAR
Status: COMPLETED | OUTPATIENT
Start: 2021-01-27 | End: 2021-01-27

## 2021-01-27 RX ORDER — DIPHENHYDRAMINE HYDROCHLORIDE 50 MG/ML
12.5 INJECTION INTRAMUSCULAR; INTRAVENOUS
Status: COMPLETED | OUTPATIENT
Start: 2021-01-27 | End: 2021-01-27

## 2021-01-27 RX ORDER — SULFAMETHOXAZOLE AND TRIMETHOPRIM 800; 160 MG/1; MG/1
1 TABLET ORAL 2 TIMES DAILY
Qty: 14 TABLET | Refills: 0 | Status: SHIPPED | OUTPATIENT
Start: 2021-01-27 | End: 2021-02-03

## 2021-01-27 RX ORDER — ONDANSETRON 4 MG/1
4 TABLET, ORALLY DISINTEGRATING ORAL EVERY 8 HOURS PRN
Qty: 15 TABLET | Refills: 0 | Status: SHIPPED | OUTPATIENT
Start: 2021-01-27 | End: 2021-02-01

## 2021-01-27 RX ADMIN — DIPHENHYDRAMINE HYDROCHLORIDE 12.5 MG: 50 INJECTION INTRAMUSCULAR; INTRAVENOUS at 08:01

## 2021-01-27 RX ADMIN — SODIUM CHLORIDE 1000 ML/HR: 9 INJECTION, SOLUTION INTRAVENOUS at 07:01

## 2021-01-27 RX ADMIN — ONDANSETRON 4 MG: 2 INJECTION, SOLUTION INTRAMUSCULAR; INTRAVENOUS at 07:01

## 2021-01-27 RX ADMIN — HALOPERIDOL LACTATE 5 MG: 5 INJECTION, SOLUTION INTRAMUSCULAR at 08:01

## 2021-04-27 ENCOUNTER — HOSPITAL ENCOUNTER (EMERGENCY)
Facility: HOSPITAL | Age: 66
Discharge: HOME | End: 2021-04-27
Attending: EMERGENCY MEDICINE
Payer: MEDICARE

## 2021-04-27 VITALS
HEIGHT: 66 IN | BODY MASS INDEX: 26.52 KG/M2 | TEMPERATURE: 99 F | SYSTOLIC BLOOD PRESSURE: 118 MMHG | DIASTOLIC BLOOD PRESSURE: 75 MMHG | RESPIRATION RATE: 19 BRPM | HEART RATE: 79 BPM | OXYGEN SATURATION: 93 % | WEIGHT: 165 LBS

## 2021-04-27 DIAGNOSIS — F10.930 ALCOHOL WITHDRAWAL SEIZURE WITHOUT COMPLICATION: ICD-10-CM

## 2021-04-27 DIAGNOSIS — F10.920 ALCOHOLIC INTOXICATION WITHOUT COMPLICATION: Primary | ICD-10-CM

## 2021-04-27 DIAGNOSIS — R56.9 ALCOHOL WITHDRAWAL SEIZURE WITHOUT COMPLICATION: ICD-10-CM

## 2021-04-27 LAB
ALBUMIN SERPL BCP-MCNC: 3.7 G/DL (ref 3.5–5.2)
ALP SERPL-CCNC: 124 U/L (ref 55–135)
ALT SERPL W/O P-5'-P-CCNC: 25 U/L (ref 10–44)
ANION GAP SERPL CALC-SCNC: 13 MMOL/L (ref 8–16)
AST SERPL-CCNC: 57 U/L (ref 10–40)
BASOPHILS # BLD AUTO: 0.04 K/UL (ref 0–0.2)
BASOPHILS NFR BLD: 1 % (ref 0–1.9)
BILIRUB SERPL-MCNC: 0.8 MG/DL (ref 0.1–1)
BUN SERPL-MCNC: 10 MG/DL (ref 8–23)
CALCIUM SERPL-MCNC: 9.2 MG/DL (ref 8.7–10.5)
CHLORIDE SERPL-SCNC: 98 MMOL/L (ref 95–110)
CO2 SERPL-SCNC: 24 MMOL/L (ref 23–29)
CREAT SERPL-MCNC: 0.8 MG/DL (ref 0.5–1.4)
DIFFERENTIAL METHOD: ABNORMAL
EOSINOPHIL # BLD AUTO: 0.1 K/UL (ref 0–0.5)
EOSINOPHIL NFR BLD: 1.5 % (ref 0–8)
ERYTHROCYTE [DISTWIDTH] IN BLOOD BY AUTOMATED COUNT: 14.6 % (ref 11.5–14.5)
EST. GFR  (AFRICAN AMERICAN): >60 ML/MIN/1.73 M^2
EST. GFR  (NON AFRICAN AMERICAN): >60 ML/MIN/1.73 M^2
GLUCOSE SERPL-MCNC: 112 MG/DL (ref 70–110)
HCT VFR BLD AUTO: 38 % (ref 37–48.5)
HGB BLD-MCNC: 12.8 G/DL (ref 12–16)
IMM GRANULOCYTES # BLD AUTO: 0.01 K/UL (ref 0–0.04)
IMM GRANULOCYTES NFR BLD AUTO: 0.2 % (ref 0–0.5)
LYMPHOCYTES # BLD AUTO: 1.5 K/UL (ref 1–4.8)
LYMPHOCYTES NFR BLD: 37.1 % (ref 18–48)
MCH RBC QN AUTO: 31.4 PG (ref 27–31)
MCHC RBC AUTO-ENTMCNC: 33.7 G/DL (ref 32–36)
MCV RBC AUTO: 93 FL (ref 82–98)
MONOCYTES # BLD AUTO: 0.8 K/UL (ref 0.3–1)
MONOCYTES NFR BLD: 19.2 % (ref 4–15)
NEUTROPHILS # BLD AUTO: 1.7 K/UL (ref 1.8–7.7)
NEUTROPHILS NFR BLD: 41 % (ref 38–73)
NRBC BLD-RTO: 0 /100 WBC
PLATELET # BLD AUTO: 187 K/UL (ref 150–450)
PMV BLD AUTO: 10.4 FL (ref 9.2–12.9)
POTASSIUM SERPL-SCNC: 5.1 MMOL/L (ref 3.5–5.1)
PROT SERPL-MCNC: 7.6 G/DL (ref 6–8.4)
RBC # BLD AUTO: 4.08 M/UL (ref 4–5.4)
SODIUM SERPL-SCNC: 135 MMOL/L (ref 136–145)
WBC # BLD AUTO: 4.07 K/UL (ref 3.9–12.7)

## 2021-04-27 PROCEDURE — 85025 COMPLETE CBC W/AUTO DIFF WBC: CPT | Performed by: EMERGENCY MEDICINE

## 2021-04-27 PROCEDURE — 99284 EMERGENCY DEPT VISIT MOD MDM: CPT | Mod: 25

## 2021-04-27 PROCEDURE — 80053 COMPREHEN METABOLIC PANEL: CPT | Performed by: EMERGENCY MEDICINE

## 2021-05-03 DIAGNOSIS — F41.9 ANXIETY: ICD-10-CM

## 2021-05-03 DIAGNOSIS — F32.A DEPRESSION, UNSPECIFIED DEPRESSION TYPE: ICD-10-CM

## 2021-05-03 RX ORDER — DULOXETIN HYDROCHLORIDE 60 MG/1
CAPSULE, DELAYED RELEASE ORAL
Qty: 90 CAPSULE | Refills: 0 | Status: CANCELLED | OUTPATIENT
Start: 2021-05-03

## 2021-05-04 DIAGNOSIS — G47.00 INSOMNIA, UNSPECIFIED TYPE: ICD-10-CM

## 2021-05-04 DIAGNOSIS — F32.A DEPRESSION, UNSPECIFIED DEPRESSION TYPE: ICD-10-CM

## 2021-05-04 DIAGNOSIS — E03.4 HYPOTHYROIDISM DUE TO ACQUIRED ATROPHY OF THYROID: ICD-10-CM

## 2021-05-04 DIAGNOSIS — F41.9 ANXIETY: ICD-10-CM

## 2021-05-05 RX ORDER — TRAZODONE HYDROCHLORIDE 50 MG/1
50 TABLET ORAL NIGHTLY
Qty: 90 TABLET | Refills: 0 | OUTPATIENT
Start: 2021-05-05 | End: 2022-05-05

## 2021-05-05 RX ORDER — LEVOTHYROXINE SODIUM 25 UG/1
25 TABLET ORAL DAILY
Qty: 90 TABLET | Refills: 3 | OUTPATIENT
Start: 2021-05-05

## 2021-05-05 RX ORDER — DULOXETIN HYDROCHLORIDE 60 MG/1
CAPSULE, DELAYED RELEASE ORAL
Qty: 90 CAPSULE | Refills: 0 | OUTPATIENT
Start: 2021-05-05

## 2021-06-02 ENCOUNTER — PES CALL (OUTPATIENT)
Dept: ADMINISTRATIVE | Facility: CLINIC | Age: 66
End: 2021-06-02

## 2021-06-04 ENCOUNTER — PES CALL (OUTPATIENT)
Dept: ADMINISTRATIVE | Facility: CLINIC | Age: 66
End: 2021-06-04

## 2021-06-11 ENCOUNTER — HOSPITAL ENCOUNTER (EMERGENCY)
Facility: HOSPITAL | Age: 66
Discharge: HOME | End: 2021-06-11
Attending: EMERGENCY MEDICINE
Payer: MEDICARE

## 2021-06-11 VITALS
DIASTOLIC BLOOD PRESSURE: 69 MMHG | WEIGHT: 160 LBS | RESPIRATION RATE: 20 BRPM | BODY MASS INDEX: 25.71 KG/M2 | HEIGHT: 66 IN | HEART RATE: 88 BPM | TEMPERATURE: 98 F | OXYGEN SATURATION: 97 % | SYSTOLIC BLOOD PRESSURE: 160 MMHG

## 2021-06-11 DIAGNOSIS — T07.XXXA ABRASIONS OF MULTIPLE SITES: ICD-10-CM

## 2021-06-11 DIAGNOSIS — S93.401A SPRAIN OF RIGHT ANKLE, UNSPECIFIED LIGAMENT, INITIAL ENCOUNTER: ICD-10-CM
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
## 2021-06-11 DIAGNOSIS — S80.00XA CONTUSION OF KNEE, UNSPECIFIED LATERALITY, INITIAL ENCOUNTER: ICD-10-CM

## 2021-06-11 PROCEDURE — 99284 EMERGENCY DEPT VISIT MOD MDM: CPT | Mod: 25,ER

## 2021-06-11 RX ORDER — NAPROXEN 500 MG/1
500 TABLET ORAL 2 TIMES DAILY WITH MEALS
Qty: 20 TABLET | Refills: 0 | Status: SHIPPED | OUTPATIENT
Start: 2021-06-11 | End: 2021-07-02 | Stop reason: SDUPTHER

## 2021-06-11 RX ORDER — PROMETHAZINE HYDROCHLORIDE 25 MG/1
25 TABLET ORAL EVERY 6 HOURS PRN
Qty: 15 TABLET | Refills: 0 | Status: SHIPPED | OUTPATIENT
Start: 2021-06-11 | End: 2021-08-16 | Stop reason: SDUPTHER

## 2021-07-02 ENCOUNTER — OFFICE VISIT (OUTPATIENT)
Dept: FAMILY MEDICINE | Facility: CLINIC | Age: 66
End: 2021-07-02
Payer: MEDICARE

## 2021-07-02 VITALS
TEMPERATURE: 98 F | HEIGHT: 66 IN | SYSTOLIC BLOOD PRESSURE: 92 MMHG | RESPIRATION RATE: 18 BRPM | DIASTOLIC BLOOD PRESSURE: 58 MMHG | WEIGHT: 187.06 LBS | OXYGEN SATURATION: 96 % | BODY MASS INDEX: 30.06 KG/M2 | HEART RATE: 63 BPM

## 2021-07-02 DIAGNOSIS — K21.9 GASTROESOPHAGEAL REFLUX DISEASE, UNSPECIFIED WHETHER ESOPHAGITIS PRESENT: ICD-10-CM

## 2021-07-02 DIAGNOSIS — K64.9 HEMORRHOIDS, UNSPECIFIED HEMORRHOID TYPE: ICD-10-CM

## 2021-07-02 DIAGNOSIS — K74.60 HEPATIC CIRRHOSIS, UNSPECIFIED HEPATIC CIRRHOSIS TYPE, UNSPECIFIED WHETHER ASCITES PRESENT: ICD-10-CM

## 2021-07-02 DIAGNOSIS — B35.1 ONYCHOMYCOSIS: ICD-10-CM

## 2021-07-02 DIAGNOSIS — G47.00 INSOMNIA, UNSPECIFIED TYPE: ICD-10-CM

## 2021-07-02 DIAGNOSIS — H26.9 CATARACT OF BOTH EYES, UNSPECIFIED CATARACT TYPE: ICD-10-CM

## 2021-07-02 DIAGNOSIS — M51.36 DDD (DEGENERATIVE DISC DISEASE), LUMBAR: ICD-10-CM

## 2021-07-02 DIAGNOSIS — H04.129 DRY EYE SYNDROME, UNSPECIFIED LATERALITY: ICD-10-CM

## 2021-07-02 DIAGNOSIS — H91.93 BILATERAL HEARING LOSS, UNSPECIFIED HEARING LOSS TYPE: ICD-10-CM

## 2021-07-02 DIAGNOSIS — I10 ESSENTIAL HYPERTENSION: ICD-10-CM

## 2021-07-02 DIAGNOSIS — J30.2 SEASONAL ALLERGIES: ICD-10-CM

## 2021-07-02 DIAGNOSIS — L03.119 CELLULITIS OF LOWER EXTREMITY, UNSPECIFIED LATERALITY: Primary | ICD-10-CM

## 2021-07-02 PROCEDURE — 1100F PTFALLS ASSESS-DOCD GE2>/YR: CPT | Mod: CPTII,S$GLB,, | Performed by: FAMILY MEDICINE

## 2021-07-02 PROCEDURE — 3288F FALL RISK ASSESSMENT DOCD: CPT | Mod: CPTII,S$GLB,, | Performed by: FAMILY MEDICINE

## 2021-07-02 PROCEDURE — 99215 PR OFFICE/OUTPT VISIT, EST, LEVL V, 40-54 MIN: ICD-10-PCS | Mod: S$GLB,,, | Performed by: FAMILY MEDICINE

## 2021-07-02 PROCEDURE — 1125F PR PAIN SEVERITY QUANTIFIED, PAIN PRESENT: ICD-10-PCS | Mod: S$GLB,,, | Performed by: FAMILY MEDICINE

## 2021-07-02 PROCEDURE — 1125F AMNT PAIN NOTED PAIN PRSNT: CPT | Mod: S$GLB,,, | Performed by: FAMILY MEDICINE

## 2021-07-02 PROCEDURE — 3288F PR FALLS RISK ASSESSMENT DOCUMENTED: ICD-10-PCS | Mod: CPTII,S$GLB,, | Performed by: FAMILY MEDICINE

## 2021-07-02 PROCEDURE — 99499 RISK ADDL DX/OHS AUDIT: ICD-10-PCS | Mod: HCNC,S$GLB,, | Performed by: FAMILY MEDICINE

## 2021-07-02 PROCEDURE — 3008F BODY MASS INDEX DOCD: CPT | Mod: CPTII,S$GLB,, | Performed by: FAMILY MEDICINE

## 2021-07-02 PROCEDURE — 1100F PR PT FALLS ASSESS DOC 2+ FALLS/FALL W/INJURY/YR: ICD-10-PCS | Mod: CPTII,S$GLB,, | Performed by: FAMILY MEDICINE

## 2021-07-02 PROCEDURE — 99999 PR PBB SHADOW E&M-EST. PATIENT-LVL V: CPT | Mod: PBBFAC,,, | Performed by: FAMILY MEDICINE

## 2021-07-02 PROCEDURE — 99499 UNLISTED E&M SERVICE: CPT | Mod: HCNC,S$GLB,, | Performed by: FAMILY MEDICINE

## 2021-07-02 PROCEDURE — 3008F PR BODY MASS INDEX (BMI) DOCUMENTED: ICD-10-PCS | Mod: CPTII,S$GLB,, | Performed by: FAMILY MEDICINE

## 2021-07-02 PROCEDURE — 99999 PR PBB SHADOW E&M-EST. PATIENT-LVL V: ICD-10-PCS | Mod: PBBFAC,,, | Performed by: FAMILY MEDICINE

## 2021-07-02 PROCEDURE — 99215 OFFICE O/P EST HI 40 MIN: CPT | Mod: S$GLB,,, | Performed by: FAMILY MEDICINE

## 2021-07-02 PROCEDURE — 1159F PR MEDICATION LIST DOCUMENTED IN MEDICAL RECORD: ICD-10-PCS | Mod: S$GLB,,, | Performed by: FAMILY MEDICINE

## 2021-07-02 PROCEDURE — 1159F MED LIST DOCD IN RCRD: CPT | Mod: S$GLB,,, | Performed by: FAMILY MEDICINE

## 2021-07-02 RX ORDER — LEVOCETIRIZINE DIHYDROCHLORIDE 5 MG/1
5 TABLET, FILM COATED ORAL NIGHTLY
Qty: 90 TABLET | Refills: 3 | Status: SHIPPED | OUTPATIENT
Start: 2021-07-02 | End: 2021-10-26 | Stop reason: SDUPTHER

## 2021-07-02 RX ORDER — TRAZODONE HYDROCHLORIDE 50 MG/1
50 TABLET ORAL NIGHTLY
Qty: 90 TABLET | Refills: 3 | Status: SHIPPED | OUTPATIENT
Start: 2021-07-02 | End: 2023-11-02 | Stop reason: CLARIF

## 2021-07-02 RX ORDER — PANTOPRAZOLE SODIUM 40 MG/1
40 TABLET, DELAYED RELEASE ORAL 2 TIMES DAILY
Qty: 180 TABLET | Refills: 3 | Status: ON HOLD | OUTPATIENT
Start: 2021-07-02 | End: 2023-11-03 | Stop reason: SDUPTHER

## 2021-07-02 RX ORDER — TERBINAFINE HYDROCHLORIDE 250 MG/1
250 TABLET ORAL DAILY
Qty: 90 TABLET | Refills: 0 | Status: CANCELLED | OUTPATIENT
Start: 2021-07-02

## 2021-07-02 RX ORDER — HYDROCORTISONE 2.5% 25 MG/G
CREAM TOPICAL
Qty: 60 G | Refills: 2 | Status: SHIPPED | OUTPATIENT
Start: 2021-07-02 | End: 2022-04-26 | Stop reason: SDUPTHER

## 2021-07-02 RX ORDER — PRENATAL VIT 91/IRON/FOLIC/DHA 28-975-200
COMBINATION PACKAGE (EA) ORAL NIGHTLY
Qty: 28.4 G | Refills: 2 | Status: SHIPPED | OUTPATIENT
Start: 2021-07-02 | End: 2021-10-26 | Stop reason: SDUPTHER

## 2021-07-02 RX ORDER — CYCLOSPORINE 0.5 MG/ML
1 EMULSION OPHTHALMIC 2 TIMES DAILY
Qty: 30 EACH | Refills: 3 | Status: SHIPPED | OUTPATIENT
Start: 2021-07-02 | End: 2021-07-06 | Stop reason: SDUPTHER

## 2021-07-02 RX ORDER — MELOXICAM 15 MG/1
15 TABLET ORAL DAILY
Qty: 30 TABLET | Refills: 1 | OUTPATIENT
Start: 2021-07-02 | End: 2021-12-12

## 2021-07-02 RX ORDER — DOXYCYCLINE 100 MG/1
100 CAPSULE ORAL EVERY 12 HOURS
Qty: 28 CAPSULE | Refills: 0 | Status: SHIPPED | OUTPATIENT
Start: 2021-07-02 | End: 2021-07-16

## 2021-07-06 ENCOUNTER — TELEPHONE (OUTPATIENT)
Dept: FAMILY MEDICINE | Facility: CLINIC | Age: 66
End: 2021-07-06

## 2021-07-06 DIAGNOSIS — H04.129 DRY EYE SYNDROME, UNSPECIFIED LATERALITY: ICD-10-CM

## 2021-07-06 RX ORDER — CYCLOSPORINE 0.5 MG/ML
1 EMULSION OPHTHALMIC 2 TIMES DAILY
Qty: 60 EACH | Refills: 3 | Status: SHIPPED | OUTPATIENT
Start: 2021-07-06 | End: 2021-10-26 | Stop reason: SDUPTHER

## 2021-08-02 ENCOUNTER — PES CALL (OUTPATIENT)
Dept: ADMINISTRATIVE | Facility: CLINIC | Age: 66
End: 2021-08-02

## 2021-08-11 ENCOUNTER — PES CALL (OUTPATIENT)
Dept: ADMINISTRATIVE | Facility: CLINIC | Age: 66
End: 2021-08-11

## 2021-08-13 ENCOUNTER — PES CALL (OUTPATIENT)
Dept: ADMINISTRATIVE | Facility: CLINIC | Age: 66
End: 2021-08-13

## 2021-08-16 ENCOUNTER — PES CALL (OUTPATIENT)
Dept: ADMINISTRATIVE | Facility: CLINIC | Age: 66
End: 2021-08-16

## 2021-08-16 ENCOUNTER — HOSPITAL ENCOUNTER (EMERGENCY)
Facility: HOSPITAL | Age: 66
Discharge: HOME | End: 2021-08-16
Attending: EMERGENCY MEDICINE
Payer: MEDICARE

## 2021-08-16 VITALS
TEMPERATURE: 99 F | DIASTOLIC BLOOD PRESSURE: 89 MMHG | OXYGEN SATURATION: 96 % | SYSTOLIC BLOOD PRESSURE: 115 MMHG | BODY MASS INDEX: 26.66 KG/M2 | RESPIRATION RATE: 19 BRPM | HEART RATE: 91 BPM | HEIGHT: 65 IN | WEIGHT: 160 LBS

## 2021-08-16 DIAGNOSIS — N39.0 URINARY TRACT INFECTION WITHOUT HEMATURIA, SITE UNSPECIFIED: Primary | ICD-10-CM

## 2021-08-16 DIAGNOSIS — R00.0 TACHYCARDIA: ICD-10-CM

## 2021-08-16 LAB
ALBUMIN SERPL BCP-MCNC: 4.1 G/DL (ref 3.5–5.2)
ALP SERPL-CCNC: 174 U/L (ref 55–135)
ALT SERPL W/O P-5'-P-CCNC: 27 U/L (ref 10–44)
ANION GAP SERPL CALC-SCNC: 14 MMOL/L (ref 8–16)
APTT BLDCRRT: <21 SEC (ref 21–32)
AST SERPL-CCNC: 67 U/L (ref 10–40)
BACTERIA #/AREA URNS HPF: ABNORMAL /HPF
BASOPHILS # BLD AUTO: 0.03 K/UL (ref 0–0.2)
BASOPHILS NFR BLD: 0.4 % (ref 0–1.9)
BILIRUB SERPL-MCNC: 0.6 MG/DL (ref 0.1–1)
BILIRUB UR QL STRIP: NEGATIVE
BNP SERPL-MCNC: 32 PG/ML (ref 0–99)
BUN SERPL-MCNC: 6 MG/DL (ref 8–23)
CALCIUM SERPL-MCNC: 8.9 MG/DL (ref 8.7–10.5)
CHLORIDE SERPL-SCNC: 100 MMOL/L (ref 95–110)
CLARITY UR: ABNORMAL
CO2 SERPL-SCNC: 20 MMOL/L (ref 23–29)
COLOR UR: ABNORMAL
CREAT SERPL-MCNC: 0.6 MG/DL (ref 0.5–1.4)
CTP QC/QA: YES
DIFFERENTIAL METHOD: ABNORMAL
EOSINOPHIL # BLD AUTO: 0.1 K/UL (ref 0–0.5)
EOSINOPHIL NFR BLD: 0.9 % (ref 0–8)
ERYTHROCYTE [DISTWIDTH] IN BLOOD BY AUTOMATED COUNT: 15.6 % (ref 11.5–14.5)
EST. GFR  (AFRICAN AMERICAN): >60 ML/MIN/1.73 M^2
EST. GFR  (NON AFRICAN AMERICAN): >60 ML/MIN/1.73 M^2
ETHANOL SERPL-MCNC: <10 MG/DL
GLUCOSE SERPL-MCNC: 126 MG/DL (ref 70–110)
GLUCOSE UR QL STRIP: NEGATIVE
HCT VFR BLD AUTO: 36.1 % (ref 37–48.5)
HGB BLD-MCNC: 12.6 G/DL (ref 12–16)
HGB UR QL STRIP: ABNORMAL
HYALINE CASTS #/AREA URNS LPF: 0 /LPF
IMM GRANULOCYTES # BLD AUTO: 0.02 K/UL (ref 0–0.04)
IMM GRANULOCYTES NFR BLD AUTO: 0.3 % (ref 0–0.5)
INR PPP: 1.1 (ref 0.8–1.2)
KETONES UR QL STRIP: NEGATIVE
LACTATE SERPL-SCNC: 1.8 MMOL/L (ref 0.5–2.2)
LEUKOCYTE ESTERASE UR QL STRIP: ABNORMAL
LIPASE SERPL-CCNC: 16 U/L (ref 4–60)
LYMPHOCYTES # BLD AUTO: 0.4 K/UL (ref 1–4.8)
LYMPHOCYTES NFR BLD: 5.7 % (ref 18–48)
MAGNESIUM SERPL-MCNC: 1.8 MG/DL (ref 1.6–2.6)
MCH RBC QN AUTO: 31.7 PG (ref 27–31)
MCHC RBC AUTO-ENTMCNC: 34.9 G/DL (ref 32–36)
MCV RBC AUTO: 91 FL (ref 82–98)
MICROSCOPIC COMMENT: ABNORMAL
MONOCYTES # BLD AUTO: 0.6 K/UL (ref 0.3–1)
MONOCYTES NFR BLD: 7.7 % (ref 4–15)
NEUTROPHILS # BLD AUTO: 6.5 K/UL (ref 1.8–7.7)
NEUTROPHILS NFR BLD: 85 % (ref 38–73)
NITRITE UR QL STRIP: NEGATIVE
NON-SQ EPI CELLS #/AREA URNS HPF: 1 /HPF
NRBC BLD-RTO: 0 /100 WBC
PH UR STRIP: 7 [PH] (ref 5–8)
PHOSPHATE SERPL-MCNC: 3.5 MG/DL (ref 2.7–4.5)
PLATELET # BLD AUTO: 239 K/UL (ref 150–450)
PMV BLD AUTO: 10.2 FL (ref 9.2–12.9)
POTASSIUM SERPL-SCNC: 4.6 MMOL/L (ref 3.5–5.1)
PROCALCITONIN SERPL IA-MCNC: 0.02 NG/ML
PROT SERPL-MCNC: 7.7 G/DL (ref 6–8.4)
PROT UR QL STRIP: ABNORMAL
PROTHROMBIN TIME: 11.3 SEC (ref 9–12.5)
RBC # BLD AUTO: 3.98 M/UL (ref 4–5.4)
RBC #/AREA URNS HPF: >100 /HPF (ref 0–4)
SARS-COV-2 RDRP RESP QL NAA+PROBE: NEGATIVE
SODIUM SERPL-SCNC: 134 MMOL/L (ref 136–145)
SP GR UR STRIP: 1.01 (ref 1–1.03)
TROPONIN I SERPL DL<=0.01 NG/ML-MCNC: <0.006 NG/ML (ref 0–0.03)
URN SPEC COLLECT METH UR: ABNORMAL
UROBILINOGEN UR STRIP-ACNC: NEGATIVE EU/DL
WBC # BLD AUTO: 7.69 K/UL (ref 3.9–12.7)
WBC #/AREA URNS HPF: >100 /HPF (ref 0–5)
WBC CLUMPS URNS QL MICRO: ABNORMAL
YEAST URNS QL MICRO: ABNORMAL

## 2021-08-16 PROCEDURE — 84484 ASSAY OF TROPONIN QUANT: CPT | Performed by: EMERGENCY MEDICINE

## 2021-08-16 PROCEDURE — 81000 URINALYSIS NONAUTO W/SCOPE: CPT | Performed by: EMERGENCY MEDICINE

## 2021-08-16 PROCEDURE — 93010 ELECTROCARDIOGRAM REPORT: CPT | Mod: ,,, | Performed by: INTERNAL MEDICINE

## 2021-08-16 PROCEDURE — 96361 HYDRATE IV INFUSION ADD-ON: CPT

## 2021-08-16 PROCEDURE — 84100 ASSAY OF PHOSPHORUS: CPT | Performed by: EMERGENCY MEDICINE

## 2021-08-16 PROCEDURE — 93010 EKG 12-LEAD: ICD-10-PCS | Mod: ,,, | Performed by: INTERNAL MEDICINE

## 2021-08-16 PROCEDURE — 83690 ASSAY OF LIPASE: CPT | Performed by: EMERGENCY MEDICINE

## 2021-08-16 PROCEDURE — 83605 ASSAY OF LACTIC ACID: CPT | Performed by: EMERGENCY MEDICINE

## 2021-08-16 PROCEDURE — 96365 THER/PROPH/DIAG IV INF INIT: CPT

## 2021-08-16 PROCEDURE — U0002 COVID-19 LAB TEST NON-CDC: HCPCS | Performed by: EMERGENCY MEDICINE

## 2021-08-16 PROCEDURE — 96375 TX/PRO/DX INJ NEW DRUG ADDON: CPT

## 2021-08-16 PROCEDURE — 87086 URINE CULTURE/COLONY COUNT: CPT | Performed by: EMERGENCY MEDICINE

## 2021-08-16 PROCEDURE — 85730 THROMBOPLASTIN TIME PARTIAL: CPT | Performed by: EMERGENCY MEDICINE

## 2021-08-16 PROCEDURE — 94761 N-INVAS EAR/PLS OXIMETRY MLT: CPT

## 2021-08-16 PROCEDURE — 96366 THER/PROPH/DIAG IV INF ADDON: CPT

## 2021-08-16 PROCEDURE — 93005 ELECTROCARDIOGRAM TRACING: CPT

## 2021-08-16 PROCEDURE — 99285 EMERGENCY DEPT VISIT HI MDM: CPT | Mod: 25

## 2021-08-16 PROCEDURE — 63600175 PHARM REV CODE 636 W HCPCS: Performed by: EMERGENCY MEDICINE

## 2021-08-16 PROCEDURE — 85610 PROTHROMBIN TIME: CPT | Performed by: EMERGENCY MEDICINE

## 2021-08-16 PROCEDURE — 82077 ASSAY SPEC XCP UR&BREATH IA: CPT | Performed by: EMERGENCY MEDICINE

## 2021-08-16 PROCEDURE — 83880 ASSAY OF NATRIURETIC PEPTIDE: CPT | Performed by: EMERGENCY MEDICINE

## 2021-08-16 PROCEDURE — 84145 PROCALCITONIN (PCT): CPT | Performed by: EMERGENCY MEDICINE

## 2021-08-16 PROCEDURE — 80053 COMPREHEN METABOLIC PANEL: CPT | Performed by: EMERGENCY MEDICINE

## 2021-08-16 PROCEDURE — 96374 THER/PROPH/DIAG INJ IV PUSH: CPT | Mod: 59

## 2021-08-16 PROCEDURE — 83735 ASSAY OF MAGNESIUM: CPT | Performed by: EMERGENCY MEDICINE

## 2021-08-16 PROCEDURE — 85025 COMPLETE CBC W/AUTO DIFF WBC: CPT | Performed by: EMERGENCY MEDICINE

## 2021-08-16 PROCEDURE — 87040 BLOOD CULTURE FOR BACTERIA: CPT | Mod: 59 | Performed by: EMERGENCY MEDICINE

## 2021-08-16 RX ORDER — ONDANSETRON 8 MG/1
8 TABLET, ORALLY DISINTEGRATING ORAL EVERY 8 HOURS PRN
Qty: 20 TABLET | Refills: 0 | Status: SHIPPED | OUTPATIENT
Start: 2021-08-16 | End: 2022-01-07 | Stop reason: SDUPTHER

## 2021-08-16 RX ORDER — LORAZEPAM 2 MG/ML
1 INJECTION INTRAMUSCULAR
Status: COMPLETED | OUTPATIENT
Start: 2021-08-16 | End: 2021-08-16

## 2021-08-16 RX ORDER — CEPHALEXIN 500 MG/1
500 CAPSULE ORAL 4 TIMES DAILY
Qty: 40 CAPSULE | Refills: 0 | Status: SHIPPED | OUTPATIENT
Start: 2021-08-16 | End: 2021-08-26

## 2021-08-16 RX ORDER — ACETAMINOPHEN 325 MG/1
650 TABLET ORAL
Status: DISCONTINUED | OUTPATIENT
Start: 2021-08-16 | End: 2021-08-16 | Stop reason: HOSPADM

## 2021-08-16 RX ORDER — KETOROLAC TROMETHAMINE 30 MG/ML
10 INJECTION, SOLUTION INTRAMUSCULAR; INTRAVENOUS
Status: COMPLETED | OUTPATIENT
Start: 2021-08-16 | End: 2021-08-16

## 2021-08-16 RX ADMIN — KETOROLAC TROMETHAMINE 10 MG: 30 INJECTION, SOLUTION INTRAMUSCULAR; INTRAVENOUS at 10:08

## 2021-08-16 RX ADMIN — LORAZEPAM 1 MG: 2 INJECTION INTRAMUSCULAR; INTRAVENOUS at 10:08

## 2021-08-17 ENCOUNTER — PES CALL (OUTPATIENT)
Dept: ADMINISTRATIVE | Facility: CLINIC | Age: 66
End: 2021-08-17

## 2021-08-18 LAB — BACTERIA UR CULT: NORMAL

## 2021-08-20 LAB
BACTERIA BLD CULT: NORMAL
BACTERIA BLD CULT: NORMAL

## 2021-09-14 ENCOUNTER — PES CALL (OUTPATIENT)
Dept: ADMINISTRATIVE | Facility: CLINIC | Age: 66
End: 2021-09-14

## 2021-09-22 ENCOUNTER — HOSPITAL ENCOUNTER (EMERGENCY)
Facility: HOSPITAL | Age: 66
Discharge: HOME | End: 2021-09-22
Attending: EMERGENCY MEDICINE
Payer: MEDICARE

## 2021-09-22 VITALS
HEART RATE: 82 BPM | HEIGHT: 66 IN | OXYGEN SATURATION: 96 % | SYSTOLIC BLOOD PRESSURE: 143 MMHG | WEIGHT: 170 LBS | DIASTOLIC BLOOD PRESSURE: 82 MMHG | TEMPERATURE: 98 F | RESPIRATION RATE: 18 BRPM | BODY MASS INDEX: 27.32 KG/M2

## 2021-09-22 DIAGNOSIS — M62.838 CERVICAL PARASPINAL MUSCLE SPASM: ICD-10-CM

## 2021-09-22 DIAGNOSIS — S20.211A CHEST WALL CONTUSION, RIGHT, INITIAL ENCOUNTER: ICD-10-CM
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
## 2021-09-22 DIAGNOSIS — S09.90XA CLOSED HEAD INJURY, INITIAL ENCOUNTER: Primary | ICD-10-CM

## 2021-09-22 DIAGNOSIS — R11.10 VOMITING: ICD-10-CM

## 2021-09-22 DIAGNOSIS — S40.021A CONTUSION OF RIGHT UPPER EXTREMITY, INITIAL ENCOUNTER: ICD-10-CM

## 2021-09-22 LAB
ALBUMIN SERPL BCP-MCNC: 4 G/DL (ref 3.5–5.2)
ALP SERPL-CCNC: 178 U/L (ref 55–135)
ALT SERPL W/O P-5'-P-CCNC: 31 U/L (ref 10–44)
AMPHET+METHAMPHET UR QL: NEGATIVE
ANION GAP SERPL CALC-SCNC: 11 MMOL/L (ref 8–16)
APTT BLDCRRT: 23.5 SEC (ref 21–32)
AST SERPL-CCNC: 62 U/L (ref 10–40)
BARBITURATES UR QL SCN>200 NG/ML: NEGATIVE
BASOPHILS # BLD AUTO: 0.04 K/UL (ref 0–0.2)
BASOPHILS NFR BLD: 1.3 % (ref 0–1.9)
BENZODIAZ UR QL SCN>200 NG/ML: NEGATIVE
BILIRUB SERPL-MCNC: 0.5 MG/DL (ref 0.1–1)
BILIRUB UR QL STRIP: NEGATIVE
BNP SERPL-MCNC: 144 PG/ML (ref 0–99)
BUN SERPL-MCNC: 10 MG/DL (ref 8–23)
BZE UR QL SCN: NEGATIVE
CALCIUM SERPL-MCNC: 9.3 MG/DL (ref 8.7–10.5)
CANNABINOIDS UR QL SCN: NEGATIVE
CHLORIDE SERPL-SCNC: 99 MMOL/L (ref 95–110)
CHOLEST SERPL-MCNC: 171 MG/DL (ref 120–199)
CHOLEST/HDLC SERPL: 2.3 {RATIO} (ref 2–5)
CLARITY UR: CLEAR
CO2 SERPL-SCNC: 23 MMOL/L (ref 23–29)
COLOR UR: COLORLESS
CREAT SERPL-MCNC: 0.7 MG/DL (ref 0.5–1.4)
CREAT UR-MCNC: 14.3 MG/DL (ref 15–325)
DIFFERENTIAL METHOD: ABNORMAL
EOSINOPHIL # BLD AUTO: 0.2 K/UL (ref 0–0.5)
EOSINOPHIL NFR BLD: 5.7 % (ref 0–8)
ERYTHROCYTE [DISTWIDTH] IN BLOOD BY AUTOMATED COUNT: 14.9 % (ref 11.5–14.5)
EST. GFR  (AFRICAN AMERICAN): >60 ML/MIN/1.73 M^2
EST. GFR  (NON AFRICAN AMERICAN): >60 ML/MIN/1.73 M^2
ETHANOL SERPL-MCNC: 62 MG/DL
GLUCOSE SERPL-MCNC: 81 MG/DL (ref 70–110)
GLUCOSE UR QL STRIP: NEGATIVE
HCT VFR BLD AUTO: 33.4 % (ref 37–48.5)
HDLC SERPL-MCNC: 73 MG/DL (ref 40–75)
HDLC SERPL: 42.7 % (ref 20–50)
HGB BLD-MCNC: 11.9 G/DL (ref 12–16)
HGB UR QL STRIP: NEGATIVE
IMM GRANULOCYTES # BLD AUTO: 0.01 K/UL (ref 0–0.04)
IMM GRANULOCYTES NFR BLD AUTO: 0.3 % (ref 0–0.5)
INR PPP: 1.1 (ref 0.8–1.2)
KETONES UR QL STRIP: NEGATIVE
LDLC SERPL CALC-MCNC: 75.8 MG/DL (ref 63–159)
LEUKOCYTE ESTERASE UR QL STRIP: ABNORMAL
LIPASE SERPL-CCNC: 25 U/L (ref 4–60)
LYMPHOCYTES # BLD AUTO: 0.9 K/UL (ref 1–4.8)
LYMPHOCYTES NFR BLD: 27.3 % (ref 18–48)
MAGNESIUM SERPL-MCNC: 2.3 MG/DL (ref 1.6–2.6)
MCH RBC QN AUTO: 32.5 PG (ref 27–31)
MCHC RBC AUTO-ENTMCNC: 35.6 G/DL (ref 32–36)
MCV RBC AUTO: 91 FL (ref 82–98)
METHADONE UR QL SCN>300 NG/ML: NEGATIVE
MICROSCOPIC COMMENT: NORMAL
MONOCYTES # BLD AUTO: 0.5 K/UL (ref 0.3–1)
MONOCYTES NFR BLD: 16.5 % (ref 4–15)
NEUTROPHILS # BLD AUTO: 1.5 K/UL (ref 1.8–7.7)
NEUTROPHILS NFR BLD: 48.9 % (ref 38–73)
NITRITE UR QL STRIP: NEGATIVE
NONHDLC SERPL-MCNC: 98 MG/DL
NRBC BLD-RTO: 0 /100 WBC
OPIATES UR QL SCN: NEGATIVE
PCP UR QL SCN>25 NG/ML: NEGATIVE
PH UR STRIP: 7 [PH] (ref 5–8)
PLATELET # BLD AUTO: 160 K/UL (ref 150–450)
PMV BLD AUTO: 9.9 FL (ref 9.2–12.9)
POTASSIUM SERPL-SCNC: 4.4 MMOL/L (ref 3.5–5.1)
PROT SERPL-MCNC: 7.7 G/DL (ref 6–8.4)
PROT UR QL STRIP: NEGATIVE
PROTHROMBIN TIME: 11.4 SEC (ref 9–12.5)
RBC # BLD AUTO: 3.66 M/UL (ref 4–5.4)
RBC #/AREA URNS HPF: 1 /HPF (ref 0–4)
SODIUM SERPL-SCNC: 133 MMOL/L (ref 136–145)
SP GR UR STRIP: 1 (ref 1–1.03)
SQUAMOUS #/AREA URNS HPF: 0 /HPF
T4 FREE SERPL-MCNC: 0.8 NG/DL (ref 0.71–1.51)
TOXICOLOGY INFORMATION: ABNORMAL
TRIGL SERPL-MCNC: 111 MG/DL (ref 30–150)
TROPONIN I SERPL DL<=0.01 NG/ML-MCNC: <0.006 NG/ML (ref 0–0.03)
TSH SERPL DL<=0.005 MIU/L-ACNC: 8.58 UIU/ML (ref 0.4–4)
URN SPEC COLLECT METH UR: ABNORMAL
UROBILINOGEN UR STRIP-ACNC: NEGATIVE EU/DL
WBC # BLD AUTO: 3.15 K/UL (ref 3.9–12.7)
WBC #/AREA URNS HPF: 5 /HPF (ref 0–5)

## 2021-09-22 PROCEDURE — 63600175 PHARM REV CODE 636 W HCPCS: Mod: HCNC | Performed by: EMERGENCY MEDICINE

## 2021-09-22 PROCEDURE — 93010 EKG 12-LEAD: ICD-10-PCS | Mod: HCNC,,, | Performed by: INTERNAL MEDICINE

## 2021-09-22 PROCEDURE — 80053 COMPREHEN METABOLIC PANEL: CPT | Mod: HCNC | Performed by: EMERGENCY MEDICINE

## 2021-09-22 PROCEDURE — 83880 ASSAY OF NATRIURETIC PEPTIDE: CPT | Mod: HCNC | Performed by: EMERGENCY MEDICINE

## 2021-09-22 PROCEDURE — 96374 THER/PROPH/DIAG INJ IV PUSH: CPT | Mod: HCNC

## 2021-09-22 PROCEDURE — 82077 ASSAY SPEC XCP UR&BREATH IA: CPT | Mod: HCNC | Performed by: EMERGENCY MEDICINE

## 2021-09-22 PROCEDURE — 99285 EMERGENCY DEPT VISIT HI MDM: CPT | Mod: 25,HCNC

## 2021-09-22 PROCEDURE — 96361 HYDRATE IV INFUSION ADD-ON: CPT | Mod: HCNC

## 2021-09-22 PROCEDURE — 84439 ASSAY OF FREE THYROXINE: CPT | Mod: HCNC | Performed by: EMERGENCY MEDICINE

## 2021-09-22 PROCEDURE — 96375 TX/PRO/DX INJ NEW DRUG ADDON: CPT | Mod: HCNC

## 2021-09-22 PROCEDURE — 25000003 PHARM REV CODE 250: Mod: HCNC | Performed by: EMERGENCY MEDICINE

## 2021-09-22 PROCEDURE — 84484 ASSAY OF TROPONIN QUANT: CPT | Mod: HCNC | Performed by: EMERGENCY MEDICINE

## 2021-09-22 PROCEDURE — 80307 DRUG TEST PRSMV CHEM ANLYZR: CPT | Mod: HCNC | Performed by: EMERGENCY MEDICINE

## 2021-09-22 PROCEDURE — 83735 ASSAY OF MAGNESIUM: CPT | Mod: HCNC | Performed by: EMERGENCY MEDICINE

## 2021-09-22 PROCEDURE — 81000 URINALYSIS NONAUTO W/SCOPE: CPT | Mod: HCNC,59 | Performed by: EMERGENCY MEDICINE

## 2021-09-22 PROCEDURE — 80061 LIPID PANEL: CPT | Mod: HCNC | Performed by: EMERGENCY MEDICINE

## 2021-09-22 PROCEDURE — 93010 ELECTROCARDIOGRAM REPORT: CPT | Mod: HCNC,,, | Performed by: INTERNAL MEDICINE

## 2021-09-22 PROCEDURE — 85025 COMPLETE CBC W/AUTO DIFF WBC: CPT | Mod: HCNC | Performed by: EMERGENCY MEDICINE

## 2021-09-22 PROCEDURE — 85610 PROTHROMBIN TIME: CPT | Mod: HCNC | Performed by: EMERGENCY MEDICINE

## 2021-09-22 PROCEDURE — 83690 ASSAY OF LIPASE: CPT | Mod: HCNC | Performed by: EMERGENCY MEDICINE

## 2021-09-22 PROCEDURE — 85730 THROMBOPLASTIN TIME PARTIAL: CPT | Mod: HCNC | Performed by: EMERGENCY MEDICINE

## 2021-09-22 PROCEDURE — 93005 ELECTROCARDIOGRAM TRACING: CPT | Mod: HCNC

## 2021-09-22 PROCEDURE — 84443 ASSAY THYROID STIM HORMONE: CPT | Mod: HCNC | Performed by: EMERGENCY MEDICINE

## 2021-09-22 RX ORDER — KETOROLAC TROMETHAMINE 10 MG/1
10 TABLET, FILM COATED ORAL EVERY 6 HOURS PRN
Qty: 20 TABLET | Refills: 0 | Status: SHIPPED | OUTPATIENT
Start: 2021-09-22 | End: 2021-10-27

## 2021-09-22 RX ORDER — ONDANSETRON 2 MG/ML
4 INJECTION INTRAMUSCULAR; INTRAVENOUS
Status: COMPLETED | OUTPATIENT
Start: 2021-09-22 | End: 2021-09-22

## 2021-09-22 RX ORDER — CYCLOBENZAPRINE HCL 10 MG
10 TABLET ORAL 3 TIMES DAILY PRN
Qty: 15 TABLET | Refills: 0 | Status: SHIPPED | OUTPATIENT
Start: 2021-09-22 | End: 2021-09-27

## 2021-09-22 RX ORDER — KETOROLAC TROMETHAMINE 30 MG/ML
30 INJECTION, SOLUTION INTRAMUSCULAR; INTRAVENOUS
Status: COMPLETED | OUTPATIENT
Start: 2021-09-22 | End: 2021-09-22

## 2021-09-22 RX ORDER — ACETAMINOPHEN 500 MG
1000 TABLET ORAL
Status: COMPLETED | OUTPATIENT
Start: 2021-09-22 | End: 2021-09-22

## 2021-09-22 RX ORDER — CYCLOBENZAPRINE HCL 10 MG
10 TABLET ORAL
Status: DISCONTINUED | OUTPATIENT
Start: 2021-09-22 | End: 2021-09-22 | Stop reason: HOSPADM

## 2021-09-22 RX ADMIN — ONDANSETRON 4 MG: 2 INJECTION INTRAMUSCULAR; INTRAVENOUS at 04:09

## 2021-09-22 RX ADMIN — KETOROLAC TROMETHAMINE 30 MG: 30 INJECTION, SOLUTION INTRAMUSCULAR; INTRAVENOUS at 05:09

## 2021-09-22 RX ADMIN — SODIUM CHLORIDE 1000 ML: 0.9 INJECTION, SOLUTION INTRAVENOUS at 04:09

## 2021-09-22 RX ADMIN — ACETAMINOPHEN 1000 MG: 500 TABLET, FILM COATED ORAL at 05:09

## 2021-10-26 ENCOUNTER — OFFICE VISIT (OUTPATIENT)
Dept: FAMILY MEDICINE | Facility: CLINIC | Age: 66
End: 2021-10-26
Payer: MEDICARE

## 2021-10-26 VITALS
BODY MASS INDEX: 29.58 KG/M2 | DIASTOLIC BLOOD PRESSURE: 64 MMHG | WEIGHT: 184.06 LBS | HEIGHT: 66 IN | OXYGEN SATURATION: 95 % | TEMPERATURE: 99 F | HEART RATE: 78 BPM | SYSTOLIC BLOOD PRESSURE: 138 MMHG

## 2021-10-26 DIAGNOSIS — F32.A DEPRESSION, UNSPECIFIED DEPRESSION TYPE: ICD-10-CM

## 2021-10-26 DIAGNOSIS — Z00.00 VISIT FOR ANNUAL HEALTH EXAMINATION: ICD-10-CM

## 2021-10-26 DIAGNOSIS — H04.129 DRY EYE SYNDROME, UNSPECIFIED LATERALITY: ICD-10-CM

## 2021-10-26 DIAGNOSIS — I10 ESSENTIAL HYPERTENSION: Primary | Chronic | ICD-10-CM

## 2021-10-26 DIAGNOSIS — M51.36 DDD (DEGENERATIVE DISC DISEASE), LUMBAR: Chronic | ICD-10-CM

## 2021-10-26 DIAGNOSIS — D63.8 ANEMIA OF CHRONIC DISEASE: Chronic | ICD-10-CM

## 2021-10-26 DIAGNOSIS — I25.10 CORONARY ARTERY DISEASE, UNSPECIFIED VESSEL OR LESION TYPE, UNSPECIFIED WHETHER ANGINA PRESENT, UNSPECIFIED WHETHER NATIVE OR TRANSPLANTED HEART: Chronic | ICD-10-CM

## 2021-10-26 DIAGNOSIS — Z23 FLU VACCINE NEED: ICD-10-CM

## 2021-10-26 DIAGNOSIS — F31.9 BIPOLAR 1 DISORDER: Chronic | ICD-10-CM

## 2021-10-26 DIAGNOSIS — B35.1 ONYCHOMYCOSIS: ICD-10-CM

## 2021-10-26 DIAGNOSIS — F11.20 NARCOTIC DEPENDENCY, CONTINUOUS: Chronic | ICD-10-CM

## 2021-10-26 DIAGNOSIS — E03.9 ACQUIRED HYPOTHYROIDISM: Chronic | ICD-10-CM

## 2021-10-26 DIAGNOSIS — B35.3 TINEA PEDIS, RIGHT: ICD-10-CM

## 2021-10-26 DIAGNOSIS — K74.60 HEPATIC CIRRHOSIS, UNSPECIFIED HEPATIC CIRRHOSIS TYPE, UNSPECIFIED WHETHER ASCITES PRESENT: Chronic | ICD-10-CM

## 2021-10-26 DIAGNOSIS — F10.10 ALCOHOL ABUSE: Chronic | ICD-10-CM

## 2021-10-26 DIAGNOSIS — F41.9 ANXIETY: ICD-10-CM

## 2021-10-26 DIAGNOSIS — J30.2 SEASONAL ALLERGIES: ICD-10-CM

## 2021-10-26 PROCEDURE — 1160F PR REVIEW ALL MEDS BY PRESCRIBER/CLIN PHARMACIST DOCUMENTED: ICD-10-PCS | Mod: HCNC,CPTII,S$GLB, | Performed by: NURSE PRACTITIONER

## 2021-10-26 PROCEDURE — 99499 UNLISTED E&M SERVICE: CPT | Mod: S$GLB,,, | Performed by: NURSE PRACTITIONER

## 2021-10-26 PROCEDURE — 3075F SYST BP GE 130 - 139MM HG: CPT | Mod: HCNC,CPTII,S$GLB, | Performed by: NURSE PRACTITIONER

## 2021-10-26 PROCEDURE — 90694 VACC AIIV4 NO PRSRV 0.5ML IM: CPT | Mod: HCNC,S$GLB,, | Performed by: NURSE PRACTITIONER

## 2021-10-26 PROCEDURE — 1160F RVW MEDS BY RX/DR IN RCRD: CPT | Mod: HCNC,CPTII,S$GLB, | Performed by: NURSE PRACTITIONER

## 2021-10-26 PROCEDURE — 3078F DIAST BP <80 MM HG: CPT | Mod: HCNC,CPTII,S$GLB, | Performed by: NURSE PRACTITIONER

## 2021-10-26 PROCEDURE — 3075F PR MOST RECENT SYSTOLIC BLOOD PRESS GE 130-139MM HG: ICD-10-PCS | Mod: HCNC,CPTII,S$GLB, | Performed by: NURSE PRACTITIONER

## 2021-10-26 PROCEDURE — 99499 RISK ADDL DX/OHS AUDIT: ICD-10-PCS | Mod: S$GLB,,, | Performed by: NURSE PRACTITIONER

## 2021-10-26 PROCEDURE — 3288F FALL RISK ASSESSMENT DOCD: CPT | Mod: HCNC,CPTII,S$GLB, | Performed by: NURSE PRACTITIONER

## 2021-10-26 PROCEDURE — 1101F PT FALLS ASSESS-DOCD LE1/YR: CPT | Mod: HCNC,CPTII,S$GLB, | Performed by: NURSE PRACTITIONER

## 2021-10-26 PROCEDURE — 99215 OFFICE O/P EST HI 40 MIN: CPT | Mod: HCNC,S$GLB,, | Performed by: NURSE PRACTITIONER

## 2021-10-26 PROCEDURE — 3008F BODY MASS INDEX DOCD: CPT | Mod: HCNC,CPTII,S$GLB, | Performed by: NURSE PRACTITIONER

## 2021-10-26 PROCEDURE — 3288F PR FALLS RISK ASSESSMENT DOCUMENTED: ICD-10-PCS | Mod: HCNC,CPTII,S$GLB, | Performed by: NURSE PRACTITIONER

## 2021-10-26 PROCEDURE — 1125F AMNT PAIN NOTED PAIN PRSNT: CPT | Mod: HCNC,CPTII,S$GLB, | Performed by: NURSE PRACTITIONER

## 2021-10-26 PROCEDURE — 1159F PR MEDICATION LIST DOCUMENTED IN MEDICAL RECORD: ICD-10-PCS | Mod: HCNC,CPTII,S$GLB, | Performed by: NURSE PRACTITIONER

## 2021-10-26 PROCEDURE — 1101F PR PT FALLS ASSESS DOC 0-1 FALLS W/OUT INJ PAST YR: ICD-10-PCS | Mod: HCNC,CPTII,S$GLB, | Performed by: NURSE PRACTITIONER

## 2021-10-26 PROCEDURE — G0008 FLU VACCINE - QUADRIVALENT - ADJUVANTED: ICD-10-PCS | Mod: HCNC,S$GLB,, | Performed by: NURSE PRACTITIONER

## 2021-10-26 PROCEDURE — 99999 PR PBB SHADOW E&M-EST. PATIENT-LVL V: CPT | Mod: PBBFAC,HCNC,, | Performed by: NURSE PRACTITIONER

## 2021-10-26 PROCEDURE — 3008F PR BODY MASS INDEX (BMI) DOCUMENTED: ICD-10-PCS | Mod: HCNC,CPTII,S$GLB, | Performed by: NURSE PRACTITIONER

## 2021-10-26 PROCEDURE — 1125F PR PAIN SEVERITY QUANTIFIED, PAIN PRESENT: ICD-10-PCS | Mod: HCNC,CPTII,S$GLB, | Performed by: NURSE PRACTITIONER

## 2021-10-26 PROCEDURE — 1159F MED LIST DOCD IN RCRD: CPT | Mod: HCNC,CPTII,S$GLB, | Performed by: NURSE PRACTITIONER

## 2021-10-26 PROCEDURE — 90694 FLU VACCINE - QUADRIVALENT - ADJUVANTED: ICD-10-PCS | Mod: HCNC,S$GLB,, | Performed by: NURSE PRACTITIONER

## 2021-10-26 PROCEDURE — 99215 PR OFFICE/OUTPT VISIT, EST, LEVL V, 40-54 MIN: ICD-10-PCS | Mod: HCNC,S$GLB,, | Performed by: NURSE PRACTITIONER

## 2021-10-26 PROCEDURE — G0008 ADMIN INFLUENZA VIRUS VAC: HCPCS | Mod: HCNC,S$GLB,, | Performed by: NURSE PRACTITIONER

## 2021-10-26 PROCEDURE — 3078F PR MOST RECENT DIASTOLIC BLOOD PRESSURE < 80 MM HG: ICD-10-PCS | Mod: HCNC,CPTII,S$GLB, | Performed by: NURSE PRACTITIONER

## 2021-10-26 PROCEDURE — 99999 PR PBB SHADOW E&M-EST. PATIENT-LVL V: ICD-10-PCS | Mod: PBBFAC,HCNC,, | Performed by: NURSE PRACTITIONER

## 2021-10-26 RX ORDER — LEVOCETIRIZINE DIHYDROCHLORIDE 5 MG/1
5 TABLET, FILM COATED ORAL NIGHTLY
Qty: 90 TABLET | Refills: 3 | Status: SHIPPED | OUTPATIENT
Start: 2021-10-26 | End: 2021-10-27

## 2021-10-26 RX ORDER — ESCITALOPRAM OXALATE 20 MG/1
20 TABLET ORAL DAILY
COMMUNITY
Start: 2021-09-20 | End: 2021-10-26 | Stop reason: SDUPTHER

## 2021-10-26 RX ORDER — PRENATAL VIT 91/IRON/FOLIC/DHA 28-975-200
COMBINATION PACKAGE (EA) ORAL NIGHTLY
Qty: 28.4 G | Refills: 2 | Status: SHIPPED | OUTPATIENT
Start: 2021-10-26 | End: 2022-04-26 | Stop reason: SDUPTHER

## 2021-10-26 RX ORDER — CYCLOSPORINE 0.5 MG/ML
1 EMULSION OPHTHALMIC 2 TIMES DAILY
Qty: 60 EACH | Refills: 3 | Status: SHIPPED | OUTPATIENT
Start: 2021-10-26 | End: 2022-04-26 | Stop reason: SDUPTHER

## 2021-10-26 RX ORDER — CHLORDIAZEPOXIDE HYDROCHLORIDE 25 MG/1
25 CAPSULE, GELATIN COATED ORAL EVERY 4 HOURS PRN
COMMUNITY
Start: 2021-06-16 | End: 2021-10-27

## 2021-10-26 RX ORDER — TERBINAFINE HYDROCHLORIDE 250 MG/1
250 TABLET ORAL DAILY
Qty: 30 TABLET | Refills: 0 | Status: CANCELLED | OUTPATIENT
Start: 2021-10-26 | End: 2021-11-25

## 2021-10-26 RX ORDER — BUPRENORPHINE AND NALOXONE 8; 2 MG/1; MG/1
1 FILM, SOLUBLE BUCCAL; SUBLINGUAL 2 TIMES DAILY
COMMUNITY
Start: 2021-07-12 | End: 2022-04-05 | Stop reason: CLARIF

## 2021-10-26 RX ORDER — ESCITALOPRAM OXALATE 20 MG/1
20 TABLET ORAL DAILY
Qty: 30 TABLET | Refills: 5 | Status: SHIPPED | OUTPATIENT
Start: 2021-10-26 | End: 2022-04-26 | Stop reason: SDUPTHER

## 2021-10-27 ENCOUNTER — TELEPHONE (OUTPATIENT)
Dept: FAMILY MEDICINE | Facility: CLINIC | Age: 66
End: 2021-10-27
Payer: OTHER GOVERNMENT

## 2021-10-27 ENCOUNTER — LAB VISIT (OUTPATIENT)
Dept: LAB | Facility: HOSPITAL | Age: 66
End: 2021-10-27
Attending: FAMILY MEDICINE
Payer: MEDICARE

## 2021-10-27 ENCOUNTER — OFFICE VISIT (OUTPATIENT)
Dept: OPHTHALMOLOGY | Facility: CLINIC | Age: 66
End: 2021-10-27
Payer: MEDICARE

## 2021-10-27 DIAGNOSIS — I10 ESSENTIAL HYPERTENSION: Chronic | ICD-10-CM

## 2021-10-27 DIAGNOSIS — H52.7 REFRACTIVE ERROR: ICD-10-CM

## 2021-10-27 DIAGNOSIS — F31.9 BIPOLAR 1 DISORDER: Primary | ICD-10-CM

## 2021-10-27 DIAGNOSIS — K74.60 HEPATIC CIRRHOSIS, UNSPECIFIED HEPATIC CIRRHOSIS TYPE, UNSPECIFIED WHETHER ASCITES PRESENT: ICD-10-CM

## 2021-10-27 DIAGNOSIS — H25.13 NUCLEAR SCLEROSIS OF BOTH EYES: Primary | ICD-10-CM

## 2021-10-27 DIAGNOSIS — H43.812 VITREOUS DETACHMENT OF LEFT EYE: ICD-10-CM

## 2021-10-27 DIAGNOSIS — K74.60 HEPATIC CIRRHOSIS, UNSPECIFIED HEPATIC CIRRHOSIS TYPE, UNSPECIFIED WHETHER ASCITES PRESENT: Chronic | ICD-10-CM

## 2021-10-27 DIAGNOSIS — H25.043 POSTERIOR SUBCAPSULAR AGE-RELATED CATARACT OF BOTH EYES: ICD-10-CM

## 2021-10-27 DIAGNOSIS — I10 ESSENTIAL HYPERTENSION: ICD-10-CM

## 2021-10-27 DIAGNOSIS — I25.10 CORONARY ARTERY DISEASE, UNSPECIFIED VESSEL OR LESION TYPE, UNSPECIFIED WHETHER ANGINA PRESENT, UNSPECIFIED WHETHER NATIVE OR TRANSPLANTED HEART: Chronic | ICD-10-CM

## 2021-10-27 DIAGNOSIS — Z00.00 VISIT FOR ANNUAL HEALTH EXAMINATION: ICD-10-CM

## 2021-10-27 LAB
ALBUMIN SERPL BCP-MCNC: 3.8 G/DL (ref 3.5–5.2)
ALP SERPL-CCNC: 157 U/L (ref 55–135)
ALT SERPL W/O P-5'-P-CCNC: 32 U/L (ref 10–44)
ANION GAP SERPL CALC-SCNC: 11 MMOL/L (ref 8–16)
AST SERPL-CCNC: 77 U/L (ref 10–40)
BASOPHILS # BLD AUTO: 0.04 K/UL (ref 0–0.2)
BASOPHILS NFR BLD: 1.4 % (ref 0–1.9)
BILIRUB SERPL-MCNC: 0.9 MG/DL (ref 0.1–1)
BUN SERPL-MCNC: 12 MG/DL (ref 8–23)
CALCIUM SERPL-MCNC: 9.2 MG/DL (ref 8.7–10.5)
CHLORIDE SERPL-SCNC: 101 MMOL/L (ref 95–110)
CHOLEST SERPL-MCNC: 165 MG/DL (ref 120–199)
CHOLEST/HDLC SERPL: 2.8 {RATIO} (ref 2–5)
CO2 SERPL-SCNC: 23 MMOL/L (ref 23–29)
CREAT SERPL-MCNC: 0.6 MG/DL (ref 0.5–1.4)
DIFFERENTIAL METHOD: ABNORMAL
EOSINOPHIL # BLD AUTO: 0.3 K/UL (ref 0–0.5)
EOSINOPHIL NFR BLD: 9 % (ref 0–8)
ERYTHROCYTE [DISTWIDTH] IN BLOOD BY AUTOMATED COUNT: 14.7 % (ref 11.5–14.5)
EST. GFR  (AFRICAN AMERICAN): >60 ML/MIN/1.73 M^2
EST. GFR  (NON AFRICAN AMERICAN): >60 ML/MIN/1.73 M^2
ESTIMATED AVG GLUCOSE: 100 MG/DL (ref 68–131)
GLUCOSE SERPL-MCNC: 99 MG/DL (ref 70–110)
HBA1C MFR BLD: 5.1 % (ref 4–5.6)
HCT VFR BLD AUTO: 39 % (ref 37–48.5)
HDLC SERPL-MCNC: 59 MG/DL (ref 40–75)
HDLC SERPL: 35.8 % (ref 20–50)
HGB BLD-MCNC: 13.2 G/DL (ref 12–16)
IMM GRANULOCYTES # BLD AUTO: 0 K/UL (ref 0–0.04)
IMM GRANULOCYTES NFR BLD AUTO: 0 % (ref 0–0.5)
LDLC SERPL CALC-MCNC: 88.2 MG/DL (ref 63–159)
LYMPHOCYTES # BLD AUTO: 0.6 K/UL (ref 1–4.8)
LYMPHOCYTES NFR BLD: 20.4 % (ref 18–48)
MCH RBC QN AUTO: 32.7 PG (ref 27–31)
MCHC RBC AUTO-ENTMCNC: 33.8 G/DL (ref 32–36)
MCV RBC AUTO: 97 FL (ref 82–98)
MONOCYTES # BLD AUTO: 0.3 K/UL (ref 0.3–1)
MONOCYTES NFR BLD: 10.8 % (ref 4–15)
NEUTROPHILS # BLD AUTO: 1.6 K/UL (ref 1.8–7.7)
NEUTROPHILS NFR BLD: 58.4 % (ref 38–73)
NONHDLC SERPL-MCNC: 106 MG/DL
NRBC BLD-RTO: 0 /100 WBC
PLATELET # BLD AUTO: 196 K/UL (ref 150–450)
PMV BLD AUTO: 10.8 FL (ref 9.2–12.9)
POTASSIUM SERPL-SCNC: 4.3 MMOL/L (ref 3.5–5.1)
PROT SERPL-MCNC: 7.3 G/DL (ref 6–8.4)
RBC # BLD AUTO: 4.04 M/UL (ref 4–5.4)
SODIUM SERPL-SCNC: 135 MMOL/L (ref 136–145)
T4 FREE SERPL-MCNC: 0.74 NG/DL (ref 0.71–1.51)
TRIGL SERPL-MCNC: 89 MG/DL (ref 30–150)
TSH SERPL DL<=0.005 MIU/L-ACNC: 5.4 UIU/ML (ref 0.4–4)
WBC # BLD AUTO: 2.79 K/UL (ref 3.9–12.7)

## 2021-10-27 PROCEDURE — 92004 PR EYE EXAM, NEW PATIENT,COMPREHESV: ICD-10-PCS | Mod: HCNC,S$GLB,, | Performed by: OPHTHALMOLOGY

## 2021-10-27 PROCEDURE — 1160F RVW MEDS BY RX/DR IN RCRD: CPT | Mod: HCNC,CPTII,S$GLB, | Performed by: OPHTHALMOLOGY

## 2021-10-27 PROCEDURE — 1126F AMNT PAIN NOTED NONE PRSNT: CPT | Mod: HCNC,CPTII,S$GLB, | Performed by: OPHTHALMOLOGY

## 2021-10-27 PROCEDURE — 1159F MED LIST DOCD IN RCRD: CPT | Mod: HCNC,CPTII,S$GLB, | Performed by: OPHTHALMOLOGY

## 2021-10-27 PROCEDURE — 99999 PR PBB SHADOW E&M-EST. PATIENT-LVL III: ICD-10-PCS | Mod: PBBFAC,HCNC,, | Performed by: OPHTHALMOLOGY

## 2021-10-27 PROCEDURE — 3288F PR FALLS RISK ASSESSMENT DOCUMENTED: ICD-10-PCS | Mod: HCNC,CPTII,S$GLB, | Performed by: OPHTHALMOLOGY

## 2021-10-27 PROCEDURE — 87389 HIV-1 AG W/HIV-1&-2 AB AG IA: CPT | Mod: HCNC | Performed by: FAMILY MEDICINE

## 2021-10-27 PROCEDURE — 1101F PR PT FALLS ASSESS DOC 0-1 FALLS W/OUT INJ PAST YR: ICD-10-PCS | Mod: HCNC,CPTII,S$GLB, | Performed by: OPHTHALMOLOGY

## 2021-10-27 PROCEDURE — 1159F PR MEDICATION LIST DOCUMENTED IN MEDICAL RECORD: ICD-10-PCS | Mod: HCNC,CPTII,S$GLB, | Performed by: OPHTHALMOLOGY

## 2021-10-27 PROCEDURE — 3044F PR MOST RECENT HEMOGLOBIN A1C LEVEL <7.0%: ICD-10-PCS | Mod: HCNC,CPTII,S$GLB, | Performed by: OPHTHALMOLOGY

## 2021-10-27 PROCEDURE — 80053 COMPREHEN METABOLIC PANEL: CPT | Mod: HCNC | Performed by: NURSE PRACTITIONER

## 2021-10-27 PROCEDURE — 80061 LIPID PANEL: CPT | Mod: HCNC | Performed by: NURSE PRACTITIONER

## 2021-10-27 PROCEDURE — 1101F PT FALLS ASSESS-DOCD LE1/YR: CPT | Mod: HCNC,CPTII,S$GLB, | Performed by: OPHTHALMOLOGY

## 2021-10-27 PROCEDURE — 83036 HEMOGLOBIN GLYCOSYLATED A1C: CPT | Mod: HCNC | Performed by: NURSE PRACTITIONER

## 2021-10-27 PROCEDURE — 1160F PR REVIEW ALL MEDS BY PRESCRIBER/CLIN PHARMACIST DOCUMENTED: ICD-10-PCS | Mod: HCNC,CPTII,S$GLB, | Performed by: OPHTHALMOLOGY

## 2021-10-27 PROCEDURE — 1126F PR PAIN SEVERITY QUANTIFIED, NO PAIN PRESENT: ICD-10-PCS | Mod: HCNC,CPTII,S$GLB, | Performed by: OPHTHALMOLOGY

## 2021-10-27 PROCEDURE — 3288F FALL RISK ASSESSMENT DOCD: CPT | Mod: HCNC,CPTII,S$GLB, | Performed by: OPHTHALMOLOGY

## 2021-10-27 PROCEDURE — 85025 COMPLETE CBC W/AUTO DIFF WBC: CPT | Mod: HCNC | Performed by: NURSE PRACTITIONER

## 2021-10-27 PROCEDURE — 92004 COMPRE OPH EXAM NEW PT 1/>: CPT | Mod: HCNC,S$GLB,, | Performed by: OPHTHALMOLOGY

## 2021-10-27 PROCEDURE — 84439 ASSAY OF FREE THYROXINE: CPT | Mod: HCNC | Performed by: NURSE PRACTITIONER

## 2021-10-27 PROCEDURE — 36415 COLL VENOUS BLD VENIPUNCTURE: CPT | Mod: HCNC,PO | Performed by: NURSE PRACTITIONER

## 2021-10-27 PROCEDURE — 3044F HG A1C LEVEL LT 7.0%: CPT | Mod: HCNC,CPTII,S$GLB, | Performed by: OPHTHALMOLOGY

## 2021-10-27 PROCEDURE — 99999 PR PBB SHADOW E&M-EST. PATIENT-LVL III: CPT | Mod: PBBFAC,HCNC,, | Performed by: OPHTHALMOLOGY

## 2021-10-27 PROCEDURE — 84443 ASSAY THYROID STIM HORMONE: CPT | Mod: HCNC | Performed by: NURSE PRACTITIONER

## 2021-10-28 ENCOUNTER — TELEPHONE (OUTPATIENT)
Dept: FAMILY MEDICINE | Facility: CLINIC | Age: 66
End: 2021-10-28
Payer: OTHER GOVERNMENT

## 2021-10-28 ENCOUNTER — TELEPHONE (OUTPATIENT)
Dept: HEMATOLOGY/ONCOLOGY | Facility: CLINIC | Age: 66
End: 2021-10-28
Payer: OTHER GOVERNMENT

## 2021-10-28 DIAGNOSIS — F54 PSYCHOLOGICAL AND BEHAVIORAL FACTORS ASSOCIATED WITH DISORDERS OR DISEASES CLASSIFIED ELSEWHERE: ICD-10-CM

## 2021-10-28 DIAGNOSIS — F32.A DEPRESSION, UNSPECIFIED DEPRESSION TYPE: Primary | ICD-10-CM

## 2021-10-28 DIAGNOSIS — K74.60 HEPATIC CIRRHOSIS, UNSPECIFIED HEPATIC CIRRHOSIS TYPE, UNSPECIFIED WHETHER ASCITES PRESENT: Primary | ICD-10-CM

## 2021-10-28 LAB — HIV 1+2 AB+HIV1 P24 AG SERPL QL IA: NEGATIVE

## 2021-11-01 ENCOUNTER — LAB VISIT (OUTPATIENT)
Dept: LAB | Facility: HOSPITAL | Age: 66
End: 2021-11-01
Attending: NURSE PRACTITIONER
Payer: OTHER GOVERNMENT

## 2021-11-01 ENCOUNTER — TELEPHONE (OUTPATIENT)
Dept: FAMILY MEDICINE | Facility: CLINIC | Age: 66
End: 2021-11-01
Payer: OTHER GOVERNMENT

## 2021-11-01 ENCOUNTER — TELEPHONE (OUTPATIENT)
Dept: OPHTHALMOLOGY | Facility: CLINIC | Age: 66
End: 2021-11-01
Payer: OTHER GOVERNMENT

## 2021-11-01 DIAGNOSIS — R30.0 BURNING WITH URINATION: Primary | ICD-10-CM

## 2021-11-01 DIAGNOSIS — R30.0 BURNING WITH URINATION: ICD-10-CM

## 2021-11-01 LAB
BILIRUB UR QL STRIP: NEGATIVE
CLARITY UR REFRACT.AUTO: CLEAR
COLOR UR AUTO: NORMAL
GLUCOSE UR QL STRIP: NEGATIVE
HGB UR QL STRIP: NEGATIVE
KETONES UR QL STRIP: NEGATIVE
LEUKOCYTE ESTERASE UR QL STRIP: NEGATIVE
NITRITE UR QL STRIP: NEGATIVE
PH UR STRIP: 5 [PH] (ref 5–8)
PROT UR QL STRIP: NEGATIVE
SP GR UR STRIP: 1 (ref 1–1.03)
URN SPEC COLLECT METH UR: NORMAL

## 2021-11-01 PROCEDURE — 81003 URINALYSIS AUTO W/O SCOPE: CPT | Mod: HCNC | Performed by: NURSE PRACTITIONER

## 2021-11-19 ENCOUNTER — TELEPHONE (OUTPATIENT)
Dept: HEMATOLOGY/ONCOLOGY | Facility: CLINIC | Age: 66
End: 2021-11-19
Payer: OTHER GOVERNMENT

## 2021-12-02 ENCOUNTER — PATIENT OUTREACH (OUTPATIENT)
Dept: ADMINISTRATIVE | Facility: CLINIC | Age: 66
End: 2021-12-02
Payer: OTHER GOVERNMENT

## 2021-12-02 ENCOUNTER — EXTERNAL HOSPITAL ADMISSION (OUTPATIENT)
Dept: ADMINISTRATIVE | Facility: CLINIC | Age: 66
End: 2021-12-02
Payer: OTHER GOVERNMENT

## 2021-12-06 ENCOUNTER — PATIENT OUTREACH (OUTPATIENT)
Dept: ADMINISTRATIVE | Facility: OTHER | Age: 66
End: 2021-12-06
Payer: OTHER GOVERNMENT

## 2021-12-11 ENCOUNTER — HOSPITAL ENCOUNTER (EMERGENCY)
Facility: HOSPITAL | Age: 66
Discharge: HOME | End: 2021-12-12
Attending: INTERNAL MEDICINE
Payer: OTHER GOVERNMENT

## 2021-12-11 DIAGNOSIS — K63.89 EPIPLOIC APPENDAGITIS: Primary | ICD-10-CM

## 2021-12-11 LAB
ALBUMIN SERPL BCP-MCNC: 3.6 G/DL (ref 3.5–5.2)
ALP SERPL-CCNC: 142 U/L (ref 55–135)
ALT SERPL W/O P-5'-P-CCNC: 47 U/L (ref 10–44)
ANION GAP SERPL CALC-SCNC: 10 MMOL/L (ref 8–16)
AST SERPL-CCNC: 64 U/L (ref 10–40)
BASOPHILS # BLD AUTO: 0.06 K/UL (ref 0–0.2)
BASOPHILS NFR BLD: 1 % (ref 0–1.9)
BILIRUB SERPL-MCNC: 0.9 MG/DL (ref 0.1–1)
BUN SERPL-MCNC: 9 MG/DL (ref 8–23)
CALCIUM SERPL-MCNC: 9.2 MG/DL (ref 8.7–10.5)
CHLORIDE SERPL-SCNC: 100 MMOL/L (ref 95–110)
CO2 SERPL-SCNC: 26 MMOL/L (ref 23–29)
CREAT SERPL-MCNC: 0.6 MG/DL (ref 0.5–1.4)
DIFFERENTIAL METHOD: ABNORMAL
EOSINOPHIL # BLD AUTO: 0.1 K/UL (ref 0–0.5)
EOSINOPHIL NFR BLD: 2.2 % (ref 0–8)
ERYTHROCYTE [DISTWIDTH] IN BLOOD BY AUTOMATED COUNT: 14.2 % (ref 11.5–14.5)
EST. GFR  (AFRICAN AMERICAN): >60 ML/MIN/1.73 M^2
EST. GFR  (NON AFRICAN AMERICAN): >60 ML/MIN/1.73 M^2
GLUCOSE SERPL-MCNC: 86 MG/DL (ref 70–110)
HCT VFR BLD AUTO: 38.6 % (ref 37–48.5)
HGB BLD-MCNC: 13 G/DL (ref 12–16)
IMM GRANULOCYTES # BLD AUTO: 0.02 K/UL (ref 0–0.04)
IMM GRANULOCYTES NFR BLD AUTO: 0.3 % (ref 0–0.5)
LACTATE SERPL-SCNC: 0.8 MMOL/L (ref 0.5–2.2)
LYMPHOCYTES # BLD AUTO: 1 K/UL (ref 1–4.8)
LYMPHOCYTES NFR BLD: 17.9 % (ref 18–48)
MCH RBC QN AUTO: 32.7 PG (ref 27–31)
MCHC RBC AUTO-ENTMCNC: 33.7 G/DL (ref 32–36)
MCV RBC AUTO: 97 FL (ref 82–98)
MONOCYTES # BLD AUTO: 0.5 K/UL (ref 0.3–1)
MONOCYTES NFR BLD: 9.3 % (ref 4–15)
NEUTROPHILS # BLD AUTO: 4 K/UL (ref 1.8–7.7)
NEUTROPHILS NFR BLD: 69.3 % (ref 38–73)
NRBC BLD-RTO: 0 /100 WBC
PLATELET # BLD AUTO: 227 K/UL (ref 150–450)
PMV BLD AUTO: 10.6 FL (ref 9.2–12.9)
POTASSIUM SERPL-SCNC: 4.3 MMOL/L (ref 3.5–5.1)
PROT SERPL-MCNC: 7.1 G/DL (ref 6–8.4)
RBC # BLD AUTO: 3.98 M/UL (ref 4–5.4)
SODIUM SERPL-SCNC: 136 MMOL/L (ref 136–145)
WBC # BLD AUTO: 5.82 K/UL (ref 3.9–12.7)

## 2021-12-11 PROCEDURE — 87040 BLOOD CULTURE FOR BACTERIA: CPT | Mod: 59,HCNC | Performed by: INTERNAL MEDICINE

## 2021-12-11 PROCEDURE — 85025 COMPLETE CBC W/AUTO DIFF WBC: CPT | Mod: HCNC | Performed by: INTERNAL MEDICINE

## 2021-12-11 PROCEDURE — 96374 THER/PROPH/DIAG INJ IV PUSH: CPT | Mod: HCNC

## 2021-12-11 PROCEDURE — 63600175 PHARM REV CODE 636 W HCPCS: Mod: HCNC | Performed by: INTERNAL MEDICINE

## 2021-12-11 PROCEDURE — 80053 COMPREHEN METABOLIC PANEL: CPT | Mod: HCNC | Performed by: INTERNAL MEDICINE

## 2021-12-11 PROCEDURE — 99285 EMERGENCY DEPT VISIT HI MDM: CPT | Mod: 25,HCNC

## 2021-12-11 PROCEDURE — 96375 TX/PRO/DX INJ NEW DRUG ADDON: CPT | Mod: HCNC

## 2021-12-11 PROCEDURE — 25000003 PHARM REV CODE 250: Mod: HCNC | Performed by: INTERNAL MEDICINE

## 2021-12-11 PROCEDURE — 83605 ASSAY OF LACTIC ACID: CPT | Mod: HCNC | Performed by: INTERNAL MEDICINE

## 2021-12-11 PROCEDURE — 96361 HYDRATE IV INFUSION ADD-ON: CPT | Mod: HCNC

## 2021-12-11 RX ORDER — ONDANSETRON 2 MG/ML
8 INJECTION INTRAMUSCULAR; INTRAVENOUS
Status: COMPLETED | OUTPATIENT
Start: 2021-12-11 | End: 2021-12-11

## 2021-12-11 RX ADMIN — ONDANSETRON 8 MG: 2 INJECTION INTRAMUSCULAR; INTRAVENOUS at 10:12

## 2021-12-11 RX ADMIN — IOHEXOL 100 ML: 350 INJECTION, SOLUTION INTRAVENOUS at 11:12

## 2021-12-11 RX ADMIN — SODIUM CHLORIDE 1000 ML: 9 INJECTION, SOLUTION INTRAVENOUS at 10:12

## 2021-12-12 VITALS
BODY MASS INDEX: 29.57 KG/M2 | DIASTOLIC BLOOD PRESSURE: 52 MMHG | HEART RATE: 78 BPM | WEIGHT: 184 LBS | HEIGHT: 66 IN | TEMPERATURE: 97 F | OXYGEN SATURATION: 98 % | SYSTOLIC BLOOD PRESSURE: 119 MMHG | RESPIRATION RATE: 18 BRPM

## 2021-12-12 PROBLEM — K63.89 EPIPLOIC APPENDAGITIS: Status: ACTIVE | Noted: 2018-09-12

## 2021-12-12 PROCEDURE — 25500020 PHARM REV CODE 255: Mod: HCNC | Performed by: INTERNAL MEDICINE

## 2021-12-12 PROCEDURE — 63600175 PHARM REV CODE 636 W HCPCS: Mod: HCNC | Performed by: INTERNAL MEDICINE

## 2021-12-12 RX ORDER — IBUPROFEN 600 MG/1
600 TABLET ORAL 3 TIMES DAILY
Qty: 30 TABLET | Refills: 0 | Status: SHIPPED | OUTPATIENT
Start: 2021-12-12 | End: 2022-01-18

## 2021-12-12 RX ORDER — KETOROLAC TROMETHAMINE 30 MG/ML
15 INJECTION, SOLUTION INTRAMUSCULAR; INTRAVENOUS
Status: COMPLETED | OUTPATIENT
Start: 2021-12-12 | End: 2021-12-12

## 2021-12-12 RX ADMIN — KETOROLAC TROMETHAMINE 15 MG: 30 INJECTION, SOLUTION INTRAMUSCULAR; INTRAVENOUS at 12:12

## 2021-12-13 ENCOUNTER — PES CALL (OUTPATIENT)
Dept: ADMINISTRATIVE | Facility: CLINIC | Age: 66
End: 2021-12-13
Payer: OTHER GOVERNMENT

## 2021-12-15 LAB
BACTERIA BLD CULT: NORMAL
BACTERIA BLD CULT: NORMAL

## 2022-01-07 DIAGNOSIS — E03.4 HYPOTHYROIDISM DUE TO ACQUIRED ATROPHY OF THYROID: ICD-10-CM

## 2022-01-07 RX ORDER — ONDANSETRON 8 MG/1
8 TABLET, ORALLY DISINTEGRATING ORAL EVERY 8 HOURS PRN
Qty: 20 TABLET | Refills: 0 | Status: SHIPPED | OUTPATIENT
Start: 2022-01-07 | End: 2022-01-18

## 2022-01-07 RX ORDER — LEVOTHYROXINE SODIUM 25 UG/1
TABLET ORAL
Qty: 90 TABLET | Refills: 3 | Status: SHIPPED | OUTPATIENT
Start: 2022-01-07 | End: 2022-04-26 | Stop reason: SDUPTHER

## 2022-01-07 NOTE — TELEPHONE ENCOUNTER
----- Message from Zenia Aguayo sent at 1/7/2022  2:11 PM CST -----  Regarding: Self/  [phone]  Type: RX Refill Request    Who Called:  Patient    Refill or New Rx:  Refill    RX Name and Strength:  ondansetron (ZOFRAN-ODT) 4 MG TbDL    Preferred Pharmacy with phone number:  [id number] - [place], [place] - [address] AT [address]    Local or Mail Order:  Local    Ordering Provider:  YANG Martinez    Would the patient rather a call back or a response via My [facility]?  Call back    Best Call Back Number:  [phone]

## 2022-01-10 ENCOUNTER — TELEPHONE (OUTPATIENT)
Dept: FAMILY MEDICINE | Facility: CLINIC | Age: 67
End: 2022-01-10
Payer: MEDICARE

## 2022-01-10 ENCOUNTER — HOSPITAL ENCOUNTER (EMERGENCY)
Facility: HOSPITAL | Age: 67
Discharge: HOME | End: 2022-01-10
Attending: EMERGENCY MEDICINE
Payer: MEDICARE

## 2022-01-10 VITALS
DIASTOLIC BLOOD PRESSURE: 99 MMHG | HEART RATE: 82 BPM | HEIGHT: 64 IN | WEIGHT: 155 LBS | TEMPERATURE: 98 F | RESPIRATION RATE: 18 BRPM | OXYGEN SATURATION: 98 % | BODY MASS INDEX: 26.46 KG/M2 | SYSTOLIC BLOOD PRESSURE: 172 MMHG

## 2022-01-10 DIAGNOSIS — R10.9 ABDOMINAL PAIN: ICD-10-CM

## 2022-01-10 DIAGNOSIS — A08.4 VIRAL GASTROENTERITIS: Primary | ICD-10-CM

## 2022-01-10 LAB
ALBUMIN SERPL BCP-MCNC: 4.6 G/DL (ref 3.5–5.2)
ALP SERPL-CCNC: 154 U/L (ref 55–135)
ALT SERPL W/O P-5'-P-CCNC: 40 U/L (ref 10–44)
ANION GAP SERPL CALC-SCNC: 14 MMOL/L (ref 8–16)
AST SERPL-CCNC: 60 U/L (ref 10–40)
BASOPHILS # BLD AUTO: 0.04 K/UL (ref 0–0.2)
BASOPHILS NFR BLD: 0.7 % (ref 0–1.9)
BILIRUB SERPL-MCNC: 0.8 MG/DL (ref 0.1–1)
BUN SERPL-MCNC: 11 MG/DL (ref 8–23)
CALCIUM SERPL-MCNC: 10 MG/DL (ref 8.7–10.5)
CHLORIDE SERPL-SCNC: 104 MMOL/L (ref 95–110)
CO2 SERPL-SCNC: 21 MMOL/L (ref 23–29)
CREAT SERPL-MCNC: 0.7 MG/DL (ref 0.5–1.4)
CTP QC/QA: YES
DIFFERENTIAL METHOD: ABNORMAL
EOSINOPHIL # BLD AUTO: 0 K/UL (ref 0–0.5)
EOSINOPHIL NFR BLD: 0.4 % (ref 0–8)
ERYTHROCYTE [DISTWIDTH] IN BLOOD BY AUTOMATED COUNT: 12.8 % (ref 11.5–14.5)
EST. GFR  (AFRICAN AMERICAN): >60 ML/MIN/1.73 M^2
EST. GFR  (NON AFRICAN AMERICAN): >60 ML/MIN/1.73 M^2
GLUCOSE SERPL-MCNC: 149 MG/DL (ref 70–110)
HCT VFR BLD AUTO: 46.7 % (ref 37–48.5)
HGB BLD-MCNC: 16.2 G/DL (ref 12–16)
IMM GRANULOCYTES # BLD AUTO: 0.01 K/UL (ref 0–0.04)
IMM GRANULOCYTES NFR BLD AUTO: 0.2 % (ref 0–0.5)
LIPASE SERPL-CCNC: 16 U/L (ref 4–60)
LYMPHOCYTES # BLD AUTO: 0.9 K/UL (ref 1–4.8)
LYMPHOCYTES NFR BLD: 16.9 % (ref 18–48)
MCH RBC QN AUTO: 32 PG (ref 27–31)
MCHC RBC AUTO-ENTMCNC: 34.7 G/DL (ref 32–36)
MCV RBC AUTO: 92 FL (ref 82–98)
MONOCYTES # BLD AUTO: 0.3 K/UL (ref 0.3–1)
MONOCYTES NFR BLD: 6.1 % (ref 4–15)
NEUTROPHILS # BLD AUTO: 4.1 K/UL (ref 1.8–7.7)
NEUTROPHILS NFR BLD: 75.7 % (ref 38–73)
NRBC BLD-RTO: 0 /100 WBC
PLATELET # BLD AUTO: 225 K/UL (ref 150–450)
PMV BLD AUTO: 10.2 FL (ref 9.2–12.9)
POTASSIUM SERPL-SCNC: 4.1 MMOL/L (ref 3.5–5.1)
PROT SERPL-MCNC: 8.9 G/DL (ref 6–8.4)
RBC # BLD AUTO: 5.06 M/UL (ref 4–5.4)
SARS-COV-2 RDRP RESP QL NAA+PROBE: NEGATIVE
SODIUM SERPL-SCNC: 139 MMOL/L (ref 136–145)
WBC # BLD AUTO: 5.45 K/UL (ref 3.9–12.7)

## 2022-01-10 PROCEDURE — 93010 EKG 12-LEAD: ICD-10-PCS | Mod: ,,, | Performed by: INTERNAL MEDICINE

## 2022-01-10 PROCEDURE — 96374 THER/PROPH/DIAG INJ IV PUSH: CPT

## 2022-01-10 PROCEDURE — 25000003 PHARM REV CODE 250: Performed by: EMERGENCY MEDICINE

## 2022-01-10 PROCEDURE — 83690 ASSAY OF LIPASE: CPT | Performed by: EMERGENCY MEDICINE

## 2022-01-10 PROCEDURE — 80053 COMPREHEN METABOLIC PANEL: CPT | Performed by: EMERGENCY MEDICINE

## 2022-01-10 PROCEDURE — 63600175 PHARM REV CODE 636 W HCPCS: Performed by: EMERGENCY MEDICINE

## 2022-01-10 PROCEDURE — 99284 EMERGENCY DEPT VISIT MOD MDM: CPT | Mod: 25

## 2022-01-10 PROCEDURE — 96361 HYDRATE IV INFUSION ADD-ON: CPT

## 2022-01-10 PROCEDURE — 93010 ELECTROCARDIOGRAM REPORT: CPT | Mod: ,,, | Performed by: INTERNAL MEDICINE

## 2022-01-10 PROCEDURE — 96372 THER/PROPH/DIAG INJ SC/IM: CPT | Mod: 59

## 2022-01-10 PROCEDURE — 93005 ELECTROCARDIOGRAM TRACING: CPT

## 2022-01-10 PROCEDURE — 85025 COMPLETE CBC W/AUTO DIFF WBC: CPT | Performed by: EMERGENCY MEDICINE

## 2022-01-10 PROCEDURE — U0002 COVID-19 LAB TEST NON-CDC: HCPCS | Performed by: EMERGENCY MEDICINE

## 2022-01-10 RX ORDER — ONDANSETRON 4 MG/1
4 TABLET, ORALLY DISINTEGRATING ORAL EVERY 8 HOURS PRN
Qty: 20 TABLET | Refills: 0 | Status: SHIPPED | OUTPATIENT
Start: 2022-01-10 | End: 2022-01-10 | Stop reason: SDUPTHER

## 2022-01-10 RX ORDER — FAMOTIDINE 10 MG/ML
40 INJECTION INTRAVENOUS
Status: DISCONTINUED | OUTPATIENT
Start: 2022-01-10 | End: 2022-01-10

## 2022-01-10 RX ORDER — PROMETHAZINE HYDROCHLORIDE 25 MG/ML
25 INJECTION, SOLUTION INTRAMUSCULAR; INTRAVENOUS
Status: COMPLETED | OUTPATIENT
Start: 2022-01-10 | End: 2022-01-10

## 2022-01-10 RX ORDER — DICYCLOMINE HYDROCHLORIDE 20 MG/1
20 TABLET ORAL 2 TIMES DAILY
Qty: 10 TABLET | Refills: 0 | Status: SHIPPED | OUTPATIENT
Start: 2022-01-10 | End: 2022-01-10 | Stop reason: SDUPTHER

## 2022-01-10 RX ORDER — ONDANSETRON 4 MG/1
8 TABLET, ORALLY DISINTEGRATING ORAL EVERY 8 HOURS PRN
Qty: 20 TABLET | Refills: 0 | OUTPATIENT
Start: 2022-01-10 | End: 2022-03-30

## 2022-01-10 RX ORDER — ONDANSETRON 4 MG/1
4 TABLET, ORALLY DISINTEGRATING ORAL
Status: COMPLETED | OUTPATIENT
Start: 2022-01-10 | End: 2022-01-10

## 2022-01-10 RX ORDER — PROMETHAZINE HYDROCHLORIDE 25 MG/1
25 TABLET ORAL EVERY 6 HOURS PRN
Qty: 10 TABLET | Refills: 0 | Status: SHIPPED | OUTPATIENT
Start: 2022-01-10 | End: 2022-01-10 | Stop reason: SDUPTHER

## 2022-01-10 RX ORDER — ONDANSETRON 4 MG/1
8 TABLET, ORALLY DISINTEGRATING ORAL EVERY 8 HOURS PRN
Qty: 20 TABLET | Refills: 0 | Status: SHIPPED | OUTPATIENT
Start: 2022-01-10 | End: 2022-01-10 | Stop reason: SDUPTHER

## 2022-01-10 RX ORDER — DICYCLOMINE HYDROCHLORIDE 20 MG/1
20 TABLET ORAL 2 TIMES DAILY
Qty: 10 TABLET | Refills: 0 | Status: SHIPPED | OUTPATIENT
Start: 2022-01-10 | End: 2022-02-09

## 2022-01-10 RX ORDER — DROPERIDOL 2.5 MG/ML
2.5 INJECTION, SOLUTION INTRAMUSCULAR; INTRAVENOUS ONCE
Status: COMPLETED | OUTPATIENT
Start: 2022-01-10 | End: 2022-01-10

## 2022-01-10 RX ORDER — ONDANSETRON 2 MG/ML
4 INJECTION INTRAMUSCULAR; INTRAVENOUS
Status: COMPLETED | OUTPATIENT
Start: 2022-01-10 | End: 2022-01-10

## 2022-01-10 RX ORDER — PROMETHAZINE HYDROCHLORIDE 25 MG/1
25 TABLET ORAL EVERY 6 HOURS PRN
Qty: 10 TABLET | Refills: 0 | OUTPATIENT
Start: 2022-01-10 | End: 2022-03-30

## 2022-01-10 RX ORDER — LOPERAMIDE HYDROCHLORIDE 2 MG/1
4 CAPSULE ORAL
Status: COMPLETED | OUTPATIENT
Start: 2022-01-10 | End: 2022-01-10

## 2022-01-10 RX ADMIN — ONDANSETRON 4 MG: 2 INJECTION INTRAMUSCULAR; INTRAVENOUS at 02:01

## 2022-01-10 RX ADMIN — DROPERIDOL 2.5 MG: 2.5 INJECTION, SOLUTION INTRAMUSCULAR; INTRAVENOUS at 06:01

## 2022-01-10 RX ADMIN — ONDANSETRON 4 MG: 4 TABLET, ORALLY DISINTEGRATING ORAL at 03:01

## 2022-01-10 RX ADMIN — LOPERAMIDE HYDROCHLORIDE 4 MG: 2 CAPSULE ORAL at 02:01

## 2022-01-10 RX ADMIN — PROMETHAZINE HYDROCHLORIDE 25 MG: 25 INJECTION INTRAMUSCULAR; INTRAVENOUS at 04:01

## 2022-01-10 RX ADMIN — LIDOCAINE HYDROCHLORIDE: 20 SOLUTION OROPHARYNGEAL at 06:01

## 2022-01-10 RX ADMIN — SODIUM CHLORIDE, SODIUM LACTATE, POTASSIUM CHLORIDE, AND CALCIUM CHLORIDE 1000 ML: .6; .31; .03; .02 INJECTION, SOLUTION INTRAVENOUS at 02:01

## 2022-01-10 NOTE — TELEPHONE ENCOUNTER
Tried calling pa about up coming appt but pt has beed admitted to the hospital as of 01/10/22 @ 3:52pm done vs

## 2022-01-10 NOTE — ED TRIAGE NOTES
Presents with N/V/D since this am. Pt denies eating anything that might of caused this. Pt states has been sweaty but is currently afebrile. Denies chest pain/ SOB.Also states cramping abdominal pain.
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
## 2022-01-11 ENCOUNTER — TELEPHONE (OUTPATIENT)
Dept: FAMILY MEDICINE | Facility: CLINIC | Age: 67
End: 2022-01-11
Payer: MEDICARE

## 2022-01-11 NOTE — TELEPHONE ENCOUNTER
Phone pt about the up coming appt pt states that she needs to bryson from 01/11/22 @ 3:00 to 01/18/22 @ 3:00pm vs

## 2022-01-18 ENCOUNTER — OFFICE VISIT (OUTPATIENT)
Dept: FAMILY MEDICINE | Facility: CLINIC | Age: 67
End: 2022-01-18
Payer: OTHER GOVERNMENT

## 2022-01-18 ENCOUNTER — TELEPHONE (OUTPATIENT)
Dept: FAMILY MEDICINE | Facility: CLINIC | Age: 67
End: 2022-01-18
Payer: MEDICARE

## 2022-01-18 VITALS
DIASTOLIC BLOOD PRESSURE: 82 MMHG | HEIGHT: 63 IN | WEIGHT: 170.5 LBS | OXYGEN SATURATION: 97 % | HEART RATE: 80 BPM | BODY MASS INDEX: 30.21 KG/M2 | SYSTOLIC BLOOD PRESSURE: 136 MMHG | RESPIRATION RATE: 20 BRPM | TEMPERATURE: 99 F

## 2022-01-18 DIAGNOSIS — Z13.820 ENCOUNTER FOR OSTEOPOROSIS SCREENING IN ASYMPTOMATIC POSTMENOPAUSAL PATIENT: ICD-10-CM

## 2022-01-18 DIAGNOSIS — M79.671 CHRONIC FOOT PAIN, RIGHT: ICD-10-CM

## 2022-01-18 DIAGNOSIS — K70.30 ALCOHOLIC CIRRHOSIS OF LIVER WITHOUT ASCITES: ICD-10-CM

## 2022-01-18 DIAGNOSIS — H91.93 DECREASED HEARING OF BOTH EARS: ICD-10-CM

## 2022-01-18 DIAGNOSIS — Z12.31 ENCOUNTER FOR SCREENING MAMMOGRAM FOR MALIGNANT NEOPLASM OF BREAST: ICD-10-CM

## 2022-01-18 DIAGNOSIS — Z09 FOLLOW-UP EXAM: Primary | ICD-10-CM

## 2022-01-18 DIAGNOSIS — I10 ESSENTIAL HYPERTENSION: Chronic | ICD-10-CM

## 2022-01-18 DIAGNOSIS — K74.60 HEPATIC CIRRHOSIS, UNSPECIFIED HEPATIC CIRRHOSIS TYPE, UNSPECIFIED WHETHER ASCITES PRESENT: Chronic | ICD-10-CM

## 2022-01-18 DIAGNOSIS — Z78.0 ENCOUNTER FOR OSTEOPOROSIS SCREENING IN ASYMPTOMATIC POSTMENOPAUSAL PATIENT: ICD-10-CM

## 2022-01-18 DIAGNOSIS — E03.9 ACQUIRED HYPOTHYROIDISM: Chronic | ICD-10-CM

## 2022-01-18 DIAGNOSIS — G89.29 CHRONIC FOOT PAIN, RIGHT: ICD-10-CM

## 2022-01-18 PROCEDURE — 99499 UNLISTED E&M SERVICE: CPT | Mod: S$GLB,,, | Performed by: NURSE PRACTITIONER

## 2022-01-18 PROCEDURE — 99215 OFFICE O/P EST HI 40 MIN: CPT | Mod: S$GLB,,, | Performed by: NURSE PRACTITIONER

## 2022-01-18 PROCEDURE — 99215 PR OFFICE/OUTPT VISIT, EST, LEVL V, 40-54 MIN: ICD-10-PCS | Mod: S$GLB,,, | Performed by: NURSE PRACTITIONER

## 2022-01-18 PROCEDURE — 99999 PR PBB SHADOW E&M-EST. PATIENT-LVL V: ICD-10-PCS | Mod: PBBFAC,,, | Performed by: NURSE PRACTITIONER

## 2022-01-18 PROCEDURE — 99215 OFFICE O/P EST HI 40 MIN: CPT | Mod: PBBFAC,PO | Performed by: NURSE PRACTITIONER

## 2022-01-18 PROCEDURE — 99499 RISK ADDL DX/OHS AUDIT: ICD-10-PCS | Mod: S$GLB,,, | Performed by: NURSE PRACTITIONER

## 2022-01-18 PROCEDURE — 99999 PR PBB SHADOW E&M-EST. PATIENT-LVL V: CPT | Mod: PBBFAC,,, | Performed by: NURSE PRACTITIONER

## 2022-01-18 RX ORDER — LEVOCETIRIZINE DIHYDROCHLORIDE 5 MG/1
5 TABLET, FILM COATED ORAL NIGHTLY
COMMUNITY
Start: 2022-01-08 | End: 2023-11-12

## 2022-01-18 RX ORDER — ONDANSETRON 2 MG/ML
INJECTION INTRAMUSCULAR; INTRAVENOUS
COMMUNITY
Start: 2021-09-22 | End: 2022-03-30

## 2022-01-18 RX ORDER — MELOXICAM 15 MG/1
15 TABLET ORAL DAILY
Qty: 60 TABLET | Refills: 0 | Status: SHIPPED | OUTPATIENT
Start: 2022-01-18 | End: 2022-02-01
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
## 2022-01-18 NOTE — PATIENT INSTRUCTIONS
Patient Education       Meloxicam (braden OKS i marshall)   Brand Names: US Anjeso; Mobic; Qmiiz ODT [DSC]; Vivlodex   Brand Names: Ashwini ACT Meloxicam [DSC]; APO-Meloxicam; Auro-Meloxicam; DOM-Meloxicam; Mobicox [DSC]; MYLAN-Meloxicam [DSC]; PMS-Meloxicam; TEVA-Meloxicam   Warning   · This drug may raise the risk of heart and blood vessel problems like heart attack and stroke. These effects can be deadly. The risk may be greater if you have heart disease or risks for heart disease. However, it can also be raised even if you do not have heart disease or risks for heart disease. The risk can happen within the first weeks of using this drug and may be greater with higher doses or long-term use. Do not use this drug right before or after bypass heart surgery.  · This drug may raise the chance of severe and sometimes deadly stomach or bowel problems like ulcers or bleeding. The risk is greater in older people, and in people who have had stomach or bowel ulcers or bleeding before. These problems may occur without warning signs.    What is this drug used for?   · It is used to treat some types of arthritis.  · It is used to ease pain.  · It may be given to you for other reasons. Talk with the doctor.    What do I need to tell my doctor BEFORE I take this drug?   All products:   · If you are allergic to this drug; any part of this drug; or any other drugs, foods, or substances. Tell your doctor about the allergy and what signs you had.  · If you have an allergy to aspirin or nonsteroidal anti-inflammatory drugs (NSAIDs) like ibuprofen or naproxen.  · If you have any of these health problems: GI (gastrointestinal) bleeding or kidney problems.  · If you have any of these health problems: Heart failure (weak heart) or a recent heart attack.  · If you are taking any other NSAID, a salicylate drug like aspirin, or pemetrexed.  · If you are having trouble getting pregnant or you are having your fertility checked.  · If you are  pregnant, plan to become pregnant, or get pregnant while taking this drug. This drug may cause harm to an unborn baby if taken at 20 weeks or later in pregnancy. If you are between 20 to 30 weeks of pregnancy, only take this drug if your doctor has told you to. Do not take this drug if you are more than 30 weeks pregnant.  Suspension:   · If you are taking sodium polystyrene sulfonate.  Oral-disintegrating tablet:   · If you have phenylketonuria (PKU). This drug has phenylalanine in it.  This is not a list of all drugs or health problems that interact with this drug.  Tell your doctor and pharmacist about all of your drugs (prescription or OTC, natural products, vitamins) and health problems. You must check to make sure that it is safe for you to take this drug with all of your drugs and health problems. Do not start, stop, or change the dose of any drug without checking with your doctor.  What are some things I need to know or do while I take this drug?   · Tell all of your health care providers that you take this drug. This includes your doctors, nurses, pharmacists, and dentists.  · Do not take more than what your doctor told you to take. Taking more than you are told may raise your chance of very bad side effects.  · Do not take this drug for longer than you were told by your doctor.  · Have your blood work checked if you are on this drug for a long time. Talk with your doctor.  · High blood pressure has happened with drugs like this one. Have your blood pressure checked as you have been told by your doctor.  · Talk with your doctor before you drink alcohol.  · If you smoke, talk with your doctor.  · If you have asthma, talk with your doctor. You may be more sensitive to this drug.  · You may bleed more easily. Be careful and avoid injury. Use a soft toothbrush and an electric razor.  · The chance of heart failure is raised with the use of drugs like this one. In people who already have heart failure, the chance  of heart attack, having to go to the hospital for heart failure, and death is raised. Talk with the doctor.  · The chance of heart attack and heart-related death is raised in people taking drugs like this one after a recent heart attack. People taking drugs like this one after a first heart attack were also more likely to die in the year after the heart attack compared with people not taking drugs like this one. Talk with the doctor.  · If you are taking aspirin to help prevent a heart attack, talk with your doctor.  · Liver problems have happened with drugs like this one. Sometimes, this has been deadly. Call your doctor right away if you have signs of liver problems like dark urine, feeling tired, not hungry, upset stomach or stomach pain, light-colored stools, throwing up, or yellow skin or eyes.  · A severe and sometimes deadly reaction has happened with drugs like this one. Most of the time, this reaction has signs like fever, rash, or swollen glands with problems in body organs like the liver, kidney, blood, heart, muscles and joints, or lungs. If you have questions, talk with the doctor.  · If you are 65 or older, use this drug with care. You could have more side effects.  · NSAIDs like this drug may affect egg release (ovulation). This may affect being able to get pregnant. This goes back to normal when this drug is stopped. Talk with the doctor.  · This drug has caused fertility problems in male animals. Fertility problems may affect being able to father a child. If this effect happens, it is not known if it will go back to normal.  · Tell your doctor if you are breast-feeding. You will need to talk about any risks to your baby.    What are some side effects that I need to call my doctor about right away?   WARNING/CAUTION: Even though it may be rare, some people may have very bad and sometimes deadly side effects when taking a drug. Tell your doctor or get medical help right away if you have any of the  following signs or symptoms that may be related to a very bad side effect:  · Signs of an allergic reaction, like rash; hives; itching; red, swollen, blistered, or peeling skin with or without fever; wheezing; tightness in the chest or throat; trouble breathing, swallowing, or talking; unusual hoarseness; or swelling of the mouth, face, lips, tongue, or throat.  · Signs of bleeding like throwing up or coughing up blood; vomit that looks like coffee grounds; blood in the urine; black, red, or tarry stools; bleeding from the gums; abnormal vaginal bleeding; bruises without a cause or that get bigger; or bleeding you cannot stop.  · Signs of kidney problems like unable to pass urine, change in how much urine is passed, blood in the urine, or a big weight gain.  · Signs of high potassium levels like a heartbeat that does not feel normal; feeling confused; feeling weak, lightheaded, or dizzy; feeling like passing out; numbness or tingling; or shortness of breath.  · Signs of high blood pressure like very bad headache or dizziness, passing out, or change in eyesight.  · Shortness of breath, a big weight gain, or swelling in the arms or legs.  · Chest pain or pressure.  · Weakness on 1 side of the body, trouble speaking or thinking, change in balance, drooping on one side of the face, or blurred eyesight.  · Feeling very tired or weak.  · Flu-like signs.  · Swollen gland.  · A severe skin reaction (Wyatt-Chris syndrome/toxic epidermal necrolysis) may happen. It can cause severe health problems that may not go away, and sometimes death. Get medical help right away if you have signs like red, swollen, blistered, or peeling skin (with or without fever); red or irritated eyes; or sores in your mouth, throat, nose, or eyes.  What are some other side effects of this drug?   All drugs may cause side effects. However, many people have no side effects or only have minor side effects. Call your doctor or get medical help if any  of these side effects or any other side effects bother you or do not go away:  All oral products:   · Constipation, diarrhea, stomach pain, upset stomach, throwing up, or feeling less hungry.  · Dizziness or headache.  · Heartburn.  · Gas.  · Signs of a common cold.  Injection:   · Constipation.  These are not all of the side effects that may occur. If you have questions about side effects, call your doctor. Call your doctor for medical advice about side effects.  You may report side effects to your national health agency.  You may report side effects to the FDA at 1-563.908.2766. You may also report side effects at https://www.fda.gov/medwatch.  How is this drug best taken?   Use this drug as ordered by your doctor. Read all information given to you. Follow all instructions closely.  All oral products:   · Take with or without food. Take with food if it causes an upset stomach.  Suspension:   · Shake well before use.  · Measure liquid doses carefully. Use the measuring device that comes with this drug. If there is none, ask the pharmacist for a device to measure this drug.  Oral-disintegrating tablet:   · Do not take this drug out of the blister pack until you are ready to take it. Take this drug right away after opening the blister pack. Do not store the removed drug for future use.  · Do not push the tablet out of the foil when opening. Use dry hands to take it from the foil. Place on your tongue and let it dissolve. Water is not needed. Do not chew, break, or crush the tablet.  Injection:   · It is given as a shot into a vein.  What do I do if I miss a dose?   All oral products:   · Take a missed dose as soon as you think about it.  · If it is close to the time for your next dose, skip the missed dose and go back to your normal time.  · Do not take 2 doses at the same time or extra doses.  Injection:   · Call your doctor to find out what to do.  How do I store and/or throw out this drug?   All oral products:    · Store at room temperature in a dry place. Do not store in a bathroom.  · Protect from heat.  Capsules, tablets, and suspension:   · Keep lid tightly closed.  Capsules:   · Store in original container.  Injection:   · If you need to store this drug at home, talk with your doctor, nurse, or pharmacist about how to store it.  All products:   · Keep all drugs in a safe place. Keep all drugs out of the reach of children and pets.  · Throw away unused or  drugs. Do not flush down a toilet or pour down a drain unless you are told to do so. Check with your pharmacist if you have questions about the best way to throw out drugs. There may be drug take-back programs in your area.  General drug facts   · If your symptoms or health problems do not get better or if they become worse, call your doctor.  · Do not share your drugs with others and do not take anyone else's drugs.  · Some drugs may have another patient information leaflet. If you have any questions about this drug, please talk with your doctor, nurse, pharmacist, or other health care provider.  · This drug comes with an extra patient fact sheet called a Medication Guide. Read it with care. Read it again each time this drug is refilled. If you have any questions about this drug, please talk with the doctor, pharmacist, or other health care provider.  · If you think there has been an overdose, call your poison control center or get medical care right away. Be ready to tell or show what was taken, how much, and when it happened.    Consumer Information Use and Disclaimer   This generalized information is a limited summary of diagnosis, treatment, and/or medication information. It is not meant to be comprehensive and should be used as a tool to help the user understand and/or assess potential diagnostic and treatment options. It does NOT include all information about conditions, treatments, medications, side effects, or risks that may apply to a specific  patient. It is not intended to be medical advice or a substitute for the medical advice, diagnosis, or treatment of a health care provider based on the health care provider's examination and assessment of a patient's specific and unique circumstances. Patients must speak with a health care provider for complete information about their health, medical questions, and treatment options, including any risks or benefits regarding use of medications. This information does not endorse any treatments or medications as safe, effective, or approved for treating a specific patient. Up[employer], Inc[employer] and its affiliates disclaim any warranty or liability relating to this information or the use thereof. The use of this information is governed by the Terms of Use, available at https://www.[employer].com/en/solutions/lexicomp/about/travon.  Last Reviewed Date   2021-05-14  Copyright   © 2021 UpToDate, Inc[employer] and its affiliates and/or licensors. All rights reserved.

## 2022-01-19 ENCOUNTER — PATIENT OUTREACH (OUTPATIENT)
Dept: ADMINISTRATIVE | Facility: OTHER | Age: 67
End: 2022-01-19
Payer: MEDICARE

## 2022-01-20 ENCOUNTER — TELEPHONE (OUTPATIENT)
Dept: OPHTHALMOLOGY | Facility: CLINIC | Age: 67
End: 2022-01-20
Payer: MEDICARE

## 2022-01-21 ENCOUNTER — TELEPHONE (OUTPATIENT)
Dept: FAMILY MEDICINE | Facility: CLINIC | Age: 67
End: 2022-01-21
Payer: MEDICARE

## 2022-01-24 ENCOUNTER — TELEPHONE (OUTPATIENT)
Dept: FAMILY MEDICINE | Facility: CLINIC | Age: 67
End: 2022-01-24
Payer: MEDICARE

## 2022-01-24 NOTE — TELEPHONE ENCOUNTER
----- Message from Cara Patel sent at 1/24/2022  4:22 PM CST -----  Name of Who is Calling:       What is the request in detail: Pt states she needs an order for a Physical Therapy and [facility] placed in the system. Please contact to further discuss and advise.        Can the clinic reply by [facility]: N      What Number to Call Back if not in [facility]:

## 2022-01-25 ENCOUNTER — OFFICE VISIT (OUTPATIENT)
Dept: PODIATRY | Facility: CLINIC | Age: 67
End: 2022-01-25
Payer: MEDICARE

## 2022-01-25 ENCOUNTER — HOSPITAL ENCOUNTER (OUTPATIENT)
Dept: RADIOLOGY | Facility: HOSPITAL | Age: 67
Discharge: HOME | End: 2022-01-25
Attending: PODIATRIST
Payer: MEDICARE

## 2022-01-25 VITALS — BODY MASS INDEX: 30.2 KG/M2 | HEIGHT: 63 IN | WEIGHT: 170.44 LBS

## 2022-01-25 DIAGNOSIS — G60.0 CHARCOT-MARIE DISEASE: ICD-10-CM

## 2022-01-25 DIAGNOSIS — M79.671 CHRONIC FOOT PAIN, RIGHT: Primary | ICD-10-CM

## 2022-01-25 DIAGNOSIS — B35.1 ONYCHOMYCOSIS DUE TO DERMATOPHYTE: ICD-10-CM

## 2022-01-25 DIAGNOSIS — G89.29 CHRONIC FOOT PAIN, RIGHT: ICD-10-CM

## 2022-01-25 DIAGNOSIS — G89.29 CHRONIC FOOT PAIN, RIGHT: Primary | ICD-10-CM

## 2022-01-25 DIAGNOSIS — M79.671 CHRONIC FOOT PAIN, RIGHT: ICD-10-CM

## 2022-01-25 PROCEDURE — 99204 OFFICE O/P NEW MOD 45 MIN: CPT | Mod: S$GLB,,, | Performed by: PODIATRIST

## 2022-01-25 PROCEDURE — 73630 X-RAY EXAM OF FOOT: CPT | Mod: TC,50,FY,PO

## 2022-01-25 PROCEDURE — 99999 PR PBB SHADOW E&M-EST. PATIENT-LVL V: CPT | Mod: PBBFAC,,, | Performed by: PODIATRIST

## 2022-01-25 PROCEDURE — 99999 PR PBB SHADOW E&M-EST. PATIENT-LVL V: ICD-10-PCS | Mod: PBBFAC,,, | Performed by: PODIATRIST

## 2022-01-25 PROCEDURE — 73630 XR FOOT COMPLETE 3 VIEW BILATERAL: ICD-10-PCS | Mod: 26,50,, | Performed by: RADIOLOGY

## 2022-01-25 PROCEDURE — 99204 PR OFFICE/OUTPT VISIT, NEW, LEVL IV, 45-59 MIN: ICD-10-PCS | Mod: S$GLB,,, | Performed by: PODIATRIST

## 2022-01-25 PROCEDURE — 73630 X-RAY EXAM OF FOOT: CPT | Mod: 26,50,, | Performed by: RADIOLOGY

## 2022-01-25 PROCEDURE — 99215 OFFICE O/P EST HI 40 MIN: CPT | Mod: PBBFAC,PO | Performed by: PODIATRIST

## 2022-01-25 RX ORDER — CICLOPIROX 80 MG/ML
SOLUTION TOPICAL NIGHTLY
Qty: 6.6 ML | Refills: 3 | Status: SHIPPED | OUTPATIENT
Start: 2022-01-25 | End: 2023-11-12

## 2022-01-25 NOTE — PATIENT INSTRUCTIONS
Kerasal for fungal nails apply daily to all toenails.  Can be found at [facility]

## 2022-01-26 NOTE — PROGRESS NOTES
Subjective:      Patient ID: Estella Arevalo is a 66 y.o. female.    Chief Complaint: Foot Swelling, Nail Problem, and Foot Problem    Estella is a 66 y.o. female who presents to the podiatry clinic  with complaint of  bilateral foot pain. Onset of the symptoms was several weeks ago. Precipitating event: none known. Current symptoms include: ability to bear weight, but with some pain. Aggravating factors: any weight bearing. Symptoms have progressed to a point and plateaued. Patient has had no prior foot problems. Evaluation to date: none. Treatment to date: none. Patients rates pain 5/10 on pain scale. Reports h/o Charcot right foot with previous sx at OSH several years ago.         Review of Systems   Constitutional: Negative for chills.   Cardiovascular: Negative for chest pain and claudication.   Respiratory: Negative for cough.    Skin: Positive for color change, dry skin and nail changes.   Musculoskeletal: Positive for joint pain.   Gastrointestinal: Negative for nausea.   Neurological: Positive for paresthesias. Negative for numbness.   Psychiatric/Behavioral: The patient is not nervous/anxious.            Objective:      Physical Exam  Constitutional:       Appearance: She is well-developed.      Comments: Oriented to time, place, and person.   Cardiovascular:      Comments: DP and PT pulses are palpable bilaterally. 3 sec capillary refill time and toes and feet are warm to touch proximally .  There is  hair growth on the feet and toes b/l. There is no edema b/l. No spider veins or varicosities present b/l.     Musculoskeletal:      Comments: Equinus noted b/l ankles with < 10 deg DF noted. MMT 5/5 in DF/PF/Inv/Ev resistance with no reproduction of pain in any direction. Passive range of motion of ankle and pedal joints is painless b/l.    B/L midfoot pain    Feet:      Right foot:      Skin integrity: No callus or dry skin.      Left foot:      Skin integrity: No callus or dry skin.   Lymphadenopathy:       Comments: Negative lymphadenopathy bilateral popliteal fossa and tarsal tunnel.   Skin:     Comments: No open lesions, lacerations or wounds noted.Interdigital spaces clean, dry and intact b/l. No erythema noted to b/l foot.    Toenails 1-5 bilaterally are elongated by 2-3 mm, thickened by 2-3 mm, discolored/yellowed, dystrophic, brittle with subungual debris.       Neurological:      Mental Status: She is alert.      Comments: Light touch, proprioception, and sharp/dull sensation are all intact bilaterally. Protective threshold with the [place]-Wienstein monofilament is intact bilaterally.    Psychiatric:         Mood and Affect: Mood and affect normal.         Behavior: Behavior is cooperative.               Assessment:       Encounter Diagnoses   Name Primary?    Chronic foot pain, right Yes    Onychomycosis due to dermatophyte     Charcot-Snehal disease          Plan:       Estella was seen today for foot swelling, nail problem and foot problem.    Diagnoses and all orders for this visit:    Chronic foot pain, right  -     Ambulatory referral/consult to Podiatry  -     X-Ray Foot Complete Bilateral; Future    Onychomycosis due to dermatophyte    Charcot-Snehal disease    Other orders  -     ciclopirox (PENLAC) 8 % Soln; Apply topically nightly.      I counseled the patient on her conditions, their implications and medical management.        At patient's request, I discussed different treatments for toenail fungus. We discussed oral antifungals but I did not recommend them as a first line treatment since the medication is taken internally and can have side effects such as rash, taste disturbances, and liver enzyme elevation. We discussed topical Penlac to be applied daily and removed weekly. Pt. Expresses understanding and would like to try the Penlac. Rx sent to the pharmacy.     B/L foot xray to assess underlying deformity and for underlying osseous pathology.     Discussed conservative treatment with shoes of  adequate dimensions, material, and style to alleviate symptoms and delay or prevent surgical intervention.  Information given on Spenco orthotics.     RTC PRN

## 2022-01-27 ENCOUNTER — TELEPHONE (OUTPATIENT)
Dept: OPHTHALMOLOGY | Facility: CLINIC | Age: 67
End: 2022-01-27
Payer: MEDICARE

## 2022-01-27 NOTE — TELEPHONE ENCOUNTER
----- Message from Kortney Leavitt sent at 1/27/2022  1:11 PM CST -----  Contact: Estella #[phone]  Pt states she needs to be seen sooner than the 1st available because she has a cataract in both eyes and also needs a lens replacement. She states she is unable to see at night

## 2022-01-28 ENCOUNTER — PATIENT OUTREACH (OUTPATIENT)
Dept: ADMINISTRATIVE | Facility: HOSPITAL | Age: 67
End: 2022-01-28
Payer: MEDICARE

## 2022-01-28 ENCOUNTER — TELEPHONE (OUTPATIENT)
Dept: PODIATRY | Facility: CLINIC | Age: 67
End: 2022-01-28
Payer: MEDICARE

## 2022-01-28 NOTE — LETTER
AUTHORIZATION FOR RELEASE OF   CONFIDENTIAL INFORMATION    Dear Hernandez Farias MD,    We are seeing Estella Arevalo, date of birth 1955, in the clinic at [facility]/ INTERNAL MED/ PEDS. Angela Snowden MD is the patient's PCP. Estella Arevalo has an outstanding lab/procedure at the time we reviewed her chart. In order to help keep her health information updated, she has authorized us to request the following medical record(s):        (  )  MAMMOGRAM                                      (  x)  COLONOSCOPY      (  )  PAP SMEAR                                          (  )  OUTSIDE LAB RESULTS     (  )  DEXA SCAN                                          (  )  EYE EXAM            (  )  FOOT EXAM                                          (  )  ENTIRE RECORD     (  )  OUTSIDE IMMUNIZATIONS                 (  )  _______________         Please fax records to [facility], Nichole G George, MD, FAX [phone] [address] [address]     If you have any questions, please contact Sabrina Esposito SWETA [phone].          Patient Name: Estella Arevalo  : 1955  Patient Phone #: [phone]

## 2022-01-28 NOTE — TELEPHONE ENCOUNTER
----- Message from Trinidad Post sent at 1/28/2022  3:12 PM CST -----  Regarding: self  .Type:  Sooner Appointment Request    Patient is requesting a sooner appointment.  Patient declined first available appointment listed as well as another facility and provider .  Patient will not accept being placed on the waitlist and is requesting a message be sent to doctor.    Name of Caller: self     When is the first available appointment? 2/8/22     Symptoms: patient states for check up on feet     Would the patient rather a call back or a response via [facility]? Call     Best Call Back Number: .[phone]

## 2022-01-31 ENCOUNTER — OFFICE VISIT (OUTPATIENT)
Dept: FAMILY MEDICINE | Facility: CLINIC | Age: 67
End: 2022-01-31
Payer: OTHER GOVERNMENT

## 2022-01-31 VITALS
SYSTOLIC BLOOD PRESSURE: 130 MMHG | HEART RATE: 89 BPM | WEIGHT: 164 LBS | DIASTOLIC BLOOD PRESSURE: 88 MMHG | OXYGEN SATURATION: 92 % | HEIGHT: 63 IN | TEMPERATURE: 99 F | BODY MASS INDEX: 29.06 KG/M2

## 2022-01-31 DIAGNOSIS — H91.93 DECREASED HEARING, BILATERAL: Primary | ICD-10-CM

## 2022-01-31 PROCEDURE — 99213 OFFICE O/P EST LOW 20 MIN: CPT | Mod: S$GLB,,, | Performed by: NURSE PRACTITIONER

## 2022-01-31 PROCEDURE — 99999 PR PBB SHADOW E&M-EST. PATIENT-LVL V: ICD-10-PCS | Mod: PBBFAC,,, | Performed by: NURSE PRACTITIONER

## 2022-01-31 PROCEDURE — 99215 OFFICE O/P EST HI 40 MIN: CPT | Mod: PBBFAC,PO | Performed by: NURSE PRACTITIONER

## 2022-01-31 PROCEDURE — 99999 PR PBB SHADOW E&M-EST. PATIENT-LVL V: CPT | Mod: PBBFAC,,, | Performed by: NURSE PRACTITIONER

## 2022-01-31 PROCEDURE — 99213 PR OFFICE/OUTPT VISIT, EST, LEVL III, 20-29 MIN: ICD-10-PCS | Mod: S$GLB,,, | Performed by: NURSE PRACTITIONER
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
## 2022-02-01 ENCOUNTER — TELEPHONE (OUTPATIENT)
Dept: OPHTHALMOLOGY | Facility: CLINIC | Age: 67
End: 2022-02-01
Payer: MEDICARE

## 2022-02-01 NOTE — TELEPHONE ENCOUNTER
----- Message from Enrike Tran sent at 1/27/2022  1:43 PM CST -----  Contact: Estella #[phone]  Pt is ready to schedule s/x. She is schedule for IOL measurement for 2/10/22.   ----- Message -----  From: Kortney Leavitt  Sent: 1/27/2022   1:13 PM CST  To: Sabrina SORIA Staff    Pt states she needs to be seen sooner than the 1st available because she has a cataract in both eyes and also needs a lens replacement. She states she is unable to see at night

## 2022-02-10 ENCOUNTER — OFFICE VISIT (OUTPATIENT)
Dept: OPHTHALMOLOGY | Facility: CLINIC | Age: 67
End: 2022-02-10
Payer: MEDICARE

## 2022-02-10 DIAGNOSIS — H25.13 NUCLEAR SCLEROSIS OF BOTH EYES: Primary | ICD-10-CM

## 2022-02-10 DIAGNOSIS — H25.043 POSTERIOR SUBCAPSULAR AGE-RELATED CATARACT OF BOTH EYES: ICD-10-CM

## 2022-02-10 PROCEDURE — 99999 PR PBB SHADOW E&M-EST. PATIENT-LVL III: CPT | Mod: PBBFAC,,, | Performed by: OPHTHALMOLOGY

## 2022-02-10 PROCEDURE — 99213 OFFICE O/P EST LOW 20 MIN: CPT | Mod: PBBFAC,PO | Performed by: OPHTHALMOLOGY

## 2022-02-10 PROCEDURE — 99499 UNLISTED E&M SERVICE: CPT | Mod: S$GLB,,, | Performed by: OPHTHALMOLOGY

## 2022-02-10 PROCEDURE — 99499 NO LOS: ICD-10-PCS | Mod: S$GLB,,, | Performed by: OPHTHALMOLOGY

## 2022-02-10 PROCEDURE — 92136 PR OPHTHAL BIOMETRY,INTRAOC LENS POW CALC: ICD-10-PCS | Mod: LT,S$GLB,, | Performed by: OPHTHALMOLOGY

## 2022-02-10 PROCEDURE — 99999 PR PBB SHADOW E&M-EST. PATIENT-LVL III: ICD-10-PCS | Mod: PBBFAC,,, | Performed by: OPHTHALMOLOGY

## 2022-02-10 PROCEDURE — 92136 OPHTHALMIC BIOMETRY: CPT | Mod: PBBFAC,PO | Performed by: OPHTHALMOLOGY

## 2022-02-10 PROCEDURE — 92136 OPHTHALMIC BIOMETRY: CPT | Mod: LT,S$GLB,, | Performed by: OPHTHALMOLOGY

## 2022-02-10 NOTE — PROGRESS NOTES
Subjective:       Patient ID: Estella Arevalo is a 66 y.o. female.    Chief Complaint: Pre-op Exam    HPI     DLS: 10/27/2021 Dr. Bennett    66 y.o. female is here for pre op exam. IOL measurements were done on   today. No Toric lens per pt. Need Restasis filled. H/o surgery Orbital   fracture,metal floor of right eye     Eye Med's: Systane Hyradration PF TID OU     Last edited by ALEXIA Riley on 2/10/2022  4:05 PM. (History)             Assessment:       1. Nuclear sclerosis of both eyes    2. Posterior subcapsular age-related cataract of both eyes        Plan:       Visually significant cataract OU -Pt. Wants Sx. Pt is here for LENSTAR.      Cataract Surgery Consent: Patient with a visually significant cataract with difficulties of ADLs, reading, driving, night vision, glare (any and all).  Discussed with Patient/Family/Caregiver: options, risks and benefits, expectations of cataract surgery, utilized an eye model with questions and answers to facilitate discussion.  Discussed lens options and patient understands that glasses may be required for optimal vision for distance and/or near vision after cataract surgery.  The Patient/Family/Caregiver  voice good understanding and patient wishes to proceed with surgery.  The patient will likely benefit from surgery and patient signed consent for Left Eye.     Will call Pt to schedule CE OS 1st [id number],                                              OD 2nd [id number] 15.0.

## 2022-02-15 ENCOUNTER — TELEPHONE (OUTPATIENT)
Dept: OPHTHALMOLOGY | Facility: CLINIC | Age: 67
End: 2022-02-15
Payer: MEDICARE

## 2022-02-15 ENCOUNTER — OFFICE VISIT (OUTPATIENT)
Dept: PODIATRY | Facility: CLINIC | Age: 67
End: 2022-02-15
Payer: MEDICARE

## 2022-02-15 VITALS — WEIGHT: 164 LBS | BODY MASS INDEX: 29.06 KG/M2 | HEIGHT: 63 IN

## 2022-02-15 DIAGNOSIS — M79.671 CHRONIC FOOT PAIN, RIGHT: Primary | ICD-10-CM

## 2022-02-15 DIAGNOSIS — G60.0 CHARCOT-MARIE DISEASE: ICD-10-CM

## 2022-02-15 DIAGNOSIS — G89.29 CHRONIC FOOT PAIN, RIGHT: Primary | ICD-10-CM

## 2022-02-15 PROCEDURE — 99214 OFFICE O/P EST MOD 30 MIN: CPT | Mod: S$GLB,,, | Performed by: PODIATRIST

## 2022-02-15 PROCEDURE — 99999 PR PBB SHADOW E&M-EST. PATIENT-LVL V: ICD-10-PCS | Mod: PBBFAC,,, | Performed by: PODIATRIST

## 2022-02-15 PROCEDURE — 99215 OFFICE O/P EST HI 40 MIN: CPT | Mod: PBBFAC,PO | Performed by: PODIATRIST

## 2022-02-15 PROCEDURE — 99999 PR PBB SHADOW E&M-EST. PATIENT-LVL V: CPT | Mod: PBBFAC,,, | Performed by: PODIATRIST

## 2022-02-15 PROCEDURE — 99214 PR OFFICE/OUTPT VISIT, EST, LEVL IV, 30-39 MIN: ICD-10-PCS | Mod: S$GLB,,, | Performed by: PODIATRIST

## 2022-02-15 RX ORDER — METHYLPREDNISOLONE 4 MG/1
TABLET ORAL
Qty: 1 EACH | Refills: 0 | Status: SHIPPED | OUTPATIENT
Start: 2022-02-15 | End: 2022-03-08

## 2022-02-16 DIAGNOSIS — H25.13 NUCLEAR SCLEROTIC CATARACT OF BOTH EYES: Primary | ICD-10-CM

## 2022-02-16 RX ORDER — NEPAFENAC 3 MG/ML
1 SUSPENSION/ DROPS OPHTHALMIC DAILY
Qty: 3 ML | Refills: 1 | Status: SHIPPED | OUTPATIENT
Start: 2022-04-04 | End: 2022-05-04

## 2022-02-16 RX ORDER — OFLOXACIN 3 MG/ML
1 SOLUTION/ DROPS OPHTHALMIC 4 TIMES DAILY
Qty: 5 ML | Refills: 1 | Status: SHIPPED | OUTPATIENT
Start: 2022-04-04 | End: 2022-04-14

## 2022-02-16 RX ORDER — DIFLUPREDNATE OPHTHALMIC 0.5 MG/ML
1 EMULSION OPHTHALMIC 4 TIMES DAILY
Qty: 5 ML | Refills: 1 | Status: SHIPPED | OUTPATIENT
Start: 2022-04-07 | End: 2022-05-07

## 2022-02-17 NOTE — PROGRESS NOTES
Subjective:      Patient ID: Estella Arevalo is a 66 y.o. female.    Chief Complaint: Foot Problem (Bilateral foot pain ), Foot Pain, and Follow-up    Estella is a 66 y.o. female who presents to the podiatry clinic  with complaint of  bilateral foot pain. Onset of the symptoms was several weeks ago. Precipitating event: none known. Current symptoms include: ability to bear weight, but with some pain. Aggravating factors: any weight bearing. Symptoms have progressed to a point and plateaued. Patient has had no prior foot problems. Evaluation to date: none. Treatment to date: none. Patients rates pain 5/10 on pain scale. Reports h/o Charcot right foot with previous sx at OSH several years ago.     2/15/22: F/u B/L foot pain R>L. Reports continued pain to right foot.     Review of Systems   Constitutional: Negative for chills.   Cardiovascular: Negative for chest pain and claudication.   Respiratory: Negative for cough.    Skin: Positive for color change, dry skin and nail changes.   Musculoskeletal: Positive for joint pain.   Gastrointestinal: Negative for nausea.   Neurological: Positive for paresthesias. Negative for numbness.   Psychiatric/Behavioral: The patient is not nervous/anxious.            Objective:      Physical Exam  Constitutional:       Appearance: She is well-developed.      Comments: Oriented to time, place, and person.   Cardiovascular:      Comments: DP and PT pulses are palpable bilaterally. 3 sec capillary refill time and toes and feet are warm to touch proximally .  There is  hair growth on the feet and toes b/l. There is no edema b/l. No spider veins or varicosities present b/l.     Musculoskeletal:      Comments: Equinus noted b/l ankles with < 10 deg DF noted. MMT 5/5 in DF/PF/Inv/Ev resistance with no reproduction of pain in any direction. Passive range of motion of ankle and pedal joints is painless b/l.    B/L midfoot pain    Feet:      Right foot:      Skin integrity: No callus or dry  skin.      Left foot:      Skin integrity: No callus or dry skin.   Lymphadenopathy:      Comments: Negative lymphadenopathy bilateral popliteal fossa and tarsal tunnel.   Skin:     Comments: No open lesions, lacerations or wounds noted.Interdigital spaces clean, dry and intact b/l. No erythema noted to b/l foot.    Toenails 1-5 bilaterally are elongated by 2-3 mm, thickened by 2-3 mm, discolored/yellowed, dystrophic, brittle with subungual debris.       Neurological:      Mental Status: She is alert.      Comments: Light touch, proprioception, and sharp/dull sensation are all intact bilaterally. Protective threshold with the [place]-Wienstein monofilament is intact bilaterally.    Psychiatric:         Behavior: Behavior is cooperative.               Assessment:       Encounter Diagnoses   Name Primary?    Chronic foot pain, right Yes    Charcot-Snehal disease          Plan:       Estella was seen today for foot problem, foot pain and follow-up.    Diagnoses and all orders for this visit:    Chronic foot pain, right  -     Ambulatory referral/consult to Pain Clinic; Future  -     Ambulatory referral/consult to Orthopedics; Future    Charcot-Snehal disease    Other orders  -     methylPREDNISolone (MEDROLMONIQUE,) 4 mg tablet; use as directed      I counseled the patient on her conditions, their implications and medical management.      B/L foot xray to assess underlying deformity and for underlying osseous pathology. Reviewed with patient.     Conservative treatments for Charcot foot deformity discussed include padding,extra-depth shoes Patient s/p right foot recon, now requesting second opinion. Referral placed to ortho.     Referral to pain mangaement    Patient instructed on adequate icing techniques. Patient should ice the affected area at least once per day x 10 minutes for 10 days . I advised the patient that extra icing would also be beneficial to ensure adequate anti inflammatory effect        Medrol dose monique  prescribed, advised patient to take meds as directed. Patient should complete medication. If problems occur notify the clinic     Discussed conservative treatment with shoes of adequate dimensions, material, and style to alleviate symptoms and delay or prevent surgical intervention.  Information given on Spenco orthotics.     RTC PRN

## 2022-03-04 ENCOUNTER — TELEPHONE (OUTPATIENT)
Dept: OPHTHALMOLOGY | Facility: CLINIC | Age: 67
End: 2022-03-04
Payer: MEDICARE

## 2022-03-04 DIAGNOSIS — H25.12 NS (NUCLEAR SCLEROSIS), LEFT: Primary | ICD-10-CM

## 2022-03-10 ENCOUNTER — TELEPHONE (OUTPATIENT)
Dept: OPHTHALMOLOGY | Facility: CLINIC | Age: 67
End: 2022-03-10
Payer: MEDICARE

## 2022-03-10 DIAGNOSIS — H25.11 NS (NUCLEAR SCLEROSIS), RIGHT: Primary | ICD-10-CM

## 2022-03-30 ENCOUNTER — HOSPITAL ENCOUNTER (EMERGENCY)
Facility: HOSPITAL | Age: 67
Discharge: HOME | End: 2022-03-30
Attending: EMERGENCY MEDICINE
Payer: MEDICARE

## 2022-03-30 VITALS
OXYGEN SATURATION: 95 % | RESPIRATION RATE: 17 BRPM | HEART RATE: 77 BPM | SYSTOLIC BLOOD PRESSURE: 187 MMHG | DIASTOLIC BLOOD PRESSURE: 89 MMHG | WEIGHT: 164 LBS | BODY MASS INDEX: 29.05 KG/M2 | TEMPERATURE: 99 F

## 2022-03-30 DIAGNOSIS — R11.10 VOMITING: ICD-10-CM

## 2022-03-30 DIAGNOSIS — S02.40CA CLOSED FRACTURE OF RIGHT SIDE OF MAXILLA, INITIAL ENCOUNTER: ICD-10-CM

## 2022-03-30 DIAGNOSIS — K42.9 UMBILICAL HERNIA WITHOUT OBSTRUCTION AND WITHOUT GANGRENE: ICD-10-CM

## 2022-03-30 DIAGNOSIS — K57.90 DIVERTICULOSIS: ICD-10-CM

## 2022-03-30 DIAGNOSIS — R11.2 NON-INTRACTABLE VOMITING WITH NAUSEA, UNSPECIFIED VOMITING TYPE: Primary | ICD-10-CM

## 2022-03-30 DIAGNOSIS — R03.0 ELEVATED BLOOD PRESSURE READING: ICD-10-CM

## 2022-03-30 DIAGNOSIS — S02.2XXA CLOSED FRACTURE OF NASAL BONE, INITIAL ENCOUNTER: ICD-10-CM

## 2022-03-30 LAB
ALBUMIN SERPL BCP-MCNC: 4.3 G/DL (ref 3.5–5.2)
ALP SERPL-CCNC: 149 U/L (ref 55–135)
ALT SERPL W/O P-5'-P-CCNC: 18 U/L (ref 10–44)
AMPHET+METHAMPHET UR QL: NEGATIVE
ANION GAP SERPL CALC-SCNC: 11 MMOL/L (ref 8–16)
AST SERPL-CCNC: 26 U/L (ref 10–40)
BACTERIA #/AREA URNS HPF: ABNORMAL /HPF
BARBITURATES UR QL SCN>200 NG/ML: NEGATIVE
BASOPHILS # BLD AUTO: 0.02 K/UL (ref 0–0.2)
BASOPHILS NFR BLD: 0.3 % (ref 0–1.9)
BENZODIAZ UR QL SCN>200 NG/ML: NEGATIVE
BILIRUB SERPL-MCNC: 0.6 MG/DL (ref 0.1–1)
BILIRUB UR QL STRIP: NEGATIVE
BUN SERPL-MCNC: 7 MG/DL (ref 8–23)
BZE UR QL SCN: NEGATIVE
CALCIUM SERPL-MCNC: 9.2 MG/DL (ref 8.7–10.5)
CANNABINOIDS UR QL SCN: NEGATIVE
CHLORIDE SERPL-SCNC: 99 MMOL/L (ref 95–110)
CLARITY UR: CLEAR
CO2 SERPL-SCNC: 26 MMOL/L (ref 23–29)
COLOR UR: YELLOW
CREAT SERPL-MCNC: 0.6 MG/DL (ref 0.5–1.4)
CREAT UR-MCNC: 56 MG/DL (ref 15–325)
DIFFERENTIAL METHOD: ABNORMAL
EOSINOPHIL # BLD AUTO: 0 K/UL (ref 0–0.5)
EOSINOPHIL NFR BLD: 0.2 % (ref 0–8)
ERYTHROCYTE [DISTWIDTH] IN BLOOD BY AUTOMATED COUNT: 15.9 % (ref 11.5–14.5)
EST. GFR  (AFRICAN AMERICAN): >60 ML/MIN/1.73 M^2
EST. GFR  (NON AFRICAN AMERICAN): >60 ML/MIN/1.73 M^2
ETHANOL SERPL-MCNC: <10 MG/DL
GLUCOSE SERPL-MCNC: 179 MG/DL (ref 70–110)
GLUCOSE UR QL STRIP: ABNORMAL
HCT VFR BLD AUTO: 39.1 % (ref 37–48.5)
HGB BLD-MCNC: 13.1 G/DL (ref 12–16)
HGB UR QL STRIP: NEGATIVE
HYALINE CASTS #/AREA URNS LPF: 0 /LPF
IMM GRANULOCYTES # BLD AUTO: 0.02 K/UL (ref 0–0.04)
IMM GRANULOCYTES NFR BLD AUTO: 0.3 % (ref 0–0.5)
KETONES UR QL STRIP: ABNORMAL
LEUKOCYTE ESTERASE UR QL STRIP: ABNORMAL
LIPASE SERPL-CCNC: 11 U/L (ref 4–60)
LYMPHOCYTES # BLD AUTO: 0.4 K/UL (ref 1–4.8)
LYMPHOCYTES NFR BLD: 7.5 % (ref 18–48)
MAGNESIUM SERPL-MCNC: 1.9 MG/DL (ref 1.6–2.6)
MCH RBC QN AUTO: 32 PG (ref 27–31)
MCHC RBC AUTO-ENTMCNC: 33.5 G/DL (ref 32–36)
MCV RBC AUTO: 95 FL (ref 82–98)
METHADONE UR QL SCN>300 NG/ML: NEGATIVE
MICROSCOPIC COMMENT: ABNORMAL
MONOCYTES # BLD AUTO: 0.1 K/UL (ref 0.3–1)
MONOCYTES NFR BLD: 1.6 % (ref 4–15)
NEUTROPHILS # BLD AUTO: 5.2 K/UL (ref 1.8–7.7)
NEUTROPHILS NFR BLD: 90.1 % (ref 38–73)
NITRITE UR QL STRIP: POSITIVE
NRBC BLD-RTO: 0 /100 WBC
OPIATES UR QL SCN: ABNORMAL
PCP UR QL SCN>25 NG/ML: NEGATIVE
PH UR STRIP: 7 [PH] (ref 5–8)
PLATELET # BLD AUTO: 194 K/UL (ref 150–450)
PMV BLD AUTO: 10.5 FL (ref 9.2–12.9)
POTASSIUM SERPL-SCNC: 3.5 MMOL/L (ref 3.5–5.1)
PROT SERPL-MCNC: 8.2 G/DL (ref 6–8.4)
PROT UR QL STRIP: ABNORMAL
RBC # BLD AUTO: 4.1 M/UL (ref 4–5.4)
RBC #/AREA URNS HPF: 2 /HPF (ref 0–4)
SODIUM SERPL-SCNC: 136 MMOL/L (ref 136–145)
SP GR UR STRIP: 1.01 (ref 1–1.03)
TOXICOLOGY INFORMATION: ABNORMAL
TROPONIN I SERPL DL<=0.01 NG/ML-MCNC: 0.01 NG/ML (ref 0–0.03)
URN SPEC COLLECT METH UR: ABNORMAL
UROBILINOGEN UR STRIP-ACNC: NEGATIVE EU/DL
WBC # BLD AUTO: 5.75 K/UL (ref 3.9–12.7)
WBC #/AREA URNS HPF: 6 /HPF (ref 0–5)

## 2022-03-30 PROCEDURE — 80053 COMPREHEN METABOLIC PANEL: CPT | Performed by: EMERGENCY MEDICINE

## 2022-03-30 PROCEDURE — 83690 ASSAY OF LIPASE: CPT | Performed by: EMERGENCY MEDICINE

## 2022-03-30 PROCEDURE — 93010 ELECTROCARDIOGRAM REPORT: CPT | Mod: ,,, | Performed by: INTERNAL MEDICINE

## 2022-03-30 PROCEDURE — 99285 EMERGENCY DEPT VISIT HI MDM: CPT | Mod: 25

## 2022-03-30 PROCEDURE — 25500020 PHARM REV CODE 255: Performed by: EMERGENCY MEDICINE

## 2022-03-30 PROCEDURE — 96375 TX/PRO/DX INJ NEW DRUG ADDON: CPT

## 2022-03-30 PROCEDURE — 63600175 PHARM REV CODE 636 W HCPCS: Performed by: EMERGENCY MEDICINE

## 2022-03-30 PROCEDURE — 85025 COMPLETE CBC W/AUTO DIFF WBC: CPT | Performed by: EMERGENCY MEDICINE

## 2022-03-30 PROCEDURE — 96365 THER/PROPH/DIAG IV INF INIT: CPT

## 2022-03-30 PROCEDURE — 82077 ASSAY SPEC XCP UR&BREATH IA: CPT | Performed by: EMERGENCY MEDICINE

## 2022-03-30 PROCEDURE — 93005 ELECTROCARDIOGRAM TRACING: CPT

## 2022-03-30 PROCEDURE — 84484 ASSAY OF TROPONIN QUANT: CPT | Performed by: EMERGENCY MEDICINE

## 2022-03-30 PROCEDURE — 25000003 PHARM REV CODE 250: Performed by: EMERGENCY MEDICINE

## 2022-03-30 PROCEDURE — 96367 TX/PROPH/DG ADDL SEQ IV INF: CPT

## 2022-03-30 PROCEDURE — 83735 ASSAY OF MAGNESIUM: CPT | Performed by: EMERGENCY MEDICINE

## 2022-03-30 PROCEDURE — 81000 URINALYSIS NONAUTO W/SCOPE: CPT | Mod: 59 | Performed by: EMERGENCY MEDICINE

## 2022-03-30 PROCEDURE — 93010 EKG 12-LEAD: ICD-10-PCS | Mod: ,,, | Performed by: INTERNAL MEDICINE

## 2022-03-30 PROCEDURE — 80307 DRUG TEST PRSMV CHEM ANLYZR: CPT | Performed by: EMERGENCY MEDICINE

## 2022-03-30 RX ORDER — ONDANSETRON 2 MG/ML
4 INJECTION INTRAMUSCULAR; INTRAVENOUS
Status: COMPLETED | OUTPATIENT
Start: 2022-03-30 | End: 2022-03-30

## 2022-03-30 RX ORDER — AMOXICILLIN AND CLAVULANATE POTASSIUM 875; 125 MG/1; MG/1
1 TABLET, FILM COATED ORAL 2 TIMES DAILY
Qty: 14 TABLET | Refills: 0 | Status: SHIPPED | OUTPATIENT
Start: 2022-03-30 | End: 2022-06-05

## 2022-03-30 RX ORDER — AMOXICILLIN AND CLAVULANATE POTASSIUM 875; 125 MG/1; MG/1
1 TABLET, FILM COATED ORAL 2 TIMES DAILY
Qty: 14 TABLET | Refills: 0 | Status: SHIPPED | OUTPATIENT
Start: 2022-03-30 | End: 2022-03-30 | Stop reason: SDUPTHER

## 2022-03-30 RX ORDER — PROMETHAZINE HYDROCHLORIDE 25 MG/1
25 SUPPOSITORY RECTAL EVERY 6 HOURS PRN
Qty: 10 SUPPOSITORY | Refills: 0 | Status: SHIPPED | OUTPATIENT
Start: 2022-03-30 | End: 2022-04-26 | Stop reason: SDUPTHER

## 2022-03-30 RX ORDER — ONDANSETRON 4 MG/1
4 TABLET, ORALLY DISINTEGRATING ORAL EVERY 8 HOURS PRN
Qty: 12 TABLET | Refills: 0 | Status: SHIPPED | OUTPATIENT
Start: 2022-03-30 | End: 2022-04-26 | Stop reason: CLARIF

## 2022-03-30 RX ORDER — DROPERIDOL 2.5 MG/ML
1.25 INJECTION, SOLUTION INTRAMUSCULAR; INTRAVENOUS ONCE
Status: COMPLETED | OUTPATIENT
Start: 2022-03-30 | End: 2022-03-30

## 2022-03-30 RX ADMIN — ONDANSETRON 4 MG: 2 INJECTION INTRAMUSCULAR; INTRAVENOUS at 03:03

## 2022-03-30 RX ADMIN — PROMETHAZINE HYDROCHLORIDE 12.5 MG: 25 INJECTION INTRAMUSCULAR; INTRAVENOUS at 02:03

## 2022-03-30 RX ADMIN — SODIUM CHLORIDE, SODIUM LACTATE, POTASSIUM CHLORIDE, AND CALCIUM CHLORIDE 1000 ML: .6; .31; .03; .02 INJECTION, SOLUTION INTRAVENOUS at 02:03

## 2022-03-30 RX ADMIN — CEFTRIAXONE 1 G: 1 INJECTION, SOLUTION INTRAVENOUS at 03:03

## 2022-03-30 RX ADMIN — DROPERIDOL 1.25 MG: 2.5 INJECTION, SOLUTION INTRAMUSCULAR; INTRAVENOUS at 04:03

## 2022-03-30 RX ADMIN — IOHEXOL 85 ML: 350 INJECTION, SOLUTION INTRAVENOUS at 03:03

## 2022-03-30 NOTE — DISCHARGE INSTRUCTIONS
Take your blood pressure at home twice a day for 3 days and write these numbers down, bring with you to your primary care doctor in 2-3 days for blood pressure recheck as you may need your medications changed. Return to the ED for chest pain, shortness of breath, numbness, weakness, loss of vision or any other concerns.     Return for fever, inability to keep water down, abdominal pain or any other concerns.     Place an ice pack or a bag of frozen peas wrapped in a towel over the painful part. Never put ice right on the skin. Do not leave the ice on more than 10 to 15 minutes at a time. Prop your head on pillows when sleeping, lying down, or resting. This will help with swelling. Avoid sniffing. Gently pat or dab away any drainage with a clean cloth.     Sinus Precautions:     For at least two weeks DO NOT:  blow your nose  drink through a straw  smoke  fly in a plane  For at least 4 weeks DO NOT:  play a wind instrument  cough or sneeze with your mouth closed  DO:  take all of the prescribed antibiotic  purchase a decongestant and take it as directed for the next 2 weeks  keep you follow-up appointment with your surgeon.  not worry if you notice minor bleeding or fluid from your nose. This is very normal.      Thank you for coming to our Emergency Department today. As we discussed, it is important to remember that some problems are difficult to diagnose and may not be found during your Emergency Department visit. Be sure to follow up with your primary care doctor and review all labs/imaging/tests that were performed during this visit with them. Some labs/tests may be outside of the normal range and require non-emergent follow-up and further investigation to help diagnose/exclude/prevent complications or other medical conditions.    If you do not have a primary care doctor, you may contact the one listed on your discharge paperwork or you may also call the [facility] Appointment Desk at [phone] to  schedule an appointment and establish care with one. It is important to your health that you have a primary care doctor.    Please take all medications as directed. All medications may potentially have side-effects and it is impossible to predict which medications may give you side-effects or what side-effects (if any) they will give you.. If you feel that you are having a negative effect or side-effect of any medication you should immediately stop taking them and seek medical attention. If you feel that you are having a life-threatening reaction call 911.    Return to the ER with any questions/concerns, new/concerning symptoms, worsening or failure to improve.     Do not drive, swim, climb to height, take a bath or make any important decisions for 24 hours if you have received any pain medications, sedatives or mood altering drugs during your ER visit.
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
## 2022-03-30 NOTE — ED TRIAGE NOTES
"Pt states she fell asleep on the toilet yesterday and fell hitting her head on the bathtub. Reports experiencing vomiting since the fall. +headache; back pain. Bruising noted to right eye. Reports last alcoholic beverage was "couple weeks ago"  "
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
## 2022-04-05 NOTE — PRE ADMISSION SCREENING
RN phone Pre Op, spoke with patient's .  Instructed to remain NPO after MN prior to surgery, take med's as directed.  Surgery arrival 10:30 am.

## 2022-04-06 ENCOUNTER — ANESTHESIA EVENT (OUTPATIENT)
Dept: SURGERY | Facility: HOSPITAL | Age: 67
End: 2022-04-06
Payer: MEDICARE

## 2022-04-06 ENCOUNTER — HOSPITAL ENCOUNTER (OUTPATIENT)
Dept: PREADMISSION TESTING | Facility: HOSPITAL | Age: 67
Discharge: HOME | End: 2022-04-06
Attending: INTERNAL MEDICINE
Payer: MEDICARE

## 2022-04-06 DIAGNOSIS — Z01.812 ENCOUNTER FOR PREOPERATIVE SCREENING LABORATORY TESTING FOR COVID-19 VIRUS: ICD-10-CM

## 2022-04-06 DIAGNOSIS — Z11.52 ENCOUNTER FOR PREOPERATIVE SCREENING LABORATORY TESTING FOR COVID-19 VIRUS: ICD-10-CM

## 2022-04-06 LAB — SARS-COV-2 RDRP RESP QL NAA+PROBE: NEGATIVE

## 2022-04-06 PROCEDURE — U0002 COVID-19 LAB TEST NON-CDC: HCPCS | Performed by: OPHTHALMOLOGY

## 2022-04-07 ENCOUNTER — HOSPITAL ENCOUNTER (OUTPATIENT)
Facility: HOSPITAL | Age: 67
Discharge: HOME | End: 2022-04-07
Attending: OPHTHALMOLOGY | Admitting: OPHTHALMOLOGY
Payer: MEDICARE

## 2022-04-07 ENCOUNTER — ANESTHESIA (OUTPATIENT)
Dept: SURGERY | Facility: HOSPITAL | Age: 67
End: 2022-04-07
Payer: MEDICARE

## 2022-04-07 VITALS
WEIGHT: 160 LBS | DIASTOLIC BLOOD PRESSURE: 73 MMHG | HEIGHT: 66 IN | RESPIRATION RATE: 16 BRPM | BODY MASS INDEX: 25.71 KG/M2 | TEMPERATURE: 98 F | HEART RATE: 58 BPM | OXYGEN SATURATION: 94 % | SYSTOLIC BLOOD PRESSURE: 117 MMHG

## 2022-04-07 DIAGNOSIS — Z11.52 ENCOUNTER FOR PREOPERATIVE SCREENING LABORATORY TESTING FOR COVID-19 VIRUS: ICD-10-CM

## 2022-04-07 DIAGNOSIS — Z01.812 ENCOUNTER FOR PREOPERATIVE SCREENING LABORATORY TESTING FOR COVID-19 VIRUS: ICD-10-CM

## 2022-04-07 DIAGNOSIS — H25.12 NUCLEAR SCLEROTIC CATARACT OF LEFT EYE: Primary | ICD-10-CM

## 2022-04-07 PROCEDURE — D9220A PRA ANESTHESIA: ICD-10-PCS | Mod: CRNA,,, | Performed by: NURSE ANESTHETIST, CERTIFIED REGISTERED

## 2022-04-07 PROCEDURE — 25000003 PHARM REV CODE 250

## 2022-04-07 PROCEDURE — D9220A PRA ANESTHESIA: ICD-10-PCS | Mod: ANES,,, | Performed by: ANESTHESIOLOGY

## 2022-04-07 PROCEDURE — 66984 XCAPSL CTRC RMVL W/O ECP: CPT | Mod: LT,,, | Performed by: OPHTHALMOLOGY

## 2022-04-07 PROCEDURE — 63600175 PHARM REV CODE 636 W HCPCS: Performed by: OPHTHALMOLOGY

## 2022-04-07 PROCEDURE — 66984 PR REMOVAL, CATARACT, W/INSRT INTRAOC LENS, W/O ENDO CYCLO: ICD-10-PCS | Mod: LT,,, | Performed by: OPHTHALMOLOGY

## 2022-04-07 PROCEDURE — 37000009 HC ANESTHESIA EA ADD 15 MINS: Performed by: OPHTHALMOLOGY

## 2022-04-07 PROCEDURE — V2632 POST CHMBR INTRAOCULAR LENS: HCPCS | Performed by: OPHTHALMOLOGY

## 2022-04-07 PROCEDURE — 37000008 HC ANESTHESIA 1ST 15 MINUTES: Performed by: OPHTHALMOLOGY

## 2022-04-07 PROCEDURE — 63600175 PHARM REV CODE 636 W HCPCS: Performed by: NURSE ANESTHETIST, CERTIFIED REGISTERED

## 2022-04-07 PROCEDURE — 36000706: Performed by: OPHTHALMOLOGY

## 2022-04-07 PROCEDURE — 36000707: Performed by: OPHTHALMOLOGY

## 2022-04-07 PROCEDURE — 71000016 HC POSTOP RECOV ADDL HR: Performed by: OPHTHALMOLOGY

## 2022-04-07 PROCEDURE — 25000003 PHARM REV CODE 250: Performed by: OPHTHALMOLOGY

## 2022-04-07 PROCEDURE — 71000015 HC POSTOP RECOV 1ST HR: Performed by: OPHTHALMOLOGY

## 2022-04-07 PROCEDURE — D9220A PRA ANESTHESIA: Mod: ANES,,, | Performed by: ANESTHESIOLOGY

## 2022-04-07 PROCEDURE — D9220A PRA ANESTHESIA: Mod: CRNA,,, | Performed by: NURSE ANESTHETIST, CERTIFIED REGISTERED

## 2022-04-07 DEVICE — LENS 14.0: Type: IMPLANTABLE DEVICE | Site: EYE | Status: FUNCTIONAL

## 2022-04-07 RX ORDER — ONDANSETRON 2 MG/ML
4 INJECTION INTRAMUSCULAR; INTRAVENOUS ONCE
Status: DISCONTINUED | OUTPATIENT
Start: 2022-04-07 | End: 2022-04-07 | Stop reason: HOSPADM

## 2022-04-07 RX ORDER — FENTANYL CITRATE 50 UG/ML
INJECTION, SOLUTION INTRAMUSCULAR; INTRAVENOUS
Status: DISCONTINUED | OUTPATIENT
Start: 2022-04-07 | End: 2022-04-07

## 2022-04-07 RX ORDER — TETRACAINE HYDROCHLORIDE 5 MG/ML
1 SOLUTION OPHTHALMIC
Status: COMPLETED | OUTPATIENT
Start: 2022-04-07 | End: 2022-04-07

## 2022-04-07 RX ORDER — OFLOXACIN 3 MG/ML
1 SOLUTION/ DROPS OPHTHALMIC
Status: DISCONTINUED | OUTPATIENT
Start: 2022-04-07 | End: 2023-11-16

## 2022-04-07 RX ORDER — SODIUM CHLORIDE 0.9 % (FLUSH) 0.9 %
10 SYRINGE (ML) INJECTION
Status: DISCONTINUED | OUTPATIENT
Start: 2022-04-07 | End: 2023-11-16

## 2022-04-07 RX ORDER — TROPICAMIDE 10 MG/ML
1 SOLUTION/ DROPS OPHTHALMIC
Status: COMPLETED | OUTPATIENT
Start: 2022-04-07 | End: 2022-04-07

## 2022-04-07 RX ORDER — PREDNISOLONE ACETATE 10 MG/ML
SUSPENSION/ DROPS OPHTHALMIC
Status: DISCONTINUED | OUTPATIENT
Start: 2022-04-07 | End: 2022-04-07 | Stop reason: HOSPADM

## 2022-04-07 RX ORDER — PHENYLEPHRINE HYDROCHLORIDE 25 MG/ML
1 SOLUTION/ DROPS OPHTHALMIC
Status: COMPLETED | OUTPATIENT
Start: 2022-04-07 | End: 2022-04-07

## 2022-04-07 RX ORDER — LIDOCAINE HYDROCHLORIDE 10 MG/ML
1 INJECTION, SOLUTION EPIDURAL; INFILTRATION; INTRACAUDAL; PERINEURAL ONCE
Status: DISCONTINUED | OUTPATIENT
Start: 2022-04-07 | End: 2022-04-07 | Stop reason: HOSPADM

## 2022-04-07 RX ORDER — POVIDONE-IODINE 5 %
SOLUTION, NON-ORAL OPHTHALMIC (EYE)
Status: DISCONTINUED | OUTPATIENT
Start: 2022-04-07 | End: 2022-04-07 | Stop reason: HOSPADM

## 2022-04-07 RX ORDER — MIDAZOLAM HYDROCHLORIDE 1 MG/ML
INJECTION, SOLUTION INTRAMUSCULAR; INTRAVENOUS
Status: DISCONTINUED | OUTPATIENT
Start: 2022-04-07 | End: 2022-04-07

## 2022-04-07 RX ORDER — HYDROCODONE BITARTRATE AND ACETAMINOPHEN 5; 325 MG/1; MG/1
1 TABLET ORAL EVERY 4 HOURS PRN
Status: DISCONTINUED | OUTPATIENT
Start: 2022-04-07 | End: 2022-04-07 | Stop reason: HOSPADM

## 2022-04-07 RX ORDER — ONDANSETRON 2 MG/ML
4 INJECTION INTRAMUSCULAR; INTRAVENOUS ONCE
Status: DISCONTINUED | OUTPATIENT
Start: 2022-04-07 | End: 2022-04-07

## 2022-04-07 RX ORDER — SODIUM CHLORIDE, SODIUM LACTATE, POTASSIUM CHLORIDE, CALCIUM CHLORIDE 600; 310; 30; 20 MG/100ML; MG/100ML; MG/100ML; MG/100ML
INJECTION, SOLUTION INTRAVENOUS CONTINUOUS
Status: DISCONTINUED | OUTPATIENT
Start: 2022-04-07 | End: 2022-04-07 | Stop reason: HOSPADM

## 2022-04-07 RX ORDER — ACETAMINOPHEN 325 MG/1
650 TABLET ORAL EVERY 4 HOURS PRN
Status: DISCONTINUED | OUTPATIENT
Start: 2022-04-07 | End: 2022-04-07 | Stop reason: HOSPADM

## 2022-04-07 RX ORDER — CYCLOPENTOLATE HYDROCHLORIDE 10 MG/ML
1 SOLUTION/ DROPS OPHTHALMIC
Status: DISCONTINUED | OUTPATIENT
Start: 2022-04-07 | End: 2023-11-16

## 2022-04-07 RX ADMIN — CYCLOPENTOLATE HYDROCHLORIDE 1 DROP: 10 SOLUTION/ DROPS OPHTHALMIC at 09:04

## 2022-04-07 RX ADMIN — OFLOXACIN 1 DROP: 3 SOLUTION OPHTHALMIC at 08:04

## 2022-04-07 RX ADMIN — CYCLOPENTOLATE HYDROCHLORIDE 1 DROP: 10 SOLUTION/ DROPS OPHTHALMIC at 08:04

## 2022-04-07 RX ADMIN — MIDAZOLAM HYDROCHLORIDE 1 MG: 1 INJECTION, SOLUTION INTRAMUSCULAR; INTRAVENOUS at 12:04

## 2022-04-07 RX ADMIN — TETRACAINE HYDROCHLORIDE 1 DROP: 5 SOLUTION OPHTHALMIC at 08:04

## 2022-04-07 RX ADMIN — TROPICAMIDE 1 DROP: 10 SOLUTION/ DROPS OPHTHALMIC at 08:04

## 2022-04-07 RX ADMIN — FENTANYL CITRATE 50 MCG: 50 INJECTION, SOLUTION INTRAMUSCULAR; INTRAVENOUS at 12:04

## 2022-04-07 RX ADMIN — SODIUM CHLORIDE, SODIUM LACTATE, POTASSIUM CHLORIDE, AND CALCIUM CHLORIDE: .6; .31; .03; .02 INJECTION, SOLUTION INTRAVENOUS at 11:04

## 2022-04-07 RX ADMIN — TROPICAMIDE 1 DROP: 10 SOLUTION/ DROPS OPHTHALMIC at 09:04

## 2022-04-07 RX ADMIN — TETRACAINE HYDROCHLORIDE 1 DROP: 5 SOLUTION OPHTHALMIC at 09:04

## 2022-04-07 RX ADMIN — OFLOXACIN 1 DROP: 3 SOLUTION OPHTHALMIC at 09:04

## 2022-04-07 RX ADMIN — PHENYLEPHRINE HYDROCHLORIDE 1 DROP: 25 SOLUTION/ DROPS OPHTHALMIC at 09:04

## 2022-04-07 RX ADMIN — PHENYLEPHRINE HYDROCHLORIDE 1 DROP: 25 SOLUTION/ DROPS OPHTHALMIC at 08:04

## 2022-04-07 NOTE — OR NURSING
Anesthesia notified for unsuccessful PIV attempts x 2. Pt states a hard stick. The veins are small. Per anesthesia, will start PIV in holding.

## 2022-04-07 NOTE — TRANSFER OF CARE
"Anesthesia Transfer of Care Note    Patient: Estella Arevalo    Procedure(s) Performed: Procedure(s) (LRB):  PHACOEMULSIFICATION, CATARACT (Left)  INSERTION, IOL PROSTHESIS (Left)    Patient location: [facility]    Anesthesia Type: MAC    Transport from OR: Transported from OR on room air with adequate spontaneous ventilation    Post pain: adequate analgesia    Post assessment: no apparent anesthetic complications    Post vital signs: stable    Level of consciousness: awake, alert and oriented    Nausea/Vomiting: no nausea/vomiting    Complications: none    Transfer of care protocol was followed      Last vitals:   Visit Vitals  /73 (BP Location: Left arm, Patient Position: Lying)   Pulse (!) 58   Temp 36.6 °C (97.8 °F) (Oral)   Resp 16   Ht 5' 6" (1.676 m)   Wt 72.6 kg (160 lb)   SpO2 (!) 94%   Breastfeeding No   BMI 25.82 kg/m²     "

## 2022-04-07 NOTE — DISCHARGE INSTRUCTIONS
ACTIVITY LEVEL:  If you received sedation or an anesthetic, you may feel sleepy for several hours. Rest until you are more awake. Gradually resume your normal activities. Normal activity will cause no undue risk to your eye. The white part of your eye might be red - this is nothing to worry about. Wear your old glasses or sunglasses that were given to you for protection during the day.    RESTRICTIONS - for the next 7 days  · Do not lift anything over 10 pounds.  · When bending, bend at the knees not the waist.  · Do not rub the eye.  · Do not get water in the eye.  · Do not sleep on the side that had surgery.  · Protect your eye at bedtime with the shield provided.    DIET: At home, continue with liquids. If there is no nausea, you may eat a soft diet and gradually resume your normal diet. Limit alcohol intake for 24 hours.    BATHING: You may shower or bathe as normal. You may take a warm wash cloth, which has been wrung out to remove excess water, and gently remove crusting on the lashes.    MEDICATIONS: You may take Extra Strength Tylenol every 4 hours for pain/headache.     Use your drops     Today: Pink-3pm, 6pm, 9pm     Beige -3pm, 6pm, 9pm     Grey- 3pm    Tomorrow:  Resume pink and beige cap drops four times a day.  Resume grey cap drops once a day.    Place one drop in the eye that had surgery from the first bottle. Wait 5 minutes and then use the second bottle. (It does not matter which bottle is used first.) CONTINUE all glaucoma drops.  No driving, alcoholic beverages or signing legal documents for next 24 hours or while taking pain medication      WHEN TO CALL THE DOCTOR:  · Redness that increases significantly  · Pain not relieved by Tylenol  RETURN APPOINTMENT:  You will need to see Dr. Bennett on Friday at the [facility] clinic .  Bring your eye kit with you on your follow-up visit. Your kit contains sunglasses, eye shield, tape.  FOR EMERGENCIES:  If any unusual problems or difficulties occur,  contact Dr. Bennett at the Clinic office at [phone] during normal business hours. If after hours, call [phone].       Fall Prevention  Millions of people fall every year and injure themselves. You may have had anesthesia or sedation which may increase your risk of falling. You may have health issues that put you at an increased risk of falling.     Here are ways to reduce your risk of falling.    Make your home safe by keeping walkways clear of objects you may trip over.  Use non-slip pads under rugs. Do not use area rugs or small throw rugs.  Use non-slip mats in bathtubs and showers.  Install handrails and lights on staircases.  Do not walk in poorly lit areas.  Do not stand on chairs or wobbly ladders.  Use caution when reaching overhead or looking upward. This position can cause a loss of balance.  Be sure your shoes fit properly, have non-slip bottoms and are in good condition.   Wear shoes both inside and out. Avoid going barefoot or wearing slippers.  Be cautious when going up and down stairs, curbs, and when walking on uneven sidewalks.  If your balance is poor, consider using a cane or walker.  If your fall was related to alcohol use, stop or limit alcohol intake.   If your fall was related to use of sleeping medicines, talk to your doctor about this. You may need to reduce your dosage at bedtime if you awaken during the night to go to the bathroom.    To reduce the need for nighttime bathroom trips:  Avoid drinking fluids for several hours before going to bed  Empty your bladder before going to bed  Men can keep a urinal at the bedside  Stay as active as you can. Balance, flexibility, strength, and endurance all come from exercise. They all play a role in preventing falls. Ask your healthcare provider which types of activity are right for you.  Get your vision checked on a regular basis.  If you have pets, know where they are before you stand up or walk so you don't trip over them.  Use  night lights.

## 2022-04-07 NOTE — ANESTHESIA PREPROCEDURE EVALUATION
04/07/2022  Estella Arevalo is a 67 y.o., female.      Pre-op Assessment    I have reviewed the Patient Summary Reports.     I have reviewed the Nursing Notes.       Review of Systems  Anesthesia Hx:  No problems with previous Anesthesia  Denies Family Hx of Anesthesia complications.   Denies Personal Hx of Anesthesia complications.   Social:  Non-Smoker, Alcohol Use H/o alcohol abuse   EENT/Dental:   Cataracts    Nasal bone fracture 2/2 fall 3/30   Cardiovascular:   Hypertension Past MI CAD      Pulmonary:  Pulmonary Normal    Hepatic/GI:   GERD Liver Disease, Hepatitis, C    Musculoskeletal:   Arthritis     Neurological:   Neuromuscular Disease, Headaches    Endocrine:   Hypothyroidism    Psych:   Psychiatric History anxiety depression          Physical Exam  General: Well nourished, Cooperative and Alert    Airway:  Mallampati: III   Mouth Opening: Normal  TM Distance: 4 - 6 cm  Tongue: Normal  Neck ROM: Extension Decreased    Dental:    Chest/Lungs:  Normal Respiratory Rate, Clear to auscultation    Heart:  Rate: Normal  Rhythm: Regular Rhythm      Wt Readings from Last 3 Encounters:   04/05/22 72.6 kg (160 lb)   03/30/22 74.4 kg (164 lb)   02/15/22 74.4 kg (164 lb)     Temp Readings from Last 3 Encounters:   04/07/22 36.6 °C (97.8 °F)   03/30/22 37.1 °C (98.8 °F) (Oral)   01/31/22 37.2 °C (98.9 °F) (Oral)     BP Readings from Last 3 Encounters:   04/07/22 96/62   03/30/22 (!) 187/89   01/31/22 130/88     Pulse Readings from Last 3 Encounters:   04/07/22 (!) 58   03/30/22 77   01/31/22 89     4/6/2022 COVID negative    Anesthesia Plan  Type of Anesthesia, risks & benefits discussed:    Anesthesia Type: Gen Natural Airway, MAC  Intra-op Monitoring Plan: Standard ASA Monitors  Post Op Pain Control Plan: multimodal analgesia  Induction:  IV  Informed Consent: Informed consent signed with the Patient and all  parties understand the risks and agree with anesthesia plan.  All questions answered.   ASA Score: 3  Day of Surgery Review of History & Physical: H&P Update referred to the surgeon/provider.    Ready For Surgery From Anesthesia Perspective.     .

## 2022-04-07 NOTE — OP NOTE
Operative Date:  04/07/2022    Discharge Date:  04/07/2022    Discharge Patient Home    Report Title: Operative Note      SURGEON: Thaddeus Bennett MD     ASSISTANT:     PREOPERATIVE DIAGNOSIS: Visually significant NSC cataract,  Left Eye.    POSTOPERATIVE DIAGNOSIS: Visually-significant NSC cataract,  Left Eye.    PROCEDURE PERFORMED: Phacoemulsification of the cataract with posterior chamber intraocular lens Left Eye.    ANESTHESIA: Topical with MAC     COMPLICATIONS: None    ESTIMATED BLOOD LOSS: Minimal    INDICATIONS FOR PROCEDURE:   The patient is a pleasant 67 year old woman with increasing difficulties with activities of daily living secondary to a dense visually significant cataract in the Left Eye.  Discussions have been carried out with this patient concerning the options to surgery, risks, benefits and expectations.  The patient voiced good understanding and wished to proceed with the above procedure.    PROCEDURE IN DETAIL: The patient was brought to the operating room and received topical anesthetic to the eye and then was prepped and draped in the usual sterile fashion.  Using the Fraser ring and the guarded radha blade set at 0.37 mm, a partial thickness clear cornea incision was made temporally.  The paracentesis site was made at the six o'clock position.  Omidria was injected into the anterior chamber through the paracentesis.  Viscoat was then injected into the anterior chamber.  The eye was then reentered at the primary surgical site with a 2.4 mm keratome followed by continuous capsulotomy, hydrodissection, hydrodelineation and phacoemulsification of the cataract.  Residual cortical material was removed using automated irrigation-aspiration technique.  Healon was injected into the posterior chamber and an SN60WF 14.0 diopter lens was placed in the bag without difficulty. Residual viscoelastic was removed using automated irrigation-aspiration technique. The eye was re-pressurized using  BSS solution and both the paracentesis site and the primary surgical site were demonstrated to be watertight at the end of the case with Weck--Viktoriya manipulation.  One drop of Ofloxacin and one drop of Pred acetate 1% was applied to the Left Eye .The eye was closed, patched and a Sanchez shield placed.  The patient was taken to the recovery room in good and stable condition.  The patient tolerated the procedure well.  The patient was instructed to refrain from any heavy lifting, bending, stooping or straining activities, discharged home  and to follow-up in the morning for routine postoperative care with Thaddeus Bennett MD.

## 2022-04-07 NOTE — BRIEF OP NOTE
[facility] [place]  Brief Operative Note    Surgery Date: 4/7/2022     Surgeon(s) and Role:     * Thaddeus Bennett MD - Primary    Assisting Surgeon: None    Pre-op Diagnosis:  NS (nuclear sclerosis), left [H25.12]    Post-op Diagnosis:  Post-Op Diagnosis Codes:     * NS (nuclear sclerosis), left [H25.12]    Procedure(s) (LRB):  PHACOEMULSIFICATION, CATARACT (Left)  INSERTION, IOL PROSTHESIS (Left)    Anesthesia: Local MAC    Operative Findings:     Estimated Blood Loss: * No values recorded between 4/7/2022 12:11 PM and 4/7/2022 12:36 PM *         Specimens:   Specimen (24h ago, onward)            None            Discharge Note    OUTCOME: Patient tolerated treatment/procedure well without complication and is now ready for discharge.    DISPOSITION: Home or Self Care    FINAL DIAGNOSIS:  Nuclear sclerotic cataract of left eye    FOLLOWUP: In clinic    DISCHARGE INSTRUCTIONS:    Discharge Procedure Orders   Other restrictions (specify):   Order Comments: No heavy lifting or bending for 1 week.        Clinical Reference Documents Added to Patient Instructions       Document    CATARACT REMOVAL DISCHARGE INSTRUCTIONS (ENGLISH)    INTRAOCULAR LENS IMPLANT (ENGLISH)

## 2022-04-08 ENCOUNTER — OFFICE VISIT (OUTPATIENT)
Dept: OPHTHALMOLOGY | Facility: CLINIC | Age: 67
End: 2022-04-08
Payer: MEDICARE

## 2022-04-08 DIAGNOSIS — Z98.890 POST-OPERATIVE STATE: Primary | ICD-10-CM

## 2022-04-08 PROCEDURE — 99024 PR POST-OP FOLLOW-UP VISIT: ICD-10-PCS | Mod: S$GLB,,, | Performed by: OPHTHALMOLOGY

## 2022-04-08 PROCEDURE — 99213 OFFICE O/P EST LOW 20 MIN: CPT | Mod: PBBFAC,PO | Performed by: OPHTHALMOLOGY

## 2022-04-08 PROCEDURE — 99024 POSTOP FOLLOW-UP VISIT: CPT | Mod: S$GLB,,, | Performed by: OPHTHALMOLOGY

## 2022-04-08 PROCEDURE — 99999 PR PBB SHADOW E&M-EST. PATIENT-LVL III: ICD-10-PCS | Mod: PBBFAC,,, | Performed by: OPHTHALMOLOGY

## 2022-04-08 PROCEDURE — 99999 PR PBB SHADOW E&M-EST. PATIENT-LVL III: CPT | Mod: PBBFAC,,, | Performed by: OPHTHALMOLOGY

## 2022-04-08 NOTE — ANESTHESIA POSTPROCEDURE EVALUATION
Anesthesia Post Evaluation    Patient: Estella Arevalo    Procedure(s) Performed: Procedure(s) (LRB):  PHACOEMULSIFICATION, CATARACT (Left)  INSERTION, IOL PROSTHESIS (Left)    Final Anesthesia Type: MAC      Patient location during evaluation: [facility]  Patient participation: Yes- Able to Participate  Level of consciousness: awake and alert  Post-procedure vital signs: reviewed and stable  Pain management: adequate  Airway patency: patent    PONV status at discharge: No PONV  Anesthetic complications: no      Cardiovascular status: hemodynamically stable  Respiratory status: unassisted and spontaneous ventilation  Hydration status: euvolemic  Follow-up not needed.          Vitals Value Taken Time   /73 04/07/22 1242   Temp 36.6 °C (97.8 °F) 04/07/22 1242   Pulse 58 04/07/22 1242   Resp 16 04/07/22 1242   SpO2 94 % 04/07/22 1242         No case tracking events are documented in the log.      Pain/Mika Score: Mika Score: 10 (4/7/2022  1:32 PM)  Modified Mika Score: 20 (4/7/2022  1:32 PM)

## 2022-04-08 NOTE — PROGRESS NOTES
Subjective:       Patient ID: Estella Arevalo is a 67 y.o. female.    Chief Complaint: Post-op Evaluation (1 day po os)    HPI     Post-op Evaluation      Additional comments: 1 day po os              Comments     S/p Phaco w/IOL OS- 4/7/2022    68 y/o female is here for 1 day post-op of the LT eye. Pt reports s/x went   well. Denies pain and discomfort. However, pt vomited this morning. Pt   still is nauseated and fever-like symptoms. Temp was check in clinic- 99.1       Eyemeds  Ofloxacin QID OS  Durezol QID OS  Ilevro QD OS          Last edited by Enrike Tran on 4/8/2022  2:46 PM. (History)             Assessment:       1. Post-operative state        Plan:       S/p CE OS- Doing well.         CPM OS.  RTC 1 wk.

## 2022-04-12 NOTE — PRE ADMISSION SCREENING
RN phone Pre OP.  Spoke with .  To report to hospital 4-20-22 at 8:00 am for Ovoid screen.  Surgery arrival 11:30 am.

## 2022-04-14 ENCOUNTER — TELEPHONE (OUTPATIENT)
Dept: OPHTHALMOLOGY | Facility: CLINIC | Age: 67
End: 2022-04-14
Payer: MEDICARE

## 2022-04-17 RX ORDER — SODIUM CHLORIDE 0.9 % (FLUSH) 0.9 %
10 SYRINGE (ML) INJECTION
Status: CANCELLED | OUTPATIENT
Start: 2022-04-17
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
## 2022-04-19 ENCOUNTER — HOSPITAL ENCOUNTER (EMERGENCY)
Facility: HOSPITAL | Age: 67
Discharge: HOME | End: 2022-04-20
Attending: EMERGENCY MEDICINE
Payer: MEDICARE
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
## 2022-04-19 DIAGNOSIS — S80.01XA CONTUSION OF RIGHT KNEE, INITIAL ENCOUNTER: ICD-10-CM

## 2022-04-19 DIAGNOSIS — S80.212A KNEE ABRASION, LEFT, INITIAL ENCOUNTER: ICD-10-CM

## 2022-04-19 DIAGNOSIS — Z00.8 MEDICAL CLEARANCE FOR PSYCHIATRIC ADMISSION: ICD-10-CM

## 2022-04-19 DIAGNOSIS — F10.929 ALCOHOLIC INTOXICATION WITH COMPLICATION: Primary | ICD-10-CM

## 2022-04-19 LAB
BACTERIA #/AREA URNS HPF: ABNORMAL /HPF
BILIRUB UR QL STRIP: NEGATIVE
CLARITY UR: CLEAR
COLOR UR: YELLOW
GLUCOSE UR QL STRIP: NEGATIVE
HGB UR QL STRIP: NEGATIVE
HYALINE CASTS #/AREA URNS LPF: 4 /LPF
KETONES UR QL STRIP: NEGATIVE
LEUKOCYTE ESTERASE UR QL STRIP: ABNORMAL
MICROSCOPIC COMMENT: ABNORMAL
NITRITE UR QL STRIP: NEGATIVE
PH UR STRIP: 5 [PH] (ref 5–8)
PROT UR QL STRIP: NEGATIVE
SP GR UR STRIP: 1.01 (ref 1–1.03)
URN SPEC COLLECT METH UR: ABNORMAL
UROBILINOGEN UR STRIP-ACNC: NEGATIVE EU/DL
WBC #/AREA URNS HPF: 0 /HPF (ref 0–5)

## 2022-04-19 PROCEDURE — 81000 URINALYSIS NONAUTO W/SCOPE: CPT | Mod: 59 | Performed by: EMERGENCY MEDICINE

## 2022-04-19 PROCEDURE — 25000003 PHARM REV CODE 250: Performed by: EMERGENCY MEDICINE

## 2022-04-19 PROCEDURE — 96372 THER/PROPH/DIAG INJ SC/IM: CPT | Performed by: EMERGENCY MEDICINE

## 2022-04-19 PROCEDURE — 99284 EMERGENCY DEPT VISIT MOD MDM: CPT | Mod: 25

## 2022-04-19 PROCEDURE — 63600175 PHARM REV CODE 636 W HCPCS: Performed by: EMERGENCY MEDICINE

## 2022-04-19 PROCEDURE — 80307 DRUG TEST PRSMV CHEM ANLYZR: CPT | Performed by: EMERGENCY MEDICINE

## 2022-04-19 RX ORDER — LORAZEPAM 2 MG/ML
2 INJECTION INTRAMUSCULAR
Status: COMPLETED | OUTPATIENT
Start: 2022-04-19 | End: 2022-04-19

## 2022-04-19 RX ORDER — DIPHENHYDRAMINE HYDROCHLORIDE 50 MG/ML
50 INJECTION INTRAMUSCULAR; INTRAVENOUS
Status: COMPLETED | OUTPATIENT
Start: 2022-04-19 | End: 2022-04-19

## 2022-04-19 RX ORDER — FOLIC ACID 1 MG/1
1 TABLET ORAL
Status: COMPLETED | OUTPATIENT
Start: 2022-04-19 | End: 2022-04-19

## 2022-04-19 RX ORDER — LANOLIN ALCOHOL/MO/W.PET/CERES
100 CREAM (GRAM) TOPICAL
Status: COMPLETED | OUTPATIENT
Start: 2022-04-19 | End: 2022-04-19

## 2022-04-19 RX ORDER — HALOPERIDOL 5 MG/ML
5 INJECTION INTRAMUSCULAR
Status: COMPLETED | OUTPATIENT
Start: 2022-04-19 | End: 2022-04-19

## 2022-04-19 RX ADMIN — HALOPERIDOL LACTATE 5 MG: 5 INJECTION, SOLUTION INTRAMUSCULAR at 10:04

## 2022-04-19 RX ADMIN — BACITRACIN ZINC, NEOMYCIN, POLYMYXIN B 1 EACH: 400; 3.5; 5 OINTMENT TOPICAL at 10:04

## 2022-04-19 RX ADMIN — FOLIC ACID 1 MG: 1 TABLET ORAL at 10:04

## 2022-04-19 RX ADMIN — LORAZEPAM 2 MG: 2 INJECTION INTRAMUSCULAR; INTRAVENOUS at 10:04

## 2022-04-19 RX ADMIN — THIAMINE HCL TAB 100 MG 100 MG: 100 TAB at 10:04

## 2022-04-19 RX ADMIN — DIPHENHYDRAMINE HYDROCHLORIDE 50 MG: 50 INJECTION INTRAMUSCULAR; INTRAVENOUS at 10:04

## 2022-04-19 NOTE — PROCEDURES
"Estella Arevalo is a 63 y.o. female patient.    Temp: 96.4 °F (35.8 °C) (09/21/18 1138)  Pulse: 67 (09/21/18 1142)  Resp: 18 (09/21/18 1138)  BP: 100/73 (09/21/18 1142)  SpO2: (!) 94 % (09/21/18 1142)  Weight: 77.7 kg (171 lb 5.5 oz) (09/20/18 2021)  Height: 5' 5" (165.1 cm) (09/20/18 2021)    PICC  Date/Time: 9/21/2018 3:02 PM  Performed by: Thom Pena RN  Consent Done: Yes  Time out: Immediately prior to procedure a time out was called to verify the correct patient, procedure, equipment, support staff and site/side marked as required  Indications: med administration and vascular access  Anesthesia: local infiltration  Local anesthetic: lidocaine 2% without epinephrine  Anesthetic Total (mL): 2  Preparation: skin prepped with chlorhexidine (without alcohol)  Skin prep agent dried: skin prep agent completely dried prior to procedure  Sterile barriers: all five maximum sterile barriers used - cap, mask, sterile gown, sterile gloves, and large sterile sheet  Hand hygiene: hand hygiene performed prior to central venous catheter insertion  Location details: right basilic  Catheter type: double lumen  Catheter size: 5 Fr  Catheter Length: 33cm    Ultrasound guidance: yes  Vessel Caliber: medium, compressibility normal  Vascular Doppler: not done  Needle advanced into vessel with real time Ultrasound guidance.  Guidewire confirmed in vessel.  Sterile sheath used.  no esophageal manometryNumber of attempts: 1  Post-procedure: blood return through all ports and sterile dressing applied            Thom Pena  9/21/2018  " "Glass of wine at night with dinner"

## 2022-04-20 ENCOUNTER — HOSPITAL ENCOUNTER (OUTPATIENT)
Dept: PREADMISSION TESTING | Facility: HOSPITAL | Age: 67
Discharge: HOME | End: 2022-04-20
Attending: INTERNAL MEDICINE
Payer: MEDICARE

## 2022-04-20 VITALS
WEIGHT: 155 LBS | HEART RATE: 91 BPM | BODY MASS INDEX: 24.91 KG/M2 | RESPIRATION RATE: 16 BRPM | TEMPERATURE: 99 F | OXYGEN SATURATION: 93 % | HEIGHT: 66 IN | DIASTOLIC BLOOD PRESSURE: 81 MMHG | SYSTOLIC BLOOD PRESSURE: 132 MMHG

## 2022-04-20 LAB
ALBUMIN SERPL BCP-MCNC: 3.6 G/DL (ref 3.5–5.2)
ALP SERPL-CCNC: 139 U/L (ref 55–135)
ALT SERPL W/O P-5'-P-CCNC: 20 U/L (ref 10–44)
AMMONIA PLAS-SCNC: 54 UMOL/L (ref 10–50)
AMPHET+METHAMPHET UR QL: NEGATIVE
ANION GAP SERPL CALC-SCNC: 12 MMOL/L (ref 8–16)
APAP SERPL-MCNC: <3 UG/ML (ref 10–20)
AST SERPL-CCNC: 33 U/L (ref 10–40)
BARBITURATES UR QL SCN>200 NG/ML: NEGATIVE
BASOPHILS # BLD AUTO: 0.06 K/UL (ref 0–0.2)
BASOPHILS NFR BLD: 2.2 % (ref 0–1.9)
BENZODIAZ UR QL SCN>200 NG/ML: NEGATIVE
BILIRUB SERPL-MCNC: 0.4 MG/DL (ref 0.1–1)
BUN SERPL-MCNC: 8 MG/DL (ref 8–23)
BZE UR QL SCN: NEGATIVE
CALCIUM SERPL-MCNC: 8.4 MG/DL (ref 8.7–10.5)
CANNABINOIDS UR QL SCN: NEGATIVE
CHLORIDE SERPL-SCNC: 103 MMOL/L (ref 95–110)
CO2 SERPL-SCNC: 21 MMOL/L (ref 23–29)
CREAT SERPL-MCNC: 0.7 MG/DL (ref 0.5–1.4)
CREAT UR-MCNC: 34.2 MG/DL (ref 15–325)
CTP QC/QA: YES
DIFFERENTIAL METHOD: ABNORMAL
EOSINOPHIL # BLD AUTO: 0.2 K/UL (ref 0–0.5)
EOSINOPHIL NFR BLD: 5.4 % (ref 0–8)
ERYTHROCYTE [DISTWIDTH] IN BLOOD BY AUTOMATED COUNT: 13.8 % (ref 11.5–14.5)
EST. GFR  (AFRICAN AMERICAN): >60 ML/MIN/1.73 M^2
EST. GFR  (NON AFRICAN AMERICAN): >60 ML/MIN/1.73 M^2
ETHANOL SERPL-MCNC: 148 MG/DL
GLUCOSE SERPL-MCNC: 112 MG/DL (ref 70–110)
HCT VFR BLD AUTO: 35.3 % (ref 37–48.5)
HGB BLD-MCNC: 12.2 G/DL (ref 12–16)
IMM GRANULOCYTES # BLD AUTO: 0.01 K/UL (ref 0–0.04)
IMM GRANULOCYTES NFR BLD AUTO: 0.4 % (ref 0–0.5)
INR PPP: 1 (ref 0.8–1.2)
LYMPHOCYTES # BLD AUTO: 0.8 K/UL (ref 1–4.8)
LYMPHOCYTES NFR BLD: 29.6 % (ref 18–48)
MCH RBC QN AUTO: 34.1 PG (ref 27–31)
MCHC RBC AUTO-ENTMCNC: 34.6 G/DL (ref 32–36)
MCV RBC AUTO: 99 FL (ref 82–98)
METHADONE UR QL SCN>300 NG/ML: NEGATIVE
MONOCYTES # BLD AUTO: 0.3 K/UL (ref 0.3–1)
MONOCYTES NFR BLD: 11.6 % (ref 4–15)
NEUTROPHILS # BLD AUTO: 1.4 K/UL (ref 1.8–7.7)
NEUTROPHILS NFR BLD: 50.8 % (ref 38–73)
NRBC BLD-RTO: 0 /100 WBC
OPIATES UR QL SCN: NEGATIVE
PCP UR QL SCN>25 NG/ML: NEGATIVE
PLATELET # BLD AUTO: 233 K/UL (ref 150–450)
PMV BLD AUTO: 9.9 FL (ref 9.2–12.9)
POTASSIUM SERPL-SCNC: 3.9 MMOL/L (ref 3.5–5.1)
PROT SERPL-MCNC: 7.2 G/DL (ref 6–8.4)
PROTHROMBIN TIME: 11.2 SEC (ref 9–12.5)
RBC # BLD AUTO: 3.58 M/UL (ref 4–5.4)
SARS-COV-2 RDRP RESP QL NAA+PROBE: NEGATIVE
SODIUM SERPL-SCNC: 136 MMOL/L (ref 136–145)
TOXICOLOGY INFORMATION: NORMAL
TSH SERPL DL<=0.005 MIU/L-ACNC: 1.66 UIU/ML (ref 0.4–4)
WBC # BLD AUTO: 2.77 K/UL (ref 3.9–12.7)

## 2022-04-20 PROCEDURE — U0002 COVID-19 LAB TEST NON-CDC: HCPCS | Performed by: EMERGENCY MEDICINE

## 2022-04-20 PROCEDURE — 80143 DRUG ASSAY ACETAMINOPHEN: CPT | Performed by: EMERGENCY MEDICINE

## 2022-04-20 PROCEDURE — 82077 ASSAY SPEC XCP UR&BREATH IA: CPT | Performed by: EMERGENCY MEDICINE

## 2022-04-20 PROCEDURE — 85610 PROTHROMBIN TIME: CPT | Performed by: EMERGENCY MEDICINE

## 2022-04-20 PROCEDURE — 80053 COMPREHEN METABOLIC PANEL: CPT | Performed by: EMERGENCY MEDICINE

## 2022-04-20 PROCEDURE — 85025 COMPLETE CBC W/AUTO DIFF WBC: CPT | Performed by: EMERGENCY MEDICINE

## 2022-04-20 PROCEDURE — 84443 ASSAY THYROID STIM HORMONE: CPT | Performed by: EMERGENCY MEDICINE

## 2022-04-20 PROCEDURE — 82140 ASSAY OF AMMONIA: CPT | Performed by: EMERGENCY MEDICINE
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
## 2022-04-20 NOTE — ED NOTES
PATIENT SEEM TO BE RESTING AT THIS TIME   Topical Retinoid counseling:  Patient advised to apply a pea-sized amount only at bedtime and wait 30 minutes after washing their face before applying.  If too drying, patient may add a non-comedogenic moisturizer. The patient verbalized understanding of the proper use and possible adverse effects of retinoids.  All of the patient's questions and concerns were addressed.
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
## 2022-04-21 ENCOUNTER — HOSPITAL ENCOUNTER (OUTPATIENT)
Facility: HOSPITAL | Age: 67
Discharge: HOME | End: 2022-04-21
Attending: OPHTHALMOLOGY | Admitting: OPHTHALMOLOGY
Payer: MEDICARE

## 2022-04-21 ENCOUNTER — ANESTHESIA EVENT (OUTPATIENT)
Dept: SURGERY | Facility: HOSPITAL | Age: 67
End: 2022-04-21
Payer: MEDICARE

## 2022-04-21 ENCOUNTER — ANESTHESIA (OUTPATIENT)
Dept: SURGERY | Facility: HOSPITAL | Age: 67
End: 2022-04-21
Payer: MEDICARE

## 2022-04-21 ENCOUNTER — TELEPHONE (OUTPATIENT)
Dept: FAMILY MEDICINE | Facility: CLINIC | Age: 67
End: 2022-04-21
Payer: MEDICARE

## 2022-04-21 VITALS
OXYGEN SATURATION: 95 % | SYSTOLIC BLOOD PRESSURE: 115 MMHG | WEIGHT: 160 LBS | BODY MASS INDEX: 25.71 KG/M2 | RESPIRATION RATE: 20 BRPM | HEIGHT: 66 IN | HEART RATE: 76 BPM | DIASTOLIC BLOOD PRESSURE: 69 MMHG | TEMPERATURE: 98 F

## 2022-04-21 DIAGNOSIS — Z01.812 ENCOUNTER FOR PREOPERATIVE SCREENING LABORATORY TESTING FOR COVID-19 VIRUS: ICD-10-CM

## 2022-04-21 DIAGNOSIS — Z11.52 ENCOUNTER FOR PREOPERATIVE SCREENING LABORATORY TESTING FOR COVID-19 VIRUS: ICD-10-CM

## 2022-04-21 DIAGNOSIS — H25.11 NUCLEAR SCLEROTIC CATARACT OF RIGHT EYE: Primary | ICD-10-CM

## 2022-04-21 PROCEDURE — 66984 XCAPSL CTRC RMVL W/O ECP: CPT | Mod: 79,RT,, | Performed by: OPHTHALMOLOGY

## 2022-04-21 PROCEDURE — 36000707: Performed by: OPHTHALMOLOGY

## 2022-04-21 PROCEDURE — 63600175 PHARM REV CODE 636 W HCPCS: Performed by: NURSE ANESTHETIST, CERTIFIED REGISTERED

## 2022-04-21 PROCEDURE — A4217 STERILE WATER/SALINE, 500 ML: HCPCS | Performed by: OPHTHALMOLOGY

## 2022-04-21 PROCEDURE — 71000016 HC POSTOP RECOV ADDL HR: Performed by: OPHTHALMOLOGY

## 2022-04-21 PROCEDURE — D9220A PRA ANESTHESIA: ICD-10-PCS | Mod: ANES,,, | Performed by: ANESTHESIOLOGY

## 2022-04-21 PROCEDURE — 36000706: Performed by: OPHTHALMOLOGY

## 2022-04-21 PROCEDURE — 63600175 PHARM REV CODE 636 W HCPCS: Performed by: OPHTHALMOLOGY

## 2022-04-21 PROCEDURE — 37000009 HC ANESTHESIA EA ADD 15 MINS: Performed by: OPHTHALMOLOGY

## 2022-04-21 PROCEDURE — 25000003 PHARM REV CODE 250: Performed by: OPHTHALMOLOGY

## 2022-04-21 PROCEDURE — V2632 POST CHMBR INTRAOCULAR LENS: HCPCS | Performed by: OPHTHALMOLOGY

## 2022-04-21 PROCEDURE — 71000015 HC POSTOP RECOV 1ST HR: Performed by: OPHTHALMOLOGY

## 2022-04-21 PROCEDURE — 66984 PR REMOVAL, CATARACT, W/INSRT INTRAOC LENS, W/O ENDO CYCLO: ICD-10-PCS | Mod: 79,RT,, | Performed by: OPHTHALMOLOGY

## 2022-04-21 PROCEDURE — D9220A PRA ANESTHESIA: ICD-10-PCS | Mod: CRNA,,, | Performed by: NURSE ANESTHETIST, CERTIFIED REGISTERED

## 2022-04-21 PROCEDURE — D9220A PRA ANESTHESIA: Mod: ANES,,, | Performed by: ANESTHESIOLOGY

## 2022-04-21 PROCEDURE — 37000008 HC ANESTHESIA 1ST 15 MINUTES: Performed by: OPHTHALMOLOGY

## 2022-04-21 PROCEDURE — 25000003 PHARM REV CODE 250: Performed by: ANESTHESIOLOGY

## 2022-04-21 PROCEDURE — D9220A PRA ANESTHESIA: Mod: CRNA,,, | Performed by: NURSE ANESTHETIST, CERTIFIED REGISTERED

## 2022-04-21 DEVICE — LENS 15.0 ACRYSOF: Type: IMPLANTABLE DEVICE | Site: EYE | Status: FUNCTIONAL

## 2022-04-21 RX ORDER — LIDOCAINE HYDROCHLORIDE 20 MG/ML
INJECTION, SOLUTION INFILTRATION; PERINEURAL
Status: DISCONTINUED | OUTPATIENT
Start: 2022-04-21 | End: 2022-04-21 | Stop reason: HOSPADM

## 2022-04-21 RX ORDER — LIDOCAINE HYDROCHLORIDE 10 MG/ML
1 INJECTION, SOLUTION EPIDURAL; INFILTRATION; INTRACAUDAL; PERINEURAL ONCE
Status: CANCELLED | OUTPATIENT
Start: 2022-04-21 | End: 2022-04-21

## 2022-04-21 RX ORDER — FENTANYL CITRATE 50 UG/ML
INJECTION, SOLUTION INTRAMUSCULAR; INTRAVENOUS
Status: DISCONTINUED | OUTPATIENT
Start: 2022-04-21 | End: 2022-04-21

## 2022-04-21 RX ORDER — POVIDONE-IODINE 5 %
SOLUTION, NON-ORAL OPHTHALMIC (EYE)
Status: DISCONTINUED | OUTPATIENT
Start: 2022-04-21 | End: 2022-04-21 | Stop reason: HOSPADM

## 2022-04-21 RX ORDER — MIDAZOLAM HYDROCHLORIDE 1 MG/ML
INJECTION, SOLUTION INTRAMUSCULAR; INTRAVENOUS
Status: DISCONTINUED | OUTPATIENT
Start: 2022-04-21 | End: 2022-04-21

## 2022-04-21 RX ORDER — PREDNISOLONE ACETATE 10 MG/ML
SUSPENSION/ DROPS OPHTHALMIC
Status: DISCONTINUED | OUTPATIENT
Start: 2022-04-21 | End: 2022-04-21 | Stop reason: HOSPADM

## 2022-04-21 RX ORDER — WATER 1 ML/ML
IRRIGANT IRRIGATION
Status: DISCONTINUED | OUTPATIENT
Start: 2022-04-21 | End: 2022-04-21 | Stop reason: HOSPADM

## 2022-04-21 RX ORDER — ACETAMINOPHEN 325 MG/1
650 TABLET ORAL EVERY 4 HOURS PRN
Status: DISCONTINUED | OUTPATIENT
Start: 2022-04-21 | End: 2022-04-21 | Stop reason: HOSPADM

## 2022-04-21 RX ORDER — PHENYLEPHRINE HYDROCHLORIDE 25 MG/ML
1 SOLUTION/ DROPS OPHTHALMIC
Status: COMPLETED | OUTPATIENT
Start: 2022-04-21 | End: 2022-04-21

## 2022-04-21 RX ORDER — ACETAMINOPHEN 500 MG
1000 TABLET ORAL ONCE
Status: COMPLETED | OUTPATIENT
Start: 2022-04-21 | End: 2022-04-21

## 2022-04-21 RX ORDER — CYCLOPENTOLATE HYDROCHLORIDE 10 MG/ML
1 SOLUTION/ DROPS OPHTHALMIC
Status: DISCONTINUED | OUTPATIENT
Start: 2022-04-21 | End: 2023-11-16

## 2022-04-21 RX ORDER — HYDROCODONE BITARTRATE AND ACETAMINOPHEN 5; 325 MG/1; MG/1
1 TABLET ORAL EVERY 4 HOURS PRN
Status: DISCONTINUED | OUTPATIENT
Start: 2022-04-21 | End: 2022-04-21 | Stop reason: HOSPADM

## 2022-04-21 RX ORDER — TROPICAMIDE 10 MG/ML
1 SOLUTION/ DROPS OPHTHALMIC
Status: COMPLETED | OUTPATIENT
Start: 2022-04-21 | End: 2022-04-21

## 2022-04-21 RX ORDER — SODIUM CHLORIDE, SODIUM LACTATE, POTASSIUM CHLORIDE, CALCIUM CHLORIDE 600; 310; 30; 20 MG/100ML; MG/100ML; MG/100ML; MG/100ML
INJECTION, SOLUTION INTRAVENOUS CONTINUOUS
Status: DISCONTINUED | OUTPATIENT
Start: 2022-04-21 | End: 2022-04-21 | Stop reason: HOSPADM

## 2022-04-21 RX ORDER — OFLOXACIN 3 MG/ML
1 SOLUTION/ DROPS OPHTHALMIC
Status: DISCONTINUED | OUTPATIENT
Start: 2022-04-21 | End: 2023-11-16

## 2022-04-21 RX ORDER — TETRACAINE HYDROCHLORIDE 5 MG/ML
1 SOLUTION OPHTHALMIC
Status: COMPLETED | OUTPATIENT
Start: 2022-04-21 | End: 2022-04-21

## 2022-04-21 RX ADMIN — OFLOXACIN 1 DROP: 3 SOLUTION OPHTHALMIC at 10:04

## 2022-04-21 RX ADMIN — CYCLOPENTOLATE HYDROCHLORIDE 1 DROP: 10 SOLUTION/ DROPS OPHTHALMIC at 10:04

## 2022-04-21 RX ADMIN — TETRACAINE HYDROCHLORIDE 1 DROP: 5 SOLUTION OPHTHALMIC at 10:04

## 2022-04-21 RX ADMIN — FENTANYL CITRATE 25 MCG: 50 INJECTION, SOLUTION INTRAMUSCULAR; INTRAVENOUS at 12:04

## 2022-04-21 RX ADMIN — HYDROCODONE BITARTRATE AND ACETAMINOPHEN 1 TABLET: 5; 325 TABLET ORAL at 02:04

## 2022-04-21 RX ADMIN — FENTANYL CITRATE 50 MCG: 50 INJECTION, SOLUTION INTRAMUSCULAR; INTRAVENOUS at 12:04

## 2022-04-21 RX ADMIN — SODIUM CHLORIDE, SODIUM LACTATE, POTASSIUM CHLORIDE, AND CALCIUM CHLORIDE: .6; .31; .03; .02 INJECTION, SOLUTION INTRAVENOUS at 12:04

## 2022-04-21 RX ADMIN — MIDAZOLAM HYDROCHLORIDE 0.5 MG: 1 INJECTION, SOLUTION INTRAMUSCULAR; INTRAVENOUS at 12:04

## 2022-04-21 RX ADMIN — TROPICAMIDE 1 DROP: 10 SOLUTION/ DROPS OPHTHALMIC at 10:04

## 2022-04-21 RX ADMIN — PHENYLEPHRINE HYDROCHLORIDE 1 DROP: 25 SOLUTION/ DROPS OPHTHALMIC at 10:04

## 2022-04-21 RX ADMIN — MIDAZOLAM HYDROCHLORIDE 0.5 MG: 1 INJECTION, SOLUTION INTRAMUSCULAR; INTRAVENOUS at 01:04

## 2022-04-21 RX ADMIN — MIDAZOLAM HYDROCHLORIDE 1 MG: 1 INJECTION, SOLUTION INTRAMUSCULAR; INTRAVENOUS at 12:04

## 2022-04-21 RX ADMIN — ACETAMINOPHEN 1000 MG: 500 TABLET ORAL at 10:04

## 2022-04-21 NOTE — BRIEF OP NOTE
[facility] - Surgery  Brief Operative Note    Surgery Date: 4/21/2022     Surgeon(s) and Role:     * Thaddeus Bennett MD - Primary    Assisting Surgeon: None    Pre-op Diagnosis:  NS (nuclear sclerosis), right [H25.11]    Post-op Diagnosis:  Post-Op Diagnosis Codes:     * NS (nuclear sclerosis), right [H25.11]    Procedure(s) (LRB):  PHACOEMULSIFICATION, CATARACT (Right)  INSERTION, IOL PROSTHESIS (Right)    Anesthesia: Local MAC    Operative Findings:     Estimated Blood Loss: * No values recorded between 4/21/2022 12:44 PM and 4/21/2022  1:13 PM *         Specimens:   Specimen (24h ago, onward)            None            Discharge Note    OUTCOME: Patient tolerated treatment/procedure well without complication and is now ready for discharge.    DISPOSITION: Home or Self Care    FINAL DIAGNOSIS:  NS (nuclear sclerosis), right    FOLLOWUP: In clinic    DISCHARGE INSTRUCTIONS:    Discharge Procedure Orders   Other restrictions (specify):   Order Comments: No heavy lifting or bending for 1 week.

## 2022-04-21 NOTE — TRANSFER OF CARE
"Anesthesia Transfer of Care Note    Patient: Estella Arevalo    Procedure(s) Performed: Procedure(s) (LRB):  PHACOEMULSIFICATION, CATARACT (Right)  INSERTION, IOL PROSTHESIS (Right)    Patient location: [facility]    Anesthesia Type: MAC    Transport from OR: Transported from OR on room air with adequate spontaneous ventilation    Post pain: adequate analgesia    Post assessment: no apparent anesthetic complications    Post vital signs: stable    Level of consciousness: awake, alert and oriented    Nausea/Vomiting: no nausea/vomiting    Complications: none    Transfer of care protocol was followed      Last vitals:   Visit Vitals  /71 (BP Location: Left arm, Patient Position: Lying)   Pulse 80   Temp 36.7 °C (98 °F) (Oral)   Resp 18   Ht 5' 6" (1.676 m)   Wt 72.6 kg (160 lb)   SpO2 95%   Breastfeeding No   BMI 25.82 kg/m²     "

## 2022-04-21 NOTE — DISCHARGE INSTRUCTIONS
ACTIVITY LEVEL:  If you received sedation or an anesthetic, you may feel sleepy for several hours. Rest until you are more awake. Gradually resume your normal activities. Normal activity will cause no undue risk to your eye. The white part of your eye might be red - this is nothing to worry about. Wear your old glasses or sunglasses that were given to you for protection during the day.    RESTRICTIONS - for the next 7 days  · Do not lift anything over 10 pounds.  · When bending, bend at the knees not the waist.  · Do not rub the eye.  · Do not get water in the eye.  · Do not sleep on the side that had surgery.  · Protect your eye at bedtime with the shield provided.    DIET: At home, continue with liquids. If there is no nausea, you may eat a soft diet and gradually resume your normal diet. Limit alcohol intake for 24 hours.    BATHING: You may shower or bathe as normal. You may take a warm wash cloth, which has been wrung out to remove excess water, and gently remove crusting on the lashes.    MEDICATIONS: You may take Extra Strength Tylenol every 4 hours for pain/headache.    Last dose of pain medication at 2:15 pm     Use your drops     Today: Pink- 3 pm, 6 pm, and 9 pm     Beige - 3 pm, 6 pm, and 9 pm     Ortiz- 3 pm    Tomorrow:  Resume pink and beige cap drops four times a day.  Resume grey cap drops once a day.    Place one drop in the eye that had surgery from the first bottle. Wait 5 minutes and then use the second bottle. (It does not matter which bottle is used first.) CONTINUE all glaucoma drops.  No driving, alcoholic beverages or signing legal documents for next 24 hours or while taking pain medication      WHEN TO CALL THE DOCTOR:  · Redness that increases significantly  · Pain not relieved by Tylenol  RETURN APPOINTMENT:  You will need to see Dr. Bennett on Friday at the [place] [facility] .  Bring your eye kit with you on your follow-up visit. Your kit contains sunglasses, eye shield, tape.  FOR  EMERGENCIES:  If any unusual problems or difficulties occur, contact Dr. Bennett at the Clinic office at [phone] during normal business hours. If after hours, call [phone].   Fall Prevention  Millions of people fall every year and injure themselves. You may have had anesthesia or sedation which may increase your risk of falling. You may have health issues that put you at an increased risk of falling.     Here are ways to reduce your risk of falling.    Make your home safe by keeping walkways clear of objects you may trip over.  Use non-slip pads under rugs. Do not use area rugs or small throw rugs.  Use non-slip mats in bathtubs and showers.  Install handrails and lights on staircases.  Do not walk in poorly lit areas.  Do not stand on chairs or wobbly ladders.  Use caution when reaching overhead or looking upward. This position can cause a loss of balance.  Be sure your shoes fit properly, have non-slip bottoms and are in good condition.   Wear shoes both inside and out. Avoid going barefoot or wearing slippers.  Be cautious when going up and down stairs, curbs, and when walking on uneven sidewalks.  If your balance is poor, consider using a cane or walker.  If your fall was related to alcohol use, stop or limit alcohol intake.   If your fall was related to use of sleeping medicines, talk to your doctor about this. You may need to reduce your dosage at bedtime if you awaken during the night to go to the bathroom.    To reduce the need for nighttime bathroom trips:  Avoid drinking fluids for several hours before going to bed  Empty your bladder before going to bed  Men can keep a urinal at the bedside  Stay as active as you can. Balance, flexibility, strength, and endurance all come from exercise. They all play a role in preventing falls. Ask your healthcare provider which types of activity are right for you.  Get your vision checked on a regular basis.  If you have pets, know where they are before  you stand up or walk so you don't trip over them.  Use night lights.

## 2022-04-21 NOTE — TRANSFER OF CARE
"Anesthesia Transfer of Care Note    Patient: Estella Arevalo    Procedure(s) Performed: Procedure(s) (LRB):  PHACOEMULSIFICATION, CATARACT (Right)  INSERTION, IOL PROSTHESIS (Right)    Anesthesia PACU Handoff    Last vitals:   Visit Vitals  /71 (BP Location: Left arm, Patient Position: Lying)   Pulse 80   Temp 36.7 °C (98 °F) (Oral)   Resp 18   Ht 5' 6" (1.676 m)   Wt 72.6 kg (160 lb)   SpO2 95%   Breastfeeding No   BMI 25.82 kg/m²     "

## 2022-04-21 NOTE — OP NOTE
Operative Date:  04/21/2022    Discharge Date:  04/21/2022    Discharge Patient Home    Report Title: Operative Note      SURGEON: Thaddeus Bennett MD     ASSISTANT:     PREOPERATIVE DIAGNOSIS: Visually significant NSC cataract,  Right Eye.    POSTOPERATIVE DIAGNOSIS: Visually-significant NSC cataract,  Right Eye.    PROCEDURE PERFORMED: Phacoemulsification of the cataract with posterior chamber intraocular lens Right Eye.    ANESTHESIA: Topical with MAC     COMPLICATIONS: None    ESTIMATED BLOOD LOSS: Minimal    INDICATIONS FOR PROCEDURE:   The patient is a pleasant 67 year old woman with increasing difficulties with activities of daily living secondary to a dense visually significant cataract in the Right Eye.  Discussions have been carried out with this patient concerning the options to surgery, risks, benefits and expectations.  The patient voiced good understanding and wished to proceed with the above procedure.    PROCEDURE IN DETAIL: The patient was brought to the operating room and received topical anesthetic to the eye and then was prepped and draped in the usual sterile fashion.  Using the Fraser ring and the guarded radha blade set at 0.37 mm, a partial thickness clear cornea incision was made temporally.  The paracentesis site was made at the twelve o'clock position.  O[place]ria was injected into the anterior chamber through the paracentesis.  Viscoat was then injected into the anterior chamber.  The eye was then reentered at the primary surgical site with a 2.4 mm keratome followed by continuous capsulotomy, hydrodissection, hydrodelineation and phacoemulsification of the cataract.  Residual cortical material was removed using automated irrigation-aspiration technique.  Healon was injected into the posterior chamber and an SN60WF 15.0 diopter lens was placed in the bag without difficulty. Residual viscoelastic was removed using automated irrigation-aspiration technique. The eye was  re-pressurized using BSS solution and both the paracentesis site and the primary surgical site were demonstrated to be watertight at the end of the case with Weck--Viktoriya manipulation.  One drop of Ofloxacin and one drop of Pred acetate 1% was applied to the Right Eye .The eye was closed, patched and a Sanchez shield placed.  The patient was taken to the recovery room in good and stable condition.  The patient tolerated the procedure well.  The patient was instructed to refrain from any heavy lifting, bending, stooping or straining activities, discharged home  and to follow-up in the morning for routine postoperative care with Thaddeus Bennett MD.

## 2022-04-21 NOTE — ANESTHESIA PREPROCEDURE EVALUATION
04/21/2022  Estella Arevalo is a 67 y.o., female.      Pre-op Assessment    I have reviewed the Patient Summary Reports.     I have reviewed the Nursing Notes.       Review of Systems  Anesthesia Hx:  No problems with previous Anesthesia  Denies Family Hx of Anesthesia complications.   Denies Personal Hx of Anesthesia complications.   Social:  Non-Smoker, Alcohol Use H/o alcohol abuse   EENT/Dental:   Cataracts    Nasal bone fracture 2/2 fall 3/30   Cardiovascular:   Hypertension Past MI CAD      Pulmonary:  Pulmonary Normal    Hepatic/GI:   GERD Liver Disease, Hepatitis, C    Musculoskeletal:   Arthritis     Neurological:   Neuromuscular Disease, Headaches    Endocrine:   Hypothyroidism    Psych:   Psychiatric History anxiety depression          Physical Exam  General: Well nourished, Cooperative and Alert    Airway:  Mallampati: III   Mouth Opening: Normal  TM Distance: 4 - 6 cm  Tongue: Normal  Neck ROM: Extension Decreased    Dental:    Chest/Lungs:  Normal Respiratory Rate, Clear to auscultation    Heart:  Rate: Normal  Rhythm: Regular Rhythm      Wt Readings from Last 3 Encounters:   04/12/22 72.6 kg (160 lb)   04/19/22 70.3 kg (155 lb)   04/05/22 72.6 kg (160 lb)     Temp Readings from Last 3 Encounters:   04/21/22 36.7 °C (98 °F) (Oral)   04/20/22 37.1 °C (98.7 °F)   04/07/22 36.6 °C (97.8 °F) (Oral)     BP Readings from Last 3 Encounters:   04/21/22 111/68   04/20/22 132/81   04/07/22 117/73     Pulse Readings from Last 3 Encounters:   04/21/22 69   04/20/22 91   04/07/22 (!) 58     4/6/2022 COVID negative    Anesthesia Plan  Type of Anesthesia, risks & benefits discussed:    Anesthesia Type: Gen Natural Airway, MAC  Intra-op Monitoring Plan: Standard ASA Monitors  Post Op Pain Control Plan: multimodal analgesia  Induction:  IV  Informed Consent: Informed consent signed with the Patient and all  parties understand the risks and agree with anesthesia plan.  All questions answered.   ASA Score: 3  Day of Surgery Review of History & Physical: H&P Update referred to the surgeon/provider.    Ready For Surgery From Anesthesia Perspective.     .

## 2022-04-22 ENCOUNTER — OFFICE VISIT (OUTPATIENT)
Dept: OPHTHALMOLOGY | Facility: CLINIC | Age: 67
End: 2022-04-22
Payer: OTHER GOVERNMENT

## 2022-04-22 DIAGNOSIS — Z98.890 POST-OPERATIVE STATE: Primary | ICD-10-CM

## 2022-04-22 PROCEDURE — 99024 POSTOP FOLLOW-UP VISIT: CPT | Mod: S$GLB,,, | Performed by: OPHTHALMOLOGY

## 2022-04-22 PROCEDURE — 99213 OFFICE O/P EST LOW 20 MIN: CPT | Mod: PBBFAC,PO | Performed by: OPHTHALMOLOGY

## 2022-04-22 PROCEDURE — 99024 PR POST-OP FOLLOW-UP VISIT: ICD-10-PCS | Mod: S$GLB,,, | Performed by: OPHTHALMOLOGY

## 2022-04-22 PROCEDURE — 99999 PR PBB SHADOW E&M-EST. PATIENT-LVL III: CPT | Mod: PBBFAC,,, | Performed by: OPHTHALMOLOGY

## 2022-04-22 PROCEDURE — 99999 PR PBB SHADOW E&M-EST. PATIENT-LVL III: ICD-10-PCS | Mod: PBBFAC,,, | Performed by: OPHTHALMOLOGY

## 2022-04-22 NOTE — PROGRESS NOTES
Subjective:       Patient ID: Estella Arevalo is a 67 y.o. female.    Chief Complaint: Post-op Evaluation (1 day po od)    HPI     Post-op Evaluation      Additional comments: 1 day po od              Comments      S/p Phaco w/IOL OD- 4/21/2022    66 y/o female is here for 1 day post-op of the RT eye. Pt reports s/x went   well but has pain, discomfort and light sensitivity. Reviewed post-op   instructions in clinic today.     Eyemeds  Ofloxacin QID OD  Ilevro QD OD  Durezol QID OD              Last edited by Enrike Tran on 4/22/2022 10:09 AM. (History)             Assessment:       1. Post-operative state        Plan:       S/p CE OU- Doing well.      CPM OD.  Taper gtts OS.  RTC 1 wk.

## 2022-04-25 ENCOUNTER — TELEPHONE (OUTPATIENT)
Dept: FAMILY MEDICINE | Facility: CLINIC | Age: 67
End: 2022-04-25
Payer: MEDICARE

## 2022-04-25 NOTE — TELEPHONE ENCOUNTER
----- Message from Jack Ho MA sent at 4/25/2022  7:52 AM CDT -----  Type: Patient Call Back    Who called:self    What is the request in detail:pt. Is asking for someone to give her a call ..     Can the clinic reply by [facility]?no    Would the patient rather a call back or a response via [facility]? yes    Best call back number:[phone]

## 2022-04-26 ENCOUNTER — OFFICE VISIT (OUTPATIENT)
Dept: FAMILY MEDICINE | Facility: CLINIC | Age: 67
End: 2022-04-26
Payer: MEDICARE

## 2022-04-26 VITALS
SYSTOLIC BLOOD PRESSURE: 120 MMHG | HEART RATE: 83 BPM | WEIGHT: 177.13 LBS | BODY MASS INDEX: 28.59 KG/M2 | OXYGEN SATURATION: 97 % | TEMPERATURE: 99 F | DIASTOLIC BLOOD PRESSURE: 80 MMHG

## 2022-04-26 DIAGNOSIS — N10 ACUTE PYELONEPHRITIS: ICD-10-CM

## 2022-04-26 DIAGNOSIS — F32.A DEPRESSION, UNSPECIFIED DEPRESSION TYPE: ICD-10-CM

## 2022-04-26 DIAGNOSIS — F10.939 ALCOHOL WITHDRAWAL SYNDROME WITH COMPLICATION: ICD-10-CM

## 2022-04-26 DIAGNOSIS — J30.2 SEASONAL ALLERGIES: ICD-10-CM

## 2022-04-26 DIAGNOSIS — F41.9 ANXIETY: ICD-10-CM

## 2022-04-26 DIAGNOSIS — B35.3 TINEA PEDIS, RIGHT: ICD-10-CM

## 2022-04-26 DIAGNOSIS — F11.20 NARCOTIC DEPENDENCY, CONTINUOUS: ICD-10-CM

## 2022-04-26 DIAGNOSIS — R56.9 CONVULSIONS, UNSPECIFIED CONVULSION TYPE: Primary | ICD-10-CM

## 2022-04-26 DIAGNOSIS — H04.129 DRY EYE SYNDROME, UNSPECIFIED LATERALITY: ICD-10-CM

## 2022-04-26 DIAGNOSIS — K64.9 HEMORRHOIDS, UNSPECIFIED HEMORRHOID TYPE: ICD-10-CM

## 2022-04-26 DIAGNOSIS — F19.20 POLYSUBSTANCE DEPENDENCE INCLUDING OPIOID TYPE DRUG, CONTINUOUS USE: Chronic | ICD-10-CM

## 2022-04-26 DIAGNOSIS — M79.671 CHRONIC FOOT PAIN, RIGHT: ICD-10-CM

## 2022-04-26 DIAGNOSIS — F31.9 BIPOLAR 1 DISORDER: Chronic | ICD-10-CM

## 2022-04-26 DIAGNOSIS — Z12.11 COLON CANCER SCREENING: ICD-10-CM

## 2022-04-26 DIAGNOSIS — R07.9 CHEST PAIN, UNSPECIFIED TYPE: ICD-10-CM

## 2022-04-26 DIAGNOSIS — G89.29 CHRONIC FOOT PAIN, RIGHT: ICD-10-CM

## 2022-04-26 DIAGNOSIS — F10.10 ALCOHOL ABUSE: Chronic | ICD-10-CM

## 2022-04-26 DIAGNOSIS — R11.0 NAUSEA: ICD-10-CM

## 2022-04-26 DIAGNOSIS — F31.9 BIPOLAR AFFECTIVE DISORDER, REMISSION STATUS UNSPECIFIED: ICD-10-CM

## 2022-04-26 DIAGNOSIS — E03.4 HYPOTHYROIDISM DUE TO ACQUIRED ATROPHY OF THYROID: ICD-10-CM

## 2022-04-26 DIAGNOSIS — I10 ESSENTIAL HYPERTENSION: Chronic | ICD-10-CM

## 2022-04-26 DIAGNOSIS — Z86.010 HISTORY OF COLON POLYPS: ICD-10-CM

## 2022-04-26 DIAGNOSIS — B35.1 ONYCHOMYCOSIS: ICD-10-CM

## 2022-04-26 DIAGNOSIS — R30.0 BURNING WITH URINATION: ICD-10-CM

## 2022-04-26 DIAGNOSIS — E03.9 ACQUIRED HYPOTHYROIDISM: Chronic | ICD-10-CM

## 2022-04-26 DIAGNOSIS — F11.20 POLYSUBSTANCE DEPENDENCE INCLUDING OPIOID TYPE DRUG, CONTINUOUS USE: Chronic | ICD-10-CM

## 2022-04-26 PROCEDURE — 3288F PR FALLS RISK ASSESSMENT DOCUMENTED: ICD-10-PCS | Mod: CPTII,S$GLB,, | Performed by: NURSE PRACTITIONER

## 2022-04-26 PROCEDURE — 3079F DIAST BP 80-89 MM HG: CPT | Mod: CPTII,S$GLB,, | Performed by: NURSE PRACTITIONER

## 2022-04-26 PROCEDURE — 1125F PR PAIN SEVERITY QUANTIFIED, PAIN PRESENT: ICD-10-PCS | Mod: CPTII,S$GLB,, | Performed by: NURSE PRACTITIONER

## 2022-04-26 PROCEDURE — 99499 UNLISTED E&M SERVICE: CPT | Mod: S$GLB,,, | Performed by: NURSE PRACTITIONER

## 2022-04-26 PROCEDURE — 3074F SYST BP LT 130 MM HG: CPT | Mod: CPTII,S$GLB,, | Performed by: NURSE PRACTITIONER

## 2022-04-26 PROCEDURE — 1100F PTFALLS ASSESS-DOCD GE2>/YR: CPT | Mod: CPTII,S$GLB,, | Performed by: NURSE PRACTITIONER

## 2022-04-26 PROCEDURE — 99214 PR OFFICE/OUTPT VISIT, EST, LEVL IV, 30-39 MIN: ICD-10-PCS | Mod: S$GLB,,, | Performed by: NURSE PRACTITIONER

## 2022-04-26 PROCEDURE — 3288F FALL RISK ASSESSMENT DOCD: CPT | Mod: CPTII,S$GLB,, | Performed by: NURSE PRACTITIONER

## 2022-04-26 PROCEDURE — 99499 RISK ADDL DX/OHS AUDIT: ICD-10-PCS | Mod: S$GLB,,, | Performed by: NURSE PRACTITIONER

## 2022-04-26 PROCEDURE — 3079F PR MOST RECENT DIASTOLIC BLOOD PRESSURE 80-89 MM HG: ICD-10-PCS | Mod: CPTII,S$GLB,, | Performed by: NURSE PRACTITIONER

## 2022-04-26 PROCEDURE — 99999 PR PBB SHADOW E&M-EST. PATIENT-LVL IV: ICD-10-PCS | Mod: PBBFAC,,, | Performed by: NURSE PRACTITIONER

## 2022-04-26 PROCEDURE — 1100F PR PT FALLS ASSESS DOC 2+ FALLS/FALL W/INJURY/YR: ICD-10-PCS | Mod: CPTII,S$GLB,, | Performed by: NURSE PRACTITIONER

## 2022-04-26 PROCEDURE — 3008F BODY MASS INDEX DOCD: CPT | Mod: CPTII,S$GLB,, | Performed by: NURSE PRACTITIONER

## 2022-04-26 PROCEDURE — 3074F PR MOST RECENT SYSTOLIC BLOOD PRESSURE < 130 MM HG: ICD-10-PCS | Mod: CPTII,S$GLB,, | Performed by: NURSE PRACTITIONER

## 2022-04-26 PROCEDURE — 1125F AMNT PAIN NOTED PAIN PRSNT: CPT | Mod: CPTII,S$GLB,, | Performed by: NURSE PRACTITIONER

## 2022-04-26 PROCEDURE — 99214 OFFICE O/P EST MOD 30 MIN: CPT | Mod: S$GLB,,, | Performed by: NURSE PRACTITIONER

## 2022-04-26 PROCEDURE — 3008F PR BODY MASS INDEX (BMI) DOCUMENTED: ICD-10-PCS | Mod: CPTII,S$GLB,, | Performed by: NURSE PRACTITIONER

## 2022-04-26 PROCEDURE — 99999 PR PBB SHADOW E&M-EST. PATIENT-LVL IV: CPT | Mod: PBBFAC,,, | Performed by: NURSE PRACTITIONER

## 2022-04-26 RX ORDER — CYCLOSPORINE 0.5 MG/ML
1 EMULSION OPHTHALMIC 2 TIMES DAILY
Qty: 60 EACH | Refills: 3 | Status: ON HOLD | OUTPATIENT
Start: 2022-04-26 | End: 2023-11-29 | Stop reason: HOSPADM

## 2022-04-26 RX ORDER — HYDROCORTISONE 2.5% 25 MG/G
CREAM TOPICAL
Qty: 60 G | Refills: 2 | Status: SHIPPED | OUTPATIENT
Start: 2022-04-26

## 2022-04-26 RX ORDER — PRENATAL VIT 91/IRON/FOLIC/DHA 28-975-200
COMBINATION PACKAGE (EA) ORAL NIGHTLY
Qty: 28.4 G | Refills: 2 | Status: SHIPPED | OUTPATIENT
Start: 2022-04-26 | End: 2023-11-12

## 2022-04-26 RX ORDER — ESCITALOPRAM OXALATE 20 MG/1
20 TABLET ORAL DAILY
Qty: 30 TABLET | Refills: 5 | Status: SHIPPED | OUTPATIENT
Start: 2022-04-26

## 2022-04-26 RX ORDER — NALOXONE HYDROCHLORIDE 4 MG/.1ML
1 SPRAY NASAL
Qty: 1 EACH | Refills: 30 | Status: ON HOLD | OUTPATIENT
Start: 2022-04-26 | End: 2023-11-29 | Stop reason: HOSPADM

## 2022-04-26 RX ORDER — MELOXICAM 15 MG/1
15 TABLET ORAL DAILY
Qty: 60 TABLET | Refills: 0 | Status: SHIPPED | OUTPATIENT
Start: 2022-04-26 | End: 2023-11-12

## 2022-04-26 RX ORDER — PROMETHAZINE HYDROCHLORIDE 25 MG/1
25 SUPPOSITORY RECTAL EVERY 6 HOURS PRN
Qty: 10 SUPPOSITORY | Refills: 0 | Status: SHIPPED | OUTPATIENT
Start: 2022-04-26 | End: 2022-07-23

## 2022-04-26 RX ORDER — AMOXICILLIN AND CLAVULANATE POTASSIUM 875; 125 MG/1; MG/1
1 TABLET, FILM COATED ORAL 2 TIMES DAILY
Qty: 14 TABLET | Refills: 0 | Status: CANCELLED | OUTPATIENT
Start: 2022-04-26

## 2022-04-26 RX ORDER — LEVOTHYROXINE SODIUM 25 UG/1
25 TABLET ORAL DAILY
Qty: 90 TABLET | Refills: 3 | Status: SHIPPED | OUTPATIENT
Start: 2022-04-26

## 2022-04-26 NOTE — ASSESSMENT & PLAN NOTE
The current medical regimen is effective;  continue present plan and medications.    Medications refilled.

## 2022-04-26 NOTE — ASSESSMENT & PLAN NOTE
Patient states she experiences burning with urination.  States she was previously treated with Augmentin but symptoms have not completely resolved.  Urinalysis with reflex to culture ordered.

## 2022-04-26 NOTE — ASSESSMENT & PLAN NOTE
List of recovery programs provided to patient.     [facility], [facility], [facility], [facility], [facility], [facility], [place], [place].     Alcohol dependence:  Discussed risk factors for liver and pancreas damage, heart disease, stroke, GI problems, cancer, skin, muscle and bone disorders, weight gain, diabetes, adverse effects on central and peripheral nervous systems and mental functioning, vitamin and mineral deficiencies and drug interactions. Counseled on cessation/cutting down. Patient shows interest. Feedback about patient's alcohol use. Psychosocial interventions: Discussed non-pharmacologic tx including CBT, psychotherapy, AA, addiction programs, or other support groups. Discussed use of naltrexone.     Opioid use disorder  Provided information about [facility] and [facility] programs. Pt strongly encouraged to enter Detox. Education provided on risks associated with opioid use, including overdose. discussed MAT. Narcan Prescribed.     ED precautions given.   Notify provider if symptoms do not resolve or increase in severity.   Patient verbalizes understanding and agrees with plan of care.
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
## 2022-04-26 NOTE — ASSESSMENT & PLAN NOTE
Denies signs and symptoms of withdrawal.  Mild joint bilateral hands.  Patient reports last use was yesterday evening.  Patient states she will inter [facility] within the next month for detox/rehab.    As above
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
## 2022-04-26 NOTE — ASSESSMENT & PLAN NOTE
/80 (BP Location: Left arm)   Pulse 83   Temp 99.2 °F (37.3 °C) (Oral)   Wt 80.3 kg (177 lb 2.2 oz)   SpO2 97%   BMI 28.59 kg/m²    -continue current medication regimen  -DASH diet, regular cardiovascular exercises, portion control  - ?weight loss  -f/u with BP logs in 2 weeks if BP is not consistently <140/90

## 2022-04-27 ENCOUNTER — TELEPHONE (OUTPATIENT)
Dept: FAMILY MEDICINE | Facility: CLINIC | Age: 67
End: 2022-04-27
Payer: MEDICARE

## 2022-04-27 ENCOUNTER — HOSPITAL ENCOUNTER (EMERGENCY)
Facility: HOSPITAL | Age: 67
Discharge: HOME | End: 2022-04-28
Attending: EMERGENCY MEDICINE
Payer: MEDICARE
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
## 2022-04-27 DIAGNOSIS — F10.920 ALCOHOLIC INTOXICATION WITHOUT COMPLICATION: ICD-10-CM

## 2022-04-27 DIAGNOSIS — S80.212A ABRASION OF LEFT KNEE, INITIAL ENCOUNTER: Primary | ICD-10-CM

## 2022-04-27 LAB
ALBUMIN SERPL BCP-MCNC: 4 G/DL (ref 3.5–5.2)
ALP SERPL-CCNC: 156 U/L (ref 55–135)
ALT SERPL W/O P-5'-P-CCNC: 23 U/L (ref 10–44)
AMPHET+METHAMPHET UR QL: NEGATIVE
ANION GAP SERPL CALC-SCNC: 13 MMOL/L (ref 8–16)
AST SERPL-CCNC: 34 U/L (ref 10–40)
BACTERIA #/AREA URNS HPF: NORMAL /HPF
BARBITURATES UR QL SCN>200 NG/ML: NEGATIVE
BASOPHILS # BLD AUTO: 0.04 K/UL (ref 0–0.2)
BASOPHILS NFR BLD: 1.1 % (ref 0–1.9)
BENZODIAZ UR QL SCN>200 NG/ML: NEGATIVE
BILIRUB SERPL-MCNC: 0.3 MG/DL (ref 0.1–1)
BILIRUB UR QL STRIP: NEGATIVE
BUN SERPL-MCNC: 14 MG/DL (ref 8–23)
BZE UR QL SCN: NEGATIVE
CALCIUM SERPL-MCNC: 8.7 MG/DL (ref 8.7–10.5)
CANNABINOIDS UR QL SCN: NEGATIVE
CHLORIDE SERPL-SCNC: 102 MMOL/L (ref 95–110)
CLARITY UR: CLEAR
CO2 SERPL-SCNC: 21 MMOL/L (ref 23–29)
COLOR UR: YELLOW
CREAT SERPL-MCNC: 0.8 MG/DL (ref 0.5–1.4)
CREAT UR-MCNC: 40.3 MG/DL (ref 15–325)
DIFFERENTIAL METHOD: ABNORMAL
EOSINOPHIL # BLD AUTO: 0.3 K/UL (ref 0–0.5)
EOSINOPHIL NFR BLD: 8.5 % (ref 0–8)
ERYTHROCYTE [DISTWIDTH] IN BLOOD BY AUTOMATED COUNT: 13.3 % (ref 11.5–14.5)
EST. GFR  (AFRICAN AMERICAN): >60 ML/MIN/1.73 M^2
EST. GFR  (NON AFRICAN AMERICAN): >60 ML/MIN/1.73 M^2
ETHANOL SERPL-MCNC: 184 MG/DL
GLUCOSE SERPL-MCNC: 79 MG/DL (ref 70–110)
GLUCOSE UR QL STRIP: NEGATIVE
HCT VFR BLD AUTO: 37.8 % (ref 37–48.5)
HGB BLD-MCNC: 12.6 G/DL (ref 12–16)
HGB UR QL STRIP: NEGATIVE
IMM GRANULOCYTES # BLD AUTO: 0.01 K/UL (ref 0–0.04)
IMM GRANULOCYTES NFR BLD AUTO: 0.3 % (ref 0–0.5)
KETONES UR QL STRIP: NEGATIVE
LEUKOCYTE ESTERASE UR QL STRIP: ABNORMAL
LYMPHOCYTES # BLD AUTO: 1.3 K/UL (ref 1–4.8)
LYMPHOCYTES NFR BLD: 34.2 % (ref 18–48)
MCH RBC QN AUTO: 33.3 PG (ref 27–31)
MCHC RBC AUTO-ENTMCNC: 33.3 G/DL (ref 32–36)
MCV RBC AUTO: 100 FL (ref 82–98)
METHADONE UR QL SCN>300 NG/ML: NEGATIVE
MICROSCOPIC COMMENT: NORMAL
MONOCYTES # BLD AUTO: 0.4 K/UL (ref 0.3–1)
MONOCYTES NFR BLD: 11.5 % (ref 4–15)
NEUTROPHILS # BLD AUTO: 1.6 K/UL (ref 1.8–7.7)
NEUTROPHILS NFR BLD: 44.4 % (ref 38–73)
NITRITE UR QL STRIP: NEGATIVE
NRBC BLD-RTO: 0 /100 WBC
OPIATES UR QL SCN: NEGATIVE
PCP UR QL SCN>25 NG/ML: NEGATIVE
PH UR STRIP: 5 [PH] (ref 5–8)
PLATELET # BLD AUTO: 206 K/UL (ref 150–450)
PMV BLD AUTO: 10.3 FL (ref 9.2–12.9)
POCT GLUCOSE: 105 MG/DL (ref 70–110)
POTASSIUM SERPL-SCNC: 4.1 MMOL/L (ref 3.5–5.1)
PROT SERPL-MCNC: 7.4 G/DL (ref 6–8.4)
PROT UR QL STRIP: NEGATIVE
RBC # BLD AUTO: 3.78 M/UL (ref 4–5.4)
SODIUM SERPL-SCNC: 136 MMOL/L (ref 136–145)
SP GR UR STRIP: 1.01 (ref 1–1.03)
TOXICOLOGY INFORMATION: NORMAL
URN SPEC COLLECT METH UR: ABNORMAL
UROBILINOGEN UR STRIP-ACNC: NEGATIVE EU/DL
WBC # BLD AUTO: 3.65 K/UL (ref 3.9–12.7)
WBC #/AREA URNS HPF: 3 /HPF (ref 0–5)

## 2022-04-27 PROCEDURE — 85025 COMPLETE CBC W/AUTO DIFF WBC: CPT | Performed by: EMERGENCY MEDICINE

## 2022-04-27 PROCEDURE — 80307 DRUG TEST PRSMV CHEM ANLYZR: CPT | Performed by: EMERGENCY MEDICINE

## 2022-04-27 PROCEDURE — 96372 THER/PROPH/DIAG INJ SC/IM: CPT | Performed by: EMERGENCY MEDICINE

## 2022-04-27 PROCEDURE — 82077 ASSAY SPEC XCP UR&BREATH IA: CPT | Performed by: EMERGENCY MEDICINE

## 2022-04-27 PROCEDURE — 80053 COMPREHEN METABOLIC PANEL: CPT | Performed by: EMERGENCY MEDICINE

## 2022-04-27 PROCEDURE — 93010 EKG 12-LEAD: ICD-10-PCS | Mod: ,,, | Performed by: INTERNAL MEDICINE

## 2022-04-27 PROCEDURE — P9612 CATHETERIZE FOR URINE SPEC: HCPCS

## 2022-04-27 PROCEDURE — 93005 ELECTROCARDIOGRAM TRACING: CPT

## 2022-04-27 PROCEDURE — 81000 URINALYSIS NONAUTO W/SCOPE: CPT | Mod: 59 | Performed by: EMERGENCY MEDICINE

## 2022-04-27 PROCEDURE — 93010 ELECTROCARDIOGRAM REPORT: CPT | Mod: ,,, | Performed by: INTERNAL MEDICINE

## 2022-04-27 PROCEDURE — 82962 GLUCOSE BLOOD TEST: CPT

## 2022-04-27 PROCEDURE — 99285 EMERGENCY DEPT VISIT HI MDM: CPT | Mod: 25

## 2022-04-27 PROCEDURE — 63600175 PHARM REV CODE 636 W HCPCS: Performed by: EMERGENCY MEDICINE

## 2022-04-27 RX ORDER — LORAZEPAM 2 MG/ML
2 INJECTION INTRAMUSCULAR ONCE AS NEEDED
Status: DISCONTINUED | OUTPATIENT
Start: 2022-04-27 | End: 2022-04-28 | Stop reason: HOSPADM

## 2022-04-27 RX ORDER — HALOPERIDOL 5 MG/ML
5 INJECTION INTRAMUSCULAR
Status: COMPLETED | OUTPATIENT
Start: 2022-04-27 | End: 2022-04-27

## 2022-04-27 RX ORDER — LORAZEPAM 2 MG/ML
2 INJECTION INTRAMUSCULAR
Status: COMPLETED | OUTPATIENT
Start: 2022-04-27 | End: 2022-04-27

## 2022-04-27 RX ADMIN — HALOPERIDOL LACTATE 5 MG: 5 INJECTION, SOLUTION INTRAMUSCULAR at 07:04

## 2022-04-27 RX ADMIN — LORAZEPAM 2 MG: 2 INJECTION INTRAMUSCULAR; INTRAVENOUS at 07:04

## 2022-04-27 NOTE — TELEPHONE ENCOUNTER
----- Message from Nadja Rudolph sent at 4/27/2022  4:26 PM CDT -----  Type: Patient Call Back    Who called: self     What is the request in detail: Pt requesting a call bk from nurse today     Can the clinic reply by [facility]?    Would the patient rather a call back or a response via [facility]? Call     Best call back number: [phone] (home)

## 2022-04-27 NOTE — TELEPHONE ENCOUNTER
----- Message from Nadja Rudolph sent at 4/27/2022 11:25 AM CDT -----  Type: Patient Call Back    Who called: self     What is the request in detail: Pt left her paperwork she left it in the office, she would like to come to the office and pick it up     Can the clinic reply by [facility]?    Would the patient rather a call back or a response via [facility]? Call     Best call back number: [phone] (home)

## 2022-04-27 NOTE — ANESTHESIA POSTPROCEDURE EVALUATION
Anesthesia Post Evaluation    Patient: Estella Arevalo    Procedure(s) Performed: Procedure(s) (LRB):  PHACOEMULSIFICATION, CATARACT (Right)  INSERTION, IOL PROSTHESIS (Right)    Final Anesthesia Type: MAC      Patient location during evaluation: PACU  Patient participation: Yes- Able to Participate  Level of consciousness: awake and alert and oriented  Post-procedure vital signs: reviewed and stable  Pain management: adequate  Airway patency: patent    PONV status at discharge: No PONV  Anesthetic complications: no      Cardiovascular status: blood pressure returned to baseline, hemodynamically stable and stable  Respiratory status: unassisted, spontaneous ventilation and room air  Hydration status: euvolemic  Follow-up not needed.          Vitals Value Taken Time   /69 04/21/22 1432   Temp 36.8 °C (98.3 °F) 04/21/22 1432   Pulse 76 04/21/22 1432   Resp 20 04/21/22 1432   SpO2 95 % 04/21/22 1432         No case tracking events are documented in the log.      Pain/Mika Score: No data recorded

## 2022-04-27 NOTE — TELEPHONE ENCOUNTER
----- Message from Tiara Henson LPN sent at 4/27/2022 12:46 PM CDT -----  Regarding: FW: self [phone]    ----- Message -----  From: Lisa Sainz  Sent: 4/27/2022  12:24 PM CDT  To: [facility] Staff  Subject: self [phone]                                Type: Patient Call Back    Who called: self    What is the request in detail: Patient would like a call back from the provider. She stated it was confidential.    Can the clinic reply by [facility]? no    Would the patient rather a call back or a response via [facility]?  Call back    Best call back number: [phone]

## 2022-04-28 VITALS
TEMPERATURE: 99 F | OXYGEN SATURATION: 94 % | HEIGHT: 66 IN | HEART RATE: 74 BPM | DIASTOLIC BLOOD PRESSURE: 66 MMHG | BODY MASS INDEX: 28.45 KG/M2 | RESPIRATION RATE: 18 BRPM | WEIGHT: 177 LBS | SYSTOLIC BLOOD PRESSURE: 125 MMHG

## 2022-04-28 DIAGNOSIS — F31.9 BIPOLAR 1 DISORDER: Chronic | ICD-10-CM

## 2022-04-28 DIAGNOSIS — F41.9 ANXIETY: ICD-10-CM

## 2022-04-28 RX ORDER — ESCITALOPRAM OXALATE 20 MG/1
TABLET ORAL
Qty: 30 TABLET | Refills: 5 | OUTPATIENT
Start: 2022-04-28

## 2022-04-28 NOTE — ED NOTES
Pt has been sleeping/snoring since arriving to room. She was loud and cursing upon arrival to ED and received medication.

## 2022-04-28 NOTE — DISCHARGE INSTRUCTIONS
Thank you for coming to our Emergency Department today. It is important to remember that some problems are difficult to diagnose and may not be found during your first visit. Be sure to follow up with your primary care doctor and review any labs/imaging that was performed with them. If you do not have a primary care doctor, you may contact the one listed on your discharge paperwork or you may also call the [facility] Appointment Desk at [phone] to schedule an appointment with one.     All medications may potentially have side effects and it is impossible to predict which medications may give you side effects. If you feel that you are having a negative effect of any medication you should immediately stop taking them and seek medical attention.    Return to the ER with any questions/concerns, new/concerning symptoms, worsening or failure to improve. Do not drive or make any important decisions for 24 hours if you have received any pain medications, sedatives or mood altering drugs during your ER visit.
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
## 2022-04-29 ENCOUNTER — TELEPHONE (OUTPATIENT)
Dept: OPHTHALMOLOGY | Facility: CLINIC | Age: 67
End: 2022-04-29
Payer: MEDICARE

## 2022-04-29 NOTE — TELEPHONE ENCOUNTER
Mailbox is full call to r/s 1 week PO OD or was confirming if patient was coming in this afternoon. Will need to go over post op drops schedule.

## 2022-04-29 NOTE — TELEPHONE ENCOUNTER
----- Message from Trinidad Post sent at 4/29/2022  3:15 PM CDT -----  Regarding: self  .Type: Patient Call Back    Who called: self     What is the request in detail: patient states she has concerns regarding the diagnosis sheet she was given from after summary. Please call     Can the clinic reply by [facility]?    Would the patient rather a call back or a response via [facility]?  Call     Best call back number: .[phone]

## 2022-05-18 ENCOUNTER — PES CALL (OUTPATIENT)
Dept: ADMINISTRATIVE | Facility: CLINIC | Age: 67
End: 2022-05-18
Payer: MEDICARE

## 2022-06-05 ENCOUNTER — HOSPITAL ENCOUNTER (INPATIENT)
Facility: HOSPITAL | Age: 67
LOS: 6 days | Discharge: HOME HEALTH | DRG: 354 | End: 2022-06-11
Attending: EMERGENCY MEDICINE | Admitting: SURGERY
Payer: MEDICARE

## 2022-06-05 DIAGNOSIS — K74.60 HEPATIC CIRRHOSIS, UNSPECIFIED HEPATIC CIRRHOSIS TYPE, UNSPECIFIED WHETHER ASCITES PRESENT: Chronic | ICD-10-CM

## 2022-06-05 DIAGNOSIS — R00.0 TACHYCARDIA: ICD-10-CM

## 2022-06-05 DIAGNOSIS — Z01.810 PREOP CARDIOVASCULAR EXAM: ICD-10-CM

## 2022-06-05 DIAGNOSIS — K43.6 INCARCERATED VENTRAL HERNIA: Primary | ICD-10-CM

## 2022-06-05 DIAGNOSIS — R10.9 ABDOMINAL PAIN, UNSPECIFIED ABDOMINAL LOCATION: ICD-10-CM

## 2022-06-05 DIAGNOSIS — R11.2 NAUSEA AND VOMITING, INTRACTABILITY OF VOMITING NOT SPECIFIED, UNSPECIFIED VOMITING TYPE: ICD-10-CM

## 2022-06-05 LAB
ALBUMIN SERPL BCP-MCNC: 4.4 G/DL (ref 3.5–5.2)
ALP SERPL-CCNC: 128 U/L (ref 55–135)
ALT SERPL W/O P-5'-P-CCNC: 35 U/L (ref 10–44)
AMPHET+METHAMPHET UR QL: NEGATIVE
ANION GAP SERPL CALC-SCNC: 14 MMOL/L (ref 8–16)
ANION GAP SERPL CALC-SCNC: 15 MMOL/L (ref 8–16)
AST SERPL-CCNC: 39 U/L (ref 10–40)
BACTERIA #/AREA URNS HPF: ABNORMAL /HPF
BARBITURATES UR QL SCN>200 NG/ML: NEGATIVE
BASOPHILS # BLD AUTO: 0.02 K/UL (ref 0–0.2)
BASOPHILS # BLD AUTO: 0.06 K/UL (ref 0–0.2)
BASOPHILS NFR BLD: 0.5 % (ref 0–1.9)
BASOPHILS NFR BLD: 0.7 % (ref 0–1.9)
BENZODIAZ UR QL SCN>200 NG/ML: NEGATIVE
BILIRUB SERPL-MCNC: 0.7 MG/DL (ref 0.1–1)
BILIRUB UR QL STRIP: ABNORMAL
BUN SERPL-MCNC: 13 MG/DL (ref 8–23)
BUN SERPL-MCNC: 15 MG/DL (ref 8–23)
BZE UR QL SCN: NEGATIVE
CALCIUM SERPL-MCNC: 8.7 MG/DL (ref 8.7–10.5)
CALCIUM SERPL-MCNC: 9.8 MG/DL (ref 8.7–10.5)
CANNABINOIDS UR QL SCN: NEGATIVE
CHLORIDE SERPL-SCNC: 102 MMOL/L (ref 95–110)
CHLORIDE SERPL-SCNC: 102 MMOL/L (ref 95–110)
CLARITY UR: ABNORMAL
CO2 SERPL-SCNC: 19 MMOL/L (ref 23–29)
CO2 SERPL-SCNC: 21 MMOL/L (ref 23–29)
COLOR UR: YELLOW
CREAT SERPL-MCNC: 0.8 MG/DL (ref 0.5–1.4)
CREAT SERPL-MCNC: 1 MG/DL (ref 0.5–1.4)
CREAT UR-MCNC: 356.3 MG/DL (ref 15–325)
CTP QC/QA: YES
DIFFERENTIAL METHOD: ABNORMAL
DIFFERENTIAL METHOD: ABNORMAL
EOSINOPHIL # BLD AUTO: 0 K/UL (ref 0–0.5)
EOSINOPHIL # BLD AUTO: 0.1 K/UL (ref 0–0.5)
EOSINOPHIL NFR BLD: 0.3 % (ref 0–8)
EOSINOPHIL NFR BLD: 1.2 % (ref 0–8)
ERYTHROCYTE [DISTWIDTH] IN BLOOD BY AUTOMATED COUNT: 13.3 % (ref 11.5–14.5)
ERYTHROCYTE [DISTWIDTH] IN BLOOD BY AUTOMATED COUNT: 13.5 % (ref 11.5–14.5)
EST. GFR  (AFRICAN AMERICAN): >60 ML/MIN/1.73 M^2
EST. GFR  (AFRICAN AMERICAN): >60 ML/MIN/1.73 M^2
EST. GFR  (NON AFRICAN AMERICAN): 58 ML/MIN/1.73 M^2
EST. GFR  (NON AFRICAN AMERICAN): >60 ML/MIN/1.73 M^2
ETHANOL SERPL-MCNC: <10 MG/DL
GLUCOSE SERPL-MCNC: 136 MG/DL (ref 70–110)
GLUCOSE SERPL-MCNC: 143 MG/DL (ref 70–110)
GLUCOSE UR QL STRIP: ABNORMAL
HCT VFR BLD AUTO: 44.1 % (ref 37–48.5)
HCT VFR BLD AUTO: 49.7 % (ref 37–48.5)
HGB BLD-MCNC: 15 G/DL (ref 12–16)
HGB BLD-MCNC: 16.8 G/DL (ref 12–16)
HGB UR QL STRIP: NEGATIVE
HYALINE CASTS #/AREA URNS LPF: 12 /LPF
IMM GRANULOCYTES # BLD AUTO: 0.01 K/UL (ref 0–0.04)
IMM GRANULOCYTES # BLD AUTO: 0.02 K/UL (ref 0–0.04)
IMM GRANULOCYTES NFR BLD AUTO: 0.2 % (ref 0–0.5)
IMM GRANULOCYTES NFR BLD AUTO: 0.3 % (ref 0–0.5)
KETONES UR QL STRIP: ABNORMAL
LACTATE SERPL-SCNC: 2.1 MMOL/L (ref 0.5–2.2)
LEUKOCYTE ESTERASE UR QL STRIP: ABNORMAL
LIPASE SERPL-CCNC: 28 U/L (ref 4–60)
LYMPHOCYTES # BLD AUTO: 0.6 K/UL (ref 1–4.8)
LYMPHOCYTES # BLD AUTO: 1 K/UL (ref 1–4.8)
LYMPHOCYTES NFR BLD: 11.2 % (ref 18–48)
LYMPHOCYTES NFR BLD: 13.9 % (ref 18–48)
MAGNESIUM SERPL-MCNC: 1.4 MG/DL (ref 1.6–2.6)
MAGNESIUM SERPL-MCNC: 2 MG/DL (ref 1.6–2.6)
MCH RBC QN AUTO: 33.3 PG (ref 27–31)
MCH RBC QN AUTO: 33.5 PG (ref 27–31)
MCHC RBC AUTO-ENTMCNC: 33.8 G/DL (ref 32–36)
MCHC RBC AUTO-ENTMCNC: 34 G/DL (ref 32–36)
MCV RBC AUTO: 98 FL (ref 82–98)
MCV RBC AUTO: 99 FL (ref 82–98)
METHADONE UR QL SCN>300 NG/ML: NEGATIVE
MICROSCOPIC COMMENT: ABNORMAL
MONOCYTES # BLD AUTO: 0.5 K/UL (ref 0.3–1)
MONOCYTES # BLD AUTO: 0.9 K/UL (ref 0.3–1)
MONOCYTES NFR BLD: 11.8 % (ref 4–15)
MONOCYTES NFR BLD: 9.8 % (ref 4–15)
NEUTROPHILS # BLD AUTO: 2.9 K/UL (ref 1.8–7.7)
NEUTROPHILS # BLD AUTO: 6.9 K/UL (ref 1.8–7.7)
NEUTROPHILS NFR BLD: 73.2 % (ref 38–73)
NEUTROPHILS NFR BLD: 76.9 % (ref 38–73)
NITRITE UR QL STRIP: NEGATIVE
NON-SQ EPI CELLS #/AREA URNS HPF: 2 /HPF
NRBC BLD-RTO: 0 /100 WBC
NRBC BLD-RTO: 0 /100 WBC
OPIATES UR QL SCN: ABNORMAL
PCP UR QL SCN>25 NG/ML: NEGATIVE
PH UR STRIP: 6 [PH] (ref 5–8)
PHOSPHATE SERPL-MCNC: 4.1 MG/DL (ref 2.7–4.5)
PLATELET # BLD AUTO: 184 K/UL (ref 150–450)
PLATELET # BLD AUTO: 235 K/UL (ref 150–450)
PMV BLD AUTO: 10.2 FL (ref 9.2–12.9)
PMV BLD AUTO: 10.9 FL (ref 9.2–12.9)
POTASSIUM SERPL-SCNC: 3.5 MMOL/L (ref 3.5–5.1)
POTASSIUM SERPL-SCNC: 3.7 MMOL/L (ref 3.5–5.1)
PROT SERPL-MCNC: 8.5 G/DL (ref 6–8.4)
PROT UR QL STRIP: ABNORMAL
RBC # BLD AUTO: 4.51 M/UL (ref 4–5.4)
RBC # BLD AUTO: 5.02 M/UL (ref 4–5.4)
RBC #/AREA URNS HPF: 18 /HPF (ref 0–4)
SARS-COV-2 RDRP RESP QL NAA+PROBE: NEGATIVE
SODIUM SERPL-SCNC: 135 MMOL/L (ref 136–145)
SODIUM SERPL-SCNC: 138 MMOL/L (ref 136–145)
SP GR UR STRIP: >1.03 (ref 1–1.03)
SQUAMOUS #/AREA URNS HPF: 5 /HPF
TOXICOLOGY INFORMATION: ABNORMAL
TROPONIN I SERPL DL<=0.01 NG/ML-MCNC: <0.006 NG/ML (ref 0–0.03)
URN SPEC COLLECT METH UR: ABNORMAL
UROBILINOGEN UR STRIP-ACNC: NEGATIVE EU/DL
WBC # BLD AUTO: 3.97 K/UL (ref 3.9–12.7)
WBC # BLD AUTO: 8.95 K/UL (ref 3.9–12.7)
WBC #/AREA URNS HPF: 29 /HPF (ref 0–5)

## 2022-06-05 PROCEDURE — 83735 ASSAY OF MAGNESIUM: CPT | Mod: 91 | Performed by: STUDENT IN AN ORGANIZED HEALTH CARE EDUCATION/TRAINING PROGRAM

## 2022-06-05 PROCEDURE — 82077 ASSAY SPEC XCP UR&BREATH IA: CPT | Performed by: EMERGENCY MEDICINE

## 2022-06-05 PROCEDURE — 63600175 PHARM REV CODE 636 W HCPCS: Performed by: STUDENT IN AN ORGANIZED HEALTH CARE EDUCATION/TRAINING PROGRAM

## 2022-06-05 PROCEDURE — 87086 URINE CULTURE/COLONY COUNT: CPT | Performed by: EMERGENCY MEDICINE

## 2022-06-05 PROCEDURE — 25000003 PHARM REV CODE 250: Performed by: EMERGENCY MEDICINE

## 2022-06-05 PROCEDURE — 80053 COMPREHEN METABOLIC PANEL: CPT | Performed by: EMERGENCY MEDICINE

## 2022-06-05 PROCEDURE — 85025 COMPLETE CBC W/AUTO DIFF WBC: CPT | Performed by: STUDENT IN AN ORGANIZED HEALTH CARE EDUCATION/TRAINING PROGRAM

## 2022-06-05 PROCEDURE — 96361 HYDRATE IV INFUSION ADD-ON: CPT

## 2022-06-05 PROCEDURE — 87077 CULTURE AEROBIC IDENTIFY: CPT | Performed by: EMERGENCY MEDICINE

## 2022-06-05 PROCEDURE — 11000001 HC ACUTE MED/SURG PRIVATE ROOM

## 2022-06-05 PROCEDURE — 83605 ASSAY OF LACTIC ACID: CPT | Performed by: EMERGENCY MEDICINE

## 2022-06-05 PROCEDURE — 80048 BASIC METABOLIC PNL TOTAL CA: CPT | Mod: XB | Performed by: STUDENT IN AN ORGANIZED HEALTH CARE EDUCATION/TRAINING PROGRAM

## 2022-06-05 PROCEDURE — 96372 THER/PROPH/DIAG INJ SC/IM: CPT | Performed by: EMERGENCY MEDICINE

## 2022-06-05 PROCEDURE — 25000003 PHARM REV CODE 250: Performed by: STUDENT IN AN ORGANIZED HEALTH CARE EDUCATION/TRAINING PROGRAM

## 2022-06-05 PROCEDURE — U0002 COVID-19 LAB TEST NON-CDC: HCPCS | Performed by: EMERGENCY MEDICINE

## 2022-06-05 PROCEDURE — 96365 THER/PROPH/DIAG IV INF INIT: CPT

## 2022-06-05 PROCEDURE — 80307 DRUG TEST PRSMV CHEM ANLYZR: CPT | Performed by: EMERGENCY MEDICINE

## 2022-06-05 PROCEDURE — 84484 ASSAY OF TROPONIN QUANT: CPT | Performed by: EMERGENCY MEDICINE

## 2022-06-05 PROCEDURE — 96376 TX/PRO/DX INJ SAME DRUG ADON: CPT

## 2022-06-05 PROCEDURE — 99285 EMERGENCY DEPT VISIT HI MDM: CPT | Mod: 25

## 2022-06-05 PROCEDURE — 93010 EKG 12-LEAD: ICD-10-PCS | Mod: ,,, | Performed by: INTERNAL MEDICINE

## 2022-06-05 PROCEDURE — 93005 ELECTROCARDIOGRAM TRACING: CPT

## 2022-06-05 PROCEDURE — 83735 ASSAY OF MAGNESIUM: CPT | Performed by: EMERGENCY MEDICINE

## 2022-06-05 PROCEDURE — 85025 COMPLETE CBC W/AUTO DIFF WBC: CPT | Mod: 91 | Performed by: EMERGENCY MEDICINE

## 2022-06-05 PROCEDURE — 63600175 PHARM REV CODE 636 W HCPCS: Performed by: EMERGENCY MEDICINE

## 2022-06-05 PROCEDURE — 94640 AIRWAY INHALATION TREATMENT: CPT

## 2022-06-05 PROCEDURE — 84100 ASSAY OF PHOSPHORUS: CPT | Performed by: STUDENT IN AN ORGANIZED HEALTH CARE EDUCATION/TRAINING PROGRAM

## 2022-06-05 PROCEDURE — 25000242 PHARM REV CODE 250 ALT 637 W/ HCPCS: Performed by: SURGERY

## 2022-06-05 PROCEDURE — 99223 PR INITIAL HOSPITAL CARE,LEVL III: ICD-10-PCS | Mod: AI,GC,, | Performed by: SURGERY

## 2022-06-05 PROCEDURE — 87088 URINE BACTERIA CULTURE: CPT | Performed by: EMERGENCY MEDICINE

## 2022-06-05 PROCEDURE — 93010 ELECTROCARDIOGRAM REPORT: CPT | Mod: ,,, | Performed by: INTERNAL MEDICINE

## 2022-06-05 PROCEDURE — 83690 ASSAY OF LIPASE: CPT | Performed by: EMERGENCY MEDICINE

## 2022-06-05 PROCEDURE — 87186 SC STD MICRODIL/AGAR DIL: CPT | Performed by: EMERGENCY MEDICINE

## 2022-06-05 PROCEDURE — C9113 INJ PANTOPRAZOLE SODIUM, VIA: HCPCS | Performed by: STUDENT IN AN ORGANIZED HEALTH CARE EDUCATION/TRAINING PROGRAM

## 2022-06-05 PROCEDURE — 96375 TX/PRO/DX INJ NEW DRUG ADDON: CPT

## 2022-06-05 PROCEDURE — 81000 URINALYSIS NONAUTO W/SCOPE: CPT | Mod: 59 | Performed by: EMERGENCY MEDICINE

## 2022-06-05 PROCEDURE — 99223 1ST HOSP IP/OBS HIGH 75: CPT | Mod: AI,GC,, | Performed by: SURGERY

## 2022-06-05 RX ORDER — HYDROMORPHONE HYDROCHLORIDE 2 MG/ML
1 INJECTION, SOLUTION INTRAMUSCULAR; INTRAVENOUS; SUBCUTANEOUS
Status: COMPLETED | OUTPATIENT
Start: 2022-06-05 | End: 2022-06-05

## 2022-06-05 RX ORDER — ONDANSETRON 2 MG/ML
4 INJECTION INTRAMUSCULAR; INTRAVENOUS EVERY 12 HOURS PRN
Status: DISCONTINUED | OUTPATIENT
Start: 2022-06-05 | End: 2022-06-07

## 2022-06-05 RX ORDER — ESCITALOPRAM OXALATE 10 MG/1
20 TABLET ORAL DAILY
Status: CANCELLED | OUTPATIENT
Start: 2022-06-05

## 2022-06-05 RX ORDER — PANTOPRAZOLE SODIUM 40 MG/10ML
40 INJECTION, POWDER, LYOPHILIZED, FOR SOLUTION INTRAVENOUS DAILY
Status: DISCONTINUED | OUTPATIENT
Start: 2022-06-06 | End: 2022-06-05

## 2022-06-05 RX ORDER — LEVALBUTEROL INHALATION SOLUTION 0.63 MG/3ML
0.63 SOLUTION RESPIRATORY (INHALATION) EVERY 8 HOURS
Status: DISCONTINUED | OUTPATIENT
Start: 2022-06-05 | End: 2022-06-11 | Stop reason: HOSPADM

## 2022-06-05 RX ORDER — ACETAMINOPHEN 650 MG/20.3ML
650 LIQUID ORAL EVERY 4 HOURS PRN
Status: DISCONTINUED | OUTPATIENT
Start: 2022-06-05 | End: 2022-06-11 | Stop reason: HOSPADM

## 2022-06-05 RX ORDER — HYDROMORPHONE HYDROCHLORIDE 2 MG/ML
2 INJECTION, SOLUTION INTRAMUSCULAR; INTRAVENOUS; SUBCUTANEOUS ONCE
Status: COMPLETED | OUTPATIENT
Start: 2022-06-05 | End: 2022-06-05

## 2022-06-05 RX ORDER — LEVALBUTEROL INHALATION SOLUTION 0.63 MG/3ML
0.63 SOLUTION RESPIRATORY (INHALATION) EVERY 8 HOURS
Status: DISCONTINUED | OUTPATIENT
Start: 2022-06-05 | End: 2022-06-05

## 2022-06-05 RX ORDER — SODIUM CHLORIDE, SODIUM LACTATE, POTASSIUM CHLORIDE, CALCIUM CHLORIDE 600; 310; 30; 20 MG/100ML; MG/100ML; MG/100ML; MG/100ML
INJECTION, SOLUTION INTRAVENOUS CONTINUOUS
Status: DISCONTINUED | OUTPATIENT
Start: 2022-06-05 | End: 2022-06-06

## 2022-06-05 RX ORDER — LIDOCAINE HYDROCHLORIDE 10 MG/ML
1 INJECTION, SOLUTION EPIDURAL; INFILTRATION; INTRACAUDAL; PERINEURAL ONCE
Status: DISCONTINUED | OUTPATIENT
Start: 2022-06-05 | End: 2022-06-05

## 2022-06-05 RX ORDER — METHOCARBAMOL 500 MG/1
500 TABLET, FILM COATED ORAL 4 TIMES DAILY
Status: DISCONTINUED | OUTPATIENT
Start: 2022-06-05 | End: 2022-06-11 | Stop reason: HOSPADM

## 2022-06-05 RX ORDER — DROPERIDOL 2.5 MG/ML
2.5 INJECTION, SOLUTION INTRAMUSCULAR; INTRAVENOUS
Status: COMPLETED | OUTPATIENT
Start: 2022-06-05 | End: 2022-06-05

## 2022-06-05 RX ORDER — PANTOPRAZOLE SODIUM 40 MG/10ML
40 INJECTION, POWDER, LYOPHILIZED, FOR SOLUTION INTRAVENOUS DAILY
Status: DISCONTINUED | OUTPATIENT
Start: 2022-06-05 | End: 2022-06-11 | Stop reason: HOSPADM

## 2022-06-05 RX ORDER — FENTANYL CITRATE 50 UG/ML
50 INJECTION, SOLUTION INTRAMUSCULAR; INTRAVENOUS
Status: COMPLETED | OUTPATIENT
Start: 2022-06-05 | End: 2022-06-05

## 2022-06-05 RX ORDER — LANOLIN ALCOHOL/MO/W.PET/CERES
100 CREAM (GRAM) TOPICAL DAILY
Status: DISCONTINUED | OUTPATIENT
Start: 2022-06-05 | End: 2022-06-05

## 2022-06-05 RX ORDER — TRAZODONE HYDROCHLORIDE 50 MG/1
50 TABLET ORAL NIGHTLY
Status: CANCELLED | OUTPATIENT
Start: 2022-06-05

## 2022-06-05 RX ORDER — TALC
6 POWDER (GRAM) TOPICAL NIGHTLY PRN
Status: DISCONTINUED | OUTPATIENT
Start: 2022-06-05 | End: 2022-06-11 | Stop reason: HOSPADM

## 2022-06-05 RX ORDER — MORPHINE SULFATE 4 MG/ML
2 INJECTION, SOLUTION INTRAMUSCULAR; INTRAVENOUS
Status: DISCONTINUED | OUTPATIENT
Start: 2022-06-05 | End: 2022-06-06

## 2022-06-05 RX ORDER — SODIUM CHLORIDE 0.9 % (FLUSH) 0.9 %
10 SYRINGE (ML) INJECTION
Status: DISCONTINUED | OUTPATIENT
Start: 2022-06-05 | End: 2022-06-11 | Stop reason: HOSPADM

## 2022-06-05 RX ORDER — ENOXAPARIN SODIUM 100 MG/ML
40 INJECTION SUBCUTANEOUS EVERY 24 HOURS
Status: DISCONTINUED | OUTPATIENT
Start: 2022-06-05 | End: 2022-06-11 | Stop reason: HOSPADM

## 2022-06-05 RX ORDER — LEVOTHYROXINE SODIUM 25 UG/1
25 TABLET ORAL DAILY
Status: CANCELLED | OUTPATIENT
Start: 2022-06-05

## 2022-06-05 RX ORDER — LEVOTHYROXINE SODIUM 25 UG/1
25 TABLET ORAL
Status: DISCONTINUED | OUTPATIENT
Start: 2022-06-06 | End: 2022-06-06

## 2022-06-05 RX ORDER — PANTOPRAZOLE SODIUM 40 MG/1
40 TABLET, DELAYED RELEASE ORAL DAILY
Status: CANCELLED | OUTPATIENT
Start: 2022-06-05

## 2022-06-05 RX ORDER — KETOROLAC TROMETHAMINE 30 MG/ML
15 INJECTION, SOLUTION INTRAMUSCULAR; INTRAVENOUS ONCE
Status: COMPLETED | OUTPATIENT
Start: 2022-06-05 | End: 2022-06-05

## 2022-06-05 RX ORDER — OXYCODONE HCL 5 MG/5 ML
5 SOLUTION, ORAL ORAL EVERY 4 HOURS PRN
Status: DISCONTINUED | OUTPATIENT
Start: 2022-06-05 | End: 2022-06-07

## 2022-06-05 RX ORDER — MULTIVIT WITH MINERALS/HERBS
1 TABLET ORAL DAILY
COMMUNITY
End: 2023-11-12

## 2022-06-05 RX ORDER — MAGNESIUM SULFATE HEPTAHYDRATE 40 MG/ML
2 INJECTION, SOLUTION INTRAVENOUS ONCE
Status: COMPLETED | OUTPATIENT
Start: 2022-06-05 | End: 2022-06-05

## 2022-06-05 RX ADMIN — DROPERIDOL 2.5 MG: 2.5 INJECTION, SOLUTION INTRAMUSCULAR; INTRAVENOUS at 05:06

## 2022-06-05 RX ADMIN — SODIUM CHLORIDE 1000 ML: 0.9 INJECTION, SOLUTION INTRAVENOUS at 12:06

## 2022-06-05 RX ADMIN — MAGNESIUM SULFATE 2 G: 2 INJECTION INTRAVENOUS at 05:06

## 2022-06-05 RX ADMIN — ENOXAPARIN SODIUM 40 MG: 100 INJECTION SUBCUTANEOUS at 05:06

## 2022-06-05 RX ADMIN — MORPHINE SULFATE 2 MG: 4 INJECTION INTRAVENOUS at 07:06

## 2022-06-05 RX ADMIN — MORPHINE SULFATE 2 MG: 4 INJECTION INTRAVENOUS at 05:06

## 2022-06-05 RX ADMIN — METHOCARBAMOL 500 MG: 500 TABLET ORAL at 12:06

## 2022-06-05 RX ADMIN — LEVALBUTEROL HYDROCHLORIDE 0.63 MG: 0.63 SOLUTION RESPIRATORY (INHALATION) at 11:06

## 2022-06-05 RX ADMIN — KETOROLAC TROMETHAMINE 15 MG: 30 INJECTION, SOLUTION INTRAMUSCULAR at 12:06

## 2022-06-05 RX ADMIN — HYDROMORPHONE HYDROCHLORIDE 1 MG: 2 INJECTION, SOLUTION INTRAMUSCULAR; INTRAVENOUS; SUBCUTANEOUS at 05:06

## 2022-06-05 RX ADMIN — HYDROMORPHONE HYDROCHLORIDE 2 MG: 2 INJECTION, SOLUTION INTRAMUSCULAR; INTRAVENOUS; SUBCUTANEOUS at 09:06

## 2022-06-05 RX ADMIN — MORPHINE SULFATE 2 MG: 4 INJECTION INTRAVENOUS at 02:06

## 2022-06-05 RX ADMIN — SODIUM CHLORIDE 1000 ML: 0.9 INJECTION, SOLUTION INTRAVENOUS at 04:06

## 2022-06-05 RX ADMIN — FOLIC ACID 1 MG: 5 INJECTION, SOLUTION INTRAMUSCULAR; INTRAVENOUS; SUBCUTANEOUS at 03:06

## 2022-06-05 RX ADMIN — MORPHINE SULFATE 2 MG: 4 INJECTION INTRAVENOUS at 10:06

## 2022-06-05 RX ADMIN — FENTANYL CITRATE 50 MCG: 50 INJECTION, SOLUTION INTRAMUSCULAR; INTRAVENOUS at 07:06

## 2022-06-05 RX ADMIN — SODIUM CHLORIDE, SODIUM LACTATE, POTASSIUM CHLORIDE, AND CALCIUM CHLORIDE: .6; .31; .03; .02 INJECTION, SOLUTION INTRAVENOUS at 12:06

## 2022-06-05 RX ADMIN — SODIUM CHLORIDE, SODIUM LACTATE, POTASSIUM CHLORIDE, AND CALCIUM CHLORIDE: .6; .31; .03; .02 INJECTION, SOLUTION INTRAVENOUS at 07:06

## 2022-06-05 RX ADMIN — THIAMINE HYDROCHLORIDE 500 MG: 100 INJECTION, SOLUTION INTRAMUSCULAR; INTRAVENOUS at 04:06

## 2022-06-05 RX ADMIN — BENZOCAINE: 200 SPRAY DENTAL; ORAL; PERIODONTAL at 12:06

## 2022-06-05 RX ADMIN — PANTOPRAZOLE SODIUM 40 MG: 40 INJECTION, POWDER, FOR SOLUTION INTRAVENOUS at 03:06

## 2022-06-05 RX ADMIN — THIAMINE HYDROCHLORIDE 500 MG: 100 INJECTION, SOLUTION INTRAMUSCULAR; INTRAVENOUS at 09:06

## 2022-06-05 RX ADMIN — CEFTRIAXONE 1 G: 1 INJECTION, SOLUTION INTRAVENOUS at 09:06

## 2022-06-05 RX ADMIN — HYDROMORPHONE HYDROCHLORIDE 1 MG: 2 INJECTION, SOLUTION INTRAMUSCULAR; INTRAVENOUS; SUBCUTANEOUS at 06:06

## 2022-06-05 NOTE — NURSING
Bed alarm going off and when entered room pt attempted to get up to go to bathroom and pulled out NG tube. NG tube replaced and Xray ordered to verify placement.

## 2022-06-05 NOTE — ED PROVIDER NOTES
Ed[facility] of Care Note:  Discussed pt and plan with prior physician. Patient was personally seen and examined by me.  Pt's vitals have been Temp: [97.9 °F (36.6 °C)] 97.9 °F (36.6 °C)  Heart Rate: [81] 81  Resp: [22] 22  BP: (117-156)/(77-94) 156/84  Pt pending surgery eval  We will continue current treatment plan and reassess the patient for any changes.    Initial plan for possible moderate sedation in the ED, however patient slightly decreased oxygen after narcotics given earlier (88% on RA, placed on 2L with improvement), tachycardia and low normal BP. ASA 3. Mallampati 3  Patient not a good candidate for routine moderate sedation in the ED. Discussed with gen surgery possible PACU vs OR sedation. Surgery evaluated patient at bedside. Plan to admit for further care.      Felecia Pham MD  06/05/22 [id number]

## 2022-06-05 NOTE — NURSING
Received to room 411 oriented to room and surroundings. IV to right AC infusing LR at 150 ml/hr. NG tube to right nostril placed to LIWS. O2 in progress at 2 l/m NC. Instructed to call for assistance. Bed Alarm on, bed in low position and locked with SR up x 2. Call light in reach. NAD noted.

## 2022-06-05 NOTE — PHARMACY MED REC
"Admission Medication History     The home medication history was taken by Soha Olivares.    You may go to "Admission" then "Reconcile Home Medications" tabs to review and/or act upon these items.      The home medication list has been updated by the Pharmacy department.    Please read ALL comments highlighted in yellow.    Please address this information as you see fit.     Feel free to contact us if you have any questions or require assistance.      The medications listed below were removed from the home medication list. Please reorder if appropriate:  Patient reports no longer taking the following medication(s):    Medications listed below were obtained from: Patient/family and Analytic software- [employer] and added to home medications:   Centrum Silver   Vitamin B-Complex    The medication reconciliation was completed at the patient's bedside.  Obtain as much information as allowed by patient, as patient stated "this is not a good time."    Soha Olivares  [phone]                      .          "

## 2022-06-05 NOTE — NURSING
Xray completed for placement and xray reads nasogastric tube LEFT upper quadrant subdiaphragmatic level of the stomach. Secure chat sent to Dr. Kelsey to verify placement and secure chat received that she reviewed xray and to place NG tube back to [facility].
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
## 2022-06-05 NOTE — ED NOTES
Pt verbalizes she will provide urine once pain subsides. Fluids currently infusing. Will continue to monitor.
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
## 2022-06-05 NOTE — ED NOTES
Received report from MARISSA Jiang. RN introduced self at bedside after return from CT. Pt alert and calm, oriented x4. Pt verbalizes abdominal pain of 8/10 at noticeable hernia site. Temp 98.2 oral. Vital signs stable. Pt 88%-92% on RA. Pt placed on 2L NC for comfort. NS bolus currently infusing at R/AC. MD Catalina Ferguson to patient bedside to reassess and update patient on status. Pt verbalizes understanding. Will proceed forward with care.      Side rails up x2, bed in lowest position, Call light in reach.

## 2022-06-05 NOTE — ED TRIAGE NOTES
"Pt presents to ED via EMS from home c/o nausea& vomiting x3 days and diaphoretic. Pt also reports that her hernia "may be strangulated" and has increased in size and pain (8/10) accompanied by a frontal HA 10/10. Denies fever, chills, SOB, chest pain, weakness, numbness. Pt is alert, calm, and oriented x4, tachy, NAD. Pt placed on cardiac monitor and continuous pulse ox.   "

## 2022-06-05 NOTE — NURSING
[facility] Nurses Note -- 4 Eyes      6/5/2022   4:44 PM      Skin assessed during: Admission Assessment      [x] No Pressure Injuries Present    []Prevention Measures Documented      [] Yes- Altered Skin Integrity Present or Discovered   [] LDA Added if Not in Epic (Describe Wound, Do NOT Stage)   [] New Altered Skin Integrity was Present on Admit and Documented in LDA   [] Wound Image Taken      Attending RN:  Blaire Moran RN     Second RN:  Juan Carlos Story RN

## 2022-06-06 ENCOUNTER — ANESTHESIA EVENT (OUTPATIENT)
Dept: SURGERY | Facility: HOSPITAL | Age: 67
DRG: 354 | End: 2022-06-06
Payer: MEDICARE

## 2022-06-06 ENCOUNTER — ANESTHESIA (OUTPATIENT)
Dept: SURGERY | Facility: HOSPITAL | Age: 67
DRG: 354 | End: 2022-06-06
Payer: MEDICARE

## 2022-06-06 PROBLEM — K46.0 INCARCERATED HERNIA: Status: ACTIVE | Noted: 2017-11-08

## 2022-06-06 LAB
ABO + RH BLD: NORMAL
ANION GAP SERPL CALC-SCNC: 11 MMOL/L (ref 8–16)
APTT BLDCRRT: 25.1 SEC (ref 21–32)
BASOPHILS # BLD AUTO: 0.04 K/UL (ref 0–0.2)
BASOPHILS NFR BLD: 1 % (ref 0–1.9)
BLD GP AB SCN CELLS X3 SERPL QL: NORMAL
BUN SERPL-MCNC: 18 MG/DL (ref 8–23)
CALCIUM SERPL-MCNC: 9 MG/DL (ref 8.7–10.5)
CHLORIDE SERPL-SCNC: 102 MMOL/L (ref 95–110)
CO2 SERPL-SCNC: 22 MMOL/L (ref 23–29)
CREAT SERPL-MCNC: 0.7 MG/DL (ref 0.5–1.4)
DIFFERENTIAL METHOD: ABNORMAL
EOSINOPHIL # BLD AUTO: 0 K/UL (ref 0–0.5)
EOSINOPHIL NFR BLD: 0.8 % (ref 0–8)
ERYTHROCYTE [DISTWIDTH] IN BLOOD BY AUTOMATED COUNT: 13.3 % (ref 11.5–14.5)
EST. GFR  (AFRICAN AMERICAN): >60 ML/MIN/1.73 M^2
EST. GFR  (NON AFRICAN AMERICAN): >60 ML/MIN/1.73 M^2
GLUCOSE SERPL-MCNC: 103 MG/DL (ref 70–110)
HCT VFR BLD AUTO: 39.9 % (ref 37–48.5)
HGB BLD-MCNC: 13.3 G/DL (ref 12–16)
IMM GRANULOCYTES # BLD AUTO: 0.01 K/UL (ref 0–0.04)
IMM GRANULOCYTES NFR BLD AUTO: 0.3 % (ref 0–0.5)
INR PPP: 1.1 (ref 0.8–1.2)
LYMPHOCYTES # BLD AUTO: 0.6 K/UL (ref 1–4.8)
LYMPHOCYTES NFR BLD: 16.1 % (ref 18–48)
MAGNESIUM SERPL-MCNC: 2 MG/DL (ref 1.6–2.6)
MCH RBC QN AUTO: 33.1 PG (ref 27–31)
MCHC RBC AUTO-ENTMCNC: 33.3 G/DL (ref 32–36)
MCV RBC AUTO: 99 FL (ref 82–98)
MONOCYTES # BLD AUTO: 0.5 K/UL (ref 0.3–1)
MONOCYTES NFR BLD: 11.6 % (ref 4–15)
NEUTROPHILS # BLD AUTO: 2.8 K/UL (ref 1.8–7.7)
NEUTROPHILS NFR BLD: 70.2 % (ref 38–73)
NRBC BLD-RTO: 0 /100 WBC
PHOSPHATE SERPL-MCNC: 3 MG/DL (ref 2.7–4.5)
PLATELET # BLD AUTO: 151 K/UL (ref 150–450)
PLATELET BLD QL SMEAR: ABNORMAL
PMV BLD AUTO: 10.5 FL (ref 9.2–12.9)
POTASSIUM SERPL-SCNC: 3.5 MMOL/L (ref 3.5–5.1)
PROTHROMBIN TIME: 11.4 SEC (ref 9–12.5)
RBC # BLD AUTO: 4.02 M/UL (ref 4–5.4)
RH BLD: NORMAL
SODIUM SERPL-SCNC: 135 MMOL/L (ref 136–145)
WBC # BLD AUTO: 3.97 K/UL (ref 3.9–12.7)

## 2022-06-06 PROCEDURE — 71000033 HC RECOVERY, INTIAL HOUR: Performed by: SURGERY

## 2022-06-06 PROCEDURE — 36000707: Performed by: SURGERY

## 2022-06-06 PROCEDURE — 83735 ASSAY OF MAGNESIUM: CPT | Performed by: STUDENT IN AN ORGANIZED HEALTH CARE EDUCATION/TRAINING PROGRAM

## 2022-06-06 PROCEDURE — 80048 BASIC METABOLIC PNL TOTAL CA: CPT | Performed by: STUDENT IN AN ORGANIZED HEALTH CARE EDUCATION/TRAINING PROGRAM

## 2022-06-06 PROCEDURE — D9220A PRA ANESTHESIA: Mod: CRNA,,, | Performed by: STUDENT IN AN ORGANIZED HEALTH CARE EDUCATION/TRAINING PROGRAM

## 2022-06-06 PROCEDURE — 94760 N-INVAS EAR/PLS OXIMETRY 1: CPT

## 2022-06-06 PROCEDURE — 86850 RBC ANTIBODY SCREEN: CPT | Performed by: STUDENT IN AN ORGANIZED HEALTH CARE EDUCATION/TRAINING PROGRAM

## 2022-06-06 PROCEDURE — 94640 AIRWAY INHALATION TREATMENT: CPT

## 2022-06-06 PROCEDURE — 63600175 PHARM REV CODE 636 W HCPCS: Performed by: ANESTHESIOLOGY

## 2022-06-06 PROCEDURE — 84100 ASSAY OF PHOSPHORUS: CPT | Performed by: STUDENT IN AN ORGANIZED HEALTH CARE EDUCATION/TRAINING PROGRAM

## 2022-06-06 PROCEDURE — 49568 PR IMPLANT MESH HERNIA REPAIR/DEBRIDEMENT CLOSURE: CPT | Mod: ,,, | Performed by: SURGERY

## 2022-06-06 PROCEDURE — 25000242 PHARM REV CODE 250 ALT 637 W/ HCPCS: Performed by: STUDENT IN AN ORGANIZED HEALTH CARE EDUCATION/TRAINING PROGRAM

## 2022-06-06 PROCEDURE — 85610 PROTHROMBIN TIME: CPT | Performed by: STUDENT IN AN ORGANIZED HEALTH CARE EDUCATION/TRAINING PROGRAM

## 2022-06-06 PROCEDURE — 85730 THROMBOPLASTIN TIME PARTIAL: CPT | Performed by: STUDENT IN AN ORGANIZED HEALTH CARE EDUCATION/TRAINING PROGRAM

## 2022-06-06 PROCEDURE — 63600175 PHARM REV CODE 636 W HCPCS: Performed by: STUDENT IN AN ORGANIZED HEALTH CARE EDUCATION/TRAINING PROGRAM

## 2022-06-06 PROCEDURE — 37000008 HC ANESTHESIA 1ST 15 MINUTES: Performed by: SURGERY

## 2022-06-06 PROCEDURE — D9220A PRA ANESTHESIA: ICD-10-PCS | Mod: CRNA,,, | Performed by: STUDENT IN AN ORGANIZED HEALTH CARE EDUCATION/TRAINING PROGRAM

## 2022-06-06 PROCEDURE — D9220A PRA ANESTHESIA: ICD-10-PCS | Mod: ANES,,, | Performed by: ANESTHESIOLOGY

## 2022-06-06 PROCEDURE — 86901 BLOOD TYPING SEROLOGIC RH(D): CPT | Performed by: STUDENT IN AN ORGANIZED HEALTH CARE EDUCATION/TRAINING PROGRAM

## 2022-06-06 PROCEDURE — 37000009 HC ANESTHESIA EA ADD 15 MINS: Performed by: SURGERY

## 2022-06-06 PROCEDURE — 49568 PR IMPLANT MESH HERNIA REPAIR/DEBRIDEMENT CLOSURE: ICD-10-PCS | Mod: ,,, | Performed by: SURGERY

## 2022-06-06 PROCEDURE — 49566 PR REPAIR RECURR INCIS HERNIA,STRANG: ICD-10-PCS | Mod: ,,, | Performed by: SURGERY

## 2022-06-06 PROCEDURE — D9220A PRA ANESTHESIA: Mod: ANES,,, | Performed by: ANESTHESIOLOGY

## 2022-06-06 PROCEDURE — 49566 PR REPAIR RECURR INCIS HERNIA,STRANG: CPT | Mod: ,,, | Performed by: SURGERY

## 2022-06-06 PROCEDURE — 36000706: Performed by: SURGERY

## 2022-06-06 PROCEDURE — 25000003 PHARM REV CODE 250: Performed by: SURGERY

## 2022-06-06 PROCEDURE — 25000003 PHARM REV CODE 250: Performed by: STUDENT IN AN ORGANIZED HEALTH CARE EDUCATION/TRAINING PROGRAM

## 2022-06-06 PROCEDURE — 11000001 HC ACUTE MED/SURG PRIVATE ROOM

## 2022-06-06 PROCEDURE — 71000039 HC RECOVERY, EACH ADD'L HOUR: Performed by: SURGERY

## 2022-06-06 PROCEDURE — 25000242 PHARM REV CODE 250 ALT 637 W/ HCPCS: Performed by: SURGERY

## 2022-06-06 PROCEDURE — 85025 COMPLETE CBC W/AUTO DIFF WBC: CPT | Performed by: STUDENT IN AN ORGANIZED HEALTH CARE EDUCATION/TRAINING PROGRAM

## 2022-06-06 PROCEDURE — 36415 COLL VENOUS BLD VENIPUNCTURE: CPT | Performed by: STUDENT IN AN ORGANIZED HEALTH CARE EDUCATION/TRAINING PROGRAM

## 2022-06-06 PROCEDURE — C1781 MESH (IMPLANTABLE): HCPCS | Performed by: SURGERY

## 2022-06-06 DEVICE — MESH PATCH HERNIA VENTRALEX: Type: IMPLANTABLE DEVICE | Site: ABDOMEN | Status: FUNCTIONAL

## 2022-06-06 RX ORDER — GABAPENTIN 250 MG/5ML
300 SOLUTION ORAL EVERY 8 HOURS
Status: DISCONTINUED | OUTPATIENT
Start: 2022-06-06 | End: 2022-06-07

## 2022-06-06 RX ORDER — SPIRONOLACTONE 25 MG/1
25 TABLET ORAL DAILY
Status: DISCONTINUED | OUTPATIENT
Start: 2022-06-07 | End: 2022-06-07

## 2022-06-06 RX ORDER — MIDAZOLAM HYDROCHLORIDE 1 MG/ML
INJECTION, SOLUTION INTRAMUSCULAR; INTRAVENOUS
Status: DISCONTINUED | OUTPATIENT
Start: 2022-06-06 | End: 2022-06-06

## 2022-06-06 RX ORDER — PROPOFOL 10 MG/ML
VIAL (ML) INTRAVENOUS
Status: DISCONTINUED | OUTPATIENT
Start: 2022-06-06 | End: 2022-06-06

## 2022-06-06 RX ORDER — FENTANYL CITRATE 50 UG/ML
INJECTION, SOLUTION INTRAMUSCULAR; INTRAVENOUS
Status: DISCONTINUED | OUTPATIENT
Start: 2022-06-06 | End: 2022-06-06

## 2022-06-06 RX ORDER — LIDOCAINE HYDROCHLORIDE 20 MG/ML
INJECTION INTRAVENOUS
Status: DISCONTINUED | OUTPATIENT
Start: 2022-06-06 | End: 2022-06-06

## 2022-06-06 RX ORDER — PHENYLEPHRINE HYDROCHLORIDE 10 MG/ML
INJECTION INTRAVENOUS
Status: DISCONTINUED | OUTPATIENT
Start: 2022-06-06 | End: 2022-06-06

## 2022-06-06 RX ORDER — BUPIVACAINE HYDROCHLORIDE AND EPINEPHRINE 2.5; 5 MG/ML; UG/ML
INJECTION, SOLUTION EPIDURAL; INFILTRATION; INTRACAUDAL; PERINEURAL
Status: DISCONTINUED | OUTPATIENT
Start: 2022-06-06 | End: 2022-06-06 | Stop reason: HOSPADM

## 2022-06-06 RX ORDER — LEVOTHYROXINE SODIUM 25 UG/1
25 TABLET ORAL DAILY
Status: DISCONTINUED | OUTPATIENT
Start: 2022-06-07 | End: 2022-06-11 | Stop reason: HOSPADM

## 2022-06-06 RX ORDER — HYDROMORPHONE HYDROCHLORIDE 2 MG/ML
0.2 INJECTION, SOLUTION INTRAMUSCULAR; INTRAVENOUS; SUBCUTANEOUS EVERY 5 MIN PRN
Status: DISCONTINUED | OUTPATIENT
Start: 2022-06-06 | End: 2022-06-06

## 2022-06-06 RX ORDER — PROCHLORPERAZINE EDISYLATE 5 MG/ML
INJECTION INTRAMUSCULAR; INTRAVENOUS
Status: DISCONTINUED | OUTPATIENT
Start: 2022-06-06 | End: 2022-06-06

## 2022-06-06 RX ORDER — DEXAMETHASONE SODIUM PHOSPHATE 4 MG/ML
INJECTION, SOLUTION INTRA-ARTICULAR; INTRALESIONAL; INTRAMUSCULAR; INTRAVENOUS; SOFT TISSUE
Status: DISCONTINUED | OUTPATIENT
Start: 2022-06-06 | End: 2022-06-06

## 2022-06-06 RX ORDER — SUCCINYLCHOLINE CHLORIDE 20 MG/ML
INJECTION INTRAMUSCULAR; INTRAVENOUS
Status: DISCONTINUED | OUTPATIENT
Start: 2022-06-06 | End: 2022-06-06

## 2022-06-06 RX ORDER — ESCITALOPRAM OXALATE 10 MG/1
20 TABLET ORAL DAILY
Status: DISCONTINUED | OUTPATIENT
Start: 2022-06-07 | End: 2022-06-11 | Stop reason: HOSPADM

## 2022-06-06 RX ORDER — OXYCODONE HCL 5 MG/5 ML
10 SOLUTION, ORAL ORAL EVERY 4 HOURS PRN
Status: DISCONTINUED | OUTPATIENT
Start: 2022-06-06 | End: 2022-06-10

## 2022-06-06 RX ORDER — MORPHINE SULFATE 4 MG/ML
4 INJECTION, SOLUTION INTRAMUSCULAR; INTRAVENOUS
Status: DISCONTINUED | OUTPATIENT
Start: 2022-06-06 | End: 2022-06-10

## 2022-06-06 RX ORDER — ONDANSETRON 2 MG/ML
4 INJECTION INTRAMUSCULAR; INTRAVENOUS DAILY PRN
Status: DISCONTINUED | OUTPATIENT
Start: 2022-06-06 | End: 2022-06-06

## 2022-06-06 RX ORDER — METRONIDAZOLE 500 MG/1
500 TABLET ORAL EVERY 8 HOURS
Status: COMPLETED | OUTPATIENT
Start: 2022-06-06 | End: 2022-06-07

## 2022-06-06 RX ORDER — ROCURONIUM BROMIDE 10 MG/ML
INJECTION, SOLUTION INTRAVENOUS
Status: DISCONTINUED | OUTPATIENT
Start: 2022-06-06 | End: 2022-06-06

## 2022-06-06 RX ORDER — ONDANSETRON 2 MG/ML
INJECTION INTRAMUSCULAR; INTRAVENOUS
Status: DISCONTINUED | OUTPATIENT
Start: 2022-06-06 | End: 2022-06-06

## 2022-06-06 RX ORDER — SODIUM CHLORIDE 0.9 % (FLUSH) 0.9 %
10 SYRINGE (ML) INJECTION
Status: DISCONTINUED | OUTPATIENT
Start: 2022-06-06 | End: 2022-06-06

## 2022-06-06 RX ADMIN — MIDAZOLAM HYDROCHLORIDE 2 MG: 1 INJECTION, SOLUTION INTRAMUSCULAR; INTRAVENOUS at 08:06

## 2022-06-06 RX ADMIN — PROPOFOL 30 MG: 10 INJECTION, EMULSION INTRAVENOUS at 08:06

## 2022-06-06 RX ADMIN — FENTANYL CITRATE 50 MCG: 50 INJECTION, SOLUTION INTRAMUSCULAR; INTRAVENOUS at 08:06

## 2022-06-06 RX ADMIN — HYDROMORPHONE HYDROCHLORIDE 0.2 MG: 2 INJECTION, SOLUTION INTRAMUSCULAR; INTRAVENOUS; SUBCUTANEOUS at 09:06

## 2022-06-06 RX ADMIN — MORPHINE SULFATE 2 MG: 4 INJECTION INTRAVENOUS at 06:06

## 2022-06-06 RX ADMIN — ONDANSETRON 4 MG: 2 INJECTION INTRAMUSCULAR; INTRAVENOUS at 10:06

## 2022-06-06 RX ADMIN — METHOCARBAMOL 500 MG: 500 TABLET ORAL at 12:06

## 2022-06-06 RX ADMIN — SODIUM CHLORIDE, SODIUM LACTATE, POTASSIUM CHLORIDE, AND CALCIUM CHLORIDE: .6; .31; .03; .02 INJECTION, SOLUTION INTRAVENOUS at 03:06

## 2022-06-06 RX ADMIN — LIDOCAINE HYDROCHLORIDE 100 MG: 20 INJECTION, SOLUTION INTRAVENOUS at 08:06

## 2022-06-06 RX ADMIN — METHOCARBAMOL 500 MG: 500 TABLET ORAL at 05:06

## 2022-06-06 RX ADMIN — SODIUM CHLORIDE, SODIUM LACTATE, POTASSIUM CHLORIDE, AND CALCIUM CHLORIDE: .6; .31; .03; .02 INJECTION, SOLUTION INTRAVENOUS at 09:06

## 2022-06-06 RX ADMIN — ROCURONIUM BROMIDE 10 MG: 10 INJECTION, SOLUTION INTRAVENOUS at 09:06

## 2022-06-06 RX ADMIN — GABAPENTIN 300 MG: 250 SOLUTION ORAL at 09:06

## 2022-06-06 RX ADMIN — ONDANSETRON 4 MG: 2 INJECTION, SOLUTION INTRAMUSCULAR; INTRAVENOUS at 09:06

## 2022-06-06 RX ADMIN — METHOCARBAMOL 500 MG: 500 TABLET ORAL at 09:06

## 2022-06-06 RX ADMIN — OXYCODONE HYDROCHLORIDE 10 MG: 5 SOLUTION ORAL at 05:06

## 2022-06-06 RX ADMIN — PHENYLEPHRINE HYDROCHLORIDE 100 MCG: 10 INJECTION INTRAVENOUS at 09:06

## 2022-06-06 RX ADMIN — MORPHINE SULFATE 2 MG: 4 INJECTION INTRAVENOUS at 02:06

## 2022-06-06 RX ADMIN — MORPHINE SULFATE 2 MG: 4 INJECTION INTRAVENOUS at 04:06

## 2022-06-06 RX ADMIN — METRONIDAZOLE 500 MG: 500 TABLET ORAL at 09:06

## 2022-06-06 RX ADMIN — PROPOFOL 30 MG: 10 INJECTION, EMULSION INTRAVENOUS at 09:06

## 2022-06-06 RX ADMIN — LEVALBUTEROL HYDROCHLORIDE 0.63 MG: 0.63 SOLUTION RESPIRATORY (INHALATION) at 07:06

## 2022-06-06 RX ADMIN — OXYCODONE HYDROCHLORIDE 10 MG: 5 SOLUTION ORAL at 09:06

## 2022-06-06 RX ADMIN — SUCCINYLCHOLINE CHLORIDE 100 MG: 20 INJECTION, SOLUTION INTRAMUSCULAR; INTRAVENOUS at 08:06

## 2022-06-06 RX ADMIN — HYDROMORPHONE HYDROCHLORIDE 0.2 MG: 2 INJECTION, SOLUTION INTRAMUSCULAR; INTRAVENOUS; SUBCUTANEOUS at 10:06

## 2022-06-06 RX ADMIN — SODIUM CHLORIDE, SODIUM LACTATE, POTASSIUM CHLORIDE, AND CALCIUM CHLORIDE: .6; .31; .03; .02 INJECTION, SOLUTION INTRAVENOUS at 08:06

## 2022-06-06 RX ADMIN — FENTANYL CITRATE 25 MCG: 50 INJECTION, SOLUTION INTRAMUSCULAR; INTRAVENOUS at 09:06

## 2022-06-06 RX ADMIN — PROCHLORPERAZINE EDISYLATE 5 MG: 5 INJECTION INTRAMUSCULAR; INTRAVENOUS at 09:06

## 2022-06-06 RX ADMIN — MORPHINE SULFATE 4 MG: 4 INJECTION INTRAVENOUS at 10:06

## 2022-06-06 RX ADMIN — SUGAMMADEX 200 MG: 100 INJECTION, SOLUTION INTRAVENOUS at 09:06

## 2022-06-06 RX ADMIN — ENOXAPARIN SODIUM 40 MG: 100 INJECTION SUBCUTANEOUS at 05:06

## 2022-06-06 RX ADMIN — ROCURONIUM BROMIDE 30 MG: 10 INJECTION, SOLUTION INTRAVENOUS at 08:06

## 2022-06-06 RX ADMIN — THIAMINE HYDROCHLORIDE 500 MG: 100 INJECTION, SOLUTION INTRAMUSCULAR; INTRAVENOUS at 09:06

## 2022-06-06 RX ADMIN — METRONIDAZOLE 500 MG: 500 TABLET ORAL at 01:06

## 2022-06-06 RX ADMIN — PROPOFOL 140 MG: 10 INJECTION, EMULSION INTRAVENOUS at 08:06

## 2022-06-06 RX ADMIN — CEFTRIAXONE 1 G: 1 INJECTION, SOLUTION INTRAVENOUS at 06:06

## 2022-06-06 RX ADMIN — THIAMINE HYDROCHLORIDE 500 MG: 100 INJECTION, SOLUTION INTRAMUSCULAR; INTRAVENOUS at 02:06

## 2022-06-06 RX ADMIN — DEXAMETHASONE SODIUM PHOSPHATE 4 MG: 4 INJECTION, SOLUTION INTRAMUSCULAR; INTRAVENOUS at 09:06

## 2022-06-06 RX ADMIN — MORPHINE SULFATE 2 MG: 4 INJECTION INTRAVENOUS at 01:06

## 2022-06-06 RX ADMIN — MORPHINE SULFATE 4 MG: 4 INJECTION INTRAVENOUS at 06:06

## 2022-06-06 RX ADMIN — LEVOTHYROXINE SODIUM 25 MCG: 0.03 TABLET ORAL at 05:06

## 2022-06-06 NOTE — ANESTHESIA PROCEDURE NOTES
Intubation    Date/Time: 6/6/2022 8:52 AM  Performed by: Debora Green CRNA  Authorized by: Gaetano Miguel MD     Intubation:     Induction:  Rapid sequence induction    Intubated:  Postinduction    Mask Ventilation:  N/a    Attempts:  1    Attempted By:  CRNA    Method of Intubation:  Video laryngoscopy    Blade:  [place] 3    Laryngeal View Grade: Grade I - full view of cords      Difficult Airway Encountered?: No      Complications:  None    Airway Device:  Oral endotracheal tube    Airway Device Size:  7.0    Style/Cuff Inflation:  Cuffed (inflated to minimal occlusive pressure)    Tube secured:  21    Secured at:  The lips    Placement Verified By:  Capnometry    Complicating Factors:  None    Findings Post-Intubation:  BS equal bilateral and atraumatic/condition of teeth unchanged

## 2022-06-06 NOTE — PLAN OF CARE
[facility] - Med Surg  Initial Discharge Assessment  [place] discuss planning with Hammad Arevalo (Spouse)   [phone] (Home Phone).  Says patient is independent and he drives patient to her appts. No dme usage.     Primary Care Provider: Angela Snowden MD    Admission Diagnosis: Incarcerated ventral hernia [K43.6]  Tachycardia [R00.0]  Preop cardiovascular exam [Z01.810]  Abdominal pain, unspecified abdominal location [R10.9]  Nausea and vomiting, intractability of vomiting not specified, unspecified vomiting type [R11.2]    Admission Date: 6/5/2022  Expected Discharge Date:     Discharge Barriers Identified: None    Payor: [employer]ARE / Plan: [employer] 65 / Product Type: Medicare Advantage /     Extended Emergency Contact Information  Primary Emergency Contact: Hammad Arevalo  Address: [address] [place] [address] [facility]  Home Phone: [phone]  Relation: Spouse  Secondary Emergency Contact: Jignesh Melton  Mobile Phone: [phone]  Relation: Son    Discharge Plan A: Home with family  Discharge Plan B: Home with family      [id number] - [place] - [address] AT [facility] & [address]  [address]  [facility] [address]  Phone: [phone] Fax: [phone]      Initial Assessment (most recent)     Adult Discharge Assessment - 06/06/22 1301        Discharge Assessment    Assessment Type Discharge Planning Assessment     Confirmed/corrected address, phone number and insurance Yes     Confirmed Demographics Correct on Facesheet     Source of Information family     Communicated SARAH with patient/caregiver Date not available/Unable to determine     Reason For Admission Incarcerated hernia     Lives With spouse     Do you expect to return to your current living situation? Yes     Do you have help at home or someone to help you manage your care at home? Yes     Who are your caregiver(s) and their phone number(s)?  Hammad Arevalo (Spouse)   [phone] (Home Phone)     Prior to hospitilization cognitive status: Alert/Oriented     Current cognitive status: Alert/Oriented     Walking or Climbing Stairs Difficulty none     Dressing/Bathing Difficulty none     Equipment Currently Used at Home none     Readmission within 30 days? No     Patient currently being followed by outpatient case management? Unable to determine (comments)     Do you currently have service(s) that help you manage your care at home? No     Do you take prescription medications? Yes     Do you have prescription coverage? Yes     Coverage [facility]     Do you have any problems affording any of your prescribed medications? No     Who is going to help you get home at discharge? Hammad Arevalo (Spouse)   [phone] (Home Phone)     How do you get to doctors appointments? family or friend will provide     Are you on dialysis? No     Do you take coumadin? No     Discharge Plan A Home with family     Discharge Plan B Home with family     DME Needed Upon Discharge  other (see comments)   TBD    Discharge Barriers Identified None        Relationship/Environment    Name(s) of Who Lives With Patient Hammad Arevalo (Spouse)   [phone] (Home Phone)

## 2022-06-06 NOTE — ASSESSMENT & PLAN NOTE
- has hx of Hep C tx with Harvoni  - also with history of alcohol abuse  - noted ascites on surgery today  - start on aldactone in AM and increase/add lasix as tolerated

## 2022-06-06 NOTE — HPI
Mrs. Arevalo is a 68 yo F with hx of HTN, depression and previous alcohol abuse who presented to the ED with abdominal pain. She was found to have an incarcerated hernia and General Surgery was consulted. She underwent reduction and surgery on 6/6/22 with repair and thankfully did not need bowel resection. Of note, ascites was found once patient's abdomen was open concerning for decompensated cirrhosis. Hospital medicine consulted for help with comanagement of her comorbidities.     Currently, patient tells me she has a history of Hepatitis C that was treated with Harvoni years ago and that is where her cirrhosis is from. She does drink alcohol and now states she is quitting now that ascites was found. She has a 5 year old grandchild she is raising and wants to live for her family. She used to take Buprenorphine-naltrexone but now tells me that she needs actual pain medication because she has chronic back pain. She has missed her last appointment so needs follow up and a prescription once she leaves the hospital. She is currently asking if we can increase the pain medication dose now that she's post-surgery and her pain is worse.
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
## 2022-06-06 NOTE — ANESTHESIA POSTPROCEDURE EVALUATION
Anesthesia Post Evaluation    Patient: Estella Arevalo    Procedure(s) Performed: Procedure(s) (LRB):  REPAIR, HERNIA, INCISIONAL, RECURRENT (N/A)    Final Anesthesia Type: general      Patient location during evaluation: PACU  Patient participation: Yes- Able to Participate  Level of consciousness: awake and alert and oriented  Post-procedure vital signs: reviewed and stable  Pain management: adequate  Airway patency: patent    PONV status at discharge: No PONV  Anesthetic complications: no      Cardiovascular status: hemodynamically stable and blood pressure returned to baseline  Respiratory status: spontaneous ventilation, room air and unassisted  Hydration status: euvolemic  Follow-up not needed.          Vitals Value Taken Time   /93 06/06/22 1213   Temp 36.9 °C (98.5 °F) 06/06/22 1213   Pulse 91 06/06/22 1213   Resp 20 06/06/22 1213   SpO2 95 % 06/06/22 1213         Event Time   Out of Recovery 06/06/2022 11:45:00         Pain/Mika Score: Pain Rating Prior to Med Admin: 6 (6/6/2022 10:30 AM)  Pain Rating Post Med Admin: 4 (6/5/2022 10:32 PM)

## 2022-06-06 NOTE — ASSESSMENT & PLAN NOTE
"- Patient has history of opioid abuse and alcohol abuse  - she tells me that she was on Subutex prescribed by her Psychiatrist but appears she has not had this prescription filled since 9/2021  - she says she has bad back pain now and needs "real pain medicine"  - last fill was February of this year for 30 tablets from Orthopedic Surgery  - would treat acute pain at this time but attempt to wean down post-discharge    "

## 2022-06-06 NOTE — TRANSFER OF CARE
"Anesthesia Transfer of Care Note    Patient: Estella Arevalo    Procedure(s) Performed: Procedure(s) (LRB):  REPAIR, HERNIA, INCISIONAL, RECURRENT (N/A)    Patient location: PACU    Anesthesia Type: general    Transport from OR: Transported from OR on 6-10 L/min O2 by face mask with adequate spontaneous ventilation    Post pain: adequate analgesia    Post assessment: no apparent anesthetic complications and tolerated procedure well    Post vital signs: stable    Level of consciousness: awake and alert    Nausea/Vomiting: no nausea/vomiting    Complications: none    Transfer of care protocol was followed      Last vitals:   Visit Vitals  BP (!) 159/93 (BP Location: Left arm, Patient Position: Lying)   Pulse 88   Temp 36.9 °C (98.4 °F) (Oral)   Resp 18   Ht 5' 6" (1.676 m)   Wt 82.7 kg (182 lb 5.8 oz)   SpO2 96%   BMI 29.43 kg/m²     "

## 2022-06-06 NOTE — NURSING
pt aaox4. able to make needs known. ng tube to left nostril at WS.   prn pain medication adm q2 hours while awake. hourly rounds made. call   light within reach. will continue to monitor.

## 2022-06-06 NOTE — ASSESSMENT & PLAN NOTE
- she tells me her last drink was 8 days ago  - would monitor for any signs/symptoms of withdrawal

## 2022-06-06 NOTE — NURSING
Pt reports passing gas, no BM yet. No complaints of N/V.. NGT connected to LIWS. Dr. Jeff notified.

## 2022-06-06 NOTE — OP NOTE
[facility]  Surgery Department  Operative Note    SUMMARY     Date of Procedure: 6/6/2022     Procedure: Procedure(s) (LRB):  REPAIR, HERNIA, INCISIONAL, RECURRENT (N/A) with mesh    Surgeon(s) and Role:     * Rupesh Farfan MD - Primary    Resident: Ruma Jeff MD PGY5    Pre-Operative Diagnosis: Incarcerated , recurrent umbilical hernia    Post-Operative Diagnosis:  Incarcerated, recurrent umbilical hernia  Ascites     Anesthesia: General    Operative Findings (including complications, if any): despite successful reduction at bedside yesterday, bowel was herniated again through defect at time of procedure; bowel appeared dusky upon entry into the hernia sac but pinked up once it was reduced and appeared viable. Ascites fluid was noted in peritoneal cavity without signs of infection. Approximately 2 mm serosal tear oversewn with 3-0 vicryl suture.     Description of Technical Procedures:      After informed consent was obtained, the patient was brought to the operating room and placed in supine position on the operating table. Anesthesia was induced. Appropriate antibiotics were administered. The patient was prepped and draped in standard sterile fashion. A time out was performed verifying correct patient and procedure.       A vertical midline incision was made over the umbilicus using a 15 blade scalpel. Dissection was carried down through the subcutaneous tissue using bovie electrocautery. The hernia sac was identified and circumferentially dissected away from the fascia and amputated.  The hernia contained bowel which appeared dusky upon entry but pinked up once reduced. The entire involved segment was examined and noted to appear viable. A small serosal tear was noted and was oversewn with 3-0 vicryl suture in a figure of eight fashion.  Blunt finger dissection cleared off the under surface of the fascia. Ascites fluid was suctioned from the peritoneal cavity.        A Bard Ventralex hernia  patch was placed inside the defect and was sewn to the fascia while closing the fascia with a running #1 PDS suture. The wound was irrigated with sterile saline solution and hemostasis was achieved using electrocautery. The subcutaneous tissues were approximated using 3-0 vicryl suture. The skin was closed with staples.  Local anesthetic was injected. A sterile dressing  dressing was applied. The patient tolerated the procedure well and was transferred to the recovery room in stable condition.  Sponge, needle, and instrument counts were correct x 2.    Estimated Blood Loss (EBL): minimal           Implants:   Implant Name Type Inv. Item Serial No.  Lot No. LRB No. Used Action   MESH PATCH HERNIA VENTRALEX - [id number]  MESH PATCH HERNIA VENTRALEX  [employer] [id number] N/A 1 Implanted       Specimens:   Specimen (24h ago, onward)            None                  Condition: Stable    Disposition: PACU - hemodynamically stable.     Dr Farfan was present and scrubbed for the entirety of the procedure.
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
## 2022-06-06 NOTE — ASSESSMENT & PLAN NOTE
- patient reports she has not been on blood pressure medications for some time now since she has lost weight  - would control pain for now   - will be started on aldactone given ascites
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
## 2022-06-07 LAB
ALBUMIN SERPL BCP-MCNC: 3.5 G/DL (ref 3.5–5.2)
ALP SERPL-CCNC: 92 U/L (ref 55–135)
ALT SERPL W/O P-5'-P-CCNC: 22 U/L (ref 10–44)
ANION GAP SERPL CALC-SCNC: 10 MMOL/L (ref 8–16)
AST SERPL-CCNC: 23 U/L (ref 10–40)
BACTERIA UR CULT: ABNORMAL
BASOPHILS # BLD AUTO: 0.02 K/UL (ref 0–0.2)
BASOPHILS NFR BLD: 0.3 % (ref 0–1.9)
BILIRUB SERPL-MCNC: 0.8 MG/DL (ref 0.1–1)
BUN SERPL-MCNC: 13 MG/DL (ref 8–23)
CALCIUM SERPL-MCNC: 9.3 MG/DL (ref 8.7–10.5)
CHLORIDE SERPL-SCNC: 100 MMOL/L (ref 95–110)
CO2 SERPL-SCNC: 26 MMOL/L (ref 23–29)
CREAT SERPL-MCNC: 0.6 MG/DL (ref 0.5–1.4)
DIFFERENTIAL METHOD: ABNORMAL
EOSINOPHIL # BLD AUTO: 0 K/UL (ref 0–0.5)
EOSINOPHIL NFR BLD: 0.7 % (ref 0–8)
ERYTHROCYTE [DISTWIDTH] IN BLOOD BY AUTOMATED COUNT: 13.3 % (ref 11.5–14.5)
EST. GFR  (AFRICAN AMERICAN): >60 ML/MIN/1.73 M^2
EST. GFR  (NON AFRICAN AMERICAN): >60 ML/MIN/1.73 M^2
GLUCOSE SERPL-MCNC: 94 MG/DL (ref 70–110)
HCT VFR BLD AUTO: 41.5 % (ref 37–48.5)
HGB BLD-MCNC: 13.8 G/DL (ref 12–16)
IMM GRANULOCYTES # BLD AUTO: 0.02 K/UL (ref 0–0.04)
IMM GRANULOCYTES NFR BLD AUTO: 0.3 % (ref 0–0.5)
LYMPHOCYTES # BLD AUTO: 0.8 K/UL (ref 1–4.8)
LYMPHOCYTES NFR BLD: 13.6 % (ref 18–48)
MAGNESIUM SERPL-MCNC: 1.9 MG/DL (ref 1.6–2.6)
MCH RBC QN AUTO: 33 PG (ref 27–31)
MCHC RBC AUTO-ENTMCNC: 33.3 G/DL (ref 32–36)
MCV RBC AUTO: 99 FL (ref 82–98)
MONOCYTES # BLD AUTO: 0.7 K/UL (ref 0.3–1)
MONOCYTES NFR BLD: 11.4 % (ref 4–15)
NEUTROPHILS # BLD AUTO: 4.3 K/UL (ref 1.8–7.7)
NEUTROPHILS NFR BLD: 73.7 % (ref 38–73)
NRBC BLD-RTO: 0 /100 WBC
PHOSPHATE SERPL-MCNC: 2.8 MG/DL (ref 2.7–4.5)
PLATELET # BLD AUTO: 170 K/UL (ref 150–450)
PMV BLD AUTO: 10.6 FL (ref 9.2–12.9)
POTASSIUM SERPL-SCNC: 3.2 MMOL/L (ref 3.5–5.1)
PROT SERPL-MCNC: 7.3 G/DL (ref 6–8.4)
RBC # BLD AUTO: 4.18 M/UL (ref 4–5.4)
SODIUM SERPL-SCNC: 136 MMOL/L (ref 136–145)
WBC # BLD AUTO: 5.81 K/UL (ref 3.9–12.7)

## 2022-06-07 PROCEDURE — 25000242 PHARM REV CODE 250 ALT 637 W/ HCPCS: Performed by: STUDENT IN AN ORGANIZED HEALTH CARE EDUCATION/TRAINING PROGRAM

## 2022-06-07 PROCEDURE — 99900035 HC TECH TIME PER 15 MIN (STAT)

## 2022-06-07 PROCEDURE — C9113 INJ PANTOPRAZOLE SODIUM, VIA: HCPCS | Performed by: STUDENT IN AN ORGANIZED HEALTH CARE EDUCATION/TRAINING PROGRAM

## 2022-06-07 PROCEDURE — 27000221 HC OXYGEN, UP TO 24 HOURS

## 2022-06-07 PROCEDURE — 25000003 PHARM REV CODE 250: Performed by: STUDENT IN AN ORGANIZED HEALTH CARE EDUCATION/TRAINING PROGRAM

## 2022-06-07 PROCEDURE — 94799 UNLISTED PULMONARY SVC/PX: CPT

## 2022-06-07 PROCEDURE — 94640 AIRWAY INHALATION TREATMENT: CPT

## 2022-06-07 PROCEDURE — 63600175 PHARM REV CODE 636 W HCPCS: Performed by: NURSE PRACTITIONER

## 2022-06-07 PROCEDURE — 11000001 HC ACUTE MED/SURG PRIVATE ROOM

## 2022-06-07 PROCEDURE — 85025 COMPLETE CBC W/AUTO DIFF WBC: CPT | Performed by: STUDENT IN AN ORGANIZED HEALTH CARE EDUCATION/TRAINING PROGRAM

## 2022-06-07 PROCEDURE — 80053 COMPREHEN METABOLIC PANEL: CPT | Performed by: STUDENT IN AN ORGANIZED HEALTH CARE EDUCATION/TRAINING PROGRAM

## 2022-06-07 PROCEDURE — 83735 ASSAY OF MAGNESIUM: CPT | Performed by: STUDENT IN AN ORGANIZED HEALTH CARE EDUCATION/TRAINING PROGRAM

## 2022-06-07 PROCEDURE — 84100 ASSAY OF PHOSPHORUS: CPT | Performed by: STUDENT IN AN ORGANIZED HEALTH CARE EDUCATION/TRAINING PROGRAM

## 2022-06-07 PROCEDURE — 63600175 PHARM REV CODE 636 W HCPCS: Performed by: STUDENT IN AN ORGANIZED HEALTH CARE EDUCATION/TRAINING PROGRAM

## 2022-06-07 PROCEDURE — 36415 COLL VENOUS BLD VENIPUNCTURE: CPT | Performed by: STUDENT IN AN ORGANIZED HEALTH CARE EDUCATION/TRAINING PROGRAM

## 2022-06-07 PROCEDURE — 94761 N-INVAS EAR/PLS OXIMETRY MLT: CPT

## 2022-06-07 RX ORDER — SPIRONOLACTONE 25 MG/1
50 TABLET ORAL DAILY
Status: DISCONTINUED | OUTPATIENT
Start: 2022-06-08 | End: 2022-06-11 | Stop reason: HOSPADM

## 2022-06-07 RX ORDER — FUROSEMIDE 20 MG/1
20 TABLET ORAL DAILY
Status: DISCONTINUED | OUTPATIENT
Start: 2022-06-07 | End: 2022-06-11 | Stop reason: HOSPADM

## 2022-06-07 RX ORDER — GABAPENTIN 250 MG/5ML
400 SOLUTION ORAL EVERY 8 HOURS
Status: DISCONTINUED | OUTPATIENT
Start: 2022-06-07 | End: 2022-06-10

## 2022-06-07 RX ORDER — BISACODYL 10 MG
10 SUPPOSITORY, RECTAL RECTAL ONCE
Status: COMPLETED | OUTPATIENT
Start: 2022-06-07 | End: 2022-06-07

## 2022-06-07 RX ORDER — OXYCODONE HCL 5 MG/5 ML
7.5 SOLUTION, ORAL ORAL EVERY 4 HOURS PRN
Status: DISCONTINUED | OUTPATIENT
Start: 2022-06-07 | End: 2022-06-10

## 2022-06-07 RX ORDER — ONDANSETRON 2 MG/ML
4 INJECTION INTRAMUSCULAR; INTRAVENOUS EVERY 6 HOURS PRN
Status: DISCONTINUED | OUTPATIENT
Start: 2022-06-07 | End: 2022-06-11 | Stop reason: HOSPADM

## 2022-06-07 RX ORDER — POLYETHYLENE GLYCOL 3350 17 G/17G
17 POWDER, FOR SOLUTION ORAL DAILY
Status: DISCONTINUED | OUTPATIENT
Start: 2022-06-07 | End: 2022-06-11 | Stop reason: HOSPADM

## 2022-06-07 RX ADMIN — METHOCARBAMOL 500 MG: 500 TABLET ORAL at 06:06

## 2022-06-07 RX ADMIN — OXYCODONE HYDROCHLORIDE 10 MG: 5 SOLUTION ORAL at 05:06

## 2022-06-07 RX ADMIN — LEVOTHYROXINE SODIUM 25 MCG: 0.03 TABLET ORAL at 10:06

## 2022-06-07 RX ADMIN — FUROSEMIDE 20 MG: 20 TABLET ORAL at 02:06

## 2022-06-07 RX ADMIN — ESCITALOPRAM OXALATE 20 MG: 10 TABLET ORAL at 10:06

## 2022-06-07 RX ADMIN — MORPHINE SULFATE 4 MG: 4 INJECTION INTRAVENOUS at 11:06

## 2022-06-07 RX ADMIN — METRONIDAZOLE 500 MG: 500 TABLET ORAL at 05:06

## 2022-06-07 RX ADMIN — PANTOPRAZOLE SODIUM 40 MG: 40 INJECTION, POWDER, FOR SOLUTION INTRAVENOUS at 09:06

## 2022-06-07 RX ADMIN — OXYCODONE HYDROCHLORIDE 10 MG: 5 SOLUTION ORAL at 02:06

## 2022-06-07 RX ADMIN — METHOCARBAMOL 500 MG: 500 TABLET ORAL at 10:06

## 2022-06-07 RX ADMIN — GABAPENTIN 400 MG: 250 SOLUTION ORAL at 10:06

## 2022-06-07 RX ADMIN — MORPHINE SULFATE 4 MG: 4 INJECTION INTRAVENOUS at 06:06

## 2022-06-07 RX ADMIN — OXYCODONE HYDROCHLORIDE 10 MG: 5 SOLUTION ORAL at 06:06

## 2022-06-07 RX ADMIN — ENOXAPARIN SODIUM 40 MG: 100 INJECTION SUBCUTANEOUS at 06:06

## 2022-06-07 RX ADMIN — SPIRONOLACTONE 25 MG: 25 TABLET, FILM COATED ORAL at 10:06

## 2022-06-07 RX ADMIN — OXYCODONE HYDROCHLORIDE 10 MG: 5 SOLUTION ORAL at 10:06

## 2022-06-07 RX ADMIN — MORPHINE SULFATE 4 MG: 4 INJECTION INTRAVENOUS at 02:06

## 2022-06-07 RX ADMIN — FOLIC ACID 1 MG: 5 INJECTION, SOLUTION INTRAMUSCULAR; INTRAVENOUS; SUBCUTANEOUS at 10:06

## 2022-06-07 RX ADMIN — THIAMINE HYDROCHLORIDE 500 MG: 100 INJECTION, SOLUTION INTRAMUSCULAR; INTRAVENOUS at 02:06

## 2022-06-07 RX ADMIN — GABAPENTIN 400 MG: 250 SOLUTION ORAL at 02:06

## 2022-06-07 RX ADMIN — MORPHINE SULFATE 4 MG: 4 INJECTION INTRAVENOUS at 04:06

## 2022-06-07 RX ADMIN — METHOCARBAMOL 500 MG: 500 TABLET ORAL at 02:06

## 2022-06-07 RX ADMIN — MORPHINE SULFATE 4 MG: 4 INJECTION INTRAVENOUS at 08:06

## 2022-06-07 RX ADMIN — METHOCARBAMOL 500 MG: 500 TABLET ORAL at 08:06

## 2022-06-07 RX ADMIN — THIAMINE HYDROCHLORIDE 500 MG: 100 INJECTION, SOLUTION INTRAMUSCULAR; INTRAVENOUS at 09:06

## 2022-06-07 RX ADMIN — GABAPENTIN 300 MG: 250 SOLUTION ORAL at 06:06

## 2022-06-07 RX ADMIN — CEFTRIAXONE 1 G: 1 INJECTION, SOLUTION INTRAVENOUS at 06:06

## 2022-06-07 RX ADMIN — THIAMINE HYDROCHLORIDE 500 MG: 100 INJECTION, SOLUTION INTRAMUSCULAR; INTRAVENOUS at 08:06

## 2022-06-07 RX ADMIN — ONDANSETRON 4 MG: 2 INJECTION INTRAMUSCULAR; INTRAVENOUS at 03:06

## 2022-06-07 RX ADMIN — BISACODYL 10 MG: 10 SUPPOSITORY RECTAL at 02:06

## 2022-06-07 RX ADMIN — LEVALBUTEROL HYDROCHLORIDE 0.63 MG: 0.63 SOLUTION RESPIRATORY (INHALATION) at 04:06

## 2022-06-07 RX ADMIN — POTASSIUM PHOSPHATE, MONOBASIC 500 MG: 500 TABLET, SOLUBLE ORAL at 10:06

## 2022-06-07 RX ADMIN — OXYCODONE HYDROCHLORIDE 10 MG: 5 SOLUTION ORAL at 01:06

## 2022-06-07 NOTE — SUBJECTIVE & OBJECTIVE
Interval History: no new issues, she has not passed gas or had bowel movement. NGT still to suction. Pain controlled.    Review of Systems   Constitutional:  Negative for chills and fever.   HENT:  Negative for congestion, ear discharge and postnasal drip.    Respiratory:  Negative for cough and shortness of breath.    Cardiovascular:  Negative for chest pain and leg swelling.   Gastrointestinal:  Positive for abdominal pain. Negative for diarrhea, nausea and vomiting.   Genitourinary:  Negative for dysuria and hematuria.   Musculoskeletal:  Positive for arthralgias and back pain.   Skin:  Negative for rash and wound.   Neurological:  Negative for dizziness, seizures and headaches.   Psychiatric/Behavioral:  Negative for agitation and confusion. The patient is not nervous/anxious.    Objective:     Vital Signs (Most Recent):  Temp: 98.2 °F (36.8 °C) (06/07/22 1109)  Pulse: 71 (06/07/22 1109)  Resp: 18 (06/07/22 1147)  BP: (!) 152/74 (06/07/22 1109)  SpO2: 95 % (06/07/22 1109)   Vital Signs (24h Range):  Temp:  [98.1 °F (36.7 °C)-98.7 °F (37.1 °C)] 98.2 °F (36.8 °C)  Pulse:  [71-83] 71  Resp:  [16-20] 18  SpO2:  [95 %-98 %] 95 %  BP: (141-184)/(71-94) 152/74     Weight: 82.7 kg (182 lb 5.8 oz)  Body mass index is 29.43 kg/m².    Intake/Output Summary (Last 24 hours) at 6/7/2022 1428  Last data filed at 6/7/2022 1117  Gross per 24 hour   Intake 845.67 ml   Output 1300 ml   Net -454.33 ml      Physical Exam  Vitals and nursing note reviewed.   Constitutional:       General: She is not in acute distress.     Appearance: She is obese. She is ill-appearing.   HENT:      Head: Normocephalic.      Nose:      Comments: NGT   Eyes:      General: No scleral icterus.     Conjunctiva/sclera: Conjunctivae normal.   Cardiovascular:      Rate and Rhythm: Normal rate and regular rhythm.      Pulses: Normal pulses.      Heart sounds: Normal heart sounds.   Pulmonary:      Effort: Pulmonary effort is normal. No respiratory distress.       Breath sounds: Normal breath sounds. No wheezing.   Abdominal:      Comments: Has abdominal binder on, post-surgical did not remove   Musculoskeletal:         General: No swelling. Normal range of motion.      Cervical back: Neck supple.   Skin:     General: Skin is warm and dry.   Neurological:      General: No focal deficit present.      Mental Status: She is alert and oriented to person, place, and time.   Psychiatric:         Mood and Affect: Mood normal.         Thought Content: Thought content normal.       Significant Labs: All pertinent labs within the past 24 hours have been reviewed.    Significant Imaging: I have reviewed all pertinent imaging results/findings within the past 24 hours.

## 2022-06-07 NOTE — PROGRESS NOTES
[facility] Medicine  Progress Note    Patient Name: Estella Arevalo  MRN: [id number]  Patient Class: IP- Inpatient   Admission Date: 6/5/2022  Length of Stay: 2 days  Attending Physician: Jaya Phillips MD  Primary Care Provider: Angela Snowden MD        Subjective:     Principal Problem:Incarcerated hernia        HPI:  Mrs. Arevalo is a 68 yo F with hx of HTN, depression and previous alcohol abuse who presented to the ED with abdominal pain. She was found to have an incarcerated hernia and General Surgery was consulted. She underwent reduction and surgery on 6/6/22 with repair and thankfully did not need bowel resection. Of note, ascites was found once patient's abdomen was open concerning for decompensated cirrhosis. Hospital medicine consulted for help with comanagement of her comorbidities.     Currently, patient tells me she has a history of Hepatitis C that was treated with Harvoni years ago and that is where her cirrhosis is from. She does drink alcohol and now states she is quitting now that ascites was found. She has a 5 year old grandchild she is raising and wants to live for her family. She used to take Buprenorphine-naltrexone but now tells me that she needs actual pain medication because she has chronic back pain. She has missed her last appointment so needs follow up and a prescription once she leaves the hospital. She is currently asking if we can increase the pain medication dose now that she's post-surgery and her pain is worse.      Overview/Hospital Course:  No notes on file    Interval History: no new issues, she has not passed gas or had bowel movement. NGT still to suction. Pain controlled.    Review of Systems   Constitutional:  Negative for chills and fever.   HENT:  Negative for congestion, ear discharge and postnasal drip.    Respiratory:  Negative for cough and shortness of breath.    Cardiovascular:  Negative for chest pain and leg swelling.   Gastrointestinal:   Positive for abdominal pain. Negative for diarrhea, nausea and vomiting.   Genitourinary:  Negative for dysuria and hematuria.   Musculoskeletal:  Positive for arthralgias and back pain.   Skin:  Negative for rash and wound.   Neurological:  Negative for dizziness, seizures and headaches.   Psychiatric/Behavioral:  Negative for agitation and confusion. The patient is not nervous/anxious.    Objective:     Vital Signs (Most Recent):  Temp: 98.2 °F (36.8 °C) (06/07/22 1109)  Pulse: 71 (06/07/22 1109)  Resp: 18 (06/07/22 1147)  BP: (!) 152/74 (06/07/22 1109)  SpO2: 95 % (06/07/22 1109)   Vital Signs (24h Range):  Temp:  [98.1 °F (36.7 °C)-98.7 °F (37.1 °C)] 98.2 °F (36.8 °C)  Pulse:  [71-83] 71  Resp:  [16-20] 18  SpO2:  [95 %-98 %] 95 %  BP: (141-184)/(71-94) 152/74     Weight: 82.7 kg (182 lb 5.8 oz)  Body mass index is 29.43 kg/m².    Intake/Output Summary (Last 24 hours) at 6/7/2022 1428  Last data filed at 6/7/2022 1117  Gross per 24 hour   Intake 845.67 ml   Output 1300 ml   Net -454.33 ml      Physical Exam  Vitals and nursing note reviewed.   Constitutional:       General: She is not in acute distress.     Appearance: She is obese. She is ill-appearing.   HENT:      Head: Normocephalic.      Nose:      Comments: NGT   Eyes:      General: No scleral icterus.     Conjunctiva/sclera: Conjunctivae normal.   Cardiovascular:      Rate and Rhythm: Normal rate and regular rhythm.      Pulses: Normal pulses.      Heart sounds: Normal heart sounds.   Pulmonary:      Effort: Pulmonary effort is normal. No respiratory distress.      Breath sounds: Normal breath sounds. No wheezing.   Abdominal:      Comments: Has abdominal binder on, post-surgical did not remove   Musculoskeletal:         General: No swelling. Normal range of motion.      Cervical back: Neck supple.   Skin:     General: Skin is warm and dry.   Neurological:      General: No focal deficit present.      Mental Status: She is alert and oriented to person,  "place, and time.   Psychiatric:         Mood and Affect: Mood normal.         Thought Content: Thought content normal.       Significant Labs: All pertinent labs within the past 24 hours have been reviewed.    Significant Imaging: I have reviewed all pertinent imaging results/findings within the past 24 hours.      Assessment/Plan:      * Incarcerated hernia  - s/p hernia repair by General Surgery on 6/6/22  - per primary  - no BM/flatus yet      Alcohol abuse  - she tells me her last drink was 8 days ago  - would monitor for any signs/symptoms of withdrawal      History of hepatitis C  - tx with Harvoni      UTI (urinary tract infection)  - on Ceftriaxone, would switch to PO keflex once able to tolerate       Cirrhosis of liver  - has hx of Hep C tx with Harvoni  - also with history of alcohol abuse  - noted ascites on surgery today  - Aldactone 50 mg daily/Lasix 20 mg daily  - titrate as tolerated      Essential hypertension  - patient reports she has not been on blood pressure medications for some time now since she has lost weight  - would control pain for now   - will be started on aldactone given ascites      Acquired hypothyroidism  - resume synthroid      Depression  Resume home Lexapro      Polysubstance dependence including opioid type drug, continuous use  - Patient has history of opioid abuse and alcohol abuse  - she tells me that she was on Subutex prescribed by her Psychiatrist but appears she has not had this prescription filled since 9/2021  - she says she has bad back pain now and needs "real pain medicine"  - last fill was February of this year for 30 tablets from [facility]opedic [facility]  - would treat acute pain at this time but attempt to wean down post-discharge        VTE Risk Mitigation (From admission, onward)         Ordered     enoxaparin injection 40 mg  Daily         06/05/22 1022     Place sequential compression device  Until discontinued         06/05/22 1022     IP VTE LOW RISK PATIENT  " Once         06/05/22 1022                Discharge Planning   SARAH:      Code Status: Full Code   Is the patient medically ready for discharge?:     Reason for patient still in hospital (select all that apply): Treatment  Discharge Plan A: Home with family                  Aden Emerson MD  Department of Hospital Medicine   [facility] Surg

## 2022-06-07 NOTE — PROGRESS NOTES
Surgery Progress Note    S:  NAEO. Reports passing flatus but no BM. Has ambulated to bedside commode. Voiding without difficulty.     O:  Temp:  [98.1 °F (36.7 °C)-98.7 °F (37.1 °C)] 98.2 °F (36.8 °C)  Pulse:  [71-83] 71  Resp:  [16-20] 18  SpO2:  [95 %-98 %] 95 %  BP: (141-184)/(71-94) 152/74    I/O last 3 completed shifts:  In: 4315.4 [P.O.:120; I.V.:2720.5; IV Piggyback:1474.9]  Out: 1790 [Urine:340; Drains:1300; Other:150]  I/O this shift:  In: 540.8 [NG/GT:240; IV Piggyback:300.8]  Out: 800 [Drains:800]    Gen: awake, resting comfortably   HEENT: EOMI, NG tube in place with bilious output  CV: regular rate, warm and well perfused  Pulm: symmetric expansion, no distress  Abdomen: soft,nondistended, appropriately ttp , dressing with small amount of dried blood, no evidence of hernia recurrence  Extremities: moves all, no cyanosis  Skin: dry intact  Neuro: alert, no focal neuro deficits    WBC/Hgb/Hct/Plts:  5.81/13.8/41.5/170 (06/07 0639)  BUN/Cr/glu/ALT/AST/amyl/lip:  13/0.6/--/22/23/--/-- (06/07 0639)    Imaging Results          X-Ray Abdomen AP 1 View (Final result)  Result time 06/05/22 11:08:49    Final result by Janet Ren MD (06/05/22 11:08:49)                 Impression:      Please see above.      Electronically signed by: Janet Ren  Date:    06/05/2022  Time:    11:08             Narrative:    EXAMINATION:  XR ABDOMEN AP 1 VIEW    CLINICAL HISTORY:  confirm ng tube;    TECHNIQUE:  AP View(s) of the abdomen was performed.    COMPARISON:  None    FINDINGS:  Supine radiograph of the upper abdomen shows the tip of the enteric tube projecting over the stomach.    Hiatal hernia.  Bowel loops in the upper abdomen are dilated in this patient with small bowel obstruction.                               X-Ray Chest AP Portable (Final result)  Result time 06/05/22 08:07:02    Final result by Dhiraj Carver MD (06/05/22 08:07:02)                 Impression:      No convincing evidence of acute  cardiopulmonary disease.      Electronically signed by: Dhiraj Carver  Date:    06/05/2022  Time:    08:07             Narrative:    EXAMINATION:  XR CHEST AP PORTABLE    CLINICAL HISTORY:  Encounter for preprocedural cardiovascular examination    TECHNIQUE:  Single frontal view of the chest was performed.    COMPARISON:  Chest radiograph 09/22/2021    FINDINGS:  Monitoring leads overlie the chest.  Grossly unchanged cardiomediastinal contours.  Background coarsened interstitial increased attenuation relatively similar prior examination.  Platelike opacity left lung base favored to represent atelectasis.    No definite pneumothorax or large volume pleural effusion.  No acute findings identified in the visualized abdomen.  Gas-filled loops of colon subjacent to the hemidiaphragms again noted, similar configuration to prior radiographs.  Osseous soft tissue structures appear without definite acute abnormality.  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                                CT Abdomen Pelvis  Without Contrast (Final result)  Result time 06/05/22 07:32:49    Final result by Dhiraj Carver MD (06/05/22 07:32:49)                 Impression:      1. Findings concerning for acute mechanical small bowel obstruction, which appears related to ventral hernia sac containing loops of small bowel.  Inflammation is suggested about the enclosed small-bowel loops within the hernia sac.  No definite evidence of pneumatosis or pneumoperitoneum at the present time.  2. Additional details of chronic and incidental findings, as provided in the body of report.  This report was flagged in Epic as abnormal.      Electronically signed by: Dhiraj Carver  Date:    06/05/2022  Time:    07:32             Narrative:    EXAMINATION:  CT ABDOMEN PELVIS WITHOUT CONTRAST    CLINICAL HISTORY:  Bowel obstruction suspected;    TECHNIQUE:  Low dose axial images, sagittal and coronal  reformations were obtained from the lung bases to the pubic symphysis.    COMPARISON:  CT of the abdomen and pelvis performed 03/30/2022    FINDINGS:  This examination is limited due to lack of intravenous contrast.    Lower chest: Emphysematous changes are suggested.  Atelectasis and/or scar.  2 mm nodule right middle lobe (series 2, image 4).  7 mm nodule right middle lobe (series 2, image 17).  Both of these nodules appear essentially unchanged dating back to at least 12/04/2019, and as such are felt benign.    Liver: Contour nodularity of the liver is suggested, which may relate to cirrhosis.  No definite new focal hepatic lesion identified by unenhanced technique.    Gallbladder and bile ducts: Stable nonspecific diffuse prominence of the extrahepatic common bile duct measuring up to 8 mm.    Pancreas: Normal contour.    Spleen: Normal contour.    Adrenals: Normal contour.    Kidneys: Nonobstructing 3 mm calculus midpole left kidney.    Lymph nodes: No abdominal or pelvic lymphadenopathy.    Bowel and mesentery: Dilated fluid and contrast filled small bowel loops measure up to least 4.6 cm, as measured in the right upper quadrant of the abdomen.  There is a ventral hernia containing fat, vessels, fluid, and loops of small bowel, with adjoining inflammatory change.  This appearance is concerning for the cause of the proximal obstruction, with decompression of the more distal bowel loops.  Bowel though the hernia sac measures approximately 24 mm in width and 16 mm in craniocaudal extent.  Orally administered contrast material collects within a moderate hiatal hernia, traverses stomach, and proximal small bowel loops.  No definite contrast material within the small bowel loops enclosed within the ventral hernia sac.    Elsewhere, there is colonic diverticulosis without definite evidence acute diverticulitis at the present time.  Normal caliber of the appendix.  Postsurgical changes in the gastroesophageal  junction.    Abdominal aorta: Nonaneurysmal.  Atherosclerotic calcifications.    Inferior vena cava: Unremarkable.    Free fluid or free air: None.    Pelvis: Stable suggested focus of fat necrosis in the anterior pelvis.    Urinary bladder: Unremarkable.    Body wall: As above.  Possible junction granuloma within the gluteal soft tissues.    Bones: No definite acute abnormality appreciated.  Extensive degenerative findings again noted involving the spine.  As before, there is grade 1 anterolisthesis of L4 on L5.  S-shaped scoliotic curvature of the thoracolumbar spine.  Retrolisthesis of L1 on L2 and of T12 on L1.  Chronic appearing degenerative endplate change most advanced spanning the T11-12 T12 levels, relatively similar to prior.                                Assessment/ Plan: 67 year old female with hx of polysubstance abuse and cirrhosis, hx of lap hiatal hernia repair, ventral hernia repair with mesh and primary repair of umbilical hernia who presents with SBO with transition point at umbilical hernia. Umbilical hernia was reduced at bedside and NG tube was placed. Patient is now s/p open umbilical hernia repair with mesh on 6/6/22 without complication. She was noted to have ascites intra operatively.    She has passed some flatus but still has bilious NG tube output.     Clamp NG tube  Dulcolax suppository   Hospital Medicine consulted, appreciate recommendations/ management  IV Thiamine and folate scheduled. CIWA   Continue rocephin; will transition to keflex once tolerating PO per Medicine recommendations   PT consulted  Encourage IS  Encourage ambulation    evi Bledsoe MD  Surgery PGY5

## 2022-06-07 NOTE — PT/OT/SLP PROGRESS
"Physical Therapy      Patient Name:  Estella Arevalo   MRN:  [id number]    Patient not seen this morning secondary to c/o not sleeping last night; "not now". Will f/u this afternoon.      1524 Addendum: pt declined to mobilize from bed again. Requesting to know when she is next due for more pain medicine. Nurse Michelle present and aware. Pt requests PT return once morphine provided. Will coordinate with tomorrow morning's nurse.     "

## 2022-06-07 NOTE — ASSESSMENT & PLAN NOTE
- has hx of Hep C tx with Harvoni  - also with history of alcohol abuse  - noted ascites on surgery today  - Aldactone 50 mg daily/Lasix 20 mg daily  - titrate as tolerated

## 2022-06-07 NOTE — NURSING
NGT clamped per order, mild nausea - relieved by prn Zofran, No vomiting.. suppository administered - No BM this shift.

## 2022-06-08 LAB
ALBUMIN SERPL BCP-MCNC: 3.5 G/DL (ref 3.5–5.2)
ALP SERPL-CCNC: 94 U/L (ref 55–135)
ALT SERPL W/O P-5'-P-CCNC: 22 U/L (ref 10–44)
ANION GAP SERPL CALC-SCNC: 12 MMOL/L (ref 8–16)
AST SERPL-CCNC: 31 U/L (ref 10–40)
BASOPHILS # BLD AUTO: 0.04 K/UL (ref 0–0.2)
BASOPHILS NFR BLD: 0.7 % (ref 0–1.9)
BILIRUB SERPL-MCNC: 0.8 MG/DL (ref 0.1–1)
BUN SERPL-MCNC: 9 MG/DL (ref 8–23)
CALCIUM SERPL-MCNC: 9.1 MG/DL (ref 8.7–10.5)
CHLORIDE SERPL-SCNC: 95 MMOL/L (ref 95–110)
CO2 SERPL-SCNC: 26 MMOL/L (ref 23–29)
CREAT SERPL-MCNC: 0.7 MG/DL (ref 0.5–1.4)
DIFFERENTIAL METHOD: ABNORMAL
EOSINOPHIL # BLD AUTO: 0.2 K/UL (ref 0–0.5)
EOSINOPHIL NFR BLD: 3 % (ref 0–8)
ERYTHROCYTE [DISTWIDTH] IN BLOOD BY AUTOMATED COUNT: 13.2 % (ref 11.5–14.5)
EST. GFR  (AFRICAN AMERICAN): >60 ML/MIN/1.73 M^2
EST. GFR  (NON AFRICAN AMERICAN): >60 ML/MIN/1.73 M^2
GLUCOSE SERPL-MCNC: 87 MG/DL (ref 70–110)
HCT VFR BLD AUTO: 42.6 % (ref 37–48.5)
HGB BLD-MCNC: 13.8 G/DL (ref 12–16)
IMM GRANULOCYTES # BLD AUTO: 0.02 K/UL (ref 0–0.04)
IMM GRANULOCYTES NFR BLD AUTO: 0.3 % (ref 0–0.5)
LYMPHOCYTES # BLD AUTO: 0.8 K/UL (ref 1–4.8)
LYMPHOCYTES NFR BLD: 13.8 % (ref 18–48)
MAGNESIUM SERPL-MCNC: 1.7 MG/DL (ref 1.6–2.6)
MCH RBC QN AUTO: 32.9 PG (ref 27–31)
MCHC RBC AUTO-ENTMCNC: 32.4 G/DL (ref 32–36)
MCV RBC AUTO: 101 FL (ref 82–98)
MONOCYTES # BLD AUTO: 0.6 K/UL (ref 0.3–1)
MONOCYTES NFR BLD: 9.9 % (ref 4–15)
NEUTROPHILS # BLD AUTO: 4.3 K/UL (ref 1.8–7.7)
NEUTROPHILS NFR BLD: 72.3 % (ref 38–73)
NRBC BLD-RTO: 0 /100 WBC
PHOSPHATE SERPL-MCNC: 3 MG/DL (ref 2.7–4.5)
PLATELET # BLD AUTO: ABNORMAL K/UL (ref 150–450)
PLATELET BLD QL SMEAR: ABNORMAL
PMV BLD AUTO: ABNORMAL FL (ref 9.2–12.9)
POTASSIUM SERPL-SCNC: 3.6 MMOL/L (ref 3.5–5.1)
PROT SERPL-MCNC: 7.6 G/DL (ref 6–8.4)
RBC # BLD AUTO: 4.2 M/UL (ref 4–5.4)
SODIUM SERPL-SCNC: 133 MMOL/L (ref 136–145)
WBC # BLD AUTO: 5.94 K/UL (ref 3.9–12.7)

## 2022-06-08 PROCEDURE — 83735 ASSAY OF MAGNESIUM: CPT | Performed by: STUDENT IN AN ORGANIZED HEALTH CARE EDUCATION/TRAINING PROGRAM

## 2022-06-08 PROCEDURE — 25000003 PHARM REV CODE 250: Performed by: STUDENT IN AN ORGANIZED HEALTH CARE EDUCATION/TRAINING PROGRAM

## 2022-06-08 PROCEDURE — 36415 COLL VENOUS BLD VENIPUNCTURE: CPT | Performed by: STUDENT IN AN ORGANIZED HEALTH CARE EDUCATION/TRAINING PROGRAM

## 2022-06-08 PROCEDURE — 25000242 PHARM REV CODE 250 ALT 637 W/ HCPCS: Performed by: STUDENT IN AN ORGANIZED HEALTH CARE EDUCATION/TRAINING PROGRAM

## 2022-06-08 PROCEDURE — 85025 COMPLETE CBC W/AUTO DIFF WBC: CPT | Performed by: STUDENT IN AN ORGANIZED HEALTH CARE EDUCATION/TRAINING PROGRAM

## 2022-06-08 PROCEDURE — 84100 ASSAY OF PHOSPHORUS: CPT | Performed by: STUDENT IN AN ORGANIZED HEALTH CARE EDUCATION/TRAINING PROGRAM

## 2022-06-08 PROCEDURE — 80053 COMPREHEN METABOLIC PANEL: CPT | Performed by: STUDENT IN AN ORGANIZED HEALTH CARE EDUCATION/TRAINING PROGRAM

## 2022-06-08 PROCEDURE — C9113 INJ PANTOPRAZOLE SODIUM, VIA: HCPCS | Performed by: STUDENT IN AN ORGANIZED HEALTH CARE EDUCATION/TRAINING PROGRAM

## 2022-06-08 PROCEDURE — 11000001 HC ACUTE MED/SURG PRIVATE ROOM

## 2022-06-08 PROCEDURE — 63600175 PHARM REV CODE 636 W HCPCS: Performed by: NURSE PRACTITIONER

## 2022-06-08 PROCEDURE — 63600175 PHARM REV CODE 636 W HCPCS: Performed by: STUDENT IN AN ORGANIZED HEALTH CARE EDUCATION/TRAINING PROGRAM

## 2022-06-08 RX ORDER — LANOLIN ALCOHOL/MO/W.PET/CERES
400 CREAM (GRAM) TOPICAL ONCE
Status: COMPLETED | OUTPATIENT
Start: 2022-06-08 | End: 2022-06-08

## 2022-06-08 RX ORDER — BISACODYL 10 MG
10 SUPPOSITORY, RECTAL RECTAL ONCE
Status: COMPLETED | OUTPATIENT
Start: 2022-06-08 | End: 2022-06-08

## 2022-06-08 RX ADMIN — MORPHINE SULFATE 4 MG: 4 INJECTION INTRAVENOUS at 04:06

## 2022-06-08 RX ADMIN — MORPHINE SULFATE 4 MG: 4 INJECTION INTRAVENOUS at 08:06

## 2022-06-08 RX ADMIN — CEFTRIAXONE 1 G: 1 INJECTION, SOLUTION INTRAVENOUS at 06:06

## 2022-06-08 RX ADMIN — OXYCODONE HYDROCHLORIDE 10 MG: 5 SOLUTION ORAL at 10:06

## 2022-06-08 RX ADMIN — GABAPENTIN 400 MG: 250 SOLUTION ORAL at 06:06

## 2022-06-08 RX ADMIN — Medication 400 MG: at 12:06

## 2022-06-08 RX ADMIN — POTASSIUM PHOSPHATE, MONOBASIC 500 MG: 500 TABLET, SOLUBLE ORAL at 08:06

## 2022-06-08 RX ADMIN — POLYETHYLENE GLYCOL 3350 17 G: 17 POWDER, FOR SOLUTION ORAL at 08:06

## 2022-06-08 RX ADMIN — MORPHINE SULFATE 4 MG: 4 INJECTION INTRAVENOUS at 12:06

## 2022-06-08 RX ADMIN — THIAMINE HYDROCHLORIDE 500 MG: 100 INJECTION, SOLUTION INTRAMUSCULAR; INTRAVENOUS at 04:06

## 2022-06-08 RX ADMIN — OXYCODONE HYDROCHLORIDE 10 MG: 5 SOLUTION ORAL at 01:06

## 2022-06-08 RX ADMIN — OXYCODONE HYDROCHLORIDE 10 MG: 5 SOLUTION ORAL at 06:06

## 2022-06-08 RX ADMIN — PANTOPRAZOLE SODIUM 40 MG: 40 INJECTION, POWDER, FOR SOLUTION INTRAVENOUS at 08:06

## 2022-06-08 RX ADMIN — FOLIC ACID 1 MG: 5 INJECTION, SOLUTION INTRAMUSCULAR; INTRAVENOUS; SUBCUTANEOUS at 12:06

## 2022-06-08 RX ADMIN — LEVOTHYROXINE SODIUM 25 MCG: 0.03 TABLET ORAL at 08:06

## 2022-06-08 RX ADMIN — THIAMINE HYDROCHLORIDE 500 MG: 100 INJECTION, SOLUTION INTRAMUSCULAR; INTRAVENOUS at 08:06

## 2022-06-08 RX ADMIN — METHOCARBAMOL 500 MG: 500 TABLET ORAL at 04:06

## 2022-06-08 RX ADMIN — OXYCODONE HYDROCHLORIDE 10 MG: 5 SOLUTION ORAL at 03:06

## 2022-06-08 RX ADMIN — METHOCARBAMOL 500 MG: 500 TABLET ORAL at 08:06

## 2022-06-08 RX ADMIN — ESCITALOPRAM OXALATE 20 MG: 10 TABLET ORAL at 08:06

## 2022-06-08 RX ADMIN — GABAPENTIN 400 MG: 250 SOLUTION ORAL at 01:06

## 2022-06-08 RX ADMIN — GABAPENTIN 400 MG: 250 SOLUTION ORAL at 10:06

## 2022-06-08 RX ADMIN — ENOXAPARIN SODIUM 40 MG: 100 INJECTION SUBCUTANEOUS at 04:06

## 2022-06-08 RX ADMIN — OXYCODONE HYDROCHLORIDE 10 MG: 5 SOLUTION ORAL at 07:06

## 2022-06-08 RX ADMIN — METHOCARBAMOL 500 MG: 500 TABLET ORAL at 12:06

## 2022-06-08 RX ADMIN — FUROSEMIDE 20 MG: 20 TABLET ORAL at 08:06

## 2022-06-08 RX ADMIN — ONDANSETRON 4 MG: 2 INJECTION INTRAMUSCULAR; INTRAVENOUS at 12:06

## 2022-06-08 RX ADMIN — SPIRONOLACTONE 50 MG: 25 TABLET, FILM COATED ORAL at 08:06

## 2022-06-08 RX ADMIN — BISACODYL 10 MG: 10 SUPPOSITORY RECTAL at 12:06

## 2022-06-08 NOTE — ASSESSMENT & PLAN NOTE
- patient reports she has not been on blood pressure medications for some time now since she has lost weight  - would control pain for now

## 2022-06-08 NOTE — PROGRESS NOTES
Surgery Progress Note    S:  NAEO. NG tube clamped since yesterday, some nausea but no vomiting. Has not passed flatus since yesterday AM and no BM yet. Ambulating to bedside commode.  Voiding without difficulty.     O:  Temp:  [97.7 °F (36.5 °C)-98.3 °F (36.8 °C)] 97.7 °F (36.5 °C)  Pulse:  [71-75] 71  Resp:  [16-20] 17  SpO2:  [93 %-98 %] 95 %  BP: (133-167)/(74-94) 157/85    I/O last 3 completed shifts:  In: 883.5 [NG/GT:340; IV Piggyback:543.5]  Out: 1600 [Urine:400; Drains:1200]  No intake/output data recorded.    Gen: awake, resting comfortably   HEENT: EOMI, NG tube in place with bilious output  CV: regular rate, warm and well perfused  Pulm: symmetric expansion, no distress  Abdomen: soft,nondistended, appropriately ttp , dressing with small amount of dried blood, no evidence of hernia recurrence  Extremities: moves all, no cyanosis  Skin: dry intact  Neuro: alert, no focal neuro deficits    WBC/Hgb/Hct/Plts:  5.94/13.8/42.6/SEE COMMENT (06/08 0515)  BUN/Cr/glu/ALT/AST/amyl/lip:  9/0.7/--/22/31/--/-- (06/08 0515)    Imaging Results          X-Ray Abdomen AP 1 View (Final result)  Result time 06/05/22 11:08:49    Final result by Janet Ren MD (06/05/22 11:08:49)                 Impression:      Please see above.      Electronically signed by: Janet Ren  Date:    06/05/2022  Time:    11:08             Narrative:    EXAMINATION:  XR ABDOMEN AP 1 VIEW    CLINICAL HISTORY:  confirm ng tube;    TECHNIQUE:  AP View(s) of the abdomen was performed.    COMPARISON:  None    FINDINGS:  Supine radiograph of the upper abdomen shows the tip of the enteric tube projecting over the stomach.    Hiatal hernia.  Bowel loops in the upper abdomen are dilated in this patient with small bowel obstruction.                               X-Ray Chest AP Portable (Final result)  Result time 06/05/22 08:07:02    Final result by Dhiraj Carver MD (06/05/22 08:07:02)                 Impression:      No convincing evidence  of acute cardiopulmonary disease.      Electronically signed by: Dhiraj Carver  Date:    06/05/2022  Time:    08:07             Narrative:    EXAMINATION:  XR CHEST AP PORTABLE    CLINICAL HISTORY:  Encounter for preprocedural cardiovascular examination    TECHNIQUE:  Single frontal view of the chest was performed.    COMPARISON:  Chest radiograph 09/22/2021    FINDINGS:  Monitoring leads overlie the chest.  Grossly unchanged cardiomediastinal contours.  Background coarsened interstitial increased attenuation relatively similar prior examination.  Platelike opacity left lung base favored to represent atelectasis.    No definite pneumothorax or large volume pleural effusion.  No acute findings identified in the visualized abdomen.  Gas-filled loops of colon subjacent to the hemidiaphragms again noted, similar configuration to prior radiographs.  Osseous soft tissue structures appear without definite acute abnormality.  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                                CT Abdomen Pelvis  Without Contrast (Final result)  Result time 06/05/22 07:32:49    Final result by Dhiraj Carver MD (06/05/22 07:32:49)                 Impression:      1. Findings concerning for acute mechanical small bowel obstruction, which appears related to ventral hernia sac containing loops of small bowel.  Inflammation is suggested about the enclosed small-bowel loops within the hernia sac.  No definite evidence of pneumatosis or pneumoperitoneum at the present time.  2. Additional details of chronic and incidental findings, as provided in the body of report.  This report was flagged in Epic as abnormal.      Electronically signed by: Dhiraj Carver  Date:    06/05/2022  Time:    07:32             Narrative:    EXAMINATION:  CT ABDOMEN PELVIS WITHOUT CONTRAST    CLINICAL HISTORY:  Bowel obstruction suspected;    TECHNIQUE:  Low dose axial images, sagittal and  coronal reformations were obtained from the lung bases to the pubic symphysis.    COMPARISON:  CT of the abdomen and pelvis performed 03/30/2022    FINDINGS:  This examination is limited due to lack of intravenous contrast.    Lower chest: Emphysematous changes are suggested.  Atelectasis and/or scar.  2 mm nodule right middle lobe (series 2, image 4).  7 mm nodule right middle lobe (series 2, image 17).  Both of these nodules appear essentially unchanged dating back to at least 12/04/2019, and as such are felt benign.    Liver: Contour nodularity of the liver is suggested, which may relate to cirrhosis.  No definite new focal hepatic lesion identified by unenhanced technique.    Gallbladder and bile ducts: Stable nonspecific diffuse prominence of the extrahepatic common bile duct measuring up to 8 mm.    Pancreas: Normal contour.    Spleen: Normal contour.    Adrenals: Normal contour.    Kidneys: Nonobstructing 3 mm calculus midpole left kidney.    Lymph nodes: No abdominal or pelvic lymphadenopathy.    Bowel and mesentery: Dilated fluid and contrast filled small bowel loops measure up to least 4.6 cm, as measured in the right upper quadrant of the abdomen.  There is a ventral hernia containing fat, vessels, fluid, and loops of small bowel, with adjoining inflammatory change.  This appearance is concerning for the cause of the proximal obstruction, with decompression of the more distal bowel loops.  Bowel though the hernia sac measures approximately 24 mm in width and 16 mm in craniocaudal extent.  Orally administered contrast material collects within a moderate hiatal hernia, traverses stomach, and proximal small bowel loops.  No definite contrast material within the small bowel loops enclosed within the ventral hernia sac.    Elsewhere, there is colonic diverticulosis without definite evidence acute diverticulitis at the present time.  Normal caliber of the appendix.  Postsurgical changes in the gastroesophageal  junction.    Abdominal aorta: Nonaneurysmal.  Atherosclerotic calcifications.    Inferior vena cava: Unremarkable.    Free fluid or free air: None.    Pelvis: Stable suggested focus of fat necrosis in the anterior pelvis.    Urinary bladder: Unremarkable.    Body wall: As above.  Possible junction granuloma within the gluteal soft tissues.    Bones: No definite acute abnormality appreciated.  Extensive degenerative findings again noted involving the spine.  As before, there is grade 1 anterolisthesis of L4 on L5.  S-shaped scoliotic curvature of the thoracolumbar spine.  Retrolisthesis of L1 on L2 and of T12 on L1.  Chronic appearing degenerative endplate change most advanced spanning the T11-12 T12 levels, relatively similar to prior.                                Assessment/ Plan: 67 year old female with hx of polysubstance abuse and cirrhosis, hx of lap hiatal hernia repair, ventral hernia repair with mesh and primary repair of umbilical hernia who presents with SBO with transition point at umbilical hernia. Umbilical hernia was reduced at bedside and NG tube was placed. Patient is now s/p open umbilical hernia repair with mesh on 6/6/22 without complication. She was noted to have ascites intra operatively.    She has passed some flatus but still has bilious NG tube output.     NG tube clamped; give Dulcolax suppository today; if passes flatus or has BM will d/c NG tube and start clears  Hospital Medicine consulted, appreciate recommendations/ management  IV Thiamine and folate scheduled. CIWA   Continue rocephin; will transition to keflex once tolerating PO per Medicine recommendations   PT consulted  Encourage IS  Encourage ambulation    evi Bledsoe MD  Surgery PGY5

## 2022-06-08 NOTE — PROGRESS NOTES
[facility] Medicine  Progress Note    Patient Name: Estella Arevalo  MRN: [id number]  Patient Class: IP- Inpatient   Admission Date: 6/5/2022  Length of Stay: 3 days  Attending Physician: Jaya Phillips MD  Primary Care Provider: Angela Snowden MD        Subjective:     Principal Problem:Incarcerated hernia        HPI:  Mrs. Arevalo is a 66 yo F with hx of HTN, depression and previous alcohol abuse who presented to the ED with abdominal pain. She was found to have an incarcerated hernia and General Surgery was consulted. She underwent reduction and surgery on 6/6/22 with repair and thankfully did not need bowel resection. Of note, ascites was found once patient's abdomen was open concerning for decompensated cirrhosis. Hospital medicine consulted for help with comanagement of her comorbidities.     Currently, patient tells me she has a history of Hepatitis C that was treated with Harvoni years ago and that is where her cirrhosis is from. She does drink alcohol and now states she is quitting now that ascites was found. She has a 5 year old grandchild she is raising and wants to live for her family. She used to take Buprenorphine-naltrexone but now tells me that she needs actual pain medication because she has chronic back pain. She has missed her last appointment so needs follow up and a prescription once she leaves the hospital. She is currently asking if we can increase the pain medication dose now that she's post-surgery and her pain is worse.      Overview/Hospital Course:  No notes on file    Interval History: doing okay, NGT clamped. Still no BM/flatus    Review of Systems   Constitutional:  Negative for chills and fever.   HENT:  Negative for congestion, ear discharge and postnasal drip.    Respiratory:  Negative for cough and shortness of breath.    Cardiovascular:  Negative for chest pain and leg swelling.   Gastrointestinal:  Positive for abdominal pain. Negative for diarrhea, nausea  and vomiting.   Genitourinary:  Negative for dysuria and hematuria.   Musculoskeletal:  Positive for arthralgias and back pain.   Skin:  Negative for rash and wound.   Neurological:  Negative for dizziness, seizures and headaches.   Psychiatric/Behavioral:  Negative for agitation and confusion. The patient is not nervous/anxious.    Objective:     Vital Signs (Most Recent):  Temp: 98.1 °F (36.7 °C) (06/08/22 1110)  Pulse: 88 (06/08/22 1110)  Resp: 19 (06/08/22 1110)  BP: (!) 144/90 (06/08/22 1110)  SpO2: (!) 94 % (06/08/22 1110)   Vital Signs (24h Range):  Temp:  [97.7 °F (36.5 °C)-98.3 °F (36.8 °C)] 98.1 °F (36.7 °C)  Pulse:  [71-88] 88  Resp:  [16-19] 19  SpO2:  [93 %-98 %] 94 %  BP: (133-167)/(81-94) 144/90     Weight: 82.7 kg (182 lb 5.8 oz)  Body mass index is 29.43 kg/m².    Intake/Output Summary (Last 24 hours) at 6/8/2022 1146  Last data filed at 6/7/2022 2000  Gross per 24 hour   Intake 482.63 ml   Output 1200 ml   Net -717.37 ml        Physical Exam  Vitals and nursing note reviewed.   Constitutional:       General: She is not in acute distress.     Appearance: She is obese. She is ill-appearing.   HENT:      Head: Normocephalic.      Nose:      Comments: NGT   Eyes:      General: No scleral icterus.     Conjunctiva/sclera: Conjunctivae normal.   Cardiovascular:      Rate and Rhythm: Normal rate and regular rhythm.      Pulses: Normal pulses.      Heart sounds: Normal heart sounds.   Pulmonary:      Effort: Pulmonary effort is normal. No respiratory distress.      Breath sounds: Normal breath sounds. No wheezing.   Abdominal:      Comments: Dressings intact, soft but tender  Hypoactive bowel sounds   Musculoskeletal:         General: No swelling. Normal range of motion.      Cervical back: Neck supple.   Skin:     General: Skin is warm and dry.   Neurological:      General: No focal deficit present.      Mental Status: She is alert and oriented to person, place, and time.   Psychiatric:         Mood and  "Affect: Mood normal.         Thought Content: Thought content normal.       Significant Labs: All pertinent labs within the past 24 hours have been reviewed.    Significant Imaging: I have reviewed all pertinent imaging results/findings within the past 24 hours.      Assessment/Plan:      * Incarcerated hernia  - s/p hernia repair by General Surgery on 6/6/22  - per primary  - no BM/flatus yet      Alcohol abuse  - she tells me her last drink was 8 days ago  - would monitor for any signs/symptoms of withdrawal      History of hepatitis C  - tx with Harvoni      UTI (urinary tract infection)  - on Ceftriaxone, would switch to PO keflex once able to tolerate       Cirrhosis of liver  - has hx of Hep C tx with Harvoni  - also with history of alcohol abuse  - noted ascites on surgery today  - Aldactone 50 mg daily/Lasix 20 mg daily  - titrate as tolerated      Essential hypertension  - patient reports she has not been on blood pressure medications for some time now since she has lost weight  - would control pain for now         Acquired hypothyroidism  - resume synthroid      Depression  Resume home Lexapro      Polysubstance dependence including opioid type drug, continuous use  - Patient has history of opioid abuse and alcohol abuse  - she tells me that she was on Subutex prescribed by her Psychiatrist but appears she has not had this prescription filled since 9/2021  - she says she has bad back pain now and needs "real pain medicine"  - last fill was February of this year for 30 tablets from Orthopedic Surgery  - would treat acute pain at this time but attempt to wean down post-discharge        VTE Risk Mitigation (From admission, onward)         Ordered     enoxaparin injection 40 mg  Daily         06/05/22 1022     Place sequential compression device  Until discontinued         06/05/22 1022     IP VTE LOW RISK PATIENT  Once         06/05/22 1022                Discharge Planning   SARAH:      Code Status: Full Code "   Is the patient medically ready for discharge?:     Reason for patient still in hospital (select all that apply): Treatment  Discharge Plan A: Home with family                  Aden Emerson MD  Department of Hospital Medicine   [facility]

## 2022-06-08 NOTE — PT/OT/SLP PROGRESS
Physical Therapy      Patient Name:  Estella Arevalo   MRN:  [id number]    Patient declined to mobilize again. Coordinated morphine with nurse as patient requested yesterday. Pt received morphine ~ 1.5 hours prior. Upon arrival to room nurse present to administer oxycodone via NGT. Pt stated she is in too much pain, has been getting back and forth all day to commode. Educated patient on importance of ambulation for return of bowel function- pt firm that she is in too much pain. Will f/u tomorrow.

## 2022-06-08 NOTE — SUBJECTIVE & OBJECTIVE
Interval History: doing okay, NGT clamped. Still no BM/flatus    Review of Systems   Constitutional:  Negative for chills and fever.   HENT:  Negative for congestion, ear discharge and postnasal drip.    Respiratory:  Negative for cough and shortness of breath.    Cardiovascular:  Negative for chest pain and leg swelling.   Gastrointestinal:  Positive for abdominal pain. Negative for diarrhea, nausea and vomiting.   Genitourinary:  Negative for dysuria and hematuria.   Musculoskeletal:  Positive for arthralgias and back pain.   Skin:  Negative for rash and wound.   Neurological:  Negative for dizziness, seizures and headaches.   Psychiatric/Behavioral:  Negative for agitation and confusion. The patient is not nervous/anxious.    Objective:     Vital Signs (Most Recent):  Temp: 98.1 °F (36.7 °C) (06/08/22 1110)  Pulse: 88 (06/08/22 1110)  Resp: 19 (06/08/22 1110)  BP: (!) 144/90 (06/08/22 1110)  SpO2: (!) 94 % (06/08/22 1110)   Vital Signs (24h Range):  Temp:  [97.7 °F (36.5 °C)-98.3 °F (36.8 °C)] 98.1 °F (36.7 °C)  Pulse:  [71-88] 88  Resp:  [16-19] 19  SpO2:  [93 %-98 %] 94 %  BP: (133-167)/(81-94) 144/90     Weight: 82.7 kg (182 lb 5.8 oz)  Body mass index is 29.43 kg/m².    Intake/Output Summary (Last 24 hours) at 6/8/2022 1146  Last data filed at 6/7/2022 2000  Gross per 24 hour   Intake 482.63 ml   Output 1200 ml   Net -717.37 ml        Physical Exam  Vitals and nursing note reviewed.   Constitutional:       General: She is not in acute distress.     Appearance: She is obese. She is ill-appearing.   HENT:      Head: Normocephalic.      Nose:      Comments: NGT   Eyes:      General: No scleral icterus.     Conjunctiva/sclera: Conjunctivae normal.   Cardiovascular:      Rate and Rhythm: Normal rate and regular rhythm.      Pulses: Normal pulses.      Heart sounds: Normal heart sounds.   Pulmonary:      Effort: Pulmonary effort is normal. No respiratory distress.      Breath sounds: Normal breath sounds. No  wheezing.   Abdominal:      Comments: Dressings intact, soft but tender  Hypoactive bowel sounds   Musculoskeletal:         General: No swelling. Normal range of motion.      Cervical back: Neck supple.   Skin:     General: Skin is warm and dry.   Neurological:      General: No focal deficit present.      Mental Status: She is alert and oriented to person, place, and time.   Psychiatric:         Mood and Affect: Mood normal.         Thought Content: Thought content normal.       Significant Labs: All pertinent labs within the past 24 hours have been reviewed.    Significant Imaging: I have reviewed all pertinent imaging results/findings within the past 24 hours.

## 2022-06-09 LAB
ALBUMIN SERPL BCP-MCNC: 3.5 G/DL (ref 3.5–5.2)
ALP SERPL-CCNC: 163 U/L (ref 55–135)
ALT SERPL W/O P-5'-P-CCNC: 19 U/L (ref 10–44)
ANION GAP SERPL CALC-SCNC: 13 MMOL/L (ref 8–16)
AST SERPL-CCNC: 38 U/L (ref 10–40)
BASOPHILS # BLD AUTO: 0.04 K/UL (ref 0–0.2)
BASOPHILS NFR BLD: 0.8 % (ref 0–1.9)
BILIRUB SERPL-MCNC: 0.6 MG/DL (ref 0.1–1)
BUN SERPL-MCNC: 6 MG/DL (ref 8–23)
CALCIUM SERPL-MCNC: 9.3 MG/DL (ref 8.7–10.5)
CHLORIDE SERPL-SCNC: 95 MMOL/L (ref 95–110)
CO2 SERPL-SCNC: 25 MMOL/L (ref 23–29)
CREAT SERPL-MCNC: 0.7 MG/DL (ref 0.5–1.4)
DIFFERENTIAL METHOD: ABNORMAL
EOSINOPHIL # BLD AUTO: 0.3 K/UL (ref 0–0.5)
EOSINOPHIL NFR BLD: 6 % (ref 0–8)
ERYTHROCYTE [DISTWIDTH] IN BLOOD BY AUTOMATED COUNT: 13 % (ref 11.5–14.5)
EST. GFR  (AFRICAN AMERICAN): >60 ML/MIN/1.73 M^2
EST. GFR  (NON AFRICAN AMERICAN): >60 ML/MIN/1.73 M^2
GLUCOSE SERPL-MCNC: 93 MG/DL (ref 70–110)
HCT VFR BLD AUTO: 41.6 % (ref 37–48.5)
HGB BLD-MCNC: 13.5 G/DL (ref 12–16)
IMM GRANULOCYTES # BLD AUTO: 0.01 K/UL (ref 0–0.04)
IMM GRANULOCYTES NFR BLD AUTO: 0.2 % (ref 0–0.5)
LYMPHOCYTES # BLD AUTO: 1 K/UL (ref 1–4.8)
LYMPHOCYTES NFR BLD: 21.5 % (ref 18–48)
MAGNESIUM SERPL-MCNC: 1.7 MG/DL (ref 1.6–2.6)
MCH RBC QN AUTO: 31.8 PG (ref 27–31)
MCHC RBC AUTO-ENTMCNC: 32.5 G/DL (ref 32–36)
MCV RBC AUTO: 98 FL (ref 82–98)
MONOCYTES # BLD AUTO: 0.5 K/UL (ref 0.3–1)
MONOCYTES NFR BLD: 11 % (ref 4–15)
NEUTROPHILS # BLD AUTO: 2.9 K/UL (ref 1.8–7.7)
NEUTROPHILS NFR BLD: 60.5 % (ref 38–73)
NRBC BLD-RTO: 0 /100 WBC
PHOSPHATE SERPL-MCNC: 3 MG/DL (ref 2.7–4.5)
PLATELET # BLD AUTO: 245 K/UL (ref 150–450)
PMV BLD AUTO: 10.6 FL (ref 9.2–12.9)
POTASSIUM SERPL-SCNC: 3.4 MMOL/L (ref 3.5–5.1)
PROT SERPL-MCNC: 7.3 G/DL (ref 6–8.4)
RBC # BLD AUTO: 4.25 M/UL (ref 4–5.4)
SODIUM SERPL-SCNC: 133 MMOL/L (ref 136–145)
WBC # BLD AUTO: 4.83 K/UL (ref 3.9–12.7)

## 2022-06-09 PROCEDURE — 25000003 PHARM REV CODE 250: Performed by: STUDENT IN AN ORGANIZED HEALTH CARE EDUCATION/TRAINING PROGRAM

## 2022-06-09 PROCEDURE — 85025 COMPLETE CBC W/AUTO DIFF WBC: CPT | Performed by: STUDENT IN AN ORGANIZED HEALTH CARE EDUCATION/TRAINING PROGRAM

## 2022-06-09 PROCEDURE — 83735 ASSAY OF MAGNESIUM: CPT | Performed by: STUDENT IN AN ORGANIZED HEALTH CARE EDUCATION/TRAINING PROGRAM

## 2022-06-09 PROCEDURE — 63600175 PHARM REV CODE 636 W HCPCS: Performed by: STUDENT IN AN ORGANIZED HEALTH CARE EDUCATION/TRAINING PROGRAM

## 2022-06-09 PROCEDURE — 97116 GAIT TRAINING THERAPY: CPT

## 2022-06-09 PROCEDURE — 25000242 PHARM REV CODE 250 ALT 637 W/ HCPCS: Performed by: STUDENT IN AN ORGANIZED HEALTH CARE EDUCATION/TRAINING PROGRAM

## 2022-06-09 PROCEDURE — 99900035 HC TECH TIME PER 15 MIN (STAT)

## 2022-06-09 PROCEDURE — C9113 INJ PANTOPRAZOLE SODIUM, VIA: HCPCS | Performed by: STUDENT IN AN ORGANIZED HEALTH CARE EDUCATION/TRAINING PROGRAM

## 2022-06-09 PROCEDURE — 94760 N-INVAS EAR/PLS OXIMETRY 1: CPT

## 2022-06-09 PROCEDURE — 80053 COMPREHEN METABOLIC PANEL: CPT | Performed by: STUDENT IN AN ORGANIZED HEALTH CARE EDUCATION/TRAINING PROGRAM

## 2022-06-09 PROCEDURE — 36415 COLL VENOUS BLD VENIPUNCTURE: CPT | Performed by: STUDENT IN AN ORGANIZED HEALTH CARE EDUCATION/TRAINING PROGRAM

## 2022-06-09 PROCEDURE — 97530 THERAPEUTIC ACTIVITIES: CPT

## 2022-06-09 PROCEDURE — 97161 PT EVAL LOW COMPLEX 20 MIN: CPT

## 2022-06-09 PROCEDURE — 84100 ASSAY OF PHOSPHORUS: CPT | Performed by: STUDENT IN AN ORGANIZED HEALTH CARE EDUCATION/TRAINING PROGRAM

## 2022-06-09 PROCEDURE — 11000001 HC ACUTE MED/SURG PRIVATE ROOM

## 2022-06-09 RX ORDER — LANOLIN ALCOHOL/MO/W.PET/CERES
100 CREAM (GRAM) TOPICAL DAILY
Status: DISCONTINUED | OUTPATIENT
Start: 2022-06-09 | End: 2022-06-11 | Stop reason: HOSPADM

## 2022-06-09 RX ORDER — CEPHALEXIN 500 MG/1
500 CAPSULE ORAL EVERY 6 HOURS
Status: DISCONTINUED | OUTPATIENT
Start: 2022-06-09 | End: 2022-06-11 | Stop reason: HOSPADM

## 2022-06-09 RX ORDER — ELECTROLYTES/DEXTROSE
SOLUTION, ORAL ORAL 2 TIMES DAILY
Status: DISCONTINUED | OUTPATIENT
Start: 2022-06-09 | End: 2022-06-11 | Stop reason: HOSPADM

## 2022-06-09 RX ORDER — FOLIC ACID 1 MG/1
1 TABLET ORAL DAILY
Status: DISCONTINUED | OUTPATIENT
Start: 2022-06-09 | End: 2022-06-11 | Stop reason: HOSPADM

## 2022-06-09 RX ADMIN — THERA TABS 1 TABLET: TAB at 01:06

## 2022-06-09 RX ADMIN — OXYCODONE HYDROCHLORIDE 10 MG: 5 SOLUTION ORAL at 10:06

## 2022-06-09 RX ADMIN — OXYCODONE HYDROCHLORIDE 10 MG: 5 SOLUTION ORAL at 02:06

## 2022-06-09 RX ADMIN — CEPHALEXIN 500 MG: 500 CAPSULE ORAL at 11:06

## 2022-06-09 RX ADMIN — ESCITALOPRAM OXALATE 20 MG: 10 TABLET ORAL at 09:06

## 2022-06-09 RX ADMIN — METHOCARBAMOL 500 MG: 500 TABLET ORAL at 04:06

## 2022-06-09 RX ADMIN — METHOCARBAMOL 500 MG: 500 TABLET ORAL at 01:06

## 2022-06-09 RX ADMIN — LEVOTHYROXINE SODIUM 25 MCG: 0.03 TABLET ORAL at 09:06

## 2022-06-09 RX ADMIN — METHOCARBAMOL 500 MG: 500 TABLET ORAL at 09:06

## 2022-06-09 RX ADMIN — CEFTRIAXONE 1 G: 1 INJECTION, SOLUTION INTRAVENOUS at 06:06

## 2022-06-09 RX ADMIN — FUROSEMIDE 20 MG: 20 TABLET ORAL at 09:06

## 2022-06-09 RX ADMIN — POTASSIUM PHOSPHATE, MONOBASIC 500 MG: 500 TABLET, SOLUBLE ORAL at 09:06

## 2022-06-09 RX ADMIN — GABAPENTIN 400 MG: 250 SOLUTION ORAL at 01:06

## 2022-06-09 RX ADMIN — HYDROCORTISONE: 0.01 CREAM TOPICAL at 11:06

## 2022-06-09 RX ADMIN — FOLIC ACID 1 MG: 5 INJECTION, SOLUTION INTRAMUSCULAR; INTRAVENOUS; SUBCUTANEOUS at 10:06

## 2022-06-09 RX ADMIN — PANTOPRAZOLE SODIUM 40 MG: 40 INJECTION, POWDER, FOR SOLUTION INTRAVENOUS at 09:06

## 2022-06-09 RX ADMIN — MORPHINE SULFATE 4 MG: 4 INJECTION INTRAVENOUS at 09:06

## 2022-06-09 RX ADMIN — HYDROCORTISONE: 0.01 CREAM TOPICAL at 09:06

## 2022-06-09 RX ADMIN — OXYCODONE HYDROCHLORIDE 10 MG: 5 SOLUTION ORAL at 06:06

## 2022-06-09 RX ADMIN — OXYCODONE HYDROCHLORIDE 10 MG: 5 SOLUTION ORAL at 04:06

## 2022-06-09 RX ADMIN — CEPHALEXIN 500 MG: 500 CAPSULE ORAL at 05:06

## 2022-06-09 RX ADMIN — THIAMINE HYDROCHLORIDE 500 MG: 100 INJECTION, SOLUTION INTRAMUSCULAR; INTRAVENOUS at 09:06

## 2022-06-09 RX ADMIN — MORPHINE SULFATE 4 MG: 4 INJECTION INTRAVENOUS at 01:06

## 2022-06-09 RX ADMIN — ENOXAPARIN SODIUM 40 MG: 100 INJECTION SUBCUTANEOUS at 04:06

## 2022-06-09 RX ADMIN — MORPHINE SULFATE 4 MG: 4 INJECTION INTRAVENOUS at 07:06

## 2022-06-09 RX ADMIN — MORPHINE SULFATE 4 MG: 4 INJECTION INTRAVENOUS at 04:06

## 2022-06-09 RX ADMIN — CEPHALEXIN 500 MG: 500 CAPSULE ORAL at 01:06

## 2022-06-09 RX ADMIN — MORPHINE SULFATE 4 MG: 4 INJECTION INTRAVENOUS at 12:06

## 2022-06-09 RX ADMIN — GABAPENTIN 400 MG: 250 SOLUTION ORAL at 09:06

## 2022-06-09 RX ADMIN — GABAPENTIN 400 MG: 250 SOLUTION ORAL at 06:06

## 2022-06-09 RX ADMIN — SPIRONOLACTONE 50 MG: 25 TABLET, FILM COATED ORAL at 09:06

## 2022-06-09 NOTE — PROGRESS NOTES
[facility] Hospital Medicine  Progress Note    Patient Name: Estella Arevalo  MRN: [id number]  Patient Class: IP- Inpatient   Admission Date: 6/5/2022  Length of Stay: 4 days  Attending Physician: Jaya Phillips MD  Primary Care Provider: Angela Snowden MD        Subjective:     Principal Problem:Incarcerated hernia        HPI:  Mrs. Arevalo is a 68 yo F with hx of HTN, depression and previous alcohol abuse who presented to the ED with abdominal pain. She was found to have an incarcerated hernia and General Surgery was consulted. She underwent reduction and surgery on 6/6/22 with repair and thankfully did not need bowel resection. Of note, ascites was found once patient's abdomen was open concerning for decompensated cirrhosis. Hospital medicine consulted for help with comanagement of her comorbidities.     Currently, patient tells me she has a history of Hepatitis C that was treated with Harvoni years ago and that is where her cirrhosis is from. She does drink alcohol and now states she is quitting now that ascites was found. She has a 5 year old grandchild she is raising and wants to live for her family. She used to take Buprenorphine-naltrexone but now tells me that she needs actual pain medication because she has chronic back pain. She has missed her last appointment so needs follow up and a prescription once she leaves the hospital. She is currently asking if we can increase the pain medication dose now that she's post-surgery and her pain is worse.      Overview/Hospital Course:  No notes on file    Interval History: having multiple bowel movements/flatus. NGT out.     Review of Systems   Constitutional:  Negative for chills and fever.   HENT:  Negative for congestion, ear discharge and postnasal drip.    Respiratory:  Negative for cough and shortness of breath.    Cardiovascular:  Negative for chest pain and leg swelling.   Gastrointestinal:  Positive for abdominal pain. Negative for diarrhea,  nausea and vomiting.   Genitourinary:  Negative for dysuria and hematuria.   Musculoskeletal:  Positive for arthralgias and back pain.   Skin:  Negative for rash and wound.   Neurological:  Negative for dizziness, seizures and headaches.   Psychiatric/Behavioral:  Negative for agitation and confusion. The patient is not nervous/anxious.    Objective:     Vital Signs (Most Recent):  Temp: 98.4 °F (36.9 °C) (06/09/22 1141)  Pulse: 79 (06/09/22 1141)  Resp: 20 (06/09/22 1303)  BP: 104/61 (06/09/22 1141)  SpO2: 96 % (06/09/22 1141)   Vital Signs (24h Range):  Temp:  [97.7 °F (36.5 °C)-98.5 °F (36.9 °C)] 98.4 °F (36.9 °C)  Pulse:  [79-99] 79  Resp:  [16-20] 20  SpO2:  [87 %-97 %] 96 %  BP: (104-149)/(61-93) 104/61     Weight: 82.7 kg (182 lb 5.8 oz)  Body mass index is 29.43 kg/m².    Intake/Output Summary (Last 24 hours) at 6/9/2022 1545  Last data filed at 6/9/2022 0815  Gross per 24 hour   Intake 974.84 ml   Output 1250 ml   Net -275.16 ml        Physical Exam  Vitals and nursing note reviewed.   Constitutional:       General: She is not in acute distress.     Appearance: She is obese. She is not ill-appearing.   HENT:      Head: Normocephalic.   Eyes:      General: No scleral icterus.     Conjunctiva/sclera: Conjunctivae normal.   Cardiovascular:      Rate and Rhythm: Normal rate and regular rhythm.      Pulses: Normal pulses.      Heart sounds: Normal heart sounds.   Pulmonary:      Effort: Pulmonary effort is normal. No respiratory distress.      Breath sounds: Normal breath sounds. No wheezing.   Abdominal:      Comments: Dressings intact, soft but tender  + bowel sounds   Musculoskeletal:         General: No swelling. Normal range of motion.      Cervical back: Neck supple.   Skin:     General: Skin is warm and dry.   Neurological:      General: No focal deficit present.      Mental Status: She is alert and oriented to person, place, and time.   Psychiatric:         Mood and Affect: Mood normal.         Thought  "Content: Thought content normal.       Significant Labs: All pertinent labs within the past 24 hours have been reviewed.    Significant Imaging: I have reviewed all pertinent imaging results/findings within the past 24 hours.      Assessment/Plan:      * Incarcerated hernia  - s/p hernia repair by General Surgery on 6/6/22  - per primary  - no BM/flatus yet      Alcohol abuse  - she tells me her last drink was 8 days ago  - would monitor for any signs/symptoms of withdrawal      History of hepatitis C  - tx with Harvoni      UTI (urinary tract infection)  - on Ceftriaxone, would switch to PO keflex once able to tolerate       Cirrhosis of liver  - has hx of Hep C tx with Harvoni  - also with history of alcohol abuse  - noted ascites on surgery today  - Aldactone 50 mg daily/Lasix 20 mg daily  - titrate as tolerated - would leave at current regimen and have patient follow up with GI as outpatient for titration      Essential hypertension  - patient reports she has not been on blood pressure medications for some time now since she has lost weight  - would control pain for now         Acquired hypothyroidism  - resume synthroid      Depression  Resume home Lexapro      Polysubstance dependence including opioid type drug, continuous use  - Patient has history of opioid abuse and alcohol abuse  - she tells me that she was on Subutex prescribed by her Psychiatrist but appears she has not had this prescription filled since 9/2021  - she says she has bad back pain now and needs "real pain medicine"  - last fill was February of this year for 30 tablets from Orthopedic Surgery  - would treat acute pain at this time but attempt to wean down post-discharge        VTE Risk Mitigation (From admission, onward)         Ordered     enoxaparin injection 40 mg  Daily         06/05/22 1022     Place sequential compression device  Until discontinued         06/05/22 1022     IP VTE LOW RISK PATIENT  Once         06/05/22 1022          "       Discharge Planning   SARAH:      Code Status: Full Code   Is the patient medically ready for discharge?:     Reason for patient still in hospital (select all that apply): Treatment  Discharge Plan A: Home with family          I will sign off, please do not hesitate to contact me for any questions.        Aden Emerson MD  Department of Hospital Medicine   [facility]

## 2022-06-09 NOTE — PROGRESS NOTES
Surgery Progress Note    S:  NAEO. Passing flatus. NG tube removed last night and pt denies n/v. Tolerated clears.  Ambulating to bedside commode.  Voiding without difficulty. Pain well controlled.    O:  Temp:  [97.7 °F (36.5 °C)-98.5 °F (36.9 °C)] 98.4 °F (36.9 °C)  Pulse:  [79-99] 79  Resp:  [16-20] 20  SpO2:  [87 %-97 %] 96 %  BP: (104-149)/(61-93) 104/61    I/O last 3 completed shifts:  In: 614.8 [P.O.:240; IV Piggyback:374.8]  Out: 2400 [Urine:2400]  I/O this shift:  In: 360 [P.O.:360]  Out: -     Gen: awake, resting comfortably   HEENT: EOMI, NG tube in place with bilious output  CV: regular rate, warm and well perfused  Pulm: symmetric expansion, no distress  Abdomen: soft,nondistended, appropriately ttp ,incision c/d/i with staples. no evidence of hernia recurrence  Extremities: moves all, no cyanosis  Skin: dry intact  Neuro: alert, no focal neuro deficits    WBC/Hgb/Hct/Plts:  4.83/13.5/41.6/245 (06/09 0417)  BUN/Cr/glu/ALT/AST/amyl/lip:  6/0.7/--/19/38/--/-- (06/09 0417)    Imaging Results          X-Ray Abdomen AP 1 View (Final result)  Result time 06/05/22 11:08:49    Final result by Janet Ren MD (06/05/22 11:08:49)                 Impression:      Please see above.      Electronically signed by: Janet Ren  Date:    06/05/2022  Time:    11:08             Narrative:    EXAMINATION:  XR ABDOMEN AP 1 VIEW    CLINICAL HISTORY:  confirm ng tube;    TECHNIQUE:  AP View(s) of the abdomen was performed.    COMPARISON:  None    FINDINGS:  Supine radiograph of the upper abdomen shows the tip of the enteric tube projecting over the stomach.    Hiatal hernia.  Bowel loops in the upper abdomen are dilated in this patient with small bowel obstruction.                               X-Ray Chest AP Portable (Final result)  Result time 06/05/22 08:07:02    Final result by Dhiraj Carver MD (06/05/22 08:07:02)                 Impression:      No convincing evidence of acute cardiopulmonary  disease.      Electronically signed by: Dhiraj Carver  Date:    06/05/2022  Time:    08:07             Narrative:    EXAMINATION:  XR CHEST AP PORTABLE    CLINICAL HISTORY:  Encounter for preprocedural cardiovascular examination    TECHNIQUE:  Single frontal view of the chest was performed.    COMPARISON:  Chest radiograph 09/22/2021    FINDINGS:  Monitoring leads overlie the chest.  Grossly unchanged cardiomediastinal contours.  Background coarsened interstitial increased attenuation relatively similar prior examination.  Platelike opacity left lung base favored to represent atelectasis.    No definite pneumothorax or large volume pleural effusion.  No acute findings identified in the visualized abdomen.  Gas-filled loops of colon subjacent to the hemidiaphragms again noted, similar configuration to prior radiographs.  Osseous soft tissue structures appear without definite acute abnormality.  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                                CT Abdomen Pelvis  Without Contrast (Final result)  Result time 06/05/22 07:32:49    Final result by Dhiraj Carver MD (06/05/22 07:32:49)                 Impression:      1. Findings concerning for acute mechanical small bowel obstruction, which appears related to ventral hernia sac containing loops of small bowel.  Inflammation is suggested about the enclosed small-bowel loops within the hernia sac.  No definite evidence of pneumatosis or pneumoperitoneum at the present time.  2. Additional details of chronic and incidental findings, as provided in the body of report.  This report was flagged in Epic as abnormal.      Electronically signed by: Dhiraj Carver  Date:    06/05/2022  Time:    07:32             Narrative:    EXAMINATION:  CT ABDOMEN PELVIS WITHOUT CONTRAST    CLINICAL HISTORY:  Bowel obstruction suspected;    TECHNIQUE:  Low dose axial images, sagittal and coronal reformations were  obtained from the lung bases to the pubic symphysis.    COMPARISON:  CT of the abdomen and pelvis performed 03/30/2022    FINDINGS:  This examination is limited due to lack of intravenous contrast.    Lower chest: Emphysematous changes are suggested.  Atelectasis and/or scar.  2 mm nodule right middle lobe (series 2, image 4).  7 mm nodule right middle lobe (series 2, image 17).  Both of these nodules appear essentially unchanged dating back to at least 12/04/2019, and as such are felt benign.    Liver: Contour nodularity of the liver is suggested, which may relate to cirrhosis.  No definite new focal hepatic lesion identified by unenhanced technique.    Gallbladder and bile ducts: Stable nonspecific diffuse prominence of the extrahepatic common bile duct measuring up to 8 mm.    Pancreas: Normal contour.    Spleen: Normal contour.    Adrenals: Normal contour.    Kidneys: Nonobstructing 3 mm calculus midpole left kidney.    Lymph nodes: No abdominal or pelvic lymphadenopathy.    Bowel and mesentery: Dilated fluid and contrast filled small bowel loops measure up to least 4.6 cm, as measured in the right upper quadrant of the abdomen.  There is a ventral hernia containing fat, vessels, fluid, and loops of small bowel, with adjoining inflammatory change.  This appearance is concerning for the cause of the proximal obstruction, with decompression of the more distal bowel loops.  Bowel though the hernia sac measures approximately 24 mm in width and 16 mm in craniocaudal extent.  Orally administered contrast material collects within a moderate hiatal hernia, traverses stomach, and proximal small bowel loops.  No definite contrast material within the small bowel loops enclosed within the ventral hernia sac.    Elsewhere, there is colonic diverticulosis without definite evidence acute diverticulitis at the present time.  Normal caliber of the appendix.  Postsurgical changes in the gastroesophageal junction.    Abdominal  aorta: Nonaneurysmal.  Atherosclerotic calcifications.    Inferior vena cava: Unremarkable.    Free fluid or free air: None.    Pelvis: Stable suggested focus of fat necrosis in the anterior pelvis.    Urinary bladder: Unremarkable.    Body wall: As above.  Possible junction granuloma within the gluteal soft tissues.    Bones: No definite acute abnormality appreciated.  Extensive degenerative findings again noted involving the spine.  As before, there is grade 1 anterolisthesis of L4 on L5.  S-shaped scoliotic curvature of the thoracolumbar spine.  Retrolisthesis of L1 on L2 and of T12 on L1.  Chronic appearing degenerative endplate change most advanced spanning the T11-12 T12 levels, relatively similar to prior.                                Assessment/ Plan: 67 year old female with hx of polysubstance abuse and cirrhosis, hx of lap hiatal hernia repair, ventral hernia repair with mesh and primary repair of umbilical hernia who presents with SBO with transition point at umbilical hernia. Umbilical hernia was reduced at bedside and NG tube was placed. Patient is now s/p open umbilical hernia repair with mesh on 6/6/22 without complication. She was noted to have ascites intra operatively.   NG removed 6/8 once pt started passing flatus  Tolerating clears    Advance to soft diet  Hospital Medicine following, appreciate recommendations/ management  Keflex for UTI  PT consulted  Encourage IS  Encourage ambulation    evi Bledsoe MD  Surgery PGY5

## 2022-06-09 NOTE — ASSESSMENT & PLAN NOTE
- has hx of Hep C tx with Harvoni  - also with history of alcohol abuse  - noted ascites on surgery today  - Aldactone 50 mg daily/Lasix 20 mg daily  - titrate as tolerated - would leave at current regimen and have patient follow up with GI as outpatient for titration

## 2022-06-09 NOTE — SUBJECTIVE & OBJECTIVE
Interval History: having multiple bowel movements/flatus. NGT out.     Review of Systems   Constitutional:  Negative for chills and fever.   HENT:  Negative for congestion, ear discharge and postnasal drip.    Respiratory:  Negative for cough and shortness of breath.    Cardiovascular:  Negative for chest pain and leg swelling.   Gastrointestinal:  Positive for abdominal pain. Negative for diarrhea, nausea and vomiting.   Genitourinary:  Negative for dysuria and hematuria.   Musculoskeletal:  Positive for arthralgias and back pain.   Skin:  Negative for rash and wound.   Neurological:  Negative for dizziness, seizures and headaches.   Psychiatric/Behavioral:  Negative for agitation and confusion. The patient is not nervous/anxious.    Objective:     Vital Signs (Most Recent):  Temp: 98.4 °F (36.9 °C) (06/09/22 1141)  Pulse: 79 (06/09/22 1141)  Resp: 20 (06/09/22 1303)  BP: 104/61 (06/09/22 1141)  SpO2: 96 % (06/09/22 1141)   Vital Signs (24h Range):  Temp:  [97.7 °F (36.5 °C)-98.5 °F (36.9 °C)] 98.4 °F (36.9 °C)  Pulse:  [79-99] 79  Resp:  [16-20] 20  SpO2:  [87 %-97 %] 96 %  BP: (104-149)/(61-93) 104/61     Weight: 82.7 kg (182 lb 5.8 oz)  Body mass index is 29.43 kg/m².    Intake/Output Summary (Last 24 hours) at 6/9/2022 1545  Last data filed at 6/9/2022 0815  Gross per 24 hour   Intake 974.84 ml   Output 1250 ml   Net -275.16 ml        Physical Exam  Vitals and nursing note reviewed.   Constitutional:       General: She is not in acute distress.     Appearance: She is obese. She is not ill-appearing.   HENT:      Head: Normocephalic.   Eyes:      General: No scleral icterus.     Conjunctiva/sclera: Conjunctivae normal.   Cardiovascular:      Rate and Rhythm: Normal rate and regular rhythm.      Pulses: Normal pulses.      Heart sounds: Normal heart sounds.   Pulmonary:      Effort: Pulmonary effort is normal. No respiratory distress.      Breath sounds: Normal breath sounds. No wheezing.   Abdominal:       Comments: Dressings intact, soft but tender  + bowel sounds   Musculoskeletal:         General: No swelling. Normal range of motion.      Cervical back: Neck supple.   Skin:     General: Skin is warm and dry.   Neurological:      General: No focal deficit present.      Mental Status: She is alert and oriented to person, place, and time.   Psychiatric:         Mood and Affect: Mood normal.         Thought Content: Thought content normal.       Significant Labs: All pertinent labs within the past 24 hours have been reviewed.    Significant Imaging: I have reviewed all pertinent imaging results/findings within the past 24 hours.

## 2022-06-09 NOTE — PLAN OF CARE
Problem: Physical Therapy  Goal: Physical Therapy Goal  Description: Goals to be met by:      Patient will increase functional independence with mobility by performin. Sit to stand transfer with Modified [place]  2. Bed to chair transfer with Modified [place]   3. Gait  x 300 feet with Modified [place] using no AD.   4. Lower extremity exercise program x20 reps per handout, with supervision, standing    Outcome: Ongoing, Progressing
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
## 2022-06-09 NOTE — NURSING
Patient reports that she is passing gas. Notified doctor and gave orders to remove ng tube and placed patient on a clear liquid diet.

## 2022-06-10 PROCEDURE — 25000242 PHARM REV CODE 250 ALT 637 W/ HCPCS: Performed by: STUDENT IN AN ORGANIZED HEALTH CARE EDUCATION/TRAINING PROGRAM

## 2022-06-10 PROCEDURE — 97116 GAIT TRAINING THERAPY: CPT | Mod: CQ

## 2022-06-10 PROCEDURE — 63600175 PHARM REV CODE 636 W HCPCS: Performed by: STUDENT IN AN ORGANIZED HEALTH CARE EDUCATION/TRAINING PROGRAM

## 2022-06-10 PROCEDURE — 99900035 HC TECH TIME PER 15 MIN (STAT)

## 2022-06-10 PROCEDURE — 94640 AIRWAY INHALATION TREATMENT: CPT

## 2022-06-10 PROCEDURE — 27000221 HC OXYGEN, UP TO 24 HOURS

## 2022-06-10 PROCEDURE — 11000001 HC ACUTE MED/SURG PRIVATE ROOM

## 2022-06-10 PROCEDURE — C9113 INJ PANTOPRAZOLE SODIUM, VIA: HCPCS | Performed by: STUDENT IN AN ORGANIZED HEALTH CARE EDUCATION/TRAINING PROGRAM

## 2022-06-10 PROCEDURE — 94760 N-INVAS EAR/PLS OXIMETRY 1: CPT

## 2022-06-10 PROCEDURE — 25000003 PHARM REV CODE 250: Performed by: STUDENT IN AN ORGANIZED HEALTH CARE EDUCATION/TRAINING PROGRAM

## 2022-06-10 RX ORDER — GABAPENTIN 400 MG/1
400 CAPSULE ORAL 3 TIMES DAILY
Status: DISCONTINUED | OUTPATIENT
Start: 2022-06-10 | End: 2022-06-11 | Stop reason: HOSPADM

## 2022-06-10 RX ORDER — OXYCODONE HYDROCHLORIDE 15 MG/1
15 TABLET ORAL EVERY 4 HOURS PRN
Status: DISCONTINUED | OUTPATIENT
Start: 2022-06-10 | End: 2022-06-11 | Stop reason: HOSPADM

## 2022-06-10 RX ORDER — OXYCODONE HYDROCHLORIDE 5 MG/1
5 TABLET ORAL EVERY 4 HOURS PRN
Status: DISCONTINUED | OUTPATIENT
Start: 2022-06-10 | End: 2022-06-11 | Stop reason: HOSPADM

## 2022-06-10 RX ORDER — BISACODYL 10 MG
10 SUPPOSITORY, RECTAL RECTAL ONCE
Status: COMPLETED | OUTPATIENT
Start: 2022-06-10 | End: 2022-06-10

## 2022-06-10 RX ORDER — OXYCODONE HYDROCHLORIDE 5 MG/1
10 TABLET ORAL EVERY 4 HOURS PRN
Status: DISCONTINUED | OUTPATIENT
Start: 2022-06-10 | End: 2022-06-11 | Stop reason: HOSPADM

## 2022-06-10 RX ADMIN — METHOCARBAMOL 500 MG: 500 TABLET ORAL at 12:06

## 2022-06-10 RX ADMIN — HYDROCORTISONE: 0.01 CREAM TOPICAL at 09:06

## 2022-06-10 RX ADMIN — POTASSIUM PHOSPHATE, MONOBASIC 500 MG: 500 TABLET, SOLUBLE ORAL at 08:06

## 2022-06-10 RX ADMIN — METHOCARBAMOL 500 MG: 500 TABLET ORAL at 04:06

## 2022-06-10 RX ADMIN — FUROSEMIDE 20 MG: 20 TABLET ORAL at 08:06

## 2022-06-10 RX ADMIN — BISACODYL 10 MG: 10 SUPPOSITORY RECTAL at 10:06

## 2022-06-10 RX ADMIN — METHOCARBAMOL 500 MG: 500 TABLET ORAL at 08:06

## 2022-06-10 RX ADMIN — THERA TABS 1 TABLET: TAB at 08:06

## 2022-06-10 RX ADMIN — CEPHALEXIN 500 MG: 500 CAPSULE ORAL at 12:06

## 2022-06-10 RX ADMIN — MORPHINE SULFATE 4 MG: 4 INJECTION INTRAVENOUS at 12:06

## 2022-06-10 RX ADMIN — OXYCODONE HYDROCHLORIDE 15 MG: 15 TABLET ORAL at 08:06

## 2022-06-10 RX ADMIN — ESCITALOPRAM OXALATE 20 MG: 10 TABLET ORAL at 08:06

## 2022-06-10 RX ADMIN — GABAPENTIN 400 MG: 250 SOLUTION ORAL at 05:06

## 2022-06-10 RX ADMIN — MORPHINE SULFATE 4 MG: 4 INJECTION INTRAVENOUS at 10:06

## 2022-06-10 RX ADMIN — OXYCODONE HYDROCHLORIDE 10 MG: 5 SOLUTION ORAL at 08:06

## 2022-06-10 RX ADMIN — THIAMINE HCL TAB 100 MG 100 MG: 100 TAB at 08:06

## 2022-06-10 RX ADMIN — PANTOPRAZOLE SODIUM 40 MG: 40 INJECTION, POWDER, FOR SOLUTION INTRAVENOUS at 08:06

## 2022-06-10 RX ADMIN — MORPHINE SULFATE 4 MG: 4 INJECTION INTRAVENOUS at 05:06

## 2022-06-10 RX ADMIN — MORPHINE SULFATE 4 MG: 4 INJECTION INTRAVENOUS at 01:06

## 2022-06-10 RX ADMIN — OXYCODONE HYDROCHLORIDE 15 MG: 15 TABLET ORAL at 04:06

## 2022-06-10 RX ADMIN — CEPHALEXIN 500 MG: 500 CAPSULE ORAL at 06:06

## 2022-06-10 RX ADMIN — HYDROCORTISONE: 0.01 CREAM TOPICAL at 08:06

## 2022-06-10 RX ADMIN — FOLIC ACID 1 MG: 1 TABLET ORAL at 08:06

## 2022-06-10 RX ADMIN — LEVALBUTEROL HYDROCHLORIDE 0.63 MG: 0.63 SOLUTION RESPIRATORY (INHALATION) at 12:06

## 2022-06-10 RX ADMIN — GABAPENTIN 400 MG: 400 CAPSULE ORAL at 08:06

## 2022-06-10 RX ADMIN — OXYCODONE HYDROCHLORIDE 10 MG: 5 SOLUTION ORAL at 12:06

## 2022-06-10 RX ADMIN — LEVOTHYROXINE SODIUM 25 MCG: 0.03 TABLET ORAL at 08:06

## 2022-06-10 RX ADMIN — OXYCODONE HYDROCHLORIDE 10 MG: 5 SOLUTION ORAL at 03:06

## 2022-06-10 RX ADMIN — ENOXAPARIN SODIUM 40 MG: 100 INJECTION SUBCUTANEOUS at 04:06

## 2022-06-10 RX ADMIN — SPIRONOLACTONE 50 MG: 25 TABLET, FILM COATED ORAL at 08:06

## 2022-06-10 RX ADMIN — CEPHALEXIN 500 MG: 500 CAPSULE ORAL at 05:06

## 2022-06-10 RX ADMIN — GABAPENTIN 400 MG: 400 CAPSULE ORAL at 03:06

## 2022-06-10 NOTE — PLAN OF CARE
[facility]  Discharge Reassessment  Lives with Hammad Arevalo (Spouse)   [phone] (Home Phone)  OR on 6/7 for incarcerated hernia.  NG tube placed, removed on the 6/8 and presently tolerating her diet. BM on 6/9/0.    Plan A: Home with spouse  Plan B:  Home with home health  Primary Care Provider: Angela Snowden MD    Expected Discharge Date:     Reassessment (most recent)     Discharge Reassessment - 06/09/22 0822        Discharge Reassessment    Assessment Type Discharge Planning Reassessment     Discharge Plan discussed with: Patient;Spouse/sig other     Name(s) and Number(s) Hammad Arevalo (Spouse)   [phone] (Home Phone     Communicated SARAH with patient/caregiver Date not available/Unable to determine     Discharge Plan A Home with family     Discharge Plan B Home with family;Home Health     DME Needed Upon Discharge  other (see comments)   tbd    Discharge Barriers Identified None     Why the patient remains in the hospital Requires continued medical care        Post-Acute Status    Coverage PHN     Discharge Delays None known at this time

## 2022-06-10 NOTE — PLAN OF CARE
[facility]    HOME HEALTH ORDERS  FACE TO FACE ENCOUNTER    Patient Name: Estella Arevalo  YOB: 1955    PCP: Angela Snowden MD   PCP Address: [address] / [place] [address]  PCP Phone Number: [phone]  PCP Fax: [phone]       Encounter Date: 06/10/2022    Admit to Home Health    Diagnoses:  Active Hospital Problems    Diagnosis  POA    *Incarcerated hernia [K46.0]  Yes    Alcohol abuse [F10.10]  Yes     Chronic    UTI (urinary tract infection) [N39.0]  Yes    History of hepatitis C [Z86.19]  Yes    Essential hypertension [I10]  Yes     Chronic    Cirrhosis of liver [K74.60]  Yes     Chronic    Acquired hypothyroidism [E03.9]  Yes     Chronic    Depression [F32.A]  Yes    Polysubstance dependence including opioid type drug, continuous use [F11.20, F19.20]  Yes     Chronic      Resolved Hospital Problems   No resolved problems to display.       Future Appointments   Date Time Provider Department Center   7/18/2022  2:20 PM Angela Snowden MD [facility]           I have seen and examined this patient face to face today. My clinical findings that support the need for the home health skilled services and home bound status are the following:  Weakness/numbness causing balance and gait disturbance due to Surgery making it taxing to leave home.    Allergies:Review of patient's allergies indicates:  No Known Allergies    Diet: regular diet    Activities: as tolerated, no heavy lifting more than 10 lbs for 6 weeks    Nursing:   SN to complete comprehensive assessment including routine vital signs. Instruct on disease process and s/s of complications to report to MD. Review/verify medication list sent home with the patient at time of discharge  and instruct patient/caregiver as needed. Frequency may be adjusted depending on start of care date.    Notify MD if SBP > 160 or < 90; DBP > 90 or < 50; HR > 120 or < 50; Temp > 101;        CONSULTS:    Physical  Therapy to evaluate and treat. Evaluate for home safety and equipment needs; Establish/upgrade home exercise program. Perform / instruct on therapeutic exercises, gait training, transfer training, and Range of Motion.    MISCELLANEOUS CARE:  Home health PT    WOUND CARE ORDERS  no      Medications: Review discharge medications with patient and family and provide education.      Current Discharge Medication List      CONTINUE these medications which have NOT CHANGED    Details   b complex vitamins tablet Take 1 tablet by mouth once daily.      cycloSPORINE (RESTASIS) 0.05 % ophthalmic emulsion Place 1 drop into both eyes 2 (two) times daily.  Qty: 60 each, Refills: 3    Associated Diagnoses: Dry eye syndrome, unspecified laterality; Seasonal allergies      EScitalopram oxalate (LEXAPRO) 20 MG tablet Take 1 tablet (20 mg total) by mouth once daily.  Qty: 30 tablet, Refills: 5    Associated Diagnoses: Anxiety; Bipolar 1 disorder      folic acid/multivit-min/lutein (CENTRUM SILVER ORAL) Take 1 tablet by mouth.      levocetirizine (XYZAL) 5 MG tablet Take 5 mg by mouth nightly.      levothyroxine (SYNTHROID) 25 MCG tablet Take 1 tablet (25 mcg total) by mouth once daily.  Qty: 90 tablet, Refills: 3    Associated Diagnoses: Hypothyroidism due to acquired atrophy of thyroid      meloxicam (MOBIC) 15 MG tablet Take 1 tablet (15 mg total) by mouth once daily.  Qty: 60 tablet, Refills: 0    Associated Diagnoses: Chronic foot pain, right      pantoprazole (PROTONIX) 40 MG tablet Take 1 tablet (40 mg total) by mouth 2 (two) times daily.  Qty: 180 tablet, Refills: 3    Associated Diagnoses: Gastroesophageal reflux disease, unspecified whether esophagitis present      traZODone (DESYREL) 50 MG tablet Take 1 tablet (50 mg total) by mouth every evening.  Qty: 90 tablet, Refills: 3    Associated Diagnoses: Insomnia, unspecified type      ciclopirox (PENLAC) 8 % Soln Apply topically nightly.  Qty: 6.6 mL, Refills: 3      cloNIDine  (CATAPRES) 0.1 MG tablet Take 1 tablet (0.1 mg total) by mouth once. for 1 dose  Qty: 30 tablet, Refills: 2    Comments: .  Associated Diagnoses: Essential hypertension      NARCAN 4 mg/actuation Spry 1 spray (4 mg total) by Nasal route as needed (in the event of overdose).  Qty: 1 each, Refills: 30    Associated Diagnoses: Narcotic dependency, continuous      nitroGLYCERIN (NITROSTAT) 0.3 MG SL tablet DISSOLVE 1 TABLET UNDER THE TONGUE EVERY 5 MINUTES AS NEEDED FOR CHEST PAIN  Qty: 100 tablet, Refills: 0    Associated Diagnoses: Coronary artery disease, angina presence unspecified, unspecified vessel or lesion type, unspecified whether native or transplanted heart      PROCTOZONE-HC 2.5 % rectal cream STACI RECTALLY BID  Qty: 60 g, Refills: 2    Associated Diagnoses: Hemorrhoids, unspecified hemorrhoid type      promethazine (PHENERGAN) 25 MG suppository Place 1 suppository (25 mg total) rectally every 6 (six) hours as needed for Nausea.  Qty: 10 suppository, Refills: 0    Associated Diagnoses: Nausea      terbinafine HCL (LAMISIL AT) 1 % cream Apply topically nightly.  Qty: 28.4 g, Refills: 2    Associated Diagnoses: Onychomycosis; Tinea pedis, right             I certify that this patient is confined to her home and needs physical therapy.

## 2022-06-10 NOTE — PLAN OF CARE
Problem: Physical Therapy  Goal: Physical Therapy Goal  Description: Goals to be met by:      Patient will increase functional independence with mobility by performin. Sit to stand transfer with Modified [place]  2. Bed to chair transfer with Modified [place]   3. Gait  x 300 feet with Modified [place] using no AD.   4. Lower extremity exercise program x20 reps per handout, with supervision, standing    Outcome: Ongoing, Progressing   Sup to sit with S, sit to stand with rw with S, gt training with rw with S with decreased cadencence, decreased step length and height.

## 2022-06-10 NOTE — PT/OT/SLP PROGRESS
"Physical Therapy Treatment    Patient Name:  Estella Arevalo   MRN:  [id number]    Recommendations:     Discharge Recommendations:  home health PT   Discharge Equipment Recommendations: none   Barriers to discharge: None    Assessment:     Estella Arevalo is a 67 y.o. female admitted with a medical diagnosis of Incarcerated hernia.  She presents with the following impairments/functional limitations:  weakness, impaired endurance, impaired functional mobilty, gait instability, pain, impaired skin, edema.    Rehab Prognosis: Good; patient would benefit from acute skilled PT services to address these deficits and reach maximum level of function.    Recent Surgery: Procedure(s) (LRB):  REPAIR, HERNIA, INCISIONAL, RECURRENT (N/A) 4 Days Post-Op    Plan:     During this hospitalization, patient to be seen 5 x/week to address the identified rehab impairments via gait training, therapeutic activities, therapeutic exercises and progress toward the following goals:    · Plan of Care Expires:  06/23/22    Subjective     Chief Complaint: pain at incision site  Patient/Family Comments/goals: "she should be bringing my pain medicine soon."  Pain/Comfort:  · Pain Rating 1: 8/10  · Location 1: abdomen (incision site)  · Pain Addressed 1: Pre-medicate for activity, Reposition, Distraction, Cessation of Activity, Nurse notified  · Pain Rating Post-Intervention 1: 8/10      Objective:     Communicated with nurse Haylee prior to session.  Patient found HOB elevated with peripheral IV, oxygen upon PT entry to room.     General Precautions: Standard, fall, other (see comments) (no heavy lifting)   Orthopedic Precautions:N/A   Braces:    Respiratory Status: Nasal cannula, flow 3 L/min     Functional Mobility:  · Bed Mobility:     · Rolling Right: supervision  · Scooting: supervision  · Supine to Sit: supervision  · Sit to Supine: supervision  · Transfers:     · Sit to Stand:  supervision with rolling walker  · Gait: 200 ft with rw with " S  · Balance: F+ dynamic standing      AM-PAC 6 CLICK MOBILITY  Turning over in bed (including adjusting bedclothes, sheets and blankets)?: 4  Sitting down on and standing up from a chair with arms (e.g., wheelchair, bedside commode, etc.): 4  Moving from lying on back to sitting on the side of the bed?: 4  Moving to and from a bed to a chair (including a wheelchair)?: 4  Need to walk in hospital room?: 4  Climbing 3-5 steps with a railing?: 3  Basic Mobility Total Score: 23       Patient left HOB elevated with all lines intact, call button in reach, bed alarm off and pct present..    GOALS:   Multidisciplinary Problems     Physical Therapy Goals        Problem: Physical Therapy    Goal Priority Disciplines Outcome Goal Variances Interventions   Physical Therapy Goal     PT, PT/OT Ongoing, Progressing     Description: Goals to be met by:      Patient will increase functional independence with mobility by performin. Sit to stand transfer with Modified [place]  2. Bed to chair transfer with Modified [place]   3. Gait  x 300 feet with Modified [place] using no AD.   4. Lower extremity exercise program x20 reps per handout, with supervision, standing                     Time Tracking:     PT Received On: 06/10/22  PT Start Time: 1150     PT Stop Time: 1205  PT Total Time (min): 15 min     Billable Minutes: Gait Training 15    Treatment Type: Treatment        PTA Visit Number: 1     06/10/2022

## 2022-06-11 VITALS
TEMPERATURE: 98 F | HEIGHT: 66 IN | SYSTOLIC BLOOD PRESSURE: 121 MMHG | DIASTOLIC BLOOD PRESSURE: 66 MMHG | RESPIRATION RATE: 18 BRPM | WEIGHT: 182.38 LBS | OXYGEN SATURATION: 99 % | BODY MASS INDEX: 29.31 KG/M2 | HEART RATE: 74 BPM

## 2022-06-11 PROCEDURE — 99900035 HC TECH TIME PER 15 MIN (STAT)

## 2022-06-11 PROCEDURE — 25000003 PHARM REV CODE 250: Performed by: STUDENT IN AN ORGANIZED HEALTH CARE EDUCATION/TRAINING PROGRAM

## 2022-06-11 PROCEDURE — 63600175 PHARM REV CODE 636 W HCPCS: Performed by: STUDENT IN AN ORGANIZED HEALTH CARE EDUCATION/TRAINING PROGRAM

## 2022-06-11 PROCEDURE — C9113 INJ PANTOPRAZOLE SODIUM, VIA: HCPCS | Performed by: STUDENT IN AN ORGANIZED HEALTH CARE EDUCATION/TRAINING PROGRAM

## 2022-06-11 RX ORDER — OXYCODONE HYDROCHLORIDE 15 MG/1
15 TABLET ORAL EVERY 4 HOURS PRN
Qty: 30 TABLET | Refills: 0 | Status: ON HOLD | OUTPATIENT
Start: 2022-06-11 | End: 2023-11-03 | Stop reason: HOSPADM

## 2022-06-11 RX ADMIN — CEPHALEXIN 500 MG: 500 CAPSULE ORAL at 01:06

## 2022-06-11 RX ADMIN — CEPHALEXIN 500 MG: 500 CAPSULE ORAL at 05:06

## 2022-06-11 RX ADMIN — POTASSIUM PHOSPHATE, MONOBASIC 500 MG: 500 TABLET, SOLUBLE ORAL at 08:06

## 2022-06-11 RX ADMIN — METHOCARBAMOL 500 MG: 500 TABLET ORAL at 08:06

## 2022-06-11 RX ADMIN — OXYCODONE HYDROCHLORIDE 15 MG: 15 TABLET ORAL at 05:06

## 2022-06-11 RX ADMIN — FUROSEMIDE 20 MG: 20 TABLET ORAL at 08:06

## 2022-06-11 RX ADMIN — METHOCARBAMOL 500 MG: 500 TABLET ORAL at 01:06

## 2022-06-11 RX ADMIN — OXYCODONE HYDROCHLORIDE 15 MG: 15 TABLET ORAL at 01:06

## 2022-06-11 RX ADMIN — FOLIC ACID 1 MG: 1 TABLET ORAL at 08:06

## 2022-06-11 RX ADMIN — SPIRONOLACTONE 50 MG: 25 TABLET, FILM COATED ORAL at 08:06

## 2022-06-11 RX ADMIN — LEVOTHYROXINE SODIUM 25 MCG: 0.03 TABLET ORAL at 08:06

## 2022-06-11 RX ADMIN — THERA TABS 1 TABLET: TAB at 08:06

## 2022-06-11 RX ADMIN — OXYCODONE HYDROCHLORIDE 15 MG: 15 TABLET ORAL at 09:06

## 2022-06-11 RX ADMIN — GABAPENTIN 400 MG: 400 CAPSULE ORAL at 08:06

## 2022-06-11 RX ADMIN — ESCITALOPRAM OXALATE 20 MG: 10 TABLET ORAL at 08:06

## 2022-06-11 RX ADMIN — POLYETHYLENE GLYCOL 3350 17 G: 17 POWDER, FOR SOLUTION ORAL at 08:06

## 2022-06-11 RX ADMIN — HYDROCORTISONE: 0.01 CREAM TOPICAL at 08:06

## 2022-06-11 RX ADMIN — THIAMINE HCL TAB 100 MG 100 MG: 100 TAB at 08:06

## 2022-06-11 NOTE — PLAN OF CARE
06/11/22 1449   Medicare Message   Important Message from Medicare regarding Discharge Appeal Rights Given to patient/caregiver;Explained to patient/caregiver   Date IMM was signed 06/11/22   Time IMM was signed 1449     SW verbally given IMM via phone. Patient voiced understanding of IMM. SW mailed copy of IMM to patient.

## 2022-06-11 NOTE — PLAN OF CARE
SW faxed patient's HH order to [facility] for arrangement.   COLBY will follow up with [facility] for HH services.

## 2022-06-11 NOTE — NURSING
Transported downstairs via wheelchair with belongings &  at side no distress noted, safety maintained.

## 2022-06-11 NOTE — DISCHARGE SUMMARY
"[facility] S[facility]  Discharge Summary     Patient ID:  Estella Arevalo  [id number]  67 y.o.  1955    Admit date: 6/5/2022    Discharge Date and Time:  06/11/2022 1:00 PM    Admitting Physician: Jaya Phillips MD     Discharge Provider: Rupesh Farfan    Reason for Admission: Incarcerated ventral hernia [K43.6]  Tachycardia [R00.0]  Preop cardiovascular exam [Z01.810]  Abdominal pain, unspecified abdominal location [R10.9]  Nausea and vomiting, intractability of vomiting not specified, unspecified vomiting type [R11.2]    Admission Condition: good    Procedures Performed: Procedure(s) (LRB):  REPAIR, HERNIA, INCISIONAL, RECURRENT (N/A)    Hospital Course: Presented with incarcerated hernia, had repair and did well postoperatively.  She had some issues with pain control, but this is a chronic issue for her.     Consults: Hospital Medicine      Final Diagnoses:    Principal Problem: Incarcerated hernia   Secondary Diagnoses:   Active Hospital Problems    Diagnosis  POA    *Incarcerated hernia [K46.0]  Yes    Alcohol abuse [F10.10]  Yes     Chronic    UTI (urinary tract infection) [N39.0]  Yes    History of hepatitis C [Z86.19]  Yes    Essential hypertension [I10]  Yes     Chronic    Cirrhosis of liver [K74.60]  Yes     Chronic    Acquired hypothyroidism [E03.9]  Yes     Chronic    Depression [F32.A]  Yes    Polysubstance dependence including opioid type drug, continuous use [F11.20, F19.20]  Yes     Chronic      Resolved Hospital Problems   No resolved problems to display.       Discharged Condition: good    Discharge Exam:    /66 (Patient Position: Lying)   Pulse 74   Temp 98.1 °F (36.7 °C) (Oral)   Resp 18   Ht 5' 6" (1.676 m)   Wt 82.7 kg (182 lb 5.8 oz)   SpO2 99%   BMI 29.43 kg/m²     General Appearance:    Alert, cooperative, no distress, appears stated age   Head:    Normocephalic, without obvious abnormality, atraumatic   Eyes:    PERRL, conjunctiva/corneas clear, EOM's intact, " fundi     benign, both eyes   Ears:    Normal TM's and external ear canals, both ears   Nose:   Nares normal, septum midline, mucosa normal, no drainage    or sinus tenderness   Throat:   Lips, mucosa, and tongue normal; teeth and gums normal   Neck:   Supple, symmetrical, trachea midline, no adenopathy;     thyroid:  no enlargement/tenderness/nodules; no carotid    bruit or JVD   Back:     Symmetric, no curvature, ROM normal, no CVA tenderness   Lungs:     Clear to auscultation bilaterally, respirations unlabored   Chest Wall:    No tenderness or deformity    Heart:    Regular rate and rhythm, S1 and S2 normal, no murmur, rub   or gallop   Breast Exam:    No tenderness, masses, or nipple abnormality   Abdomen:     Soft, non-tender, bowel sounds active all four quadrants,     no masses, no organomegaly   Genitalia:    Normal female without lesion, discharge or tenderness   Rectal:    Normal tone, no masses or tenderness;    guaiac negative stool   Extremities:   Extremities normal, atraumatic, no cyanosis or edema   Pulses:   2+ and symmetric all extremities   Skin:   Skin color, texture, turgor normal, no rashes or lesions   Lymph nodes:   Cervical, supraclavicular, and axillary nodes normal   Neurologic:   CNII-XII intact, normal strength, sensation and reflexes     throughout       Disposition: Home or Self Care    Follow Up/Patient Instructions:     Medications:  Reconciled Home Medications:      Medication List      CONTINUE taking these medications    b complex vitamins tablet  Take 1 tablet by mouth once daily.     CENTRUM SILVER ORAL  Take 1 tablet by mouth.     ciclopirox 8 % Soln  Commonly known as: PENLAC  Apply topically nightly.     cloNIDine 0.1 MG tablet  Commonly known as: CATAPRES  Take 1 tablet (0.1 mg total) by mouth once. for 1 dose     cycloSPORINE 0.05 % ophthalmic emulsion  Commonly known as: RESTASIS  Place 1 drop into both eyes 2 (two) times daily.     EScitalopram oxalate 20 MG  tablet  Commonly known as: LEXAPRO  Take 1 tablet (20 mg total) by mouth once daily.     levocetirizine 5 MG tablet  Commonly known as: XYZAL  Take 5 mg by mouth nightly.     levothyroxine 25 MCG tablet  Commonly known as: SYNTHROID  Take 1 tablet (25 mcg total) by mouth once daily.     meloxicam 15 MG tablet  Commonly known as: MOBIC  Take 1 tablet (15 mg total) by mouth once daily.     NARCAN 4 mg/actuation Spry  Generic drug: naloxone  1 spray (4 mg total) by Nasal route as needed (in the event of overdose).     nitroGLYCERIN 0.3 MG SL tablet  Commonly known as: NITROSTAT  DISSOLVE 1 TABLET UNDER THE TONGUE EVERY 5 MINUTES AS NEEDED FOR CHEST PAIN     pantoprazole 40 MG tablet  Commonly known as: PROTONIX  Take 1 tablet (40 mg total) by mouth 2 (two) times daily.     PROCTOZONE-HC 2.5 % rectal cream  Generic drug: hydrocortisone  STACI RECTALLY BID     promethazine 25 MG suppository  Commonly known as: PHENERGAN  Place 1 suppository (25 mg total) rectally every 6 (six) hours as needed for Nausea.     terbinafine HCL 1 % cream  Commonly known as: LamISIL AT  Apply topically nightly.     traZODone 50 MG tablet  Commonly known as: DESYREL  Take 1 tablet (50 mg total) by mouth every evening.          Discharge Procedure Orders   Ambulatory referral/consult to Hepatology   Standing Status: Future   Referral Priority: Routine Referral Type: Consultation   Referral Reason: Specialty Services Required   Requested Specialty: Hepatology   Number of Visits Requested: 1       Activity: no lifting, Driving, or Strenuous exercise for 2 weeks  Diet: regular diet  Wound Care: keep wound clean and dry    Follow-up with Dr. Farfan  in 1 week.    Signed:  Rupesh Farfan  6/11/2022  1:00 PM

## 2022-06-11 NOTE — PLAN OF CARE
[facility] - Med Surg  Discharge Final Note    Primary Care Provider: Angela Snowden MD    Expected Discharge Date: 6/11/2022    Final Discharge Note (most recent)       Final Note - 06/11/22 1455          Final Note    Assessment Type Final Discharge Note     Anticipated Discharge Disposition Home-Health Care [facility]     What phone number can be called within the next 1-3 days to see how you are doing after discharge? [phone]     Hospital Resources/Appts/Education Provided Provided patient/caregiver with written discharge plan information;Appointments scheduled and added to AVS;Appointments scheduled by Navigator/Coordinator        Post-Acute Status    Post-Acute Authorization Home Health     Home Health Status Set-up Complete/Auth obtained     Coverage PHN     Discharge Delays None known at this time                     Important Message from Medicare  Important Message from Medicare regarding Discharge Appeal Rights: Given to patient/caregiver, Explained to patient/caregiver     Date IMM was signed: 06/11/22  Time IMM was signed: 1449    Contact Info       Angela Snowden MD   Specialty: Family Medicine, Wound Care   Relationship: PCP - General    [address]  [place] [address]   Phone: [phone]       Next Steps: Follow up    Rupesh Farfan MD   Specialty: General Surgery, Surgery    [address]  [address]  [place] [address]   Phone: [phone]       Next Steps: Follow up    [facility]   Specialty: DME Provider, Home Health Services    [address]  [address]  [place] [address]   Phone: [phone]       Next Steps: Follow up    Instructions: Home Health          SW spoke with patient via phone. SW explained follow up appointment will be on discharge instructions. Patient voiced understanding.     SW secure chat patient's nurse Carla patient is cleared from case management standpoint.

## 2022-06-11 NOTE — NURSING
Discharge instructions reviewed with patient, verbalized understanding, no concerns voiced, awaiting  for transport home.

## 2022-06-11 NOTE — PROGRESS NOTES
Surgery Progress Note    S:  NAEO. Had large BM yesterday afternoon. Tolerating diet. Ambulating. Pain well controlled.    O:  Temp:  [97.8 °F (36.6 °C)-98.8 °F (37.1 °C)] 98.5 °F (36.9 °C)  Pulse:  [68-82] 82  Resp:  [16-20] 18  SpO2:  [92 %-99 %] 92 %  BP: (113-138)/(66-77) 118/67    I/O last 3 completed shifts:  In: 2280 [P.O.:2280]  Out: 5000 [Urine:5000]  No intake/output data recorded.    Gen: awake, resting comfortably   HEENT: EOMI  CV: regular rate, warm and well perfused  Pulm: symmetric expansion, no distress  Abdomen: soft,nondistended, appropriately ttp ,incision c/d/i with staples. no evidence of hernia recurrence  Extremities: moves all, no cyanosis  Skin: dry intact  Neuro: alert, no focal neuro deficits            Imaging Results          X-Ray Abdomen AP 1 View (Final result)  Result time 06/05/22 11:08:49    Final result by Janet Ren MD (06/05/22 11:08:49)                 Impression:      Please see above.      Electronically signed by: Janet Ren  Date:    06/05/2022  Time:    11:08             Narrative:    EXAMINATION:  XR ABDOMEN AP 1 VIEW    CLINICAL HISTORY:  confirm ng tube;    TECHNIQUE:  AP View(s) of the abdomen was performed.    COMPARISON:  None    FINDINGS:  Supine radiograph of the upper abdomen shows the tip of the enteric tube projecting over the stomach.    Hiatal hernia.  Bowel loops in the upper abdomen are dilated in this patient with small bowel obstruction.                               X-Ray Chest AP Portable (Final result)  Result time 06/05/22 08:07:02    Final result by Dhiraj Carver MD (06/05/22 08:07:02)                 Impression:      No convincing evidence of acute cardiopulmonary disease.      Electronically signed by: Dhiraj Carver  Date:    06/05/2022  Time:    08:07             Narrative:    EXAMINATION:  XR CHEST AP PORTABLE    CLINICAL HISTORY:  Encounter for preprocedural cardiovascular examination    TECHNIQUE:  Single frontal view of the  chest was performed.    COMPARISON:  Chest radiograph 09/22/2021    FINDINGS:  Monitoring leads overlie the chest.  Grossly unchanged cardiomediastinal contours.  Background coarsened interstitial increased attenuation relatively similar prior examination.  Platelike opacity left lung base favored to represent atelectasis.    No definite pneumothorax or large volume pleural effusion.  No acute findings identified in the visualized abdomen.  Gas-filled loops of colon subjacent to the hemidiaphragms again noted, similar configuration to prior radiographs.  Osseous soft tissue structures appear without definite acute abnormality.  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                                CT Abdomen Pelvis  Without Contrast (Final result)  Result time 06/05/22 07:32:49    Final result by Dhiraj Carver MD (06/05/22 07:32:49)                 Impression:      1. Findings concerning for acute mechanical small bowel obstruction, which appears related to ventral hernia sac containing loops of small bowel.  Inflammation is suggested about the enclosed small-bowel loops within the hernia sac.  No definite evidence of pneumatosis or pneumoperitoneum at the present time.  2. Additional details of chronic and incidental findings, as provided in the body of report.  This report was flagged in Epic as abnormal.      Electronically signed by: Dhiraj Carver  Date:    06/05/2022  Time:    07:32             Narrative:    EXAMINATION:  CT ABDOMEN PELVIS WITHOUT CONTRAST    CLINICAL HISTORY:  Bowel obstruction suspected;    TECHNIQUE:  Low dose axial images, sagittal and coronal reformations were obtained from the lung bases to the pubic symphysis.    COMPARISON:  CT of the abdomen and pelvis performed 03/30/2022    FINDINGS:  This examination is limited due to lack of intravenous contrast.    Lower chest: Emphysematous changes are suggested.  Atelectasis and/or  scar.  2 mm nodule right middle lobe (series 2, image 4).  7 mm nodule right middle lobe (series 2, image 17).  Both of these nodules appear essentially unchanged dating back to at least 12/04/2019, and as such are felt benign.    Liver: Contour nodularity of the liver is suggested, which may relate to cirrhosis.  No definite new focal hepatic lesion identified by unenhanced technique.    Gallbladder and bile ducts: Stable nonspecific diffuse prominence of the extrahepatic common bile duct measuring up to 8 mm.    Pancreas: Normal contour.    Spleen: Normal contour.    Adrenals: Normal contour.    Kidneys: Nonobstructing 3 mm calculus midpole left kidney.    Lymph nodes: No abdominal or pelvic lymphadenopathy.    Bowel and mesentery: Dilated fluid and contrast filled small bowel loops measure up to least 4.6 cm, as measured in the right upper quadrant of the abdomen.  There is a ventral hernia containing fat, vessels, fluid, and loops of small bowel, with adjoining inflammatory change.  This appearance is concerning for the cause of the proximal obstruction, with decompression of the more distal bowel loops.  Bowel though the hernia sac measures approximately 24 mm in width and 16 mm in craniocaudal extent.  Orally administered contrast material collects within a moderate hiatal hernia, traverses stomach, and proximal small bowel loops.  No definite contrast material within the small bowel loops enclosed within the ventral hernia sac.    Elsewhere, there is colonic diverticulosis without definite evidence acute diverticulitis at the present time.  Normal caliber of the appendix.  Postsurgical changes in the gastroesophageal junction.    Abdominal aorta: Nonaneurysmal.  Atherosclerotic calcifications.    Inferior vena cava: Unremarkable.    Free fluid or free air: None.    Pelvis: Stable suggested focus of fat necrosis in the anterior pelvis.    Urinary bladder: Unremarkable.    Body wall: As above.  Possible  junction granuloma within the gluteal soft tissues.    Bones: No definite acute abnormality appreciated.  Extensive degenerative findings again noted involving the spine.  As before, there is grade 1 anterolisthesis of L4 on L5.  S-shaped scoliotic curvature of the thoracolumbar spine.  Retrolisthesis of L1 on L2 and of T12 on L1.  Chronic appearing degenerative endplate change most advanced spanning the T11-12 T12 levels, relatively similar to prior.                                Assessment/ Plan: 67 year old female with hx of polysubstance abuse and cirrhosis, hx of lap hiatal hernia repair, ventral hernia repair with mesh and primary repair of umbilical hernia who presents with SBO with transition point at umbilical hernia. Umbilical hernia was reduced at bedside and NG tube was placed. Patient is now s/p open umbilical hernia repair with mesh on 6/6/22 without complication. She was noted to have ascites intra operatively.   NG removed 6/8 once pt started passing flatus. Patient has now had return of bowel function.     Continue soft diet  Discontinue IV pain meds; will increase dosages of PO pain medicine  Hospital Medicine following, recommend continuing lasix and aldactone upon discharge  Keflex for UTI; end date 6/12  Encourage IS  Encourage ambulation    SCDs, lovenox    Dispo: likely 6/11 once weaned from IV pain medication  Plans to follow up at her pain management clinic 2 weeks after discharge  Ambulatory referral placed to hepatology   Home health PT orders placed    Randee CHAIDEZ  Surgery PGY5

## 2022-06-13 NOTE — PLAN OF CARE
SW received call from [place] ([facility]) re: HH order arrangement. Patient arranged with [facility].

## 2022-06-14 ENCOUNTER — TELEPHONE (OUTPATIENT)
Dept: SURGERY | Facility: CLINIC | Age: 67
End: 2022-06-14
Payer: MEDICARE

## 2022-06-14 NOTE — TELEPHONE ENCOUNTER
----- Message from Nadja Rudolph sent at 6/14/2022 11:28 AM CDT -----  Type: RX Refill Request    Who Called:  self     Have you contacted your pharmacy:yes , pt states she is in pain the staples are really bothering her     Refill or New Rx: refill     RX Name and Strength: oxyCODONE (ROXICODONE) 15 MG Tab    How is the patient currently taking it? (ex. 1XDay):    Is this a 30 day or 90 day RX:    Preferred Pharmacy with phone number:     [id number] - [place] - [address] [facility]  [address]  [place] [address]  Phone: [phone] Fax: [phone]

## 2022-06-15 ENCOUNTER — TELEPHONE (OUTPATIENT)
Dept: SURGERY | Facility: CLINIC | Age: 67
End: 2022-06-15
Payer: MEDICARE

## 2022-06-15 NOTE — TELEPHONE ENCOUNTER
Spoke with Ms. Arevalo regarding msg she states she's in pain and would like a refill on pain meds, inform her I would relate the msg to the doctor, pt verbalized understanding.      ----- Message from Becky Orosco sent at 6/15/2022 10:10 AM CDT -----  .Type: Patient Call Back    Who called self    What is the request in detail: patient just got discharged from surgery and is still in a lot of pain    Can the clinic reply by [facility]? no    Would the patient rather a call back or a response via [facility]? #call    Best call back number:.[phone] (home)

## 2022-06-15 NOTE — TELEPHONE ENCOUNTER
Spoke with  and inform her that Doctor denied her request for pain meds, at discharged it was explained to her that she is on a pain contract and need to follow up with her pain management provider, pt verbalized understanding.

## 2022-06-22 ENCOUNTER — TELEPHONE (OUTPATIENT)
Dept: SURGERY | Facility: CLINIC | Age: 67
End: 2022-06-22
Payer: MEDICARE

## 2022-06-22 NOTE — TELEPHONE ENCOUNTER
Attempted to contact MsLeolaMickey to reschedule post op appt with Dr. Farfan, unable to reach left brief VM to contact office.

## 2022-06-30 ENCOUNTER — OFFICE VISIT (OUTPATIENT)
Dept: SURGERY | Facility: CLINIC | Age: 67
End: 2022-06-30
Payer: MEDICARE

## 2022-06-30 VITALS
HEIGHT: 66 IN | DIASTOLIC BLOOD PRESSURE: 87 MMHG | BODY MASS INDEX: 28.19 KG/M2 | WEIGHT: 175.38 LBS | HEART RATE: 101 BPM | OXYGEN SATURATION: 95 % | SYSTOLIC BLOOD PRESSURE: 120 MMHG | TEMPERATURE: 99 F

## 2022-06-30 DIAGNOSIS — K42.9 UMBILICAL HERNIA WITHOUT OBSTRUCTION AND WITHOUT GANGRENE: Primary | ICD-10-CM

## 2022-06-30 PROCEDURE — 1159F PR MEDICATION LIST DOCUMENTED IN MEDICAL RECORD: ICD-10-PCS | Mod: CPTII,S$GLB,, | Performed by: SURGERY

## 2022-06-30 PROCEDURE — 3008F BODY MASS INDEX DOCD: CPT | Mod: CPTII,S$GLB,, | Performed by: SURGERY

## 2022-06-30 PROCEDURE — 3074F PR MOST RECENT SYSTOLIC BLOOD PRESSURE < 130 MM HG: ICD-10-PCS | Mod: CPTII,S$GLB,, | Performed by: SURGERY

## 2022-06-30 PROCEDURE — 3074F SYST BP LT 130 MM HG: CPT | Mod: CPTII,S$GLB,, | Performed by: SURGERY

## 2022-06-30 PROCEDURE — 3288F FALL RISK ASSESSMENT DOCD: CPT | Mod: CPTII,S$GLB,, | Performed by: SURGERY

## 2022-06-30 PROCEDURE — 99999 PR PBB SHADOW E&M-EST. PATIENT-LVL IV: CPT | Mod: PBBFAC,,, | Performed by: SURGERY

## 2022-06-30 PROCEDURE — 99999 PR PBB SHADOW E&M-EST. PATIENT-LVL IV: ICD-10-PCS | Mod: PBBFAC,,, | Performed by: SURGERY

## 2022-06-30 PROCEDURE — 3008F PR BODY MASS INDEX (BMI) DOCUMENTED: ICD-10-PCS | Mod: CPTII,S$GLB,, | Performed by: SURGERY

## 2022-06-30 PROCEDURE — 1125F PR PAIN SEVERITY QUANTIFIED, PAIN PRESENT: ICD-10-PCS | Mod: CPTII,S$GLB,, | Performed by: SURGERY

## 2022-06-30 PROCEDURE — 3079F PR MOST RECENT DIASTOLIC BLOOD PRESSURE 80-89 MM HG: ICD-10-PCS | Mod: CPTII,S$GLB,, | Performed by: SURGERY

## 2022-06-30 PROCEDURE — 1100F PR PT FALLS ASSESS DOC 2+ FALLS/FALL W/INJURY/YR: ICD-10-PCS | Mod: CPTII,S$GLB,, | Performed by: SURGERY

## 2022-06-30 PROCEDURE — 1100F PTFALLS ASSESS-DOCD GE2>/YR: CPT | Mod: CPTII,S$GLB,, | Performed by: SURGERY

## 2022-06-30 PROCEDURE — 3288F PR FALLS RISK ASSESSMENT DOCUMENTED: ICD-10-PCS | Mod: CPTII,S$GLB,, | Performed by: SURGERY

## 2022-06-30 PROCEDURE — 3079F DIAST BP 80-89 MM HG: CPT | Mod: CPTII,S$GLB,, | Performed by: SURGERY

## 2022-06-30 PROCEDURE — 1159F MED LIST DOCD IN RCRD: CPT | Mod: CPTII,S$GLB,, | Performed by: SURGERY

## 2022-06-30 PROCEDURE — 99024 POSTOP FOLLOW-UP VISIT: CPT | Mod: S$GLB,,, | Performed by: SURGERY

## 2022-06-30 PROCEDURE — 99024 PR POST-OP FOLLOW-UP VISIT: ICD-10-PCS | Mod: S$GLB,,, | Performed by: SURGERY

## 2022-06-30 PROCEDURE — 1125F AMNT PAIN NOTED PAIN PRSNT: CPT | Mod: CPTII,S$GLB,, | Performed by: SURGERY

## 2022-06-30 NOTE — PROGRESS NOTES
67. y/o woman with history of umbilical hernia repair, now about 2 weeks out.  No complaints this am, reports feeling well.    PE: incision c/d/i no evidence of infection or hernia    Impression: post op, doing well    Plan: gradually resume normal activity, normal diet, and follow up as needed.

## 2022-07-15 ENCOUNTER — TELEPHONE (OUTPATIENT)
Dept: OPHTHALMOLOGY | Facility: CLINIC | Age: 67
End: 2022-07-15
Payer: MEDICARE

## 2022-07-15 NOTE — TELEPHONE ENCOUNTER
----- Message from Rajeev Alexandre sent at 7/14/2022  3:37 PM CDT -----  Type: Patient Call Back    Who called: Self    What is the request in detail: Pt is requesting an appt for a follow up regarding her cataracts. Please call to schedule.    Can the clinic reply by [facility]? no    Would the patient rather a call back or a response via [facility]? Call back    Best call back number: [phone] (home)       Additional Information

## 2022-07-15 NOTE — TELEPHONE ENCOUNTER
Attempted to speak with pt to schedule cataract sx follow up visit; pt lost to follow up. No answer at contact number, unable to leave a msg for pt.

## 2022-07-17 ENCOUNTER — TELEPHONE (OUTPATIENT)
Dept: HEPATOLOGY | Facility: CLINIC | Age: 67
End: 2022-07-17
Payer: MEDICARE

## 2022-07-18 ENCOUNTER — TELEPHONE (OUTPATIENT)
Dept: FAMILY MEDICINE | Facility: CLINIC | Age: 67
End: 2022-07-18
Payer: MEDICARE

## 2022-07-18 NOTE — TELEPHONE ENCOUNTER
Tried to reach pt about her up coming appt but phone is not taking in coming  call as of 07/18/22 @ 9:06 am vs

## 2022-07-22 LAB
BASOPHILS # BLD AUTO: 0.05 K/UL (ref 0–0.2)
BASOPHILS NFR BLD: 1 % (ref 0–1.9)
DIFFERENTIAL METHOD: ABNORMAL
EOSINOPHIL # BLD AUTO: 0 K/UL (ref 0–0.5)
EOSINOPHIL NFR BLD: 0.4 % (ref 0–8)
ERYTHROCYTE [DISTWIDTH] IN BLOOD BY AUTOMATED COUNT: 13.1 % (ref 11.5–14.5)
HCT VFR BLD AUTO: 45.4 % (ref 37–48.5)
HGB BLD-MCNC: 15.8 G/DL (ref 12–16)
IMM GRANULOCYTES # BLD AUTO: 0.01 K/UL (ref 0–0.04)
IMM GRANULOCYTES NFR BLD AUTO: 0.2 % (ref 0–0.5)
LYMPHOCYTES # BLD AUTO: 1 K/UL (ref 1–4.8)
LYMPHOCYTES NFR BLD: 21.2 % (ref 18–48)
MCH RBC QN AUTO: 32.2 PG (ref 27–31)
MCHC RBC AUTO-ENTMCNC: 34.8 G/DL (ref 32–36)
MCV RBC AUTO: 93 FL (ref 82–98)
MONOCYTES # BLD AUTO: 0.4 K/UL (ref 0.3–1)
MONOCYTES NFR BLD: 8 % (ref 4–15)
NEUTROPHILS # BLD AUTO: 3.3 K/UL (ref 1.8–7.7)
NEUTROPHILS NFR BLD: 69.2 % (ref 38–73)
NRBC BLD-RTO: 0 /100 WBC
PLATELET # BLD AUTO: 279 K/UL (ref 150–450)
PMV BLD AUTO: 10.2 FL (ref 9.2–12.9)
RBC # BLD AUTO: 4.91 M/UL (ref 4–5.4)
WBC # BLD AUTO: 4.77 K/UL (ref 3.9–12.7)

## 2022-07-22 PROCEDURE — 93010 EKG 12-LEAD: ICD-10-PCS | Mod: ,,, | Performed by: INTERNAL MEDICINE

## 2022-07-22 PROCEDURE — 84100 ASSAY OF PHOSPHORUS: CPT | Performed by: PHYSICIAN ASSISTANT

## 2022-07-22 PROCEDURE — 96361 HYDRATE IV INFUSION ADD-ON: CPT

## 2022-07-22 PROCEDURE — 93010 ELECTROCARDIOGRAM REPORT: CPT | Mod: ,,, | Performed by: INTERNAL MEDICINE

## 2022-07-22 PROCEDURE — 99285 EMERGENCY DEPT VISIT HI MDM: CPT | Mod: 25

## 2022-07-22 PROCEDURE — 93005 ELECTROCARDIOGRAM TRACING: CPT

## 2022-07-22 PROCEDURE — 96360 HYDRATION IV INFUSION INIT: CPT

## 2022-07-22 PROCEDURE — 83735 ASSAY OF MAGNESIUM: CPT | Performed by: PHYSICIAN ASSISTANT

## 2022-07-22 PROCEDURE — 80053 COMPREHEN METABOLIC PANEL: CPT | Performed by: PHYSICIAN ASSISTANT

## 2022-07-22 PROCEDURE — 25000003 PHARM REV CODE 250: Performed by: PHYSICIAN ASSISTANT

## 2022-07-22 PROCEDURE — 85025 COMPLETE CBC W/AUTO DIFF WBC: CPT | Performed by: PHYSICIAN ASSISTANT

## 2022-07-22 RX ORDER — PROMETHAZINE HYDROCHLORIDE 25 MG/1
25 TABLET ORAL
Status: COMPLETED | OUTPATIENT
Start: 2022-07-22 | End: 2022-07-22

## 2022-07-22 RX ADMIN — SODIUM CHLORIDE 1000 ML: 0.9 INJECTION, SOLUTION INTRAVENOUS at 11:07

## 2022-07-22 RX ADMIN — PROMETHAZINE HYDROCHLORIDE 25 MG: 25 TABLET ORAL at 11:07

## 2022-07-23 ENCOUNTER — HOSPITAL ENCOUNTER (EMERGENCY)
Facility: HOSPITAL | Age: 67
Discharge: HOME | End: 2022-07-23
Attending: INTERNAL MEDICINE
Payer: MEDICARE

## 2022-07-23 VITALS
OXYGEN SATURATION: 96 % | BODY MASS INDEX: 27.49 KG/M2 | RESPIRATION RATE: 15 BRPM | HEIGHT: 65 IN | SYSTOLIC BLOOD PRESSURE: 109 MMHG | HEART RATE: 79 BPM | WEIGHT: 165 LBS | DIASTOLIC BLOOD PRESSURE: 84 MMHG | TEMPERATURE: 98 F

## 2022-07-23 DIAGNOSIS — S06.0X0A CONCUSSION WITHOUT LOSS OF CONSCIOUSNESS, INITIAL ENCOUNTER: Primary | ICD-10-CM

## 2022-07-23 DIAGNOSIS — R11.10 VOMITING: ICD-10-CM

## 2022-07-23 DIAGNOSIS — N30.00 ACUTE CYSTITIS WITHOUT HEMATURIA: ICD-10-CM

## 2022-07-23 LAB
ALBUMIN SERPL BCP-MCNC: 4.6 G/DL (ref 3.5–5.2)
ALP SERPL-CCNC: 117 U/L (ref 55–135)
ALT SERPL W/O P-5'-P-CCNC: 37 U/L (ref 10–44)
ANION GAP SERPL CALC-SCNC: 15 MMOL/L (ref 8–16)
AST SERPL-CCNC: 52 U/L (ref 10–40)
BACTERIA #/AREA URNS HPF: ABNORMAL /HPF
BILIRUB SERPL-MCNC: 0.9 MG/DL (ref 0.1–1)
BILIRUB UR QL STRIP: NEGATIVE
BUN SERPL-MCNC: 19 MG/DL (ref 8–23)
CALCIUM SERPL-MCNC: 10 MG/DL (ref 8.7–10.5)
CHLORIDE SERPL-SCNC: 100 MMOL/L (ref 95–110)
CLARITY UR: ABNORMAL
CO2 SERPL-SCNC: 23 MMOL/L (ref 23–29)
COLOR UR: YELLOW
CREAT SERPL-MCNC: 0.7 MG/DL (ref 0.5–1.4)
EST. GFR  (AFRICAN AMERICAN): >60 ML/MIN/1.73 M^2
EST. GFR  (NON AFRICAN AMERICAN): >60 ML/MIN/1.73 M^2
GLUCOSE SERPL-MCNC: 123 MG/DL (ref 70–110)
GLUCOSE UR QL STRIP: NEGATIVE
HGB UR QL STRIP: NEGATIVE
HYALINE CASTS #/AREA URNS LPF: 0 /LPF
KETONES UR QL STRIP: NEGATIVE
LEUKOCYTE ESTERASE UR QL STRIP: ABNORMAL
MAGNESIUM SERPL-MCNC: 1.8 MG/DL (ref 1.6–2.6)
MICROSCOPIC COMMENT: ABNORMAL
NITRITE UR QL STRIP: NEGATIVE
PH UR STRIP: 7 [PH] (ref 5–8)
PHOSPHATE SERPL-MCNC: 3 MG/DL (ref 2.7–4.5)
POTASSIUM SERPL-SCNC: 3.2 MMOL/L (ref 3.5–5.1)
PROT SERPL-MCNC: 8.4 G/DL (ref 6–8.4)
PROT UR QL STRIP: ABNORMAL
RBC #/AREA URNS HPF: 3 /HPF (ref 0–4)
SODIUM SERPL-SCNC: 138 MMOL/L (ref 136–145)
SP GR UR STRIP: 1.02 (ref 1–1.03)
SQUAMOUS #/AREA URNS HPF: 2 /HPF
URN SPEC COLLECT METH UR: ABNORMAL
UROBILINOGEN UR STRIP-ACNC: ABNORMAL EU/DL
WBC #/AREA URNS HPF: >100 /HPF (ref 0–5)

## 2022-07-23 PROCEDURE — 81000 URINALYSIS NONAUTO W/SCOPE: CPT | Performed by: PHYSICIAN ASSISTANT

## 2022-07-23 PROCEDURE — 87086 URINE CULTURE/COLONY COUNT: CPT | Performed by: PHYSICIAN ASSISTANT

## 2022-07-23 PROCEDURE — 25000003 PHARM REV CODE 250: Performed by: INTERNAL MEDICINE

## 2022-07-23 PROCEDURE — 63600175 PHARM REV CODE 636 W HCPCS: Performed by: INTERNAL MEDICINE

## 2022-07-23 RX ORDER — ONDANSETRON 2 MG/ML
8 INJECTION INTRAMUSCULAR; INTRAVENOUS
Status: DISCONTINUED | OUTPATIENT
Start: 2022-07-23 | End: 2022-07-23

## 2022-07-23 RX ORDER — BUTALBITAL, ACETAMINOPHEN AND CAFFEINE 50; 325; 40 MG/1; MG/1; MG/1
2 TABLET ORAL
Status: COMPLETED | OUTPATIENT
Start: 2022-07-23 | End: 2022-07-23

## 2022-07-23 RX ORDER — BUTALBITAL, ACETAMINOPHEN AND CAFFEINE 50; 325; 40 MG/1; MG/1; MG/1
2 TABLET ORAL EVERY 8 HOURS PRN
Qty: 30 TABLET | Refills: 1 | Status: SHIPPED | OUTPATIENT
Start: 2022-07-23 | End: 2022-08-22

## 2022-07-23 RX ORDER — NITROFURANTOIN 25; 75 MG/1; MG/1
100 CAPSULE ORAL 2 TIMES DAILY
Qty: 14 CAPSULE | Refills: 0 | Status: SHIPPED | OUTPATIENT
Start: 2022-07-23 | End: 2022-07-30

## 2022-07-23 RX ORDER — NITROFURANTOIN 25; 75 MG/1; MG/1
100 CAPSULE ORAL
Status: COMPLETED | OUTPATIENT
Start: 2022-07-23 | End: 2022-07-23

## 2022-07-23 RX ORDER — PROMETHAZINE HYDROCHLORIDE 25 MG/1
25 SUPPOSITORY RECTAL EVERY 6 HOURS PRN
Qty: 10 SUPPOSITORY | Refills: 0 | Status: SHIPPED | OUTPATIENT
Start: 2022-07-23 | End: 2022-08-20 | Stop reason: SDUPTHER

## 2022-07-23 RX ORDER — ONDANSETRON 8 MG/1
8 TABLET, ORALLY DISINTEGRATING ORAL EVERY 8 HOURS PRN
Qty: 10 TABLET | Refills: 0 | OUTPATIENT
Start: 2022-07-23 | End: 2023-03-19

## 2022-07-23 RX ORDER — SODIUM CHLORIDE 9 MG/ML
1000 INJECTION, SOLUTION INTRAVENOUS ONCE
Status: DISCONTINUED | OUTPATIENT
Start: 2022-07-23 | End: 2022-07-23 | Stop reason: HOSPADM

## 2022-07-23 RX ORDER — ONDANSETRON 8 MG/1
8 TABLET, ORALLY DISINTEGRATING ORAL
Status: COMPLETED | OUTPATIENT
Start: 2022-07-23 | End: 2022-07-23

## 2022-07-23 RX ADMIN — ONDANSETRON 8 MG: 8 TABLET, ORALLY DISINTEGRATING ORAL at 02:07

## 2022-07-23 RX ADMIN — NITROFURANTOIN (MONOHYDRATE/MACROCRYSTALS) 100 MG: 75; 25 CAPSULE ORAL at 01:07

## 2022-07-23 RX ADMIN — BUTALBITAL, ACETAMINOPHEN AND CAFFEINE 2 TABLET: 50; 325; 40 TABLET ORAL at 01:07

## 2022-07-23 RX ADMIN — POTASSIUM BICARBONATE 40 MEQ: 391 TABLET, EFFERVESCENT ORAL at 01:07
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
## 2022-07-23 NOTE — FIRST PROVIDER EVALUATION
"Medical screening exam completed.  I have conducted a focused provider triage encounter, findings are as follows:    Brief history of present illness:  Hernia repair on 7/6  3 day history of nausea and vomiting, unable to tolerate PO. Having lower abdominal pain. C/o subjective fever yesterday. Diarrhea yesterday "until therewasn't anything left." she has not passed flatus for 2 days.    Slip and fall 2 days ago getting out of bathtub. Has Periorbital bruising. C/o severe HA but states this is not 2/2 head injury   She has history of alcohol abuse. Last drink weeks ago   Attempted tx with zofran  Vitals:    07/22/22 2020   BP: 120/60   Pulse: 94   Resp: 18   Temp: 98.3 °F (36.8 °C)   TempSrc: Oral   SpO2: 97%   Weight: 74.8 kg (165 lb)   Height: 5' 5" (1.651 m)       Pertinent physical exam:  Answering questions appropriately  Holding emesis bag    Brief workup plan:  Labs   Preliminary workup initiated; this workup will be continued and followed by the physician or advanced practice provider that is assigned to the patient when roomed.  "

## 2022-07-25 LAB — BACTERIA UR CULT: NORMAL

## 2022-07-31 ENCOUNTER — TELEPHONE (OUTPATIENT)
Dept: HEPATOLOGY | Facility: CLINIC | Age: 67
End: 2022-07-31
Payer: MEDICARE

## 2022-08-02 ENCOUNTER — TELEPHONE (OUTPATIENT)
Dept: ENDOSCOPY | Facility: HOSPITAL | Age: 67
End: 2022-08-02
Payer: MEDICARE

## 2022-08-02 NOTE — TELEPHONE ENCOUNTER
Contacted pt to schedule Colonoscopy. Unable to leave a voicemail. Unable to Contact you letter sent to pt mailing address on chart.

## 2022-08-18 ENCOUNTER — PES CALL (OUTPATIENT)
Dept: ADMINISTRATIVE | Facility: CLINIC | Age: 67
End: 2022-08-18
Payer: MEDICARE

## 2022-08-20 ENCOUNTER — HOSPITAL ENCOUNTER (EMERGENCY)
Facility: HOSPITAL | Age: 67
Discharge: HOME | End: 2022-08-20
Attending: EMERGENCY MEDICINE
Payer: MEDICARE

## 2022-08-20 VITALS
DIASTOLIC BLOOD PRESSURE: 92 MMHG | OXYGEN SATURATION: 97 % | SYSTOLIC BLOOD PRESSURE: 154 MMHG | TEMPERATURE: 99 F | BODY MASS INDEX: 28.49 KG/M2 | RESPIRATION RATE: 20 BRPM | HEIGHT: 65 IN | HEART RATE: 92 BPM | WEIGHT: 171 LBS

## 2022-08-20 DIAGNOSIS — N30.00 ACUTE CYSTITIS WITHOUT HEMATURIA: Primary | ICD-10-CM

## 2022-08-20 DIAGNOSIS — J06.9 UPPER RESPIRATORY TRACT INFECTION, UNSPECIFIED TYPE: ICD-10-CM

## 2022-08-20 LAB
BILIRUBIN, POC UA: NEGATIVE
BLOOD, POC UA: NEGATIVE
CLARITY, POC UA: CLEAR
COLOR, POC UA: YELLOW
CTP QC/QA: YES
GLUCOSE, POC UA: NEGATIVE
INFLUENZA A ANTIGEN, POC: NEGATIVE
INFLUENZA B ANTIGEN, POC: NEGATIVE
KETONES, POC UA: NEGATIVE
LEUKOCYTE EST, POC UA: ABNORMAL
NITRITE, POC UA: POSITIVE
PH UR STRIP: 5.5 [PH]
PROTEIN, POC UA: NEGATIVE
SARS-COV-2 RDRP RESP QL NAA+PROBE: NEGATIVE
SPECIFIC GRAVITY, POC UA: >=1.03
UROBILINOGEN, POC UA: 1 E.U./DL

## 2022-08-20 PROCEDURE — 99284 EMERGENCY DEPT VISIT MOD MDM: CPT | Mod: 25,ER

## 2022-08-20 PROCEDURE — 81003 URINALYSIS AUTO W/O SCOPE: CPT | Mod: ER

## 2022-08-20 PROCEDURE — U0002 COVID-19 LAB TEST NON-CDC: HCPCS | Mod: ER | Performed by: EMERGENCY MEDICINE

## 2022-08-20 PROCEDURE — 87804 INFLUENZA ASSAY W/OPTIC: CPT | Mod: 59,ER

## 2022-08-20 PROCEDURE — 25000003 PHARM REV CODE 250: Mod: ER | Performed by: EMERGENCY MEDICINE

## 2022-08-20 RX ORDER — AMOXICILLIN AND CLAVULANATE POTASSIUM 875; 125 MG/1; MG/1
1 TABLET, FILM COATED ORAL 2 TIMES DAILY
Qty: 14 TABLET | Refills: 0 | Status: SHIPPED | OUTPATIENT
Start: 2022-08-20 | End: 2023-03-19 | Stop reason: ALTCHOICE

## 2022-08-20 RX ORDER — AMOXICILLIN AND CLAVULANATE POTASSIUM 875; 125 MG/1; MG/1
1 TABLET, FILM COATED ORAL
Status: COMPLETED | OUTPATIENT
Start: 2022-08-20 | End: 2022-08-20

## 2022-08-20 RX ORDER — PROMETHAZINE HYDROCHLORIDE 25 MG/1
25 SUPPOSITORY RECTAL EVERY 6 HOURS PRN
Qty: 6 SUPPOSITORY | Refills: 0 | Status: SHIPPED | OUTPATIENT
Start: 2022-08-20 | End: 2023-03-19 | Stop reason: ALTCHOICE

## 2022-08-20 RX ORDER — ONDANSETRON 4 MG/1
4 TABLET, ORALLY DISINTEGRATING ORAL
Status: COMPLETED | OUTPATIENT
Start: 2022-08-20 | End: 2022-08-20

## 2022-08-20 RX ORDER — ONDANSETRON 4 MG/1
4 TABLET, FILM COATED ORAL EVERY 6 HOURS PRN
Qty: 12 TABLET | Refills: 0 | Status: SHIPPED | OUTPATIENT
Start: 2022-08-20 | End: 2023-03-19 | Stop reason: ALTCHOICE

## 2022-08-20 RX ORDER — DEXTROMETHORPHAN HYDROBROMIDE, GUAIFENESIN 5; 100 MG/5ML; MG/5ML
650 LIQUID ORAL
Qty: 20 TABLET | Refills: 0 | Status: ON HOLD | OUTPATIENT
Start: 2022-08-20 | End: 2023-11-03 | Stop reason: HOSPADM

## 2022-08-20 RX ORDER — ACETAMINOPHEN 500 MG
1000 TABLET ORAL
Status: COMPLETED | OUTPATIENT
Start: 2022-08-20 | End: 2022-08-20

## 2022-08-20 RX ADMIN — AMOXICILLIN AND CLAVULANATE POTASSIUM 1 TABLET: 875; 125 TABLET, FILM COATED ORAL at 10:08

## 2022-08-20 RX ADMIN — ACETAMINOPHEN 1000 MG: 500 TABLET, FILM COATED ORAL at 09:08

## 2022-08-20 RX ADMIN — ONDANSETRON 4 MG: 4 TABLET, ORALLY DISINTEGRATING ORAL at 09:08
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
## 2022-10-14 ENCOUNTER — PES CALL (OUTPATIENT)
Dept: ADMINISTRATIVE | Facility: OTHER | Age: 67
End: 2022-10-14
Payer: MEDICARE

## 2022-10-18 ENCOUNTER — TELEPHONE (OUTPATIENT)
Dept: FAMILY MEDICINE | Facility: CLINIC | Age: 67
End: 2022-10-18
Payer: MEDICARE

## 2022-10-19 ENCOUNTER — TELEPHONE (OUTPATIENT)
Dept: FAMILY MEDICINE | Facility: CLINIC | Age: 67
End: 2022-10-19
Payer: MEDICARE

## 2022-11-16 DIAGNOSIS — Z12.11 COLON CANCER SCREENING: ICD-10-CM

## 2022-11-16 DIAGNOSIS — Z86.010 HISTORY OF COLON POLYPS: Primary | ICD-10-CM

## 2022-11-21 ENCOUNTER — PES CALL (OUTPATIENT)
Dept: ADMINISTRATIVE | Facility: CLINIC | Age: 67
End: 2022-11-21
Payer: MEDICARE

## 2022-11-28 ENCOUNTER — PES CALL (OUTPATIENT)
Dept: ADMINISTRATIVE | Facility: CLINIC | Age: 67
End: 2022-11-28
Payer: MEDICARE

## 2022-11-29 ENCOUNTER — TELEPHONE (OUTPATIENT)
Dept: HEPATOLOGY | Facility: CLINIC | Age: 67
End: 2022-11-29
Payer: MEDICARE

## 2022-11-29 NOTE — TELEPHONE ENCOUNTER
MA ,called pt to follow up on phone message left vm to return call back to office           Thanks   Mabel

## 2022-11-29 NOTE — TELEPHONE ENCOUNTER
----- Message from Veronica Capone MA sent at 11/28/2022  4:08 PM CST -----  Regarding: FW: Call Back    ----- Message -----  From: Carla Chowdary  Sent: 11/28/2022   4:07 PM CST  To: [facility] Clinical Staff  Subject: Call Back                                        Pt is returning a call to office. They need to schedule an appt with Dr. Joe. They were referred by Dr. uRma Jeff.      Estella @ [phone]

## 2022-12-15 DIAGNOSIS — G47.00 INSOMNIA, UNSPECIFIED TYPE: ICD-10-CM

## 2022-12-15 RX ORDER — CEPHRADINE 250 MG
1 CAPSULE ORAL
COMMUNITY
Start: 2022-12-14 | End: 2023-11-12

## 2022-12-15 RX ORDER — LEVETIRACETAM 500 MG/1
500 TABLET ORAL 2 TIMES DAILY
COMMUNITY
Start: 2022-11-19 | End: 2023-11-12

## 2022-12-15 RX ORDER — OXYCODONE AND ACETAMINOPHEN 5; 325 MG/1; MG/1
TABLET ORAL
COMMUNITY
Start: 2022-11-14 | End: 2023-11-12

## 2022-12-15 RX ORDER — BUTALBITAL, ACETAMINOPHEN AND CAFFEINE 50; 325; 40 MG/1; MG/1; MG/1
TABLET ORAL
COMMUNITY
Start: 2022-11-14 | End: 2023-11-12

## 2022-12-15 RX ORDER — TRAZODONE HYDROCHLORIDE 50 MG/1
50 TABLET ORAL NIGHTLY
Qty: 90 TABLET | Refills: 3 | Status: CANCELLED | OUTPATIENT
Start: 2022-12-15 | End: 2023-12-15

## 2022-12-20 ENCOUNTER — PES CALL (OUTPATIENT)
Dept: ADMINISTRATIVE | Facility: CLINIC | Age: 67
End: 2022-12-20
Payer: MEDICARE

## 2022-12-22 ENCOUNTER — PES CALL (OUTPATIENT)
Dept: ADMINISTRATIVE | Facility: CLINIC | Age: 67
End: 2022-12-22
Payer: MEDICARE

## 2022-12-31 ENCOUNTER — HOSPITAL ENCOUNTER (EMERGENCY)
Facility: HOSPITAL | Age: 67
Discharge: HOME | End: 2022-12-31
Attending: EMERGENCY MEDICINE
Payer: MEDICARE

## 2022-12-31 VITALS
WEIGHT: 171.06 LBS | TEMPERATURE: 98 F | SYSTOLIC BLOOD PRESSURE: 133 MMHG | HEIGHT: 65 IN | BODY MASS INDEX: 28.5 KG/M2 | OXYGEN SATURATION: 99 % | RESPIRATION RATE: 12 BRPM | HEART RATE: 65 BPM | DIASTOLIC BLOOD PRESSURE: 91 MMHG

## 2022-12-31 DIAGNOSIS — K62.3 RECTAL PROLAPSE: Primary | ICD-10-CM

## 2022-12-31 LAB
ALBUMIN SERPL BCP-MCNC: 4 G/DL (ref 3.5–5.2)
ALP SERPL-CCNC: 132 U/L (ref 55–135)
ALT SERPL W/O P-5'-P-CCNC: 27 U/L (ref 10–44)
AMPHET+METHAMPHET UR QL: NEGATIVE
ANION GAP SERPL CALC-SCNC: 9 MMOL/L (ref 8–16)
AST SERPL-CCNC: 70 U/L (ref 10–40)
BARBITURATES UR QL SCN>200 NG/ML: NEGATIVE
BASOPHILS # BLD AUTO: 0.03 K/UL (ref 0–0.2)
BASOPHILS NFR BLD: 0.8 % (ref 0–1.9)
BENZODIAZ UR QL SCN>200 NG/ML: NEGATIVE
BILIRUB SERPL-MCNC: 0.5 MG/DL (ref 0.1–1)
BILIRUB UR QL STRIP: NEGATIVE
BUN SERPL-MCNC: 10 MG/DL (ref 8–23)
BZE UR QL SCN: NEGATIVE
CALCIUM SERPL-MCNC: 9 MG/DL (ref 8.7–10.5)
CANNABINOIDS UR QL SCN: NEGATIVE
CHLORIDE SERPL-SCNC: 102 MMOL/L (ref 95–110)
CLARITY UR: CLEAR
CO2 SERPL-SCNC: 23 MMOL/L (ref 23–29)
COLOR UR: YELLOW
CREAT SERPL-MCNC: 0.8 MG/DL (ref 0.5–1.4)
CREAT UR-MCNC: 91.4 MG/DL (ref 15–325)
DIFFERENTIAL METHOD: ABNORMAL
EOSINOPHIL # BLD AUTO: 0.3 K/UL (ref 0–0.5)
EOSINOPHIL NFR BLD: 8.9 % (ref 0–8)
ERYTHROCYTE [DISTWIDTH] IN BLOOD BY AUTOMATED COUNT: 13.4 % (ref 11.5–14.5)
EST. GFR  (NO RACE VARIABLE): >60 ML/MIN/1.73 M^2
ETHANOL SERPL-MCNC: <10 MG/DL
GLUCOSE SERPL-MCNC: 98 MG/DL (ref 70–110)
GLUCOSE UR QL STRIP: NEGATIVE
HCT VFR BLD AUTO: 39.1 % (ref 37–48.5)
HGB BLD-MCNC: 13.4 G/DL (ref 12–16)
HGB UR QL STRIP: NEGATIVE
HYALINE CASTS #/AREA URNS LPF: 4 /LPF
IMM GRANULOCYTES # BLD AUTO: 0 K/UL (ref 0–0.04)
IMM GRANULOCYTES NFR BLD AUTO: 0 % (ref 0–0.5)
KETONES UR QL STRIP: NEGATIVE
LEUKOCYTE ESTERASE UR QL STRIP: ABNORMAL
LIPASE SERPL-CCNC: 22 U/L (ref 4–60)
LYMPHOCYTES # BLD AUTO: 0.7 K/UL (ref 1–4.8)
LYMPHOCYTES NFR BLD: 18 % (ref 18–48)
MAGNESIUM SERPL-MCNC: 2.2 MG/DL (ref 1.6–2.6)
MCH RBC QN AUTO: 32.1 PG (ref 27–31)
MCHC RBC AUTO-ENTMCNC: 34.3 G/DL (ref 32–36)
MCV RBC AUTO: 94 FL (ref 82–98)
METHADONE UR QL SCN>300 NG/ML: NEGATIVE
MICROSCOPIC COMMENT: ABNORMAL
MONOCYTES # BLD AUTO: 0.5 K/UL (ref 0.3–1)
MONOCYTES NFR BLD: 12 % (ref 4–15)
NEUTROPHILS # BLD AUTO: 2.3 K/UL (ref 1.8–7.7)
NEUTROPHILS NFR BLD: 60.3 % (ref 38–73)
NITRITE UR QL STRIP: NEGATIVE
NRBC BLD-RTO: 0 /100 WBC
OPIATES UR QL SCN: ABNORMAL
PCP UR QL SCN>25 NG/ML: NEGATIVE
PH UR STRIP: 6 [PH] (ref 5–8)
PLATELET # BLD AUTO: 202 K/UL (ref 150–450)
PMV BLD AUTO: 9.6 FL (ref 9.2–12.9)
POTASSIUM SERPL-SCNC: ABNORMAL MMOL/L (ref 3.5–5.1)
PROT SERPL-MCNC: 8.4 G/DL (ref 6–8.4)
PROT UR QL STRIP: NEGATIVE
RBC # BLD AUTO: 4.17 M/UL (ref 4–5.4)
SODIUM SERPL-SCNC: 134 MMOL/L (ref 136–145)
SP GR UR STRIP: 1.02 (ref 1–1.03)
TOXICOLOGY INFORMATION: ABNORMAL
URN SPEC COLLECT METH UR: ABNORMAL
UROBILINOGEN UR STRIP-ACNC: NEGATIVE EU/DL
WBC # BLD AUTO: 3.83 K/UL (ref 3.9–12.7)
WBC #/AREA URNS HPF: 8 /HPF (ref 0–5)

## 2022-12-31 PROCEDURE — 63600175 PHARM REV CODE 636 W HCPCS: Performed by: EMERGENCY MEDICINE

## 2022-12-31 PROCEDURE — 81000 URINALYSIS NONAUTO W/SCOPE: CPT | Mod: 59 | Performed by: EMERGENCY MEDICINE

## 2022-12-31 PROCEDURE — 25000003 PHARM REV CODE 250: Performed by: EMERGENCY MEDICINE

## 2022-12-31 PROCEDURE — 45900 REDUCTION OF RECTAL PROLAPSE: CPT

## 2022-12-31 PROCEDURE — 96361 HYDRATE IV INFUSION ADD-ON: CPT | Mod: 59

## 2022-12-31 PROCEDURE — 80307 DRUG TEST PRSMV CHEM ANLYZR: CPT | Performed by: EMERGENCY MEDICINE

## 2022-12-31 PROCEDURE — 80053 COMPREHEN METABOLIC PANEL: CPT | Performed by: EMERGENCY MEDICINE

## 2022-12-31 PROCEDURE — 99900035 HC TECH TIME PER 15 MIN (STAT)

## 2022-12-31 PROCEDURE — 85025 COMPLETE CBC W/AUTO DIFF WBC: CPT | Performed by: EMERGENCY MEDICINE

## 2022-12-31 PROCEDURE — 82077 ASSAY SPEC XCP UR&BREATH IA: CPT | Performed by: EMERGENCY MEDICINE

## 2022-12-31 PROCEDURE — 27000221 HC OXYGEN, UP TO 24 HOURS

## 2022-12-31 PROCEDURE — 83735 ASSAY OF MAGNESIUM: CPT | Performed by: EMERGENCY MEDICINE

## 2022-12-31 PROCEDURE — 83690 ASSAY OF LIPASE: CPT | Performed by: EMERGENCY MEDICINE

## 2022-12-31 PROCEDURE — 94761 N-INVAS EAR/PLS OXIMETRY MLT: CPT

## 2022-12-31 PROCEDURE — 96374 THER/PROPH/DIAG INJ IV PUSH: CPT | Mod: 59

## 2022-12-31 PROCEDURE — 99152 MOD SED SAME PHYS/QHP 5/>YRS: CPT

## 2022-12-31 PROCEDURE — 99285 EMERGENCY DEPT VISIT HI MDM: CPT | Mod: 25

## 2022-12-31 PROCEDURE — 63600175 PHARM REV CODE 636 W HCPCS: Performed by: STUDENT IN AN ORGANIZED HEALTH CARE EDUCATION/TRAINING PROGRAM

## 2022-12-31 PROCEDURE — 96375 TX/PRO/DX INJ NEW DRUG ADDON: CPT

## 2022-12-31 RX ORDER — DROPERIDOL 2.5 MG/ML
1.25 INJECTION, SOLUTION INTRAMUSCULAR; INTRAVENOUS ONCE
Status: COMPLETED | OUTPATIENT
Start: 2022-12-31 | End: 2022-12-31

## 2022-12-31 RX ORDER — DIPHENHYDRAMINE HYDROCHLORIDE 50 MG/ML
25 INJECTION INTRAMUSCULAR; INTRAVENOUS
Status: COMPLETED | OUTPATIENT
Start: 2022-12-31 | End: 2022-12-31

## 2022-12-31 RX ORDER — MORPHINE SULFATE 4 MG/ML
4 INJECTION, SOLUTION INTRAMUSCULAR; INTRAVENOUS
Status: COMPLETED | OUTPATIENT
Start: 2022-12-31 | End: 2022-12-31

## 2022-12-31 RX ORDER — HYDROMORPHONE HYDROCHLORIDE 1 MG/ML
1 INJECTION, SOLUTION INTRAMUSCULAR; INTRAVENOUS; SUBCUTANEOUS
Status: COMPLETED | OUTPATIENT
Start: 2022-12-31 | End: 2022-12-31

## 2022-12-31 RX ORDER — KETOROLAC TROMETHAMINE 30 MG/ML
15 INJECTION, SOLUTION INTRAMUSCULAR; INTRAVENOUS
Status: COMPLETED | OUTPATIENT
Start: 2022-12-31 | End: 2022-12-31

## 2022-12-31 RX ORDER — SODIUM CHLORIDE 9 MG/ML
1000 INJECTION, SOLUTION INTRAVENOUS
Status: COMPLETED | OUTPATIENT
Start: 2022-12-31 | End: 2022-12-31

## 2022-12-31 RX ORDER — PROPOFOL 10 MG/ML
1.5 VIAL (ML) INTRAVENOUS ONCE
Status: COMPLETED | OUTPATIENT
Start: 2022-12-31 | End: 2022-12-31

## 2022-12-31 RX ADMIN — DIPHENHYDRAMINE HYDROCHLORIDE 25 MG: 50 INJECTION, SOLUTION INTRAMUSCULAR; INTRAVENOUS at 05:12

## 2022-12-31 RX ADMIN — SODIUM CHLORIDE 1000 ML: 0.9 INJECTION, SOLUTION INTRAVENOUS at 08:12

## 2022-12-31 RX ADMIN — HYDROMORPHONE HYDROCHLORIDE 1 MG: 1 INJECTION, SOLUTION INTRAMUSCULAR; INTRAVENOUS; SUBCUTANEOUS at 05:12

## 2022-12-31 RX ADMIN — MORPHINE SULFATE 4 MG: 4 INJECTION INTRAVENOUS at 06:12

## 2022-12-31 RX ADMIN — KETOROLAC TROMETHAMINE 15 MG: 30 INJECTION, SOLUTION INTRAMUSCULAR; INTRAVENOUS at 02:12

## 2022-12-31 RX ADMIN — DROPERIDOL 1.25 MG: 2.5 INJECTION, SOLUTION INTRAMUSCULAR; INTRAVENOUS at 05:12

## 2022-12-31 RX ADMIN — PROPOFOL 50 MG: 10 INJECTION, EMULSION INTRAVENOUS at 08:12
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
## 2022-12-31 NOTE — ED NOTES
Pt c/o redness to IV site and states that she needs a new IV and more IV pain meds; Pt refused to let RN try to flush with 1cc NS to check IV and stated that she wants a new IV and more meds; Dr Pond informed of pt status

## 2022-12-31 NOTE — ED NOTES
IV to RT AC flushed with 3cc NS without incident, IV slightly positional in AC but flushes with ease; IV meds given and flushed with 3cc of NS in between meds, no signs of infiltration noted and pt did not c/o any discomfort and tolerated well

## 2022-12-31 NOTE — DISCHARGE INSTRUCTIONS
If your rectum comes out again.  Please lay down in bed and push gently until it goes back in.  Return immediately if you get worse or if new problems develop.  Please follow-up with the colorectal surgeon above as soon as possible.

## 2023-01-03 ENCOUNTER — TELEPHONE (OUTPATIENT)
Dept: SURGERY | Facility: CLINIC | Age: 68
End: 2023-01-03
Payer: MEDICARE

## 2023-01-03 NOTE — TELEPHONE ENCOUNTER
----- Message from Amirah Dale MD sent at 12/31/2022  9:37 AM CST -----  Regarding: Clinic appt  Can you please reach out and schedule her for clinic for rectal prolapse?    Coreen- maybe you can screen her for any vaginal prolapse symptoms and if so she could be in Wed am combo slot?

## 2023-01-03 NOTE — TELEPHONE ENCOUNTER
Called patient, no answer, left a message. Will schedule 01/18 at 9:40am with Dr. Dale. Will mail appointment slip.

## 2023-01-09 ENCOUNTER — TELEPHONE (OUTPATIENT)
Dept: UROLOGY | Facility: CLINIC | Age: 68
End: 2023-01-09
Payer: MEDICARE

## 2023-01-09 NOTE — TELEPHONE ENCOUNTER
LVM to screen for vaginal prolapse symptoms.    ----- Message from Bobby Patton RN sent at 1/3/2023 11:55 AM CST -----  Regarding: FW: Clinic appt  Froilan Angela,    I called patient, left a message. Will schedule with Dr. Dale on 01/18 at 9:40am.     Thank you,    Alexei  ----- Message -----  From: Amirah Dale MD  Sent: 12/31/2022   9:38 AM CST  To: Coreen Crews RN, Suyapa Macario Staff  Subject: Clinic appt                                      Can you please reach out and schedule her for clinic for rectal prolapse?    Coreen- maybe you can screen her for any vaginal prolapse symptoms and if so she could be in Wed am combo slot?

## 2023-01-13 ENCOUNTER — TELEPHONE (OUTPATIENT)
Dept: FAMILY MEDICINE | Facility: CLINIC | Age: 68
End: 2023-01-13
Payer: MEDICARE

## 2023-01-13 NOTE — TELEPHONE ENCOUNTER
----- Message from Dinesh Hanley sent at 1/13/2023 12:27 PM CST -----  Regarding: CAll BAck  Name of Who is Calling: DARLIN PENA [[id number]]              What is the request in detail: Patient requesting a appointment for 01- @ . Next available appointment was 02- and denied appointment . Please assist              Can the clinic reply by [facility]: No              What Number to Call Back if not in [facility]:[phone]

## 2023-01-17 ENCOUNTER — TELEPHONE (OUTPATIENT)
Dept: SURGERY | Facility: CLINIC | Age: 68
End: 2023-01-17
Payer: MEDICARE

## 2023-01-17 ENCOUNTER — TELEPHONE (OUTPATIENT)
Dept: UROGYNECOLOGY | Facility: CLINIC | Age: 68
End: 2023-01-17
Payer: MEDICARE

## 2023-01-17 NOTE — TELEPHONE ENCOUNTER
Called patient regarding correct location for visit with Dr. Thayer/Suyapa tomorrow. No answer, left detailed message.

## 2023-01-18 ENCOUNTER — TELEPHONE (OUTPATIENT)
Dept: UROGYNECOLOGY | Facility: CLINIC | Age: 68
End: 2023-01-18
Payer: MEDICARE

## 2023-01-18 ENCOUNTER — TELEPHONE (OUTPATIENT)
Dept: SURGERY | Facility: CLINIC | Age: 68
End: 2023-01-18
Payer: MEDICARE

## 2023-01-18 NOTE — TELEPHONE ENCOUNTER
Spoke with spouse, [facility] in [place], and said she would be out by the weekend. He confirmed appointment could be rescheduled for next Wednesday with Dr. Dale.

## 2023-01-18 NOTE — TELEPHONE ENCOUNTER
Called patient regarding appointment with Dr. Thayer at 8:30am today. Call went straight to voicemail, unable to leave message.

## 2023-01-20 ENCOUNTER — HOSPITAL ENCOUNTER (EMERGENCY)
Facility: HOSPITAL | Age: 68
Discharge: PSYCHIATRIC OR SUBSTANCE USE FACILITY | End: 2023-01-20
Attending: EMERGENCY MEDICINE
Payer: MEDICARE

## 2023-01-20 VITALS
TEMPERATURE: 98 F | SYSTOLIC BLOOD PRESSURE: 155 MMHG | OXYGEN SATURATION: 98 % | DIASTOLIC BLOOD PRESSURE: 85 MMHG | HEART RATE: 89 BPM | BODY MASS INDEX: 28.32 KG/M2 | HEIGHT: 65 IN | WEIGHT: 170 LBS | RESPIRATION RATE: 16 BRPM

## 2023-01-20 DIAGNOSIS — R11.10 VOMITING: ICD-10-CM

## 2023-01-20 DIAGNOSIS — L03.90 CELLULITIS, UNSPECIFIED CELLULITIS SITE: ICD-10-CM

## 2023-01-20 DIAGNOSIS — R11.10 VOMITING, UNSPECIFIED VOMITING TYPE, UNSPECIFIED WHETHER NAUSEA PRESENT: Primary | ICD-10-CM

## 2023-01-20 LAB
ALBUMIN SERPL BCP-MCNC: 4.3 G/DL (ref 3.5–5.2)
ALP SERPL-CCNC: 236 U/L (ref 55–135)
ALT SERPL W/O P-5'-P-CCNC: 75 U/L (ref 10–44)
ANION GAP SERPL CALC-SCNC: 14 MMOL/L (ref 8–16)
APTT BLDCRRT: 23.1 SEC (ref 21–32)
AST SERPL-CCNC: 101 U/L (ref 10–40)
BASOPHILS # BLD AUTO: 0.02 K/UL (ref 0–0.2)
BASOPHILS NFR BLD: 0.5 % (ref 0–1.9)
BILIRUB SERPL-MCNC: 1 MG/DL (ref 0.1–1)
BUN SERPL-MCNC: 13 MG/DL (ref 8–23)
CALCIUM SERPL-MCNC: 9.9 MG/DL (ref 8.7–10.5)
CHLORIDE SERPL-SCNC: 96 MMOL/L (ref 95–110)
CO2 SERPL-SCNC: 20 MMOL/L (ref 23–29)
CREAT SERPL-MCNC: 0.6 MG/DL (ref 0.5–1.4)
DIFFERENTIAL METHOD: ABNORMAL
EOSINOPHIL # BLD AUTO: 0 K/UL (ref 0–0.5)
EOSINOPHIL NFR BLD: 0 % (ref 0–8)
ERYTHROCYTE [DISTWIDTH] IN BLOOD BY AUTOMATED COUNT: 13.8 % (ref 11.5–14.5)
EST. GFR  (NO RACE VARIABLE): >60 ML/MIN/1.73 M^2
GLUCOSE SERPL-MCNC: 133 MG/DL (ref 70–110)
HCT VFR BLD AUTO: 44.4 % (ref 37–48.5)
HGB BLD-MCNC: 15.1 G/DL (ref 12–16)
IMM GRANULOCYTES # BLD AUTO: 0.01 K/UL (ref 0–0.04)
IMM GRANULOCYTES NFR BLD AUTO: 0.2 % (ref 0–0.5)
INR PPP: 0.9 (ref 0.8–1.2)
LACTATE SERPL-SCNC: 1.2 MMOL/L (ref 0.5–2.2)
LYMPHOCYTES # BLD AUTO: 0.4 K/UL (ref 1–4.8)
LYMPHOCYTES NFR BLD: 10.7 % (ref 18–48)
MCH RBC QN AUTO: 30.9 PG (ref 27–31)
MCHC RBC AUTO-ENTMCNC: 34 G/DL (ref 32–36)
MCV RBC AUTO: 91 FL (ref 82–98)
MONOCYTES # BLD AUTO: 0.2 K/UL (ref 0.3–1)
MONOCYTES NFR BLD: 4.6 % (ref 4–15)
NEUTROPHILS # BLD AUTO: 3.5 K/UL (ref 1.8–7.7)
NEUTROPHILS NFR BLD: 84 % (ref 38–73)
NRBC BLD-RTO: 0 /100 WBC
PLATELET # BLD AUTO: 236 K/UL (ref 150–450)
PMV BLD AUTO: 10.8 FL (ref 9.2–12.9)
POTASSIUM SERPL-SCNC: 4.5 MMOL/L (ref 3.5–5.1)
PROT SERPL-MCNC: 9.3 G/DL (ref 6–8.4)
PROTHROMBIN TIME: 10.2 SEC (ref 9–12.5)
RBC # BLD AUTO: 4.88 M/UL (ref 4–5.4)
SODIUM SERPL-SCNC: 130 MMOL/L (ref 136–145)
TROPONIN I SERPL DL<=0.01 NG/ML-MCNC: <0.006 NG/ML (ref 0–0.03)
WBC # BLD AUTO: 4.13 K/UL (ref 3.9–12.7)

## 2023-01-20 PROCEDURE — 80053 COMPREHEN METABOLIC PANEL: CPT | Performed by: EMERGENCY MEDICINE

## 2023-01-20 PROCEDURE — 96375 TX/PRO/DX INJ NEW DRUG ADDON: CPT

## 2023-01-20 PROCEDURE — C9113 INJ PANTOPRAZOLE SODIUM, VIA: HCPCS | Performed by: EMERGENCY MEDICINE

## 2023-01-20 PROCEDURE — 25000003 PHARM REV CODE 250: Performed by: EMERGENCY MEDICINE

## 2023-01-20 PROCEDURE — 63600175 PHARM REV CODE 636 W HCPCS: Performed by: EMERGENCY MEDICINE

## 2023-01-20 PROCEDURE — 85610 PROTHROMBIN TIME: CPT | Performed by: EMERGENCY MEDICINE

## 2023-01-20 PROCEDURE — 85730 THROMBOPLASTIN TIME PARTIAL: CPT | Performed by: EMERGENCY MEDICINE

## 2023-01-20 PROCEDURE — 96374 THER/PROPH/DIAG INJ IV PUSH: CPT

## 2023-01-20 PROCEDURE — 96361 HYDRATE IV INFUSION ADD-ON: CPT

## 2023-01-20 PROCEDURE — 84484 ASSAY OF TROPONIN QUANT: CPT | Performed by: EMERGENCY MEDICINE

## 2023-01-20 PROCEDURE — 99285 EMERGENCY DEPT VISIT HI MDM: CPT | Mod: 25

## 2023-01-20 PROCEDURE — 83605 ASSAY OF LACTIC ACID: CPT | Performed by: EMERGENCY MEDICINE

## 2023-01-20 PROCEDURE — 85025 COMPLETE CBC W/AUTO DIFF WBC: CPT | Performed by: EMERGENCY MEDICINE

## 2023-01-20 RX ORDER — CLONIDINE HYDROCHLORIDE 0.1 MG/1
0.1 TABLET ORAL
Status: COMPLETED | OUTPATIENT
Start: 2023-01-20 | End: 2023-01-20

## 2023-01-20 RX ORDER — PANTOPRAZOLE SODIUM 40 MG/10ML
40 INJECTION, POWDER, LYOPHILIZED, FOR SOLUTION INTRAVENOUS
Status: COMPLETED | OUTPATIENT
Start: 2023-01-20 | End: 2023-01-20

## 2023-01-20 RX ORDER — SODIUM CHLORIDE 9 MG/ML
1000 INJECTION, SOLUTION INTRAVENOUS
Status: COMPLETED | OUTPATIENT
Start: 2023-01-20 | End: 2023-01-20

## 2023-01-20 RX ORDER — ONDANSETRON 2 MG/ML
4 INJECTION INTRAMUSCULAR; INTRAVENOUS
Status: COMPLETED | OUTPATIENT
Start: 2023-01-20 | End: 2023-01-20

## 2023-01-20 RX ADMIN — PANTOPRAZOLE SODIUM 40 MG: 40 INJECTION, POWDER, FOR SOLUTION INTRAVENOUS at 04:01

## 2023-01-20 RX ADMIN — SODIUM CHLORIDE 1000 ML: 9 INJECTION, SOLUTION INTRAVENOUS at 05:01

## 2023-01-20 RX ADMIN — CLONIDINE HYDROCHLORIDE 0.1 MG: 0.1 TABLET ORAL at 06:01

## 2023-01-20 RX ADMIN — ONDANSETRON 4 MG: 2 INJECTION INTRAMUSCULAR; INTRAVENOUS at 06:01

## 2023-01-20 NOTE — ASSESSMENT & PLAN NOTE
I am concerned she has signs of cirrhosis. Patient is aware of this and is anxious about diagnosis. Encouraged alcohol cessation for good.      Undermining Type: Entire Wound
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
## 2023-01-20 NOTE — ED TRIAGE NOTES
Pt presents to ER from [facility] with c/o vomiting black tarry emesis since 0600. Pt reports having diarrhea but it's not black. Pt denies chest pain at this time. Pt reports she fell on 01/19/2023 and hit her head nancy was not seen by an MD. Pt AAOx4.

## 2023-01-21 NOTE — DISCHARGE INSTRUCTIONS
Start protonix 40mg daily x 30 days. Start keflex 500mg PO BID x 7 days. Follow-up with your GI doctor within a week. Avoid NSAIDs and tylenol. Return for any new or worsening complaints.

## 2023-02-11 ENCOUNTER — HOSPITAL ENCOUNTER (EMERGENCY)
Facility: HOSPITAL | Age: 68
Discharge: HOME | End: 2023-02-11
Attending: INTERNAL MEDICINE
Payer: MEDICARE

## 2023-02-11 VITALS
HEIGHT: 66 IN | TEMPERATURE: 98 F | HEART RATE: 90 BPM | BODY MASS INDEX: 26.52 KG/M2 | WEIGHT: 165 LBS | SYSTOLIC BLOOD PRESSURE: 122 MMHG | DIASTOLIC BLOOD PRESSURE: 69 MMHG | OXYGEN SATURATION: 98 % | RESPIRATION RATE: 16 BRPM

## 2023-02-11 DIAGNOSIS — F22 DELUSIONS: Primary | ICD-10-CM

## 2023-02-11 DIAGNOSIS — F31.9 BIPOLAR 1 DISORDER: Chronic | ICD-10-CM

## 2023-02-11 LAB
B-HCG UR QL: NEGATIVE
BILIRUBIN, POC UA: NEGATIVE
BLOOD, POC UA: NEGATIVE
CLARITY, POC UA: CLEAR
COLOR, POC UA: YELLOW
CTP QC/QA: YES
GLUCOSE, POC UA: NEGATIVE
KETONES, POC UA: NEGATIVE
LEUKOCYTE EST, POC UA: NEGATIVE
NITRITE, POC UA: NEGATIVE
PH UR STRIP: 5.5 [PH]
PROTEIN, POC UA: NEGATIVE
SPECIFIC GRAVITY, POC UA: 1.01
UROBILINOGEN, POC UA: 0.2 E.U./DL

## 2023-02-11 PROCEDURE — 81025 URINE PREGNANCY TEST: CPT | Mod: ER | Performed by: INTERNAL MEDICINE

## 2023-02-11 PROCEDURE — 99282 EMERGENCY DEPT VISIT SF MDM: CPT | Mod: ER
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
## 2023-03-15 NOTE — ASSESSMENT & PLAN NOTE
Medication Appeal Initiation    We have initiated an appeal for the requested medication:  Medication: Wegovy - [place] Appeal Pending   Appeal Start Date:  3/15/2023  Insurance Company: [place] [employer] ([facility]) - Phone [phone] Fax [phone]  Comments:   Faxed appeal letter, provider chart notes and nutrition chart notes to pro appeals at [phone]. Fax confirmed sent.      Thank you,     Johnathan Daniels [facility]  Pharmacy Clinic [facility]  [email or web address]   Phone: [phone]  Fax: [phone]   Resume home meds.

## 2023-03-19 ENCOUNTER — HOSPITAL ENCOUNTER (EMERGENCY)
Facility: HOSPITAL | Age: 68
Discharge: HOME | End: 2023-03-19
Attending: EMERGENCY MEDICINE
Payer: MEDICARE

## 2023-03-19 VITALS
WEIGHT: 160 LBS | RESPIRATION RATE: 16 BRPM | SYSTOLIC BLOOD PRESSURE: 182 MMHG | OXYGEN SATURATION: 98 % | HEIGHT: 65 IN | TEMPERATURE: 98 F | DIASTOLIC BLOOD PRESSURE: 104 MMHG | HEART RATE: 76 BPM | BODY MASS INDEX: 26.66 KG/M2

## 2023-03-19 DIAGNOSIS — R11.2 NAUSEA, VOMITING, AND DIARRHEA: Primary | ICD-10-CM

## 2023-03-19 DIAGNOSIS — R19.7 NAUSEA, VOMITING, AND DIARRHEA: Primary | ICD-10-CM

## 2023-03-19 DIAGNOSIS — G89.29 CHRONIC BILATERAL LOW BACK PAIN WITHOUT SCIATICA: ICD-10-CM

## 2023-03-19 DIAGNOSIS — N39.0 URINARY TRACT INFECTION WITHOUT HEMATURIA, SITE UNSPECIFIED: ICD-10-CM

## 2023-03-19 DIAGNOSIS — M54.50 CHRONIC BILATERAL LOW BACK PAIN WITHOUT SCIATICA: ICD-10-CM

## 2023-03-19 DIAGNOSIS — K62.5 RECTAL BLEED: ICD-10-CM

## 2023-03-19 DIAGNOSIS — R10.84 GENERALIZED ABDOMINAL PAIN: ICD-10-CM

## 2023-03-19 DIAGNOSIS — R07.9 CHEST PAIN: ICD-10-CM

## 2023-03-19 LAB
ALBUMIN SERPL BCP-MCNC: 4.3 G/DL (ref 3.5–5.2)
ALLENS TEST: ABNORMAL
ALP SERPL-CCNC: 199 U/L (ref 55–135)
ALT SERPL W/O P-5'-P-CCNC: 31 U/L (ref 10–44)
ANION GAP SERPL CALC-SCNC: 13 MMOL/L (ref 8–16)
AST SERPL-CCNC: 34 U/L (ref 10–40)
BACTERIA #/AREA URNS HPF: ABNORMAL /HPF
BASOPHILS # BLD AUTO: 0.07 K/UL (ref 0–0.2)
BASOPHILS NFR BLD: 0.8 % (ref 0–1.9)
BILIRUB SERPL-MCNC: 1.2 MG/DL (ref 0.1–1)
BILIRUB UR QL STRIP: NEGATIVE
BNP SERPL-MCNC: 88 PG/ML (ref 0–99)
BUN SERPL-MCNC: 12 MG/DL (ref 8–23)
CALCIUM SERPL-MCNC: 9.9 MG/DL (ref 8.7–10.5)
CHLORIDE SERPL-SCNC: 104 MMOL/L (ref 95–110)
CLARITY UR: ABNORMAL
CO2 SERPL-SCNC: 22 MMOL/L (ref 23–29)
COLOR UR: YELLOW
CREAT SERPL-MCNC: 0.8 MG/DL (ref 0.5–1.4)
DELSYS: ABNORMAL
DIFFERENTIAL METHOD: ABNORMAL
EOSINOPHIL # BLD AUTO: 0 K/UL (ref 0–0.5)
EOSINOPHIL NFR BLD: 0.2 % (ref 0–8)
ERYTHROCYTE [DISTWIDTH] IN BLOOD BY AUTOMATED COUNT: 15 % (ref 11.5–14.5)
EST. GFR  (NO RACE VARIABLE): >60 ML/MIN/1.73 M^2
GLUCOSE SERPL-MCNC: 121 MG/DL (ref 70–110)
GLUCOSE UR QL STRIP: NEGATIVE
HCO3 UR-SCNC: 26 MMOL/L (ref 24–28)
HCT VFR BLD AUTO: 40.7 % (ref 37–48.5)
HGB BLD-MCNC: 13.8 G/DL (ref 12–16)
HGB UR QL STRIP: ABNORMAL
HYALINE CASTS #/AREA URNS LPF: 1 /LPF
IMM GRANULOCYTES # BLD AUTO: 0.03 K/UL (ref 0–0.04)
IMM GRANULOCYTES NFR BLD AUTO: 0.4 % (ref 0–0.5)
KETONES UR QL STRIP: ABNORMAL
LEUKOCYTE ESTERASE UR QL STRIP: ABNORMAL
LYMPHOCYTES # BLD AUTO: 0.8 K/UL (ref 1–4.8)
LYMPHOCYTES NFR BLD: 9.8 % (ref 18–48)
MCH RBC QN AUTO: 31.1 PG (ref 27–31)
MCHC RBC AUTO-ENTMCNC: 33.9 G/DL (ref 32–36)
MCV RBC AUTO: 92 FL (ref 82–98)
MICROSCOPIC COMMENT: ABNORMAL
MONOCYTES # BLD AUTO: 0.8 K/UL (ref 0.3–1)
MONOCYTES NFR BLD: 9.1 % (ref 4–15)
NEUTROPHILS # BLD AUTO: 6.8 K/UL (ref 1.8–7.7)
NEUTROPHILS NFR BLD: 79.7 % (ref 38–73)
NITRITE UR QL STRIP: NEGATIVE
NRBC BLD-RTO: 0 /100 WBC
PCO2 BLDA: 38.2 MMHG (ref 35–45)
PH SMN: 7.44 [PH] (ref 7.35–7.45)
PH UR STRIP: 6 [PH] (ref 5–8)
PLATELET # BLD AUTO: 333 K/UL (ref 150–450)
PMV BLD AUTO: 9.9 FL (ref 9.2–12.9)
PO2 BLDA: 30 MMHG (ref 40–60)
POC BE: 2 MMOL/L
POC SATURATED O2: 59 % (ref 95–100)
POC TCO2: 27 MMOL/L (ref 24–29)
POTASSIUM SERPL-SCNC: 3 MMOL/L (ref 3.5–5.1)
PROT SERPL-MCNC: 9.3 G/DL (ref 6–8.4)
PROT UR QL STRIP: ABNORMAL
RBC # BLD AUTO: 4.44 M/UL (ref 4–5.4)
RBC #/AREA URNS HPF: 38 /HPF (ref 0–4)
SAMPLE: ABNORMAL
SITE: ABNORMAL
SODIUM SERPL-SCNC: 139 MMOL/L (ref 136–145)
SP GR UR STRIP: >1.03 (ref 1–1.03)
SQUAMOUS #/AREA URNS HPF: 1 /HPF
TROPONIN I SERPL DL<=0.01 NG/ML-MCNC: 0.02 NG/ML (ref 0–0.03)
URN SPEC COLLECT METH UR: ABNORMAL
UROBILINOGEN UR STRIP-ACNC: NEGATIVE EU/DL
WBC # BLD AUTO: 8.49 K/UL (ref 3.9–12.7)
WBC #/AREA URNS HPF: 24 /HPF (ref 0–5)

## 2023-03-19 PROCEDURE — 82803 BLOOD GASES ANY COMBINATION: CPT

## 2023-03-19 PROCEDURE — 93005 ELECTROCARDIOGRAM TRACING: CPT

## 2023-03-19 PROCEDURE — 25000003 PHARM REV CODE 250: Performed by: EMERGENCY MEDICINE

## 2023-03-19 PROCEDURE — 99900035 HC TECH TIME PER 15 MIN (STAT)

## 2023-03-19 PROCEDURE — 87086 URINE CULTURE/COLONY COUNT: CPT | Performed by: EMERGENCY MEDICINE

## 2023-03-19 PROCEDURE — 63600175 PHARM REV CODE 636 W HCPCS: Performed by: EMERGENCY MEDICINE

## 2023-03-19 PROCEDURE — 96374 THER/PROPH/DIAG INJ IV PUSH: CPT

## 2023-03-19 PROCEDURE — 96361 HYDRATE IV INFUSION ADD-ON: CPT

## 2023-03-19 PROCEDURE — 93010 EKG 12-LEAD: ICD-10-PCS | Mod: ,,, | Performed by: INTERNAL MEDICINE

## 2023-03-19 PROCEDURE — 81000 URINALYSIS NONAUTO W/SCOPE: CPT | Performed by: EMERGENCY MEDICINE

## 2023-03-19 PROCEDURE — 80053 COMPREHEN METABOLIC PANEL: CPT | Performed by: EMERGENCY MEDICINE

## 2023-03-19 PROCEDURE — 96375 TX/PRO/DX INJ NEW DRUG ADDON: CPT

## 2023-03-19 PROCEDURE — 83880 ASSAY OF NATRIURETIC PEPTIDE: CPT | Performed by: EMERGENCY MEDICINE

## 2023-03-19 PROCEDURE — 93010 ELECTROCARDIOGRAM REPORT: CPT | Mod: ,,, | Performed by: INTERNAL MEDICINE

## 2023-03-19 PROCEDURE — 84484 ASSAY OF TROPONIN QUANT: CPT | Performed by: EMERGENCY MEDICINE

## 2023-03-19 PROCEDURE — 99285 EMERGENCY DEPT VISIT HI MDM: CPT | Mod: 25

## 2023-03-19 PROCEDURE — 85025 COMPLETE CBC W/AUTO DIFF WBC: CPT | Performed by: EMERGENCY MEDICINE

## 2023-03-19 RX ORDER — CEPHALEXIN 500 MG/1
500 CAPSULE ORAL EVERY 6 HOURS
Qty: 28 CAPSULE | Refills: 0 | Status: SHIPPED | OUTPATIENT
Start: 2023-03-19 | End: 2023-03-26

## 2023-03-19 RX ORDER — MORPHINE SULFATE 4 MG/ML
4 INJECTION, SOLUTION INTRAMUSCULAR; INTRAVENOUS
Status: COMPLETED | OUTPATIENT
Start: 2023-03-19 | End: 2023-03-19

## 2023-03-19 RX ORDER — ONDANSETRON 2 MG/ML
4 INJECTION INTRAMUSCULAR; INTRAVENOUS
Status: COMPLETED | OUTPATIENT
Start: 2023-03-19 | End: 2023-03-19

## 2023-03-19 RX ORDER — DIPHENOXYLATE HYDROCHLORIDE AND ATROPINE SULFATE 2.5; .025 MG/1; MG/1
1 TABLET ORAL
Status: COMPLETED | OUTPATIENT
Start: 2023-03-19 | End: 2023-03-19

## 2023-03-19 RX ORDER — OXYCODONE HYDROCHLORIDE 5 MG/1
15 TABLET ORAL
Status: COMPLETED | OUTPATIENT
Start: 2023-03-19 | End: 2023-03-19

## 2023-03-19 RX ORDER — CEPHALEXIN 250 MG/1
500 CAPSULE ORAL
Status: COMPLETED | OUTPATIENT
Start: 2023-03-19 | End: 2023-03-19

## 2023-03-19 RX ORDER — ONDANSETRON 4 MG/1
4 TABLET, ORALLY DISINTEGRATING ORAL EVERY 6 HOURS PRN
Qty: 10 TABLET | Refills: 0 | Status: SHIPPED | OUTPATIENT
Start: 2023-03-19 | End: 2023-11-12

## 2023-03-19 RX ORDER — PROMETHAZINE HYDROCHLORIDE 6.25 MG/5ML
12.5 SYRUP ORAL
Status: COMPLETED | OUTPATIENT
Start: 2023-03-19 | End: 2023-03-19

## 2023-03-19 RX ORDER — DIPHENOXYLATE HYDROCHLORIDE AND ATROPINE SULFATE 2.5; .025 MG/1; MG/1
1 TABLET ORAL 4 TIMES DAILY PRN
Qty: 12 TABLET | Refills: 0 | Status: SHIPPED | OUTPATIENT
Start: 2023-03-19 | End: 2023-11-12

## 2023-03-19 RX ADMIN — ONDANSETRON HYDROCHLORIDE 4 MG: 2 SOLUTION INTRAMUSCULAR; INTRAVENOUS at 03:03

## 2023-03-19 RX ADMIN — MORPHINE SULFATE 4 MG: 4 INJECTION, SOLUTION INTRAMUSCULAR; INTRAVENOUS at 03:03

## 2023-03-19 RX ADMIN — OXYCODONE HYDROCHLORIDE 15 MG: 5 TABLET ORAL at 06:03

## 2023-03-19 RX ADMIN — CEPHALEXIN 500 MG: 250 CAPSULE ORAL at 07:03

## 2023-03-19 RX ADMIN — DIPHENOXYLATE HYDROCHLORIDE AND ATROPINE SULFATE 1 TABLET: 2.5; .025 TABLET ORAL at 03:03

## 2023-03-19 RX ADMIN — SODIUM CHLORIDE, POTASSIUM CHLORIDE, SODIUM LACTATE AND CALCIUM CHLORIDE 1000 ML: 600; 310; 30; 20 INJECTION, SOLUTION INTRAVENOUS at 03:03

## 2023-03-19 RX ADMIN — PROMETHAZINE HYDROCHLORIDE 12.5 MG: 6.25 SOLUTION ORAL at 06:03

## 2023-03-19 NOTE — ED NOTES
"Pt to room 22 from home BIB EMS For eval of rectal pain, n/v. Pt reports hx of rectal prolapse. Per EMS, possibly red tinged emesis. Hx of ETOH abuse, pt reports last drinl "few days ago." Pt also reports takes pain pills at home.   Pt poor historian and restless at this time.   "

## 2023-03-19 NOTE — ED NOTES
PIV to R forearm infiltrated, MD aware, compress placed. Pt given water for PO challenge -no diarrhea since arrival to ER
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
## 2023-03-20 NOTE — DISCHARGE INSTRUCTIONS
Ms. Arevalo spent the day in the ER and will be unable to go to court on 3/19/2023.         Danish Cordero III, M.D.

## 2023-03-21 LAB — BACTERIA UR CULT: NORMAL

## 2023-04-18 NOTE — ASSESSMENT & PLAN NOTE
From [facility]. This has resolved. She is alert and oriented. Did not try to hurt herself.        Ivana Nj)  Pediatric Gastroenterology; Pediatrics  [address], [place] [address]  Phone: [phone]  Fax: [phone]  Follow Up Time:

## 2023-08-07 NOTE — ASSESSMENT & PLAN NOTE
- has hx of Hep C tx with Harvoni  - also with history of alcohol abuse  - noted ascites on surgery today  - Aldactone 50 mg daily/Lasix 20 mg daily  - titrate as tolerated     Please follow up with your primary care physician within 24-72 hours and return immediately if symptoms worsen. PLEASE RETURN IN 7 DAYS FOR SUTURE REMOVAL.     Laceration    WHAT YOU NEED TO KNOW:    A laceration is an injury to the skin and the soft tissue underneath it. Lacerations happen when you are cut or hit by something. They can happen anywhere on the body.     DISCHARGE INSTRUCTIONS:    Return to the emergency department if:     You have heavy bleeding or bleeding that does not stop after 10 minutes of holding firm, direct pressure over the wound.       Your wound opens up.     Contact your healthcare provider if:     You have a fever or chills.       Your laceration is red, warm, or swollen.      You have red streaks on your skin coming from your wound.      You have white or yellow drainage from the wound that smells bad.      You have pain that gets worse, even after treatment.       You have questions or concerns about your condition or care.     Medicines:     Prescription pain medicine may be given. Ask how to take this medicine safely.       Antibiotics help treat or prevent a bacterial infection.       Take your medicine as directed. Contact your healthcare provider if you think your medicine is not helping or if you have side effects. Tell him or her if you are allergic to any medicine. Keep a list of the medicines, vitamins, and herbs you take. Include the amounts, and when and why you take them. Bring the list or the pill bottles to follow-up visits. Carry your medicine list with you in case of an emergency.    Care for your wound as directed:     Do not get your wound wet until your healthcare provider says it is okay. Do not soak your wound in water. Do not go swimming until your healthcare provider says it is okay. Carefully wash the wound with soap and water. Gently pat the area dry or allow it to air dry.       Change your bandages when they get wet, dirty, or after washing. Apply new, clean bandages as directed. Do not apply elastic bandages or tape too tight. Do not put powders or lotions over your incision.       Apply antibiotic ointment as directed. Your healthcare provider may give you antibiotic ointment to put over your wound if you have stitches. If you have strips of tape over your incision, let them dry up and fall off on their own. If they do not fall off within 14 days, gently remove them. If you have glue over your wound, do not remove or pick at it. If your glue comes off, do not replace it with glue that you have at home.       Check your wound every day for signs of infection such as swelling, redness, or pus.     Self-care:     Apply ice on your wound for 15 to 20 minutes every hour or as directed. Use an ice pack, or put crushed ice in a plastic bag. Cover it with a towel. Ice helps prevent tissue damage and decreases swelling and pain.      Use a splint as directed. A splint will decrease movement and stress on your wound. It may help it heal faster. A splint may be used for lacerations over joints or areas of your body that bend. Ask your healthcare provider how to apply and remove a splint.       Decrease scarring of your wound by applying ointments as directed. Do not apply ointments until your healthcare provider says it is okay. You may need to wait until your wound is healed. Ask which ointment to buy and how often to use it. After your wound is healed, use sunscreen over the area when you are out in the sun. You should do this for at least 6 months to 1 year after your injury.     Follow up with your healthcare provider as directed: You may need to follow up in 24 to 48 hours to have your wound checked for infection. You will need to return in 3 to 14 days if you have stitches or staples so they can be removed. Care for your wound as directed to prevent infection and help it heal. Write down your questions so you remember to ask them during your visits.       © Copyright [employer] 2019 All illustrations and images included in CareNotes are the copyrighted property of [employer].A[employer]., Inc[employer] or [employer].

## 2023-11-02 ENCOUNTER — ANESTHESIA EVENT (OUTPATIENT)
Dept: ENDOSCOPY | Facility: HOSPITAL | Age: 68
End: 2023-11-02
Payer: MEDICARE

## 2023-11-02 ENCOUNTER — ANESTHESIA (OUTPATIENT)
Dept: ENDOSCOPY | Facility: HOSPITAL | Age: 68
End: 2023-11-02
Payer: OTHER GOVERNMENT

## 2023-11-02 ENCOUNTER — HOSPITAL ENCOUNTER (OUTPATIENT)
Facility: HOSPITAL | Age: 68
Discharge: HOME | End: 2023-11-03
Attending: EMERGENCY MEDICINE | Admitting: STUDENT IN AN ORGANIZED HEALTH CARE EDUCATION/TRAINING PROGRAM
Payer: OTHER GOVERNMENT

## 2023-11-02 ENCOUNTER — PATIENT OUTREACH (OUTPATIENT)
Dept: ADMINISTRATIVE | Facility: OTHER | Age: 68
End: 2023-11-02
Payer: OTHER GOVERNMENT

## 2023-11-02 DIAGNOSIS — K21.9 GASTROESOPHAGEAL REFLUX DISEASE, UNSPECIFIED WHETHER ESOPHAGITIS PRESENT: ICD-10-CM

## 2023-11-02 DIAGNOSIS — K92.0 HEMATEMESIS WITH NAUSEA: Primary | ICD-10-CM

## 2023-11-02 DIAGNOSIS — R11.10 VOMITING: ICD-10-CM

## 2023-11-02 PROBLEM — E66.9 OBESITY (BMI 30-39.9): Status: ACTIVE | Noted: 2023-11-02

## 2023-11-02 PROBLEM — T76.91XA SUSPECTED ELDER ABUSE: Status: ACTIVE | Noted: 2023-11-02

## 2023-11-02 LAB
ABO + RH BLD: NORMAL
ALBUMIN SERPL BCP-MCNC: 4 G/DL (ref 3.5–5.2)
ALLENS TEST: ABNORMAL
ALP SERPL-CCNC: 199 U/L (ref 55–135)
ALT SERPL W/O P-5'-P-CCNC: 83 U/L (ref 10–44)
ANION GAP SERPL CALC-SCNC: 12 MMOL/L (ref 8–16)
ANION GAP SERPL CALC-SCNC: 15 MMOL/L (ref 8–16)
AST SERPL-CCNC: 140 U/L (ref 10–40)
BASOPHILS # BLD AUTO: 0.03 K/UL (ref 0–0.2)
BASOPHILS # BLD AUTO: 0.05 K/UL (ref 0–0.2)
BASOPHILS # BLD AUTO: 0.05 K/UL (ref 0–0.2)
BASOPHILS NFR BLD: 0.6 % (ref 0–1.9)
BASOPHILS NFR BLD: 1.1 % (ref 0–1.9)
BASOPHILS NFR BLD: 1.1 % (ref 0–1.9)
BILIRUB SERPL-MCNC: 0.6 MG/DL (ref 0.1–1)
BILIRUB UR QL STRIP: NEGATIVE
BLD GP AB SCN CELLS X3 SERPL QL: NORMAL
BUN SERPL-MCNC: 15 MG/DL (ref 8–23)
BUN SERPL-MCNC: 16 MG/DL (ref 6–30)
CALCIUM SERPL-MCNC: 9.8 MG/DL (ref 8.7–10.5)
CHLORIDE SERPL-SCNC: 100 MMOL/L (ref 95–110)
CHLORIDE SERPL-SCNC: 100 MMOL/L (ref 95–110)
CLARITY UR: CLEAR
CO2 SERPL-SCNC: 22 MMOL/L (ref 23–29)
COLOR UR: YELLOW
CREAT SERPL-MCNC: 0.4 MG/DL (ref 0.5–1.4)
CREAT SERPL-MCNC: 0.7 MG/DL (ref 0.5–1.4)
CTP QC/QA: YES
CTP QC/QA: YES
DELSYS: ABNORMAL
DIFFERENTIAL METHOD: ABNORMAL
EOSINOPHIL # BLD AUTO: 0 K/UL (ref 0–0.5)
EOSINOPHIL # BLD AUTO: 0.1 K/UL (ref 0–0.5)
EOSINOPHIL # BLD AUTO: 0.1 K/UL (ref 0–0.5)
EOSINOPHIL NFR BLD: 0.6 % (ref 0–8)
EOSINOPHIL NFR BLD: 1.3 % (ref 0–8)
EOSINOPHIL NFR BLD: 3.1 % (ref 0–8)
ERYTHROCYTE [DISTWIDTH] IN BLOOD BY AUTOMATED COUNT: 13.4 % (ref 11.5–14.5)
ERYTHROCYTE [DISTWIDTH] IN BLOOD BY AUTOMATED COUNT: 13.5 % (ref 11.5–14.5)
ERYTHROCYTE [DISTWIDTH] IN BLOOD BY AUTOMATED COUNT: 13.6 % (ref 11.5–14.5)
EST. GFR  (NO RACE VARIABLE): >60 ML/MIN/1.73 M^2
GLUCOSE SERPL-MCNC: 131 MG/DL (ref 70–110)
GLUCOSE SERPL-MCNC: 136 MG/DL (ref 70–110)
GLUCOSE UR QL STRIP: NEGATIVE
HCT VFR BLD AUTO: 44.6 % (ref 37–48.5)
HCT VFR BLD AUTO: 48.6 % (ref 37–48.5)
HCT VFR BLD AUTO: 51.2 % (ref 37–48.5)
HCT VFR BLD CALC: 51 %PCV (ref 36–54)
HGB BLD-MCNC: 14.7 G/DL (ref 12–16)
HGB BLD-MCNC: 15.9 G/DL (ref 12–16)
HGB BLD-MCNC: 16.2 G/DL (ref 12–16)
HGB UR QL STRIP: NEGATIVE
IMM GRANULOCYTES # BLD AUTO: 0.01 K/UL (ref 0–0.04)
IMM GRANULOCYTES NFR BLD AUTO: 0.2 % (ref 0–0.5)
INR PPP: 1 (ref 0.8–1.2)
KETONES UR QL STRIP: NEGATIVE
LEUKOCYTE ESTERASE UR QL STRIP: NEGATIVE
LIPASE SERPL-CCNC: 24 U/L (ref 4–60)
LYMPHOCYTES # BLD AUTO: 0.8 K/UL (ref 1–4.8)
LYMPHOCYTES # BLD AUTO: 0.9 K/UL (ref 1–4.8)
LYMPHOCYTES # BLD AUTO: 1.1 K/UL (ref 1–4.8)
LYMPHOCYTES NFR BLD: 16.9 % (ref 18–48)
LYMPHOCYTES NFR BLD: 18.6 % (ref 18–48)
LYMPHOCYTES NFR BLD: 22.9 % (ref 18–48)
MCH RBC QN AUTO: 31.9 PG (ref 27–31)
MCH RBC QN AUTO: 32.1 PG (ref 27–31)
MCH RBC QN AUTO: 32.5 PG (ref 27–31)
MCHC RBC AUTO-ENTMCNC: 31.1 G/DL (ref 32–36)
MCHC RBC AUTO-ENTMCNC: 33 G/DL (ref 32–36)
MCHC RBC AUTO-ENTMCNC: 33.3 G/DL (ref 32–36)
MCV RBC AUTO: 103 FL (ref 82–98)
MCV RBC AUTO: 97 FL (ref 82–98)
MCV RBC AUTO: 97 FL (ref 82–98)
MONOCYTES # BLD AUTO: 0.3 K/UL (ref 0.3–1)
MONOCYTES # BLD AUTO: 0.4 K/UL (ref 0.3–1)
MONOCYTES # BLD AUTO: 0.5 K/UL (ref 0.3–1)
MONOCYTES NFR BLD: 10.5 % (ref 4–15)
MONOCYTES NFR BLD: 6.8 % (ref 4–15)
MONOCYTES NFR BLD: 7.8 % (ref 4–15)
NEUTROPHILS # BLD AUTO: 2.9 K/UL (ref 1.8–7.7)
NEUTROPHILS # BLD AUTO: 3.3 K/UL (ref 1.8–7.7)
NEUTROPHILS # BLD AUTO: 3.5 K/UL (ref 1.8–7.7)
NEUTROPHILS NFR BLD: 62.2 % (ref 38–73)
NEUTROPHILS NFR BLD: 72.7 % (ref 38–73)
NEUTROPHILS NFR BLD: 73.2 % (ref 38–73)
NITRITE UR QL STRIP: NEGATIVE
NRBC BLD-RTO: 0 /100 WBC
PH UR STRIP: 8 [PH] (ref 5–8)
PLATELET # BLD AUTO: 252 K/UL (ref 150–450)
PLATELET # BLD AUTO: 286 K/UL (ref 150–450)
PLATELET # BLD AUTO: 300 K/UL (ref 150–450)
PMV BLD AUTO: 10 FL (ref 9.2–12.9)
PMV BLD AUTO: 10.5 FL (ref 9.2–12.9)
PMV BLD AUTO: 10.5 FL (ref 9.2–12.9)
POC IONIZED CALCIUM: 1.15 MMOL/L (ref 1.06–1.42)
POC MOLECULAR INFLUENZA A AGN: NEGATIVE
POC MOLECULAR INFLUENZA B AGN: NEGATIVE
POC TCO2 (MEASURED): 25 MMOL/L (ref 23–29)
POTASSIUM BLD-SCNC: 4 MMOL/L (ref 3.5–5.1)
POTASSIUM SERPL-SCNC: 4.1 MMOL/L (ref 3.5–5.1)
PROT SERPL-MCNC: 8.7 G/DL (ref 6–8.4)
PROT UR QL STRIP: ABNORMAL
PROTHROMBIN TIME: 10.5 SEC (ref 9–12.5)
RBC # BLD AUTO: 4.58 M/UL (ref 4–5.4)
RBC # BLD AUTO: 4.98 M/UL (ref 4–5.4)
RBC # BLD AUTO: 4.99 M/UL (ref 4–5.4)
SAMPLE: ABNORMAL
SARS-COV-2 RDRP RESP QL NAA+PROBE: NEGATIVE
SITE: ABNORMAL
SODIUM BLD-SCNC: 135 MMOL/L (ref 136–145)
SODIUM SERPL-SCNC: 134 MMOL/L (ref 136–145)
SP GR UR STRIP: 1.02 (ref 1–1.03)
SPECIMEN OUTDATE: NORMAL
URN SPEC COLLECT METH UR: ABNORMAL
UROBILINOGEN UR STRIP-ACNC: NEGATIVE EU/DL
WBC # BLD AUTO: 4.5 K/UL (ref 3.9–12.7)
WBC # BLD AUTO: 4.59 K/UL (ref 3.9–12.7)
WBC # BLD AUTO: 4.74 K/UL (ref 3.9–12.7)

## 2023-11-02 PROCEDURE — 63600175 PHARM REV CODE 636 W HCPCS: Performed by: HOSPITALIST

## 2023-11-02 PROCEDURE — G0378 HOSPITAL OBSERVATION PER HR: HCPCS

## 2023-11-02 PROCEDURE — 96375 TX/PRO/DX INJ NEW DRUG ADDON: CPT

## 2023-11-02 PROCEDURE — 87522 HEPATITIS C REVRS TRNSCRPJ: CPT | Performed by: NURSE PRACTITIONER

## 2023-11-02 PROCEDURE — 82565 ASSAY OF CREATININE: CPT

## 2023-11-02 PROCEDURE — 93010 EKG 12-LEAD: ICD-10-PCS | Mod: ,,, | Performed by: INTERNAL MEDICINE

## 2023-11-02 PROCEDURE — 87635 SARS-COV-2 COVID-19 AMP PRB: CPT | Performed by: STUDENT IN AN ORGANIZED HEALTH CARE EDUCATION/TRAINING PROGRAM

## 2023-11-02 PROCEDURE — D9220A PRA ANESTHESIA: Mod: ANES,,, | Performed by: ANESTHESIOLOGY

## 2023-11-02 PROCEDURE — 85025 COMPLETE CBC W/AUTO DIFF WBC: CPT | Mod: 91 | Performed by: STUDENT IN AN ORGANIZED HEALTH CARE EDUCATION/TRAINING PROGRAM

## 2023-11-02 PROCEDURE — 84132 ASSAY OF SERUM POTASSIUM: CPT

## 2023-11-02 PROCEDURE — 96366 THER/PROPH/DIAG IV INF ADDON: CPT | Mod: 59

## 2023-11-02 PROCEDURE — 43235 PR EGD, FLEX, DIAGNOSTIC: ICD-10-PCS | Mod: ,,, | Performed by: INTERNAL MEDICINE

## 2023-11-02 PROCEDURE — 99285 EMERGENCY DEPT VISIT HI MDM: CPT | Mod: 25

## 2023-11-02 PROCEDURE — 99205 PR OFFICE/OUTPT VISIT, NEW, LEVL V, 60-74 MIN: ICD-10-PCS | Mod: 25,,, | Performed by: NURSE PRACTITIONER

## 2023-11-02 PROCEDURE — 25000003 PHARM REV CODE 250: Performed by: EMERGENCY MEDICINE

## 2023-11-02 PROCEDURE — 25000003 PHARM REV CODE 250: Performed by: STUDENT IN AN ORGANIZED HEALTH CARE EDUCATION/TRAINING PROGRAM

## 2023-11-02 PROCEDURE — C9113 INJ PANTOPRAZOLE SODIUM, VIA: HCPCS | Performed by: EMERGENCY MEDICINE

## 2023-11-02 PROCEDURE — 37000008 HC ANESTHESIA 1ST 15 MINUTES: Performed by: INTERNAL MEDICINE

## 2023-11-02 PROCEDURE — D9220A PRA ANESTHESIA: Mod: CRNA,,, | Performed by: STUDENT IN AN ORGANIZED HEALTH CARE EDUCATION/TRAINING PROGRAM

## 2023-11-02 PROCEDURE — 96376 TX/PRO/DX INJ SAME DRUG ADON: CPT

## 2023-11-02 PROCEDURE — 99205 OFFICE O/P NEW HI 60 MIN: CPT | Mod: 25,,, | Performed by: NURSE PRACTITIONER

## 2023-11-02 PROCEDURE — 63600175 PHARM REV CODE 636 W HCPCS: Performed by: EMERGENCY MEDICINE

## 2023-11-02 PROCEDURE — 37000009 HC ANESTHESIA EA ADD 15 MINS: Performed by: INTERNAL MEDICINE

## 2023-11-02 PROCEDURE — 83690 ASSAY OF LIPASE: CPT | Performed by: STUDENT IN AN ORGANIZED HEALTH CARE EDUCATION/TRAINING PROGRAM

## 2023-11-02 PROCEDURE — 82330 ASSAY OF CALCIUM: CPT

## 2023-11-02 PROCEDURE — 25000003 PHARM REV CODE 250

## 2023-11-02 PROCEDURE — 87502 INFLUENZA DNA AMP PROBE: CPT

## 2023-11-02 PROCEDURE — 93010 ELECTROCARDIOGRAM REPORT: CPT | Mod: ,,, | Performed by: INTERNAL MEDICINE

## 2023-11-02 PROCEDURE — 43235 EGD DIAGNOSTIC BRUSH WASH: CPT | Performed by: INTERNAL MEDICINE

## 2023-11-02 PROCEDURE — D9220A PRA ANESTHESIA: ICD-10-PCS | Mod: ANES,,, | Performed by: ANESTHESIOLOGY

## 2023-11-02 PROCEDURE — 85025 COMPLETE CBC W/AUTO DIFF WBC: CPT | Mod: 91

## 2023-11-02 PROCEDURE — 81003 URINALYSIS AUTO W/O SCOPE: CPT | Performed by: STUDENT IN AN ORGANIZED HEALTH CARE EDUCATION/TRAINING PROGRAM

## 2023-11-02 PROCEDURE — 96375 TX/PRO/DX INJ NEW DRUG ADDON: CPT | Mod: 59

## 2023-11-02 PROCEDURE — G0427 INPT/ED TELECONSULT70: HCPCS | Mod: 95,,, | Performed by: PSYCHIATRY & NEUROLOGY

## 2023-11-02 PROCEDURE — G0427 PR INPT TELEHEALTH CON 70/>M: ICD-10-PCS | Mod: 95,,, | Performed by: PSYCHIATRY & NEUROLOGY

## 2023-11-02 PROCEDURE — 85014 HEMATOCRIT: CPT

## 2023-11-02 PROCEDURE — 96365 THER/PROPH/DIAG IV INF INIT: CPT

## 2023-11-02 PROCEDURE — 84295 ASSAY OF SERUM SODIUM: CPT

## 2023-11-02 PROCEDURE — 63600175 PHARM REV CODE 636 W HCPCS

## 2023-11-02 PROCEDURE — 43235 EGD DIAGNOSTIC BRUSH WASH: CPT | Mod: ,,, | Performed by: INTERNAL MEDICINE

## 2023-11-02 PROCEDURE — D9220A PRA ANESTHESIA: ICD-10-PCS | Mod: CRNA,,, | Performed by: STUDENT IN AN ORGANIZED HEALTH CARE EDUCATION/TRAINING PROGRAM

## 2023-11-02 PROCEDURE — 93005 ELECTROCARDIOGRAM TRACING: CPT

## 2023-11-02 PROCEDURE — 85610 PROTHROMBIN TIME: CPT | Performed by: EMERGENCY MEDICINE

## 2023-11-02 PROCEDURE — 80074 ACUTE HEPATITIS PANEL: CPT | Performed by: NURSE PRACTITIONER

## 2023-11-02 PROCEDURE — 63600175 PHARM REV CODE 636 W HCPCS: Performed by: STUDENT IN AN ORGANIZED HEALTH CARE EDUCATION/TRAINING PROGRAM

## 2023-11-02 PROCEDURE — 86850 RBC ANTIBODY SCREEN: CPT | Performed by: EMERGENCY MEDICINE

## 2023-11-02 PROCEDURE — 80053 COMPREHEN METABOLIC PANEL: CPT | Performed by: STUDENT IN AN ORGANIZED HEALTH CARE EDUCATION/TRAINING PROGRAM

## 2023-11-02 RX ORDER — ATENOLOL 25 MG/1
25 TABLET ORAL DAILY
Status: DISCONTINUED | OUTPATIENT
Start: 2023-11-02 | End: 2023-11-03 | Stop reason: HOSPADM

## 2023-11-02 RX ORDER — PANTOPRAZOLE SODIUM 40 MG/1
40 TABLET, DELAYED RELEASE ORAL 2 TIMES DAILY
Status: DISCONTINUED | OUTPATIENT
Start: 2023-11-02 | End: 2023-11-03 | Stop reason: HOSPADM

## 2023-11-02 RX ORDER — LEVETIRACETAM 500 MG/1
500 TABLET ORAL 2 TIMES DAILY
Status: DISCONTINUED | OUTPATIENT
Start: 2023-11-02 | End: 2023-11-03 | Stop reason: HOSPADM

## 2023-11-02 RX ORDER — LOPERAMIDE HYDROCHLORIDE 2 MG/1
2 CAPSULE ORAL
Status: ON HOLD | COMMUNITY
End: 2023-11-03 | Stop reason: HOSPADM

## 2023-11-02 RX ORDER — ONDANSETRON 2 MG/ML
4 INJECTION INTRAMUSCULAR; INTRAVENOUS EVERY 6 HOURS PRN
Status: DISCONTINUED | OUTPATIENT
Start: 2023-11-02 | End: 2023-11-03 | Stop reason: HOSPADM

## 2023-11-02 RX ORDER — SODIUM CHLORIDE 9 MG/ML
INJECTION, SOLUTION INTRAVENOUS CONTINUOUS
Status: DISCONTINUED | OUTPATIENT
Start: 2023-11-02 | End: 2023-11-02

## 2023-11-02 RX ORDER — ONDANSETRON 2 MG/ML
4 INJECTION INTRAMUSCULAR; INTRAVENOUS
Status: COMPLETED | OUTPATIENT
Start: 2023-11-02 | End: 2023-11-02

## 2023-11-02 RX ORDER — PANTOPRAZOLE SODIUM 40 MG/10ML
80 INJECTION, POWDER, LYOPHILIZED, FOR SOLUTION INTRAVENOUS
Status: COMPLETED | OUTPATIENT
Start: 2023-11-02 | End: 2023-11-02

## 2023-11-02 RX ORDER — SUCCINYLCHOLINE CHLORIDE 20 MG/ML
INJECTION INTRAMUSCULAR; INTRAVENOUS
Status: DISCONTINUED | OUTPATIENT
Start: 2023-11-02 | End: 2023-11-02

## 2023-11-02 RX ORDER — LEVOTHYROXINE SODIUM 25 UG/1
25 TABLET ORAL DAILY
Status: DISCONTINUED | OUTPATIENT
Start: 2023-11-02 | End: 2023-11-03 | Stop reason: HOSPADM

## 2023-11-02 RX ORDER — PANTOPRAZOLE SODIUM 40 MG/10ML
40 INJECTION, POWDER, LYOPHILIZED, FOR SOLUTION INTRAVENOUS 2 TIMES DAILY
Status: DISCONTINUED | OUTPATIENT
Start: 2023-11-02 | End: 2023-11-02

## 2023-11-02 RX ORDER — PROCHLORPERAZINE EDISYLATE 5 MG/ML
2.5 INJECTION INTRAMUSCULAR; INTRAVENOUS EVERY 6 HOURS PRN
Status: DISCONTINUED | OUTPATIENT
Start: 2023-11-02 | End: 2023-11-03 | Stop reason: HOSPADM

## 2023-11-02 RX ORDER — ESCITALOPRAM OXALATE 10 MG/1
20 TABLET ORAL DAILY
Status: DISCONTINUED | OUTPATIENT
Start: 2023-11-02 | End: 2023-11-03 | Stop reason: HOSPADM

## 2023-11-02 RX ORDER — HYDRALAZINE HYDROCHLORIDE 20 MG/ML
10 INJECTION INTRAMUSCULAR; INTRAVENOUS EVERY 6 HOURS PRN
Status: DISCONTINUED | OUTPATIENT
Start: 2023-11-02 | End: 2023-11-03 | Stop reason: HOSPADM

## 2023-11-02 RX ORDER — CLONIDINE HYDROCHLORIDE 0.1 MG/1
0.1 TABLET ORAL ONCE
Status: COMPLETED | OUTPATIENT
Start: 2023-11-03 | End: 2023-11-03

## 2023-11-02 RX ORDER — TALC
6 POWDER (GRAM) TOPICAL NIGHTLY PRN
Status: DISCONTINUED | OUTPATIENT
Start: 2023-11-02 | End: 2023-11-03 | Stop reason: HOSPADM

## 2023-11-02 RX ORDER — CLONIDINE HYDROCHLORIDE 0.1 MG/1
0.1 TABLET ORAL
Status: DISPENSED | OUTPATIENT
Start: 2023-11-02 | End: 2023-11-02

## 2023-11-02 RX ORDER — ACETAMINOPHEN 325 MG/1
650 TABLET ORAL EVERY 6 HOURS PRN
Status: DISCONTINUED | OUTPATIENT
Start: 2023-11-02 | End: 2023-11-03 | Stop reason: HOSPADM

## 2023-11-02 RX ORDER — LORAZEPAM 2 MG/1
0.5 TABLET ORAL NIGHTLY
Status: ON HOLD | COMMUNITY
End: 2023-11-03 | Stop reason: HOSPADM

## 2023-11-02 RX ORDER — ACETAMINOPHEN 325 MG/1
650 TABLET ORAL
Status: COMPLETED | OUTPATIENT
Start: 2023-11-02 | End: 2023-11-02

## 2023-11-02 RX ORDER — DROPERIDOL 2.5 MG/ML
1.25 INJECTION, SOLUTION INTRAMUSCULAR; INTRAVENOUS ONCE
Status: COMPLETED | OUTPATIENT
Start: 2023-11-02 | End: 2023-11-02

## 2023-11-02 RX ORDER — PROPOFOL 10 MG/ML
INJECTION, EMULSION INTRAVENOUS
Status: DISPENSED
Start: 2023-11-02 | End: 2023-11-03

## 2023-11-02 RX ORDER — OXYCODONE AND ACETAMINOPHEN 5; 325 MG/1; MG/1
1 TABLET ORAL EVERY 8 HOURS PRN
Status: DISCONTINUED | OUTPATIENT
Start: 2023-11-02 | End: 2023-11-03 | Stop reason: HOSPADM

## 2023-11-02 RX ORDER — TRAZODONE HYDROCHLORIDE 100 MG/1
100 TABLET ORAL NIGHTLY
Status: ON HOLD | COMMUNITY
End: 2023-11-28 | Stop reason: DRUGHIGH

## 2023-11-02 RX ORDER — CHLORDIAZEPOXIDE HYDROCHLORIDE 25 MG/1
25 CAPSULE, GELATIN COATED ORAL EVERY 6 HOURS
Status: DISCONTINUED | OUTPATIENT
Start: 2023-11-02 | End: 2023-11-03 | Stop reason: HOSPADM

## 2023-11-02 RX ORDER — HYDROXYZINE PAMOATE 50 MG/1
25 CAPSULE ORAL EVERY 6 HOURS PRN
COMMUNITY
End: 2023-11-12

## 2023-11-02 RX ORDER — PROPOFOL 10 MG/ML
VIAL (ML) INTRAVENOUS
Status: DISCONTINUED | OUTPATIENT
Start: 2023-11-02 | End: 2023-11-02

## 2023-11-02 RX ORDER — SUCCINYLCHOLINE CHLORIDE 20 MG/ML
INJECTION INTRAMUSCULAR; INTRAVENOUS
Status: DISPENSED
Start: 2023-11-02 | End: 2023-11-03

## 2023-11-02 RX ORDER — ALUMINUM HYDROXIDE, MAGNESIUM HYDROXIDE, AND SIMETHICONE 2400; 240; 2400 MG/30ML; MG/30ML; MG/30ML
30 SUSPENSION ORAL EVERY 4 HOURS PRN
COMMUNITY
End: 2023-11-12

## 2023-11-02 RX ORDER — LIDOCAINE HYDROCHLORIDE 20 MG/ML
INJECTION INTRAVENOUS
Status: DISCONTINUED | OUTPATIENT
Start: 2023-11-02 | End: 2023-11-02

## 2023-11-02 RX ORDER — LORAZEPAM 1 MG/1
1 TABLET ORAL 2 TIMES DAILY
COMMUNITY
End: 2023-11-12

## 2023-11-02 RX ORDER — OCTREOTIDE ACETATE 50 UG/ML
50 INJECTION, SOLUTION INTRAVENOUS; SUBCUTANEOUS
Status: COMPLETED | OUTPATIENT
Start: 2023-11-02 | End: 2023-11-02

## 2023-11-02 RX ORDER — MELOXICAM 7.5 MG/1
15 TABLET ORAL DAILY
Status: DISCONTINUED | OUTPATIENT
Start: 2023-11-02 | End: 2023-11-03 | Stop reason: HOSPADM

## 2023-11-02 RX ADMIN — DROPERIDOL 1.25 MG: 2.5 INJECTION, SOLUTION INTRAMUSCULAR; INTRAVENOUS at 07:11

## 2023-11-02 RX ADMIN — SODIUM CHLORIDE 1000 ML: 9 INJECTION, SOLUTION INTRAVENOUS at 06:11

## 2023-11-02 RX ADMIN — PROCHLORPERAZINE EDISYLATE 2.5 MG: 5 INJECTION INTRAMUSCULAR; INTRAVENOUS at 11:11

## 2023-11-02 RX ADMIN — ONDANSETRON 4 MG: 2 INJECTION INTRAMUSCULAR; INTRAVENOUS at 09:11

## 2023-11-02 RX ADMIN — ATENOLOL 25 MG: 25 TABLET ORAL at 11:11

## 2023-11-02 RX ADMIN — PANTOPRAZOLE SODIUM 40 MG: 40 TABLET, DELAYED RELEASE ORAL at 09:11

## 2023-11-02 RX ADMIN — PROPOFOL 200 MG: 10 INJECTION, EMULSION INTRAVENOUS at 02:11

## 2023-11-02 RX ADMIN — OCTREOTIDE ACETATE 50 MCG: 50 INJECTION, SOLUTION INTRAVENOUS; SUBCUTANEOUS at 09:11

## 2023-11-02 RX ADMIN — OXYCODONE HYDROCHLORIDE AND ACETAMINOPHEN 1 TABLET: 5; 325 TABLET ORAL at 06:11

## 2023-11-02 RX ADMIN — PANTOPRAZOLE SODIUM 80 MG: 40 INJECTION, POWDER, FOR SOLUTION INTRAVENOUS at 09:11

## 2023-11-02 RX ADMIN — ESCITALOPRAM OXALATE 20 MG: 10 TABLET ORAL at 11:11

## 2023-11-02 RX ADMIN — PROPOFOL 50 MG: 10 INJECTION, EMULSION INTRAVENOUS at 02:11

## 2023-11-02 RX ADMIN — FOLIC ACID: 5 INJECTION, SOLUTION INTRAMUSCULAR; INTRAVENOUS; SUBCUTANEOUS at 04:11

## 2023-11-02 RX ADMIN — PROPOFOL 30 MG: 10 INJECTION, EMULSION INTRAVENOUS at 02:11

## 2023-11-02 RX ADMIN — SUCCINYLCHOLINE CHLORIDE 100 MG: 20 INJECTION, SOLUTION INTRAMUSCULAR; INTRAVENOUS at 02:11

## 2023-11-02 RX ADMIN — LEVETIRACETAM 500 MG: 500 TABLET, FILM COATED ORAL at 09:11

## 2023-11-02 RX ADMIN — MELOXICAM 15 MG: 7.5 TABLET ORAL at 11:11

## 2023-11-02 RX ADMIN — LIDOCAINE HYDROCHLORIDE 100 MG: 20 INJECTION, SOLUTION INTRAVENOUS at 02:11

## 2023-11-02 RX ADMIN — LEVOTHYROXINE SODIUM 25 MCG: 25 TABLET ORAL at 11:11

## 2023-11-02 RX ADMIN — ACETAMINOPHEN 650 MG: 325 TABLET ORAL at 12:11

## 2023-11-02 RX ADMIN — HYDRALAZINE HYDROCHLORIDE 10 MG: 20 INJECTION INTRAMUSCULAR; INTRAVENOUS at 08:11

## 2023-11-02 RX ADMIN — CHLORDIAZEPOXIDE HYDROCHLORIDE 25 MG: 25 CAPSULE ORAL at 12:11

## 2023-11-02 RX ADMIN — SODIUM CHLORIDE: 0.9 INJECTION, SOLUTION INTRAVENOUS at 01:11

## 2023-11-02 RX ADMIN — LEVETIRACETAM 500 MG: 500 TABLET, FILM COATED ORAL at 11:11

## 2023-11-02 RX ADMIN — OCTREOTIDE ACETATE 50 MCG/HR: 500 INJECTION, SOLUTION INTRAVENOUS; SUBCUTANEOUS at 09:11

## 2023-11-02 RX ADMIN — ONDANSETRON 4 MG: 2 INJECTION INTRAMUSCULAR; INTRAVENOUS at 06:11

## 2023-11-02 RX ADMIN — CHLORDIAZEPOXIDE HYDROCHLORIDE 25 MG: 25 CAPSULE ORAL at 07:11

## 2023-11-02 NOTE — PROGRESS NOTES
CHW - Initial Contact    This Community Health Worker completed the Social Determinant of Health questionnaire with patient  at bedside  today.    Pt identified barriers of most importance are: food insecurities and issues with utility bill payments.   Referrals to community agencies completed with patient/caregiver consent outside of [facility] include: yes: [facility].  Referrals were put through [facility] - yes: [facility].  Support and Services: has no support at this time.  Other information discussed the patient needs / wants help with: Completed SDOH with patient at bedside today, patient has food insecurities and issues with utility bill payments. Will follow up in one week to check status of referrals and pt's future needs.   Follow up required: yes.  Follow-up Outreach - Due: 11/8/2023
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
## 2023-11-02 NOTE — NURSING
[facility] [place]  Nurses Note -- 4 Eyes      11/2/2023       Skin assessed on: Admit      [x] No Pressure Injuries Present    [x]Prevention Measures Documented    [] Yes LDA  for Pressure Injury Previously documented     [] Yes New Pressure Injury Discovered   [] LDA for New Pressure Injury Added      Attending RN:  Mariya Chavez, RN     Second :  Alton OTT, PCT

## 2023-11-02 NOTE — PROVATION PATIENT INSTRUCTIONS
Discharge Summary/Instructions after an Endoscopic Procedure  Patient Name: Estella Arevalo  Patient MRN: [id number]  Patient YOB: 1955  Thursday, November 2, 2023  Rick Shaikh MD  Dear patient,  As a result of recent federal legislation (The Federal Cures Act), you may   receive lab or pathology results from your procedure in your [facility]   account before your physician is able to contact you. Your physician or   their representative will relay the results to you with their   recommendations at their soonest availability.  Thank you,  RESTRICTIONS:  During your procedure today, you received medications for sedation.  These   medications may affect your judgment, balance and coordination.  Therefore,   for 24 hours, you have the following restrictions:   - DO NOT drive a car, operate machinery, make legal/financial decisions,   sign important papers or drink alcohol.    ACTIVITY:  Today: no heavy lifting, straining or running due to procedural   sedation/anesthesia.  The following day: return to full activity including work.  DIET:  Eat and drink normally unless instructed otherwise.     TREATMENT FOR COMMON SIDE EFFECTS:  - Mild abdominal pain, nausea, belching, bloating or excessive gas:  rest,   eat lightly and use a heating pad.  - Sore Throat: treat with throat lozenges and/or gargle with warm salt   water.  - Because air was used during the procedure, expelling large amounts of air   from your rectum or belching is normal.  - If a bowel prep was taken, you may not have a bowel movement for 1-3 days.    This is normal.  SYMPTOMS TO WATCH FOR AND REPORT TO YOUR PHYSICIAN:  1. Abdominal pain or bloating, other than gas cramps.  2. Chest pain.  3. Back pain.  4. Signs of infection such as: chills or fever occurring within 24 hours   after the procedure.  5. Rectal bleeding, which would show as bright red, maroon, or black stools.   (A tablespoon of blood from the rectum is not serious, especially if    hemorrhoids are present.)  6. Vomiting.  7. Weakness or dizziness.  GO DIRECTLY TO THE NEAREST EMERGENCY ROOM IF YOU HAVE ANY OF THE FOLLOWING:      Difficulty breathing              Chills and/or fever over 101 F   Persistent vomiting and/or vomiting blood   Severe abdominal pain   Severe chest pain   Black, tarry stools   Bleeding- more than one tablespoon   Any other symptom or condition that you feel may need urgent attention  Your doctor recommends these additional instructions:  If any biopsies were taken, your doctors clinic will contact you in 1 to 2   weeks with any results.  - [facility]   - Use a proton pump inhibitor PO BID for 12 weeks.   - Repeat upper endoscopy in 12 weeks to check healing.   - The findings and recommendations were discussed with the referring   physician.   For questions, problems or results please call your physician - Rick Shaikh MD at Work:  [phone].  [facility] can be reached at [phone]     IF A COMPLICATION OR EMERGENCY SITUATION ARISES AND YOU ARE UNABLE TO REACH   YOUR PHYSICIAN - GO DIRECTLY TO THE EMERGENCY ROOM.  Rick Shaikh MD  11/2/2023 2:18:33 PM  This report has been verified and signed electronically.  Dear patient,  As a result of recent federal legislation (The Federal Cures Act), you may   receive lab or pathology results from your procedure in your [facility]   account before your physician is able to contact you. Your physician or   their representative will relay the results to you with their   recommendations at their soonest availability.  Thank you,  PROVATION

## 2023-11-02 NOTE — PHARMACY MED REC
"Admission Medication History     The home medication history was taken by Rocio Spear.    You may go to "Admission" then "Reconcile Home Medications" tabs to review and/or act upon these items.     The home medication list has been updated by the Pharmacy department.   Please read ALL comments highlighted in yellow.   Please address this information as you see fit.    Feel free to contact us if you have any questions or require assistance.      Medications listed below were obtained from: Patient/family and Analytic software- [employer]  (Not in a hospital admission)      Rocio Spear  [phone]                 .          "

## 2023-11-02 NOTE — ASSESSMENT & PLAN NOTE
Patient has persistent depression which is currently controlled. Will Continue anti-depressant medications. We will consult psychiatry at this time. Patient does not display psychosis at this time.   Continue to monitor closely and adjust plan of care as needed.  Requested patient's medication list from [employer]; awaiting response.
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
## 2023-11-02 NOTE — ASSESSMENT & PLAN NOTE
Body mass index is 31.62 kg/m².   Morbid obesity complicates all aspects of disease management from diagnostic modalities to treatment.   Weight loss encouraged and health benefits explained to patient

## 2023-11-02 NOTE — ANESTHESIA PROCEDURE NOTES
Intubation    Date/Time: 11/2/2023 2:01 PM    Performed by: Zeke Lawrence CRNA  Authorized by: Arabella Bah MD    Intubation:     Induction:  Rapid sequence induction    Intubated:  Postinduction    Mask Ventilation:  Not attempted    Attempts:  1    Attempted By:  CRNA    Method of Intubation:  Video laryngoscopy    Blade:  [place] 3    Laryngeal View Grade: Grade I - full view of cords      Difficult Airway Encountered?: No      Complications:  None    Airway Device:  Oral endotracheal tube    Airway Device Size:  7.0    Style/Cuff Inflation:  Cuffed (inflated to minimal occlusive pressure)    Tube secured:  22    Secured at:  The lips    Placement Verified By:  Capnometry    Complicating Factors:  Obesity and poor neck/head extension    Findings Post-Intubation:  BS equal bilateral and atraumatic/condition of teeth unchanged

## 2023-11-02 NOTE — HPI
"sEtella Arevalo is a 68 y.o. female with PMHx of alcohol abuse, suicidal ideation, recurrent falls, CAD, GERD, Obesity, HTN and Hypothyroidism presented to the ED from [facility] complaining of hematemesis. She stated that she had 5 episodes since last night, of which 2 were in the ED. She states that she is not on any blood thinners and she has not experienced this before. She endorses drinking 6 beers daily, of which her last drink was 2 days ago. Along with her hematemesis, she endorses a headache, rhinorrhea, blurry vision, recent fall during which she hit her head x 2 days ago, shortness of breath on exertion, nausea, vomiting, diarrhea, blood in her stool and hematemesis. She denies any loss of consciousness, sore throat, trouble swallowing, chest pain, polyuria, polydipsia, current suicidal ideations, auditory/visual hallucinations and/or any other associated symptoms. She states that she has previously had an abdominal surgery "a while back" and that she is allergic to Codeine, which causes her to vomit.     In the ED: Patient is afebrile, markedly hypertensive, HDS with H/H: 15.9/51.2, ALP: 199, AST/ALT: 140/83. Type and Screened. GI consulted from the ED. CT Head obtained and reviewed and noted to be negative for any acute intracranial findings.    Case discussed with ED Physician, VIOLETTA Martell MD and GI provider, JAYCEE Cruz NP.    Estella Arevalo will be placed under observation for further evaluation and management of her hematemesis.  "
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
## 2023-11-02 NOTE — ASSESSMENT & PLAN NOTE
Noted per Psychiatry's evaluation note  Social work consulted for home assessment/evaluation; appreciate recommendations.
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
## 2023-11-02 NOTE — ASSESSMENT & PLAN NOTE
Patient presented to the ED from [facility], complaining of 3 episodes of hematemesis, and was noted to have 2 more episodes in the ED.  She states she has never experienced this before and did not attempt any treatment prior to arrival.  She is currently hemodynamically stable with H/H: 15.9/51.2  Following GI Bleed Pathway  Maintain on telemetry  Trend CBC/CMP  Type and Screen  IV PPI BID  GI Consulted form the ED and recommending octreotide drip and planning for EGD today.

## 2023-11-02 NOTE — INTERVAL H&P NOTE
The patient has been examined and the H&P has been reviewed:    I concur with the findings and no changes have occurred since H&P was written.    Anesthesia risks, benefits and alternative options discussed and understood by patient/family.    History and Exam unchanged from visit.  Psych clearance in setting of [facility] states patient consentable  Previous endoscopies (multiple) reviewed, no varices history but chronic esophageal ulcer    Procedure - EGD  Neck - supple  Plan of anesthesia - General  ASA - per anesthesia  Mallampati - per anesthesia  Anesthesia problems - no  Family history of anesthesia problems - no        Active Hospital Problems    Diagnosis  POA    *Hematemesis [K92.0]  Yes    Alcohol abuse [F10.10]  Yes     Chronic    Rectal prolapse [K62.3]  Yes    CAD (coronary artery disease) [I25.10]  Yes     Chronic    Essential hypertension [I10]  Yes     Chronic    Fall [W19.XXXA]  Yes    Acquired hypothyroidism [E03.9]  Yes     Chronic    Anxiety [F41.9]  Yes    Depression [F32.A]  Yes      Resolved Hospital Problems   No resolved problems to display.

## 2023-11-02 NOTE — ASSESSMENT & PLAN NOTE
Chronic, uncontrolled. Latest blood pressure and vitals reviewed-     Temp:  [99.1 °F (37.3 °C)]   Pulse:  [71-97]   Resp:  [16-20]   BP: (143-187)/()   SpO2:  [93 %-97 %] .   Home meds for hypertension were reviewed and noted below.   Hypertension Medications             cloNIDine (CATAPRES) 0.1 MG tablet Take 1 tablet (0.1 mg total) by mouth once. for 1 dose    nitroGLYCERIN (NITROSTAT) 0.3 MG SL tablet DISSOLVE 1 TABLET UNDER THE TONGUE EVERY 5 MINUTES AS NEEDED FOR CHEST PAIN          While in the hospital, will manage blood pressure as follows; Continue home antihypertensive regimen    Will utilize p.r.n. blood pressure medication only if patient's blood pressure greater than 180/110 and she develops symptoms such as worsening chest pain or shortness of breath.

## 2023-11-02 NOTE — ASSESSMENT & PLAN NOTE
Patient presented to the ED under CEC signed 11/01/2023 at 0900, for suicidal ideations.  Patient is currently stable and denies any current suicidal ideations.  Tele-Psych consulted and recommends continue CEC and have 1:1 sitter
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
## 2023-11-02 NOTE — ASSESSMENT & PLAN NOTE
Noted per chart review  Patient states she drinks 6 pack of beer daily, but her last drink was 2 days ago.  Currently stable and not in withdrawal  Continue Librium 25 mg q6h  CIWA precautions.  Banana bag ordered.
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
## 2023-11-02 NOTE — NURSING TRANSFER
Nursing Transfer Note      11/2/2023   5:35 PM    Reason patient is being transferred: Hematemesis    Transfer From: ED    Transfer via stretcher    Transfer with cardiac monitoring    Medicines sent: none    Any special needs or follow-up needed: none    Patient belongings transferred with patient: Yes    Chart send with patient: Yes    Notified: daughter    Patient reassessed at: 11/2/23 @ 1738     Upon arrival to floor: cardiac monitor applied, patient oriented to room, call bell in reach, and bed in lowest position

## 2023-11-02 NOTE — ASSESSMENT & PLAN NOTE
Patient with known CAD which is controlled Will continue to monitor for S/Sx of angina/ACS. Continue to monitor on telemetry.   Admission EKG reviewed and noted to show sinus rhythm with PACs at 96 bpm.

## 2023-11-02 NOTE — ASSESSMENT & PLAN NOTE
Patient states she had a fall at [facility] and hit her head x 2 days ago, due to gait instability.  CT Head obtained in the ED and is without any acute abnormalities.  PT/OT consulted.

## 2023-11-02 NOTE — TRANSFER OF CARE
"Anesthesia Transfer of Care Note    Patient: Estella Arevalo    Procedure(s) Performed: Procedure(s) (LRB):  EGD (ESOPHAGOGASTRODUODENOSCOPY) (N/A)    Patient location: GI    Anesthesia Type: general    Transport from OR: Transported from OR on 6-10 L/min O2 by face mask with adequate spontaneous ventilation    Post pain: adequate analgesia    Post assessment: no apparent anesthetic complications and tolerated procedure well    Post vital signs: stable    Level of consciousness: sedated and responds to stimulation    Nausea/Vomiting: no nausea/vomiting    Complications: none    Transfer of care protocol was followed      Last vitals:   Visit Vitals  BP 99/62 (BP Location: Left arm, Patient Position: Lying)   Pulse 78   Temp 36.2 °C (97.1 °F) (Oral)   Resp (!) 21   Ht 5' 5" (1.651 m)   Wt 86.2 kg (190 lb)   SpO2 98%   Breastfeeding No   BMI 31.62 kg/m²     "

## 2023-11-02 NOTE — PLAN OF CARE
Procedure and recovery complete. Pt awake and alert. Procedure findings and suggestions discussed. Procedure report given given. Transfer report given to ED, RN, understanding verbalized. Pt transported back to room by RN, security and sitter.

## 2023-11-02 NOTE — ED TRIAGE NOTES
"Patient presents to ED via [facility] from [facility] secondary to dark colored emesis. Staff reported three episodes of vomiting and multiple episodes of diarrhea. Also c/o headache s/p fall yesterday in which she was not evaluated for in ED. Patient arrived with current PEC which expires today at 1536    Pt actively vomited once, "hematemesis" in the ED  Pt is calm and cooperative, alert and oriented  "

## 2023-11-03 VITALS
WEIGHT: 190 LBS | HEIGHT: 65 IN | RESPIRATION RATE: 18 BRPM | BODY MASS INDEX: 31.65 KG/M2 | DIASTOLIC BLOOD PRESSURE: 63 MMHG | HEART RATE: 81 BPM | TEMPERATURE: 98 F | SYSTOLIC BLOOD PRESSURE: 95 MMHG | OXYGEN SATURATION: 91 %

## 2023-11-03 DIAGNOSIS — R79.89 ELEVATED LFTS: Primary | ICD-10-CM

## 2023-11-03 DIAGNOSIS — F10.10 ETOH ABUSE: ICD-10-CM

## 2023-11-03 LAB
ALBUMIN SERPL BCP-MCNC: 3.5 G/DL (ref 3.5–5.2)
ALP SERPL-CCNC: 148 U/L (ref 55–135)
ALT SERPL W/O P-5'-P-CCNC: 67 U/L (ref 10–44)
AST SERPL-CCNC: 99 U/L (ref 10–40)
BASOPHILS # BLD AUTO: 0.06 K/UL (ref 0–0.2)
BASOPHILS # BLD AUTO: 0.06 K/UL (ref 0–0.2)
BASOPHILS NFR BLD: 1.2 % (ref 0–1.9)
BASOPHILS NFR BLD: 1.3 % (ref 0–1.9)
BILIRUB DIRECT SERPL-MCNC: 0.3 MG/DL (ref 0.1–0.3)
BILIRUB SERPL-MCNC: 0.6 MG/DL (ref 0.1–1)
DIFFERENTIAL METHOD: ABNORMAL
DIFFERENTIAL METHOD: ABNORMAL
EOSINOPHIL # BLD AUTO: 0.1 K/UL (ref 0–0.5)
EOSINOPHIL # BLD AUTO: 0.2 K/UL (ref 0–0.5)
EOSINOPHIL NFR BLD: 2.5 % (ref 0–8)
EOSINOPHIL NFR BLD: 4.2 % (ref 0–8)
ERYTHROCYTE [DISTWIDTH] IN BLOOD BY AUTOMATED COUNT: 13.7 % (ref 11.5–14.5)
ERYTHROCYTE [DISTWIDTH] IN BLOOD BY AUTOMATED COUNT: 13.8 % (ref 11.5–14.5)
HAV IGM SERPL QL IA: ABNORMAL
HBV CORE IGM SERPL QL IA: ABNORMAL
HBV SURFACE AG SERPL QL IA: ABNORMAL
HCT VFR BLD AUTO: 41 % (ref 37–48.5)
HCT VFR BLD AUTO: 42 % (ref 37–48.5)
HCV AB SERPL QL IA: REACTIVE
HGB BLD-MCNC: 13.1 G/DL (ref 12–16)
HGB BLD-MCNC: 13.3 G/DL (ref 12–16)
IMM GRANULOCYTES # BLD AUTO: 0.01 K/UL (ref 0–0.04)
IMM GRANULOCYTES # BLD AUTO: 0.01 K/UL (ref 0–0.04)
IMM GRANULOCYTES NFR BLD AUTO: 0.2 % (ref 0–0.5)
IMM GRANULOCYTES NFR BLD AUTO: 0.2 % (ref 0–0.5)
LYMPHOCYTES # BLD AUTO: 0.9 K/UL (ref 1–4.8)
LYMPHOCYTES # BLD AUTO: 1.3 K/UL (ref 1–4.8)
LYMPHOCYTES NFR BLD: 19.3 % (ref 18–48)
LYMPHOCYTES NFR BLD: 27.7 % (ref 18–48)
MCH RBC QN AUTO: 32.4 PG (ref 27–31)
MCH RBC QN AUTO: 32.8 PG (ref 27–31)
MCHC RBC AUTO-ENTMCNC: 31.7 G/DL (ref 32–36)
MCHC RBC AUTO-ENTMCNC: 32 G/DL (ref 32–36)
MCV RBC AUTO: 102 FL (ref 82–98)
MCV RBC AUTO: 103 FL (ref 82–98)
MONOCYTES # BLD AUTO: 0.5 K/UL (ref 0.3–1)
MONOCYTES # BLD AUTO: 0.6 K/UL (ref 0.3–1)
MONOCYTES NFR BLD: 12.2 % (ref 4–15)
MONOCYTES NFR BLD: 9.8 % (ref 4–15)
NEUTROPHILS # BLD AUTO: 2.5 K/UL (ref 1.8–7.7)
NEUTROPHILS # BLD AUTO: 3.3 K/UL (ref 1.8–7.7)
NEUTROPHILS NFR BLD: 54.4 % (ref 38–73)
NEUTROPHILS NFR BLD: 67 % (ref 38–73)
NRBC BLD-RTO: 0 /100 WBC
NRBC BLD-RTO: 0 /100 WBC
PLATELET # BLD AUTO: 265 K/UL (ref 150–450)
PLATELET # BLD AUTO: 301 K/UL (ref 150–450)
PLATELET BLD QL SMEAR: ABNORMAL
PMV BLD AUTO: 10.9 FL (ref 9.2–12.9)
PMV BLD AUTO: 11.1 FL (ref 9.2–12.9)
PROT SERPL-MCNC: 7.2 G/DL (ref 6–8.4)
RBC # BLD AUTO: 4 M/UL (ref 4–5.4)
RBC # BLD AUTO: 4.1 M/UL (ref 4–5.4)
WBC # BLD AUTO: 4.52 K/UL (ref 3.9–12.7)
WBC # BLD AUTO: 4.88 K/UL (ref 3.9–12.7)

## 2023-11-03 PROCEDURE — 97535 SELF CARE MNGMENT TRAINING: CPT

## 2023-11-03 PROCEDURE — 97161 PT EVAL LOW COMPLEX 20 MIN: CPT

## 2023-11-03 PROCEDURE — G0378 HOSPITAL OBSERVATION PER HR: HCPCS

## 2023-11-03 PROCEDURE — 99232 PR SUBSEQUENT HOSPITAL CARE,LEVL II: ICD-10-PCS | Mod: ,,, | Performed by: NURSE PRACTITIONER

## 2023-11-03 PROCEDURE — 25000003 PHARM REV CODE 250

## 2023-11-03 PROCEDURE — 97116 GAIT TRAINING THERAPY: CPT

## 2023-11-03 PROCEDURE — 97168 OT RE-EVAL EST PLAN CARE: CPT

## 2023-11-03 PROCEDURE — 97165 OT EVAL LOW COMPLEX 30 MIN: CPT

## 2023-11-03 PROCEDURE — 36415 COLL VENOUS BLD VENIPUNCTURE: CPT

## 2023-11-03 PROCEDURE — 80076 HEPATIC FUNCTION PANEL: CPT | Performed by: NURSE PRACTITIONER

## 2023-11-03 PROCEDURE — 36415 COLL VENOUS BLD VENIPUNCTURE: CPT | Performed by: NURSE PRACTITIONER

## 2023-11-03 PROCEDURE — 99232 SBSQ HOSP IP/OBS MODERATE 35: CPT | Mod: ,,, | Performed by: NURSE PRACTITIONER

## 2023-11-03 PROCEDURE — 85025 COMPLETE CBC W/AUTO DIFF WBC: CPT | Mod: 91

## 2023-11-03 RX ORDER — PANTOPRAZOLE SODIUM 40 MG/1
40 TABLET, DELAYED RELEASE ORAL 2 TIMES DAILY
Qty: 180 TABLET | Refills: 3 | Status: SHIPPED | OUTPATIENT
Start: 2023-11-03

## 2023-11-03 RX ADMIN — CLONIDINE HYDROCHLORIDE 0.1 MG: 0.1 TABLET ORAL at 12:11

## 2023-11-03 RX ADMIN — LEVETIRACETAM 500 MG: 500 TABLET, FILM COATED ORAL at 09:11

## 2023-11-03 RX ADMIN — ESCITALOPRAM OXALATE 20 MG: 10 TABLET ORAL at 08:11

## 2023-11-03 RX ADMIN — MELOXICAM 15 MG: 7.5 TABLET ORAL at 08:11

## 2023-11-03 RX ADMIN — OXYCODONE HYDROCHLORIDE AND ACETAMINOPHEN 1 TABLET: 5; 325 TABLET ORAL at 02:11

## 2023-11-03 RX ADMIN — PANTOPRAZOLE SODIUM 40 MG: 40 TABLET, DELAYED RELEASE ORAL at 08:11

## 2023-11-03 RX ADMIN — LEVOTHYROXINE SODIUM 25 MCG: 25 TABLET ORAL at 07:11

## 2023-11-03 RX ADMIN — CHLORDIAZEPOXIDE HYDROCHLORIDE 25 MG: 25 CAPSULE ORAL at 09:11
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
## 2023-11-03 NOTE — PLAN OF CARE
Problem: Adjustment to Illness (Gastrointestinal Bleeding)  Goal: Optimal Coping with Acute Illness  Outcome: Met     Problem: Bleeding (Gastrointestinal Bleeding)  Goal: Hemostasis  Outcome: Met     Problem: Adult Inpatient Plan of Care  Goal: Plan of Care Review  Outcome: Met  Goal: Patient-Specific Goal (Individualized)  Outcome: Met  Goal: Absence of Hospital-Acquired Illness or Injury  Outcome: Met  Goal: Optimal Comfort and Wellbeing  Outcome: Met  Goal: Readiness for Transition of Care  Outcome: Met     Problem: Skin Injury Risk Increased  Goal: Skin Health and Integrity  Outcome: Met     Problem: Occupational Therapy  Goal: Occupational Therapy Goal  Description: Goals to be met by: 11/10/23     Patient will increase functional independence with ADLs by performing:    UE Dressing with [place].  LE Dressing with Modified [place].  Grooming while standing at sink with Modified [place].  Toileting from toilet with Modified [place] for hygiene and clothing management.   Sitting in chair x 60 minutes with Supervision.  Upper extremity exercise program x10  reps per handout, with independence.    Outcome: Met     Problem: Physical Therapy  Goal: Physical Therapy Goal  Outcome: Met

## 2023-11-03 NOTE — PLAN OF CARE
Problem: Occupational Therapy  Goal: Occupational Therapy Goal  Description: Goals to be met by: 11/10/23     Patient will increase functional independence with ADLs by performing:    UE Dressing with [place].  LE Dressing with Modified [place].  Grooming while standing at sink with Modified [place].  Toileting from toilet with Modified [place] for hygiene and clothing management.   Sitting in chair x 60 minutes with Supervision.  Upper extremity exercise program x10  reps per handout, with independence.    Outcome: Ongoing, Progressing   Patient seen by occupational therapy for initial evaluation. Patient to be seen 2-3x/wk if remains in acute care and for low intensity therapy. Occupational therapy is not recommending any other DME.

## 2023-11-03 NOTE — NURSING
[facility]  Nurses Note -- 4 Eyes      11/3/2023       Skin assessed on: Q Shift      [x] No Pressure Injuries Present    [x]Prevention Measures Documented    [] Yes LDA  for Pressure Injury Previously documented     [] Yes New Pressure Injury Discovered   [] LDA for New Pressure Injury Added      Attending RN:  Mariya Chavez, RN     Second RN:  Bradley BROOKS LPN
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
## 2023-11-03 NOTE — PLAN OF CARE
Problem: Physical Therapy  Goal: Physical Therapy Goal  Outcome: Adequate for Care Transition   Initial PT evaluation performed today.  Pt could benefit from Low Intensity therapy once Home.  PT to sign off

## 2023-11-03 NOTE — NURSING
Called report to [facility], nurse [place]. Awaiting transportation.    1356 - Pt discharged via transport.

## 2023-11-03 NOTE — PLAN OF CARE
If transportation does not arrive at ETA time nurse will be instructed to follow protocol for transportation below: transportation requested for 1pm .  How can I get in touch directly with dispatch, if needed?                   Escalation Needs: [phone]-- option #6
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
## 2023-11-03 NOTE — PLAN OF CARE
11/03/23 1203   Final Note   Assessment Type Final Discharge Note   Anticipated Discharge Disposition Psych   Hospital Resources/Appts/Education Provided Appointments scheduled and added to AVS   Post-Acute Status   Post-Acute Authorization Placement   Post-Acute Placement Status Set-up Complete/Auth obtained     Pts nurse Mariya notified that the pt is clear for d/c from CM standpoint to go to [facility]

## 2023-11-03 NOTE — PLAN OF CARE
Message sent to [facility] with [employer] to confirm pt came from their facility and inquire if she will be able to return and continue treatment.  TN to follow for response    Call report information provided to pts nurse Mariya and advised that the transportation will be requested for 1pm transport back to [facility].

## 2023-11-03 NOTE — ANESTHESIA POSTPROCEDURE EVALUATION
Anesthesia Post Evaluation    Patient: Estella Arevalo    Procedure(s) Performed: Procedure(s) (LRB):  EGD (ESOPHAGOGASTRODUODENOSCOPY) (N/A)    Final Anesthesia Type: general      Patient location during evaluation: GI PACU  Patient participation: Yes- Able to Participate  Level of consciousness: awake and alert, oriented and awake  Post-procedure vital signs: reviewed and stable  Airway patency: patent    PONV status at discharge: No PONV  Anesthetic complications: no      Cardiovascular status: blood pressure returned to baseline  Respiratory status: unassisted, spontaneous ventilation and room air  Hydration status: euvolemic  Follow-up not needed.          Vitals Value Taken Time   BP 93/53 11/03/23 0437   Temp 37.1 °C (98.8 °F) 11/03/23 0437   Pulse 66 11/03/23 0437   Resp 18 11/03/23 0437   SpO2 98 % 11/03/23 0437         Event Time   Out of Recovery 15:07:00         Pain/Mika Score: Pain Rating Prior to Med Admin: 10 (11/3/2023  2:03 AM)  Pain Rating Post Med Admin: 5 (11/2/2023  7:55 PM)  Mika Score: 9 (11/2/2023  3:03 PM)
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
## 2023-11-06 LAB
HCV RNA SERPL QL NAA+PROBE: NOT DETECTED
HCV RNA SPEC NAA+PROBE-ACNC: NOT DETECTED IU/ML

## 2023-11-07 ENCOUNTER — TELEPHONE (OUTPATIENT)
Dept: ENDOSCOPY | Facility: HOSPITAL | Age: 68
End: 2023-11-07
Payer: OTHER GOVERNMENT

## 2023-11-07 NOTE — TELEPHONE ENCOUNTER
"----- Message from Irlanda Her sent at 2023  2:20 PM CDT -----  Regarding: FW: Endo scheduling    ----- Message -----  From: Charito Cruz NP  Sent: 2023   2:15 PM CDT  To: [facility]  Subject: Endo scheduling                                  Procedure: EGD    Diagnosis: Esophagitis    Procedure Timin weeks    #If within 4 weeks selected, please misty as high priority#    #If greater than 12 weeks, please select "4-12 weeks" and delay sending until 2 months prior to requested date#     Provider: Any endoscopist    Location: [facility]    Additional Scheduling Information: No scheduling concerns    Prep Specifications:N/A    Have you attached a patient to this message: Yes        "

## 2023-11-07 NOTE — TELEPHONE ENCOUNTER
Contacted the patient to schedule an endoscopy procedure. The patient did not answer the call and unable to leave voice message.

## 2023-11-12 ENCOUNTER — HOSPITAL ENCOUNTER (INPATIENT)
Facility: HOSPITAL | Age: 68
LOS: 3 days | Discharge: HOME HEALTH | DRG: 571 | End: 2023-11-16
Attending: EMERGENCY MEDICINE | Admitting: EMERGENCY MEDICINE
Payer: MEDICARE

## 2023-11-12 DIAGNOSIS — L97.522 SKIN ULCER OF TOE OF LEFT FOOT WITH FAT LAYER EXPOSED: ICD-10-CM

## 2023-11-12 DIAGNOSIS — L03.90 CELLULITIS: ICD-10-CM

## 2023-11-12 DIAGNOSIS — T14.90XA TRAUMA: ICD-10-CM

## 2023-11-12 DIAGNOSIS — L03.032 CELLULITIS OF TOE OF LEFT FOOT: Primary | ICD-10-CM

## 2023-11-12 DIAGNOSIS — L03.116 CELLULITIS OF LEFT LOWER EXTREMITY: ICD-10-CM

## 2023-11-12 DIAGNOSIS — R07.9 CHEST PAIN: ICD-10-CM

## 2023-11-12 DIAGNOSIS — L03.90 CELLULITIS, UNSPECIFIED CELLULITIS SITE: ICD-10-CM
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
## 2023-11-12 DIAGNOSIS — R29.6 RECURRENT FALLS: ICD-10-CM

## 2023-11-12 LAB
ALBUMIN SERPL BCP-MCNC: 4.2 G/DL (ref 3.5–5.2)
ALLENS TEST: NORMAL
ALP SERPL-CCNC: 140 U/L (ref 55–135)
ALT SERPL W/O P-5'-P-CCNC: 54 U/L (ref 10–44)
AMPHET+METHAMPHET UR QL: NEGATIVE
ANION GAP SERPL CALC-SCNC: 15 MMOL/L (ref 8–16)
AST SERPL-CCNC: 129 U/L (ref 10–40)
BARBITURATES UR QL SCN>200 NG/ML: NEGATIVE
BASOPHILS # BLD AUTO: 0.05 K/UL (ref 0–0.2)
BASOPHILS NFR BLD: 1.1 % (ref 0–1.9)
BENZODIAZ UR QL SCN>200 NG/ML: ABNORMAL
BILIRUB SERPL-MCNC: 0.6 MG/DL (ref 0.1–1)
BILIRUB UR QL STRIP: NEGATIVE
BUN SERPL-MCNC: 8 MG/DL (ref 8–23)
BZE UR QL SCN: ABNORMAL
CALCIUM SERPL-MCNC: 10 MG/DL (ref 8.7–10.5)
CANNABINOIDS UR QL SCN: NEGATIVE
CHLORIDE SERPL-SCNC: 103 MMOL/L (ref 95–110)
CLARITY UR: CLEAR
CO2 SERPL-SCNC: 21 MMOL/L (ref 23–29)
COLOR UR: COLORLESS
CREAT SERPL-MCNC: 0.8 MG/DL (ref 0.5–1.4)
CREAT UR-MCNC: 25.4 MG/DL (ref 15–325)
CRP SERPL-MCNC: 16.9 MG/L (ref 0–8.2)
DIFFERENTIAL METHOD: ABNORMAL
EOSINOPHIL # BLD AUTO: 0.1 K/UL (ref 0–0.5)
EOSINOPHIL NFR BLD: 3.2 % (ref 0–8)
ERYTHROCYTE [DISTWIDTH] IN BLOOD BY AUTOMATED COUNT: 13.3 % (ref 11.5–14.5)
ERYTHROCYTE [SEDIMENTATION RATE] IN BLOOD BY WESTERGREN METHOD: 33 MM/HR (ref 0–20)
EST. GFR  (NO RACE VARIABLE): >60 ML/MIN/1.73 M^2
ETHANOL SERPL-MCNC: <10 MG/DL
GLUCOSE SERPL-MCNC: 105 MG/DL (ref 70–110)
GLUCOSE UR QL STRIP: NEGATIVE
HCT VFR BLD AUTO: 42.7 % (ref 37–48.5)
HGB BLD-MCNC: 14 G/DL (ref 12–16)
HGB UR QL STRIP: NEGATIVE
IMM GRANULOCYTES # BLD AUTO: 0.01 K/UL (ref 0–0.04)
IMM GRANULOCYTES NFR BLD AUTO: 0.2 % (ref 0–0.5)
KETONES UR QL STRIP: NEGATIVE
LACTATE SERPL-SCNC: 1.1 MMOL/L (ref 0.5–2.2)
LDH SERPL L TO P-CCNC: 1.05 MMOL/L (ref 0.5–2.2)
LEUKOCYTE ESTERASE UR QL STRIP: NEGATIVE
LYMPHOCYTES # BLD AUTO: 0.7 K/UL (ref 1–4.8)
LYMPHOCYTES NFR BLD: 16.1 % (ref 18–48)
MCH RBC QN AUTO: 31.8 PG (ref 27–31)
MCHC RBC AUTO-ENTMCNC: 32.8 G/DL (ref 32–36)
MCV RBC AUTO: 97 FL (ref 82–98)
METHADONE UR QL SCN>300 NG/ML: NEGATIVE
MONOCYTES # BLD AUTO: 0.4 K/UL (ref 0.3–1)
MONOCYTES NFR BLD: 9.2 % (ref 4–15)
NEUTROPHILS # BLD AUTO: 3.1 K/UL (ref 1.8–7.7)
NEUTROPHILS NFR BLD: 70.2 % (ref 38–73)
NITRITE UR QL STRIP: NEGATIVE
NRBC BLD-RTO: 0 /100 WBC
OPIATES UR QL SCN: NEGATIVE
PCP UR QL SCN>25 NG/ML: NEGATIVE
PH UR STRIP: 6 [PH] (ref 5–8)
PLATELET # BLD AUTO: 264 K/UL (ref 150–450)
PMV BLD AUTO: 10.1 FL (ref 9.2–12.9)
POCT GLUCOSE: 108 MG/DL (ref 70–110)
POTASSIUM SERPL-SCNC: 3.4 MMOL/L (ref 3.5–5.1)
PROT SERPL-MCNC: 8.3 G/DL (ref 6–8.4)
PROT UR QL STRIP: NEGATIVE
RBC # BLD AUTO: 4.4 M/UL (ref 4–5.4)
SAMPLE: NORMAL
SITE: NORMAL
SODIUM SERPL-SCNC: 139 MMOL/L (ref 136–145)
SP GR UR STRIP: 1 (ref 1–1.03)
TOXICOLOGY INFORMATION: ABNORMAL
URN SPEC COLLECT METH UR: ABNORMAL
UROBILINOGEN UR STRIP-ACNC: NEGATIVE EU/DL
WBC # BLD AUTO: 4.36 K/UL (ref 3.9–12.7)

## 2023-11-12 PROCEDURE — 83605 ASSAY OF LACTIC ACID: CPT

## 2023-11-12 PROCEDURE — 82077 ASSAY SPEC XCP UR&BREATH IA: CPT | Performed by: EMERGENCY MEDICINE

## 2023-11-12 PROCEDURE — 96367 TX/PROPH/DG ADDL SEQ IV INF: CPT

## 2023-11-12 PROCEDURE — 96366 THER/PROPH/DIAG IV INF ADDON: CPT

## 2023-11-12 PROCEDURE — 80053 COMPREHEN METABOLIC PANEL: CPT | Performed by: EMERGENCY MEDICINE

## 2023-11-12 PROCEDURE — 83605 ASSAY OF LACTIC ACID: CPT | Performed by: EMERGENCY MEDICINE

## 2023-11-12 PROCEDURE — 80307 DRUG TEST PRSMV CHEM ANLYZR: CPT | Performed by: EMERGENCY MEDICINE

## 2023-11-12 PROCEDURE — G0378 HOSPITAL OBSERVATION PER HR: HCPCS

## 2023-11-12 PROCEDURE — 99900035 HC TECH TIME PER 15 MIN (STAT)

## 2023-11-12 PROCEDURE — 93010 EKG 12-LEAD: ICD-10-PCS | Mod: ,,, | Performed by: INTERNAL MEDICINE

## 2023-11-12 PROCEDURE — 63600175 PHARM REV CODE 636 W HCPCS: Performed by: EMERGENCY MEDICINE

## 2023-11-12 PROCEDURE — 82962 GLUCOSE BLOOD TEST: CPT

## 2023-11-12 PROCEDURE — 86140 C-REACTIVE PROTEIN: CPT | Performed by: EMERGENCY MEDICINE

## 2023-11-12 PROCEDURE — 85652 RBC SED RATE AUTOMATED: CPT | Performed by: EMERGENCY MEDICINE

## 2023-11-12 PROCEDURE — 25000003 PHARM REV CODE 250: Performed by: EMERGENCY MEDICINE

## 2023-11-12 PROCEDURE — 25000003 PHARM REV CODE 250: Performed by: PHYSICIAN ASSISTANT

## 2023-11-12 PROCEDURE — 85025 COMPLETE CBC W/AUTO DIFF WBC: CPT | Performed by: EMERGENCY MEDICINE

## 2023-11-12 PROCEDURE — 93005 ELECTROCARDIOGRAM TRACING: CPT

## 2023-11-12 PROCEDURE — 87040 BLOOD CULTURE FOR BACTERIA: CPT | Mod: 59 | Performed by: EMERGENCY MEDICINE

## 2023-11-12 PROCEDURE — 81003 URINALYSIS AUTO W/O SCOPE: CPT | Mod: 59 | Performed by: EMERGENCY MEDICINE

## 2023-11-12 PROCEDURE — 96365 THER/PROPH/DIAG IV INF INIT: CPT

## 2023-11-12 PROCEDURE — 99285 EMERGENCY DEPT VISIT HI MDM: CPT | Mod: 25

## 2023-11-12 PROCEDURE — 93010 ELECTROCARDIOGRAM REPORT: CPT | Mod: ,,, | Performed by: INTERNAL MEDICINE

## 2023-11-12 RX ORDER — AMOXICILLIN 250 MG
1 CAPSULE ORAL DAILY PRN
Status: DISCONTINUED | OUTPATIENT
Start: 2023-11-12 | End: 2023-11-16 | Stop reason: HOSPADM

## 2023-11-12 RX ORDER — LINACLOTIDE 145 UG/1
145 CAPSULE, GELATIN COATED ORAL
COMMUNITY
Start: 2023-10-16

## 2023-11-12 RX ORDER — GLUCAGON 1 MG
1 KIT INJECTION
Status: DISCONTINUED | OUTPATIENT
Start: 2023-11-12 | End: 2023-11-16 | Stop reason: HOSPADM

## 2023-11-12 RX ORDER — FOLIC ACID 1 MG/1
1 TABLET ORAL DAILY
Status: DISCONTINUED | OUTPATIENT
Start: 2023-11-13 | End: 2023-11-16 | Stop reason: HOSPADM

## 2023-11-12 RX ORDER — SODIUM CHLORIDE 0.9 % (FLUSH) 0.9 %
10 SYRINGE (ML) INJECTION
Status: DISCONTINUED | OUTPATIENT
Start: 2023-11-12 | End: 2023-11-16 | Stop reason: HOSPADM

## 2023-11-12 RX ORDER — LORAZEPAM 2 MG/ML
2 INJECTION INTRAMUSCULAR EVERY 8 HOURS PRN
Status: DISCONTINUED | OUTPATIENT
Start: 2023-11-12 | End: 2023-11-16 | Stop reason: HOSPADM

## 2023-11-12 RX ORDER — NALOXONE HCL 0.4 MG/ML
0.02 VIAL (ML) INJECTION
Status: DISCONTINUED | OUTPATIENT
Start: 2023-11-12 | End: 2023-11-16 | Stop reason: HOSPADM

## 2023-11-12 RX ORDER — OXYCODONE HYDROCHLORIDE 5 MG/1
5 TABLET ORAL EVERY 4 HOURS PRN
Status: ON HOLD | COMMUNITY
Start: 2023-10-21 | End: 2023-11-28 | Stop reason: DRUGHIGH

## 2023-11-12 RX ORDER — LEVOTHYROXINE SODIUM 25 UG/1
25 TABLET ORAL
Status: DISCONTINUED | OUTPATIENT
Start: 2023-11-13 | End: 2023-11-16 | Stop reason: HOSPADM

## 2023-11-12 RX ORDER — IBUPROFEN 200 MG
24 TABLET ORAL
Status: DISCONTINUED | OUTPATIENT
Start: 2023-11-12 | End: 2023-11-16 | Stop reason: HOSPADM

## 2023-11-12 RX ORDER — CHLORDIAZEPOXIDE HYDROCHLORIDE 25 MG/1
25 CAPSULE, GELATIN COATED ORAL
Status: ON HOLD | COMMUNITY
End: 2023-11-28

## 2023-11-12 RX ORDER — ACETAMINOPHEN 325 MG/1
650 TABLET ORAL EVERY 4 HOURS PRN
Status: DISCONTINUED | OUTPATIENT
Start: 2023-11-12 | End: 2023-11-16 | Stop reason: HOSPADM

## 2023-11-12 RX ORDER — IBUPROFEN 200 MG
16 TABLET ORAL
Status: DISCONTINUED | OUTPATIENT
Start: 2023-11-12 | End: 2023-11-16 | Stop reason: HOSPADM

## 2023-11-12 RX ORDER — LANOLIN ALCOHOL/MO/W.PET/CERES
800 CREAM (GRAM) TOPICAL
Status: DISCONTINUED | OUTPATIENT
Start: 2023-11-12 | End: 2023-11-16 | Stop reason: HOSPADM

## 2023-11-12 RX ORDER — SODIUM CHLORIDE 0.9 % (FLUSH) 0.9 %
10 SYRINGE (ML) INJECTION EVERY 8 HOURS
Status: DISCONTINUED | OUTPATIENT
Start: 2023-11-12 | End: 2023-11-12

## 2023-11-12 RX ORDER — TALC
6 POWDER (GRAM) TOPICAL NIGHTLY PRN
Status: DISCONTINUED | OUTPATIENT
Start: 2023-11-12 | End: 2023-11-16 | Stop reason: HOSPADM

## 2023-11-12 RX ADMIN — SODIUM CHLORIDE, POTASSIUM CHLORIDE, SODIUM LACTATE AND CALCIUM CHLORIDE 2448 ML: 600; 310; 30; 20 INJECTION, SOLUTION INTRAVENOUS at 06:11

## 2023-11-12 RX ADMIN — PIPERACILLIN AND TAZOBACTAM 4.5 G: 4; .5 INJECTION, POWDER, LYOPHILIZED, FOR SOLUTION INTRAVENOUS; PARENTERAL at 05:11

## 2023-11-12 RX ADMIN — VANCOMYCIN HYDROCHLORIDE 2000 MG: 500 INJECTION, POWDER, LYOPHILIZED, FOR SOLUTION INTRAVENOUS at 05:11

## 2023-11-12 RX ADMIN — ACETAMINOPHEN 650 MG: 325 TABLET ORAL at 07:11

## 2023-11-12 NOTE — ED TRIAGE NOTES
Per   ems, patient was discharged 2 days ago from [facility] with prescriptions (including abx) sent to [facility].  She did not have the means to pick them up.  She has been homebound on the floor since then.  She reports left foot pain, redness, swelling.  Patient cleaned of urinary and fecal incontinence.

## 2023-11-12 NOTE — PLAN OF CARE
SW was contacted by nurse Horowitz to inform her that patient needs help contacting her son Jignesh to let him know that she is at the hospital and that there's nobody at the house to take care of her granddaughter. SW contacted Jignesh he stated that patient's granddaughter is fine with him and that he will continue to take care of her.    COLBY met with patient at her bedside to inform her that her granddaughter is doing fine and that her son will continue to take care of her.
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
## 2023-11-13 PROBLEM — R29.6 RECURRENT FALLS: Status: ACTIVE | Noted: 2023-11-13

## 2023-11-13 LAB
ALBUMIN SERPL BCP-MCNC: 3.3 G/DL (ref 3.5–5.2)
ALP SERPL-CCNC: 118 U/L (ref 55–135)
ALT SERPL W/O P-5'-P-CCNC: 42 U/L (ref 10–44)
ANION GAP SERPL CALC-SCNC: 9 MMOL/L (ref 8–16)
AST SERPL-CCNC: 90 U/L (ref 10–40)
BASOPHILS # BLD AUTO: 0.07 K/UL (ref 0–0.2)
BASOPHILS NFR BLD: 1.9 % (ref 0–1.9)
BILIRUB SERPL-MCNC: 0.7 MG/DL (ref 0.1–1)
BUN SERPL-MCNC: 7 MG/DL (ref 8–23)
CALCIUM SERPL-MCNC: 8.8 MG/DL (ref 8.7–10.5)
CHLORIDE SERPL-SCNC: 108 MMOL/L (ref 95–110)
CK SERPL-CCNC: 2050 U/L (ref 20–180)
CO2 SERPL-SCNC: 24 MMOL/L (ref 23–29)
CREAT SERPL-MCNC: 0.8 MG/DL (ref 0.5–1.4)
DIFFERENTIAL METHOD: ABNORMAL
EOSINOPHIL # BLD AUTO: 0.3 K/UL (ref 0–0.5)
EOSINOPHIL NFR BLD: 8 % (ref 0–8)
ERYTHROCYTE [DISTWIDTH] IN BLOOD BY AUTOMATED COUNT: 13.4 % (ref 11.5–14.5)
EST. GFR  (NO RACE VARIABLE): >60 ML/MIN/1.73 M^2
GLUCOSE SERPL-MCNC: 110 MG/DL (ref 70–110)
HCT VFR BLD AUTO: 38.3 % (ref 37–48.5)
HGB BLD-MCNC: 12.3 G/DL (ref 12–16)
IMM GRANULOCYTES # BLD AUTO: 0 K/UL (ref 0–0.04)
IMM GRANULOCYTES NFR BLD AUTO: 0 % (ref 0–0.5)
LYMPHOCYTES # BLD AUTO: 0.7 K/UL (ref 1–4.8)
LYMPHOCYTES NFR BLD: 18.2 % (ref 18–48)
MCH RBC QN AUTO: 31.9 PG (ref 27–31)
MCHC RBC AUTO-ENTMCNC: 32.1 G/DL (ref 32–36)
MCV RBC AUTO: 99 FL (ref 82–98)
MONOCYTES # BLD AUTO: 0.4 K/UL (ref 0.3–1)
MONOCYTES NFR BLD: 10.5 % (ref 4–15)
NEUTROPHILS # BLD AUTO: 2.3 K/UL (ref 1.8–7.7)
NEUTROPHILS NFR BLD: 61.4 % (ref 38–73)
NRBC BLD-RTO: 0 /100 WBC
PLATELET # BLD AUTO: 239 K/UL (ref 150–450)
PMV BLD AUTO: 10.3 FL (ref 9.2–12.9)
POTASSIUM SERPL-SCNC: 3.5 MMOL/L (ref 3.5–5.1)
PROT SERPL-MCNC: 6.5 G/DL (ref 6–8.4)
RBC # BLD AUTO: 3.86 M/UL (ref 4–5.4)
SODIUM SERPL-SCNC: 141 MMOL/L (ref 136–145)
VIT B12 SERPL-MCNC: 710 PG/ML (ref 210–950)
WBC # BLD AUTO: 3.73 K/UL (ref 3.9–12.7)

## 2023-11-13 PROCEDURE — 80053 COMPREHEN METABOLIC PANEL: CPT | Performed by: PHYSICIAN ASSISTANT

## 2023-11-13 PROCEDURE — 82607 VITAMIN B-12: CPT | Performed by: NURSE PRACTITIONER

## 2023-11-13 PROCEDURE — 63600175 PHARM REV CODE 636 W HCPCS: Performed by: EMERGENCY MEDICINE

## 2023-11-13 PROCEDURE — 99222 1ST HOSP IP/OBS MODERATE 55: CPT | Mod: ,,, | Performed by: PODIATRIST

## 2023-11-13 PROCEDURE — 87070 CULTURE OTHR SPECIMN AEROBIC: CPT | Performed by: PODIATRIST

## 2023-11-13 PROCEDURE — 87075 CULTR BACTERIA EXCEPT BLOOD: CPT | Performed by: PODIATRIST

## 2023-11-13 PROCEDURE — 87205 SMEAR GRAM STAIN: CPT | Performed by: PODIATRIST

## 2023-11-13 PROCEDURE — 25000003 PHARM REV CODE 250: Performed by: PHYSICIAN ASSISTANT

## 2023-11-13 PROCEDURE — 36415 COLL VENOUS BLD VENIPUNCTURE: CPT | Performed by: PHYSICIAN ASSISTANT

## 2023-11-13 PROCEDURE — 99222 PR INITIAL HOSPITAL CARE,LEVL II: ICD-10-PCS | Mod: ,,, | Performed by: PODIATRIST

## 2023-11-13 PROCEDURE — 25000003 PHARM REV CODE 250: Performed by: NURSE PRACTITIONER

## 2023-11-13 PROCEDURE — 63600175 PHARM REV CODE 636 W HCPCS: Performed by: HOSPITALIST

## 2023-11-13 PROCEDURE — 25000003 PHARM REV CODE 250: Performed by: EMERGENCY MEDICINE

## 2023-11-13 PROCEDURE — 87186 SC STD MICRODIL/AGAR DIL: CPT | Performed by: PODIATRIST

## 2023-11-13 PROCEDURE — 11000001 HC ACUTE MED/SURG PRIVATE ROOM

## 2023-11-13 PROCEDURE — 96366 THER/PROPH/DIAG IV INF ADDON: CPT

## 2023-11-13 PROCEDURE — 36415 COLL VENOUS BLD VENIPUNCTURE: CPT | Performed by: NURSE PRACTITIONER

## 2023-11-13 PROCEDURE — 25000003 PHARM REV CODE 250: Performed by: REGISTERED NURSE

## 2023-11-13 PROCEDURE — 87077 CULTURE AEROBIC IDENTIFY: CPT | Performed by: PODIATRIST

## 2023-11-13 PROCEDURE — 25000003 PHARM REV CODE 250: Performed by: HOSPITALIST

## 2023-11-13 PROCEDURE — 63600175 PHARM REV CODE 636 W HCPCS: Performed by: NURSE PRACTITIONER

## 2023-11-13 PROCEDURE — 82550 ASSAY OF CK (CPK): CPT | Performed by: PHYSICIAN ASSISTANT

## 2023-11-13 PROCEDURE — 96361 HYDRATE IV INFUSION ADD-ON: CPT

## 2023-11-13 PROCEDURE — 85025 COMPLETE CBC W/AUTO DIFF WBC: CPT | Performed by: PHYSICIAN ASSISTANT

## 2023-11-13 RX ORDER — TRAZODONE HYDROCHLORIDE 50 MG/1
100 TABLET ORAL NIGHTLY
Status: DISCONTINUED | OUTPATIENT
Start: 2023-11-13 | End: 2023-11-16 | Stop reason: HOSPADM

## 2023-11-13 RX ORDER — SODIUM CHLORIDE, SODIUM LACTATE, POTASSIUM CHLORIDE, CALCIUM CHLORIDE 600; 310; 30; 20 MG/100ML; MG/100ML; MG/100ML; MG/100ML
INJECTION, SOLUTION INTRAVENOUS CONTINUOUS
Status: DISCONTINUED | OUTPATIENT
Start: 2023-11-13 | End: 2023-11-15

## 2023-11-13 RX ORDER — ESCITALOPRAM OXALATE 10 MG/1
20 TABLET ORAL DAILY
Status: DISCONTINUED | OUTPATIENT
Start: 2023-11-13 | End: 2023-11-16 | Stop reason: HOSPADM

## 2023-11-13 RX ORDER — IBUPROFEN 600 MG/1
600 TABLET ORAL EVERY 8 HOURS PRN
Status: COMPLETED | OUTPATIENT
Start: 2023-11-13 | End: 2023-11-14

## 2023-11-13 RX ORDER — LANOLIN ALCOHOL/MO/W.PET/CERES
100 CREAM (GRAM) TOPICAL DAILY
Status: DISCONTINUED | OUTPATIENT
Start: 2023-11-14 | End: 2023-11-16 | Stop reason: HOSPADM

## 2023-11-13 RX ORDER — ARIPIPRAZOLE 5 MG/1
10 TABLET ORAL DAILY
Status: DISCONTINUED | OUTPATIENT
Start: 2023-11-13 | End: 2023-11-16 | Stop reason: HOSPADM

## 2023-11-13 RX ADMIN — LEVOTHYROXINE SODIUM 25 MCG: 25 TABLET ORAL at 06:11

## 2023-11-13 RX ADMIN — FOLIC ACID 1 MG: 1 TABLET ORAL at 08:11

## 2023-11-13 RX ADMIN — TRAZODONE HYDROCHLORIDE 100 MG: 50 TABLET ORAL at 08:11

## 2023-11-13 RX ADMIN — THERA TABS 1 TABLET: TAB at 08:11

## 2023-11-13 RX ADMIN — VANCOMYCIN HYDROCHLORIDE 1000 MG: 1 INJECTION, POWDER, LYOPHILIZED, FOR SOLUTION INTRAVENOUS at 06:11

## 2023-11-13 RX ADMIN — VANCOMYCIN HYDROCHLORIDE 1000 MG: 1 INJECTION, POWDER, LYOPHILIZED, FOR SOLUTION INTRAVENOUS at 05:11

## 2023-11-13 RX ADMIN — IBUPROFEN 600 MG: 600 TABLET ORAL at 02:11

## 2023-11-13 RX ADMIN — TRAZODONE HYDROCHLORIDE 100 MG: 50 TABLET ORAL at 02:11

## 2023-11-13 RX ADMIN — ARIPIPRAZOLE 10 MG: 5 TABLET ORAL at 05:11

## 2023-11-13 RX ADMIN — PIPERACILLIN AND TAZOBACTAM 4.5 G: 4; .5 INJECTION, POWDER, LYOPHILIZED, FOR SOLUTION INTRAVENOUS; PARENTERAL at 01:11

## 2023-11-13 RX ADMIN — PIPERACILLIN AND TAZOBACTAM 4.5 G: 4; .5 INJECTION, POWDER, LYOPHILIZED, FOR SOLUTION INTRAVENOUS; PARENTERAL at 08:11

## 2023-11-13 RX ADMIN — SODIUM CHLORIDE, POTASSIUM CHLORIDE, SODIUM LACTATE AND CALCIUM CHLORIDE: 600; 310; 30; 20 INJECTION, SOLUTION INTRAVENOUS at 08:11

## 2023-11-13 RX ADMIN — ESCITALOPRAM OXALATE 20 MG: 10 TABLET ORAL at 05:11

## 2023-11-13 RX ADMIN — SODIUM CHLORIDE, POTASSIUM CHLORIDE, SODIUM LACTATE AND CALCIUM CHLORIDE: 600; 310; 30; 20 INJECTION, SOLUTION INTRAVENOUS at 12:11
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
## 2023-11-13 NOTE — ASSESSMENT & PLAN NOTE
Body mass index is 29.95 kg/m². Morbid obesity complicates all aspects of disease management from diagnostic modalities to treatment. Weight loss encouraged and health benefits explained to patient.

## 2023-11-13 NOTE — PLAN OF CARE
Problem: Violence Risk or Actual  Goal: Anger and Impulse Control  Outcome: Ongoing, Progressing     Problem: Adult Inpatient Plan of Care  Goal: Plan of Care Review  Outcome: Ongoing, Progressing  Flowsheets (Taken 11/13/2023 5454)  Plan of Care Reviewed With: patient  Goal: Absence of Hospital-Acquired Illness or Injury  Outcome: Ongoing, Progressing  Goal: Optimal Comfort and Wellbeing  Outcome: Ongoing, Progressing     Problem: Skin Injury Risk Increased  Goal: Skin Health and Integrity  Outcome: Ongoing, Progressing

## 2023-11-13 NOTE — ASSESSMENT & PLAN NOTE
Presents with ascending cellulitis to left leg with wound to left toe. Multiple frequent falls at home per patient. Afebrile without leukocytosis. Sed rate/CRP elevated, x-ray foot without bony abnormality. Lactic within normal limits  Started on vanc/zosyn  Podiatry consulted  Blood cultures pending

## 2023-11-13 NOTE — PLAN OF CARE
Problem: Violence Risk or Actual  Goal: Anger and Impulse Control  Outcome: Ongoing, Progressing     Problem: Adult Inpatient Plan of Care  Goal: Plan of Care Review  Outcome: Ongoing, Progressing  Goal: Patient-Specific Goal (Individualized)  Outcome: Ongoing, Progressing  Goal: Absence of Hospital-Acquired Illness or Injury  Outcome: Ongoing, Progressing  Goal: Optimal Comfort and Wellbeing  Outcome: Ongoing, Progressing  Goal: Readiness for Transition of Care  Outcome: Ongoing, Progressing     Problem: Skin Injury Risk Increased  Goal: Skin Health and Integrity  Outcome: Ongoing, Progressing     Problem: Impaired Wound Healing  Goal: Optimal Wound Healing  Outcome: Ongoing, Progressing

## 2023-11-13 NOTE — ASSESSMENT & PLAN NOTE
Presents with ascending cellulitis to left leg with wound to left toe. Multiple frequent falls at home per patient. Afebrile without leukocytosis. Sed rate/CRP elevated, x-ray foot without bony abnormality. Lactic within normal limits  Left lower extremity erythema receded from marking-improving  Left great toe tip ulcer intact  Continue IV antibiotic while stay  Patient declined DARCO shoe  Podiatry debrided left great toe ulcer and no purulent drainage.   On discharge, switch IV abx to doxycycline PO x 10 days and follow up Podiatry clinic in -10 days.   Wound care-iodine to left great toe with mepilex daily.

## 2023-11-13 NOTE — ASSESSMENT & PLAN NOTE
Presents with ascending cellulitis to left leg with wound to left toe. Multiple frequent falls at home per patient. Afebrile without leukocytosis. Sed rate/CRP elevated, x-ray foot without bony abnormality. Lactic within normal limits  Left lower extremity erythema receded from marking-improving  Left great toe tip ulcer intact  Was seen by Podiatry this am- will follow up recs  Continue IV antibiotic while stay

## 2023-11-13 NOTE — PROGRESS NOTES
[facility]  Progress Note    Patient Name: Estella Arevalo  MRN: [id number]  Patient Class: IP- Inpatient   Admission Date: 11/12/2023  Length of Stay: 0 days  Attending Physician: Ramon Marvin MD  Primary Care Provider: Cristela, Primary Doctor        Subjective:     Principal Problem:Cellulitis        HPI:  Estella Arevalo 68 y.o. female with alcohol abuse, cocaine abuse, THC, CAD, presents to the hospital with a chief complaint of leg swelling and erythema.  She reports yesterday she found her left great toe was red and swelling.  This progressed throughout the day and began to extend to her mid causing presentation in the hospital.  She is had multiple falls at home he is unsure if she injured her toe during 1 of the falls.  She reports using THC yesterday her last drink was 1 month ago.  She denies chest Pain nausea vomiting abdominal pain melena hematuria hematemesis dizziness syncope.    In the ED, without leukocytosis sed rate elevated CRP elevated lactic acid within normal limits alcohol negative UDS positive for benzodiazepines and cocaine x-ray pelvis without acute findings in the pelvic ring her hips x-ray tibia fibula without acute abnormality x-ray foot with soft tissue swelling of the ankle no acute bony abnormality no significant change chest x-ray without acute abnormality.    Overview/Hospital Course:  Admission to observation for LLE cellulitis and left great toe ulcer.  Erythema improved overnight with IVF. Afebrile and no leukocytosis. Mild elevated CPR/ED 16/33. CPK elevated with this am labs. Podiatry consult pending. Start IVF and continue antibiotics. Repeat CPK in am. Add vitamin b 12 due to alcohol abuse and PT consult.   If no further recs from Podiatry and CPK and cellulitis continues to improve, then discharge home tomorrow.     Addendum 1741  Patient declined DARCO shoe  Podiatry debrided left great toe ulcer and no purulent drainage.   On discharge, switch  IV abx to doxycycline PO x 10 days and follow up Podiatry clinic in -10 days.   Wound care-iodine to left great toe with mepilex daily.     Interval History: feels her LLE erythema much better. No cp or sob.     Review of Systems   Constitutional:  Positive for fatigue. Negative for chills and fever.   HENT:  Negative for nosebleeds and tinnitus.    Eyes:  Negative for photophobia and visual disturbance.   Respiratory:  Negative for shortness of breath and wheezing.    Cardiovascular:  Negative for chest pain, palpitations and leg swelling.   Gastrointestinal:  Negative for abdominal distention, nausea and vomiting.   Genitourinary:  Negative for dysuria, flank pain and hematuria.   Musculoskeletal:  Negative for gait problem and joint swelling.   Skin:  Positive for color change and rash. Negative for wound.   Neurological:  Negative for seizures and syncope.     Objective:     Vital Signs (Most Recent):  Temp: 97.9 °F (36.6 °C) (11/13/23 1129)  Pulse: 76 (11/13/23 1129)  Resp: 20 (11/13/23 1129)  BP: (!) 127/96 (11/13/23 1129)  SpO2: (!) 94 % (11/13/23 1129) Vital Signs (24h Range):  Temp:  [97.6 °F (36.4 °C)-98 °F (36.7 °C)] 97.9 °F (36.6 °C)  Pulse:  [76-94] 76  Resp:  [16-24] 20  SpO2:  [93 %-98 %] 94 %  BP: (113-156)/() 127/96     Weight: 91.2 kg (201 lb 1 oz)  Body mass index is 33.46 kg/m².    Intake/Output Summary (Last 24 hours) at 11/13/2023 1235  Last data filed at 11/13/2023 1129  Gross per 24 hour   Intake 600 ml   Output 1000 ml   Net -400 ml         Physical Exam  Vitals and nursing note reviewed.   Constitutional:       General: She is not in acute distress.     Appearance: She is well-developed. She is not diaphoretic.   Neck:      Thyroid: No thyromegaly.   Cardiovascular:      Rate and Rhythm: Normal rate and regular rhythm.      Heart sounds: No murmur heard.  Pulmonary:      Effort: Pulmonary effort is normal. No respiratory distress.      Breath sounds: Normal breath sounds.   Abdominal:       General: Bowel sounds are normal. There is no distension.      Palpations: Abdomen is soft. There is no mass.      Tenderness: There is no abdominal tenderness.   Musculoskeletal:         General: Swelling, tenderness and signs of injury present. No deformity.      Cervical back: Normal range of motion and neck supple.   Skin:     General: Skin is warm and dry.      Comments: LLE shin to foot erythema swelling improving compared with pictures in media (see below) . Redness have receded from marking.   Left great toe tip closed ulcer dry    Neurological:      Mental Status: She is alert and oriented to person, place, and time.      Sensory: No sensory deficit.             Significant Labs: All pertinent labs within the past 24 hours have been reviewed.    Significant Imaging: I have reviewed all pertinent imaging results/findings within the past 24 hours.    Assessment/Plan:      * LLE Cellulitis, Left great toe ulcer  Presents with ascending cellulitis to left leg with wound to left toe. Multiple frequent falls at home per patient. Afebrile without leukocytosis. Sed rate/CRP elevated, x-ray foot without bony abnormality. Lactic within normal limits  Left lower extremity erythema receded from marking-improving  Left great toe tip ulcer intact  Was seen by Podiatry this am- will follow up recs  Continue IV antibiotic while stay    Addendum 1741  Patient declined DARCO shoe  Podiatry debrided left great toe ulcer and no purulent drainage.   On discharge, switch IV abx to doxycycline PO x 10 days and follow up Podiatry clinic in -10 days.   Wound care-iodine to left great toe with mepilex daily.         Recurrent falls  Patient states have charcot foot? But does not follow up with anyone  Possible due to neuropathy? From alcohol   Add vitamin b 12      PT consult     Obesity (BMI 30-39.9)  Body mass index is 33.46 kg/m². Morbid obesity complicates all aspects of disease management from diagnostic modalities to  treatment. Weight loss encouraged and health benefits explained to patient.     Alcohol abuse  Reports last drink of alcohol 2 weeks ago. Chronically elevated LFTs. CIWA score 0  PRN ativan, CIWA trend, thiamine/folate supplementation    CAD (coronary artery disease)  Per chart review though no previous [facility] to compare. Denies chest pain.       Patient with known CAD s/p no known interventions, which is controlled Will continue not on medication outpatient and monitor for S/Sx of angina/ACS. Continue to monitor on telemetry.     Essential hypertension  Well controlled not currently on medication outpatient per patient. Resume medications if BP elevates    Chronic, controlled. Latest blood pressure and vitals reviewed-     Temp:  [97.7 °F (36.5 °C)-97.9 °F (36.6 °C)]   Pulse:  [85-94]   Resp:  [16-24]   BP: (124-142)/()   SpO2:  [95 %-97 %] .   Home meds for hypertension were reviewed and noted below.   Hypertension Medications               cloNIDine (CATAPRES) 0.1 MG tablet Take 1 tablet (0.1 mg total) by mouth once. for 1 dose            While in the hospital, will manage blood pressure as follows; Adjust home antihypertensive regimen as follows- not on medication outpatient    Will utilize p.r.n. blood pressure medication only if patient's blood pressure greater than 180/110 and she develops symptoms such as worsening chest pain or shortness of breath.    Acquired hypothyroidism  Lab Results   Component Value Date    TSH 1.661 04/20/2022   continue home synthroid    Polysubstance dependence including opioid type drug, continuous use  Per chart review previously positive for opiates on multiple UDS. Today positive for benzos and cocaine. Admits to smoking THC possible containing cocaine though UDS negative for THC  Encourage stop drinking alcohol and using illicit drugs      VTE Risk Mitigation (From admission, onward)           Ordered     IP VTE HIGH RISK PATIENT  Once         11/12/23 1947     Place  sequential compression device  Until discontinued         11/12/23 1947     Place MARINA hose  Until discontinued         11/12/23 1947                    Discharge Planning   SARAH:      Code Status: Full Code   Is the patient medically ready for discharge?:     Reason for patient still in hospital (select all that apply): Treatment  Discharge Plan A: Home with family, Other (TBD)            Petra Loja NP  Department of Hospital Medicine   [facility]

## 2023-11-13 NOTE — ASSESSMENT & PLAN NOTE
Patient states have charcot foot? But does not follow up with anyone  Possible due to neuropathy? From alcohol   Add vitamin b 12      PT consult

## 2023-11-13 NOTE — ASSESSMENT & PLAN NOTE
Per chart review though no previous [facility] to compare. Denies chest pain.       Patient with known CAD s/p no known interventions, which is controlled Will continue not on medication outpatient and monitor for S/Sx of angina/ACS. Continue to monitor on telemetry.

## 2023-11-13 NOTE — PROGRESS NOTES
Vancomycin consult follow-up:    Patient reviewed, renal function stable, no new levels, continue current therapy; Next levels due: trough due 11/14/2023 at 0500

## 2023-11-13 NOTE — NURSING
Received pt from ER. On RA not in distress. Alert and oriented. On 22g on her R forearm and 20g on her L UA, flushed, saline locked noted. On cardiac diet, but NPO at midnight. Pt informed and understand. With swelling on her L great toe noted. With dry wound on her R heel, foam dressing applied. HOB elevated. Bed in low position. Call light within reach.

## 2023-11-13 NOTE — NURSING
[facility] [place]  Nurses Note -- 4 Eyes      11/13/2023       Skin assessed on: Q Shift      [x] No Pressure Injuries Present    [x]Prevention Measures Documented    [] Yes LDA  for Pressure Injury Previously documented     [] Yes New Pressure Injury Discovered   [] LDA for New Pressure Injury Added      Attending RN:  Daisha Reaves, RN     Second RN:  Elda

## 2023-11-13 NOTE — PLAN OF CARE
11/13/23 1117   Discharge Planning   Assessment Type Discharge Planning Brief Assessment   Resource/Environmental Concerns none   Support Systems Children   Equipment Currently Used at Home rollator;grab bar;shower chair   Current Living Arrangements home   Patient/Family Anticipates Transition to home;home with family   Patient/Family Anticipated Services at Transition none   DME Needed Upon Discharge  other (see comments)   Discharge Plan A Home with family;Other  (TBD)       [id number] - [place] - [address] [facility]  [address] [address]  Phone: [phone] Fax: [phone]

## 2023-11-13 NOTE — ASSESSMENT & PLAN NOTE
Per chart review previously positive for opiates on multiple UDS. Today positive for benzos and cocaine. Admits to smoking THC possible containing cocaine though UDS negative for THC
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
## 2023-11-13 NOTE — PROGRESS NOTES
"Pharmacokinetic Initial Assessment: IV Vancomycin    Assessment/Plan:    Initiate intravenous vancomycin with loading dose of 2000 mg once followed by a maintenance dose of vancomycin 1000mg IV every 12 hours  Desired empiric serum trough concentration is 10 to 20 mcg/mL  Draw vancomycin trough level 60 min prior to fourth dose on 11-14-23 at approximately 0500  Pharmacy will continue to follow and monitor vancomycin.      Please contact pharmacy at extension [phone] with any questions regarding this assessment.     Thank you for the consult,   Yanira Hernandez       Patient brief summary:  Estella Arevalo is a 68 y.o. female initiated on antimicrobial therapy with IV Vancomycin for treatment of suspected skin & soft tissue infection    Drug Allergies:   Review of patient's allergies indicates:   Allergen Reactions    Codeine      nausea       Actual Body Weight:   81.6 kg    Renal Function:   Estimated Creatinine Clearance: 70 mL/min (based on SCr of 0.8 mg/dL).,     Dialysis Method (if applicable):  N/A    CBC (last 72 hours):  Recent Labs   Lab Result Units 11/12/23  1810   WBC K/uL 4.36   Hemoglobin g/dL 14.0   Hematocrit % 42.7   Platelets K/uL 264   Gran % % 70.2   Lymph % % 16.1*   Mono % % 9.2   Eosinophil % % 3.2   Basophil % % 1.1   Differential Method  Automated       Metabolic Panel (last 72 hours):  Recent Labs   Lab Result Units 11/12/23  1735 11/12/23  1810   Sodium mmol/L  --  139   Potassium mmol/L  --  3.4*   Chloride mmol/L  --  103   CO2 mmol/L  --  21*   Glucose mg/dL  --  105   Glucose, UA  Negative  --    BUN mg/dL  --  8   Creatinine mg/dL  --  0.8   Creatinine, Urine mg/dL 25.4  --    Albumin g/dL  --  4.2   Total Bilirubin mg/dL  --  0.6   Alkaline Phosphatase U/L  --  140*   AST U/L  --  129*   ALT U/L  --  54*       Drug levels (last 3 results):  No results for input(s): "VANCOMYCINRA", "VANCORANDOM", "VANCOMYCINPE", "VANCOPEAK", "VANCOMYCINTR", "VANCOTROUGH" in the last 72 " hours.    Microbiologic Results:  Microbiology Results (last 7 days)       Procedure Component Value Units Date/Time    Blood culture x two cultures. Draw prior to antibiotics. [[id number]] Collected: 11/12/23 1731    Order Status: Sent Specimen: Blood from Peripheral, Hand, Left Updated: 11/12/23 1735    Blood culture x two cultures. Draw prior to antibiotics. [[id number]] Collected: 11/12/23 1719    Order Status: Sent Specimen: Blood from Peripheral, Hand, Right Updated: 11/12/23 1724

## 2023-11-13 NOTE — ASSESSMENT & PLAN NOTE
Body mass index is 33.46 kg/m². Morbid obesity complicates all aspects of disease management from diagnostic modalities to treatment. Weight loss encouraged and health benefits explained to patient.

## 2023-11-13 NOTE — SUBJECTIVE & OBJECTIVE
Interval History: feels her LLE erythema much better. No cp or sob.     Review of Systems   Constitutional:  Positive for fatigue. Negative for chills and fever.   HENT:  Negative for nosebleeds and tinnitus.    Eyes:  Negative for photophobia and visual disturbance.   Respiratory:  Negative for shortness of breath and wheezing.    Cardiovascular:  Negative for chest pain, palpitations and leg swelling.   Gastrointestinal:  Negative for abdominal distention, nausea and vomiting.   Genitourinary:  Negative for dysuria, flank pain and hematuria.   Musculoskeletal:  Negative for gait problem and joint swelling.   Skin:  Positive for color change and rash. Negative for wound.   Neurological:  Negative for seizures and syncope.     Objective:     Vital Signs (Most Recent):  Temp: 97.9 °F (36.6 °C) (11/13/23 1129)  Pulse: 76 (11/13/23 1129)  Resp: 20 (11/13/23 1129)  BP: (!) 127/96 (11/13/23 1129)  SpO2: (!) 94 % (11/13/23 1129) Vital Signs (24h Range):  Temp:  [97.6 °F (36.4 °C)-98 °F (36.7 °C)] 97.9 °F (36.6 °C)  Pulse:  [76-94] 76  Resp:  [16-24] 20  SpO2:  [93 %-98 %] 94 %  BP: (113-156)/() 127/96     Weight: 91.2 kg (201 lb 1 oz)  Body mass index is 33.46 kg/m².    Intake/Output Summary (Last 24 hours) at 11/13/2023 1235  Last data filed at 11/13/2023 1129  Gross per 24 hour   Intake 600 ml   Output 1000 ml   Net -400 ml         Physical Exam  Vitals and nursing note reviewed.   Constitutional:       General: She is not in acute distress.     Appearance: She is well-developed. She is not diaphoretic.   Neck:      Thyroid: No thyromegaly.   Cardiovascular:      Rate and Rhythm: Normal rate and regular rhythm.      Heart sounds: No murmur heard.  Pulmonary:      Effort: Pulmonary effort is normal. No respiratory distress.      Breath sounds: Normal breath sounds.   Abdominal:      General: Bowel sounds are normal. There is no distension.      Palpations: Abdomen is soft. There is no mass.      Tenderness: There  is no abdominal tenderness.   Musculoskeletal:         General: Swelling, tenderness and signs of injury present. No deformity.      Cervical back: Normal range of motion and neck supple.   Skin:     General: Skin is warm and dry.      Comments: LLE shin to foot erythema swelling improving compared with pictures in media (see below) . Redness have receded from marking.   Left great toe tip closed ulcer dry    Neurological:      Mental Status: She is alert and oriented to person, place, and time.      Sensory: No sensory deficit.             Significant Labs: All pertinent labs within the past 24 hours have been reviewed.    Significant Imaging: I have reviewed all pertinent imaging results/findings within the past 24 hours.

## 2023-11-13 NOTE — ASSESSMENT & PLAN NOTE
Reports last drink of alcohol 2 weeks ago. Chronically elevated LFTs. CIWA score 0  PRN ativan, CIWA trend, thiamine/folate supplementation

## 2023-11-13 NOTE — HOSPITAL COURSE
Admission to observation for LLE cellulitis and left great toe ulcer.  Erythema improved overnight with IVF. Afebrile and no leukocytosis. Mild elevated CPR/ED 16/33. CPK elevated with this am labs. Podiatry consult pending. Start IVF and continue antibiotics. Repeat CPK in am. Add vitamin b 12 due to alcohol abuse and PT consult.   If no further recs from Podiatry and CPK and cellulitis continues to improve, then discharge home tomorrow.     Addendum 1741  Patient declined DARCO shoe  Podiatry debrided left great toe ulcer and no purulent drainage.   On discharge, switch IV abx to doxycycline PO x 10 days and follow up Podiatry clinic in -10 days.   Wound care-iodine to left great toe with mepilex daily.     11/14: CPK trended down from 2k to 923. Complains of N/v and diarrhea. KUB neg. Cdiff neg  11/15:KUB neg. Continues to have N/V/D. States this has been going onfor 6 weeks. CT abdomen shows possible cholecystitis. Surgery consulted. NPO ordered  11/16: HIDA scan is normal. Pt's N/V/D resolved. Pt states she would like to be d/c home. Deemed stable to be d/c.

## 2023-11-13 NOTE — HPI
Estella Arevalo 68 y.o. female with alcohol abuse, cocaine abuse, THC, CAD, presents to the hospital with a chief complaint of leg swelling and erythema.  She reports yesterday she found her left great toe was red and swelling.  This progressed throughout the day and began to extend to her mid causing presentation in the hospital.  She is had multiple falls at home he is unsure if she injured her toe during 1 of the falls.  She reports using THC yesterday her last drink was 1 month ago.  She denies chest Pain nausea vomiting abdominal pain melena hematuria hematemesis dizziness syncope.    In the ED, without leukocytosis sed rate elevated CRP elevated lactic acid within normal limits alcohol negative UDS positive for benzodiazepines and cocaine x-ray pelvis without acute findings in the pelvic ring her hips x-ray tibia fibula without acute abnormality x-ray foot with soft tissue swelling of the ankle no acute bony abnormality no significant change chest x-ray without acute abnormality.

## 2023-11-13 NOTE — ASSESSMENT & PLAN NOTE
Per chart review previously positive for opiates on multiple UDS. Today positive for benzos and cocaine. Admits to smoking THC possible containing cocaine though UDS negative for THC  Encourage stop drinking alcohol and using illicit drugs

## 2023-11-13 NOTE — ASSESSMENT & PLAN NOTE
Well controlled not currently on medication outpatient per patient. Resume medications if BP elevates    Chronic, controlled. Latest blood pressure and vitals reviewed-     Temp:  [97.7 °F (36.5 °C)-97.9 °F (36.6 °C)]   Pulse:  [85-94]   Resp:  [16-24]   BP: (124-142)/()   SpO2:  [95 %-97 %] .   Home meds for hypertension were reviewed and noted below.   Hypertension Medications             cloNIDine (CATAPRES) 0.1 MG tablet Take 1 tablet (0.1 mg total) by mouth once. for 1 dose          While in the hospital, will manage blood pressure as follows; Adjust home antihypertensive regimen as follows- not on medication outpatient    Will utilize p.r.n. blood pressure medication only if patient's blood pressure greater than 180/110 and she develops symptoms such as worsening chest pain or shortness of breath.

## 2023-11-14 PROBLEM — L97.522 SKIN ULCER OF TOE OF LEFT FOOT WITH FAT LAYER EXPOSED: Status: ACTIVE | Noted: 2023-11-14

## 2023-11-14 LAB
ALBUMIN SERPL BCP-MCNC: 3.2 G/DL (ref 3.5–5.2)
ALP SERPL-CCNC: 113 U/L (ref 55–135)
ALT SERPL W/O P-5'-P-CCNC: 33 U/L (ref 10–44)
ANION GAP SERPL CALC-SCNC: 11 MMOL/L (ref 8–16)
AST SERPL-CCNC: 64 U/L (ref 10–40)
BASOPHILS # BLD AUTO: 0.04 K/UL (ref 0–0.2)
BASOPHILS NFR BLD: 1.2 % (ref 0–1.9)
BILIRUB SERPL-MCNC: 0.3 MG/DL (ref 0.1–1)
BUN SERPL-MCNC: 5 MG/DL (ref 8–23)
C DIFF GDH STL QL: NEGATIVE
C DIFF TOX A+B STL QL IA: NEGATIVE
CALCIUM SERPL-MCNC: 8.8 MG/DL (ref 8.7–10.5)
CHLORIDE SERPL-SCNC: 109 MMOL/L (ref 95–110)
CK SERPL-CCNC: 923 U/L (ref 20–180)
CO2 SERPL-SCNC: 22 MMOL/L (ref 23–29)
CREAT SERPL-MCNC: 0.7 MG/DL (ref 0.5–1.4)
DIFFERENTIAL METHOD: ABNORMAL
EOSINOPHIL # BLD AUTO: 0.3 K/UL (ref 0–0.5)
EOSINOPHIL NFR BLD: 9.5 % (ref 0–8)
ERYTHROCYTE [DISTWIDTH] IN BLOOD BY AUTOMATED COUNT: 13.6 % (ref 11.5–14.5)
EST. GFR  (NO RACE VARIABLE): >60 ML/MIN/1.73 M^2
GLUCOSE SERPL-MCNC: 110 MG/DL (ref 70–110)
GRAM STN SPEC: NORMAL
GRAM STN SPEC: NORMAL
HCT VFR BLD AUTO: 40 % (ref 37–48.5)
HGB BLD-MCNC: 12.8 G/DL (ref 12–16)
IMM GRANULOCYTES # BLD AUTO: 0 K/UL (ref 0–0.04)
IMM GRANULOCYTES NFR BLD AUTO: 0 % (ref 0–0.5)
LYMPHOCYTES # BLD AUTO: 1 K/UL (ref 1–4.8)
LYMPHOCYTES NFR BLD: 29.2 % (ref 18–48)
MCH RBC QN AUTO: 32.2 PG (ref 27–31)
MCHC RBC AUTO-ENTMCNC: 32 G/DL (ref 32–36)
MCV RBC AUTO: 101 FL (ref 82–98)
MONOCYTES # BLD AUTO: 0.3 K/UL (ref 0.3–1)
MONOCYTES NFR BLD: 8.4 % (ref 4–15)
NEUTROPHILS # BLD AUTO: 1.8 K/UL (ref 1.8–7.7)
NEUTROPHILS NFR BLD: 51.7 % (ref 38–73)
NRBC BLD-RTO: 0 /100 WBC
PLATELET # BLD AUTO: 240 K/UL (ref 150–450)
PMV BLD AUTO: 10.5 FL (ref 9.2–12.9)
POTASSIUM SERPL-SCNC: 3.2 MMOL/L (ref 3.5–5.1)
PROT SERPL-MCNC: 6.5 G/DL (ref 6–8.4)
RBC # BLD AUTO: 3.98 M/UL (ref 4–5.4)
SODIUM SERPL-SCNC: 142 MMOL/L (ref 136–145)
VANCOMYCIN TROUGH SERPL-MCNC: 17.8 UG/ML (ref 10–22)
WBC # BLD AUTO: 3.46 K/UL (ref 3.9–12.7)

## 2023-11-14 PROCEDURE — 36415 COLL VENOUS BLD VENIPUNCTURE: CPT | Performed by: HOSPITALIST

## 2023-11-14 PROCEDURE — 87449 NOS EACH ORGANISM AG IA: CPT | Performed by: FAMILY MEDICINE

## 2023-11-14 PROCEDURE — 80202 ASSAY OF VANCOMYCIN: CPT | Performed by: HOSPITALIST

## 2023-11-14 PROCEDURE — 63600175 PHARM REV CODE 636 W HCPCS: Performed by: HOSPITALIST

## 2023-11-14 PROCEDURE — 82550 ASSAY OF CK (CPK): CPT | Performed by: NURSE PRACTITIONER

## 2023-11-14 PROCEDURE — 85025 COMPLETE CBC W/AUTO DIFF WBC: CPT | Performed by: NURSE PRACTITIONER

## 2023-11-14 PROCEDURE — 25000003 PHARM REV CODE 250: Performed by: PODIATRIST

## 2023-11-14 PROCEDURE — 25000003 PHARM REV CODE 250: Performed by: NURSE PRACTITIONER

## 2023-11-14 PROCEDURE — 25000003 PHARM REV CODE 250: Performed by: FAMILY MEDICINE

## 2023-11-14 PROCEDURE — 25000003 PHARM REV CODE 250: Performed by: PHYSICIAN ASSISTANT

## 2023-11-14 PROCEDURE — 11000001 HC ACUTE MED/SURG PRIVATE ROOM

## 2023-11-14 PROCEDURE — 63600175 PHARM REV CODE 636 W HCPCS: Performed by: NURSE PRACTITIONER

## 2023-11-14 PROCEDURE — 63600175 PHARM REV CODE 636 W HCPCS: Performed by: FAMILY MEDICINE

## 2023-11-14 PROCEDURE — 80053 COMPREHEN METABOLIC PANEL: CPT | Performed by: NURSE PRACTITIONER

## 2023-11-14 PROCEDURE — 63600175 PHARM REV CODE 636 W HCPCS: Performed by: PHYSICIAN ASSISTANT

## 2023-11-14 PROCEDURE — 25000003 PHARM REV CODE 250: Performed by: HOSPITALIST

## 2023-11-14 PROCEDURE — 27000207 HC ISOLATION

## 2023-11-14 PROCEDURE — 25000003 PHARM REV CODE 250: Performed by: REGISTERED NURSE

## 2023-11-14 RX ORDER — PROCHLORPERAZINE EDISYLATE 5 MG/ML
10 INJECTION INTRAMUSCULAR; INTRAVENOUS EVERY 6 HOURS PRN
Status: DISCONTINUED | OUTPATIENT
Start: 2023-11-14 | End: 2023-11-14

## 2023-11-14 RX ORDER — ONDANSETRON 2 MG/ML
4 INJECTION INTRAMUSCULAR; INTRAVENOUS EVERY 6 HOURS PRN
Status: DISCONTINUED | OUTPATIENT
Start: 2023-11-14 | End: 2023-11-16 | Stop reason: HOSPADM

## 2023-11-14 RX ORDER — POTASSIUM CHLORIDE 20 MEQ/1
40 TABLET, EXTENDED RELEASE ORAL ONCE
Status: COMPLETED | OUTPATIENT
Start: 2023-11-14 | End: 2023-11-14

## 2023-11-14 RX ORDER — ONDANSETRON 2 MG/ML
8 INJECTION INTRAMUSCULAR; INTRAVENOUS EVERY 6 HOURS PRN
Status: DISCONTINUED | OUTPATIENT
Start: 2023-11-14 | End: 2023-11-14

## 2023-11-14 RX ORDER — PROCHLORPERAZINE EDISYLATE 5 MG/ML
10 INJECTION INTRAMUSCULAR; INTRAVENOUS EVERY 6 HOURS PRN
Status: DISCONTINUED | OUTPATIENT
Start: 2023-11-14 | End: 2023-11-16 | Stop reason: HOSPADM

## 2023-11-14 RX ADMIN — ONDANSETRON 4 MG: 2 INJECTION INTRAMUSCULAR; INTRAVENOUS at 10:11

## 2023-11-14 RX ADMIN — Medication: at 04:11

## 2023-11-14 RX ADMIN — THIAMINE HCL TAB 100 MG 100 MG: 100 TAB at 08:11

## 2023-11-14 RX ADMIN — SODIUM CHLORIDE, POTASSIUM CHLORIDE, SODIUM LACTATE AND CALCIUM CHLORIDE: 600; 310; 30; 20 INJECTION, SOLUTION INTRAVENOUS at 09:11

## 2023-11-14 RX ADMIN — ARIPIPRAZOLE 10 MG: 5 TABLET ORAL at 08:11

## 2023-11-14 RX ADMIN — THERA TABS 1 TABLET: TAB at 08:11

## 2023-11-14 RX ADMIN — PROMETHAZINE HYDROCHLORIDE 25 MG: 25 INJECTION INTRAMUSCULAR; INTRAVENOUS at 01:11

## 2023-11-14 RX ADMIN — PROCHLORPERAZINE EDISYLATE 10 MG: 5 INJECTION INTRAMUSCULAR; INTRAVENOUS at 10:11

## 2023-11-14 RX ADMIN — PROCHLORPERAZINE EDISYLATE 10 MG: 5 INJECTION INTRAMUSCULAR; INTRAVENOUS at 05:11

## 2023-11-14 RX ADMIN — POTASSIUM CHLORIDE 40 MEQ: 1500 TABLET, EXTENDED RELEASE ORAL at 04:11

## 2023-11-14 RX ADMIN — LEVOTHYROXINE SODIUM 25 MCG: 25 TABLET ORAL at 05:11

## 2023-11-14 RX ADMIN — TRAZODONE HYDROCHLORIDE 100 MG: 50 TABLET ORAL at 11:11

## 2023-11-14 RX ADMIN — SODIUM CHLORIDE, POTASSIUM CHLORIDE, SODIUM LACTATE AND CALCIUM CHLORIDE: 600; 310; 30; 20 INJECTION, SOLUTION INTRAVENOUS at 04:11

## 2023-11-14 RX ADMIN — ESCITALOPRAM OXALATE 20 MG: 10 TABLET ORAL at 08:11

## 2023-11-14 RX ADMIN — IBUPROFEN 600 MG: 600 TABLET ORAL at 07:11

## 2023-11-14 RX ADMIN — ACETAMINOPHEN 650 MG: 325 TABLET ORAL at 04:11

## 2023-11-14 RX ADMIN — VANCOMYCIN HYDROCHLORIDE 1000 MG: 1 INJECTION, POWDER, LYOPHILIZED, FOR SOLUTION INTRAVENOUS at 05:11

## 2023-11-14 RX ADMIN — FOLIC ACID 1 MG: 1 TABLET ORAL at 08:11

## 2023-11-14 RX ADMIN — VANCOMYCIN HYDROCHLORIDE 1000 MG: 1 INJECTION, POWDER, LYOPHILIZED, FOR SOLUTION INTRAVENOUS at 06:11

## 2023-11-14 NOTE — PROGRESS NOTES
Pharmacokinetic Assessment Follow Up: IV Vancomycin    Vancomycin serum concentration assessment(s):    The trough level was drawn correctly and can be used to guide therapy at this time. The measurement is within the desired definitive target range of 10 to 20 mcg/mL.    Vancomycin Regimen Plan:    Continue regimen to Vancomycin 1000 mg IV every 12 hours with next serum trough concentration measured at 0500 prior to 3rd dose on 11/15/2023    Drug levels (last 3 results):  Recent Labs   Lab Result Units 11/14/23  0456   Vancomycin-Trough ug/mL 17.8       Pharmacy will continue to follow and monitor vancomycin.    Please contact pharmacy at extension [phone] for questions regarding this assessment.    Thank you for the consult,   Herb Anaya Jr       Patient brief summary:  Estella Arevalo is a 68 y.o. female initiated on antimicrobial therapy with IV Vancomycin for treatment of skin & soft tissue infection    Drug Allergies:   Review of patient's allergies indicates:   Allergen Reactions    Codeine      nausea       Actual Body Weight:   91.2 kg    Renal Function:   Estimated Creatinine Clearance: 84.7 mL/min (based on SCr of 0.7 mg/dL).,     Dialysis Method (if applicable):  N/A    CBC (last 72 hours):  Recent Labs   Lab Result Units 11/12/23 1810 11/13/23  0515 11/14/23  0456   WBC K/uL 4.36 3.73* 3.46*   Hemoglobin g/dL 14.0 12.3 12.8   Hematocrit % 42.7 38.3 40.0   Platelets K/uL 264 239 240   Gran % % 70.2 61.4 51.7   Lymph % % 16.1* 18.2 29.2   Mono % % 9.2 10.5 8.4   Eosinophil % % 3.2 8.0 9.5*   Basophil % % 1.1 1.9 1.2   Differential Method  Automated Automated Automated       Metabolic Panel (last 72 hours):  Recent Labs   Lab Result Units 11/12/23  1735 11/12/23  1810 11/13/23  0516 11/14/23  0456   Sodium mmol/L  --  139 141 142   Potassium mmol/L  --  3.4* 3.5 3.2*   Chloride mmol/L  --  103 108 109   CO2 mmol/L  --  21* 24 22*   Glucose mg/dL  --  105 110 110   Glucose, UA  Negative  --   --   --     BUN mg/dL  --  8 7* 5*   Creatinine mg/dL  --  0.8 0.8 0.7   Creatinine, Urine mg/dL 25.4  --   --   --    Albumin g/dL  --  4.2 3.3* 3.2*   Total Bilirubin mg/dL  --  0.6 0.7 0.3   Alkaline Phosphatase U/L  --  140* 118 113   AST U/L  --  129* 90* 64*   ALT U/L  --  54* 42 33       Vancomycin Administrations:  vancomycin given in the last 96 hours                     vancomycin (VANCOCIN) 1,000 mg in dextrose 5 % (D5W) 250 mL IVPB (Vial-Mate) (mg) 1,000 mg New Bag 11/14/23 0631     1,000 mg New Bag 11/13/23 1736     1,000 mg New Bag  0612    vancomycin (VANCOCIN) 2,000 mg in dextrose 5 % (D5W) 500 mL IVPB (mg) 2,000 mg New Bag 11/12/23 1746                    Microbiologic Results:  Microbiology Results (last 7 days)       Procedure Component Value Units Date/Time    Gram stain [[id number]] Collected: 11/13/23 1356    Order Status: Completed Specimen: Wound from Toe, Left Foot Updated: 11/14/23 0737     Gram Stain Result Rare WBC's      No organisms seen    Blood culture x two cultures. Draw prior to antibiotics. [[id number]] Collected: 11/12/23 1731    Order Status: Completed Specimen: Blood from Peripheral, Hand, Left Updated: 11/13/23 1903     Blood Culture, Routine No Growth to date      No Growth to date    Narrative:      Aerobic and anaerobic    Blood culture x two cultures. Draw prior to antibiotics. [[id number]] Collected: 11/12/23 1719    Order Status: Completed Specimen: Blood from Peripheral, Hand, Right Updated: 11/13/23 1903     Blood Culture, Routine No Growth to date      No Growth to date    Narrative:      Aerobic and anaerobic    Culture, Anaerobe [[id number]] Collected: 11/13/23 1356    Order Status: Sent Specimen: Wound from Toe, Left Foot Updated: 11/13/23 1411    Aerobic culture [[id number]] Collected: 11/13/23 1356    Order Status: Sent Specimen: Wound from Toe, Left Foot Updated: 11/13/23 1411

## 2023-11-14 NOTE — PT/OT/SLP PROGRESS
"Physical Therapy      Patient Name:  Estella Arevalo   MRN:  [id number]    Patient not seen today secondary to Nausea/vomiting (PCT reports she's been having problems all morning; pt reports nausea persists and "it's not a good time"). Will follow-up tomorrow.    "

## 2023-11-14 NOTE — SUBJECTIVE & OBJECTIVE
Interval History: complains of n/v/D but no abdominal pain. KUB negative    Review of Systems   Constitutional:  Positive for fatigue. Negative for chills and fever.   HENT:  Negative for nosebleeds and tinnitus.    Eyes:  Negative for photophobia and visual disturbance.   Respiratory:  Negative for shortness of breath and wheezing.    Cardiovascular:  Negative for chest pain, palpitations and leg swelling.   Gastrointestinal:  Negative for abdominal distention, nausea and vomiting.   Genitourinary:  Negative for dysuria, flank pain and hematuria.   Musculoskeletal:  Negative for gait problem and joint swelling.   Skin:  Positive for color change and rash. Negative for wound.   Neurological:  Negative for seizures and syncope.     Objective:     Vital Signs (Most Recent):  Temp: 98.1 °F (36.7 °C) (11/14/23 1645)  Pulse: 98 (11/14/23 1645)  Resp: 19 (11/14/23 1645)  BP: (!) 186/113 (11/14/23 1645)  SpO2: (!) 94 % (11/14/23 1645) Vital Signs (24h Range):  Temp:  [97.4 °F (36.3 °C)-98.5 °F (36.9 °C)] 98.1 °F (36.7 °C)  Pulse:  [81-98] 98  Resp:  [17-19] 19  SpO2:  [93 %-95 %] 94 %  BP: (115-186)/() 186/113     Weight: 91.2 kg (201 lb 1 oz)  Body mass index is 33.46 kg/m².    Intake/Output Summary (Last 24 hours) at 11/14/2023 1658  Last data filed at 11/14/2023 1404  Gross per 24 hour   Intake 2122.08 ml   Output 4000 ml   Net -1877.92 ml           Physical Exam  Vitals and nursing note reviewed.   Constitutional:       General: She is not in acute distress.     Appearance: She is well-developed. She is not diaphoretic.   Neck:      Thyroid: No thyromegaly.   Cardiovascular:      Rate and Rhythm: Normal rate and regular rhythm.      Heart sounds: No murmur heard.  Pulmonary:      Effort: Pulmonary effort is normal. No respiratory distress.      Breath sounds: Normal breath sounds.   Abdominal:      General: Bowel sounds are normal. There is no distension.      Palpations: Abdomen is soft. There is no mass.       Tenderness: There is no abdominal tenderness.   Musculoskeletal:         General: Swelling, tenderness and signs of injury present. No deformity.      Cervical back: Normal range of motion and neck supple.   Skin:     General: Skin is warm and dry.      Comments: LLE shin to foot erythema swelling improving compared with pictures in media (see below) . Redness have receded from marking.   Left great toe tip closed ulcer dry    Neurological:      Mental Status: She is alert and oriented to person, place, and time.      Sensory: No sensory deficit.             Significant Labs: All pertinent labs within the past 24 hours have been reviewed.    Significant Imaging: I have reviewed all pertinent imaging results/findings within the past 24 hours.

## 2023-11-14 NOTE — PLAN OF CARE
Problem: Violence Risk or Actual  Goal: Anger and Impulse Control  11/14/2023 0504 by Marga Real, MARISSA  Outcome: Ongoing, Progressing     Problem: Adult Inpatient Plan of Care  Goal: Plan of Care Review  11/14/2023 0504 by Marga Real RN  Outcome: Ongoing, Progressing     Problem: Skin Injury Risk Increased  Goal: Skin Health and Integrity  11/14/2023 0504 by Marga Real RN  Outcome: Ongoing, Progressing       Problem: Impaired Wound Healing  Goal: Optimal Wound Healing  11/14/2023 0504 by Marga Real RN  Outcome: Ongoing, Progressing

## 2023-11-14 NOTE — PROGRESS NOTES
[facility]  Progress Note    Patient Name: Estella Arevalo  MRN: [id number]  Patient Class: IP- Inpatient   Admission Date: 11/12/2023  Length of Stay: 1 days  Attending Physician: Luciana Loja MD  Primary Care Provider: Cristela, Primary Doctor        Subjective:     Principal Problem:Cellulitis        HPI:  Estella Arevalo 68 y.o. female with alcohol abuse, cocaine abuse, THC, CAD, presents to the hospital with a chief complaint of leg swelling and erythema.  She reports yesterday she found her left great toe was red and swelling.  This progressed throughout the day and began to extend to her mid causing presentation in the hospital.  She is had multiple falls at home he is unsure if she injured her toe during 1 of the falls.  She reports using THC yesterday her last drink was 1 month ago.  She denies chest Pain nausea vomiting abdominal pain melena hematuria hematemesis dizziness syncope.    In the ED, without leukocytosis sed rate elevated CRP elevated lactic acid within normal limits alcohol negative UDS positive for benzodiazepines and cocaine x-ray pelvis without acute findings in the pelvic ring her hips x-ray tibia fibula without acute abnormality x-ray foot with soft tissue swelling of the ankle no acute bony abnormality no significant change chest x-ray without acute abnormality.    Overview/Hospital Course:  Admission to observation for LLE cellulitis and left great toe ulcer.  Erythema improved overnight with IVF. Afebrile and no leukocytosis. Mild elevated CPR/ED 16/33. CPK elevated with this am labs. Podiatry consult pending. Start IVF and continue antibiotics. Repeat CPK in am. Add vitamin b 12 due to alcohol abuse and PT consult.   If no further recs from Podiatry and CPK and cellulitis continues to improve, then discharge home tomorrow.     Addendum 1741  Patient declined DARCO shoe  Podiatry debrided left great toe ulcer and no purulent drainage.   On discharge,  switch IV abx to doxycycline PO x 10 days and follow up Podiatry clinic in -10 days.   Wound care-iodine to left great toe with mepilex daily.     11/14: CPK trended down from 2k to 923. Complains of N/v and diarrhea. KUB neg. Cdiff pending    Interval History: complains of n/v/D but no abdominal pain. KUB negative    Review of Systems   Constitutional:  Positive for fatigue. Negative for chills and fever.   HENT:  Negative for nosebleeds and tinnitus.    Eyes:  Negative for photophobia and visual disturbance.   Respiratory:  Negative for shortness of breath and wheezing.    Cardiovascular:  Negative for chest pain, palpitations and leg swelling.   Gastrointestinal:  Negative for abdominal distention, nausea and vomiting.   Genitourinary:  Negative for dysuria, flank pain and hematuria.   Musculoskeletal:  Negative for gait problem and joint swelling.   Skin:  Positive for color change and rash. Negative for wound.   Neurological:  Negative for seizures and syncope.     Objective:     Vital Signs (Most Recent):  Temp: 98.1 °F (36.7 °C) (11/14/23 1645)  Pulse: 98 (11/14/23 1645)  Resp: 19 (11/14/23 1645)  BP: (!) 186/113 (11/14/23 1645)  SpO2: (!) 94 % (11/14/23 1645) Vital Signs (24h Range):  Temp:  [97.4 °F (36.3 °C)-98.5 °F (36.9 °C)] 98.1 °F (36.7 °C)  Pulse:  [81-98] 98  Resp:  [17-19] 19  SpO2:  [93 %-95 %] 94 %  BP: (115-186)/() 186/113     Weight: 91.2 kg (201 lb 1 oz)  Body mass index is 33.46 kg/m².    Intake/Output Summary (Last 24 hours) at 11/14/2023 1658  Last data filed at 11/14/2023 1404  Gross per 24 hour   Intake 2122.08 ml   Output 4000 ml   Net -1877.92 ml           Physical Exam  Vitals and nursing note reviewed.   Constitutional:       General: She is not in acute distress.     Appearance: She is well-developed. She is not diaphoretic.   Neck:      Thyroid: No thyromegaly.   Cardiovascular:      Rate and Rhythm: Normal rate and regular rhythm.      Heart sounds: No murmur  heard.  Pulmonary:      Effort: Pulmonary effort is normal. No respiratory distress.      Breath sounds: Normal breath sounds.   Abdominal:      General: Bowel sounds are normal. There is no distension.      Palpations: Abdomen is soft. There is no mass.      Tenderness: There is no abdominal tenderness.   Musculoskeletal:         General: Swelling, tenderness and signs of injury present. No deformity.      Cervical back: Normal range of motion and neck supple.   Skin:     General: Skin is warm and dry.      Comments: LLE shin to foot erythema swelling improving compared with pictures in media (see below) . Redness have receded from marking.   Left great toe tip closed ulcer dry    Neurological:      Mental Status: She is alert and oriented to person, place, and time.      Sensory: No sensory deficit.             Significant Labs: All pertinent labs within the past 24 hours have been reviewed.    Significant Imaging: I have reviewed all pertinent imaging results/findings within the past 24 hours.    Assessment/Plan:      * LLE Cellulitis, Left great toe ulcer  Presents with ascending cellulitis to left leg with wound to left toe. Multiple frequent falls at home per patient. Afebrile without leukocytosis. Sed rate/CRP elevated, x-ray foot without bony abnormality. Lactic within normal limits  Left lower extremity erythema receded from marking-improving  Left great toe tip ulcer intact  Continue IV antibiotic while stay  Patient declined DARCO shoe  Podiatry debrided left great toe ulcer and no purulent drainage.   On discharge, switch IV abx to doxycycline PO x 10 days and follow up Podiatry clinic in -10 days.   Wound care-iodine to left great toe with mepilex daily.         Recurrent falls  Patient states have charcot foot? But does not follow up with anyone  Possible due to neuropathy? From alcohol   Add vitamin b 12      PT consult     Obesity (BMI 30-39.9)  Body mass index is 33.46 kg/m². Morbid obesity  complicates all aspects of disease management from diagnostic modalities to treatment. Weight loss encouraged and health benefits explained to patient.     Alcohol abuse  Reports last drink of alcohol 2 weeks ago. Chronically elevated LFTs. CIWA score 0  PRN ativan, CIWA trend, thiamine/folate supplementation    CAD (coronary artery disease)  Per chart review though no previous LHC to compare. Denies chest pain.       Patient with known CAD s/p no known interventions, which is controlled Will continue not on medication outpatient and monitor for S/Sx of angina/ACS. Continue to monitor on telemetry.     Essential hypertension  Well controlled not currently on medication outpatient per patient. Resume medications if BP elevates    Chronic, controlled. Latest blood pressure and vitals reviewed-     Temp:  [97.7 °F (36.5 °C)-97.9 °F (36.6 °C)]   Pulse:  [85-94]   Resp:  [16-24]   BP: (124-142)/()   SpO2:  [95 %-97 %] .   Home meds for hypertension were reviewed and noted below.   Hypertension Medications               cloNIDine (CATAPRES) 0.1 MG tablet Take 1 tablet (0.1 mg total) by mouth once. for 1 dose            While in the hospital, will manage blood pressure as follows; Adjust home antihypertensive regimen as follows- not on medication outpatient    Will utilize p.r.n. blood pressure medication only if patient's blood pressure greater than 180/110 and she develops symptoms such as worsening chest pain or shortness of breath.    Acquired hypothyroidism  Lab Results   Component Value Date    TSH 1.661 04/20/2022   continue home synthroid    Polysubstance dependence including opioid type drug, continuous use  Per chart review previously positive for opiates on multiple UDS. Today positive for benzos and cocaine. Admits to smoking THC possible containing cocaine though UDS negative for THC  Encourage stop drinking alcohol and using illicit drugs      VTE Risk Mitigation (From admission, onward)            Ordered     IP VTE HIGH RISK PATIENT  Once         11/12/23 1947     Place sequential compression device  Until discontinued         11/12/23 1947     Place MARINA hose  Until discontinued         11/12/23 1947                    Discharge Planning   SARAH:      Code Status: Full Code   Is the patient medically ready for discharge?:     Reason for patient still in hospital (select all that apply): Treatment  Discharge Plan A: Home with family, Other (TBD)                  Luciana Loja MD  Department of Hospital Medicine   [facility]

## 2023-11-14 NOTE — PLAN OF CARE
Plan of care reviewed with patient.  Patient verbalized understanding and had no further questions.  Patient having nausea and vomiting throughout most of the shift.  Patient also complaining of multiple episodes of diarrhea, stool sample sent to lab.  Patient now resting comfortably in bed locked in lowest position, side rails up x3, and call bell in reach.  Will continue to monitor.       Problem: Violence Risk or Actual  Goal: Anger and Impulse Control  Outcome: Ongoing, Progressing     Problem: Adult Inpatient Plan of Care  Goal: Plan of Care Review  Outcome: Ongoing, Progressing  Goal: Patient-Specific Goal (Individualized)  Outcome: Ongoing, Progressing  Goal: Absence of Hospital-Acquired Illness or Injury  Outcome: Ongoing, Progressing  Goal: Optimal Comfort and Wellbeing  Outcome: Ongoing, Progressing  Goal: Readiness for Transition of Care  Outcome: Ongoing, Progressing     Problem: Skin Injury Risk Increased  Goal: Skin Health and Integrity  Outcome: Ongoing, Progressing     Problem: Impaired Wound Healing  Goal: Optimal Wound Healing  Outcome: Ongoing, Progressing     Problem: Infection  Goal: Absence of Infection Signs and Symptoms  Outcome: Ongoing, Progressing

## 2023-11-14 NOTE — NURSING
[facility] [place]  Nurses Note -- 4 Eyes      11/13/2023       Skin assessed on: Q Shift      [x] No Pressure Injuries Present    [x]Prevention Measures Documented    [] Yes LDA  for Pressure Injury Previously documented     [] Yes New Pressure Injury Discovered   [] LDA for New Pressure Injury Added      Attending RN:  Marga Real RN     Second RN:  MARISSA Ashton

## 2023-11-14 NOTE — PLAN OF CARE
Problem: Violence Risk or Actual  Goal: Anger and Impulse Control  Outcome: Ongoing, Progressing     Problem: Adult Inpatient Plan of Care  Goal: Plan of Care Review  Outcome: Ongoing, Progressing     Problem: Skin Injury Risk Increased  Goal: Skin Health and Integrity  Outcome: Ongoing, Progressing     Problem: Impaired Wound Healing  Goal: Optimal Wound Healing  Outcome: Ongoing, Progressing

## 2023-11-15 PROBLEM — R11.2 N&V (NAUSEA AND VOMITING): Status: ACTIVE | Noted: 2023-11-15

## 2023-11-15 LAB
BACTERIA SPEC AEROBE CULT: ABNORMAL
POCT GLUCOSE: 118 MG/DL (ref 70–110)
VANCOMYCIN TROUGH SERPL-MCNC: 13.9 UG/ML (ref 10–22)

## 2023-11-15 PROCEDURE — 25000003 PHARM REV CODE 250: Performed by: FAMILY MEDICINE

## 2023-11-15 PROCEDURE — 97161 PT EVAL LOW COMPLEX 20 MIN: CPT

## 2023-11-15 PROCEDURE — 63600175 PHARM REV CODE 636 W HCPCS: Performed by: PHYSICIAN ASSISTANT

## 2023-11-15 PROCEDURE — C9113 INJ PANTOPRAZOLE SODIUM, VIA: HCPCS | Performed by: FAMILY MEDICINE

## 2023-11-15 PROCEDURE — 25000003 PHARM REV CODE 250: Performed by: PHYSICIAN ASSISTANT

## 2023-11-15 PROCEDURE — 63600175 PHARM REV CODE 636 W HCPCS: Performed by: FAMILY MEDICINE

## 2023-11-15 PROCEDURE — 25000003 PHARM REV CODE 250: Performed by: NURSE PRACTITIONER

## 2023-11-15 PROCEDURE — 80202 ASSAY OF VANCOMYCIN: CPT | Performed by: FAMILY MEDICINE

## 2023-11-15 PROCEDURE — 25500020 PHARM REV CODE 255: Performed by: FAMILY MEDICINE

## 2023-11-15 PROCEDURE — 11000001 HC ACUTE MED/SURG PRIVATE ROOM

## 2023-11-15 PROCEDURE — 25000003 PHARM REV CODE 250: Performed by: REGISTERED NURSE

## 2023-11-15 PROCEDURE — 36415 COLL VENOUS BLD VENIPUNCTURE: CPT | Performed by: FAMILY MEDICINE

## 2023-11-15 PROCEDURE — 63600175 PHARM REV CODE 636 W HCPCS: Performed by: HOSPITALIST

## 2023-11-15 PROCEDURE — 97530 THERAPEUTIC ACTIVITIES: CPT

## 2023-11-15 PROCEDURE — 25000003 PHARM REV CODE 250: Performed by: HOSPITALIST

## 2023-11-15 RX ORDER — CALCIUM CARBONATE 200(500)MG
500 TABLET,CHEWABLE ORAL 3 TIMES DAILY PRN
Status: DISCONTINUED | OUTPATIENT
Start: 2023-11-15 | End: 2023-11-16 | Stop reason: HOSPADM

## 2023-11-15 RX ORDER — PANTOPRAZOLE SODIUM 40 MG/10ML
40 INJECTION, POWDER, LYOPHILIZED, FOR SOLUTION INTRAVENOUS DAILY
Status: DISCONTINUED | OUTPATIENT
Start: 2023-11-15 | End: 2023-11-16 | Stop reason: HOSPADM

## 2023-11-15 RX ORDER — CLONIDINE HYDROCHLORIDE 0.1 MG/1
0.1 TABLET ORAL EVERY 6 HOURS PRN
Status: DISCONTINUED | OUTPATIENT
Start: 2023-11-15 | End: 2023-11-16 | Stop reason: HOSPADM

## 2023-11-15 RX ORDER — SODIUM CHLORIDE, SODIUM LACTATE, POTASSIUM CHLORIDE, CALCIUM CHLORIDE 600; 310; 30; 20 MG/100ML; MG/100ML; MG/100ML; MG/100ML
INJECTION, SOLUTION INTRAVENOUS CONTINUOUS
Status: DISCONTINUED | OUTPATIENT
Start: 2023-11-15 | End: 2023-11-16 | Stop reason: HOSPADM

## 2023-11-15 RX ADMIN — LEVOTHYROXINE SODIUM 25 MCG: 25 TABLET ORAL at 06:11

## 2023-11-15 RX ADMIN — THERA TABS 1 TABLET: TAB at 09:11

## 2023-11-15 RX ADMIN — LORAZEPAM 2 MG: 2 INJECTION INTRAMUSCULAR; INTRAVENOUS at 12:11

## 2023-11-15 RX ADMIN — PROCHLORPERAZINE EDISYLATE 10 MG: 5 INJECTION INTRAMUSCULAR; INTRAVENOUS at 05:11

## 2023-11-15 RX ADMIN — SODIUM CHLORIDE, POTASSIUM CHLORIDE, SODIUM LACTATE AND CALCIUM CHLORIDE: 600; 310; 30; 20 INJECTION, SOLUTION INTRAVENOUS at 07:11

## 2023-11-15 RX ADMIN — PANTOPRAZOLE SODIUM 40 MG: 40 INJECTION, POWDER, FOR SOLUTION INTRAVENOUS at 10:11

## 2023-11-15 RX ADMIN — CLONIDINE HYDROCHLORIDE 0.1 MG: 0.1 TABLET ORAL at 05:11

## 2023-11-15 RX ADMIN — IOHEXOL 80 ML: 350 INJECTION, SOLUTION INTRAVENOUS at 11:11

## 2023-11-15 RX ADMIN — ONDANSETRON 4 MG: 2 INJECTION INTRAMUSCULAR; INTRAVENOUS at 07:11

## 2023-11-15 RX ADMIN — SODIUM CHLORIDE, POTASSIUM CHLORIDE, SODIUM LACTATE AND CALCIUM CHLORIDE: 600; 310; 30; 20 INJECTION, SOLUTION INTRAVENOUS at 06:11

## 2023-11-15 RX ADMIN — VANCOMYCIN HYDROCHLORIDE 1000 MG: 1 INJECTION, POWDER, LYOPHILIZED, FOR SOLUTION INTRAVENOUS at 04:11

## 2023-11-15 RX ADMIN — FOLIC ACID 1 MG: 1 TABLET ORAL at 09:11

## 2023-11-15 RX ADMIN — TRAZODONE HYDROCHLORIDE 100 MG: 50 TABLET ORAL at 10:11

## 2023-11-15 RX ADMIN — ESCITALOPRAM OXALATE 20 MG: 10 TABLET ORAL at 09:11

## 2023-11-15 RX ADMIN — ANTACID TABLETS 500 MG: 500 TABLET, CHEWABLE ORAL at 12:11

## 2023-11-15 RX ADMIN — VANCOMYCIN HYDROCHLORIDE 1000 MG: 1 INJECTION, POWDER, LYOPHILIZED, FOR SOLUTION INTRAVENOUS at 05:11

## 2023-11-15 RX ADMIN — THIAMINE HCL TAB 100 MG 100 MG: 100 TAB at 09:11

## 2023-11-15 RX ADMIN — ARIPIPRAZOLE 10 MG: 5 TABLET ORAL at 09:11

## 2023-11-15 NOTE — NURSING
Pt lying in bed, requested to remove heel boots. Helped repositioned and turn pt. External urinary catheter in place to suction. LR infusing at 125mL/hr. Noted foam dressing to right heel and great toe and left great toe. Call light place in hand, bed alarm set, instructed pt to call for assistance.     [facility] [place]  Nurses Note -- 4 Eyes      11/14/2023       Skin assessed on: Q Shift      [] No Pressure Injuries Present    []Prevention Measures Documented    [x] Yes LDA  for Pressure Injury Previously documented     [] Yes New Pressure Injury Discovered   [] LDA for New Pressure Injury Added      Attending RN:  Eugenia Loja RN     Second RN:  MARISSA Harris

## 2023-11-15 NOTE — PLAN OF CARE
Problem: Physical Therapy  Goal: Physical Therapy Goal  Description: Goals to be met by: 23     Patient will increase functional independence with mobility by performin. Supine to sit with Modified Snow  2. Sit to stand transfer with Modified Snow  3. Bed to chair transfer with Modified Snow    4. Gait  x 10-15 feet with Supervision using Rolling Walker.   5. Wheelchair propulsion x50 feet with Supervision    6. Lower extremity exercise program x10 reps per handout, with supervision    Outcome: Ongoing, Progressing   Initial PT evaluation performed today.  Pt could benefit from skilled PT services 5-6x/wk in order to maximize function prior to D/C.  Moderate Level Therapy, W/C, and BSC recommended at time of D/C.

## 2023-11-15 NOTE — PROGRESS NOTES
[facility] Medicine  Progress Note    Patient Name: Estella Arevalo  MRN: [id number]  Patient Class: IP- Inpatient   Admission Date: 11/12/2023  Length of Stay: 2 days  Attending Physician: Luciana Loja MD  Primary Care Provider: Cristela, Primary Doctor        Subjective:     Principal Problem:Cellulitis        HPI:  Estella Arevalo 68 y.o. female with alcohol abuse, cocaine abuse, THC, CAD, presents to the hospital with a chief complaint of leg swelling and erythema.  She reports yesterday she found her left great toe was red and swelling.  This progressed throughout the day and began to extend to her mid causing presentation in the hospital.  She is had multiple falls at home he is unsure if she injured her toe during 1 of the falls.  She reports using THC yesterday her last drink was 1 month ago.  She denies chest Pain nausea vomiting abdominal pain melena hematuria hematemesis dizziness syncope.    In the ED, without leukocytosis sed rate elevated CRP elevated lactic acid within normal limits alcohol negative UDS positive for benzodiazepines and cocaine x-ray pelvis without acute findings in the pelvic ring her hips x-ray tibia fibula without acute abnormality x-ray foot with soft tissue swelling of the ankle no acute bony abnormality no significant change chest x-ray without acute abnormality.    Overview/Hospital Course:  Admission to observation for LLE cellulitis and left great toe ulcer.  Erythema improved overnight with IVF. Afebrile and no leukocytosis. Mild elevated CPR/ED 16/33. CPK elevated with this am labs. Podiatry consult pending. Start IVF and continue antibiotics. Repeat CPK in am. Add vitamin b 12 due to alcohol abuse and PT consult.   If no further recs from Podiatry and CPK and cellulitis continues to improve, then discharge home tomorrow.     Addendum 1741  Patient declined DARCO shoe  Podiatry debrided left great toe ulcer and no purulent drainage.   On discharge,  switch IV abx to doxycycline PO x 10 days and follow up Podiatry clinic in -10 days.   Wound care-iodine to left great toe with mepilex daily.     11/14: CPK trended down from 2k to 923. Complains of N/v and diarrhea. KUB neg. Cdiff neg  11/15:KUB neg. Continues to have N/V/D. States this has been going onfor 6 weeks. CT abdomen shows possible cholecystitis. Surgery consulted. NPO ordered    Interval History: complains of n/v/D but no abdominal pain.     Review of Systems   Constitutional:  Positive for fatigue. Negative for chills and fever.   HENT:  Negative for nosebleeds and tinnitus.    Eyes:  Negative for photophobia and visual disturbance.   Respiratory:  Negative for shortness of breath and wheezing.    Cardiovascular:  Negative for chest pain, palpitations and leg swelling.   Gastrointestinal:  Negative for abdominal distention, nausea and vomiting.   Genitourinary:  Negative for dysuria, flank pain and hematuria.   Musculoskeletal:  Negative for gait problem and joint swelling.   Skin:  Positive for color change and rash. Negative for wound.   Neurological:  Negative for seizures and syncope.     Objective:     Vital Signs (Most Recent):  Temp: 98.9 °F (37.2 °C) (11/15/23 1653)  Pulse: 94 (11/15/23 1653)  Resp: 20 (11/15/23 1653)  BP: (!) 170/107 (11/15/23 1653)  SpO2: (!) 92 % (11/15/23 1653) Vital Signs (24h Range):  Temp:  [97.9 °F (36.6 °C)-98.9 °F (37.2 °C)] 98.9 °F (37.2 °C)  Pulse:  [82-95] 94  Resp:  [16-20] 20  SpO2:  [92 %-95 %] 92 %  BP: (133-188)/() 170/107     Weight: 91.2 kg (201 lb 1 oz)  Body mass index is 33.46 kg/m².    Intake/Output Summary (Last 24 hours) at 11/15/2023 1739  Last data filed at 11/15/2023 1430  Gross per 24 hour   Intake 120 ml   Output 2950 ml   Net -2830 ml           Physical Exam  Vitals and nursing note reviewed.   Constitutional:       General: She is not in acute distress.     Appearance: She is well-developed. She is not diaphoretic.   Neck:      Thyroid: No  thyromegaly.   Cardiovascular:      Rate and Rhythm: Normal rate and regular rhythm.      Heart sounds: No murmur heard.  Pulmonary:      Effort: Pulmonary effort is normal. No respiratory distress.      Breath sounds: Normal breath sounds.   Abdominal:      General: Bowel sounds are normal. There is no distension.      Palpations: Abdomen is soft. There is no mass.      Tenderness: There is no abdominal tenderness.   Musculoskeletal:         General: Swelling, tenderness and signs of injury present. No deformity.      Cervical back: Normal range of motion and neck supple.   Skin:     General: Skin is warm and dry.      Comments: LLE shin to foot erythema swelling improving compared with pictures in media (see below) . Redness have receded from marking.   Left great toe tip closed ulcer dry    Neurological:      Mental Status: She is alert and oriented to person, place, and time.      Sensory: No sensory deficit.             Significant Labs: All pertinent labs within the past 24 hours have been reviewed.    Significant Imaging: I have reviewed all pertinent imaging results/findings within the past 24 hours.    Assessment/Plan:      * LLE Cellulitis, Left great toe ulcer  Presents with ascending cellulitis to left leg with wound to left toe. Multiple frequent falls at home per patient. Afebrile without leukocytosis. Sed rate/CRP elevated, x-ray foot without bony abnormality. Lactic within normal limits  Left lower extremity erythema receded from marking-improving  Left great toe tip ulcer intact  Continue IV antibiotic while stay  Patient declined DARCO shoe  Podiatry debrided left great toe ulcer and no purulent drainage.   On discharge, switch IV abx to doxycycline PO x 10 days and follow up Podiatry clinic in -10 days.   Wound care-iodine to left great toe with mepilex daily.         N&V (nausea and vomiting)  CT abdomen shows possible cholecystitis  Surgery consult  Npo with ivfs      Recurrent falls  Patient  states have charcot foot? But does not follow up with anyone  Possible due to neuropathy? From alcohol   Add vitamin b 12      PT consult     Obesity (BMI 30-39.9)  Body mass index is 33.46 kg/m². Morbid obesity complicates all aspects of disease management from diagnostic modalities to treatment. Weight loss encouraged and health benefits explained to patient.     Alcohol abuse  Reports last drink of alcohol 2 weeks ago. Chronically elevated LFTs. CIWA score 0  PRN ativan, CIWA trend, thiamine/folate supplementation    CAD (coronary artery disease)  Per chart review though no previous [facility] to compare. Denies chest pain.       Patient with known CAD s/p no known interventions, which is controlled Will continue not on medication outpatient and monitor for S/Sx of angina/ACS. Continue to monitor on telemetry.     Essential hypertension  Well controlled not currently on medication outpatient per patient. Resume medications if BP elevates    Chronic, controlled. Latest blood pressure and vitals reviewed-     Temp:  [97.7 °F (36.5 °C)-97.9 °F (36.6 °C)]   Pulse:  [85-94]   Resp:  [16-24]   BP: (124-142)/()   SpO2:  [95 %-97 %] .   Home meds for hypertension were reviewed and noted below.   Hypertension Medications               cloNIDine (CATAPRES) 0.1 MG tablet Take 1 tablet (0.1 mg total) by mouth once. for 1 dose            While in the hospital, will manage blood pressure as follows; Adjust home antihypertensive regimen as follows- not on medication outpatient    Will utilize p.r.n. blood pressure medication only if patient's blood pressure greater than 180/110 and she develops symptoms such as worsening chest pain or shortness of breath.    Acquired hypothyroidism  Lab Results   Component Value Date    TSH 1.661 04/20/2022   continue home synthroid    Polysubstance dependence including opioid type drug, continuous use  Per chart review previously positive for opiates on multiple UDS. Today positive for benzos  and cocaine. Admits to smoking THC possible containing cocaine though UDS negative for THC  Encourage stop drinking alcohol and using illicit drugs      VTE Risk Mitigation (From admission, onward)           Ordered     IP VTE HIGH RISK PATIENT  Once         11/12/23 1947     Place sequential compression device  Until discontinued         11/12/23 1947     Place MARINA hose  Until discontinued         11/12/23 1947                    Discharge Planning   SARAH:      Code Status: Full Code   Is the patient medically ready for discharge?:     Reason for patient still in hospital (select all that apply): Treatment  Discharge Plan A: Home with family, Other (TBD)                  Luciana Loja MD  Department of Hospital Medicine   [facility] [place] [facility]

## 2023-11-15 NOTE — SUBJECTIVE & OBJECTIVE
Interval History: complains of n/v/D but no abdominal pain.     Review of Systems   Constitutional:  Positive for fatigue. Negative for chills and fever.   HENT:  Negative for nosebleeds and tinnitus.    Eyes:  Negative for photophobia and visual disturbance.   Respiratory:  Negative for shortness of breath and wheezing.    Cardiovascular:  Negative for chest pain, palpitations and leg swelling.   Gastrointestinal:  Negative for abdominal distention, nausea and vomiting.   Genitourinary:  Negative for dysuria, flank pain and hematuria.   Musculoskeletal:  Negative for gait problem and joint swelling.   Skin:  Positive for color change and rash. Negative for wound.   Neurological:  Negative for seizures and syncope.     Objective:     Vital Signs (Most Recent):  Temp: 98.9 °F (37.2 °C) (11/15/23 1653)  Pulse: 94 (11/15/23 1653)  Resp: 20 (11/15/23 1653)  BP: (!) 170/107 (11/15/23 1653)  SpO2: (!) 92 % (11/15/23 1653) Vital Signs (24h Range):  Temp:  [97.9 °F (36.6 °C)-98.9 °F (37.2 °C)] 98.9 °F (37.2 °C)  Pulse:  [82-95] 94  Resp:  [16-20] 20  SpO2:  [92 %-95 %] 92 %  BP: (133-188)/() 170/107     Weight: 91.2 kg (201 lb 1 oz)  Body mass index is 33.46 kg/m².    Intake/Output Summary (Last 24 hours) at 11/15/2023 1739  Last data filed at 11/15/2023 1430  Gross per 24 hour   Intake 120 ml   Output 2950 ml   Net -2830 ml           Physical Exam  Vitals and nursing note reviewed.   Constitutional:       General: She is not in acute distress.     Appearance: She is well-developed. She is not diaphoretic.   Neck:      Thyroid: No thyromegaly.   Cardiovascular:      Rate and Rhythm: Normal rate and regular rhythm.      Heart sounds: No murmur heard.  Pulmonary:      Effort: Pulmonary effort is normal. No respiratory distress.      Breath sounds: Normal breath sounds.   Abdominal:      General: Bowel sounds are normal. There is no distension.      Palpations: Abdomen is soft. There is no mass.      Tenderness: There is  no abdominal tenderness.   Musculoskeletal:         General: Swelling, tenderness and signs of injury present. No deformity.      Cervical back: Normal range of motion and neck supple.   Skin:     General: Skin is warm and dry.      Comments: LLE shin to foot erythema swelling improving compared with pictures in media (see below) . Redness have receded from marking.   Left great toe tip closed ulcer dry    Neurological:      Mental Status: She is alert and oriented to person, place, and time.      Sensory: No sensory deficit.             Significant Labs: All pertinent labs within the past 24 hours have been reviewed.    Significant Imaging: I have reviewed all pertinent imaging results/findings within the past 24 hours.

## 2023-11-15 NOTE — PT/OT/SLP PROGRESS
Physical Therapy      Patient Name:  Estella Arevalo   MRN:  [id number]    Patient not seen today secondary to Testing/imaging (xray/CT/MRI). Will follow-up .

## 2023-11-15 NOTE — NURSING
[facility], [facility]  Nurses Note -- 4 Eyes      11/15/2023       Skin assessed on: Q Shift      [] No Pressure Injuries Present    []Prevention Measures Documented    [x] Yes LDA  for Pressure Injury Previously documented     [] Yes New Pressure Injury Discovered   [] LDA for New Pressure Injury Added      Attending RN:  Venice Thompson RN     Second RN:  MARISSA Bello

## 2023-11-15 NOTE — PLAN OF CARE
Problem: Adult Inpatient Plan of Care  Goal: Plan of Care Review  Outcome: Ongoing, Progressing     Problem: Impaired Wound Healing  Goal: Optimal Wound Healing  Outcome: Ongoing, Progressing     Problem: Infection  Goal: Absence of Infection Signs and Symptoms  Outcome: Ongoing, Progressing     Problem: Skin or Soft Tissue Infection  Goal: Absence of Infection Signs and Symptoms  Outcome: Ongoing, Progressing     Problem: Fall Injury Risk  Goal: Absence of Fall and Fall-Related Injury  Outcome: Ongoing, Progressing
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
## 2023-11-15 NOTE — PROGRESS NOTES
Pharmacokinetic Assessment Follow Up: IV Vancomycin    Vancomycin serum concentration assessment(s):    The trough level was drawn correctly and can be used to guide therapy at this time. The measurement is within the desired definitive target range of 10 to 20 mcg/mL.    Vancomycin Regimen Plan:    Continue regimen to Vancomycin 1000 mg IV every 12 hours with next serum trough concentration measured at 0400 prior to 4th dose on 11/17/23    Drug levels (last 3 results):  Recent Labs   Lab Result Units 11/14/23  0456 11/15/23  0503   Vancomycin-Trough ug/mL 17.8 13.9       Pharmacy will continue to follow and monitor vancomycin.    Please contact pharmacy at extension [phone] for questions regarding this assessment.    Thank you for the consult,   Osmany De Jesus       Patient brief summary:  Estella Arevalo is a 68 y.o. female initiated on antimicrobial therapy with IV Vancomycin for treatment of skin & soft tissue infection    The patient's current regimen is Vancomycin 1000mg q12h    Drug Allergies:   Review of patient's allergies indicates:   Allergen Reactions    Codeine      nausea       Actual Body Weight:   91.2 kg    Renal Function:   Estimated Creatinine Clearance: 84.7 mL/min (based on SCr of 0.7 mg/dL).,     Dialysis Method (if applicable):  N/A    CBC (last 72 hours):  Recent Labs   Lab Result Units 11/12/23 1810 11/13/23  0515 11/14/23  0456   WBC K/uL 4.36 3.73* 3.46*   Hemoglobin g/dL 14.0 12.3 12.8   Hematocrit % 42.7 38.3 40.0   Platelets K/uL 264 239 240   Gran % % 70.2 61.4 51.7   Lymph % % 16.1* 18.2 29.2   Mono % % 9.2 10.5 8.4   Eosinophil % % 3.2 8.0 9.5*   Basophil % % 1.1 1.9 1.2   Differential Method  Automated Automated Automated       Metabolic Panel (last 72 hours):  Recent Labs   Lab Result Units 11/12/23  1735 11/12/23 1810 11/13/23  0516 11/14/23  0456   Sodium mmol/L  --  139 141 142   Potassium mmol/L  --  3.4* 3.5 3.2*   Chloride mmol/L  --  103 108 109   CO2 mmol/L  --  21*  24 22*   Glucose mg/dL  --  105 110 110   Glucose, UA  Negative  --   --   --    BUN mg/dL  --  8 7* 5*   Creatinine mg/dL  --  0.8 0.8 0.7   Creatinine, Urine mg/dL 25.4  --   --   --    Albumin g/dL  --  4.2 3.3* 3.2*   Total Bilirubin mg/dL  --  0.6 0.7 0.3   Alkaline Phosphatase U/L  --  140* 118 113   AST U/L  --  129* 90* 64*   ALT U/L  --  54* 42 33       Vancomycin Administrations:  vancomycin given in the last 96 hours                     vancomycin (VANCOCIN) 1,000 mg in dextrose 5 % (D5W) 250 mL IVPB (Vial-Mate) (mg) 1,000 mg New Bag 11/15/23 0514     1,000 mg New Bag 11/14/23 1712     1,000 mg New Bag  0631     1,000 mg New Bag 11/13/23 1736     1,000 mg New Bag  0612    vancomycin (VANCOCIN) 2,000 mg in dextrose 5 % (D5W) 500 mL IVPB (mg) 2,000 mg New Bag 11/12/23 1746                    Microbiologic Results:  Microbiology Results (last 7 days)       Procedure Component Value Units Date/Time    Blood culture x two cultures. Draw prior to antibiotics. [[id number]] Collected: 11/12/23 1719    Order Status: Completed Specimen: Blood from Peripheral, Hand, Right Updated: 11/14/23 1903     Blood Culture, Routine No Growth to date      No Growth to date      No Growth to date    Narrative:      Aerobic and anaerobic    Blood culture x two cultures. Draw prior to antibiotics. [[id number]] Collected: 11/12/23 1731    Order Status: Completed Specimen: Blood from Peripheral, Hand, Left Updated: 11/14/23 1903     Blood Culture, Routine No Growth to date      No Growth to date      No Growth to date    Narrative:      Aerobic and anaerobic    Clostridium difficile EIA [[id number]] Collected: 11/14/23 1116    Order Status: Completed Specimen: Stool Updated: 11/14/23 1239     C. diff Antigen Negative     C difficile Toxins A+B, EIA Negative     Comment: Testing not recommended for children <24 months old.       Aerobic culture [[id number]]  (Abnormal) Collected: 11/13/23 1356    Order Status: Completed Specimen:  Wound from Toe, Left Foot Updated: 11/14/23 0811     Aerobic Bacterial Culture STAPHYLOCOCCUS AUREUS  Few  Susceptibility pending      Gram stain [[id number]] Collected: 11/13/23 4276    Order Status: Completed Specimen: Wound from Toe, Left Foot Updated: 11/14/23 0737     Gram Stain Result Rare WBC's      No organisms seen    Culture, Anaerobe [[id number]] Collected: 11/13/23 3106    Order Status: Sent Specimen: Wound from Toe, Left Foot Updated: 11/13/23 1418

## 2023-11-16 VITALS
BODY MASS INDEX: 33.5 KG/M2 | HEIGHT: 65 IN | SYSTOLIC BLOOD PRESSURE: 139 MMHG | DIASTOLIC BLOOD PRESSURE: 88 MMHG | WEIGHT: 201.06 LBS | RESPIRATION RATE: 20 BRPM | OXYGEN SATURATION: 94 % | HEART RATE: 66 BPM | TEMPERATURE: 98 F

## 2023-11-16 LAB
ALBUMIN SERPL BCP-MCNC: 3.2 G/DL (ref 3.5–5.2)
ALP SERPL-CCNC: 103 U/L (ref 55–135)
ALT SERPL W/O P-5'-P-CCNC: 24 U/L (ref 10–44)
ANION GAP SERPL CALC-SCNC: 7 MMOL/L (ref 8–16)
AST SERPL-CCNC: 39 U/L (ref 10–40)
BACTERIA BLD CULT: NORMAL
BACTERIA BLD CULT: NORMAL
BASOPHILS # BLD AUTO: 0.04 K/UL (ref 0–0.2)
BASOPHILS NFR BLD: 1.1 % (ref 0–1.9)
BILIRUB SERPL-MCNC: 0.5 MG/DL (ref 0.1–1)
BUN SERPL-MCNC: 5 MG/DL (ref 8–23)
CALCIUM SERPL-MCNC: 9 MG/DL (ref 8.7–10.5)
CHLORIDE SERPL-SCNC: 105 MMOL/L (ref 95–110)
CO2 SERPL-SCNC: 25 MMOL/L (ref 23–29)
CREAT SERPL-MCNC: 0.7 MG/DL (ref 0.5–1.4)
DIFFERENTIAL METHOD: ABNORMAL
EOSINOPHIL # BLD AUTO: 0.1 K/UL (ref 0–0.5)
EOSINOPHIL NFR BLD: 3.5 % (ref 0–8)
ERYTHROCYTE [DISTWIDTH] IN BLOOD BY AUTOMATED COUNT: 13.3 % (ref 11.5–14.5)
EST. GFR  (NO RACE VARIABLE): >60 ML/MIN/1.73 M^2
GLUCOSE SERPL-MCNC: 93 MG/DL (ref 70–110)
HCT VFR BLD AUTO: 38.8 % (ref 37–48.5)
HGB BLD-MCNC: 12.6 G/DL (ref 12–16)
IMM GRANULOCYTES # BLD AUTO: 0.01 K/UL (ref 0–0.04)
IMM GRANULOCYTES NFR BLD AUTO: 0.3 % (ref 0–0.5)
LYMPHOCYTES # BLD AUTO: 0.9 K/UL (ref 1–4.8)
LYMPHOCYTES NFR BLD: 24.6 % (ref 18–48)
MCH RBC QN AUTO: 32.1 PG (ref 27–31)
MCHC RBC AUTO-ENTMCNC: 32.5 G/DL (ref 32–36)
MCV RBC AUTO: 99 FL (ref 82–98)
MONOCYTES # BLD AUTO: 0.3 K/UL (ref 0.3–1)
MONOCYTES NFR BLD: 8.6 % (ref 4–15)
NEUTROPHILS # BLD AUTO: 2.3 K/UL (ref 1.8–7.7)
NEUTROPHILS NFR BLD: 61.9 % (ref 38–73)
NRBC BLD-RTO: 0 /100 WBC
PLATELET # BLD AUTO: ABNORMAL K/UL (ref 150–450)
PLATELET BLD QL SMEAR: ABNORMAL
PMV BLD AUTO: ABNORMAL FL (ref 9.2–12.9)
POTASSIUM SERPL-SCNC: 3.4 MMOL/L (ref 3.5–5.1)
PROT SERPL-MCNC: 6.5 G/DL (ref 6–8.4)
RBC # BLD AUTO: 3.93 M/UL (ref 4–5.4)
SODIUM SERPL-SCNC: 137 MMOL/L (ref 136–145)
WBC # BLD AUTO: 3.74 K/UL (ref 3.9–12.7)

## 2023-11-16 PROCEDURE — 99223 PR INITIAL HOSPITAL CARE,LEVL III: ICD-10-PCS | Mod: ,,, | Performed by: SURGERY

## 2023-11-16 PROCEDURE — 25000003 PHARM REV CODE 250: Performed by: PHYSICIAN ASSISTANT

## 2023-11-16 PROCEDURE — 99223 1ST HOSP IP/OBS HIGH 75: CPT | Mod: ,,, | Performed by: SURGERY

## 2023-11-16 PROCEDURE — 36415 COLL VENOUS BLD VENIPUNCTURE: CPT | Performed by: FAMILY MEDICINE

## 2023-11-16 PROCEDURE — 97530 THERAPEUTIC ACTIVITIES: CPT | Mod: CQ

## 2023-11-16 PROCEDURE — 25000003 PHARM REV CODE 250: Performed by: PODIATRIST

## 2023-11-16 PROCEDURE — 25000003 PHARM REV CODE 250: Performed by: HOSPITALIST

## 2023-11-16 PROCEDURE — 85025 COMPLETE CBC W/AUTO DIFF WBC: CPT | Performed by: FAMILY MEDICINE

## 2023-11-16 PROCEDURE — 80053 COMPREHEN METABOLIC PANEL: CPT | Performed by: FAMILY MEDICINE

## 2023-11-16 PROCEDURE — 97165 OT EVAL LOW COMPLEX 30 MIN: CPT

## 2023-11-16 PROCEDURE — 63600175 PHARM REV CODE 636 W HCPCS: Performed by: HOSPITALIST

## 2023-11-16 PROCEDURE — 97535 SELF CARE MNGMENT TRAINING: CPT

## 2023-11-16 PROCEDURE — 97110 THERAPEUTIC EXERCISES: CPT | Mod: CQ

## 2023-11-16 RX ORDER — DOXYCYCLINE 100 MG/1
100 CAPSULE ORAL EVERY 12 HOURS
Qty: 20 CAPSULE | Refills: 0 | Status: ON HOLD | OUTPATIENT
Start: 2023-11-16 | End: 2023-11-28

## 2023-11-16 RX ADMIN — ACETAMINOPHEN 650 MG: 325 TABLET ORAL at 03:11

## 2023-11-16 RX ADMIN — ACETAMINOPHEN 650 MG: 325 TABLET ORAL at 02:11

## 2023-11-16 RX ADMIN — VANCOMYCIN HYDROCHLORIDE 1000 MG: 1 INJECTION, POWDER, LYOPHILIZED, FOR SOLUTION INTRAVENOUS at 04:11

## 2023-11-16 RX ADMIN — Medication: at 02:11

## 2023-11-16 RX ADMIN — VANCOMYCIN HYDROCHLORIDE 1000 MG: 1 INJECTION, POWDER, LYOPHILIZED, FOR SOLUTION INTRAVENOUS at 05:11

## 2023-11-16 NOTE — PLAN OF CARE
Problem: Adult Inpatient Plan of Care  Goal: Plan of Care Review  Outcome: Ongoing, Progressing  Flowsheets (Taken 11/16/2023 0815)  Plan of Care Reviewed With: patient  Goal: Patient-Specific Goal (Individualized)  Outcome: Ongoing, Progressing     Problem: Fall Injury Risk  Goal: Absence of Fall and Fall-Related Injury  Outcome: Ongoing, Progressing  Intervention: Identify and Manage Contributors  Flowsheets (Taken 11/16/2023 0815)  Self-Care Promotion:   independence encouraged   BADL personal routines maintained  Medication Review/Management: medications reviewed     Problem: Adult Inpatient Plan of Care  Goal: Plan of Care Review  Outcome: Ongoing, Progressing  Flowsheets (Taken 11/16/2023 0815)  Plan of Care Reviewed With: patient     Problem: Adult Inpatient Plan of Care  Goal: Plan of Care Review  Outcome: Ongoing, Progressing  Flowsheets (Taken 11/16/2023 0815)  Plan of Care Reviewed With: patient     Problem: Adult Inpatient Plan of Care  Goal: Patient-Specific Goal (Individualized)  Outcome: Ongoing, Progressing

## 2023-11-16 NOTE — NURSING
Wound care provided. Cleanse with normal saline, pat dry, applied cadexomer iodine and placed mepilex foam dressing to right great toe, heel and left great toe.
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
## 2023-11-16 NOTE — ASSESSMENT & PLAN NOTE
CT abdomen shows possible cholecystitis  Surgery consult and signed off since HIDA scan negative  N/V/D resolved after being made NPO

## 2023-11-16 NOTE — ASSESSMENT & PLAN NOTE
Patient states have charcot foot? But does not follow up with anyone  Possible due to neuropathy? From alcohol   Add vitamin b 12      PT/oT consulted  Pt declines SNF  D/c with HH

## 2023-11-16 NOTE — PLAN OF CARE
11/16/23 1100   Medicare Message   Important Message from Medicare regarding Discharge Appeal Rights Given to patient/caregiver;Explained to patient/caregiver;Signed/date by patient/caregiver;Other (comments)   Date IMM was signed 11/16/23   Time IMM was signed 1100     [facility] mail [phone]

## 2023-11-16 NOTE — NURSING
House supervisor will confirmed that nuclear med is contacted for STAT HIDA scan    Update 0018  Charity tech called @ 1980 twice and left messages and no call back, house sup aware

## 2023-11-16 NOTE — PLAN OF CARE
[facility]      HOME HEALTH ORDERS  FACE TO FACE ENCOUNTER    Patient Name: Estella Arevalo  YOB: 1955    PCP: Cristela, Primary Doctor   PCP Address: None  PCP Phone Number: None  PCP Fax: None    Encounter Date: 11/12/23    Admit to Home Health    Diagnoses:  Active Hospital Problems    Diagnosis  POA    *LLE Cellulitis, Left great toe ulcer [L03.90]  Yes    N&V (nausea and vomiting) [R11.2]  Yes    Skin ulcer of toe of left foot with fat layer exposed [L97.522]  Yes    Recurrent falls [R29.6]  Not Applicable    Obesity (BMI 30-39.9) [E66.9]  Yes    Alcohol abuse [F10.10]  Yes     Chronic    Essential hypertension [I10]  Yes     Chronic    CAD (coronary artery disease) [I25.10]  Yes     Chronic    Acquired hypothyroidism [E03.9]  Yes     Chronic    Polysubstance dependence including opioid type drug, continuous use [F11.20, F19.20]  Yes     Chronic      Resolved Hospital Problems   No resolved problems to display.       Follow Up Appointments:  No future appointments.    Allergies:  Review of patient's allergies indicates:   Allergen Reactions    Codeine      nausea       Medications: Review discharge medications with patient and family and provide education.    Current Facility-Administered Medications   Medication Dose Route Frequency Provider Last Rate Last Admin    acetaminophen tablet 650 mg  650 mg Oral Q4H PRN Gaetano Sage PA-C   650 mg at 11/16/23 0201    ARIPiprazole tablet 10 mg  10 mg Oral Daily Petra Loja, NP   10 mg at 11/15/23 0914    cadexomer iodine 0.9 % gel   Topical (Top) Daily PRN Lena Fields DPM   Given at 11/16/23 0202    calcium carbonate 200 mg calcium (500 mg) chewable tablet 500 mg  500 mg Oral TID PRN Luciana Loja MD   500 mg at 11/15/23 1216    cloNIDine tablet 0.1 mg  0.1 mg Oral Q6H PRN Luciana Loja MD   0.1 mg at 11/15/23 1740    EScitalopram oxalate tablet 20 mg  20 mg Oral Daily Petra Loja, NP   20 mg at 11/15/23 0914     folic acid tablet 1 mg  1 mg Oral Daily ShowGaetano madrid PA-C   1 mg at 11/15/23 0915    glucagon (human recombinant) injection 1 mg  1 mg Intramuscular PRN Gaetano Sage PA-C        glucose chewable tablet 16 g  16 g Oral PRN Gaetano Sage PA-C        glucose chewable tablet 24 g  24 g Oral PRN Gaetano Sage PA-C        lactated ringers infusion   Intravenous Continuous Luciana Loja MD   Paused at 11/16/23 0550    levothyroxine tablet 25 mcg  25 mcg Oral Before breakfast Gaetano Sage PA-C   25 mcg at 11/15/23 0610    LORazepam injection 2 mg  2 mg Intravenous Q8H PRN Gaetano Sage PA-C   2 mg at 11/15/23 1216    magnesium oxide tablet 800 mg  800 mg Oral PRN Gaetano Sage PA-C        magnesium oxide tablet 800 mg  800 mg Oral PRN Gaetano Sage PA-C        melatonin tablet 6 mg  6 mg Oral Nightly PRN Gaetano Sage PA-C        multivitamin tablet  1 tablet Oral Daily ShowGaetano madrid PA-C   1 tablet at 11/15/23 0915    naloxone 0.4 mg/mL injection 0.02 mg  0.02 mg Intravenous PRN Gaetano Sage PA-C        ondansetron injection 4 mg  4 mg Intravenous Q6H PRN Gaetano Sage PA-C   4 mg at 11/15/23 0753    pantoprazole injection 40 mg  40 mg Intravenous Daily Luciana Loja MD   40 mg at 11/15/23 1048    prochlorperazine injection Soln 10 mg  10 mg Intravenous Q6H PRN Luciana Loja MD   10 mg at 11/15/23 0507    senna-docusate 8.6-50 mg per tablet 1 tablet  1 tablet Oral Daily PRN Gaetano Sage PA-C        sodium chloride 0.9% flush 10 mL  10 mL Intravenous PRN ShowGaetano madrid PA-C        thiamine tablet 100 mg  100 mg Oral Daily Petra Loja NP   100 mg at 11/15/23 0915    traZODone tablet 100 mg  100 mg Oral QHS Eric Jiang NP   100 mg at 11/15/23 1585    vancomycin (VANCOCIN) 1,000 mg in dextrose 5 % (D5W) 250 mL IVPB (Vial-Mate)  1,000 mg Intravenous Q12H Ramon Marvin MD   Paused at 11/16/23 0550    vancomycin - pharmacy to dose    Intravenous pharmacy to manage frequency ShowersGaetano PA-C         Current Discharge Medication List        CONTINUE these medications which have NOT CHANGED    Details   chlordiazepoxide (LIBRIUM) 25 MG Cap Take 25 mg by mouth.      EScitalopram oxalate (LEXAPRO) 20 MG tablet Take 1 tablet (20 mg total) by mouth once daily.  Qty: 30 tablet, Refills: 5    Associated Diagnoses: Anxiety; Bipolar 1 disorder      folic acid/multivit-min/lutein (CENTRUM SILVER ORAL) Take 1 tablet by mouth.      levothyroxine (SYNTHROID) 25 MCG tablet Take 1 tablet (25 mcg total) by mouth once daily.  Qty: 90 tablet, Refills: 3    Associated Diagnoses: Hypothyroidism due to acquired atrophy of thyroid      LINZESS 145 mcg Cap capsule Take 145 mcg by mouth.      NARCAN 4 mg/actuation Spry 1 spray (4 mg total) by Nasal route as needed (in the event of overdose).  Qty: 1 each, Refills: 30    Associated Diagnoses: Narcotic dependency, continuous      oxyCODONE (ROXICODONE) 5 MG immediate release tablet Take 5 mg by mouth every 4 (four) hours as needed.      pantoprazole (PROTONIX) 40 MG tablet Take 1 tablet (40 mg total) by mouth 2 (two) times daily.  Qty: 180 tablet, Refills: 3    Associated Diagnoses: Gastroesophageal reflux disease, unspecified whether esophagitis present      PROCTOZONE-HC 2.5 % rectal cream STACI RECTALLY BID  Qty: 60 g, Refills: 2    Associated Diagnoses: Hemorrhoids, unspecified hemorrhoid type      traZODone (DESYREL) 100 MG tablet Take 100 mg by mouth every evening.      cloNIDine (CATAPRES) 0.1 MG tablet Take 1 tablet (0.1 mg total) by mouth once. for 1 dose  Qty: 30 tablet, Refills: 2    Comments: .  Associated Diagnoses: Essential hypertension      cycloSPORINE (RESTASIS) 0.05 % ophthalmic emulsion Place 1 drop into both eyes 2 (two) times daily.  Qty: 60 each, Refills: 3    Associated Diagnoses: Dry eye syndrome, unspecified laterality; Seasonal allergies               I have seen and examined this patient  within the last 30 days. My clinical findings that support the need for the home health skilled services and home bound status are the following:no   Weakness/numbness causing balance and gait disturbance due to Infection making it taxing to leave home.     Diet:   2 gram sodium diet    Labs:  N/a    Referrals/ Consults  Physical Therapy to evaluate and treat. Evaluate for home safety and equipment needs; Establish/upgrade home exercise program. Perform / instruct on therapeutic exercises, gait training, transfer training, and Range of Motion.  Occupational Therapy to evaluate and treat. Evaluate home environment for safety and equipment needs. Perform/Instruct on transfers, ADL training, ROM, and therapeutic exercises.   to evaluate for community resources/long-range planning.  Aide to provide assistance with personal care, ADLs, and vital signs.    Activities:   activity as tolerated    Nursing:   Agency to admit patient within 24 hours of hospital discharge unless specified on physician order or at patient request    SN to complete comprehensive assessment including routine vital signs. Instruct on disease process and s/s of complications to report to MD. Review/verify medication list sent home with the patient at time of discharge  and instruct patient/caregiver as needed. Frequency may be adjusted depending on start of care date.     Skilled nurse to perform up to 3 visits PRN for symptoms related to diagnosis    Notify MD if SBP > 160 or < 90; DBP > 90 or < 50; HR > 120 or < 50; Temp > 101; O2 < 88%; Other:       Ok to schedule additional visits based on staff availability and patient request on consecutive days within the home health episode.    When multiple disciplines ordered:    Start of Care occurs on Sunday - Wednesday schedule remaining discipline evaluations as ordered on separate consecutive days following the start of care.    Thursday SOC -schedule subsequent evaluations Friday and  Monday the following week.     Friday - Saturday SOC - schedule subsequent discipline evaluations on consecutive days starting Monday of the following week.    For all post-discharge communication and subsequent orders please contact patient's primary care physician. If unable to reach primary care physician or do not receive response within 30 minutes, please contact SW  for clinical staff order clarification    Miscellaneous   N/a    Home Health Aide:  Nursing Three times weekly, Physical Therapy Three times weekly, Occupational Therapy Three times weekly, and Home Health Aide Three times weekly    Wound Care Orders  Wound care-iodine to left great toe with mepilex daily.      I certify that this patient is confined to her home and needs intermittent skilled nursing care, physical therapy, and occupational therapy.

## 2023-11-16 NOTE — ASSESSMENT & PLAN NOTE
68F with multiple medical co-morbidities including alcoholic cirrhosis who is currently admitted for treatment of cellulitis and L great toe ulcer. She has had 1 day of nausea and vomiting, which was not associated with any abdominal pain. CT A/P was ordered, showed a contracted gallbladder. Scan inconclusive for acute cholecystitis. T bili and LFTs wnl. General surgery consulted due to the findings on CT scan. HIDA scan showing uptake of contrast into the gallbladder, no acute cholecystitis.     -- patient without RUQ pain, t bili normal, LFTs normal, HIDA negative   -- no exam, laboratory, or imaging findings to suggest acute cholecystitis   -- general surgery will sign off
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
## 2023-11-16 NOTE — NURSING
Pt lying in bed, LR infusing at 100mL/hr. Reported slight nausea but refused PRN medication at this time. Assisted pt to turn and reposition. External urinary catheter in place to suction. Heel boots present. Bed alarm set, call light in reach, instructed pt to call for assistance.     Wound care was not done by day shift. Will address this shift.       [facility] [place]  Nurses Note -- 4 Eyes      11/15/2023       Skin assessed on: Q Shift      [] No Pressure Injuries Present    []Prevention Measures Documented    [x] Yes LDA  for Pressure Injury Previously documented     [] Yes New Pressure Injury Discovered   [] LDA for New Pressure Injury Added      Attending RN:  Eugenia Loja RN     Second RN:  MARISSA Millard

## 2023-11-16 NOTE — DISCHARGE SUMMARY
[facility] Medicine  Discharge Summary      Patient Name: Estella Arevalo  MRN: [id number]  MACKENZIE: [id number]  Patient Class: IP- Inpatient  Admission Date: 11/12/2023  Hospital Length of Stay: 3 days  Discharge Date and Time:  11/16/2023 3:21 PM  Attending Physician: Luciana Loja MD   Discharging Provider: Luciana Loja MD  Primary Care Provider: Cristela, Primary Doctor    Primary Care Team: Networked reference to record PCT     HPI:   Estlela Arevalo 68 y.o. female with alcohol abuse, cocaine abuse, THC, CAD, presents to the hospital with a chief complaint of leg swelling and erythema.  She reports yesterday she found her left great toe was red and swelling.  This progressed throughout the day and began to extend to her mid causing presentation in the hospital.  She is had multiple falls at home he is unsure if she injured her toe during 1 of the falls.  She reports using THC yesterday her last drink was 1 month ago.  She denies chest Pain nausea vomiting abdominal pain melena hematuria hematemesis dizziness syncope.    In the ED, without leukocytosis sed rate elevated CRP elevated lactic acid within normal limits alcohol negative UDS positive for benzodiazepines and cocaine x-ray pelvis without acute findings in the pelvic ring her hips x-ray tibia fibula without acute abnormality x-ray foot with soft tissue swelling of the ankle no acute bony abnormality no significant change chest x-ray without acute abnormality.    * No surgery found *      Hospital Course:   Admission to observation for LLE cellulitis and left great toe ulcer.  Erythema improved overnight with IVF. Afebrile and no leukocytosis. Mild elevated CPR/ED 16/33. CPK elevated with this am labs. Podiatry consult pending. Start IVF and continue antibiotics. Repeat CPK in am. Add vitamin b 12 due to alcohol abuse and PT consult.   If no further recs from Podiatry and CPK and cellulitis continues to improve, then discharge  home tomorrow.     Addendum 1746  Patient declined DARCO shoe  Podiatry debrided left great toe ulcer and no purulent drainage.   On discharge, switch IV abx to doxycycline PO x 10 days and follow up Podiatry clinic in -10 days.   Wound care-iodine to left great toe with mepilex daily.     11/14: CPK trended down from 2k to 923. Complains of N/v and diarrhea. KUB neg. Cdiff neg  11/15:KUB neg. Continues to have N/V/D. States this has been going onfor 6 weeks. CT abdomen shows possible cholecystitis. Surgery consulted. NPO ordered  11/16: HIDA scan is normal. Pt's N/V/D resolved. Pt states she would like to be d/c home. Deemed stable to be d/c.      Goals of Care Treatment Preferences:  Code Status: Full Code      Consults:   Consults (From admission, onward)          Status Ordering Provider     Inpatient consult to General Surgery  Once        Provider:  eMrly Rehman MD    Completed MARI HEART     Case Management/  Once        Provider:  (Not yet assigned)    Completed RAFA DENT     Pharmacy to dose Vancomycin consult  Once        Provider:  (Not yet assigned)   See Hyperspace for full Linked Orders Report.    Acknowledged RAFA DENT     Inpatient consult to Podiatry  Once        Provider:  Raquel Howard DPM    Completed RAFA DENT            Psychiatric  Alcohol abuse  Reports last drink of alcohol 2 weeks ago. Chronically elevated LFTs. CIWA score 0  PRN ativan, CIWA trend, thiamine/folate supplementation    Polysubstance dependence including opioid type drug, continuous use  Per chart review previously positive for opiates on multiple UDS. Today positive for benzos and cocaine. Admits to smoking THC possible containing cocaine though UDS negative for THC  Encourage stop drinking alcohol and using illicit drugs    Cardiac/Vascular  CAD (coronary artery disease)  Per chart review though no previous C to compare. Denies chest pain.       Patient with known CAD s/p no  known interventions, which is controlled Will continue not on medication outpatient and monitor for S/Sx of angina/ACS. Continue to monitor on telemetry.     ID  * LLE Cellulitis, Left great toe ulcer  Presents with ascending cellulitis to left leg with wound to left toe. Multiple frequent falls at home per patient. Afebrile without leukocytosis. Sed rate/CRP elevated, x-ray foot without bony abnormality. Lactic within normal limits  Left lower extremity erythema receded from marking-improving  Left great toe tip ulcer intact  Continue IV antibiotic while stay  Patient declined DARCO shoe  Podiatry debrided left great toe ulcer and no purulent drainage.     D/c with HH .  doxycycline PO x 10 days and follow up Podiatry clinic in -10 days.   Wound care-iodine to left great toe with mepilex daily.     Pt would need a bedside commode due to the patient is bedroom confined for an extended time and non-ambulatory     Mrs Arevalo has a mobility limitation that significantly impairs her ability to participate in one or more mobility related activities of daily living (MRADL's) such as toileting, feeding, dressing, grooming, and bathing in customary locations in the home. The mobility limitation cannot be sufficiently resolved by the use of a cane or walker. The use of a manual wheelchair will significantly improve the patient's ability to participate in MRADLS and the patient will use it on regular basis in the home. Mrs Arevalo has expressed her willingness to use a manual wheelchair in the home. She also has a caregiver who is available, willing, and able to provide assistance with the wheelchair when needed.         Endocrine  Obesity (BMI 30-39.9)  Body mass index is 33.46 kg/m². Morbid obesity complicates all aspects of disease management from diagnostic modalities to treatment. Weight loss encouraged and health benefits explained to patient.     Acquired hypothyroidism  Lab Results   Component Value Date    TSH 1.661  04/20/2022   continue home synthroid    GI  N&V (nausea and vomiting)  CT abdomen shows possible cholecystitis  Surgery consult and signed off since HIDA scan negative  N/V/D resolved after being made NPO      Orthopedic  Skin ulcer of toe of left foot with fat layer exposed        Other  Recurrent falls  Patient states have charcot foot? But does not follow up with anyone  Possible due to neuropathy? From alcohol   Add vitamin b 12      PT/oT consulted  Pt declines SNF  D/c with       Final Active Diagnoses:    Diagnosis Date Noted POA    PRINCIPAL PROBLEM:  LLE Cellulitis, Left great toe ulcer [L03.90] 11/12/2023 Yes    N&V (nausea and vomiting) [R11.2] 11/15/2023 Yes    Skin ulcer of toe of left foot with fat layer exposed [L97.522] 11/14/2023 Yes    Recurrent falls [R29.6] 11/13/2023 Not Applicable    Obesity (BMI 30-39.9) [E66.9] 11/02/2023 Yes    Alcohol abuse [F10.10] 09/25/2019 Yes     Chronic    Essential hypertension [I10] 07/16/2018 Yes     Chronic    CAD (coronary artery disease) [I25.10] 07/16/2018 Yes     Chronic    Acquired hypothyroidism [E03.9] 12/19/2014 Yes     Chronic    Polysubstance dependence including opioid type drug, continuous use [F11.20, F19.20] 07/14/2013 Yes     Chronic      Problems Resolved During this Admission:       Discharged Condition: stable    Disposition: Home-Health Care [facility]    Follow Up:    Patient Instructions:      Ambulatory referral/consult to Podiatry   Standing Status: Future   Referral Priority: Routine Referral Type: Consultation   Referral Reason: Specialty Services Required   Requested Specialty: Podiatry   Number of Visits Requested: 1     Diet Adult Regular     Notify your health care provider if you experience any of the following:  temperature >100.4     Notify your health care provider if you experience any of the following:  persistent nausea and vomiting or diarrhea     Notify your health care provider if you experience any of the following:  severe  uncontrolled pain     Activity as tolerated       Significant Diagnostic Studies: Labs: BMP:   Recent Labs   Lab 11/16/23 0327   GLU 93      K 3.4*      CO2 25   BUN 5*   CREATININE 0.7   CALCIUM 9.0    and CBC   Recent Labs   Lab 11/16/23 0327   WBC 3.74*   HGB 12.6   HCT 38.8   PLT SEE COMMENT       Pending Diagnostic Studies:       None           Medications:  Reconciled Home Medications:      Medication List        START taking these medications      doxycycline 100 MG Cap  Commonly known as: VIBRAMYCIN  Take 1 capsule (100 mg total) by mouth every 12 (twelve) hours.            CHANGE how you take these medications      cloNIDine 0.1 MG tablet  Commonly known as: CATAPRES  Take 1 tablet (0.1 mg total) by mouth once. for 1 dose  What changed:   when to take this  reasons to take this  additional instructions            CONTINUE taking these medications      CENTRUM SILVER ORAL  Take 1 tablet by mouth.     chlordiazepoxide 25 MG Cap  Commonly known as: LIBRIUM  Take 25 mg by mouth.     cycloSPORINE 0.05 % ophthalmic emulsion  Commonly known as: RESTASIS  Place 1 drop into both eyes 2 (two) times daily.     EScitalopram oxalate 20 MG tablet  Commonly known as: LEXAPRO  Take 1 tablet (20 mg total) by mouth once daily.     levothyroxine 25 MCG tablet  Commonly known as: SYNTHROID  Take 1 tablet (25 mcg total) by mouth once daily.     LINZESS 145 mcg Cap capsule  Generic drug: linaCLOtide  Take 145 mcg by mouth.     NARCAN 4 mg/actuation Spry  Generic drug: naloxone  1 spray (4 mg total) by Nasal route as needed (in the event of overdose).     oxyCODONE 5 MG immediate release tablet  Commonly known as: ROXICODONE  Take 5 mg by mouth every 4 (four) hours as needed.     pantoprazole 40 MG tablet  Commonly known as: PROTONIX  Take 1 tablet (40 mg total) by mouth 2 (two) times daily.     PROCTOZONE-HC 2.5 % rectal cream  Generic drug: hydrocortisone  STACI RECTALLY BID     traZODone 100 MG tablet  Commonly  known as: DESYREL  Take 100 mg by mouth every evening.              Indwelling Lines/Drains at time of discharge:   Lines/Drains/Airways       None                   Time spent on the discharge of patient: >30 minutes         Luciana Loja MD  Department of Hospital Medicine  [facility]

## 2023-11-16 NOTE — PLAN OF CARE
Problem: Physical Therapy  Goal: Physical Therapy Goal  Description: Goals to be met by: 23     Patient will increase functional independence with mobility by performin. Supine to sit with Modified [place]  2. Sit to stand transfer with Modified [place]  3. Bed to chair transfer with Modified [place]    4. Gait  x 10-15 feet with Supervision using Rolling Walker.   5. Wheelchair propulsion x50 feet with Supervision    6. Lower extremity exercise program x10 reps per handout, with supervision    Outcome: Ongoing, Progressing

## 2023-11-16 NOTE — PROGRESS NOTES
Vancomycin consult follow-up:    Patient reviewed, renal function stable, no new levels, continue current therapy; Next levels due: trough due 11/17/2023 at 0400

## 2023-11-16 NOTE — ASSESSMENT & PLAN NOTE
Presents with ascending cellulitis to left leg with wound to left toe. Multiple frequent falls at home per patient. Afebrile without leukocytosis. Sed rate/CRP elevated, x-ray foot without bony abnormality. Lactic within normal limits  Left lower extremity erythema receded from marking-improving  Left great toe tip ulcer intact  Continue IV antibiotic while stay  Patient declined DARCO shoe  Podiatry debrided left great toe ulcer and no purulent drainage.     D/c with  .  doxycycline PO x 10 days and follow up Podiatry clinic in -10 days.   Wound care-iodine to left great toe with mepilex daily.

## 2023-11-16 NOTE — NURSING
Lost IV site, nuclear medicine is aware. OK to send down without IV site and they will attempt to gain access. House supervisor aware as well.

## 2023-11-16 NOTE — PT/OT/SLP PROGRESS
Physical Therapy      Patient Name:  Estella Arevalo   MRN:  [id number]    Patient not seen today secondary to Testing/imaging (xray/CT/MRI), Other (NM HEPATOBILIARY). Will follow-up.

## 2023-11-16 NOTE — HPI
Estella Arevalo is a 68 y.o. female with a history of alcohol abuse, cocaine abuse, THC, CAD, alcoholic cirrhosis, and thyroid disease who was originally admitted for cellulitis of the left leg. Has improved on IV abx and with continued resuscitation. Podiatry on board. 11/15 she had continued bouts of nausea, vomiting, and diarrhea. A CT A/P was ordered which showed a contracted gallbladder which was inconclusive for acute cholecystitis. General surgery was consulted for this reason. LFTs wnl. T bili 0.5.

## 2023-11-16 NOTE — PT/OT/SLP PROGRESS
Physical Therapy Treatment    Patient Name:  Estella Arevalo   MRN:  [id number]    Recommendations:     Discharge Recommendations: Moderate Intensity Therapy  Discharge Equipment Recommendations: wheelchair  Barriers to discharge:  Pt is not functioning at baseline and is at increased risk for falls and readmission as evident from pt being readmitted with frequent fall with injury after D/C last week.    Assessment:     Estella Arevalo is a 68 y.o. female admitted with a medical diagnosis of Cellulitis.  She presents with the following impairments/functional limitations: gait instability, impaired balance, impaired endurance, impaired functional mobility, weakness, decreased coordination, decreased upper extremity function, decreased lower extremity function, decreased safety awareness, impaired skin.    Rehab Prognosis: Good; patient would benefit from acute skilled PT services to address these deficits and reach maximum level of function.    Recent Surgery: * No surgery found *      Plan:     During this hospitalization, patient to be seen  (5-6x/wk) to address the identified rehab impairments via gait training, therapeutic exercises, therapeutic activities, neuromuscular re-education, wheelchair management/training and progress toward the following goals:    Plan of Care Expires:  11/29/23    Subjective     Chief Complaint: weakness  Patient/Family Comments/goals: Pt agreed to participate.  Pain/Comfort:  Pain Rating 1: 0/10  Pain Rating Post-Intervention 1: 0/10      Objective:     Communicated with nurse Kraus prior to session.  Patient found HOB elevated with peripheral IV, PureWick upon PT entry to room.     General Precautions: Standard, fall  Orthopedic Precautions: N/A  Braces:  (B [employer] Shoes)  Respiratory Status: Room air     Functional Mobility:  Bed Mobility:     Scooting: anterior scoot to EOB with stand by assistance  Bridging: stand by assistance  Supine to Sit: stand by assistance with HOB  elevated  Sit to Supine: stand by assistance  Transfers: gait belt donned prior OOB activity    Sit to Stand: 2 trials from EOB with contact guard assistance and minimum assistance with rolling walker  Gait: Pt ambulated ~16ft in pt's room and 7-8 lateral steps along EOB with CGA using RW, PTA helped managing the IV pole. Pt with min unsteadiness, decreased mahsa/step length, v/c for , AD management  Balance: Good Sitting; Fair Standing      AM-PAC 6 CLICK MOBILITY  Turning over in bed (including adjusting bedclothes, sheets and blankets)?: 3  Sitting down on and standing up from a chair with arms (e.g., wheelchair, bedside commode, etc.): 3  Moving from lying on back to sitting on the side of the bed?: 3  Moving to and from a bed to a chair (including a wheelchair)?: 3  Need to walk in hospital room?: 3  Climbing 3-5 steps with a railing?: 1  Basic Mobility Total Score: 16       Treatment & Education:  Educated pt on benefit of OOB activity with hospital staff assistance and performing BLE ex throughout the day, pt verbalized understanding.    BLE ex in seated 10 reps AP, LAQ, Latia, PS    Patient left HOB elevated with BLE offloaded by pillow, tray table at bedside, all lines intact, call button in reach, bed alarm on, and nurse notified.    GOALS:   Multidisciplinary Problems       Physical Therapy Goals          Problem: Physical Therapy    Goal Priority Disciplines Outcome Goal Variances Interventions   Physical Therapy Goal     PT, PT/OT Ongoing, Progressing     Description: Goals to be met by: 23     Patient will increase functional independence with mobility by performin. Supine to sit with Modified [place]  2. Sit to stand transfer with Modified [place]  3. Bed to chair transfer with Modified [place]    4. Gait  x 10-15 feet with Supervision using Rolling Walker.   5. Wheelchair propulsion x50 feet with Supervision    6. Lower extremity exercise program x10 reps  per handout, with supervision                         Time Tracking:     PT Received On: 11/16/23  PT Start Time: 1450     PT Stop Time: 1520  PT Total Time (min): 30 min     Billable Minutes: Therapeutic Activity 14 min and Therapeutic Exercise 16 min    Treatment Type: Treatment  PT/PTA: PTA     Number of PTA visits since last PT visit: 1 11/16/2023

## 2023-11-16 NOTE — PLAN OF CARE
TN sent a secure chat to med surg nurse Nayana this patient is cleared for discharge from case management's viewpoint.  PHN will call with home giuliana provider.  Wheel chair will be delivered to home and patient did not qualify for bedside commode and chose not to pay for it.   Follow-up Information       [facility] - Follow up.    Why: 11:10 [facility] hospital follow up will see Lo Mojica  Contact information:  [address] [place] [address]  [phone]               [facility] Follow up on 11/18/2023.    Why: Home Health  Contact information:  [phone]    [facility] to be assigned by   PHN Dme, [facility] Follow up on 11/16/2023.    Specialty: DME Provider  Why: DME:    wheel chair to be delivered to home.  Contact information:  [address]  [facility] [address]  [phone]                              11/16/23 1615   Final Note   Assessment Type Final Discharge Note   Anticipated Discharge Disposition Home-Health  (PHN)   Hospital Resources/Appts/Education Provided Provided patient/caregiver with written discharge plan information;Appointments scheduled and added to AVS   Post-Acute Status   Post-Acute Authorization HME;Home Health   HME Status (!) Pending Delivery  ([facility] to deliver to home)   Home Health Status Pending Payor Review  (PHN)   Discharge Delays None known at this time
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
## 2023-11-17 LAB — BACTERIA SPEC ANAEROBE CULT: NORMAL

## 2023-11-17 NOTE — PHYSICIAN QUERY
"PT Name: Estella Arevalo  MR #: [id number]    DOCUMENTATION CLARIFICATION     CDS/: EMANUEL Glover, RN, CDS               Contact information:[email or web address]   This form is a permanent document in the medical record.    Query Date: November 17, 2023  By submitting this query, we are merely seeking further clarification of documentation. Please utilize your independent clinical judgment when addressing the question(s) below.    The Medical Record contains the following:   Indicator Supporting Clinical Findings Location in Medical Record   X Documentation of "Debridement"  Debridement: With verbal consent, nonviable tissues on the left foot were debrided beyond sub q utilizing a  sterile No. 3 scalpel and forceps. Minimal bleeding controlled with direct pressure  The patient tolerated this well.      Left Great Toe Ulceration        Op note, Dr. Fields, 11/13    Documentation of "I&D"      Other       Excisional debridement is the surgical removal or cutting away of such tissue, necrosis, or slough and is classified to the root operation "Excision." Use of a sharp instrument does not always indicate that an excisional debridement was performed. Minor removal of loose fragments with scissors or using a sharp instrument to scrape away tissue is not an excisional debridement. Excisional debridement involves the use of a scalpel to remove devitalized tissue.  Nonexcisional debridement is the nonoperative brushing, irrigating, scrubbing, or washing of devitalized tissue, necrosis, slough, or foreign material. Most nonexcisional debridement procedures are classified to the root operation "Extraction" (pulling or stripping out or off all or a portion of a body part by the use of force).     Provider, please provide further clarification on Type and Depth of Debridement Performed on the Left Great Toe:    [   ] Excisional Debridement of skin   [ X  ] Excisional Debridement of subcutaneous tissue and/or fascia   [ "   ] Excisional Debridement of muscle   [   ] Excisional Debridement of tendon   [   ] Excisional Debridement of other depth, please specify:__________        [   ] Non-excisional Debridement of skin   [   ] Non-excisional Debridement of subcutaneous tissue and/or fascia   [   ] Non-excisional Debridement of muscle   [   ] Non-excisional Debridement of tendon   [   ] Non-excisional Debridement of other depth, please specify:___________     [   ] Other  (please specify): _____________     Reference:    ICD-10-CM/PCS Coding Clinic Third Quarter ICD-10, Effective with discharges: October 7, 2015 [facility] [employer] § Excisional and nonexcisional debridement (2015).    Form No. 75674

## 2023-11-17 NOTE — PLAN OF CARE
Problem: Violence Risk or Actual  Goal: Anger and Impulse Control  Outcome: Adequate for Care Transition     Problem: Adult Inpatient Plan of Care  Goal: Plan of Care Review  Outcome: Adequate for Care Transition  Goal: Patient-Specific Goal (Individualized)  Outcome: Adequate for Care Transition  Goal: Absence of Hospital-Acquired Illness or Injury  Outcome: Adequate for Care Transition  Goal: Optimal Comfort and Wellbeing  Outcome: Adequate for Care Transition  Goal: Readiness for Transition of Care  Outcome: Adequate for Care Transition     Problem: Skin Injury Risk Increased  Goal: Skin Health and Integrity  Outcome: Adequate for Care Transition     Problem: Impaired Wound Healing  Goal: Optimal Wound Healing  Outcome: Adequate for Care Transition     Problem: Infection  Goal: Absence of Infection Signs and Symptoms  Outcome: Adequate for Care Transition     Problem: Fall Injury Risk  Goal: Absence of Fall and Fall-Related Injury  Outcome: Adequate for Care Transition

## 2023-11-23 ENCOUNTER — HOSPITAL ENCOUNTER (INPATIENT)
Facility: HOSPITAL | Age: 68
LOS: 4 days | Discharge: NURSING, REHAB OR LONG-TERM CARE | DRG: 540 | End: 2023-11-29
Attending: STUDENT IN AN ORGANIZED HEALTH CARE EDUCATION/TRAINING PROGRAM | Admitting: HOSPITALIST
Payer: MEDICARE

## 2023-11-23 DIAGNOSIS — M86.9 OSTEOMYELITIS OF GREAT TOE: ICD-10-CM

## 2023-11-23 DIAGNOSIS — R07.9 CHEST PAIN: ICD-10-CM

## 2023-11-23 DIAGNOSIS — I95.9 HYPOTENSION: ICD-10-CM

## 2023-11-23 DIAGNOSIS — R52 PAIN: ICD-10-CM

## 2023-11-23 DIAGNOSIS — L03.90 CELLULITIS: ICD-10-CM

## 2023-11-23 DIAGNOSIS — L97.522 SKIN ULCER OF TOE OF LEFT FOOT WITH FAT LAYER EXPOSED: ICD-10-CM

## 2023-11-23 DIAGNOSIS — L97.522 TOE ULCER, LEFT, WITH FAT LAYER EXPOSED: Primary | ICD-10-CM

## 2023-11-23 DIAGNOSIS — L03.032 CELLULITIS OF TOE OF LEFT FOOT: ICD-10-CM

## 2023-11-23 DIAGNOSIS — G60.0 CMT (CHARCOT-MARIE-TOOTH DISEASE): ICD-10-CM

## 2023-11-23 LAB
ALBUMIN SERPL BCP-MCNC: 3.8 G/DL (ref 3.5–5.2)
ALLENS TEST: NORMAL
ALP SERPL-CCNC: 139 U/L (ref 55–135)
ALT SERPL W/O P-5'-P-CCNC: 34 U/L (ref 10–44)
ANION GAP SERPL CALC-SCNC: 14 MMOL/L (ref 8–16)
AST SERPL-CCNC: 87 U/L (ref 10–40)
BASOPHILS # BLD AUTO: 0.06 K/UL (ref 0–0.2)
BASOPHILS NFR BLD: 1.4 % (ref 0–1.9)
BILIRUB SERPL-MCNC: 1.4 MG/DL (ref 0.1–1)
BUN SERPL-MCNC: 13 MG/DL (ref 8–23)
CALCIUM SERPL-MCNC: 9.4 MG/DL (ref 8.7–10.5)
CHLORIDE SERPL-SCNC: 101 MMOL/L (ref 95–110)
CO2 SERPL-SCNC: 21 MMOL/L (ref 23–29)
CREAT SERPL-MCNC: 0.7 MG/DL (ref 0.5–1.4)
CRP SERPL-MCNC: 64.3 MG/L (ref 0–8.2)
DIFFERENTIAL METHOD: ABNORMAL
EOSINOPHIL # BLD AUTO: 0.6 K/UL (ref 0–0.5)
EOSINOPHIL NFR BLD: 13.2 % (ref 0–8)
ERYTHROCYTE [DISTWIDTH] IN BLOOD BY AUTOMATED COUNT: 12.9 % (ref 11.5–14.5)
ERYTHROCYTE [SEDIMENTATION RATE] IN BLOOD BY WESTERGREN METHOD: 51 MM/HR (ref 0–20)
EST. GFR  (NO RACE VARIABLE): >60 ML/MIN/1.73 M^2
GLUCOSE SERPL-MCNC: 89 MG/DL (ref 70–110)
HCT VFR BLD AUTO: 41.3 % (ref 37–48.5)
HGB BLD-MCNC: 13.5 G/DL (ref 12–16)
IMM GRANULOCYTES # BLD AUTO: 0.01 K/UL (ref 0–0.04)
IMM GRANULOCYTES NFR BLD AUTO: 0.2 % (ref 0–0.5)
INR PPP: 1.1 (ref 0.8–1.2)
LACTATE SERPL-SCNC: 1 MMOL/L (ref 0.5–2.2)
LDH SERPL L TO P-CCNC: 1.46 MMOL/L (ref 0.5–2.2)
LYMPHOCYTES # BLD AUTO: 0.7 K/UL (ref 1–4.8)
LYMPHOCYTES NFR BLD: 17.3 % (ref 18–48)
MAGNESIUM SERPL-MCNC: 1.8 MG/DL (ref 1.6–2.6)
MCH RBC QN AUTO: 31.4 PG (ref 27–31)
MCHC RBC AUTO-ENTMCNC: 32.7 G/DL (ref 32–36)
MCV RBC AUTO: 96 FL (ref 82–98)
MONOCYTES # BLD AUTO: 0.3 K/UL (ref 0.3–1)
MONOCYTES NFR BLD: 7 % (ref 4–15)
NEUTROPHILS # BLD AUTO: 2.5 K/UL (ref 1.8–7.7)
NEUTROPHILS NFR BLD: 60.9 % (ref 38–73)
NRBC BLD-RTO: 0 /100 WBC
PLATELET # BLD AUTO: 210 K/UL (ref 150–450)
PMV BLD AUTO: 10.3 FL (ref 9.2–12.9)
POTASSIUM SERPL-SCNC: 3.2 MMOL/L (ref 3.5–5.1)
PROT SERPL-MCNC: 7.8 G/DL (ref 6–8.4)
PROTHROMBIN TIME: 11.4 SEC (ref 9–12.5)
RBC # BLD AUTO: 4.3 M/UL (ref 4–5.4)
SAMPLE: NORMAL
SITE: NORMAL
SODIUM SERPL-SCNC: 136 MMOL/L (ref 136–145)
WBC # BLD AUTO: 4.17 K/UL (ref 3.9–12.7)

## 2023-11-23 PROCEDURE — 63600175 PHARM REV CODE 636 W HCPCS: Performed by: HOSPITALIST

## 2023-11-23 PROCEDURE — G0378 HOSPITAL OBSERVATION PER HR: HCPCS

## 2023-11-23 PROCEDURE — 25000003 PHARM REV CODE 250: Performed by: HOSPITALIST

## 2023-11-23 PROCEDURE — 85610 PROTHROMBIN TIME: CPT | Performed by: PHYSICIAN ASSISTANT

## 2023-11-23 PROCEDURE — 93010 ELECTROCARDIOGRAM REPORT: CPT | Mod: ,,, | Performed by: INTERNAL MEDICINE

## 2023-11-23 PROCEDURE — 83605 ASSAY OF LACTIC ACID: CPT

## 2023-11-23 PROCEDURE — 87040 BLOOD CULTURE FOR BACTERIA: CPT | Mod: 59 | Performed by: STUDENT IN AN ORGANIZED HEALTH CARE EDUCATION/TRAINING PROGRAM

## 2023-11-23 PROCEDURE — 99900035 HC TECH TIME PER 15 MIN (STAT)

## 2023-11-23 PROCEDURE — 25000003 PHARM REV CODE 250: Performed by: STUDENT IN AN ORGANIZED HEALTH CARE EDUCATION/TRAINING PROGRAM

## 2023-11-23 PROCEDURE — 99285 EMERGENCY DEPT VISIT HI MDM: CPT | Mod: 25

## 2023-11-23 PROCEDURE — 93010 EKG 12-LEAD: ICD-10-PCS | Mod: ,,, | Performed by: INTERNAL MEDICINE

## 2023-11-23 PROCEDURE — 63600175 PHARM REV CODE 636 W HCPCS: Performed by: STUDENT IN AN ORGANIZED HEALTH CARE EDUCATION/TRAINING PROGRAM

## 2023-11-23 PROCEDURE — 83605 ASSAY OF LACTIC ACID: CPT | Performed by: STUDENT IN AN ORGANIZED HEALTH CARE EDUCATION/TRAINING PROGRAM

## 2023-11-23 PROCEDURE — 80053 COMPREHEN METABOLIC PANEL: CPT | Performed by: STUDENT IN AN ORGANIZED HEALTH CARE EDUCATION/TRAINING PROGRAM

## 2023-11-23 PROCEDURE — 83735 ASSAY OF MAGNESIUM: CPT | Performed by: STUDENT IN AN ORGANIZED HEALTH CARE EDUCATION/TRAINING PROGRAM

## 2023-11-23 PROCEDURE — 86140 C-REACTIVE PROTEIN: CPT | Performed by: STUDENT IN AN ORGANIZED HEALTH CARE EDUCATION/TRAINING PROGRAM

## 2023-11-23 PROCEDURE — 85025 COMPLETE CBC W/AUTO DIFF WBC: CPT | Performed by: STUDENT IN AN ORGANIZED HEALTH CARE EDUCATION/TRAINING PROGRAM

## 2023-11-23 PROCEDURE — 25000003 PHARM REV CODE 250: Performed by: PHYSICIAN ASSISTANT

## 2023-11-23 PROCEDURE — 85652 RBC SED RATE AUTOMATED: CPT | Performed by: STUDENT IN AN ORGANIZED HEALTH CARE EDUCATION/TRAINING PROGRAM

## 2023-11-23 PROCEDURE — 93005 ELECTROCARDIOGRAM TRACING: CPT

## 2023-11-23 RX ORDER — SODIUM CHLORIDE 0.9 % (FLUSH) 0.9 %
10 SYRINGE (ML) INJECTION EVERY 8 HOURS
Status: DISCONTINUED | OUTPATIENT
Start: 2023-11-23 | End: 2023-11-23

## 2023-11-23 RX ORDER — POTASSIUM CHLORIDE 7.45 MG/ML
10 INJECTION INTRAVENOUS ONCE
Status: COMPLETED | OUTPATIENT
Start: 2023-11-23 | End: 2023-11-23

## 2023-11-23 RX ORDER — AMOXICILLIN 250 MG
1 CAPSULE ORAL DAILY PRN
Status: DISCONTINUED | OUTPATIENT
Start: 2023-11-23 | End: 2023-11-29 | Stop reason: HOSPADM

## 2023-11-23 RX ORDER — IBUPROFEN 200 MG
24 TABLET ORAL
Status: DISCONTINUED | OUTPATIENT
Start: 2023-11-23 | End: 2023-11-29 | Stop reason: HOSPADM

## 2023-11-23 RX ORDER — OXYCODONE HYDROCHLORIDE 5 MG/1
5 TABLET ORAL
Status: COMPLETED | OUTPATIENT
Start: 2023-11-23 | End: 2023-11-23

## 2023-11-23 RX ORDER — LANOLIN ALCOHOL/MO/W.PET/CERES
800 CREAM (GRAM) TOPICAL
Status: DISCONTINUED | OUTPATIENT
Start: 2023-11-23 | End: 2023-11-27

## 2023-11-23 RX ORDER — TALC
6 POWDER (GRAM) TOPICAL NIGHTLY PRN
Status: DISCONTINUED | OUTPATIENT
Start: 2023-11-23 | End: 2023-11-29 | Stop reason: HOSPADM

## 2023-11-23 RX ORDER — SODIUM CHLORIDE 0.9 % (FLUSH) 0.9 %
10 SYRINGE (ML) INJECTION
Status: DISCONTINUED | OUTPATIENT
Start: 2023-11-23 | End: 2023-11-29 | Stop reason: HOSPADM

## 2023-11-23 RX ORDER — LEVOTHYROXINE SODIUM 25 UG/1
25 TABLET ORAL DAILY
Status: DISCONTINUED | OUTPATIENT
Start: 2023-11-24 | End: 2023-11-29 | Stop reason: HOSPADM

## 2023-11-23 RX ORDER — NALOXONE HCL 0.4 MG/ML
0.02 VIAL (ML) INJECTION
Status: DISCONTINUED | OUTPATIENT
Start: 2023-11-23 | End: 2023-11-29 | Stop reason: HOSPADM

## 2023-11-23 RX ORDER — IBUPROFEN 200 MG
16 TABLET ORAL
Status: DISCONTINUED | OUTPATIENT
Start: 2023-11-23 | End: 2023-11-29 | Stop reason: HOSPADM

## 2023-11-23 RX ORDER — GLUCAGON 1 MG
1 KIT INJECTION
Status: DISCONTINUED | OUTPATIENT
Start: 2023-11-23 | End: 2023-11-29 | Stop reason: HOSPADM

## 2023-11-23 RX ORDER — LANOLIN ALCOHOL/MO/W.PET/CERES
800 CREAM (GRAM) TOPICAL
Status: DISCONTINUED | OUTPATIENT
Start: 2023-11-23 | End: 2023-11-23

## 2023-11-23 RX ORDER — ACETAMINOPHEN 325 MG/1
650 TABLET ORAL EVERY 4 HOURS PRN
Status: DISCONTINUED | OUTPATIENT
Start: 2023-11-23 | End: 2023-11-26

## 2023-11-23 RX ADMIN — POTASSIUM CHLORIDE 10 MEQ: 7.46 INJECTION, SOLUTION INTRAVENOUS at 02:11

## 2023-11-23 RX ADMIN — POTASSIUM BICARBONATE 20 MEQ: 391 TABLET, EFFERVESCENT ORAL at 02:11

## 2023-11-23 RX ADMIN — PIPERACILLIN AND TAZOBACTAM 4.5 G: 4; .5 INJECTION, POWDER, LYOPHILIZED, FOR SOLUTION INTRAVENOUS; PARENTERAL at 02:11

## 2023-11-23 RX ADMIN — SODIUM CHLORIDE, POTASSIUM CHLORIDE, SODIUM LACTATE AND CALCIUM CHLORIDE 710 ML: 600; 310; 30; 20 INJECTION, SOLUTION INTRAVENOUS at 02:11

## 2023-11-23 RX ADMIN — OXYCODONE HYDROCHLORIDE 5 MG: 5 TABLET ORAL at 02:11

## 2023-11-23 RX ADMIN — ACETAMINOPHEN 650 MG: 325 TABLET ORAL at 08:11

## 2023-11-23 RX ADMIN — PIPERACILLIN AND TAZOBACTAM 4.5 G: 4; .5 INJECTION, POWDER, LYOPHILIZED, FOR SOLUTION INTRAVENOUS; PARENTERAL at 11:11

## 2023-11-23 RX ADMIN — VANCOMYCIN HYDROCHLORIDE 1750 MG: 500 INJECTION, POWDER, LYOPHILIZED, FOR SOLUTION INTRAVENOUS at 07:11

## 2023-11-23 NOTE — ASSESSMENT & PLAN NOTE
Presents with erythema and swelling to left leg and toe which reoccured after finishing PO doxy after last hospitalization. Also developed erythema to right leg. Afebrile without leukocytosis, Sed rate/CRP elevated.   Continue zosyn/vanc  Podiatry consulted  Repeat x-ray of left foot  Blood cultures pending

## 2023-11-23 NOTE — ED TRIAGE NOTES
Patient BIB EMS for eval of BLE for cellulitis. Pt recently hospitalized for cellulitis and treated with IV antibiotics and currently taking oral antibiotics at home. Pt states it is burning when she urinates now and wants her legs to be seen again. Pt does not have HTN nor DM. Denies chest pain, SOB, fever, chills, N/V/D. Placed on cardiac monitor.
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
## 2023-11-23 NOTE — ASSESSMENT & PLAN NOTE
Listed on problem list though no previous [facility] to compare. Not currently on medication outpatient. Denies chest pain.

## 2023-11-23 NOTE — ADMISSIONCARE
AdmissionCare    Guideline: Cellulitis - OBS, Observation    Based on the indications selected for the patient, the bed status of Admit to Observation was determined to be MET    The following indications were selected as present at the time of evaluation of the patient:      - Severity of infection unclear (eg, concern regarding rapid progression)    AdmissionCare documentation entered by: Juliann Poon    [facility], 27th edition, Copyright © 2023 [facility] All Rights Reserved.  [id number]:26-06:00

## 2023-11-23 NOTE — HPI
Estella Arevalo 68 y.o. female with alcohol abuse, cocaine abuse, THC, CAD, presents to the hospital with a chief complaint of leg swelling and erythema.  She reports she was seen 2 weeks ago and hospitalized for lower extremity erythema. She was discharged on oral doxycycline which she reports she was compliant.  She states initially there erythema improved while on the oral doxycycline, but after finishing the erythema returned and then returned to her right leg.  She reports she walks barefoot in her home.  She reports she has chronic back pain.  She does report using cocaine 1 time since discharge.  She denies any known trauma to the feet.  She denies fever chest pain shortness of breath nausea vomiting abdominal pain melena hematuria hematemesis dizziness and syncope.     In the ED, afebrile without without leukocytosis sed rate 51 CRP 64.3. of care lactic acid negative chest x-ray without large focal consolidation and interstitial findings accentuated by shallow inspiratory effort.
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
## 2023-11-23 NOTE — ASSESSMENT & PLAN NOTE
With 1 episode hypotension in ED which resolved with IVF denies symptoms.   Not currently on anti-hypertensives outpatient     Chronic, controlled. Latest blood pressure and vitals reviewed-     Temp:  [97.7 °F (36.5 °C)]   Pulse:  [76-86]   Resp:  [18-21]   BP: ()/(53-77)   SpO2:  [95 %-98 %] .   Home meds for hypertension were reviewed and noted below.   Hypertension Medications               cloNIDine (CATAPRES) 0.1 MG tablet Take 1 tablet (0.1 mg total) by mouth once. for 1 dose            While in the hospital, will manage blood pressure as follows; Adjust home antihypertensive regimen as follows- not currently on medication outpatient    Will utilize p.r.n. blood pressure medication only if patient's blood pressure greater than 180/110 and she develops symptoms such as worsening chest pain or shortness of breath.

## 2023-11-24 LAB
ALBUMIN SERPL BCP-MCNC: 3.2 G/DL (ref 3.5–5.2)
ALP SERPL-CCNC: 135 U/L (ref 55–135)
ALT SERPL W/O P-5'-P-CCNC: 35 U/L (ref 10–44)
AMPHET+METHAMPHET UR QL: ABNORMAL
ANION GAP SERPL CALC-SCNC: 10 MMOL/L (ref 8–16)
AST SERPL-CCNC: 79 U/L (ref 10–40)
BARBITURATES UR QL SCN>200 NG/ML: NEGATIVE
BASOPHILS # BLD AUTO: 0.04 K/UL (ref 0–0.2)
BASOPHILS NFR BLD: 1.5 % (ref 0–1.9)
BENZODIAZ UR QL SCN>200 NG/ML: ABNORMAL
BILIRUB SERPL-MCNC: 0.9 MG/DL (ref 0.1–1)
BILIRUB UR QL STRIP: NEGATIVE
BUN SERPL-MCNC: 8 MG/DL (ref 8–23)
BZE UR QL SCN: NEGATIVE
CALCIUM SERPL-MCNC: 8.8 MG/DL (ref 8.7–10.5)
CANNABINOIDS UR QL SCN: NEGATIVE
CHLORIDE SERPL-SCNC: 106 MMOL/L (ref 95–110)
CLARITY UR: ABNORMAL
CO2 SERPL-SCNC: 21 MMOL/L (ref 23–29)
COLOR UR: YELLOW
CREAT SERPL-MCNC: 0.7 MG/DL (ref 0.5–1.4)
CREAT UR-MCNC: 47.6 MG/DL (ref 15–325)
DIFFERENTIAL METHOD: ABNORMAL
EOSINOPHIL # BLD AUTO: 0.4 K/UL (ref 0–0.5)
EOSINOPHIL NFR BLD: 15.9 % (ref 0–8)
ERYTHROCYTE [DISTWIDTH] IN BLOOD BY AUTOMATED COUNT: 13 % (ref 11.5–14.5)
EST. GFR  (NO RACE VARIABLE): >60 ML/MIN/1.73 M^2
GLUCOSE SERPL-MCNC: 102 MG/DL (ref 70–110)
GLUCOSE UR QL STRIP: NEGATIVE
HCT VFR BLD AUTO: 39.6 % (ref 37–48.5)
HGB BLD-MCNC: 12.5 G/DL (ref 12–16)
HGB UR QL STRIP: NEGATIVE
IMM GRANULOCYTES # BLD AUTO: 0 K/UL (ref 0–0.04)
IMM GRANULOCYTES NFR BLD AUTO: 0 % (ref 0–0.5)
KETONES UR QL STRIP: NEGATIVE
LEUKOCYTE ESTERASE UR QL STRIP: ABNORMAL
LYMPHOCYTES # BLD AUTO: 0.7 K/UL (ref 1–4.8)
LYMPHOCYTES NFR BLD: 24.4 % (ref 18–48)
MCH RBC QN AUTO: 31.4 PG (ref 27–31)
MCHC RBC AUTO-ENTMCNC: 31.6 G/DL (ref 32–36)
MCV RBC AUTO: 100 FL (ref 82–98)
METHADONE UR QL SCN>300 NG/ML: NEGATIVE
MICROSCOPIC COMMENT: ABNORMAL
MONOCYTES # BLD AUTO: 0.3 K/UL (ref 0.3–1)
MONOCYTES NFR BLD: 11.8 % (ref 4–15)
NEUTROPHILS # BLD AUTO: 1.3 K/UL (ref 1.8–7.7)
NEUTROPHILS NFR BLD: 46.4 % (ref 38–73)
NITRITE UR QL STRIP: NEGATIVE
NON-SQ EPI CELLS #/AREA URNS HPF: 1 /HPF
NRBC BLD-RTO: 0 /100 WBC
OPIATES UR QL SCN: NEGATIVE
PCP UR QL SCN>25 NG/ML: NEGATIVE
PH UR STRIP: 6 [PH] (ref 5–8)
PLATELET # BLD AUTO: 157 K/UL (ref 150–450)
PMV BLD AUTO: 10.9 FL (ref 9.2–12.9)
POTASSIUM SERPL-SCNC: 3.1 MMOL/L (ref 3.5–5.1)
PROT SERPL-MCNC: 6.4 G/DL (ref 6–8.4)
PROT UR QL STRIP: NEGATIVE
RBC # BLD AUTO: 3.98 M/UL (ref 4–5.4)
RBC #/AREA URNS HPF: 2 /HPF (ref 0–4)
SODIUM SERPL-SCNC: 137 MMOL/L (ref 136–145)
SP GR UR STRIP: 1.01 (ref 1–1.03)
SQUAMOUS #/AREA URNS HPF: 12 /HPF
TOXICOLOGY INFORMATION: ABNORMAL
URN SPEC COLLECT METH UR: ABNORMAL
UROBILINOGEN UR STRIP-ACNC: NEGATIVE EU/DL
WBC # BLD AUTO: 2.71 K/UL (ref 3.9–12.7)
WBC #/AREA URNS HPF: 36 /HPF (ref 0–5)
YEAST URNS QL MICRO: ABNORMAL

## 2023-11-24 PROCEDURE — 81000 URINALYSIS NONAUTO W/SCOPE: CPT | Mod: 59 | Performed by: STUDENT IN AN ORGANIZED HEALTH CARE EDUCATION/TRAINING PROGRAM

## 2023-11-24 PROCEDURE — 85025 COMPLETE CBC W/AUTO DIFF WBC: CPT | Performed by: PHYSICIAN ASSISTANT

## 2023-11-24 PROCEDURE — 87088 URINE BACTERIA CULTURE: CPT | Performed by: STUDENT IN AN ORGANIZED HEALTH CARE EDUCATION/TRAINING PROGRAM

## 2023-11-24 PROCEDURE — 80307 DRUG TEST PRSMV CHEM ANLYZR: CPT | Performed by: PHYSICIAN ASSISTANT

## 2023-11-24 PROCEDURE — 25000003 PHARM REV CODE 250: Performed by: STUDENT IN AN ORGANIZED HEALTH CARE EDUCATION/TRAINING PROGRAM

## 2023-11-24 PROCEDURE — 63600175 PHARM REV CODE 636 W HCPCS: Performed by: HOSPITALIST

## 2023-11-24 PROCEDURE — 25000003 PHARM REV CODE 250: Performed by: PHYSICIAN ASSISTANT

## 2023-11-24 PROCEDURE — 25000003 PHARM REV CODE 250: Performed by: HOSPITALIST

## 2023-11-24 PROCEDURE — 87086 URINE CULTURE/COLONY COUNT: CPT | Performed by: STUDENT IN AN ORGANIZED HEALTH CARE EDUCATION/TRAINING PROGRAM

## 2023-11-24 PROCEDURE — 36415 COLL VENOUS BLD VENIPUNCTURE: CPT | Performed by: PHYSICIAN ASSISTANT

## 2023-11-24 PROCEDURE — C1751 CATH, INF, PER/CENT/MIDLINE: HCPCS

## 2023-11-24 PROCEDURE — 63600175 PHARM REV CODE 636 W HCPCS: Performed by: STUDENT IN AN ORGANIZED HEALTH CARE EDUCATION/TRAINING PROGRAM

## 2023-11-24 PROCEDURE — G0378 HOSPITAL OBSERVATION PER HR: HCPCS

## 2023-11-24 PROCEDURE — 36410 VNPNXR 3YR/> PHY/QHP DX/THER: CPT

## 2023-11-24 PROCEDURE — 99214 PR OFFICE/OUTPT VISIT, EST, LEVL IV, 30-39 MIN: ICD-10-PCS | Mod: ,,, | Performed by: PODIATRIST

## 2023-11-24 PROCEDURE — 99214 OFFICE O/P EST MOD 30 MIN: CPT | Mod: ,,, | Performed by: PODIATRIST

## 2023-11-24 PROCEDURE — 80053 COMPREHEN METABOLIC PANEL: CPT | Performed by: PHYSICIAN ASSISTANT

## 2023-11-24 PROCEDURE — 25000003 PHARM REV CODE 250: Performed by: NURSE PRACTITIONER

## 2023-11-24 RX ORDER — SODIUM CHLORIDE 0.9 % (FLUSH) 0.9 %
10 SYRINGE (ML) INJECTION
Status: DISCONTINUED | OUTPATIENT
Start: 2023-11-25 | End: 2023-11-29 | Stop reason: HOSPADM

## 2023-11-24 RX ORDER — ESCITALOPRAM OXALATE 10 MG/1
20 TABLET ORAL DAILY
Status: DISCONTINUED | OUTPATIENT
Start: 2023-11-24 | End: 2023-11-29 | Stop reason: HOSPADM

## 2023-11-24 RX ORDER — MICONAZOLE NITRATE 2 %
POWDER (GRAM) TOPICAL 2 TIMES DAILY
Status: DISCONTINUED | OUTPATIENT
Start: 2023-11-24 | End: 2023-11-29 | Stop reason: HOSPADM

## 2023-11-24 RX ORDER — ARIPIPRAZOLE 5 MG/1
10 TABLET ORAL DAILY
Status: DISCONTINUED | OUTPATIENT
Start: 2023-11-24 | End: 2023-11-29 | Stop reason: HOSPADM

## 2023-11-24 RX ORDER — POTASSIUM CHLORIDE 20 MEQ/1
40 TABLET, EXTENDED RELEASE ORAL ONCE
Status: COMPLETED | OUTPATIENT
Start: 2023-11-24 | End: 2023-11-24

## 2023-11-24 RX ORDER — SODIUM CHLORIDE 0.9 % (FLUSH) 0.9 %
10 SYRINGE (ML) INJECTION EVERY 6 HOURS
Status: DISCONTINUED | OUTPATIENT
Start: 2023-11-25 | End: 2023-11-29 | Stop reason: HOSPADM

## 2023-11-24 RX ADMIN — POTASSIUM CHLORIDE 40 MEQ: 1500 TABLET, EXTENDED RELEASE ORAL at 08:11

## 2023-11-24 RX ADMIN — MICONAZOLE NITRATE: 20 POWDER TOPICAL at 05:11

## 2023-11-24 RX ADMIN — ARIPIPRAZOLE 10 MG: 5 TABLET ORAL at 05:11

## 2023-11-24 RX ADMIN — ACETAMINOPHEN 650 MG: 325 TABLET ORAL at 01:11

## 2023-11-24 RX ADMIN — LEVOTHYROXINE SODIUM 25 MCG: 25 TABLET ORAL at 08:11

## 2023-11-24 RX ADMIN — PIPERACILLIN AND TAZOBACTAM 4.5 G: 4; .5 INJECTION, POWDER, LYOPHILIZED, FOR SOLUTION INTRAVENOUS; PARENTERAL at 06:11

## 2023-11-24 RX ADMIN — VANCOMYCIN HYDROCHLORIDE 1250 MG: 1.25 INJECTION, POWDER, LYOPHILIZED, FOR SOLUTION INTRAVENOUS at 11:11

## 2023-11-24 RX ADMIN — ESCITALOPRAM OXALATE 20 MG: 10 TABLET ORAL at 05:11

## 2023-11-24 RX ADMIN — ACETAMINOPHEN 650 MG: 325 TABLET ORAL at 05:11

## 2023-11-24 NOTE — PROGRESS NOTES
"Pharmacokinetic Initial Assessment: IV Vancomycin    Assessment/Plan:    Initiate intravenous vancomycin with loading dose of 1750 mg once followed by a maintenance dose of vancomycin 1250 mg IV every 12 hours  Desired empiric serum trough concentration is 10 to 20 mcg/mL  Draw vancomycin trough level 60 min prior to fourth dose on 11/25/2023 at approximately 0700  Pharmacy will continue to follow and monitor vancomycin.      Please contact pharmacy at extension [phone] with any questions regarding this assessment.     Thank you for the consult,   Osmany De Jesus       Patient brief summary:  Estella Arevalo is a 68 y.o. female initiated on antimicrobial therapy with IV Vancomycin for treatment of suspected skin & soft tissue infection    Drug Allergies:   Review of patient's allergies indicates:   Allergen Reactions    Codeine      nausea       Actual Body Weight:   81.6 kg    Renal Function:   Estimated Creatinine Clearance: 80 mL/min (based on SCr of 0.7 mg/dL).,     Dialysis Method (if applicable):  N/A    CBC (last 72 hours):  Recent Labs   Lab Result Units 11/23/23  1244   WBC K/uL 4.17   Hemoglobin g/dL 13.5   Hematocrit % 41.3   Platelets K/uL 210   Gran % % 60.9   Lymph % % 17.3*   Mono % % 7.0   Eosinophil % % 13.2*   Basophil % % 1.4   Differential Method  Automated       Metabolic Panel (last 72 hours):  Recent Labs   Lab Result Units 11/23/23  1244   Sodium mmol/L 136   Potassium mmol/L 3.2*   Chloride mmol/L 101   CO2 mmol/L 21*   Glucose mg/dL 89   BUN mg/dL 13   Creatinine mg/dL 0.7   Albumin g/dL 3.8   Total Bilirubin mg/dL 1.4*   Alkaline Phosphatase U/L 139*   AST U/L 87*   ALT U/L 34   Magnesium mg/dL 1.8       Drug levels (last 3 results):  No results for input(s): "VANCOMYCINRA", "VANCORANDOM", "VANCOMYCINPE", "VANCOPEAK", "VANCOMYCINTR", "VANCOTROUGH" in the last 72 hours.    Microbiologic Results:  Microbiology Results (last 7 days)       Procedure Component Value Units Date/Time    Blood " culture x two cultures. Draw prior to antibiotics. [[id number]] Collected: 11/23/23 1247    Order Status: Sent Specimen: Blood from Peripheral, Antecubital, Right Updated: 11/23/23 1249    Blood culture x two cultures. Draw prior to antibiotics. [[id number]] Collected: 11/23/23 1244    Order Status: Sent Specimen: Blood from Peripheral, Antecubital, Right Updated: 11/23/23 1248
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
## 2023-11-24 NOTE — PROGRESS NOTES
[facility] Medicine  Progress Note    Patient Name: Estella Arevalo  MRN: [id number]  Patient Class: OP- Observation   Admission Date: 11/23/2023  Length of Stay: 0 days  Attending Physician: Daina Pat MD  Primary Care Provider: Fiordaliza Ma -        Subjective:     Principal Problem:Cellulitis        HPI:  Estella Arevalo 68 y.o. female with alcohol abuse, cocaine abuse, THC, CAD, presents to the hospital with a chief complaint of leg swelling and erythema.  She reports she was seen 2 weeks ago and hospitalized for lower extremity erythema. She was discharged on oral doxycycline which she reports she was compliant.  She states initially there erythema improved while on the oral doxycycline, but after finishing the erythema returned and then returned to her right leg.  She reports she walks barefoot in her home.  She reports she has chronic back pain.  She does report using cocaine 1 time since discharge.  She denies any known trauma to the feet.  She denies fever chest pain shortness of breath nausea vomiting abdominal pain melena hematuria hematemesis dizziness and syncope.     In the ED, afebrile without without leukocytosis sed rate 51 CRP 64.3. of care lactic acid negative chest x-ray without large focal consolidation and interstitial findings accentuated by shallow inspiratory effort.    Overview/Hospital Course:  Admission to hospital for BLE cellulitis. Denied IVDU but admitted to snorting cocaine 3-4 days ago. Podiatry concern for osteomyelitis of left foot. MRI of left forefoot . Continue IV vanc/zosyn.      Interval History: left foot pain    Review of Systems   Constitutional:  Negative for chills and fever.   Eyes:  Negative for photophobia and visual disturbance.   Respiratory:  Negative for shortness of breath and wheezing.    Cardiovascular:  Negative for chest pain, palpitations and leg swelling.   Gastrointestinal:  Negative for abdominal distention, nausea and  vomiting.   Genitourinary:  Negative for dysuria, flank pain and hematuria.   Musculoskeletal:  Negative for gait problem and joint swelling.   Skin:  Positive for color change and rash. Negative for wound.   Neurological:  Negative for seizures and syncope.     Objective:     Vital Signs (Most Recent):  Temp: 97.7 °F (36.5 °C) (11/24/23 1105)  Pulse: 86 (11/24/23 1105)  Resp: 12 (11/24/23 1105)  BP: 108/75 (11/24/23 1105)  SpO2: 95 % (11/24/23 1105) Vital Signs (24h Range):  Temp:  [97.5 °F (36.4 °C)-97.9 °F (36.6 °C)] 97.7 °F (36.5 °C)  Pulse:  [76-86] 86  Resp:  [12-21] 12  SpO2:  [92 %-99 %] 95 %  BP: ()/(53-94) 108/75     Weight: 81.6 kg (180 lb)  Body mass index is 29.95 kg/m².    Intake/Output Summary (Last 24 hours) at 11/24/2023 1112  Last data filed at 11/24/2023 0530  Gross per 24 hour   Intake --   Output 750 ml   Net -750 ml         Physical Exam  Vitals and nursing note reviewed.   Constitutional:       General: She is not in acute distress.     Appearance: She is well-developed. She is not diaphoretic.   Eyes:      General:         Right eye: No discharge.         Left eye: No discharge.      Conjunctiva/sclera: Conjunctivae normal.   Neck:      Thyroid: No thyromegaly.   Cardiovascular:      Rate and Rhythm: Normal rate and regular rhythm.      Heart sounds: No murmur heard.  Pulmonary:      Effort: Pulmonary effort is normal. No respiratory distress.      Breath sounds: Normal breath sounds.   Abdominal:      General: Bowel sounds are normal. There is no distension.      Palpations: Abdomen is soft. There is no mass.      Tenderness: There is no abdominal tenderness.   Musculoskeletal:         General: No deformity.      Cervical back: Normal range of motion and neck supple.   Skin:     General: Skin is warm and dry.      Findings: Lesion (wound to left great toe) present. Erythema: both legs.  Neurological:      Mental Status: She is alert and oriented to person, place, and time.       Sensory: No sensory deficit.   Psychiatric:         Mood and Affect: Mood normal.         Behavior: Behavior normal.                           Significant Labs: All pertinent labs within the past 24 hours have been reviewed.    Significant Imaging: I have reviewed all pertinent imaging results/findings within the past 24 hours.    Assessment/Plan:      * BLE Cellulitis, Left great toe ulcer  Admission to hospital for BLE cellulitis and left great toe ulcer.    Presents with erythema and swelling to left leg and toe which reoccured after finishing PO doxy after last hospitalization. Also developed erythema to right leg.   Afebrile without leukocytosis, Sed rate/CRP elevated.   Denied IVDU but admitted to snorting cocaine 3-4 days ago. Podiatry concern for osteomyelitis of left foot. MRI of left forefoot . Continue IV vanc/zosyn.        Alcohol abuse  Reports continued cessation of alcohol    CAD (coronary artery disease)  Listed on problem list though no previous C to compare. Not currently on medication outpatient. Denies chest pain.     Essential hypertension  With 1 episode hypotension in ED which resolved with IVF denies symptoms.   Not currently on anti-hypertensives outpatient     Chronic, controlled. Latest blood pressure and vitals reviewed-     Temp:  [97.7 °F (36.5 °C)]   Pulse:  [76-86]   Resp:  [18-21]   BP: ()/(53-77)   SpO2:  [95 %-98 %] .   Home meds for hypertension were reviewed and noted below.   Hypertension Medications               cloNIDine (CATAPRES) 0.1 MG tablet Take 1 tablet (0.1 mg total) by mouth once. for 1 dose            While in the hospital, will manage blood pressure as follows; Adjust home antihypertensive regimen as follows- not currently on medication outpatient    Will utilize p.r.n. blood pressure medication only if patient's blood pressure greater than 180/110 and she develops symptoms such as worsening chest pain or shortness of breath.    Bipolar 1 disorder  Reports  not currently on medication outpatient. Affect and mood appropriate    Acquired hypothyroidism  Lab Results   Component Value Date    TSH 1.661 04/20/2022     Continue home synthroid    Polysubstance dependence including opioid type drug, continuous use  Counseled on cessation.  Snort cocaine 3-4 days ago. Denies IVDU  Urine tox positive for benzo and amphetamine  Encourage stop using illicit drugs      VTE Risk Mitigation (From admission, onward)           Ordered     Place MARINA hose  Until discontinued         11/23/23 1450     IP VTE HIGH RISK PATIENT  Once         11/23/23 1450     Place sequential compression device  Until discontinued         11/23/23 1450                    Discharge Planning   SARAH:      Code Status: Full Code   Is the patient medically ready for discharge?:     Reason for patient still in hospital (select all that apply): Treatment                 Petra Loja NP  Department of Hospital Medicine   [facility] - [facility]

## 2023-11-24 NOTE — NURSING
[facility] - [place]  Nurses Note -- 4 Eyes      11/23/2023       Skin assessed on: Q Shift      [] No Pressure Injuries Present    []Prevention Measures Documented    [x] Yes LDA  for Pressure Injury Previously documented BLE cellulitis.     [] Yes New Pressure Injury Discovered   [] LDA for New Pressure Injury Added      Attending RN:  Blaire Espinoza RN     Second RN:  ED RN.

## 2023-11-24 NOTE — NURSING
Pt abx infusing completed. IV site assessed. L arm swollen, skin taunt, arm tender to touch. L arm elevated on pillow. JAYCEE Ventura NP notified. Provider order US of L arm.

## 2023-11-24 NOTE — ASSESSMENT & PLAN NOTE
Admission to hospital for BLE cellulitis and left great toe ulcer.    Presents with erythema and swelling to left leg and toe which reoccured after finishing PO doxy after last hospitalization. Also developed erythema to right leg.   Afebrile without leukocytosis, Sed rate/CRP elevated.   Denied IVDU but admitted to snorting cocaine 3-4 days ago. Podiatry concern for osteomyelitis of left foot. MRI of left forefoot . Continue IV vanc/zosyn.

## 2023-11-24 NOTE — PLAN OF CARE
Problem: Impaired Wound Healing  Goal: Optimal Wound Healing  Outcome: Met  Intervention: Promote Wound Healing  Flowsheets (Taken 11/24/2023 1617)  Oral Nutrition Promotion:   medicated   calorie-dense foods provided  Sleep/Rest Enhancement:   consistent schedule promoted   family presence promoted   noise level reduced   regular sleep/rest pattern promoted  Activity Management:   Ankle pumps - L1   Arm raise - L1   Patient unable to perform activities  Pain Management Interventions:   care clustered   heat applied   position adjusted

## 2023-11-24 NOTE — NURSING
"Patient transferred from the ED to Telemetry Room 314.  Required 4 person assistance with transferring from the gurney to the bed.   Awake, alert and fully oriented. V/S stable. .   C/O 5/10 pain to bilateral lower extremity, especially with touch. Also c/o,"I'm hungry."  Legs are red with swelling from both feet to below the knees.  Patient also has redness to her perineum, but denies itching. .   Patient also has a tremor her head and both hands.     Reviewed orders with patient.   Applied Telemetry [address].  Attempted to infuse Vancomycin, however order  (15:00).   Vee in Pharm will send another bag of Vanc to the floor for infusion.     All safety measures activated.    Will continue to f/u.       "

## 2023-11-24 NOTE — NURSING
Note that patient has returned from imaging. She denies pain to right upper extremity at this time, however states that right FA IV infiltrated after contrast was injected.   Right arm pink and inflamed. Warm compress placed to arm at 12:30 after IV infiltrated during Vanc infusion. Left arm IV infiltrated during night-shift. Left arm now with no swelling and pt denies pain.     Also, patient has urinary frequency, and linen soaked at return from imaging. Patient also c/o itching to perineum. Area red and excoriated.     Informed NP Petra of above for orders.     Will complete wound care and continue to f/u.

## 2023-11-24 NOTE — SUBJECTIVE & OBJECTIVE
Interval History: left foot pain    Review of Systems   Constitutional:  Negative for chills and fever.   Eyes:  Negative for photophobia and visual disturbance.   Respiratory:  Negative for shortness of breath and wheezing.    Cardiovascular:  Negative for chest pain, palpitations and leg swelling.   Gastrointestinal:  Negative for abdominal distention, nausea and vomiting.   Genitourinary:  Negative for dysuria, flank pain and hematuria.   Musculoskeletal:  Negative for gait problem and joint swelling.   Skin:  Positive for color change and rash. Negative for wound.   Neurological:  Negative for seizures and syncope.     Objective:     Vital Signs (Most Recent):  Temp: 97.7 °F (36.5 °C) (11/24/23 1105)  Pulse: 86 (11/24/23 1105)  Resp: 12 (11/24/23 1105)  BP: 108/75 (11/24/23 1105)  SpO2: 95 % (11/24/23 1105) Vital Signs (24h Range):  Temp:  [97.5 °F (36.4 °C)-97.9 °F (36.6 °C)] 97.7 °F (36.5 °C)  Pulse:  [76-86] 86  Resp:  [12-21] 12  SpO2:  [92 %-99 %] 95 %  BP: ()/(53-94) 108/75     Weight: 81.6 kg (180 lb)  Body mass index is 29.95 kg/m².    Intake/Output Summary (Last 24 hours) at 11/24/2023 1112  Last data filed at 11/24/2023 0530  Gross per 24 hour   Intake --   Output 750 ml   Net -750 ml         Physical Exam  Vitals and nursing note reviewed.   Constitutional:       General: She is not in acute distress.     Appearance: She is well-developed. She is not diaphoretic.   Eyes:      General:         Right eye: No discharge.         Left eye: No discharge.      Conjunctiva/sclera: Conjunctivae normal.   Neck:      Thyroid: No thyromegaly.   Cardiovascular:      Rate and Rhythm: Normal rate and regular rhythm.      Heart sounds: No murmur heard.  Pulmonary:      Effort: Pulmonary effort is normal. No respiratory distress.      Breath sounds: Normal breath sounds.   Abdominal:      General: Bowel sounds are normal. There is no distension.      Palpations: Abdomen is soft. There is no mass.       Tenderness: There is no abdominal tenderness.   Musculoskeletal:         General: No deformity.      Cervical back: Normal range of motion and neck supple.   Skin:     General: Skin is warm and dry.      Findings: Lesion (wound to left great toe) present. Erythema: both legs.  Neurological:      Mental Status: She is alert and oriented to person, place, and time.      Sensory: No sensory deficit.   Psychiatric:         Mood and Affect: Mood normal.         Behavior: Behavior normal.                           Significant Labs: All pertinent labs within the past 24 hours have been reviewed.    Significant Imaging: I have reviewed all pertinent imaging results/findings within the past 24 hours.

## 2023-11-24 NOTE — NURSING
Consulted for Midline placement. Pt has had bilateral US guided IV's that were both noted to be infiltrated. Provider notified that there is nothing we can do at this time until swelling improves and pending US is resulted.

## 2023-11-24 NOTE — PLAN OF CARE
Problem: Skin Injury Risk Increased  Goal: Skin Health and Integrity  Outcome: Met  Intervention: Optimize Skin Protection  Flowsheets (Taken 11/23/2023 6429)  Pressure Reduction Techniques: frequent weight shift encouraged  Pressure Reduction Devices: positioning supports utilized  Skin Protection: incontinence pads utilized  Head of Bed (HOB) Positioning: HOB elevated

## 2023-11-24 NOTE — PLAN OF CARE
Problem: Violence Risk or Actual  Goal: Anger and Impulse Control  Outcome: Ongoing, Progressing     Problem: Adult Inpatient Plan of Care  Goal: Plan of Care Review  Outcome: Ongoing, Progressing     Problem: Impaired Wound Healing  Goal: Optimal Wound Healing  Outcome: Ongoing, Progressing

## 2023-11-24 NOTE — PLAN OF CARE
Patient to discharge and follow-up with Nina Anderson NP at [facility] in 3 days. Email to be sent to [facility] to assist with scheduling follow-up at discharge

## 2023-11-24 NOTE — PROGRESS NOTES
Vancomycin consult follow-up:    Patient reviewed, renal function stable, no new levels, continue current therapy; Next levels due: trough due 11/25/2023 at 0700

## 2023-11-24 NOTE — ASSESSMENT & PLAN NOTE
Counseled on cessation.  Snort cocaine 3-4 days ago. Denies IVDU  Urine tox positive for benzo and amphetamine  Encourage stop using illicit drugs

## 2023-11-24 NOTE — HOSPITAL COURSE
Admission to hospital for BLE cellulitis found to have OM left 1st distal phalanx tuff. Denied IVDU but admitted to snorting cocaine 3-4 days PTA. MRI of left forefoot concern for OM of 1st distal phalanx tuff with overlying cellulitis. ID recommended 6 weeks of Ancef. Follow up Podiatry further rec.  PT/OT evaluation completed. Patient agreed to SNF. TN/SW consult.

## 2023-11-24 NOTE — NURSING
[facility]  Nurses Note -- 4 Eyes      11/23/2023       Skin assessed on: Q Shift      [x] No Pressure Injuries Present    [x]Prevention Measures Documented  Blanchable redness to sacrum, Cellulitis to BLE   [] Yes LDA  for Pressure Injury Previously documented     [] Yes New Pressure Injury Discovered   [] LDA for New Pressure Injury Added      Attending RN:  Marga Real RN     Second RN:  MARISSA Rudd

## 2023-11-25 PROBLEM — R53.81 PHYSICAL DECONDITIONING: Status: ACTIVE | Noted: 2023-11-25

## 2023-11-25 PROBLEM — A49.01 OSTEOMYELITIS DUE TO STAPHYLOCOCCUS AUREUS: Status: ACTIVE | Noted: 2023-11-25

## 2023-11-25 PROBLEM — M86.9 OSTEOMYELITIS DUE TO STAPHYLOCOCCUS AUREUS: Status: ACTIVE | Noted: 2023-11-25

## 2023-11-25 LAB
ALBUMIN SERPL BCP-MCNC: 3.1 G/DL (ref 3.5–5.2)
ALP SERPL-CCNC: 121 U/L (ref 55–135)
ALT SERPL W/O P-5'-P-CCNC: 37 U/L (ref 10–44)
ANION GAP SERPL CALC-SCNC: 11 MMOL/L (ref 8–16)
AST SERPL-CCNC: 63 U/L (ref 10–40)
BASOPHILS # BLD AUTO: 0.03 K/UL (ref 0–0.2)
BASOPHILS NFR BLD: 0.8 % (ref 0–1.9)
BILIRUB SERPL-MCNC: 0.5 MG/DL (ref 0.1–1)
BUN SERPL-MCNC: 6 MG/DL (ref 8–23)
C DIFF GDH STL QL: NEGATIVE
C DIFF TOX A+B STL QL IA: NEGATIVE
CALCIUM SERPL-MCNC: 8.5 MG/DL (ref 8.7–10.5)
CHLORIDE SERPL-SCNC: 109 MMOL/L (ref 95–110)
CO2 SERPL-SCNC: 21 MMOL/L (ref 23–29)
CREAT SERPL-MCNC: 0.7 MG/DL (ref 0.5–1.4)
DIFFERENTIAL METHOD: ABNORMAL
EOSINOPHIL # BLD AUTO: 0.5 K/UL (ref 0–0.5)
EOSINOPHIL NFR BLD: 13.5 % (ref 0–8)
ERYTHROCYTE [DISTWIDTH] IN BLOOD BY AUTOMATED COUNT: 13.1 % (ref 11.5–14.5)
EST. GFR  (NO RACE VARIABLE): >60 ML/MIN/1.73 M^2
GLUCOSE SERPL-MCNC: 96 MG/DL (ref 70–110)
HCT VFR BLD AUTO: 36 % (ref 37–48.5)
HGB BLD-MCNC: 11.8 G/DL (ref 12–16)
IMM GRANULOCYTES # BLD AUTO: 0.01 K/UL (ref 0–0.04)
IMM GRANULOCYTES NFR BLD AUTO: 0.3 % (ref 0–0.5)
LYMPHOCYTES # BLD AUTO: 0.7 K/UL (ref 1–4.8)
LYMPHOCYTES NFR BLD: 19.1 % (ref 18–48)
MCH RBC QN AUTO: 31.7 PG (ref 27–31)
MCHC RBC AUTO-ENTMCNC: 32.8 G/DL (ref 32–36)
MCV RBC AUTO: 97 FL (ref 82–98)
MONOCYTES # BLD AUTO: 0.4 K/UL (ref 0.3–1)
MONOCYTES NFR BLD: 9.7 % (ref 4–15)
NEUTROPHILS # BLD AUTO: 2.1 K/UL (ref 1.8–7.7)
NEUTROPHILS NFR BLD: 56.6 % (ref 38–73)
NRBC BLD-RTO: 0 /100 WBC
PLATELET # BLD AUTO: 228 K/UL (ref 150–450)
PMV BLD AUTO: 10.6 FL (ref 9.2–12.9)
POTASSIUM SERPL-SCNC: 3.2 MMOL/L (ref 3.5–5.1)
PROT SERPL-MCNC: 6.4 G/DL (ref 6–8.4)
RBC # BLD AUTO: 3.72 M/UL (ref 4–5.4)
SODIUM SERPL-SCNC: 141 MMOL/L (ref 136–145)
VANCOMYCIN TROUGH SERPL-MCNC: 11.5 UG/ML (ref 10–22)
WBC # BLD AUTO: 3.71 K/UL (ref 3.9–12.7)

## 2023-11-25 PROCEDURE — 63600175 PHARM REV CODE 636 W HCPCS: Performed by: NURSE PRACTITIONER

## 2023-11-25 PROCEDURE — 97165 OT EVAL LOW COMPLEX 30 MIN: CPT

## 2023-11-25 PROCEDURE — 97535 SELF CARE MNGMENT TRAINING: CPT

## 2023-11-25 PROCEDURE — 99223 1ST HOSP IP/OBS HIGH 75: CPT | Mod: ,,, | Performed by: INTERNAL MEDICINE

## 2023-11-25 PROCEDURE — 97161 PT EVAL LOW COMPLEX 20 MIN: CPT

## 2023-11-25 PROCEDURE — 87449 NOS EACH ORGANISM AG IA: CPT | Performed by: HOSPITALIST

## 2023-11-25 PROCEDURE — 99223 PR INITIAL HOSPITAL CARE,LEVL III: ICD-10-PCS | Mod: ,,, | Performed by: INTERNAL MEDICINE

## 2023-11-25 PROCEDURE — 36415 COLL VENOUS BLD VENIPUNCTURE: CPT | Performed by: NURSE PRACTITIONER

## 2023-11-25 PROCEDURE — 85025 COMPLETE CBC W/AUTO DIFF WBC: CPT | Performed by: NURSE PRACTITIONER

## 2023-11-25 PROCEDURE — 80202 ASSAY OF VANCOMYCIN: CPT | Performed by: HOSPITALIST

## 2023-11-25 PROCEDURE — 80053 COMPREHEN METABOLIC PANEL: CPT | Performed by: NURSE PRACTITIONER

## 2023-11-25 PROCEDURE — 63600175 PHARM REV CODE 636 W HCPCS: Performed by: INTERNAL MEDICINE

## 2023-11-25 PROCEDURE — 25000003 PHARM REV CODE 250: Performed by: HOSPITALIST

## 2023-11-25 PROCEDURE — 25000003 PHARM REV CODE 250: Performed by: NURSE PRACTITIONER

## 2023-11-25 PROCEDURE — 21400001 HC TELEMETRY ROOM

## 2023-11-25 PROCEDURE — A4216 STERILE WATER/SALINE, 10 ML: HCPCS | Performed by: HOSPITALIST

## 2023-11-25 PROCEDURE — 63600175 PHARM REV CODE 636 W HCPCS: Performed by: HOSPITALIST

## 2023-11-25 PROCEDURE — 25000003 PHARM REV CODE 250: Performed by: PHYSICIAN ASSISTANT

## 2023-11-25 RX ORDER — POTASSIUM CHLORIDE 20 MEQ/1
40 TABLET, EXTENDED RELEASE ORAL ONCE
Status: COMPLETED | OUTPATIENT
Start: 2023-11-25 | End: 2023-11-25

## 2023-11-25 RX ORDER — ENOXAPARIN SODIUM 100 MG/ML
40 INJECTION SUBCUTANEOUS EVERY 24 HOURS
Status: DISCONTINUED | OUTPATIENT
Start: 2023-11-25 | End: 2023-11-29 | Stop reason: HOSPADM

## 2023-11-25 RX ORDER — CEFAZOLIN SODIUM 2 G/50ML
2 SOLUTION INTRAVENOUS
Status: DISCONTINUED | OUTPATIENT
Start: 2023-11-25 | End: 2023-11-29 | Stop reason: HOSPADM

## 2023-11-25 RX ADMIN — ESCITALOPRAM OXALATE 20 MG: 10 TABLET ORAL at 08:11

## 2023-11-25 RX ADMIN — LEVOTHYROXINE SODIUM 25 MCG: 25 TABLET ORAL at 08:11

## 2023-11-25 RX ADMIN — MICONAZOLE NITRATE: 20 POWDER TOPICAL at 01:11

## 2023-11-25 RX ADMIN — Medication 10 ML: at 05:11

## 2023-11-25 RX ADMIN — CEFAZOLIN SODIUM 2 G: 2 SOLUTION INTRAVENOUS at 11:11

## 2023-11-25 RX ADMIN — ENOXAPARIN SODIUM 40 MG: 40 INJECTION SUBCUTANEOUS at 05:11

## 2023-11-25 RX ADMIN — ACETAMINOPHEN 650 MG: 325 TABLET ORAL at 02:11

## 2023-11-25 RX ADMIN — POTASSIUM CHLORIDE 40 MEQ: 1500 TABLET, EXTENDED RELEASE ORAL at 08:11

## 2023-11-25 RX ADMIN — MICONAZOLE NITRATE: 20 POWDER TOPICAL at 08:11

## 2023-11-25 RX ADMIN — ARIPIPRAZOLE 10 MG: 5 TABLET ORAL at 08:11

## 2023-11-25 RX ADMIN — CEFAZOLIN SODIUM 2 G: 2 SOLUTION INTRAVENOUS at 08:11

## 2023-11-25 RX ADMIN — Medication 10 ML: at 12:11

## 2023-11-25 RX ADMIN — VANCOMYCIN HYDROCHLORIDE 1250 MG: 1.25 INJECTION, POWDER, LYOPHILIZED, FOR SOLUTION INTRAVENOUS at 12:11

## 2023-11-25 NOTE — PLAN OF CARE
Problem: Violence Risk or Actual  Goal: Anger and Impulse Control  Outcome: Met  Intervention: Minimize Safety Risk  Flowsheets (Taken 11/25/2023 1747)  Sensory Stimulation Regulation: auditory stimulation minimized  Enhanced Safety Measures:   bed alarm set   room near unit station     Problem: Infection  Goal: Absence of Infection Signs and Symptoms  Outcome: Met  Intervention: Prevent or Manage Infection  Flowsheets (Taken 11/25/2023 1747)  Fever Reduction/Comfort Measures:   lightweight bedding   lightweight clothing  Infection Management: cultures obtained and sent to lab  Isolation Precautions: precautions maintained

## 2023-11-25 NOTE — ASSESSMENT & PLAN NOTE
See above #1  MRI forefoot osteomyelitis of the 1st distal phalanx tuft with overlying cellulitis.   Right great toe ulcer sp I&D at bedside by podiatry scabbing over   Recent culture MSSA  ID recommended 6 weeks Ancef  Midline placement yesterday- will ask picc team

## 2023-11-25 NOTE — PLAN OF CARE
Problem: Infection  Goal: Absence of Infection Signs and Symptoms  Outcome: Ongoing, Progressing     Problem: Fall Injury Risk  Goal: Absence of Fall and Fall-Related Injury  Outcome: Ongoing, Progressing     Problem: Adult Inpatient Plan of Care  Goal: Plan of Care Review  Flowsheets (Taken 11/25/2023 8620)  Plan of Care Reviewed With: patient

## 2023-11-25 NOTE — NURSING
ID Consult:  Dr. Lechuga contacted about Right great toe osteomyelitis.   He will see the patient.

## 2023-11-25 NOTE — ASSESSMENT & PLAN NOTE
67 yo woman with recent cellulitis of the foot due to toe ulceration. Worsened at home on po doxycyline. Admitted with cellulitis. MRI c/w possible osteomyelitis of the distal tuft. Prior culture grew MSSA. Received Zosyn and vancomycin initially, now only on vancomycin.    Recommendations:  Begin Ancef x 6 weeks  Stop vancomycin  May benefit from improved elevation of feet  May need LTAC due to PSA

## 2023-11-25 NOTE — HPI
"68 y.o. woman admitted with cellulitis. She was recently with cellulitis is completing abx (doxycyline, eoc: 11/26. The cellulitis has now spread to her right foot and leg. Both legs are very red and painful. She was started on vancomycin and an MRI revealed possible osteomyelitis. ID is consulted for that.    11/13/2023: Toe culture -> MSSA    MRI: possible "osteomyelitis of the 1st distal phalanx tuft with overlying cellulitis."                    "

## 2023-11-25 NOTE — PLAN OF CARE
Problem: Physical Therapy  Goal: Physical Therapy Goal  Description: Goals to be met by: 23     Patient will increase functional independence with mobility by performin. Pt to be (S) with bed mobility.  2. Pt to transfer with SBA.  3. Pt to ambulate 15' w/RW CGA, (B) CAM boots.  4. Pt to be (I) with written HEP.    Outcome: Ongoing, Progressing   Initial eval completed, see in chart for details.

## 2023-11-25 NOTE — PROGRESS NOTES
[facility] Medicine  Progress Note    Patient Name: Estella Arevalo  MRN: [id number]  Patient Class: IP- Inpatient   Admission Date: 11/23/2023  Length of Stay: 0 days  Attending Physician: Daina Pat MD  Primary Care Provider: Fiordaliza Ma -        Subjective:     Principal Problem:Cellulitis        HPI:  Estella Arevalo 68 y.o. female with alcohol abuse, cocaine abuse, THC, CAD, presents to the hospital with a chief complaint of leg swelling and erythema.  She reports she was seen 2 weeks ago and hospitalized for lower extremity erythema. She was discharged on oral doxycycline which she reports she was compliant.  She states initially there erythema improved while on the oral doxycycline, but after finishing the erythema returned and then returned to her right leg.  She reports she walks barefoot in her home.  She reports she has chronic back pain.  She does report using cocaine 1 time since discharge.  She denies any known trauma to the feet.  She denies fever chest pain shortness of breath nausea vomiting abdominal pain melena hematuria hematemesis dizziness and syncope.     In the ED, afebrile without without leukocytosis sed rate 51 CRP 64.3. of care lactic acid negative chest x-ray without large focal consolidation and interstitial findings accentuated by shallow inspiratory effort.    Overview/Hospital Course:  Admission to hospital for BLE cellulitis found to have OM left 1st distal phalanx tuff. Denied IVDU but admitted to snorting cocaine 3-4 days ago. MRI of left forefoot concern for OM of 1st distal phalanx tuff with overlying cellulitis. ID recommended 6 weeks of Ancef. Follow up Podiatry further recs.     Interval History: BUE IV infiltration site feels better today. BLE edema and redness also improving.     Review of Systems   Constitutional:  Negative for chills and fever.   Eyes:  Negative for photophobia and visual disturbance.   Respiratory:  Negative for  shortness of breath and wheezing.    Cardiovascular:  Negative for chest pain, palpitations and leg swelling.   Gastrointestinal:  Negative for abdominal distention, nausea and vomiting.   Genitourinary:  Negative for dysuria, flank pain and hematuria.   Musculoskeletal:  Negative for gait problem and joint swelling.   Skin:  Positive for color change and rash. Negative for wound.        BUE swelling    Neurological:  Negative for seizures and syncope.     Objective:     Vital Signs (Most Recent):  Temp: 98 °F (36.7 °C) (11/25/23 1106)  Pulse: 85 (11/25/23 1106)  Resp: 19 (11/25/23 1106)  BP: 123/72 (11/25/23 1106)  SpO2: (!) 93 % (11/25/23 1106) Vital Signs (24h Range):  Temp:  [97.3 °F (36.3 °C)-98.3 °F (36.8 °C)] 98 °F (36.7 °C)  Pulse:  [80-89] 85  Resp:  [12-19] 19  SpO2:  [93 %-97 %] 93 %  BP: (109-138)/(67-77) 123/72     Weight: 92.6 kg (204 lb 2.3 oz)  Body mass index is 33.97 kg/m².    Intake/Output Summary (Last 24 hours) at 11/25/2023 1221  Last data filed at 11/25/2023 1206  Gross per 24 hour   Intake --   Output 852 ml   Net -852 ml         Physical Exam  Vitals and nursing note reviewed.   Constitutional:       General: She is not in acute distress.     Appearance: She is well-developed. She is not diaphoretic.   Eyes:      General:         Right eye: No discharge.         Left eye: No discharge.      Conjunctiva/sclera: Conjunctivae normal.   Neck:      Thyroid: No thyromegaly.   Cardiovascular:      Rate and Rhythm: Normal rate and regular rhythm.      Heart sounds: No murmur heard.  Pulmonary:      Effort: Pulmonary effort is normal. No respiratory distress.      Breath sounds: Normal breath sounds.   Abdominal:      General: Bowel sounds are normal. There is no distension.      Palpations: Abdomen is soft. There is no mass.      Tenderness: There is no abdominal tenderness.   Musculoskeletal:         General: No deformity.      Cervical back: Normal range of motion and neck supple.      Right lower  leg: Edema (improving) present.      Left lower leg: Left lower leg edema: improving.   Skin:     General: Skin is warm and dry.      Findings: Erythema (both legs improving) and lesion (wound to left great toe I&D by podiatry now scabbed over) present.   Neurological:      Mental Status: She is alert and oriented to person, place, and time.      Sensory: No sensory deficit.   Psychiatric:         Mood and Affect: Mood normal.         Behavior: Behavior normal.             Significant Labs: All pertinent labs within the past 24 hours have been reviewed.    Significant Imaging: I have reviewed all pertinent imaging results/findings within the past 24 hours.    Assessment/Plan:      * BLE Cellulitis, Left great toe ulcer  Admission to hospital for BLE cellulitis and left great toe ulcer.    Presents with erythema and swelling to left leg and toe which reoccured after finishing PO doxy after last hospitalization. Also developed erythema to right leg.   Afebrile without leukocytosis, Sed rate/CRP elevated.   Denied IVDU but admitted to snorting cocaine 3-4 days ago  MRI of left forefoot concern for OM of 1st distal phalanx tuff with overlying cellulitis  ID recommended 6 weeks of Ancef. Follow up Podiatry further recs.       Osteomyelitis due to Staphylococcus aureus  See above #1  MRI forefoot osteomyelitis of the 1st distal phalanx tuft with overlying cellulitis.   Right great toe ulcer sp I&D at bedside by podiatry scabbing over   Recent culture MSSA  ID recommended 6 weeks Ancef  Midline placement yesterday- will ask picc team     Physical deconditioning  PT/OT recommends mod intensity  SW/TN will assist      Alcohol abuse  Reports continued cessation of alcohol    CAD (coronary artery disease)  Listed on problem list though no previous LHC to compare. Not currently on medication outpatient. Denies chest pain.     Essential hypertension  With 1 episode hypotension in ED which resolved with IVF denies symptoms.   Not  currently on anti-hypertensives outpatient     Chronic, controlled. Latest blood pressure and vitals reviewed-     Temp:  [97.7 °F (36.5 °C)]   Pulse:  [76-86]   Resp:  [18-21]   BP: ()/(53-77)   SpO2:  [95 %-98 %] .   Home meds for hypertension were reviewed and noted below.   Hypertension Medications               cloNIDine (CATAPRES) 0.1 MG tablet Take 1 tablet (0.1 mg total) by mouth once. for 1 dose            While in the hospital, will manage blood pressure as follows; Adjust home antihypertensive regimen as follows- not currently on medication outpatient    Will utilize p.r.n. blood pressure medication only if patient's blood pressure greater than 180/110 and she develops symptoms such as worsening chest pain or shortness of breath.    Bipolar 1 disorder  Reports not currently on medication outpatient. Affect and mood appropriate  Continue lexapro and abilify    Acquired hypothyroidism  Lab Results   Component Value Date    TSH 1.661 04/20/2022     Continue home synthroid    Polysubstance dependence including opioid type drug, continuous use  Counseled on cessation.  Snort cocaine 3-4 days ago. Denies IVDU  Urine tox positive for benzo and amphetamine  Encourage stop using illicit drugs      VTE Risk Mitigation (From admission, onward)           Ordered     enoxaparin injection 40 mg  Every 24 hours         11/25/23 1506     Place MARINA hose  Until discontinued         11/23/23 1450     IP VTE HIGH RISK PATIENT  Once         11/23/23 1450     Place sequential compression device  Until discontinued         11/23/23 1450                    Discharge Planning   SARAH:      Code Status: Full Code   Is the patient medically ready for discharge?:     Reason for patient still in hospital (select all that apply): Treatment                     Petra Loja NP  Department of Hospital Medicine   [facility]

## 2023-11-25 NOTE — NURSING
[facility]  Nurses Note -- 4 Eyes      11/25/2023       Skin assessed on: Q Shift      [] No Pressure Injuries Present    []Prevention Measures Documented    [x] Yes LDA  for Pressure Injury Previously documented     [] Yes New Pressure Injury Discovered   [] LDA for New Pressure Injury Added      Attending RN:  Blaire Espinoza RN     Second RN:  MARISSA Garza
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
## 2023-11-25 NOTE — ASSESSMENT & PLAN NOTE
Reports not currently on medication outpatient. Affect and mood appropriate  Continue lexapro and abilify
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
## 2023-11-25 NOTE — SUBJECTIVE & OBJECTIVE
Interval History: BUE IV infiltration site feels better today. BLE edema and redness also improving.     Review of Systems   Constitutional:  Negative for chills and fever.   Eyes:  Negative for photophobia and visual disturbance.   Respiratory:  Negative for shortness of breath and wheezing.    Cardiovascular:  Negative for chest pain, palpitations and leg swelling.   Gastrointestinal:  Negative for abdominal distention, nausea and vomiting.   Genitourinary:  Negative for dysuria, flank pain and hematuria.   Musculoskeletal:  Negative for gait problem and joint swelling.   Skin:  Positive for color change and rash. Negative for wound.        BUE swelling    Neurological:  Negative for seizures and syncope.     Objective:     Vital Signs (Most Recent):  Temp: 98 °F (36.7 °C) (11/25/23 1106)  Pulse: 85 (11/25/23 1106)  Resp: 19 (11/25/23 1106)  BP: 123/72 (11/25/23 1106)  SpO2: (!) 93 % (11/25/23 1106) Vital Signs (24h Range):  Temp:  [97.3 °F (36.3 °C)-98.3 °F (36.8 °C)] 98 °F (36.7 °C)  Pulse:  [80-89] 85  Resp:  [12-19] 19  SpO2:  [93 %-97 %] 93 %  BP: (109-138)/(67-77) 123/72     Weight: 92.6 kg (204 lb 2.3 oz)  Body mass index is 33.97 kg/m².    Intake/Output Summary (Last 24 hours) at 11/25/2023 1221  Last data filed at 11/25/2023 1206  Gross per 24 hour   Intake --   Output 852 ml   Net -852 ml         Physical Exam  Vitals and nursing note reviewed.   Constitutional:       General: She is not in acute distress.     Appearance: She is well-developed. She is not diaphoretic.   Eyes:      General:         Right eye: No discharge.         Left eye: No discharge.      Conjunctiva/sclera: Conjunctivae normal.   Neck:      Thyroid: No thyromegaly.   Cardiovascular:      Rate and Rhythm: Normal rate and regular rhythm.      Heart sounds: No murmur heard.  Pulmonary:      Effort: Pulmonary effort is normal. No respiratory distress.      Breath sounds: Normal breath sounds.   Abdominal:      General: Bowel sounds are  normal. There is no distension.      Palpations: Abdomen is soft. There is no mass.      Tenderness: There is no abdominal tenderness.   Musculoskeletal:         General: No deformity.      Cervical back: Normal range of motion and neck supple.      Right lower leg: Edema (improving) present.      Left lower leg: Left lower leg edema: improving.   Skin:     General: Skin is warm and dry.      Findings: Erythema (both legs improving) and lesion (wound to left great toe I&D by podiatry now scabbed over) present.   Neurological:      Mental Status: She is alert and oriented to person, place, and time.      Sensory: No sensory deficit.   Psychiatric:         Mood and Affect: Mood normal.         Behavior: Behavior normal.             Significant Labs: All pertinent labs within the past 24 hours have been reviewed.    Significant Imaging: I have reviewed all pertinent imaging results/findings within the past 24 hours.
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
## 2023-11-25 NOTE — ASSESSMENT & PLAN NOTE
Admission to hospital for BLE cellulitis and left great toe ulcer.    Presents with erythema and swelling to left leg and toe which reoccured after finishing PO doxy after last hospitalization. Also developed erythema to right leg.   Afebrile without leukocytosis, Sed rate/CRP elevated.   Denied IVDU but admitted to snorting cocaine 3-4 days ago  MRI of left forefoot concern for OM of 1st distal phalanx tuff with overlying cellulitis  ID recommended 6 weeks of Ancef. Follow up Podiatry further recs.

## 2023-11-25 NOTE — PLAN OF CARE
Problem: Occupational Therapy  Goal: Occupational Therapy Goal  Description: Goals to be met by: 12/9/23     Patient will increase functional independence with ADLs by performing:    LE Dressing with Supervision.  Grooming while seated with Modified [place].  Toileting from bedside commode with Supervision for hygiene and clothing management.   Step transfer with Supervision  Sit to stand transfer with Supervision.   Toilet transfer to bedside commode with Supervision.  Upper extremity exercise program x15 reps per handout, with independence.    Outcome: Ongoing, Progressing

## 2023-11-25 NOTE — NURSING
[facility], [facility]  Nurses Note -- 4 Eyes      11/24/2023       Skin assessed on: Q Shift      [x] No Pressure Injuries Present    [x]Prevention Measures Documented  Sacral rendess    [] Yes LDA  for Pressure Injury Previously documented     [] Yes New Pressure Injury Discovered   [] LDA for New Pressure Injury Added      Attending RN:  Marga Real RN     Second RN:  MARISSA Rudd

## 2023-11-26 LAB
BACTERIA UR CULT: ABNORMAL
CRP SERPL-MCNC: 16.5 MG/L (ref 0–8.2)
ERYTHROCYTE [SEDIMENTATION RATE] IN BLOOD BY WESTERGREN METHOD: 30 MM/HR (ref 0–20)
GRAM STN SPEC: NORMAL
GRAM STN SPEC: NORMAL

## 2023-11-26 PROCEDURE — 25000003 PHARM REV CODE 250: Performed by: NURSE PRACTITIONER

## 2023-11-26 PROCEDURE — 87070 CULTURE OTHR SPECIMN AEROBIC: CPT

## 2023-11-26 PROCEDURE — 99233 SBSQ HOSP IP/OBS HIGH 50: CPT | Mod: ,,, | Performed by: INTERNAL MEDICINE

## 2023-11-26 PROCEDURE — 25000003 PHARM REV CODE 250: Performed by: PHYSICIAN ASSISTANT

## 2023-11-26 PROCEDURE — 87075 CULTR BACTERIA EXCEPT BLOOD: CPT

## 2023-11-26 PROCEDURE — A4216 STERILE WATER/SALINE, 10 ML: HCPCS | Performed by: HOSPITALIST

## 2023-11-26 PROCEDURE — 87205 SMEAR GRAM STAIN: CPT

## 2023-11-26 PROCEDURE — 99233 PR SUBSEQUENT HOSPITAL CARE,LEVL III: ICD-10-PCS | Mod: ,,, | Performed by: PODIATRIST

## 2023-11-26 PROCEDURE — 86140 C-REACTIVE PROTEIN: CPT | Performed by: NURSE PRACTITIONER

## 2023-11-26 PROCEDURE — 63600175 PHARM REV CODE 636 W HCPCS: Performed by: NURSE PRACTITIONER

## 2023-11-26 PROCEDURE — 99233 PR SUBSEQUENT HOSPITAL CARE,LEVL III: ICD-10-PCS | Mod: ,,, | Performed by: INTERNAL MEDICINE

## 2023-11-26 PROCEDURE — 21400001 HC TELEMETRY ROOM

## 2023-11-26 PROCEDURE — 99233 SBSQ HOSP IP/OBS HIGH 50: CPT | Mod: ,,, | Performed by: PODIATRIST

## 2023-11-26 PROCEDURE — 87118 MYCOBACTERIC IDENTIFICATION: CPT

## 2023-11-26 PROCEDURE — 87206 SMEAR FLUORESCENT/ACID STAI: CPT

## 2023-11-26 PROCEDURE — 85652 RBC SED RATE AUTOMATED: CPT | Performed by: NURSE PRACTITIONER

## 2023-11-26 PROCEDURE — 87102 FUNGUS ISOLATION CULTURE: CPT

## 2023-11-26 PROCEDURE — 87116 MYCOBACTERIA CULTURE: CPT

## 2023-11-26 PROCEDURE — 63600175 PHARM REV CODE 636 W HCPCS: Performed by: INTERNAL MEDICINE

## 2023-11-26 PROCEDURE — 25000003 PHARM REV CODE 250: Performed by: HOSPITALIST

## 2023-11-26 RX ORDER — PANTOPRAZOLE SODIUM 40 MG/1
40 TABLET, DELAYED RELEASE ORAL DAILY
Status: DISCONTINUED | OUTPATIENT
Start: 2023-11-26 | End: 2023-11-29 | Stop reason: HOSPADM

## 2023-11-26 RX ORDER — ACETAMINOPHEN 325 MG/1
650 TABLET ORAL EVERY 4 HOURS PRN
Status: DISCONTINUED | OUTPATIENT
Start: 2023-11-26 | End: 2023-11-29 | Stop reason: HOSPADM

## 2023-11-26 RX ADMIN — Medication 10 ML: at 05:11

## 2023-11-26 RX ADMIN — ESCITALOPRAM OXALATE 20 MG: 10 TABLET ORAL at 08:11

## 2023-11-26 RX ADMIN — THERA TABS 1 TABLET: TAB at 08:11

## 2023-11-26 RX ADMIN — MICONAZOLE NITRATE: 20 POWDER TOPICAL at 08:11

## 2023-11-26 RX ADMIN — CEFAZOLIN SODIUM 2 G: 2 SOLUTION INTRAVENOUS at 08:11

## 2023-11-26 RX ADMIN — Medication 10 ML: at 07:11

## 2023-11-26 RX ADMIN — ACETAMINOPHEN 650 MG: 325 TABLET ORAL at 04:11

## 2023-11-26 RX ADMIN — Medication 10 ML: at 12:11

## 2023-11-26 RX ADMIN — CEFAZOLIN SODIUM 2 G: 2 SOLUTION INTRAVENOUS at 11:11

## 2023-11-26 RX ADMIN — MICONAZOLE NITRATE: 20 POWDER TOPICAL at 09:11

## 2023-11-26 RX ADMIN — ENOXAPARIN SODIUM 40 MG: 40 INJECTION SUBCUTANEOUS at 05:11

## 2023-11-26 RX ADMIN — CEFAZOLIN SODIUM 2 G: 2 SOLUTION INTRAVENOUS at 03:11

## 2023-11-26 RX ADMIN — LEVOTHYROXINE SODIUM 25 MCG: 25 TABLET ORAL at 08:11

## 2023-11-26 RX ADMIN — Medication 10 ML: at 01:11

## 2023-11-26 RX ADMIN — PANTOPRAZOLE SODIUM 40 MG: 40 TABLET, DELAYED RELEASE ORAL at 12:11

## 2023-11-26 RX ADMIN — ACETAMINOPHEN 650 MG: 325 TABLET ORAL at 06:11

## 2023-11-26 RX ADMIN — ARIPIPRAZOLE 10 MG: 5 TABLET ORAL at 08:11

## 2023-11-26 NOTE — NURSING
[facility], [facility] [place]  Nurses Note -- 4 Eyes      11/26/2023       Skin assessed on: Q Shift      [] No Pressure Injuries Present    []Prevention Measures Documented    [x] Yes LDA  for Pressure Injury Previously documented     [] Yes New Pressure Injury Discovered   [] LDA for New Pressure Injury Added      Attending RN:  Blaire Espinoza RN     Second RN:  MARISSA Loya

## 2023-11-26 NOTE — SUBJECTIVE & OBJECTIVE
Subjective:     Interval History:  Patient seen and evaluated by Podiatry bedside; no adverse events overnight.  MRI results reviewed with patient, repeat culture taken of left distal hallux. Denies nausea, chills, fevers.         Scheduled Meds:   ARIPiprazole  10 mg Oral Daily    ceFAZolin (ANCEF) IVPB  2 g Intravenous Q8H    enoxparin  40 mg Subcutaneous Q24H (prophylaxis, 1700)    EScitalopram oxalate  20 mg Oral Daily    levothyroxine  25 mcg Oral Daily    miconazole NITRATE 2 %   Topical (Top) BID    multivitamin  1 tablet Oral Daily    sodium chloride 0.9%  10 mL Intravenous Q6H     Continuous Infusions:  PRN Meds:acetaminophen, glucagon (human recombinant), glucose, glucose, magnesium oxide, melatonin, naloxone, senna-docusate 8.6-50 mg, sodium chloride 0.9%, Flushing PICC/Midline Protocol **AND** sodium chloride 0.9% **AND** sodium chloride 0.9%    Review of Systems   Constitutional:  Negative for chills and fever.   Respiratory:  Negative for shortness of breath.    Gastrointestinal:  Negative for nausea and vomiting.   Skin:  Positive for wound.   All other systems reviewed and are negative.    Objective:     Vital Signs (Most Recent):  Temp: 98.4 °F (36.9 °C) (11/26/23 0800)  Pulse: 73 (11/26/23 0800)  Resp: 18 (11/26/23 0800)  BP: 137/80 (11/26/23 0800)  SpO2: (!) 93 % (11/26/23 0800) Vital Signs (24h Range):  Temp:  [98 °F (36.7 °C)-98.7 °F (37.1 °C)] 98.4 °F (36.9 °C)  Pulse:  [70-85] 73  Resp:  [18-20] 18  SpO2:  [93 %-96 %] 93 %  BP: (106-158)/(64-80) 137/80     Weight: 92.6 kg (204 lb 2.3 oz)  Body mass index is 33.97 kg/m².    Foot Exam    General  Orientation: alert and oriented to person, place, and time   Affect: appropriate       Right Foot/Ankle     Inspection and Palpation  Tenderness: none   Skin Exam: skin intact;     Neurovascular  Dorsalis pedis: 2+  Posterior tibial: 2+  Saphenous nerve sensation: diminished  Tibial nerve sensation: diminished  Superficial peroneal nerve sensation:  diminished  Deep peroneal nerve sensation: diminished  Sural nerve sensation: diminished    Comments  Mild swelling noted throughout right lower extremity.    Left Foot/Ankle      Inspection and Palpation  Swelling: dorsum   Skin Exam: cellulitis and ulcer; no drainage     Neurovascular  Dorsalis pedis: 2+  Posterior tibial: 2+  Saphenous nerve sensation: diminished  Tibial nerve sensation: diminished  Superficial peroneal nerve sensation: diminished  Deep peroneal nerve sensation: diminished  Sural nerve sensation: diminished    Comments  Chronic ulcer at the distal tuft of the left hallux, 0.5 cm x 0.5 cm and circular in shape.  Appearing stable with granular wound bed.  Macerated periwound.  No drainage to note.      Laboratory:  BMP:   Recent Labs   Lab 11/23/23  1244 11/24/23  0415 11/25/23  0637   GLU 89   < > 96      < > 141   K 3.2*   < > 3.2*      < > 109   CO2 21*   < > 21*   BUN 13   < > 6*   CREATININE 0.7   < > 0.7   CALCIUM 9.4   < > 8.5*   MG 1.8  --   --     < > = values in this interval not displayed.     CBC:   Recent Labs   Lab 11/25/23  0637   WBC 3.71*   RBC 3.72*   HGB 11.8*   HCT 36.0*      MCV 97   MCH 31.7*   MCHC 32.8     CRP:   Recent Labs   Lab 11/26/23  0501   CRP 16.5*     ESR:   Recent Labs   Lab 11/26/23  0501   SEDRATE 30*     Microbiology Results (last 7 days)       Procedure Component Value Units Date/Time    Gram stain [[id number]] Collected: 11/26/23 0933    Order Status: Completed Specimen: Wound from Toe, Left Foot Updated: 11/26/23 1012     Gram Stain Result Few WBC's      No organisms seen    Narrative:      Distal left hallux    AFB Culture & Smear [[id number]] Collected: 11/26/23 0933    Order Status: Sent Specimen: Wound from Toe, Left Foot Updated: 11/26/23 0938    Aerobic culture [[id number]] Collected: 11/26/23 0933    Order Status: Sent Specimen: Wound from Toe, Left Foot Updated: 11/26/23 0938    Fungus culture [[id number]] Collected: 11/26/23 0933     Order Status: Sent Specimen: Wound from Toe, Left Foot Updated: 11/26/23 0938    Culture, Anaerobe [[id number]] Collected: 11/26/23 0933    Order Status: Sent Specimen: Wound from Toe, Left Foot Updated: 11/26/23 0937    Urine culture [[id number]]  (Abnormal) Collected: 11/24/23 0733    Order Status: Completed Specimen: Urine Updated: 11/26/23 0537     Urine Culture, Routine ELLEN ALBICANS  50,000 - 99,999 cfu/ml  Treatment of asymptomatic candiduria is not recommended (except for   specific populations). Candida isolated in the urine typically   represents colonization. If an indwelling urinary catheter is present  it should be removed or replaced.      Narrative:      Specimen Source->Urine    Blood culture x two cultures. Draw prior to antibiotics. [[id number]] Collected: 11/23/23 1247    Order Status: Completed Specimen: Blood from Peripheral, Antecubital, Right Updated: 11/25/23 1503     Blood Culture, Routine No Growth to date      No Growth to date      No Growth to date    Narrative:      Aerobic and anaerobic    Blood culture x two cultures. Draw prior to antibiotics. [[id number]] Collected: 11/23/23 1244    Order Status: Completed Specimen: Blood from Peripheral, Antecubital, Right Updated: 11/25/23 1503     Blood Culture, Routine No Growth to date      No Growth to date      No Growth to date    Narrative:      Aerobic and anaerobic    Clostridium difficile EIA [[id number]] Collected: 11/25/23 0213    Order Status: Completed Specimen: Stool Updated: 11/25/23 1057     C. diff Antigen Negative     C difficile Toxins A+B, EIA Negative     Comment: Testing not recommended for children <24 months old.               Diagnostic Results:  MRI: I have reviewed all pertinent results/findings within the past 24 hours.  1. Examination degraded by patient motion artifact.  Findings concerning for osteomyelitis of the 1st distal phalanx tuft with overlying cellulitis.     Clinical Findings:  Left  right

## 2023-11-26 NOTE — PROGRESS NOTES
[facility] Medicine  Progress Note    Patient Name: Estella Arevalo  MRN: [id number]  Patient Class: IP- Inpatient   Admission Date: 11/23/2023  Length of Stay: 1 days  Attending Physician: Han Morris MD  Primary Care Provider: Fiordaliza Ma -        Subjective:     Principal Problem:Cellulitis        HPI:  Estella Arevalo 68 y.o. female with alcohol abuse, cocaine abuse, THC, CAD, presents to the hospital with a chief complaint of leg swelling and erythema.  She reports she was seen 2 weeks ago and hospitalized for lower extremity erythema. She was discharged on oral doxycycline which she reports she was compliant.  She states initially there erythema improved while on the oral doxycycline, but after finishing the erythema returned and then returned to her right leg.  She reports she walks barefoot in her home.  She reports she has chronic back pain.  She does report using cocaine 1 time since discharge.  She denies any known trauma to the feet.  She denies fever chest pain shortness of breath nausea vomiting abdominal pain melena hematuria hematemesis dizziness and syncope.     In the ED, afebrile without without leukocytosis sed rate 51 CRP 64.3. of care lactic acid negative chest x-ray without large focal consolidation and interstitial findings accentuated by shallow inspiratory effort.    Overview/Hospital Course:  Admission to hospital for BLE cellulitis found to have OM left 1st distal phalanx tuff. Denied IVDU but admitted to snorting cocaine 3-4 days PTA. MRI of left forefoot concern for OM of 1st distal phalanx tuff with overlying cellulitis. ID recommended 6 weeks of Ancef. Follow up Podiatry further rec.  PT/OT evaluation completed. Patient agreed to SNF. TN/SW consult.     Interval History: Overall feeling well. Spoke about  passed in October. Wants to get better to go home.     Review of Systems   Constitutional:  Negative for chills and fever.   Eyes:  Negative for  photophobia and visual disturbance.   Respiratory:  Negative for shortness of breath and wheezing.    Cardiovascular:  Negative for chest pain, palpitations and leg swelling.   Gastrointestinal:  Negative for abdominal distention, nausea and vomiting.   Genitourinary:  Negative for dysuria, flank pain and hematuria.   Musculoskeletal:  Negative for gait problem and joint swelling.   Skin:  Positive for color change and rash. Negative for wound.        BUE swelling improving.    Neurological:  Negative for seizures and syncope.     Objective:     Vital Signs (Most Recent):  Temp: 98.1 °F (36.7 °C) (11/26/23 0323)  Pulse: 70 (11/26/23 0323)  Resp: 18 (11/25/23 2341)  BP: (!) 158/65 (11/26/23 0323)  SpO2: 96 % (11/26/23 0323) Vital Signs (24h Range):  Temp:  [97.6 °F (36.4 °C)-98.7 °F (37.1 °C)] 98.1 °F (36.7 °C)  Pulse:  [70-85] 70  Resp:  [18-20] 18  SpO2:  [93 %-96 %] 96 %  BP: (106-158)/(64-73) 158/65     Weight: 92.6 kg (204 lb 2.3 oz)  Body mass index is 33.97 kg/m².    Intake/Output Summary (Last 24 hours) at 11/26/2023 0727  Last data filed at 11/26/2023 0446  Gross per 24 hour   Intake --   Output 1950 ml   Net -1950 ml         Physical Exam  Vitals and nursing note reviewed.   Constitutional:       General: She is not in acute distress.     Appearance: She is well-developed. She is not diaphoretic.   Eyes:      General:         Right eye: No discharge.         Left eye: No discharge.      Conjunctiva/sclera: Conjunctivae normal.   Neck:      Thyroid: No thyromegaly.   Cardiovascular:      Rate and Rhythm: Normal rate and regular rhythm.      Heart sounds: No murmur heard.  Pulmonary:      Effort: Pulmonary effort is normal. No respiratory distress.      Breath sounds: Normal breath sounds.   Abdominal:      General: Bowel sounds are normal. There is no distension.      Palpations: Abdomen is soft. There is no mass.      Tenderness: There is no abdominal tenderness.   Musculoskeletal:         General: No  deformity.      Cervical back: Normal range of motion and neck supple.      Right lower leg: Edema (trace) present.      Left lower leg: Edema (trace) present.   Skin:     General: Skin is warm and dry.      Findings: Erythema (both legs improving) and lesion (wound to left great toe I&D by podiatry now scabbed over) present.      Comments: BLE erythema significantly improved   BUE swelling also improved  LUE midline   Neurological:      Mental Status: She is alert and oriented to person, place, and time.      Sensory: No sensory deficit.   Psychiatric:         Mood and Affect: Mood normal.         Behavior: Behavior normal.             Significant Labs: All pertinent labs within the past 24 hours have been reviewed.    Significant Imaging: I have reviewed all pertinent imaging results/findings within the past 24 hours.    Assessment/Plan:      * BLE Cellulitis, Left great toe ulcer  Admission to hospital for BLE cellulitis and left great toe ulcer    Presents with erythema and swelling to left leg and toe which reoccured after finishing PO doxy after last hospitalization. Also developed erythema to right leg.   Afebrile without leukocytosis, Sed rate/CRP elevated.   Denied IVDU but admitted to snorting cocaine 3-4 days ago  MRI of left forefoot concern for OM of 1st distal phalanx tuff with overlying cellulitis -see below #2  BLE erythema and edema significantly improved  ID recommended 6 weeks of Ancef for right foot OM    Osteomyelitis due to Staphylococcus aureus left  great toe  See above #1  MRI forefoot osteomyelitis of the 1st distal phalanx tuft with overlying cellulitis.   Left  great toe ulcer sp I&D at bedside by podiatry scabbing over   Recent 11/13 left great toe culture MSSA   ID recommended 6 weeks Ancef  Podiatry aware- follow up further recs  Midline placement 11/24- picc team consult for PICC    Physical deconditioning  PT/OT recommends mod intensity. Patient agreed to SNF  SW/TN consult for  SNFP      Alcohol abuse  Have not used alcohol since alcohol rehab early this November  Reports continued cessation of alcohol    CAD (coronary artery disease)  Listed on problem list though no previous C to compare. Not currently on medication outpatient. Denies chest pain.     Essential hypertension  With 1 episode hypotension in ED which resolved with IVF denies symptoms.   Not currently on anti-hypertensives outpatient     Chronic, controlled. Latest blood pressure and vitals reviewed-     Temp:  [97.7 °F (36.5 °C)]   Pulse:  [76-86]   Resp:  [18-21]   BP: ()/(53-77)   SpO2:  [95 %-98 %] .   Home meds for hypertension were reviewed and noted below.   Hypertension Medications               cloNIDine (CATAPRES) 0.1 MG tablet Take 1 tablet (0.1 mg total) by mouth once. for 1 dose            While in the hospital, will manage blood pressure as follows; Adjust home antihypertensive regimen as follows- not currently on medication outpatient    Will utilize p.r.n. blood pressure medication only if patient's blood pressure greater than 180/110 and she develops symptoms such as worsening chest pain or shortness of breath.    Bipolar 1 disorder  Reports not currently on medication outpatient. Affect and mood appropriate  Continue lexapro and abilify    Acquired hypothyroidism  Lab Results   Component Value Date    TSH 1.661 04/20/2022     Continue home synthroid    Polysubstance dependence including opioid type drug, continuous use  Counseled on cessation.  Snort cocaine 3-4 days ago. Denies IVDU  Urine tox positive for benzo and amphetamine  Encourage stop using illicit drugs      VTE Risk Mitigation (From admission, onward)           Ordered     enoxaparin injection 40 mg  Every 24 hours         11/25/23 1506     Place MARINA hose  Until discontinued         11/23/23 1450     IP VTE HIGH RISK PATIENT  Once         11/23/23 1450     Place sequential compression device  Until discontinued         11/23/23 1450                     Discharge Planning   SARAH:      Code Status: Full Code   Is the patient medically ready for discharge?:     Reason for patient still in hospital (select all that apply): Treatment             Petra Loja NP  Department of [facility] Medicine   [facility]

## 2023-11-26 NOTE — ASSESSMENT & PLAN NOTE
- MRI results reviewed with patient, findings concerning for osteomyelitis in the 1st distal phalanx of the left foot with overlying cellulitis.  However it is to note motion artifacts in addition to surrounding inflammation and reactive periostitis may be differential.  Overall short-term plan is to continue with wound care and antibiotic regimen  - patient was expressing that with her 's recent death amongst other life situations, she is not amenable for SNF as she has things to take care of.   - attempted to obtain Jamshidi needle kit for bone biopsy; however, none was available in the supply rooms in the hospital floor.  Attempt was made to obtain from the OR, however no staff was on duty on Sunday and Jamshidi kit was not found in the OR supply.  Plan to re-attempt for bone biopsy later in the week.   - ulcer at the distal aspect of the left hallux was carefully incised via 15 blade with point incision with patient's verbal consent, and repeat culture was taken.  Incision was carried out without incidence, and minimal drainage (sanguinous) was expressed.  Probing to bone.   - ID consulted and following, recommended to continue Ancef for 6 weeks, on IV vancomycin. Appreciate ID recs.   - offloading boots in room, recommending continue to wear.  - dressings today by Podiatry:  Mepilex Ag around left hallux, secured with Kerlix, Ace with moderate compression.  Wound care orders placed.    Short-term goals include maintaining good offloading and minimizing bioburden, promoting granulation and epithelialization to healing.  Long-term goals include keeping the wound healed by good offloading and medical management under the direction of internist.

## 2023-11-26 NOTE — SUBJECTIVE & OBJECTIVE
Interval History: Doing better. Keeping feet elevated more. No fever or chills.    Review of Systems   Constitutional:  Negative for chills and fever.   Skin:  Positive for wound.   All other systems reviewed and are negative.    Objective:     Vital Signs (Most Recent):  Temp: 98.4 °F (36.9 °C) (11/26/23 0800)  Pulse: 73 (11/26/23 0800)  Resp: 18 (11/26/23 0800)  BP: 137/80 (11/26/23 0800)  SpO2: (!) 93 % (11/26/23 0800) Vital Signs (24h Range):  Temp:  [98 °F (36.7 °C)-98.7 °F (37.1 °C)] 98.4 °F (36.9 °C)  Pulse:  [70-85] 73  Resp:  [18-20] 18  SpO2:  [93 %-96 %] 93 %  BP: (106-158)/(64-80) 137/80     Weight: 92.6 kg (204 lb 2.3 oz)  Body mass index is 33.97 kg/m².    Estimated Creatinine Clearance: 85.3 mL/min (based on SCr of 0.7 mg/dL).     Physical Exam  Vitals and nursing note reviewed.   Constitutional:       General: She is not in acute distress.     Appearance: Normal appearance. She is not toxic-appearing.   HENT:      Head: Normocephalic and atraumatic.   Musculoskeletal:      Comments: Toe dressed   Skin:     Findings: Erythema present. No rash.   Neurological:      General: No focal deficit present.      Mental Status: She is alert and oriented to person, place, and time.   Psychiatric:         Mood and Affect: Mood normal.         Behavior: Behavior normal.         Thought Content: Thought content normal.         Judgment: Judgment normal.          Significant Labs: Blood Culture:   Recent Labs   Lab 11/12/23  1719 11/12/23  1731 11/23/23  1244 11/23/23  1247   LABBLOO No Growth after 4 days. No Growth after 4 days. No Growth to date  No Growth to date  No Growth to date No Growth to date  No Growth to date  No Growth to date     CBC:   Recent Labs   Lab 11/25/23  0637   WBC 3.71*   HGB 11.8*   HCT 36.0*        Wound Culture:   Recent Labs   Lab 11/13/23  1356   LABAERO STAPHYLOCOCCUS AUREUS  Few  *       Significant Imaging: I have reviewed all pertinent imaging results/findings within the  past 24 hours.
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
## 2023-11-26 NOTE — ASSESSMENT & PLAN NOTE
Admission to hospital for BLE cellulitis and left great toe ulcer    Presents with erythema and swelling to left leg and toe which reoccured after finishing PO doxy after last hospitalization. Also developed erythema to right leg.   Afebrile without leukocytosis, Sed rate/CRP elevated.   Denied IVDU but admitted to snorting cocaine 3-4 days ago  MRI of left forefoot concern for OM of 1st distal phalanx tuff with overlying cellulitis -see below #2  BLE erythema and edema significantly improved  ID recommended 6 weeks of Ancef for right foot OM

## 2023-11-26 NOTE — ASSESSMENT & PLAN NOTE
Have not used alcohol since alcohol rehab early this November  Reports continued cessation of alcohol

## 2023-11-26 NOTE — PROGRESS NOTES
[facility] - Telemetry  Infectious Disease  Progress Note    Patient Name: Estella Arevalo  MRN: [id number]  Admission Date: 11/23/2023  Length of Stay: 1 days  Attending Physician: Han Morris MD  Primary Care Provider: Fiordaliza Ma -    Isolation Status: No active isolations  Assessment/Plan:        Osteomyelitis due to Staphylococcus aureus left  great toe    69 yo woman with recent cellulitis of the foot due to toe ulceration. Worsened at home on po doxycyline. Admitted with cellulitis. MRI c/w possible osteomyelitis of the distal tuft. Prior culture grew MSSA. Received Zosyn and vancomycin initially, now only on vancomycin.    Recommendations:  Continue Ancef x 6 weeks  Post-acute plans noted  If she is discharged home, see OPAT note below      Thanks,  Jeremy Lechuga MD    _____________________________________________________________________    Outpatient Antibiotic Therapy Plan:    Please send referral to [facility].    1) Infection: MSSA toe osteomyelitis    2) Discharge Antibiotics:    Intravenous antibiotics:  Ancef 2 grams IV q 8 hours     3) Therapy Duration:  4-6 weeks    Estimated end date of IV antibiotics: 01/05/2024    4) Outpatient Weekly Labs:    Order the following labs to be drawn on Mondays:   CBC  CMP   CRP    5) Fax Lab Results to Infectious Diseases Provider: Alexandrea    [facility] Fax Number: [phone]    6) Outpatient Infectious Diseases Follow-up    Follow-up appointment will be arranged by the ID clinic and will be found in the patient's appointments tab.    Prior to discharge, please ensure the patient's follow-up has been scheduled.    If there is still no follow-up scheduled prior to discharge, please send an EPIC message to Zonia Acuña in Infectious Diseases.       I will sign off. Please contact us if you have any additional questions.    Jeremy Lechuga MD  Infectious Disease      Subjective:     Principal  "Problem:Cellulitis    HPI: 68 y.o. woman admitted with cellulitis. She was recently with cellulitis is completing abx (doxycyline, eoc: 11/26. The cellulitis has now spread to her right foot and leg. Both legs are very red and painful. She was started on vancomycin and an MRI revealed possible osteomyelitis. ID is consulted for that.    11/13/2023: Toe culture -> MSSA    MRI: possible "osteomyelitis of the 1st distal phalanx tuft with overlying cellulitis."                    Interval History: Doing better. Keeping feet elevated more. No fever or chills.    Review of Systems   Constitutional:  Negative for chills and fever.   Skin:  Positive for wound.   All other systems reviewed and are negative.    Objective:     Vital Signs (Most Recent):  Temp: 98.4 °F (36.9 °C) (11/26/23 0800)  Pulse: 73 (11/26/23 0800)  Resp: 18 (11/26/23 0800)  BP: 137/80 (11/26/23 0800)  SpO2: (!) 93 % (11/26/23 0800) Vital Signs (24h Range):  Temp:  [98 °F (36.7 °C)-98.7 °F (37.1 °C)] 98.4 °F (36.9 °C)  Pulse:  [70-85] 73  Resp:  [18-20] 18  SpO2:  [93 %-96 %] 93 %  BP: (106-158)/(64-80) 137/80     Weight: 92.6 kg (204 lb 2.3 oz)  Body mass index is 33.97 kg/m².    Estimated Creatinine Clearance: 85.3 mL/min (based on SCr of 0.7 mg/dL).     Physical Exam  Vitals and nursing note reviewed.   Constitutional:       General: She is not in acute distress.     Appearance: Normal appearance. She is not toxic-appearing.   HENT:      Head: Normocephalic and atraumatic.   Musculoskeletal:      Comments: Toe dressed   Skin:     Findings: Erythema present. No rash.   Neurological:      General: No focal deficit present.      Mental Status: She is alert and oriented to person, place, and time.   Psychiatric:         Mood and Affect: Mood normal.         Behavior: Behavior normal.         Thought Content: Thought content normal.         Judgment: Judgment normal.          Significant Labs: Blood Culture:   Recent Labs   Lab 11/12/23  9046 11/12/23  1731 " 11/23/23  1244 11/23/23  1247   LABBLOO No Growth after 4 days. No Growth after 4 days. No Growth to date  No Growth to date  No Growth to date No Growth to date  No Growth to date  No Growth to date     CBC:   Recent Labs   Lab 11/25/23  0637   WBC 3.71*   HGB 11.8*   HCT 36.0*        Wound Culture:   Recent Labs   Lab 11/13/23  1356   LABAERO STAPHYLOCOCCUS AUREUS  Few  *       Significant Imaging: I have reviewed all pertinent imaging results/findings within the past 24 hours.

## 2023-11-26 NOTE — PROGRESS NOTES
[facility] [place] - Telemetry  Podiatry  Progress Note    Patient Name: Estella Arevalo  MRN: [id number]  Admission Date: 11/23/2023  Hospital Length of Stay: 1 days  Attending Physician: Han Morris MD  Primary Care Provider: Fiordaliza Ma -     Subjective:     Interval History:  Patient seen and evaluated by Podiatry bedside; no adverse events overnight.  MRI results reviewed with patient, repeat culture taken of left distal hallux. Denies nausea, chills, fevers.         Scheduled Meds:   ARIPiprazole  10 mg Oral Daily    ceFAZolin (ANCEF) IVPB  2 g Intravenous Q8H    enoxparin  40 mg Subcutaneous Q24H (prophylaxis, 1700)    EScitalopram oxalate  20 mg Oral Daily    levothyroxine  25 mcg Oral Daily    miconazole NITRATE 2 %   Topical (Top) BID    multivitamin  1 tablet Oral Daily    sodium chloride 0.9%  10 mL Intravenous Q6H     Continuous Infusions:  PRN Meds:acetaminophen, glucagon (human recombinant), glucose, glucose, magnesium oxide, melatonin, naloxone, senna-docusate 8.6-50 mg, sodium chloride 0.9%, Flushing PICC/Midline Protocol **AND** sodium chloride 0.9% **AND** sodium chloride 0.9%    Review of Systems   Constitutional:  Negative for chills and fever.   Respiratory:  Negative for shortness of breath.    Gastrointestinal:  Negative for nausea and vomiting.   Skin:  Positive for wound.   All other systems reviewed and are negative.    Objective:     Vital Signs (Most Recent):  Temp: 98.4 °F (36.9 °C) (11/26/23 0800)  Pulse: 73 (11/26/23 0800)  Resp: 18 (11/26/23 0800)  BP: 137/80 (11/26/23 0800)  SpO2: (!) 93 % (11/26/23 0800) Vital Signs (24h Range):  Temp:  [98 °F (36.7 °C)-98.7 °F (37.1 °C)] 98.4 °F (36.9 °C)  Pulse:  [70-85] 73  Resp:  [18-20] 18  SpO2:  [93 %-96 %] 93 %  BP: (106-158)/(64-80) 137/80     Weight: 92.6 kg (204 lb 2.3 oz)  Body mass index is 33.97 kg/m².    Foot Exam    General  Orientation: alert and oriented to person, place, and time   Affect: appropriate       Right Foot/Ankle      Inspection and Palpation  Tenderness: none   Skin Exam: skin intact;     Neurovascular  Dorsalis pedis: 2+  Posterior tibial: 2+  Saphenous nerve sensation: diminished  Tibial nerve sensation: diminished  Superficial peroneal nerve sensation: diminished  Deep peroneal nerve sensation: diminished  Sural nerve sensation: diminished    Comments  Mild swelling noted throughout right lower extremity.    Left Foot/Ankle      Inspection and Palpation  Swelling: dorsum   Skin Exam: cellulitis and ulcer; no drainage     Neurovascular  Dorsalis pedis: 2+  Posterior tibial: 2+  Saphenous nerve sensation: diminished  Tibial nerve sensation: diminished  Superficial peroneal nerve sensation: diminished  Deep peroneal nerve sensation: diminished  Sural nerve sensation: diminished    Comments  Chronic ulcer at the distal tuft of the left hallux, 0.5 cm x 0.5 cm and circular in shape.  Appearing stable with granular wound bed.  Macerated periwound.  No drainage to note.      Laboratory:  BMP:   Recent Labs   Lab 11/23/23  1244 11/24/23  0415 11/25/23  0637   GLU 89   < > 96      < > 141   K 3.2*   < > 3.2*      < > 109   CO2 21*   < > 21*   BUN 13   < > 6*   CREATININE 0.7   < > 0.7   CALCIUM 9.4   < > 8.5*   MG 1.8  --   --     < > = values in this interval not displayed.     CBC:   Recent Labs   Lab 11/25/23  0637   WBC 3.71*   RBC 3.72*   HGB 11.8*   HCT 36.0*      MCV 97   MCH 31.7*   MCHC 32.8     CRP:   Recent Labs   Lab 11/26/23  0501   CRP 16.5*     ESR:   Recent Labs   Lab 11/26/23  0501   SEDRATE 30*     Microbiology Results (last 7 days)       Procedure Component Value Units Date/Time    Gram stain [[id number]] Collected: 11/26/23 0933    Order Status: Completed Specimen: Wound from Toe, Left Foot Updated: 11/26/23 1012     Gram Stain Result Few WBC's      No organisms seen    Narrative:      Distal left hallux    AFB Culture & Smear [[id number]] Collected: 11/26/23 0933    Order Status: Sent  Specimen: Wound from Toe, Left Foot Updated: 11/26/23 0938    Aerobic culture [[id number]] Collected: 11/26/23 0933    Order Status: Sent Specimen: Wound from Toe, Left Foot Updated: 11/26/23 0938    Fungus culture [[id number]] Collected: 11/26/23 0933    Order Status: Sent Specimen: Wound from Toe, Left Foot Updated: 11/26/23 0938    Culture, Anaerobe [[id number]] Collected: 11/26/23 0933    Order Status: Sent Specimen: Wound from Toe, Left Foot Updated: 11/26/23 0937    Urine culture [[id number]]  (Abnormal) Collected: 11/24/23 0733    Order Status: Completed Specimen: Urine Updated: 11/26/23 0537     Urine Culture, Routine ELLEN ALBICANS  50,000 - 99,999 cfu/ml  Treatment of asymptomatic candiduria is not recommended (except for   specific populations). Candida isolated in the urine typically   represents colonization. If an indwelling urinary catheter is present  it should be removed or replaced.      Narrative:      Specimen Source->Urine    Blood culture x two cultures. Draw prior to antibiotics. [[id number]] Collected: 11/23/23 1247    Order Status: Completed Specimen: Blood from Peripheral, Antecubital, Right Updated: 11/25/23 1503     Blood Culture, Routine No Growth to date      No Growth to date      No Growth to date    Narrative:      Aerobic and anaerobic    Blood culture x two cultures. Draw prior to antibiotics. [[id number]] Collected: 11/23/23 1244    Order Status: Completed Specimen: Blood from Peripheral, Antecubital, Right Updated: 11/25/23 1503     Blood Culture, Routine No Growth to date      No Growth to date      No Growth to date    Narrative:      Aerobic and anaerobic    Clostridium difficile EIA [[id number]] Collected: 11/25/23 0213    Order Status: Completed Specimen: Stool Updated: 11/25/23 1057     C. diff Antigen Negative     C difficile Toxins A+B, EIA Negative     Comment: Testing not recommended for children <24 months old.               Diagnostic Results:  MRI: I have reviewed  all pertinent results/findings within the past 24 hours.  1. Examination degraded by patient motion artifact.  Findings concerning for osteomyelitis of the 1st distal phalanx tuft with overlying cellulitis.     Clinical Findings:  Left  right          Assessment/Plan:     ID  Osteomyelitis due to Staphylococcus aureus left  great toe  - MRI results reviewed with patient, findings concerning for osteomyelitis in the 1st distal phalanx of the left foot with overlying cellulitis.  However it is to note motion artifacts in addition to surrounding inflammation and reactive periostitis may be differential.  Overall short-term plan is to continue with wound care and antibiotic regimen  - patient was expressing that with her 's recent death amongst other life situations, she is not amenable for SNF as she has things to take care of.   - attempted to obtain Jamshidi needle kit for bone biopsy; however, none was available in the supply rooms in the hospital floor.  Attempt was made to obtain from the OR, however no staff was on duty on Sunday and Jamshidi kit was not found in the OR supply.  Plan to re-attempt for bone biopsy later in the week.   - ulcer at the distal aspect of the left hallux was carefully incised via 15 blade with point incision with patient's verbal consent, and repeat culture was taken.  Incision was carried out without incidence, and minimal drainage (sanguinous) was expressed.  Probing to bone.   - ID consulted and following, recommended to continue Ancef for 6 weeks, on IV vancomycin. Appreciate ID recs.   - offloading boots in room, recommending continue to wear.  - dressings today by Podiatry:  Mepilex Ag around left hallux, secured with Kerlix, Ace with moderate compression.  Wound care orders placed.    Short-term goals include maintaining good offloading and minimizing bioburden, promoting granulation and epithelialization to healing.  Long-term goals include keeping the wound healed by  good offloading and medical management under the direction of internist.            Miguel Ashley MD  Podiatry  [facility] - Telemetry

## 2023-11-26 NOTE — SUBJECTIVE & OBJECTIVE
Interval History: Overall feeling well. Spoke about  passed in October. Wants to get better to go home.     Review of Systems   Constitutional:  Negative for chills and fever.   Eyes:  Negative for photophobia and visual disturbance.   Respiratory:  Negative for shortness of breath and wheezing.    Cardiovascular:  Negative for chest pain, palpitations and leg swelling.   Gastrointestinal:  Negative for abdominal distention, nausea and vomiting.   Genitourinary:  Negative for dysuria, flank pain and hematuria.   Musculoskeletal:  Negative for gait problem and joint swelling.   Skin:  Positive for color change and rash. Negative for wound.        BUE swelling improving.    Neurological:  Negative for seizures and syncope.     Objective:     Vital Signs (Most Recent):  Temp: 98.1 °F (36.7 °C) (11/26/23 0323)  Pulse: 70 (11/26/23 0323)  Resp: 18 (11/25/23 2341)  BP: (!) 158/65 (11/26/23 0323)  SpO2: 96 % (11/26/23 0323) Vital Signs (24h Range):  Temp:  [97.6 °F (36.4 °C)-98.7 °F (37.1 °C)] 98.1 °F (36.7 °C)  Pulse:  [70-85] 70  Resp:  [18-20] 18  SpO2:  [93 %-96 %] 96 %  BP: (106-158)/(64-73) 158/65     Weight: 92.6 kg (204 lb 2.3 oz)  Body mass index is 33.97 kg/m².    Intake/Output Summary (Last 24 hours) at 11/26/2023 0727  Last data filed at 11/26/2023 0446  Gross per 24 hour   Intake --   Output 1950 ml   Net -1950 ml         Physical Exam  Vitals and nursing note reviewed.   Constitutional:       General: She is not in acute distress.     Appearance: She is well-developed. She is not diaphoretic.   Eyes:      General:         Right eye: No discharge.         Left eye: No discharge.      Conjunctiva/sclera: Conjunctivae normal.   Neck:      Thyroid: No thyromegaly.   Cardiovascular:      Rate and Rhythm: Normal rate and regular rhythm.      Heart sounds: No murmur heard.  Pulmonary:      Effort: Pulmonary effort is normal. No respiratory distress.      Breath sounds: Normal breath sounds.   Abdominal:       General: Bowel sounds are normal. There is no distension.      Palpations: Abdomen is soft. There is no mass.      Tenderness: There is no abdominal tenderness.   Musculoskeletal:         General: No deformity.      Cervical back: Normal range of motion and neck supple.      Right lower leg: Edema (trace) present.      Left lower leg: Edema (trace) present.   Skin:     General: Skin is warm and dry.      Findings: Erythema (both legs improving) and lesion (wound to left great toe I&D by podiatry now scabbed over) present.      Comments: BLE erythema significantly improved   BUE swelling also improved  LUE midline   Neurological:      Mental Status: She is alert and oriented to person, place, and time.      Sensory: No sensory deficit.   Psychiatric:         Mood and Affect: Mood normal.         Behavior: Behavior normal.             Significant Labs: All pertinent labs within the past 24 hours have been reviewed.    Significant Imaging: I have reviewed all pertinent imaging results/findings within the past 24 hours.

## 2023-11-26 NOTE — CONSULTS
[facility] Medicine  Telemedicine Consult Note    Patient Name: Estella Arevalo  MRN: [id number]  Admission Date: 11/23/2023  Hospital Length of Stay: 1 days  Attending Physician: Han Morris MD   Primary Care Provider: Fiordaliza Ma -       Thank you for your consult to [facility]. We have reviewed the patient chart. This patient does meet criteria for [facility] service at this time.  Will assume care on 11/27/23 at 6AM.          Lizzie Benavidez MD  Department of Hospital Medicine   [facility]

## 2023-11-26 NOTE — PLAN OF CARE
Case Management Assessment     PCP: Fiordaliza Ma -    Pharmacy:   [id number] - [place] - [address] [facility] [address]  [address] [address]  Phone: [phone] Fax: [phone]       Patient Arrived From: home  Existing Help at Home: children    Barriers to Discharge: TBD      Discharge Plan:    A. SNF    B. Home with family         11/26/23 1051   Discharge Assessment   Assessment Type Discharge Planning Brief Assessment   Confirmed/corrected address, phone number and insurance Yes   Source of Information other (see comments)   People in Home child(carol), adult;grandchild(carol)   Do you expect to return to your current living situation? Yes   Do you have help at home or someone to help you manage your care at home? Yes   Who are your caregiver(s) and their phone number(s)? Jignesh Modi  [phone]   Prior to hospitilization cognitive status: Alert/Oriented   Current cognitive status: Alert/Oriented   Home Accessibility wheelchair accessible   Home Layout Able to live on 1st floor   Equipment Currently Used at Home wheelchair;grab bar;shower chair   Readmission within 30 days? No   Patient currently being followed by outpatient case management? No   Do you currently have service(s) that help you manage your care at home? No   Do you take prescription medications? Yes   Do you have prescription coverage? Yes   Coverage PHN   Do you have any problems affording any of your prescribed medications? No   Is the patient taking medications as prescribed? yes   How do you get to doctors appointments? family or friend will provide   DME Needed Upon Discharge  other (see comments)  (TBD)   Transition of Care Barriers None   Discharge Plan A Skilled Nursing Facility   Discharge Plan B Home with family;Home Health

## 2023-11-26 NOTE — ASSESSMENT & PLAN NOTE
See above #1  MRI forefoot osteomyelitis of the 1st distal phalanx tuft with overlying cellulitis.   Left  great toe ulcer sp I&D at bedside by podiatry scabbing over   Recent 11/13 left great toe culture MSSA   ID recommended 6 weeks Ancef  Podiatry aware- follow up further recs  Midline placement 11/24- picc team consult for PICC

## 2023-11-26 NOTE — PROGRESS NOTES
[facility] [place]  Nurses Note -- 4 Eyes      11/26/2023       Skin assessed on: Q Shift      [] No Pressure Injuries Present    []Prevention Measures Documented    [x] Yes LDA  for Pressure Injury Previously documented     [] Yes New Pressure Injury Discovered   [] LDA for New Pressure Injury Added      Attending RN:  Shalini Petty RN     Second RN:  Blaire Espinoza RN

## 2023-11-26 NOTE — ASSESSMENT & PLAN NOTE
69 yo woman with recent cellulitis of the foot due to toe ulceration. Worsened at home on po doxycyline. Admitted with cellulitis. MRI c/w possible osteomyelitis of the distal tuft. Prior culture grew MSSA. Received Zosyn and vancomycin initially, now only on vancomycin.    Recommendations:  Continue Ancef x 6 weeks  Post-acute plans noted  If she is discharged home, see OPAT note    Outpatient Antibiotic Therapy Plan:    Please send referral to [facility].    1) Infection: MSSA toe osteomyelitis    2) Discharge Antibiotics:    Intravenous antibiotics:  Ancef 2 grams IV q 8 hours     3) Therapy Duration:  4-6 weeks    Estimated end date of IV antibiotics: 01/05/2024    4) Outpatient Weekly Labs:    Order the following labs to be drawn on Mondays:   CBC  CMP   CRP    5) Fax Lab Results to Infectious Diseases Provider: [facility] Fax Number: [phone]    6) Outpatient Infectious Diseases Follow-up    Follow-up appointment will be arranged by the ID clinic and will be found in the patient's appointments tab.    Prior to discharge, please ensure the patient's follow-up has been scheduled.    If there is still no follow-up scheduled prior to discharge, please send an EPIC message to Zonia Acuña in Infectious Diseases.

## 2023-11-26 NOTE — NURSING
Note that patient awake, alert and fully oriented.  Required 1-person assistance with bed pan. Had a large, soft, light brown BM.   Able to turn with assistance.   Patient has a prolapsed rectum that she reduces manually after bowel movements.   Perineum improving with scheduled miconazole nitrate powder.     Consumed 75% of breakfast tray.    Will continue to f/u.

## 2023-11-27 ENCOUNTER — PATIENT OUTREACH (OUTPATIENT)
Dept: ADMINISTRATIVE | Facility: OTHER | Age: 68
End: 2023-11-27

## 2023-11-27 LAB
ALBUMIN SERPL BCP-MCNC: 3.2 G/DL (ref 3.5–5.2)
ALP SERPL-CCNC: 102 U/L (ref 55–135)
ALT SERPL W/O P-5'-P-CCNC: 17 U/L (ref 10–44)
ANION GAP SERPL CALC-SCNC: 10 MMOL/L (ref 8–16)
AST SERPL-CCNC: 30 U/L (ref 10–40)
BACTERIA BLD CULT: NORMAL
BACTERIA BLD CULT: NORMAL
BASOPHILS # BLD AUTO: 0.03 K/UL (ref 0–0.2)
BASOPHILS NFR BLD: 1 % (ref 0–1.9)
BILIRUB SERPL-MCNC: 0.4 MG/DL (ref 0.1–1)
BUN SERPL-MCNC: 6 MG/DL (ref 8–23)
CALCIUM SERPL-MCNC: 8.9 MG/DL (ref 8.7–10.5)
CHLORIDE SERPL-SCNC: 107 MMOL/L (ref 95–110)
CO2 SERPL-SCNC: 25 MMOL/L (ref 23–29)
CREAT SERPL-MCNC: 0.6 MG/DL (ref 0.5–1.4)
DIFFERENTIAL METHOD: ABNORMAL
EOSINOPHIL # BLD AUTO: 0.5 K/UL (ref 0–0.5)
EOSINOPHIL NFR BLD: 15.5 % (ref 0–8)
ERYTHROCYTE [DISTWIDTH] IN BLOOD BY AUTOMATED COUNT: 13.2 % (ref 11.5–14.5)
EST. GFR  (NO RACE VARIABLE): >60 ML/MIN/1.73 M^2
GLUCOSE SERPL-MCNC: 91 MG/DL (ref 70–110)
HCT VFR BLD AUTO: 36.8 % (ref 37–48.5)
HGB BLD-MCNC: 12.1 G/DL (ref 12–16)
IMM GRANULOCYTES # BLD AUTO: 0.01 K/UL (ref 0–0.04)
IMM GRANULOCYTES NFR BLD AUTO: 0.3 % (ref 0–0.5)
LYMPHOCYTES # BLD AUTO: 1 K/UL (ref 1–4.8)
LYMPHOCYTES NFR BLD: 32.2 % (ref 18–48)
MCH RBC QN AUTO: 31.6 PG (ref 27–31)
MCHC RBC AUTO-ENTMCNC: 32.9 G/DL (ref 32–36)
MCV RBC AUTO: 96 FL (ref 82–98)
MONOCYTES # BLD AUTO: 0.3 K/UL (ref 0.3–1)
MONOCYTES NFR BLD: 10.2 % (ref 4–15)
NEUTROPHILS # BLD AUTO: 1.2 K/UL (ref 1.8–7.7)
NEUTROPHILS NFR BLD: 40.8 % (ref 38–73)
NRBC BLD-RTO: 0 /100 WBC
PLATELET # BLD AUTO: 233 K/UL (ref 150–450)
PMV BLD AUTO: 10.4 FL (ref 9.2–12.9)
POTASSIUM SERPL-SCNC: 3.5 MMOL/L (ref 3.5–5.1)
PROT SERPL-MCNC: 6.5 G/DL (ref 6–8.4)
RBC # BLD AUTO: 3.83 M/UL (ref 4–5.4)
SODIUM SERPL-SCNC: 142 MMOL/L (ref 136–145)
WBC # BLD AUTO: 3.04 K/UL (ref 3.9–12.7)

## 2023-11-27 PROCEDURE — 25000003 PHARM REV CODE 250: Performed by: NURSE PRACTITIONER

## 2023-11-27 PROCEDURE — 97530 THERAPEUTIC ACTIVITIES: CPT | Mod: CQ

## 2023-11-27 PROCEDURE — 63600175 PHARM REV CODE 636 W HCPCS: Performed by: INTERNAL MEDICINE

## 2023-11-27 PROCEDURE — A4216 STERILE WATER/SALINE, 10 ML: HCPCS | Performed by: HOSPITALIST

## 2023-11-27 PROCEDURE — 97535 SELF CARE MNGMENT TRAINING: CPT

## 2023-11-27 PROCEDURE — 97110 THERAPEUTIC EXERCISES: CPT | Mod: CQ

## 2023-11-27 PROCEDURE — 97110 THERAPEUTIC EXERCISES: CPT

## 2023-11-27 PROCEDURE — 99232 PR SUBSEQUENT HOSPITAL CARE,LEVL II: ICD-10-PCS | Mod: ,,, | Performed by: PODIATRIST

## 2023-11-27 PROCEDURE — 85025 COMPLETE CBC W/AUTO DIFF WBC: CPT | Performed by: NURSE PRACTITIONER

## 2023-11-27 PROCEDURE — 25000003 PHARM REV CODE 250: Performed by: HOSPITALIST

## 2023-11-27 PROCEDURE — 99232 SBSQ HOSP IP/OBS MODERATE 35: CPT | Mod: ,,, | Performed by: PODIATRIST

## 2023-11-27 PROCEDURE — 25000003 PHARM REV CODE 250: Performed by: PHYSICIAN ASSISTANT

## 2023-11-27 PROCEDURE — 80053 COMPREHEN METABOLIC PANEL: CPT | Performed by: NURSE PRACTITIONER

## 2023-11-27 PROCEDURE — 21400001 HC TELEMETRY ROOM

## 2023-11-27 PROCEDURE — 63600175 PHARM REV CODE 636 W HCPCS: Performed by: NURSE PRACTITIONER

## 2023-11-27 RX ADMIN — CEFAZOLIN SODIUM 2 G: 2 SOLUTION INTRAVENOUS at 06:11

## 2023-11-27 RX ADMIN — LEVOTHYROXINE SODIUM 25 MCG: 25 TABLET ORAL at 05:11

## 2023-11-27 RX ADMIN — Medication 10 ML: at 05:11

## 2023-11-27 RX ADMIN — ACETAMINOPHEN 650 MG: 325 TABLET ORAL at 08:11

## 2023-11-27 RX ADMIN — ENOXAPARIN SODIUM 40 MG: 40 INJECTION SUBCUTANEOUS at 05:11

## 2023-11-27 RX ADMIN — ESCITALOPRAM OXALATE 20 MG: 10 TABLET ORAL at 09:11

## 2023-11-27 RX ADMIN — Medication 10 ML: at 12:11

## 2023-11-27 RX ADMIN — CEFAZOLIN SODIUM 2 G: 2 SOLUTION INTRAVENOUS at 11:11

## 2023-11-27 RX ADMIN — ARIPIPRAZOLE 10 MG: 5 TABLET ORAL at 09:11

## 2023-11-27 RX ADMIN — MICONAZOLE NITRATE: 20 POWDER TOPICAL at 08:11

## 2023-11-27 RX ADMIN — ACETAMINOPHEN 650 MG: 325 TABLET ORAL at 05:11

## 2023-11-27 RX ADMIN — Medication 10 ML: at 11:11

## 2023-11-27 RX ADMIN — THERA TABS 1 TABLET: TAB at 09:11

## 2023-11-27 RX ADMIN — PANTOPRAZOLE SODIUM 40 MG: 40 TABLET, DELAYED RELEASE ORAL at 09:11

## 2023-11-27 RX ADMIN — CEFAZOLIN SODIUM 2 G: 2 SOLUTION INTRAVENOUS at 03:11
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
## 2023-11-27 NOTE — PT/OT/SLP PROGRESS
Physical Therapy Treatment    Patient Name:  Estella Arevalo   MRN:  [id number]    Recommendations:     Discharge Recommendations: Moderate Intensity Therapy  Discharge Equipment Recommendations: bedside commode (if discharged to home)  Barriers to discharge:  high fall risk, increased readmission if pt d/c home    Assessment:     Estella Arevalo is a 68 y.o. female admitted with a medical diagnosis of Cellulitis.  She presents with the following impairments/functional limitations: impaired endurance, impaired self care skills, impaired functional mobility, gait instability, weakness, impaired balance, decreased coordination, decreased upper extremity function, decreased lower extremity function, decreased safety awareness, decreased ROM, impaired skin, orthopedic precautions.    Rehab Prognosis: Good; patient would benefit from acute skilled PT services to address these deficits and reach maximum level of function.    Recent Surgery: * No surgery found *      Plan:     During this hospitalization, patient to be seen 5 x/week to address the identified rehab impairments via gait training, therapeutic activities, therapeutic exercises and progress toward the following goals:    Plan of Care Expires:  12/01/23    Subjective     Chief Complaint: weak and fatigue  Patient/Family Comments/goals: Pt agreed to sit [facility] post treatment.  Pain/Comfort:  Pain Rating 1: 0/10  Pain Rating Post-Intervention 1: 0/10      Objective:     Communicated with nurse Jackson prior to session.  Patient found HOB elevated with pressure relief boots, telemetry, peripheral IV, PureWick upon PT entry to room.     General Precautions: Standard, fall, special contact  Orthopedic Precautions:  (B CAM boots for all OOB acticvities)  Braces:  (B CAM Boots)  Respiratory Status: Room air     Functional Mobility:  Bed Mobility:     Scooting: anterior scoot to EOB with stand by assistance  Supine to Sit: stand by assistance with HOB elevated  Transfers:   B  "Cam Boots and gait belt donned prior OOB activity  Sit to Stand: from EOB with minimum assistance with rolling walker  Bed to Chair: contact guard assistance with rolling walker  using Step Transfer (~4 ft)  Balance: Good Sitting; Fair Standing      AM-PAC 6 CLICK MOBILITY  Turning over in bed (including adjusting bedclothes, sheets and blankets)?: 4  Sitting down on and standing up from a chair with arms (e.g., wheelchair, bedside commode, etc.): 3  Moving from lying on back to sitting on the side of the bed?: 3  Moving to and from a bed to a chair (including a wheelchair)?: 3  Need to walk in hospital room?: 2  Climbing 3-5 steps with a railing?: 2  Basic Mobility Total Score: 17       Treatment & Education:  Educated pt on "call before you fall", benefits of OOB activity with hospital staff assistance and performing BLE ex throughout the day, pt verbalized understanding.    BLE ex in seated 15 reps LAQ, Marching, GS    Patient left up in chair in reclined position on cushion seat with BLE offloaded by pillows, tray table in front, PureWick, all lines intact, call button in reach, and nurse notified. Pt's room door left open.    GOALS:   Multidisciplinary Problems       Physical Therapy Goals          Problem: Physical Therapy    Goal Priority Disciplines Outcome Goal Variances Interventions   Physical Therapy Goal     PT, PT/OT Ongoing, Progressing     Description: Goals to be met by: 23     Patient will increase functional independence with mobility by performin. Pt to be (S) with bed mobility.  2. Pt to transfer with SBA.  3. Pt to ambulate 15' w/RW CGA, (B) CAM boots.  4. Pt to be (I) with written HEP.                         Time Tracking:     PT Received On: 23  PT Start Time: 1015     PT Stop Time: 1043  PT Total Time (min): 28 min     Billable Minutes: Therapeutic Activity 14 min and Therapeutic Exercise 14 min    Treatment Type: Treatment  PT/PTA: PTA     Number of PTA visits since last " PT visit: 1     11/27/2023

## 2023-11-27 NOTE — PROGRESS NOTES
[facility]  Nurses Note -- 4 Eyes      11/26/2023       Skin assessed on: Q Shift      [] No Pressure Injuries Present    []Prevention Measures Documented    [x] Yes LDA  for Pressure Injury Previously documented     [] Yes New Pressure Injury Discovered   [] LDA for New Pressure Injury Added      Attending RN:  Shalini Petty RN     Second RN:  Blaire Espinoza RN

## 2023-11-27 NOTE — PLAN OF CARE
Problem: Adult Inpatient Plan of Care  Goal: Plan of Care Review  Outcome: Ongoing, Progressing  Flowsheets (Taken 11/27/2023 1544)  Plan of Care Reviewed With: patient  Goal: Patient-Specific Goal (Individualized)  Outcome: Ongoing, Progressing  Goal: Absence of Hospital-Acquired Illness or Injury  Outcome: Ongoing, Progressing  Intervention: Identify and Manage Fall Risk  Flowsheets (Taken 11/27/2023 1544)  Safety Promotion/Fall Prevention:   assistive device/personal item within reach   bed alarm set   Fall Risk signage in place   Fall Risk reviewed with patient/family   high risk medications identified   medications reviewed   nonskid shoes/socks when out of bed   lighting adjusted   side rails raised x 2   instructed to call staff for mobility  Intervention: Prevent Skin Injury  Flowsheets (Taken 11/27/2023 1544)  Body Position:   turned   foot of bed elevated   heels elevated   weight shifting  Skin Protection:   adhesive use limited   drying agents applied   incontinence pads utilized   pouching devices used   tubing/devices free from skin contact   transparent dressing maintained   skin sealant/moisture barrier applied  Intervention: Prevent and Manage VTE (Venous Thromboembolism) Risk  Flowsheets (Taken 11/27/2023 1544)  Activity Management: Up in chair - L3  VTE Prevention/Management: ROM (active) performed  Range of Motion: active ROM (range of motion) encouraged  Intervention: Prevent Infection  Flowsheets (Taken 11/27/2023 1544)  Infection Prevention: rest/sleep promoted

## 2023-11-27 NOTE — PLAN OF CARE
Problem: Fall Injury Risk  Goal: Absence of Fall and Fall-Related Injury  Intervention: Identify and Manage Contributors  Flowsheets (Taken 11/26/2023 1946)  Self-Care Promotion:   BADL personal objects within reach   BADL personal routines maintained  Medication Review/Management:   medications reviewed   high-risk medications identified
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
## 2023-11-27 NOTE — SUBJECTIVE & OBJECTIVE
Interval History: Virtual follow up visit for suspected Chest pain [R07.9]  Hypotension [I95.9] present on admission.   This service was provided by telemedicine.    The patient location is: [address]   Admitted 11/23/2023 12:10 PM    The patient is able to provide adequate history. History was obtained from patient and past medical records. No significant events overnight reported.  Patient complains of nothing new.   Initial symptoms have improved since yesterday. Current symptoms are stable.       Data  Details     [x]   Lab results reviewed 11/27/2023  H&H stable, sCr normal. CRP improving    []   Micro reports reviewed 11/27/2023     []   Pathology reports reviewed 11/27/2023     [x]   Imaging reports reviewed 11/27/2023  MRI L foot:  osteomyelitis of the 1st distal phalanx tuft with overlying cellulitis.    []   Cardiology Procedure reports reviewed 11/27/2023     []   Non- records/CareEverywhere notes reviewed 11/27/2023      [x]  Tests/studies orders placed or verified 11/27/2023   CBC Renal P, Mg    []  Independently viewed/assessed 11/27/2023      [x]  11/27/2023 Discussion of:  DC plan with CM     High Risk of M&M from management or Complexity of problems:   Patient has a condition that poses threat to life and bodily function: risk of limb loss     Review of Systems   Constitutional:  Negative for fever.   Respiratory:  Negative for shortness of breath.      Objective:     Vital Signs (Most Recent):  Temp: 98.1 °F (36.7 °C) (11/27/23 1127)  Pulse: 72 (11/27/23 1127)  Resp: 18 (11/27/23 1127)  BP: 120/77 (11/27/23 1127)  SpO2: 95 % (11/27/23 1127) Vital Signs (24h Range):  Temp:  [98 °F (36.7 °C)-99.2 °F (37.3 °C)] 98.1 °F (36.7 °C)  Pulse:  [72-85] 72  Resp:  [16-20] 18  SpO2:  [91 %-95 %] 95 %  BP: (112-152)/(70-88) 120/77     Weight: 92.6 kg (204 lb 2.3 oz)  Body mass index is 33.97 kg/m².    Intake/Output Summary (Last 24 hours) at 11/27/2023 1244  Last data filed at 11/27/2023 0720  Gross per 24  hour   Intake --   Output 3600 ml   Net -3600 ml         Physical Exam  Constitutional:       General: She is awake.      Appearance: Normal appearance.   Cardiovascular:      Comments: Monitor and/or Vital signs reviewed at time of visit  Pulmonary:      Effort: Pulmonary effort is normal. No accessory muscle usage or respiratory distress.   Neurological:      Mental Status: She is alert. She is not disoriented.   Psychiatric:         Attention and Perception: Attention normal.         Mood and Affect: Affect normal.         Behavior: Behavior is cooperative.         Significant Labs:   Recent Labs   Lab 11/24/23 0415 11/25/23  0637 11/27/23  0352   WBC 2.71* 3.71* 3.04*   HGB 12.5 11.8* 12.1   HCT 39.6 36.0* 36.8*    228 233     Recent Labs   Lab 11/24/23 0415 11/25/23  0637 11/27/23  0352   GRAN 46.4  1.3* 56.6  2.1 40.8  1.2*   LYMPH 24.4  0.7* 19.1  0.7* 32.2  1.0   MONO 11.8  0.3 9.7  0.4 10.2  0.3   EOS 0.4 0.5 0.5     Recent Labs   Lab 11/23/23  1244 11/24/23 0415 11/25/23  0637 11/27/23  0352    137 141 142   K 3.2* 3.1* 3.2* 3.5    106 109 107   CO2 21* 21* 21* 25   BUN 13 8 6* 6*   CREATININE 0.7 0.7 0.7 0.6   GLU 89 102 96 91   CALCIUM 9.4 8.8 8.5* 8.9   ALBUMIN 3.8 3.2* 3.1* 3.2*   MG 1.8  --   --   --      Recent Labs   Lab 11/23/23  1244 11/23/23 2025 11/24/23  0415 11/25/23  0637 11/26/23  0501 11/27/23  0352   ALKPHOS 139*  --  135 121  --  102   ALT 34  --  35 37  --  17   AST 87*  --  79* 63*  --  30   PROT 7.8  --  6.4 6.4  --  6.5   BILITOT 1.4*  --  0.9 0.5  --  0.4   INR  --  1.1  --   --   --   --    CRP 64.3*  --   --   --  16.5*  --      Recent Labs   Lab 11/12/23  1810 11/12/23  1818 11/23/23  1244 11/23/23  1646 11/26/23  0501   LACTATE  --  1.1  --  1.0  --    SEDRATE 33*  --  51*  --  30*     SARS-CoV-2 RNA, Amplification, Qual (no units)   Date Value   04/06/2022 Negative   05/24/2020 Negative     POC Rapid COVID (no units)   Date Value   11/02/2023  Negative   08/20/2022 Negative   06/05/2022 Negative   04/20/2022 Negative   01/10/2022 Negative   08/16/2021 Negative   01/27/2021 Negative       ECG Results              EKG 12-lead (Final result)  Result time 11/24/23 06:46:55      Final result by Interface, Lab In [facility] (11/24/23 06:46:55)                   Narrative:    Test Reason : I95.9,    Vent. Rate : 084 BPM     Atrial Rate : 084 BPM     P-R Int : 154 ms          QRS Dur : 088 ms      QT Int : 404 ms       P-R-T Axes : 037 034 022 degrees     QTc Int : 477 ms    Normal sinus rhythm  Cannot rule out Anterior infarct (cited on or before 22-JUL-2022)  Abnormal ECG  When compared with ECG of 12-NOV-2023 16:36,  Significant changes have occurred  Confirmed by Devin CHAIDEZ, Wayne PLUMMER ([id number]) on 11/24/2023 6:46:46 AM    Referred By: [email or web address]   SELF           Confirmed By:Wayne Beltran MD                                MRI Foot (Forefoot) Left Without Contrast  Narrative: EXAMINATION:  MRI FOOT (FOREFOOT) LEFT WITHOUT CONTRAST    CLINICAL HISTORY:  Foot swelling, nondiabetic, osteomyelitis suspected;  Non-pressure chronic 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    COMPARISON:  Radiographs 11/23/2023.    FINDINGS:  Examination degraded by patient motion artifact.    LIGAMENTS: Dorsal and interosseous components of the Lisfranc ligament are intact.    TENDONS: Flexor hallucis longus tenosynovitis at the level of the crossover point of Saul's knot.  Remaining visualized flexor and extensor tendons appear within normal limits.    BONES: Marrow edema involving the distal half of the 1st distal phalanx with associated cortical indistinctness and T1 medullary hypointensity at the level of the tuft.  Remaining visualized osseous structures demonstrate preserved marrow signal intensity.  No fractures.  No avascular necrosis.    JOINTS/CARTILAGE: 3rd through 5th TMT  osteoarthritis.  No dislocation.    MUSCLES: Severe fatty degeneration of the visualized musculature.    MISCELLANEOUS: Edema and suspected ulceration overlying the 1st distal phalanx tuft.  Heterogeneous subcutaneous signal intensity along the lateral aspect of the 5th MTP joint, possibly sequela of callus formation.  Subcutaneous edema along the dorsum of the foot.  No focal fluid collection.  Plantar aponeurosis appears within normal limits.  Impression: 1. Examination degraded by patient motion artifact.  2. Findings concerning for osteomyelitis of the 1st distal phalanx tuft with overlying cellulitis.  3. Subcutaneous edema along the dorsum of the foot, possibly sterile or infectious in nature.  No abscess.    Electronically signed by: Arvind Avlarado MD  Date:    11/25/2023  Time:    07:22      Labs and Imaging listed above were reviewed.

## 2023-11-27 NOTE — PLAN OF CARE
[facility] sent referrals to all SNF [facility] in network with [facility]. Patient's drug and psyc history may be a barrier to placement.     PASRR completed; Waiting on 142; Hospital exemption signed by Dr. Benavidez.   11/27/23 1303   Post-Acute Status   Post-Acute Authorization Placement   Post-Acute Placement Status Pending post-acute provider review/more information requested   Coverage PHN   Discharge Delays (!) Post-Acute Set-up   Discharge Plan   Discharge Plan A Skilled Nursing Facility   Discharge Plan B Home Health

## 2023-11-27 NOTE — PLAN OF CARE
Problem: Physical Therapy  Goal: Physical Therapy Goal  Description: Goals to be met by: 23     Patient will increase functional independence with mobility by performin. Pt to be (S) with bed mobility.  2. Pt to transfer with SBA.  3. Pt to ambulate 15' w/RW CGA, (B) CAM boots.  4. Pt to be (I) with written HEP.    Outcome: Ongoing, Progressing

## 2023-11-28 LAB
ALBUMIN SERPL BCP-MCNC: 3.1 G/DL (ref 3.5–5.2)
ANION GAP SERPL CALC-SCNC: 11 MMOL/L (ref 8–16)
BASOPHILS # BLD AUTO: 0.04 K/UL (ref 0–0.2)
BASOPHILS NFR BLD: 1.3 % (ref 0–1.9)
BUN SERPL-MCNC: 8 MG/DL (ref 8–23)
CALCIUM SERPL-MCNC: 8.8 MG/DL (ref 8.7–10.5)
CHLORIDE SERPL-SCNC: 106 MMOL/L (ref 95–110)
CO2 SERPL-SCNC: 22 MMOL/L (ref 23–29)
CREAT SERPL-MCNC: 0.6 MG/DL (ref 0.5–1.4)
DIFFERENTIAL METHOD: ABNORMAL
EOSINOPHIL # BLD AUTO: 0.4 K/UL (ref 0–0.5)
EOSINOPHIL NFR BLD: 14.3 % (ref 0–8)
ERYTHROCYTE [DISTWIDTH] IN BLOOD BY AUTOMATED COUNT: 13.2 % (ref 11.5–14.5)
EST. GFR  (NO RACE VARIABLE): >60 ML/MIN/1.73 M^2
GLUCOSE SERPL-MCNC: 91 MG/DL (ref 70–110)
HCT VFR BLD AUTO: 38.7 % (ref 37–48.5)
HGB BLD-MCNC: 12.5 G/DL (ref 12–16)
IMM GRANULOCYTES # BLD AUTO: 0.01 K/UL (ref 0–0.04)
IMM GRANULOCYTES NFR BLD AUTO: 0.3 % (ref 0–0.5)
LYMPHOCYTES # BLD AUTO: 1 K/UL (ref 1–4.8)
LYMPHOCYTES NFR BLD: 30.9 % (ref 18–48)
MAGNESIUM SERPL-MCNC: 2 MG/DL (ref 1.6–2.6)
MCH RBC QN AUTO: 31.2 PG (ref 27–31)
MCHC RBC AUTO-ENTMCNC: 32.3 G/DL (ref 32–36)
MCV RBC AUTO: 97 FL (ref 82–98)
MONOCYTES # BLD AUTO: 0.3 K/UL (ref 0.3–1)
MONOCYTES NFR BLD: 9.1 % (ref 4–15)
NEUTROPHILS # BLD AUTO: 1.4 K/UL (ref 1.8–7.7)
NEUTROPHILS NFR BLD: 44.1 % (ref 38–73)
NRBC BLD-RTO: 0 /100 WBC
PHOSPHATE SERPL-MCNC: 3.7 MG/DL (ref 2.7–4.5)
PLATELET # BLD AUTO: 243 K/UL (ref 150–450)
PMV BLD AUTO: 10.7 FL (ref 9.2–12.9)
POTASSIUM SERPL-SCNC: 3.7 MMOL/L (ref 3.5–5.1)
RBC # BLD AUTO: 4.01 M/UL (ref 4–5.4)
SODIUM SERPL-SCNC: 139 MMOL/L (ref 136–145)
WBC # BLD AUTO: 3.07 K/UL (ref 3.9–12.7)

## 2023-11-28 PROCEDURE — 25000003 PHARM REV CODE 250: Performed by: NURSE PRACTITIONER

## 2023-11-28 PROCEDURE — 63600175 PHARM REV CODE 636 W HCPCS: Performed by: NURSE PRACTITIONER

## 2023-11-28 PROCEDURE — 63600175 PHARM REV CODE 636 W HCPCS: Performed by: INTERNAL MEDICINE

## 2023-11-28 PROCEDURE — 25000003 PHARM REV CODE 250: Performed by: INTERNAL MEDICINE

## 2023-11-28 PROCEDURE — 83735 ASSAY OF MAGNESIUM: CPT | Performed by: INTERNAL MEDICINE

## 2023-11-28 PROCEDURE — C1751 CATH, INF, PER/CENT/MIDLINE: HCPCS

## 2023-11-28 PROCEDURE — A4216 STERILE WATER/SALINE, 10 ML: HCPCS | Performed by: HOSPITALIST

## 2023-11-28 PROCEDURE — 25000003 PHARM REV CODE 250: Performed by: PHYSICIAN ASSISTANT

## 2023-11-28 PROCEDURE — 36569 INSJ PICC 5 YR+ W/O IMAGING: CPT

## 2023-11-28 PROCEDURE — 97110 THERAPEUTIC EXERCISES: CPT

## 2023-11-28 PROCEDURE — 97535 SELF CARE MNGMENT TRAINING: CPT

## 2023-11-28 PROCEDURE — 80069 RENAL FUNCTION PANEL: CPT | Performed by: INTERNAL MEDICINE

## 2023-11-28 PROCEDURE — 85025 COMPLETE CBC W/AUTO DIFF WBC: CPT | Performed by: INTERNAL MEDICINE

## 2023-11-28 PROCEDURE — 25000003 PHARM REV CODE 250: Performed by: HOSPITALIST

## 2023-11-28 PROCEDURE — 97110 THERAPEUTIC EXERCISES: CPT | Mod: CQ

## 2023-11-28 PROCEDURE — 21400001 HC TELEMETRY ROOM

## 2023-11-28 PROCEDURE — 36415 COLL VENOUS BLD VENIPUNCTURE: CPT | Performed by: INTERNAL MEDICINE

## 2023-11-28 PROCEDURE — 97116 GAIT TRAINING THERAPY: CPT | Mod: CQ

## 2023-11-28 RX ORDER — NALTREXONE HYDROCHLORIDE 50 MG/1
50 TABLET, FILM COATED ORAL NIGHTLY
COMMUNITY
Start: 2023-11-12

## 2023-11-28 RX ORDER — GABAPENTIN 300 MG/1
600 CAPSULE ORAL 3 TIMES DAILY
Status: DISCONTINUED | OUTPATIENT
Start: 2023-11-28 | End: 2023-11-29 | Stop reason: HOSPADM

## 2023-11-28 RX ORDER — TRAZODONE HYDROCHLORIDE 150 MG/1
150 TABLET ORAL NIGHTLY
Status: ON HOLD | COMMUNITY
Start: 2023-11-12 | End: 2023-11-29 | Stop reason: SDUPTHER

## 2023-11-28 RX ORDER — TRAZODONE HYDROCHLORIDE 50 MG/1
150 TABLET ORAL NIGHTLY
Status: DISCONTINUED | OUTPATIENT
Start: 2023-11-28 | End: 2023-11-29

## 2023-11-28 RX ORDER — ARIPIPRAZOLE 10 MG/1
10 TABLET ORAL DAILY
COMMUNITY
Start: 2023-11-16

## 2023-11-28 RX ORDER — GABAPENTIN 600 MG/1
600 TABLET ORAL 3 TIMES DAILY
COMMUNITY
Start: 2023-11-12

## 2023-11-28 RX ORDER — MUPIROCIN 20 MG/G
OINTMENT TOPICAL 2 TIMES DAILY
Status: DISCONTINUED | OUTPATIENT
Start: 2023-11-28 | End: 2023-11-29 | Stop reason: HOSPADM

## 2023-11-28 RX ADMIN — Medication 10 ML: at 05:11

## 2023-11-28 RX ADMIN — CEFAZOLIN SODIUM 2 G: 2 SOLUTION INTRAVENOUS at 02:11

## 2023-11-28 RX ADMIN — MICONAZOLE NITRATE: 20 POWDER TOPICAL at 08:11

## 2023-11-28 RX ADMIN — TRAZODONE HYDROCHLORIDE 150 MG: 50 TABLET ORAL at 08:11

## 2023-11-28 RX ADMIN — Medication 10 ML: at 12:11

## 2023-11-28 RX ADMIN — PANTOPRAZOLE SODIUM 40 MG: 40 TABLET, DELAYED RELEASE ORAL at 09:11

## 2023-11-28 RX ADMIN — CEFAZOLIN SODIUM 2 G: 2 SOLUTION INTRAVENOUS at 10:11

## 2023-11-28 RX ADMIN — CEFAZOLIN SODIUM 2 G: 2 SOLUTION INTRAVENOUS at 03:11

## 2023-11-28 RX ADMIN — ENOXAPARIN SODIUM 40 MG: 40 INJECTION SUBCUTANEOUS at 05:11

## 2023-11-28 RX ADMIN — ACETAMINOPHEN 650 MG: 325 TABLET ORAL at 09:11

## 2023-11-28 RX ADMIN — GABAPENTIN 600 MG: 300 CAPSULE ORAL at 03:11

## 2023-11-28 RX ADMIN — ESCITALOPRAM OXALATE 20 MG: 10 TABLET ORAL at 09:11

## 2023-11-28 RX ADMIN — ACETAMINOPHEN 650 MG: 325 TABLET ORAL at 02:11

## 2023-11-28 RX ADMIN — MUPIROCIN: 20 OINTMENT TOPICAL at 08:11

## 2023-11-28 RX ADMIN — GABAPENTIN 600 MG: 300 CAPSULE ORAL at 08:11

## 2023-11-28 RX ADMIN — THERA TABS 1 TABLET: TAB at 09:11

## 2023-11-28 RX ADMIN — LEVOTHYROXINE SODIUM 25 MCG: 25 TABLET ORAL at 05:11

## 2023-11-28 RX ADMIN — ARIPIPRAZOLE 10 MG: 5 TABLET ORAL at 09:11

## 2023-11-28 NOTE — ASSESSMENT & PLAN NOTE
One episode hypotension in ED which resolved with IVF; denies symptoms.   Not currently on anti-hypertensives outpatient   Chronic, controlled.   Home meds for hypertension were reviewed and noted below.   Hypertension Medications               cloNIDine (CATAPRES) 0.1 MG tablet Take 1 tablet (0.1 mg total) by mouth once. for 1 dose     While in the hospital, will manage blood pressure as follows; Adjust home antihypertensive regimen as follows- not currently on medication outpatient  Will utilize p.r.n. blood pressure medication only if patient's blood pressure greater than 180/110 and she develops symptoms such as worsening chest pain or shortness of breath.

## 2023-11-28 NOTE — PLAN OF CARE
Problem: Adult Inpatient Plan of Care  Goal: Plan of Care Review  Flowsheets (Taken 11/28/2023 1705)  Plan of Care Reviewed With: patient  Goal: Optimal Comfort and Wellbeing  Intervention: Monitor Pain and Promote Comfort  Flowsheets (Taken 11/28/2023 1705)  Pain Management Interventions:   pillow support provided   position adjusted  Intervention: Provide Person-Centered Care  Flowsheets (Taken 11/28/2023 1705)  Trust Relationship/Rapport:   care explained   questions answered   questions encouraged     Problem: Fall Injury Risk  Goal: Absence of Fall and Fall-Related Injury  Intervention: Identify and Manage Contributors  Flowsheets (Taken 11/28/2023 1705)  Self-Care Promotion:   independence encouraged   meal set-up provided   BADL personal routines maintained  Medication Review/Management: medications reviewed  Intervention: Promote Injury-Free Environment  Flowsheets (Taken 11/28/2023 1705)  Safety Promotion/Fall Prevention:   assistive device/personal item within reach   bed alarm set   side rails raised x 2   instructed to call staff for mobility   medications reviewed

## 2023-11-28 NOTE — NURSING
Report given to MARISSA Dominguez. Patient resting comfortably in bed. No complaints, no acute distress noted.

## 2023-11-28 NOTE — ASSESSMENT & PLAN NOTE
Admission to hospital for BLE cellulitis and left great toe ulcer    Presents with erythema and swelling to left leg and toe which reoccured after finishing PO doxy after last hospitalization. Also developed erythema to right leg.   Afebrile without leukocytosis, Sed rate/CRP elevated.   Denied IVDU but admitted to snorting cocaine recently  MRI of left forefoot concern for OM of 1st distal phalanx tuff with overlying cellulitis   BLE erythema and edema significantly improved  ID recommended 6 weeks of Ancef for left foot OM  Awaiting LTAC placement

## 2023-11-28 NOTE — NURSING
[facility] [place]  Nurses Note -- 4 Eyes      11/27/2023       Skin assessed on: Q Shift      [] No Pressure Injuries Present    []Prevention Measures Documented    [x] Yes LDA  for Pressure Injury Previously documented     [] Yes New Pressure Injury Discovered   [] LDA for New Pressure Injury Added      Attending RN:  ABIMBOLA BROOKS RN     Second RN:  Renetta ANN

## 2023-11-28 NOTE — NURSING
Received report from Talia Plunkett RN. Patient lying in bed resting, NAD noted. Safety Precautions maintained, Will Monitor.

## 2023-11-28 NOTE — ASSESSMENT & PLAN NOTE
Admission to hospital for BLE cellulitis and left great toe ulcer    Presents with erythema and swelling to left leg and toe which reoccured after finishing PO doxy after last hospitalization. Also developed erythema to right leg.   Afebrile without leukocytosis, Sed rate/CRP elevated.   Denied IVDU but admitted to snorting cocaine recently  MRI of left forefoot concern for OM of 1st distal phalanx tuff with overlying cellulitis   BLE erythema and edema significantly improved  ID recommended 6 weeks of Ancef for right foot OM  Awaiting LTAC placement

## 2023-11-28 NOTE — PROGRESS NOTES
[facility] Medicine  Telemedicine Progress Note    Patient Name: Estella Arevalo  MRN: [id number]  Patient Class: IP- Inpatient   Admission Date: 11/23/2023  Length of Stay: 3 days  Attending Physician: Lizzie Benavidez MD  Primary Care Provider: Fiordaliza Ma -    Subjective:     Principal Problem:Cellulitis    HPI:  Estella Arevalo 68 y.o. female with alcohol abuse, cocaine abuse, THC, CAD, presents to the hospital with a chief complaint of leg swelling and erythema.  She reports she was seen 2 weeks ago and hospitalized for lower extremity erythema. She was discharged on oral doxycycline which she reports she was compliant.  She states initially there erythema improved while on the oral doxycycline, but after finishing the erythema returned and then returned to her right leg.  She reports she walks barefoot in her home.  She reports she has chronic back pain.  She does report using cocaine 1 time since discharge.  She denies any known trauma to the feet.  She denies fever chest pain shortness of breath nausea vomiting abdominal pain melena hematuria hematemesis dizziness and syncope.     In the ED, afebrile without without leukocytosis sed rate 51 CRP 64.3. of care lactic acid negative chest x-ray without large focal consolidation and interstitial findings accentuated by shallow inspiratory effort.    Overview/Hospital Course:  Admission to hospital for BLE cellulitis found to have OM left 1st distal phalanx tuff. Denied IVDU but admitted to snorting cocaine 3-4 days PTA. MRI of left forefoot concern for OM of 1st distal phalanx tuff with overlying cellulitis. ID recommended 6 weeks of Ancef. Follow up Podiatry further rec.  PT/OT evaluation completed. Patient agreed to SNF. TN/SW consult.     Interval History: Virtual follow up visit for suspected Chest pain [R07.9]  Hypotension [I95.9] present on admission.   This service was provided by telemedicine.    The patient location is:  W314/W314 A   Admitted 11/23/2023 12:10 PM    The patient is able to provide adequate history. History was obtained from patient and past medical records. No significant events overnight reported.  Patient complains of nothing new. Eager to go to [facility]. PICC placed.  Initial symptoms have improved since yesterday. Current symptoms are stable.       Data  Details     [x]   Lab results reviewed 11/28/2023  H&H stable, sCr normal.Mg and phos normal    []   Micro reports reviewed 11/28/2023     []   Pathology reports reviewed 11/28/2023     []   Imaging reports reviewed 11/28/2023      []   Cardiology Procedure reports reviewed 11/28/2023     []   Non- records/CareEverywhere notes reviewed 11/28/2023      [x]  Tests/studies orders placed or verified 11/28/2023   CBC Renal P, Mg    []  Independently viewed/assessed 11/28/2023      []  11/28/2023 Discussion of:       High Risk of M&M from management or Complexity of problems:   Patient has a condition that poses threat to life and bodily function: risk of limb loss     Review of Systems   Constitutional:  Negative for fever.   Respiratory:  Negative for shortness of breath.      Objective:     Vital Signs (Most Recent):  Temp: 98.8 °F (37.1 °C) (11/28/23 0736)  Pulse: 78 (11/28/23 0736)  Resp: 18 (11/28/23 0736)  BP: 120/76 (11/28/23 0736)  SpO2: (!) 93 % (11/28/23 0736) Vital Signs (24h Range):  Temp:  [97.6 °F (36.4 °C)-98.8 °F (37.1 °C)] 98.8 °F (37.1 °C)  Pulse:  [72-96] 78  Resp:  [17-20] 18  SpO2:  [93 %-95 %] 93 %  BP: (119-140)/(66-89) 120/76     Weight: 92.6 kg (204 lb 2.3 oz)  Body mass index is 33.97 kg/m².    Intake/Output Summary (Last 24 hours) at 11/28/2023 0905  Last data filed at 11/28/2023 0852  Gross per 24 hour   Intake 649.84 ml   Output 1975 ml   Net -1325.16 ml           Physical Exam  Constitutional:       General: She is awake.      Appearance: Normal appearance.   Cardiovascular:      Comments: Monitor and/or Vital signs reviewed at  time of visit  Pulmonary:      Effort: Pulmonary effort is normal. No accessory muscle usage or respiratory distress.   Neurological:      Mental Status: She is alert. She is not disoriented.   Psychiatric:         Attention and Perception: Attention normal.         Mood and Affect: Affect normal.         Behavior: Behavior is cooperative.         Significant Labs:   Recent Labs   Lab 11/25/23 0637 11/27/23  0352 11/28/23  0609   WBC 3.71* 3.04* 3.07*   HGB 11.8* 12.1 12.5   HCT 36.0* 36.8* 38.7    233 243       Recent Labs   Lab 11/25/23 0637 11/27/23  0352 11/28/23  0609   GRAN 56.6  2.1 40.8  1.2* 44.1  1.4*   LYMPH 19.1  0.7* 32.2  1.0 30.9  1.0   MONO 9.7  0.4 10.2  0.3 9.1  0.3   EOS 0.5 0.5 0.4       Recent Labs   Lab 11/23/23 1244 11/24/23 0415 11/25/23 0637 11/27/23  0352 11/28/23  0610      < > 141 142 139   K 3.2*   < > 3.2* 3.5 3.7      < > 109 107 106   CO2 21*   < > 21* 25 22*   BUN 13   < > 6* 6* 8   CREATININE 0.7   < > 0.7 0.6 0.6   GLU 89   < > 96 91 91   CALCIUM 9.4   < > 8.5* 8.9 8.8   ALBUMIN 3.8   < > 3.1* 3.2* 3.1*   MG 1.8  --   --   --  2.0   PHOS  --   --   --   --  3.7    < > = values in this interval not displayed.       Recent Labs   Lab 11/23/23  1244 11/23/23 2025 11/24/23 0415 11/25/23 0637 11/26/23  0501 11/27/23 0352   ALKPHOS 139*  --  135 121  --  102   ALT 34  --  35 37  --  17   AST 87*  --  79* 63*  --  30   PROT 7.8  --  6.4 6.4  --  6.5   BILITOT 1.4*  --  0.9 0.5  --  0.4   INR  --  1.1  --   --   --   --    CRP 64.3*  --   --   --  16.5*  --        Recent Labs   Lab 11/12/23  1810 11/12/23  1818 11/23/23  1244 11/23/23  1646 11/26/23  0501   LACTATE  --  1.1  --  1.0  --    SEDRATE 33*  --  51*  --  30*       SARS-CoV-2 RNA, Amplification, Qual (no units)   Date Value   04/06/2022 Negative   05/24/2020 Negative     POC Rapid COVID (no units)   Date Value   11/02/2023 Negative   08/20/2022 Negative   06/05/2022 Negative   04/20/2022  Negative   01/10/2022 Negative   08/16/2021 Negative   01/27/2021 Negative       ECG Results              EKG 12-lead (Final result)  Result time 11/24/23 06:46:55      Final result by Interface, [facility] (11/24/23 06:46:55)                   Narrative:    Test Reason : I95.9,    Vent. Rate : 084 BPM     Atrial Rate : 084 BPM     P-R Int : 154 ms          QRS Dur : 088 ms      QT Int : 404 ms       P-R-T Axes : 037 034 022 degrees     QTc Int : 477 ms    Normal sinus rhythm  Cannot rule out Anterior infarct (cited on or before 22-JUL-2022)  Abnormal ECG  When compared with ECG of 12-NOV-2023 16:36,  Significant changes have occurred  Confirmed by Devin CHAIDEZ, Wayne PLUMMER ([id number]) on 11/24/2023 6:46:46 AM    Referred By: [email or web address]   SELF           Confirmed By:Wayne Beltran MD                                MRI Foot (Forefoot) Left Without Contrast  Narrative: EXAMINATION:  MRI FOOT (FOREFOOT) LEFT WITHOUT CONTRAST    CLINICAL HISTORY:  Foot swelling, nondiabetic, osteomyelitis suspected;  Non-pressure chronic 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    COMPARISON:  Radiographs 11/23/2023.    FINDINGS:  Examination degraded by patient motion artifact.    LIGAMENTS: Dorsal and interosseous components of the Lisfranc ligament are intact.    TENDONS: Flexor hallucis longus tenosynovitis at the level of the crossover point of Saul's knot.  Remaining visualized flexor and extensor tendons appear within normal limits.    BONES: Marrow edema involving the distal half of the 1st distal phalanx with associated cortical indistinctness and T1 medullary hypointensity at the level of the tuft.  Remaining visualized osseous structures demonstrate preserved marrow signal intensity.  No fractures.  No avascular necrosis.    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  No dislocation.    MUSCLES: Severe fatty degeneration of  the visualized musculature.    MISCELLANEOUS: Edema and suspected ulceration overlying the 1st distal phalanx tuft.  Heterogeneous subcutaneous signal intensity along the lateral aspect of the 5th MTP joint, possibly sequela of callus formation.  Subcutaneous edema along the dorsum of the foot.  No focal fluid collection.  Plantar aponeurosis appears within normal limits.  Impression: 1. Examination degraded by patient motion artifact.  2. Findings concerning for osteomyelitis of the 1st distal phalanx tuft with overlying cellulitis.  3. Subcutaneous edema along the dorsum of the foot, possibly sterile or infectious in nature.  No abscess.    Electronically signed by: Arvind Alvarado MD  Date:    11/25/2023  Time:    07:22      Labs and Imaging listed above were reviewed.       Assessment/Plan:      * BLE Cellulitis, Left great toe ulcer  Admission to hospital for BLE cellulitis and left great toe ulcer    Presents with erythema and swelling to left leg and toe which reoccured after finishing PO doxy after last hospitalization. Also developed erythema to right leg.   Afebrile without leukocytosis, Sed rate/CRP elevated.   Denied IVDU but admitted to snorting cocaine recently  MRI of left forefoot concern for OM of 1st distal phalanx tuff with overlying cellulitis   BLE erythema and edema significantly improved  ID recommended 6 weeks of Ancef for left foot OM  Awaiting LTAC placement    Physical deconditioning  PT/OT recommends mod intensity. Patient agreed to placement  SW/TN consult for LTAC    Osteomyelitis of great toe  MRI forefoot osteomyelitis of the 1st distal phalanx tuft with overlying cellulitis.   Left great toe ulcer s/p I&D at bedside by Podiatry scabbing over   Recent 11/13 left great toe culture MSSA   ID recommended 6 weeks Ancef  Podiatry aware - offered amputation  Midline placement 11/24 - PICC 11/28    Alcohol abuse  Has not used alcohol since alcohol rehab early this November  Reports continued  cessation of alcohol    CAD (coronary artery disease)  Listed on problem list though no previous C to compare. Not currently on medication outpatient. Denies chest pain.     Essential hypertension  One episode hypotension in ED which resolved with IVF; denies symptoms.   Not currently on anti-hypertensives outpatient   Chronic, controlled.   Home meds for hypertension were reviewed and noted below.   Hypertension Medications               cloNIDine (CATAPRES) 0.1 MG tablet Take 1 tablet (0.1 mg total) by mouth once. for 1 dose     While in the hospital, will manage blood pressure as follows; Adjust home antihypertensive regimen as follows- not currently on medication outpatient  Will utilize p.r.n. blood pressure medication only if patient's blood pressure greater than 180/110 and she develops symptoms such as worsening chest pain or shortness of breath.    Bipolar 1 disorder  Reports not currently on medication outpatient. Affect and mood appropriate  Continue Lexapro and Abilify    Acquired hypothyroidism  Lab Results   Component Value Date    TSH 1.661 04/20/2022     Continue home Synthroid    Polysubstance dependence including opioid type drug, continuous use  Counseled on cessation.  Snorted cocaine 3-4 days prior to admission. Denies IVDU  Urine tox positive for benzo and amphetamine      Active Hospital Problems    Diagnosis  POA    *BLE Cellulitis, Left great toe ulcer [L03.90]  Yes    Osteomyelitis of great toe [M86.9]  Yes    Physical deconditioning [R53.81]  Yes    Alcohol abuse [F10.10]  Yes     Chronic    Essential hypertension [I10]  Yes     Chronic    CAD (coronary artery disease) [I25.10]  Yes     Chronic    Bipolar 1 disorder [F31.9]  Yes     Chronic    Acquired hypothyroidism [E03.9]  Yes     Chronic    Polysubstance dependence including opioid type drug, continuous use [F11.20, F19.20]  Yes     Chronic      Resolved Hospital Problems   No resolved problems to display.       Inpatient Medications  Prescribed for Management of Current Problems:     Scheduled Meds:    ARIPiprazole  10 mg Oral Daily    ceFAZolin (ANCEF) IVPB  2 g Intravenous Q8H    enoxparin  40 mg Subcutaneous Q24H (prophylaxis, 1700)    EScitalopram oxalate  20 mg Oral Daily    gabapentin  600 mg Oral TID    levothyroxine  25 mcg Oral Daily    miconazole NITRATE 2 %   Topical (Top) BID    multivitamin  1 tablet Oral Daily    mupirocin   Nasal BID    pantoprazole  40 mg Oral Daily    sodium chloride 0.9%  10 mL Intravenous Q6H    traZODone  150 mg Oral QHS     Continuous Infusions:   As Needed: acetaminophen, glucagon (human recombinant), glucose, glucose, melatonin, naloxone, senna-docusate 8.6-50 mg, sodium chloride 0.9%, Flushing PICC/Midline Protocol **AND** sodium chloride 0.9% **AND** sodium chloride 0.9%    VTE Risk Mitigation (From admission, onward)           Ordered     enoxaparin injection 40 mg  Every 24 hours         11/25/23 1506     Place MARINA hose  Until discontinued         11/23/23 1450     IP VTE HIGH RISK PATIENT  Once         11/23/23 1450     Place sequential compression device  Until discontinued         11/23/23 1450                  I have completed this tele-visit without the assistance of a telepresenter.    The attending portion of this evaluation, treatment, and documentation was performed per Lizzie Benavidez MD via Telemedicine AudioVisual using the secure Dot[employer] software platform with 2 way audio/video. The provider was located off-site and the patient is located in the hospital. The aforementioned video software was utilized to document the relevant history and physical exam    The amount of time spent during, and associated with, this encounter was 50 minutes; the nature of the activities performed were:  Preparing to see the patient, chart review  Obtaining and/or receiving separately obtained history   Performing medically appropriate examination and evaluation  Counseling and educating the  patient/family/caregiver  Ordering medications, tests, or procedures  Referring to and communicating with other health care professionals  Documenting clinical information in the EHR  Independently interpreting results  Care coordination      Lizzie Benavidez MD  Department of Hospital Medicine   [facility]

## 2023-11-28 NOTE — NURSING
[facility] [place]  Nurses Note -- 4 Eyes      11/28/2023       Skin assessed on: Q Shift      [] No Pressure Injuries Present    []Prevention Measures Documented    [x] Yes LDA  for Pressure Injury Previously documented     [] Yes New Pressure Injury Discovered   [] LDA for New Pressure Injury Added      Attending RN:  Amber Gama RN     Second RN:  Talia Plunkett RN

## 2023-11-28 NOTE — PT/OT/SLP PROGRESS
Occupational Therapy   Treatment    Name: Estella Arevalo  MRN: [id number]  Admitting Diagnosis:  Cellulitis       Recommendations:     Discharge Recommendations: Moderate Intensity Therapy  Discharge Equipment Recommendations:  bedside commode, walker, rolling  Barriers to discharge:   (Pt is at risk for falls, unplanned readmission, and morbidity if d/c home at this time)    Assessment:     Estella Arevalo is a 68 y.o. female with a medical diagnosis of Cellulitis. Performance deficits affecting function are weakness, gait instability, decreased upper extremity function, decreased ROM, impaired endurance, impaired balance, decreased lower extremity function, decreased safety awareness, impaired skin, impaired self care skills, impaired functional mobility, decreased coordination, edema.     Rehab Prognosis:  Good; patient would benefit from acute skilled OT services to address these deficits and reach maximum level of function.       Plan:     Patient to be seen  (3-5x/week) to address the above listed problems via self-care/home management, therapeutic activities, therapeutic exercises  Plan of Care Expires: 12/09/23  Plan of Care Reviewed with: patient    Subjective     Chief Complaint: tearful about losing her spouse; hard to find her balance with the cam boots   Patient/Family Comments/goals: realizes that she made her wounds worse by walking barefoot    Pain/Comfort:   0/10    Objective:     Communicated with: nurse, Renetta, prior to session.  Patient found HOB elevated with peripheral IV, PureWick, pressure relief boots upon OT entry to room. Pt was eating dinner upon arrival; OT offered to come back later, but pt paused dinner to complete therapy and finish her meal in the chair.     General Precautions: Standard, fall    Orthopedic Precautions: (B/L CAM boots for all OOB acticvities)  Braces:  (B/L CAM Boots)  Respiratory Status: Room air     Occupational Performance:     Bed Mobility:    Patient completed  Scooting anteriorly with stand by assistance  Patient completed Supine to Sit with stand by assistance     Functional Mobility/Transfers:  Patient completed Sit <> Stand Transfer with MIN A  with  rolling walker   Patient completed Bed <> Chair Transfer using Step Transfer technique with minimum assistance with rolling walker  Functional Mobility: Gait belt donned prior to transfer for safety during mobility/transfers. Pt required extra time upon standing to adjust her static standing balance d/t minor posterior lean with MIN A for correction. Pt completed ~4 ft bed>chair using RW with B/L cam boots with MIN A.     Activities of Daily Living:  Feeding:  independence with dinner seated in the chair at end of session   Upper Body Dressing: minimum assistance to don back gown while seated unsupported at EOB   Lower Body Dressing: pt practiced donning L cam boot in long sit position in the bed with MAX A. Total A to don R cam boot. While seated in the chair, OT prompted pt for trunk flexion and pt was able to unstrap 2nd most distal strap with extra time required.  Pt stood 2nd time from the chair with MIN A for total A to doff padding of purewick and don clean one        [facility] 6 Click ADL:  18    Treatment & Education:  Pt re-educated on OT role/POC.   Importance of OOB activity with staff assistance with wear of B/L cam boots at all times   Safety during functional t/f and mobility   Seated upright, pt completed the following therapeutic exercises:   BUE AROM x12: shoulder flex/ext (last 7 LUE AAROM d/t weakness); shoulder horizontal ab/dduction, forward punches, shoulder elevation/depression  X10 toe touches to increase ROM and core strength to work on lower body dressing   Encouraged pt to complete BUE AROM HEP 3x10 reps daily; pt agreed.   Multiple self-care tasks/functional mobility completed- assistance level noted above   All questions/concerns answered within OT scope of practice       Patient left  reclined in  the chair with B/L LE elevated on pillow with tray table in front of pt with dinner tray set-up  with all lines intact, call button in reach, and all needs met/within reach; nurse and CNA updated.     GOALS:   Multidisciplinary Problems       Occupational Therapy Goals          Problem: Occupational Therapy    Goal Priority Disciplines Outcome Interventions   Occupational Therapy Goal     OT, PT/OT Ongoing, Progressing    Description: Goals to be met by: 12/9/23     Patient will increase functional independence with ADLs by performing:    LE Dressing with Supervision.  Grooming while seated with Modified [place].  Toileting from bedside commode with Supervision for hygiene and clothing management.   Step transfer with Supervision  Sit to stand transfer with Supervision.   Toilet transfer to bedside commode with Supervision.  Upper extremity exercise program x15 reps per handout, with independence.                         Time Tracking:     OT Date of Treatment: 11/27/23  OT Start Time: 1636  OT Stop Time: 1703  OT Total Time (min): 27 min    Billable Minutes:Self Care/Home Management 14 min  Therapeutic Exercise 13 min  Total Time 27 min     OT/FRED: OT     Number of FRED visits since last OT visit: 0    11/28/2023

## 2023-11-28 NOTE — PLAN OF CARE
11/28/23 1009   Post-Acute Status   Post-Acute Authorization Placement   Post-Acute Placement Status Pending payor review/awaiting authorization (if required)   Coverage PHN   Patient choice form signed by patient/caregiver List from System Post-Acute Care   Discharge Delays (!) Post-Acute Set-up   Discharge Plan   Discharge Plan A Long-term acute care facility (LTAC)   Discharge Plan B Skilled Nursing Facility

## 2023-11-28 NOTE — ASSESSMENT & PLAN NOTE
Labs without significant change and no change in treatment Reports not currently on medication outpatient. Affect and mood appropriate  Continue Lexapro and Abilify

## 2023-11-28 NOTE — ASSESSMENT & PLAN NOTE
Counseled on cessation.  Snorted cocaine 3-4 days prior to admission. Denies IVDU  Urine tox positive for benzo and amphetamine

## 2023-11-28 NOTE — ASSESSMENT & PLAN NOTE
MRI forefoot osteomyelitis of the 1st distal phalanx tuft with overlying cellulitis.   Left great toe ulcer s/p I&D at bedside by Podiatry scabbing over   Recent 11/13 left great toe culture MSSA   ID recommended 6 weeks [facility]  Podiatry aware - offered amputation  Midline placement 11/24 - PICC 11/28

## 2023-11-28 NOTE — PROGRESS NOTES
[facility] Medicine  Telemedicine Progress Note    Patient Name: Estella Arevalo  MRN: [id number]  Patient Class: IP- Inpatient   Admission Date: 11/23/2023  Length of Stay: 2 days  Attending Physician: Lizzie Benavidez MD  Primary Care Provider: Fiordaliza Ma -    Subjective:     Principal Problem:Cellulitis    HPI:  Estella Arevalo 68 y.o. female with alcohol abuse, cocaine abuse, THC, CAD, presents to the hospital with a chief complaint of leg swelling and erythema.  She reports she was seen 2 weeks ago and hospitalized for lower extremity erythema. She was discharged on oral doxycycline which she reports she was compliant.  She states initially there erythema improved while on the oral doxycycline, but after finishing the erythema returned and then returned to her right leg.  She reports she walks barefoot in her home.  She reports she has chronic back pain.  She does report using cocaine 1 time since discharge.  She denies any known trauma to the feet.  She denies fever chest pain shortness of breath nausea vomiting abdominal pain melena hematuria hematemesis dizziness and syncope.     In the ED, afebrile without without leukocytosis sed rate 51 CRP 64.3. of care lactic acid negative chest x-ray without large focal consolidation and interstitial findings accentuated by shallow inspiratory effort.    Overview/Hospital Course:  Admission to hospital for BLE cellulitis found to have OM left 1st distal phalanx tuff. Denied IVDU but admitted to snorting cocaine 3-4 days PTA. MRI of left forefoot concern for OM of 1st distal phalanx tuff with overlying cellulitis. ID recommended 6 weeks of Ancef. Follow up Podiatry further rec.  PT/OT evaluation completed. Patient agreed to SNF. TN/SW consult.     Interval History: Virtual follow up visit for suspected Chest pain [R07.9]  Hypotension [I95.9] present on admission.   This service was provided by telemedicine.    The patient location is:  W314/W[address] A   Admitted 11/23/2023 12:10 PM    The patient is able to provide adequate history. History was obtained from patient and past medical records. No significant events overnight reported.  Patient complains of nothing new.   Initial symptoms have improved since yesterday. Current symptoms are stable.       Data  Details     [x]   Lab results reviewed 11/27/2023  H&H stable, sCr normal. CRP improving    []   Micro reports reviewed 11/27/2023     []   Pathology reports reviewed 11/27/2023     [x]   Imaging reports reviewed 11/27/2023  MRI L foot:  osteomyelitis of the 1st distal phalanx tuft with overlying cellulitis.    []   Cardiology Procedure reports reviewed 11/27/2023     []   Non-HM records/CareEverywhere notes reviewed 11/27/2023      [x]  Tests/studies orders placed or verified 11/27/2023   CBC Renal P, Mg    []  Independently viewed/assessed 11/27/2023      [x]  11/27/2023 Discussion of:  DC plan with CM     High Risk of M&M from management or Complexity of problems:   Patient has a condition that poses threat to life and bodily function: risk of limb loss     Review of Systems   Constitutional:  Negative for fever.   Respiratory:  Negative for shortness of breath.      Objective:     Vital Signs (Most Recent):  Temp: 98.1 °F (36.7 °C) (11/27/23 1127)  Pulse: 72 (11/27/23 1127)  Resp: 18 (11/27/23 1127)  BP: 120/77 (11/27/23 1127)  SpO2: 95 % (11/27/23 1127) Vital Signs (24h Range):  Temp:  [98 °F (36.7 °C)-99.2 °F (37.3 °C)] 98.1 °F (36.7 °C)  Pulse:  [72-85] 72  Resp:  [16-20] 18  SpO2:  [91 %-95 %] 95 %  BP: (112-152)/(70-88) 120/77     Weight: 92.6 kg (204 lb 2.3 oz)  Body mass index is 33.97 kg/m².    Intake/Output Summary (Last 24 hours) at 11/27/2023 1244  Last data filed at 11/27/2023 0720  Gross per 24 hour   Intake --   Output 3600 ml   Net -3600 ml         Physical Exam  Constitutional:       General: She is awake.      Appearance: Normal appearance.   Cardiovascular:      Comments:  Monitor and/or Vital signs reviewed at time of visit  Pulmonary:      Effort: Pulmonary effort is normal. No accessory muscle usage or respiratory distress.   Neurological:      Mental Status: She is alert. She is not disoriented.   Psychiatric:         Attention and Perception: Attention normal.         Mood and Affect: Affect normal.         Behavior: Behavior is cooperative.         Significant Labs:   Recent Labs   Lab 11/24/23 0415 11/25/23  0637 11/27/23  0352   WBC 2.71* 3.71* 3.04*   HGB 12.5 11.8* 12.1   HCT 39.6 36.0* 36.8*    228 233     Recent Labs   Lab 11/24/23 0415 11/25/23  0637 11/27/23  0352   GRAN 46.4  1.3* 56.6  2.1 40.8  1.2*   LYMPH 24.4  0.7* 19.1  0.7* 32.2  1.0   MONO 11.8  0.3 9.7  0.4 10.2  0.3   EOS 0.4 0.5 0.5     Recent Labs   Lab 11/23/23  1244 11/24/23 0415 11/25/23  0637 11/27/23  0352    137 141 142   K 3.2* 3.1* 3.2* 3.5    106 109 107   CO2 21* 21* 21* 25   BUN 13 8 6* 6*   CREATININE 0.7 0.7 0.7 0.6   GLU 89 102 96 91   CALCIUM 9.4 8.8 8.5* 8.9   ALBUMIN 3.8 3.2* 3.1* 3.2*   MG 1.8  --   --   --      Recent Labs   Lab 11/23/23  1244 11/23/23 2025 11/24/23 0415 11/25/23  0637 11/26/23  0501 11/27/23  0352   ALKPHOS 139*  --  135 121  --  102   ALT 34  --  35 37  --  17   AST 87*  --  79* 63*  --  30   PROT 7.8  --  6.4 6.4  --  6.5   BILITOT 1.4*  --  0.9 0.5  --  0.4   INR  --  1.1  --   --   --   --    CRP 64.3*  --   --   --  16.5*  --      Recent Labs   Lab 11/12/23  1810 11/12/23  1818 11/23/23  1244 11/23/23  1646 11/26/23  0501   LACTATE  --  1.1  --  1.0  --    SEDRATE 33*  --  51*  --  30*     SARS-CoV-2 RNA, Amplification, Qual (no units)   Date Value   04/06/2022 Negative   05/24/2020 Negative     POC Rapid COVID (no units)   Date Value   11/02/2023 Negative   08/20/2022 Negative   06/05/2022 Negative   04/20/2022 Negative   01/10/2022 Negative   08/16/2021 Negative   01/27/2021 Negative       ECG Results              EKG 12-lead (Final  result)  Result time 11/24/23 06:46:55      Final result by Interface, Lab In [facility] (11/24/23 06:46:55)                   Narrative:    Test Reason : I95.9,    Vent. Rate : 084 BPM     Atrial Rate : 084 BPM     P-R Int : 154 ms          QRS Dur : 088 ms      QT Int : 404 ms       P-R-T Axes : 037 034 022 degrees     QTc Int : 477 ms    Normal sinus rhythm  Cannot rule out Anterior infarct (cited on or before 22-JUL-2022)  Abnormal ECG  When compared with ECG of 12-NOV-2023 16:36,  Significant changes have occurred  Confirmed by Wayne Beltran MD ([id number]) on 11/24/2023 6:46:46 AM    Referred By: [email or web address]   SELF           Confirmed By:Wayne Beltran MD                                MRI Foot (Forefoot) Left Without Contrast  Narrative: EXAMINATION:  MRI FOOT (FOREFOOT) LEFT WITHOUT CONTRAST    CLINICAL HISTORY:  Foot swelling, nondiabetic, osteomyelitis suspected;  Non-pressure chronic 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    COMPARISON:  Radiographs 11/23/2023.    FINDINGS:  Examination degraded by patient motion artifact.    LIGAMENTS: Dorsal and interosseous components of the Lisfranc ligament are intact.    TENDONS: Flexor hallucis longus tenosynovitis at the level of the crossover point of Saul's knot.  Remaining visualized flexor and extensor tendons appear within normal limits.    BONES: Marrow edema involving the distal half of the 1st distal phalanx with associated cortical indistinctness and T1 medullary hypointensity at the level of the tuft.  Remaining visualized osseous structures demonstrate preserved marrow signal intensity.  No fractures.  No avascular necrosis.    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  No dislocation.    MUSCLES: Severe fatty degeneration of the visualized musculature.    MISCELLANEOUS: Edema and suspected ulceration overlying the 1st distal phalanx tuft.   Heterogeneous subcutaneous signal intensity along the lateral aspect of the 5th MTP joint, possibly sequela of callus formation.  Subcutaneous edema along the dorsum of the foot.  No focal fluid collection.  Plantar aponeurosis appears within normal limits.  Impression: 1. Examination degraded by patient motion artifact.  2. Findings concerning for osteomyelitis of the 1st distal phalanx tuft with overlying cellulitis.  3. Subcutaneous edema along the dorsum of the foot, possibly sterile or infectious in nature.  No abscess.    Electronically signed by: Arvind Alvarado MD  Date:    11/25/2023  Time:    07:22      Labs and Imaging listed above were reviewed.     Assessment/Plan:      * BLE Cellulitis, Left great toe ulcer  Admission to hospital for BLE cellulitis and left great toe ulcer    Presents with erythema and swelling to left leg and toe which reoccured after finishing PO doxy after last hospitalization. Also developed erythema to right leg.   Afebrile without leukocytosis, Sed rate/CRP elevated.   Denied IVDU but admitted to snorting cocaine recently  MRI of left forefoot concern for OM of 1st distal phalanx tuff with overlying cellulitis   BLE erythema and edema significantly improved  ID recommended 6 weeks of Ancef for right foot OM  Awaiting LTAC placement    Physical deconditioning  PT/OT recommends mod intensity. Patient agreed to SNF  SW/TN consult for SNF    Osteomyelitis of great toe  MRI forefoot osteomyelitis of the 1st distal phalanx tuft with overlying cellulitis.   Left great toe ulcer s/p I&D at bedside by Podiatry scabbing over   Recent 11/13 left great toe culture MSSA   ID recommended 6 weeks Ancef  Podiatry aware - offered amputation  Midline placement 11/24 - PICC team consult for PICC    Alcohol abuse  Has not used alcohol since alcohol rehab early this November  Reports continued cessation of alcohol    CAD (coronary artery disease)  Listed on problem list though no previous LHC to  compare. Not currently on medication outpatient. Denies chest pain.     Essential hypertension  One episode hypotension in ED which resolved with IVF; denies symptoms.   Not currently on anti-hypertensives outpatient   Chronic, controlled.   Home meds for hypertension were reviewed and noted below.   Hypertension Medications               cloNIDine (CATAPRES) 0.1 MG tablet Take 1 tablet (0.1 mg total) by mouth once. for 1 dose     While in the hospital, will manage blood pressure as follows; Adjust home antihypertensive regimen as follows- not currently on medication outpatient  Will utilize p.r.n. blood pressure medication only if patient's blood pressure greater than 180/110 and she develops symptoms such as worsening chest pain or shortness of breath.    Bipolar 1 disorder  Reports not currently on medication outpatient. Affect and mood appropriate  Continue Lexapro and Abilify    Acquired hypothyroidism  Lab Results   Component Value Date    TSH 1.661 04/20/2022     Continue home Synthroid    Polysubstance dependence including opioid type drug, continuous use  Counseled on cessation.  Snorted cocaine 3-4 days prior to admission. Denies IVDU  Urine tox positive for benzo and amphetamine      Active Hospital Problems    Diagnosis  POA    *BLE Cellulitis, Left great toe ulcer [L03.90]  Yes    Osteomyelitis of great toe [M86.9]  Yes    Physical deconditioning [R53.81]  Yes    Alcohol abuse [F10.10]  Yes     Chronic    Essential hypertension [I10]  Yes     Chronic    CAD (coronary artery disease) [I25.10]  Yes     Chronic    Bipolar 1 disorder [F31.9]  Yes     Chronic    Acquired hypothyroidism [E03.9]  Yes     Chronic    Polysubstance dependence including opioid type drug, continuous use [F11.20, F19.20]  Yes     Chronic      Resolved Hospital Problems   No resolved problems to display.       Inpatient Medications Prescribed for Management of Current Problems:     Scheduled Meds:    ARIPiprazole  10 mg Oral Daily     ceFAZolin (ANCEF) IVPB  2 g Intravenous Q8H    enoxparin  40 mg Subcutaneous Q24H (prophylaxis, 1700)    EScitalopram oxalate  20 mg Oral Daily    levothyroxine  25 mcg Oral Daily    miconazole NITRATE 2 %   Topical (Top) BID    multivitamin  1 tablet Oral Daily    pantoprazole  40 mg Oral Daily    sodium chloride 0.9%  10 mL Intravenous Q6H     Continuous Infusions:   As Needed: acetaminophen, glucagon (human recombinant), glucose, glucose, melatonin, naloxone, senna-docusate 8.6-50 mg, sodium chloride 0.9%, Flushing PICC/Midline Protocol **AND** sodium chloride 0.9% **AND** sodium chloride 0.9%    VTE Risk Mitigation (From admission, onward)           Ordered     enoxaparin injection 40 mg  Every 24 hours         11/25/23 1506     Place MARINA hose  Until discontinued         11/23/23 1450     IP VTE HIGH RISK PATIENT  Once         11/23/23 1450     Place sequential compression device  Until discontinued         11/23/23 1450                  I have completed this tele-visit without the assistance of a telepresenter.    The attending portion of this evaluation, treatment, and documentation was performed per Lizzie Benavidez MD via Telemedicine AudioVisual using the secure [email or web address] software platform with 2 way audio/video. The provider was located off-site and the patient is located in the hospital. The aforementioned video software was utilized to document the relevant history and physical exam    The amount of time spent during, and associated with, this encounter was 50 minutes; the nature of the activities performed were:  Preparing to see the patient, chart review  Obtaining and/or receiving separately obtained history   Performing medically appropriate examination and evaluation  Counseling and educating the patient/family/caregiver  Ordering medications, tests, or procedures  Referring to and communicating with other health care professionals  Documenting clinical information in the EHR  Independently  interpreting results  Care coordination      Lizzie Benavdiez MD  Department of Hospital Medicine   [facility] [place] - Telemetry

## 2023-11-28 NOTE — PT/OT/SLP PROGRESS
Occupational Therapy   Treatment    Name: Estella Arevalo  MRN: [id number]  Admitting Diagnosis:  Cellulitis       Recommendations:     Discharge Recommendations: Moderate Intensity Therapy  Discharge Equipment Recommendations:  bedside commode, walker, rolling  Barriers to discharge:  None    Assessment:     Estella Arevalo is a 68 y.o. female with a medical diagnosis of Cellulitis. Performance deficits affecting function are weakness, gait instability, decreased ROM, impaired balance, impaired endurance, decreased lower extremity function, decreased upper extremity function, decreased safety awareness, pain, impaired skin, orthopedic precautions, decreased coordination, impaired functional mobility, impaired self care skills.     Rehab Prognosis:  Good; patient would benefit from acute skilled OT services to address these deficits and reach maximum level of function.       Plan:     Patient to be seen  (3-5x/week) to address the above listed problems via self-care/home management, therapeutic activities, therapeutic exercises  Plan of Care Expires: 12/09/23  Plan of Care Reviewed with: patient    Subjective     Chief Complaint: n/a   Patient/Family Comments/goals: pleasantly agreeable to session; happy to see progress with ADLs     Pain/Comfort:  Pain Rating 1: 0/10    Objective:     Communicated with: nurseAmber, prior to session.  Patient found HOB elevated with B/L cam boots on and B/L LE elevated by pillow with PICC line, telemetry, PureWick upon OT entry to room.    General Precautions: Standard, fall    Orthopedic Precautions: (B/L CAM boots for all OOB activities)  Braces:  (B/L CAM Boots)  Respiratory Status: Room air     Occupational Performance:     Bed Mobility:    Patient completed Scooting with supervision  Patient completed Supine to Sit with supervision     Functional Mobility/Transfers:  Patient completed Sit <> Stand Transfer with contact guard assistance  with  rolling walker   Patient  completed Bed <> Chair Transfer using Step Transfer technique with contact guard assistance with rolling walker  Functional Mobility: Gait belt donned prior to transfer for safety during mobility/transfers. Pt completed ~6 ft functional mobility with B/L cam boots on with use of RW with CGA.     Activities of Daily Living:  Upper Body Dressing: minimum assistance to don back gown while seated unsupported in the chair   Lower Body Dressing: seated in the chair, pt required ~mod verbal cueing and MIN A to doff then don R cam boot. Pt required 8 minutes to complete this.         [facility] 6 Click ADL: 20    Treatment & Education:  Pt re-educated on OT role/POC  Importance of OOB activity with staff assistance  Safety during functional t/f and mobility   Positive feedback provided and edu on tips for donning/doffing cam boots and encouraged continued practice to reduce time so ADL won't be a barrier and pt will be compliant with wearing. Pt eagerly agreed.    Seated upright pt completed the following therapeutic exercises:   RUE AROM x10: shoulder flex/ext, shoulder ab/dduction, forward punches   LUE: gentle stretch x 30 sec in shoulder flexion, x10 AROM shoulder flex/ext with reduced range as reps continued- AAROM to complete range; x10 elbow extension with moderate resistance provided by OT; x10 shoulder horizontal abduction to adduction with ~minimal resistance provided by OT; x10 AAROM shoulder adduction/abduction; encouraged continued AROM HEP with B/L shoulder elevation/depression, scapular protraction/retraction. Pt demo'd understanding   Multiple self-care tasks/functional mobility completed- assistance level noted above   All questions/concerns answered within OT scope of practice       Patient left  reclined in the chair with B/L LE elevated on pillows and tray table in front of pt  with all lines intact, call button in reach, nurse, Amber, notified, and all needs met/within reach; door left open; lights on,  blinds opened, tv on.       When sitting EOB before completing transfer, OT used the bag clip holding the telemetry monitor and clipped the bag onto the pt's gown. A small part of pt's skin was accidentally clipped when donning. OT immediately removed it and examined the skin- no skin tears. Very small area of redness near R chest that subsided by end of session. Apologetic to pt who reported she was ok and in no pain. Escalated to nurse.     GOALS:   Multidisciplinary Problems       Occupational Therapy Goals          Problem: Occupational Therapy    Goal Priority Disciplines Outcome Interventions   Occupational Therapy Goal     OT, PT/OT Ongoing, Progressing    Description: Goals to be met by: 12/9/23     Patient will increase functional independence with ADLs by performing:    LE Dressing with Supervision.  Grooming while seated with Modified [place].  Toileting from bedside commode with Supervision for hygiene and clothing management.   Step transfer with Supervision  Sit to stand transfer with Supervision.   Toilet transfer to bedside commode with Supervision.  Upper extremity exercise program x15 reps per handout, with independence.                         Time Tracking:     OT Date of Treatment: 11/28/23  OT Start Time: 1613  OT Stop Time: 1636  OT Total Time (min): 23 min    Billable Minutes:Self Care/Home Management 13 min  Therapeutic Exercise 10 min  Total Time 23 min     OT/FRED: OT     Number of FRED visits since last OT visit: 0    11/28/2023

## 2023-11-28 NOTE — SUBJECTIVE & OBJECTIVE
Interval History: Virtual follow up visit for suspected Chest pain [R07.9]  Hypotension [I95.9] present on admission.   This service was provided by telemedicine.    The patient location is: [address]   Admitted 11/23/2023 12:10 PM    The patient is able to provide adequate history. History was obtained from patient and past medical records. No significant events overnight reported.  Patient complains of nothing new. Eager to go to [facility]. PICC placed.  Initial symptoms have improved since yesterday. Current symptoms are stable.       Data  Details     [x]   Lab results reviewed 11/28/2023  H&H stable, sCr normal.Mg and phos normal    []   Micro reports reviewed 11/28/2023     []   Pathology reports reviewed 11/28/2023     []   Imaging reports reviewed 11/28/2023      []   Cardiology Procedure reports reviewed 11/28/2023     []   Non- records/CareEverywhere notes reviewed 11/28/2023      [x]  Tests/studies orders placed or verified 11/28/2023   CBC Renal P, Mg    []  Independently viewed/assessed 11/28/2023      []  11/28/2023 Discussion of:       High Risk of M&M from management or Complexity of problems:   Patient has a condition that poses threat to life and bodily function: risk of limb loss     Review of Systems   Constitutional:  Negative for fever.   Respiratory:  Negative for shortness of breath.      Objective:     Vital Signs (Most Recent):  Temp: 98.8 °F (37.1 °C) (11/28/23 0736)  Pulse: 78 (11/28/23 0736)  Resp: 18 (11/28/23 0736)  BP: 120/76 (11/28/23 0736)  SpO2: (!) 93 % (11/28/23 0736) Vital Signs (24h Range):  Temp:  [97.6 °F (36.4 °C)-98.8 °F (37.1 °C)] 98.8 °F (37.1 °C)  Pulse:  [72-96] 78  Resp:  [17-20] 18  SpO2:  [93 %-95 %] 93 %  BP: (119-140)/(66-89) 120/76     Weight: 92.6 kg (204 lb 2.3 oz)  Body mass index is 33.97 kg/m².    Intake/Output Summary (Last 24 hours) at 11/28/2023 0905  Last data filed at 11/28/2023 0852  Gross per 24 hour   Intake 649.84 ml   Output 1975 ml   Net  -1325.16 ml           Physical Exam  Constitutional:       General: She is awake.      Appearance: Normal appearance.   Cardiovascular:      Comments: Monitor and/or Vital signs reviewed at time of visit  Pulmonary:      Effort: Pulmonary effort is normal. No accessory muscle usage or respiratory distress.   Neurological:      Mental Status: She is alert. She is not disoriented.   Psychiatric:         Attention and Perception: Attention normal.         Mood and Affect: Affect normal.         Behavior: Behavior is cooperative.         Significant Labs:   Recent Labs   Lab 11/25/23 0637 11/27/23 0352 11/28/23  0609   WBC 3.71* 3.04* 3.07*   HGB 11.8* 12.1 12.5   HCT 36.0* 36.8* 38.7    233 243       Recent Labs   Lab 11/25/23 0637 11/27/23 0352 11/28/23  0609   GRAN 56.6  2.1 40.8  1.2* 44.1  1.4*   LYMPH 19.1  0.7* 32.2  1.0 30.9  1.0   MONO 9.7  0.4 10.2  0.3 9.1  0.3   EOS 0.5 0.5 0.4       Recent Labs   Lab 11/23/23  1244 11/24/23 0415 11/25/23 0637 11/27/23  0352 11/28/23  0610      < > 141 142 139   K 3.2*   < > 3.2* 3.5 3.7      < > 109 107 106   CO2 21*   < > 21* 25 22*   BUN 13   < > 6* 6* 8   CREATININE 0.7   < > 0.7 0.6 0.6   GLU 89   < > 96 91 91   CALCIUM 9.4   < > 8.5* 8.9 8.8   ALBUMIN 3.8   < > 3.1* 3.2* 3.1*   MG 1.8  --   --   --  2.0   PHOS  --   --   --   --  3.7    < > = values in this interval not displayed.       Recent Labs   Lab 11/23/23  1244 11/23/23 2025 11/24/23 0415 11/25/23 0637 11/26/23  0501 11/27/23 0352   ALKPHOS 139*  --  135 121  --  102   ALT 34  --  35 37  --  17   AST 87*  --  79* 63*  --  30   PROT 7.8  --  6.4 6.4  --  6.5   BILITOT 1.4*  --  0.9 0.5  --  0.4   INR  --  1.1  --   --   --   --    CRP 64.3*  --   --   --  16.5*  --        Recent Labs   Lab 11/12/23  1810 11/12/23  1818 11/23/23  1244 11/23/23  1646 11/26/23  0501   LACTATE  --  1.1  --  1.0  --    SEDRATE 33*  --  51*  --  30*       SARS-CoV-2 RNA, Amplification, Qual (no  units)   Date Value   04/06/2022 Negative   05/24/2020 Negative     POC Rapid COVID (no units)   Date Value   11/02/2023 Negative   08/20/2022 Negative   06/05/2022 Negative   04/20/2022 Negative   01/10/2022 Negative   08/16/2021 Negative   01/27/2021 Negative       ECG Results              EKG 12-lead (Final result)  Result time 11/24/23 06:46:55      Final result by Interface, Lab In [facility] (11/24/23 06:46:55)                   Narrative:    Test Reason : I95.9,    Vent. Rate : 084 BPM     Atrial Rate : 084 BPM     P-R Int : 154 ms          QRS Dur : 088 ms      QT Int : 404 ms       P-R-T Axes : 037 034 022 degrees     QTc Int : 477 ms    Normal sinus rhythm  Cannot rule out Anterior infarct (cited on or before 22-JUL-2022)  Abnormal ECG  When compared with ECG of 12-NOV-2023 16:36,  Significant changes have occurred  Confirmed by Devin CHAIDEZ, Wayne PLUMMER ([id number]) on 11/24/2023 6:46:46 AM    Referred By: AAAREFERR   SELF           Confirmed By:Wayne Beltran MD                                MRI Foot (Forefoot) Left Without Contrast  Narrative: EXAMINATION:  MRI FOOT (FOREFOOT) LEFT WITHOUT CONTRAST    CLINICAL HISTORY:  Foot swelling, nondiabetic, osteomyelitis suspected;  Non-pressure chronic 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    COMPARISON:  Radiographs 11/23/2023.    FINDINGS:  Examination degraded by patient motion artifact.    LIGAMENTS: Dorsal and interosseous components of the Lisfranc ligament are intact.    TENDONS: Flexor hallucis longus tenosynovitis at the level of the crossover point of Saul's knot.  Remaining visualized flexor and extensor tendons appear within normal limits.    BONES: Marrow edema involving the distal half of the 1st distal phalanx with associated cortical indistinctness and T1 medullary hypointensity at the level of the tuft.  Remaining visualized osseous structures demonstrate  preserved marrow signal intensity.  No fractures.  No avascular necrosis.    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  No dislocation.    MUSCLES: Severe fatty degeneration of the visualized musculature.    MISCELLANEOUS: Edema and suspected ulceration overlying the 1st distal phalanx tuft.  Heterogeneous subcutaneous signal intensity along the lateral aspect of the 5th MTP joint, possibly sequela of callus formation.  Subcutaneous edema along the dorsum of the foot.  No focal fluid collection.  Plantar aponeurosis appears within normal limits.  Impression: 1. Examination degraded by patient motion artifact.  2. Findings concerning for osteomyelitis of the 1st distal phalanx tuft with overlying cellulitis.  3. Subcutaneous edema along the dorsum of the foot, possibly sterile or infectious in nature.  No abscess.    Electronically signed by: Arvind Alvarado MD  Date:    11/25/2023  Time:    07:22      Labs and Imaging listed above were reviewed.

## 2023-11-28 NOTE — PLAN OF CARE
Case Management Assessment     PCP: Fiordaliza  Pharmacy: [facility]    Patient Arrived From: Home  Existing Help at Home: Mike brewer    Barriers to Discharge: None    Discharge Plan:    A. LTAC   B. SNF    CM discussed discharge planning with pt at bedside. Pt has agreed to go to [facility] (LTAC). Lilli will visit with pt today.Pt lives with her son, Pippa. Pt is independent and uses a wheelchair. Pt's son will provide transportation home.        11/28/23 1316   Discharge Assessment   Assessment Type Discharge Planning Assessment   Confirmed/corrected address, phone number and insurance Yes   Confirmed Demographics Correct on Facesheet   Source of Information patient   When was your last doctors appointment?   (unknown)   Reason For Admission BLE CELLULITIS LEFT GREAT TOE ULCER   People in Home child(carol), adult   Facility Arrived From: HOME   Do you expect to return to your current living situation? No   Do you have help at home or someone to help you manage your care at home? Yes   Who are your caregiver(s) and their phone number(s)? PIPPA- - [phone]   Prior to hospitilization cognitive status: Alert/Oriented   Current cognitive status: Alert/Oriented   Walking or Climbing Stairs ambulation difficulty, requires equipment   Mobility Management WHEELCHAIR   Dressing/Bathing bathing difficulty, requires equipment   Dressing/Bathing Management grab bar, shower chair   Home Accessibility wheelchair accessible   Home Layout Able to live on 1st floor   Equipment Currently Used at Home wheelchair;grab bar;shower chair   Readmission within 30 days? Yes   Patient currently being followed by outpatient case management? No   Do you currently have service(s) that help you manage your care at home? No   Do you take prescription medications? Yes   Do you have prescription coverage? Yes   Coverage PHN   Do you have any problems affording any of your prescribed medications? No   Is the patient taking medications as  prescribed? yes   Who is going to help you get home at discharge? Jignesh- son   How do you get to doctors appointments? family or friend will provide   Are you on dialysis? No   Do you take coumadin? No   DME Needed Upon Discharge  other (see comments)  (tbd)   Transition of Care Barriers None   Discharge Plan A Long-term acute care facility (LTAC)   Discharge Plan B Skilled Nursing Facility   OTHER   Name(s) of People in Home Jignesh- son

## 2023-11-28 NOTE — PT/OT/SLP PROGRESS
Physical Therapy Treatment    Patient Name:  Estella Arevalo   MRN:  [id number]    Recommendations:     Discharge Recommendations: Moderate Intensity Therapy  Discharge Equipment Recommendations: bedside commode (if discharged to home)  Barriers to discharge:  high fall risk, increased readmission if pt d/c home    Assessment:     Estella Arevalo is a 68 y.o. female admitted with a medical diagnosis of Cellulitis.  She presents with the following impairments/functional limitations: weakness, impaired functional mobility, gait instability, impaired balance, decreased coordination, decreased upper extremity function, decreased lower extremity function, decreased safety awareness, impaired skin, edema.    Rehab Prognosis: Good; patient would benefit from acute skilled PT services to address these deficits and reach maximum level of function.    Recent Surgery: * No surgery found *      Plan:     During this hospitalization, patient to be seen 5 x/week to address the identified rehab impairments via gait training, therapeutic activities, therapeutic exercises and progress toward the following goals:    Plan of Care Expires:  12/01/23    Subjective     Chief Complaint: no complaint  Patient/Family Comments/goals: Pt agreed to participate.  Pain/Comfort:  Pain Rating 1: 0/10  Pain Addressed 1: Pre-medicate for activity  Pain Rating Post-Intervention 1: 0/10      Objective:     Communicated with nurse Clark prior to session.  Patient found HOB elevated with peripheral IV, PureWick, pressure relief boots upon PT entry to room.     General Precautions: Standard, fall  Orthopedic Precautions:  (B/L CAM boots for all OOB acticvities)  Braces:  (B/L CAM Boots)  Respiratory Status: Room air     Functional Mobility:  Bed Mobility:     Scooting: anterior scoot to EOB with stand by assistance  Supine to Sit: stand by assistance with HOB elevated  Transfers:   gait belt and B Cam Boots donned prior OOB activity  Sit to Stand: from  EOB with contact guard assistance with rolling walker  Gait: Pt ambulated ~15 ft with CGA using RW. Pt with decreased mahsa/step length, min unsteadiness but no LOB; v/c for , AD management  Balance: Good Sitting; Fair Standing      AM-PAC 6 CLICK MOBILITY  Turning over in bed (including adjusting bedclothes, sheets and blankets)?: 4  Sitting down on and standing up from a chair with arms (e.g., wheelchair, bedside commode, etc.): 3  Moving from lying on back to sitting on the side of the bed?: 4  Moving to and from a bed to a chair (including a wheelchair)?: 3  Need to walk in hospital room?: 3  Climbing 3-5 steps with a railing?: 2  Basic Mobility Total Score: 19       Treatment & Education:  Re-educated pt on benefits of OOB activity with hospital staff assistance and performing BLE ex throughout the day, pt verbalized understanding.    BLE ex in seated 15 reps PENNY, Latia, PS; 20 reps AP    Patient left up in chair in reclined position on cushion seat and pillow with BLE offloaded by pillow, tray table near by, PureWick, all lines intact, hospital phone & call button in reach, and nurse notified. Pt's room door left open.    GOALS:   Multidisciplinary Problems       Physical Therapy Goals          Problem: Physical Therapy    Goal Priority Disciplines Outcome Goal Variances Interventions   Physical Therapy Goal     PT, PT/OT Ongoing, Progressing     Description: Goals to be met by: 23     Patient will increase functional independence with mobility by performin. Pt to be (S) with bed mobility.  2. Pt to transfer with SBA.  3. Pt to ambulate 15' w/RW CGA, (B) CAM boots.  4. Pt to be (I) with written HEP.                         Time Tracking:     PT Received On: 23  PT Start Time: 1039     PT Stop Time: 1111  PT Total Time (min): 32 min     Billable Minutes: Gait Training 15 min and Therapeutic Exercise 17 min    Treatment Type: Treatment  PT/PTA: PTA     Number of PTA visits since  last PT visit: 2     11/28/2023

## 2023-11-28 NOTE — ASSESSMENT & PLAN NOTE
Has not used alcohol since alcohol rehab early this November  Reports continued cessation of alcohol

## 2023-11-28 NOTE — ASSESSMENT & PLAN NOTE
MRI forefoot osteomyelitis of the 1st distal phalanx tuft with overlying cellulitis.   Left great toe ulcer s/p I&D at bedside by Podiatry scabbing over   Recent 11/13 left great toe culture MSSA   ID recommended 6 weeks [facility]  Podiatry aware - offered amputation  Midline placement 11/24 - PICC team consult for PICC

## 2023-11-28 NOTE — PLAN OF CARE
Pt is AAOx4. Room air. Tele maintained.   No falls or new injuries reported during shift, safety precautions maintained.    Problem: Adult Inpatient Plan of Care  Goal: Plan of Care Review  Outcome: Ongoing, Progressing     Problem: Adult Inpatient Plan of Care  Goal: Optimal Comfort and Wellbeing  Outcome: Ongoing, Progressing

## 2023-11-28 NOTE — PROCEDURES
"Estella Areavlo is a 68 y.o. female patient.    Temp: 98.8 °F (37.1 °C) (11/28/23 0736)  Pulse: 78 (11/28/23 0736)  Resp: 18 (11/28/23 0736)  BP: 120/76 (11/28/23 0736)  SpO2: (!) 93 % (11/28/23 0736)  Weight: 92.6 kg (204 lb 2.3 oz) (11/25/23 0407)  Height: 5' 5" (165.1 cm) (11/23/23 1208)    PICC  Date/Time: 11/28/2023 11:35 AM  Performed by: London See RN  Consent Done: Yes  Time out: Immediately prior to procedure a time out was called to verify the correct patient, procedure, equipment, support staff and site/side marked as required  Indications: vascular access and med administration  Anesthesia: local infiltration  Local anesthetic: lidocaine 1% without epinephrine  Anesthetic Total (mL): 5  Preparation: skin prepped with ChloraPrep  Skin prep agent dried: skin prep agent completely dried prior to procedure  Sterile barriers: all five maximum sterile barriers used - cap, mask, sterile gown, sterile gloves, and large sterile sheet  Hand hygiene: hand hygiene performed prior to central venous catheter insertion  Location details: left basilic  Catheter type: double lumen  Catheter size: 5 Fr  Catheter Length: 42cm    Vascular probe with ultrasound: over the wire.  Number of attempts: 1  Post-procedure: blood return through all ports, chlorhexidine patch and sterile dressing applied            Name london see  11/28/2023    "

## 2023-11-29 VITALS
OXYGEN SATURATION: 94 % | HEART RATE: 100 BPM | RESPIRATION RATE: 18 BRPM | BODY MASS INDEX: 32.69 KG/M2 | DIASTOLIC BLOOD PRESSURE: 66 MMHG | HEIGHT: 65 IN | SYSTOLIC BLOOD PRESSURE: 104 MMHG | TEMPERATURE: 97 F | WEIGHT: 196.19 LBS

## 2023-11-29 LAB — BACTERIA SPEC AEROBE CULT: NORMAL

## 2023-11-29 PROCEDURE — 25000003 PHARM REV CODE 250: Performed by: HOSPITALIST

## 2023-11-29 PROCEDURE — 97116 GAIT TRAINING THERAPY: CPT | Mod: CQ

## 2023-11-29 PROCEDURE — 25000003 PHARM REV CODE 250: Performed by: NURSE PRACTITIONER

## 2023-11-29 PROCEDURE — 25000003 PHARM REV CODE 250: Performed by: PHYSICIAN ASSISTANT

## 2023-11-29 PROCEDURE — A4216 STERILE WATER/SALINE, 10 ML: HCPCS | Performed by: HOSPITALIST

## 2023-11-29 PROCEDURE — 63600175 PHARM REV CODE 636 W HCPCS: Performed by: INTERNAL MEDICINE

## 2023-11-29 PROCEDURE — 97110 THERAPEUTIC EXERCISES: CPT | Mod: CQ

## 2023-11-29 PROCEDURE — 25000003 PHARM REV CODE 250: Performed by: INTERNAL MEDICINE

## 2023-11-29 RX ORDER — CEFAZOLIN SODIUM 2 G/50ML
2000 SOLUTION INTRAVENOUS EVERY 8 HOURS
Start: 2023-11-29 | End: 2024-01-05

## 2023-11-29 RX ORDER — TRAZODONE HYDROCHLORIDE 100 MG/1
100 TABLET ORAL NIGHTLY
Start: 2023-11-29

## 2023-11-29 RX ORDER — TRAZODONE HYDROCHLORIDE 50 MG/1
100 TABLET ORAL NIGHTLY
Status: DISCONTINUED | OUTPATIENT
Start: 2023-11-29 | End: 2023-11-29 | Stop reason: HOSPADM

## 2023-11-29 RX ADMIN — ACETAMINOPHEN 650 MG: 325 TABLET ORAL at 01:11

## 2023-11-29 RX ADMIN — PANTOPRAZOLE SODIUM 40 MG: 40 TABLET, DELAYED RELEASE ORAL at 08:11

## 2023-11-29 RX ADMIN — GABAPENTIN 600 MG: 300 CAPSULE ORAL at 08:11

## 2023-11-29 RX ADMIN — Medication 10 ML: at 06:11

## 2023-11-29 RX ADMIN — ACETAMINOPHEN 650 MG: 325 TABLET ORAL at 06:11

## 2023-11-29 RX ADMIN — Medication 10 ML: at 12:11

## 2023-11-29 RX ADMIN — LEVOTHYROXINE SODIUM 25 MCG: 25 TABLET ORAL at 05:11

## 2023-11-29 RX ADMIN — ESCITALOPRAM OXALATE 20 MG: 10 TABLET ORAL at 08:11

## 2023-11-29 RX ADMIN — ARIPIPRAZOLE 10 MG: 5 TABLET ORAL at 08:11

## 2023-11-29 RX ADMIN — CEFAZOLIN SODIUM 2 G: 2 SOLUTION INTRAVENOUS at 06:11

## 2023-11-29 RX ADMIN — MUPIROCIN: 20 OINTMENT TOPICAL at 08:11

## 2023-11-29 RX ADMIN — CEFAZOLIN SODIUM 2 G: 2 SOLUTION INTRAVENOUS at 02:11

## 2023-11-29 RX ADMIN — THERA TABS 1 TABLET: TAB at 08:11

## 2023-11-29 RX ADMIN — GABAPENTIN 600 MG: 300 CAPSULE ORAL at 02:11

## 2023-11-29 NOTE — PT/OT/SLP PROGRESS
Physical Therapy Treatment    Patient Name:  Estella Arevalo   MRN:  [id number]    Recommendations:     Discharge Recommendations: Moderate Intensity Therapy  Discharge Equipment Recommendations: bedside commode (if discharged to home)  Barriers to discharge:   high fall risk, increased readmission if pt d/c home    Assessment:     Estella Arevalo is a 68 y.o. female admitted with a medical diagnosis of Osteomyelitis of great toe.  She presents with the following impairments/functional limitations: weakness, impaired functional mobility, gait instability, impaired balance, impaired endurance, decreased coordination, decreased upper extremity function, decreased lower extremity function, decreased safety awareness, impaired skin, edema, orthopedic precautions.    Rehab Prognosis: Good; patient would benefit from acute skilled PT services to address these deficits and reach maximum level of function.    Recent Surgery: * No surgery found *      Plan:     During this hospitalization, patient to be seen 5 x/week to address the identified rehab impairments via gait training, therapeutic activities, therapeutic exercises and progress toward the following goals:    Plan of Care Expires:  12/01/23    Subjective     Chief Complaint: no complaint  Patient/Family Comments/goals: Pt agreed to participate.  Pain/Comfort:  Pain Rating 1: 0/10  Pain Rating Post-Intervention 1: 0/10      Objective:     Communicated with nurse Clark prior to session.  Patient found HOB elevated with PICC line, telemetry, PureWick upon PT entry to room.     General Precautions: Standard, fall  Orthopedic Precautions:  (B/L CAM boots for all OOB activities)  Braces:  (B/L CAM Boots)  Respiratory Status: Room air     Functional Mobility: B Cam Boot donned by pt with instructions and assistance from PTA  Bed Mobility:     Scooting: anterior scoot to EOB with stand by assistance  Supine to Sit: stand by assistance with HOB elevated  Transfers:   gait  belt and B Cam Boots donned prior OOB activity  Sit to Stand: from EOB with contact guard assistance with rolling walker  Gait: Pt ambulated ~16 ft with CGA using RW. Pt with decreased mahsa/step length, min unsteadiness but no LOB; v/c for , AD management  Balance: Good Sitting; Fair Standing      AM-PAC 6 CLICK MOBILITY  Turning over in bed (including adjusting bedclothes, sheets and blankets)?: 4  Sitting down on and standing up from a chair with arms (e.g., wheelchair, bedside commode, etc.): 3  Moving from lying on back to sitting on the side of the bed?: 4  Moving to and from a bed to a chair (including a wheelchair)?: 3  Need to walk in hospital room?: 3  Climbing 3-5 steps with a railing?: 2  Basic Mobility Total Score: 19       Treatment & Education:    Physical Therapy Treatment     Patient Name:  Estella Arevalo   MRN:  [id number]     Recommendations:      Discharge Recommendations: Moderate Intensity Therapy  Discharge Equipment Recommendations: bedside commode (if discharged to home)  Barriers to discharge:  high fall risk, increased readmission if pt d/c home     Assessment:      Estella Arevalo is a 68 y.o. female admitted with a medical diagnosis of Cellulitis.  She presents with the following impairments/functional limitations: weakness, impaired functional mobility, gait instability, impaired balance, decreased coordination, decreased upper extremity function, decreased lower extremity function, decreased safety awareness, impaired skin, edema.     Rehab Prognosis: Good; patient would benefit from acute skilled PT services to address these deficits and reach maximum level of function.    Recent Surgery: * No surgery found *       Plan:      During this hospitalization, patient to be seen 5 x/week to address the identified rehab impairments via gait training, therapeutic activities, therapeutic exercises and progress toward the following goals:     Plan of Care Expires:  12/01/23      Subjective      Chief Complaint: no complaint  Patient/Family Comments/goals: Pt agreed to participate.  Pain/Comfort:  Pain Rating 1: 0/10  Pain Addressed 1: Pre-medicate for activity  Pain Rating Post-Intervention 1: 0/10        Objective:      Communicated with nurse Clark prior to session.  Patient found HOB elevated with peripheral IV, PureWick, pressure relief boots upon PT entry to room.      General Precautions: Standard, fall  Orthopedic Precautions:  (B/L CAM boots for all OOB acticvities)  Braces:  (B/L CAM Boots)  Respiratory Status: Room air     Functional Mobility:  Bed Mobility:     Scooting: anterior scoot to EOB with stand by assistance  Supine to Sit: stand by assistance with HOB elevated  Transfers:   gait belt and B Cam Boots donned prior OOB activity  Sit to Stand: from EOB with contact guard assistance with rolling walker  Gait: Pt ambulated ~15 ft with CGA using RW. Pt with decreased mahsa/step length, min unsteadiness but no LOB; v/c for , AD management  Balance: Good Sitting; Fair Standing        AM-PAC 6 CLICK MOBILITY  Turning over in bed (including adjusting bedclothes, sheets and blankets)?: 4  Sitting down on and standing up from a chair with arms (e.g., wheelchair, bedside commode, etc.): 3  Moving from lying on back to sitting on the side of the bed?: 4  Moving to and from a bed to a chair (including a wheelchair)?: 3  Need to walk in hospital room?: 3  Climbing 3-5 steps with a railing?: 2  Basic Mobility Total Score: 19                    Treatment & Education:  Re-educated pt on benefits of OOB activity with hospital staff assistance and performing BLE ex throughout the day, pt verbalized understanding.     BLE ex in seated 2 sets x 15 reps AP, LAQ, Marching     Patient left up in chair in reclined position on cushion seat and pillow with BLE offloaded by pillow, tray table near by, Ranjit, all lines intact, hospital phone & call button in reach, and nurse  notified. Pt's room door left open.       GOALS:   Multidisciplinary Problems       Physical Therapy Goals          Problem: Physical Therapy    Goal Priority Disciplines Outcome Goal Variances Interventions   Physical Therapy Goal     PT, PT/OT Ongoing, Progressing     Description: Goals to be met by: 23     Patient will increase functional independence with mobility by performin. Pt to be (S) with bed mobility.  2. Pt to transfer with SBA.  3. Pt to ambulate 15' w/RW CGA, (B) CAM boots.  4. Pt to be (I) with written HEP.                         Time Tracking:     PT Received On: 23  PT Start Time: 1500     PT Stop Time: 1532  PT Total Time (min): 32 min     Billable Minutes: Gait Training 16 min and Therapeutic Exercise 16 min    Treatment Type: Treatment  PT/PTA: PTA     Number of PTA visits since last PT visit: 3     2023

## 2023-11-29 NOTE — ASSESSMENT & PLAN NOTE
One episode hypotension in ED which resolved with IVF; denies symptoms.   Not currently on anti-hypertensives outpatient   Chronic, controlled.   Home meds for hypertension were reviewed and noted to be none.   While in the hospital, will manage blood pressure as follows; Adjust home antihypertensive regimen as follows- not currently on medication outpatient  Will utilize p.r.n. blood pressure medication only if patient's blood pressure greater than 180/110 and she develops symptoms such as worsening chest pain or shortness of breath.

## 2023-11-29 NOTE — PLAN OF CARE
Transportation scheduled for 6:00 pm to [facility]. CM notified nurse, Amber that pt is ready for discharge from CM standpoint. All CM needs met.     11/29/23 1655   Final Note   Assessment Type Final Discharge Note   Anticipated Discharge Disposition Rehab   What phone number can be called within the next 1-3 days to see how you are doing after discharge? [phone]   Hospital Resources/Appts/Education Provided Appointments scheduled and added to AVS   Post-Acute Status   Post-Acute Authorization Placement   Post-Acute Placement Status Set-up Complete/Auth obtained   Coverage PHN   Patient choice form signed by patient/caregiver List from System Post-Acute Care   Discharge Delays (!) PFC Arranged Transportation        3

## 2023-11-29 NOTE — PLAN OF CARE
11/29/23 1520   Medicare Message   Important Message from Medicare regarding Discharge Appeal Rights Given to patient/caregiver;Explained to patient/caregiver;Signed/date by patient/caregiver   Date IMM was signed 11/29/23   Time IMM was signed 1500

## 2023-11-29 NOTE — PLAN OF CARE
[facility]    FACILITY TRANSFER ORDERS      Patient Name: Estella Arevalo  YOB: 1955    PCP: Fiordaliza Ma -   PCP Address: [address] / [address]  PCP Phone Number: [phone]  PCP Fax: [phone]    Encounter Date: 11/29/2023    Admit to: LTAC    Vital Signs:  Routine    Diagnoses:   Active Hospital Problems    Diagnosis  POA    *Osteomyelitis of great toe [M86.9]  Yes    Physical deconditioning [R53.81]  Yes    BLE Cellulitis, Left great toe ulcer [L03.90]  Yes    Alcohol abuse [F10.10]  Yes     Chronic    Essential hypertension [I10]  Yes     Chronic    CAD (coronary artery disease) [I25.10]  Yes     Chronic    Bipolar 1 disorder [F31.9]  Yes     Chronic    Acquired hypothyroidism [E03.9]  Yes     Chronic    Polysubstance dependence including opioid type drug, continuous use [F11.20, F19.20]  Yes     Chronic      Resolved Hospital Problems   No resolved problems to display.       Allergies:  Review of patient's allergies indicates:   Allergen Reactions    Codeine      nausea       Diet: cardiac diet     Activities: Activity as tolerated;  good offloading left forefoot - to wear offloading boots when ambulating    Goals of Care Treatment Preferences:  Code Status: Full Code      Nursing: routine     Labs:    per facility protocol and:  Labs to be drawn on Mondays:   CBC  CMP   CRP    Fax Lab Results to Infectious Diseases Provider:    Dr. Lechuga. [facility] Fax Number: [phone]    CONSULTS:    Physical Therapy to evaluate and treat. , Occupational Therapy to evaluate and treat., and  to evaluate for community resources/long-range planning.    MISCELLANEOUS CARE:  Routine Skin for Bedridden Patients: Apply moisture barrier cream to all skin folds and wet areas in perineal area daily and after baths and all bowel movements.    WOUND CARE ORDERS  Local wound care to perineum, groin, and gluteal cleft every shift and prn- Cleanse with Remedy No  Rinse foaming cleanser. Apply Miconazole to reddened areas. Cover with Remedy Clear Moisture Barrier (blue top tube).  1. Cleanse toe with vashe and evaluate for acute changes (purulence, increased redness/swelling, increased drainage, malodor, increased pain, pallor, necrosis) please contact physician on any acute changes  2. Pat completely dry  3. Mepilex Ag around left hallux, secured with Kerlix, Ace with moderate compression every M-W-F  CAM boot with all ambulation    Medications: Review discharge medications with patient and family and provide education.      Intravenous antibiotics:  Ancef 2 grams IV q 8 hours x 4-6 weeks  Estimated end date of IV antibiotics: 01/05/2024     Current Discharge Medication List        START taking these medications    Details   ceFAZolin 2 g/50mL Dextrose IVPB (ANCEF) 2 gram/50 mL PgBk Inject 50 mLs (2,000 mg total) into the vein every 8 (eight) hours.           CONTINUE these medications which have CHANGED    Details   traZODone (DESYREL) 100 MG tablet Take 1 tablet (100 mg total) by mouth every evening.           CONTINUE these medications which have NOT CHANGED    Details   ARIPiprazole (ABILIFY) 10 MG Tab Take 10 mg by mouth once daily.      gabapentin (NEURONTIN) 600 MG tablet Take 600 mg by mouth 3 (three) times daily.      naltrexone (DEPADE) 50 mg tablet Take 50 mg by mouth every evening.      EScitalopram oxalate (LEXAPRO) 20 MG tablet Take 1 tablet (20 mg total) by mouth once daily.  Qty: 30 tablet, Refills: 5    Associated Diagnoses: Anxiety; Bipolar 1 disorder      folic acid/multivit-min/lutein (CENTRUM SILVER ORAL) Take 1 tablet by mouth.      levothyroxine (SYNTHROID) 25 MCG tablet Take 1 tablet (25 mcg total) by mouth once daily.  Qty: 90 tablet, Refills: 3    Associated Diagnoses: Hypothyroidism due to acquired atrophy of thyroid      LINZESS 145 mcg Cap capsule Take 145 mcg by mouth.      pantoprazole (PROTONIX) 40 MG tablet Take 1 tablet (40 mg total) by  mouth 2 (two) times daily.  Qty: 180 tablet, Refills: 3    Associated Diagnoses: Gastroesophageal reflux disease, unspecified whether esophagitis present      PROCTOZONE-HC 2.5 % rectal cream STACI RECTALLY BID  Qty: 60 g, Refills: 2    Associated Diagnoses: Hemorrhoids, unspecified hemorrhoid type           STOP taking these medications       cloNIDine (CATAPRES) 0.1 MG tablet Comments:   Reason for Stopping:         cycloSPORINE (RESTASIS) 0.05 % ophthalmic emulsion Comments:   Reason for Stopping:         NARCAN 4 mg/actuation Spry Comments:   Reason for Stopping:              Immunizations Administered as of 11/29/2023       No immunizations on file.           Follow-up Information       Fiordaliza Ma - Follow up.    [facility]: [facility] Follow-up in 3 days with Nina Anderson NP  Contact information:  [address] [address] [phone]               Lena Fields DPM. Schedule an appointment as soon as possible for a visit in 3 week(s).    Specialties: Podiatry, Wound Care  Why: For discharge from hospital follow up, Wound check  Contact information:  [address] [address] [phone]                             Lizzie Benavidez MD  11/29/2023

## 2023-11-29 NOTE — NURSING
Received report from MARISSA Melgar. Patient lying in bed resting, NAD noted. Safety Precautions maintained, Will Monitor.

## 2023-11-29 NOTE — PLAN OF CARE
ADT 30 order placed for Van Transportation.  Requested  time: 6:00 pm.  If transportation does not arrive at ETA time nurse will be instructed to follow protocol for transportation below:  How can I get in touch directly with dispatch, if needed?                 Non-emergent dispatch: [phone] option 6    +++NURSING:  If Van does not arrive at requested time please call the above Non Emergent Dispatcher.  If issue not resolved please escalate to your charge nurse for further instructions.

## 2023-11-29 NOTE — CONSULTS
Sitting up in chair at bedside on air cushion with Versette in place wearing CAM boots bilaterally. Staff assisted patient to bed with moderate assistance.   Assessment:  Reports skin breakdown perineum improved since visit 11/27. Nursing reports they apply miconazole - not pharmacy.   Buttocks/gluteal cleft- drying darkened area 10 cm (L). Not denuded. Yeast + moisture associated skin damage.   Perineum/groin- Light red area with skin dry; not denuded  Treatment/Plan:  Discussed treatment plan with nursing and reinforced use of Remedy products with miconazole that is ordered bid.

## 2023-11-29 NOTE — ASSESSMENT & PLAN NOTE
Admission to hospital for BLE cellulitis and left great toe ulcer    Presents with erythema and swelling to left leg and toe which reoccured after finishing PO doxy after last hospitalization. Also developed erythema to right leg.   Afebrile without leukocytosis, Sed rate/CRP elevated.   Denied IVDU but admitted to snorting cocaine recently  MRI of left forefoot concern for OM of 1st distal phalanx tuff with overlying cellulitis   BLE erythema and edema significantly improved  ID recommended 6 weeks of Ancef for left foot OM  LTAC placement

## 2023-11-29 NOTE — ASSESSMENT & PLAN NOTE
MRI forefoot osteomyelitis of the 1st distal phalanx tuft with overlying cellulitis.   Left great toe ulcer s/p I&D at bedside by Podiatry scabbing over   Recent 11/13 left great toe culture MSSA   ID recommended 6 weeks [facility]  Podiatry aware - offered amputation. Patient selected 6-week IV antibiotics  Midline placement 11/24 - PICC 11/28

## 2023-11-29 NOTE — NURSING
[facility], [facility]  Nurses Note -- 4 Eyes      11/29/2023       Skin assessed on: Q Shift      [] No Pressure Injuries Present    []Prevention Measures Documented    [x] Yes LDA  for Pressure Injury Previously documented     [] Yes New Pressure Injury Discovered   [] LDA for New Pressure Injury Added      Attending RN:  Amber Gama RN     Second RN:  Talia Plunkett RN

## 2023-11-29 NOTE — DISCHARGE SUMMARY
[facility]  Telemedicine Discharge Summary      Patient Name: Estella Arevalo  MRN: [id number]  Patient Class: IP- Inpatient  Admission Date: 11/23/2023  Hospital Length of Stay: 4 days  Discharge Date and Time:  11/29/2023 3:37 PM  Attending Physician: Lizzie Benavidez MD   Discharging Provider: Lizzie Benavidez MD  Primary Care Provider: [facility] -      HPI:   Estella Arevalo 68 y.o. female with alcohol abuse, cocaine abuse, THC, CAD, presents to the hospital with a chief complaint of leg swelling and erythema.  She reports she was seen 2 weeks ago and hospitalized for lower extremity erythema. She was discharged on oral doxycycline which she reports she was compliant.  She states initially there erythema improved while on the oral doxycycline, but after finishing the erythema returned and then returned to her right leg.  She reports she walks barefoot in her home.  She reports she has chronic back pain.  She does report using cocaine 1 time since discharge.  She denies any known trauma to the feet.  She denies fever chest pain shortness of breath nausea vomiting abdominal pain melena hematuria hematemesis dizziness and syncope.     In the ED, afebrile without without leukocytosis sed rate 51 CRP 64.3. of care lactic acid negative chest x-ray without large focal consolidation and interstitial findings accentuated by shallow inspiratory effort.    * No surgery found *      Hospital Course:   Admission to hospital for BLE cellulitis found to have OM left 1st distal phalanx tuff. Denied IVDU but admitted to snorting cocaine 3-4 days PTA. MRI of left forefoot concern for OM of 1st distal phalanx tuff with overlying cellulitis. ID recommended 6 weeks of Ancef. Follow up Podiatry further rec.  PT/OT evaluation completed. Patient agreed to SNF. TN/SW consult.      See Problems listed below for additional details of this hospital stay.      Goals of Care Treatment  Preferences:  Code Status: Full Code      Consults:   Consults (From admission, onward)          Status Ordering Provider     Inpatient virtual consult to Hospital Medicine  Once        Provider:  Lizzie Benavidez MD    Completed HEART, JOE AISHA     Inpatient consult to Social Work  Once        Provider:  (Not yet assigned)    Completed HEART, JOE AISHA     Inpatient consult to PICC team (NIAS)  Once        Provider:  (Not yet assigned)    Completed HEART, JOE AISHA     Inpatient consult to Infectious Diseases  Once        Provider:  Jeremy Lechuga MD    Completed HEART, JOE AISHA     Inpatient consult to Midline team  Once        Provider:  (Not yet assigned)    Completed RAFA DENT     Case Management/  Once        Provider:  (Not yet assigned)    Completed RAFA DENT     Inpatient consult to Podiatry  Once        Provider:  Raquel Howard DPM    Completed RAFA DENT            Psychiatric  Alcohol abuse  Has not used alcohol since alcohol rehab early this November  Reports continued cessation of alcohol    Bipolar 1 disorder  Reports not currently on medication outpatient. Affect and mood appropriate  Continue Lexapro and Abilify    Polysubstance dependence including opioid type drug, continuous use  Counseled on cessation.  Snorted cocaine 3-4 days prior to admission. Denies IVDU  Urine tox positive for benzo and amphetamine    Cardiac/Vascular  CAD (coronary artery disease)  Listed on problem list though no previous LHC to compare. Not currently on medication outpatient. Denies chest pain.     Essential hypertension  One episode hypotension in ED which resolved with IVF; denies symptoms.   Not currently on anti-hypertensives outpatient   Chronic, controlled.   Home meds for hypertension were reviewed and noted to be none.   While in the hospital, will manage blood pressure as follows; Adjust home antihypertensive regimen as follows- not currently on medication  outpatient  Will utilize p.r.n. blood pressure medication only if patient's blood pressure greater than 180/110 and she develops symptoms such as worsening chest pain or shortness of breath.    ID  * Osteomyelitis of great toe  MRI forefoot osteomyelitis of the 1st distal phalanx tuft with overlying cellulitis.   Left great toe ulcer s/p I&D at bedside by Podiatry scabbing over   Recent 11/13 left great toe culture MSSA   ID recommended 6 weeks Ancef  Podiatry aware - offered amputation. Patient selected 6-week IV antibiotics  Midline placement 11/24 - PICC 11/28    BLE Cellulitis, Left great toe ulcer  Admission to hospital for BLE cellulitis and left great toe ulcer    Presents with erythema and swelling to left leg and toe which reoccured after finishing PO doxy after last hospitalization. Also developed erythema to right leg.   Afebrile without leukocytosis, Sed rate/CRP elevated.   Denied IVDU but admitted to snorting cocaine recently  MRI of left forefoot concern for OM of 1st distal phalanx tuff with overlying cellulitis   BLE erythema and edema significantly improved  ID recommended 6 weeks of Ancef for left foot OM  LTAC placement    Endocrine  Acquired hypothyroidism  Lab Results   Component Value Date    TSH 1.661 04/20/2022     Continue home Synthroid    Other  Physical deconditioning  PT/OT recommends mod intensity. Patient agreed to placement  SW/TN consult for LTAC      Final Active Diagnoses:    Diagnosis Date Noted POA    PRINCIPAL PROBLEM:  Osteomyelitis of great toe [M86.9] 11/25/2023 Yes    Physical deconditioning [R53.81] 11/25/2023 Yes    BLE Cellulitis, Left great toe ulcer [L03.90] 11/12/2023 Yes    Alcohol abuse [F10.10] 09/25/2019 Yes     Chronic    Essential hypertension [I10] 07/16/2018 Yes     Chronic    CAD (coronary artery disease) [I25.10] 07/16/2018 Yes     Chronic    Bipolar 1 disorder [F31.9] 12/30/2016 Yes     Chronic    Acquired hypothyroidism [E03.9] 12/19/2014 Yes     Chronic     Polysubstance dependence including opioid type drug, continuous use [F11.20, F19.20] 07/14/2013 Yes     Chronic      Problems Resolved During this Admission:       Discharged Condition: stable    Disposition: Long Term Acute Care    Follow Up:   Follow-up Information       Fiordaliza Ma - Follow up.    Why: [facility] Follow-up in 3 days with Nina Anderson NP  Contact information:  [address] [address] [phone]               Lena Fields DPM. Schedule an appointment as soon as possible for a visit in 3 week(s).    Specialties: Podiatry, Wound Care  Why: For discharge from hospital follow up, Wound check  Contact information:  [address] [address] [phone]                           Patient Instructions:      Diet Cardiac     Change dressing (specify)   Order Comments: Dressing change: MWF - see LTAC orders     Activity as tolerated   Order Comments: Ambulate in CAM boots       Significant Diagnostic Studies:   Recent Labs   Lab 11/25/23  0637 11/27/23  0352 11/28/23  0609   WBC 3.71* 3.04* 3.07*   HGB 11.8* 12.1 12.5   HCT 36.0* 36.8* 38.7    233 243     Recent Labs   Lab 11/25/23  0637 11/27/23  0352 11/28/23  0609   GRAN 56.6  2.1 40.8  1.2* 44.1  1.4*   LYMPH 19.1  0.7* 32.2  1.0 30.9  1.0   MONO 9.7  0.4 10.2  0.3 9.1  0.3   EOS 0.5 0.5 0.4     Recent Labs   Lab 11/23/23  1244 11/24/23  0415 11/25/23  0637 11/27/23  0352 11/28/23  0610      < > 141 142 139   K 3.2*   < > 3.2* 3.5 3.7      < > 109 107 106   CO2 21*   < > 21* 25 22*   BUN 13   < > 6* 6* 8   CREATININE 0.7   < > 0.7 0.6 0.6   GLU 89   < > 96 91 91   CALCIUM 9.4   < > 8.5* 8.9 8.8   ALBUMIN 3.8   < > 3.1* 3.2* 3.1*   MG 1.8  --   --   --  2.0   PHOS  --   --   --   --  3.7    < > = values in this interval not displayed.     Recent Labs   Lab 11/23/23  1244 11/23/23 2025 11/24/23  0415 11/25/23  0637 11/26/23  0501 11/27/23  0352   ALKPHOS 139*  --   135 121  --  102   ALT 34  --  35 37  --  17   AST 87*  --  79* 63*  --  30   PROT 7.8  --  6.4 6.4  --  6.5   BILITOT 1.4*  --  0.9 0.5  --  0.4   INR  --  1.1  --   --   --   --    CRP 64.3*  --   --   --  16.5*  --      Recent Labs   Lab 11/12/23  1810 11/12/23  1818 11/23/23  1244 11/23/23  1646 11/26/23  0501   LACTATE  --  1.1  --  1.0  --    SEDRATE 33*  --  51*  --  30*     SARS-CoV-2 RNA, Amplification, Qual (no units)   Date Value   04/06/2022 Negative   05/24/2020 Negative     POC Rapid COVID (no units)   Date Value   11/02/2023 Negative   08/20/2022 Negative   06/05/2022 Negative   04/20/2022 Negative   01/10/2022 Negative   08/16/2021 Negative   01/27/2021 Negative     ECG Results              EKG 12-lead (Final result)  Result time 11/24/23 06:46:55      Final result by Interface, Lab In [facility] (11/24/23 06:46:55)                   Narrative:    Test Reason : I95.9,    Vent. Rate : 084 BPM     Atrial Rate : 084 BPM     P-R Int : 154 ms          QRS Dur : 088 ms      QT Int : 404 ms       P-R-T Axes : 037 034 022 degrees     QTc Int : 477 ms    Normal sinus rhythm  Cannot rule out Anterior infarct (cited on or before 22-JUL-2022)  Abnormal ECG  When compared with ECG of 12-NOV-2023 16:36,  Significant changes have occurred  Confirmed by Devin CHAIDEZ, Wayne PLUMMER ([id number]) on 11/24/2023 6:46:46 AM    Referred By: [email or web address]   SELF           Confirmed By:Wayne Beltran MD                                X-Ray Chest AP Portable  Narrative: EXAMINATION:  XR CHEST AP PORTABLE    CLINICAL HISTORY:  picc placement;    TECHNIQUE:  Single frontal view of the chest was performed.    COMPARISON:  None    FINDINGS:  Left PICC tip terminates mid to low SVC.  Cardiac monitoring leads are noted.    Grossly unchanged cardiomediastinal contours.    No acute detrimental change in appearance of lung fields when compared to 11/23/2023, 12:56 hours examination.    No definite pneumothorax or large volume pleural effusion.    Remainder  examination not substantially changed.  Impression: As above.    Electronically signed by: Dhiraj Carver  Date:    11/28/2023  Time:    12:22       Medications:  Reconciled Home Medications:      Medication List        START taking these medications      ceFAZolin 2 g/50mL Dextrose IVPB 2 gram/50 mL Pgbk  Commonly known as: ANCEF  Inject 50 mLs (2,000 mg total) into the vein every 8 (eight) hours.            CHANGE how you take these medications      traZODone 100 MG tablet  Commonly known as: DESYREL  Take 1 tablet (100 mg total) by mouth every evening.  What changed:   medication strength  how much to take            CONTINUE taking these medications      ARIPiprazole 10 MG Tab  Commonly known as: ABILIFY  Take 10 mg by mouth once daily.     CENTRUM SILVER ORAL  Take 1 tablet by mouth.     EScitalopram oxalate 20 MG tablet  Commonly known as: LEXAPRO  Take 1 tablet (20 mg total) by mouth once daily.     gabapentin 600 MG tablet  Commonly known as: NEURONTIN  Take 600 mg by mouth 3 (three) times daily.     levothyroxine 25 MCG tablet  Commonly known as: SYNTHROID  Take 1 tablet (25 mcg total) by mouth once daily.     LINZESS 145 mcg Cap capsule  Generic drug: linaCLOtide  Take 145 mcg by mouth.     naltrexone 50 mg tablet  Commonly known as: DEPADE  Take 50 mg by mouth every evening.     pantoprazole 40 MG tablet  Commonly known as: PROTONIX  Take 1 tablet (40 mg total) by mouth 2 (two) times daily.     PROCTOZONE-HC 2.5 % rectal cream  Generic drug: hydrocortisone  STACI RECTALLY BID            STOP taking these medications      cloNIDine 0.1 MG tablet  Commonly known as: CATAPRES     cycloSPORINE 0.05 % ophthalmic emulsion  Commonly known as: RESTASIS     NARCAN 4 mg/actuation Spry  Generic drug: naloxone            Indwelling Lines/Drains at time of discharge:   Lines/Drains/Airways       Peripherally Inserted Central Catheter Line  Duration             PICC Double Lumen 11/28/23 1135 left basilic 1 day         Time spent on the discharge of patient: 50 minutes  This service was provided by Virtual Visit without a Telepresenter.   Patient was seen and examined on the date of discharge.  Additional time was spent speaking with consultants and case management, reviewing records, and/or discussing the plan of care with patient/family.    The patient location: [id number]/[id number]  Admitted 11/23/2023 12:10 PM  Patient was transferred to [facility] on:  11/27/23    This document was partially prepared by chart review and may not directly reflect my personal knowledge of the patient's case, clinical course, or significant events during the hospital stay.    The attending portion of this evaluation, treatment, and documentation was performed per Lizzie Benavidez MD via Telemedicine AudioVisual using the secure [email or web address] software platform with 2 way audio/video. The provider was located off-site and the patient is located in the hospital. The aforementioned video software was utilized to document the relevant history and physical exam    Lizzie Benavidez MD  Department of Hospital Medicine  [facility]

## 2023-11-29 NOTE — NURSING
[facility] [place]  Nurses Note -- 4 Eyes      11/28/2023       Skin assessed on: Q Shift      [] No Pressure Injuries Present    []Prevention Measures Documented    [x] Yes LDA  for Pressure Injury Previously documented     [] Yes New Pressure Injury Discovered   [] LDA for New Pressure Injury Added      Attending RN:  ABIMBOLA BROOKS RN     Second RN:  Amber ANN

## 2023-11-30 LAB — BACTERIA SPEC ANAEROBE CULT: NORMAL

## 2023-11-30 NOTE — NURSING
Patient transported off unit via stretcher with [facility], transferred to [facility].

## 2023-12-26 LAB — FUNGUS SPEC CULT: NORMAL

## 2023-12-28 NOTE — PROGRESS NOTES
CHW - Outreach Attempt    Community Health Worker to attempt to contact patient on: 12/28/23  2nd missed follow up visit attempt call.

## 2024-01-16 LAB
ACID FAST MOD KINY STN SPEC: NORMAL
MYCOBACTERIUM SPEC QL CULT: NORMAL

## 2024-01-19 ENCOUNTER — PATIENT OUTREACH (OUTPATIENT)
Dept: ADMINISTRATIVE | Facility: OTHER | Age: 69
End: 2024-01-19
Payer: MEDICARE

## 2024-01-27 ENCOUNTER — HOSPITAL ENCOUNTER (INPATIENT)
Facility: HOSPITAL | Age: 69
LOS: 3 days | Discharge: HOME HEALTH | DRG: 989 | End: 2024-01-31
Attending: EMERGENCY MEDICINE | Admitting: HOSPITALIST
Payer: MEDICARE

## 2024-01-27 DIAGNOSIS — M86.9 OSTEOMYELITIS OF GREAT TOE: ICD-10-CM

## 2024-01-27 DIAGNOSIS — I70.223 CRITICAL LIMB ISCHEMIA OF BOTH LOWER EXTREMITIES: ICD-10-CM

## 2024-01-27 DIAGNOSIS — L97.522 SKIN ULCER OF TOE OF LEFT FOOT WITH FAT LAYER EXPOSED: Primary | ICD-10-CM

## 2024-01-27 DIAGNOSIS — R07.9 CHEST PAIN: ICD-10-CM

## 2024-01-27 DIAGNOSIS — L97.522 SKIN ULCER OF TOE OF LEFT FOOT WITH FAT LAYER EXPOSED: ICD-10-CM

## 2024-01-27 DIAGNOSIS — L03.90 CELLULITIS: ICD-10-CM

## 2024-01-27 DIAGNOSIS — S99.921A INJURY, FOOT, RIGHT, INITIAL ENCOUNTER: ICD-10-CM

## 2024-01-27 DIAGNOSIS — S99.922A INJURY, FOOT, LEFT, INITIAL ENCOUNTER: ICD-10-CM

## 2024-01-27 LAB
ALBUMIN SERPL BCP-MCNC: 3.2 G/DL (ref 3.5–5.2)
ALLENS TEST: NORMAL
ALP SERPL-CCNC: 132 U/L (ref 55–135)
ALT SERPL W/O P-5'-P-CCNC: 29 U/L (ref 10–44)
AMPHET+METHAMPHET UR QL: ABNORMAL
ANION GAP SERPL CALC-SCNC: 11 MMOL/L (ref 8–16)
AST SERPL-CCNC: 66 U/L (ref 10–40)
BARBITURATES UR QL SCN>200 NG/ML: NEGATIVE
BASOPHILS # BLD AUTO: 0.04 K/UL (ref 0–0.2)
BASOPHILS NFR BLD: 0.9 % (ref 0–1.9)
BENZODIAZ UR QL SCN>200 NG/ML: NEGATIVE
BILIRUB SERPL-MCNC: 0.6 MG/DL (ref 0.1–1)
BILIRUB UR QL STRIP: NEGATIVE
BNP SERPL-MCNC: 62 PG/ML (ref 0–99)
BUN SERPL-MCNC: 5 MG/DL (ref 8–23)
BZE UR QL SCN: NEGATIVE
CALCIUM SERPL-MCNC: 8.4 MG/DL (ref 8.7–10.5)
CANNABINOIDS UR QL SCN: NEGATIVE
CHLORIDE SERPL-SCNC: 100 MMOL/L (ref 95–110)
CLARITY UR: CLEAR
CO2 SERPL-SCNC: 25 MMOL/L (ref 23–29)
COLOR UR: YELLOW
CREAT SERPL-MCNC: 0.7 MG/DL (ref 0.5–1.4)
CREAT UR-MCNC: 27.6 MG/DL (ref 15–325)
DELSYS: NORMAL
DIFFERENTIAL METHOD BLD: ABNORMAL
EOSINOPHIL # BLD AUTO: 0.2 K/UL (ref 0–0.5)
EOSINOPHIL NFR BLD: 4.2 % (ref 0–8)
ERYTHROCYTE [DISTWIDTH] IN BLOOD BY AUTOMATED COUNT: 16.1 % (ref 11.5–14.5)
EST. GFR  (NO RACE VARIABLE): >60 ML/MIN/1.73 M^2
GLUCOSE SERPL-MCNC: 97 MG/DL (ref 70–110)
GLUCOSE UR QL STRIP: NEGATIVE
HCT VFR BLD AUTO: 36.5 % (ref 37–48.5)
HGB BLD-MCNC: 11.7 G/DL (ref 12–16)
HGB UR QL STRIP: NEGATIVE
IMM GRANULOCYTES # BLD AUTO: 0.01 K/UL (ref 0–0.04)
IMM GRANULOCYTES NFR BLD AUTO: 0.2 % (ref 0–0.5)
KETONES UR QL STRIP: NEGATIVE
LACTATE SERPL-SCNC: 1.7 MMOL/L (ref 0.5–2.2)
LDH SERPL L TO P-CCNC: 1.8 MMOL/L (ref 0.5–2.2)
LEUKOCYTE ESTERASE UR QL STRIP: NEGATIVE
LYMPHOCYTES # BLD AUTO: 0.7 K/UL (ref 1–4.8)
LYMPHOCYTES NFR BLD: 16.2 % (ref 18–48)
MCH RBC QN AUTO: 29.8 PG (ref 27–31)
MCHC RBC AUTO-ENTMCNC: 32.1 G/DL (ref 32–36)
MCV RBC AUTO: 93 FL (ref 82–98)
METHADONE UR QL SCN>300 NG/ML: NEGATIVE
MONOCYTES # BLD AUTO: 0.3 K/UL (ref 0.3–1)
MONOCYTES NFR BLD: 8 % (ref 4–15)
NEUTROPHILS # BLD AUTO: 3 K/UL (ref 1.8–7.7)
NEUTROPHILS NFR BLD: 70.5 % (ref 38–73)
NITRITE UR QL STRIP: NEGATIVE
NRBC BLD-RTO: 0 /100 WBC
OPIATES UR QL SCN: NEGATIVE
PCP UR QL SCN>25 NG/ML: NEGATIVE
PH UR STRIP: 7 [PH] (ref 5–8)
PLATELET # BLD AUTO: 241 K/UL (ref 150–450)
PMV BLD AUTO: 10.1 FL (ref 9.2–12.9)
POTASSIUM SERPL-SCNC: 3.7 MMOL/L (ref 3.5–5.1)
PROCALCITONIN SERPL IA-MCNC: 0.05 NG/ML
PROT SERPL-MCNC: 7 G/DL (ref 6–8.4)
PROT UR QL STRIP: NEGATIVE
RBC # BLD AUTO: 3.92 M/UL (ref 4–5.4)
SAMPLE: NORMAL
SITE: NORMAL
SODIUM SERPL-SCNC: 136 MMOL/L (ref 136–145)
SP GR UR STRIP: 1 (ref 1–1.03)
TOXICOLOGY INFORMATION: ABNORMAL
TROPONIN I SERPL DL<=0.01 NG/ML-MCNC: <0.006 NG/ML (ref 0–0.03)
URN SPEC COLLECT METH UR: NORMAL
UROBILINOGEN UR STRIP-ACNC: NEGATIVE EU/DL
WBC # BLD AUTO: 4.26 K/UL (ref 3.9–12.7)

## 2024-01-27 PROCEDURE — 93005 ELECTROCARDIOGRAM TRACING: CPT

## 2024-01-27 PROCEDURE — 80307 DRUG TEST PRSMV CHEM ANLYZR: CPT | Performed by: EMERGENCY MEDICINE

## 2024-01-27 PROCEDURE — 87040 BLOOD CULTURE FOR BACTERIA: CPT | Mod: 59 | Performed by: EMERGENCY MEDICINE

## 2024-01-27 PROCEDURE — 96375 TX/PRO/DX INJ NEW DRUG ADDON: CPT

## 2024-01-27 PROCEDURE — 81003 URINALYSIS AUTO W/O SCOPE: CPT

## 2024-01-27 PROCEDURE — 85025 COMPLETE CBC W/AUTO DIFF WBC: CPT | Performed by: EMERGENCY MEDICINE

## 2024-01-27 PROCEDURE — 25000003 PHARM REV CODE 250: Performed by: EMERGENCY MEDICINE

## 2024-01-27 PROCEDURE — 96365 THER/PROPH/DIAG IV INF INIT: CPT

## 2024-01-27 PROCEDURE — 99285 EMERGENCY DEPT VISIT HI MDM: CPT | Mod: 25

## 2024-01-27 PROCEDURE — G0378 HOSPITAL OBSERVATION PER HR: HCPCS

## 2024-01-27 PROCEDURE — 80053 COMPREHEN METABOLIC PANEL: CPT | Performed by: EMERGENCY MEDICINE

## 2024-01-27 PROCEDURE — 93010 ELECTROCARDIOGRAM REPORT: CPT | Mod: 76,,, | Performed by: INTERNAL MEDICINE

## 2024-01-27 PROCEDURE — 83605 ASSAY OF LACTIC ACID: CPT

## 2024-01-27 PROCEDURE — 83880 ASSAY OF NATRIURETIC PEPTIDE: CPT | Performed by: EMERGENCY MEDICINE

## 2024-01-27 PROCEDURE — 84484 ASSAY OF TROPONIN QUANT: CPT | Performed by: EMERGENCY MEDICINE

## 2024-01-27 PROCEDURE — 99900035 HC TECH TIME PER 15 MIN (STAT)

## 2024-01-27 PROCEDURE — 63600175 PHARM REV CODE 636 W HCPCS: Mod: JZ,JG | Performed by: EMERGENCY MEDICINE

## 2024-01-27 PROCEDURE — 83605 ASSAY OF LACTIC ACID: CPT | Performed by: EMERGENCY MEDICINE

## 2024-01-27 PROCEDURE — 84145 PROCALCITONIN (PCT): CPT | Performed by: EMERGENCY MEDICINE

## 2024-01-27 RX ORDER — ACETAMINOPHEN 325 MG/1
650 TABLET ORAL EVERY 4 HOURS PRN
Status: DISCONTINUED | OUTPATIENT
Start: 2024-01-27 | End: 2024-01-31 | Stop reason: HOSPADM

## 2024-01-27 RX ORDER — CLINDAMYCIN PHOSPHATE 900 MG/50ML
900 INJECTION, SOLUTION INTRAVENOUS
Status: DISCONTINUED | OUTPATIENT
Start: 2024-01-27 | End: 2024-01-27

## 2024-01-27 RX ORDER — LANOLIN ALCOHOL/MO/W.PET/CERES
800 CREAM (GRAM) TOPICAL
Status: DISCONTINUED | OUTPATIENT
Start: 2024-01-27 | End: 2024-01-31 | Stop reason: HOSPADM

## 2024-01-27 RX ORDER — AMOXICILLIN 250 MG
1 CAPSULE ORAL DAILY PRN
Status: DISCONTINUED | OUTPATIENT
Start: 2024-01-27 | End: 2024-01-31 | Stop reason: HOSPADM

## 2024-01-27 RX ORDER — MORPHINE SULFATE 4 MG/ML
4 INJECTION, SOLUTION INTRAMUSCULAR; INTRAVENOUS
Status: ACTIVE | OUTPATIENT
Start: 2024-01-27 | End: 2024-01-28

## 2024-01-27 RX ORDER — KETOROLAC TROMETHAMINE 30 MG/ML
15 INJECTION, SOLUTION INTRAMUSCULAR; INTRAVENOUS
Status: COMPLETED | OUTPATIENT
Start: 2024-01-27 | End: 2024-01-27

## 2024-01-27 RX ORDER — SODIUM CHLORIDE 0.9 % (FLUSH) 0.9 %
10 SYRINGE (ML) INJECTION
Status: DISCONTINUED | OUTPATIENT
Start: 2024-01-27 | End: 2024-01-31 | Stop reason: HOSPADM

## 2024-01-27 RX ORDER — SODIUM CHLORIDE 0.9 % (FLUSH) 0.9 %
10 SYRINGE (ML) INJECTION
Status: DISCONTINUED | OUTPATIENT
Start: 2024-01-27 | End: 2024-01-29

## 2024-01-27 RX ORDER — GLUCAGON 1 MG
1 KIT INJECTION
Status: DISCONTINUED | OUTPATIENT
Start: 2024-01-27 | End: 2024-01-31 | Stop reason: HOSPADM

## 2024-01-27 RX ORDER — NALOXONE HCL 0.4 MG/ML
0.02 VIAL (ML) INJECTION
Status: DISCONTINUED | OUTPATIENT
Start: 2024-01-27 | End: 2024-01-31 | Stop reason: HOSPADM

## 2024-01-27 RX ORDER — IBUPROFEN 200 MG
16 TABLET ORAL
Status: DISCONTINUED | OUTPATIENT
Start: 2024-01-27 | End: 2024-01-31 | Stop reason: HOSPADM

## 2024-01-27 RX ORDER — LEVOTHYROXINE SODIUM 25 UG/1
25 TABLET ORAL
Status: DISCONTINUED | OUTPATIENT
Start: 2024-01-28 | End: 2024-01-31 | Stop reason: HOSPADM

## 2024-01-27 RX ORDER — TALC
6 POWDER (GRAM) TOPICAL NIGHTLY PRN
Status: DISCONTINUED | OUTPATIENT
Start: 2024-01-27 | End: 2024-01-31 | Stop reason: HOSPADM

## 2024-01-27 RX ORDER — SODIUM CHLORIDE 0.9 % (FLUSH) 0.9 %
10 SYRINGE (ML) INJECTION EVERY 6 HOURS
Status: DISCONTINUED | OUTPATIENT
Start: 2024-01-28 | End: 2024-01-29

## 2024-01-27 RX ORDER — SODIUM CHLORIDE 0.9 % (FLUSH) 0.9 %
10 SYRINGE (ML) INJECTION EVERY 8 HOURS
Status: DISCONTINUED | OUTPATIENT
Start: 2024-01-27 | End: 2024-01-27

## 2024-01-27 RX ORDER — IBUPROFEN 200 MG
24 TABLET ORAL
Status: DISCONTINUED | OUTPATIENT
Start: 2024-01-27 | End: 2024-01-31 | Stop reason: HOSPADM

## 2024-01-27 RX ADMIN — VANCOMYCIN HYDROCHLORIDE 2000 MG: 500 INJECTION, POWDER, LYOPHILIZED, FOR SOLUTION INTRAVENOUS at 08:01

## 2024-01-27 RX ADMIN — CLINDAMYCIN PHOSPHATE 900 MG: 900 INJECTION, SOLUTION INTRAVENOUS at 08:01

## 2024-01-27 RX ADMIN — KETOROLAC TROMETHAMINE 15 MG: 30 INJECTION, SOLUTION INTRAMUSCULAR; INTRAVENOUS at 08:01

## 2024-01-27 RX ADMIN — SODIUM CHLORIDE 1710 ML: 9 INJECTION, SOLUTION INTRAVENOUS at 08:01

## 2024-01-28 LAB
ALBUMIN SERPL BCP-MCNC: 3 G/DL (ref 3.5–5.2)
ALP SERPL-CCNC: 125 U/L (ref 55–135)
ALT SERPL W/O P-5'-P-CCNC: 26 U/L (ref 10–44)
ANION GAP SERPL CALC-SCNC: 10 MMOL/L (ref 8–16)
AST SERPL-CCNC: 50 U/L (ref 10–40)
BASOPHILS # BLD AUTO: 0.04 K/UL (ref 0–0.2)
BASOPHILS NFR BLD: 0.9 % (ref 0–1.9)
BILIRUB SERPL-MCNC: 0.6 MG/DL (ref 0.1–1)
BUN SERPL-MCNC: 5 MG/DL (ref 8–23)
CALCIUM SERPL-MCNC: 8.2 MG/DL (ref 8.7–10.5)
CHLORIDE SERPL-SCNC: 103 MMOL/L (ref 95–110)
CO2 SERPL-SCNC: 24 MMOL/L (ref 23–29)
CREAT SERPL-MCNC: 0.7 MG/DL (ref 0.5–1.4)
CRP SERPL-MCNC: 33 MG/L (ref 0–8.2)
DIFFERENTIAL METHOD BLD: ABNORMAL
EOSINOPHIL # BLD AUTO: 0.3 K/UL (ref 0–0.5)
EOSINOPHIL NFR BLD: 7.9 % (ref 0–8)
ERYTHROCYTE [DISTWIDTH] IN BLOOD BY AUTOMATED COUNT: 15.9 % (ref 11.5–14.5)
ERYTHROCYTE [SEDIMENTATION RATE] IN BLOOD BY WESTERGREN METHOD: 21 MM/HR (ref 0–20)
EST. GFR  (NO RACE VARIABLE): >60 ML/MIN/1.73 M^2
GLUCOSE SERPL-MCNC: 136 MG/DL (ref 70–110)
HCT VFR BLD AUTO: 35.3 % (ref 37–48.5)
HGB BLD-MCNC: 11.4 G/DL (ref 12–16)
IMM GRANULOCYTES # BLD AUTO: 0.01 K/UL (ref 0–0.04)
IMM GRANULOCYTES NFR BLD AUTO: 0.2 % (ref 0–0.5)
LACTATE SERPL-SCNC: 1.3 MMOL/L (ref 0.5–2.2)
LYMPHOCYTES # BLD AUTO: 1.1 K/UL (ref 1–4.8)
LYMPHOCYTES NFR BLD: 26 % (ref 18–48)
MAGNESIUM SERPL-MCNC: 2.4 MG/DL (ref 1.6–2.6)
MCH RBC QN AUTO: 28.9 PG (ref 27–31)
MCHC RBC AUTO-ENTMCNC: 32.3 G/DL (ref 32–36)
MCV RBC AUTO: 89 FL (ref 82–98)
MONOCYTES # BLD AUTO: 0.4 K/UL (ref 0.3–1)
MONOCYTES NFR BLD: 9.1 % (ref 4–15)
NEUTROPHILS # BLD AUTO: 2.4 K/UL (ref 1.8–7.7)
NEUTROPHILS NFR BLD: 55.9 % (ref 38–73)
NRBC BLD-RTO: 0 /100 WBC
PLATELET # BLD AUTO: 240 K/UL (ref 150–450)
PMV BLD AUTO: 9.7 FL (ref 9.2–12.9)
POTASSIUM SERPL-SCNC: 3 MMOL/L (ref 3.5–5.1)
PROT SERPL-MCNC: 6.2 G/DL (ref 6–8.4)
RBC # BLD AUTO: 3.95 M/UL (ref 4–5.4)
SODIUM SERPL-SCNC: 137 MMOL/L (ref 136–145)
WBC # BLD AUTO: 4.3 K/UL (ref 3.9–12.7)

## 2024-01-28 PROCEDURE — 25000003 PHARM REV CODE 250: Performed by: EMERGENCY MEDICINE

## 2024-01-28 PROCEDURE — C1751 CATH, INF, PER/CENT/MIDLINE: HCPCS

## 2024-01-28 PROCEDURE — 87070 CULTURE OTHR SPECIMN AEROBIC: CPT

## 2024-01-28 PROCEDURE — A4216 STERILE WATER/SALINE, 10 ML: HCPCS | Performed by: EMERGENCY MEDICINE

## 2024-01-28 PROCEDURE — 87075 CULTR BACTERIA EXCEPT BLOOD: CPT

## 2024-01-28 PROCEDURE — 63600175 PHARM REV CODE 636 W HCPCS: Performed by: PHYSICIAN ASSISTANT

## 2024-01-28 PROCEDURE — 63600175 PHARM REV CODE 636 W HCPCS: Performed by: HOSPITALIST

## 2024-01-28 PROCEDURE — 83735 ASSAY OF MAGNESIUM: CPT | Performed by: PHYSICIAN ASSISTANT

## 2024-01-28 PROCEDURE — 83605 ASSAY OF LACTIC ACID: CPT | Performed by: EMERGENCY MEDICINE

## 2024-01-28 PROCEDURE — 11044 DBRDMT BONE 1ST 20 SQ CM/<: CPT | Mod: GC,,, | Performed by: PODIATRIST

## 2024-01-28 PROCEDURE — 85652 RBC SED RATE AUTOMATED: CPT | Performed by: PHYSICIAN ASSISTANT

## 2024-01-28 PROCEDURE — 80053 COMPREHEN METABOLIC PANEL: CPT | Performed by: PHYSICIAN ASSISTANT

## 2024-01-28 PROCEDURE — 85025 COMPLETE CBC W/AUTO DIFF WBC: CPT | Performed by: PHYSICIAN ASSISTANT

## 2024-01-28 PROCEDURE — 87186 SC STD MICRODIL/AGAR DIL: CPT | Mod: 59

## 2024-01-28 PROCEDURE — 36569 INSJ PICC 5 YR+ W/O IMAGING: CPT

## 2024-01-28 PROCEDURE — 87077 CULTURE AEROBIC IDENTIFY: CPT | Mod: 59

## 2024-01-28 PROCEDURE — 99223 1ST HOSP IP/OBS HIGH 75: CPT | Mod: 25,,, | Performed by: PODIATRIST

## 2024-01-28 PROCEDURE — 25000003 PHARM REV CODE 250: Performed by: PHYSICIAN ASSISTANT

## 2024-01-28 PROCEDURE — 02HV33Z INSERTION OF INFUSION DEVICE INTO SUPERIOR VENA CAVA, PERCUTANEOUS APPROACH: ICD-10-PCS | Performed by: HOSPITALIST

## 2024-01-28 PROCEDURE — 0QBM0ZZ EXCISION OF LEFT TARSAL, OPEN APPROACH: ICD-10-PCS | Performed by: PODIATRIST

## 2024-01-28 PROCEDURE — 25000003 PHARM REV CODE 250: Performed by: NURSE PRACTITIONER

## 2024-01-28 PROCEDURE — 87205 SMEAR GRAM STAIN: CPT

## 2024-01-28 PROCEDURE — 25000003 PHARM REV CODE 250: Performed by: HOSPITALIST

## 2024-01-28 PROCEDURE — 86140 C-REACTIVE PROTEIN: CPT | Performed by: PHYSICIAN ASSISTANT

## 2024-01-28 PROCEDURE — 11000001 HC ACUTE MED/SURG PRIVATE ROOM

## 2024-01-28 RX ORDER — TRAMADOL HYDROCHLORIDE 50 MG/1
50 TABLET ORAL EVERY 6 HOURS PRN
Status: DISCONTINUED | OUTPATIENT
Start: 2024-01-28 | End: 2024-01-31 | Stop reason: HOSPADM

## 2024-01-28 RX ORDER — PANTOPRAZOLE SODIUM 40 MG/1
40 TABLET, DELAYED RELEASE ORAL DAILY
Status: DISCONTINUED | OUTPATIENT
Start: 2024-01-29 | End: 2024-01-31 | Stop reason: HOSPADM

## 2024-01-28 RX ORDER — PANTOPRAZOLE SODIUM 40 MG/1
40 TABLET, DELAYED RELEASE ORAL 2 TIMES DAILY
Status: DISCONTINUED | OUTPATIENT
Start: 2024-01-28 | End: 2024-01-28

## 2024-01-28 RX ORDER — TRAZODONE HYDROCHLORIDE 50 MG/1
100 TABLET ORAL NIGHTLY
Status: DISCONTINUED | OUTPATIENT
Start: 2024-01-28 | End: 2024-01-31 | Stop reason: HOSPADM

## 2024-01-28 RX ORDER — ARIPIPRAZOLE 10 MG/1
10 TABLET ORAL DAILY
Status: DISCONTINUED | OUTPATIENT
Start: 2024-01-29 | End: 2024-01-31 | Stop reason: HOSPADM

## 2024-01-28 RX ORDER — SODIUM CHLORIDE 0.9 % (FLUSH) 0.9 %
10 SYRINGE (ML) INJECTION
Status: DISCONTINUED | OUTPATIENT
Start: 2024-01-28 | End: 2024-01-31 | Stop reason: HOSPADM

## 2024-01-28 RX ORDER — ESCITALOPRAM OXALATE 10 MG/1
20 TABLET ORAL DAILY
Status: DISCONTINUED | OUTPATIENT
Start: 2024-01-29 | End: 2024-01-31 | Stop reason: HOSPADM

## 2024-01-28 RX ORDER — SODIUM CHLORIDE 0.9 % (FLUSH) 0.9 %
10 SYRINGE (ML) INJECTION EVERY 6 HOURS
Status: DISCONTINUED | OUTPATIENT
Start: 2024-01-28 | End: 2024-01-31 | Stop reason: HOSPADM

## 2024-01-28 RX ORDER — POTASSIUM CHLORIDE 20 MEQ/1
40 TABLET, EXTENDED RELEASE ORAL ONCE
Status: COMPLETED | OUTPATIENT
Start: 2024-01-28 | End: 2024-01-28

## 2024-01-28 RX ORDER — HYDROCODONE BITARTRATE AND ACETAMINOPHEN 5; 325 MG/1; MG/1
1 TABLET ORAL EVERY 6 HOURS PRN
Status: DISCONTINUED | OUTPATIENT
Start: 2024-01-28 | End: 2024-01-31 | Stop reason: HOSPADM

## 2024-01-28 RX ADMIN — POTASSIUM CHLORIDE 40 MEQ: 1500 TABLET, EXTENDED RELEASE ORAL at 08:01

## 2024-01-28 RX ADMIN — LEVOTHYROXINE SODIUM 25 MCG: 25 TABLET ORAL at 06:01

## 2024-01-28 RX ADMIN — Medication 10 ML: at 11:01

## 2024-01-28 RX ADMIN — TRAZODONE HYDROCHLORIDE 100 MG: 50 TABLET ORAL at 11:01

## 2024-01-28 RX ADMIN — HYDROCODONE BITARTRATE AND ACETAMINOPHEN 1 TABLET: 5; 325 TABLET ORAL at 04:01

## 2024-01-28 RX ADMIN — Medication 10 ML: at 06:01

## 2024-01-28 RX ADMIN — VANCOMYCIN HYDROCHLORIDE 1250 MG: 1.25 INJECTION, POWDER, LYOPHILIZED, FOR SOLUTION INTRAVENOUS at 02:01

## 2024-01-28 RX ADMIN — CEFTRIAXONE 1 G: 1 INJECTION, POWDER, FOR SOLUTION INTRAMUSCULAR; INTRAVENOUS at 11:01

## 2024-01-28 RX ADMIN — Medication 10 ML: at 02:01

## 2024-01-28 NOTE — SUBJECTIVE & OBJECTIVE
Interval History: left great toe pain in palpation. AAOx 4 but poor historian    Review of Systems   Constitutional:  Negative for chills and fever.   HENT:  Negative for nosebleeds and tinnitus.    Eyes:  Negative for photophobia and visual disturbance.   Respiratory:  Negative for shortness of breath and wheezing.    Cardiovascular:  Negative for chest pain, palpitations and leg swelling.   Gastrointestinal:  Negative for abdominal distention, nausea and vomiting.   Genitourinary:  Negative for dysuria, flank pain and hematuria.   Musculoskeletal:  Negative for gait problem and joint swelling.   Skin:  Positive for color change, rash and wound.   Neurological:  Negative for seizures and syncope.     Objective:     Vital Signs (Most Recent):  Temp: 98 °F (36.7 °C) (01/28/24 1152)  Pulse: 77 (01/28/24 1152)  Resp: 16 (01/28/24 1152)  BP: 128/69 (01/28/24 1152)  SpO2: (!) 94 % (01/28/24 1152) Vital Signs (24h Range):  Temp:  [97.8 °F (36.6 °C)-98.5 °F (36.9 °C)] 98 °F (36.7 °C)  Pulse:  [74-95] 77  Resp:  [1-28] 16  SpO2:  [93 %-96 %] 94 %  BP: (103-130)/(60-80) 128/69     Weight: 88.5 kg (195 lb)  Body mass index is 32.45 kg/m².    Intake/Output Summary (Last 24 hours) at 1/28/2024 1217  Last data filed at 1/27/2024 2147  Gross per 24 hour   Intake 50 ml   Output --   Net 50 ml         Physical Exam  Vitals and nursing note reviewed.   Constitutional:       General: She is not in acute distress.     Appearance: She is well-developed. She is not diaphoretic.   Neck:      Thyroid: No thyromegaly.   Cardiovascular:      Rate and Rhythm: Normal rate and regular rhythm.      Heart sounds: No murmur heard.  Pulmonary:      Effort: Pulmonary effort is normal. No respiratory distress.      Breath sounds: Normal breath sounds.   Abdominal:      General: Bowel sounds are normal. There is no distension.      Palpations: Abdomen is soft. There is no mass.      Tenderness: There is no abdominal tenderness.   Musculoskeletal:          General: Swelling and tenderness present. No deformity.      Cervical back: Normal range of motion and neck supple.      Right lower leg: Edema present.      Left lower leg: Edema present.      Comments: Wound to left foot as in picture. Bilateral lower extremity swelling and erythema  Bilateral dorsalis pedis pulses palpable  Left foot plantar aspect one quarter size dry ulcer/wound without drainage. Left great toe macerated. Erythema and swelling extending up to shin.   Right foot medial aspect with old incison ? with small blister, right great toe macerated, right lateral healed laceration? . Right 1st to 3rd digit tips necrotic . Right foot erythema and swelling extending up to shin        Skin:     General: Skin is warm and dry.      Comments: See pictures below   Neurological:      Mental Status: She is alert and oriented to person, place, and time.      Sensory: No sensory deficit.   Psychiatric:         Mood and Affect: Mood normal.         Behavior: Behavior normal.                                       Significant Labs: All pertinent labs within the past 24 hours have been reviewed.    Significant Imaging: I have reviewed all pertinent imaging results/findings within the past 24 hours.

## 2024-01-28 NOTE — ASSESSMENT & PLAN NOTE
Body mass index is 32.45 kg/m². Morbid obesity complicates all aspects of disease management from diagnostic modalities to treatment. Weight loss encouraged and health benefits explained to patient.

## 2024-01-28 NOTE — NURSING
[facility]  Nurses Note -- 4 Eyes      1/28/2024       Skin assessed on: Q Shift      [x] No Pressure Injuries Present    []Prevention Measures Documented    [] Yes LDA  for Pressure Injury Previously documented     [] Yes New Pressure Injury Discovered   [] LDA for New Pressure Injury Added      Attending RN:  Venice Thompson RN     Second RN:  MARISSA Estes

## 2024-01-28 NOTE — NURSING
Nurses Note -- 4 Eyes      1/28/2024   8:17 AM      Skin assessed during: Admit      [] No Altered Skin Integrity Present    []Prevention Measures Documented      [x] Yes- Altered Skin Integrity Present or Discovered   [] LDA Added if Not in Epic (Describe Wound)   [x] New Altered Skin Integrity was Present on Admit and Documented in LDA   [] Wound Image Taken    Wound Care Consulted? No    Attending Nurse:  Venice Hope RN/Staff Member:  Meera ANN

## 2024-01-28 NOTE — ASSESSMENT & PLAN NOTE
BP well controlled. Not currently on medication outpatient    Chronic, controlled. Latest blood pressure and vitals reviewed-     Temp:  [98 °F (36.7 °C)]   Pulse:  [80-94]   Resp:  [18-28]   BP: (103-130)/(68-80)   SpO2:  [93 %-96 %] .   Home meds for hypertension were reviewed and noted below.       While in the hospital, will manage blood pressure as follows; Continue home antihypertensive regimen    Will utilize p.r.n. blood pressure medication only if patient's blood pressure greater than 180/110 and she develops symptoms such as worsening chest pain or shortness of breath.

## 2024-01-28 NOTE — ASSESSMENT & PLAN NOTE
"Presents with worsening pain and swelling to left foot with laceration "few weeks." She is a poor historian.  History of previous MSSA with concern for osteo 11/2023.   Hospitalization 11/23-11/29/23 for BLE cellulitis and left toe OM. 11/13/23 Left foot culture grew MSSA.  Podiatry offered amputation vs abx and  transferred LTAC to complete 6 weeks of IV Ancef.   BLE significantly worst then November- see above assessment and pictures  Denies IVDU but does admit to snorting meth PTA. Denies cocaine and alcohol   Continue IV vanc/rocephin  Obtain MRI both foot  Podiatry consult   ID consult pending above   "

## 2024-01-28 NOTE — ADMISSIONCARE
AdmissionCare    Guideline: Cellulitis - INPT, Inpatient    Based on the indications selected for the patient, the bed status of Admit to Inpatient was determined to be NOT MET    The following indications were selected as present at the time of evaluation of the patient:       AdmissionCare documentation entered by: MAURICE Fung    [facility], 27th edition, Copyright © 2023 [facility], [facility] All Rights Reserved.  [id number]:00
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
## 2024-01-28 NOTE — HPI
68 y.o. female with HTN, hypothyroidism, history of polysubstance abuse, previous alcohol abuse presents with complaint of dysuria and leg pain.  Podiatry was consulted for bilateral foot wounds with left foot wounds being worse.  Per patient's she did crystal meth yesterday and has been doing this for weeks recently. She  says when she does crystal meth she likes to pick on her skin and has been picking on her great toe wounds and left lateral plantar wound.  Patient admits to noticing some drainage coming from her left foot so she decided to present to the ED. Patient admits that she has been walking barefooted.  She was started on IV antibiotics in the ED. admits to feeling nauseous and tired but denies any fevers chills or vomiting.  Patient is stable at the time of this visit.  Patient has no other pedal complaints at this time.

## 2024-01-28 NOTE — ED PROVIDER NOTES
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 displaced fracture.  Suggest correlation with physical exam and consider MRI follow-up if there is a specific clinical concern for osteomyelitis.      Electronically signed by: Sp Sequeira MD  Date:    01/27/2024  Time:    20:38               Narrative:    EXAMINATION:  XR FOOT COMPLETE 3 VIEW LEFT    CLINICAL HISTORY:  Unspecified injury of left foot, initial encounter    TECHNIQUE:  Three views of the left foot.    COMPARISON:  11/23/2023.    FINDINGS:  No acute displaced fracture.  No dislocation.  Possible subluxation of the 1st interphalangeal joint which is better evaluated on the prior exam secondary to suboptimal positioning on the current study.  Bones of the foot are similar when compared with the prior study.  Soft tissue edema, more pronounced adjacent to the 5th metatarsophalangeal joint region and along the dorsal aspect of the foot.  No unexpected radiopaque foreign body.                                               ED Course as of 01/27/24 2215   Sat Jan 27, 2024 2035 EKG interpreted by Dr. Vanegas.  Poor quality EKG.  No STEMI.  Normal sinus rhythm, ventricular rate of 79.  Indeterminate axis.  QTC normal at 447.  When compared to previous EKG done on 11/23/2023 rate has decreased by 5 beats per minute today [RF]      ED Course User Index  [RF] Danna Vanegas DO                           I, Dr. Danna Vanegas, personally performed the services described in this documentation. This document was produced by a scribe under my direction and in my presence. All medical record entries made by the scribe were at my direction and in my presence.  I have reviewed the chart and agree that the record reflects my personal performance and is accurate and complete. Danna Vanegas DO.     01/27/2024 10:17 PM    Clinical Impression:  Final diagnoses:  [L03.90] Cellulitis  [S99.922A] Injury, foot, left, initial encounter  [S99.921A] Injury, foot, right, initial encounter          ED Disposition Condition    Observation Stable                Danna Vanegas,   01/27/24 1258

## 2024-01-28 NOTE — PROGRESS NOTES
"Pharmacokinetic Initial Assessment: IV Vancomycin    Assessment/Plan:    Initiate intravenous vancomycin with loading dose of 2000 mg once followed by a maintenance dose of vancomycin 1250 mg IV every 12 hours  Desired empiric serum trough concentration is 10 to 20 mcg/mL  Draw vancomycin trough level 60 min prior to fourth dose on 1/29/24 at approximately 0700  Pharmacy will continue to follow and monitor vancomycin.      Please contact pharmacy at extension [phone] with any questions regarding this assessment.     Thank you for the consult,   Osmany De Jesus       Patient brief summary:  Estella Arevalo is a 68 y.o. female initiated on antimicrobial therapy with IV Vancomycin for treatment of suspected skin & soft tissue infection    Drug Allergies:   Review of patient's allergies indicates:   Allergen Reactions    Codeine      nausea       Actual Body Weight:   88.5 kg    Renal Function:   Estimated Creatinine Clearance: 83.3 mL/min (based on SCr of 0.7 mg/dL).,     Dialysis Method (if applicable):  N/A    CBC (last 72 hours):  Recent Labs   Lab Result Units 01/27/24 2001   WBC K/uL 4.26   Hemoglobin g/dL 11.7*   Hematocrit % 36.5*   Platelets K/uL 241   Gran % % 70.5   Lymph % % 16.2*   Mono % % 8.0   Eosinophil % % 4.2   Basophil % % 0.9   Differential Method  Automated       Metabolic Panel (last 72 hours):  Recent Labs   Lab Result Units 01/27/24 1947 01/27/24 2001   Sodium mmol/L  --  136   Potassium mmol/L  --  3.7   Chloride mmol/L  --  100   CO2 mmol/L  --  25   Glucose mg/dL  --  97   Glucose, UA  Negative  --    BUN mg/dL  --  5*   Creatinine mg/dL  --  0.7   Creatinine, Urine mg/dL 27.6  --    Albumin g/dL  --  3.2*   Total Bilirubin mg/dL  --  0.6   Alkaline Phosphatase U/L  --  132   AST U/L  --  66*   ALT U/L  --  29       Drug levels (last 3 results):  No results for input(s): "VANCOMYCINRA", "VANCORANDOM", "VANCOMYCINPE", "VANCOPEAK", "VANCOMYCINTR", "VANCOTROUGH" in the last 72 " hours.    Microbiologic Results:  Microbiology Results (last 7 days)       Procedure Component Value Units Date/Time    Blood culture x two cultures. Draw prior to antibiotics. [[id number]] Collected: 01/27/24 2042    Order Status: Sent Specimen: Blood from Peripheral, Antecubital, Right Updated: 01/27/24 2049    Blood culture x two cultures. Draw prior to antibiotics. [[id number]] Collected: 01/27/24 1950    Order Status: Sent Specimen: Blood from Peripheral, Forearm, Left Updated: 01/27/24 2009

## 2024-01-28 NOTE — ASSESSMENT & PLAN NOTE
Presents with worsening pain and swelling to left foot with laceration. History of previous MSSA with concern for osteo 11/2023. Treated with 6 weeks of ancef. Afebrile without leukocytosis today. Xray with worsening edema.  Started on rocephin/vanc  Podiatry consulted  Check sed rate/crp  Blood cultures pending  Check arterial US

## 2024-01-28 NOTE — ADMISSIONCARE
AdmissionCare    Guideline: Cellulitis - OBS, Observation    Based on the indications selected for the patient, the bed status of Admit to Observation was determined to be MET    The following indications were selected as present at the time of evaluation of the patient:      - Severity of infection unclear (eg, concern regarding rapid progression)    AdmissionCare documentation entered by: MAURICE Fung    [facility], 27th edition, Copyright © 2023 [facility] All Rights Reserved.  [id number]

## 2024-01-28 NOTE — PROCEDURES
"Estella Arevalo is a 68 y.o. female patient.    Temp: 98 °F (36.7 °C) (01/27/24 2301)  Pulse: 91 (01/28/24 0001)  Resp: (!) 21 (01/28/24 0001)  BP: 106/65 (01/28/24 0001)  SpO2: 95 % (01/28/24 0001)  Weight: 88.5 kg (195 lb) (01/27/24 1907)  Height: 5' 5" (165.1 cm) (01/27/24 1907)    PICC  Date/Time: 1/28/2024 1:20 AM  Performed by: González Piedra RN  Consent Done: Yes  Time out: Immediately prior to procedure a time out was called to verify the correct patient, procedure, equipment, support staff and site/side marked as required  Indications: med administration  Anesthesia: local infiltration  Local anesthetic: lidocaine 1% without epinephrine  Anesthetic Total (mL): 1  Preparation: skin prepped with ChloraPrep  Skin prep agent dried: skin prep agent completely dried prior to procedure  Sterile barriers: all five maximum sterile barriers used - cap, mask, sterile gown, sterile gloves, and large sterile sheet  Hand hygiene: hand hygiene performed prior to central venous catheter insertion  Location details: right basilic  Catheter type: double lumen  Catheter size: 5 Fr  Catheter Length: 38cm    Ultrasound guidance: yes  Vessel Caliber: medium and large and patent, compressibility normal  Needle advanced into vessel with real time Ultrasound guidance.  Guidewire confirmed in vessel.  Sterile sheath used.  Number of attempts: 1  Post-procedure: blood return through all ports, chlorhexidine patch and sterile dressing applied  Estimated blood loss (mL): 0            Name González Piedra   1/28/2024    "

## 2024-01-28 NOTE — PROGRESS NOTES
[facility] Medicine  Progress Note    Patient Name: Estella Arevalo  MRN: [id number]  Patient Class: IP- Inpatient   Admission Date: 1/27/2024  Length of Stay: 0 days  Attending Physician: Ramon Marvin MD  Primary Care Provider: Fiordaliza Ma -        Subjective:     Principal Problem:Skin ulcer of toe of left foot with fat layer exposed        HPI:  Estella Arevalo 68 y.o. female with HTN, hypothyroidism, history of polysubstance abuse, previous alcohol abuse presents hospital chief complaint of dysuria and leg pain.  She reports 1 week of worsening pain and erythema to her left foot that initially started as a wound.  She reports she developed this wound after cutting her foot on glass.  She denies any attempted treatment home.  She denies any aggravating or alleviating factors.  She reports she completed her previous antibiotics as prescribed for osteomyelitis.  She denies fever chest pain nausea vomiting abdominal pain leg swelling hematuria hematemesis dizziness and syncope.    In the ED, afebrile without leukocytosis urine drug screen positive for amphetamines lactic acid within normal limits COVID negative flu negative pro count within normal limits x-ray of right foot with diffuse soft tissue edema worse when compared to prior stone chest x-ray without acute cardiopulmonary process x-ray foot left with soft tissue edema without displaced fracture.    Overview/Hospital Course:  Admission to observation for both foot infection concern of OM. Drug screen positive for amphetamine. Patient denied IVDU. Last snort meth prior to admission. Arterial US BLE no focal occlusion but does show PAD BLE. X ray right foot diffuse soft tissue edema. MRI hindfoot left and right ordered.Continue Rocephin and Vancomycin IV.  Wound care instructions until official Podiatry consult.    Interval History: left great toe pain in palpation. AAOx 4 but poor historian    Review of Systems   Constitutional:   Negative for chills and fever.   HENT:  Negative for nosebleeds and tinnitus.    Eyes:  Negative for photophobia and visual disturbance.   Respiratory:  Negative for shortness of breath and wheezing.    Cardiovascular:  Negative for chest pain, palpitations and leg swelling.   Gastrointestinal:  Negative for abdominal distention, nausea and vomiting.   Genitourinary:  Negative for dysuria, flank pain and hematuria.   Musculoskeletal:  Negative for gait problem and joint swelling.   Skin:  Positive for color change, rash and wound.   Neurological:  Negative for seizures and syncope.     Objective:     Vital Signs (Most Recent):  Temp: 98 °F (36.7 °C) (01/28/24 1152)  Pulse: 77 (01/28/24 1152)  Resp: 16 (01/28/24 1152)  BP: 128/69 (01/28/24 1152)  SpO2: (!) 94 % (01/28/24 1152) Vital Signs (24h Range):  Temp:  [97.8 °F (36.6 °C)-98.5 °F (36.9 °C)] 98 °F (36.7 °C)  Pulse:  [74-95] 77  Resp:  [1-28] 16  SpO2:  [93 %-96 %] 94 %  BP: (103-130)/(60-80) 128/69     Weight: 88.5 kg (195 lb)  Body mass index is 32.45 kg/m².    Intake/Output Summary (Last 24 hours) at 1/28/2024 1217  Last data filed at 1/27/2024 2147  Gross per 24 hour   Intake 50 ml   Output --   Net 50 ml         Physical Exam  Vitals and nursing note reviewed.   Constitutional:       General: She is not in acute distress.     Appearance: She is well-developed. She is not diaphoretic.   Neck:      Thyroid: No thyromegaly.   Cardiovascular:      Rate and Rhythm: Normal rate and regular rhythm.      Heart sounds: No murmur heard.  Pulmonary:      Effort: Pulmonary effort is normal. No respiratory distress.      Breath sounds: Normal breath sounds.   Abdominal:      General: Bowel sounds are normal. There is no distension.      Palpations: Abdomen is soft. There is no mass.      Tenderness: There is no abdominal tenderness.   Musculoskeletal:         General: Swelling and tenderness present. No deformity.      Cervical back: Normal range of motion and neck  "supple.      Right lower leg: Edema present.      Left lower leg: Edema present.      Comments: Wound to left foot as in picture. Bilateral lower extremity swelling and erythema  Bilateral dorsalis pedis pulses palpable  Left foot plantar aspect one quarter size dry ulcer/wound without drainage. Left great toe macerated. Erythema and swelling extending up to shin.   Right foot medial aspect with old incison ? with small blister, right great toe macerated, right lateral healed laceration? . Right 1st to 3rd digit tips necrotic . Right foot erythema and swelling extending up to shin        Skin:     General: Skin is warm and dry.      Comments: See pictures below   Neurological:      Mental Status: She is alert and oriented to person, place, and time.      Sensory: No sensory deficit.   Psychiatric:         Mood and Affect: Mood normal.         Behavior: Behavior normal.                                       Significant Labs: All pertinent labs within the past 24 hours have been reviewed.    Significant Imaging: I have reviewed all pertinent imaging results/findings within the past 24 hours.    Assessment/Plan:      * BLE cellulitis , concern for OM in both foot  Presents with worsening pain and swelling to left foot with laceration "few weeks." She is a poor historian.  History of previous MSSA with concern for osteo 11/2023.   Hospitalization 11/23-11/29/23 for BLE cellulitis and left toe OM. 11/13/23 Left foot culture grew MSSA.  Podiatry offered amputation vs abx and  transferred LTAC to complete 6 weeks of IV Ancef.   BLE significantly worst then November- see above assessment and pictures  Denies IVDU but does admit to snorting meth PTA. Denies cocaine and alcohol   Continue IV vanc/rocephin  Obtain MRI both foot  Podiatry consult   ID consult pending above     Obesity (BMI 30-39.9)  Body mass index is 32.45 kg/m². Morbid obesity complicates all aspects of disease management from diagnostic modalities to " treatment. Weight loss encouraged and health benefits explained to patient.    Polysubstance abuse  UDS positive for amphetamines. Counseled on cessation  Concern for IVDU  Snort meth PTA    Essential hypertension  BP well controlled. Not currently on medication outpatient    Chronic, controlled. Latest blood pressure and vitals reviewed-     Temp:  [98 °F (36.7 °C)]   Pulse:  [80-94]   Resp:  [18-28]   BP: (103-130)/(68-80)   SpO2:  [93 %-96 %] .   Home meds for hypertension were reviewed and noted below.       While in the hospital, will manage blood pressure as follows; Continue home antihypertensive regimen    Will utilize p.r.n. blood pressure medication only if patient's blood pressure greater than 180/110 and she develops symptoms such as worsening chest pain or shortness of breath.    Bipolar 1 disorder  Took self off abilify and lexapro recently  Denies SI  Resume abilify and lexapro      Acquired hypothyroidism  Lab Results   Component Value Date    TSH 1.661 04/20/2022     Continue home synthroid          VTE Risk Mitigation (From admission, onward)           Ordered     IP VTE HIGH RISK PATIENT  Once         01/27/24 2222     Place sequential compression device  Until discontinued         01/27/24 2222     Place MARINA hose  Until discontinued         01/27/24 2222                    Discharge Planning   SARAH:      Code Status: Full Code   Is the patient medically ready for discharge?:     Reason for patient still in hospital (select all that apply): Treatment               Petra Loja NP  Department of Hospital Medicine   [facility] - Med Surg

## 2024-01-28 NOTE — NURSING
Assume care of this patient at 0500am report received from ER nurse. No distress noted at this time.

## 2024-01-28 NOTE — ASSESSMENT & PLAN NOTE
IDSA moderate o foot infection. Bilateral foot wounds with left lateral plantar forefoot wound probing to bone.  diffuse cellulitis and erythema noted Patient on previous admission and had MRI taken at which had OM like changes noted to the distal tuft of left great toe.  At that time patient declined amputation.  WBC within normal limits. CRP 33 and esr 21.  X-ray has no OM like changes or emphysema noted.  Arterial studies have monophasic waveforms noted bilaterally but no focal occlusions noted  - MRI left foot pending  - bedside debridement and thorough irrigation of left plantar forefoot wound performed bedside today, without incident.  Verbal consent obtained from patient.  Procedure note to follow up  - antibiotic plan per ID  - deep wound cultures pending   - dressings applied by Podiatry today as follows:  To right foot wounds were thoroughly cleansed with wash and dressed with Betadine paint, 4 x 4 gauze, Kerlix, Ace wraps.  Left foot lateral plantar wound was thoroughly irrigated with wash and then Hydrofera blue was applied to this wound, Betadine paint was applied to left great toe wound followed by 4 x 4 gauze Kerlix and Ace wraps to left foot.  - patient okay to ambulate as tolerated in bilateral post shoes  - educated patient to not walk barefooted  - podiatry will follow

## 2024-01-28 NOTE — PROCEDURES
"Estella Arevalo is a 68 y.o. female patient.    Temp: 98 °F (36.7 °C) (01/28/24 1152)  Pulse: 77 (01/28/24 1152)  Resp: 16 (01/28/24 1152)  BP: 128/69 (01/28/24 1152)  SpO2: (!) 94 % (01/28/24 1152)  Weight: 88.5 kg (195 lb) (01/27/24 1907)  Height: 5' 5" (165.1 cm) (01/27/24 1907)       Debridement    Date/Time: 1/28/2024 2:59 PM    Performed by: Brie Mcbride MD  Authorized by: Brie Mcbride MD    Time out: Immediately prior to procedure a "time out" was called to verify the correct patient, procedure, equipment, support staff and site/side marked as required.    Consent Done?:  Yes (Verbal)    Preparation: Patient was prepped and draped in usual sterile fashion and Patient was prepped and draped with clean technique    Local anesthesia used?: No      Wound Details:    Location:  Left foot    Location:  Left Plantar    Type of Debridement:  Excisional       Length (cm):  3.1       Area (sq cm):  7.13       Width (cm):  2.3       Percent Debrided (%):  94       Depth (cm):  1.1       Total Area Debrided (sq cm):  6.7    Depth of debridement:  Bone    Tissue debrided:  Bone, Dermis, Epidermis and Subcutaneous    Devitalized tissue debrided:  Slough, Necrotic/Eschar, Fibrin and Exudate    Instruments:  Blade, Forceps, Scissors and Curette  Bleeding:  Minimal  Hemostasis Achieved: Yes  Method Used:  Pressure  Patient tolerance:  Patient tolerated the procedure well with no immediate complications  Specimen Collected: Specimen sent to microbiology    Brie Mcbride DPM PGY-2  Podiatric Medicine & Surgery  [facility]  Secure Chat Preferred  Mobile: [phone]  Pager: [phone]   "

## 2024-01-28 NOTE — ASSESSMENT & PLAN NOTE
UDS positive for amphetamines. Counseled on cessation  Concern for IVDU  Snort meth PTA   06-Jul-2023 13:41

## 2024-01-28 NOTE — PLAN OF CARE
Problem: Violence Risk or Actual  Goal: Anger and Impulse Control  Outcome: Ongoing, Progressing  Intervention: Promote Self-Control  Flowsheets (Taken 1/28/2024 1656)  Supportive Measures:   active listening utilized   positive reinforcement provided   relaxation techniques promoted   self-care encouraged   self-responsibility promoted   verbalization of feelings encouraged  Environmental Support: calm environment promoted     Problem: Adult Inpatient Plan of Care  Goal: Plan of Care Review  Outcome: Ongoing, Progressing  Flowsheets (Taken 1/28/2024 1656)  Plan of Care Reviewed With: patient  Goal: Patient-Specific Goal (Individualized)  Outcome: Ongoing, Progressing  Goal: Absence of Hospital-Acquired Illness or Injury  Outcome: Ongoing, Progressing  Intervention: Identify and Manage Fall Risk  Flowsheets (Taken 1/28/2024 1656)  Safety Promotion/Fall Prevention:   assistive device/personal item within reach   bedside commode chair   side rails raised x 2   instructed to call staff for mobility   room near unit station  Intervention: Prevent Skin Injury  Flowsheets (Taken 1/28/2024 1656)  Body Position: position changed independently  Skin Protection: adhesive use limited  Intervention: Prevent and Manage VTE (Venous Thromboembolism) Risk  Flowsheets (Taken 1/28/2024 1656)  Activity Management: Arm raise - L1  Range of Motion: active ROM (range of motion) encouraged  Intervention: Prevent Infection  Flowsheets (Taken 1/28/2024 1656)  Infection Prevention:   hand hygiene promoted   rest/sleep promoted   single patient room provided  Goal: Optimal Comfort and Wellbeing  Outcome: Ongoing, Progressing  Intervention: Monitor Pain and Promote Comfort  Flowsheets (Taken 1/28/2024 1656)  Pain Management Interventions:   medication offered   quiet environment facilitated  Intervention: Provide Person-Centered Care  Flowsheets (Taken 1/28/2024 1656)  Trust Relationship/Rapport:   care explained   choices provided    emotional support provided   empathic listening provided   questions answered   questions encouraged   reassurance provided   thoughts/feelings acknowledged  Goal: Readiness for Transition of Care  Outcome: Ongoing, Progressing     Problem: Infection  Goal: Absence of Infection Signs and Symptoms  Outcome: Ongoing, Progressing     Problem: Impaired Wound Healing  Goal: Optimal Wound Healing  Outcome: Ongoing, Progressing
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
## 2024-01-28 NOTE — HOSPITAL COURSE
Admission to observation for both foot infection concern of OM. Drug screen positive for amphetamine. Patient denied IVDU. Last snort meth prior to admission. Arterial US BLE no focal occlusion but does show PAD BLE. X ray right foot diffuse soft tissue edema. MRI hindfoot left and right ordered.Continue Rocephin and Vancomycin IV.  Wound care instructions until official Podiatry consult.  Per Podiatry: MRI results reviewed with patient. Reactive edema vs early OM to the distal hallux and 5th met head. Personally not convinced the risk associated with trocar bone biopsy in this patient with history on noncompliance and not following up outpatient would be to her benefit long term for just the suspicion of OM. Deep culture swabs have already been obtained. In out opinion wound care and antibiosis without procedure or podiatric surgical intervention should be sufficient.     Patient was discharged home on extended course of clindamycin.  Home health and home wound care was set up prior to discharge.  Recommend outpatient follow-up with PCP and Podiatry.  Referral placed.

## 2024-01-28 NOTE — HPI
Estella Arevalo 68 y.o. female with HTN, hypothyroidism, history of polysubstance abuse, previous alcohol abuse presents hospital chief complaint of dysuria and leg pain.  She reports 1 week of worsening pain and erythema to her left foot that initially started as a wound.  She reports she developed this wound after cutting her foot on glass.  She denies any attempted treatment home.  She denies any aggravating or alleviating factors.  She reports she completed her previous antibiotics as prescribed for osteomyelitis.  She denies fever chest pain nausea vomiting abdominal pain leg swelling hematuria hematemesis dizziness and syncope.    In the ED, afebrile without leukocytosis urine drug screen positive for amphetamines lactic acid within normal limits COVID negative flu negative pro count within normal limits x-ray of right foot with diffuse soft tissue edema worse when compared to prior stone chest x-ray without acute cardiopulmonary process x-ray foot left with soft tissue edema without displaced fracture.

## 2024-01-28 NOTE — PLAN OF CARE
Case Management Assessment     PCP: Dr. Nina Anderson  Ph[facility]: [facility]    Patient Arrived From: Home  Existing Help at Home: Jignesh- son    Barriers to Discharge: None    Discharge Plan:    A. Home with family   B. Other- TBD      CM discussed discharge planning with pt at bedside. Pt is independent and lives with her son, Jignesh. Pt uses a rollator and wc. Pt's son will provide transportation home when discharged.       01/28/24 9106   Discharge Assessment   Assessment Type Discharge Planning Assessment   Confirmed/corrected address, phone number and insurance Yes   Confirmed Demographics Correct on Facesheet   Source of Information patient   When was your last doctors appointment? 11/29/23   Reason For Admission BLE CELLULITIS, CONCERN OSTEOMYELITIS IN BOTH FEET   People in Home child(carol), adult   Facility Arrived From: HOME   Do you expect to return to your current living situation? Yes   Do you have help at home or someone to help you manage your care at home? Yes   Who are your caregiver(s) and their phone number(s)? Jignesh- [phone]   Prior to hospitilization cognitive status: Alert/Oriented   Current cognitive status: Alert/Oriented   Walking or Climbing Stairs Difficulty yes   Walking or Climbing Stairs ambulation difficulty, requires equipment;stair climbing difficulty, requires equipment   Mobility Management rollator   Dressing/Bathing Difficulty no   Home Accessibility wheelchair accessible   Equipment Currently Used at Home rollator   Readmission within 30 days? No   Do you currently have service(s) that help you manage your care at home? No   Is the pt/caregiver preference to resume services with current agency No   Do you take prescription medications? Yes   Do you have prescription coverage? Yes   Coverage PHN   Do you have any problems affording any of your prescribed medications? No   Is the patient taking medications as prescribed? yes   Who is going to help you get home at  discharge? Jignesh-son   How do you get to doctors appointments? family or friend will provide   Are you on dialysis? No   Do you take coumadin? No   Discharge Plan A Home with family   Discharge Plan B Other  (tbd)   DME Needed Upon Discharge  other (see comments)  (tbd)   Discharge Plan discussed with: Patient   Transition of Care Barriers None   SDOH   (none)   OTHER   Name(s) of People in Home Jignesh- son

## 2024-01-29 LAB
GRAM STN SPEC: NORMAL
GRAM STN SPEC: NORMAL

## 2024-01-29 PROCEDURE — 63600175 PHARM REV CODE 636 W HCPCS: Performed by: HOSPITALIST

## 2024-01-29 PROCEDURE — 99232 SBSQ HOSP IP/OBS MODERATE 35: CPT | Mod: ,,, | Performed by: PODIATRIST

## 2024-01-29 PROCEDURE — 63600175 PHARM REV CODE 636 W HCPCS: Performed by: PHYSICIAN ASSISTANT

## 2024-01-29 PROCEDURE — A4216 STERILE WATER/SALINE, 10 ML: HCPCS | Performed by: EMERGENCY MEDICINE

## 2024-01-29 PROCEDURE — 25000003 PHARM REV CODE 250: Performed by: HOSPITALIST

## 2024-01-29 PROCEDURE — 25000003 PHARM REV CODE 250: Performed by: NURSE PRACTITIONER

## 2024-01-29 PROCEDURE — 25000003 PHARM REV CODE 250: Performed by: EMERGENCY MEDICINE

## 2024-01-29 PROCEDURE — 25000003 PHARM REV CODE 250: Performed by: PHYSICIAN ASSISTANT

## 2024-01-29 PROCEDURE — 94761 N-INVAS EAR/PLS OXIMETRY MLT: CPT

## 2024-01-29 PROCEDURE — 99223 1ST HOSP IP/OBS HIGH 75: CPT | Mod: ,,, | Performed by: SURGERY

## 2024-01-29 PROCEDURE — 25500020 PHARM REV CODE 255: Performed by: HOSPITALIST

## 2024-01-29 PROCEDURE — 11000001 HC ACUTE MED/SURG PRIVATE ROOM

## 2024-01-29 RX ADMIN — HYDROCODONE BITARTRATE AND ACETAMINOPHEN 1 TABLET: 5; 325 TABLET ORAL at 09:01

## 2024-01-29 RX ADMIN — ESCITALOPRAM OXALATE 20 MG: 10 TABLET ORAL at 08:01

## 2024-01-29 RX ADMIN — PANTOPRAZOLE SODIUM 40 MG: 40 TABLET, DELAYED RELEASE ORAL at 08:01

## 2024-01-29 RX ADMIN — THERA TABS 1 TABLET: TAB at 02:01

## 2024-01-29 RX ADMIN — VANCOMYCIN HYDROCHLORIDE 1250 MG: 1.25 INJECTION, POWDER, LYOPHILIZED, FOR SOLUTION INTRAVENOUS at 02:01

## 2024-01-29 RX ADMIN — IOHEXOL 125 ML: 350 INJECTION, SOLUTION INTRAVENOUS at 05:01

## 2024-01-29 RX ADMIN — Medication 10 ML: at 11:01

## 2024-01-29 RX ADMIN — TRAZODONE HYDROCHLORIDE 100 MG: 50 TABLET ORAL at 09:01

## 2024-01-29 RX ADMIN — CEFTRIAXONE 1 G: 1 INJECTION, POWDER, FOR SOLUTION INTRAMUSCULAR; INTRAVENOUS at 11:01

## 2024-01-29 RX ADMIN — ARIPIPRAZOLE 10 MG: 10 TABLET ORAL at 08:01

## 2024-01-29 RX ADMIN — Medication 10 ML: at 01:01

## 2024-01-29 RX ADMIN — LEVOTHYROXINE SODIUM 25 MCG: 25 TABLET ORAL at 05:01

## 2024-01-29 RX ADMIN — Medication 10 ML: at 05:01

## 2024-01-29 RX ADMIN — VANCOMYCIN HYDROCHLORIDE 1250 MG: 1.25 INJECTION, POWDER, LYOPHILIZED, FOR SOLUTION INTRAVENOUS at 01:01

## 2024-01-29 RX ADMIN — HYDROCODONE BITARTRATE AND ACETAMINOPHEN 1 TABLET: 5; 325 TABLET ORAL at 12:01

## 2024-01-29 RX ADMIN — HYDROCODONE BITARTRATE AND ACETAMINOPHEN 1 TABLET: 5; 325 TABLET ORAL at 02:01

## 2024-01-29 NOTE — PROGRESS NOTES
Progress Note    Admit Date: 1/27/2024   LOS: 1 day     SUBJECTIVE:     Follow-up For:  F/u B/L ulceration. MRI pending    Scheduled Meds:   ARIPiprazole  10 mg Oral Daily    cefTRIAXone (Rocephin) IV (PEDS and ADULTS)  1 g Intravenous Q24H    EScitalopram oxalate  20 mg Oral Daily    levothyroxine  25 mcg Oral Before breakfast    multivitamin  1 tablet Oral Daily    pantoprazole  40 mg Oral Daily    sodium chloride 0.9%  10 mL Intravenous Q6H    sodium chloride 0.9%  10 mL Intravenous Q6H    traZODone  100 mg Oral QHS    vancomycin (VANCOCIN) IV (PEDS and ADULTS)  1,250 mg Intravenous Q12H     Continuous Infusions:  PRN Meds:acetaminophen, glucagon (human recombinant), glucose, glucose, HYDROcodone-acetaminophen, magnesium oxide, magnesium oxide, melatonin, naloxone, senna-docusate 8.6-50 mg, Flushing PICC/Midline Protocol **AND** sodium chloride 0.9% **AND** sodium chloride 0.9%, sodium chloride 0.9%, Flushing PICC/Midline Protocol **AND** sodium chloride 0.9% **AND** sodium chloride 0.9%, DIPH,PERTUSS(ACELL),TET VACCINE (ADULT)(BOOSTRIX,ADACEL), traMADoL, Pharmacy to dose Vancomycin consult **AND** vancomycin - pharmacy to dose    Review of patient's allergies indicates:   Allergen Reactions    Codeine      nausea       Review of Systems  Constitutional:  Positive for fatigue. Negative for chills and fever.   Cardiovascular:  Positive for leg swelling.   Gastrointestinal:  Positive for nausea. Negative for vomiting.   Musculoskeletal:  Positive for arthralgias and myalgias.   Skin:  Positive for color change and wound.   Neurological:  Positive for numbness.   Psychiatric/Behavioral:  Negative for behavioral problems and confusion.    OBJECTIVE:     Vital Signs (Most Recent)  Temp: 97.8 °F (36.6 °C) (01/29/24 1102)  Pulse: 81 (01/29/24 1108)  Resp: 18 (01/29/24 1408)  BP: (!) 139/58 (01/29/24 1102)  SpO2: (!) 92 % (01/29/24 1102)    Vital Signs Range (Last 24H):  Temp:  [97.8 °F (36.6 °C)-98.8 °F (37.1 °C)]    Pulse:  [73-85]   Resp:  [16-19]   BP: (115-141)/(58-73)   SpO2:  [91 %-95 %]     I & O (Last 24H):  Intake/Output Summary (Last 24 hours) at 1/29/2024 1451  Last data filed at 1/29/2024 1212  Gross per 24 hour   Intake 720 ml   Output 1800 ml   Net -1080 ml     Physical Exam:  \General  Orientation: alert and oriented to person, place, and time         Right Foot/Ankle      Inspection and Palpation  Skin Exam: cellulitis, skin changes, abnormal color, ulcer and erythema;      Neurovascular  Dorsalis pedis: 2+  Posterior tibial: absent  Saphenous nerve sensation: diminished  Tibial nerve sensation: diminished  Superficial peroneal nerve sensation: diminished  Deep peroneal nerve sensation: diminished  Sural nerve sensation: diminished     Comments  Right foot:  Patient has wound noted to dermal layer 8 great toe.  Patient also has superficial wounds noted to the medial midfoot to the layer of dermis as well.  These wounds have scant bloody drainage noted.  No active purulence expressed today.  No probe to bone noted on these wounds.   No crepitus noted.  Diffuse erythema and swelling noted     Left Foot/Ankle       Inspection and Palpation  Skin Exam: cellulitis, skin changes, abnormal color, ulcer and erythema;      Neurovascular  Dorsalis pedis: 2+  Posterior tibial: absent  Saphenous nerve sensation: diminished  Tibial nerve sensation: diminished  Superficial peroneal nerve sensation: diminished  Deep peroneal nerve sensation: diminished  Sural nerve sensation: diminished     Comments  Left foot:  Patient has wounds noted to the left great toe and left lateral forefoot.  Lateral forefoot wound probes to bone but no purulence expressed today on exam.  Bloody drainage noted.   No crepitus noted.  Tender to palpation noted.  Wound to great toe is to the layer of dermal tissue.  Diffuse erythema and swelling noted    Laboratory:  I have reviewed all pertinent lab results within the past 24 hours.  CBC:   Recent  Labs   Lab 01/28/24  0122   WBC 4.30   RBC 3.95*   HGB 11.4*   HCT 35.3*      MCV 89   MCH 28.9   MCHC 32.3     BMP:   Recent Labs   Lab 01/28/24  0122   *      K 3.0*      CO2 24   BUN 5*   CREATININE 0.7   CALCIUM 8.2*   MG 2.4       Diagnostic Results:  MRI: pending    Xray: X-Ray Foot Complete Right  Order: [id number]  Status: Final result       Visible to patient: No (inaccessible in Patient Portal)       Next appt: None       Dx: Injury, foot, right, initial encounter    0 Result Notes  Details    Reading Physician Reading Date Result Priority   Sp Sequeira MD  [phone] 1/27/2024 STAT     Narrative & Impression  EXAMINATION:  XR FOOT COMPLETE 3 VIEW RIGHT     CLINICAL HISTORY:  Unspecified injury of right foot, initial encounter     TECHNIQUE:  AP, lateral, and oblique views of the right foot were performed.     COMPARISON:  01/27/2021.     FINDINGS:  Similar degenerative and postoperative changes in the foot and ankle.  No acute displaced fracture.  No dislocation.  No large focal bony erosion allowing for limitations of plain radiography.  Diffuse soft tissue edema, worse compared to prior study.     Impression:     Diffuse soft tissue edema, worse when compared with prior study.        X-Ray Foot Complete Left  Order: [id number]  Status: Final result       Visible to patient: No (inaccessible in Patient Portal)       Next appt: None       Dx: Injury, foot, left, initial encounter    0 Result Notes  Details    Reading Physician Reading Date Result Priority   Sp Sequeira MD  [phone] 1/27/2024 STAT     Narrative & Impression  EXAMINATION:  XR FOOT COMPLETE 3 VIEW LEFT     CLINICAL HISTORY:  Unspecified injury of left foot, initial encounter     TECHNIQUE:  Three views of the left foot.     COMPARISON:  11/23/2023.     FINDINGS:  No acute displaced fracture.  No dislocation.  Possible subluxation of the 1st interphalangeal joint which is better evaluated on the  prior exam secondary to suboptimal positioning on the current study.  Bones of the foot are similar when compared with the prior study.  Soft tissue edema, more pronounced adjacent to the 5th metatarsophalangeal joint region and along the dorsal aspect of the foot.  No unexpected radiopaque foreign body.     Impression:     Soft tissue edema.  No acute displaced fracture.  Suggest correlation with physical exam and consider MRI follow-up if there is a specific clinical concern for osteomyelitis.      US Lower Extremity Arteries Bilateral  Order: [id number]  Status: Final result       Visible to patient: No (inaccessible in Patient Portal)       Next appt: None    0 Result Notes  Details    Reading Physician Reading Date Result Priority   Sp Sequeira MD  [phone] 1/28/2024 STAT     Narrative & Impression  EXAMINATION:  US LOWER EXTREMITY ARTERIES BILATERAL     CLINICAL HISTORY:  foot wound;     TECHNIQUE:  Bilateral spectral, color and grayscale images of the large arteries of both lower extremities were performed.     COMPARISON:  None.     FINDINGS:  Right lower extremity.     CFA: 163 cm/s, triphasic     Prox SFA: 154 cm/s, biphasic     Mid SFA: 108 cm/s, monophasic     Dist SFA: 68 cm/s, monophasic     Prox pop A: 75 cm/s, triphasic     Dist pop A: 107 cm/s, triphasic     PTA: 132 cm/s, monophasic     DEVIN: 148 cm/s, monophasic     Left lower extremity     CFA: 127 cm/s, triphasic     Prox SFA: 180 cm/s, monophasic     Mid SFA: 140 cm/s, monophasic     Dist SFA: 106 cm/s, monophasic     Prox pop A: 95 cm/s, monophasic     Dist pop A: 77 cm/s, monophasic     PTA: 53 cm/s, monophasic     DEVIN: 81 cm/s, monophasic     Mild soft tissue edema.        Impression:     Doubling of velocities from the right proximal popliteal artery to the right posterior tibial and anterior tibial arteries, suggesting some degree of right lower extremity arterial stenosis.     Abnormal monophasic waveforms throughout the  majority of the left lower extremity and portions of the right lower extremity, suggestive of peripheral arterial disease.     No focal occlusion.     Additional details and velocities listed in the body of the report.       ASSESSMENT/PLAN:     B/L ulceration    Plan:     MRI pending    Vascular surgery consulted    DSD applied B/L       - patient okay to ambulate as tolerated in bilateral DARCO shoes   - educated patient to not walk barefooted  - podiatry will follow

## 2024-01-29 NOTE — PROGRESS NOTES
[facility] Medicine  Telemedicine Progress Note    Patient Name: Estella Arevalo  MRN: [id number]  Patient Class: IP- Inpatient   Admission Date: 1/27/2024  Length of Stay: 1 days  Attending Physician: Clemencia Grossman MD  Primary Care Provider: Fiordaliza Ma -          Subjective:     Principal Problem:Skin ulcer of toe of left foot with fat layer exposed        HPI:  Estella Arevalo 68 y.o. female with HTN, hypothyroidism, history of polysubstance abuse, previous alcohol abuse presents hospital chief complaint of dysuria and leg pain.  She reports 1 week of worsening pain and erythema to her left foot that initially started as a wound.  She reports she developed this wound after cutting her foot on glass.  She denies any attempted treatment home.  She denies any aggravating or alleviating factors.  She reports she completed her previous antibiotics as prescribed for osteomyelitis.  She denies fever chest pain nausea vomiting abdominal pain leg swelling hematuria hematemesis dizziness and syncope.    In the ED, afebrile without leukocytosis urine drug screen positive for amphetamines lactic acid within normal limits COVID negative flu negative pro count within normal limits x-ray of right foot with diffuse soft tissue edema worse when compared to prior stone chest x-ray without acute cardiopulmonary process x-ray foot left with soft tissue edema without displaced fracture.    Overview/Hospital Course:  Admission to observation for both foot infection concern of OM. Drug screen positive for amphetamine. Patient denied IVDU. Last snort meth prior to admission. Arterial US BLE no focal occlusion but does show PAD BLE. X ray right foot diffuse soft tissue edema. MRI hindfoot left and right ordered.Continue Rocephin and Vancomycin IV.  Wound care instructions until official Podiatry consult.    Interval History: Pt noted to be awake, alert, conversant and calm. HDS and afebrile. No acute  events overnight. No new complaints this morning.  Pain is well controlled.  Continue wound care, continue to follow-up cultures, continue IV antibiotics.      Review of Systems   Constitutional:  Positive for activity change and fatigue. Negative for fever.   Respiratory:  Negative for shortness of breath.    Cardiovascular:  Negative for chest pain.   Gastrointestinal:  Negative for abdominal pain.   Musculoskeletal:  Positive for arthralgias.   Skin:  Positive for wound.   Neurological:  Negative for dizziness and headaches.   All other systems reviewed and are negative.    Objective:     Vital Signs (Most Recent):  Temp: 97.8 °F (36.6 °C) (01/29/24 1102)  Pulse: 81 (01/29/24 1108)  Resp: 18 (01/29/24 1102)  BP: (!) 139/58 (01/29/24 1102)  SpO2: (!) 92 % (01/29/24 1102) Vital Signs (24h Range):  Temp:  [97.8 °F (36.6 °C)-98.8 °F (37.1 °C)] 97.8 °F (36.6 °C)  Pulse:  [73-85] 81  Resp:  [16-19] 18  SpO2:  [91 %-95 %] 92 %  BP: (115-141)/(58-73) 139/58     Weight: 88.5 kg (195 lb)  Body mass index is 32.45 kg/m².    Intake/Output Summary (Last 24 hours) at 1/29/2024 1308  Last data filed at 1/29/2024 0814  Gross per 24 hour   Intake 720 ml   Output 1800 ml   Net -1080 ml         Physical Exam  Vitals and nursing note reviewed.   Constitutional:       Appearance: Normal appearance.   HENT:      Head: Normocephalic and atraumatic.   Eyes:      Extraocular Movements: Extraocular movements intact.      Pupils: Pupils are equal, round, and reactive to light.   Cardiovascular:      Rate and Rhythm: Normal rate and regular rhythm.   Pulmonary:      Effort: Pulmonary effort is normal. No respiratory distress.   Abdominal:      General: There is no distension.   Musculoskeletal:         General: Normal range of motion.      Cervical back: Normal range of motion.      Comments: Foot wound, in bandage   Neurological:      General: No focal deficit present.      Mental Status: She is alert and oriented to person, place, and  "time.   Psychiatric:         Mood and Affect: Mood normal.         Behavior: Behavior normal.             Significant Labs: All pertinent labs within the past 24 hours have been reviewed.  CBC:   Recent Labs   Lab 01/27/24 2001 01/28/24  0122   WBC 4.26 4.30   HGB 11.7* 11.4*   HCT 36.5* 35.3*    240     CMP:   Recent Labs   Lab 01/27/24 2001 01/28/24  0122    137   K 3.7 3.0*    103   CO2 25 24   GLU 97 136*   BUN 5* 5*   CREATININE 0.7 0.7   CALCIUM 8.4* 8.2*   PROT 7.0 6.2   ALBUMIN 3.2* 3.0*   BILITOT 0.6 0.6   ALKPHOS 132 125   AST 66* 50*   ALT 29 26   ANIONGAP 11 10       Significant Imaging: I have reviewed all pertinent imaging results/findings within the past 24 hours.    Assessment/Plan:      * BLE cellulitis , concern for OM in both foot  Presents with worsening pain and swelling to left foot with laceration "few weeks." She is a poor historian.  History of previous MSSA with concern for osteo 11/2023.   Hospitalization 11/23-11/29/23 for BLE cellulitis and left toe OM. 11/13/23 Left foot culture grew MSSA.  Podiatry offered amputation vs abx and  transferred LTAC to complete 6 weeks of IV Ancef.   BLE significantly worst then November- see above assessment and pictures  Denies IVDU but does admit to snorting meth PTA. Denies cocaine and alcohol   Continue IV vanc/rocephin  Obtain MRI both foot, still pending  Podiatry consult, vascular surgery consult    Per Podiatry:  patient okay to ambulate as tolerated in bilateral DARCO shoes   ID consult pending above     Obesity (BMI 30-39.9)  Body mass index is 32.45 kg/m². Morbid obesity complicates all aspects of disease management from diagnostic modalities to treatment. Weight loss encouraged and health benefits explained to patient.    Polysubstance abuse  UDS positive for amphetamines. Counseled on cessation  Concern for IVDU  Snort meth PTA    Essential hypertension  BP well controlled. Not currently on medication " outpatient    Chronic, controlled. Latest blood pressure and vitals reviewed-     Temp:  [98 °F (36.7 °C)]   Pulse:  [80-94]   Resp:  [18-28]   BP: (103-130)/(68-80)   SpO2:  [93 %-96 %] .   Home meds for hypertension were reviewed and noted below.       While in the hospital, will manage blood pressure as follows; Continue home antihypertensive regimen    Will utilize p.r.n. blood pressure medication only if patient's blood pressure greater than 180/110 and she develops symptoms such as worsening chest pain or shortness of breath.    Bipolar 1 disorder  Took self off abilify and lexapro recently  Denies SI  Resume abilify and lexapro      Acquired hypothyroidism  Lab Results   Component Value Date    TSH 1.661 04/20/2022     Continue home synthroid          VTE Risk Mitigation (From admission, onward)           Ordered     IP VTE HIGH RISK PATIENT  Once         01/27/24 2222     Place sequential compression device  Until discontinued         01/27/24 2222     Place MARINA hose  Until discontinued         01/27/24 2222                          I have completed this tele-visit without the assistance of a telepresenter.    The attending portion of this evaluation, treatment, and documentation was performed per Clemencia Grossman MD via Telemedicine AudioVisual using the secure IMASTE software platform with 2 way audio/video. The provider was located off-site and the patient is located in the hospital. The aforementioned video software was utilized to document the relevant history and physical exam    Clemencia Grossman MD  Department of Hospital Medicine   [facility] - Med Surg
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
## 2024-01-29 NOTE — SUBJECTIVE & OBJECTIVE
Interval History: Pt noted to be awake, alert, conversant and calm. HDS and afebrile. No acute events overnight. No new complaints this morning.  Pain is well controlled.  Continue wound care, continue to follow-up cultures, continue IV antibiotics.      Review of Systems   Constitutional:  Positive for activity change and fatigue. Negative for fever.   Respiratory:  Negative for shortness of breath.    Cardiovascular:  Negative for chest pain.   Gastrointestinal:  Negative for abdominal pain.   Musculoskeletal:  Positive for arthralgias.   Skin:  Positive for wound.   Neurological:  Negative for dizziness and headaches.   All other systems reviewed and are negative.    Objective:     Vital Signs (Most Recent):  Temp: 97.8 °F (36.6 °C) (01/29/24 1102)  Pulse: 81 (01/29/24 1108)  Resp: 18 (01/29/24 1102)  BP: (!) 139/58 (01/29/24 1102)  SpO2: (!) 92 % (01/29/24 1102) Vital Signs (24h Range):  Temp:  [97.8 °F (36.6 °C)-98.8 °F (37.1 °C)] 97.8 °F (36.6 °C)  Pulse:  [73-85] 81  Resp:  [16-19] 18  SpO2:  [91 %-95 %] 92 %  BP: (115-141)/(58-73) 139/58     Weight: 88.5 kg (195 lb)  Body mass index is 32.45 kg/m².    Intake/Output Summary (Last 24 hours) at 1/29/2024 1308  Last data filed at 1/29/2024 0814  Gross per 24 hour   Intake 720 ml   Output 1800 ml   Net -1080 ml         Physical Exam  Vitals and nursing note reviewed.   Constitutional:       Appearance: Normal appearance.   HENT:      Head: Normocephalic and atraumatic.   Eyes:      Extraocular Movements: Extraocular movements intact.      Pupils: Pupils are equal, round, and reactive to light.   Cardiovascular:      Rate and Rhythm: Normal rate and regular rhythm.   Pulmonary:      Effort: Pulmonary effort is normal. No respiratory distress.   Abdominal:      General: There is no distension.   Musculoskeletal:         General: Normal range of motion.      Cervical back: Normal range of motion.      Comments: Foot wound, in bandage   Neurological:      General: No  focal deficit present.      Mental Status: She is alert and oriented to person, place, and time.   Psychiatric:         Mood and Affect: Mood normal.         Behavior: Behavior normal.             Significant Labs: All pertinent labs within the past 24 hours have been reviewed.  CBC:   Recent Labs   Lab 01/27/24 2001 01/28/24  0122   WBC 4.26 4.30   HGB 11.7* 11.4*   HCT 36.5* 35.3*    240     CMP:   Recent Labs   Lab 01/27/24 2001 01/28/24  0122    137   K 3.7 3.0*    103   CO2 25 24   GLU 97 136*   BUN 5* 5*   CREATININE 0.7 0.7   CALCIUM 8.4* 8.2*   PROT 7.0 6.2   ALBUMIN 3.2* 3.0*   BILITOT 0.6 0.6   ALKPHOS 132 125   AST 66* 50*   ALT 29 26   ANIONGAP 11 10       Significant Imaging: I have reviewed all pertinent imaging results/findings within the past 24 hours.

## 2024-01-29 NOTE — ASSESSMENT & PLAN NOTE
"Presents with worsening pain and swelling to left foot with laceration "few weeks." She is a poor historian.  History of previous MSSA with concern for osteo 11/2023.   Hospitalization 11/23-11/29/23 for BLE cellulitis and left toe OM. 11/13/23 Left foot culture grew MSSA.  Podiatry offered amputation vs abx and  transferred LTAC to complete 6 weeks of IV Ancef.   BLE significantly worst then November- see above assessment and pictures  Denies IVDU but does admit to snorting meth PTA. Denies cocaine and alcohol   Continue IV vanc/rocephin  Obtain MRI both foot, still pending  Podiatry consult, vascular surgery consult    Per Podiatry:  patient okay to ambulate as tolerated in bilateral DARCO shoes   ID consult pending above   "

## 2024-01-29 NOTE — CONSULTS
[facility]  Telemedicine Consult Note       Estella Arevalo has been accepted for transfer to [facility] and will be followed through telemedicine services beginning at 7 AM.      Clemencia Grossman MD  [facility] Medicine Staff

## 2024-01-30 LAB
ANION GAP SERPL CALC-SCNC: 5 MMOL/L (ref 8–16)
BASOPHILS # BLD AUTO: 0.04 K/UL (ref 0–0.2)
BASOPHILS NFR BLD: 1.4 % (ref 0–1.9)
BUN SERPL-MCNC: 3 MG/DL (ref 8–23)
CALCIUM SERPL-MCNC: 8.2 MG/DL (ref 8.7–10.5)
CHLORIDE SERPL-SCNC: 104 MMOL/L (ref 95–110)
CO2 SERPL-SCNC: 30 MMOL/L (ref 23–29)
CREAT SERPL-MCNC: 0.6 MG/DL (ref 0.5–1.4)
DIFFERENTIAL METHOD BLD: ABNORMAL
EOSINOPHIL # BLD AUTO: 0.3 K/UL (ref 0–0.5)
EOSINOPHIL NFR BLD: 11.2 % (ref 0–8)
ERYTHROCYTE [DISTWIDTH] IN BLOOD BY AUTOMATED COUNT: 16.4 % (ref 11.5–14.5)
EST. GFR  (NO RACE VARIABLE): >60 ML/MIN/1.73 M^2
GLUCOSE SERPL-MCNC: 99 MG/DL (ref 70–110)
HCT VFR BLD AUTO: 36.2 % (ref 37–48.5)
HGB BLD-MCNC: 11.6 G/DL (ref 12–16)
IMM GRANULOCYTES # BLD AUTO: 0 K/UL (ref 0–0.04)
IMM GRANULOCYTES NFR BLD AUTO: 0 % (ref 0–0.5)
LYMPHOCYTES # BLD AUTO: 0.8 K/UL (ref 1–4.8)
LYMPHOCYTES NFR BLD: 27 % (ref 18–48)
MAGNESIUM SERPL-MCNC: 1.9 MG/DL (ref 1.6–2.6)
MCH RBC QN AUTO: 30.1 PG (ref 27–31)
MCHC RBC AUTO-ENTMCNC: 32 G/DL (ref 32–36)
MCV RBC AUTO: 94 FL (ref 82–98)
MONOCYTES # BLD AUTO: 0.3 K/UL (ref 0.3–1)
MONOCYTES NFR BLD: 11.6 % (ref 4–15)
NEUTROPHILS # BLD AUTO: 1.4 K/UL (ref 1.8–7.7)
NEUTROPHILS NFR BLD: 48.8 % (ref 38–73)
NRBC BLD-RTO: 0 /100 WBC
PHOSPHATE SERPL-MCNC: 3.3 MG/DL (ref 2.7–4.5)
PLATELET # BLD AUTO: 265 K/UL (ref 150–450)
PMV BLD AUTO: 9.6 FL (ref 9.2–12.9)
POTASSIUM SERPL-SCNC: 3.2 MMOL/L (ref 3.5–5.1)
RBC # BLD AUTO: 3.85 M/UL (ref 4–5.4)
SODIUM SERPL-SCNC: 139 MMOL/L (ref 136–145)
VANCOMYCIN TROUGH SERPL-MCNC: 19.4 UG/ML (ref 10–22)
WBC # BLD AUTO: 2.85 K/UL (ref 3.9–12.7)

## 2024-01-30 PROCEDURE — 25000003 PHARM REV CODE 250: Performed by: HOSPITALIST

## 2024-01-30 PROCEDURE — 80202 ASSAY OF VANCOMYCIN: CPT | Performed by: HOSPITALIST

## 2024-01-30 PROCEDURE — 25000003 PHARM REV CODE 250: Performed by: PHYSICIAN ASSISTANT

## 2024-01-30 PROCEDURE — 25000003 PHARM REV CODE 250: Performed by: EMERGENCY MEDICINE

## 2024-01-30 PROCEDURE — 83735 ASSAY OF MAGNESIUM: CPT | Performed by: HOSPITALIST

## 2024-01-30 PROCEDURE — 63600175 PHARM REV CODE 636 W HCPCS: Performed by: HOSPITALIST

## 2024-01-30 PROCEDURE — 97161 PT EVAL LOW COMPLEX 20 MIN: CPT

## 2024-01-30 PROCEDURE — 25000003 PHARM REV CODE 250: Performed by: NURSE PRACTITIONER

## 2024-01-30 PROCEDURE — 97165 OT EVAL LOW COMPLEX 30 MIN: CPT

## 2024-01-30 PROCEDURE — 94761 N-INVAS EAR/PLS OXIMETRY MLT: CPT

## 2024-01-30 PROCEDURE — A4216 STERILE WATER/SALINE, 10 ML: HCPCS | Performed by: EMERGENCY MEDICINE

## 2024-01-30 PROCEDURE — 84100 ASSAY OF PHOSPHORUS: CPT | Performed by: HOSPITALIST

## 2024-01-30 PROCEDURE — 25000003 PHARM REV CODE 250

## 2024-01-30 PROCEDURE — 80048 BASIC METABOLIC PNL TOTAL CA: CPT | Performed by: HOSPITALIST

## 2024-01-30 PROCEDURE — 63600175 PHARM REV CODE 636 W HCPCS: Performed by: PHYSICIAN ASSISTANT

## 2024-01-30 PROCEDURE — 99232 SBSQ HOSP IP/OBS MODERATE 35: CPT | Mod: ,,, | Performed by: PODIATRIST

## 2024-01-30 PROCEDURE — 85025 COMPLETE CBC W/AUTO DIFF WBC: CPT | Performed by: HOSPITALIST

## 2024-01-30 PROCEDURE — 11000001 HC ACUTE MED/SURG PRIVATE ROOM

## 2024-01-30 RX ORDER — ALUMINUM HYDROXIDE, MAGNESIUM HYDROXIDE, AND SIMETHICONE 1200; 120; 1200 MG/30ML; MG/30ML; MG/30ML
30 SUSPENSION ORAL 4 TIMES DAILY PRN
Status: DISCONTINUED | OUTPATIENT
Start: 2024-01-30 | End: 2024-01-31 | Stop reason: HOSPADM

## 2024-01-30 RX ADMIN — ESCITALOPRAM OXALATE 20 MG: 10 TABLET ORAL at 08:01

## 2024-01-30 RX ADMIN — Medication 10 ML: at 05:01

## 2024-01-30 RX ADMIN — VANCOMYCIN HYDROCHLORIDE 1250 MG: 1.25 INJECTION, POWDER, LYOPHILIZED, FOR SOLUTION INTRAVENOUS at 01:01

## 2024-01-30 RX ADMIN — HYDROCODONE BITARTRATE AND ACETAMINOPHEN 1 TABLET: 5; 325 TABLET ORAL at 11:01

## 2024-01-30 RX ADMIN — PANTOPRAZOLE SODIUM 40 MG: 40 TABLET, DELAYED RELEASE ORAL at 08:01

## 2024-01-30 RX ADMIN — Medication 10 ML: at 12:01

## 2024-01-30 RX ADMIN — THERA TABS 1 TABLET: TAB at 08:01

## 2024-01-30 RX ADMIN — ARIPIPRAZOLE 10 MG: 10 TABLET ORAL at 08:01

## 2024-01-30 RX ADMIN — LEVOTHYROXINE SODIUM 25 MCG: 25 TABLET ORAL at 05:01

## 2024-01-30 RX ADMIN — Medication 10 ML: at 11:01

## 2024-01-30 RX ADMIN — TRAZODONE HYDROCHLORIDE 100 MG: 50 TABLET ORAL at 10:01

## 2024-01-30 RX ADMIN — ALUMINUM HYDROXIDE, MAGNESIUM HYDROXIDE, AND DIMETHICONE 30 ML: 200; 20; 200 SUSPENSION ORAL at 11:01

## 2024-01-30 RX ADMIN — HYDROCODONE BITARTRATE AND ACETAMINOPHEN 1 TABLET: 5; 325 TABLET ORAL at 10:01

## 2024-01-30 RX ADMIN — VANCOMYCIN HYDROCHLORIDE 1250 MG: 1.25 INJECTION, POWDER, LYOPHILIZED, FOR SOLUTION INTRAVENOUS at 02:01

## 2024-01-30 RX ADMIN — HYDROCODONE BITARTRATE AND ACETAMINOPHEN 1 TABLET: 5; 325 TABLET ORAL at 05:01

## 2024-01-30 RX ADMIN — CEFTRIAXONE 1 G: 1 INJECTION, POWDER, FOR SOLUTION INTRAMUSCULAR; INTRAVENOUS at 10:01

## 2024-01-30 NOTE — PROGRESS NOTES
Pharmacokinetic Assessment Follow Up: IV Vancomycin    Vancomycin serum concentration assessment(s):    The trough level was drawn correctly and can be used to guide therapy at this time. The measurement is within the desired definitive target range of 10 to 20 mcg/mL.    Vancomycin Regimen Plan:    Continue regimen to Vancomycin 1250 mg IV every 12 hours with next serum trough concentration measured at 0100 prior to fourth dose on 2/1    Drug levels (last 3 results):  Recent Labs   Lab Result Units 01/30/24  0149   Vancomycin-Trough ug/mL 19.4       Pharmacy will continue to follow and monitor vancomycin.    Please contact pharmacy at extension [phone] for questions regarding this assessment.    Thank you for the consult,   Cale Loja       Patient brief summary:  Estella Arevalo is a 68 y.o. female initiated on antimicrobial therapy with IV Vancomycin for treatment of skin & soft tissue infection    Drug Allergies:   Review of patient's allergies indicates:   Allergen Reactions    Codeine      nausea       Actual Body Weight:   88.5 kg    Renal Function:   Estimated Creatinine Clearance: 97.2 mL/min (based on SCr of 0.6 mg/dL).,     Dialysis Method (if applicable):  N/A    CBC (last 72 hours):  Recent Labs   Lab Result Units 01/27/24 2001 01/28/24 0122 01/30/24 0149   WBC K/uL 4.26 4.30 2.85*   Hemoglobin g/dL 11.7* 11.4* 11.6*   Hematocrit % 36.5* 35.3* 36.2*   Platelets K/uL 241 240 265   Gran % % 70.5 55.9 48.8   Lymph % % 16.2* 26.0 27.0   Mono % % 8.0 9.1 11.6   Eosinophil % % 4.2 7.9 11.2*   Basophil % % 0.9 0.9 1.4   Differential Method  Automated Automated Automated       Metabolic Panel (last 72 hours):  Recent Labs   Lab Result Units 01/27/24  1947 01/27/24 2001 01/28/24 0122 01/30/24 0149   Sodium mmol/L  --  136 137 139   Potassium mmol/L  --  3.7 3.0* 3.2*   Chloride mmol/L  --  100 103 104   CO2 mmol/L  --  25 24 30*   Glucose mg/dL  --  97 136* 99   Glucose, UA  Negative  --   --   --     BUN mg/dL  --  5* 5* 3*   Creatinine mg/dL  --  0.7 0.7 0.6   Creatinine, Urine mg/dL 27.6  --   --   --    Albumin g/dL  --  3.2* 3.0*  --    Total Bilirubin mg/dL  --  0.6 0.6  --    Alkaline Phosphatase U/L  --  132 125  --    AST U/L  --  66* 50*  --    ALT U/L  --  29 26  --    Magnesium mg/dL  --   --  2.4 1.9   Phosphorus mg/dL  --   --   --  3.3       Vancomycin Administrations:  vancomycin given in the last 96 hours                     vancomycin 1,250 mg in dextrose 5 % (D5W) 250 mL IVPB (Vial-Mate) (mg) 1,250 mg New Bag 01/30/24 0225     1,250 mg New Bag 01/29/24 1447     1,250 mg New Bag  0121      Restarted 01/28/24 1549     1,250 mg New Bag  1429    vancomycin (VANCOCIN) 2,000 mg in dextrose 5 % (D5W) 500 mL IVPB (mg) 2,000 mg New Bag 01/27/24 2000                    Microbiologic Results:  Microbiology Results (last 7 days)       Procedure Component Value Units Date/Time    Blood culture x two cultures. Draw prior to antibiotics. [[id number]] Collected: 01/27/24 2042    Order Status: Completed Specimen: Blood from Peripheral, Antecubital, Right Updated: 01/29/24 2303     Blood Culture, Routine No Growth to date      No Growth to date      No Growth to date    Narrative:      Aerobic and anaerobic    Blood culture x two cultures. Draw prior to antibiotics. [[id number]] Collected: 01/27/24 1950    Order Status: Completed Specimen: Blood from Peripheral, Forearm, Left Updated: 01/29/24 2103     Blood Culture, Routine No Growth to date      No Growth to date      No Growth to date    Narrative:      Aerobic and anaerobic    Aerobic culture [[id number]]  (Abnormal) Collected: 01/28/24 1335    Order Status: Completed Specimen: Wound from Foot, Left Updated: 01/29/24 1051     Aerobic Bacterial Culture GRAM NEGATIVE DEMETRIO, NON-LACTOSE   Moderate  Identification and susceptibility pending        GRAM NEGATIVE DEMETRIO  Few  Identification and susceptibility pending      Narrative:      Left foot plantar  lateral wound    Gram stain [[id number]] Collected: 01/28/24 1334    Order Status: Completed Specimen: Wound from Foot, Left Updated: 01/29/24 1032     Gram Stain Result Moderate Gram positive cocci in pairs      No WBC's    Narrative:      Left foot plantar lateral wound    Culture, Anaerobe [[id number]] Collected: 01/28/24 1333    Order Status: Sent Specimen: Wound from Foot, Left Updated: 01/28/24 1431

## 2024-01-30 NOTE — NURSING
[facility], [facility]  Nurses Note -- 4 Eyes      1/29/2024       Skin assessed on: Q Shift      [x] No Pressure Injuries Present    []Prevention Measures Documented    [] Yes LDA  for Pressure Injury Previously documented     [] Yes New Pressure Injury Discovered   [] LDA for New Pressure Injury Added      Attending RN:  Jovita Garcia, MARISSA     Second RN:  Brandee Anaya LPN

## 2024-01-30 NOTE — NURSING
[facility], [facility]  Nurses Note -- 4 Eyes      1/29/2024      Skin assessed on: Q Shift      [x] No Pressure Injuries Present    []Prevention Measures Documented    [] Yes LDA  for Pressure Injury Previously documented     [] Yes New Pressure Injury Discovered   [] LDA for New Pressure Injury Added      Attending RN:  Marcela Gibbs, MARISSA     Second RN:  MIRANDA Arredondo
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
## 2024-01-30 NOTE — PLAN OF CARE
Problem: Adult Inpatient Plan of Care  Goal: Plan of Care Review  1/30/2024 0509 by Marcela Gibbs RN  Outcome: Ongoing, Progressing  Flowsheets (Taken 1/30/2024 0509)  Plan of Care Reviewed With: patient    Patient remains free from injury and falls this shift. Pain controlled with PRN analgesics. BLE in EHOB boots. IV antibiotics administered as ordered. Patient resting comfortably, able to make needs known. Plan of care continued.

## 2024-01-30 NOTE — PLAN OF CARE
Problem: Physical Therapy  Goal: Physical Therapy Goal  Description: Goals to be met by: 24     Patient will increase functional independence with mobility by performin. Sit to stand transfer with Modified [place]  2. Bed to chair transfer with Modified [place] using RW   3. Gait  x 25-50 feet with Modified [place] using Rolling Walker.   4. Wheelchair propulsion x  feet with Supervision using bilateral uppper extremities  5. Ascend/descend 1 stair with  Stand-by Assistance using Rolling Walker.   6. Lower extremity exercise program x10 reps per handout, with supervision    Outcome: Ongoing, Progressing   Initial PT evaluation Completed today.  Pt could benefit from skilled PT services 3-5x/wk in order to maximize function prior to D/C.  Ongoing assessment pending pt progress and Sx? for disposition and DME.

## 2024-01-30 NOTE — PLAN OF CARE
Problem: Violence Risk or Actual  Goal: Anger and Impulse Control  Outcome: Ongoing, Progressing     Problem: Adult Inpatient Plan of Care  Goal: Plan of Care Review  Outcome: Ongoing, Progressing  Goal: Patient-Specific Goal (Individualized)  Outcome: Ongoing, Progressing  Goal: Absence of Hospital-Acquired Illness or Injury  Outcome: Ongoing, Progressing  Goal: Optimal Comfort and Wellbeing  Outcome: Ongoing, Progressing  Goal: Readiness for Transition of Care  Outcome: Ongoing, Progressing     Problem: Infection  Goal: Absence of Infection Signs and Symptoms  Outcome: Ongoing, Progressing     Problem: Impaired Wound Healing  Goal: Optimal Wound Healing  Outcome: Ongoing, Progressing

## 2024-01-30 NOTE — NURSING
Pt back to unit from cardio by bed via transport. IV access in place, saline locked. NAD or pain noted.

## 2024-01-30 NOTE — PROGRESS NOTES
[facility] - Med Surg  Podiatry  Progress Note    Patient Name: Estella Arevalo  MRN: [id number]  Admission Date: 1/27/2024  Hospital Length of Stay: 2 days  Attending Physician: Clemencia Grossman MD  Primary Care Provider: Fiordaliza Ma -     Subjective:     History of Present Illness:  68 y.o. female with HTN, hypothyroidism, history of polysubstance abuse, previous alcohol abuse presents with complaint of dysuria and leg pain.  Podiatry was consulted for bilateral foot wounds with left foot wounds being worse.  Per patient's she did crystal meth yesterday and has been doing this for weeks recently. She  says when she does crystal meth she likes to pick on her skin and has been picking on her great toe wounds and left lateral plantar wound.  Patient admits to noticing some drainage coming from her left foot so she decided to present to the ED. Patient admits that she has been walking barefooted.  She was started on IV antibiotics in the ED. admits to feeling nauseous and tired but denies any fevers chills or vomiting.  Patient is stable at the time of this visit.  Patient has no other pedal complaints at this time.     Interval History:     01/29/2024 F/u B/L ulceration. MRI pending     01/30/2024 patient seen at bedside resting comfortably.  No new pedal complaints        Scheduled Meds:   ARIPiprazole  10 mg Oral Daily    cefTRIAXone (Rocephin) IV (PEDS and ADULTS)  1 g Intravenous Q24H    EScitalopram oxalate  20 mg Oral Daily    levothyroxine  25 mcg Oral Before breakfast    multivitamin  1 tablet Oral Daily    pantoprazole  40 mg Oral Daily    sodium chloride 0.9%  10 mL Intravenous Q6H    traZODone  100 mg Oral QHS    vancomycin (VANCOCIN) IV (PEDS and ADULTS)  1,250 mg Intravenous Q12H     Continuous Infusions:  PRN Meds:acetaminophen, glucagon (human recombinant), glucose, glucose, HYDROcodone-acetaminophen, magnesium oxide, magnesium oxide, melatonin, naloxone, senna-docusate 8.6-50 mg, sodium chloride  0.9%, [place] PICC/Midline Protocol **AND** sodium chloride 0.9% **AND** sodium chloride 0.9%, DIPH,PERTUSS(ACELL),TET VACCINE (ADULT)(BOOSTRIX,ADACEL), traMADoL, Pharmacy to dose Vancomycin consult **AND** vancomycin - pharmacy to dose    Review of Systems   Constitutional:  Positive for activity change and fatigue. Negative for appetite change, chills and fever.   Respiratory:  Negative for cough and shortness of breath.    Cardiovascular:  Negative for chest pain.   Gastrointestinal:  Negative for diarrhea, nausea and vomiting.   Musculoskeletal:  Positive for arthralgias and myalgias.   Skin:  Positive for pallor and wound.   Neurological:  Negative for weakness.        + paresthesia      Objective:     Vital Signs (Most Recent):  Temp: 98.5 °F (36.9 °C) (01/30/24 1117)  Pulse: 82 (01/30/24 1117)  Resp: 18 (01/30/24 1141)  BP: 108/71 (01/30/24 1117)  SpO2: (!) 94 % (01/30/24 1117) Vital Signs (24h Range):  Temp:  [98 °F (36.7 °C)-98.8 °F (37.1 °C)] 98.5 °F (36.9 °C)  Pulse:  [77-97] 82  Resp:  [15-20] 18  SpO2:  [91 %-95 %] 94 %  BP: (108-144)/(71-89) 108/71     Weight: 88.5 kg (195 lb)  Body mass index is 32.45 kg/m².    Physical Exam  Vitals and nursing note reviewed.   Constitutional:       General: She is not in acute distress.     Appearance: She is well-developed. She is not toxic-appearing or diaphoretic.      Comments: alert and oriented x 3.    Cardiovascular:      Pulses:           Dorsalis pedis pulses are 1+ on the right side and 1+ on the left side.        Posterior tibial pulses are 1+ on the right side and 1+ on the left side.   Pulmonary:      Effort: No respiratory distress.   Musculoskeletal:      Right ankle: No tenderness. No lateral malleolus, medial malleolus, AITF ligament, CF ligament or posterior TF ligament tenderness.      Right Achilles Tendon: No defects. Gonzalez's test negative.      Left ankle: No tenderness. No lateral malleolus, medial malleolus, AITF ligament, CF ligament or  "posterior TF ligament tenderness.      Left Achilles Tendon: No defects. Gonzalez's test negative.      Right foot: Deformity present. No bony tenderness.      Left foot: Deformity present. No bony tenderness.      Comments: Fusion at ankle RLE    Rigid cavus with hallux malleus of the LLE           Feet:      Right foot:      Protective Sensation: 5 sites tested.  5 sites sensed.      Skin integrity: Erythema present.      Left foot:      Protective Sensation: 5 sites tested.  5 sites sensed.      Skin integrity: Ulcer and erythema present.   Lymphadenopathy:      Comments: No lymphatic streaking     Skin:     General: Skin is warm and dry.      Coloration: Skin is not pale.      Findings: Erythema (BLE) and wound (see below) present. No rash.      Nails: There is no clubbing.   Neurological:      Sensory: No sensory deficit.      Motor: No atrophy.      Comments: Light touch present     Psychiatric:         Attention and Perception: She is attentive.         Mood and Affect: Mood is not anxious. Affect is not inappropriate.         Speech: She is communicative. Speech is not slurred.         Behavior: Behavior is not combative.          01/30/24    Left: left great toe and dub 5th MTPJ which probes deep. No purulence expressed today on exam.  Bloody drainage noted.   No crepitus noted.  Tender to palpation noted.  Wound to great toe is to the layer of dermal tissue.  Diffuse erythema and swelling noted               Right Noted to dermal layer great toe.  Patient also has superficial wounds noted to the medial midfoot to the layer of dermis as well.  These wounds have scant bloody drainage noted.  No active purulence expressed today.  No probe to bone noted on these wounds.   No crepitus noted.  Diffuse erythema and swelling noted               Laboratory:  A1C: No results for input(s): "HGBA1C" in the last 4320 hours.  BMP:   Recent Labs   Lab 01/30/24  0149   GLU 99      K 3.2*      CO2 30*   BUN 3* "   CREATININE 0.6   CALCIUM 8.2*   MG 1.9     CBC:   Recent Labs   Lab 01/30/24  0149   WBC 2.85*   RBC 3.85*   HGB 11.6*   HCT 36.2*      MCV 94   MCH 30.1   MCHC 32.0     CPS: {:LNK,LABRCNTIP[  CRP:   Recent Labs   Lab 01/28/24  0122   CRP 33.0*     Microbiology Results (last 7 days)       Procedure Component Value Units Date/Time    Aerobic culture [[id number]]  (Abnormal)  (Susceptibility) Collected: 01/28/24 1335    Order Status: Completed Specimen: Wound from Foot, Left Updated: 01/30/24 0857     Aerobic Bacterial Culture PROVIDENCIA RETTGERI  Moderate        ENTEROBACTER CLOACAE  Few        STAPHYLOCOCCUS AUREUS  Moderate  Susceptibility pending      Narrative:      Left foot plantar lateral wound    Culture, Anaerobe [[id number]] Collected: 01/28/24 1335    Order Status: Completed Specimen: Wound from Foot, Left Updated: 01/30/24 0832     Anaerobic Culture Culture in progress    Narrative:      Left foot plantar lateral wound    Blood culture x two cultures. Draw prior to antibiotics. [[id number]] Collected: 01/27/24 2042    Order Status: Completed Specimen: Blood from Peripheral, Antecubital, Right Updated: 01/29/24 2303     Blood Culture, Routine No Growth to date      No Growth to date      No Growth to date    Narrative:      Aerobic and anaerobic    Blood culture x two cultures. Draw prior to antibiotics. [[id number]] Collected: 01/27/24 1950    Order Status: Completed Specimen: Blood from Peripheral, Forearm, Left Updated: 01/29/24 2103     Blood Culture, Routine No Growth to date      No Growth to date      No Growth to date    Narrative:      Aerobic and anaerobic    Gram stain [[id number]] Collected: 01/28/24 1335    Order Status: Completed Specimen: Wound from Foot, Left Updated: 01/29/24 1032     Gram Stain Result Moderate Gram positive cocci in pairs      No WBC's    Narrative:      Left foot plantar lateral wound            Diagnostic Results:  Imaging Results              X-Ray Chest 1  View S/P PICC Line by Nursing (Final result)  Result time 01/28/24 02:03:50   Procedure changed from X-Ray Chest 1 View for Line/Tube Placement without Guidance     Final result by Fadumo Shi MD (01/28/24 02:03:50)                   Impression:      Please see above.      Electronically signed by: Fadumo Shi MD  Date:    01/28/2024  Time:    02:03               Narrative:    EXAMINATION:  XR CHEST 1 VIEW S/P PICC LINE BY NURSING    CLINICAL HISTORY:  PICC;    TECHNIQUE:  Single frontal view of the chest was performed.    COMPARISON:  01/27/2024 at 20:08    FINDINGS:  Cardiac monitoring leads overlie the chest.  There is interval placement of a right upper extremity PICC line with tip projecting over the region of the SVC.  Cardiomediastinal silhouette is unchanged in size and configuration.  No interval detrimental change in lung aeration or evidence of new or enlarging pneumothorax.  Osseous structures intact with postoperative change of the right shoulder.                                       US Lower Extremity Arteries Bilateral (Final result)  Result time 01/28/24 01:25:12      Final result by Sp Sequeira MD (01/28/24 01:25:12)                   Impression:      Doubling of velocities from the right proximal popliteal artery to the right posterior tibial and anterior tibial arteries, suggesting some degree of right lower extremity arterial stenosis.    Abnormal monophasic waveforms throughout the majority of the left lower extremity and portions of the right lower extremity, suggestive of peripheral arterial disease.    No focal occlusion.    Additional details and velocities listed in the body of the report.      Electronically signed by: Sp Sequeira MD  Date:    01/28/2024  Time:    01:25               Narrative:    EXAMINATION:  US LOWER EXTREMITY ARTERIES BILATERAL    CLINICAL HISTORY:  foot wound;    TECHNIQUE:  Bilateral spectral, color and grayscale images of the large arteries of  "both lower extremities were performed.    COMPARISON:  None.    FINDINGS:  Right lower extremity.    CFA: 163 cm/s, triphasic    Prox SFA: 154 cm/s, biphasic    Mid SFA: 108 cm/s, monophasic    Dist SFA: 68 cm/s, monophasic    Prox pop A: 75 cm/s, triphasic    Dist pop A: 107 cm/s, triphasic    PTA: 132 cm/s, monophasic    DEVIN: 148 cm/s, monophasic    Left lower extremity    CFA: 127 cm/s, triphasic    Prox SFA: 180 cm/s, monophasic    Mid SFA: 140 cm/s, monophasic    Dist SFA: 106 cm/s, monophasic    Prox pop A: 95 cm/s, monophasic    Dist pop A: 77 cm/s, monophasic    PTA: 53 cm/s, monophasic    DEVIN: 81 cm/s, monophasic    Mild soft tissue edema.                                         X-Ray Chest AP Portable (Final result)  Result time 01/27/24 20:40:21      Final result by Sp Sequeira MD (01/27/24 20:40:21)                   Impression:      No acute cardiopulmonary finding identified on this single view.  No detrimental change when compared with 11/28/2023.      Electronically signed by: Sp Sequeira MD  Date:    01/27/2024  Time:    20:40               Narrative:    EXAMINATION:  XR CHEST AP PORTABLE    CLINICAL HISTORY:  Provided history is "Sepsis;  ".    TECHNIQUE:  One view of the chest.    COMPARISON:  11/28/2023.    FINDINGS:  Cardiac wires overlie the chest.  Cardiomediastinal silhouette is not enlarged.  Atherosclerotic calcifications overlie the aortic arch.  No focal consolidation.  No sizable pleural effusion.  No pneumothorax.  Postoperative changes of right total shoulder arthroplasty.                                       X-Ray Foot Complete Right (Final result)  Result time 01/27/24 20:42:20      Final result by Sp Sequeira MD (01/27/24 20:42:20)                   Impression:      Diffuse soft tissue edema, worse when compared with prior study.      Electronically signed by: Sp Sequeira MD  Date:    01/27/2024  Time:    20:42               Narrative:    EXAMINATION:  XR " FOOT COMPLETE 3 VIEW RIGHT    CLINICAL HISTORY:  Unspecified injury of right foot, initial encounter    TECHNIQUE:  AP, lateral, and oblique views of the right foot were performed.    COMPARISON:  01/27/2021.    FINDINGS:  Similar degenerative and postoperative changes in the foot and ankle.  No acute displaced fracture.  No dislocation.  No large focal bony erosion allowing for limitations of plain radiography.  Diffuse soft tissue edema, worse compared to prior study.                                       X-Ray Foot Complete Left (Final result)  Result time 01/27/24 20:38:15      Final result by Sp Sequeira MD (01/27/24 20:38:15)                   Impression:      Soft tissue edema.  No acute displaced fracture.  Suggest correlation with physical exam and consider MRI follow-up if there is a specific clinical concern for osteomyelitis.      Electronically signed by: Sp Sequeira MD  Date:    01/27/2024  Time:    20:38               Narrative:    EXAMINATION:  XR FOOT COMPLETE 3 VIEW LEFT    CLINICAL HISTORY:  Unspecified injury of left foot, initial encounter    TECHNIQUE:  Three views of the left foot.    COMPARISON:  11/23/2023.    FINDINGS:  No acute displaced fracture.  No dislocation.  Possible subluxation of the 1st interphalangeal joint which is better evaluated on the prior exam secondary to suboptimal positioning on the current study.  Bones of the foot are similar when compared with the prior study.  Soft tissue edema, more pronounced adjacent to the 5th metatarsophalangeal joint region and along the dorsal aspect of the foot.  No unexpected radiopaque foreign body.                                        Assessment/Plan:     Active Diagnoses:    Diagnosis Date Noted POA    PRINCIPAL PROBLEM:  BLE cellulitis , concern for OM in both foot [L97.522] 11/14/2023 Yes    Obesity (BMI 30-39.9) [E66.9] 11/02/2023 Yes    Polysubstance abuse [F19.10] 01/04/2019 Yes    Essential hypertension [I10]  07/16/2018 Yes     Chronic    Bipolar 1 disorder [F31.9] 12/30/2016 Yes     Chronic    Acquired hypothyroidism [E03.9] 12/19/2014 Yes     Chronic      Problems Resolved During this Admission:       Education about the prevention of limb loss.    Discussed wound healing cycle, skin integrity, ways to care for skin.Counseled patient on the effects of  PVD and biomechanical pressure on healing. She verbalizes understanding that it can increase the chances of delayed healing and this prolonged exposure leads to infection or progression of infection which subsequently can result in loss of limb.    Adequate vitamin supplementation, protein intake, and hydration - discussed with patient    MRI results reviewed with patient.  Reactive edema vs early OM to the distal hallux and 5th met head. Personally not convinced the risk associated with trocar bone biopsy in this patient with history on noncompliance and not following up outpatient would be to her benefit long term for just the suspicion of OM. Deep culture swabs have already been obtained.  In out opinion wound care and antibiosis without procedure or podiatric surgical intervention should be sufficient.    The wound is cleansed of foreign material as much as possible and the base inspected for bone or abscess.      Vashe moist to dry dressings      Raquel Howard DPM  Podiatry  [facility] - [place] - Med Surg
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
## 2024-01-30 NOTE — NURSING
Pt off the unit going to cardio by bed via transport. IV access in place, saline locked. NAD or pain noted.

## 2024-01-30 NOTE — PLAN OF CARE
Problem: Occupational Therapy  Goal: Occupational Therapy Goal  Description: Goals to be met by: 2/13/2024     Patient will increase functional independence with ADLs by performing:    UE Dressing with Modified [place].  LE Dressing with Modified [place].  Grooming while standing at sink with Modified [place] and Assistive Devices as needed.  Toileting from toilet with Modified [place], Supervision, and Assistive Devices as needed for hygiene and clothing management.   Supine to sit with Modified [place].  Step transfer with Modified [place] and maintaining weight-bearing precaution(s) and darco shoes as recommended by podiatry  Toilet transfer to toilet with Modified [place] and maintaining weight-bearing precaution(s)/Darco shoes.  Upper extremity exercise program x15 reps per handout, with independence.    Outcome: Ongoing, Progressing     OT eval was limited 2* Darco shoes not present. The patient was able to sit on the EOB and perform bed mobility with mod I/(S). The patient was able to scoot along the EOB, Mod I.   The patient will benefit from OT to address functional deficits wit D/C recs to be made pending progress and podiatry rec for possible surgery.

## 2024-01-30 NOTE — PLAN OF CARE
Problem: Adult Inpatient Plan of Care  Goal: Plan of Care Review  Outcome: Ongoing, Progressing  Goal: Patient-Specific Goal (Individualized)  Outcome: Ongoing, Progressing  Goal: Absence of Hospital-Acquired Illness or Injury  Outcome: Ongoing, Progressing  Goal: Optimal Comfort and Wellbeing  Outcome: Ongoing, Progressing  Goal: Readiness for Transition of Care  Outcome: Ongoing, Progressing     Problem: Infection  Goal: Absence of Infection Signs and Symptoms  Outcome: Ongoing, Progressing  Intervention: Prevent or Manage Infection  Flowsheets (Taken 1/30/2024 1553)  Infection Management: aseptic technique maintained  Isolation Precautions: protective     Problem: Impaired Wound Healing  Goal: Optimal Wound Healing  Outcome: Ongoing, Progressing  Intervention: Promote Wound Healing  Flowsheets (Taken 1/30/2024 1553)  Oral Nutrition Promotion: calorie-dense foods provided  Sleep/Rest Enhancement: regular sleep/rest pattern promoted  Activity Management: Rolling - L1  Pain Management Interventions:   medication offered   pain management plan reviewed with patient/caregiver     Problem: Skin Injury Risk Increased  Goal: Skin Health and Integrity  Outcome: Ongoing, Progressing  Intervention: Optimize Skin Protection  Flowsheets (Taken 1/30/2024 1553)  Pressure Reduction Techniques: frequent weight shift encouraged  Pressure Reduction Devices: heel offloading device utilized  Skin Protection:   adhesive use limited   tubing/devices free from skin contact  Head of Bed (HOB) Positioning: HOB lowered  Intervention: Promote and Optimize Oral Intake  Flowsheets (Taken 1/30/2024 1553)  Oral Nutrition Promotion: calorie-dense foods provided     Problem: Pain Acute  Goal: Acceptable Pain Control and Functional Ability  Outcome: Ongoing, Progressing  Intervention: Develop Pain Management Plan  Flowsheets (Taken 1/30/2024 1554)  Pain Management Interventions:   around-the-clock dosing utilized   medication offered    medication offered but refused   pain management plan reviewed with patient/caregiver  Intervention: Prevent or Manage Pain  Flowsheets (Taken 1/30/2024 1554)  Sleep/Rest Enhancement: regular sleep/rest pattern promoted  Bowel Elimination Promotion:   adequate fluid intake promoted   commode/bedpan at bedside  Medication Review/Management: medications reviewed  Intervention: Optimize Psychosocial Wellbeing  Flowsheets (Taken 1/30/2024 1554)  Supportive Measures:   active listening utilized   relaxation techniques promoted  Diversional Activities: television   Pt A&Ox4, free from falls/injury, and able to make needs known during shift. VSS. Pt had c/o pain during shift. PRN pain medication given with relief. Telemetry monitoring continued on box #[id number], visible and audible on monitor at nurses' station, no acute distress noted. Bed locked and in lowest position. Bedside table and call light in reach. Will cont to monitor.

## 2024-01-31 VITALS
BODY MASS INDEX: 32.49 KG/M2 | TEMPERATURE: 99 F | SYSTOLIC BLOOD PRESSURE: 123 MMHG | DIASTOLIC BLOOD PRESSURE: 78 MMHG | RESPIRATION RATE: 18 BRPM | HEIGHT: 65 IN | HEART RATE: 82 BPM | OXYGEN SATURATION: 96 % | WEIGHT: 195 LBS

## 2024-01-31 LAB
BACTERIA BLD CULT: NORMAL
BACTERIA BLD CULT: NORMAL
BACTERIA SPEC AEROBE CULT: ABNORMAL

## 2024-01-31 PROCEDURE — 25000003 PHARM REV CODE 250: Performed by: NURSE PRACTITIONER

## 2024-01-31 PROCEDURE — 63600175 PHARM REV CODE 636 W HCPCS: Performed by: HOSPITALIST

## 2024-01-31 PROCEDURE — 25000003 PHARM REV CODE 250: Performed by: EMERGENCY MEDICINE

## 2024-01-31 PROCEDURE — 97116 GAIT TRAINING THERAPY: CPT

## 2024-01-31 PROCEDURE — 97530 THERAPEUTIC ACTIVITIES: CPT

## 2024-01-31 PROCEDURE — 97535 SELF CARE MNGMENT TRAINING: CPT

## 2024-01-31 PROCEDURE — 25000003 PHARM REV CODE 250: Performed by: HOSPITALIST

## 2024-01-31 PROCEDURE — 97110 THERAPEUTIC EXERCISES: CPT

## 2024-01-31 PROCEDURE — 25000003 PHARM REV CODE 250: Performed by: PHYSICIAN ASSISTANT

## 2024-01-31 PROCEDURE — A4216 STERILE WATER/SALINE, 10 ML: HCPCS | Performed by: EMERGENCY MEDICINE

## 2024-01-31 RX ORDER — CLINDAMYCIN HYDROCHLORIDE 300 MG/1
300 CAPSULE ORAL 3 TIMES DAILY
Qty: 120 CAPSULE | Refills: 0 | Status: SHIPPED | OUTPATIENT
Start: 2024-01-31 | End: 2024-03-11

## 2024-01-31 RX ORDER — HYDROCODONE BITARTRATE AND ACETAMINOPHEN 5; 325 MG/1; MG/1
1 TABLET ORAL EVERY 6 HOURS PRN
Qty: 10 TABLET | Refills: 0 | Status: ON HOLD | OUTPATIENT
Start: 2024-01-31 | End: 2024-04-25 | Stop reason: HOSPADM

## 2024-01-31 RX ADMIN — Medication 10 ML: at 12:01

## 2024-01-31 RX ADMIN — LEVOTHYROXINE SODIUM 25 MCG: 25 TABLET ORAL at 06:01

## 2024-01-31 RX ADMIN — THERA TABS 1 TABLET: TAB at 08:01

## 2024-01-31 RX ADMIN — PANTOPRAZOLE SODIUM 40 MG: 40 TABLET, DELAYED RELEASE ORAL at 08:01

## 2024-01-31 RX ADMIN — VANCOMYCIN HYDROCHLORIDE 1250 MG: 1.25 INJECTION, POWDER, LYOPHILIZED, FOR SOLUTION INTRAVENOUS at 01:01

## 2024-01-31 RX ADMIN — Medication 10 ML: at 06:01

## 2024-01-31 RX ADMIN — ESCITALOPRAM OXALATE 20 MG: 10 TABLET ORAL at 08:01

## 2024-01-31 RX ADMIN — ARIPIPRAZOLE 10 MG: 10 TABLET ORAL at 08:01

## 2024-01-31 RX ADMIN — HYDROCODONE BITARTRATE AND ACETAMINOPHEN 1 TABLET: 5; 325 TABLET ORAL at 02:01

## 2024-01-31 RX ADMIN — VANCOMYCIN HYDROCHLORIDE 1250 MG: 1.25 INJECTION, POWDER, LYOPHILIZED, FOR SOLUTION INTRAVENOUS at 02:01

## 2024-01-31 NOTE — PLAN OF CARE
[facility] - Med Surg    HOME HEALTH ORDERS  FACE TO FACE ENCOUNTER    Patient Name: Estella Arevalo  YOB: 1955    PCP: Fiordaliza Ma -   PCP Address: [address] / [id number]  PCP Phone Number: [phone]  PCP Fax: [phone]    Encounter Date: 01/31/2024    Admit to Home Health    Diagnoses:  Active Hospital Problems    Diagnosis  POA    *BLE cellulitis , concern for OM in both foot [L97.522]  Yes    Obesity (BMI 30-39.9) [E66.9]  Yes    Polysubstance abuse [F19.10]  Yes    Essential hypertension [I10]  Yes     Chronic    Bipolar 1 disorder [F31.9]  Yes     Chronic    Acquired hypothyroidism [E03.9]  Yes     Chronic      Resolved Hospital Problems   No resolved problems to display.       No future appointments.        I have seen and examined this patient face to face today. My clinical findings that support the need for the home health skilled services and home bound status are the following:  Weakness/numbness causing balance and gait disturbance due to Infection and Weakness/Debility making it taxing to leave home.    Allergies:  Review of patient's allergies indicates:   Allergen Reactions    Codeine      nausea       Diet: cardiac diet and diabetic diet: 2000 calorie    Activities: activity as tolerated    Nursing:   SN to complete comprehensive assessment including routine vital signs. Instruct on disease process and s/s of complications to report to MD. Review/verify medication list sent home with the patient at time of discharge  and instruct patient/caregiver as needed. Frequency may be adjusted depending on start of care date.    Notify MD if SBP > 160 or < 90; DBP > 90 or < 50; HR > 120 or < 50; Temp > 101;       CONSULTS:    Physical Therapy to evaluate and treat. Evaluate for home safety and equipment needs; Establish/upgrade home exercise program. Perform / instruct on therapeutic exercises, gait training, transfer training, and Range of Motion.  Occupational Therapy  to evaluate and treat. Evaluate home environment for safety and equipment needs. Perform/Instruct on transfers, ADL training, ROM, and therapeutic exercises.  Aide to provide assistance with personal care, ADLs, and vital signs.    MISCELLANEOUS CARE:  Routine Skin for Bedridden Patients: Instruct patient/caregiver to apply moisture barrier cream to all skin folds and wet areas in perineal area daily and after baths and all bowel movements.    WOUND CARE ORDERS  Vashe moist to dry dressings to BLE daily      Medications: Review discharge medications with patient and family and provide education.      Current Discharge Medication List        START taking these medications    Details   clindamycin (CLEOCIN) 300 MG capsule Take 1 capsule (300 mg total) by mouth 3 (three) times daily.  Qty: 120 capsule, Refills: 0      HYDROcodone-acetaminophen (NORCO) 5-325 mg per tablet Take 1 tablet by mouth every 6 (six) hours as needed for Pain.  Qty: 10 tablet, Refills: 0    Comments: Quantity prescribed more than 7 day supply? No           CONTINUE these medications which have NOT CHANGED    Details   ARIPiprazole (ABILIFY) 10 MG Tab Take 10 mg by mouth once daily.      EScitalopram oxalate (LEXAPRO) 20 MG tablet Take 1 tablet (20 mg total) by mouth once daily.  Qty: 30 tablet, Refills: 5    Associated Diagnoses: Anxiety; Bipolar 1 disorder      folic acid/multivit-min/lutein (CENTRUM SILVER ORAL) Take 1 tablet by mouth.      gabapentin (NEURONTIN) 600 MG tablet Take 600 mg by mouth 3 (three) times daily.      levothyroxine (SYNTHROID) 25 MCG tablet Take 1 tablet (25 mcg total) by mouth once daily.  Qty: 90 tablet, Refills: 3    Associated Diagnoses: Hypothyroidism due to acquired atrophy of thyroid      LINZESS 145 mcg Cap capsule Take 145 mcg by mouth.      naltrexone (DEPADE) 50 mg tablet Take 50 mg by mouth every evening.      pantoprazole (PROTONIX) 40 MG tablet Take 1 tablet (40 mg total) by mouth 2 (two) times daily.  Qty:  180 tablet, Refills: 3    Associated Diagnoses: Gastroesophageal reflux disease, unspecified whether esophagitis present      PROCTOZONE-HC 2.5 % rectal cream STACI RECTALLY BID  Qty: 60 g, Refills: 2    Associated Diagnoses: Hemorrhoids, unspecified hemorrhoid type      traZODone (DESYREL) 100 MG tablet Take 1 tablet (100 mg total) by mouth every evening.             I certify that this patient is confined to her home and needs intermittent skilled nursing care, physical therapy and occupational therapy.    Clemencia Grossman MD  1/31/2024 12:37 PM  Department of Hospital medicine

## 2024-01-31 NOTE — PROGRESS NOTES
Vancomycin consult follow-up:    Patient reviewed, renal function stable, no new levels, continue current therapy; Next levels due: trough due 2/1/2024 at 0100

## 2024-01-31 NOTE — NURSING
[facility] [place]  Nurses Note -- 4 Eyes    1/30/2024    Skin assessed on: Q Shift      [x] No Pressure Injuries Present    []Prevention Measures Documented    [] Yes LDA  for Pressure Injury Previously documented     [] Yes New Pressure Injury Discovered   [] LDA for New Pressure Injury Added      Attending RN:  Marcela Gibbs RN     Second RN:  Zari RN

## 2024-01-31 NOTE — PLAN OF CARE
Problem: Physical Therapy  Goal: Physical Therapy Goal  Description: Goals to be met by: 24     Patient will increase functional independence with mobility by performin. Sit to stand transfer with Modified [place]  2. Bed to chair transfer with Modified [place] using RW   3. Gait  x 25-50 feet with Modified [place] using Rolling Walker.   4. Wheelchair propulsion x  feet with Supervision using bilateral uppper extremities  5. Ascend/descend 1 stair with  Stand-by Assistance using Rolling Walker.   6. Lower extremity exercise program x10 reps per handout, with supervision    Outcome: Adequate for Care Transition   Low Intensity recommended.

## 2024-01-31 NOTE — NURSING
Patient discharged per MD order.  IV removed.  Catheter tip intact.  No distress noted.  Discharge instructions explained by Lisa Dhillon RN.  AVS given to patient.  Patient verbalized understanding.  VSS. Afebrile.  No complaints of pain, N/V, diarrhea, or SOB.  Rx provided to home pharmacy.  Patient left with belongings to ER via wheelchair per nurse tech.

## 2024-01-31 NOTE — SUBJECTIVE & OBJECTIVE
Interval History: Pt noted to be awake, alert, conversant and calm. HDS and afebrile. No acute events overnight. No new complaints this morning.  Pain is well controlled.  Continue wound care, continue to follow-up cultures, continue IV antibiotics.      Review of Systems   Constitutional:  Positive for activity change and fatigue. Negative for fever.   Respiratory:  Negative for shortness of breath.    Cardiovascular:  Negative for chest pain.   Gastrointestinal:  Negative for abdominal pain.   Musculoskeletal:  Positive for arthralgias.   Skin:  Positive for wound.   Neurological:  Negative for dizziness and headaches.   All other systems reviewed and are negative.    Objective:     Vital Signs (Most Recent):  Temp: 98.8 °F (37.1 °C) (01/30/24 2354)  Pulse: 92 (01/30/24 2354)  Resp: 18 (01/30/24 2354)  BP: 133/84 (01/30/24 2354)  SpO2: 95 % (01/30/24 2354) Vital Signs (24h Range):  Temp:  [98.1 °F (36.7 °C)-98.8 °F (37.1 °C)] 98.8 °F (37.1 °C)  Pulse:  [] 92  Resp:  [15-20] 18  SpO2:  [91 %-96 %] 95 %  BP: (108-142)/(70-84) 133/84     Weight: 88.5 kg (195 lb)  Body mass index is 32.45 kg/m².    Intake/Output Summary (Last 24 hours) at 1/31/2024 0238  Last data filed at 1/30/2024 2200  Gross per 24 hour   Intake 1676.47 ml   Output 1750 ml   Net -73.53 ml           Physical Exam  Vitals and nursing note reviewed.   Constitutional:       Appearance: Normal appearance.   HENT:      Head: Normocephalic and atraumatic.   Eyes:      Extraocular Movements: Extraocular movements intact.      Pupils: Pupils are equal, round, and reactive to light.   Cardiovascular:      Rate and Rhythm: Normal rate and regular rhythm.   Pulmonary:      Effort: Pulmonary effort is normal. No respiratory distress.   Abdominal:      General: There is no distension.   Musculoskeletal:         General: Normal range of motion.      Cervical back: Normal range of motion.      Comments: Foot wound, in bandage   Neurological:      General: No  focal deficit present.      Mental Status: She is alert and oriented to person, place, and time.   Psychiatric:         Mood and Affect: Mood normal.         Behavior: Behavior normal.             Significant Labs: All pertinent labs within the past 24 hours have been reviewed.  CBC:   Recent Labs   Lab 01/30/24  0149   WBC 2.85*   HGB 11.6*   HCT 36.2*          CMP:   Recent Labs   Lab 01/30/24  0149      K 3.2*      CO2 30*   GLU 99   BUN 3*   CREATININE 0.6   CALCIUM 8.2*   ANIONGAP 5*         Significant Imaging: I have reviewed all pertinent imaging results/findings within the past 24 hours.

## 2024-01-31 NOTE — ASSESSMENT & PLAN NOTE
"Presents with worsening pain and swelling to left foot with laceration "few weeks." She is a poor historian.  History of previous MSSA with concern for osteo 11/2023.   Hospitalization 11/23-11/29/23 for BLE cellulitis and left toe OM. 11/13/23 Left foot culture grew MSSA.  Podiatry offered amputation vs abx and  transferred LTAC to complete 6 weeks of IV Ancef.   BLE significantly worst then November- see above assessment and pictures  Denies IVDU but does admit to snorting meth PTA. Denies cocaine and alcohol   Continue IV vanc/rocephin  Podiatry consult, vascular surgery consult    Per Podiatry:  patient okay to ambulate as tolerated in bilateral DARCO shoes   MRI done, f/u with podiatry and vascular surgery for recs   "

## 2024-01-31 NOTE — PLAN OF CARE
Problem: Adult Inpatient Plan of Care  Goal: Plan of Care Review  Outcome: Ongoing, Progressing  Goal: Patient-Specific Goal (Individualized)  Outcome: Ongoing, Progressing  Goal: Absence of Hospital-Acquired Illness or Injury  Outcome: Ongoing, Progressing  Goal: Optimal Comfort and Wellbeing  Outcome: Ongoing, Progressing  Goal: Readiness for Transition of Care  Outcome: Ongoing, Progressing     Problem: Infection  Goal: Absence of Infection Signs and Symptoms  Outcome: Ongoing, Progressing  Intervention: Prevent or Manage Infection  Flowsheets (Taken 1/31/2024 1344)  Infection Management: aseptic technique maintained  Isolation Precautions:   precautions initiated   contact     Problem: Impaired Wound Healing  Goal: Optimal Wound Healing  Outcome: Ongoing, Progressing  Intervention: Promote Wound Healing  Flowsheets (Taken 1/31/2024 1344)  Oral Nutrition Promotion: calorie-dense foods provided  Activity Management:   Ambulated in room - L4   Up to bedside commode - L3   Up in chair - L3  Pain Management Interventions:   around-the-clock dosing utilized   pain management plan reviewed with patient/caregiver   Pt A&Ox4, free from falls/injury, and able to make needs known during shift. VSS. Pt had no c/o pain during shift. Telemetry monitoring continued on box #[id number], visible and audible on monitor at nurses' station, no acute distress noted. Bed locked and in lowest position. Bedside table and call light in reach. Will cont to monitor.

## 2024-01-31 NOTE — PLAN OF CARE
Problem: Occupational Therapy  Goal: Occupational Therapy Goal  Description: Goals to be met by: 2/13/2024     Patient will increase functional independence with ADLs by performing:    UE Dressing with Modified [place].  LE Dressing with Modified [place].  Grooming while standing at sink with Modified [place] and Assistive Devices as needed.  Toileting from toilet with Modified [place], Supervision, and Assistive Devices as needed for hygiene and clothing management.   Supine to sit with Modified [place].  Step transfer with Modified [place] and maintaining weight-bearing precaution(s) and darco shoes as recommended by podiatry  Toilet transfer to toilet with Modified [place] and maintaining weight-bearing precaution(s)/Darco shoes.  Upper extremity exercise program x15 reps per handout, with independence.    Outcome: Ongoing, Progressing   The patient was able to amb using a RW with B Darco shoes to the toilet and sink with (S), The patient was dependent to don B [place] shoes while seated on the EOB. The patient was educated re: podiatry recs for WBAT for amb with B Darco shoes and do not amb in her bare feet. The patient verbalized understanding.    The patient will benefit from LIT OT, no DME needs.

## 2024-01-31 NOTE — PLAN OF CARE
Case Management Final Discharge Note    Discharge Disposition: home    primary Caretaker and contact information: son    Scheduled followup appointment:    Follow-up Information       Nina Anderson NP-C. Go on 2/2/2024.    Specialty: Nurse Practitioner  Why: Appointment scheduled for 2:30pm  Contact information:  [address] [facility] [address]  [phone]               [facility] Follow up.    Specialties: Home Health Services, Home Therapy Services, Home Living Aide Services  Why: Home Health  Contact information:  [address] [place] [id number]  [phone]                         Referrals placed: home health    Transportation: son    Patient and family educated on discharge services and updated on DC plan. Zari Bedside RN notified, patient clear to discharge from Case Management Perspective.      01/31/24 1538   Final Note   Assessment Type Final Discharge Note   Anticipated Discharge Disposition Home-Health   What phone number can be called within the next 1-3 days to see how you are doing after discharge?   (see chart)   Hospital Resources/Appts/Education Provided Provided patient/caregiver with written discharge plan information;Appointments scheduled and added to AVS   Post-Acute Status   Post-Acute Authorization Home Health   Coverage PHN   Discharge Delays None known at this time

## 2024-01-31 NOTE — PT/OT/SLP PROGRESS
"Physical Therapy Treatment    Patient Name:  Estella Arevalo   MRN:  [id number]    Recommendations:     Discharge Recommendations: Low Intensity Therapy  Discharge Equipment Recommendations: none  Barriers to discharge:  close supervision/assist PRN recommended    Assessment:     Estella Arevalo is a 68 y.o. female admitted with a medical diagnosis of Skin ulcer of toe of left foot with fat layer exposed.  She presents with the following impairments/functional limitations: impaired balance, decreased safety awareness, pain, edema, impaired endurance, weakness, decreased coordination, impaired joint extensibility, impaired functional mobility, impaired coordination, gait instability, decreased lower extremity function, impaired fine motor .    Rehab Prognosis: Fair; patient would benefit from acute skilled PT services to address these deficits and reach maximum level of function.    Recent Surgery: * No surgery found *      Plan:     During this hospitalization, patient to be seen  (3-5x/wk) to address the identified rehab impairments via gait training, therapeutic activities, therapeutic exercises, neuromuscular re-education and progress toward the following goals:    Plan of Care Expires:  02/13/24    Subjective     Chief Complaint: pain  Patient/Family Comments/goals: "I don't like doing this"  Pain/Comfort:  Pain Rating 1: 7/10  Pain Addressed 1: Reposition, Distraction, Cessation of Activity      Objective:     Communicated with nsg prior to session.  Patient found HOB elevated with PureWick, pressure relief boots upon PT entry to room.     General Precautions: Standard, fall  Orthopedic Precautions:  (WBAT in DARCO shoes)  Braces:  (DARCO shoes)  Respiratory Status: Room air     Functional Mobility:  Bed Mobility:     Scooting: modified independence  Supine to Sit: modified independence  Transfers:     Sit to Stand:  supervision with rolling walker and vc for hand placement  Stand to sit: SBA with RW and VC for " "walker management/safety and hand placement/controlled descent  Gait: Gait training with RW and SBA approx 35' total with VC/TC for walker management/safety  Balance: Fair+ sit and stand      AM-PAC 6 CLICK MOBILITY  Turning over in bed (including adjusting bedclothes, sheets and blankets)?: 4  Sitting down on and standing up from a chair with arms (e.g., wheelchair, bedside commode, etc.): 3  Moving from lying on back to sitting on the side of the bed?: 4  Moving to and from a bed to a chair (including a wheelchair)?: 4  Need to walk in hospital room?: 3  Climbing 3-5 steps with a railing?: 3  Basic Mobility Total Score: 21       Treatment & Education:  Transfer and Gait training as above.  Pt stood at sink with RW and SBA approx 3m for ADL's with OT.  Educated pt on pursed lip breathing 2/2 SPO2 90% following ambulation and pt with DOA.    Handout provided to and reviewed with pt for: AP's, Marching, and FAQ's in sitting and AP's, TKE's, and GS in reclined x10 each, x 3x/day.  Pt verbalized/demonstrated understanding with VC/TC to perform correctly.  Educated pt on PT POC and "to call before you fall"    Patient left up in chair with all lines intact, call button in reach, and nsg notified..    GOALS:   Multidisciplinary Problems       Physical Therapy Goals          Problem: Physical Therapy    Goal Priority Disciplines Outcome Goal Variances Interventions   Physical Therapy Goal     PT, PT/OT Adequate for Care Transition     Description: Goals to be met by: 24     Patient will increase functional independence with mobility by performin. Sit to stand transfer with Modified [place]  2. Bed to chair transfer with Modified [place] using RW   3. Gait  x 25-50 feet with Modified [place] using Rolling Walker.   4. Wheelchair propulsion x  feet with Supervision using bilateral uppper extremities  5. Ascend/descend 1 stair with  Stand-by Assistance using Rolling Walker.   6. Lower " extremity exercise program x10 reps per handout, with supervision                         Time Tracking:     PT Received On: 01/31/24  PT Start Time: 1115     PT Stop Time: 1146  PT Total Time (min): 31 min     Billable Minutes: Gait Training 8 and Therapeutic Exercise 15  Co-evaluation with OT    Treatment Type: Treatment  PT/PTA: PT     Number of PTA visits since last PT visit: 0     01/31/2024

## 2024-01-31 NOTE — PLAN OF CARE
01/31/24 1150   Medicare Message   Important Message from Medicare regarding Discharge Appeal Rights Given to patient/caregiver;Explained to patient/caregiver;Signed/date by patient/caregiver;Other (comments)   Date IMM was signed 01/31/24   Time IMM was signed 1150     [facility] mail [phone]

## 2024-01-31 NOTE — PROGRESS NOTES
[facility] Medicine  Telemedicine Progress Note    Patient Name: Estella Arevalo  MRN: [id number]  Patient Class: IP- Inpatient   Admission Date: 1/27/2024  Length of Stay: 3 days  Attending Physician: Clemencia Grossman MD  Primary Care Provider: Fiordaliza Ma -          Subjective:     Principal Problem:Skin ulcer of toe of left foot with fat layer exposed        HPI:  Estella Arevalo 68 y.o. female with HTN, hypothyroidism, history of polysubstance abuse, previous alcohol abuse presents hospital chief complaint of dysuria and leg pain.  She reports 1 week of worsening pain and erythema to her left foot that initially started as a wound.  She reports she developed this wound after cutting her foot on glass.  She denies any attempted treatment home.  She denies any aggravating or alleviating factors.  She reports she completed her previous antibiotics as prescribed for osteomyelitis.  She denies fever chest pain nausea vomiting abdominal pain leg swelling hematuria hematemesis dizziness and syncope.    In the ED, afebrile without leukocytosis urine drug screen positive for amphetamines lactic acid within normal limits COVID negative flu negative pro count within normal limits x-ray of right foot with diffuse soft tissue edema worse when compared to prior stone chest x-ray without acute cardiopulmonary process x-ray foot left with soft tissue edema without displaced fracture.    Overview/Hospital Course:  Admission to observation for both foot infection concern of OM. Drug screen positive for amphetamine. Patient denied IVDU. Last snort meth prior to admission. Arterial US BLE no focal occlusion but does show PAD BLE. X ray right foot diffuse soft tissue edema. MRI hindfoot left and right ordered.Continue Rocephin and Vancomycin IV.  Wound care instructions until official Podiatry consult.    Interval History: Pt noted to be awake, alert, conversant and calm. HDS and afebrile. No acute  events overnight. No new complaints this morning.  Pain is well controlled.  Continue wound care, continue to follow-up cultures, continue IV antibiotics.      Review of Systems   Constitutional:  Positive for activity change and fatigue. Negative for fever.   Respiratory:  Negative for shortness of breath.    Cardiovascular:  Negative for chest pain.   Gastrointestinal:  Negative for abdominal pain.   Musculoskeletal:  Positive for arthralgias.   Skin:  Positive for wound.   Neurological:  Negative for dizziness and headaches.   All other systems reviewed and are negative.    Objective:     Vital Signs (Most Recent):  Temp: 98.8 °F (37.1 °C) (01/30/24 2354)  Pulse: 92 (01/30/24 2354)  Resp: 18 (01/30/24 2354)  BP: 133/84 (01/30/24 2354)  SpO2: 95 % (01/30/24 2354) Vital Signs (24h Range):  Temp:  [98.1 °F (36.7 °C)-98.8 °F (37.1 °C)] 98.8 °F (37.1 °C)  Pulse:  [] 92  Resp:  [15-20] 18  SpO2:  [91 %-96 %] 95 %  BP: (108-142)/(70-84) 133/84     Weight: 88.5 kg (195 lb)  Body mass index is 32.45 kg/m².    Intake/Output Summary (Last 24 hours) at 1/31/2024 0238  Last data filed at 1/30/2024 2200  Gross per 24 hour   Intake 1676.47 ml   Output 1750 ml   Net -73.53 ml           Physical Exam  Vitals and nursing note reviewed.   Constitutional:       Appearance: Normal appearance.   HENT:      Head: Normocephalic and atraumatic.   Eyes:      Extraocular Movements: Extraocular movements intact.      Pupils: Pupils are equal, round, and reactive to light.   Cardiovascular:      Rate and Rhythm: Normal rate and regular rhythm.   Pulmonary:      Effort: Pulmonary effort is normal. No respiratory distress.   Abdominal:      General: There is no distension.   Musculoskeletal:         General: Normal range of motion.      Cervical back: Normal range of motion.      Comments: Foot wound, in bandage   Neurological:      General: No focal deficit present.      Mental Status: She is alert and oriented to person, place, and  "time.   Psychiatric:         Mood and Affect: Mood normal.         Behavior: Behavior normal.             Significant Labs: All pertinent labs within the past 24 hours have been reviewed.  CBC:   Recent Labs   Lab 01/30/24 0149   WBC 2.85*   HGB 11.6*   HCT 36.2*          CMP:   Recent Labs   Lab 01/30/24 0149      K 3.2*      CO2 30*   GLU 99   BUN 3*   CREATININE 0.6   CALCIUM 8.2*   ANIONGAP 5*         Significant Imaging: I have reviewed all pertinent imaging results/findings within the past 24 hours.    Assessment/Plan:      * BLE cellulitis , concern for OM in both foot  Presents with worsening pain and swelling to left foot with laceration "few weeks." She is a poor historian.  History of previous MSSA with concern for osteo 11/2023.   Hospitalization 11/23-11/29/23 for BLE cellulitis and left toe OM. 11/13/23 Left foot culture grew MSSA.  Podiatry offered amputation vs abx and  transferred LTAC to complete 6 weeks of IV Ancef.   BLE significantly worst then November- see above assessment and pictures  Denies IVDU but does admit to snorting meth PTA. Denies cocaine and alcohol   Continue IV vanc/rocephin  Podiatry consult, vascular surgery consult    Per Podiatry:  patient okay to ambulate as tolerated in bilateral DARCO shoes   MRI done, f/u with podiatry and vascular surgery for recs     Obesity (BMI 30-39.9)  Body mass index is 32.45 kg/m². Morbid obesity complicates all aspects of disease management from diagnostic modalities to treatment. Weight loss encouraged and health benefits explained to patient.    Polysubstance abuse  UDS positive for amphetamines. Counseled on cessation  Concern for IVDU  Snort meth PTA    Essential hypertension  BP well controlled. Not currently on medication outpatient    Chronic, controlled. Latest blood pressure and vitals reviewed-     Temp:  [98 °F (36.7 °C)]   Pulse:  [80-94]   Resp:  [18-28]   BP: (103-130)/(68-80)   SpO2:  [93 %-96 %] .   Home meds " for hypertension were reviewed and noted below.       While in the hospital, will manage blood pressure as follows; Continue home antihypertensive regimen    Will utilize p.r.n. blood pressure medication only if patient's blood pressure greater than 180/110 and she develops symptoms such as worsening chest pain or shortness of breath.    Bipolar 1 disorder  Took self off abilify and lexapro recently  Denies SI  Resume abilify and lexapro      Acquired hypothyroidism  Lab Results   Component Value Date    TSH 1.661 04/20/2022     Continue home synthroid          VTE Risk Mitigation (From admission, onward)           Ordered     IP VTE HIGH RISK PATIENT  Once         01/27/24 2222     Place sequential compression device  Until discontinued         01/27/24 2222     Place MARINA hose  Until discontinued         01/27/24 2222                          I have completed this tele-visit without the assistance of a telepresenter.    The attending portion of this evaluation, treatment, and documentation was performed per Clemencia Grossman MD via Telemedicine AudioVisual using the secure [employer] software platform with 2 way audio/video. The provider was located off-site and the patient is located in the hospital. The aforementioned video software was utilized to document the relevant history and physical exam    Clmeencia Grossman MD  Department of Hospital Medicine   [facility] Surg

## 2024-01-31 NOTE — PLAN OF CARE
Problem: Adult Inpatient Plan of Care  Goal: Plan of Care Review  Outcome: Adequate for Care Transition  Goal: Patient-Specific Goal (Individualized)  Outcome: Adequate for Care Transition  Goal: Absence of Hospital-Acquired Illness or Injury  Outcome: Adequate for Care Transition  Goal: Optimal Comfort and Wellbeing  Outcome: Adequate for Care Transition  Goal: Readiness for Transition of Care  Outcome: Adequate for Care Transition     Problem: Infection  Goal: Absence of Infection Signs and Symptoms  Outcome: Adequate for Care Transition     Problem: Impaired Wound Healing  Goal: Optimal Wound Healing  Outcome: Adequate for Care Transition     Problem: Skin Injury Risk Increased  Goal: Skin Health and Integrity  Outcome: Adequate for Care Transition     Problem: Pain Acute  Goal: Acceptable Pain Control and Functional Ability  Outcome: Adequate for Care Transition

## 2024-01-31 NOTE — PLAN OF CARE
Problem: Adult Inpatient Plan of Care  Goal: Plan of Care Review  1/31/2024 0358 by Marcela Gibbs RN  Outcome: Ongoing, Progressing  Flowsheets (Taken 1/31/2024 0358)  Plan of Care Reviewed With: patient    Patient remains free from injury and falls this shift. Pain controlled with PRN analgesics. IV antibiotics administered as ordered. Patient resting comfortably, able to make needs known. Plan of care continued.

## 2024-01-31 NOTE — PT/OT/SLP PROGRESS
Occupational Therapy   Treatment    Name: Estella Arevalo  MRN: [id number]  Admitting Diagnosis:  Skin ulcer of toe of left foot with fat layer exposed       Recommendations:     Discharge Recommendations: Low Intensity Therapy  Discharge Equipment Recommendations:  none  Barriers to discharge:   (poor safety awaress)    Assessment:     Estella Arevalo is a 68 y.o. female with a medical diagnosis of Skin ulcer of toe of left foot with fat layer exposed.   Performance deficits affecting function are impaired endurance, impaired self care skills, impaired functional mobility, gait instability, impaired balance, decreased upper extremity function, decreased lower extremity function, decreased safety awareness, pain, decreased ROM, impaired skin, edema, impaired cardiopulmonary response to activity, orthopedic precautions.   The patient was able to amb using a RW with B Darco shoes to the toilet and sink with (S), The patient was dependent to don B Darco shoes while seated on the EOB. The patient was educated re: podiatry recs for WBAT for amb with B Darco shoes and do not amb in her bare feet. The patient with SOB after amb with SpO2 of 88-90, . The patient was educated re: PLB with SpO2 increased to 95%,  with PLB.       Rehab Prognosis:  Good and Fair; patient would benefit from acute skilled OT services to address these deficits and reach maximum level of function.       Plan:     Patient to be seen 3 x/week to address the above listed problems via self-care/home management, therapeutic activities, therapeutic exercises  Plan of Care Expires: 02/13/24  Plan of Care Reviewed with: patient    Subjective     Chief Complaint: trying to sleep  Patient/Family Comments/goals: agreeable to OT  Pain/Comfort:  Pain Rating 1: 7/10  Location - Side 1: Bilateral  Location 1: foot  Pain Addressed 1: Distraction, Cessation of Activity    Objective:     Communicated with: Zari carcamo prior to session.  Patient found  HOB elevated with bed alarm, PureWick, telemetry, peripheral IV, pressure relief boots upon OT entry to room.    General Precautions: Standard, fall    Orthopedic Precautions: (WBAT in B Darco shoes)  Braces:  (B Darco shoes)  Respiratory Status: Room air     Occupational Performance:     Bed Mobility:    Patient completed Scooting/Bridging with modified independence  Patient completed Supine to Sit with modified independence     Functional Mobility/Transfers:  Patient completed Sit <> Stand Transfer with modified independence and supervision  with  rolling walker   Patient completed Toilet Transfer Step Transfer technique with supervision with  rolling walker and grab bars to sit on the toilet, CGA to stand from the toilet  Functional Mobility: The patient was able to amb using a RW with (S) and VC for RW use at the toilet and sink. The patient tolerated standing at the sink to perform grooming tasks with no LOB.     Activities of Daily Living:  Grooming: supervision and stand by assistance to stand at the sink to brush her teeth and wash her hands and face  Upper Body Dressing: contact guard assistance and minimum assistance to don back gown  Lower Body Dressing: The patient was able to doff B shoe covers while seated on the EOB but was dependent to don B darco shoes    Toileting: The patient urinated via PureWick at start of OT. The patient performed toilet transfer but denied needed to void        [facility] 6 Click ADL: 22    Treatment & Education:  Performed self care and functional mobility as noted above  Educated the patient re: podiatry recs for r WBAT for amb with B Darco shoes and do not amb in her bare feet. The patient verbalized understanding  Educated the patient re: Pursed Lipped breathing 2* SOB after amb with SpO2 of 88-90, , SpO2 increased to 95%,  with PLB.    The patient was educated re: PLB verbally, handout and demonstration. The patient was provided a handout for reference and was  attached to chart for D/C education  Educated the patient re: need to requested nursing assist to amb using a RW to the toilet or to return to bed. The pt verbalized understanding      Patient left up in chair with all lines intact, call button in reach, and nurseZari notified    GOALS:   Multidisciplinary Problems       Occupational Therapy Goals          Problem: Occupational Therapy    Goal Priority Disciplines Outcome Interventions   Occupational Therapy Goal     OT, PT/OT Ongoing, Progressing    Description: Goals to be met by: 2/13/2024     Patient will increase functional independence with ADLs by performing:    UE Dressing with Modified [place].  LE Dressing with Modified [place].  Grooming while standing at sink with Modified [place] and Assistive Devices as needed.  Toileting from toilet with Modified [place], Supervision, and Assistive Devices as needed for hygiene and clothing management.   Supine to sit with Modified [place].  Step transfer with Modified [place] and maintaining weight-bearing precaution(s) and darco shoes as recommended by podiatry  Toilet transfer to toilet with Modified [place] and maintaining weight-bearing precaution(s)/Darco shoes.  Upper extremity exercise program x15 reps per handout, with independence.                         Time Tracking:     OT Date of Treatment: 01/31/24  OT Start Time: 1115  OT Stop Time: 1144  OT Total Time (min): 29 min    Billable Minutes:Self Care/Home Management 15  Therapeutic Activity 14  Total Time 29 (with PT)    OT/FRED: OT          1/31/2024

## 2024-02-01 LAB — BACTERIA SPEC ANAEROBE CULT: NORMAL

## 2024-02-01 NOTE — PROGRESS NOTES
"Pt very confused tonight, disoriented to place and situation, unsteady on feet attempting to walk out of room stating, "I want to go to the kitchen. I need to get a beer." HR in 130s. Notified Dr. Gomez--was instructed to call hospitalist for what sounds like DTs/alcohol w/drawal. Notified Dr. Mcgill. Order for 2mg Ativan IM x1 and if able to get IV, scheduled valium 10mg IV Q8H. Okay to apply restraints as needed. Restraints applied, as pt removing n/c and attempting to leave. Attempted to call spouse, Hammad, but no answer; unable to leave voicemail d/t full voicemail.     Assisted pt to bathroom when she became again confused and agitated. She repeatedly stated, "I want my sleeping pill," and grabbed her purse. She took an unwrapped pill out of her purse and attempted to take it until staff took it away from pt. Confiscated purse, no other pills noted. Restraints re-applied.   " Subjective   Patient ID: Rylan Cuenca is a 17 y.o. 4 mo old male who presents for followup of hypothyroidism. Last seen 8/10/23 with TSH 6.11  Today he is accompanied by mother.     PAST HX: Initial ped endo consultation 4/14/2011 due to goiter with 4/12/11 results: TSH 58.7, Free T4 0.8, Total T4 4.8, antiTPO 213 (nl < 5.6). Initiated on LT4 with regular followup and monitoring since that time.     Based on  lab results, began to have intermittent elevation in TSH in 2018, at times associated with gaps in supply of LT4 while at other times reported to have been missing LT4 doses and /or taking after a meal    At 8/10/23 visit, had lost 4.4# and, due to interval height gain, had reduction of BMI 1.8 kg/m2 to BMI at 4%ile for age    - due to past hx of missing LT4 doses several times per week until recent improvement in compliance, elected to reduce dose from 137 microgm to 125 microgm daily while parents ensured continued compliance with daily dosing    HPI  Since last visit TFTs obtained 10/25/23 (TSH 12.2) and 12/13/23 (2.24)   Via phone discussions 10/25/23 increased LT4 from 125 microgm daily to 137 mcirogm daily    Levothyroxine  -- take at 7-8 AM; when awakens then eats about 20-30 min later - not forgetting to take it, though over winter break was getting up later in morning thus affecting timing of daily dose    Feeling more tired -- though getting up earlier to get to school.  Recently ended work about 1 hr earlier to ensure adequate sleep.  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  Denies tasting acid reflux  -  Not known to snore at night  DERM - seeing dermatologist  for facial acne however has also diagnosed with \"pustulitis\" of the scalp -- specifically in lateral aspect of hair line in temporal area. TO have f/up with derm in 1 wk    Objective   /81 (BP Location: Right arm, Patient Position: Sitting)   Pulse 74   Resp 18   Ht 1.792 m (5' 10.55\")   Wt 59.5 kg (131 lb 3.2 oz)   BMI 18.53 kg/m²  (5%ile)  BSA: 1.72 meters squared  Growth percentiles: 65 %ile (Z= 0.40) based on CDC (Boys, 2-20 Years) Stature-for-age data based on Stature recorded on 2/1/2024. 19 %ile (Z= -0.90) based on CDC (Boys, 2-20 Years) weight-for-age data using vitals from 2/1/2024.     Physical Exam Alert, well- hydrated though appeared to be withdrawn, did have eye contact when direct questions posed to him  PERRL  Thyroid of normal size and consistency without palpable nodules  DTR 2+/4+ with normal relaxation phase  No tremor with arm extension and finger abduction        Component      Latest Ref Rng 12/13/2023   Thyroid Stimulating Hormone      0.44 - 3.98 mIU/L 2.24      Assessment/Plan 1.  Hypothyroidism - due to complaints of tiredness, although normal TSH in mid December, will reassess at this time    - if TFTs indicate adequate LT4 dose at this time then should consider other possibilities with PCP     Next followup in 6 months although should have TFTs more frequently since has had history of intermittently missing many doses with increase in TSH      Can still be followed with pedi endo particularly in next 12 months while ensure has optimal dosing/management    2. Hx of weight loss -- gained 1.6 kg in recent interval although still at lower range of normal    3. Recurrent hiccoughs - suggested Mother discuss with PCP however can occur due to reflux though can " be large differential    - at this time Mother believes that it quickly resolves and will not pursue further    4. Affect somewhat depressed - if has normal thyroid status then other considerations to consider include seasonal affect disorder, which was briefly explained to Mother

## 2024-02-05 PROBLEM — K92.0 HEMATEMESIS: Status: RESOLVED | Noted: 2019-03-20 | Resolved: 2024-02-05

## 2024-02-06 LAB
LEFT ABI: 1.19
LEFT ARM BP: 115 MMHG
LEFT DORSALIS PEDIS: 128 MMHG
LEFT POSTERIOR TIBIAL: 137 MMHG
RIGHT ABI: 1.14
RIGHT DORSALIS PEDIS: 131 MMHG
RIGHT POSTERIOR TIBIAL: 126 MMHG

## 2024-02-13 NOTE — DISCHARGE SUMMARY
[facility] Medicine  Discharge Summary      Patient Name: Estella Arevalo  MRN: [id number]  Patient Class: IP- Inpatient  Admission Date: 1/27/2024  Hospital Length of Stay: 3 days  Discharge Date and Time: 1/31/2024  5:57 PM  Attending Physician: No att. providers found   Discharging Provider: Clemencia Grossman MD  Primary Care Provider: Fiordaliza Ma -      HPI:   Estella Arevalo 68 y.o. female with HTN, hypothyroidism, history of polysubstance abuse, previous alcohol abuse presents hospital chief complaint of dysuria and leg pain.  She reports 1 week of worsening pain and erythema to her left foot that initially started as a wound.  She reports she developed this wound after cutting her foot on glass.  She denies any attempted treatment home.  She denies any aggravating or alleviating factors.  She reports she completed her previous antibiotics as prescribed for osteomyelitis.  She denies fever chest pain nausea vomiting abdominal pain leg swelling hematuria hematemesis dizziness and syncope.    In the ED, afebrile without leukocytosis urine drug screen positive for amphetamines lactic acid within normal limits COVID negative flu negative pro count within normal limits x-ray of right foot with diffuse soft tissue edema worse when compared to prior stone chest x-ray without acute cardiopulmonary process x-ray foot left with soft tissue edema without displaced fracture.    * No surgery found *      Hospital Course:   Admission to observation for both foot infection concern of OM. Drug screen positive for amphetamine. Patient denied IVDU. Last snort meth prior to admission. Arterial US BLE no focal occlusion but does show PAD BLE. X ray right foot diffuse soft tissue edema. MRI hindfoot left and right ordered.Continue Rocephin and Vancomycin IV.  Wound care instructions until official Podiatry consult.  Per Podiatry: MRI results reviewed with patient. Reactive edema vs early OM to the distal  hallux and 5th met head. Personally not convinced the risk associated with trocar bone biopsy in this patient with history on noncompliance and not following up outpatient would be to her benefit long term for just the suspicion of OM. Deep culture swabs have already been obtained. In out opinion wound care and antibiosis without procedure or podiatric surgical intervention should be sufficient.     Patient was discharged home on extended course of clindamycin.  Home health and home wound care was set up prior to discharge.  Recommend outpatient follow-up with PCP and Podiatry.  Referral placed.       Goals of Care Treatment Preferences:  Code Status: Full Code      Consults:   Consults (From admission, onward)          Status Ordering Provider     Inpatient consult to Vascular Surgery  Once        Provider:  Jeremy Patricio MD    Completed HEART, JOE AISHA     Inpatient virtual consult to Hospital Medicine  Once        Provider:  Clemencia Grossman MD    Completed SLY JOE AISHA     Inpatient virtual consult to Hospital Medicine  Once        Provider:  Clemencia Grossman MD    Completed SLY JOE AISHA     Case Management/  Once        Provider:  (Not yet assigned)    Completed RAFA DENT     Inpatient consult to Podiatry  Once        Provider:  Gee Barrios DPM    Completed ANDERS BOWEN            No new Assessment & Plan notes have been filed under this hospital service since the last note was generated.  Service: Hospital Medicine    Final Active Diagnoses:    Diagnosis Date Noted POA    PRINCIPAL PROBLEM:  BLE cellulitis , concern for OM in both foot [L97.522] 11/14/2023 Yes    Obesity (BMI 30-39.9) [E66.9] 11/02/2023 Yes    Polysubstance abuse [F19.10] 01/04/2019 Yes    Essential hypertension [I10] 07/16/2018 Yes     Chronic    Bipolar 1 disorder [F31.9] 12/30/2016 Yes     Chronic    Acquired hypothyroidism [E03.9] 12/19/2014 Yes     Chronic      Problems Resolved  During this Admission:       Discharged Condition: stable    Disposition: Home-Health Care [place]    Follow Up:   Follow-up Information       Nina Anderson NP-C. Go on 2/2/2024.    Specialty: Nurse Practitioner  Why: Appointment scheduled for 2:30pm  Contact information:  [address] [facility]  [place] [address]  [phone]               [facility] Follow up.    Specialties: Home Health Services, Home Therapy Services, Home Living Aide Services  Why: Home Health  Contact information:  [address] [place] [id number]  [phone]                         Patient Instructions:      Ambulatory referral/consult to Wound Clinic   Standing Status: Future   Referral Priority: Routine Referral Type: Consultation   Referral Reason: Specialty Services Required   Requested Specialty: Wound Care   Number of Visits Requested: 1     Ambulatory referral/consult to Podiatry   Standing Status: Future   Referral Priority: Routine Referral Type: Consultation   Referral Reason: Specialty Services Required   Requested Specialty: Podiatry   Number of Visits Requested: 1     Diet Cardiac     Notify your health care provider if you experience any of the following:  temperature >100.4     Notify your health care provider if you experience any of the following:  persistent nausea and vomiting or diarrhea     Notify your health care provider if you experience any of the following:  severe uncontrolled pain     Notify your health care provider if you experience any of the following:  redness, tenderness, or signs of infection (pain, swelling, redness, odor or green/yellow discharge around incision site)     Notify your health care provider if you experience any of the following:  difficulty breathing or increased cough     Notify your health care provider if you experience any of the following:  severe persistent headache     Notify your health care provider if you experience  "any of the following:  worsening rash     Notify your health care provider if you experience any of the following:  persistent dizziness, light-headedness, or visual disturbances     Notify your health care provider if you experience any of the following:  increased confusion or weakness     Send follow up & questions to   Order Comments: Patient's primary care physician: Fiordaliza Ma -     Activity as tolerated       Significant Diagnostic Studies: Labs: BMP: No results for input(s): "GLU", "NA", "K", "CL", "CO2", "BUN", "CREATININE", "CALCIUM", "MG" in the last 48 hours. and CMP No results for input(s): "NA", "K", "CL", "CO2", "GLU", "BUN", "CREATININE", "CALCIUM", "PROT", "ALBUMIN", "BILITOT", "ALKPHOS", "AST", "ALT", "ANIONGAP", "ESTGFRAFRICA", "EGFRNONAA" in the last 48 hours.    Pending Diagnostic Studies:       None           Medications:  Reconciled Home Medications:      Medication List        START taking these medications      clindamycin 300 MG capsule  Commonly known as: CLEOCIN  Take 1 capsule (300 mg total) by mouth 3 (three) times daily.     HYDROcodone-acetaminophen 5-325 mg per tablet  Commonly known as: NORCO  Take 1 tablet by mouth every 6 (six) hours as needed for Pain.            CONTINUE taking these medications      ARIPiprazole 10 MG Tab  Commonly known as: ABILIFY  Take 10 mg by mouth once daily.     CENTRUM SILVER ORAL  Take 1 tablet by mouth.     EScitalopram oxalate 20 MG tablet  Commonly known as: LEXAPRO  Take 1 tablet (20 mg total) by mouth once daily.     gabapentin 600 MG tablet  Commonly known as: NEURONTIN  Take 600 mg by mouth 3 (three) times daily.     levothyroxine 25 MCG tablet  Commonly known as: SYNTHROID  Take 1 tablet (25 mcg total) by mouth once daily.     LINZESS 145 mcg Cap capsule  Generic drug: linaCLOtide  Take 145 mcg by mouth.     naltrexone 50 mg tablet  Commonly known as: DEPADE  Take 50 mg by mouth every evening.     pantoprazole 40 MG tablet  Commonly " known as: PROTONIX  Take 1 tablet (40 mg total) by mouth 2 (two) times daily.     PROCTOZONE-HC 2.5 % rectal cream  Generic drug: hydrocortisone  STACI RECTALLY BID     traZODone 100 MG tablet  Commonly known as: DESYREL  Take 1 tablet (100 mg total) by mouth every evening.              Indwelling Lines/Drains at time of discharge:   Lines/Drains/Airways       None                   Time spent on the discharge of patient: 39 minutes         The attending portion of this evaluation, treatment, and documentation was performed per Clemencia Grossman MD via Telemedicine AudioVisual using the secure [employer] software platform with 2 way audio/video. The provider was located off-site and the patient is located in the hospital. The aforementioned video software was utilized to document the relevant history and physical exam    Clemencia Grossman MD  Department of Hospital Medicine  [facility]

## 2024-02-26 ENCOUNTER — TELEPHONE (OUTPATIENT)
Dept: WOUND CARE | Facility: HOSPITAL | Age: 69
End: 2024-02-26
Payer: MEDICARE

## 2024-02-27 NOTE — TELEPHONE ENCOUNTER
"C attempted to contact patient to advise of need to reschedule.  Message received "the person you are calling is not accepting calls at this time."  Wound care staff notified.  "

## 2024-04-04 NOTE — ASSESSMENT & PLAN NOTE
"   04/04/24 1305   Post-Acute Status   Post-Acute Authorization Placement;Home Health   Post-Acute Placement Status Set-up Complete/Auth obtained  ([id number])   HME Status Pending Qualifying Diagnosis   Home Health Status Set-up Complete/Auth obtained   Coverage [facility] SNP -   Patient choice form signed by patient/caregiver List from CMS Compare   Discharge Delays None known at this time   Discharge Plan   Discharge Plan A Skilled Nursing Facility  ([facility] Phone: [phone])   Discharge Plan B Home Health  ([facility] [place] [facility] Phone: [phone])     CM met with patient  to discuss any changes in discharge planning.  Patients plan is to  dc to SNF and HH after SNF.   No changes in DC plans. ARTURO: 4/4/24    Discharge Recommendations: moderate intensity therapy    1045 am  CM spoke with Sonido # [id number] and provided answers to verify the patient and the patients CMChris Head stated, " I do not have the capabilities to change or update a members information. I will transfer toy to benefits and someone will help  you. Also the patent will need to call social security and update his new address. Sonido provided the contact number for [employer] security # [phone].    2794-7842 am  Sonido transferred 's call to Piedad Roa @ [phone] and CM provided patients information to verify patients information and Adrienne stated, " I am not the person, you need to speak with and I will ensure that I update the patients information and that the patient has provided authorization for you to speak to benefits & recovery department on the patients behalf. DEBBIE provided the NPI # [id number], Adrienne transferred me to Xi # [id number].    7255-4564 pm  Xi #[id number] could not provide assistance because she worked in data collection department. CM was transferred to [employer] , which manages and monitors any open claims. DEBBIE informed " As addressed above.   "Celina# [id number] that, " I was instructed to fax a typed letter in reference to the patient not receiving any form of payment: settlement, judgement or award money for the open claim and he dismissed his . CM faxed the letter to # [phone] and confirmation received.    1:00 pm -1:15 pm  CM spoke with Maggie # [phone] and CM verified patients identity and patient was present through all recorded calls.  Maggie stated, " It takes 48-72 hours before the letter is received and can be attached to the open claim to close out the claim. Maggie provided a reference number and will update Mary FLYNN @ [facility].    1:19 pm  CM called and spoke  with Mary FLYNN and informed of the above and Mary verbalized an understanding and supportive  documentation of open claim request uploaded in Care Port    1:20 pm   CM uploaded SNF orders and COVID results via Care Port and CM waiting on patients assigned room and number to give report. Patient ordered paper scrubs    2:05 PM  Patient assigned to room 4A and nurse provided number to call report [phone] and will arrange for transportation at 3:00 pm and patient has received his paper scrubs      Sabine Oreilly RN  Case Management  [facility]  [phone]    "

## 2024-04-23 ENCOUNTER — HOSPITAL ENCOUNTER (INPATIENT)
Facility: HOSPITAL | Age: 69
LOS: 2 days | Discharge: PSYCHIATRIC OR SUBSTANCE USE FACILITY | DRG: 378 | End: 2024-04-25
Attending: EMERGENCY MEDICINE | Admitting: STUDENT IN AN ORGANIZED HEALTH CARE EDUCATION/TRAINING PROGRAM
Payer: MEDICARE

## 2024-04-23 DIAGNOSIS — R07.9 CHEST PAIN: ICD-10-CM

## 2024-04-23 DIAGNOSIS — R11.10 VOMITING, UNSPECIFIED VOMITING TYPE, UNSPECIFIED WHETHER NAUSEA PRESENT: Primary | ICD-10-CM

## 2024-04-23 DIAGNOSIS — K92.0 COFFEE GROUND EMESIS: ICD-10-CM

## 2024-04-23 LAB
ALBUMIN SERPL BCP-MCNC: 3.9 G/DL (ref 3.5–5.2)
ALP SERPL-CCNC: 271 U/L (ref 55–135)
ALT SERPL W/O P-5'-P-CCNC: 37 U/L (ref 10–44)
AMPHET+METHAMPHET UR QL: NEGATIVE
ANION GAP SERPL CALC-SCNC: 10 MMOL/L (ref 8–16)
APTT PPP: 23.2 SEC (ref 21–32)
AST SERPL-CCNC: 87 U/L (ref 10–40)
BACTERIA #/AREA URNS HPF: ABNORMAL /HPF
BARBITURATES UR QL SCN>200 NG/ML: NEGATIVE
BASOPHILS # BLD AUTO: 0.04 K/UL (ref 0–0.2)
BASOPHILS NFR BLD: 0.5 % (ref 0–1.9)
BENZODIAZ UR QL SCN>200 NG/ML: ABNORMAL
BILIRUB SERPL-MCNC: 0.8 MG/DL (ref 0.1–1)
BILIRUB UR QL STRIP: NEGATIVE
BUN SERPL-MCNC: 13 MG/DL (ref 8–23)
BZE UR QL SCN: NEGATIVE
CALCIUM SERPL-MCNC: 9.4 MG/DL (ref 8.7–10.5)
CANNABINOIDS UR QL SCN: NEGATIVE
CHLORIDE SERPL-SCNC: 100 MMOL/L (ref 95–110)
CLARITY UR: ABNORMAL
CO2 SERPL-SCNC: 23 MMOL/L (ref 23–29)
COLOR UR: ABNORMAL
CREAT SERPL-MCNC: 0.7 MG/DL (ref 0.5–1.4)
CREAT UR-MCNC: 40.1 MG/DL (ref 15–325)
DIFFERENTIAL METHOD BLD: ABNORMAL
EOSINOPHIL # BLD AUTO: 0.1 K/UL (ref 0–0.5)
EOSINOPHIL NFR BLD: 1.1 % (ref 0–8)
ERYTHROCYTE [DISTWIDTH] IN BLOOD BY AUTOMATED COUNT: 18.6 % (ref 11.5–14.5)
EST. GFR  (NO RACE VARIABLE): >60 ML/MIN/1.73 M^2
GLUCOSE SERPL-MCNC: 129 MG/DL (ref 70–110)
GLUCOSE UR QL STRIP: NEGATIVE
HCT VFR BLD AUTO: 35.7 % (ref 37–48.5)
HGB BLD-MCNC: 10.4 G/DL (ref 12–16)
HGB BLD-MCNC: 11.5 G/DL (ref 12–16)
HGB UR QL STRIP: ABNORMAL
HYALINE CASTS #/AREA URNS LPF: 0 /LPF
IMM GRANULOCYTES # BLD AUTO: 0.02 K/UL (ref 0–0.04)
IMM GRANULOCYTES NFR BLD AUTO: 0.2 % (ref 0–0.5)
INR PPP: 1 (ref 0.8–1.2)
KETONES UR QL STRIP: NEGATIVE
LEUKOCYTE ESTERASE UR QL STRIP: ABNORMAL
LYMPHOCYTES # BLD AUTO: 0.6 K/UL (ref 1–4.8)
LYMPHOCYTES NFR BLD: 7 % (ref 18–48)
MCH RBC QN AUTO: 26.9 PG (ref 27–31)
MCHC RBC AUTO-ENTMCNC: 32.2 G/DL (ref 32–36)
MCV RBC AUTO: 83 FL (ref 82–98)
METHADONE UR QL SCN>300 NG/ML: NEGATIVE
MICROSCOPIC COMMENT: ABNORMAL
MONOCYTES # BLD AUTO: 0.6 K/UL (ref 0.3–1)
MONOCYTES NFR BLD: 7 % (ref 4–15)
NEUTROPHILS # BLD AUTO: 6.9 K/UL (ref 1.8–7.7)
NEUTROPHILS NFR BLD: 84.2 % (ref 38–73)
NITRITE UR QL STRIP: NEGATIVE
NRBC BLD-RTO: 0 /100 WBC
OPIATES UR QL SCN: NEGATIVE
PCP UR QL SCN>25 NG/ML: NEGATIVE
PH UR STRIP: >8 [PH] (ref 5–8)
PLATELET # BLD AUTO: 345 K/UL (ref 150–450)
PMV BLD AUTO: 9.6 FL (ref 9.2–12.9)
POTASSIUM SERPL-SCNC: 4.1 MMOL/L (ref 3.5–5.1)
PROT SERPL-MCNC: 9 G/DL (ref 6–8.4)
PROT UR QL STRIP: ABNORMAL
PROTHROMBIN TIME: 10.9 SEC (ref 9–12.5)
RBC # BLD AUTO: 4.28 M/UL (ref 4–5.4)
RBC #/AREA URNS HPF: >100 /HPF (ref 0–4)
SODIUM SERPL-SCNC: 133 MMOL/L (ref 136–145)
SP GR UR STRIP: 1.02 (ref 1–1.03)
SQUAMOUS #/AREA URNS HPF: 1 /HPF
TOXICOLOGY INFORMATION: ABNORMAL
URN SPEC COLLECT METH UR: ABNORMAL
UROBILINOGEN UR STRIP-ACNC: NEGATIVE EU/DL
WBC # BLD AUTO: 8.14 K/UL (ref 3.9–12.7)
WBC #/AREA URNS HPF: 20 /HPF (ref 0–5)

## 2024-04-23 PROCEDURE — 85025 COMPLETE CBC W/AUTO DIFF WBC: CPT | Performed by: EMERGENCY MEDICINE

## 2024-04-23 PROCEDURE — 80053 COMPREHEN METABOLIC PANEL: CPT | Performed by: EMERGENCY MEDICINE

## 2024-04-23 PROCEDURE — 99285 EMERGENCY DEPT VISIT HI MDM: CPT | Mod: 25

## 2024-04-23 PROCEDURE — 86140 C-REACTIVE PROTEIN: CPT | Performed by: STUDENT IN AN ORGANIZED HEALTH CARE EDUCATION/TRAINING PROGRAM

## 2024-04-23 PROCEDURE — 96375 TX/PRO/DX INJ NEW DRUG ADDON: CPT

## 2024-04-23 PROCEDURE — 63600175 PHARM REV CODE 636 W HCPCS: Performed by: STUDENT IN AN ORGANIZED HEALTH CARE EDUCATION/TRAINING PROGRAM

## 2024-04-23 PROCEDURE — 63600175 PHARM REV CODE 636 W HCPCS: Performed by: EMERGENCY MEDICINE

## 2024-04-23 PROCEDURE — 81000 URINALYSIS NONAUTO W/SCOPE: CPT | Performed by: EMERGENCY MEDICINE

## 2024-04-23 PROCEDURE — 85018 HEMOGLOBIN: CPT | Performed by: STUDENT IN AN ORGANIZED HEALTH CARE EDUCATION/TRAINING PROGRAM

## 2024-04-23 PROCEDURE — 25000003 PHARM REV CODE 250: Performed by: EMERGENCY MEDICINE

## 2024-04-23 PROCEDURE — 85730 THROMBOPLASTIN TIME PARTIAL: CPT | Performed by: EMERGENCY MEDICINE

## 2024-04-23 PROCEDURE — C9113 INJ PANTOPRAZOLE SODIUM, VIA: HCPCS | Performed by: EMERGENCY MEDICINE

## 2024-04-23 PROCEDURE — 80307 DRUG TEST PRSMV CHEM ANLYZR: CPT | Performed by: EMERGENCY MEDICINE

## 2024-04-23 PROCEDURE — 25500020 PHARM REV CODE 255: Performed by: EMERGENCY MEDICINE

## 2024-04-23 PROCEDURE — C9113 INJ PANTOPRAZOLE SODIUM, VIA: HCPCS | Performed by: STUDENT IN AN ORGANIZED HEALTH CARE EDUCATION/TRAINING PROGRAM

## 2024-04-23 PROCEDURE — 96374 THER/PROPH/DIAG INJ IV PUSH: CPT

## 2024-04-23 PROCEDURE — 12000002 HC ACUTE/MED SURGE SEMI-PRIVATE ROOM

## 2024-04-23 PROCEDURE — 85610 PROTHROMBIN TIME: CPT | Performed by: EMERGENCY MEDICINE

## 2024-04-23 PROCEDURE — 83036 HEMOGLOBIN GLYCOSYLATED A1C: CPT | Performed by: EMERGENCY MEDICINE

## 2024-04-23 PROCEDURE — 25000003 PHARM REV CODE 250: Performed by: STUDENT IN AN ORGANIZED HEALTH CARE EDUCATION/TRAINING PROGRAM

## 2024-04-23 RX ORDER — GABAPENTIN 300 MG/1
600 CAPSULE ORAL 3 TIMES DAILY
Status: DISCONTINUED | OUTPATIENT
Start: 2024-04-23 | End: 2024-04-25 | Stop reason: HOSPADM

## 2024-04-23 RX ORDER — LEVOTHYROXINE SODIUM 25 UG/1
25 TABLET ORAL
Status: DISCONTINUED | OUTPATIENT
Start: 2024-04-24 | End: 2024-04-25 | Stop reason: HOSPADM

## 2024-04-23 RX ORDER — ESCITALOPRAM OXALATE 10 MG/1
20 TABLET ORAL DAILY
Status: DISCONTINUED | OUTPATIENT
Start: 2024-04-24 | End: 2024-04-25 | Stop reason: HOSPADM

## 2024-04-23 RX ORDER — IBUPROFEN 200 MG
16 TABLET ORAL
Status: DISCONTINUED | OUTPATIENT
Start: 2024-04-23 | End: 2024-04-25 | Stop reason: HOSPADM

## 2024-04-23 RX ORDER — NALOXONE HCL 0.4 MG/ML
0.02 VIAL (ML) INJECTION
Status: DISCONTINUED | OUTPATIENT
Start: 2024-04-23 | End: 2024-04-25 | Stop reason: HOSPADM

## 2024-04-23 RX ORDER — GLUCAGON 1 MG
1 KIT INJECTION
Status: DISCONTINUED | OUTPATIENT
Start: 2024-04-23 | End: 2024-04-25 | Stop reason: HOSPADM

## 2024-04-23 RX ORDER — MORPHINE SULFATE 4 MG/ML
2 INJECTION, SOLUTION INTRAMUSCULAR; INTRAVENOUS
Status: COMPLETED | OUTPATIENT
Start: 2024-04-23 | End: 2024-04-23

## 2024-04-23 RX ORDER — ACETAMINOPHEN 325 MG/1
650 TABLET ORAL EVERY 8 HOURS PRN
Status: DISCONTINUED | OUTPATIENT
Start: 2024-04-23 | End: 2024-04-25 | Stop reason: HOSPADM

## 2024-04-23 RX ORDER — TALC
6 POWDER (GRAM) TOPICAL NIGHTLY
Status: DISCONTINUED | OUTPATIENT
Start: 2024-04-23 | End: 2024-04-25 | Stop reason: HOSPADM

## 2024-04-23 RX ORDER — IBUPROFEN 200 MG
24 TABLET ORAL
Status: DISCONTINUED | OUTPATIENT
Start: 2024-04-23 | End: 2024-04-25 | Stop reason: HOSPADM

## 2024-04-23 RX ORDER — PANTOPRAZOLE SODIUM 40 MG/10ML
40 INJECTION, POWDER, LYOPHILIZED, FOR SOLUTION INTRAVENOUS ONCE
Status: COMPLETED | OUTPATIENT
Start: 2024-04-23 | End: 2024-04-23

## 2024-04-23 RX ORDER — PANTOPRAZOLE SODIUM 40 MG/10ML
40 INJECTION, POWDER, LYOPHILIZED, FOR SOLUTION INTRAVENOUS 2 TIMES DAILY
Status: DISCONTINUED | OUTPATIENT
Start: 2024-04-24 | End: 2024-04-25 | Stop reason: HOSPADM

## 2024-04-23 RX ORDER — POLYETHYLENE GLYCOL 3350 17 G/17G
17 POWDER, FOR SOLUTION ORAL 2 TIMES DAILY PRN
Status: DISCONTINUED | OUTPATIENT
Start: 2024-04-23 | End: 2024-04-24

## 2024-04-23 RX ORDER — TRAZODONE HYDROCHLORIDE 50 MG/1
50 TABLET ORAL NIGHTLY PRN
Status: DISCONTINUED | OUTPATIENT
Start: 2024-04-23 | End: 2024-04-25 | Stop reason: HOSPADM

## 2024-04-23 RX ORDER — PANTOPRAZOLE SODIUM 40 MG/10ML
40 INJECTION, POWDER, LYOPHILIZED, FOR SOLUTION INTRAVENOUS
Status: COMPLETED | OUTPATIENT
Start: 2024-04-23 | End: 2024-04-23

## 2024-04-23 RX ORDER — ARIPIPRAZOLE 10 MG/1
10 TABLET ORAL DAILY
Status: DISCONTINUED | OUTPATIENT
Start: 2024-04-24 | End: 2024-04-25 | Stop reason: HOSPADM

## 2024-04-23 RX ORDER — ONDANSETRON HYDROCHLORIDE 2 MG/ML
8 INJECTION, SOLUTION INTRAVENOUS
Status: COMPLETED | OUTPATIENT
Start: 2024-04-23 | End: 2024-04-23

## 2024-04-23 RX ORDER — PANTOPRAZOLE SODIUM 40 MG/10ML
40 INJECTION, POWDER, LYOPHILIZED, FOR SOLUTION INTRAVENOUS 2 TIMES DAILY
Status: DISCONTINUED | OUTPATIENT
Start: 2024-04-23 | End: 2024-04-23

## 2024-04-23 RX ADMIN — OCTREOTIDE ACETATE 50 MCG/HR: 500 INJECTION, SOLUTION INTRAVENOUS; SUBCUTANEOUS at 04:04

## 2024-04-23 RX ADMIN — IOHEXOL 75 ML: 350 INJECTION, SOLUTION INTRAVENOUS at 05:04

## 2024-04-23 RX ADMIN — MORPHINE SULFATE 2 MG: 4 INJECTION INTRAVENOUS at 06:04

## 2024-04-23 RX ADMIN — PANTOPRAZOLE SODIUM 40 MG: 40 INJECTION, POWDER, FOR SOLUTION INTRAVENOUS at 07:04

## 2024-04-23 RX ADMIN — PIPERACILLIN AND TAZOBACTAM 4.5 G: 4; .5 INJECTION, POWDER, LYOPHILIZED, FOR SOLUTION INTRAVENOUS; PARENTERAL at 06:04

## 2024-04-23 RX ADMIN — ONDANSETRON 8 MG: 2 INJECTION INTRAMUSCULAR; INTRAVENOUS at 04:04

## 2024-04-23 RX ADMIN — GABAPENTIN 600 MG: 300 CAPSULE ORAL at 09:04

## 2024-04-23 RX ADMIN — Medication 6 MG: at 09:04

## 2024-04-23 RX ADMIN — PANTOPRAZOLE SODIUM 40 MG: 40 INJECTION, POWDER, FOR SOLUTION INTRAVENOUS at 04:04
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
## 2024-04-23 NOTE — ADMISSIONCARE
AdmissionCare    Guideline: Gastrointestinal Bleeding (Upper) - INPT, Inpatient    Based on the indications selected for the patient, the bed status of Inpatient was determined to be MET    The following indications were selected as present at the time of evaluation of the patient:      - Ongoing active bleeding (eg, decreasing hematocrit)    AdmissionCare documentation entered by: Clemencia Grossman    [facility] [email or web address]com, 27th edition, Copyright © 2023 [facility] [employer], [facility] All Rights Reserved.  [id number]:00
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
## 2024-04-23 NOTE — ED NOTES
Estella Arevalo, a 69 y.o. female presents to the ED via EMS from [facility] w/ complaint of nausea and vomiting. Patient actively vomiting coffee ground emesis in triage. Patient also c/o of prolapsed rectum when vomiting. Patient's CEC documented in chart by critical nurse. Patient is AAOx3, VSS, NAD. Denies CP, SOB, cough, fever, numbness, or tingling. Bed locked, in lowest position, bed rails up x2, call light in reach, all monitoring attached, sitter at bedside.

## 2024-04-24 ENCOUNTER — ANESTHESIA (OUTPATIENT)
Dept: ENDOSCOPY | Facility: HOSPITAL | Age: 69
DRG: 378 | End: 2024-04-24
Payer: MEDICARE

## 2024-04-24 ENCOUNTER — ANESTHESIA EVENT (OUTPATIENT)
Dept: ENDOSCOPY | Facility: HOSPITAL | Age: 69
DRG: 378 | End: 2024-04-24
Payer: MEDICARE

## 2024-04-24 PROBLEM — L97.509: Status: ACTIVE | Noted: 2024-04-24

## 2024-04-24 LAB
ABO + RH BLD: NORMAL
ALBUMIN SERPL BCP-MCNC: 3.5 G/DL (ref 3.5–5.2)
ALP SERPL-CCNC: 226 U/L (ref 55–135)
ALT SERPL W/O P-5'-P-CCNC: 29 U/L (ref 10–44)
ANION GAP SERPL CALC-SCNC: 8 MMOL/L (ref 8–16)
AST SERPL-CCNC: 66 U/L (ref 10–40)
BILIRUB SERPL-MCNC: 1.1 MG/DL (ref 0.1–1)
BLD GP AB SCN CELLS X3 SERPL QL: NORMAL
BUN SERPL-MCNC: 13 MG/DL (ref 8–23)
CALCIUM SERPL-MCNC: 9.1 MG/DL (ref 8.7–10.5)
CHLORIDE SERPL-SCNC: 101 MMOL/L (ref 95–110)
CO2 SERPL-SCNC: 25 MMOL/L (ref 23–29)
CREAT SERPL-MCNC: 0.8 MG/DL (ref 0.5–1.4)
CRP SERPL-MCNC: 4.8 MG/L (ref 0–8.2)
EST. GFR  (NO RACE VARIABLE): >60 ML/MIN/1.73 M^2
ESTIMATED AVG GLUCOSE: 114 MG/DL (ref 68–131)
GLUCOSE SERPL-MCNC: 136 MG/DL (ref 70–110)
HBA1C MFR BLD: 5.6 % (ref 4–5.6)
MAGNESIUM SERPL-MCNC: 2.6 MG/DL (ref 1.6–2.6)
PHOSPHATE SERPL-MCNC: 4 MG/DL (ref 2.7–4.5)
POTASSIUM SERPL-SCNC: 4.3 MMOL/L (ref 3.5–5.1)
PROT SERPL-MCNC: 8.5 G/DL (ref 6–8.4)
RH BLD: NORMAL
SODIUM SERPL-SCNC: 134 MMOL/L (ref 136–145)
SPECIMEN OUTDATE: NORMAL

## 2024-04-24 PROCEDURE — 25000003 PHARM REV CODE 250: Performed by: STUDENT IN AN ORGANIZED HEALTH CARE EDUCATION/TRAINING PROGRAM

## 2024-04-24 PROCEDURE — 25000003 PHARM REV CODE 250: Performed by: HOSPITALIST

## 2024-04-24 PROCEDURE — 37000008 HC ANESTHESIA 1ST 15 MINUTES: Performed by: INTERNAL MEDICINE

## 2024-04-24 PROCEDURE — 63600175 PHARM REV CODE 636 W HCPCS: Performed by: NURSE ANESTHETIST, CERTIFIED REGISTERED

## 2024-04-24 PROCEDURE — 63600175 PHARM REV CODE 636 W HCPCS: Performed by: STUDENT IN AN ORGANIZED HEALTH CARE EDUCATION/TRAINING PROGRAM

## 2024-04-24 PROCEDURE — 87522 HEPATITIS C REVRS TRNSCRPJ: CPT | Performed by: STUDENT IN AN ORGANIZED HEALTH CARE EDUCATION/TRAINING PROGRAM

## 2024-04-24 PROCEDURE — 25000003 PHARM REV CODE 250: Performed by: EMERGENCY MEDICINE

## 2024-04-24 PROCEDURE — D9220A PRA ANESTHESIA: Mod: ANES,,, | Performed by: ANESTHESIOLOGY

## 2024-04-24 PROCEDURE — G0425 INPT/ED TELECONSULT30: HCPCS | Mod: GT,,, | Performed by: PSYCHIATRY & NEUROLOGY

## 2024-04-24 PROCEDURE — 83735 ASSAY OF MAGNESIUM: CPT | Performed by: STUDENT IN AN ORGANIZED HEALTH CARE EDUCATION/TRAINING PROGRAM

## 2024-04-24 PROCEDURE — D9220A PRA ANESTHESIA: Mod: CRNA,,, | Performed by: NURSE ANESTHETIST, CERTIFIED REGISTERED

## 2024-04-24 PROCEDURE — 11000001 HC ACUTE MED/SURG PRIVATE ROOM

## 2024-04-24 PROCEDURE — 25000003 PHARM REV CODE 250: Performed by: NURSE ANESTHETIST, CERTIFIED REGISTERED

## 2024-04-24 PROCEDURE — 43235 EGD DIAGNOSTIC BRUSH WASH: CPT | Performed by: INTERNAL MEDICINE

## 2024-04-24 PROCEDURE — 86901 BLOOD TYPING SEROLOGIC RH(D): CPT | Performed by: STUDENT IN AN ORGANIZED HEALTH CARE EDUCATION/TRAINING PROGRAM

## 2024-04-24 PROCEDURE — 99223 1ST HOSP IP/OBS HIGH 75: CPT | Mod: ,,,

## 2024-04-24 PROCEDURE — 80053 COMPREHEN METABOLIC PANEL: CPT | Performed by: STUDENT IN AN ORGANIZED HEALTH CARE EDUCATION/TRAINING PROGRAM

## 2024-04-24 PROCEDURE — 25000003 PHARM REV CODE 250: Performed by: NURSE PRACTITIONER

## 2024-04-24 PROCEDURE — 63600175 PHARM REV CODE 636 W HCPCS: Mod: JA | Performed by: EMERGENCY MEDICINE

## 2024-04-24 PROCEDURE — 86850 RBC ANTIBODY SCREEN: CPT | Performed by: STUDENT IN AN ORGANIZED HEALTH CARE EDUCATION/TRAINING PROGRAM

## 2024-04-24 PROCEDURE — C9113 INJ PANTOPRAZOLE SODIUM, VIA: HCPCS | Performed by: STUDENT IN AN ORGANIZED HEALTH CARE EDUCATION/TRAINING PROGRAM

## 2024-04-24 PROCEDURE — 37000009 HC ANESTHESIA EA ADD 15 MINS: Performed by: INTERNAL MEDICINE

## 2024-04-24 PROCEDURE — 43235 EGD DIAGNOSTIC BRUSH WASH: CPT | Mod: ,,, | Performed by: INTERNAL MEDICINE

## 2024-04-24 PROCEDURE — 0DJ08ZZ INSPECTION OF UPPER INTESTINAL TRACT, VIA NATURAL OR ARTIFICIAL OPENING ENDOSCOPIC: ICD-10-PCS | Performed by: INTERNAL MEDICINE

## 2024-04-24 PROCEDURE — 99223 1ST HOSP IP/OBS HIGH 75: CPT | Mod: 25,,, | Performed by: NURSE PRACTITIONER

## 2024-04-24 PROCEDURE — 84100 ASSAY OF PHOSPHORUS: CPT | Performed by: STUDENT IN AN ORGANIZED HEALTH CARE EDUCATION/TRAINING PROGRAM

## 2024-04-24 RX ORDER — PANTOPRAZOLE SODIUM 40 MG/10ML
40 INJECTION, POWDER, LYOPHILIZED, FOR SOLUTION INTRAVENOUS 2 TIMES DAILY
Status: DISCONTINUED | OUTPATIENT
Start: 2024-04-24 | End: 2024-04-24

## 2024-04-24 RX ORDER — PROPOFOL 10 MG/ML
VIAL (ML) INTRAVENOUS
Status: DISPENSED
Start: 2024-04-24 | End: 2024-04-25

## 2024-04-24 RX ORDER — POLYETHYLENE GLYCOL 3350 17 G/17G
17 POWDER, FOR SOLUTION ORAL DAILY
Status: DISCONTINUED | OUTPATIENT
Start: 2024-04-24 | End: 2024-04-25

## 2024-04-24 RX ORDER — PHENYLEPHRINE HYDROCHLORIDE 10 MG/ML
INJECTION INTRAVENOUS
Status: DISCONTINUED | OUTPATIENT
Start: 2024-04-24 | End: 2024-04-24

## 2024-04-24 RX ORDER — PROPOFOL 10 MG/ML
VIAL (ML) INTRAVENOUS
Status: DISCONTINUED | OUTPATIENT
Start: 2024-04-24 | End: 2024-04-24

## 2024-04-24 RX ORDER — LIDOCAINE HYDROCHLORIDE 20 MG/ML
INJECTION INTRAVENOUS
Status: DISCONTINUED | OUTPATIENT
Start: 2024-04-24 | End: 2024-04-24

## 2024-04-24 RX ORDER — MUPIROCIN 20 MG/G
OINTMENT TOPICAL 2 TIMES DAILY
Status: DISCONTINUED | OUTPATIENT
Start: 2024-04-24 | End: 2024-04-25 | Stop reason: HOSPADM

## 2024-04-24 RX ORDER — PHENYLEPHRINE HCL IN 0.9% NACL 1 MG/10 ML
SYRINGE (ML) INTRAVENOUS
Status: DISPENSED
Start: 2024-04-24 | End: 2024-04-25

## 2024-04-24 RX ORDER — SUCCINYLCHOLINE CHLORIDE 20 MG/ML
INJECTION INTRAMUSCULAR; INTRAVENOUS
Status: DISCONTINUED | OUTPATIENT
Start: 2024-04-24 | End: 2024-04-24

## 2024-04-24 RX ORDER — LIDOCAINE HYDROCHLORIDE 20 MG/ML
INJECTION, SOLUTION EPIDURAL; INFILTRATION; INTRACAUDAL; PERINEURAL
Status: DISPENSED
Start: 2024-04-24 | End: 2024-04-25

## 2024-04-24 RX ORDER — SUCCINYLCHOLINE CHLORIDE 20 MG/ML
INJECTION INTRAMUSCULAR; INTRAVENOUS
Status: DISPENSED
Start: 2024-04-24 | End: 2024-04-25

## 2024-04-24 RX ADMIN — ARIPIPRAZOLE 10 MG: 10 TABLET ORAL at 09:04

## 2024-04-24 RX ADMIN — OCTREOTIDE ACETATE 50 MCG/HR: 500 INJECTION, SOLUTION INTRAVENOUS; SUBCUTANEOUS at 05:04

## 2024-04-24 RX ADMIN — PIPERACILLIN AND TAZOBACTAM 4.5 G: 4; .5 INJECTION, POWDER, LYOPHILIZED, FOR SOLUTION INTRAVENOUS; PARENTERAL at 05:04

## 2024-04-24 RX ADMIN — SODIUM CHLORIDE: 0.9 INJECTION, SOLUTION INTRAVENOUS at 12:04

## 2024-04-24 RX ADMIN — SUCCINYLCHOLINE CHLORIDE 120 MG: 20 INJECTION, SOLUTION INTRAMUSCULAR; INTRAVENOUS at 01:04

## 2024-04-24 RX ADMIN — POLYETHYLENE GLYCOL 3350 17 G: 17 POWDER, FOR SOLUTION ORAL at 03:04

## 2024-04-24 RX ADMIN — ESCITALOPRAM OXALATE 20 MG: 10 TABLET ORAL at 09:04

## 2024-04-24 RX ADMIN — GABAPENTIN 600 MG: 300 CAPSULE ORAL at 09:04

## 2024-04-24 RX ADMIN — GABAPENTIN 600 MG: 300 CAPSULE ORAL at 10:04

## 2024-04-24 RX ADMIN — LEVOTHYROXINE SODIUM 25 MCG: 25 TABLET ORAL at 05:04

## 2024-04-24 RX ADMIN — MUPIROCIN: 20 OINTMENT TOPICAL at 10:04

## 2024-04-24 RX ADMIN — PROPOFOL 60 MG: 10 INJECTION, EMULSION INTRAVENOUS at 01:04

## 2024-04-24 RX ADMIN — GABAPENTIN 600 MG: 300 CAPSULE ORAL at 03:04

## 2024-04-24 RX ADMIN — PHENYLEPHRINE HYDROCHLORIDE 100 MCG: 10 INJECTION INTRAVENOUS at 01:04

## 2024-04-24 RX ADMIN — PANTOPRAZOLE SODIUM 40 MG: 40 INJECTION, POWDER, FOR SOLUTION INTRAVENOUS at 05:04

## 2024-04-24 RX ADMIN — PANTOPRAZOLE SODIUM 40 MG: 40 INJECTION, POWDER, FOR SOLUTION INTRAVENOUS at 10:04

## 2024-04-24 RX ADMIN — TRAZODONE HYDROCHLORIDE 50 MG: 50 TABLET ORAL at 10:04

## 2024-04-24 RX ADMIN — LIDOCAINE HYDROCHLORIDE 50 MG: 20 INJECTION, SOLUTION INTRAVENOUS at 01:04

## 2024-04-24 RX ADMIN — Medication 6 MG: at 10:04

## 2024-04-24 NOTE — ANESTHESIA PROCEDURE NOTES
Intubation    Date/Time: 4/24/2024 1:16 PM    Performed by: Myah Brown CRNA  Authorized by: Lilliam Davis MD    Intubation:     Induction:  Rapid sequence induction    Intubated:  Postinduction    Mask Ventilation:  N/a    Attempts:  1    Attempted By:  CRNA    Method of Intubation:  Video laryngoscopy    Blade:  Hancock 3    Laryngeal View Grade: Grade I - full view of cords      Difficult Airway Encountered?: No      Complications:  None    Airway Device:  Oral endotracheal tube    Airway Device Size:  7.0    Style/Cuff Inflation:  Cuffed (inflated to minimal occlusive pressure)    Inflation Amount (mL):  4    Tube secured:  20    Secured at:  The lips    Placement Verified By:  Capnometry and Revisualization with laryngoscopy    Complicating Factors:  None    Findings Post-Intubation:  BS equal bilateral and atraumatic/condition of teeth unchanged

## 2024-04-24 NOTE — PLAN OF CARE
Problem: Adult Inpatient Plan of Care  Goal: Plan of Care Review  Outcome: Progressing  Flowsheets (Taken 4/24/2024 1703)  Plan of Care Reviewed With: patient  Goal: Patient-Specific Goal (Individualized)  Outcome: Progressing  Goal: Absence of Hospital-Acquired Illness or Injury  Outcome: Progressing  Goal: Optimal Comfort and Wellbeing  Outcome: Progressing  Intervention: Provide Person-Centered Care  Flowsheets (Taken 4/24/2024 1703)  Trust Relationship/Rapport:   care explained   questions answered  Goal: Readiness for Transition of Care  Outcome: Progressing     Problem: Gastrointestinal Bleeding  Goal: Optimal Coping with Acute Illness  Outcome: Progressing  Goal: Hemostasis  Outcome: Progressing     Problem: Wound  Goal: Optimal Coping  Outcome: Progressing  Goal: Optimal Functional Ability  Outcome: Progressing  Goal: Absence of Infection Signs and Symptoms  Outcome: Progressing  Goal: Improved Oral Intake  Outcome: Progressing  Goal: Optimal Pain Control and Function  Outcome: Progressing  Goal: Skin Health and Integrity  Outcome: Progressing  Goal: Optimal Wound Healing  Outcome: Progressing

## 2024-04-24 NOTE — ASSESSMENT & PLAN NOTE
Patient had a EGD about 5-6 months ago, showing diffuse esophagitis  Although not found at that time, given patient's cirrhosis, varices are on the differential  NPO at midnight, likely EGD in a.m.  GI consult in a.m.
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
## 2024-04-24 NOTE — ASSESSMENT & PLAN NOTE
Chronic, controlled. Latest blood pressure and vitals reviewed-     Temp:  [97.7 °F (36.5 °C)]   Pulse:  []   Resp:  [18-24]   BP: (112-186)/()   SpO2:  [91 %-96 %]     Home meds for hypertension were reviewed   While in the hospital, will manage blood pressure as follows  Adjust home antihypertensive regimen as follows- decrease in setting of GIB  Will utilize p.r.n. blood pressure medication only if patient's blood pressure greater than 180/110 and she develops symptoms such as worsening chest pain or shortness of breath.

## 2024-04-24 NOTE — PROVATION PATIENT INSTRUCTIONS
Discharge Summary/Instructions after an Endoscopic Procedure  Patient Name: Estella Arevalo  Patient MRN: [id number]  Patient YOB: 1955  Wednesday, April 24, 2024  Jean Pierre Moran MD  Dear patient,  As a result of recent federal legislation (The Federal Cures Act), you may   receive lab or pathology results from your procedure in your [facility]   account before your physician is able to contact you. Your physician or   their representative will relay the results to you with their   recommendations at their soonest availability.  Thank you,  RESTRICTIONS:  During your procedure today, you received medications for sedation.  These   medications may affect your judgment, balance and coordination.  Therefore,   for 24 hours, you have the following restrictions:   - DO NOT drive a car, operate machinery, make legal/financial decisions,   sign important papers or drink alcohol.    ACTIVITY:  Today: no heavy lifting, straining or running due to procedural   sedation/anesthesia.  The following day: return to full activity including work.  DIET:  Eat and drink normally unless instructed otherwise.     TREATMENT FOR COMMON SIDE EFFECTS:  - Mild abdominal pain, nausea, belching, bloating or excessive gas:  rest,   eat lightly and use a heating pad.  - Sore Throat: treat with throat lozenges and/or gargle with warm salt   water.  - Because air was used during the procedure, expelling large amounts of air   from your rectum or belching is normal.  - If a bowel prep was taken, you may not have a bowel movement for 1-3 days.    This is normal.  SYMPTOMS TO WATCH FOR AND REPORT TO YOUR PHYSICIAN:  1. Abdominal pain or bloating, other than gas cramps.  2. Chest pain.  3. Back pain.  4. Signs of infection such as: chills or fever occurring within 24 hours   after the procedure.  5. Rectal bleeding, which would show as bright red, maroon, or black stools.   (A tablespoon of blood from the rectum is not serious, especially if    hemorrhoids are present.)  6. Vomiting.  7. Weakness or dizziness.  GO DIRECTLY TO THE NEAREST EMERGENCY ROOM IF YOU HAVE ANY OF THE FOLLOWING:      Difficulty breathing              Chills and/or fever over 101 F   Persistent vomiting and/or vomiting blood   Severe abdominal pain   Severe chest pain   Black, tarry stools   Bleeding- more than one tablespoon   Any other symptom or condition that you feel may need urgent attention  Your doctor recommends these additional instructions:  If any biopsies were taken, your doctors clinic will contact you in 1 to 2   weeks with any results.  - [facility].   - Advance diet as tolerated.   - Continue present medications.  For questions, problems or results please call your physician - Jean Pierre Moran MD at Work:  [phone].  [facility] can be reached at [phone]     IF A COMPLICATION OR EMERGENCY SITUATION ARISES AND YOU ARE UNABLE TO REACH   YOUR PHYSICIAN - GO DIRECTLY TO THE EMERGENCY ROOM.  Jean Pierre Moran MD  4/24/2024 1:27:50 PM  This report has been verified and signed electronically.  Dear patient,  As a result of recent federal legislation (The Federal Cures Act), you may   receive lab or pathology results from your procedure in your [facility]   account before your physician is able to contact you. Your physician or   their representative will relay the results to you with their   recommendations at their soonest availability.  Thank you,  PROVATION

## 2024-04-24 NOTE — ASSESSMENT & PLAN NOTE
Patient has chronic liver disease due to hepatitis c. They have a history of portal hypertension. Their liver disease is compensated. Hepatology has not been consulted. The patient is not on the liver transplant list. We will obtain daily CBC, CMP, and INR. Their most current Na-MELD is listed below.  MELD 3.0: 10 at 4/24/2024  6:47 AM  MELD-Na: 7 at 4/24/2024  6:47 AM  Calculated from:  Serum Creatinine: 0.8 mg/dL (Using min of 1 mg/dL) at 4/24/2024  6:47 AM  Serum Sodium: 134 mmol/L at 4/24/2024  6:47 AM  Total Bilirubin: 1.1 mg/dL at 4/24/2024  6:47 AM  Serum Albumin: 3.5 g/dL at 4/24/2024  6:47 AM  INR(ratio): 1.0 at 4/23/2024  3:59 PM  Age at listing (hypothetical): 69 years  Sex: Female at 4/24/2024  6:47 AM

## 2024-04-24 NOTE — ASSESSMENT & PLAN NOTE
Patient had a EGD about 5-6 months ago, showing diffuse esophagitis  Although not found at that time, given patient's cirrhosis, varices are on the differential  NPO at midnight, likely EGD in a.m.  Started on Octreotide, IV PPI BID, and CTX for ppx, upgrade to zosyn for potential proctitis.   Seen by GI,patient will have EGD today.

## 2024-04-24 NOTE — TRANSFER OF CARE
"Anesthesia Transfer of Care Note    Patient: Estella Arevalo    Procedure(s) Performed: Procedure(s) (LRB):  EGD (ESOPHAGOGASTRODUODENOSCOPY) (N/A)    Patient location: GI    Anesthesia Type: general    Transport from OR: Transported from OR on room air with adequate spontaneous ventilation    Post pain: adequate analgesia    Post assessment: no apparent anesthetic complications and tolerated procedure well    Post vital signs: stable    Level of consciousness: awake    Nausea/Vomiting: no nausea/vomiting    Complications: none    Transfer of care protocol was followed      Last vitals: Visit Vitals  BP (!) 105/57 (BP Location: Left arm, Patient Position: Lying)   Pulse 82   Temp 36.8 °C (98.2 °F) (Oral)   Resp 18   Ht 5' 5" (1.651 m)   Wt 88.5 kg (195 lb)   SpO2 (!) 93%   Breastfeeding No   BMI 32.45 kg/m²     "
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
## 2024-04-24 NOTE — ASSESSMENT & PLAN NOTE
Given death of  in sister, and daughter going back to shelter, patient would benefit from further psychotherapy  SSRI continued  CEC continued

## 2024-04-24 NOTE — NURSING
[facility], [facility]  Nurses Note -- 4 Eyes      4/24/2024       Skin assessed on: Admit      [x] No Pressure Injuries Present    [x]Prevention Measures Documented  Wound to left foot near great toe. Wound care order initiated.   [] Yes LDA  for Pressure Injury Previously documented     [] Yes New Pressure Injury Discovered   [] LDA for New Pressure Injury Added      Attending RN:  Mariya Chavez, RN     Second :  Carla SHIPLEY, PCT

## 2024-04-24 NOTE — ANESTHESIA POSTPROCEDURE EVALUATION
Anesthesia Post Evaluation    Patient: Estella Arevalo    Procedure(s) Performed: Procedure(s) (LRB):  EGD (ESOPHAGOGASTRODUODENOSCOPY) (N/A)    Final Anesthesia Type: general      Patient location during evaluation: GI PACU  Patient participation: Yes- Able to Participate  Level of consciousness: awake and alert and oriented  Post-procedure vital signs: reviewed and stable  Pain management: adequate  Airway patency: patent    PONV status at discharge: No PONV  Anesthetic complications: no      Cardiovascular status: blood pressure returned to baseline and hemodynamically stable  Respiratory status: unassisted, spontaneous ventilation and room air  Hydration status: euvolemic  Follow-up not needed.              Vitals Value Taken Time   /57 04/24/24 1335   Temp 36.8 °C (98.2 °F) 04/24/24 1335   Pulse 82 04/24/24 1335   Resp 18 04/24/24 1335   SpO2 93 % 04/24/24 1335         No case tracking events are documented in the log.      Pain/Mika Score: Pain Rating Prior to Med Admin: 10 (4/23/2024  6:27 PM)  Mika Score: 9 (4/24/2024  1:35 PM)
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
## 2024-04-24 NOTE — ASSESSMENT & PLAN NOTE
Long discussion about better coping mechanisms and need to drink less/stopped drinking and stop substance abuse
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
## 2024-04-24 NOTE — SUBJECTIVE & OBJECTIVE
Interval History: Seen by GI,patient will have EGD today.    Review of Systems   Constitutional:  Positive for activity change and appetite change.   Respiratory:  Negative for apnea and choking.    Gastrointestinal:  Negative for abdominal distention and abdominal pain.   Genitourinary:  Negative for decreased urine volume.   Musculoskeletal:  Negative for arthralgias.   Neurological:  Positive for weakness.     Objective:     Vital Signs (Most Recent):  Temp: 97.9 °F (36.6 °C) (04/24/24 1045)  Pulse: 79 (04/24/24 1045)  Resp: 16 (04/24/24 1045)  BP: 129/80 (04/24/24 1045)  SpO2: (!) 93 % (04/24/24 1045) Vital Signs (24h Range):  Temp:  [97.7 °F (36.5 °C)-98.3 °F (36.8 °C)] 97.9 °F (36.6 °C)  Pulse:  [] 79  Resp:  [16-24] 16  SpO2:  [83 %-96 %] 93 %  BP: (107-186)/() 129/80     Weight: 88.5 kg (195 lb)  Body mass index is 32.45 kg/m².    Intake/Output Summary (Last 24 hours) at 4/24/2024 1109  Last data filed at 4/24/2024 0545  Gross per 24 hour   Intake 200 ml   Output --   Net 200 ml         Physical Exam  Eyes:      Pupils: Pupils are equal, round, and reactive to light.   Cardiovascular:      Rate and Rhythm: Normal rate.   Pulmonary:      Effort: No respiratory distress.      Breath sounds: No wheezing.   Musculoskeletal:         General: No swelling or deformity.   Neurological:      Mental Status: She is alert and oriented to person, place, and time.             Significant Labs: All pertinent labs within the past 24 hours have been reviewed.  BMP:   Recent Labs   Lab 04/24/24  0647   *   *   K 4.3      CO2 25   BUN 13   CREATININE 0.8   CALCIUM 9.1   MG 2.6     CBC:   Recent Labs   Lab 04/23/24  1559 04/23/24  2345   WBC 8.14  --    HGB 11.5* 10.4*   HCT 35.7*  --      --        Significant Imaging: I have reviewed all pertinent imaging results/findings within the past 24 hours.
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
## 2024-04-24 NOTE — NURSING TRANSFER
Nursing Transfer Note      4/24/2024   10:03 AM    Reason patient is being transferred: Coffee ground emesis    Transfer From: ED    Transfer via bed    Transfer with cardiac monitoring    4 eyes on Skin: yes    Medicines sent: none    Any special needs or follow-up needed: none    Patient belongings transferred with patient: No Belongings are with security    Chart send with patient: No    Patient reassessed at: 4/24/24 @ 0930     Upon arrival to floor: patient oriented to room and bed in lowest position

## 2024-04-25 ENCOUNTER — TELEPHONE (OUTPATIENT)
Dept: ENDOSCOPY | Facility: HOSPITAL | Age: 69
End: 2024-04-25
Payer: MEDICARE

## 2024-04-25 VITALS
TEMPERATURE: 98 F | BODY MASS INDEX: 32.49 KG/M2 | DIASTOLIC BLOOD PRESSURE: 68 MMHG | RESPIRATION RATE: 19 BRPM | HEIGHT: 65 IN | WEIGHT: 195 LBS | OXYGEN SATURATION: 93 % | HEART RATE: 84 BPM | SYSTOLIC BLOOD PRESSURE: 115 MMHG

## 2024-04-25 DIAGNOSIS — K70.30 ALCOHOLIC CIRRHOSIS, UNSPECIFIED WHETHER ASCITES PRESENT: Primary | ICD-10-CM

## 2024-04-25 DIAGNOSIS — R93.3 ABNORMAL FINDING ON GI TRACT IMAGING: Primary | ICD-10-CM

## 2024-04-25 DIAGNOSIS — R76.8 HEPATITIS C ANTIBODY POSITIVE IN BLOOD: ICD-10-CM

## 2024-04-25 DIAGNOSIS — Z12.11 COLON CANCER SCREENING: ICD-10-CM

## 2024-04-25 LAB
ALBUMIN SERPL BCP-MCNC: 3 G/DL (ref 3.5–5.2)
ALP SERPL-CCNC: 185 U/L (ref 55–135)
ALT SERPL W/O P-5'-P-CCNC: 39 U/L (ref 10–44)
ANION GAP SERPL CALC-SCNC: 10 MMOL/L (ref 8–16)
AST SERPL-CCNC: 106 U/L (ref 10–40)
BASOPHILS # BLD AUTO: 0.03 K/UL (ref 0–0.2)
BASOPHILS NFR BLD: 0.6 % (ref 0–1.9)
BILIRUB SERPL-MCNC: 0.6 MG/DL (ref 0.1–1)
BUN SERPL-MCNC: 15 MG/DL (ref 8–23)
CALCIUM SERPL-MCNC: 8.3 MG/DL (ref 8.7–10.5)
CHLORIDE SERPL-SCNC: 103 MMOL/L (ref 95–110)
CO2 SERPL-SCNC: 22 MMOL/L (ref 23–29)
CREAT SERPL-MCNC: 0.7 MG/DL (ref 0.5–1.4)
DIFFERENTIAL METHOD BLD: ABNORMAL
EOSINOPHIL # BLD AUTO: 0.3 K/UL (ref 0–0.5)
EOSINOPHIL NFR BLD: 5.3 % (ref 0–8)
ERYTHROCYTE [DISTWIDTH] IN BLOOD BY AUTOMATED COUNT: 19.3 % (ref 11.5–14.5)
EST. GFR  (NO RACE VARIABLE): >60 ML/MIN/1.73 M^2
GLUCOSE SERPL-MCNC: 109 MG/DL (ref 70–110)
HCT VFR BLD AUTO: 33.6 % (ref 37–48.5)
HCV RNA SERPL QL NAA+PROBE: NOT DETECTED
HCV RNA SPEC NAA+PROBE-ACNC: NOT DETECTED IU/ML
HGB BLD-MCNC: 10.3 G/DL (ref 12–16)
IMM GRANULOCYTES # BLD AUTO: 0.01 K/UL (ref 0–0.04)
IMM GRANULOCYTES NFR BLD AUTO: 0.2 % (ref 0–0.5)
LYMPHOCYTES # BLD AUTO: 1.1 K/UL (ref 1–4.8)
LYMPHOCYTES NFR BLD: 21.9 % (ref 18–48)
MAGNESIUM SERPL-MCNC: 2.3 MG/DL (ref 1.6–2.6)
MCH RBC QN AUTO: 27 PG (ref 27–31)
MCHC RBC AUTO-ENTMCNC: 30.7 G/DL (ref 32–36)
MCV RBC AUTO: 88 FL (ref 82–98)
MONOCYTES # BLD AUTO: 0.3 K/UL (ref 0.3–1)
MONOCYTES NFR BLD: 6.9 % (ref 4–15)
NEUTROPHILS # BLD AUTO: 3.2 K/UL (ref 1.8–7.7)
NEUTROPHILS NFR BLD: 65.1 % (ref 38–73)
NRBC BLD-RTO: 0 /100 WBC
PHOSPHATE SERPL-MCNC: 3.5 MG/DL (ref 2.7–4.5)
PLATELET # BLD AUTO: 261 K/UL (ref 150–450)
PMV BLD AUTO: 10.8 FL (ref 9.2–12.9)
POTASSIUM SERPL-SCNC: 4.6 MMOL/L (ref 3.5–5.1)
PROT SERPL-MCNC: 7.1 G/DL (ref 6–8.4)
RBC # BLD AUTO: 3.81 M/UL (ref 4–5.4)
SODIUM SERPL-SCNC: 135 MMOL/L (ref 136–145)
WBC # BLD AUTO: 4.93 K/UL (ref 3.9–12.7)

## 2024-04-25 PROCEDURE — 25000003 PHARM REV CODE 250: Performed by: HOSPITALIST

## 2024-04-25 PROCEDURE — 63600175 PHARM REV CODE 636 W HCPCS: Performed by: HOSPITALIST

## 2024-04-25 PROCEDURE — C9113 INJ PANTOPRAZOLE SODIUM, VIA: HCPCS | Performed by: STUDENT IN AN ORGANIZED HEALTH CARE EDUCATION/TRAINING PROGRAM

## 2024-04-25 PROCEDURE — 25000003 PHARM REV CODE 250: Performed by: STUDENT IN AN ORGANIZED HEALTH CARE EDUCATION/TRAINING PROGRAM

## 2024-04-25 PROCEDURE — 63600175 PHARM REV CODE 636 W HCPCS: Performed by: STUDENT IN AN ORGANIZED HEALTH CARE EDUCATION/TRAINING PROGRAM

## 2024-04-25 PROCEDURE — 85025 COMPLETE CBC W/AUTO DIFF WBC: CPT | Performed by: HOSPITALIST

## 2024-04-25 PROCEDURE — 36415 COLL VENOUS BLD VENIPUNCTURE: CPT | Performed by: STUDENT IN AN ORGANIZED HEALTH CARE EDUCATION/TRAINING PROGRAM

## 2024-04-25 PROCEDURE — 84100 ASSAY OF PHOSPHORUS: CPT | Performed by: STUDENT IN AN ORGANIZED HEALTH CARE EDUCATION/TRAINING PROGRAM

## 2024-04-25 PROCEDURE — 99232 SBSQ HOSP IP/OBS MODERATE 35: CPT | Mod: ,,, | Performed by: NURSE PRACTITIONER

## 2024-04-25 PROCEDURE — 80053 COMPREHEN METABOLIC PANEL: CPT | Performed by: STUDENT IN AN ORGANIZED HEALTH CARE EDUCATION/TRAINING PROGRAM

## 2024-04-25 PROCEDURE — 83735 ASSAY OF MAGNESIUM: CPT | Performed by: STUDENT IN AN ORGANIZED HEALTH CARE EDUCATION/TRAINING PROGRAM

## 2024-04-25 RX ORDER — POLYETHYLENE GLYCOL 3350, SODIUM SULFATE ANHYDROUS, SODIUM BICARBONATE, SODIUM CHLORIDE, POTASSIUM CHLORIDE 236; 22.74; 6.74; 5.86; 2.97 G/4L; G/4L; G/4L; G/4L; G/4L
4 POWDER, FOR SOLUTION ORAL ONCE
Qty: 4000 ML | Refills: 0 | Status: SHIPPED | OUTPATIENT
Start: 2024-04-25 | End: 2024-04-25

## 2024-04-25 RX ORDER — POLYETHYLENE GLYCOL 3350 17 G/17G
17 POWDER, FOR SOLUTION ORAL 2 TIMES DAILY
Status: DISCONTINUED | OUTPATIENT
Start: 2024-04-25 | End: 2024-04-25 | Stop reason: HOSPADM

## 2024-04-25 RX ADMIN — ESCITALOPRAM OXALATE 20 MG: 10 TABLET ORAL at 09:04

## 2024-04-25 RX ADMIN — GABAPENTIN 600 MG: 300 CAPSULE ORAL at 09:04

## 2024-04-25 RX ADMIN — MUPIROCIN: 20 OINTMENT TOPICAL at 09:04

## 2024-04-25 RX ADMIN — PANTOPRAZOLE SODIUM 40 MG: 40 INJECTION, POWDER, FOR SOLUTION INTRAVENOUS at 09:04

## 2024-04-25 RX ADMIN — CEFTRIAXONE 1 G: 1 INJECTION, POWDER, FOR SOLUTION INTRAMUSCULAR; INTRAVENOUS at 10:04

## 2024-04-25 RX ADMIN — ARIPIPRAZOLE 10 MG: 10 TABLET ORAL at 09:04
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
## 2024-04-25 NOTE — NURSING
[facility], [facility] - [place]  Nurses Note -- 4 Eyes      4/24/2024       Skin assessed on: Q Shift      [x] No Pressure Injuries Present    []Prevention Measures Documented    [] Yes LDA  for Pressure Injury Previously documented     [] Yes New Pressure Injury Discovered   [] LDA for New Pressure Injury Added      Attending RN:  Debora Roberson LPN     Second RN:  Mariya Chavez RN

## 2024-04-25 NOTE — PLAN OF CARE
Case Management Final Discharge Note      Discharge Disposition: Ps facility (ADT 32 placed)    New DME ordered / company name: n/a    Relevant SDOH / Transition of Care Barriers:  n/a    Primary Caretaker and contact information: n/a    Scheduled followup appointment: n/a    Referrals placed: n/a    Transportation: per Transfer center    Patient educated on discharge services and updated on DC plan. Bedside RN notified, patient clear to discharge from Case Management Perspective.        04/25/24 1117   Final Note   Assessment Type Final Discharge Note   Anticipated Discharge Disposition Psych   Post-Acute Status   Post-Acute Authorization Placement   Post-Acute Placement Status Referrals Sent   Discharge Delays (!) Post-Acute Set-up

## 2024-04-25 NOTE — TELEPHONE ENCOUNTER
Referral for colonoscopy received. Patient currently in hospital. Patient will be contacted when discharged. Refendo placed.

## 2024-04-25 NOTE — PLAN OF CARE
Case Management Assessment     PCP: Fiordaliza Ma -   Pharm[facility]:   [id number] [place] - [address] AT [facility] [address]  [address] [address]  Phone: [phone] Fax: [phone]       Patient Arrived From: [facility]    Barriers to Discharge: none    Discharge Plan:    A. Patient is PEC'd and will return to [facility] facility          04/25/24 1006   Discharge Assessment   Assessment Type Discharge Planning Assessment   Source of Information health record  (staff and am rounds)   Facility Arrived From: [facility]

## 2024-04-25 NOTE — NURSING
[facility] [place]es [place] -- 4 Eyes      4/24/2024       Skin assessed on: Q Shift      [x] No Pressure Injuries Present    []Prevention Measures Documented    [] Yes LDA  for Pressure Injury Previously documented     [] Yes New Pressure Injury Discovered   [] LDA for New Pressure Injury Added      Attending RN:  Debora Roberson LPN     Second RN:  Mariya Chavez RN

## 2024-04-25 NOTE — NURSING
Patient transferred off unit with [facility], transporting back to [facility].

## 2024-04-25 NOTE — NURSING
[facility] [place]  Nurses Note -- 4 Eyes      4/25/2024       Skin assessed on: Q Shift      [x] No Pressure Injuries Present    []Prevention Measures Documented    [] Yes LDA  for Pressure Injury Previously documented     [] Yes New Pressure Injury Discovered   [] LDA for New Pressure Injury Added      Attending RN:  Amber Gama RN     Second RN:  Debora Roberson LPN
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
## 2024-04-30 ENCOUNTER — TELEPHONE (OUTPATIENT)
Dept: ENDOSCOPY | Facility: HOSPITAL | Age: 69
End: 2024-04-30
Payer: MEDICARE

## 2024-04-30 NOTE — TELEPHONE ENCOUNTER
Unable to reach patient by phone at either number on file.  Sent postal letter for patient to call Endoscopy Scheduling to review instructions and confirm appointment for Colonoscopy on 5/3/24.        Dear Estella Arevalo,     I attempted to reach you to discuss your upcoming Colonoscopy.  Please call [phone] to confirm your appointment.  Below is a copy of your instructions for your reference:          Instructions for Colonoscopy  PEG (polyethylene glycol) - Common Brands: Golytely, Colyte, Nulytely, Gavilyte, Trilyte    Date of procedure: 5/3/24 - Arrive at 7:45 AM    Location of Department:   [facility] [address] [place] [address]  Take the Elevators to 2nd Floor Endoscopy Procedural Area    As soon as possible:   your prep from pharmacy and over the counter DULCOLAX LAXATIVE TABLETS     On the ENTIRE day before your procedure:  You may ONLY have clear liquids, such as:  Water  Sports drinks (Gatorade, Power-Aid)  Coffee or tea (no cream or nondairy creamer)  Clear juices without pulp (apple, white grape)  Gelatin desserts (no fruit or toppings)  Clear soda (Sprite, Coke, ginger ale)  Chicken broth (until 12 midnight the night before procedure)    What You CANNOT do:   Do not EAT solid food.  Do not drink milk or anything colored red.  Do not drink alcohol.  Do not take oral medications within 1 hour of starting each dose of prep.  No gum chewing or candy morning of procedure.                       Note:   Disregard the insert instructions from pharmacy.  Medication taken by mouth may not be absorbed properly when taken within 1 hour before the start of each dose of PEG Bowel Prep.  It is not uncommon to experience some abdominal cramping, nausea and/or vomiting when taking the prep. If you have nausea and/or vomiting while taking the prep, stop drinking for 20 to 30 minutes then continue.    How to take prep:    PEG Bowel Prep is a (2-day) prep.    One (1) bottle of prep  is required for a complete preparation for colonoscopy. You must drink water with each dose of prep, and additional water after each dose.    DOSE 1--Day Before Colonoscopy 5/2/24     Drink at least 6 to 8 glasses of clear liquids from time you wake up until you begin your prep, and then continue until bedtime to avoid dehydration.     12:00 pm (NOON) Mix your entire container of prep with lukewarm water and refrigerate. Take four (4) Dulcolax (Bisacodyl) tablets with at least 8 ounces or more of clear liquids.       6:00 pm:  You must complete Steps 1 and 2 below before going to bed:    Step 1- Drink half of the liquid in the container within one (1) hour.   Step 2- Refrigerate the remaining half of the liquid for dose 2.                       DOSE 2--Day of the Colonoscopy 5/3/24 at 2-3 AM    For this dose, repeat Step 1 shown above using the remaining half of the liquid prep.   You may continue drinking water/clear liquids until (4:45 AM).    For more information about your procedure, please note the two items below:    Please watch this informational video. It is important to watch this animated consent video prior to your arrival. If you haven't watched the video prior to arriving, you will be asked to watch it during admission, which can cause delays.     Options for viewing:  Using a keyboard:  press and hold the control tab (Ctrl) and left mouse click to follow link          Colonoscopy Instructional Video                                                        OR    Type link address into your web browser's address bar:  [email or web address]    2. Educational Booklet Colonoscopy Prep - Liquid            IMPORTANT INFORMATION TO KNOW BEFORE YOUR PROCEDURE  [facility] 2nd Floor--Enter at the rear of the building through the emergency department screening station or the Outpatient Registration door, then continue to endoscopy department on the 2nd floor.      If your  procedure requires the administration of anesthesia, it is necessary for a responsible adult to drive you home. (Medical Transportation, Uber, Lyft, Taxi, etc. may ONLY be used if a responsible adult is present to accompany you home.  The responsible adult CANNOT be the  of the service).   person must be available to return to pick you up within 15 minutes of being notified of discharge.    Please bring a picture ID, insurance card, & copayment      Take Medications as directed below:    If you begin taking any new blood thinning medications or injectable weight loss/diabetes medications (other than insulin), please contact the endoscopy scheduling department listed below as soon as possible.    If you are diabetic, see the attached instructions sheet regarding your medication(s).    If you take HEART, BLOOD PRESSURE, SEIZURE, PAIN, LUNG (including inhalers/nebulizers), ANTI-REJECTION (transplant patients) OR PSYCHIATRIC medications, please take at your regular times with a sip of water, unless otherwise directed by the scheduling nurse.    Important contact information:    Endoscopy Scheduling [phone] / Hours of operation Monday-Friday 8:00 AM-4:30 PM    Questions about insurance or financial obligations call [phone] or [phone]    After hours questions requiring immediate assistance, contact [facility] at [phone] or [phone]    NOTE - On occasion, unforeseen circumstances may cause a delay in your procedure start time. We respect your time and appreciate your patience during these circumstances.

## 2024-05-08 ENCOUNTER — HOSPITAL ENCOUNTER (EMERGENCY)
Facility: HOSPITAL | Age: 69
Discharge: HOME | End: 2024-05-08
Attending: EMERGENCY MEDICINE
Payer: MEDICARE

## 2024-05-08 VITALS
HEART RATE: 97 BPM | RESPIRATION RATE: 18 BRPM | TEMPERATURE: 98 F | OXYGEN SATURATION: 97 % | WEIGHT: 180 LBS | SYSTOLIC BLOOD PRESSURE: 156 MMHG | DIASTOLIC BLOOD PRESSURE: 89 MMHG | BODY MASS INDEX: 29.95 KG/M2

## 2024-05-08 DIAGNOSIS — R11.2 NAUSEA AND VOMITING, UNSPECIFIED VOMITING TYPE: Primary | ICD-10-CM

## 2024-05-08 DIAGNOSIS — F10.10 ALCOHOL ABUSE: ICD-10-CM

## 2024-05-08 DIAGNOSIS — R10.9 ABDOMINAL PAIN: ICD-10-CM

## 2024-05-08 DIAGNOSIS — R10.13 EPIGASTRIC ABDOMINAL PAIN: ICD-10-CM

## 2024-05-08 LAB
ALBUMIN SERPL BCP-MCNC: 3.7 G/DL (ref 3.5–5.2)
ALP SERPL-CCNC: 240 U/L (ref 55–135)
ALT SERPL W/O P-5'-P-CCNC: 23 U/L (ref 10–44)
AMPHET+METHAMPHET UR QL: NEGATIVE
ANION GAP SERPL CALC-SCNC: 12 MMOL/L (ref 8–16)
AST SERPL-CCNC: 51 U/L (ref 10–40)
BARBITURATES UR QL SCN>200 NG/ML: NEGATIVE
BASOPHILS # BLD AUTO: 0.07 K/UL (ref 0–0.2)
BASOPHILS NFR BLD: 1 % (ref 0–1.9)
BENZODIAZ UR QL SCN>200 NG/ML: NEGATIVE
BILIRUB SERPL-MCNC: 0.3 MG/DL (ref 0.1–1)
BILIRUB UR QL STRIP: NEGATIVE
BUN SERPL-MCNC: 11 MG/DL (ref 8–23)
BZE UR QL SCN: NEGATIVE
CALCIUM SERPL-MCNC: 9 MG/DL (ref 8.7–10.5)
CANNABINOIDS UR QL SCN: NEGATIVE
CHLORIDE SERPL-SCNC: 102 MMOL/L (ref 95–110)
CLARITY UR: CLEAR
CO2 SERPL-SCNC: 20 MMOL/L (ref 23–29)
COLOR UR: YELLOW
CREAT SERPL-MCNC: 0.7 MG/DL (ref 0.5–1.4)
CREAT UR-MCNC: 51.2 MG/DL (ref 15–325)
DIFFERENTIAL METHOD BLD: ABNORMAL
EOSINOPHIL # BLD AUTO: 0.1 K/UL (ref 0–0.5)
EOSINOPHIL NFR BLD: 1.4 % (ref 0–8)
ERYTHROCYTE [DISTWIDTH] IN BLOOD BY AUTOMATED COUNT: 16.8 % (ref 11.5–14.5)
EST. GFR  (NO RACE VARIABLE): >60 ML/MIN/1.73 M^2
ETHANOL SERPL-MCNC: 36 MG/DL
GLUCOSE SERPL-MCNC: 127 MG/DL (ref 70–110)
GLUCOSE UR QL STRIP: NEGATIVE
HCT VFR BLD AUTO: 34.9 % (ref 37–48.5)
HGB BLD-MCNC: 11.2 G/DL (ref 12–16)
HGB UR QL STRIP: NEGATIVE
IMM GRANULOCYTES # BLD AUTO: 0.02 K/UL (ref 0–0.04)
IMM GRANULOCYTES NFR BLD AUTO: 0.3 % (ref 0–0.5)
KETONES UR QL STRIP: NEGATIVE
LACTATE SERPL-SCNC: 2.3 MMOL/L (ref 0.5–2.2)
LEUKOCYTE ESTERASE UR QL STRIP: NEGATIVE
LIPASE SERPL-CCNC: 26 U/L (ref 4–60)
LYMPHOCYTES # BLD AUTO: 1 K/UL (ref 1–4.8)
LYMPHOCYTES NFR BLD: 14.6 % (ref 18–48)
MAGNESIUM SERPL-MCNC: 1.8 MG/DL (ref 1.6–2.6)
MCH RBC QN AUTO: 27.1 PG (ref 27–31)
MCHC RBC AUTO-ENTMCNC: 32.1 G/DL (ref 32–36)
MCV RBC AUTO: 84 FL (ref 82–98)
METHADONE UR QL SCN>300 NG/ML: NEGATIVE
MONOCYTES # BLD AUTO: 0.5 K/UL (ref 0.3–1)
MONOCYTES NFR BLD: 6.3 % (ref 4–15)
NEUTROPHILS # BLD AUTO: 5.4 K/UL (ref 1.8–7.7)
NEUTROPHILS NFR BLD: 76.4 % (ref 38–73)
NITRITE UR QL STRIP: NEGATIVE
NRBC BLD-RTO: 0 /100 WBC
OPIATES UR QL SCN: NEGATIVE
PCP UR QL SCN>25 NG/ML: NEGATIVE
PH UR STRIP: 7 [PH] (ref 5–8)
PLATELET # BLD AUTO: 340 K/UL (ref 150–450)
PMV BLD AUTO: 10.1 FL (ref 9.2–12.9)
POTASSIUM SERPL-SCNC: 3.9 MMOL/L (ref 3.5–5.1)
PROT SERPL-MCNC: 8.4 G/DL (ref 6–8.4)
PROT UR QL STRIP: NEGATIVE
RBC # BLD AUTO: 4.14 M/UL (ref 4–5.4)
SODIUM SERPL-SCNC: 134 MMOL/L (ref 136–145)
SP GR UR STRIP: 1.01 (ref 1–1.03)
TOXICOLOGY INFORMATION: NORMAL
URN SPEC COLLECT METH UR: NORMAL
UROBILINOGEN UR STRIP-ACNC: NEGATIVE EU/DL
WBC # BLD AUTO: 7.12 K/UL (ref 3.9–12.7)

## 2024-05-08 PROCEDURE — 63600175 PHARM REV CODE 636 W HCPCS: Performed by: EMERGENCY MEDICINE

## 2024-05-08 PROCEDURE — 96372 THER/PROPH/DIAG INJ SC/IM: CPT | Performed by: EMERGENCY MEDICINE

## 2024-05-08 PROCEDURE — 80307 DRUG TEST PRSMV CHEM ANLYZR: CPT | Performed by: EMERGENCY MEDICINE

## 2024-05-08 PROCEDURE — 93005 ELECTROCARDIOGRAM TRACING: CPT

## 2024-05-08 PROCEDURE — 83690 ASSAY OF LIPASE: CPT | Performed by: EMERGENCY MEDICINE

## 2024-05-08 PROCEDURE — 96374 THER/PROPH/DIAG INJ IV PUSH: CPT

## 2024-05-08 PROCEDURE — 83735 ASSAY OF MAGNESIUM: CPT | Performed by: EMERGENCY MEDICINE

## 2024-05-08 PROCEDURE — 81003 URINALYSIS AUTO W/O SCOPE: CPT | Mod: 59 | Performed by: EMERGENCY MEDICINE

## 2024-05-08 PROCEDURE — 99285 EMERGENCY DEPT VISIT HI MDM: CPT | Mod: 25

## 2024-05-08 PROCEDURE — 82077 ASSAY SPEC XCP UR&BREATH IA: CPT | Performed by: EMERGENCY MEDICINE

## 2024-05-08 PROCEDURE — 80053 COMPREHEN METABOLIC PANEL: CPT | Performed by: EMERGENCY MEDICINE

## 2024-05-08 PROCEDURE — 85025 COMPLETE CBC W/AUTO DIFF WBC: CPT | Performed by: EMERGENCY MEDICINE

## 2024-05-08 PROCEDURE — 93010 ELECTROCARDIOGRAM REPORT: CPT | Mod: ,,, | Performed by: INTERNAL MEDICINE

## 2024-05-08 PROCEDURE — 96375 TX/PRO/DX INJ NEW DRUG ADDON: CPT

## 2024-05-08 PROCEDURE — 83605 ASSAY OF LACTIC ACID: CPT | Performed by: EMERGENCY MEDICINE

## 2024-05-08 PROCEDURE — 25000003 PHARM REV CODE 250: Performed by: EMERGENCY MEDICINE

## 2024-05-08 RX ORDER — PROMETHAZINE HYDROCHLORIDE 25 MG/ML
25 INJECTION, SOLUTION INTRAMUSCULAR; INTRAVENOUS
Status: COMPLETED | OUTPATIENT
Start: 2024-05-08 | End: 2024-05-08

## 2024-05-08 RX ORDER — HYDROCODONE BITARTRATE AND ACETAMINOPHEN 5; 325 MG/1; MG/1
1 TABLET ORAL
Status: COMPLETED | OUTPATIENT
Start: 2024-05-08 | End: 2024-05-08

## 2024-05-08 RX ORDER — DROPERIDOL 2.5 MG/ML
1.25 INJECTION, SOLUTION INTRAMUSCULAR; INTRAVENOUS ONCE
Status: COMPLETED | OUTPATIENT
Start: 2024-05-08 | End: 2024-05-08

## 2024-05-08 RX ORDER — PANTOPRAZOLE SODIUM 40 MG/10ML
40 INJECTION, POWDER, LYOPHILIZED, FOR SOLUTION INTRAVENOUS
Status: DISCONTINUED | OUTPATIENT
Start: 2024-05-08 | End: 2024-05-08

## 2024-05-08 RX ORDER — PANTOPRAZOLE SODIUM 40 MG/1
80 TABLET, DELAYED RELEASE ORAL ONCE
Status: COMPLETED | OUTPATIENT
Start: 2024-05-08 | End: 2024-05-08

## 2024-05-08 RX ADMIN — HYDROCODONE BITARTRATE AND ACETAMINOPHEN 1 TABLET: 5; 325 TABLET ORAL at 09:05

## 2024-05-08 RX ADMIN — SODIUM CHLORIDE, POTASSIUM CHLORIDE, SODIUM LACTATE AND CALCIUM CHLORIDE 1000 ML: 600; 310; 30; 20 INJECTION, SOLUTION INTRAVENOUS at 05:05

## 2024-05-08 RX ADMIN — PROMETHAZINE HYDROCHLORIDE 25 MG: 25 INJECTION INTRAMUSCULAR; INTRAVENOUS at 07:05

## 2024-05-08 RX ADMIN — PANTOPRAZOLE SODIUM 80 MG: 40 TABLET, DELAYED RELEASE ORAL at 07:05

## 2024-05-08 RX ADMIN — PROMETHAZINE HYDROCHLORIDE 12.5 MG: 25 INJECTION INTRAMUSCULAR; INTRAVENOUS at 05:05

## 2024-05-08 RX ADMIN — DROPERIDOL 1.25 MG: 2.5 INJECTION, SOLUTION INTRAMUSCULAR; INTRAVENOUS at 05:05
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
## 2024-05-09 LAB
OHS QRS DURATION: 78 MS
OHS QTC CALCULATION: 453 MS

## 2024-11-25 NOTE — ED NOTES
Lab notified of labs to be drawn.  
Pt still having n/v. Unable to give oral meds. MD will be notified   
Tele tech called to put pt on box number [id number].  
78
